# Patient Record
Sex: FEMALE | Race: WHITE | NOT HISPANIC OR LATINO | Employment: UNEMPLOYED | URBAN - METROPOLITAN AREA
[De-identification: names, ages, dates, MRNs, and addresses within clinical notes are randomized per-mention and may not be internally consistent; named-entity substitution may affect disease eponyms.]

---

## 2017-01-01 ENCOUNTER — HOSPITAL ENCOUNTER (INPATIENT)
Facility: HOSPITAL | Age: 52
LOS: 2 days | Discharge: ANOTHER HOSPITAL | DRG: 949 | End: 2017-06-28
Attending: PHYSICAL MEDICINE & REHABILITATION | Admitting: PHYSICAL MEDICINE & REHABILITATION
Payer: MEDICAID

## 2017-01-01 ENCOUNTER — ANESTHESIA EVENT (OUTPATIENT)
Dept: ENDOSCOPY | Facility: HOSPITAL | Age: 52
DRG: 643 | End: 2017-01-01
Payer: MEDICAID

## 2017-01-01 ENCOUNTER — OFFICE VISIT (OUTPATIENT)
Dept: NEUROSURGERY | Facility: CLINIC | Age: 52
End: 2017-01-01
Payer: MEDICAID

## 2017-01-01 ENCOUNTER — ANESTHESIA (OUTPATIENT)
Dept: SURGERY | Facility: HOSPITAL | Age: 52
DRG: 025 | End: 2017-01-01
Payer: MEDICAID

## 2017-01-01 ENCOUNTER — HOSPITAL ENCOUNTER (INPATIENT)
Facility: HOSPITAL | Age: 52
LOS: 1 days | DRG: 871 | End: 2017-08-31
Attending: EMERGENCY MEDICINE | Admitting: INTERNAL MEDICINE
Payer: MEDICAID

## 2017-01-01 ENCOUNTER — ANESTHESIA EVENT (OUTPATIENT)
Dept: SURGERY | Facility: HOSPITAL | Age: 52
DRG: 025 | End: 2017-01-01
Payer: MEDICAID

## 2017-01-01 ENCOUNTER — HOSPITAL ENCOUNTER (INPATIENT)
Facility: HOSPITAL | Age: 52
LOS: 5 days | Discharge: NURSING, REHAB OR LONG-TERM CARE | DRG: 871 | End: 2017-08-16
Attending: EMERGENCY MEDICINE | Admitting: INTERNAL MEDICINE
Payer: MEDICAID

## 2017-01-01 ENCOUNTER — HOSPITAL ENCOUNTER (OUTPATIENT)
Dept: RADIOLOGY | Facility: HOSPITAL | Age: 52
Discharge: HOME | DRG: 025 | End: 2017-06-06
Attending: NEUROLOGICAL SURGERY
Payer: MEDICAID

## 2017-01-01 ENCOUNTER — HOSPITAL ENCOUNTER (INPATIENT)
Facility: HOSPITAL | Age: 52
LOS: 43 days | Discharge: NURSING, REHAB OR LONG-TERM CARE | DRG: 643 | End: 2017-08-10
Attending: EMERGENCY MEDICINE | Admitting: INTERNAL MEDICINE
Payer: MEDICAID

## 2017-01-01 ENCOUNTER — SURGERY (OUTPATIENT)
Age: 52
End: 2017-01-01

## 2017-01-01 ENCOUNTER — TELEPHONE (OUTPATIENT)
Dept: NEUROSURGERY | Facility: CLINIC | Age: 52
End: 2017-01-01

## 2017-01-01 ENCOUNTER — PATIENT OUTREACH (OUTPATIENT)
Dept: ADMINISTRATIVE | Facility: CLINIC | Age: 52
End: 2017-01-01

## 2017-01-01 ENCOUNTER — ANESTHESIA (OUTPATIENT)
Dept: ENDOSCOPY | Facility: HOSPITAL | Age: 52
DRG: 643 | End: 2017-01-01
Payer: MEDICAID

## 2017-01-01 ENCOUNTER — HOSPITAL ENCOUNTER (INPATIENT)
Facility: HOSPITAL | Age: 52
LOS: 19 days | Discharge: NURSING, REHAB OR LONG-TERM CARE | DRG: 025 | End: 2017-06-26
Attending: NEUROLOGICAL SURGERY | Admitting: NEUROLOGICAL SURGERY
Payer: MEDICAID

## 2017-01-01 ENCOUNTER — TELEPHONE (OUTPATIENT)
Dept: ENDOCRINOLOGY | Facility: CLINIC | Age: 52
End: 2017-01-01

## 2017-01-01 VITALS
TEMPERATURE: 98 F | SYSTOLIC BLOOD PRESSURE: 113 MMHG | HEART RATE: 91 BPM | WEIGHT: 130.88 LBS | BODY MASS INDEX: 24.09 KG/M2 | DIASTOLIC BLOOD PRESSURE: 76 MMHG | HEIGHT: 62 IN

## 2017-01-01 VITALS
SYSTOLIC BLOOD PRESSURE: 95 MMHG | TEMPERATURE: 98 F | RESPIRATION RATE: 18 BRPM | OXYGEN SATURATION: 93 % | HEIGHT: 62 IN | WEIGHT: 118.38 LBS | HEART RATE: 118 BPM | DIASTOLIC BLOOD PRESSURE: 72 MMHG | BODY MASS INDEX: 21.79 KG/M2

## 2017-01-01 VITALS
OXYGEN SATURATION: 93 % | HEIGHT: 62 IN | BODY MASS INDEX: 23.93 KG/M2 | WEIGHT: 130.06 LBS | SYSTOLIC BLOOD PRESSURE: 116 MMHG | OXYGEN SATURATION: 96 % | TEMPERATURE: 98 F | HEART RATE: 62 BPM | DIASTOLIC BLOOD PRESSURE: 75 MMHG | HEIGHT: 62 IN | WEIGHT: 130.06 LBS | BODY MASS INDEX: 23.93 KG/M2 | DIASTOLIC BLOOD PRESSURE: 68 MMHG | SYSTOLIC BLOOD PRESSURE: 137 MMHG | HEART RATE: 69 BPM | TEMPERATURE: 97 F | RESPIRATION RATE: 18 BRPM | RESPIRATION RATE: 24 BRPM

## 2017-01-01 VITALS
SYSTOLIC BLOOD PRESSURE: 105 MMHG | HEART RATE: 115 BPM | BODY MASS INDEX: 24.38 KG/M2 | RESPIRATION RATE: 17 BRPM | WEIGHT: 132.5 LBS | TEMPERATURE: 100 F | HEIGHT: 62 IN | OXYGEN SATURATION: 96 % | DIASTOLIC BLOOD PRESSURE: 72 MMHG

## 2017-01-01 VITALS
DIASTOLIC BLOOD PRESSURE: 70 MMHG | OXYGEN SATURATION: 91 % | WEIGHT: 123.88 LBS | SYSTOLIC BLOOD PRESSURE: 112 MMHG | BODY MASS INDEX: 22.8 KG/M2 | TEMPERATURE: 99 F | RESPIRATION RATE: 18 BRPM | HEIGHT: 62 IN | HEART RATE: 121 BPM

## 2017-01-01 DIAGNOSIS — B95.62 MRSA BACTEREMIA: Primary | ICD-10-CM

## 2017-01-01 DIAGNOSIS — Z01.818 ENCOUNTER FOR INTUBATION: ICD-10-CM

## 2017-01-01 DIAGNOSIS — R45.1 RESTLESSNESS AND AGITATION: ICD-10-CM

## 2017-01-01 DIAGNOSIS — T68.XXXA HYPOTHERMIA, INITIAL ENCOUNTER: ICD-10-CM

## 2017-01-01 DIAGNOSIS — E23.2 ACQUIRED NEUROGENIC DIABETES INSIPIDUS: ICD-10-CM

## 2017-01-01 DIAGNOSIS — E27.49 SECONDARY ADRENAL INSUFFICIENCY: ICD-10-CM

## 2017-01-01 DIAGNOSIS — F17.210 CIGARETTE SMOKER: ICD-10-CM

## 2017-01-01 DIAGNOSIS — R78.81 MRSA BACTEREMIA: Primary | ICD-10-CM

## 2017-01-01 DIAGNOSIS — T68.XXXA HYPOTHERMIA: ICD-10-CM

## 2017-01-01 DIAGNOSIS — D32.9 MENINGIOMA: Primary | ICD-10-CM

## 2017-01-01 DIAGNOSIS — E87.0 HYPERNATREMIA: Primary | ICD-10-CM

## 2017-01-01 DIAGNOSIS — G93.5 BRAIN COMPRESSION: ICD-10-CM

## 2017-01-01 DIAGNOSIS — I95.9 HYPOTENSION, UNSPECIFIED HYPOTENSION TYPE: ICD-10-CM

## 2017-01-01 DIAGNOSIS — R13.12 OROPHARYNGEAL DYSPHAGIA: ICD-10-CM

## 2017-01-01 DIAGNOSIS — E23.2 DIABETES INSIPIDUS: Chronic | ICD-10-CM

## 2017-01-01 DIAGNOSIS — E83.52 HYPERCALCEMIA: ICD-10-CM

## 2017-01-01 DIAGNOSIS — R45.1 AGITATION: ICD-10-CM

## 2017-01-01 DIAGNOSIS — E03.8 SECONDARY HYPOTHYROIDISM: ICD-10-CM

## 2017-01-01 DIAGNOSIS — G93.41 ENCEPHALOPATHY, METABOLIC: ICD-10-CM

## 2017-01-01 DIAGNOSIS — D32.9 MENINGIOMA: ICD-10-CM

## 2017-01-01 DIAGNOSIS — A41.02 MRSA (METHICILLIN RESISTANT STAPHYLOCOCCUS AUREUS) SEPTICEMIA: ICD-10-CM

## 2017-01-01 DIAGNOSIS — E03.8 SECONDARY HYPOTHYROIDISM: Chronic | ICD-10-CM

## 2017-01-01 DIAGNOSIS — Z98.890 S/P CRANIOTOMY: ICD-10-CM

## 2017-01-01 DIAGNOSIS — D32.0 MENINGIOMA, RECURRENT OF BRAIN: ICD-10-CM

## 2017-01-01 DIAGNOSIS — Z71.89 GOALS OF CARE, COUNSELING/DISCUSSION: ICD-10-CM

## 2017-01-01 DIAGNOSIS — R41.82 ALTERED MENTAL STATUS, UNSPECIFIED ALTERED MENTAL STATUS TYPE: ICD-10-CM

## 2017-01-01 DIAGNOSIS — E23.2 PRIMARY CENTRAL DIABETES INSIPIDUS: ICD-10-CM

## 2017-01-01 DIAGNOSIS — E44.0 MALNUTRITION OF MODERATE DEGREE: ICD-10-CM

## 2017-01-01 DIAGNOSIS — G93.89 PNEUMOCEPHALUS: ICD-10-CM

## 2017-01-01 DIAGNOSIS — J96.01 ACUTE HYPOXEMIC RESPIRATORY FAILURE: ICD-10-CM

## 2017-01-01 DIAGNOSIS — A49.01 STAPH AUREUS INFECTION: ICD-10-CM

## 2017-01-01 DIAGNOSIS — E23.2 DIABETES INSIPIDUS: Primary | ICD-10-CM

## 2017-01-01 DIAGNOSIS — A41.9 SEPTIC SHOCK: ICD-10-CM

## 2017-01-01 DIAGNOSIS — E87.20 ACIDOSIS: ICD-10-CM

## 2017-01-01 DIAGNOSIS — A41.9 SEPSIS: ICD-10-CM

## 2017-01-01 DIAGNOSIS — T38.0X5A ADVERSE EFFECT OF GLUCOCORTICOID OR SYNTHETIC ANALOGUE, INITIAL ENCOUNTER: ICD-10-CM

## 2017-01-01 DIAGNOSIS — R13.11 ORAL PHASE DYSPHAGIA: ICD-10-CM

## 2017-01-01 DIAGNOSIS — R65.21 SEPTIC SHOCK: ICD-10-CM

## 2017-01-01 DIAGNOSIS — R40.1 STUPOR: Primary | ICD-10-CM

## 2017-01-01 DIAGNOSIS — E23.2 DIABETES INSIPIDUS: ICD-10-CM

## 2017-01-01 DIAGNOSIS — S09.90XA CHI (CLOSED HEAD INJURY): ICD-10-CM

## 2017-01-01 DIAGNOSIS — R00.1 BRADYCARDIA: ICD-10-CM

## 2017-01-01 DIAGNOSIS — R41.82 ALTERED MENTAL STATUS: ICD-10-CM

## 2017-01-01 DIAGNOSIS — J18.9 BILATERAL PNEUMONIA: ICD-10-CM

## 2017-01-01 DIAGNOSIS — G93.40 ACUTE ENCEPHALOPATHY: ICD-10-CM

## 2017-01-01 DIAGNOSIS — R47.81 SLURRED SPEECH: ICD-10-CM

## 2017-01-01 DIAGNOSIS — E27.49 SECONDARY ADRENAL INSUFFICIENCY: Primary | ICD-10-CM

## 2017-01-01 DIAGNOSIS — R00.0 TACHYARRHYTHMIA: ICD-10-CM

## 2017-01-01 DIAGNOSIS — R06.89 RESPIRATORY INSUFFICIENCY: ICD-10-CM

## 2017-01-01 DIAGNOSIS — T68.XXXD HYPOTHERMIA, SUBSEQUENT ENCOUNTER: ICD-10-CM

## 2017-01-01 DIAGNOSIS — R57.9 SHOCK: ICD-10-CM

## 2017-01-01 LAB
1,25(OH)2D3 SERPL-MCNC: 8 PG/ML
25(OH)D3+25(OH)D2 SERPL-MCNC: 28 NG/ML
ABO + RH BLD: NORMAL
ACE CSF-CCNC: 1.3 U/L (ref 0–2.5)
ACTH PLAS-MCNC: 15 PG/ML
ACTH PLAS-MCNC: 19 PG/ML
ALBUMIN SERPL BCP-MCNC: 2.2 G/DL
ALBUMIN SERPL BCP-MCNC: 2.2 G/DL
ALBUMIN SERPL BCP-MCNC: 2.3 G/DL
ALBUMIN SERPL BCP-MCNC: 2.5 G/DL
ALBUMIN SERPL BCP-MCNC: 2.5 G/DL
ALBUMIN SERPL BCP-MCNC: 2.6 G/DL
ALBUMIN SERPL BCP-MCNC: 2.6 G/DL
ALBUMIN SERPL BCP-MCNC: 2.7 G/DL
ALBUMIN SERPL BCP-MCNC: 2.8 G/DL
ALBUMIN SERPL BCP-MCNC: 2.9 G/DL
ALBUMIN SERPL BCP-MCNC: 3 G/DL
ALBUMIN SERPL BCP-MCNC: 3.1 G/DL
ALBUMIN SERPL BCP-MCNC: 3.2 G/DL
ALBUMIN SERPL BCP-MCNC: 3.3 G/DL
ALBUMIN SERPL BCP-MCNC: 3.4 G/DL
ALBUMIN SERPL BCP-MCNC: 3.5 G/DL
ALBUMIN SERPL BCP-MCNC: 3.6 G/DL
ALBUMIN SERPL BCP-MCNC: 3.7 G/DL
ALBUMIN SERPL BCP-MCNC: 3.7 G/DL
ALBUMIN SERPL BCP-MCNC: 3.8 G/DL
ALBUMIN SERPL BCP-MCNC: 3.9 G/DL
ALBUMIN SERPL ELPH-MCNC: 3.5 G/DL
ALLENS TEST: ABNORMAL
ALLENS TEST: NORMAL
ALP SERPL-CCNC: 100 U/L
ALP SERPL-CCNC: 107 U/L
ALP SERPL-CCNC: 107 U/L
ALP SERPL-CCNC: 109 U/L
ALP SERPL-CCNC: 111 U/L
ALP SERPL-CCNC: 111 U/L
ALP SERPL-CCNC: 113 U/L
ALP SERPL-CCNC: 122 U/L
ALP SERPL-CCNC: 122 U/L
ALP SERPL-CCNC: 126 U/L
ALP SERPL-CCNC: 128 U/L
ALP SERPL-CCNC: 128 U/L
ALP SERPL-CCNC: 130 U/L
ALP SERPL-CCNC: 130 U/L
ALP SERPL-CCNC: 132 U/L
ALP SERPL-CCNC: 134 U/L
ALP SERPL-CCNC: 135 U/L
ALP SERPL-CCNC: 139 U/L
ALP SERPL-CCNC: 141 U/L
ALP SERPL-CCNC: 145 U/L
ALP SERPL-CCNC: 145 U/L
ALP SERPL-CCNC: 147 U/L
ALP SERPL-CCNC: 148 U/L
ALP SERPL-CCNC: 158 U/L
ALP SERPL-CCNC: 163 U/L
ALP SERPL-CCNC: 167 U/L
ALP SERPL-CCNC: 177 U/L
ALP SERPL-CCNC: 185 U/L
ALP SERPL-CCNC: 218 U/L
ALP SERPL-CCNC: 62 U/L
ALP SERPL-CCNC: 64 U/L
ALP SERPL-CCNC: 65 U/L
ALP SERPL-CCNC: 66 U/L
ALP SERPL-CCNC: 72 U/L
ALP SERPL-CCNC: 74 U/L
ALP SERPL-CCNC: 80 U/L
ALP SERPL-CCNC: 83 U/L
ALP SERPL-CCNC: 84 U/L
ALP SERPL-CCNC: 86 U/L
ALP SERPL-CCNC: 87 U/L
ALP SERPL-CCNC: 89 U/L
ALP SERPL-CCNC: 90 U/L
ALP SERPL-CCNC: 93 U/L
ALP SERPL-CCNC: 95 U/L
ALP SERPL-CCNC: 96 U/L
ALP SERPL-CCNC: 97 U/L
ALPHA1 GLOB SERPL ELPH-MCNC: 0.65 G/DL
ALPHA2 GLOB SERPL ELPH-MCNC: 1.34 G/DL
ALT SERPL W/O P-5'-P-CCNC: 115 U/L
ALT SERPL W/O P-5'-P-CCNC: 155 U/L
ALT SERPL W/O P-5'-P-CCNC: 167 U/L
ALT SERPL W/O P-5'-P-CCNC: 22 U/L
ALT SERPL W/O P-5'-P-CCNC: 25 U/L
ALT SERPL W/O P-5'-P-CCNC: 25 U/L
ALT SERPL W/O P-5'-P-CCNC: 26 U/L
ALT SERPL W/O P-5'-P-CCNC: 27 U/L
ALT SERPL W/O P-5'-P-CCNC: 29 U/L
ALT SERPL W/O P-5'-P-CCNC: 30 U/L
ALT SERPL W/O P-5'-P-CCNC: 30 U/L
ALT SERPL W/O P-5'-P-CCNC: 32 U/L
ALT SERPL W/O P-5'-P-CCNC: 33 U/L
ALT SERPL W/O P-5'-P-CCNC: 36 U/L
ALT SERPL W/O P-5'-P-CCNC: 38 U/L
ALT SERPL W/O P-5'-P-CCNC: 42 U/L
ALT SERPL W/O P-5'-P-CCNC: 42 U/L
ALT SERPL W/O P-5'-P-CCNC: 44 U/L
ALT SERPL W/O P-5'-P-CCNC: 44 U/L
ALT SERPL W/O P-5'-P-CCNC: 45 U/L
ALT SERPL W/O P-5'-P-CCNC: 50 U/L
ALT SERPL W/O P-5'-P-CCNC: 58 U/L
ALT SERPL W/O P-5'-P-CCNC: 59 U/L
ALT SERPL W/O P-5'-P-CCNC: 61 U/L
ALT SERPL W/O P-5'-P-CCNC: 63 U/L
ALT SERPL W/O P-5'-P-CCNC: 64 U/L
ALT SERPL W/O P-5'-P-CCNC: 65 U/L
ALT SERPL W/O P-5'-P-CCNC: 65 U/L
ALT SERPL W/O P-5'-P-CCNC: 66 U/L
ALT SERPL W/O P-5'-P-CCNC: 67 U/L
ALT SERPL W/O P-5'-P-CCNC: 70 U/L
ALT SERPL W/O P-5'-P-CCNC: 71 U/L
ALT SERPL W/O P-5'-P-CCNC: 73 U/L
ALT SERPL W/O P-5'-P-CCNC: 73 U/L
ALT SERPL W/O P-5'-P-CCNC: 74 U/L
ALT SERPL W/O P-5'-P-CCNC: 77 U/L
ALT SERPL W/O P-5'-P-CCNC: 79 U/L
ALT SERPL W/O P-5'-P-CCNC: 82 U/L
ALT SERPL W/O P-5'-P-CCNC: 88 U/L
ALT SERPL W/O P-5'-P-CCNC: 92 U/L
AMMONIA PLAS-SCNC: 32 UMOL/L
AMMONIA PLAS-SCNC: 40 UMOL/L
AMORPH CRY UR QL COMP ASSIST: ABNORMAL
AMORPH CRY UR QL COMP ASSIST: NORMAL
AMPHET+METHAMPHET UR QL: NEGATIVE
ANION GAP SERPL CALC-SCNC: 10 MMOL/L
ANION GAP SERPL CALC-SCNC: 11 MMOL/L
ANION GAP SERPL CALC-SCNC: 12 MMOL/L
ANION GAP SERPL CALC-SCNC: 13 MMOL/L
ANION GAP SERPL CALC-SCNC: 14 MMOL/L
ANION GAP SERPL CALC-SCNC: 14 MMOL/L
ANION GAP SERPL CALC-SCNC: 15 MMOL/L
ANION GAP SERPL CALC-SCNC: 15 MMOL/L
ANION GAP SERPL CALC-SCNC: 16 MMOL/L
ANION GAP SERPL CALC-SCNC: 6 MMOL/L
ANION GAP SERPL CALC-SCNC: 7 MMOL/L
ANION GAP SERPL CALC-SCNC: 8 MMOL/L
ANION GAP SERPL CALC-SCNC: 9 MMOL/L
ANION GAP SERPL CALC-SCNC: ABNORMAL MMOL/L
ANISOCYTOSIS BLD QL SMEAR: SLIGHT
APTT BLDCRRT: 21.1 SEC
APTT BLDCRRT: 21.4 SEC
APTT BLDCRRT: 21.5 SEC
APTT BLDCRRT: 21.8 SEC
APTT BLDCRRT: 22.1 SEC
APTT BLDCRRT: 22.4 SEC
APTT BLDCRRT: 22.8 SEC
APTT BLDCRRT: 23.8 SEC
APTT BLDCRRT: 24.5 SEC
APTT BLDCRRT: 25.2 SEC
APTT BLDCRRT: 27.6 SEC
APTT BLDCRRT: 29 SEC
APTT BLDCRRT: 29.7 SEC
APTT BLDCRRT: 30.7 SEC
APTT BLDCRRT: 31.4 SEC
APTT BLDCRRT: 32.1 SEC
APTT BLDCRRT: 32.1 SEC
APTT BLDCRRT: 32.2 SEC
APTT BLDCRRT: 33.4 SEC
APTT BLDCRRT: 34.3 SEC
APTT BLDCRRT: 35.9 SEC
APTT BLDCRRT: 58.5 SEC
APTT BLDCRRT: 79.9 SEC
AST SERPL-CCNC: 104 U/L
AST SERPL-CCNC: 22 U/L
AST SERPL-CCNC: 23 U/L
AST SERPL-CCNC: 24 U/L
AST SERPL-CCNC: 24 U/L
AST SERPL-CCNC: 29 U/L
AST SERPL-CCNC: 30 U/L
AST SERPL-CCNC: 31 U/L
AST SERPL-CCNC: 34 U/L
AST SERPL-CCNC: 35 U/L
AST SERPL-CCNC: 35 U/L
AST SERPL-CCNC: 36 U/L
AST SERPL-CCNC: 38 U/L
AST SERPL-CCNC: 38 U/L
AST SERPL-CCNC: 39 U/L
AST SERPL-CCNC: 41 U/L
AST SERPL-CCNC: 41 U/L
AST SERPL-CCNC: 42 U/L
AST SERPL-CCNC: 44 U/L
AST SERPL-CCNC: 44 U/L
AST SERPL-CCNC: 46 U/L
AST SERPL-CCNC: 47 U/L
AST SERPL-CCNC: 47 U/L
AST SERPL-CCNC: 48 U/L
AST SERPL-CCNC: 49 U/L
AST SERPL-CCNC: 50 U/L
AST SERPL-CCNC: 51 U/L
AST SERPL-CCNC: 51 U/L
AST SERPL-CCNC: 53 U/L
AST SERPL-CCNC: 56 U/L
AST SERPL-CCNC: 57 U/L
AST SERPL-CCNC: 57 U/L
AST SERPL-CCNC: 58 U/L
AST SERPL-CCNC: 62 U/L
AST SERPL-CCNC: 62 U/L
AST SERPL-CCNC: 68 U/L
AST SERPL-CCNC: 81 U/L
AST SERPL-CCNC: 87 U/L
AST SERPL-CCNC: 90 U/L
AST SERPL-CCNC: 91 U/L
B-GLOBULIN SERPL ELPH-MCNC: 1.14 G/DL
B-HCG UR QL: NEGATIVE
BACTERIA #/AREA URNS AUTO: ABNORMAL /HPF
BACTERIA #/AREA URNS AUTO: NORMAL /HPF
BACTERIA BLD CULT: NORMAL
BACTERIA SPEC AEROBE CULT: NORMAL
BACTERIA UR CULT: NO GROWTH
BARBITURATES UR QL SCN>200 NG/ML: NEGATIVE
BASOPHILS # BLD AUTO: 0 K/UL
BASOPHILS # BLD AUTO: 0.01 K/UL
BASOPHILS # BLD AUTO: 0.02 K/UL
BASOPHILS # BLD AUTO: 0.03 K/UL
BASOPHILS # BLD AUTO: 0.04 K/UL
BASOPHILS # BLD AUTO: 0.05 K/UL
BASOPHILS # BLD AUTO: 0.06 K/UL
BASOPHILS # BLD AUTO: 0.09 K/UL
BASOPHILS # BLD AUTO: ABNORMAL K/UL
BASOPHILS # BLD AUTO: ABNORMAL K/UL
BASOPHILS NFR BLD: 0 %
BASOPHILS NFR BLD: 0.1 %
BASOPHILS NFR BLD: 0.2 %
BASOPHILS NFR BLD: 0.3 %
BASOPHILS NFR BLD: 0.4 %
BASOPHILS NFR BLD: 0.5 %
BASOPHILS NFR BLD: 0.6 %
BASOPHILS NFR BLD: 0.7 %
BASOPHILS NFR BLD: 0.7 %
BASOPHILS NFR BLD: 0.8 %
BASOPHILS NFR BLD: 0.9 %
BASOPHILS NFR BLD: 1 %
BENZODIAZ UR QL SCN>200 NG/ML: NEGATIVE
BENZODIAZ UR QL SCN>200 NG/ML: NEGATIVE
BENZODIAZ UR QL SCN>200 NG/ML: NORMAL
BILIRUB DIRECT SERPL-MCNC: 0.1 MG/DL
BILIRUB DIRECT SERPL-MCNC: 0.2 MG/DL
BILIRUB DIRECT SERPL-MCNC: 0.2 MG/DL
BILIRUB SERPL-MCNC: 0.1 MG/DL
BILIRUB SERPL-MCNC: 0.2 MG/DL
BILIRUB SERPL-MCNC: 0.3 MG/DL
BILIRUB SERPL-MCNC: 0.4 MG/DL
BILIRUB SERPL-MCNC: 0.5 MG/DL
BILIRUB UR QL STRIP: ABNORMAL
BILIRUB UR QL STRIP: ABNORMAL
BILIRUB UR QL STRIP: NEGATIVE
BLD GP AB SCN CELLS X3 SERPL QL: NORMAL
BLD PROD TYP BPU: NORMAL
BLD PROD TYP BPU: NORMAL
BLOOD UNIT EXPIRATION DATE: NORMAL
BLOOD UNIT EXPIRATION DATE: NORMAL
BLOOD UNIT TYPE CODE: 600
BLOOD UNIT TYPE CODE: 600
BLOOD UNIT TYPE: NORMAL
BLOOD UNIT TYPE: NORMAL
BNP SERPL-MCNC: 59 PG/ML
BNP SERPL-MCNC: <10 PG/ML
BUN SERPL-MCNC: 10 MG/DL
BUN SERPL-MCNC: 11 MG/DL
BUN SERPL-MCNC: 12 MG/DL
BUN SERPL-MCNC: 13 MG/DL
BUN SERPL-MCNC: 14 MG/DL
BUN SERPL-MCNC: 15 MG/DL
BUN SERPL-MCNC: 16 MG/DL
BUN SERPL-MCNC: 16 MG/DL (ref 6–30)
BUN SERPL-MCNC: 17 MG/DL
BUN SERPL-MCNC: 18 MG/DL
BUN SERPL-MCNC: 19 MG/DL
BUN SERPL-MCNC: 20 MG/DL
BUN SERPL-MCNC: 21 MG/DL
BUN SERPL-MCNC: 22 MG/DL
BUN SERPL-MCNC: 23 MG/DL
BUN SERPL-MCNC: 23 MG/DL
BUN SERPL-MCNC: 24 MG/DL
BUN SERPL-MCNC: 25 MG/DL
BUN SERPL-MCNC: 26 MG/DL
BUN SERPL-MCNC: 27 MG/DL
BUN SERPL-MCNC: 28 MG/DL
BUN SERPL-MCNC: 31 MG/DL
BUN SERPL-MCNC: 31 MG/DL
BUN SERPL-MCNC: 32 MG/DL
BUN SERPL-MCNC: 33 MG/DL
BUN SERPL-MCNC: 33 MG/DL
BUN SERPL-MCNC: 35 MG/DL
BUN SERPL-MCNC: 6 MG/DL
BUN SERPL-MCNC: 7 MG/DL
BUN SERPL-MCNC: 8 MG/DL
BUN SERPL-MCNC: 8 MG/DL
BUN SERPL-MCNC: 9 MG/DL
BZE UR QL SCN: NEGATIVE
CA-I BLDV-SCNC: 1.25 MMOL/L
CA-I BLDV-SCNC: 1.33 MMOL/L
CALCIUM SERPL-MCNC: 10 MG/DL
CALCIUM SERPL-MCNC: 10.1 MG/DL
CALCIUM SERPL-MCNC: 10.2 MG/DL
CALCIUM SERPL-MCNC: 10.3 MG/DL
CALCIUM SERPL-MCNC: 10.4 MG/DL
CALCIUM SERPL-MCNC: 10.5 MG/DL
CALCIUM SERPL-MCNC: 10.6 MG/DL
CALCIUM SERPL-MCNC: 10.7 MG/DL
CALCIUM SERPL-MCNC: 10.7 MG/DL
CALCIUM SERPL-MCNC: 10.8 MG/DL
CALCIUM SERPL-MCNC: 10.9 MG/DL
CALCIUM SERPL-MCNC: 11 MG/DL
CALCIUM SERPL-MCNC: 11.1 MG/DL
CALCIUM SERPL-MCNC: 11.2 MG/DL
CALCIUM SERPL-MCNC: 11.3 MG/DL
CALCIUM SERPL-MCNC: 11.4 MG/DL
CALCIUM SERPL-MCNC: 11.4 MG/DL
CALCIUM SERPL-MCNC: 11.6 MG/DL
CALCIUM SERPL-MCNC: 11.7 MG/DL
CALCIUM SERPL-MCNC: 11.8 MG/DL
CALCIUM SERPL-MCNC: 11.9 MG/DL
CALCIUM SERPL-MCNC: 12 MG/DL
CALCIUM SERPL-MCNC: 12.2 MG/DL
CALCIUM SERPL-MCNC: 12.7 MG/DL
CALCIUM SERPL-MCNC: 7.4 MG/DL
CALCIUM SERPL-MCNC: 7.5 MG/DL
CALCIUM SERPL-MCNC: 8 MG/DL
CALCIUM SERPL-MCNC: 8.1 MG/DL
CALCIUM SERPL-MCNC: 8.3 MG/DL
CALCIUM SERPL-MCNC: 8.3 MG/DL
CALCIUM SERPL-MCNC: 8.4 MG/DL
CALCIUM SERPL-MCNC: 8.4 MG/DL
CALCIUM SERPL-MCNC: 8.5 MG/DL
CALCIUM SERPL-MCNC: 8.6 MG/DL
CALCIUM SERPL-MCNC: 8.8 MG/DL
CALCIUM SERPL-MCNC: 9 MG/DL
CALCIUM SERPL-MCNC: 9.1 MG/DL
CALCIUM SERPL-MCNC: 9.2 MG/DL
CALCIUM SERPL-MCNC: 9.3 MG/DL
CALCIUM SERPL-MCNC: 9.4 MG/DL
CALCIUM SERPL-MCNC: 9.5 MG/DL
CALCIUM SERPL-MCNC: 9.6 MG/DL
CALCIUM SERPL-MCNC: 9.7 MG/DL
CALCIUM SERPL-MCNC: 9.8 MG/DL
CALCIUM SERPL-MCNC: 9.8 MG/DL
CALCIUM SERPL-MCNC: 9.9 MG/DL
CANNABINOIDS UR QL SCN: NEGATIVE
CANNABINOIDS UR QL SCN: NEGATIVE
CANNABINOIDS UR QL SCN: NORMAL
CAOX CRY UR QL COMP ASSIST: ABNORMAL
CAOX CRY UR QL COMP ASSIST: ABNORMAL
CHLORIDE SERPL-SCNC: 100 MMOL/L
CHLORIDE SERPL-SCNC: 101 MMOL/L
CHLORIDE SERPL-SCNC: 102 MMOL/L
CHLORIDE SERPL-SCNC: 103 MMOL/L
CHLORIDE SERPL-SCNC: 104 MMOL/L
CHLORIDE SERPL-SCNC: 105 MMOL/L
CHLORIDE SERPL-SCNC: 106 MMOL/L
CHLORIDE SERPL-SCNC: 106 MMOL/L
CHLORIDE SERPL-SCNC: 107 MMOL/L
CHLORIDE SERPL-SCNC: 108 MMOL/L
CHLORIDE SERPL-SCNC: 109 MMOL/L
CHLORIDE SERPL-SCNC: 109 MMOL/L
CHLORIDE SERPL-SCNC: 109 MMOL/L (ref 95–110)
CHLORIDE SERPL-SCNC: 110 MMOL/L
CHLORIDE SERPL-SCNC: 111 MMOL/L
CHLORIDE SERPL-SCNC: 112 MMOL/L
CHLORIDE SERPL-SCNC: 113 MMOL/L
CHLORIDE SERPL-SCNC: 114 MMOL/L
CHLORIDE SERPL-SCNC: 114 MMOL/L
CHLORIDE SERPL-SCNC: 115 MMOL/L
CHLORIDE SERPL-SCNC: 116 MMOL/L
CHLORIDE SERPL-SCNC: 117 MMOL/L
CHLORIDE SERPL-SCNC: 118 MMOL/L
CHLORIDE SERPL-SCNC: 119 MMOL/L
CHLORIDE SERPL-SCNC: 120 MMOL/L
CHLORIDE SERPL-SCNC: 120 MMOL/L
CHLORIDE SERPL-SCNC: 122 MMOL/L
CHLORIDE SERPL-SCNC: 122 MMOL/L
CHLORIDE SERPL-SCNC: 124 MMOL/L
CHLORIDE SERPL-SCNC: 125 MMOL/L
CHLORIDE SERPL-SCNC: 126 MMOL/L
CHLORIDE SERPL-SCNC: 127 MMOL/L
CHLORIDE SERPL-SCNC: 128 MMOL/L
CHLORIDE SERPL-SCNC: 84 MMOL/L
CHLORIDE SERPL-SCNC: 91 MMOL/L
CHLORIDE SERPL-SCNC: 94 MMOL/L
CHLORIDE SERPL-SCNC: 95 MMOL/L
CHLORIDE SERPL-SCNC: 96 MMOL/L
CHLORIDE SERPL-SCNC: 97 MMOL/L
CHLORIDE SERPL-SCNC: 98 MMOL/L
CHLORIDE SERPL-SCNC: 99 MMOL/L
CHLORIDE SERPL-SCNC: >130 MMOL/L
CLARITY CSF: CLEAR
CLARITY CSF: CLEAR
CLARITY UR REFRACT.AUTO: ABNORMAL
CLARITY UR REFRACT.AUTO: CLEAR
CMV SPEC QL SHELL VIAL CULT: NO GROWTH
CO2 SERPL-SCNC: 17 MMOL/L
CO2 SERPL-SCNC: 17 MMOL/L
CO2 SERPL-SCNC: 18 MMOL/L
CO2 SERPL-SCNC: 19 MMOL/L
CO2 SERPL-SCNC: 19 MMOL/L
CO2 SERPL-SCNC: 20 MMOL/L
CO2 SERPL-SCNC: 21 MMOL/L
CO2 SERPL-SCNC: 21 MMOL/L
CO2 SERPL-SCNC: 22 MMOL/L
CO2 SERPL-SCNC: 23 MMOL/L
CO2 SERPL-SCNC: 24 MMOL/L
CO2 SERPL-SCNC: 25 MMOL/L
CO2 SERPL-SCNC: 26 MMOL/L
CO2 SERPL-SCNC: 27 MMOL/L
CO2 SERPL-SCNC: 28 MMOL/L
CO2 SERPL-SCNC: 29 MMOL/L
CO2 SERPL-SCNC: 30 MMOL/L
CO2 SERPL-SCNC: 31 MMOL/L
CO2 SERPL-SCNC: 32 MMOL/L
CO2 SERPL-SCNC: 33 MMOL/L
CO2 SERPL-SCNC: 33 MMOL/L
CO2 SERPL-SCNC: 34 MMOL/L
CO2 SERPL-SCNC: 35 MMOL/L
CODING SYSTEM: NORMAL
CODING SYSTEM: NORMAL
COLOR CSF: COLORLESS
COLOR CSF: COLORLESS
COLOR UR AUTO: ABNORMAL
COLOR UR AUTO: COLORLESS
COLOR UR AUTO: NORMAL
COLOR UR AUTO: YELLOW
CORTIS SERPL-MCNC: 1.1 UG/DL
CORTIS SERPL-MCNC: 1.2 UG/DL
CORTIS SERPL-MCNC: 10.7 UG/DL
CORTIS SERPL-MCNC: 2.7 UG/DL
CORTIS SERPL-MCNC: 6.7 UG/DL
CORTIS SERPL-MCNC: 8.6 UG/DL
CREAT SERPL-MCNC: 0.5 MG/DL
CREAT SERPL-MCNC: 0.6 MG/DL
CREAT SERPL-MCNC: 0.6 MG/DL (ref 0.5–1.4)
CREAT SERPL-MCNC: 0.7 MG/DL
CREAT SERPL-MCNC: 0.8 MG/DL
CREAT SERPL-MCNC: 0.9 MG/DL
CREAT SERPL-MCNC: 1 MG/DL
CREAT SERPL-MCNC: 1.1 MG/DL
CREAT SERPL-MCNC: 1.2 MG/DL
CREAT SERPL-MCNC: 1.3 MG/DL
CREAT UR-MCNC: 105 MG/DL
CREAT UR-MCNC: 73 MG/DL
CREAT UR-MCNC: 73 MG/DL
CREAT UR-MCNC: 92 MG/DL
CRYPTOC AG CSF QL LA: NEGATIVE
CRYPTOC AG SER QL LA: NEGATIVE
DACRYOCYTES BLD QL SMEAR: ABNORMAL
DELSYS: ABNORMAL
DIASTOLIC DYSFUNCTION: NO
DIFFERENTIAL METHOD: ABNORMAL
DISPENSE STATUS: NORMAL
DISPENSE STATUS: NORMAL
DOHLE BOD BLD QL SMEAR: PRESENT
EOSINOPHIL # BLD AUTO: 0 K/UL
EOSINOPHIL # BLD AUTO: 0.1 K/UL
EOSINOPHIL # BLD AUTO: 0.2 K/UL
EOSINOPHIL # BLD AUTO: 0.3 K/UL
EOSINOPHIL # BLD AUTO: 0.4 K/UL
EOSINOPHIL # BLD AUTO: 0.5 K/UL
EOSINOPHIL # BLD AUTO: ABNORMAL K/UL
EOSINOPHIL # BLD AUTO: ABNORMAL K/UL
EOSINOPHIL NFR BLD: 0 %
EOSINOPHIL NFR BLD: 0.1 %
EOSINOPHIL NFR BLD: 0.1 %
EOSINOPHIL NFR BLD: 0.3 %
EOSINOPHIL NFR BLD: 0.5 %
EOSINOPHIL NFR BLD: 0.6 %
EOSINOPHIL NFR BLD: 0.7 %
EOSINOPHIL NFR BLD: 1 %
EOSINOPHIL NFR BLD: 1.4 %
EOSINOPHIL NFR BLD: 1.5 %
EOSINOPHIL NFR BLD: 1.6 %
EOSINOPHIL NFR BLD: 1.6 %
EOSINOPHIL NFR BLD: 1.8 %
EOSINOPHIL NFR BLD: 1.8 %
EOSINOPHIL NFR BLD: 2 %
EOSINOPHIL NFR BLD: 2.1 %
EOSINOPHIL NFR BLD: 2.2 %
EOSINOPHIL NFR BLD: 2.2 %
EOSINOPHIL NFR BLD: 2.4 %
EOSINOPHIL NFR BLD: 2.4 %
EOSINOPHIL NFR BLD: 2.5 %
EOSINOPHIL NFR BLD: 2.7 %
EOSINOPHIL NFR BLD: 2.8 %
EOSINOPHIL NFR BLD: 2.9 %
EOSINOPHIL NFR BLD: 3 %
EOSINOPHIL NFR BLD: 3 %
EOSINOPHIL NFR BLD: 3.1 %
EOSINOPHIL NFR BLD: 3.1 %
EOSINOPHIL NFR BLD: 3.2 %
EOSINOPHIL NFR BLD: 3.3 %
EOSINOPHIL NFR BLD: 3.3 %
EOSINOPHIL NFR BLD: 3.4 %
EOSINOPHIL NFR BLD: 3.7 %
EOSINOPHIL NFR BLD: 3.8 %
EOSINOPHIL NFR BLD: 3.9 %
EOSINOPHIL NFR BLD: 4 %
EOSINOPHIL NFR BLD: 4.3 %
EOSINOPHIL NFR BLD: 4.4 %
EOSINOPHIL NFR BLD: 4.6 %
EOSINOPHIL NFR BLD: 4.6 %
EOSINOPHIL NFR BLD: 4.9 %
EOSINOPHIL NFR BLD: 5 %
EOSINOPHIL NFR BLD: 5 %
EOSINOPHIL NFR BLD: 5.1 %
EOSINOPHIL NFR BLD: 5.3 %
EOSINOPHIL NFR BLD: 6 %
EOSINOPHIL NFR CSF MANUAL: 1 %
ERYTHROCYTE [DISTWIDTH] IN BLOOD BY AUTOMATED COUNT: 13.4 %
ERYTHROCYTE [DISTWIDTH] IN BLOOD BY AUTOMATED COUNT: 13.7 %
ERYTHROCYTE [DISTWIDTH] IN BLOOD BY AUTOMATED COUNT: 13.8 %
ERYTHROCYTE [DISTWIDTH] IN BLOOD BY AUTOMATED COUNT: 13.8 %
ERYTHROCYTE [DISTWIDTH] IN BLOOD BY AUTOMATED COUNT: 13.9 %
ERYTHROCYTE [DISTWIDTH] IN BLOOD BY AUTOMATED COUNT: 13.9 %
ERYTHROCYTE [DISTWIDTH] IN BLOOD BY AUTOMATED COUNT: 14 %
ERYTHROCYTE [DISTWIDTH] IN BLOOD BY AUTOMATED COUNT: 14.1 %
ERYTHROCYTE [DISTWIDTH] IN BLOOD BY AUTOMATED COUNT: 14.2 %
ERYTHROCYTE [DISTWIDTH] IN BLOOD BY AUTOMATED COUNT: 14.3 %
ERYTHROCYTE [DISTWIDTH] IN BLOOD BY AUTOMATED COUNT: 14.3 %
ERYTHROCYTE [DISTWIDTH] IN BLOOD BY AUTOMATED COUNT: 14.4 %
ERYTHROCYTE [DISTWIDTH] IN BLOOD BY AUTOMATED COUNT: 14.4 %
ERYTHROCYTE [DISTWIDTH] IN BLOOD BY AUTOMATED COUNT: 14.5 %
ERYTHROCYTE [DISTWIDTH] IN BLOOD BY AUTOMATED COUNT: 14.6 %
ERYTHROCYTE [DISTWIDTH] IN BLOOD BY AUTOMATED COUNT: 14.7 %
ERYTHROCYTE [DISTWIDTH] IN BLOOD BY AUTOMATED COUNT: 14.8 %
ERYTHROCYTE [DISTWIDTH] IN BLOOD BY AUTOMATED COUNT: 14.9 %
ERYTHROCYTE [DISTWIDTH] IN BLOOD BY AUTOMATED COUNT: 14.9 %
ERYTHROCYTE [DISTWIDTH] IN BLOOD BY AUTOMATED COUNT: 15 %
ERYTHROCYTE [DISTWIDTH] IN BLOOD BY AUTOMATED COUNT: 15.1 %
ERYTHROCYTE [DISTWIDTH] IN BLOOD BY AUTOMATED COUNT: 15.3 %
ERYTHROCYTE [DISTWIDTH] IN BLOOD BY AUTOMATED COUNT: 15.4 %
ERYTHROCYTE [DISTWIDTH] IN BLOOD BY AUTOMATED COUNT: 15.4 %
ERYTHROCYTE [DISTWIDTH] IN BLOOD BY AUTOMATED COUNT: 15.5 %
ERYTHROCYTE [DISTWIDTH] IN BLOOD BY AUTOMATED COUNT: 15.6 %
ERYTHROCYTE [DISTWIDTH] IN BLOOD BY AUTOMATED COUNT: 15.9 %
ERYTHROCYTE [DISTWIDTH] IN BLOOD BY AUTOMATED COUNT: 16 %
ERYTHROCYTE [DISTWIDTH] IN BLOOD BY AUTOMATED COUNT: 16.1 %
ERYTHROCYTE [DISTWIDTH] IN BLOOD BY AUTOMATED COUNT: 16.1 %
ERYTHROCYTE [DISTWIDTH] IN BLOOD BY AUTOMATED COUNT: 16.4 %
ERYTHROCYTE [DISTWIDTH] IN BLOOD BY AUTOMATED COUNT: 16.5 %
ERYTHROCYTE [DISTWIDTH] IN BLOOD BY AUTOMATED COUNT: 16.6 %
ERYTHROCYTE [DISTWIDTH] IN BLOOD BY AUTOMATED COUNT: 16.9 %
ERYTHROCYTE [DISTWIDTH] IN BLOOD BY AUTOMATED COUNT: 17.1 %
ERYTHROCYTE [DISTWIDTH] IN BLOOD BY AUTOMATED COUNT: 17.2 %
ERYTHROCYTE [DISTWIDTH] IN BLOOD BY AUTOMATED COUNT: 17.2 %
ERYTHROCYTE [DISTWIDTH] IN BLOOD BY AUTOMATED COUNT: 17.3 %
ERYTHROCYTE [DISTWIDTH] IN BLOOD BY AUTOMATED COUNT: 17.4 %
ERYTHROCYTE [DISTWIDTH] IN BLOOD BY AUTOMATED COUNT: 17.6 %
ERYTHROCYTE [SEDIMENTATION RATE] IN BLOOD BY WESTERGREN METHOD: 14 MM/H
ERYTHROCYTE [SEDIMENTATION RATE] IN BLOOD BY WESTERGREN METHOD: 16 MM/H
ERYTHROCYTE [SEDIMENTATION RATE] IN BLOOD BY WESTERGREN METHOD: 20 MM/H
EST. GFR  (AFRICAN AMERICAN): 54.9 ML/MIN/1.73 M^2
EST. GFR  (AFRICAN AMERICAN): >60 ML/MIN/1.73 M^2
EST. GFR  (NON AFRICAN AMERICAN): 47.6 ML/MIN/1.73 M^2
EST. GFR  (NON AFRICAN AMERICAN): 52.5 ML/MIN/1.73 M^2
EST. GFR  (NON AFRICAN AMERICAN): 58.3 ML/MIN/1.73 M^2
EST. GFR  (NON AFRICAN AMERICAN): >60 ML/MIN/1.73 M^2
ETHANOL SERPL-MCNC: <10 MG/DL
ETHANOL UR-MCNC: <10 MG/DL
FIBRINOGEN PPP-MCNC: 581 MG/DL
FIO2: 100
FIO2: 40
FIO2: 50
FLOW: 8
GAMMA GLOB SERPL ELPH-MCNC: 1.17 G/DL
GIANT PLATELETS BLD QL SMEAR: PRESENT
GLUCOSE CSF-MCNC: 53 MG/DL
GLUCOSE FINGERSTICK: 137
GLUCOSE FINGERSTICK: 141
GLUCOSE FINGERSTICK: 82
GLUCOSE SERPL-MCNC: 100 MG/DL
GLUCOSE SERPL-MCNC: 1008 MG/DL
GLUCOSE SERPL-MCNC: 101 MG/DL
GLUCOSE SERPL-MCNC: 102 MG/DL
GLUCOSE SERPL-MCNC: 103 MG/DL
GLUCOSE SERPL-MCNC: 104 MG/DL
GLUCOSE SERPL-MCNC: 105 MG/DL
GLUCOSE SERPL-MCNC: 107 MG/DL
GLUCOSE SERPL-MCNC: 108 MG/DL
GLUCOSE SERPL-MCNC: 109 MG/DL
GLUCOSE SERPL-MCNC: 110 MG/DL
GLUCOSE SERPL-MCNC: 112 MG/DL
GLUCOSE SERPL-MCNC: 112 MG/DL
GLUCOSE SERPL-MCNC: 113 MG/DL
GLUCOSE SERPL-MCNC: 114 MG/DL
GLUCOSE SERPL-MCNC: 116 MG/DL
GLUCOSE SERPL-MCNC: 116 MG/DL
GLUCOSE SERPL-MCNC: 117 MG/DL
GLUCOSE SERPL-MCNC: 118 MG/DL
GLUCOSE SERPL-MCNC: 119 MG/DL
GLUCOSE SERPL-MCNC: 121 MG/DL
GLUCOSE SERPL-MCNC: 122 MG/DL
GLUCOSE SERPL-MCNC: 122 MG/DL
GLUCOSE SERPL-MCNC: 123 MG/DL
GLUCOSE SERPL-MCNC: 124 MG/DL
GLUCOSE SERPL-MCNC: 125 MG/DL
GLUCOSE SERPL-MCNC: 125 MG/DL
GLUCOSE SERPL-MCNC: 131 MG/DL
GLUCOSE SERPL-MCNC: 131 MG/DL
GLUCOSE SERPL-MCNC: 132 MG/DL
GLUCOSE SERPL-MCNC: 132 MG/DL
GLUCOSE SERPL-MCNC: 134 MG/DL
GLUCOSE SERPL-MCNC: 137 MG/DL
GLUCOSE SERPL-MCNC: 141 MG/DL
GLUCOSE SERPL-MCNC: 142 MG/DL
GLUCOSE SERPL-MCNC: 143 MG/DL
GLUCOSE SERPL-MCNC: 146 MG/DL (ref 70–110)
GLUCOSE SERPL-MCNC: 150 MG/DL
GLUCOSE SERPL-MCNC: 151 MG/DL
GLUCOSE SERPL-MCNC: 154 MG/DL
GLUCOSE SERPL-MCNC: 156 MG/DL
GLUCOSE SERPL-MCNC: 157 MG/DL (ref 70–110)
GLUCOSE SERPL-MCNC: 165 MG/DL (ref 70–110)
GLUCOSE SERPL-MCNC: 168 MG/DL
GLUCOSE SERPL-MCNC: 178 MG/DL
GLUCOSE SERPL-MCNC: 182 MG/DL
GLUCOSE SERPL-MCNC: 189 MG/DL
GLUCOSE SERPL-MCNC: 196 MG/DL
GLUCOSE SERPL-MCNC: 266 MG/DL
GLUCOSE SERPL-MCNC: 57 MG/DL
GLUCOSE SERPL-MCNC: 57 MG/DL
GLUCOSE SERPL-MCNC: 59 MG/DL
GLUCOSE SERPL-MCNC: 62 MG/DL
GLUCOSE SERPL-MCNC: 65 MG/DL
GLUCOSE SERPL-MCNC: 68 MG/DL
GLUCOSE SERPL-MCNC: 68 MG/DL
GLUCOSE SERPL-MCNC: 69 MG/DL
GLUCOSE SERPL-MCNC: 69 MG/DL
GLUCOSE SERPL-MCNC: 74 MG/DL
GLUCOSE SERPL-MCNC: 75 MG/DL
GLUCOSE SERPL-MCNC: 76 MG/DL
GLUCOSE SERPL-MCNC: 76 MG/DL
GLUCOSE SERPL-MCNC: 78 MG/DL
GLUCOSE SERPL-MCNC: 79 MG/DL
GLUCOSE SERPL-MCNC: 80 MG/DL
GLUCOSE SERPL-MCNC: 80 MG/DL
GLUCOSE SERPL-MCNC: 81 MG/DL
GLUCOSE SERPL-MCNC: 81 MG/DL
GLUCOSE SERPL-MCNC: 82 MG/DL
GLUCOSE SERPL-MCNC: 83 MG/DL
GLUCOSE SERPL-MCNC: 84 MG/DL
GLUCOSE SERPL-MCNC: 84 MG/DL
GLUCOSE SERPL-MCNC: 85 MG/DL
GLUCOSE SERPL-MCNC: 85 MG/DL
GLUCOSE SERPL-MCNC: 86 MG/DL
GLUCOSE SERPL-MCNC: 87 MG/DL
GLUCOSE SERPL-MCNC: 87 MG/DL
GLUCOSE SERPL-MCNC: 88 MG/DL
GLUCOSE SERPL-MCNC: 88 MG/DL
GLUCOSE SERPL-MCNC: 89 MG/DL
GLUCOSE SERPL-MCNC: 89 MG/DL
GLUCOSE SERPL-MCNC: 91 MG/DL
GLUCOSE SERPL-MCNC: 92 MG/DL
GLUCOSE SERPL-MCNC: 92 MG/DL
GLUCOSE SERPL-MCNC: 93 MG/DL
GLUCOSE SERPL-MCNC: 94 MG/DL
GLUCOSE SERPL-MCNC: 95 MG/DL
GLUCOSE SERPL-MCNC: 96 MG/DL
GLUCOSE SERPL-MCNC: 96 MG/DL
GLUCOSE SERPL-MCNC: 97 MG/DL
GLUCOSE SERPL-MCNC: 98 MG/DL
GLUCOSE SERPL-MCNC: 99 MG/DL
GLUCOSE UR QL STRIP: ABNORMAL
GLUCOSE UR QL STRIP: NEGATIVE
GRAM STN SPEC: NORMAL
HAV IGM SERPL QL IA: NEGATIVE
HBV CORE IGM SERPL QL IA: NEGATIVE
HBV SURFACE AG SERPL QL IA: NEGATIVE
HCO3 UR-SCNC: 19.5 MMOL/L (ref 24–28)
HCO3 UR-SCNC: 21 MMOL/L (ref 24–28)
HCO3 UR-SCNC: 21.4 MMOL/L (ref 24–28)
HCO3 UR-SCNC: 21.5 MMOL/L (ref 24–28)
HCO3 UR-SCNC: 22 MMOL/L (ref 24–28)
HCO3 UR-SCNC: 25.8 MMOL/L (ref 24–28)
HCO3 UR-SCNC: 29 MMOL/L (ref 24–28)
HCO3 UR-SCNC: 29.4 MMOL/L (ref 24–28)
HCO3 UR-SCNC: 29.9 MMOL/L (ref 24–28)
HCO3 UR-SCNC: 30.3 MMOL/L (ref 24–28)
HCO3 UR-SCNC: 32.9 MMOL/L (ref 24–28)
HCO3 UR-SCNC: 34 MMOL/L (ref 24–28)
HCT VFR BLD AUTO: 23.4 %
HCT VFR BLD AUTO: 24.5 %
HCT VFR BLD AUTO: 24.7 %
HCT VFR BLD AUTO: 25.8 %
HCT VFR BLD AUTO: 25.8 %
HCT VFR BLD AUTO: 25.9 %
HCT VFR BLD AUTO: 26.2 %
HCT VFR BLD AUTO: 27.2 %
HCT VFR BLD AUTO: 27.6 %
HCT VFR BLD AUTO: 27.9 %
HCT VFR BLD AUTO: 28.2 %
HCT VFR BLD AUTO: 28.4 %
HCT VFR BLD AUTO: 28.6 %
HCT VFR BLD AUTO: 28.7 %
HCT VFR BLD AUTO: 28.7 %
HCT VFR BLD AUTO: 28.9 %
HCT VFR BLD AUTO: 29.2 %
HCT VFR BLD AUTO: 29.5 %
HCT VFR BLD AUTO: 29.6 %
HCT VFR BLD AUTO: 30 %
HCT VFR BLD AUTO: 30.1 %
HCT VFR BLD AUTO: 30.3 %
HCT VFR BLD AUTO: 30.7 %
HCT VFR BLD AUTO: 30.8 %
HCT VFR BLD AUTO: 31.1 %
HCT VFR BLD AUTO: 32 %
HCT VFR BLD AUTO: 32.5 %
HCT VFR BLD AUTO: 33.5 %
HCT VFR BLD AUTO: 33.6 %
HCT VFR BLD AUTO: 34.3 %
HCT VFR BLD AUTO: 34.4 %
HCT VFR BLD AUTO: 34.8 %
HCT VFR BLD AUTO: 34.9 %
HCT VFR BLD AUTO: 35 %
HCT VFR BLD AUTO: 35.1 %
HCT VFR BLD AUTO: 35.4 %
HCT VFR BLD AUTO: 35.5 %
HCT VFR BLD AUTO: 35.9 %
HCT VFR BLD AUTO: 36 %
HCT VFR BLD AUTO: 36.1 %
HCT VFR BLD AUTO: 36.4 %
HCT VFR BLD AUTO: 36.6 %
HCT VFR BLD AUTO: 36.7 %
HCT VFR BLD AUTO: 36.9 %
HCT VFR BLD AUTO: 37.4 %
HCT VFR BLD AUTO: 37.7 %
HCT VFR BLD AUTO: 37.9 %
HCT VFR BLD AUTO: 38 %
HCT VFR BLD AUTO: 38 %
HCT VFR BLD AUTO: 38.1 %
HCT VFR BLD AUTO: 38.2 %
HCT VFR BLD AUTO: 38.9 %
HCT VFR BLD AUTO: 39.3 %
HCT VFR BLD AUTO: 40.1 %
HCT VFR BLD AUTO: 40.4 %
HCT VFR BLD AUTO: 40.5 %
HCT VFR BLD AUTO: 40.6 %
HCT VFR BLD AUTO: 42.1 %
HCT VFR BLD AUTO: 42.7 %
HCT VFR BLD AUTO: 42.9 %
HCT VFR BLD AUTO: 45.1 %
HCT VFR BLD AUTO: 45.2 %
HCT VFR BLD AUTO: 45.2 %
HCT VFR BLD AUTO: 46 %
HCT VFR BLD AUTO: 46.9 %
HCT VFR BLD AUTO: 48.1 %
HCT VFR BLD AUTO: 48.4 %
HCT VFR BLD CALC: 25 %PCV (ref 36–54)
HCT VFR BLD CALC: 29 %PCV (ref 36–54)
HCT VFR BLD CALC: 30 %PCV (ref 36–54)
HCT VFR BLD CALC: 34 %PCV (ref 36–54)
HCT VFR BLD CALC: 40 %PCV (ref 36–54)
HCT VFR BLD CALC: 56 %PCV (ref 36–54)
HCV AB SERPL QL IA: NEGATIVE
HGB BLD-MCNC: 10 G/DL
HGB BLD-MCNC: 10 G/DL
HGB BLD-MCNC: 10.4 G/DL
HGB BLD-MCNC: 10.5 G/DL
HGB BLD-MCNC: 10.7 G/DL
HGB BLD-MCNC: 10.9 G/DL
HGB BLD-MCNC: 11.1 G/DL
HGB BLD-MCNC: 11.1 G/DL
HGB BLD-MCNC: 11.3 G/DL
HGB BLD-MCNC: 11.3 G/DL
HGB BLD-MCNC: 11.4 G/DL
HGB BLD-MCNC: 11.4 G/DL
HGB BLD-MCNC: 11.5 G/DL
HGB BLD-MCNC: 11.6 G/DL
HGB BLD-MCNC: 11.7 G/DL
HGB BLD-MCNC: 11.7 G/DL
HGB BLD-MCNC: 11.9 G/DL
HGB BLD-MCNC: 12 G/DL
HGB BLD-MCNC: 12 G/DL
HGB BLD-MCNC: 12.1 G/DL
HGB BLD-MCNC: 12.2 G/DL
HGB BLD-MCNC: 12.3 G/DL
HGB BLD-MCNC: 12.3 G/DL
HGB BLD-MCNC: 12.4 G/DL
HGB BLD-MCNC: 12.5 G/DL
HGB BLD-MCNC: 12.6 G/DL
HGB BLD-MCNC: 12.7 G/DL
HGB BLD-MCNC: 12.9 G/DL
HGB BLD-MCNC: 13 G/DL
HGB BLD-MCNC: 13.1 G/DL
HGB BLD-MCNC: 13.1 G/DL
HGB BLD-MCNC: 13.2 G/DL
HGB BLD-MCNC: 13.5 G/DL
HGB BLD-MCNC: 13.5 G/DL
HGB BLD-MCNC: 14.1 G/DL
HGB BLD-MCNC: 14.1 G/DL
HGB BLD-MCNC: 14.3 G/DL
HGB BLD-MCNC: 14.6 G/DL
HGB BLD-MCNC: 14.6 G/DL
HGB BLD-MCNC: 15 G/DL
HGB BLD-MCNC: 15.4 G/DL
HGB BLD-MCNC: 15.5 G/DL
HGB BLD-MCNC: 7.3 G/DL
HGB BLD-MCNC: 7.7 G/DL
HGB BLD-MCNC: 8.1 G/DL
HGB BLD-MCNC: 8.3 G/DL
HGB BLD-MCNC: 8.4 G/DL
HGB BLD-MCNC: 8.6 G/DL
HGB BLD-MCNC: 8.8 G/DL
HGB BLD-MCNC: 8.9 G/DL
HGB BLD-MCNC: 9.1 G/DL
HGB BLD-MCNC: 9.2 G/DL
HGB BLD-MCNC: 9.2 G/DL
HGB BLD-MCNC: 9.3 G/DL
HGB BLD-MCNC: 9.3 G/DL
HGB BLD-MCNC: 9.4 G/DL
HGB BLD-MCNC: 9.4 G/DL
HGB BLD-MCNC: 9.5 G/DL
HGB BLD-MCNC: 9.6 G/DL
HGB BLD-MCNC: 9.7 G/DL
HGB BLD-MCNC: 9.9 G/DL
HGB BLD-MCNC: 9.9 G/DL
HGB UR QL STRIP: ABNORMAL
HGB UR QL STRIP: NEGATIVE
HSV-1 DNA BY PCR: NEGATIVE
HSV-2 DNA BY PCR: NEGATIVE
HSV1 DNA SPEC QL NAA+PROBE: NORMAL
HSV1, PCR, CSF: NEGATIVE
HSV2 DNA SPEC QL NAA+PROBE: NORMAL
HSV2, PCR, CSF: NEGATIVE
HYALINE CASTS UR QL AUTO: 0 /LPF
HYALINE CASTS UR QL AUTO: 1 /LPF
HYALINE CASTS UR QL AUTO: 10 /LPF
HYALINE CASTS UR QL AUTO: 17 /LPF
HYALINE CASTS UR QL AUTO: 19 /LPF
HYALINE CASTS UR QL AUTO: 2 /LPF
HYALINE CASTS UR QL AUTO: 2 /LPF
HYALINE CASTS UR QL AUTO: 3 /LPF
HYALINE CASTS UR QL AUTO: 4 /LPF
HYALINE CASTS UR QL AUTO: 5 /LPF
HYALINE CASTS UR QL AUTO: 5 /LPF
HYALINE CASTS UR QL AUTO: 60 /LPF
HYALINE CASTS UR QL AUTO: 7 /LPF
HYALINE CASTS UR QL AUTO: 8 /LPF
HYALINE CASTS UR QL AUTO: <1 /LPF
HYPOCHROMIA BLD QL SMEAR: ABNORMAL
INR PPP: 0.9
INR PPP: 1
INR PPP: 1.1
INR PPP: 1.2
INTERPRETATION SERPL IFE-IMP: NORMAL
KETONES UR QL STRIP: ABNORMAL
KETONES UR QL STRIP: NEGATIVE
LACTATE SERPL-SCNC: 0.9 MMOL/L
LACTATE SERPL-SCNC: 0.9 MMOL/L
LACTATE SERPL-SCNC: 1 MMOL/L
LACTATE SERPL-SCNC: 1.1 MMOL/L
LACTATE SERPL-SCNC: 1.5 MMOL/L
LACTATE SERPL-SCNC: 1.8 MMOL/L
LACTATE SERPL-SCNC: 1.8 MMOL/L
LACTATE SERPL-SCNC: 2.9 MMOL/L
LACTATE SERPL-SCNC: 4.7 MMOL/L
LACTATE SERPL-SCNC: 5.5 MMOL/L
LACTATE SERPL-SCNC: >12 MMOL/L
LDH FLD L TO P-CCNC: 37 U/L
LDH SERPL L TO P-CCNC: 0.63 MMOL/L (ref 0.36–1.25)
LDH SERPL L TO P-CCNC: 0.89 MMOL/L (ref 0.5–2.2)
LEUKOCYTE ESTERASE UR QL STRIP: ABNORMAL
LEUKOCYTE ESTERASE UR QL STRIP: NEGATIVE
LIPASE SERPL-CCNC: 5 U/L
LIPASE SERPL-CCNC: >1000 U/L
LYMPHOCYTES # BLD AUTO: 0.7 K/UL
LYMPHOCYTES # BLD AUTO: 1 K/UL
LYMPHOCYTES # BLD AUTO: 1.1 K/UL
LYMPHOCYTES # BLD AUTO: 1.2 K/UL
LYMPHOCYTES # BLD AUTO: 1.3 K/UL
LYMPHOCYTES # BLD AUTO: 1.3 K/UL
LYMPHOCYTES # BLD AUTO: 1.4 K/UL
LYMPHOCYTES # BLD AUTO: 1.5 K/UL
LYMPHOCYTES # BLD AUTO: 1.5 K/UL
LYMPHOCYTES # BLD AUTO: 1.6 K/UL
LYMPHOCYTES # BLD AUTO: 1.8 K/UL
LYMPHOCYTES # BLD AUTO: 1.8 K/UL
LYMPHOCYTES # BLD AUTO: 1.9 K/UL
LYMPHOCYTES # BLD AUTO: 2 K/UL
LYMPHOCYTES # BLD AUTO: 2.1 K/UL
LYMPHOCYTES # BLD AUTO: 2.2 K/UL
LYMPHOCYTES # BLD AUTO: 2.2 K/UL
LYMPHOCYTES # BLD AUTO: 2.3 K/UL
LYMPHOCYTES # BLD AUTO: 2.4 K/UL
LYMPHOCYTES # BLD AUTO: 2.5 K/UL
LYMPHOCYTES # BLD AUTO: 2.6 K/UL
LYMPHOCYTES # BLD AUTO: 2.7 K/UL
LYMPHOCYTES # BLD AUTO: 2.8 K/UL
LYMPHOCYTES # BLD AUTO: 2.9 K/UL
LYMPHOCYTES # BLD AUTO: 3 K/UL
LYMPHOCYTES # BLD AUTO: 3 K/UL
LYMPHOCYTES # BLD AUTO: 3.1 K/UL
LYMPHOCYTES # BLD AUTO: 3.1 K/UL
LYMPHOCYTES # BLD AUTO: 3.2 K/UL
LYMPHOCYTES # BLD AUTO: 3.3 K/UL
LYMPHOCYTES # BLD AUTO: 3.3 K/UL
LYMPHOCYTES # BLD AUTO: 3.5 K/UL
LYMPHOCYTES # BLD AUTO: 3.6 K/UL
LYMPHOCYTES # BLD AUTO: 3.9 K/UL
LYMPHOCYTES # BLD AUTO: 4.1 K/UL
LYMPHOCYTES # BLD AUTO: 4.3 K/UL
LYMPHOCYTES # BLD AUTO: ABNORMAL K/UL
LYMPHOCYTES # BLD AUTO: ABNORMAL K/UL
LYMPHOCYTES NFR BLD: 10 %
LYMPHOCYTES NFR BLD: 10.2 %
LYMPHOCYTES NFR BLD: 10.6 %
LYMPHOCYTES NFR BLD: 12.6 %
LYMPHOCYTES NFR BLD: 15.9 %
LYMPHOCYTES NFR BLD: 18 %
LYMPHOCYTES NFR BLD: 19.9 %
LYMPHOCYTES NFR BLD: 20.9 %
LYMPHOCYTES NFR BLD: 21.4 %
LYMPHOCYTES NFR BLD: 21.5 %
LYMPHOCYTES NFR BLD: 22 %
LYMPHOCYTES NFR BLD: 22.6 %
LYMPHOCYTES NFR BLD: 23 %
LYMPHOCYTES NFR BLD: 24.1 %
LYMPHOCYTES NFR BLD: 24.8 %
LYMPHOCYTES NFR BLD: 24.9 %
LYMPHOCYTES NFR BLD: 24.9 %
LYMPHOCYTES NFR BLD: 25 %
LYMPHOCYTES NFR BLD: 26 %
LYMPHOCYTES NFR BLD: 26.5 %
LYMPHOCYTES NFR BLD: 26.5 %
LYMPHOCYTES NFR BLD: 27.5 %
LYMPHOCYTES NFR BLD: 27.9 %
LYMPHOCYTES NFR BLD: 28 %
LYMPHOCYTES NFR BLD: 28.5 %
LYMPHOCYTES NFR BLD: 28.7 %
LYMPHOCYTES NFR BLD: 29.3 %
LYMPHOCYTES NFR BLD: 29.5 %
LYMPHOCYTES NFR BLD: 29.7 %
LYMPHOCYTES NFR BLD: 3 %
LYMPHOCYTES NFR BLD: 30.6 %
LYMPHOCYTES NFR BLD: 30.6 %
LYMPHOCYTES NFR BLD: 31.4 %
LYMPHOCYTES NFR BLD: 31.6 %
LYMPHOCYTES NFR BLD: 31.7 %
LYMPHOCYTES NFR BLD: 31.8 %
LYMPHOCYTES NFR BLD: 32.2 %
LYMPHOCYTES NFR BLD: 32.5 %
LYMPHOCYTES NFR BLD: 32.5 %
LYMPHOCYTES NFR BLD: 32.6 %
LYMPHOCYTES NFR BLD: 33.2 %
LYMPHOCYTES NFR BLD: 33.3 %
LYMPHOCYTES NFR BLD: 33.3 %
LYMPHOCYTES NFR BLD: 33.4 %
LYMPHOCYTES NFR BLD: 33.5 %
LYMPHOCYTES NFR BLD: 34.2 %
LYMPHOCYTES NFR BLD: 34.6 %
LYMPHOCYTES NFR BLD: 35.6 %
LYMPHOCYTES NFR BLD: 36.7 %
LYMPHOCYTES NFR BLD: 37 %
LYMPHOCYTES NFR BLD: 38 %
LYMPHOCYTES NFR BLD: 38.1 %
LYMPHOCYTES NFR BLD: 38.6 %
LYMPHOCYTES NFR BLD: 38.8 %
LYMPHOCYTES NFR BLD: 39.6 %
LYMPHOCYTES NFR BLD: 39.7 %
LYMPHOCYTES NFR BLD: 4.1 %
LYMPHOCYTES NFR BLD: 42.7 %
LYMPHOCYTES NFR BLD: 48.1 %
LYMPHOCYTES NFR BLD: 5.3 %
LYMPHOCYTES NFR BLD: 5.3 %
LYMPHOCYTES NFR BLD: 53.7 %
LYMPHOCYTES NFR BLD: 58.5 %
LYMPHOCYTES NFR BLD: 6.3 %
LYMPHOCYTES NFR BLD: 6.4 %
LYMPHOCYTES NFR BLD: 6.6 %
LYMPHOCYTES NFR BLD: 7 %
LYMPHOCYTES NFR BLD: 7.8 %
LYMPHOCYTES NFR BLD: 8 %
LYMPHOCYTES NFR CSF MANUAL: 91 %
LYMPHOCYTES NFR CSF MANUAL: 93 %
MAGNESIUM SERPL-MCNC: 1.1 MG/DL
MAGNESIUM SERPL-MCNC: 1.6 MG/DL
MAGNESIUM SERPL-MCNC: 1.7 MG/DL
MAGNESIUM SERPL-MCNC: 1.8 MG/DL
MAGNESIUM SERPL-MCNC: 1.9 MG/DL
MAGNESIUM SERPL-MCNC: 2 MG/DL
MAGNESIUM SERPL-MCNC: 2.1 MG/DL
MAGNESIUM SERPL-MCNC: 2.2 MG/DL
MAGNESIUM SERPL-MCNC: 2.3 MG/DL
MAGNESIUM SERPL-MCNC: 2.4 MG/DL
MAGNESIUM SERPL-MCNC: 2.4 MG/DL
MAGNESIUM SERPL-MCNC: 2.5 MG/DL
MAGNESIUM SERPL-MCNC: 2.5 MG/DL
MAGNESIUM SERPL-MCNC: 2.6 MG/DL
MAGNESIUM SERPL-MCNC: 2.7 MG/DL
MAGNESIUM SERPL-MCNC: 2.7 MG/DL
MAGNESIUM SERPL-MCNC: 2.8 MG/DL
MAGNESIUM SERPL-MCNC: 2.8 MG/DL
MAGNESIUM SERPL-MCNC: 3 MG/DL
MAGNESIUM SERPL-MCNC: 3.2 MG/DL
MCH RBC QN AUTO: 30.7 PG
MCH RBC QN AUTO: 31 PG
MCH RBC QN AUTO: 31.1 PG
MCH RBC QN AUTO: 31.2 PG
MCH RBC QN AUTO: 31.3 PG
MCH RBC QN AUTO: 31.3 PG
MCH RBC QN AUTO: 31.4 PG
MCH RBC QN AUTO: 31.4 PG
MCH RBC QN AUTO: 31.5 PG
MCH RBC QN AUTO: 31.6 PG
MCH RBC QN AUTO: 31.7 PG
MCH RBC QN AUTO: 31.8 PG
MCH RBC QN AUTO: 31.9 PG
MCH RBC QN AUTO: 32 PG
MCH RBC QN AUTO: 32.1 PG
MCH RBC QN AUTO: 32.2 PG
MCH RBC QN AUTO: 32.3 PG
MCH RBC QN AUTO: 32.3 PG
MCH RBC QN AUTO: 32.4 PG
MCH RBC QN AUTO: 32.5 PG
MCH RBC QN AUTO: 32.7 PG
MCHC RBC AUTO-ENTMCNC: 30.9 G/DL
MCHC RBC AUTO-ENTMCNC: 31 %
MCHC RBC AUTO-ENTMCNC: 31.1 G/DL
MCHC RBC AUTO-ENTMCNC: 31.2 %
MCHC RBC AUTO-ENTMCNC: 31.2 %
MCHC RBC AUTO-ENTMCNC: 31.2 G/DL
MCHC RBC AUTO-ENTMCNC: 31.3 %
MCHC RBC AUTO-ENTMCNC: 31.3 G/DL
MCHC RBC AUTO-ENTMCNC: 31.4 %
MCHC RBC AUTO-ENTMCNC: 31.6 %
MCHC RBC AUTO-ENTMCNC: 31.6 %
MCHC RBC AUTO-ENTMCNC: 31.7 %
MCHC RBC AUTO-ENTMCNC: 31.8 %
MCHC RBC AUTO-ENTMCNC: 32 %
MCHC RBC AUTO-ENTMCNC: 32 %
MCHC RBC AUTO-ENTMCNC: 32 G/DL
MCHC RBC AUTO-ENTMCNC: 32.1 %
MCHC RBC AUTO-ENTMCNC: 32.1 %
MCHC RBC AUTO-ENTMCNC: 32.1 G/DL
MCHC RBC AUTO-ENTMCNC: 32.2 %
MCHC RBC AUTO-ENTMCNC: 32.2 %
MCHC RBC AUTO-ENTMCNC: 32.2 G/DL
MCHC RBC AUTO-ENTMCNC: 32.2 G/DL
MCHC RBC AUTO-ENTMCNC: 32.3 %
MCHC RBC AUTO-ENTMCNC: 32.3 %
MCHC RBC AUTO-ENTMCNC: 32.3 G/DL
MCHC RBC AUTO-ENTMCNC: 32.4 %
MCHC RBC AUTO-ENTMCNC: 32.6 %
MCHC RBC AUTO-ENTMCNC: 32.7 %
MCHC RBC AUTO-ENTMCNC: 32.7 %
MCHC RBC AUTO-ENTMCNC: 32.8 %
MCHC RBC AUTO-ENTMCNC: 32.8 G/DL
MCHC RBC AUTO-ENTMCNC: 32.9 %
MCHC RBC AUTO-ENTMCNC: 32.9 G/DL
MCHC RBC AUTO-ENTMCNC: 33 %
MCHC RBC AUTO-ENTMCNC: 33 %
MCHC RBC AUTO-ENTMCNC: 33 G/DL
MCHC RBC AUTO-ENTMCNC: 33.1 %
MCHC RBC AUTO-ENTMCNC: 33.1 G/DL
MCHC RBC AUTO-ENTMCNC: 33.1 G/DL
MCHC RBC AUTO-ENTMCNC: 33.2 %
MCHC RBC AUTO-ENTMCNC: 33.3 %
MCHC RBC AUTO-ENTMCNC: 33.4 %
MCHC RBC AUTO-ENTMCNC: 33.4 G/DL
MCHC RBC AUTO-ENTMCNC: 33.5 %
MCHC RBC AUTO-ENTMCNC: 33.6 G/DL
MCHC RBC AUTO-ENTMCNC: 33.7 G/DL
MCHC RBC AUTO-ENTMCNC: 33.7 G/DL
MCHC RBC AUTO-ENTMCNC: 33.8 G/DL
MCHC RBC AUTO-ENTMCNC: 33.9 %
MCHC RBC AUTO-ENTMCNC: 33.9 G/DL
MCHC RBC AUTO-ENTMCNC: 34.1 %
MCHC RBC AUTO-ENTMCNC: 34.1 G/DL
MCHC RBC AUTO-ENTMCNC: 34.5 G/DL
MCV RBC AUTO: 100 FL
MCV RBC AUTO: 101 FL
MCV RBC AUTO: 102 FL
MCV RBC AUTO: 103 FL
MCV RBC AUTO: 104 FL
MCV RBC AUTO: 104 FL
MCV RBC AUTO: 91 FL
MCV RBC AUTO: 92 FL
MCV RBC AUTO: 93 FL
MCV RBC AUTO: 93 FL
MCV RBC AUTO: 94 FL
MCV RBC AUTO: 95 FL
MCV RBC AUTO: 96 FL
MCV RBC AUTO: 97 FL
MCV RBC AUTO: 98 FL
MCV RBC AUTO: 99 FL
METHADONE UR QL SCN>300 NG/ML: NEGATIVE
MICROSCOPIC COMMENT: ABNORMAL
MICROSCOPIC COMMENT: NORMAL
MIN VOL: 4.43
MIN VOL: 9.3
MODE: ABNORMAL
MONOCYTES # BLD AUTO: 0.2 K/UL
MONOCYTES # BLD AUTO: 0.3 K/UL
MONOCYTES # BLD AUTO: 0.3 K/UL
MONOCYTES # BLD AUTO: 0.4 K/UL
MONOCYTES # BLD AUTO: 0.5 K/UL
MONOCYTES # BLD AUTO: 0.6 K/UL
MONOCYTES # BLD AUTO: 0.7 K/UL
MONOCYTES # BLD AUTO: 0.8 K/UL
MONOCYTES # BLD AUTO: 0.9 K/UL
MONOCYTES # BLD AUTO: 1 K/UL
MONOCYTES # BLD AUTO: 1.1 K/UL
MONOCYTES # BLD AUTO: 1.2 K/UL
MONOCYTES # BLD AUTO: 1.3 K/UL
MONOCYTES # BLD AUTO: 1.4 K/UL
MONOCYTES # BLD AUTO: 1.6 K/UL
MONOCYTES # BLD AUTO: 1.9 K/UL
MONOCYTES # BLD AUTO: 2.1 K/UL
MONOCYTES # BLD AUTO: ABNORMAL K/UL
MONOCYTES # BLD AUTO: ABNORMAL K/UL
MONOCYTES NFR BLD: 1.3 %
MONOCYTES NFR BLD: 10 %
MONOCYTES NFR BLD: 10 %
MONOCYTES NFR BLD: 10.1 %
MONOCYTES NFR BLD: 10.2 %
MONOCYTES NFR BLD: 10.3 %
MONOCYTES NFR BLD: 10.4 %
MONOCYTES NFR BLD: 10.4 %
MONOCYTES NFR BLD: 10.5 %
MONOCYTES NFR BLD: 10.9 %
MONOCYTES NFR BLD: 11.1 %
MONOCYTES NFR BLD: 11.2 %
MONOCYTES NFR BLD: 11.2 %
MONOCYTES NFR BLD: 11.5 %
MONOCYTES NFR BLD: 11.6 %
MONOCYTES NFR BLD: 11.6 %
MONOCYTES NFR BLD: 11.7 %
MONOCYTES NFR BLD: 11.7 %
MONOCYTES NFR BLD: 11.9 %
MONOCYTES NFR BLD: 12.2 %
MONOCYTES NFR BLD: 12.3 %
MONOCYTES NFR BLD: 13.8 %
MONOCYTES NFR BLD: 2 %
MONOCYTES NFR BLD: 2.3 %
MONOCYTES NFR BLD: 2.9 %
MONOCYTES NFR BLD: 3.1 %
MONOCYTES NFR BLD: 3.6 %
MONOCYTES NFR BLD: 4.2 %
MONOCYTES NFR BLD: 5.1 %
MONOCYTES NFR BLD: 5.2 %
MONOCYTES NFR BLD: 5.3 %
MONOCYTES NFR BLD: 5.4 %
MONOCYTES NFR BLD: 5.8 %
MONOCYTES NFR BLD: 5.9 %
MONOCYTES NFR BLD: 6.6 %
MONOCYTES NFR BLD: 6.7 %
MONOCYTES NFR BLD: 6.9 %
MONOCYTES NFR BLD: 6.9 %
MONOCYTES NFR BLD: 7 %
MONOCYTES NFR BLD: 7.1 %
MONOCYTES NFR BLD: 7.3 %
MONOCYTES NFR BLD: 7.4 %
MONOCYTES NFR BLD: 7.5 %
MONOCYTES NFR BLD: 7.6 %
MONOCYTES NFR BLD: 7.6 %
MONOCYTES NFR BLD: 7.9 %
MONOCYTES NFR BLD: 7.9 %
MONOCYTES NFR BLD: 8 %
MONOCYTES NFR BLD: 8.1 %
MONOCYTES NFR BLD: 8.3 %
MONOCYTES NFR BLD: 8.7 %
MONOCYTES NFR BLD: 8.8 %
MONOCYTES NFR BLD: 8.8 %
MONOCYTES NFR BLD: 9 %
MONOCYTES NFR BLD: 9.2 %
MONOCYTES NFR BLD: 9.2 %
MONOCYTES NFR BLD: 9.3 %
MONOCYTES NFR BLD: 9.3 %
MONOCYTES NFR BLD: 9.4 %
MONOCYTES NFR BLD: 9.5 %
MONOCYTES NFR BLD: 9.7 %
MONOCYTES NFR BLD: 9.7 %
MONOCYTES NFR BLD: 9.8 %
MONOCYTES NFR BLD: 9.9 %
MONOCYTES NFR BLD: 9.9 %
MONOS+MACROS NFR CSF MANUAL: 6 %
MONOS+MACROS NFR CSF MANUAL: 8 %
NEUTROPHILS # BLD AUTO: 1.3 K/UL
NEUTROPHILS # BLD AUTO: 1.4 K/UL
NEUTROPHILS # BLD AUTO: 1.4 K/UL
NEUTROPHILS # BLD AUTO: 1.6 K/UL
NEUTROPHILS # BLD AUTO: 11.3 K/UL
NEUTROPHILS # BLD AUTO: 13.1 K/UL
NEUTROPHILS # BLD AUTO: 13.8 K/UL
NEUTROPHILS # BLD AUTO: 14.8 K/UL
NEUTROPHILS # BLD AUTO: 15 K/UL
NEUTROPHILS # BLD AUTO: 15.1 K/UL
NEUTROPHILS # BLD AUTO: 16.2 K/UL
NEUTROPHILS # BLD AUTO: 17.7 K/UL
NEUTROPHILS # BLD AUTO: 18.2 K/UL
NEUTROPHILS # BLD AUTO: 18.9 K/UL
NEUTROPHILS # BLD AUTO: 19.6 K/UL
NEUTROPHILS # BLD AUTO: 21 K/UL
NEUTROPHILS # BLD AUTO: 3.4 K/UL
NEUTROPHILS # BLD AUTO: 3.5 K/UL
NEUTROPHILS # BLD AUTO: 3.6 K/UL
NEUTROPHILS # BLD AUTO: 3.8 K/UL
NEUTROPHILS # BLD AUTO: 3.8 K/UL
NEUTROPHILS # BLD AUTO: 3.9 K/UL
NEUTROPHILS # BLD AUTO: 4.1 K/UL
NEUTROPHILS # BLD AUTO: 4.2 K/UL
NEUTROPHILS # BLD AUTO: 4.2 K/UL
NEUTROPHILS # BLD AUTO: 4.3 K/UL
NEUTROPHILS # BLD AUTO: 4.4 K/UL
NEUTROPHILS # BLD AUTO: 4.6 K/UL
NEUTROPHILS # BLD AUTO: 4.7 K/UL
NEUTROPHILS # BLD AUTO: 4.8 K/UL
NEUTROPHILS # BLD AUTO: 4.9 K/UL
NEUTROPHILS # BLD AUTO: 4.9 K/UL
NEUTROPHILS # BLD AUTO: 5.1 K/UL
NEUTROPHILS # BLD AUTO: 5.2 K/UL
NEUTROPHILS # BLD AUTO: 5.3 K/UL
NEUTROPHILS # BLD AUTO: 5.4 K/UL
NEUTROPHILS # BLD AUTO: 5.4 K/UL
NEUTROPHILS # BLD AUTO: 5.5 K/UL
NEUTROPHILS # BLD AUTO: 5.6 K/UL
NEUTROPHILS # BLD AUTO: 5.7 K/UL
NEUTROPHILS # BLD AUTO: 5.8 K/UL
NEUTROPHILS # BLD AUTO: 5.9 K/UL
NEUTROPHILS # BLD AUTO: 5.9 K/UL
NEUTROPHILS # BLD AUTO: 6 K/UL
NEUTROPHILS # BLD AUTO: 6.1 K/UL
NEUTROPHILS # BLD AUTO: 6.2 K/UL
NEUTROPHILS # BLD AUTO: 6.2 K/UL
NEUTROPHILS # BLD AUTO: 6.3 K/UL
NEUTROPHILS # BLD AUTO: 7.1 K/UL
NEUTROPHILS # BLD AUTO: 7.2 K/UL
NEUTROPHILS # BLD AUTO: 7.3 K/UL
NEUTROPHILS # BLD AUTO: 7.6 K/UL
NEUTROPHILS # BLD AUTO: 7.7 K/UL
NEUTROPHILS # BLD AUTO: 7.9 K/UL
NEUTROPHILS # BLD AUTO: 8.4 K/UL
NEUTROPHILS # BLD AUTO: 8.6 K/UL
NEUTROPHILS NFR BLD: 26.3 %
NEUTROPHILS NFR BLD: 34 %
NEUTROPHILS NFR BLD: 37.7 %
NEUTROPHILS NFR BLD: 39.3 %
NEUTROPHILS NFR BLD: 45.7 %
NEUTROPHILS NFR BLD: 45.9 %
NEUTROPHILS NFR BLD: 47.1 %
NEUTROPHILS NFR BLD: 47.3 %
NEUTROPHILS NFR BLD: 47.8 %
NEUTROPHILS NFR BLD: 48.5 %
NEUTROPHILS NFR BLD: 50.2 %
NEUTROPHILS NFR BLD: 50.6 %
NEUTROPHILS NFR BLD: 50.6 %
NEUTROPHILS NFR BLD: 50.9 %
NEUTROPHILS NFR BLD: 51.3 %
NEUTROPHILS NFR BLD: 51.3 %
NEUTROPHILS NFR BLD: 51.9 %
NEUTROPHILS NFR BLD: 51.9 %
NEUTROPHILS NFR BLD: 52.3 %
NEUTROPHILS NFR BLD: 52.7 %
NEUTROPHILS NFR BLD: 53.9 %
NEUTROPHILS NFR BLD: 55.3 %
NEUTROPHILS NFR BLD: 55.4 %
NEUTROPHILS NFR BLD: 55.7 %
NEUTROPHILS NFR BLD: 56.2 %
NEUTROPHILS NFR BLD: 56.2 %
NEUTROPHILS NFR BLD: 56.3 %
NEUTROPHILS NFR BLD: 56.5 %
NEUTROPHILS NFR BLD: 56.9 %
NEUTROPHILS NFR BLD: 57.3 %
NEUTROPHILS NFR BLD: 57.5 %
NEUTROPHILS NFR BLD: 57.5 %
NEUTROPHILS NFR BLD: 58.1 %
NEUTROPHILS NFR BLD: 59.3 %
NEUTROPHILS NFR BLD: 59.6 %
NEUTROPHILS NFR BLD: 59.8 %
NEUTROPHILS NFR BLD: 59.9 %
NEUTROPHILS NFR BLD: 60.3 %
NEUTROPHILS NFR BLD: 60.7 %
NEUTROPHILS NFR BLD: 60.9 %
NEUTROPHILS NFR BLD: 61.5 %
NEUTROPHILS NFR BLD: 62 %
NEUTROPHILS NFR BLD: 62.7 %
NEUTROPHILS NFR BLD: 63.3 %
NEUTROPHILS NFR BLD: 64 %
NEUTROPHILS NFR BLD: 64.5 %
NEUTROPHILS NFR BLD: 64.5 %
NEUTROPHILS NFR BLD: 65.5 %
NEUTROPHILS NFR BLD: 66.6 %
NEUTROPHILS NFR BLD: 67.9 %
NEUTROPHILS NFR BLD: 69.1 %
NEUTROPHILS NFR BLD: 69.7 %
NEUTROPHILS NFR BLD: 71 %
NEUTROPHILS NFR BLD: 72.3 %
NEUTROPHILS NFR BLD: 74.8 %
NEUTROPHILS NFR BLD: 77 %
NEUTROPHILS NFR BLD: 78.6 %
NEUTROPHILS NFR BLD: 78.9 %
NEUTROPHILS NFR BLD: 80 %
NEUTROPHILS NFR BLD: 80 %
NEUTROPHILS NFR BLD: 80.1 %
NEUTROPHILS NFR BLD: 83 %
NEUTROPHILS NFR BLD: 85.3 %
NEUTROPHILS NFR BLD: 86.3 %
NEUTROPHILS NFR BLD: 88.4 %
NEUTROPHILS NFR BLD: 89.4 %
NEUTROPHILS NFR BLD: 90.6 %
NEUTROPHILS NFR BLD: 91.3 %
NEUTROPHILS NFR BLD: 94.5 %
NEUTROPHILS NFR CSF MANUAL: 1 %
NEUTS BAND NFR BLD MANUAL: 18 %
NEUTS BAND NFR BLD MANUAL: 2 %
NEUTS BAND NFR BLD MANUAL: 9 %
NITRITE UR QL STRIP: NEGATIVE
NITRITE UR QL STRIP: POSITIVE
NON-SQ EPI CELLS #/AREA URNS AUTO: 0 /HPF
NON-SQ EPI CELLS #/AREA URNS AUTO: 5 /HPF
NON-SQ EPI CELLS #/AREA URNS AUTO: <1 /HPF
NRBC BLD-RTO: ABNORMAL /100 WBC
OPIATES UR QL SCN: NEGATIVE
OSMOLALITY SERPL: 276 MOSM/KG
OSMOLALITY SERPL: 314 MOSM/KG
OSMOLALITY UR: 200 MOSM/KG
OSMOLALITY UR: 470 MOSM/KG
OSMOLALITY UR: 506 MOSM/KG
OSMOLALITY UR: 647 MOSM/KG
OVALOCYTES BLD QL SMEAR: ABNORMAL
PATHOLOGIST INTERPRETATION IFE: NORMAL
PATHOLOGIST INTERPRETATION SPE: NORMAL
PCO2 BLDA: 31.3 MMHG (ref 35–45)
PCO2 BLDA: 33 MMHG (ref 35–45)
PCO2 BLDA: 37.9 MMHG (ref 35–45)
PCO2 BLDA: 38.3 MMHG (ref 35–45)
PCO2 BLDA: 38.8 MMHG (ref 35–45)
PCO2 BLDA: 41.6 MMHG (ref 35–45)
PCO2 BLDA: 41.8 MMHG (ref 35–45)
PCO2 BLDA: 44.4 MMHG (ref 35–45)
PCO2 BLDA: 48.3 MMHG (ref 35–45)
PCO2 BLDA: 50.9 MMHG (ref 35–45)
PCO2 BLDA: 51.4 MMHG (ref 35–45)
PCO2 BLDA: 59.4 MMHG (ref 35–45)
PCP UR QL SCN>25 NG/ML: NEGATIVE
PEEP: 5
PEEP: 5
PEEP: 6
PH SMN: 7.31 [PH] (ref 7.35–7.45)
PH SMN: 7.35 [PH] (ref 7.35–7.45)
PH SMN: 7.37 [PH] (ref 7.35–7.45)
PH SMN: 7.38 [PH] (ref 7.35–7.45)
PH SMN: 7.38 [PH] (ref 7.35–7.45)
PH SMN: 7.39 [PH] (ref 7.35–7.45)
PH SMN: 7.42 [PH] (ref 7.35–7.45)
PH SMN: 7.44 [PH] (ref 7.35–7.45)
PH SMN: 7.46 [PH] (ref 7.35–7.45)
PH SMN: 7.49 [PH] (ref 7.35–7.45)
PH UR STRIP: 5 [PH] (ref 5–8)
PH UR STRIP: 6 [PH] (ref 5–8)
PH UR STRIP: 7 [PH] (ref 5–8)
PH UR STRIP: 8 [PH] (ref 5–8)
PHOSPHATE SERPL-MCNC: 1.8 MG/DL
PHOSPHATE SERPL-MCNC: 1.9 MG/DL
PHOSPHATE SERPL-MCNC: 2 MG/DL
PHOSPHATE SERPL-MCNC: 2.2 MG/DL
PHOSPHATE SERPL-MCNC: 2.4 MG/DL
PHOSPHATE SERPL-MCNC: 2.6 MG/DL
PHOSPHATE SERPL-MCNC: 2.7 MG/DL
PHOSPHATE SERPL-MCNC: 2.7 MG/DL
PHOSPHATE SERPL-MCNC: 2.9 MG/DL
PHOSPHATE SERPL-MCNC: 3 MG/DL
PHOSPHATE SERPL-MCNC: 3 MG/DL
PHOSPHATE SERPL-MCNC: 3.2 MG/DL
PHOSPHATE SERPL-MCNC: 3.6 MG/DL
PHOSPHATE SERPL-MCNC: 3.6 MG/DL
PHOSPHATE SERPL-MCNC: 3.7 MG/DL
PHOSPHATE SERPL-MCNC: 3.8 MG/DL
PHOSPHATE SERPL-MCNC: 3.9 MG/DL
PHOSPHATE SERPL-MCNC: 4 MG/DL
PHOSPHATE SERPL-MCNC: 4.1 MG/DL
PHOSPHATE SERPL-MCNC: 4.2 MG/DL
PHOSPHATE SERPL-MCNC: 4.3 MG/DL
PHOSPHATE SERPL-MCNC: 4.4 MG/DL
PHOSPHATE SERPL-MCNC: 4.5 MG/DL
PHOSPHATE SERPL-MCNC: 4.6 MG/DL
PHOSPHATE SERPL-MCNC: 4.7 MG/DL
PHOSPHATE SERPL-MCNC: 4.8 MG/DL
PHOSPHATE SERPL-MCNC: 4.8 MG/DL
PHOSPHATE SERPL-MCNC: 5 MG/DL
PHOSPHATE SERPL-MCNC: 5.1 MG/DL
PHOSPHATE SERPL-MCNC: 5.2 MG/DL
PHOSPHATE SERPL-MCNC: 5.4 MG/DL
PHOSPHATE SERPL-MCNC: 5.4 MG/DL
PHOSPHATE SERPL-MCNC: 5.5 MG/DL
PHOSPHATE SERPL-MCNC: 5.5 MG/DL
PHOSPHATE SERPL-MCNC: 5.8 MG/DL
PHOSPHATE SERPL-MCNC: 6 MG/DL
PHOSPHATE SERPL-MCNC: 6.2 MG/DL
PHOSPHATE SERPL-MCNC: 7.1 MG/DL
PIP: 18
PIP: 29
PLATELET # BLD AUTO: 108 K/UL
PLATELET # BLD AUTO: 223 K/UL
PLATELET # BLD AUTO: 235 K/UL
PLATELET # BLD AUTO: 243 K/UL
PLATELET # BLD AUTO: 250 K/UL
PLATELET # BLD AUTO: 252 K/UL
PLATELET # BLD AUTO: 258 K/UL
PLATELET # BLD AUTO: 259 K/UL
PLATELET # BLD AUTO: 264 K/UL
PLATELET # BLD AUTO: 265 K/UL
PLATELET # BLD AUTO: 267 K/UL
PLATELET # BLD AUTO: 269 K/UL
PLATELET # BLD AUTO: 272 K/UL
PLATELET # BLD AUTO: 273 K/UL
PLATELET # BLD AUTO: 273 K/UL
PLATELET # BLD AUTO: 276 K/UL
PLATELET # BLD AUTO: 279 K/UL
PLATELET # BLD AUTO: 280 K/UL
PLATELET # BLD AUTO: 282 K/UL
PLATELET # BLD AUTO: 282 K/UL
PLATELET # BLD AUTO: 284 K/UL
PLATELET # BLD AUTO: 286 K/UL
PLATELET # BLD AUTO: 286 K/UL
PLATELET # BLD AUTO: 289 K/UL
PLATELET # BLD AUTO: 289 K/UL
PLATELET # BLD AUTO: 293 K/UL
PLATELET # BLD AUTO: 296 K/UL
PLATELET # BLD AUTO: 300 K/UL
PLATELET # BLD AUTO: 301 K/UL
PLATELET # BLD AUTO: 303 K/UL
PLATELET # BLD AUTO: 307 K/UL
PLATELET # BLD AUTO: 309 K/UL
PLATELET # BLD AUTO: 312 K/UL
PLATELET # BLD AUTO: 315 K/UL
PLATELET # BLD AUTO: 322 K/UL
PLATELET # BLD AUTO: 326 K/UL
PLATELET # BLD AUTO: 334 K/UL
PLATELET # BLD AUTO: 338 K/UL
PLATELET # BLD AUTO: 343 K/UL
PLATELET # BLD AUTO: 344 K/UL
PLATELET # BLD AUTO: 345 K/UL
PLATELET # BLD AUTO: 347 K/UL
PLATELET # BLD AUTO: 349 K/UL
PLATELET # BLD AUTO: 354 K/UL
PLATELET # BLD AUTO: 358 K/UL
PLATELET # BLD AUTO: 359 K/UL
PLATELET # BLD AUTO: 366 K/UL
PLATELET # BLD AUTO: 382 K/UL
PLATELET # BLD AUTO: 386 K/UL
PLATELET # BLD AUTO: 390 K/UL
PLATELET # BLD AUTO: 396 K/UL
PLATELET # BLD AUTO: 403 K/UL
PLATELET # BLD AUTO: 408 K/UL
PLATELET # BLD AUTO: 413 K/UL
PLATELET # BLD AUTO: 423 K/UL
PLATELET # BLD AUTO: 427 K/UL
PLATELET # BLD AUTO: 430 K/UL
PLATELET # BLD AUTO: 431 K/UL
PLATELET # BLD AUTO: 431 K/UL
PLATELET # BLD AUTO: 432 K/UL
PLATELET # BLD AUTO: 434 K/UL
PLATELET # BLD AUTO: 455 K/UL
PLATELET # BLD AUTO: 456 K/UL
PLATELET # BLD AUTO: 478 K/UL
PLATELET # BLD AUTO: 496 K/UL
PLATELET # BLD AUTO: 498 K/UL
PLATELET # BLD AUTO: 525 K/UL
PLATELET # BLD AUTO: 533 K/UL
PLATELET # BLD AUTO: ABNORMAL K/UL
PLATELET BLD QL SMEAR: ABNORMAL
PMV BLD AUTO: 10 FL
PMV BLD AUTO: 10.1 FL
PMV BLD AUTO: 10.2 FL
PMV BLD AUTO: 10.3 FL
PMV BLD AUTO: 10.4 FL
PMV BLD AUTO: 10.5 FL
PMV BLD AUTO: 10.6 FL
PMV BLD AUTO: 10.7 FL
PMV BLD AUTO: 10.8 FL
PMV BLD AUTO: 10.9 FL
PMV BLD AUTO: 10.9 FL
PMV BLD AUTO: 11 FL
PMV BLD AUTO: 11.1 FL
PMV BLD AUTO: 11.3 FL
PMV BLD AUTO: 9.1 FL
PMV BLD AUTO: 9.3 FL
PMV BLD AUTO: 9.4 FL
PMV BLD AUTO: 9.5 FL
PMV BLD AUTO: 9.6 FL
PMV BLD AUTO: 9.7 FL
PMV BLD AUTO: 9.8 FL
PMV BLD AUTO: 9.9 FL
PO2 BLDA: 145 MMHG (ref 80–100)
PO2 BLDA: 164 MMHG (ref 80–100)
PO2 BLDA: 178 MMHG (ref 80–100)
PO2 BLDA: 200 MMHG (ref 80–100)
PO2 BLDA: 37 MMHG (ref 40–60)
PO2 BLDA: 39 MMHG (ref 40–60)
PO2 BLDA: 43 MMHG (ref 40–60)
PO2 BLDA: 56 MMHG (ref 80–100)
PO2 BLDA: 62 MMHG (ref 80–100)
PO2 BLDA: 73 MMHG (ref 80–100)
PO2 BLDA: 81 MMHG (ref 80–100)
PO2 BLDA: 87 MMHG (ref 80–100)
POC BE: -3 MMOL/L
POC BE: -3 MMOL/L
POC BE: -4 MMOL/L
POC BE: -5 MMOL/L
POC BE: -6 MMOL/L
POC BE: 1 MMOL/L
POC BE: 5 MMOL/L
POC BE: 5 MMOL/L
POC BE: 6 MMOL/L
POC BE: 6 MMOL/L
POC BE: 8 MMOL/L
POC BE: 9 MMOL/L
POC IONIZED CALCIUM: 1.04 MMOL/L (ref 1.06–1.42)
POC IONIZED CALCIUM: 1.05 MMOL/L (ref 1.06–1.42)
POC IONIZED CALCIUM: 1.09 MMOL/L (ref 1.06–1.42)
POC IONIZED CALCIUM: 1.17 MMOL/L (ref 1.06–1.42)
POC IONIZED CALCIUM: 1.25 MMOL/L (ref 1.06–1.42)
POC IONIZED CALCIUM: 1.26 MMOL/L (ref 1.06–1.42)
POC SATURATED O2: 100 % (ref 95–100)
POC SATURATED O2: 68 % (ref 95–100)
POC SATURATED O2: 70 % (ref 95–100)
POC SATURATED O2: 83 % (ref 95–100)
POC SATURATED O2: 90 % (ref 95–100)
POC SATURATED O2: 91 % (ref 95–100)
POC SATURATED O2: 94 % (ref 95–100)
POC SATURATED O2: 96 % (ref 95–100)
POC SATURATED O2: 96 % (ref 95–100)
POC SATURATED O2: 99 % (ref 95–100)
POC TCO2 (MEASURED): 27 MMOL/L (ref 23–29)
POC TCO2: 20 MMOL/L (ref 23–27)
POC TCO2: 22 MMOL/L (ref 23–27)
POC TCO2: 22 MMOL/L (ref 24–29)
POC TCO2: 23 MMOL/L (ref 23–27)
POC TCO2: 23 MMOL/L (ref 23–27)
POC TCO2: 27 MMOL/L (ref 23–27)
POC TCO2: 30 MMOL/L (ref 24–29)
POC TCO2: 31 MMOL/L (ref 23–27)
POC TCO2: 31 MMOL/L (ref 23–27)
POC TCO2: 32 MMOL/L (ref 23–27)
POC TCO2: 34 MMOL/L (ref 24–29)
POC TCO2: 36 MMOL/L (ref 23–27)
POCT GLUCOSE: 100 MG/DL (ref 70–110)
POCT GLUCOSE: 101 MG/DL (ref 70–110)
POCT GLUCOSE: 102 MG/DL (ref 70–110)
POCT GLUCOSE: 103 MG/DL (ref 70–110)
POCT GLUCOSE: 104 MG/DL (ref 70–110)
POCT GLUCOSE: 105 MG/DL (ref 70–110)
POCT GLUCOSE: 106 MG/DL (ref 70–110)
POCT GLUCOSE: 107 MG/DL (ref 70–110)
POCT GLUCOSE: 107 MG/DL (ref 70–110)
POCT GLUCOSE: 108 MG/DL (ref 70–110)
POCT GLUCOSE: 109 MG/DL (ref 70–110)
POCT GLUCOSE: 110 MG/DL (ref 70–110)
POCT GLUCOSE: 111 MG/DL (ref 70–110)
POCT GLUCOSE: 113 MG/DL (ref 70–110)
POCT GLUCOSE: 114 MG/DL (ref 70–110)
POCT GLUCOSE: 115 MG/DL (ref 70–110)
POCT GLUCOSE: 116 MG/DL (ref 70–110)
POCT GLUCOSE: 117 MG/DL (ref 70–110)
POCT GLUCOSE: 119 MG/DL (ref 70–110)
POCT GLUCOSE: 120 MG/DL (ref 70–110)
POCT GLUCOSE: 120 MG/DL (ref 70–110)
POCT GLUCOSE: 121 MG/DL (ref 70–110)
POCT GLUCOSE: 121 MG/DL (ref 70–110)
POCT GLUCOSE: 122 MG/DL (ref 70–110)
POCT GLUCOSE: 123 MG/DL (ref 70–110)
POCT GLUCOSE: 123 MG/DL (ref 70–110)
POCT GLUCOSE: 124 MG/DL (ref 70–110)
POCT GLUCOSE: 124 MG/DL (ref 70–110)
POCT GLUCOSE: 125 MG/DL (ref 70–110)
POCT GLUCOSE: 125 MG/DL (ref 70–110)
POCT GLUCOSE: 126 MG/DL (ref 70–110)
POCT GLUCOSE: 127 MG/DL (ref 70–110)
POCT GLUCOSE: 128 MG/DL (ref 70–110)
POCT GLUCOSE: 129 MG/DL (ref 70–110)
POCT GLUCOSE: 129 MG/DL (ref 70–110)
POCT GLUCOSE: 130 MG/DL (ref 70–110)
POCT GLUCOSE: 130 MG/DL (ref 70–110)
POCT GLUCOSE: 131 MG/DL (ref 70–110)
POCT GLUCOSE: 132 MG/DL (ref 70–110)
POCT GLUCOSE: 132 MG/DL (ref 70–110)
POCT GLUCOSE: 134 MG/DL (ref 70–110)
POCT GLUCOSE: 134 MG/DL (ref 70–110)
POCT GLUCOSE: 135 MG/DL (ref 70–110)
POCT GLUCOSE: 140 MG/DL (ref 70–110)
POCT GLUCOSE: 141 MG/DL (ref 70–110)
POCT GLUCOSE: 142 MG/DL (ref 70–110)
POCT GLUCOSE: 143 MG/DL (ref 70–110)
POCT GLUCOSE: 145 MG/DL (ref 70–110)
POCT GLUCOSE: 146 MG/DL (ref 70–110)
POCT GLUCOSE: 150 MG/DL (ref 70–110)
POCT GLUCOSE: 157 MG/DL (ref 70–110)
POCT GLUCOSE: 157 MG/DL (ref 70–110)
POCT GLUCOSE: 159 MG/DL (ref 70–110)
POCT GLUCOSE: 160 MG/DL (ref 70–110)
POCT GLUCOSE: 161 MG/DL (ref 70–110)
POCT GLUCOSE: 161 MG/DL (ref 70–110)
POCT GLUCOSE: 162 MG/DL (ref 70–110)
POCT GLUCOSE: 162 MG/DL (ref 70–110)
POCT GLUCOSE: 167 MG/DL (ref 70–110)
POCT GLUCOSE: 168 MG/DL (ref 70–110)
POCT GLUCOSE: 170 MG/DL (ref 70–110)
POCT GLUCOSE: 172 MG/DL (ref 70–110)
POCT GLUCOSE: 193 MG/DL (ref 70–110)
POCT GLUCOSE: 196 MG/DL (ref 70–110)
POCT GLUCOSE: 215 MG/DL (ref 70–110)
POCT GLUCOSE: 223 MG/DL (ref 70–110)
POCT GLUCOSE: 246 MG/DL (ref 70–110)
POCT GLUCOSE: 301 MG/DL (ref 70–110)
POCT GLUCOSE: 362 MG/DL (ref 70–110)
POCT GLUCOSE: 53 MG/DL (ref 70–110)
POCT GLUCOSE: 60 MG/DL (ref 70–110)
POCT GLUCOSE: 60 MG/DL (ref 70–110)
POCT GLUCOSE: 61 MG/DL (ref 70–110)
POCT GLUCOSE: 64 MG/DL (ref 70–110)
POCT GLUCOSE: 68 MG/DL (ref 70–110)
POCT GLUCOSE: 69 MG/DL (ref 70–110)
POCT GLUCOSE: 69 MG/DL (ref 70–110)
POCT GLUCOSE: 72 MG/DL (ref 70–110)
POCT GLUCOSE: 73 MG/DL (ref 70–110)
POCT GLUCOSE: 74 MG/DL (ref 70–110)
POCT GLUCOSE: 75 MG/DL (ref 70–110)
POCT GLUCOSE: 76 MG/DL (ref 70–110)
POCT GLUCOSE: 77 MG/DL (ref 70–110)
POCT GLUCOSE: 78 MG/DL (ref 70–110)
POCT GLUCOSE: 79 MG/DL (ref 70–110)
POCT GLUCOSE: 80 MG/DL (ref 70–110)
POCT GLUCOSE: 82 MG/DL (ref 70–110)
POCT GLUCOSE: 83 MG/DL (ref 70–110)
POCT GLUCOSE: 84 MG/DL (ref 70–110)
POCT GLUCOSE: 84 MG/DL (ref 70–110)
POCT GLUCOSE: 85 MG/DL (ref 70–110)
POCT GLUCOSE: 86 MG/DL (ref 70–110)
POCT GLUCOSE: 87 MG/DL (ref 70–110)
POCT GLUCOSE: 88 MG/DL (ref 70–110)
POCT GLUCOSE: 89 MG/DL (ref 70–110)
POCT GLUCOSE: 90 MG/DL (ref 70–110)
POCT GLUCOSE: 91 MG/DL (ref 70–110)
POCT GLUCOSE: 92 MG/DL (ref 70–110)
POCT GLUCOSE: 93 MG/DL (ref 70–110)
POCT GLUCOSE: 94 MG/DL (ref 70–110)
POCT GLUCOSE: 95 MG/DL (ref 70–110)
POCT GLUCOSE: 96 MG/DL (ref 70–110)
POCT GLUCOSE: 97 MG/DL (ref 70–110)
POCT GLUCOSE: 97 MG/DL (ref 70–110)
POCT GLUCOSE: 98 MG/DL (ref 70–110)
POCT GLUCOSE: 99 MG/DL (ref 70–110)
POIKILOCYTOSIS BLD QL SMEAR: SLIGHT
POLYCHROMASIA BLD QL SMEAR: ABNORMAL
POTASSIUM BLD-SCNC: 2.7 MMOL/L (ref 3.5–5.1)
POTASSIUM BLD-SCNC: 3.3 MMOL/L (ref 3.5–5.1)
POTASSIUM BLD-SCNC: 3.3 MMOL/L (ref 3.5–5.1)
POTASSIUM BLD-SCNC: 3.7 MMOL/L (ref 3.5–5.1)
POTASSIUM BLD-SCNC: 3.8 MMOL/L (ref 3.5–5.1)
POTASSIUM BLD-SCNC: 6.3 MMOL/L (ref 3.5–5.1)
POTASSIUM SERPL-SCNC: 3 MMOL/L
POTASSIUM SERPL-SCNC: 3.1 MMOL/L
POTASSIUM SERPL-SCNC: 3.2 MMOL/L
POTASSIUM SERPL-SCNC: 3.3 MMOL/L
POTASSIUM SERPL-SCNC: 3.4 MMOL/L
POTASSIUM SERPL-SCNC: 3.5 MMOL/L
POTASSIUM SERPL-SCNC: 3.6 MMOL/L
POTASSIUM SERPL-SCNC: 3.7 MMOL/L
POTASSIUM SERPL-SCNC: 3.8 MMOL/L
POTASSIUM SERPL-SCNC: 3.9 MMOL/L
POTASSIUM SERPL-SCNC: 4 MMOL/L
POTASSIUM SERPL-SCNC: 4.1 MMOL/L
POTASSIUM SERPL-SCNC: 4.2 MMOL/L
POTASSIUM SERPL-SCNC: 4.3 MMOL/L
POTASSIUM SERPL-SCNC: 4.4 MMOL/L
POTASSIUM SERPL-SCNC: 4.5 MMOL/L
POTASSIUM SERPL-SCNC: 4.6 MMOL/L
POTASSIUM SERPL-SCNC: 4.7 MMOL/L
POTASSIUM SERPL-SCNC: 4.8 MMOL/L
POTASSIUM SERPL-SCNC: 4.8 MMOL/L
POTASSIUM SERPL-SCNC: 4.9 MMOL/L
POTASSIUM SERPL-SCNC: 5 MMOL/L
POTASSIUM SERPL-SCNC: 5.7 MMOL/L
POTASSIUM SERPL-SCNC: 5.9 MMOL/L
POTASSIUM SERPL-SCNC: 5.9 MMOL/L
POTASSIUM SERPL-SCNC: 6.4 MMOL/L
POTASSIUM SERPL-SCNC: 6.4 MMOL/L
POTASSIUM UR-SCNC: 64 MMOL/L
PREALB SERPL-MCNC: 26 MG/DL
PROCALCITONIN SERPL IA-MCNC: 0.05 NG/ML
PROCALCITONIN SERPL IA-MCNC: 0.09 NG/ML
PROCALCITONIN SERPL IA-MCNC: 0.13 NG/ML
PROCALCITONIN SERPL IA-MCNC: 0.19 NG/ML
PROT CSF-MCNC: 49 MG/DL
PROT SERPL-MCNC: 5.4 G/DL
PROT SERPL-MCNC: 5.8 G/DL
PROT SERPL-MCNC: 5.9 G/DL
PROT SERPL-MCNC: 6 G/DL
PROT SERPL-MCNC: 6.3 G/DL
PROT SERPL-MCNC: 6.4 G/DL
PROT SERPL-MCNC: 6.5 G/DL
PROT SERPL-MCNC: 6.6 G/DL
PROT SERPL-MCNC: 6.7 G/DL
PROT SERPL-MCNC: 6.9 G/DL
PROT SERPL-MCNC: 7 G/DL
PROT SERPL-MCNC: 7.1 G/DL
PROT SERPL-MCNC: 7.1 G/DL
PROT SERPL-MCNC: 7.2 G/DL
PROT SERPL-MCNC: 7.2 G/DL
PROT SERPL-MCNC: 7.3 G/DL
PROT SERPL-MCNC: 7.4 G/DL
PROT SERPL-MCNC: 7.5 G/DL
PROT SERPL-MCNC: 7.6 G/DL
PROT SERPL-MCNC: 7.6 G/DL
PROT SERPL-MCNC: 7.7 G/DL
PROT SERPL-MCNC: 7.8 G/DL
PROT SERPL-MCNC: 7.9 G/DL
PROT SERPL-MCNC: 8 G/DL
PROT SERPL-MCNC: 8.2 G/DL
PROT SERPL-MCNC: 8.2 G/DL
PROT SERPL-MCNC: 8.3 G/DL
PROT SERPL-MCNC: 8.4 G/DL
PROT SERPL-MCNC: 8.5 G/DL
PROT SERPL-MCNC: 8.7 G/DL
PROT SERPL-MCNC: 9.1 G/DL
PROT UR QL STRIP: ABNORMAL
PROT UR QL STRIP: NEGATIVE
PROTHROMBIN TIME: 10.1 SEC
PROTHROMBIN TIME: 10.2 SEC
PROTHROMBIN TIME: 10.3 SEC
PROTHROMBIN TIME: 10.3 SEC
PROTHROMBIN TIME: 10.4 SEC
PROTHROMBIN TIME: 10.4 SEC
PROTHROMBIN TIME: 10.5 SEC
PROTHROMBIN TIME: 10.6 SEC
PROTHROMBIN TIME: 10.6 SEC
PROTHROMBIN TIME: 10.7 SEC
PROTHROMBIN TIME: 10.8 SEC
PROTHROMBIN TIME: 10.9 SEC
PROTHROMBIN TIME: 10.9 SEC
PROTHROMBIN TIME: 11 SEC
PROTHROMBIN TIME: 11.3 SEC
PROTHROMBIN TIME: 11.6 SEC
PROTHROMBIN TIME: 12 SEC
PROTHROMBIN TIME: 12 SEC
PROTHROMBIN TIME: 12.2 SEC
PTH RELATED PROT SERPL-SCNC: <0.2 PMOL/L
PTH-INTACT SERPL-MCNC: 29 PG/ML
PTH-INTACT SERPL-MCNC: 34 PG/ML
RBC # BLD AUTO: 2.27 M/UL
RBC # BLD AUTO: 2.46 M/UL
RBC # BLD AUTO: 2.55 M/UL
RBC # BLD AUTO: 2.63 M/UL
RBC # BLD AUTO: 2.67 M/UL
RBC # BLD AUTO: 2.71 M/UL
RBC # BLD AUTO: 2.73 M/UL
RBC # BLD AUTO: 2.83 M/UL
RBC # BLD AUTO: 2.88 M/UL
RBC # BLD AUTO: 2.9 M/UL
RBC # BLD AUTO: 2.92 M/UL
RBC # BLD AUTO: 2.93 M/UL
RBC # BLD AUTO: 2.95 M/UL
RBC # BLD AUTO: 2.97 M/UL
RBC # BLD AUTO: 2.99 M/UL
RBC # BLD AUTO: 3 M/UL
RBC # BLD AUTO: 3.02 M/UL
RBC # BLD AUTO: 3.02 M/UL
RBC # BLD AUTO: 3.05 M/UL
RBC # BLD AUTO: 3.08 M/UL
RBC # BLD AUTO: 3.11 M/UL
RBC # BLD AUTO: 3.12 M/UL
RBC # BLD AUTO: 3.14 M/UL
RBC # BLD AUTO: 3.18 M/UL
RBC # BLD AUTO: 3.28 M/UL
RBC # BLD AUTO: 3.3 M/UL
RBC # BLD AUTO: 3.36 M/UL
RBC # BLD AUTO: 3.4 M/UL
RBC # BLD AUTO: 3.51 M/UL
RBC # BLD AUTO: 3.51 M/UL
RBC # BLD AUTO: 3.57 M/UL
RBC # BLD AUTO: 3.57 M/UL
RBC # BLD AUTO: 3.59 M/UL
RBC # BLD AUTO: 3.62 M/UL
RBC # BLD AUTO: 3.64 M/UL
RBC # BLD AUTO: 3.64 M/UL
RBC # BLD AUTO: 3.65 M/UL
RBC # BLD AUTO: 3.65 M/UL
RBC # BLD AUTO: 3.66 M/UL
RBC # BLD AUTO: 3.7 M/UL
RBC # BLD AUTO: 3.72 M/UL
RBC # BLD AUTO: 3.72 M/UL
RBC # BLD AUTO: 3.73 M/UL
RBC # BLD AUTO: 3.74 M/UL
RBC # BLD AUTO: 3.75 M/UL
RBC # BLD AUTO: 3.8 M/UL
RBC # BLD AUTO: 3.85 M/UL
RBC # BLD AUTO: 3.86 M/UL
RBC # BLD AUTO: 3.88 M/UL
RBC # BLD AUTO: 3.91 M/UL
RBC # BLD AUTO: 4 M/UL
RBC # BLD AUTO: 4.06 M/UL
RBC # BLD AUTO: 4.09 M/UL
RBC # BLD AUTO: 4.13 M/UL
RBC # BLD AUTO: 4.14 M/UL
RBC # BLD AUTO: 4.23 M/UL
RBC # BLD AUTO: 4.25 M/UL
RBC # BLD AUTO: 4.35 M/UL
RBC # BLD AUTO: 4.37 M/UL
RBC # BLD AUTO: 4.45 M/UL
RBC # BLD AUTO: 4.55 M/UL
RBC # BLD AUTO: 4.61 M/UL
RBC # BLD AUTO: 4.67 M/UL
RBC # BLD AUTO: 4.74 M/UL
RBC # BLD AUTO: 4.78 M/UL
RBC # CSF: 282 /CU MM
RBC # CSF: 9 /CU MM
RBC #/AREA URNS AUTO: 0 /HPF (ref 0–4)
RBC #/AREA URNS AUTO: 1 /HPF (ref 0–4)
RBC #/AREA URNS AUTO: 11 /HPF (ref 0–4)
RBC #/AREA URNS AUTO: 13 /HPF (ref 0–4)
RBC #/AREA URNS AUTO: 14 /HPF (ref 0–4)
RBC #/AREA URNS AUTO: 17 /HPF (ref 0–4)
RBC #/AREA URNS AUTO: 18 /HPF (ref 0–4)
RBC #/AREA URNS AUTO: 2 /HPF (ref 0–4)
RBC #/AREA URNS AUTO: 20 /HPF (ref 0–4)
RBC #/AREA URNS AUTO: 22 /HPF (ref 0–4)
RBC #/AREA URNS AUTO: 3 /HPF (ref 0–4)
RBC #/AREA URNS AUTO: 3 /HPF (ref 0–4)
RBC #/AREA URNS AUTO: 4 /HPF (ref 0–4)
RBC #/AREA URNS AUTO: 42 /HPF (ref 0–4)
RBC #/AREA URNS AUTO: 46 /HPF (ref 0–4)
RBC #/AREA URNS AUTO: 5 /HPF (ref 0–4)
RBC #/AREA URNS AUTO: 54 /HPF (ref 0–4)
RBC #/AREA URNS AUTO: 6 /HPF (ref 0–4)
RBC #/AREA URNS AUTO: 6 /HPF (ref 0–4)
RBC #/AREA URNS AUTO: 62 /HPF (ref 0–4)
RBC #/AREA URNS AUTO: 69 /HPF (ref 0–4)
RBC #/AREA URNS AUTO: 7 /HPF (ref 0–4)
RBC #/AREA URNS AUTO: 75 /HPF (ref 0–4)
RBC #/AREA URNS AUTO: 8 /HPF (ref 0–4)
RBC #/AREA URNS AUTO: >100 /HPF (ref 0–4)
RETIRED EF AND QEF - SEE NOTES: 60 (ref 55–65)
SAMPLE: ABNORMAL
SAMPLE: NORMAL
SITE: ABNORMAL
SITE: NORMAL
SODIUM BLD-SCNC: 122 MMOL/L (ref 136–145)
SODIUM BLD-SCNC: 125 MMOL/L (ref 136–145)
SODIUM BLD-SCNC: 143 MMOL/L (ref 136–145)
SODIUM BLD-SCNC: 144 MMOL/L (ref 136–145)
SODIUM BLD-SCNC: 147 MMOL/L (ref 136–145)
SODIUM BLD-SCNC: 150 MMOL/L (ref 136–145)
SODIUM SERPL-SCNC: 116 MMOL/L
SODIUM SERPL-SCNC: 121 MMOL/L
SODIUM SERPL-SCNC: 123 MMOL/L
SODIUM SERPL-SCNC: 124 MMOL/L
SODIUM SERPL-SCNC: 125 MMOL/L
SODIUM SERPL-SCNC: 126 MMOL/L
SODIUM SERPL-SCNC: 128 MMOL/L
SODIUM SERPL-SCNC: 131 MMOL/L
SODIUM SERPL-SCNC: 131 MMOL/L
SODIUM SERPL-SCNC: 132 MMOL/L
SODIUM SERPL-SCNC: 133 MMOL/L
SODIUM SERPL-SCNC: 134 MMOL/L
SODIUM SERPL-SCNC: 134 MMOL/L
SODIUM SERPL-SCNC: 135 MMOL/L
SODIUM SERPL-SCNC: 135 MMOL/L
SODIUM SERPL-SCNC: 136 MMOL/L
SODIUM SERPL-SCNC: 137 MMOL/L
SODIUM SERPL-SCNC: 138 MMOL/L
SODIUM SERPL-SCNC: 139 MMOL/L
SODIUM SERPL-SCNC: 139 MMOL/L
SODIUM SERPL-SCNC: 140 MMOL/L
SODIUM SERPL-SCNC: 141 MMOL/L
SODIUM SERPL-SCNC: 142 MMOL/L
SODIUM SERPL-SCNC: 143 MMOL/L
SODIUM SERPL-SCNC: 144 MMOL/L
SODIUM SERPL-SCNC: 145 MMOL/L
SODIUM SERPL-SCNC: 146 MMOL/L
SODIUM SERPL-SCNC: 147 MMOL/L
SODIUM SERPL-SCNC: 148 MMOL/L
SODIUM SERPL-SCNC: 149 MMOL/L
SODIUM SERPL-SCNC: 150 MMOL/L
SODIUM SERPL-SCNC: 151 MMOL/L
SODIUM SERPL-SCNC: 152 MMOL/L
SODIUM SERPL-SCNC: 153 MMOL/L
SODIUM SERPL-SCNC: 154 MMOL/L
SODIUM SERPL-SCNC: 155 MMOL/L
SODIUM SERPL-SCNC: 156 MMOL/L
SODIUM SERPL-SCNC: 156 MMOL/L
SODIUM SERPL-SCNC: 157 MMOL/L
SODIUM SERPL-SCNC: 158 MMOL/L
SODIUM SERPL-SCNC: 159 MMOL/L
SODIUM SERPL-SCNC: 160 MMOL/L
SODIUM SERPL-SCNC: 163 MMOL/L
SODIUM SERPL-SCNC: 165 MMOL/L
SODIUM SERPL-SCNC: 168 MMOL/L
SODIUM UR-SCNC: 63 MMOL/L
SODIUM UR-SCNC: <20 MMOL/L
SP GR UR STRIP: 1 (ref 1–1.03)
SP GR UR STRIP: 1.01 (ref 1–1.03)
SP GR UR STRIP: 1.02 (ref 1–1.03)
SP GR UR STRIP: 1.03 (ref 1–1.03)
SP GR UR STRIP: <1.005 (ref 1–1.03)
SP GR UR STRIP: <1.005 (ref 1–1.03)
SP GR UR STRIP: >1.03 (ref 1–1.03)
SP GR UR STRIP: >=1.03 (ref 1–1.03)
SP GR UR STRIP: >=1.03 (ref 1–1.03)
SP02: 100
SP02: 88
SP02: 95
SP02: 99
SPECIMEN SOURCE: NORMAL
SPECIMEN SOURCE: NORMAL
SPECIMEN VOL CSF: 2 ML
SPECIMEN VOL CSF: 3 ML
SPONT RATE: 22
SQUAMOUS #/AREA URNS AUTO: 0 /HPF
SQUAMOUS #/AREA URNS AUTO: 1 /HPF
SQUAMOUS #/AREA URNS AUTO: 2 /HPF
SQUAMOUS #/AREA URNS AUTO: 3 /HPF
T4 FREE SERPL-MCNC: 0.81 NG/DL
T4 FREE SERPL-MCNC: 0.83 NG/DL
T4 FREE SERPL-MCNC: 0.83 NG/DL
T4 FREE SERPL-MCNC: 0.84 NG/DL
T4 FREE SERPL-MCNC: 0.85 NG/DL
T4 FREE SERPL-MCNC: 0.9 NG/DL
T4 FREE SERPL-MCNC: 0.96 NG/DL
T4 FREE SERPL-MCNC: 1 NG/DL
T4 FREE SERPL-MCNC: 1.1 NG/DL
T4 FREE SERPL-MCNC: 1.14 NG/DL
T4 SERPL-MCNC: 5.1 UG/DL
T4 SERPL-MCNC: 7.1 UG/DL
TB INDURATION 48 - 72 HR READ: 0 MM
TOXIC GRANULES BLD QL SMEAR: PRESENT
TOXIC GRANULES BLD QL SMEAR: PRESENT
TOXICOLOGY INFORMATION: NORMAL
TRANS ERYTHROCYTES VOL PATIENT: NORMAL ML
TRANS ERYTHROCYTES VOL PATIENT: NORMAL ML
TRIGL SERPL-MCNC: 291 MG/DL
TROPONIN I SERPL DL<=0.01 NG/ML-MCNC: 0.01 NG/ML
TROPONIN I SERPL DL<=0.01 NG/ML-MCNC: 0.05 NG/ML
TROPONIN I SERPL DL<=0.01 NG/ML-MCNC: <0.006 NG/ML
TROPONIN I SERPL DL<=0.01 NG/ML-MCNC: <0.006 NG/ML
TSH SERPL DL<=0.005 MIU/L-ACNC: 0.01 UIU/ML
TSH SERPL DL<=0.005 MIU/L-ACNC: 0.04 UIU/ML
TSH SERPL DL<=0.005 MIU/L-ACNC: 0.04 UIU/ML
TSH SERPL DL<=0.005 MIU/L-ACNC: 0.24 UIU/ML
TSH SERPL DL<=0.005 MIU/L-ACNC: 0.35 UIU/ML
TSH SERPL DL<=0.005 MIU/L-ACNC: 0.47 UIU/ML
URATE CRY UR QL COMP ASSIST: ABNORMAL
URN SPEC COLLECT METH UR: ABNORMAL
URN SPEC COLLECT METH UR: NORMAL
UROBILINOGEN UR STRIP-ACNC: 2 EU/DL
UROBILINOGEN UR STRIP-ACNC: 2 EU/DL
UROBILINOGEN UR STRIP-ACNC: NEGATIVE EU/DL
VANCOMYCIN TROUGH SERPL-MCNC: 15.3 UG/ML
VANCOMYCIN TROUGH SERPL-MCNC: 16.2 UG/ML
VANCOMYCIN TROUGH SERPL-MCNC: 17.3 UG/ML
VANCOMYCIN TROUGH SERPL-MCNC: 23.5 UG/ML
VASOPRESSIN SERPL-MCNC: <0.5 PG/ML (ref 0–6.9)
VDRL CSF QL: NORMAL
VIT B12 SERPL-MCNC: 1089 PG/ML
VT: 300
VT: 300
WBC # BLD AUTO: 10.19 K/UL
WBC # BLD AUTO: 10.23 K/UL
WBC # BLD AUTO: 10.37 K/UL
WBC # BLD AUTO: 10.42 K/UL
WBC # BLD AUTO: 10.44 K/UL
WBC # BLD AUTO: 10.46 K/UL
WBC # BLD AUTO: 10.76 K/UL
WBC # BLD AUTO: 11.48 K/UL
WBC # BLD AUTO: 11.64 K/UL
WBC # BLD AUTO: 11.67 K/UL
WBC # BLD AUTO: 11.77 K/UL
WBC # BLD AUTO: 13.55 K/UL
WBC # BLD AUTO: 13.57 K/UL
WBC # BLD AUTO: 14.31 K/UL
WBC # BLD AUTO: 15.95 K/UL
WBC # BLD AUTO: 16.38 K/UL
WBC # BLD AUTO: 16.63 K/UL
WBC # BLD AUTO: 17.26 K/UL
WBC # BLD AUTO: 17.35 K/UL
WBC # BLD AUTO: 17.88 K/UL
WBC # BLD AUTO: 18.8 K/UL
WBC # BLD AUTO: 19.34 K/UL
WBC # BLD AUTO: 20.63 K/UL
WBC # BLD AUTO: 20.66 K/UL
WBC # BLD AUTO: 22.97 K/UL
WBC # BLD AUTO: 23.65 K/UL
WBC # BLD AUTO: 23.71 K/UL
WBC # BLD AUTO: 3.72 K/UL
WBC # BLD AUTO: 34.23 K/UL
WBC # BLD AUTO: 4.12 K/UL
WBC # BLD AUTO: 4.28 K/UL
WBC # BLD AUTO: 4.92 K/UL
WBC # BLD AUTO: 5.55 K/UL
WBC # BLD AUTO: 6.99 K/UL
WBC # BLD AUTO: 7.02 K/UL
WBC # BLD AUTO: 7.14 K/UL
WBC # BLD AUTO: 7.34 K/UL
WBC # BLD AUTO: 7.35 K/UL
WBC # BLD AUTO: 7.4 K/UL
WBC # BLD AUTO: 7.41 K/UL
WBC # BLD AUTO: 7.71 K/UL
WBC # BLD AUTO: 7.72 K/UL
WBC # BLD AUTO: 8.1 K/UL
WBC # BLD AUTO: 8.1 K/UL
WBC # BLD AUTO: 8.22 K/UL
WBC # BLD AUTO: 8.25 K/UL
WBC # BLD AUTO: 8.33 K/UL
WBC # BLD AUTO: 8.4 K/UL
WBC # BLD AUTO: 8.61 K/UL
WBC # BLD AUTO: 8.8 K/UL
WBC # BLD AUTO: 8.81 K/UL
WBC # BLD AUTO: 8.84 K/UL
WBC # BLD AUTO: 8.96 K/UL
WBC # BLD AUTO: 9 K/UL
WBC # BLD AUTO: 9.06 K/UL
WBC # BLD AUTO: 9.06 K/UL
WBC # BLD AUTO: 9.09 K/UL
WBC # BLD AUTO: 9.29 K/UL
WBC # BLD AUTO: 9.4 K/UL
WBC # BLD AUTO: 9.43 K/UL
WBC # BLD AUTO: 9.51 K/UL
WBC # BLD AUTO: 9.66 K/UL
WBC # BLD AUTO: 9.66 K/UL
WBC # BLD AUTO: 9.68 K/UL
WBC # BLD AUTO: 9.77 K/UL
WBC # BLD AUTO: 9.81 K/UL
WBC # BLD AUTO: 9.83 K/UL
WBC # BLD AUTO: 9.86 K/UL
WBC # BLD AUTO: 9.96 K/UL
WBC # CSF: 110 /CU MM
WBC # CSF: 74 /CU MM
WBC #/AREA URNS AUTO: 0 /HPF (ref 0–5)
WBC #/AREA URNS AUTO: 1 /HPF (ref 0–5)
WBC #/AREA URNS AUTO: 10 /HPF (ref 0–5)
WBC #/AREA URNS AUTO: 11 /HPF (ref 0–5)
WBC #/AREA URNS AUTO: 11 /HPF (ref 0–5)
WBC #/AREA URNS AUTO: 12 /HPF (ref 0–5)
WBC #/AREA URNS AUTO: 14 /HPF (ref 0–5)
WBC #/AREA URNS AUTO: 18 /HPF (ref 0–5)
WBC #/AREA URNS AUTO: 19 /HPF (ref 0–5)
WBC #/AREA URNS AUTO: 2 /HPF (ref 0–5)
WBC #/AREA URNS AUTO: 24 /HPF (ref 0–5)
WBC #/AREA URNS AUTO: 27 /HPF (ref 0–5)
WBC #/AREA URNS AUTO: 3 /HPF (ref 0–5)
WBC #/AREA URNS AUTO: 4 /HPF (ref 0–5)
WBC #/AREA URNS AUTO: 5 /HPF (ref 0–5)
WBC #/AREA URNS AUTO: 5 /HPF (ref 0–5)
WBC #/AREA URNS AUTO: 6 /HPF (ref 0–5)
WBC #/AREA URNS AUTO: 7 /HPF (ref 0–5)
WBC #/AREA URNS AUTO: 75 /HPF (ref 0–5)
WBC #/AREA URNS AUTO: 8 /HPF (ref 0–5)
WBC #/AREA URNS AUTO: 8 /HPF (ref 0–5)
WBC #/AREA URNS AUTO: 80 /HPF (ref 0–5)
WBC #/AREA URNS AUTO: 9 /HPF (ref 0–5)
WBC CLUMPS UR QL AUTO: ABNORMAL
YEAST UR QL AUTO: ABNORMAL

## 2017-01-01 PROCEDURE — 63600175 PHARM REV CODE 636 W HCPCS: Performed by: PSYCHIATRY & NEUROLOGY

## 2017-01-01 PROCEDURE — 80053 COMPREHEN METABOLIC PANEL: CPT

## 2017-01-01 PROCEDURE — 97530 THERAPEUTIC ACTIVITIES: CPT

## 2017-01-01 PROCEDURE — 92526 ORAL FUNCTION THERAPY: CPT

## 2017-01-01 PROCEDURE — 63600175 PHARM REV CODE 636 W HCPCS: Performed by: STUDENT IN AN ORGANIZED HEALTH CARE EDUCATION/TRAINING PROGRAM

## 2017-01-01 PROCEDURE — 25000003 PHARM REV CODE 250: Performed by: INTERNAL MEDICINE

## 2017-01-01 PROCEDURE — 97803 MED NUTRITION INDIV SUBSEQ: CPT

## 2017-01-01 PROCEDURE — 25000003 PHARM REV CODE 250: Performed by: NEUROLOGICAL SURGERY

## 2017-01-01 PROCEDURE — 25000003 PHARM REV CODE 250: Performed by: STUDENT IN AN ORGANIZED HEALTH CARE EDUCATION/TRAINING PROGRAM

## 2017-01-01 PROCEDURE — 83735 ASSAY OF MAGNESIUM: CPT

## 2017-01-01 PROCEDURE — 80048 BASIC METABOLIC PNL TOTAL CA: CPT | Mod: 91

## 2017-01-01 PROCEDURE — 80048 BASIC METABOLIC PNL TOTAL CA: CPT

## 2017-01-01 PROCEDURE — 84100 ASSAY OF PHOSPHORUS: CPT

## 2017-01-01 PROCEDURE — 25000003 PHARM REV CODE 250: Performed by: CLINICAL NURSE SPECIALIST

## 2017-01-01 PROCEDURE — 63600175 PHARM REV CODE 636 W HCPCS: Performed by: INTERNAL MEDICINE

## 2017-01-01 PROCEDURE — 99024 POSTOP FOLLOW-UP VISIT: CPT | Mod: ,,, | Performed by: PHYSICIAN ASSISTANT

## 2017-01-01 PROCEDURE — 63600175 PHARM REV CODE 636 W HCPCS: Performed by: GENERAL ACUTE CARE HOSPITAL

## 2017-01-01 PROCEDURE — 85025 COMPLETE CBC W/AUTO DIFF WBC: CPT

## 2017-01-01 PROCEDURE — 84300 ASSAY OF URINE SODIUM: CPT

## 2017-01-01 PROCEDURE — 97535 SELF CARE MNGMENT TRAINING: CPT

## 2017-01-01 PROCEDURE — 93005 ELECTROCARDIOGRAM TRACING: CPT

## 2017-01-01 PROCEDURE — 11000001 HC ACUTE MED/SURG PRIVATE ROOM

## 2017-01-01 PROCEDURE — 93010 ELECTROCARDIOGRAM REPORT: CPT | Mod: 77,,, | Performed by: INTERNAL MEDICINE

## 2017-01-01 PROCEDURE — 20600001 HC STEP DOWN PRIVATE ROOM

## 2017-01-01 PROCEDURE — 84145 PROCALCITONIN (PCT): CPT

## 2017-01-01 PROCEDURE — 99232 SBSQ HOSP IP/OBS MODERATE 35: CPT | Mod: ,,, | Performed by: INTERNAL MEDICINE

## 2017-01-01 PROCEDURE — 92610 EVALUATE SWALLOWING FUNCTION: CPT

## 2017-01-01 PROCEDURE — 63600175 PHARM REV CODE 636 W HCPCS: Performed by: EMERGENCY MEDICINE

## 2017-01-01 PROCEDURE — 81001 URINALYSIS AUTO W/SCOPE: CPT | Mod: 91

## 2017-01-01 PROCEDURE — 80202 ASSAY OF VANCOMYCIN: CPT

## 2017-01-01 PROCEDURE — 97162 PT EVAL MOD COMPLEX 30 MIN: CPT

## 2017-01-01 PROCEDURE — S0166 INJ OLANZAPINE 2.5MG: HCPCS | Performed by: INTERNAL MEDICINE

## 2017-01-01 PROCEDURE — 25000003 PHARM REV CODE 250: Performed by: GENERAL ACUTE CARE HOSPITAL

## 2017-01-01 PROCEDURE — 36415 COLL VENOUS BLD VENIPUNCTURE: CPT

## 2017-01-01 PROCEDURE — 80076 HEPATIC FUNCTION PANEL: CPT

## 2017-01-01 PROCEDURE — 20000000 HC ICU ROOM

## 2017-01-01 PROCEDURE — 92523 SPEECH SOUND LANG COMPREHEN: CPT

## 2017-01-01 PROCEDURE — 84295 ASSAY OF SERUM SODIUM: CPT

## 2017-01-01 PROCEDURE — 99233 SBSQ HOSP IP/OBS HIGH 50: CPT | Mod: ,,, | Performed by: INTERNAL MEDICINE

## 2017-01-01 PROCEDURE — 84484 ASSAY OF TROPONIN QUANT: CPT

## 2017-01-01 PROCEDURE — 25000003 PHARM REV CODE 250: Performed by: NURSE PRACTITIONER

## 2017-01-01 PROCEDURE — 99233 SBSQ HOSP IP/OBS HIGH 50: CPT | Mod: ,,, | Performed by: PSYCHIATRY & NEUROLOGY

## 2017-01-01 PROCEDURE — 97112 NEUROMUSCULAR REEDUCATION: CPT

## 2017-01-01 PROCEDURE — 80053 COMPREHEN METABOLIC PANEL: CPT | Mod: 91

## 2017-01-01 PROCEDURE — 86580 TB INTRADERMAL TEST: CPT | Performed by: INTERNAL MEDICINE

## 2017-01-01 PROCEDURE — 25000003 PHARM REV CODE 250: Performed by: PHYSICIAN ASSISTANT

## 2017-01-01 PROCEDURE — 87040 BLOOD CULTURE FOR BACTERIA: CPT

## 2017-01-01 PROCEDURE — 85610 PROTHROMBIN TIME: CPT

## 2017-01-01 PROCEDURE — 84133 ASSAY OF URINE POTASSIUM: CPT

## 2017-01-01 PROCEDURE — 81001 URINALYSIS AUTO W/SCOPE: CPT

## 2017-01-01 PROCEDURE — 99285 EMERGENCY DEPT VISIT HI MDM: CPT | Mod: 25

## 2017-01-01 PROCEDURE — 99291 CRITICAL CARE FIRST HOUR: CPT | Mod: ,,, | Performed by: INTERNAL MEDICINE

## 2017-01-01 PROCEDURE — 12800000 HC REHAB SEMI-PRIVATE ROOM

## 2017-01-01 PROCEDURE — 99232 SBSQ HOSP IP/OBS MODERATE 35: CPT | Mod: ,,, | Performed by: NURSE PRACTITIONER

## 2017-01-01 PROCEDURE — 94799 UNLISTED PULMONARY SVC/PX: CPT

## 2017-01-01 PROCEDURE — 25000003 PHARM REV CODE 250: Performed by: ANESTHESIOLOGY

## 2017-01-01 PROCEDURE — 25000003 PHARM REV CODE 250: Performed by: HOSPITALIST

## 2017-01-01 PROCEDURE — 85730 THROMBOPLASTIN TIME PARTIAL: CPT

## 2017-01-01 PROCEDURE — 63600175 PHARM REV CODE 636 W HCPCS: Performed by: NURSE PRACTITIONER

## 2017-01-01 PROCEDURE — 84443 ASSAY THYROID STIM HORMONE: CPT

## 2017-01-01 PROCEDURE — 86901 BLOOD TYPING SEROLOGIC RH(D): CPT

## 2017-01-01 PROCEDURE — 94640 AIRWAY INHALATION TREATMENT: CPT

## 2017-01-01 PROCEDURE — 36620 INSERTION CATHETER ARTERY: CPT | Mod: ,,, | Performed by: GENERAL ACUTE CARE HOSPITAL

## 2017-01-01 PROCEDURE — 99900035 HC TECH TIME PER 15 MIN (STAT)

## 2017-01-01 PROCEDURE — 86334 IMMUNOFIX E-PHORESIS SERUM: CPT

## 2017-01-01 PROCEDURE — 43752 NASAL/OROGASTRIC W/TUBE PLMT: CPT

## 2017-01-01 PROCEDURE — 02HV33Z INSERTION OF INFUSION DEVICE INTO SUPERIOR VENA CAVA, PERCUTANEOUS APPROACH: ICD-10-PCS | Performed by: INTERNAL MEDICINE

## 2017-01-01 PROCEDURE — 94002 VENT MGMT INPAT INIT DAY: CPT

## 2017-01-01 PROCEDURE — C9113 INJ PANTOPRAZOLE SODIUM, VIA: HCPCS | Performed by: NEUROLOGICAL SURGERY

## 2017-01-01 PROCEDURE — 92507 TX SP LANG VOICE COMM INDIV: CPT

## 2017-01-01 PROCEDURE — 37799 UNLISTED PX VASCULAR SURGERY: CPT

## 2017-01-01 PROCEDURE — 94761 N-INVAS EAR/PLS OXIMETRY MLT: CPT

## 2017-01-01 PROCEDURE — 99233 SBSQ HOSP IP/OBS HIGH 50: CPT | Mod: ,,, | Performed by: NURSE PRACTITIONER

## 2017-01-01 PROCEDURE — 99291 CRITICAL CARE FIRST HOUR: CPT | Mod: ,,, | Performed by: NURSE PRACTITIONER

## 2017-01-01 PROCEDURE — 81003 URINALYSIS AUTO W/O SCOPE: CPT

## 2017-01-01 PROCEDURE — 83970 ASSAY OF PARATHORMONE: CPT

## 2017-01-01 PROCEDURE — 96361 HYDRATE IV INFUSION ADD-ON: CPT

## 2017-01-01 PROCEDURE — 99291 CRITICAL CARE FIRST HOUR: CPT | Mod: ,,, | Performed by: CLINICAL NURSE SPECIALIST

## 2017-01-01 PROCEDURE — 25000242 PHARM REV CODE 250 ALT 637 W/ HCPCS

## 2017-01-01 PROCEDURE — 84132 ASSAY OF SERUM POTASSIUM: CPT

## 2017-01-01 PROCEDURE — 84165 PROTEIN E-PHORESIS SERUM: CPT | Mod: 26,,, | Performed by: PATHOLOGY

## 2017-01-01 PROCEDURE — 93307 TTE W/O DOPPLER COMPLETE: CPT | Mod: 26,,, | Performed by: INTERNAL MEDICINE

## 2017-01-01 PROCEDURE — 63600175 PHARM REV CODE 636 W HCPCS: Performed by: HOSPITALIST

## 2017-01-01 PROCEDURE — 99231 SBSQ HOSP IP/OBS SF/LOW 25: CPT | Mod: ,,, | Performed by: HOSPITALIST

## 2017-01-01 PROCEDURE — 85610 PROTHROMBIN TIME: CPT | Mod: 91

## 2017-01-01 PROCEDURE — 96365 THER/PROPH/DIAG IV INF INIT: CPT

## 2017-01-01 PROCEDURE — 85384 FIBRINOGEN ACTIVITY: CPT

## 2017-01-01 PROCEDURE — 25000003 PHARM REV CODE 250

## 2017-01-01 PROCEDURE — 84295 ASSAY OF SERUM SODIUM: CPT | Mod: 91

## 2017-01-01 PROCEDURE — 36600 WITHDRAWAL OF ARTERIAL BLOOD: CPT

## 2017-01-01 PROCEDURE — 99204 OFFICE O/P NEW MOD 45 MIN: CPT | Mod: S$PBB,,, | Performed by: NEUROLOGICAL SURGERY

## 2017-01-01 PROCEDURE — 70553 MRI BRAIN STEM W/O & W/DYE: CPT | Mod: 26,,, | Performed by: RADIOLOGY

## 2017-01-01 PROCEDURE — 27201037 HC PRESSURE MONITORING SET UP

## 2017-01-01 PROCEDURE — 82533 TOTAL CORTISOL: CPT

## 2017-01-01 PROCEDURE — 83690 ASSAY OF LIPASE: CPT

## 2017-01-01 PROCEDURE — 84439 ASSAY OF FREE THYROXINE: CPT

## 2017-01-01 PROCEDURE — 25000003 PHARM REV CODE 250: Performed by: EMERGENCY MEDICINE

## 2017-01-01 PROCEDURE — 93010 ELECTROCARDIOGRAM REPORT: CPT | Mod: ,,, | Performed by: INTERNAL MEDICINE

## 2017-01-01 PROCEDURE — 99233 SBSQ HOSP IP/OBS HIGH 50: CPT | Mod: ,,, | Performed by: PHYSICAL MEDICINE & REHABILITATION

## 2017-01-01 PROCEDURE — 85014 HEMATOCRIT: CPT

## 2017-01-01 PROCEDURE — 82330 ASSAY OF CALCIUM: CPT

## 2017-01-01 PROCEDURE — 85007 BL SMEAR W/DIFF WBC COUNT: CPT

## 2017-01-01 PROCEDURE — 85730 THROMBOPLASTIN TIME PARTIAL: CPT | Mod: 91

## 2017-01-01 PROCEDURE — 00B10ZZ EXCISION OF CEREBRAL MENINGES, OPEN APPROACH: ICD-10-PCS | Performed by: NEUROLOGICAL SURGERY

## 2017-01-01 PROCEDURE — 63600175 PHARM REV CODE 636 W HCPCS: Performed by: NEUROLOGICAL SURGERY

## 2017-01-01 PROCEDURE — 83935 ASSAY OF URINE OSMOLALITY: CPT

## 2017-01-01 PROCEDURE — 99233 SBSQ HOSP IP/OBS HIGH 50: CPT | Mod: ,,, | Performed by: HOSPITALIST

## 2017-01-01 PROCEDURE — 95813 EEG EXTND MNTR 61-119 MIN: CPT | Mod: 26,,, | Performed by: PSYCHIATRY & NEUROLOGY

## 2017-01-01 PROCEDURE — 63600175 PHARM REV CODE 636 W HCPCS

## 2017-01-01 PROCEDURE — 94667 MNPJ CHEST WALL 1ST: CPT

## 2017-01-01 PROCEDURE — 25000003 PHARM REV CODE 250: Performed by: PSYCHIATRY & NEUROLOGY

## 2017-01-01 PROCEDURE — 27000221 HC OXYGEN, UP TO 24 HOURS

## 2017-01-01 PROCEDURE — 99223 1ST HOSP IP/OBS HIGH 75: CPT | Mod: ,,, | Performed by: NURSE PRACTITIONER

## 2017-01-01 PROCEDURE — 83880 ASSAY OF NATRIURETIC PEPTIDE: CPT

## 2017-01-01 PROCEDURE — G8996 SWALLOW CURRENT STATUS: HCPCS | Mod: CN

## 2017-01-01 PROCEDURE — 97532 *HC OT COG SKL DEV EA 15: CPT

## 2017-01-01 PROCEDURE — 99232 SBSQ HOSP IP/OBS MODERATE 35: CPT | Mod: ,,, | Performed by: HOSPITALIST

## 2017-01-01 PROCEDURE — 97163 PT EVAL HIGH COMPLEX 45 MIN: CPT

## 2017-01-01 PROCEDURE — C1751 CATH, INF, PER/CENT/MIDLINE: HCPCS

## 2017-01-01 PROCEDURE — 84100 ASSAY OF PHOSPHORUS: CPT | Mod: 91

## 2017-01-01 PROCEDURE — 89051 BODY FLUID CELL COUNT: CPT | Mod: 91

## 2017-01-01 PROCEDURE — 96368 THER/DIAG CONCURRENT INF: CPT

## 2017-01-01 PROCEDURE — A9585 GADOBUTROL INJECTION: HCPCS | Performed by: INTERNAL MEDICINE

## 2017-01-01 PROCEDURE — 83605 ASSAY OF LACTIC ACID: CPT

## 2017-01-01 PROCEDURE — 94668 MNPJ CHEST WALL SBSQ: CPT

## 2017-01-01 PROCEDURE — 95957 EEG DIGITAL ANALYSIS: CPT

## 2017-01-01 PROCEDURE — 87040 BLOOD CULTURE FOR BACTERIA: CPT | Mod: 59

## 2017-01-01 PROCEDURE — 87186 SC STD MICRODIL/AGAR DIL: CPT

## 2017-01-01 PROCEDURE — 36556 INSERT NON-TUNNEL CV CATH: CPT

## 2017-01-01 PROCEDURE — 95951 HC EEG MONITORING/VIDEO RECORD: CPT

## 2017-01-01 PROCEDURE — 94003 VENT MGMT INPAT SUBQ DAY: CPT

## 2017-01-01 PROCEDURE — C1713 ANCHOR/SCREW BN/BN,TIS/BN: HCPCS | Performed by: NEUROLOGICAL SURGERY

## 2017-01-01 PROCEDURE — 86403 PARTICLE AGGLUT ANTBDY SCRN: CPT

## 2017-01-01 PROCEDURE — 99000 SPECIMEN HANDLING OFFICE-LAB: CPT

## 2017-01-01 PROCEDURE — 80307 DRUG TEST PRSMV CHEM ANLYZR: CPT

## 2017-01-01 PROCEDURE — 80074 ACUTE HEPATITIS PANEL: CPT

## 2017-01-01 PROCEDURE — 62270 DX LMBR SPI PNXR: CPT | Mod: ,,, | Performed by: NURSE PRACTITIONER

## 2017-01-01 PROCEDURE — 82652 VIT D 1 25-DIHYDROXY: CPT

## 2017-01-01 PROCEDURE — 62272 THER SPI PNXR DRG CSF: CPT

## 2017-01-01 PROCEDURE — 85027 COMPLETE CBC AUTOMATED: CPT

## 2017-01-01 PROCEDURE — 51702 INSERT TEMP BLADDER CATH: CPT

## 2017-01-01 PROCEDURE — 25500020 PHARM REV CODE 255: Performed by: INTERNAL MEDICINE

## 2017-01-01 PROCEDURE — 85025 COMPLETE CBC W/AUTO DIFF WBC: CPT | Mod: 91

## 2017-01-01 PROCEDURE — 87086 URINE CULTURE/COLONY COUNT: CPT

## 2017-01-01 PROCEDURE — 97803 MED NUTRITION INDIV SUBSEQ: CPT | Performed by: DIETITIAN, REGISTERED

## 2017-01-01 PROCEDURE — 96366 THER/PROPH/DIAG IV INF ADDON: CPT | Mod: XS

## 2017-01-01 PROCEDURE — 97802 MEDICAL NUTRITION INDIV IN: CPT

## 2017-01-01 PROCEDURE — 82803 BLOOD GASES ANY COMBINATION: CPT

## 2017-01-01 PROCEDURE — 87529 HSV DNA AMP PROBE: CPT | Mod: 59

## 2017-01-01 PROCEDURE — 37000009 HC ANESTHESIA EA ADD 15 MINS: Performed by: INTERNAL MEDICINE

## 2017-01-01 PROCEDURE — 93307 TTE W/O DOPPLER COMPLETE: CPT

## 2017-01-01 PROCEDURE — 95951 PR EEG MONITORING/VIDEORECORD: CPT | Mod: 26,52,, | Performed by: PSYCHIATRY & NEUROLOGY

## 2017-01-01 PROCEDURE — 87070 CULTURE OTHR SPECIMN AEROBIC: CPT

## 2017-01-01 PROCEDURE — 84478 ASSAY OF TRIGLYCERIDES: CPT

## 2017-01-01 PROCEDURE — 63600175 PHARM REV CODE 636 W HCPCS: Performed by: NURSE ANESTHETIST, CERTIFIED REGISTERED

## 2017-01-01 PROCEDURE — 99239 HOSP IP/OBS DSCHRG MGMT >30: CPT | Mod: ,,, | Performed by: HOSPITALIST

## 2017-01-01 PROCEDURE — 99213 OFFICE O/P EST LOW 20 MIN: CPT | Mod: PBBFAC | Performed by: NEUROLOGICAL SURGERY

## 2017-01-01 PROCEDURE — 99291 CRITICAL CARE FIRST HOUR: CPT | Mod: ,,, | Performed by: PHYSICIAN ASSISTANT

## 2017-01-01 PROCEDURE — 82962 GLUCOSE BLOOD TEST: CPT

## 2017-01-01 PROCEDURE — 83605 ASSAY OF LACTIC ACID: CPT | Mod: 91

## 2017-01-01 PROCEDURE — 18500000 HC LEAVE OF ABSENCE HOSPITAL SERVICES

## 2017-01-01 PROCEDURE — 83615 LACTATE (LD) (LDH) ENZYME: CPT

## 2017-01-01 PROCEDURE — 95819 EEG AWAKE AND ASLEEP: CPT

## 2017-01-01 PROCEDURE — 25000242 PHARM REV CODE 250 ALT 637 W/ HCPCS: Performed by: INTERNAL MEDICINE

## 2017-01-01 PROCEDURE — 87205 SMEAR GRAM STAIN: CPT

## 2017-01-01 PROCEDURE — D9220A PRA ANESTHESIA: Mod: CRNA,,, | Performed by: NURSE ANESTHETIST, CERTIFIED REGISTERED

## 2017-01-01 PROCEDURE — 36000713 HC OR TIME LEV V EA ADD 15 MIN: Performed by: NEUROLOGICAL SURGERY

## 2017-01-01 PROCEDURE — 97110 THERAPEUTIC EXERCISES: CPT

## 2017-01-01 PROCEDURE — 99223 1ST HOSP IP/OBS HIGH 75: CPT | Mod: ,,, | Performed by: HOSPITALIST

## 2017-01-01 PROCEDURE — C1729 CATH, DRAINAGE: HCPCS | Performed by: NEUROLOGICAL SURGERY

## 2017-01-01 PROCEDURE — 25000003 PHARM REV CODE 250: Performed by: REGISTERED NURSE

## 2017-01-01 PROCEDURE — 95816 EEG AWAKE AND DROWSY: CPT

## 2017-01-01 PROCEDURE — 84165 PROTEIN E-PHORESIS SERUM: CPT

## 2017-01-01 PROCEDURE — 99292 CRITICAL CARE ADDL 30 MIN: CPT | Mod: 25,,, | Performed by: NURSE PRACTITIONER

## 2017-01-01 PROCEDURE — 8E09XBZ COMPUTER ASSISTED PROCEDURE OF HEAD AND NECK REGION: ICD-10-PCS | Performed by: NEUROLOGICAL SURGERY

## 2017-01-01 PROCEDURE — 31500 INSERT EMERGENCY AIRWAY: CPT

## 2017-01-01 PROCEDURE — 83930 ASSAY OF BLOOD OSMOLALITY: CPT

## 2017-01-01 PROCEDURE — 82397 CHEMILUMINESCENT ASSAY: CPT

## 2017-01-01 PROCEDURE — 86900 BLOOD TYPING SEROLOGIC ABO: CPT

## 2017-01-01 PROCEDURE — 27201423 OPTIME MED/SURG SUP & DEVICES STERILE SUPPLY: Performed by: NEUROLOGICAL SURGERY

## 2017-01-01 PROCEDURE — 99291 CRITICAL CARE FIRST HOUR: CPT | Mod: 25,,, | Performed by: NURSE PRACTITIONER

## 2017-01-01 PROCEDURE — 95951 PR EEG MONITORING/VIDEORECORD: CPT | Mod: 26,,, | Performed by: PSYCHIATRY & NEUROLOGY

## 2017-01-01 PROCEDURE — 82024 ASSAY OF ACTH: CPT

## 2017-01-01 PROCEDURE — 82164 ANGIOTENSIN I ENZYME TEST: CPT

## 2017-01-01 PROCEDURE — 99900026 HC AIRWAY MAINTENANCE (STAT)

## 2017-01-01 PROCEDURE — 86920 COMPATIBILITY TEST SPIN: CPT

## 2017-01-01 PROCEDURE — 95822 EEG COMA OR SLEEP ONLY: CPT | Mod: 26,,, | Performed by: PSYCHIATRY & NEUROLOGY

## 2017-01-01 PROCEDURE — 84588 ASSAY OF VASOPRESSIN: CPT

## 2017-01-01 PROCEDURE — 84157 ASSAY OF PROTEIN OTHER: CPT

## 2017-01-01 PROCEDURE — 86592 SYPHILIS TEST NON-TREP QUAL: CPT

## 2017-01-01 PROCEDURE — 43246 EGD PLACE GASTROSTOMY TUBE: CPT | Performed by: INTERNAL MEDICINE

## 2017-01-01 PROCEDURE — 82306 VITAMIN D 25 HYDROXY: CPT

## 2017-01-01 PROCEDURE — 31500 INSERT EMERGENCY AIRWAY: CPT | Mod: 59,,, | Performed by: EMERGENCY MEDICINE

## 2017-01-01 PROCEDURE — 36620 INSERTION CATHETER ARTERY: CPT | Mod: 59,,, | Performed by: ANESTHESIOLOGY

## 2017-01-01 PROCEDURE — 97116 GAIT TRAINING THERAPY: CPT

## 2017-01-01 PROCEDURE — A4216 STERILE WATER/SALINE, 10 ML: HCPCS | Performed by: NURSE PRACTITIONER

## 2017-01-01 PROCEDURE — 83735 ASSAY OF MAGNESIUM: CPT | Mod: 91

## 2017-01-01 PROCEDURE — 82945 GLUCOSE OTHER FLUID: CPT

## 2017-01-01 PROCEDURE — 99233 SBSQ HOSP IP/OBS HIGH 50: CPT | Mod: ,,, | Performed by: PHYSICIAN ASSISTANT

## 2017-01-01 PROCEDURE — 37000008 HC ANESTHESIA 1ST 15 MINUTES: Performed by: NEUROLOGICAL SURGERY

## 2017-01-01 PROCEDURE — 88108 CYTOPATH CONCENTRATE TECH: CPT | Performed by: PATHOLOGY

## 2017-01-01 PROCEDURE — 82140 ASSAY OF AMMONIA: CPT

## 2017-01-01 PROCEDURE — D9220A PRA ANESTHESIA: Mod: CRNA,,, | Performed by: REGISTERED NURSE

## 2017-01-01 PROCEDURE — D9220A PRA ANESTHESIA: Mod: ANES,,, | Performed by: ANESTHESIOLOGY

## 2017-01-01 PROCEDURE — 99291 CRITICAL CARE FIRST HOUR: CPT | Mod: 25

## 2017-01-01 PROCEDURE — 99999 PR PBB SHADOW E&M-EST. PATIENT-LVL III: CPT | Mod: PBBFAC,,, | Performed by: NEUROLOGICAL SURGERY

## 2017-01-01 PROCEDURE — 81003 URINALYSIS AUTO W/O SCOPE: CPT | Mod: 91

## 2017-01-01 PROCEDURE — 36000712 HC OR TIME LEV V 1ST 15 MIN: Performed by: NEUROLOGICAL SURGERY

## 2017-01-01 PROCEDURE — 63600175 PHARM REV CODE 636 W HCPCS: Performed by: REGISTERED NURSE

## 2017-01-01 PROCEDURE — 63600175 PHARM REV CODE 636 W HCPCS: Performed by: PHYSICIAN ASSISTANT

## 2017-01-01 PROCEDURE — 27201018 HC KIT, PEG (ANY): Performed by: INTERNAL MEDICINE

## 2017-01-01 PROCEDURE — 37000009 HC ANESTHESIA EA ADD 15 MINS: Performed by: NEUROLOGICAL SURGERY

## 2017-01-01 PROCEDURE — 81025 URINE PREGNANCY TEST: CPT

## 2017-01-01 PROCEDURE — 96366 THER/PROPH/DIAG IV INF ADDON: CPT

## 2017-01-01 PROCEDURE — 84436 ASSAY OF TOTAL THYROXINE: CPT

## 2017-01-01 PROCEDURE — 86334 IMMUNOFIX E-PHORESIS SERUM: CPT | Mod: 26,,, | Performed by: PATHOLOGY

## 2017-01-01 PROCEDURE — 88307 TISSUE EXAM BY PATHOLOGIST: CPT | Mod: 26,,, | Performed by: PATHOLOGY

## 2017-01-01 PROCEDURE — 5A1945Z RESPIRATORY VENTILATION, 24-96 CONSECUTIVE HOURS: ICD-10-PCS | Performed by: EMERGENCY MEDICINE

## 2017-01-01 PROCEDURE — 27000190 HC CPAP FULL FACE MASK W/VALVE

## 2017-01-01 PROCEDURE — 97164 PT RE-EVAL EST PLAN CARE: CPT

## 2017-01-01 PROCEDURE — 99291 CRITICAL CARE FIRST HOUR: CPT | Mod: ,,, | Performed by: PSYCHIATRY & NEUROLOGY

## 2017-01-01 PROCEDURE — 96365 THER/PROPH/DIAG IV INF INIT: CPT | Mod: XS

## 2017-01-01 PROCEDURE — 25000003 PHARM REV CODE 250: Performed by: PHYSICAL MEDICINE & REHABILITATION

## 2017-01-01 PROCEDURE — 99232 SBSQ HOSP IP/OBS MODERATE 35: CPT | Mod: ,,, | Performed by: PSYCHIATRY & NEUROLOGY

## 2017-01-01 PROCEDURE — 80320 DRUG SCREEN QUANTALCOHOLS: CPT

## 2017-01-01 PROCEDURE — 87186 SC STD MICRODIL/AGAR DIL: CPT | Mod: 59

## 2017-01-01 PROCEDURE — 99285 EMERGENCY DEPT VISIT HI MDM: CPT | Mod: ,,, | Performed by: EMERGENCY MEDICINE

## 2017-01-01 PROCEDURE — 99291 CRITICAL CARE FIRST HOUR: CPT | Mod: 25,,, | Performed by: INTERNAL MEDICINE

## 2017-01-01 PROCEDURE — 009U3ZX DRAINAGE OF SPINAL CANAL, PERCUTANEOUS APPROACH, DIAGNOSTIC: ICD-10-PCS | Performed by: INTERNAL MEDICINE

## 2017-01-01 PROCEDURE — 97166 OT EVAL MOD COMPLEX 45 MIN: CPT

## 2017-01-01 PROCEDURE — 36556 INSERT NON-TUNNEL CV CATH: CPT | Mod: 52,59,, | Performed by: EMERGENCY MEDICINE

## 2017-01-01 PROCEDURE — 87529 HSV DNA AMP PROBE: CPT

## 2017-01-01 PROCEDURE — 97167 OT EVAL HIGH COMPLEX 60 MIN: CPT

## 2017-01-01 PROCEDURE — S0028 INJECTION, FAMOTIDINE, 20 MG: HCPCS | Performed by: STUDENT IN AN ORGANIZED HEALTH CARE EDUCATION/TRAINING PROGRAM

## 2017-01-01 PROCEDURE — G8997 SWALLOW GOAL STATUS: HCPCS | Mod: CL

## 2017-01-01 PROCEDURE — 99291 CRITICAL CARE FIRST HOUR: CPT | Mod: ,,, | Performed by: EMERGENCY MEDICINE

## 2017-01-01 PROCEDURE — 0DH63UZ INSERTION OF FEEDING DEVICE INTO STOMACH, PERCUTANEOUS APPROACH: ICD-10-PCS | Performed by: INTERNAL MEDICINE

## 2017-01-01 PROCEDURE — G8997 SWALLOW GOAL STATUS: HCPCS | Mod: CK

## 2017-01-01 PROCEDURE — 43246 EGD PLACE GASTROSTOMY TUBE: CPT | Mod: ,,, | Performed by: INTERNAL MEDICINE

## 2017-01-01 PROCEDURE — 82607 VITAMIN B-12: CPT

## 2017-01-01 PROCEDURE — 0BH17EZ INSERTION OF ENDOTRACHEAL AIRWAY INTO TRACHEA, VIA NATURAL OR ARTIFICIAL OPENING: ICD-10-PCS | Performed by: EMERGENCY MEDICINE

## 2017-01-01 PROCEDURE — 88307 TISSUE EXAM BY PATHOLOGIST: CPT | Performed by: PATHOLOGY

## 2017-01-01 PROCEDURE — 94660 CPAP INITIATION&MGMT: CPT

## 2017-01-01 PROCEDURE — 96375 TX/PRO/DX INJ NEW DRUG ADDON: CPT

## 2017-01-01 PROCEDURE — 96367 TX/PROPH/DG ADDL SEQ IV INF: CPT

## 2017-01-01 PROCEDURE — A4216 STERILE WATER/SALINE, 10 ML: HCPCS | Performed by: ANESTHESIOLOGY

## 2017-01-01 PROCEDURE — 94760 N-INVAS EAR/PLS OXIMETRY 1: CPT

## 2017-01-01 PROCEDURE — 99239 HOSP IP/OBS DSCHRG MGMT >30: CPT | Mod: ,,, | Performed by: INTERNAL MEDICINE

## 2017-01-01 PROCEDURE — 84134 ASSAY OF PREALBUMIN: CPT

## 2017-01-01 PROCEDURE — 25000003 PHARM REV CODE 250: Performed by: GENERAL PRACTICE

## 2017-01-01 PROCEDURE — 36569 INSJ PICC 5 YR+ W/O IMAGING: CPT

## 2017-01-01 PROCEDURE — G8998 SWALLOW D/C STATUS: HCPCS | Mod: CN

## 2017-01-01 PROCEDURE — 25000003 PHARM REV CODE 250: Performed by: NURSE ANESTHETIST, CERTIFIED REGISTERED

## 2017-01-01 PROCEDURE — 76937 US GUIDE VASCULAR ACCESS: CPT

## 2017-01-01 PROCEDURE — 37000008 HC ANESTHESIA 1ST 15 MINUTES: Performed by: INTERNAL MEDICINE

## 2017-01-01 PROCEDURE — S0166 INJ OLANZAPINE 2.5MG: HCPCS | Performed by: NURSE PRACTITIONER

## 2017-01-01 PROCEDURE — 99291 CRITICAL CARE FIRST HOUR: CPT | Mod: 25,,, | Performed by: EMERGENCY MEDICINE

## 2017-01-01 PROCEDURE — 95816 EEG AWAKE AND DROWSY: CPT | Mod: 26,,, | Performed by: PSYCHIATRY & NEUROLOGY

## 2017-01-01 PROCEDURE — 87077 CULTURE AEROBIC IDENTIFY: CPT

## 2017-01-01 PROCEDURE — S0077 INJECTION, CLINDAMYCIN PHOSP: HCPCS | Performed by: STUDENT IN AN ORGANIZED HEALTH CARE EDUCATION/TRAINING PROGRAM

## 2017-01-01 PROCEDURE — 99223 1ST HOSP IP/OBS HIGH 75: CPT | Mod: ,,, | Performed by: INTERNAL MEDICINE

## 2017-01-01 PROCEDURE — P9045 ALBUMIN (HUMAN), 5%, 250 ML: HCPCS | Performed by: NURSE ANESTHETIST, CERTIFIED REGISTERED

## 2017-01-01 PROCEDURE — 95812 EEG 41-60 MINUTES: CPT

## 2017-01-01 PROCEDURE — 88108 CYTOPATH CONCENTRATE TECH: CPT | Mod: 26,,, | Performed by: PATHOLOGY

## 2017-01-01 PROCEDURE — G8996 SWALLOW CURRENT STATUS: HCPCS | Mod: CM

## 2017-01-01 PROCEDURE — 99292 CRITICAL CARE ADDL 30 MIN: CPT | Mod: ,,, | Performed by: NURSE PRACTITIONER

## 2017-01-01 PROCEDURE — C1762 CONN TISS, HUMAN(INC FASCIA): HCPCS | Performed by: NEUROLOGICAL SURGERY

## 2017-01-01 PROCEDURE — 36556 INSERT NON-TUNNEL CV CATH: CPT | Mod: ,,, | Performed by: GENERAL ACUTE CARE HOSPITAL

## 2017-01-01 DEVICE — PLATE BONE 2X2 HOLE SM BOX: Type: IMPLANTABLE DEVICE | Site: BRAIN | Status: FUNCTIONAL

## 2017-01-01 DEVICE — SCREW SD 1.5X4MM TI CROSS PIN: Type: IMPLANTABLE DEVICE | Site: BRAIN | Status: FUNCTIONAL

## 2017-01-01 DEVICE — DURA MATRIX ONLAY PLUS 3X3: Type: IMPLANTABLE DEVICE | Site: BRAIN | Status: FUNCTIONAL

## 2017-01-01 RX ORDER — BISACODYL 10 MG
10 SUPPOSITORY, RECTAL RECTAL DAILY PRN
Status: DISCONTINUED | OUTPATIENT
Start: 2017-01-01 | End: 2017-01-01 | Stop reason: HOSPADM

## 2017-01-01 RX ORDER — POTASSIUM CHLORIDE 20 MEQ/15ML
40 SOLUTION ORAL
Status: DISCONTINUED | OUTPATIENT
Start: 2017-01-01 | End: 2017-01-01

## 2017-01-01 RX ORDER — LEVOTHYROXINE SODIUM 75 UG/1
75 TABLET ORAL
Status: DISCONTINUED | OUTPATIENT
Start: 2017-01-01 | End: 2017-01-01

## 2017-01-01 RX ORDER — LIDOCAINE HYDROCHLORIDE 10 MG/ML
1 INJECTION, SOLUTION EPIDURAL; INFILTRATION; INTRACAUDAL; PERINEURAL ONCE
Status: DISCONTINUED | OUTPATIENT
Start: 2017-01-01 | End: 2017-01-01 | Stop reason: HOSPADM

## 2017-01-01 RX ORDER — PHENYLEPHRINE HCL IN 0.9% NACL 1 MG/10 ML
100 SYRINGE (ML) INTRAVENOUS
Status: ACTIVE | OUTPATIENT
Start: 2017-01-01 | End: 2017-01-01

## 2017-01-01 RX ORDER — LEVETIRACETAM 100 MG/ML
500 SOLUTION ORAL 2 TIMES DAILY
Status: DISCONTINUED | OUTPATIENT
Start: 2017-01-01 | End: 2017-01-01

## 2017-01-01 RX ORDER — SODIUM,POTASSIUM PHOSPHATES 280-250MG
2 POWDER IN PACKET (EA) ORAL
Status: DISCONTINUED | OUTPATIENT
Start: 2017-01-01 | End: 2017-01-01

## 2017-01-01 RX ORDER — POTASSIUM CHLORIDE 7.45 MG/ML
60 INJECTION INTRAVENOUS
Status: DISCONTINUED | OUTPATIENT
Start: 2017-01-01 | End: 2017-01-01

## 2017-01-01 RX ORDER — NOREPINEPHRINE BITARTRATE/D5W 4MG/250ML
0.05 PLASTIC BAG, INJECTION (ML) INTRAVENOUS CONTINUOUS
Status: DISCONTINUED | OUTPATIENT
Start: 2017-01-01 | End: 2017-01-01

## 2017-01-01 RX ORDER — SODIUM CHLORIDE 0.9 % (FLUSH) 0.9 %
3 SYRINGE (ML) INJECTION EVERY 8 HOURS
Status: DISCONTINUED | OUTPATIENT
Start: 2017-01-01 | End: 2017-01-01

## 2017-01-01 RX ORDER — HALOPERIDOL 5 MG/ML
2 INJECTION INTRAMUSCULAR EVERY 4 HOURS PRN
Status: DISCONTINUED | OUTPATIENT
Start: 2017-01-01 | End: 2017-01-01

## 2017-01-01 RX ORDER — PHENYLEPHRINE HCL IN 0.9% NACL 1 MG/10 ML
200 SYRINGE (ML) INTRAVENOUS ONCE
Status: COMPLETED | OUTPATIENT
Start: 2017-01-01 | End: 2017-01-01

## 2017-01-01 RX ORDER — LEVETIRACETAM 10 MG/ML
1000 INJECTION INTRAVASCULAR ONCE
Status: COMPLETED | OUTPATIENT
Start: 2017-01-01 | End: 2017-01-01

## 2017-01-01 RX ORDER — LEVOTHYROXINE SODIUM 50 UG/1
50 TABLET ORAL
Status: DISCONTINUED | OUTPATIENT
Start: 2017-01-01 | End: 2017-01-01 | Stop reason: HOSPADM

## 2017-01-01 RX ORDER — POTASSIUM CHLORIDE 7.45 MG/ML
20 INJECTION INTRAVENOUS ONCE
Status: COMPLETED | OUTPATIENT
Start: 2017-01-01 | End: 2017-01-01

## 2017-01-01 RX ORDER — HYDROCORTISONE 5 MG/1
5 TABLET ORAL NIGHTLY
Status: DISCONTINUED | OUTPATIENT
Start: 2017-01-01 | End: 2017-01-01 | Stop reason: HOSPADM

## 2017-01-01 RX ORDER — DESMOPRESSIN ACETATE 0.1 MG/1
200 TABLET ORAL NIGHTLY
Status: DISCONTINUED | OUTPATIENT
Start: 2017-01-01 | End: 2017-01-01

## 2017-01-01 RX ORDER — ATROPINE SULFATE 10 MG/ML
2 SOLUTION/ DROPS OPHTHALMIC EVERY 4 HOURS PRN
Status: DISCONTINUED | OUTPATIENT
Start: 2017-01-01 | End: 2017-01-01 | Stop reason: HOSPADM

## 2017-01-01 RX ORDER — IBUPROFEN 200 MG
16 TABLET ORAL
Status: CANCELLED | OUTPATIENT
Start: 2017-01-01

## 2017-01-01 RX ORDER — DESMOPRESSIN ACETATE 0.1 MG/ML
10 SOLUTION NASAL ONCE
Status: COMPLETED | OUTPATIENT
Start: 2017-01-01 | End: 2017-01-01

## 2017-01-01 RX ORDER — DEXTROSE MONOHYDRATE 50 MG/ML
INJECTION, SOLUTION INTRAVENOUS CONTINUOUS
Status: DISCONTINUED | OUTPATIENT
Start: 2017-01-01 | End: 2017-01-01

## 2017-01-01 RX ORDER — POTASSIUM CHLORIDE 29.8 MG/ML
40 INJECTION INTRAVENOUS
Status: DISCONTINUED | OUTPATIENT
Start: 2017-01-01 | End: 2017-01-01

## 2017-01-01 RX ORDER — SODIUM CHLORIDE 9 MG/ML
INJECTION, SOLUTION INTRAVENOUS CONTINUOUS PRN
Status: DISCONTINUED | OUTPATIENT
Start: 2017-01-01 | End: 2017-01-01

## 2017-01-01 RX ORDER — LORAZEPAM 2 MG/ML
1 INJECTION INTRAMUSCULAR EVERY 30 MIN PRN
Status: DISCONTINUED | OUTPATIENT
Start: 2017-01-01 | End: 2017-01-01

## 2017-01-01 RX ORDER — LEVETIRACETAM 5 MG/ML
500 INJECTION INTRAVASCULAR EVERY 12 HOURS
Status: DISCONTINUED | OUTPATIENT
Start: 2017-01-01 | End: 2017-01-01

## 2017-01-01 RX ORDER — LEVETIRACETAM 100 MG/ML
500 SOLUTION ORAL 2 TIMES DAILY
Qty: 300 ML | Refills: 0 | Status: SHIPPED | OUTPATIENT
Start: 2017-01-01 | End: 2017-01-01

## 2017-01-01 RX ORDER — HYDROCORTISONE 5 MG/1
5 TABLET ORAL DAILY
Status: DISCONTINUED | OUTPATIENT
Start: 2017-01-01 | End: 2017-01-01 | Stop reason: HOSPADM

## 2017-01-01 RX ORDER — FENTANYL CITRATE 50 UG/ML
INJECTION, SOLUTION INTRAMUSCULAR; INTRAVENOUS
Status: DISCONTINUED | OUTPATIENT
Start: 2017-01-01 | End: 2017-01-01

## 2017-01-01 RX ORDER — GADOBUTROL 604.72 MG/ML
6 INJECTION INTRAVENOUS
Status: COMPLETED | OUTPATIENT
Start: 2017-01-01 | End: 2017-01-01

## 2017-01-01 RX ORDER — IBUPROFEN 200 MG
24 TABLET ORAL
Status: DISCONTINUED | OUTPATIENT
Start: 2017-01-01 | End: 2017-01-01 | Stop reason: HOSPADM

## 2017-01-01 RX ORDER — ENOXAPARIN SODIUM 100 MG/ML
40 INJECTION SUBCUTANEOUS EVERY 24 HOURS
Status: DISCONTINUED | OUTPATIENT
Start: 2017-01-01 | End: 2017-01-01 | Stop reason: HOSPADM

## 2017-01-01 RX ORDER — PANTOPRAZOLE SODIUM 40 MG/1
40 FOR SUSPENSION ORAL 2 TIMES DAILY
Status: CANCELLED | OUTPATIENT
Start: 2017-01-01

## 2017-01-01 RX ORDER — DESMOPRESSIN ACETATE 0.1 MG/1
100 TABLET ORAL 3 TIMES DAILY
Status: DISCONTINUED | OUTPATIENT
Start: 2017-01-01 | End: 2017-01-01 | Stop reason: HOSPADM

## 2017-01-01 RX ORDER — DESMOPRESSIN ACETATE 0.2 MG/1
400 TABLET ORAL NIGHTLY
Qty: 60 TABLET | Refills: 0 | Status: SHIPPED | OUTPATIENT
Start: 2017-01-01 | End: 2017-01-01 | Stop reason: HOSPADM

## 2017-01-01 RX ORDER — MORPHINE SULFATE 2 MG/ML
2 INJECTION, SOLUTION INTRAMUSCULAR; INTRAVENOUS EVERY 12 HOURS PRN
Status: DISCONTINUED | OUTPATIENT
Start: 2017-01-01 | End: 2017-01-01

## 2017-01-01 RX ORDER — DESMOPRESSIN ACETATE 0.1 MG/ML
10 SOLUTION NASAL NIGHTLY
Status: DISCONTINUED | OUTPATIENT
Start: 2017-01-01 | End: 2017-01-01

## 2017-01-01 RX ORDER — CEFAZOLIN SODIUM 1 G/3ML
INJECTION, POWDER, FOR SOLUTION INTRAMUSCULAR; INTRAVENOUS
Status: DISCONTINUED | OUTPATIENT
Start: 2017-01-01 | End: 2017-01-01

## 2017-01-01 RX ORDER — HYDROCORTISONE 5 MG/1
5 TABLET ORAL NIGHTLY
Status: DISCONTINUED | OUTPATIENT
Start: 2017-01-01 | End: 2017-01-01

## 2017-01-01 RX ORDER — FUROSEMIDE 20 MG/1
20 TABLET ORAL DAILY
Status: DISCONTINUED | OUTPATIENT
Start: 2017-01-01 | End: 2017-01-01

## 2017-01-01 RX ORDER — DEXAMETHASONE SODIUM PHOSPHATE 4 MG/ML
4 INJECTION, SOLUTION INTRA-ARTICULAR; INTRALESIONAL; INTRAMUSCULAR; INTRAVENOUS; SOFT TISSUE EVERY 12 HOURS
Status: DISCONTINUED | OUTPATIENT
Start: 2017-01-01 | End: 2017-01-01

## 2017-01-01 RX ORDER — DESMOPRESSIN ACETATE 0.1 MG/ML
10 SOLUTION NASAL 2 TIMES DAILY PRN
Status: COMPLETED | OUTPATIENT
Start: 2017-01-01 | End: 2017-01-01

## 2017-01-01 RX ORDER — METOCLOPRAMIDE HYDROCHLORIDE 5 MG/ML
5 INJECTION INTRAMUSCULAR; INTRAVENOUS EVERY 6 HOURS PRN
Status: DISCONTINUED | OUTPATIENT
Start: 2017-01-01 | End: 2017-01-01

## 2017-01-01 RX ORDER — CEFAZOLIN SODIUM 1 G/50ML
1 SOLUTION INTRAVENOUS ONCE
Status: DISCONTINUED | OUTPATIENT
Start: 2017-01-01 | End: 2017-01-01

## 2017-01-01 RX ORDER — IPRATROPIUM BROMIDE AND ALBUTEROL SULFATE 2.5; .5 MG/3ML; MG/3ML
SOLUTION RESPIRATORY (INHALATION)
Status: COMPLETED
Start: 2017-01-01 | End: 2017-01-01

## 2017-01-01 RX ORDER — INSULIN ASPART 100 [IU]/ML
0-5 INJECTION, SOLUTION INTRAVENOUS; SUBCUTANEOUS EVERY 6 HOURS PRN
Status: DISCONTINUED | OUTPATIENT
Start: 2017-01-01 | End: 2017-01-01 | Stop reason: HOSPADM

## 2017-01-01 RX ORDER — ONDANSETRON 2 MG/ML
INJECTION INTRAMUSCULAR; INTRAVENOUS
Status: DISCONTINUED | OUTPATIENT
Start: 2017-01-01 | End: 2017-01-01

## 2017-01-01 RX ORDER — LIDOCAINE HCL/PF 100 MG/5ML
SYRINGE (ML) INTRAVENOUS
Status: DISCONTINUED | OUTPATIENT
Start: 2017-01-01 | End: 2017-01-01

## 2017-01-01 RX ORDER — PROPOFOL 10 MG/ML
VIAL (ML) INTRAVENOUS CONTINUOUS PRN
Status: DISCONTINUED | OUTPATIENT
Start: 2017-01-01 | End: 2017-01-01

## 2017-01-01 RX ORDER — GABAPENTIN 300 MG/1
300 CAPSULE ORAL 3 TIMES DAILY
Status: DISCONTINUED | OUTPATIENT
Start: 2017-01-01 | End: 2017-01-01

## 2017-01-01 RX ORDER — PANTOPRAZOLE SODIUM 40 MG/1
40 FOR SUSPENSION ORAL 2 TIMES DAILY
Status: DISCONTINUED | OUTPATIENT
Start: 2017-01-01 | End: 2017-01-01 | Stop reason: HOSPADM

## 2017-01-01 RX ORDER — ACETAMINOPHEN 325 MG/1
650 TABLET ORAL EVERY 4 HOURS PRN
Status: DISCONTINUED | OUTPATIENT
Start: 2017-01-01 | End: 2017-01-01 | Stop reason: HOSPADM

## 2017-01-01 RX ORDER — PROPOFOL 10 MG/ML
10 INJECTION, EMULSION INTRAVENOUS CONTINUOUS
Status: DISCONTINUED | OUTPATIENT
Start: 2017-01-01 | End: 2017-01-01

## 2017-01-01 RX ORDER — LEVETIRACETAM 500 MG/1
500 TABLET ORAL 2 TIMES DAILY
Status: DISCONTINUED | OUTPATIENT
Start: 2017-01-01 | End: 2017-01-01

## 2017-01-01 RX ORDER — AMITRIPTYLINE HYDROCHLORIDE 25 MG/1
100 TABLET, FILM COATED ORAL NIGHTLY
Status: DISCONTINUED | OUTPATIENT
Start: 2017-01-01 | End: 2017-01-01

## 2017-01-01 RX ORDER — DEXTROSE MONOHYDRATE AND SODIUM CHLORIDE 5; .45 G/100ML; G/100ML
INJECTION, SOLUTION INTRAVENOUS CONTINUOUS
Status: DISCONTINUED | OUTPATIENT
Start: 2017-01-01 | End: 2017-01-01

## 2017-01-01 RX ORDER — DESMOPRESSIN ACETATE 4 UG/ML
1 INJECTION, SOLUTION INTRAVENOUS; SUBCUTANEOUS ONCE
Status: COMPLETED | OUTPATIENT
Start: 2017-01-01 | End: 2017-01-01

## 2017-01-01 RX ORDER — LIDOCAINE HYDROCHLORIDE AND EPINEPHRINE 10; 10 MG/ML; UG/ML
INJECTION, SOLUTION INFILTRATION; PERINEURAL
Status: DISCONTINUED | OUTPATIENT
Start: 2017-01-01 | End: 2017-01-01 | Stop reason: HOSPADM

## 2017-01-01 RX ORDER — ENOXAPARIN SODIUM 100 MG/ML
40 INJECTION SUBCUTANEOUS EVERY 24 HOURS
Status: DISCONTINUED | OUTPATIENT
Start: 2017-01-01 | End: 2017-01-01

## 2017-01-01 RX ORDER — POLYETHYLENE GLYCOL 3350 17 G/17G
17 POWDER, FOR SOLUTION ORAL DAILY
Status: CANCELLED | OUTPATIENT
Start: 2017-01-01

## 2017-01-01 RX ORDER — NOREPINEPHRINE BITARTRATE/D5W 4MG/250ML
PLASTIC BAG, INJECTION (ML) INTRAVENOUS
Status: COMPLETED
Start: 2017-01-01 | End: 2017-01-01

## 2017-01-01 RX ORDER — MORPHINE SULFATE 2 MG/ML
4 INJECTION, SOLUTION INTRAMUSCULAR; INTRAVENOUS
Status: DISCONTINUED | OUTPATIENT
Start: 2017-01-01 | End: 2017-01-01

## 2017-01-01 RX ORDER — DESMOPRESSIN ACETATE 0.1 MG/ML
10 SOLUTION NASAL 2 TIMES DAILY
Status: DISCONTINUED | OUTPATIENT
Start: 2017-01-01 | End: 2017-01-01

## 2017-01-01 RX ORDER — GUAIFENESIN 100 MG/5ML
300 SOLUTION ORAL EVERY 6 HOURS PRN
Status: DISCONTINUED | OUTPATIENT
Start: 2017-01-01 | End: 2017-01-01 | Stop reason: HOSPADM

## 2017-01-01 RX ORDER — FENTANYL CITRATE 50 UG/ML
50 INJECTION, SOLUTION INTRAMUSCULAR; INTRAVENOUS
Status: COMPLETED | OUTPATIENT
Start: 2017-01-01 | End: 2017-01-01

## 2017-01-01 RX ORDER — ENOXAPARIN SODIUM 100 MG/ML
40 INJECTION SUBCUTANEOUS EVERY 24 HOURS
Status: CANCELLED | OUTPATIENT
Start: 2017-01-01

## 2017-01-01 RX ORDER — DESMOPRESSIN ACETATE 0.1 MG/ML
10 SOLUTION NASAL
Status: DISCONTINUED | OUTPATIENT
Start: 2017-01-01 | End: 2017-01-01

## 2017-01-01 RX ORDER — SODIUM CHLORIDE 450 MG/100ML
INJECTION, SOLUTION INTRAVENOUS CONTINUOUS
Status: DISCONTINUED | OUTPATIENT
Start: 2017-01-01 | End: 2017-01-01

## 2017-01-01 RX ORDER — SODIUM CHLORIDE 9 MG/ML
INJECTION, SOLUTION INTRAVENOUS CONTINUOUS
Status: DISCONTINUED | OUTPATIENT
Start: 2017-01-01 | End: 2017-01-01

## 2017-01-01 RX ORDER — DESMOPRESSIN ACETATE 4 UG/ML
1 INJECTION, SOLUTION INTRAVENOUS; SUBCUTANEOUS 2 TIMES DAILY
Status: DISCONTINUED | OUTPATIENT
Start: 2017-01-01 | End: 2017-01-01

## 2017-01-01 RX ORDER — IBUPROFEN 200 MG
1 TABLET ORAL DAILY
Status: DISCONTINUED | OUTPATIENT
Start: 2017-01-01 | End: 2017-01-01 | Stop reason: HOSPADM

## 2017-01-01 RX ORDER — MANNITOL 250 MG/ML
INJECTION, SOLUTION INTRAVENOUS
Status: DISCONTINUED | OUTPATIENT
Start: 2017-01-01 | End: 2017-01-01

## 2017-01-01 RX ORDER — POTASSIUM CHLORIDE 20 MEQ/15ML
40 SOLUTION ORAL
Status: COMPLETED | OUTPATIENT
Start: 2017-01-01 | End: 2017-01-01

## 2017-01-01 RX ORDER — MAGNESIUM SULFATE HEPTAHYDRATE 40 MG/ML
4 INJECTION, SOLUTION INTRAVENOUS
Status: DISCONTINUED | OUTPATIENT
Start: 2017-01-01 | End: 2017-01-01

## 2017-01-01 RX ORDER — OLANZAPINE 10 MG/2ML
10 INJECTION, POWDER, FOR SOLUTION INTRAMUSCULAR ONCE AS NEEDED
Status: COMPLETED | OUTPATIENT
Start: 2017-01-01 | End: 2017-01-01

## 2017-01-01 RX ORDER — POLYETHYLENE GLYCOL 3350 17 G/17G
17 POWDER, FOR SOLUTION ORAL DAILY
Status: DISCONTINUED | OUTPATIENT
Start: 2017-01-01 | End: 2017-01-01 | Stop reason: HOSPADM

## 2017-01-01 RX ORDER — PROPOFOL 10 MG/ML
INJECTION, EMULSION INTRAVENOUS
Status: COMPLETED
Start: 2017-01-01 | End: 2017-01-01

## 2017-01-01 RX ORDER — DESMOPRESSIN ACETATE 0.1 MG/1
100 TABLET ORAL 3 TIMES DAILY
Start: 2017-01-01 | End: 2017-01-01

## 2017-01-01 RX ORDER — IPRATROPIUM BROMIDE 0.5 MG/2.5ML
0.5 SOLUTION RESPIRATORY (INHALATION) EVERY 6 HOURS PRN
Status: DISCONTINUED | OUTPATIENT
Start: 2017-01-01 | End: 2017-01-01 | Stop reason: HOSPADM

## 2017-01-01 RX ORDER — MAGNESIUM SULFATE HEPTAHYDRATE 40 MG/ML
2 INJECTION, SOLUTION INTRAVENOUS
Status: DISCONTINUED | OUTPATIENT
Start: 2017-01-01 | End: 2017-01-01

## 2017-01-01 RX ORDER — GLUCAGON 1 MG
1 KIT INJECTION
Status: CANCELLED | OUTPATIENT
Start: 2017-01-01

## 2017-01-01 RX ORDER — RAMELTEON 8 MG/1
8 TABLET ORAL NIGHTLY
Status: DISCONTINUED | OUTPATIENT
Start: 2017-01-01 | End: 2017-01-01 | Stop reason: HOSPADM

## 2017-01-01 RX ORDER — FENTANYL CITRATE 50 UG/ML
100 INJECTION, SOLUTION INTRAMUSCULAR; INTRAVENOUS EVERY 5 MIN PRN
Status: DISCONTINUED | OUTPATIENT
Start: 2017-01-01 | End: 2017-01-01 | Stop reason: HOSPADM

## 2017-01-01 RX ORDER — BISACODYL 10 MG
10 SUPPOSITORY, RECTAL RECTAL DAILY
Status: CANCELLED | OUTPATIENT
Start: 2017-01-01

## 2017-01-01 RX ORDER — SUCCINYLCHOLINE CHLORIDE 20 MG/ML
INJECTION INTRAMUSCULAR; INTRAVENOUS
Status: DISCONTINUED | OUTPATIENT
Start: 2017-01-01 | End: 2017-01-01

## 2017-01-01 RX ORDER — DEXMEDETOMIDINE HYDROCHLORIDE 4 UG/ML
INJECTION, SOLUTION INTRAVENOUS
Status: COMPLETED
Start: 2017-01-01 | End: 2017-01-01

## 2017-01-01 RX ORDER — GLUCAGON 1 MG
1 KIT INJECTION
Status: DISCONTINUED | OUTPATIENT
Start: 2017-01-01 | End: 2017-01-01 | Stop reason: HOSPADM

## 2017-01-01 RX ORDER — OLANZAPINE 5 MG/1
5 TABLET ORAL NIGHTLY
Status: DISCONTINUED | OUTPATIENT
Start: 2017-01-01 | End: 2017-01-01

## 2017-01-01 RX ORDER — DESMOPRESSIN ACETATE 0.1 MG/1
100 TABLET ORAL 2 TIMES DAILY
Start: 2017-01-01 | End: 2018-08-09

## 2017-01-01 RX ORDER — METOCLOPRAMIDE HYDROCHLORIDE 5 MG/ML
5 INJECTION INTRAMUSCULAR; INTRAVENOUS EVERY 6 HOURS PRN
Status: CANCELLED | OUTPATIENT
Start: 2017-01-01

## 2017-01-01 RX ORDER — HALOPERIDOL 5 MG/ML
2 INJECTION INTRAMUSCULAR ONCE
Status: COMPLETED | OUTPATIENT
Start: 2017-01-01 | End: 2017-01-01

## 2017-01-01 RX ORDER — CHLORHEXIDINE GLUCONATE ORAL RINSE 1.2 MG/ML
15 SOLUTION DENTAL 2 TIMES DAILY
Status: DISCONTINUED | OUTPATIENT
Start: 2017-01-01 | End: 2017-01-01

## 2017-01-01 RX ORDER — PROPOFOL 10 MG/ML
VIAL (ML) INTRAVENOUS
Status: DISCONTINUED | OUTPATIENT
Start: 2017-01-01 | End: 2017-01-01

## 2017-01-01 RX ORDER — PANTOPRAZOLE SODIUM 40 MG/1
40 TABLET, DELAYED RELEASE ORAL DAILY
Status: DISCONTINUED | OUTPATIENT
Start: 2017-01-01 | End: 2017-01-01

## 2017-01-01 RX ORDER — DESMOPRESSIN ACETATE 0.1 MG/ML
10 SOLUTION NASAL
Status: DISCONTINUED | OUTPATIENT
Start: 2017-01-01 | End: 2017-01-01 | Stop reason: HOSPADM

## 2017-01-01 RX ORDER — CEFEPIME HYDROCHLORIDE 2 G/50ML
2 INJECTION, SOLUTION INTRAVENOUS
Status: DISCONTINUED | OUTPATIENT
Start: 2017-01-01 | End: 2017-01-01

## 2017-01-01 RX ORDER — DESMOPRESSIN ACETATE 0.1 MG/1
100 TABLET ORAL 2 TIMES DAILY
Status: DISCONTINUED | OUTPATIENT
Start: 2017-01-01 | End: 2017-01-01 | Stop reason: HOSPADM

## 2017-01-01 RX ORDER — FUROSEMIDE 10 MG/ML
20 INJECTION INTRAMUSCULAR; INTRAVENOUS ONCE
Status: COMPLETED | OUTPATIENT
Start: 2017-01-01 | End: 2017-01-01

## 2017-01-01 RX ORDER — BACITRACIN ZINC 500 UNIT/G
OINTMENT (GRAM) TOPICAL
Status: DISCONTINUED | OUTPATIENT
Start: 2017-01-01 | End: 2017-01-01 | Stop reason: HOSPADM

## 2017-01-01 RX ORDER — POTASSIUM CHLORIDE 7.45 MG/ML
80 INJECTION INTRAVENOUS
Status: DISCONTINUED | OUTPATIENT
Start: 2017-01-01 | End: 2017-01-01

## 2017-01-01 RX ORDER — LEVETIRACETAM 100 MG/ML
500 SOLUTION ORAL 2 TIMES DAILY
Status: DISCONTINUED | OUTPATIENT
Start: 2017-01-01 | End: 2017-01-01 | Stop reason: HOSPADM

## 2017-01-01 RX ORDER — LABETALOL HYDROCHLORIDE 5 MG/ML
INJECTION, SOLUTION INTRAVENOUS
Status: DISCONTINUED | OUTPATIENT
Start: 2017-01-01 | End: 2017-01-01

## 2017-01-01 RX ORDER — ROCURONIUM BROMIDE 10 MG/ML
INJECTION, SOLUTION INTRAVENOUS
Status: COMPLETED
Start: 2017-01-01 | End: 2017-01-01

## 2017-01-01 RX ORDER — IBUPROFEN 200 MG
16 TABLET ORAL
Status: DISCONTINUED | OUTPATIENT
Start: 2017-01-01 | End: 2017-01-01 | Stop reason: HOSPADM

## 2017-01-01 RX ORDER — MORPHINE SULFATE 2 MG/ML
2 INJECTION, SOLUTION INTRAMUSCULAR; INTRAVENOUS ONCE
Status: COMPLETED | OUTPATIENT
Start: 2017-01-01 | End: 2017-01-01

## 2017-01-01 RX ORDER — POTASSIUM CHLORIDE 14.9 MG/ML
20 INJECTION INTRAVENOUS
Status: DISCONTINUED | OUTPATIENT
Start: 2017-01-01 | End: 2017-01-01

## 2017-01-01 RX ORDER — HYDROCORTISONE 5 MG/1
15 TABLET ORAL DAILY
Status: DISCONTINUED | OUTPATIENT
Start: 2017-01-01 | End: 2017-01-01 | Stop reason: HOSPADM

## 2017-01-01 RX ORDER — RAMELTEON 8 MG/1
8 TABLET ORAL NIGHTLY PRN
Status: DISCONTINUED | OUTPATIENT
Start: 2017-01-01 | End: 2017-01-01

## 2017-01-01 RX ORDER — MIDAZOLAM HYDROCHLORIDE 1 MG/ML
INJECTION, SOLUTION INTRAMUSCULAR; INTRAVENOUS
Status: DISCONTINUED | OUTPATIENT
Start: 2017-01-01 | End: 2017-01-01

## 2017-01-01 RX ORDER — FAMOTIDINE 20 MG/1
20 TABLET, FILM COATED ORAL 2 TIMES DAILY
Status: DISCONTINUED | OUTPATIENT
Start: 2017-01-01 | End: 2017-01-01

## 2017-01-01 RX ORDER — NICARDIPINE HYDROCHLORIDE 0.2 MG/ML
1 INJECTION INTRAVENOUS CONTINUOUS
Status: DISCONTINUED | OUTPATIENT
Start: 2017-01-01 | End: 2017-01-01

## 2017-01-01 RX ORDER — IPRATROPIUM BROMIDE AND ALBUTEROL SULFATE 2.5; .5 MG/3ML; MG/3ML
3 SOLUTION RESPIRATORY (INHALATION) ONCE
Status: COMPLETED | OUTPATIENT
Start: 2017-01-01 | End: 2017-01-01

## 2017-01-01 RX ORDER — DESMOPRESSIN ACETATE 0.1 MG/ML
10 SOLUTION NASAL
Status: CANCELLED | OUTPATIENT
Start: 2017-01-01

## 2017-01-01 RX ORDER — POTASSIUM CHLORIDE 20 MEQ/15ML
60 SOLUTION ORAL
Status: DISCONTINUED | OUTPATIENT
Start: 2017-01-01 | End: 2017-01-01

## 2017-01-01 RX ORDER — RAMELTEON 8 MG/1
8 TABLET ORAL NIGHTLY
Status: CANCELLED | OUTPATIENT
Start: 2017-01-01

## 2017-01-01 RX ORDER — DESMOPRESSIN ACETATE 4 UG/ML
2 INJECTION, SOLUTION INTRAVENOUS; SUBCUTANEOUS ONCE
Status: COMPLETED | OUTPATIENT
Start: 2017-01-01 | End: 2017-01-01

## 2017-01-01 RX ORDER — HYDROCORTISONE 5 MG/1
5 TABLET ORAL
Status: DISCONTINUED | OUTPATIENT
Start: 2017-01-01 | End: 2017-01-01 | Stop reason: HOSPADM

## 2017-01-01 RX ORDER — ALBUMIN HUMAN 50 G/1000ML
SOLUTION INTRAVENOUS CONTINUOUS PRN
Status: DISCONTINUED | OUTPATIENT
Start: 2017-01-01 | End: 2017-01-01

## 2017-01-01 RX ORDER — MORPHINE SULFATE 2 MG/ML
2 INJECTION, SOLUTION INTRAMUSCULAR; INTRAVENOUS
Status: DISCONTINUED | OUTPATIENT
Start: 2017-01-01 | End: 2017-01-01

## 2017-01-01 RX ORDER — ONDANSETRON 2 MG/ML
4 INJECTION INTRAMUSCULAR; INTRAVENOUS EVERY 6 HOURS PRN
Status: DISCONTINUED | OUTPATIENT
Start: 2017-01-01 | End: 2017-01-01 | Stop reason: HOSPADM

## 2017-01-01 RX ORDER — SODIUM CHLORIDE 0.9 % (FLUSH) 0.9 %
3 SYRINGE (ML) INJECTION
Status: DISCONTINUED | OUTPATIENT
Start: 2017-01-01 | End: 2017-01-01 | Stop reason: HOSPADM

## 2017-01-01 RX ORDER — LEVOTHYROXINE SODIUM 75 UG/1
75 TABLET ORAL DAILY
Qty: 30 TABLET | Refills: 0 | Status: SHIPPED | OUTPATIENT
Start: 2017-01-01 | End: 2017-01-01

## 2017-01-01 RX ORDER — LEVOTHYROXINE SODIUM 75 UG/1
75 TABLET ORAL DAILY
Status: DISCONTINUED | OUTPATIENT
Start: 2017-01-01 | End: 2017-01-01 | Stop reason: HOSPADM

## 2017-01-01 RX ORDER — PROPOFOL 10 MG/ML
INJECTION, EMULSION INTRAVENOUS
Status: DISPENSED
Start: 2017-01-01 | End: 2017-01-01

## 2017-01-01 RX ORDER — ACETAMINOPHEN 325 MG/1
650 TABLET ORAL EVERY 4 HOURS PRN
Status: CANCELLED | OUTPATIENT
Start: 2017-01-01

## 2017-01-01 RX ORDER — ENOXAPARIN SODIUM 100 MG/ML
40 INJECTION SUBCUTANEOUS
Status: DISCONTINUED | OUTPATIENT
Start: 2017-01-01 | End: 2017-01-01

## 2017-01-01 RX ORDER — AMITRIPTYLINE HYDROCHLORIDE 25 MG/1
100 TABLET, FILM COATED ORAL NIGHTLY
Status: DISCONTINUED | OUTPATIENT
Start: 2017-01-01 | End: 2017-01-01 | Stop reason: HOSPADM

## 2017-01-01 RX ORDER — HYDROCORTISONE 10 MG/1
10 TABLET ORAL EVERY MORNING
Status: CANCELLED | OUTPATIENT
Start: 2017-01-01

## 2017-01-01 RX ORDER — SENNOSIDES 8.6 MG/1
8.6 TABLET ORAL DAILY PRN
Status: DISCONTINUED | OUTPATIENT
Start: 2017-01-01 | End: 2017-01-01 | Stop reason: HOSPADM

## 2017-01-01 RX ORDER — VASOPRESSIN 20 [USP'U]/ML
INJECTION, SOLUTION INTRAMUSCULAR; SUBCUTANEOUS
Status: DISPENSED
Start: 2017-01-01 | End: 2017-01-01

## 2017-01-01 RX ORDER — DESMOPRESSIN ACETATE 0.1 MG/1
100 TABLET ORAL 3 TIMES DAILY
Status: DISCONTINUED | OUTPATIENT
Start: 2017-01-01 | End: 2017-01-01

## 2017-01-01 RX ORDER — GABAPENTIN 300 MG/1
600 CAPSULE ORAL 3 TIMES DAILY
Status: CANCELLED | OUTPATIENT
Start: 2017-01-01

## 2017-01-01 RX ORDER — IBUPROFEN 200 MG
1 TABLET ORAL DAILY
Status: CANCELLED | OUTPATIENT
Start: 2017-01-01

## 2017-01-01 RX ORDER — METOCLOPRAMIDE HYDROCHLORIDE 5 MG/ML
5 INJECTION INTRAMUSCULAR; INTRAVENOUS EVERY 6 HOURS PRN
Status: DISCONTINUED | OUTPATIENT
Start: 2017-01-01 | End: 2017-01-01 | Stop reason: HOSPADM

## 2017-01-01 RX ORDER — IBUPROFEN 200 MG
1 TABLET ORAL DAILY
Status: DISCONTINUED | OUTPATIENT
Start: 2017-01-01 | End: 2017-01-01

## 2017-01-01 RX ORDER — HYDRALAZINE HYDROCHLORIDE 20 MG/ML
10 INJECTION INTRAMUSCULAR; INTRAVENOUS
Status: DISCONTINUED | OUTPATIENT
Start: 2017-01-01 | End: 2017-01-01 | Stop reason: HOSPADM

## 2017-01-01 RX ORDER — DESMOPRESSIN ACETATE 0.1 MG/1
400 TABLET ORAL NIGHTLY
Status: DISCONTINUED | OUTPATIENT
Start: 2017-01-01 | End: 2017-01-01

## 2017-01-01 RX ORDER — ACETAMINOPHEN 325 MG/1
650 TABLET ORAL EVERY 6 HOURS PRN
Status: DISCONTINUED | OUTPATIENT
Start: 2017-01-01 | End: 2017-01-01 | Stop reason: HOSPADM

## 2017-01-01 RX ORDER — AMITRIPTYLINE HYDROCHLORIDE 50 MG/1
100 TABLET, FILM COATED ORAL NIGHTLY
Status: DISCONTINUED | OUTPATIENT
Start: 2017-01-01 | End: 2017-01-01 | Stop reason: HOSPADM

## 2017-01-01 RX ORDER — DEXMEDETOMIDINE HYDROCHLORIDE 4 UG/ML
0.2 INJECTION, SOLUTION INTRAVENOUS CONTINUOUS
Status: DISCONTINUED | OUTPATIENT
Start: 2017-01-01 | End: 2017-01-01

## 2017-01-01 RX ORDER — FLUCONAZOLE 2 MG/ML
400 INJECTION, SOLUTION INTRAVENOUS
Status: DISCONTINUED | OUTPATIENT
Start: 2017-01-01 | End: 2017-01-01

## 2017-01-01 RX ORDER — DESMOPRESSIN ACETATE 4 UG/ML
1 INJECTION, SOLUTION INTRAVENOUS; SUBCUTANEOUS EVERY 6 HOURS PRN
Status: DISCONTINUED | OUTPATIENT
Start: 2017-01-01 | End: 2017-01-01

## 2017-01-01 RX ORDER — ROCURONIUM BROMIDE 10 MG/ML
70 INJECTION, SOLUTION INTRAVENOUS ONCE
Status: COMPLETED | OUTPATIENT
Start: 2017-01-01 | End: 2017-01-01

## 2017-01-01 RX ORDER — MAGNESIUM SULFATE HEPTAHYDRATE 40 MG/ML
2 INJECTION, SOLUTION INTRAVENOUS ONCE
Status: COMPLETED | OUTPATIENT
Start: 2017-01-01 | End: 2017-01-01

## 2017-01-01 RX ORDER — OLANZAPINE 2.5 MG/1
5 TABLET ORAL DAILY PRN
Status: DISCONTINUED | OUTPATIENT
Start: 2017-01-01 | End: 2017-01-01 | Stop reason: HOSPADM

## 2017-01-01 RX ORDER — CLINDAMYCIN PHOSPHATE 600 MG/50ML
600 INJECTION, SOLUTION INTRAVENOUS
Status: COMPLETED | OUTPATIENT
Start: 2017-01-01 | End: 2017-01-01

## 2017-01-01 RX ORDER — DOCUSATE SODIUM 100 MG/1
100 CAPSULE, LIQUID FILLED ORAL 2 TIMES DAILY
Status: DISCONTINUED | OUTPATIENT
Start: 2017-01-01 | End: 2017-01-01 | Stop reason: HOSPADM

## 2017-01-01 RX ORDER — DESMOPRESSIN ACETATE 4 UG/ML
2 INJECTION, SOLUTION INTRAVENOUS; SUBCUTANEOUS 2 TIMES DAILY
Status: DISCONTINUED | OUTPATIENT
Start: 2017-01-01 | End: 2017-01-01

## 2017-01-01 RX ORDER — DEXAMETHASONE SODIUM PHOSPHATE 4 MG/ML
INJECTION, SOLUTION INTRA-ARTICULAR; INTRALESIONAL; INTRAMUSCULAR; INTRAVENOUS; SOFT TISSUE
Status: DISCONTINUED | OUTPATIENT
Start: 2017-01-01 | End: 2017-01-01

## 2017-01-01 RX ORDER — ONDANSETRON 2 MG/ML
8 INJECTION INTRAMUSCULAR; INTRAVENOUS EVERY 8 HOURS PRN
Status: DISCONTINUED | OUTPATIENT
Start: 2017-01-01 | End: 2017-01-01 | Stop reason: HOSPADM

## 2017-01-01 RX ORDER — GABAPENTIN 250 MG/5ML
125 SOLUTION ORAL EVERY 12 HOURS
Status: DISCONTINUED | OUTPATIENT
Start: 2017-01-01 | End: 2017-01-01

## 2017-01-01 RX ORDER — ACETAMINOPHEN 650 MG/1
650 SUPPOSITORY RECTAL EVERY 6 HOURS PRN
Status: DISCONTINUED | OUTPATIENT
Start: 2017-01-01 | End: 2017-01-01 | Stop reason: HOSPADM

## 2017-01-01 RX ORDER — PHENYLEPHRINE HCL IN 0.9% NACL 1 MG/10 ML
100 SYRINGE (ML) INTRAVENOUS ONCE
Status: COMPLETED | OUTPATIENT
Start: 2017-01-01 | End: 2017-01-01

## 2017-01-01 RX ORDER — GLYCOPYRROLATE 0.2 MG/ML
INJECTION INTRAMUSCULAR; INTRAVENOUS
Status: DISCONTINUED | OUTPATIENT
Start: 2017-01-01 | End: 2017-01-01

## 2017-01-01 RX ORDER — LIDOCAINE HYDROCHLORIDE 20 MG/ML
JELLY TOPICAL ONCE
Status: COMPLETED | OUTPATIENT
Start: 2017-01-01 | End: 2017-01-01

## 2017-01-01 RX ORDER — ETOMIDATE 2 MG/ML
INJECTION INTRAVENOUS
Status: COMPLETED
Start: 2017-01-01 | End: 2017-01-01

## 2017-01-01 RX ORDER — PHENYLEPHRINE HYDROCHLORIDE 10 MG/ML
INJECTION INTRAVENOUS
Status: DISCONTINUED | OUTPATIENT
Start: 2017-01-01 | End: 2017-01-01

## 2017-01-01 RX ORDER — ONDANSETRON 8 MG/1
8 TABLET, ORALLY DISINTEGRATING ORAL EVERY 8 HOURS PRN
Status: DISCONTINUED | OUTPATIENT
Start: 2017-01-01 | End: 2017-01-01 | Stop reason: HOSPADM

## 2017-01-01 RX ORDER — ETOMIDATE 2 MG/ML
21 INJECTION INTRAVENOUS
Status: COMPLETED | OUTPATIENT
Start: 2017-01-01 | End: 2017-01-01

## 2017-01-01 RX ORDER — HYDROCORTISONE 10 MG/1
10 TABLET ORAL EVERY MORNING
Status: DISCONTINUED | OUTPATIENT
Start: 2017-01-01 | End: 2017-01-01 | Stop reason: HOSPADM

## 2017-01-01 RX ORDER — DEXTROSE MONOHYDRATE AND SODIUM CHLORIDE 5; .9 G/100ML; G/100ML
INJECTION, SOLUTION INTRAVENOUS CONTINUOUS
Status: DISCONTINUED | OUTPATIENT
Start: 2017-01-01 | End: 2017-01-01

## 2017-01-01 RX ORDER — DESMOPRESSIN ACETATE 0.1 MG/1
300 TABLET ORAL NIGHTLY
Status: DISCONTINUED | OUTPATIENT
Start: 2017-01-01 | End: 2017-01-01

## 2017-01-01 RX ORDER — POTASSIUM CHLORIDE 20 MEQ/15ML
40 SOLUTION ORAL ONCE
Status: COMPLETED | OUTPATIENT
Start: 2017-01-01 | End: 2017-01-01

## 2017-01-01 RX ORDER — DEXTROSE MONOHYDRATE 50 MG/ML
INJECTION, SOLUTION INTRAVENOUS CONTINUOUS
Status: DISCONTINUED | OUTPATIENT
Start: 2017-01-01 | End: 2017-01-01 | Stop reason: HOSPADM

## 2017-01-01 RX ORDER — ACETAMINOPHEN 500 MG
500 TABLET ORAL EVERY 6 HOURS PRN
Status: DISCONTINUED | OUTPATIENT
Start: 2017-01-01 | End: 2017-01-01 | Stop reason: HOSPADM

## 2017-01-01 RX ORDER — FENTANYL CITRAT/DEXTROSE 5%/PF 100 MCG/10
PATIENT CONTROLLED ANALGESIA SYRINGE INTRAVENOUS CONTINUOUS
Status: DISCONTINUED | OUTPATIENT
Start: 2017-01-01 | End: 2017-01-01

## 2017-01-01 RX ORDER — LEVOTHYROXINE SODIUM 50 UG/1
50 TABLET ORAL
Status: CANCELLED | OUTPATIENT
Start: 2017-01-01

## 2017-01-01 RX ORDER — SODIUM CHLORIDE AND POTASSIUM CHLORIDE 150; 900 MG/100ML; MG/100ML
INJECTION, SOLUTION INTRAVENOUS CONTINUOUS
Status: DISCONTINUED | OUTPATIENT
Start: 2017-01-01 | End: 2017-01-01

## 2017-01-01 RX ORDER — PANTOPRAZOLE SODIUM 40 MG/10ML
40 INJECTION, POWDER, LYOPHILIZED, FOR SOLUTION INTRAVENOUS DAILY
Status: DISCONTINUED | OUTPATIENT
Start: 2017-01-01 | End: 2017-01-01

## 2017-01-01 RX ORDER — POTASSIUM CHLORIDE 7.45 MG/ML
10 INJECTION INTRAVENOUS
Status: DISPENSED | OUTPATIENT
Start: 2017-01-01 | End: 2017-01-01

## 2017-01-01 RX ORDER — ETOMIDATE 2 MG/ML
INJECTION INTRAVENOUS
Status: DISPENSED
Start: 2017-01-01 | End: 2017-01-01

## 2017-01-01 RX ORDER — OLANZAPINE 10 MG/2ML
5 INJECTION, POWDER, FOR SOLUTION INTRAMUSCULAR ONCE
Status: COMPLETED | OUTPATIENT
Start: 2017-01-01 | End: 2017-01-01

## 2017-01-01 RX ORDER — SUCCINYLCHOLINE CHLORIDE 20 MG/ML
INJECTION INTRAMUSCULAR; INTRAVENOUS
Status: DISCONTINUED
Start: 2017-01-01 | End: 2017-01-01 | Stop reason: WASHOUT

## 2017-01-01 RX ORDER — DEXAMETHASONE SODIUM PHOSPHATE 4 MG/ML
4 INJECTION, SOLUTION INTRA-ARTICULAR; INTRALESIONAL; INTRAMUSCULAR; INTRAVENOUS; SOFT TISSUE EVERY 6 HOURS
Status: DISCONTINUED | OUTPATIENT
Start: 2017-01-01 | End: 2017-01-01

## 2017-01-01 RX ORDER — BISACODYL 10 MG
10 SUPPOSITORY, RECTAL RECTAL DAILY PRN
Status: DISCONTINUED | OUTPATIENT
Start: 2017-01-01 | End: 2017-01-01

## 2017-01-01 RX ORDER — LORAZEPAM 2 MG/ML
2 INJECTION INTRAMUSCULAR EVERY 5 MIN PRN
Status: DISCONTINUED | OUTPATIENT
Start: 2017-01-01 | End: 2017-01-01 | Stop reason: HOSPADM

## 2017-01-01 RX ORDER — OLANZAPINE 5 MG/1
5 TABLET ORAL DAILY PRN
Qty: 30 TABLET | Refills: 0 | Status: SHIPPED | OUTPATIENT
Start: 2017-01-01 | End: 2017-01-01 | Stop reason: HOSPADM

## 2017-01-01 RX ORDER — PHENYLEPHRINE HCL IN 0.9% NACL 1 MG/10 ML
100 SYRINGE (ML) INTRAVENOUS ONCE
Status: DISCONTINUED | OUTPATIENT
Start: 2017-01-01 | End: 2017-01-01

## 2017-01-01 RX ORDER — OLANZAPINE 5 MG/1
5 TABLET ORAL DAILY PRN
Status: DISCONTINUED | OUTPATIENT
Start: 2017-01-01 | End: 2017-01-01

## 2017-01-01 RX ORDER — POTASSIUM CHLORIDE 7.45 MG/ML
40 INJECTION INTRAVENOUS
Status: DISCONTINUED | OUTPATIENT
Start: 2017-01-01 | End: 2017-01-01

## 2017-01-01 RX ORDER — HYDROCODONE BITARTRATE AND ACETAMINOPHEN 5; 325 MG/1; MG/1
1 TABLET ORAL EVERY 4 HOURS PRN
Status: DISCONTINUED | OUTPATIENT
Start: 2017-01-01 | End: 2017-01-01

## 2017-01-01 RX ORDER — PHENYLEPHRINE HCL IN 0.9% NACL 1 MG/10 ML
SYRINGE (ML) INTRAVENOUS
Status: COMPLETED
Start: 2017-01-01 | End: 2017-01-01

## 2017-01-01 RX ORDER — HEPARIN SODIUM 5000 [USP'U]/ML
5000 INJECTION, SOLUTION INTRAVENOUS; SUBCUTANEOUS EVERY 8 HOURS
Status: CANCELLED | OUTPATIENT
Start: 2017-01-01

## 2017-01-01 RX ORDER — OLANZAPINE 5 MG/1
5 TABLET ORAL DAILY PRN
Status: DISCONTINUED | OUTPATIENT
Start: 2017-01-01 | End: 2017-01-01 | Stop reason: HOSPADM

## 2017-01-01 RX ORDER — SODIUM CHLORIDE 9 MG/ML
INJECTION, SOLUTION INTRAVENOUS CONTINUOUS
Status: ACTIVE | OUTPATIENT
Start: 2017-01-01 | End: 2017-01-01

## 2017-01-01 RX ORDER — OLANZAPINE 5 MG/1
5 TABLET ORAL DAILY PRN
Start: 2017-01-01 | End: 2017-01-01

## 2017-01-01 RX ORDER — OLANZAPINE 5 MG/1
5 TABLET ORAL DAILY PRN
Qty: 30 TABLET | Refills: 0 | Status: ON HOLD | OUTPATIENT
Start: 2017-01-01 | End: 2017-01-01

## 2017-01-01 RX ORDER — DESMOPRESSIN ACETATE 4 UG/ML
1 INJECTION, SOLUTION INTRAVENOUS; SUBCUTANEOUS
Status: DISCONTINUED | OUTPATIENT
Start: 2017-01-01 | End: 2017-01-01

## 2017-01-01 RX ORDER — HYDROCORTISONE 5 MG/1
5 TABLET ORAL DAILY
Qty: 30 TABLET | Refills: 0
Start: 2017-01-01 | End: 2017-09-08

## 2017-01-01 RX ORDER — HYDROCORTISONE 5 MG/1
15 TABLET ORAL DAILY
Qty: 90 TABLET | Refills: 0
Start: 2017-01-01 | End: 2017-09-08

## 2017-01-01 RX ORDER — GABAPENTIN 250 MG/5ML
300 SOLUTION ORAL 3 TIMES DAILY
Status: DISCONTINUED | OUTPATIENT
Start: 2017-01-01 | End: 2017-01-01

## 2017-01-01 RX ORDER — BISACODYL 10 MG
10 SUPPOSITORY, RECTAL RECTAL DAILY
Status: DISCONTINUED | OUTPATIENT
Start: 2017-01-01 | End: 2017-01-01 | Stop reason: HOSPADM

## 2017-01-01 RX ORDER — GABAPENTIN 300 MG/1
600 CAPSULE ORAL 3 TIMES DAILY
Status: DISCONTINUED | OUTPATIENT
Start: 2017-01-01 | End: 2017-01-01 | Stop reason: HOSPADM

## 2017-01-01 RX ORDER — OLANZAPINE 10 MG/2ML
2.5 INJECTION, POWDER, FOR SOLUTION INTRAMUSCULAR ONCE AS NEEDED
Status: COMPLETED | OUTPATIENT
Start: 2017-01-01 | End: 2017-01-01

## 2017-01-01 RX ORDER — ACETAMINOPHEN 500 MG
500 TABLET ORAL EVERY 6 HOURS PRN
Refills: 0 | COMMUNITY
Start: 2017-01-01

## 2017-01-01 RX ORDER — CEFEPIME HYDROCHLORIDE 2 G/50ML
2 INJECTION, SOLUTION INTRAVENOUS
Status: COMPLETED | OUTPATIENT
Start: 2017-01-01 | End: 2017-01-01

## 2017-01-01 RX ORDER — CEFEPIME HYDROCHLORIDE 1 G/50ML
1 INJECTION, SOLUTION INTRAVENOUS
Status: DISCONTINUED | OUTPATIENT
Start: 2017-01-01 | End: 2017-01-01

## 2017-01-01 RX ORDER — METOPROLOL TARTRATE 1 MG/ML
5 INJECTION, SOLUTION INTRAVENOUS ONCE
Status: COMPLETED | OUTPATIENT
Start: 2017-01-01 | End: 2017-01-01

## 2017-01-01 RX ORDER — HEPARIN SODIUM 5000 [USP'U]/ML
5000 INJECTION, SOLUTION INTRAVENOUS; SUBCUTANEOUS EVERY 8 HOURS
Status: DISCONTINUED | OUTPATIENT
Start: 2017-01-01 | End: 2017-01-01 | Stop reason: HOSPADM

## 2017-01-01 RX ORDER — LEVETIRACETAM 100 MG/ML
500 SOLUTION ORAL 2 TIMES DAILY
Qty: 300 ML | Refills: 0 | Status: ON HOLD | OUTPATIENT
Start: 2017-01-01 | End: 2017-01-01

## 2017-01-01 RX ORDER — BISACODYL 10 MG
10 SUPPOSITORY, RECTAL RECTAL DAILY
Status: DISCONTINUED | OUTPATIENT
Start: 2017-01-01 | End: 2017-01-01

## 2017-01-01 RX ORDER — HYDROCORTISONE 5 MG/1
5 TABLET ORAL DAILY
Status: DISCONTINUED | OUTPATIENT
Start: 2017-01-01 | End: 2017-01-01

## 2017-01-01 RX ORDER — PHENYTOIN SODIUM 50 MG/ML
INJECTION INTRAMUSCULAR; INTRAVENOUS
Status: DISCONTINUED | OUTPATIENT
Start: 2017-01-01 | End: 2017-01-01

## 2017-01-01 RX ORDER — DESMOPRESSIN ACETATE 4 UG/ML
0.5 INJECTION, SOLUTION INTRAVENOUS; SUBCUTANEOUS 2 TIMES DAILY
Status: DISCONTINUED | OUTPATIENT
Start: 2017-01-01 | End: 2017-01-01

## 2017-01-01 RX ORDER — SODIUM CHLORIDE, SODIUM LACTATE, POTASSIUM CHLORIDE, CALCIUM CHLORIDE 600; 310; 30; 20 MG/100ML; MG/100ML; MG/100ML; MG/100ML
INJECTION, SOLUTION INTRAVENOUS CONTINUOUS
Status: DISCONTINUED | OUTPATIENT
Start: 2017-01-01 | End: 2017-01-01

## 2017-01-01 RX ORDER — FENTANYL CITRATE 50 UG/ML
50 INJECTION, SOLUTION INTRAMUSCULAR; INTRAVENOUS EVERY 5 MIN PRN
Status: DISCONTINUED | OUTPATIENT
Start: 2017-01-01 | End: 2017-01-01

## 2017-01-01 RX ORDER — FENTANYL CITRAT/DEXTROSE 5%/PF 100 MCG/10
PATIENT CONTROLLED ANALGESIA SYRINGE INTRAVENOUS CONTINUOUS
Status: DISCONTINUED | OUTPATIENT
Start: 2017-01-01 | End: 2017-01-01 | Stop reason: HOSPADM

## 2017-01-01 RX ORDER — IBUPROFEN 200 MG
24 TABLET ORAL
Status: CANCELLED | OUTPATIENT
Start: 2017-01-01

## 2017-01-01 RX ORDER — ACETAMINOPHEN 325 MG/1
650 TABLET ORAL
Status: DISCONTINUED | OUTPATIENT
Start: 2017-01-01 | End: 2017-01-01 | Stop reason: HOSPADM

## 2017-01-01 RX ORDER — DESMOPRESSIN ACETATE 0.1 MG/1
200 TABLET ORAL 2 TIMES DAILY
Status: DISCONTINUED | OUTPATIENT
Start: 2017-01-01 | End: 2017-01-01

## 2017-01-01 RX ORDER — ROCURONIUM BROMIDE 10 MG/ML
INJECTION, SOLUTION INTRAVENOUS
Status: DISCONTINUED | OUTPATIENT
Start: 2017-01-01 | End: 2017-01-01

## 2017-01-01 RX ORDER — LEVOTHYROXINE SODIUM 50 UG/1
50 TABLET ORAL DAILY
Status: DISCONTINUED | OUTPATIENT
Start: 2017-01-01 | End: 2017-01-01

## 2017-01-01 RX ORDER — MUPIROCIN 20 MG/G
1 OINTMENT TOPICAL
Status: COMPLETED | OUTPATIENT
Start: 2017-01-01 | End: 2017-01-01

## 2017-01-01 RX ORDER — GABAPENTIN 300 MG/1
600 CAPSULE ORAL 3 TIMES DAILY
Status: DISCONTINUED | OUTPATIENT
Start: 2017-01-01 | End: 2017-01-01

## 2017-01-01 RX ORDER — FENTANYL CITRATE 50 UG/ML
INJECTION, SOLUTION INTRAMUSCULAR; INTRAVENOUS
Status: COMPLETED
Start: 2017-01-01 | End: 2017-01-01

## 2017-01-01 RX ORDER — AMITRIPTYLINE HYDROCHLORIDE 50 MG/1
100 TABLET, FILM COATED ORAL NIGHTLY
Status: CANCELLED | OUTPATIENT
Start: 2017-01-01

## 2017-01-01 RX ORDER — FAMOTIDINE 10 MG/ML
20 INJECTION INTRAVENOUS 2 TIMES DAILY
Status: DISCONTINUED | OUTPATIENT
Start: 2017-01-01 | End: 2017-01-01

## 2017-01-01 RX ORDER — BACITRACIN 50000 [IU]/1
INJECTION, POWDER, FOR SOLUTION INTRAMUSCULAR
Status: DISCONTINUED | OUTPATIENT
Start: 2017-01-01 | End: 2017-01-01 | Stop reason: HOSPADM

## 2017-01-01 RX ORDER — LEVETIRACETAM 100 MG/ML
500 SOLUTION ORAL 2 TIMES DAILY
Qty: 300 ML | Refills: 0 | Status: SHIPPED | OUTPATIENT
Start: 2017-01-01 | End: 2017-09-15

## 2017-01-01 RX ORDER — DESMOPRESSIN ACETATE 0.1 MG/1
100 TABLET ORAL 2 TIMES DAILY
Status: DISCONTINUED | OUTPATIENT
Start: 2017-01-01 | End: 2017-01-01

## 2017-01-01 RX ORDER — PANTOPRAZOLE SODIUM 40 MG/1
40 FOR SUSPENSION ORAL 2 TIMES DAILY
Status: DISCONTINUED | OUTPATIENT
Start: 2017-01-01 | End: 2017-01-01

## 2017-01-01 RX ADMIN — DEXTROSE MONOHYDRATE 25 G: 25 INJECTION, SOLUTION INTRAVENOUS at 10:08

## 2017-01-01 RX ADMIN — MAGNESIUM SULFATE IN WATER 2 G: 40 INJECTION, SOLUTION INTRAVENOUS at 02:06

## 2017-01-01 RX ADMIN — SODIUM CHLORIDE, SODIUM GLUCONATE, SODIUM ACETATE, POTASSIUM CHLORIDE, MAGNESIUM CHLORIDE, SODIUM PHOSPHATE, DIBASIC, AND POTASSIUM PHOSPHATE: .53; .5; .37; .037; .03; .012; .00082 INJECTION, SOLUTION INTRAVENOUS at 12:06

## 2017-01-01 RX ADMIN — HYDROCORTISONE 5 MG: 5 TABLET ORAL at 09:08

## 2017-01-01 RX ADMIN — SODIUM CHLORIDE AND POTASSIUM CHLORIDE: 9; 1.49 INJECTION, SOLUTION INTRAVENOUS at 07:06

## 2017-01-01 RX ADMIN — EPHEDRINE SULFATE 5 MG: 50 INJECTION, SOLUTION INTRAMUSCULAR; INTRAVENOUS; SUBCUTANEOUS at 09:06

## 2017-01-01 RX ADMIN — LEVETIRACETAM 500 MG: 100 SOLUTION ORAL at 09:07

## 2017-01-01 RX ADMIN — AMITRIPTYLINE HYDROCHLORIDE 100 MG: 100 TABLET, FILM COATED ORAL at 09:06

## 2017-01-01 RX ADMIN — HEPARIN SODIUM 5000 UNITS: 5000 INJECTION, SOLUTION INTRAVENOUS; SUBCUTANEOUS at 01:06

## 2017-01-01 RX ADMIN — HYDROCORTISONE 5 MG: 5 TABLET ORAL at 08:06

## 2017-01-01 RX ADMIN — HEPARIN SODIUM 5000 UNITS: 5000 INJECTION, SOLUTION INTRAVENOUS; SUBCUTANEOUS at 11:06

## 2017-01-01 RX ADMIN — LIDOCAINE HYDROCHLORIDE 100 MG: 20 INJECTION, SOLUTION INTRAVENOUS at 02:07

## 2017-01-01 RX ADMIN — DESMOPRESSIN ACETATE 100 MCG: 0.1 TABLET ORAL at 09:08

## 2017-01-01 RX ADMIN — CLINDAMYCIN IN 5 PERCENT DEXTROSE 600 MG: 12 INJECTION, SOLUTION INTRAVENOUS at 09:08

## 2017-01-01 RX ADMIN — FENTANYL CITRATE 50 MCG: 50 INJECTION, SOLUTION INTRAMUSCULAR; INTRAVENOUS at 12:08

## 2017-01-01 RX ADMIN — PHENYLEPHRINE HYDROCHLORIDE 50 MCG: 10 INJECTION INTRAVENOUS at 02:07

## 2017-01-01 RX ADMIN — PHENYLEPHRINE HYDROCHLORIDE 200 MCG: 10 INJECTION INTRAVENOUS at 09:06

## 2017-01-01 RX ADMIN — SODIUM CHLORIDE, SODIUM LACTATE, POTASSIUM CHLORIDE, AND CALCIUM CHLORIDE 2000 ML: .6; .31; .03; .02 INJECTION, SOLUTION INTRAVENOUS at 06:08

## 2017-01-01 RX ADMIN — CEFEPIME HYDROCHLORIDE 2 G: 2 INJECTION, SOLUTION INTRAVENOUS at 03:08

## 2017-01-01 RX ADMIN — ENOXAPARIN SODIUM 40 MG: 100 INJECTION SUBCUTANEOUS at 05:07

## 2017-01-01 RX ADMIN — NICOTINE 1 PATCH: 21 PATCH, EXTENDED RELEASE TRANSDERMAL at 09:06

## 2017-01-01 RX ADMIN — AMITRIPTYLINE HYDROCHLORIDE 100 MG: 25 TABLET, FILM COATED ORAL at 10:06

## 2017-01-01 RX ADMIN — ACETAMINOPHEN 650 MG: 325 TABLET ORAL at 10:06

## 2017-01-01 RX ADMIN — LEVOTHYROXINE SODIUM 75 MCG: 75 TABLET ORAL at 09:08

## 2017-01-01 RX ADMIN — CEFEPIME HYDROCHLORIDE 2 G: 2 INJECTION, SOLUTION INTRAVENOUS at 01:07

## 2017-01-01 RX ADMIN — HYDROCORTISONE 10 MG: 10 TABLET ORAL at 08:06

## 2017-01-01 RX ADMIN — NICOTINE 1 PATCH: 21 PATCH, EXTENDED RELEASE TRANSDERMAL at 08:07

## 2017-01-01 RX ADMIN — FUROSEMIDE 20 MG: 20 TABLET ORAL at 09:07

## 2017-01-01 RX ADMIN — GABAPENTIN 300 MG: 300 CAPSULE ORAL at 05:06

## 2017-01-01 RX ADMIN — FUROSEMIDE 20 MG: 20 TABLET ORAL at 05:07

## 2017-01-01 RX ADMIN — GABAPENTIN 125 MG: 250 SOLUTION ORAL at 08:07

## 2017-01-01 RX ADMIN — DESMOPRESSIN ACETATE 10 MCG: 0.1 SOLUTION NASAL at 09:07

## 2017-01-01 RX ADMIN — CEFTRIAXONE 1 G: 1 INJECTION, SOLUTION INTRAVENOUS at 09:06

## 2017-01-01 RX ADMIN — POTASSIUM CHLORIDE 40 MEQ: 400 INJECTION, SOLUTION INTRAVENOUS at 04:07

## 2017-01-01 RX ADMIN — PANTOPRAZOLE SODIUM 40 MG: 40 GRANULE, DELAYED RELEASE ORAL at 08:06

## 2017-01-01 RX ADMIN — CEFAZOLIN 1 G: 1 INJECTION, POWDER, FOR SOLUTION INTRAVENOUS at 02:07

## 2017-01-01 RX ADMIN — DESMOPRESSIN ACETATE 100 MCG: 0.1 TABLET ORAL at 05:08

## 2017-01-01 RX ADMIN — PHENYLEPHRINE HYDROCHLORIDE 0.25 MCG/KG/MIN: 10 INJECTION INTRAVENOUS at 10:06

## 2017-01-01 RX ADMIN — FAMOTIDINE 20 MG: 10 INJECTION, SOLUTION INTRAVENOUS at 08:08

## 2017-01-01 RX ADMIN — DEXTROSE: 5 SOLUTION INTRAVENOUS at 08:06

## 2017-01-01 RX ADMIN — RAMELTEON 8 MG: 8 TABLET, FILM COATED ORAL at 09:06

## 2017-01-01 RX ADMIN — GABAPENTIN 600 MG: 300 CAPSULE ORAL at 01:06

## 2017-01-01 RX ADMIN — HYDROCORTISONE 10 MG: 10 TABLET ORAL at 06:06

## 2017-01-01 RX ADMIN — GABAPENTIN 600 MG: 300 CAPSULE ORAL at 05:06

## 2017-01-01 RX ADMIN — NICOTINE 1 PATCH: 21 PATCH, EXTENDED RELEASE TRANSDERMAL at 08:06

## 2017-01-01 RX ADMIN — LEVOTHYROXINE SODIUM 50 MCG: 50 TABLET ORAL at 06:06

## 2017-01-01 RX ADMIN — POTASSIUM CHLORIDE 40 MEQ: 400 INJECTION, SOLUTION INTRAVENOUS at 06:07

## 2017-01-01 RX ADMIN — LEVETIRACETAM 500 MG: 100 SOLUTION ORAL at 10:07

## 2017-01-01 RX ADMIN — CEFEPIME HYDROCHLORIDE 1 G: 1 INJECTION, SOLUTION INTRAVENOUS at 01:07

## 2017-01-01 RX ADMIN — CEFEPIME HYDROCHLORIDE 1 G: 1 INJECTION, SOLUTION INTRAVENOUS at 05:07

## 2017-01-01 RX ADMIN — ACETAMINOPHEN 650 MG: 325 TABLET ORAL at 06:06

## 2017-01-01 RX ADMIN — DEXMEDETOMIDINE HYDROCHLORIDE 0.2 MCG/KG/HR: 4 INJECTION, SOLUTION INTRAVENOUS at 10:06

## 2017-01-01 RX ADMIN — POTASSIUM CHLORIDE 10 MEQ: 10 INJECTION, SOLUTION INTRAVENOUS at 01:06

## 2017-01-01 RX ADMIN — SODIUM CHLORIDE: 0.9 INJECTION, SOLUTION INTRAVENOUS at 02:07

## 2017-01-01 RX ADMIN — LEVETIRACETAM 500 MG: 100 SOLUTION ORAL at 08:07

## 2017-01-01 RX ADMIN — GABAPENTIN 600 MG: 300 CAPSULE ORAL at 06:06

## 2017-01-01 RX ADMIN — PANTOPRAZOLE SODIUM 40 MG: 40 GRANULE, DELAYED RELEASE ORAL at 09:06

## 2017-01-01 RX ADMIN — SODIUM CHLORIDE 1000 ML: 0.9 INJECTION, SOLUTION INTRAVENOUS at 03:06

## 2017-01-01 RX ADMIN — DEXTROSE: 5 SOLUTION INTRAVENOUS at 02:06

## 2017-01-01 RX ADMIN — LEVOTHYROXINE SODIUM 75 MCG: 75 TABLET ORAL at 08:08

## 2017-01-01 RX ADMIN — SODIUM CHLORIDE 500 ML: 900 INJECTION, SOLUTION INTRAVENOUS at 08:07

## 2017-01-01 RX ADMIN — POLYETHYLENE GLYCOL 3350 17 G: 17 POWDER, FOR SOLUTION ORAL at 09:06

## 2017-01-01 RX ADMIN — DEXTROSE MONOHYDRATE 12.5 G: 25 INJECTION, SOLUTION INTRAVENOUS at 06:07

## 2017-01-01 RX ADMIN — POTASSIUM CHLORIDE 40 MEQ: 20 SOLUTION ORAL at 02:06

## 2017-01-01 RX ADMIN — NICOTINE 1 PATCH: 21 PATCH, EXTENDED RELEASE TRANSDERMAL at 09:07

## 2017-01-01 RX ADMIN — LEVETIRACETAM 500 MG: 5 INJECTION INTRAVENOUS at 08:08

## 2017-01-01 RX ADMIN — GABAPENTIN 600 MG: 300 CAPSULE ORAL at 09:06

## 2017-01-01 RX ADMIN — DESMOPRESSIN ACETATE 0.5 MCG: 4 SOLUTION INTRAVENOUS at 01:07

## 2017-01-01 RX ADMIN — LEVOTHYROXINE SODIUM 50 MCG: 50 TABLET ORAL at 08:07

## 2017-01-01 RX ADMIN — HEPARIN SODIUM 5000 UNITS: 5000 INJECTION, SOLUTION INTRAVENOUS; SUBCUTANEOUS at 06:06

## 2017-01-01 RX ADMIN — DESMOPRESSIN ACETATE 10 MCG: 10 SPRAY NASAL at 10:07

## 2017-01-01 RX ADMIN — ACYCLOVIR SODIUM 600 MG: 50 INJECTION, SOLUTION INTRAVENOUS at 01:07

## 2017-01-01 RX ADMIN — HEPARIN SODIUM 5000 UNITS: 5000 INJECTION, SOLUTION INTRAVENOUS; SUBCUTANEOUS at 09:06

## 2017-01-01 RX ADMIN — PANTOPRAZOLE SODIUM 40 MG: 40 INJECTION, POWDER, FOR SOLUTION INTRAVENOUS at 08:06

## 2017-01-01 RX ADMIN — ENOXAPARIN SODIUM 40 MG: 100 INJECTION SUBCUTANEOUS at 04:08

## 2017-01-01 RX ADMIN — SODIUM CHLORIDE, SODIUM LACTATE, POTASSIUM CHLORIDE, AND CALCIUM CHLORIDE 500 ML: .6; .31; .03; .02 INJECTION, SOLUTION INTRAVENOUS at 06:07

## 2017-01-01 RX ADMIN — LEVOTHYROXINE SODIUM 75 MCG: 75 TABLET ORAL at 09:07

## 2017-01-01 RX ADMIN — ENOXAPARIN SODIUM 40 MG: 100 INJECTION SUBCUTANEOUS at 04:07

## 2017-01-01 RX ADMIN — HYDROCORTISONE 5 MG: 5 TABLET ORAL at 04:08

## 2017-01-01 RX ADMIN — ACYCLOVIR SODIUM 600 MG: 50 INJECTION, SOLUTION INTRAVENOUS at 03:07

## 2017-01-01 RX ADMIN — POTASSIUM CHLORIDE 10 MEQ: 10 INJECTION, SOLUTION INTRAVENOUS at 08:08

## 2017-01-01 RX ADMIN — Medication 3 ML: at 09:06

## 2017-01-01 RX ADMIN — HYDROCORTISONE 10 MG: 10 TABLET ORAL at 05:06

## 2017-01-01 RX ADMIN — DESMOPRESSIN ACETATE 100 MCG: 0.1 TABLET ORAL at 02:08

## 2017-01-01 RX ADMIN — LEVETIRACETAM 500 MG: 100 SOLUTION ORAL at 11:07

## 2017-01-01 RX ADMIN — HEPARIN SODIUM 5000 UNITS: 5000 INJECTION, SOLUTION INTRAVENOUS; SUBCUTANEOUS at 02:06

## 2017-01-01 RX ADMIN — GABAPENTIN 600 MG: 300 CAPSULE ORAL at 08:06

## 2017-01-01 RX ADMIN — DOCUSATE SODIUM 100 MG: 100 CAPSULE, LIQUID FILLED ORAL at 10:06

## 2017-01-01 RX ADMIN — SENNOSIDES 8.6 MG: 8.6 TABLET, FILM COATED ORAL at 09:08

## 2017-01-01 RX ADMIN — SODIUM CHLORIDE, SODIUM GLUCONATE, SODIUM ACETATE, POTASSIUM CHLORIDE, MAGNESIUM CHLORIDE, SODIUM PHOSPHATE, DIBASIC, AND POTASSIUM PHOSPHATE: .53; .5; .37; .037; .03; .012; .00082 INJECTION, SOLUTION INTRAVENOUS at 10:06

## 2017-01-01 RX ADMIN — POTASSIUM CHLORIDE 40 MEQ: 20 SOLUTION ORAL at 09:07

## 2017-01-01 RX ADMIN — SODIUM PHOSPHATE, MONOBASIC, MONOHYDRATE 20.01 MMOL: 276; 142 INJECTION, SOLUTION INTRAVENOUS at 03:06

## 2017-01-01 RX ADMIN — DESMOPRESSIN ACETATE 10 MCG: 10 SPRAY NASAL at 09:07

## 2017-01-01 RX ADMIN — LEVETIRACETAM 500 MG: 100 SOLUTION ORAL at 07:07

## 2017-01-01 RX ADMIN — LEVOTHYROXINE SODIUM 50 MCG: 50 TABLET ORAL at 09:07

## 2017-01-01 RX ADMIN — LEVETIRACETAM 500 MG: 100 SOLUTION ORAL at 09:08

## 2017-01-01 RX ADMIN — SUCCINYLCHOLINE CHLORIDE 100 MG: 20 INJECTION, SOLUTION INTRAMUSCULAR; INTRAVENOUS at 08:06

## 2017-01-01 RX ADMIN — HEPARIN SODIUM 5000 UNITS: 5000 INJECTION, SOLUTION INTRAVENOUS; SUBCUTANEOUS at 05:06

## 2017-01-01 RX ADMIN — HYDROCORTISONE 15 MG: 5 TABLET ORAL at 09:08

## 2017-01-01 RX ADMIN — HYDROCORTISONE SODIUM SUCCINATE 100 MG: 100 INJECTION, POWDER, FOR SOLUTION INTRAMUSCULAR; INTRAVENOUS at 10:08

## 2017-01-01 RX ADMIN — DESMOPRESSIN ACETATE 0.5 MCG: 4 SOLUTION INTRAVENOUS at 09:07

## 2017-01-01 RX ADMIN — CEFEPIME HYDROCHLORIDE 1 G: 1 INJECTION, SOLUTION INTRAVENOUS at 06:08

## 2017-01-01 RX ADMIN — ROCURONIUM BROMIDE 20 MG: 10 INJECTION, SOLUTION INTRAVENOUS at 10:06

## 2017-01-01 RX ADMIN — LEVOTHYROXINE SODIUM 50 MCG: 50 TABLET ORAL at 05:06

## 2017-01-01 RX ADMIN — CHLORHEXIDINE GLUCONATE 15 ML: 1.2 RINSE ORAL at 10:08

## 2017-01-01 RX ADMIN — HYDROCORTISONE SODIUM SUCCINATE 100 MG: 100 INJECTION, POWDER, FOR SOLUTION INTRAMUSCULAR; INTRAVENOUS at 04:08

## 2017-01-01 RX ADMIN — BISACODYL 10 MG: 10 SUPPOSITORY RECTAL at 09:06

## 2017-01-01 RX ADMIN — GABAPENTIN 125 MG: 250 SOLUTION ORAL at 09:07

## 2017-01-01 RX ADMIN — LORAZEPAM 2 MG: 2 INJECTION INTRAMUSCULAR; INTRAVENOUS at 12:08

## 2017-01-01 RX ADMIN — DEXAMETHASONE SODIUM PHOSPHATE 4 MG: 4 INJECTION, SOLUTION INTRAMUSCULAR; INTRAVENOUS at 08:06

## 2017-01-01 RX ADMIN — PHENYTOIN SODIUM 50 MG: 50 INJECTION INTRAMUSCULAR; INTRAVENOUS at 09:06

## 2017-01-01 RX ADMIN — HYDROCORTISONE 5 MG: 5 TABLET ORAL at 09:06

## 2017-01-01 RX ADMIN — DESMOPRESSIN ACETATE 0.5 MCG: 4 SOLUTION INTRAVENOUS at 12:07

## 2017-01-01 RX ADMIN — ACETAMINOPHEN 500 MG: 500 TABLET ORAL at 11:07

## 2017-01-01 RX ADMIN — HYDRALAZINE HYDROCHLORIDE 10 MG: 20 INJECTION INTRAMUSCULAR; INTRAVENOUS at 10:06

## 2017-01-01 RX ADMIN — POLYETHYLENE GLYCOL 3350 17 G: 17 POWDER, FOR SOLUTION ORAL at 08:06

## 2017-01-01 RX ADMIN — SODIUM CHLORIDE, SODIUM LACTATE, POTASSIUM CHLORIDE, AND CALCIUM CHLORIDE 1000 ML: .6; .31; .03; .02 INJECTION, SOLUTION INTRAVENOUS at 11:07

## 2017-01-01 RX ADMIN — POLYETHYLENE GLYCOL 3350 17 G: 17 POWDER, FOR SOLUTION ORAL at 11:06

## 2017-01-01 RX ADMIN — FUROSEMIDE 20 MG: 20 TABLET ORAL at 10:07

## 2017-01-01 RX ADMIN — OLANZAPINE 5 MG: 2.5 TABLET, FILM COATED ORAL at 10:08

## 2017-01-01 RX ADMIN — OLANZAPINE 5 MG: 5 TABLET, FILM COATED ORAL at 08:07

## 2017-01-01 RX ADMIN — DEXTROSE: 5 SOLUTION INTRAVENOUS at 10:07

## 2017-01-01 RX ADMIN — DESMOPRESSIN ACETATE 100 MCG: 0.1 TABLET ORAL at 01:08

## 2017-01-01 RX ADMIN — CEFEPIME HYDROCHLORIDE 1 G: 1 INJECTION, SOLUTION INTRAVENOUS at 09:07

## 2017-01-01 RX ADMIN — LEVETIRACETAM 500 MG: 100 SOLUTION ORAL at 08:08

## 2017-01-01 RX ADMIN — GABAPENTIN 300 MG: 250 SOLUTION ORAL at 02:07

## 2017-01-01 RX ADMIN — GABAPENTIN 300 MG: 300 CAPSULE ORAL at 06:06

## 2017-01-01 RX ADMIN — ACYCLOVIR SODIUM 600 MG: 50 INJECTION, SOLUTION INTRAVENOUS at 02:07

## 2017-01-01 RX ADMIN — CEFTRIAXONE 1 G: 1 INJECTION, SOLUTION INTRAVENOUS at 10:06

## 2017-01-01 RX ADMIN — HYDROCORTISONE 5 MG: 5 TABLET ORAL at 02:08

## 2017-01-01 RX ADMIN — GABAPENTIN 300 MG: 300 CAPSULE ORAL at 10:06

## 2017-01-01 RX ADMIN — SODIUM CHLORIDE, PRESERVATIVE FREE 3 ML: 5 INJECTION INTRAVENOUS at 05:08

## 2017-01-01 RX ADMIN — CEFTRIAXONE 1 G: 1 INJECTION, SOLUTION INTRAVENOUS at 05:08

## 2017-01-01 RX ADMIN — Medication 100 MCG: at 05:08

## 2017-01-01 RX ADMIN — ROCURONIUM BROMIDE 70 MG: 10 INJECTION, SOLUTION INTRAVENOUS at 12:08

## 2017-01-01 RX ADMIN — GABAPENTIN 125 MG: 250 SOLUTION ORAL at 02:07

## 2017-01-01 RX ADMIN — LEVOTHYROXINE SODIUM 50 MCG: 50 TABLET ORAL at 02:07

## 2017-01-01 RX ADMIN — GABAPENTIN 125 MG: 250 SOLUTION ORAL at 10:07

## 2017-01-01 RX ADMIN — OLANZAPINE 5 MG: 5 TABLET, FILM COATED ORAL at 06:08

## 2017-01-01 RX ADMIN — AMITRIPTYLINE HYDROCHLORIDE 100 MG: 100 TABLET, FILM COATED ORAL at 10:06

## 2017-01-01 RX ADMIN — ACETAMINOPHEN 650 MG: 325 TABLET ORAL at 09:06

## 2017-01-01 RX ADMIN — POTASSIUM CHLORIDE 20 MEQ: 200 INJECTION, SOLUTION INTRAVENOUS at 02:07

## 2017-01-01 RX ADMIN — DEXAMETHASONE SODIUM PHOSPHATE 4 MG: 4 INJECTION, SOLUTION INTRAMUSCULAR; INTRAVENOUS at 12:06

## 2017-01-01 RX ADMIN — POTASSIUM CHLORIDE 40 MEQ: 20 SOLUTION ORAL at 10:07

## 2017-01-01 RX ADMIN — FLUCONAZOLE 400 MG: 2 INJECTION INTRAVENOUS at 08:07

## 2017-01-01 RX ADMIN — VANCOMYCIN HYDROCHLORIDE 1000 MG: 1 INJECTION, POWDER, LYOPHILIZED, FOR SOLUTION INTRAVENOUS at 05:08

## 2017-01-01 RX ADMIN — LEVOTHYROXINE SODIUM 50 MCG: 50 TABLET ORAL at 09:06

## 2017-01-01 RX ADMIN — POTASSIUM CHLORIDE 40 MEQ: 20 SOLUTION ORAL at 05:06

## 2017-01-01 RX ADMIN — DESMOPRESSIN ACETATE 0.5 MCG: 4 SOLUTION INTRAVENOUS at 08:07

## 2017-01-01 RX ADMIN — DESMOPRESSIN ACETATE 100 MCG: 0.1 TABLET ORAL at 08:08

## 2017-01-01 RX ADMIN — HALOPERIDOL LACTATE 2 MG: 5 INJECTION, SOLUTION INTRAMUSCULAR at 03:06

## 2017-01-01 RX ADMIN — NICOTINE 1 PATCH: 21 PATCH, EXTENDED RELEASE TRANSDERMAL at 11:06

## 2017-01-01 RX ADMIN — OLANZAPINE 5 MG: 5 TABLET, FILM COATED ORAL at 09:08

## 2017-01-01 RX ADMIN — VANCOMYCIN HYDROCHLORIDE 1000 MG: 1 INJECTION, POWDER, LYOPHILIZED, FOR SOLUTION INTRAVENOUS at 06:08

## 2017-01-01 RX ADMIN — HALOPERIDOL LACTATE 2 MG: 5 INJECTION, SOLUTION INTRAMUSCULAR at 09:06

## 2017-01-01 RX ADMIN — HEPARIN SODIUM 5000 UNITS: 5000 INJECTION, SOLUTION INTRAVENOUS; SUBCUTANEOUS at 07:06

## 2017-01-01 RX ADMIN — GABAPENTIN 300 MG: 250 SOLUTION ORAL at 06:07

## 2017-01-01 RX ADMIN — VANCOMYCIN HYDROCHLORIDE 750 MG: 1 INJECTION, POWDER, LYOPHILIZED, FOR SOLUTION INTRAVENOUS at 11:07

## 2017-01-01 RX ADMIN — MAGNESIUM SULFATE IN WATER 2 G: 40 INJECTION, SOLUTION INTRAVENOUS at 05:07

## 2017-01-01 RX ADMIN — LIDOCAINE HYDROCHLORIDE 75 MG: 20 INJECTION, SOLUTION INTRAVENOUS at 08:06

## 2017-01-01 RX ADMIN — DEXAMETHASONE SODIUM PHOSPHATE 4 MG: 4 INJECTION, SOLUTION INTRAMUSCULAR; INTRAVENOUS at 06:06

## 2017-01-01 RX ADMIN — BISACODYL 10 MG: 10 SUPPOSITORY RECTAL at 08:06

## 2017-01-01 RX ADMIN — POTASSIUM CHLORIDE 40 MEQ: 20 SOLUTION ORAL at 07:07

## 2017-01-01 RX ADMIN — LEVETIRACETAM 500 MG: 5 INJECTION INTRAVENOUS at 11:06

## 2017-01-01 RX ADMIN — GABAPENTIN 300 MG: 300 CAPSULE ORAL at 07:06

## 2017-01-01 RX ADMIN — PAMIDRONATE DISODIUM 60 MG: 3 INJECTION, SOLUTION INTRAVENOUS at 09:07

## 2017-01-01 RX ADMIN — GABAPENTIN 125 MG: 250 SOLUTION ORAL at 11:07

## 2017-01-01 RX ADMIN — SODIUM CHLORIDE: 0.9 INJECTION, SOLUTION INTRAVENOUS at 11:06

## 2017-01-01 RX ADMIN — NICOTINE 1 PATCH: 21 PATCH, EXTENDED RELEASE TRANSDERMAL at 10:07

## 2017-01-01 RX ADMIN — LEVOTHYROXINE SODIUM 50 MCG: 50 TABLET ORAL at 10:07

## 2017-01-01 RX ADMIN — AMITRIPTYLINE HYDROCHLORIDE 100 MG: 100 TABLET, FILM COATED ORAL at 08:06

## 2017-01-01 RX ADMIN — POTASSIUM CHLORIDE 10 MEQ: 10 INJECTION, SOLUTION INTRAVENOUS at 05:06

## 2017-01-01 RX ADMIN — GABAPENTIN 300 MG: 300 CAPSULE ORAL at 09:06

## 2017-01-01 RX ADMIN — FENTANYL CITRATE 25 MCG: 50 INJECTION, SOLUTION INTRAMUSCULAR; INTRAVENOUS at 02:07

## 2017-01-01 RX ADMIN — SODIUM CHLORIDE, SODIUM GLUCONATE, SODIUM ACETATE, POTASSIUM CHLORIDE, MAGNESIUM CHLORIDE, SODIUM PHOSPHATE, DIBASIC, AND POTASSIUM PHOSPHATE: .53; .5; .37; .037; .03; .012; .00082 INJECTION, SOLUTION INTRAVENOUS at 08:06

## 2017-01-01 RX ADMIN — ACETAMINOPHEN 500 MG: 500 TABLET ORAL at 08:07

## 2017-01-01 RX ADMIN — DESMOPRESSIN ACETATE 400 MCG: 0.1 TABLET ORAL at 09:07

## 2017-01-01 RX ADMIN — GABAPENTIN 300 MG: 250 SOLUTION ORAL at 05:07

## 2017-01-01 RX ADMIN — RAMELTEON 8 MG: 8 TABLET, FILM COATED ORAL at 08:06

## 2017-01-01 RX ADMIN — GADOBUTROL 6 ML: 604.72 INJECTION INTRAVENOUS at 02:07

## 2017-01-01 RX ADMIN — POTASSIUM CHLORIDE 20 MEQ: 200 INJECTION, SOLUTION INTRAVENOUS at 04:07

## 2017-01-01 RX ADMIN — GABAPENTIN 600 MG: 300 CAPSULE ORAL at 02:06

## 2017-01-01 RX ADMIN — NOREPINEPHRINE BITARTRATE 2.65 MCG/KG/MIN: 1 INJECTION, SOLUTION, CONCENTRATE INTRAVENOUS at 07:08

## 2017-01-01 RX ADMIN — HEPARIN SODIUM 5000 UNITS: 5000 INJECTION, SOLUTION INTRAVENOUS; SUBCUTANEOUS at 10:06

## 2017-01-01 RX ADMIN — SODIUM CHLORIDE: 0.45 INJECTION, SOLUTION INTRAVENOUS at 07:06

## 2017-01-01 RX ADMIN — FENTANYL CITRATE 50 MCG: 50 INJECTION, SOLUTION INTRAMUSCULAR; INTRAVENOUS at 06:06

## 2017-01-01 RX ADMIN — HALOPERIDOL LACTATE 2 MG: 5 INJECTION, SOLUTION INTRAMUSCULAR at 04:06

## 2017-01-01 RX ADMIN — NICOTINE 1 PATCH: 14 PATCH, EXTENDED RELEASE TRANSDERMAL at 08:06

## 2017-01-01 RX ADMIN — SODIUM CHLORIDE, SODIUM LACTATE, POTASSIUM CHLORIDE, AND CALCIUM CHLORIDE 500 ML: .6; .31; .03; .02 INJECTION, SOLUTION INTRAVENOUS at 11:07

## 2017-01-01 RX ADMIN — HALOPERIDOL LACTATE 2 MG: 5 INJECTION, SOLUTION INTRAMUSCULAR at 05:06

## 2017-01-01 RX ADMIN — ENOXAPARIN SODIUM 40 MG: 100 INJECTION SUBCUTANEOUS at 06:08

## 2017-01-01 RX ADMIN — SODIUM CHLORIDE: 0.45 INJECTION, SOLUTION INTRAVENOUS at 11:06

## 2017-01-01 RX ADMIN — DEXTROSE: 5 SOLUTION INTRAVENOUS at 01:06

## 2017-01-01 RX ADMIN — SODIUM CHLORIDE 1000 ML: 0.45 INJECTION, SOLUTION INTRAVENOUS at 10:06

## 2017-01-01 RX ADMIN — NOREPINEPHRINE BITARTRATE 2 MCG/KG/MIN: 1 INJECTION, SOLUTION, CONCENTRATE INTRAVENOUS at 11:08

## 2017-01-01 RX ADMIN — DEXTROSE AND SODIUM CHLORIDE: 5; .9 INJECTION, SOLUTION INTRAVENOUS at 06:06

## 2017-01-01 RX ADMIN — SODIUM CHLORIDE, SODIUM LACTATE, POTASSIUM CHLORIDE, AND CALCIUM CHLORIDE 250 ML: .6; .31; .03; .02 INJECTION, SOLUTION INTRAVENOUS at 02:07

## 2017-01-01 RX ADMIN — HYDROCORTISONE 15 MG: 10 TABLET ORAL at 09:08

## 2017-01-01 RX ADMIN — HYDROCODONE BITARTRATE AND ACETAMINOPHEN 1 TABLET: 5; 325 TABLET ORAL at 03:06

## 2017-01-01 RX ADMIN — LORAZEPAM 2 MG: 2 INJECTION INTRAMUSCULAR; INTRAVENOUS at 04:08

## 2017-01-01 RX ADMIN — Medication 0.1 MCG/KG/MIN: at 08:07

## 2017-01-01 RX ADMIN — SODIUM CHLORIDE, PRESERVATIVE FREE 3 ML: 5 INJECTION INTRAVENOUS at 06:08

## 2017-01-01 RX ADMIN — LEVETIRACETAM 500 MG: 100 SOLUTION ORAL at 09:06

## 2017-01-01 RX ADMIN — DESMOPRESSIN ACETATE 10 MCG: 10 SPRAY NASAL at 03:06

## 2017-01-01 RX ADMIN — HYDROCORTISONE 15 MG: 10 TABLET ORAL at 12:08

## 2017-01-01 RX ADMIN — DEXAMETHASONE SODIUM PHOSPHATE 4 MG: 4 INJECTION, SOLUTION INTRAMUSCULAR; INTRAVENOUS at 05:06

## 2017-01-01 RX ADMIN — DEXAMETHASONE SODIUM PHOSPHATE 12 MG: 4 INJECTION, SOLUTION INTRAMUSCULAR; INTRAVENOUS at 09:06

## 2017-01-01 RX ADMIN — EPHEDRINE SULFATE 10 MG: 50 INJECTION, SOLUTION INTRAMUSCULAR; INTRAVENOUS; SUBCUTANEOUS at 08:06

## 2017-01-01 RX ADMIN — DESMOPRESSIN ACETATE 10 MCG: 10 SPRAY NASAL at 01:06

## 2017-01-01 RX ADMIN — LEVETIRACETAM 500 MG: 5 INJECTION INTRAVENOUS at 08:06

## 2017-01-01 RX ADMIN — CHLORHEXIDINE GLUCONATE 15 ML: 1.2 RINSE ORAL at 08:08

## 2017-01-01 RX ADMIN — EPHEDRINE SULFATE 5 MG: 50 INJECTION, SOLUTION INTRAMUSCULAR; INTRAVENOUS; SUBCUTANEOUS at 11:06

## 2017-01-01 RX ADMIN — FLUCONAZOLE 400 MG: 2 INJECTION INTRAVENOUS at 09:07

## 2017-01-01 RX ADMIN — PIPERACILLIN AND TAZOBACTAM 4.5 G: 4; .5 INJECTION, POWDER, LYOPHILIZED, FOR SOLUTION INTRAVENOUS; PARENTERAL at 11:07

## 2017-01-01 RX ADMIN — Medication 3 ML: at 05:07

## 2017-01-01 RX ADMIN — POTASSIUM CHLORIDE 40 MEQ: 20 SOLUTION ORAL at 05:08

## 2017-01-01 RX ADMIN — DEXTROSE: 5 SOLUTION INTRAVENOUS at 06:06

## 2017-01-01 RX ADMIN — CALCIUM GLUCONATE 1000 MG: 94 INJECTION, SOLUTION INTRAVENOUS at 07:08

## 2017-01-01 RX ADMIN — DESMOPRESSIN ACETATE 100 MCG: 0.1 TABLET ORAL at 10:08

## 2017-01-01 RX ADMIN — LIDOCAINE HYDROCHLORIDE: 20 JELLY TOPICAL at 02:06

## 2017-01-01 RX ADMIN — MORPHINE SULFATE 2 MG: 2 INJECTION, SOLUTION INTRAMUSCULAR; INTRAVENOUS at 10:06

## 2017-01-01 RX ADMIN — SODIUM CHLORIDE: 0.45 INJECTION, SOLUTION INTRAVENOUS at 12:06

## 2017-01-01 RX ADMIN — DEXTROSE MONOHYDRATE 12.5 G: 25 INJECTION, SOLUTION INTRAVENOUS at 08:06

## 2017-01-01 RX ADMIN — SODIUM CHLORIDE 1000 ML: 0.9 INJECTION, SOLUTION INTRAVENOUS at 04:08

## 2017-01-01 RX ADMIN — CEFTRIAXONE 1 G: 1 INJECTION, SOLUTION INTRAVENOUS at 08:06

## 2017-01-01 RX ADMIN — RAMELTEON 8 MG: 8 TABLET, FILM COATED ORAL at 11:06

## 2017-01-01 RX ADMIN — SODIUM CHLORIDE: 0.9 INJECTION, SOLUTION INTRAVENOUS at 08:06

## 2017-01-01 RX ADMIN — DEXTROSE: 5 SOLUTION INTRAVENOUS at 12:07

## 2017-01-01 RX ADMIN — IPRATROPIUM BROMIDE 0.5 MG: 0.5 SOLUTION RESPIRATORY (INHALATION) at 02:07

## 2017-01-01 RX ADMIN — Medication 3 ML: at 10:07

## 2017-01-01 RX ADMIN — VANCOMYCIN HYDROCHLORIDE 750 MG: 1 INJECTION, POWDER, LYOPHILIZED, FOR SOLUTION INTRAVENOUS at 02:07

## 2017-01-01 RX ADMIN — GABAPENTIN 300 MG: 250 SOLUTION ORAL at 06:06

## 2017-01-01 RX ADMIN — MIDAZOLAM HYDROCHLORIDE 2 MG: 1 INJECTION, SOLUTION INTRAMUSCULAR; INTRAVENOUS at 08:06

## 2017-01-01 RX ADMIN — PANTOPRAZOLE SODIUM 40 MG: 40 INJECTION, POWDER, FOR SOLUTION INTRAVENOUS at 09:06

## 2017-01-01 RX ADMIN — LORAZEPAM 2 MG: 2 INJECTION INTRAMUSCULAR; INTRAVENOUS at 01:08

## 2017-01-01 RX ADMIN — POTASSIUM CHLORIDE 40 MEQ: 20 SOLUTION ORAL at 08:06

## 2017-01-01 RX ADMIN — ENOXAPARIN SODIUM 40 MG: 100 INJECTION SUBCUTANEOUS at 05:08

## 2017-01-01 RX ADMIN — CEFEPIME HYDROCHLORIDE 2 G: 2 INJECTION, SOLUTION INTRAVENOUS at 02:07

## 2017-01-01 RX ADMIN — DESMOPRESSIN ACETATE 10 MCG: 10 SPRAY NASAL at 09:06

## 2017-01-01 RX ADMIN — DESMOPRESSIN ACETATE 1 MCG: 4 SOLUTION INTRAVENOUS at 06:06

## 2017-01-01 RX ADMIN — Medication 3 ML: at 10:06

## 2017-01-01 RX ADMIN — POTASSIUM CHLORIDE 10 MEQ: 10 INJECTION, SOLUTION INTRAVENOUS at 12:06

## 2017-01-01 RX ADMIN — ENOXAPARIN SODIUM 40 MG: 100 INJECTION SUBCUTANEOUS at 06:07

## 2017-01-01 RX ADMIN — REMIFENTANIL HYDROCHLORIDE 0.2 MCG/KG/MIN: 1 INJECTION, POWDER, LYOPHILIZED, FOR SOLUTION INTRAVENOUS at 08:06

## 2017-01-01 RX ADMIN — VANCOMYCIN HYDROCHLORIDE 1000 MG: 1 INJECTION, POWDER, LYOPHILIZED, FOR SOLUTION INTRAVENOUS at 07:08

## 2017-01-01 RX ADMIN — PHENYLEPHRINE HYDROCHLORIDE 50 MCG: 10 INJECTION INTRAVENOUS at 03:07

## 2017-01-01 RX ADMIN — Medication 3 ML: at 06:07

## 2017-01-01 RX ADMIN — POTASSIUM CHLORIDE 40 MEQ: 20 SOLUTION ORAL at 11:07

## 2017-01-01 RX ADMIN — DEXAMETHASONE SODIUM PHOSPHATE 4 MG: 4 INJECTION, SOLUTION INTRAMUSCULAR; INTRAVENOUS at 09:06

## 2017-01-01 RX ADMIN — GABAPENTIN 300 MG: 250 SOLUTION ORAL at 03:06

## 2017-01-01 RX ADMIN — MORPHINE SULFATE 2 MG: 2 INJECTION, SOLUTION INTRAMUSCULAR; INTRAVENOUS at 11:08

## 2017-01-01 RX ADMIN — Medication 50 MCG: at 12:08

## 2017-01-01 RX ADMIN — SODIUM CHLORIDE, SODIUM GLUCONATE, SODIUM ACETATE, POTASSIUM CHLORIDE, MAGNESIUM CHLORIDE, SODIUM PHOSPHATE, DIBASIC, AND POTASSIUM PHOSPHATE: .53; .5; .37; .037; .03; .012; .00082 INJECTION, SOLUTION INTRAVENOUS at 05:06

## 2017-01-01 RX ADMIN — BISACODYL 10 MG: 10 SUPPOSITORY RECTAL at 11:06

## 2017-01-01 RX ADMIN — DESMOPRESSIN ACETATE 1 MCG: 4 SOLUTION INTRAVENOUS at 01:06

## 2017-01-01 RX ADMIN — SODIUM CHLORIDE 300 ML: 0.45 INJECTION, SOLUTION INTRAVENOUS at 06:06

## 2017-01-01 RX ADMIN — ROCURONIUM BROMIDE 10 MG: 10 INJECTION, SOLUTION INTRAVENOUS at 08:06

## 2017-01-01 RX ADMIN — VASOPRESSIN 0.04 UNITS/MIN: 20 INJECTION INTRAVENOUS at 06:08

## 2017-01-01 RX ADMIN — ONDANSETRON 4 MG: 2 INJECTION INTRAMUSCULAR; INTRAVENOUS at 06:06

## 2017-01-01 RX ADMIN — LEVOTHYROXINE SODIUM 50 MCG: 50 TABLET ORAL at 07:07

## 2017-01-01 RX ADMIN — SODIUM CHLORIDE, SODIUM LACTATE, POTASSIUM CHLORIDE, AND CALCIUM CHLORIDE 1000 ML: .6; .31; .03; .02 INJECTION, SOLUTION INTRAVENOUS at 05:08

## 2017-01-01 RX ADMIN — SODIUM PHOSPHATE, MONOBASIC, MONOHYDRATE 20.01 MMOL: 276; 142 INJECTION, SOLUTION INTRAVENOUS at 08:08

## 2017-01-01 RX ADMIN — PROPOFOL 120 MG: 10 INJECTION, EMULSION INTRAVENOUS at 08:06

## 2017-01-01 RX ADMIN — GABAPENTIN 300 MG: 300 CAPSULE ORAL at 01:06

## 2017-01-01 RX ADMIN — HEPARIN SODIUM 5000 UNITS: 5000 INJECTION, SOLUTION INTRAVENOUS; SUBCUTANEOUS at 08:06

## 2017-01-01 RX ADMIN — DESMOPRESSIN ACETATE 300 MCG: 0.1 TABLET ORAL at 08:08

## 2017-01-01 RX ADMIN — LABETALOL HYDROCHLORIDE 10 MG: 5 INJECTION, SOLUTION INTRAVENOUS at 06:06

## 2017-01-01 RX ADMIN — Medication 200 MCG: at 05:08

## 2017-01-01 RX ADMIN — DESMOPRESSIN ACETATE 100 MCG: 0.1 TABLET ORAL at 04:08

## 2017-01-01 RX ADMIN — HYDROCORTISONE SODIUM SUCCINATE 100 MG: 100 INJECTION, POWDER, FOR SOLUTION INTRAMUSCULAR; INTRAVENOUS at 06:08

## 2017-01-01 RX ADMIN — VANCOMYCIN HYDROCHLORIDE 750 MG: 1 INJECTION, POWDER, LYOPHILIZED, FOR SOLUTION INTRAVENOUS at 03:07

## 2017-01-01 RX ADMIN — SODIUM CHLORIDE 1000 ML: 0.9 INJECTION, SOLUTION INTRAVENOUS at 06:06

## 2017-01-01 RX ADMIN — HEPARIN SODIUM 5000 UNITS: 5000 INJECTION, SOLUTION INTRAVENOUS; SUBCUTANEOUS at 03:06

## 2017-01-01 RX ADMIN — IPRATROPIUM BROMIDE AND ALBUTEROL SULFATE 3 ML: 2.5; .5 SOLUTION RESPIRATORY (INHALATION) at 05:07

## 2017-01-01 RX ADMIN — PHENYLEPHRINE HYDROCHLORIDE 100 MCG: 10 INJECTION INTRAVENOUS at 08:06

## 2017-01-01 RX ADMIN — MUPIROCIN 1 G: 20 OINTMENT TOPICAL at 07:06

## 2017-01-01 RX ADMIN — SODIUM CHLORIDE 1000 ML: 0.9 INJECTION, SOLUTION INTRAVENOUS at 04:06

## 2017-01-01 RX ADMIN — HYDROCORTISONE 5 MG: 5 TABLET ORAL at 03:08

## 2017-01-01 RX ADMIN — SODIUM CHLORIDE 500 ML: 0.9 INJECTION, SOLUTION INTRAVENOUS at 04:08

## 2017-01-01 RX ADMIN — Medication 3 ML: at 02:07

## 2017-01-01 RX ADMIN — DESMOPRESSIN ACETATE 200 MCG: 0.1 TABLET ORAL at 11:07

## 2017-01-01 RX ADMIN — Medication 500 MG: at 08:07

## 2017-01-01 RX ADMIN — Medication 3 ML: at 09:07

## 2017-01-01 RX ADMIN — DEXTROSE AND SODIUM CHLORIDE: 5; .45 INJECTION, SOLUTION INTRAVENOUS at 10:06

## 2017-01-01 RX ADMIN — MANNITOL 50 G: 250 INJECTION, SOLUTION INTRAVENOUS at 10:06

## 2017-01-01 RX ADMIN — DEXTROSE MONOHYDRATE 50 G: 25 INJECTION, SOLUTION INTRAVENOUS at 07:08

## 2017-01-01 RX ADMIN — DESMOPRESSIN ACETATE 100 MCG: 0.1 TABLET ORAL at 06:08

## 2017-01-01 RX ADMIN — POTASSIUM CHLORIDE 10 MEQ: 10 INJECTION, SOLUTION INTRAVENOUS at 06:08

## 2017-01-01 RX ADMIN — SODIUM CHLORIDE: 0.45 INJECTION, SOLUTION INTRAVENOUS at 05:06

## 2017-01-01 RX ADMIN — ACETAMINOPHEN 650 MG: 325 TABLET ORAL at 05:06

## 2017-01-01 RX ADMIN — DESMOPRESSIN ACETATE 10 MCG: 10 SPRAY NASAL at 08:07

## 2017-01-01 RX ADMIN — GLYCOPYRROLATE 0.2 MG: 0.2 INJECTION, SOLUTION INTRAMUSCULAR; INTRAVENOUS at 02:07

## 2017-01-01 RX ADMIN — SODIUM CHLORIDE: 0.45 INJECTION, SOLUTION INTRAVENOUS at 04:06

## 2017-01-01 RX ADMIN — GABAPENTIN 300 MG: 300 CAPSULE ORAL at 02:06

## 2017-01-01 RX ADMIN — GABAPENTIN 125 MG: 250 SOLUTION ORAL at 01:07

## 2017-01-01 RX ADMIN — ACETAMINOPHEN 650 MG: 325 TABLET ORAL at 08:06

## 2017-01-01 RX ADMIN — OLANZAPINE 5 MG: 5 TABLET, FILM COATED ORAL at 08:08

## 2017-01-01 RX ADMIN — HALOPERIDOL LACTATE 2 MG: 5 INJECTION, SOLUTION INTRAMUSCULAR at 02:06

## 2017-01-01 RX ADMIN — ETOMIDATE 21 MG: 2 INJECTION INTRAVENOUS at 12:08

## 2017-01-01 RX ADMIN — DESMOPRESSIN ACETATE 1 MCG: 4 SOLUTION INTRAVENOUS at 09:06

## 2017-01-01 RX ADMIN — NICARDIPINE HYDROCHLORIDE 2.5 MG/HR: 0.2 INJECTION, SOLUTION INTRAVENOUS at 09:06

## 2017-01-01 RX ADMIN — PROPOFOL: 10 INJECTION, EMULSION INTRAVENOUS at 01:08

## 2017-01-01 RX ADMIN — DESMOPRESSIN ACETATE 10 MCG: 0.1 SOLUTION NASAL at 11:07

## 2017-01-01 RX ADMIN — Medication 0.75 MCG/KG/MIN: at 02:08

## 2017-01-01 RX ADMIN — PHENYLEPHRINE HYDROCHLORIDE 100 MCG: 10 INJECTION INTRAVENOUS at 02:06

## 2017-01-01 RX ADMIN — PROPOFOL 100 MCG/KG/MIN: 10 INJECTION, EMULSION INTRAVENOUS at 08:06

## 2017-01-01 RX ADMIN — DOCUSATE SODIUM 100 MG: 100 CAPSULE, LIQUID FILLED ORAL at 08:06

## 2017-01-01 RX ADMIN — SODIUM CHLORIDE: 0.9 INJECTION, SOLUTION INTRAVENOUS at 06:06

## 2017-01-01 RX ADMIN — FENTANYL CITRATE 150 MCG: 50 INJECTION, SOLUTION INTRAMUSCULAR; INTRAVENOUS at 08:06

## 2017-01-01 RX ADMIN — HYDROCORTISONE 10 MG: 10 TABLET ORAL at 11:06

## 2017-01-01 RX ADMIN — Medication 3 ML: at 03:06

## 2017-01-01 RX ADMIN — HYDROCORTISONE 10 MG: 10 TABLET ORAL at 09:06

## 2017-01-01 RX ADMIN — CEFEPIME HYDROCHLORIDE 1 G: 1 INJECTION, SOLUTION INTRAVENOUS at 11:08

## 2017-01-01 RX ADMIN — FENTANYL CITRATE 50 MCG: 50 INJECTION INTRAMUSCULAR; INTRAVENOUS at 12:08

## 2017-01-01 RX ADMIN — SODIUM CHLORIDE, SODIUM LACTATE, POTASSIUM CHLORIDE, AND CALCIUM CHLORIDE: .6; .31; .03; .02 INJECTION, SOLUTION INTRAVENOUS at 10:06

## 2017-01-01 RX ADMIN — GABAPENTIN 600 MG: 300 CAPSULE ORAL at 04:06

## 2017-01-01 RX ADMIN — POTASSIUM CHLORIDE 20 MEQ: 10 INJECTION, SOLUTION INTRAVENOUS at 12:08

## 2017-01-01 RX ADMIN — GABAPENTIN 125 MG: 250 SOLUTION ORAL at 07:07

## 2017-01-01 RX ADMIN — POTASSIUM CHLORIDE 10 MEQ: 10 INJECTION, SOLUTION INTRAVENOUS at 09:08

## 2017-01-01 RX ADMIN — VANCOMYCIN HYDROCHLORIDE 1000 MG: 1 INJECTION, POWDER, LYOPHILIZED, FOR SOLUTION INTRAVENOUS at 03:08

## 2017-01-01 RX ADMIN — OLANZAPINE 2.5 MG: 10 INJECTION, POWDER, LYOPHILIZED, FOR SOLUTION INTRAMUSCULAR at 01:07

## 2017-01-01 RX ADMIN — POTASSIUM CHLORIDE 40 MEQ: 20 SOLUTION ORAL at 10:06

## 2017-01-01 RX ADMIN — LEVETIRACETAM 500 MG: 100 SOLUTION ORAL at 10:08

## 2017-01-01 RX ADMIN — Medication 0.1 MCG/KG/MIN: at 12:08

## 2017-01-01 RX ADMIN — Medication 5 UNITS: at 11:07

## 2017-01-01 RX ADMIN — DEXTROSE MONOHYDRATE 12.5 G: 25 INJECTION, SOLUTION INTRAVENOUS at 04:07

## 2017-01-01 RX ADMIN — EPHEDRINE SULFATE 15 MG: 50 INJECTION, SOLUTION INTRAMUSCULAR; INTRAVENOUS; SUBCUTANEOUS at 08:06

## 2017-01-01 RX ADMIN — Medication 0.05 MCG/KG/MIN: at 03:07

## 2017-01-01 RX ADMIN — OLANZAPINE 5 MG: 2.5 TABLET, FILM COATED ORAL at 08:08

## 2017-01-01 RX ADMIN — LEVETIRACETAM 1000 MG: 10 INJECTION INTRAVENOUS at 06:06

## 2017-01-01 RX ADMIN — GABAPENTIN 125 MG: 250 SOLUTION ORAL at 04:07

## 2017-01-01 RX ADMIN — ETOMIDATE 21 MG: 2 INJECTION, SOLUTION INTRAVENOUS at 12:08

## 2017-01-01 RX ADMIN — GABAPENTIN 300 MG: 250 SOLUTION ORAL at 02:06

## 2017-01-01 RX ADMIN — LEVOTHYROXINE SODIUM 50 MCG: 50 TABLET ORAL at 11:06

## 2017-01-01 RX ADMIN — POTASSIUM CHLORIDE 40 MEQ: 20 SOLUTION ORAL at 09:06

## 2017-01-01 RX ADMIN — DESMOPRESSIN ACETATE 1 MCG: 4 SOLUTION INTRAVENOUS at 02:06

## 2017-01-01 RX ADMIN — NOREPINEPHRINE BITARTRATE 1 MCG/KG/MIN: 1 INJECTION, SOLUTION, CONCENTRATE INTRAVENOUS at 05:08

## 2017-01-01 RX ADMIN — PIPERACILLIN AND TAZOBACTAM 4.5 G: 4; .5 INJECTION, POWDER, LYOPHILIZED, FOR SOLUTION INTRAVENOUS; PARENTERAL at 07:07

## 2017-01-01 RX ADMIN — OLANZAPINE 5 MG: 5 TABLET, FILM COATED ORAL at 10:08

## 2017-01-01 RX ADMIN — OLANZAPINE 5 MG: 5 TABLET, FILM COATED ORAL at 04:08

## 2017-01-01 RX ADMIN — VANCOMYCIN HYDROCHLORIDE 1250 MG: 5 INJECTION, POWDER, LYOPHILIZED, FOR SOLUTION INTRAVENOUS at 03:08

## 2017-01-01 RX ADMIN — Medication 3 ML: at 04:07

## 2017-01-01 RX ADMIN — DESMOPRESSIN ACETATE 2 MCG: 4 SOLUTION INTRAVENOUS at 07:06

## 2017-01-01 RX ADMIN — CEFTRIAXONE 1 G: 1 INJECTION, SOLUTION INTRAVENOUS at 10:08

## 2017-01-01 RX ADMIN — NICOTINE 1 PATCH: 21 PATCH, EXTENDED RELEASE TRANSDERMAL at 07:07

## 2017-01-01 RX ADMIN — OLANZAPINE 5 MG: 10 INJECTION, POWDER, FOR SOLUTION INTRAMUSCULAR at 10:08

## 2017-01-01 RX ADMIN — OLANZAPINE 10 MG: 10 INJECTION, POWDER, LYOPHILIZED, FOR SOLUTION INTRAMUSCULAR at 01:07

## 2017-01-01 RX ADMIN — DEXTROSE: 5 SOLUTION INTRAVENOUS at 12:06

## 2017-01-01 RX ADMIN — ENOXAPARIN SODIUM 40 MG: 100 INJECTION SUBCUTANEOUS at 11:06

## 2017-01-01 RX ADMIN — OLANZAPINE 5 MG: 2.5 TABLET, FILM COATED ORAL at 12:08

## 2017-01-01 RX ADMIN — CEFEPIME HYDROCHLORIDE 2 G: 2 INJECTION, SOLUTION INTRAVENOUS at 05:08

## 2017-01-01 RX ADMIN — DESMOPRESSIN ACETATE 400 MCG: 0.1 TABLET ORAL at 08:07

## 2017-01-01 RX ADMIN — POTASSIUM CHLORIDE 40 MEQ: 20 SOLUTION ORAL at 02:07

## 2017-01-01 RX ADMIN — GABAPENTIN 600 MG: 300 CAPSULE ORAL at 11:06

## 2017-01-01 RX ADMIN — DEXTROSE: 5 SOLUTION INTRAVENOUS at 05:06

## 2017-01-01 RX ADMIN — POTASSIUM CHLORIDE 20 MEQ: 200 INJECTION, SOLUTION INTRAVENOUS at 03:07

## 2017-01-01 RX ADMIN — FAMOTIDINE 20 MG: 10 INJECTION, SOLUTION INTRAVENOUS at 11:08

## 2017-01-01 RX ADMIN — Medication 12.5 MCG/HR: at 10:08

## 2017-01-01 RX ADMIN — ROCURONIUM BROMIDE 70 MG: 10 SOLUTION INTRAVENOUS at 12:08

## 2017-01-01 RX ADMIN — DEXAMETHASONE SODIUM PHOSPHATE 4 MG: 4 INJECTION, SOLUTION INTRAMUSCULAR; INTRAVENOUS at 11:06

## 2017-01-01 RX ADMIN — SODIUM CHLORIDE 1000 ML: 0.45 INJECTION, SOLUTION INTRAVENOUS at 05:06

## 2017-01-01 RX ADMIN — LEVETIRACETAM 500 MG: 100 SOLUTION ORAL at 02:07

## 2017-01-01 RX ADMIN — DESMOPRESSIN ACETATE 10 MCG: 0.1 SOLUTION NASAL at 06:06

## 2017-01-01 RX ADMIN — VANCOMYCIN HYDROCHLORIDE 1000 MG: 1 INJECTION, POWDER, LYOPHILIZED, FOR SOLUTION INTRAVENOUS at 10:08

## 2017-01-01 RX ADMIN — METOPROLOL TARTRATE 5 MG: 5 INJECTION INTRAVENOUS at 07:06

## 2017-01-01 RX ADMIN — CEFEPIME HYDROCHLORIDE 1 G: 1 INJECTION, SOLUTION INTRAVENOUS at 08:07

## 2017-01-01 RX ADMIN — ACETAMINOPHEN 500 MG: 500 TABLET ORAL at 09:08

## 2017-01-01 RX ADMIN — GABAPENTIN 300 MG: 250 SOLUTION ORAL at 10:06

## 2017-01-01 RX ADMIN — PROPOFOL 30 MG: 10 INJECTION, EMULSION INTRAVENOUS at 02:07

## 2017-01-01 RX ADMIN — HALOPERIDOL LACTATE 2 MG: 5 INJECTION, SOLUTION INTRAMUSCULAR at 10:06

## 2017-01-01 RX ADMIN — DEXTROSE: 5 SOLUTION INTRAVENOUS at 09:07

## 2017-01-01 RX ADMIN — DESMOPRESSIN ACETATE 200 MCG: 0.1 TABLET ORAL at 06:07

## 2017-01-01 RX ADMIN — GABAPENTIN 300 MG: 250 SOLUTION ORAL at 09:06

## 2017-01-01 RX ADMIN — SODIUM CHLORIDE, PRESERVATIVE FREE 3 ML: 5 INJECTION INTRAVENOUS at 10:08

## 2017-01-01 RX ADMIN — HYDRALAZINE HYDROCHLORIDE 10 MG: 20 INJECTION INTRAMUSCULAR; INTRAVENOUS at 08:06

## 2017-01-01 RX ADMIN — IPRATROPIUM BROMIDE AND ALBUTEROL SULFATE 3 ML: .5; 3 SOLUTION RESPIRATORY (INHALATION) at 05:07

## 2017-01-01 RX ADMIN — ACETAMINOPHEN 650 MG: 325 TABLET ORAL at 09:08

## 2017-01-01 RX ADMIN — INSULIN HUMAN 6 UNITS: 100 INJECTION, SOLUTION PARENTERAL at 08:08

## 2017-01-01 RX ADMIN — EPHEDRINE SULFATE 10 MG: 50 INJECTION, SOLUTION INTRAMUSCULAR; INTRAVENOUS; SUBCUTANEOUS at 09:06

## 2017-01-01 RX ADMIN — ENOXAPARIN SODIUM 40 MG: 100 INJECTION SUBCUTANEOUS at 09:07

## 2017-01-01 RX ADMIN — Medication 3 ML: at 02:06

## 2017-01-01 RX ADMIN — HYDROCORTISONE 10 MG: 10 TABLET ORAL at 07:06

## 2017-01-01 RX ADMIN — POTASSIUM CHLORIDE 40 MEQ: 400 INJECTION, SOLUTION INTRAVENOUS at 08:07

## 2017-01-01 RX ADMIN — ENOXAPARIN SODIUM 40 MG: 100 INJECTION SUBCUTANEOUS at 06:06

## 2017-01-01 RX ADMIN — PHENYLEPHRINE HYDROCHLORIDE 100 MCG: 10 INJECTION INTRAVENOUS at 09:06

## 2017-01-01 RX ADMIN — MAGNESIUM SULFATE IN WATER 2 G: 40 INJECTION, SOLUTION INTRAVENOUS at 06:07

## 2017-01-01 RX ADMIN — DESMOPRESSIN ACETATE 0.5 MCG: 4 SOLUTION INTRAVENOUS at 10:07

## 2017-01-01 RX ADMIN — ACETAMINOPHEN 500 MG: 500 TABLET ORAL at 10:07

## 2017-01-01 RX ADMIN — CALCIUM CHLORIDE 500 MG: 100 INJECTION, SOLUTION INTRAVENOUS at 04:06

## 2017-01-01 RX ADMIN — DESMOPRESSIN ACETATE 10 MCG: 0.1 SOLUTION NASAL at 06:07

## 2017-01-01 RX ADMIN — PROPOFOL 100 MCG/KG/MIN: 10 INJECTION, EMULSION INTRAVENOUS at 02:07

## 2017-01-01 RX ADMIN — VASOPRESSIN 0.04 UNITS/MIN: 20 INJECTION INTRAVENOUS at 08:08

## 2017-01-01 RX ADMIN — SODIUM CHLORIDE: 0.9 INJECTION, SOLUTION INTRAVENOUS at 02:06

## 2017-01-01 RX ADMIN — SENNOSIDES 8.6 MG: 8.6 TABLET, FILM COATED ORAL at 01:07

## 2017-01-01 RX ADMIN — ALBUMIN (HUMAN): 12.5 SOLUTION INTRAVENOUS at 04:06

## 2017-01-01 RX ADMIN — POTASSIUM CHLORIDE 10 MEQ: 10 INJECTION, SOLUTION INTRAVENOUS at 02:06

## 2017-01-01 RX ADMIN — BISACODYL 10 MG: 10 SUPPOSITORY RECTAL at 01:07

## 2017-01-01 RX ADMIN — SODIUM CHLORIDE 0.5 MCG: 9 INJECTION, SOLUTION INTRAVENOUS at 05:06

## 2017-01-01 RX ADMIN — SODIUM CHLORIDE: 0.9 INJECTION, SOLUTION INTRAVENOUS at 07:06

## 2017-01-01 RX ADMIN — NOREPINEPHRINE BITARTRATE 2.3 MCG/KG/MIN: 1 INJECTION, SOLUTION, CONCENTRATE INTRAVENOUS at 04:08

## 2017-01-01 RX ADMIN — DESMOPRESSIN ACETATE 1 MCG: 4 SOLUTION INTRAVENOUS at 12:06

## 2017-01-01 RX ADMIN — CEFEPIME HYDROCHLORIDE 1 G: 1 INJECTION, SOLUTION INTRAVENOUS at 10:07

## 2017-01-01 RX ADMIN — ENOXAPARIN SODIUM 40 MG: 100 INJECTION SUBCUTANEOUS at 05:06

## 2017-01-01 RX ADMIN — PANTOPRAZOLE SODIUM 40 MG: 40 GRANULE, DELAYED RELEASE ORAL at 10:06

## 2017-01-01 RX ADMIN — SODIUM CHLORIDE 1770 ML: 0.9 INJECTION, SOLUTION INTRAVENOUS at 02:08

## 2017-01-01 RX ADMIN — FUROSEMIDE 20 MG: 10 INJECTION, SOLUTION INTRAMUSCULAR; INTRAVENOUS at 02:07

## 2017-01-01 RX ADMIN — POTASSIUM CHLORIDE 10 MEQ: 10 INJECTION, SOLUTION INTRAVENOUS at 03:06

## 2017-01-01 RX ADMIN — LEVETIRACETAM 500 MG: 5 INJECTION INTRAVENOUS at 11:08

## 2017-01-01 RX ADMIN — CEFEPIME HYDROCHLORIDE 1 G: 1 INJECTION, SOLUTION INTRAVENOUS at 04:07

## 2017-01-01 RX ADMIN — SODIUM CHLORIDE, SODIUM GLUCONATE, SODIUM ACETATE, POTASSIUM CHLORIDE, MAGNESIUM CHLORIDE, SODIUM PHOSPHATE, DIBASIC, AND POTASSIUM PHOSPHATE: .53; .5; .37; .037; .03; .012; .00082 INJECTION, SOLUTION INTRAVENOUS at 03:06

## 2017-01-01 RX ADMIN — DESMOPRESSIN ACETATE 400 MCG: 0.1 TABLET ORAL at 09:08

## 2017-01-01 RX ADMIN — GUAIFENESIN 300 MG: 100 SOLUTION ORAL at 08:07

## 2017-01-01 RX ADMIN — POTASSIUM CHLORIDE 60 MEQ: 20 SOLUTION ORAL at 07:06

## 2017-01-01 RX ADMIN — SODIUM CHLORIDE, SODIUM GLUCONATE, SODIUM ACETATE, POTASSIUM CHLORIDE, MAGNESIUM CHLORIDE, SODIUM PHOSPHATE, DIBASIC, AND POTASSIUM PHOSPHATE: .53; .5; .37; .037; .03; .012; .00082 INJECTION, SOLUTION INTRAVENOUS at 09:06

## 2017-01-01 RX ADMIN — GABAPENTIN 300 MG: 250 SOLUTION ORAL at 09:07

## 2017-01-01 RX ADMIN — LEVOTHYROXINE SODIUM 75 MCG: 75 TABLET ORAL at 06:08

## 2017-01-01 RX ADMIN — GADOBUTROL 6 ML: 604.72 INJECTION INTRAVENOUS at 03:06

## 2017-01-01 RX ADMIN — Medication 3 ML: at 07:06

## 2017-04-27 NOTE — TELEPHONE ENCOUNTER
Left voice message advising patient appointment scheduled with Dr. Donald Chew at [facility] location on Monday 5/15/17 at 8:30am on 7th floor of the clinic. Appointment slip mailed to address on file. Call back number provided for questions or concerns.

## 2017-05-15 NOTE — PROGRESS NOTES
This office note has been dictated.  May 15, 2017    Sarah Adam M.D.  Department of Ophthalmology  [facility] [address] [place]  [address]    RE:  JOANNA MORALES  [facility] No.:  [id number]    Dear Dr. Adam:    Joannaclemente Morales was seen in neurosurgical consultation at the office this   morning.  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  She underwent operation in January 2015 on the [facility] service at [facility].  It   was apparently only possible to get a partial resection of the tumor.  She did   have a followup scan done in 2016 showing a continued large meningioma in this   area.  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  She had a   followup MRI scan done at [facility] on 01/20/17, showing this large tumor.  She   was seen by you in Ophthalmology and then referred to Neurosurgery for further   evaluation.  At this point, she has some preserved vision in the right eye.  She   complains of constant headaches.  She has no sense of smell.  She feels off   balance.  She is taking Neurontin apparently for seizure prevention. Past   medical history relates primarily to the present illness.  She does not have   chronic medical illnesses such as diabetes or hypertension.  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  She is  and has good family support.    On physical examination, she is a well-developed, well-nourished white lady, who   is alert and oriented.  Examination of the head shows a somewhat anterior, but   well-healed bicoronal incision.  She says that this is a little tender in the   left frontal area.  Eyes show full  extraocular movements.  The pupils are small,   the left is not reactive.  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  The right optic nerve is probably normal.  She   sees things as doubles.  When I held up two fingers she counted four, although   she knew that there were two.  Hearing seems preserved.  The neck is supple.  On   neurological examination, she is speaking clearly.  She is obviously anxious   and depressed over her illness.  Finger-to-nose is somewhat diminished on the   left side and gait was mildly unsteady.  Cranial nerves are otherwise intact.    She has normal facial sensation and movement.  The tongue protrudes in the   midline.  Strength in the extremities seems generally good, sensation normal and   deep tendon reflexes symmetrical.    MRI of the brain performed at [facility] on 01/20/17, shows a   previous bifrontal craniotomy.  There is a large lobular meningioma, which   occupies the sella, planum sphenoidale and extends back on to the clivus.  The   optic chiasm and optic nerves are not visualized.  The carotid arteries and   anterior cerebral arteries run done through the tumor.  The tumor elevates and   pushes the third ventricle back, but there is no hydrocephalus.    IMPRESSION:  Tuberculum sellae and planum sphenoidale meningioma.    RECOMMENDATIONS:  After considerable discussion with her and her family, we will   attempt to resect more of the tumor.  She will need navigation scan and other   preparations.    Thank you very much for the opportunity to see her in neurosurgical   consultation.    Sincerely yours,                ROSCOE  dd: 05/15/2017 11:54:47 (CDT)  td: 05/16/2017 07:59:43 (CDT)  Doc ID   #[id number]  Job ID #[id number]    CC: Sarah Adam M.D. (HODAN)  [facility]

## 2017-05-15 NOTE — MR AVS SNAPSHOT
"    [facility] - Neurosurgery 7th Fl  [address]  [place] [address]  Phone: [phone]                  Joanna Authement   5/15/2017 10:30 AM   Office Visit    Description:  Female : 1965   Provider:  Donald Chew MD   Department:  [facility] - Neurosurgery 7th Fl           Diagnoses this Visit        Comments    Meningioma    -  Primary            To Do List           Goals (5 Years of Data)     None      [facility]     [facility] On Call Nurse Care Line -  Assistance  Unless otherwise directed by your provider, please contact [facility], our nurse care line that is available for  assistance.     Registered nurses in the [facility] On Call Center provide: appointment scheduling, clinical advisement, health education, and other advisory services.  Call: [phone] (toll free)               Medications                Verify that the below list of medications is an accurate representation of the medications you are currently taking.  If none reported, the list may be blank. If incorrect, please contact your healthcare provider. Carry this list with you in case of emergency.           Current Medications     amitriptyline (ELAVIL) 100 MG tablet Take 1 tablet (100 mg total) by mouth nightly.    EUCALYPTUS OIL/MENTHOL/CAMPHOR (VICKS VAPORUB TOP) Apply topically daily as needed.    gabapentin (NEURONTIN) 300 MG capsule Take 1 capsule (300 mg total) by mouth 3 (three) times daily.    hydrocodone-acetaminophen 5-325mg (NORCO) 5-325 mg per tablet Take 1 tablet by mouth every 12 (twelve) hours as needed (severe pain).           Clinical Reference Information           Your Vitals Were     BP Pulse Temp Height Weight BMI    113/76 91 98.1 °F (36.7 °C) (Oral) 5' 2" (1.575 m) 59.4 kg (130 lb 14.4 oz) 23.94 kg/m2      Blood Pressure          Most Recent Value    BP  113/76      Allergies as of 5/15/2017     Codeine      Immunizations Administered on Date of Encounter - 5/15/2017     None "      Orders Placed During Today's Visit     Future Labs/Procedures Expected by Expires    MRI Brain W WO Contrast  5/15/2018 5/15/2018      Instructions    For surgery next month.       Smoking Cessation     If you would like to quit smoking:   You may be eligible for free services if you are a [place] resident and started smoking cigarettes before September 1, 1988.  Call the Smoking Cessation Trust (SCT) toll free at (674) 227-6189 or (074) 802-2185.   Call 1-800-QUIT-NOW if you do not meet the above criteria.   Contact us via email: [email or web address]   View our website for more information: [email or web address]        Language Assistance Services     ATTENTION: Language assistance services are available, free of charge. Please call [phone].      ATENCIÓN: Si habla español, tiene a farley disposición servicios gratuitos de asistencia lingüística. Llame al [phone].     CHÚ Ý: N?u b?n nói Ti?ng Vi?t, có các d?ch v? h? tr? ngôn ng? mi?n phí dành cho b?n. G?i s? [phone].         [facility] - [facility] complies with applicable Federal civil rights laws and does not discriminate on the basis of race, color, national origin, age, disability, or sex.

## 2017-05-15 NOTE — LETTER
May 15, 2017      Sarah Adam MD  [address] [address] [facility] [address]  [facility] [address]           [facility] Fl  [address]  [facility] [address]  Phone: [phone]          Patient: Joanna Morales   MR Number: [id number]   YOB: 1965   Date of Visit: 5/15/2017       Dear Dr. Sarah Adam:    Thank you for referring Joanna Morales to me for evaluation. Attached you will find relevant portions of my assessment and plan of care.    If you have questions, please do not hesitate to call me. I look forward to following Joanna Morales along with you.    Sincerely,    Donald Chew MD    Enclosure  CC:  No Recipients    If you would like to receive this communication electronically, please contact [email or web address] or [phone] to request more information on [employer] access.    For providers and/or their staff who would like to refer a patient to [facility], please contact us through our one-stop-shop provider referral line, [facility], at [phone].    If you feel you have received this communication in error or would no longer like to receive these types of communications, please e-mail [email or web address]

## 2017-05-15 NOTE — TELEPHONE ENCOUNTER
----- Message from Ivania Cortés sent at 5/15/2017  7:47 AM CDT -----  Contact: /Jj [phone]  PT's  called and would like to know if pt can still be seen. Pt's  stated that they are running late due to transportation.    Pt's  can be reached at [phone]      Thank you

## 2017-06-07 PROBLEM — D32.0: Status: ACTIVE | Noted: 2017-01-01

## 2017-06-07 PROBLEM — R06.89 RESPIRATORY INSUFFICIENCY: Status: ACTIVE | Noted: 2017-01-01

## 2017-06-07 NOTE — H&P (VIEW-ONLY)
This office note has been dictated.  May 15, 2017    Sarah Adam M.D.  Department of Ophthalmology  [facility] [address] [place]  [address]    RE:  JOANNA MORALES  [facility]  [id number]    Dear Dr. Adam:    Joannaclemente Morales was seen in neurosurgical consultation at the office this   morning.  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  She underwent operation in January 2015 on the [facility] service at [facility].  It   was apparently only possible to get a partial resection of the tumor.  She did   have a followup scan done in 2016 showing a continued large meningioma in this   area.  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  She had a   followup MRI scan done at [facility] on 01/20/17, showing this large tumor.  She   was seen by you in Ophthalmology and then referred to Neurosurgery for further   evaluation.  At this point, she has some preserved vision in the right eye.  She   complains of constant headaches.  She has no sense of smell.  She feels off   balance.  She is taking Neurontin apparently for seizure prevention. Past   medical history relates primarily to the present illness.  She does not have   chronic medical illnesses such as diabetes or hypertension.  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  She is  and has good family support.    On physical examination, she is a well-developed, well-nourished white lady, who   is alert and oriented.  Examination of the head shows a somewhat anterior, but   well-healed bicoronal incision.  She says that this is a little tender in the   left frontal area.  Eyes show full  extraocular movements.  The pupils are small,   the left is not reactive.  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  The right optic nerve is probably normal.  She   sees things as doubles.  When I held up two fingers she counted four, although   she knew that there were two.  Hearing seems preserved.  The neck is supple.  On   neurological examination, she is speaking clearly.  She is obviously anxious   and depressed over her illness.  Finger-to-nose is somewhat diminished on the   left side and gait was mildly unsteady.  Cranial nerves are otherwise intact.    She has normal facial sensation and movement.  The tongue protrudes in the   midline.  Strength in the extremities seems generally good, sensation normal and   deep tendon reflexes symmetrical.    MRI of the brain performed at [facility] on 01/20/17, shows a   previous bifrontal craniotomy.  There is a large lobular meningioma, which   occupies the sella, planum sphenoidale and extends back on to the clivus.  The   optic chiasm and optic nerves are not visualized.  The carotid arteries and   anterior cerebral arteries run done through the tumor.  The tumor elevates and   pushes the third ventricle back, but there is no hydrocephalus.    IMPRESSION:  Tuberculum sellae and planum sphenoidale meningioma.    RECOMMENDATIONS:  After considerable discussion with her and her family, we will   attempt to resect more of the tumor.  She will need navigation scan and other   preparations.    Thank you very much for the opportunity to see her in neurosurgical   consultation.    Sincerely yours,                ROSCOE  dd: 05/15/2017 11:54:47 (CDT)  td: 05/16/2017 07:59:43 (CDT)  Doc ID   #[id number]  Job ID #[id number]    CC: Sarah Adam M.D. (HODAN)  [facility]

## 2017-06-07 NOTE — ANESTHESIA PROCEDURE NOTES
Arterial    Diagnosis: Intraoperative Hemodynamic Monitoring    Patient location during procedure: done in OR  Procedure start time: 6/7/2017 8:26 AM  Timeout: 6/7/2017 8:25 AM  Procedure end time: 6/7/2017 8:35 AM  Staffing  Anesthesiologist: ANTONI VANG  Resident/CRNA: KAYA ALEXANDER  Performed: anesthesiologist and resident/CRNA   Anesthesiologist was present at the time of the procedure.  Preanesthetic Checklist  Completed: patient identified, site marked, surgical consent, pre-op evaluation, timeout performed, IV checked, risks and benefits discussed, monitors and equipment checked and anesthesia consent given  Arterial Line  Skin Prep: chlorhexidine gluconate  Orientation: left  Location: radial  Catheter Size: 20 G{[facility] ANESTHESIA BLOCK ART PLACEMENTInsertion Attempts: 1  Assessment  Dressing: secured with tape and tegaderm  Patient: Tolerated well

## 2017-06-08 PROBLEM — G93.5 BRAIN COMPRESSION: Status: ACTIVE | Noted: 2017-01-01

## 2017-06-08 PROBLEM — G93.89 PNEUMOCEPHALUS: Status: ACTIVE | Noted: 2017-01-01

## 2017-06-08 PROBLEM — E23.2 DIABETES INSIPIDUS: Status: ACTIVE | Noted: 2017-01-01

## 2017-06-08 PROBLEM — R47.81 SLURRED SPEECH: Status: ACTIVE | Noted: 2017-01-01

## 2017-06-08 PROBLEM — R57.9 SHOCK: Status: ACTIVE | Noted: 2017-01-01

## 2017-06-08 PROBLEM — R45.1 AGITATION: Status: ACTIVE | Noted: 2017-01-01

## 2017-06-08 NOTE — TRANSFER OF CARE
"Anesthesia Transfer of Care Note    Patient: Joanna Morales    Procedure(s) Performed: Procedure(s) (LRB):  CRANIOTOMY WITH STEALTH (N/A)    Patient location: ICU    Anesthesia Type: general    Transport from OR: Transported from OR on 6-10 L/min O2 by face mask with adequate spontaneous ventilation. Continuous SpO2 monitoring in transport    Post pain: adequate analgesia    Post assessment: no apparent anesthetic complications and tolerated procedure well    Post vital signs: stable    Level of consciousness: responds to stimulation and sedated    Nausea/Vomiting: no nausea/vomiting    Complications: none    Transfer of care protocol was followed      Last vitals:   Visit Vitals  BP (!) 101/50   Pulse (!) 45   Temp 36.4 °C (97.6 °F) (Oral)   Resp 13   Ht 5' 2" (1.575 m)   Wt 59.9 kg (132 lb)   SpO2 99%   Breastfeeding? No   BMI 24.14 kg/m²     "

## 2017-06-08 NOTE — PLAN OF CARE
Problem: Patient Care Overview  Goal: Plan of Care Review  POC reviewed with pt and son at bedside at 0400. Pt nodded head for understanding. Questions and concerns addressed. 1/2 NS started @125, Precedex titrated throughout the night due to pt restlessness, Left hemovac drain to full suction, NGT placed overnight. CT overnight. Pt in bilateral wrist restraints. No acute events overnight. Pt progressing toward goals. Will continue to monitor. See flowsheets for full assessment and VS info.  Goal: Individualization & Mutuality  Outcome: Ongoing (interventions implemented as appropriate)  Past Medical History:   Diagnosis Date    Allergy     Basal cell carcinoma     Depression 11/1/2016    Eye disorder     Fever blister      Past Surgical History:   Procedure Laterality Date    BASAL CELL CARCINOMA EXCISION      forehead    BRAIN SURGERY      SKIN BIOPSY       DX: meningioma resection    Bilateral upper wrist restraints applied 6/7/17 at 2150 for pulling at lines.

## 2017-06-08 NOTE — PROGRESS NOTES
Merari ART with [facility] notified of urine output greater then 250 for the last two hours no new orders.

## 2017-06-08 NOTE — ANESTHESIA POSTPROCEDURE EVALUATION
"Anesthesia Post Evaluation    Patient: Joanna Morales    Procedure(s) Performed: Procedure(s) (LRB):  CRANIOTOMY WITH STEALTH (N/A)    Final Anesthesia Type: general  Patient location during evaluation: ICU  Patient participation: No - Unable to Participate, Sedation  Level of consciousness: responds to stimulation  Post-procedure vital signs: reviewed and stable  Pain management: adequate  PONV status at discharge: No PONV  Anesthetic complications: no      Cardiovascular status: blood pressure returned to baseline  Respiratory status: unassisted  Hydration status: euvolemic  Follow-up not needed.        Visit Vitals  BP (!) 101/50   Pulse (!) 50   Temp 36.4 °C (97.6 °F) (Oral)   Resp 13   Ht 5' 2" (1.575 m)   Wt 59.9 kg (132 lb)   SpO2 98%   Breastfeeding? No   BMI 24.14 kg/m²       Pain/Arcelia Score: Pain Assessment Performed: Yes (6/8/2017  9:00 AM)  Presence of Pain: non-verbal indicators absent (6/8/2017  9:00 AM)  Pain Rating Prior to Med Admin: 5 (6/8/2017  3:38 AM)      "

## 2017-06-08 NOTE — PROGRESS NOTES
Pt arrived via bed from PACU on portable monitor. NCC team notified of arrival. Pt connected to wall monitor and O2. VSS, will continue to monitor.

## 2017-06-08 NOTE — PROGRESS NOTES
Merari ART with [facility] notified of of urine output greater then 250 within the last 30 minutes. Ordered Serum sodium and urinalysis. Urinalysis and sodium collected and sent to lab.

## 2017-06-08 NOTE — PLAN OF CARE
06/08/17 1549   Discharge Assessment   Assessment Type Discharge Planning Assessment   Confirmed/corrected address and phone number on facesheet? Yes   Assessment information obtained from? Caregiver   Expected Length of Stay (days) 5   Communicated expected length of stay with patient/caregiver yes   Prior to hospitilization cognitive status: Alert/Oriented   Prior to hospitalization functional status: Independent;Assistive Equipment   Current cognitive status: Unable to Assess   Current Functional Status: Completely Dependent   Arrived From admitted as an inpatient   Lives With friend(s)   Able to Return to Prior Arrangements unable to determine at this time (comments)   Is patient able to care for self after discharge? Unable to determine at this time (comments)   How many people do you have in your home that can help with your care after discharge? 1   Who are your caregiver(s) and their phone number(s)? Raj Blood (son) [phone], Aga Hernandez: (friend/roommate)  [phone]   Patient's perception of discharge disposition other (comments)  (aiyana)   Readmission Within The Last 30 Days no previous admission in last 30 days   Patient currently being followed by outpatient case management? No   Patient currently receives home health services? No   Does the patient currently use HME? No   Patient currently receives private duty nursing? N/A   Patient currently receives any other outside agency services? No   Equipment Currently Used at Home walker, rolling;cane, straight   Do you have any problems affording any of your prescribed medications? TBD   Is the patient taking medications as prescribed? yes   Does the patient have transportation to healthcare appointments? Yes   Transportation Available family or friend will provide   On Dialysis? No   Does the patient receive services at the Coumadin Clinic? No   Are there any open cases? No   Discharge Plan A Home with family   Discharge Plan B Rehab    Patient/Family In Agreement With Plan yes         Discharge/ My Health Packet Folder Given to patient/family:      Yes        PCP:  Claudy Tse MD        Pharmacy:    [facility] [place] - [address]  [address]  [place] [address]  Phone: [phone] Fax: [phone]    [facility] [place] - [place] - [address]  [address]  [place] [address]  Phone: [phone] Fax: [phone]        Emergency Contacts:  Extended Emergency Contact Information  Primary Emergency Contact: Aga Hernandez   [facility]  Home Phone: [phone]  Relation: Friend    Raj Blood (son) [phone]      Insurance:  Payor: MEDICAID / Plan: [employer] IfOnly / Product Type: Managed Medicaid /             Myla Watkins RN, CCRN-K, [facility]  Neuro-Critical Care   X [phone]

## 2017-06-08 NOTE — HOSPITAL COURSE
6/7 S/P menengioma resection  6/8 D.I monitoring Q6h sp.grav and Na. DDAVP0.5mcg IV x1  6/9 continue monitoring for D. I. Q 6h  Na/ sp. grav. Urine output >250; Na >145 sp. Grav. 1.005.  DDAVP 1mcg IV; 1L bolus .45NS x2   Endocrinology consulted for diabetes Insipidus. Agitated/restless. Haldol prn started. Repeat CTH today per [facility]  6/10 Required DDAVP 1mcg IV early a.m. For U.O > 250cc and sp. Grav. 1.0000. Less restless today required only one dose of haldol.  6/11Continue to monitor for DI; bilateral blindness; Continue Decadron; Switched to d5+.45 NS @100  6/12: The patient received DDAVP this AM. DI watch. D5% was D/C. Still blind. DI watch. Na 144. Patient on D5. DDAVP IN  Subgaleal drain was D/C.  6/14: DI event early this AM. On DDAVP bid. WBC elevated. Constipated. Na 153. Apparently did not receive the night DDAVP dose. Lethargic today. Serum Dl=614. Dobbhoff tube got placed to replace free water. Dobbhoff tip position confirmed with KUB.  6/15:DI corrected. Na 150. Hypotensive and got fluids. WBC normalizing.  EEG theta. No SZ. Became hypothermic last night. Had BM last night. L to R 1.3 cm subfalcine shift and still L- uncal herniation. Follow-up cT head with some improvement. EEG today suggestive of encephalopathy. No epileptiform discharges.  6/16: Transfer to [facility]

## 2017-06-08 NOTE — H&P
[facility]  Neurocritical Care  History & Physical    Admit Date: 6/7/2017  Service Date: 06/07/2017  Length of Stay: 0    Subjective:     Chief Complaint: <principal problem not specified>    History of Present Illness: 50yo F, presented to [facility] s/p menigioma resection (6/7). 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  She underwent operation in January 2015 on the [facility] service at [facility].  It   was apparently only possible to get a partial resection of the tumor.  She did   have a followup scan done in 2016 showing a continued large meningioma in this   area.  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  She had a   followup MRI scan done at [facility] on 01/20/17, showing this large tumor.  She   was seen by you in Ophthalmology and then referred to Neurosurgery for further   evaluation.       Past Medical History:   Diagnosis Date    Allergy     Basal cell carcinoma     Depression 11/1/2016    Eye disorder     Fever blister      Past Surgical History:   Procedure Laterality Date    BASAL CELL CARCINOMA EXCISION      forehead    BRAIN SURGERY      SKIN BIOPSY        No current facility-administered medications on file prior to encounter.      Current Outpatient Prescriptions on File Prior to Encounter   Medication Sig Dispense Refill    gabapentin (NEURONTIN) 300 MG capsule Take 1 capsule (300 mg total) by mouth 3 (three) times daily. 90 capsule 8    hydrocodone-acetaminophen 5-325mg (NORCO) 5-325 mg per tablet Take 1 tablet by mouth every 12 (twelve) hours as needed (severe pain). 60 tablet 0    amitriptyline (ELAVIL) 100 MG tablet Take 1 tablet (100 mg total) by mouth nightly. 90 tablet 3    EUCALYPTUS OIL/MENTHOL/CAMPHOR (VICKS VAPORUB TOP) Apply  topically daily as needed.        Allergies: Codeine    Family History   Problem Relation Age of Onset    Diabetes Father     Hypertension Father     Hepatitis Father     No Known Problems Mother      Social History   Substance Use Topics    Smoking status: Current Every Day Smoker     Packs/day: 1.00     Years: 40.00     Types: Cigarettes    Smokeless tobacco: Never Used    Alcohol use No     Review of Systems   Unable to perform ROS: Patient nonverbal (s/p waking from anesthesia)     Objective:     Vitals:  Temp: (!) 100.9 °F (38.3 °C) (06/07/17 1900)  Pulse: 80 (06/07/17 1900)  Resp: 14 (06/07/17 1900)  BP: (!) 141/88 (06/07/17 1900)  SpO2: 100 % (06/07/17 1900)    Temp:  [97.5 °F (36.4 °C)-100.9 °F (38.3 °C)] 100.9 °F (38.3 °C)  Pulse:  [80-84] 80  Resp:  [14-16] 14  SpO2:  [96 %-100 %] 100 %  BP: (100-141)/(82-88) 141/88  Arterial Line BP: (153)/(81) 153/81              No intake/output data recorded.    Physical Exam   Constitutional: She appears well-developed and well-nourished.   Eyes: Pupils are equal, round, and reactive to light.   Cardiovascular: Normal rate, regular rhythm, normal heart sounds and intact distal pulses.  Exam reveals no gallop and no friction rub.    No murmur heard.  Pulmonary/Chest: Effort normal and breath sounds normal. No respiratory distress. She has no wheezes. She has no rales. She exhibits no tenderness.   Abdominal: Soft. Bowel sounds are normal. She exhibits no distension.   Skin: Skin is warm.       NEUROLOGICAL EXAMINATION:     MENTAL STATUS        E1 V1 M5  Lethergic, waking from anesthesia (just returned from OR)  Nonverbal,   PERRLA, 5mm/5mm, slugish  Does not follow commands  Localize RUE  Withdraw LUE  Withdraw Bilateral lower extremity, R > L     CRANIAL NERVES     CN III, IV, VI   Pupils are equal, round, and reactive to light.      [place] Coma Scale    Today I personally reviewed pertinent medications, lines/drains/airways, imaging, cardiology, lab results,  microbiology results, notably:     Assessment/Plan:     Neuro   Meningioma, recurrent of brain    NSGY Following  S/P resection 6/7  Q1hr Neuro Checks, SBP <150  CTH in AM  Rocephin for drain PPX  PT/OT/SLP when able        Depression    -amitriptyline resumed        Pulmonary   Respiratory insufficiency    NC  Maintain O2 sat >92  CXR pending            Prophylaxis:  Venous Thromboembolism: mechanical  Stress Ulcer: PPI  Ventilator Pneumonia: not applicable     Activity Orders          None        No Order   Level 3  I spent >35 minutes reviewing patient records, examining, and counseling the patient with greater than 50% of the time spent with direct patient care and coordination.       Chapin Carrion NP  Neurocritical Care  [facility]

## 2017-06-08 NOTE — ASSESSMENT & PLAN NOTE
NSGY Following  S/P resection 6/7  Q1hr Neuro Checks, SBP <150  CTH in AM  Rocephin for drain PPX  PT/OT/SLP when able

## 2017-06-08 NOTE — PROGRESS NOTES
ICU Attending Note  Neurocritical Care    DDAVP, dexmedetomidine    E2V3M6    -dexamethasone per Neurosurgery  -gabapentin  -off dexmedetomidine  --160  -stop 1/2 NS  -LR 50 mL/h  -if UOP >250 mL/h x2 h, Na >145, urine sp g <1.005, repeat DDAVP 1 mcg IV; q6h UA, Na  -ceftriaxone per Neurosurgery  -activity as tolerated  -start heparin prophylaxis tomorrow  -PPI prophylaxis    Goal: Continue postoperative management, particularly treatment for DI.

## 2017-06-08 NOTE — PROGRESS NOTES
0400: pt brought to CT via bed on portable monitor with nurse x1 and PCT; VSS.    0420: pt brought back radha room 7080, connected to wall monitor. Pt tolerated scan well, VSS, will continue to monitor.

## 2017-06-08 NOTE — SUBJECTIVE & OBJECTIVE
Past Medical History:   Diagnosis Date    Allergy     Basal cell carcinoma     Depression 11/1/2016    Eye disorder     Fever blister      Past Surgical History:   Procedure Laterality Date    BASAL CELL CARCINOMA EXCISION      forehead    BRAIN SURGERY      SKIN BIOPSY        No current facility-administered medications on file prior to encounter.      Current Outpatient Prescriptions on File Prior to Encounter   Medication Sig Dispense Refill    gabapentin (NEURONTIN) 300 MG capsule Take 1 capsule (300 mg total) by mouth 3 (three) times daily. 90 capsule 8    hydrocodone-acetaminophen 5-325mg (NORCO) 5-325 mg per tablet Take 1 tablet by mouth every 12 (twelve) hours as needed (severe pain). 60 tablet 0    amitriptyline (ELAVIL) 100 MG tablet Take 1 tablet (100 mg total) by mouth nightly. 90 tablet 3    EUCALYPTUS OIL/MENTHOL/CAMPHOR (VICKS VAPORUB TOP) Apply topically daily as needed.        Allergies: Codeine    Family History   Problem Relation Age of Onset    Diabetes Father     Hypertension Father     Hepatitis Father     No Known Problems Mother      Social History   Substance Use Topics    Smoking status: Current Every Day Smoker     Packs/day: 1.00     Years: 40.00     Types: Cigarettes    Smokeless tobacco: Never Used    Alcohol use No     Review of Systems   Unable to perform ROS: Patient nonverbal (s/p waking from anesthesia)     Objective:     Vitals:  Temp: (!) 100.9 °F (38.3 °C) (06/07/17 1900)  Pulse: 80 (06/07/17 1900)  Resp: 14 (06/07/17 1900)  BP: (!) 141/88 (06/07/17 1900)  SpO2: 100 % (06/07/17 1900)    Temp:  [97.5 °F (36.4 °C)-100.9 °F (38.3 °C)] 100.9 °F (38.3 °C)  Pulse:  [80-84] 80  Resp:  [14-16] 14  SpO2:  [96 %-100 %] 100 %  BP: (100-141)/(82-88) 141/88  Arterial Line BP: (153)/(81) 153/81              No intake/output data recorded.    Physical Exam   Constitutional: She appears well-developed and well-nourished.   Eyes: Pupils are equal, round, and reactive to light.    Cardiovascular: Normal rate, regular rhythm, normal heart sounds and intact distal pulses.  Exam reveals no gallop and no friction rub.    No murmur heard.  Pulmonary/Chest: Effort normal and breath sounds normal. No respiratory distress. She has no wheezes. She has no rales. She exhibits no tenderness.   Abdominal: Soft. Bowel sounds are normal. She exhibits no distension.   Skin: Skin is warm.       NEUROLOGICAL EXAMINATION:     MENTAL STATUS        E1 V1 M5  Lethergic, waking from anesthesia (just returned from OR)  Nonverbal,   PERRLA, 5mm/5mm, slugish  Does not follow commands  Localize RUE  Withdraw LUE  Withdraw Bilateral lower extremity, R > L     CRANIAL NERVES     CN III, IV, VI   Pupils are equal, round, and reactive to light.      [place] Coma Scale    Today I personally reviewed pertinent medications, lines/drains/airways, imaging, cardiology, lab results, microbiology results, notably:

## 2017-06-08 NOTE — PROGRESS NOTES
Notified NCC of pt increased UO over the last two hours, 300 cc at 1900, 290 cc at 2000. Ordered to collect a sodium and send off a UA. Will continue to monitor.

## 2017-06-08 NOTE — SIGNIFICANT EVENT
ICU attending    called to patient bedside for agitation, trying to get out of bed  precedex started with modest improvement in her agitation    Patient urine out put > 500cc for three consecutive hours  Increasing serum sodium  Diabetes insipidus  ua spec grav 1.005  Given 1mcg ddavp  Switch maintenance fluids to 0.45% NS at 125cc/hour  q 4 hour Na checks    Additional 45 minutes cc time

## 2017-06-08 NOTE — HPI
50yo F, presented to [facility] s/p menigioma resection (6/7). 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  She underwent operation in January 2015 on the [facility] service at [facility].  It   was apparently only possible to get a partial resection of the tumor.  She did   have a followup scan done in 2016 showing a continued large meningioma in this   area.  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  She had a   followup MRI scan done at [facility] on 01/20/17, showing this large tumor.  She   was seen by you in Ophthalmology and then referred to Neurosurgery for further   evaluation.

## 2017-06-09 PROBLEM — D64.9 NORMOCYTIC ANEMIA: Status: ACTIVE | Noted: 2017-01-01

## 2017-06-09 PROBLEM — H54.61 VISION LOSS OF RIGHT EYE: Status: ACTIVE | Noted: 2017-01-01

## 2017-06-09 PROBLEM — D72.829 LEUKOCYTOSIS: Status: ACTIVE | Noted: 2017-01-01

## 2017-06-09 PROBLEM — R45.1 RESTLESSNESS AND AGITATION: Status: ACTIVE | Noted: 2017-01-01

## 2017-06-09 PROBLEM — I10 ESSENTIAL HYPERTENSION: Status: ACTIVE | Noted: 2017-01-01

## 2017-06-09 NOTE — PROGRESS NOTES
[facility]  Neurocritical Care  Progress Note    Admit Date: 6/7/2017  Service Date: 06/09/2017  Length of Stay: 2    Subjective:     Chief Complaint: <principal problem not specified>    History of Present Illness: 52yo F, presented to [facility] s/p menigioma resection (6/7). 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  She underwent operation in January 2015 on the [facility] service at [facility].  It   was apparently only possible to get a partial resection of the tumor.  She did   have a followup scan done in 2016 showing a continued large meningioma in this   area.  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  She had a   followup MRI scan done at [facility] on 01/20/17, showing this large tumor.  She   was seen by you in Ophthalmology and then referred to Neurosurgery for further   evaluation.       Hospital Course: 6/7 S/P menengioma resection  6/8 D.I monitoring Q6h sp.grav and Na. DDAVP0.5mcg IV x1  6/9 continue monitoring for D. I. Q 6h  Na/ sp. grav. Urine output >250; Na >145 sp. Grav. 1.005.  DDAVP 1mcg IV; 1L bolus .45NS x2   Endocrinology consulted for diabetes Insipidus. Agitated/restless. Haldol prn started. Repeat CTH today per NSGY    Interval History:  continue monitoring for D. I. Q 6h  Na/ sp. grav. Urine output >250; Na >145 sp. Grav. 1.005.  DDAVP 1mcg IV; 1L bolus .45NS x2   Endocrinology consulted for diabetes Insipidus. Agitated/restless. Haldol prn started      Review of Systems: JAVIER 2/2 encephalopathy      Vitals:   Temp: 98.2 °F (36.8 °C) (06/09/17 1500)  Pulse: (!) 58 (06/09/17 0600)  Resp: 17 (06/09/17 0600)  BP: (!) 101/50 (06/08/17 0700)  SpO2: 100 % (06/09/17 0600)    Temp:  [97.5 °F (36.4 °C)-98.9 °F (37.2 °C)] 98.2 °F (36.8 °C)  Pulse:  []  58  Resp:  [17-37] 17  SpO2:  [89 %-100 %] 100 %  Arterial Line BP: (126-138)/(59-69) 136/65              06/08 0701 - 06/09 0700  In: 1186.3 [I.V.:976.3]  Out: 4715 [Urine:4545; Drains:170]     Examination:   Constitutional: Well-nourished and -developed. No apparent distress.   Eyes: Conjunctiva clear, anicteric. Lids no lesions.  Head/Ears/Nose/Mouth/Throat/Neck: Moist mucous membranes. External ears, nose atraumatic.   Cardiovascular: Regular rhythm. No murmurs. No leg edema.  Respiratory: Comfortable respirations. Clear to auscultation.  Gastrointestinal: No hernia. Soft, nondistended, nontender. + bowel sounds.    Neurologic:  -GCS E3V4M6  -restless, agitated at times. Disoriented x3. Follows some commands.  -Cranial nerves II-XII grossly intact  --R pupil 3 sluggish/ L pupil 3 slightly reactive. EOMI  -Motor 4/5 RUE/RLE; 4/5 LUE/LLE  -Sensation bilat intact to all extremities  Unable to test memory, judgment, insight, fund of knowledge, hearing, coordination, gait due to level of consciousness.       Medications:   Continuous    sodium chloride 0.45% Last Rate: 100 mL/hr at 06/09/17 1100   Scheduled    amitriptyline 100 mg Nightly   cefTRIAXone (ROCEPHIN) IVPB 1 g Q12H   dexamethasone 4 mg Q6H   gabapentin 300 mg TID   heparin (porcine) 5,000 Units Q8H   pantoprazole 40 mg Daily   [COMPLETED] sodium chloride 1,000 mL Once   PRN    acetaminophen 650 mg Q6H PRN   acetaminophen 650 mg Q6H PRN   acetaminophen 650 mg Q4H PRN   haloperidol lactate 2 mg Q4H PRN   hydrALAZINE 10 mg Q1H PRN   hydrocodone-acetaminophen 5-325mg 1 tablet Q4H PRN   magnesium sulfate IVPB 2 g PRN   magnesium sulfate IVPB 4 g PRN   metoclopramide HCl 5 mg Q6H PRN   metoclopramide HCl 5 mg Q6H PRN   potassium chloride 10% 40 mEq PRN   potassium chloride 10% 40 mEq PRN   potassium chloride 10% 60 mEq PRN   potassium, sodium phosphates 2 packet PRN   potassium, sodium phosphates 2 packet PRN   potassium, sodium phosphates 2 packet PRN       Today I independently reviewed pertinent medications, lines/drains/airways, imaging, lab results, microbiology results,   Assessment/Plan:     Neuro   Restlessness and agitation    Haldol 2mg Q4h prn        Diabetes insipidus    6/9 Urine output >250 x2h; sp. Grav. 1.005 Na 150  DDAVP 1mcg x1 given + 1L .45NS bolus + maintenance .45NS 100cc/h  Repeated .45NS 1L bolus for Na 152 @ 1700  Endocrinology consulted for D.I  Urine Osmo; serum Osmo pending  Follow U/A sp. Grav; Na Q6h  IVFs changed .45NS @100cc/h          Meningioma, recurrent of brain    NSGY Following  S/P resection 6/7 POD2  Q1hr Neuro Checks  SBP <160  Decadron 4mg q6h  Gabapentin 300mg tid  Prn norco 5/325  Rocephin for drain PPX  PT/OT/SLP when able  CTH 6/9 Post-Op changes consistent with bilat. Craniotomy. No large residual mass identified, collection of air, blood and fluid at the site of the prior mass and underneath the frontal lobes along the surgical tract.and is decreasing from prior exam. Mass effect stable, ventricles stable, small hemorrhage in supersellar region stable. No hydrocephalus.         Depression    -amitriptyline 100mg Qhs        Pulmonary   Respiratory insufficiency    NC to room air O2 sats 97-98%  Maintain O2 sat >92  Prn CXR/ABG        Cardiac   Essential hypertension    SBP goal <160  cardene gtt off  Only on prn hydralazine.        Hem/Onc   Leukocytosis    WBC 22.97, afebrile  Monitor daily CBC  On ceftriaxone 1g Q12 while drain in place        Other   Normocytic anemia    Monitor daily CBC  H/H 12.3/36.7 stable        Vision loss of right eye     R eye vision loss s/p resection of meningioma without improvement in eye sight.   Per NSGY,Known that optic nerve injured during surgery and Will need time to heal.  Not requiring MRI or  Ophthalmology consult at present            Prophylaxis:  Venous Thromboembolism: mechanical  Stress Ulcer: PPI  Ventilator Pneumonia: not applicable     Activity Orders          Activity as  tolerated starting at 06/08 1028        No Order     Critical care time 30 minutes    Merari Barajas NP  Neurocritical Care  [facility]

## 2017-06-09 NOTE — ASSESSMENT & PLAN NOTE
R eye vision loss s/p resection of meningioma without improvement in eye sight.   Per NSGY,Known that optic nerve injured during surgery and Will need time to heal.  Not requiring MRI or  Ophthalmology consult at present

## 2017-06-09 NOTE — ASSESSMENT & PLAN NOTE
6/9 Urine output >250 x2h; sp. Grav. 1.005 Na 150  DDAVP 1mcg x1 given + 1L .45NS bolus + maintenance .45NS 100cc/h  Repeated .45NS 1L bolus for Na 152 @ 1700  Endocrinology consulted for D.I  Urine Osmo; serum Osmo pending  Follow U/A sp. Grav; Na Q6h  IVFs changed .45NS @100cc/h

## 2017-06-09 NOTE — PLAN OF CARE
Problem: Patient Care Overview  Goal: Plan of Care Review  Outcome: Ongoing (interventions implemented as appropriate)  Plan of Care discussed with Son, Raj Blood and KAREN Huang by telephone regarding patient's continued confusion and altered level of consciousness. Serial serum and urine chemistries continue. Head CT today for diagnostic evaluation of post-surgical processes. Patient given opportunity to ask questions to their satisfaction, emotional support provided. Will continue to monitor.

## 2017-06-09 NOTE — ASSESSMENT & PLAN NOTE
NSGY Following  S/P resection 6/7 POD2  Q1hr Neuro Checks  SBP <160  Decadron 4mg q6h  Gabapentin 300mg tid  Prn norco 5/325  Rocephin for drain PPX  PT/OT/SLP when able  CTH 6/9 Post-Op changes consistent with bilat. Craniotomy. No large residual mass identified, collection of air, blood and fluid at the site of the prior mass and underneath the frontal lobes along the surgical tract.and is decreasing from prior exam. Mass effect stable, ventricles stable, small hemorrhage in supersellar region stable. No hydrocephalus.

## 2017-06-09 NOTE — PROGRESS NOTES
Spoke with Merari Barajas NP   Pt with Meningioma s/p resection (6/7)   Now with DI   Elevated sodium   Sp gravity 1.000    Now on DDAVP 1 mcg IV -  6/8, 6/9  Also on dexamethasone 4 mg IV     Recommend adequate fluid intake   Match I/O and replace IV fluid   Ok to continue DDVAP 1mcg BID per primary team --> would like to transition to oral or nasal formulation.   Check BMP and UA  Q6   urine osmo and plasma osmo     D/w primary team   Full consult note to follow in the morning.   Please call with any further questions.     Umu Curry DO   Endocrinology Fellow.

## 2017-06-09 NOTE — OP NOTE
DATE OF PROCEDURE:  06/07/2017.    ATTENDING PHYSICIAN:  Donald Chew M.D.    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    PROCEDURES:  Left frontotemporal craniotomy and excision of meningioma with   neuronavigation and microsurgery.    SURGEON:  Donald Chew M.D.    ASSISTANT:  Andrew Balderrama M.D. (RES) ([facility] Resident Neurosurgery).    ANESTHESIA:  General endotracheal.    ESTIMATED BLOOD LOSS:  600 mL.    DRAINS:  Hemovac subgaleal.    CONDITION AT THE END OF PROCEDURE:  Stable.    BRIEF HISTORY:  This 51-year-old lady presented with visual loss in 2015 and was   found to have a large subfrontal and tuberculum sellae meningioma.  She   underwent a right frontotemporal craniotomy at [facility] with   partial removal of the tumor.  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  An Ophthalmology   examination in February 2017 showed her able to count fingers at 3 feet.  Other   than the severe visual loss, she is otherwise neurologically intact.  Recent MRI   shows a very large meningioma on the planum sphenoidale coming down the   tuberculum sellae and extending back over the clivus into the perimesencephalic   area.  After considerable discussion, it was decided to proceed with resection   of this tumor with help to be able to preserve some vision.    PROCEDURE IN DETAIL:  The patient was taken to MRI where a navigation sequence   was done.  She was brought to the Operating Room in supine position under   premedication, intubated, and induced with general anesthesia.  A cannula was   placed in the left brachial artery for continuous blood pressure monitoring, a   Mackey catheter inserted, sequential compression devices applied to legs, and   various intravenous lines started.  It was decided to come from  the left at this   time as she had had a previous craniotomy on the right.  The head of the bed   was somewhat elevated, turned to the right, and immobilized with the [place]   three-point skeletal fixation device.  The navigation arc was attached to the   [place] headrest and the head registered to the system with surface   recognition.  The area of the tumor and relevant landmarks were marked on the   scalp.  The hair in the left temporal and bifrontal area was shaved and this   portion of the scalp prepped with Betadine and draped in a sterile fashion.  Her   previous excision extended behind the hairline from the right over to the   temporal line on the left.  Incision was outlined from the zygoma, then   continuing her previous incision, and carrying this to the temporal line on the   right.  In case additional exposure was needed anteriorly, this area of the   scalp was prepped and draped in a sterile fashion.  The incision was marked and   injected with 1% Xylocaine and epinephrine.  The incision was carried through   the skin and galea and bleeding controlled in the skin margins and skin flap   with Trena clips.  The skin was elevated in the subgaleal space down to the   temporal line.  The temporalis fascia was cut from the zygoma along the temporal   line to the zygomatic process of the frontal bone and the skin and fascia   elevated and retracted with fishhook retractors.  The temporalis muscle was   detached from the temporal line with the Bovie cutting current, elevated and   retracted inferiorly with the fishhook retractors.  A generous frontotemporal   craniotomy was outlined with the Bovie current cutting the pericranium somewhat   off of midline and just lateral to the frontal sinus.  Bur holes were made on   the zygomatic process of the frontal bone, on the temporal squama, and the   craniotomy flap cut with the high-speed drill, elevated, and removed from the   operative field.  The temporal  squama and sphenoid ridge were drilled down with   the high-speed drill.  Drill holes were made in the bone margin and dura tacked   up to these with 4-0 Nurolon sutures.  The Cifuentes retractor was affixed to   the [place] headrest, clean towels placed around the craniotomy opening, and   the operating microscope brought into the field.  The dura was opened with a   curvilinear incision, hinged inferiorly, and retracted with 4-0 Nurolon sutures.    The operating microscope was brought into the field.  With microdissection,   the arachnoid along the sylvian fissure and then underneath the left frontal   lobe was dissected to allow some retraction of the inferior frontal lobe.  A   pinkish tumor consistent with meningioma was seen.  The arachnoid between the   frontal lobe and tumor was  with microdissection and the frontal lobe   gently retracted medially.  The tumor was biopsied and sent to the Laboratory   and consistent with meningioma.  The Sonopet ultrasonic aspirator was then used   to begin removing the tumor.  The tumor was quite vascular and the mAPPn   bipolar coagulation unit was helpful in controlling this bleeding as the tumor   was gradually detached from the floor of the anterior fossa and orbit and then   gradually and sequentially debulked and removed following this medially and   anteriorly on the floor of the anterior fossa.  After removal of a considerable   amount of tumor on the left, the anterior cerebral artery, which was seen to be   encased with tumor on MRI, was found.  Considerable time was spent dissecting   this artery out of the tumor.  It was possible to preserve the artery, and then   with this protected, take out additional tumor across the floor of the anterior   fossa.  In this way, it seemed possible to get a full resection of the anterior   portion of the tumor.  Dissection was then carried posteriorly.  The left optic   nerve was identified and tumor removed  from under this and the left internal   carotid artery seen.  With microdissection, the tumor could be debulked in the   suprasellar area and then gradually removed and detached.  The left optic nerve   appeared intact, but is apparently completely nonfunctional.  Dissection was   then carried out over the jugum sphenoidale and tuberculum sellae down toward   the sella and additional tumor removed.  Careful dissection was carried   underneath the left optic nerve and the beneath the chiasm to separate this from   the chiasm and infundibulum.  It appeared that the origin of the right optic   nerve could be identified and dissection in this area was deferred until the   tumor could be removed beneath from the internal carotid posterior communicating   artery on the right and back to the posterior cerebral artery and basilar   artery.  The area of the presumed right optic nerve and internal carotid artery   were densely covered in scar tissue, right into the area of the optic foramen   and along the edge of the tentorium.  A clearly defined right optic nerve could   not be seen in this area and defined.  The tumor was removed underneath this,   but definite identification of the right optic nerve was not made, raising   concern for her limited remaining vision.  No definite tumor seemed to remain,   but attempt was not made to dissect this scar tissue out and search for the   nerve.  The pituitary stalk had been identified and this could be preserved.  At   the conclusion of the procedure, it appeared that the tumor had been grossly   removed.  Surgicel was then placed over the frontal lobe.  Small bleeding points   were controlled with bipolar coagulation and thrombin solution used over this.    The retractor was removed.  The dura was closed with interrupted 4-0 Nurolon   sutures and a piece of Duragen placed over this and the bone flap returned in   place using the [employer] system with three four-sided  boxes.  The   temporalis muscle and fascia were reapproximated with interrupted 3-0 Vicryl   sutures.  A Hemovac drain was placed in the subgaleal space and brought through   a separate stab incision posterior to the primary incision.  The galea was   closed with inverted interrupted 3-0 Vicryl sutures and the skin closed with   skin staples.  A Telfa and bacitracin dressing was placed over the wound and   held in place with tape.  The patient's head was removed from the three-point   skeletal fixation device.  She was returned to full supine position and allowed   to awaken from anesthesia, extubated, and returned to the Neurocritical Care   Area in stable condition.  She received 12 mg of Decadron, 2 g of Rocephin, and   500 mg of Dilantin at the beginning of the procedure, and bacitracin-containing   antibiotic irrigating solutions were used throughout.      RDS/HN  dd: 06/09/2017 11:38:45 (CDT)  td: 06/09/2017 14:29:41 (CDT)  Doc ID   #[id number]  Job ID #[id number]    CC:

## 2017-06-09 NOTE — ASSESSMENT & PLAN NOTE
Urine output increased hourly  260/ 330/ 625/ 575/ 530  U/A sp. Grav; Na Q6h  Sp grav. Down to 1.005/ Na 155, DDAVP 0.5mcg IV given 1830  IVFs changed .45NS @100cc/h  1930 sp grav 1.015  Will repeat Na and sp. Grav in 3H

## 2017-06-09 NOTE — PROGRESS NOTES
[facility]  Neurocritical Care  Progress Note    Admit Date: 6/7/2017  Service Date: 06/08/2017  Length of Stay: 1    Subjective:     Chief Complaint: <principal problem not specified>    History of Present Illness: 50yo F, presented to [facility] s/p menigioma resection (6/7). 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  She underwent operation in January 2015 on the [facility] service at [facility].  It   was apparently only possible to get a partial resection of the tumor.  She did   have a followup scan done in 2016 showing a continued large meningioma in this   area.  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  She had a   followup MRI scan done at [facility] on 01/20/17, showing this large tumor.  She   was seen by you in Ophthalmology and then referred to Neurosurgery for further   evaluation.       Hospital Course: 6/7 S/P menengioma resection  6/8 D.I monitoring Q6h sp.grav and Na. DDAVP0.5mcg IV x1    Interval History:POD 1 S/P meningioma resection. D.I monitoring Q6h sp.grav and Na. DDAVP0.5mcg IV x1    Review of Systems: JAVIER 2/2 to encephalopathy    Vitals:   Temp: 97.8 °F (36.6 °C) (06/08/17 1500)  Pulse: 79 (06/08/17 1800)  Resp: 20 (06/08/17 1800)  BP: (!) 101/50 (06/08/17 0700)  SpO2: 97 % (06/08/17 1800)    Temp:  [97.6 °F (36.4 °C)-99.3 °F (37.4 °C)] 97.8 °F (36.6 °C)  Pulse:  [] 79  Resp:  [13-21] 20  SpO2:  [94 %-100 %] 97 %  BP: ()/(50-73) 101/50  Arterial Line BP: ()/(49-75) 132/65              06/07 0701 - 06/08 0700  In: 7800.1 [I.V.:7750.1]  Out: 6405 [Urine:5790; Drains:15]     Examination:   Constitutional: Well-nourished and -developed. No apparent distress.   Eyes: Conjunctiva clear, anicteric. Lids no  lesions.  Head/Ears/Nose/Mouth/Throat/Neck: Moist mucous membranes. External ears, nose atraumatic.   Cardiovascular: Regular rhythm. No murmurs. No leg edema.  Respiratory: Comfortable respirations. Clear to auscultation.  Gastrointestinal: No hernia. Soft, nondistended, nontender. + bowel sounds.    Neurologic:  -GCS E3V3M5  -lethargic. Knows she is in the hospital. Follows some commands, tongue midline, hands, gives thumbs up, lifts legs, no drift  R pupil 3 sluggish, L pupil noreactive, Blind in L eye. EOMI.  -Cranial nerves II-XII grossly intact  -Motor 4/5 RUE/RLLE; 4/5LUE/LLE  -Sensation bilat intact to all extremities  Unable to test memory, judgment, insight, fund of knowledge, hearing, coordination, gait due to level of consciousness.    Medications:   Continuous  sodium chloride 0.45% Last Rate: 100 mL/hr at 06/08/17 1932   dexmedetomidine (PRECEDEX) infusion Last Rate: Stopped (06/08/17 0700)   lactated ringers Last Rate: Stopped (06/08/17 1900)   nicardipine Last Rate: Stopped (06/07/17 2321)   Scheduled  amitriptyline 100 mg Nightly   cefTRIAXone (ROCEPHIN) IVPB 1 g Q12H   dexamethasone 4 mg Q6H   gabapentin 300 mg TID   [START ON 6/9/2017] heparin (porcine) 5,000 Units Q8H   pantoprazole 40 mg Daily   PRN  acetaminophen 650 mg Q6H PRN   acetaminophen 650 mg Q6H PRN   acetaminophen 650 mg Q4H PRN   hydrALAZINE 10 mg Q1H PRN   hydrocodone-acetaminophen 5-325mg 1 tablet Q4H PRN   metoclopramide HCl 5 mg Q6H PRN   metoclopramide HCl 5 mg Q6H PRN      Today I independently reviewed pertinent medications, lines/drains/airways, imaging, lab results, microbiology results,   Assessment/Plan:     Neuro   Diabetes insipidus    Urine output increased hourly  260/ 330/ 625/ 575/ 530  U/A sp. Grav; Na Q6h  Sp grav. Down to 1.005/ Na 155, DDAVP 0.5mcg IV given 1830  IVFs changed .45NS @100cc/h  1930 sp grav 1.015  Will repeat Na and sp. Grav in 3H         Meningioma, recurrent of brain    NSGY Following  S/P  resection 6/7 POD1  Q1hr Neuro Checks  SBP <160  Decadron 4mg q6h  Gabapentin 300mg tid  Prn norco 5/325  Rocephin for drain PPX  PT/OT/SLP when able  CTH 6/8 Small amount of hyperdensity in suprasellar cistern and within the frontal horns of lateral ventricles likely represents postoperative hemorrhage.  Loculated hypodensity tracking along left inferior frontal lobes end of the suprasellar cistern likely represents packing material and postoperative seroma.        Depression    -amitriptyline 100mg Qhs        Pulmonary   Respiratory insufficiency    NC to room air O2 sats 97-98%  Maintain O2 sat >92  Prn CXR/ABG            Prophylaxis:  Venous Thromboembolism: mechanical  Stress Ulcer: PPI  Ventilator Pneumonia: not applicable     Activity Orders          Activity as tolerated starting at 06/08 1028        No Order     I have spent 35 min with this patient, with over 50% of this time spent coordinating care and speaking with the family    Merari Barajas NP  Neurocritical Care  [facility]

## 2017-06-09 NOTE — PROGRESS NOTES
ICU Attending Note  Neurocritical Care    E4V4M6  R pupil reactive but sluggish. L pupil unreactive, R and L eyes NLP.    -query Neurosurgery regarding need for MR brain, MR orbits +/- contrast and Ophthalmology consult given R eye vision loss  -dexamethasone 4 mg q6h  -haloperidol 2 mg IV q4h prn  -attempt to avoid dexmedetomidine  --160  -DDAVP 1 mcg IV now  -1 L 1/2 NS, increase 1/2 NS to 100 mL/h  -q6h Na, UA  -ceftriaxone per Neurosurgery  -remove AL, remove NGT  -heparin, pantoprazole prophylaxis    Goal: Continue postoperative management.

## 2017-06-09 NOTE — ASSESSMENT & PLAN NOTE
NSGY Following  S/P resection 6/7 POD1  Q1hr Neuro Checks  SBP <160  Decadron 4mg q6h  Gabapentin 300mg tid  Prn norco 5/325  Rocephin for drain PPX  PT/OT/SLP when able  CTH 6/8 Small amount of hyperdensity in suprasellar cistern and within the frontal horns of lateral ventricles likely represents postoperative hemorrhage.  Loculated hypodensity tracking along left inferior frontal lobes end of the suprasellar cistern likely represents packing material and postoperative seroma.

## 2017-06-10 PROBLEM — Z98.890 S/P CRANIOTOMY: Status: ACTIVE | Noted: 2017-01-01

## 2017-06-10 PROBLEM — T38.0X5A ADVERSE EFFECT OF GLUCOCORTICOID OR SYNTHETIC ANALOGUE: Status: ACTIVE | Noted: 2017-01-01

## 2017-06-10 NOTE — CONSULTS
[facility]  Endocrinology  Consult Note    Consult Requested by: Donald Chew MD   Reason for admit: <principal problem not specified>    HISTORY OF PRESENT ILLNESS:  Reason for Consult: Diabetes insipidus     Surgical Procedure and Date:   CRANIOTOMY 6/8/2017        HPI:   Patient is a 51 y.o. female with a diagnosis of  meningioma s/p resection (6/7). 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  She underwent partial resection of the tumor in January 2015 at [facility]. Followup scan done in 2016 showing a continued large meningioma.     Endocrinology consulted for post operative DI management.  Per the operative report - The pituitary stalk was identified and could be preserved      Medications and/or Treatments Impacting Glycemic Control:  Immunotherapy:    Immunosuppressants     None        Steroids:   Hormones     Start     Stop Route Frequency Ordered    06/09/17 1200  dexamethasone injection 4 mg      -- IV Every 6 hours 06/09/17 1019        Pressors:    Autonomic Drugs     None          Prescriptions Prior to Admission   Medication Sig Dispense Refill Last Dose    gabapentin (NEURONTIN) 300 MG capsule Take 1 capsule (300 mg total) by mouth 3 (three) times daily. 90 capsule 8 6/6/2017 at 0900    hydrocodone-acetaminophen 5-325mg (NORCO) 5-325 mg per tablet Take 1 tablet by mouth every 12 (twelve) hours as needed (severe pain). 60 tablet 0 6/6/2017 at 1200    amitriptyline (ELAVIL) 100 MG tablet Take 1 tablet (100 mg total) by mouth nightly. 90 tablet 3 6/5/2017    EUCALYPTUS OIL/MENTHOL/CAMPHOR (VICKS VAPORUB TOP) Apply topically daily as needed.   Taking       Current Facility-Administered Medications   Medication Dose Route Frequency Provider Last Rate Last Dose    0.45% NaCl infusion   Intravenous Continuous Quan Burciaga  mL/hr at 06/10/17 0902       acetaminophen suppository 650 mg  650 mg Rectal Q6H PRN Andrew Balderrama MD        acetaminophen tablet 650 mg  650 mg Oral Q6H PRN Andrew Balderrama MD        acetaminophen tablet 650 mg  650 mg Oral Q4H PRN Andrew Balderrama MD        amitriptyline tablet 100 mg  100 mg Oral Nightly Andrew Bladerrama MD   Stopped at 06/09/17 2100    cefTRIAXone (ROCEPHIN) 1 g in dextrose 5 % 50 mL IVPB  1 g Intravenous Q12H Andrew Balderrama MD   1 g at 06/10/17 0813    dexamethasone injection 4 mg  4 mg Intravenous Q6H Quan Burciaga MD   4 mg at 06/10/17 0607    gabapentin capsule 300 mg  300 mg Oral TID Andrew Balderrama MD   Stopped at 06/09/17 2200    haloperidol lactate injection 2 mg  2 mg Intravenous Q4H PRN Merari Barajas NP   2 mg at 06/10/17 0230    heparin (porcine) injection 5,000 Units  5,000 Units Subcutaneous Q8H Merari Barajas NP   5,000 Units at 06/10/17 0607    hydrALAZINE injection 10 mg  10 mg Intravenous Q1H PRN Andrew Balderrama MD   10 mg at 06/09/17 2218    hydrocodone-acetaminophen 5-325mg per tablet 1 tablet  1 tablet Oral Q4H PRN Andrew Balderrama MD   1 tablet at 06/08/17 0338    magnesium sulfate 2g in water 50mL IVPB (premix)  2 g Intravenous PRN ALANNAH Savage        magnesium sulfate 2g in water 50mL IVPB (premix)  4 g Intravenous PRN ALANNAH Savage        metoclopramide HCl injection 5 mg  5 mg Intravenous Q6H PRN Andrew Balderrama MD        metoclopramide HCl injection 5 mg  5 mg Intravenous Q6H PRN Andrew Balderrama MD        pantoprazole injection 40 mg  40 mg Intravenous Daily Andrew Balderrama MD   40 mg at 06/09/17 0830    potassium chloride 10% solution 40 mEq  40 mEq Oral PRN ALANNAH Savage        potassium chloride 10% solution 40 mEq  40 mEq Oral PRN ALANNAH Savage        potassium chloride 10% solution 60 mEq  60 mEq Oral PRN ALANNAH Savage        potassium, sodium phosphates 280-160-250 mg packet 2 packet  2 packet Oral PRN Dinorah CROCKER  ALANNAH Hinton        potassium, sodium phosphates 280-160-250 mg packet 2 packet  2 packet Oral PRN ALANNAH Savage        potassium, sodium phosphates 280-160-250 mg packet 2 packet  2 packet Oral PRN ALANNAH Savage           Interval HPI:   Started on DDAVP 1 mcg IV -  6/8, 6/9, 610 once a day   Also on dexamethasone 4 mg IV   sodium remains b/t 150 - 157, today 156  Sp gravity 1000  UO remains over 3.5L ( 6L - 5.4L)   Currently NPO - pending swallow eval   S/p 1/2 NS yesterday - 1 L      PMH, PSH, FH, SH updated and reviewed     Review of Systems unable to obtain due to alter mental status. Reviewed ROS from time of admission         PHYSICAL EXAMINATION:  Vitals:    06/10/17 0802   BP: 138/63   Pulse: 62   Resp: 20   Temp:      Body mass index is 23.87 kg/m².    Physical Exam   PHYSICAL EXAMINATION  Constitutional:  Well developed, well nourished,laying in bed appears anxious/ agitated    ENT: External ears no masses with nose patent; normal hearing.   Neck:  Supple; trachea midline; no thyromegaly.   Cardiovascular: Normal heart sounds, no LE edema.     Lungs:  Normal effort; lungs anterior bilaterally clear to auscultation.  Abdomen:  Soft, no masses,  no hernias.  MS: No clubbing or cyanosis of nails noted; unable to assess gait.  Skin: No rashes, lesions, or ulcers; no nodules.  Psychiatric: unable to assess  Neurological: unable to assess  .     ASSESSMENT and PLAN:    Diabetes insipidus    S/p craniotomy 6/8/2017   Current free water deficit is 3.5 L   Recommend replacing free water deficit -- 5 percent dextrose in water, intravenously, at a rate of 80 cc/hr   Recommend intranasal DDAVP 10 mcg BID  Check sodium and urine osmo and sp. Gravity  Q6 for now  Strict I/O and daily measured weight.   Needs to replace fluids 1st and will adjust DDAVP based on response     Discussed with RN and primary team           Adverse effect of glucocorticoid or synthetic analogue    Can  increase BG          S/P craniotomy    On 6/8/2017 now with DI as in #1   Currently on dexamethasone 4 mg Q6 hr   Recommend taper to HC 10 mg in the am and 5 mg pm as clinically tolerated   Nl TSH in the past   Recommend repeat TSH ( may not be reliable ) and FT4     Will need follow up with endocrinology.     Discussed with RN and primary team           Meningioma, recurrent of brain    S/p surgery   Per primary team                DISCHARGE NEEDS: will assess daily    Umu Curry DO  Endocrinology  [facility] ORTIZ, Linda Carson MD,  have personally taken the history and examined the patient and agree with the resident's note as stated above.    hypernatremia due to DI - unclear if will be persistent or transient  Needs free water to normalize na levels - unclear if adequate thirst present   Will give prn ddavp if uop > 250 x 2 consecutive hours     On glucocorticoids for HPA coverage   Assess thyroid in 1 week -tsh and ft4

## 2017-06-10 NOTE — ASSESSMENT & PLAN NOTE
On 6/8/2017 now with DI as in #1   Currently on dexamethasone 4 mg Q6 hr   Recommend taper to HC 10 mg in the am and 5 mg pm as clinically tolerated   Nl TSH in the past   Recommend repeat TSH ( may not be reliable ) and FT4     Discussed with RN and primary team

## 2017-06-10 NOTE — PROGRESS NOTES
[facility]  Neurocritical Care  Progress Note    Admit Date: 6/7/2017  Service Date: 06/10/2017  Length of Stay: 3    Subjective:     Chief Complaint: <principal problem not specified>    History of Present Illness: 50yo F, presented to [facility] s/p menigioma resection (6/7). 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  She underwent operation in January 2015 on the [facility] service at [facility].  It   was apparently only possible to get a partial resection of the tumor.  She did   have a followup scan done in 2016 showing a continued large meningioma in this   area.  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  She had a   followup MRI scan done at [facility] on 01/20/17, showing this large tumor.  She   was seen by you in Ophthalmology and then referred to Neurosurgery for further   evaluation.       Hospital Course: 6/7 S/P menengioma resection  6/8 D.I monitoring Q6h sp.grav and Na. DDAVP0.5mcg IV x1  6/9 continue monitoring for D. I. Q 6h  Na/ sp. grav. Urine output >250; Na >145 sp. Grav. 1.005.  DDAVP 1mcg IV; 1L bolus .45NS x2   Endocrinology consulted for diabetes Insipidus. Agitated/restless. Haldol prn started. Repeat CTH today per NSGY  6/10 Required DDAVP 1mcg IV early a.m. For U.O > 250cc and sp. Grav. 1.0000. Less restless today required only one dose of haldol.    Interval History: Required DDAVP 1mcg IV early a.m. For U.O > 250cc and sp. Grav. 1.0000. Less restless today required only one dose of haldol.    Review of Systems: JAVIER 2/2 encephalopathy      Vitals:   Temp: (!) 95.6 °F (35.3 °C) (06/10/17 1502)  Pulse: 74 (06/10/17 1702)  Resp: 16 (06/10/17 1702)  BP: 128/66 (06/10/17 1702)  SpO2: 100 % (06/10/17 1702)    Temp:  [95.6 °F (35.3 °C)-98.1 °F (36.7 °C)]  95.6 °F (35.3 °C)  Pulse:  [] 74  Resp:  [12-33] 16  SpO2:  [96 %-100 %] 100 %  BP: (112-155)/(56-98) 128/66              06/09 0701 - 06/10 0700  In: 4995 [I.V.:1545]  Out: 5430 [Urine:5330; Drains:100]     Examination:   Constitutional: Well-nourished and -developed. No apparent distress.   Eyes: Conjunctiva clear, anicteric. Lids no lesions.  Head/Ears/Nose/Mouth/Throat/Neck: Moist mucous membranes. External ears, nose atraumatic.   Cardiovascular: Regular rhythm. No murmurs. No leg edema.  Respiratory: Comfortable respirations. Clear to auscultation.  Gastrointestinal: No hernia. Soft, nondistended, nontender. + bowel sounds.    Neurologic:  -GCS [id number]  -Alert. disoriented to person, place, and time.. Follows commands.  -Cranial nerves II-XII grossly intact  ---R pupil 3 sluggish/ L pupil 3 slightly reactive. EOMI  -Motor 4/5 RUE/RLE; 4/5 LUE/LLE  -Sensation bilat intact to all extremities  Unable to test memory, judgment, insight, fund of knowledge, hearing, coordination, gait due to level of consciousness.       Medications:   Continuous  sodium chloride 0.45% Last Rate: 150 mL/hr at 06/10/17 1702   Scheduled  amitriptyline 100 mg Nightly   cefTRIAXone (ROCEPHIN) IVPB 1 g Q12H   dexamethasone 4 mg Q12H   gabapentin 300 mg TID   heparin (porcine) 5,000 Units Q8H   [START ON 6/11/2017] nicotine 1 patch Daily   pantoprazole 40 mg Daily   PRN  acetaminophen 650 mg Q6H PRN   acetaminophen 650 mg Q6H PRN   acetaminophen 650 mg Q4H PRN   calcium gluconate IVPB 1 g PRN   calcium gluconate IVPB 1 g PRN   calcium gluconate IVPB 1 g PRN   desmopressin 1 mcg PRN   haloperidol lactate 2 mg Q4H PRN   hydrALAZINE 10 mg Q1H PRN   hydrocodone-acetaminophen 5-325mg 1 tablet Q4H PRN   magnesium sulfate IVPB 2 g PRN   magnesium sulfate IVPB 4 g PRN   magnesium sulfate IVPB 2 g PRN   magnesium sulfate IVPB 4 g PRN   metoclopramide HCl 5 mg Q6H PRN   metoclopramide HCl 5 mg Q6H PRN   potassium chloride 40 mEq PRN   And      potassium chloride 60 mEq PRN   And     potassium chloride 80 mEq PRN   potassium chloride 10% 40 mEq PRN   potassium chloride 10% 40 mEq PRN   potassium chloride 10% 60 mEq PRN   potassium, sodium phosphates 2 packet PRN   potassium, sodium phosphates 2 packet PRN   potassium, sodium phosphates 2 packet PRN   sodium phosphate IVPB 15 mmol PRN   sodium phosphate IVPB 20.01 mmol PRN   sodium phosphate IVPB 30 mmol PRN      Today I independently reviewed pertinent medications, lines/drains/airways, imaging, lab results, microbiology results,    Assessment/Plan:     Neuro   Restlessness and agitation    Haldol 2mg Q4h prn restlessness  6/10 less restless today after 1 dose haldol        Diabetes insipidus    6/10 Urine output >250 x2h; sp. Grav. 1.00o Na 150  DDAVP 1mcg x1 given  IL .45NS bolus IV x1   increased maintenance .45NS 150cc/h  Endocrinology following for D.I  Urine Osmo; serum Osmo pending  Follow U/A sp. Grav; Na Q6h          Meningioma, recurrent of brain    NSGY Following  S/P resection 6/7 POD3  Q1hr Neuro Checks  SBP <160  Decadron 4mg q12, tapering  Gabapentin 300mg tid  Prn norco 5/325  Rocephin for drain PPX  PT/OT/SLP when able  CTH 6/9 Post-Op changes consistent with bilat. Craniotomy. No large residual mass identified, collection of air, blood and fluid at the site of the prior mass and underneath the frontal lobes along the surgical tract.and is decreasing from prior exam. Mass effect stable, ventricles stable, small hemorrhage in supersellar region stable. No hydrocephalus.         Depression    -amitriptyline 100mg Qhs        Pulmonary   Respiratory insufficiency    NC to room air O2 sats 97-98%  Maintain O2 sat >92  Prn CXR/ABG        Cardiac   Essential hypertension    SBP goal <160  cardene gtt off  Only on prn hydralazine.        Hem/Onc   Leukocytosis    WBC 17.4, trending down, afebrile  Monitor daily CBC  On ceftriaxone 1g Q12 while drain in place        Other   Normocytic anemia     Monitor daily CBC  H/H 12.3/36.7 stable        Vision loss of right eye     R eye vision loss s/p resection of meningioma without improvement in eye sight.   Per NSGY,Known that optic nerve injured during surgery and Will need time to heal.  Not requiring MRI or  Ophthalmology consult at present            Prophylaxis:  Venous Thromboembolism: mechanical chemical  Stress Ulcer: PPI  Ventilator Pneumonia: not applicable     Activity Orders          Activity as tolerated starting at 06/08 1028        No Order     I have spent 35 min with this patient, with over 50% of this time spent coordinating care and speaking with the family    Merari Barajas NP  Neurocritical Care  [facility]

## 2017-06-10 NOTE — ASSESSMENT & PLAN NOTE
NSGY Following  S/P resection 6/7 POD3  Q1hr Neuro Checks  SBP <160  Decadron 4mg q12, tapering  Gabapentin 300mg tid  Prn norco 5/325  Rocephin for drain PPX  PT/OT/SLP when able  CTH 6/9 Post-Op changes consistent with bilat. Craniotomy. No large residual mass identified, collection of air, blood and fluid at the site of the prior mass and underneath the frontal lobes along the surgical tract.and is decreasing from prior exam. Mass effect stable, ventricles stable, small hemorrhage in supersellar region stable. No hydrocephalus.

## 2017-06-10 NOTE — PROGRESS NOTES
Pts produced 375 ml of urine between 0100 and 0200.  Another 325 ml of urine was produced by 0230.  [facility] was contacted per order at 0239.  No new orders given awaiting results of Sodium and Urinalysis.

## 2017-06-10 NOTE — PLAN OF CARE
Problem: Patient Care Overview  Goal: Plan of Care Review  POC reviewed with pt at 0500. Pt verbalized understanding. Questions and concerns addressed. No acute events overnight. Pt progressing toward goals. Will continue to monitor. See flowsheets for full assessment and VS info

## 2017-06-10 NOTE — SUBJECTIVE & OBJECTIVE
Interval HPI:   Started on DDAVP 1 mcg IV -  6/8, 6/9, 610 once a day   Also on dexamethasone 4 mg IV   sodium remains b/t 150 - 157, today 156  Sp gravity 1000  UO remains over 3.5L ( 6L - 5.4L)   Currently NPO - pending swallow eval   S/p 1/2 NS yesterday - 1 L      PMH, PSH, FH, SH updated and reviewed     Review of Systems unable to obtain due to alter mental status. Reviewed ROS from time of admission         PHYSICAL EXAMINATION:  Vitals:    06/10/17 0802   BP: 138/63   Pulse: 62   Resp: 20   Temp:      Body mass index is 23.87 kg/m².    Physical Exam   PHYSICAL EXAMINATION  Constitutional:  Well developed, well nourished,laying in bed appears anxious/ agitated    ENT: External ears no masses with nose patent; normal hearing.   Neck:  Supple; trachea midline; no thyromegaly.   Cardiovascular: Normal heart sounds, no LE edema.     Lungs:  Normal effort; lungs anterior bilaterally clear to auscultation.  Abdomen:  Soft, no masses,  no hernias.  MS: No clubbing or cyanosis of nails noted; unable to assess gait.  Skin: No rashes, lesions, or ulcers; no nodules.  Psychiatric: unable to assess  Neurological: unable to assess

## 2017-06-10 NOTE — SUBJECTIVE & OBJECTIVE
Interval History: no AE. AFVSS. Continued confusion/agitation.     Medications:  Continuous Infusions:   sodium chloride 0.45% 100 mL/hr at 06/10/17 1002     Scheduled Meds:   amitriptyline  100 mg Oral Nightly    cefTRIAXone (ROCEPHIN) IVPB  1 g Intravenous Q12H    desmopressin  10 mcg Nasal BID    dexamethasone  4 mg Intravenous Q12H    gabapentin  300 mg Oral TID    heparin (porcine)  5,000 Units Subcutaneous Q8H    pantoprazole  40 mg Intravenous Daily    sodium chloride  1,000 mL Intravenous Once     PRN Meds:acetaminophen, acetaminophen, acetaminophen, calcium gluconate IVPB, calcium gluconate IVPB, calcium gluconate IVPB, haloperidol lactate, hydrALAZINE, hydrocodone-acetaminophen 5-325mg, magnesium sulfate IVPB, magnesium sulfate IVPB, magnesium sulfate IVPB, magnesium sulfate IVPB, metoclopramide HCl, metoclopramide HCl, potassium chloride **AND** potassium chloride **AND** potassium chloride, potassium chloride 10%, potassium chloride 10%, potassium chloride 10%, potassium, sodium phosphates, potassium, sodium phosphates, potassium, sodium phosphates, sodium phosphate IVPB, sodium phosphate IVPB, sodium phosphate IVPB     Review of Systems  Objective:     Weight: 59.4 kg (130 lb 15.3 oz)  Body mass index is 23.95 kg/m².  Vital Signs (Most Recent):  Temp: 96.5 °F (35.8 °C) (06/10/17 0702)  Pulse: (!) 55 (06/10/17 0902)  Resp: (!) 22 (06/10/17 09)  BP: 116/72 (06/10/17 09)  SpO2: 100 % (06/10/17 09) Vital Signs (24h Range):  Temp:  [96.5 °F (35.8 °C)-98.9 °F (37.2 °C)] 96.5 °F (35.8 °C)  Pulse:  [] 55  Resp:  [15-33] 22  SpO2:  [96 %-100 %] 100 %  BP: (116-155)/(63-86) 116/72              Temp (24hrs), Av.9 °F (36.6 °C), Min:96.5 °F (35.8 °C), Max:98.9 °F (37.2 °C)           Date 06/10/17 07 - 17 0659   Shift 3975-8836 3728-1790 1455-0011 24 Hour Total   I  N  T  A  K  E   I.V.  (mL/kg) 413.3  (7)   413.3  (7)    IV Piggyback 50   50    Shift Total  (mL/kg) 463.3  (7.8)    "463.3  (7.8)   O  U  T  P  U  T   Urine  (mL/kg/hr) 265   265    Shift Total  (mL/kg) 265  (4.5)   265  (4.5)   Weight (kg) 59.4 59.4 59.4 59.4          Closed/Suction Drain 06/07/17 1800 Anterior;Left Scalp Accordion 10 Fr. (Active)   Site Description Unable to view 6/10/2017  7:02 AM   Dressing Type [place] 6/10/2017  7:02 AM   Dressing Status Clean;Dry;Intact 6/10/2017  7:02 AM   Dressing Intervention Dressing reinforced 6/10/2017  7:02 AM   Drainage Serosanguineous 6/10/2017  7:02 AM   Status To bulb suction 6/10/2017  7:02 AM   Output (mL) 30 mL 6/10/2017  6:00 AM            Urethral Catheter 06/07/17 0823 Straight-tip;Non-latex 16 Fr. (Active)   Site Assessment Clean;Intact 6/10/2017  7:02 AM   Collection Container Urimeter 6/10/2017  7:02 AM   Securement Method secured to top of thigh w/ adhesive device 6/10/2017  7:02 AM   Catheter Care Performed yes 6/10/2017  7:02 AM   Reason for Continuing Urinary Catheterization Critically ill in ICU requiring intensive monitoring 6/10/2017  7:02 AM   CAUTI Prevention Bundle StatLock in place w 1" slack;Intact seal between catheter & drainage tubing;Drainage bag off the floor;Green sheeting clip in use;No dependent loops or kinks;Drainage bag not overfilled (<2/3 full);Drainage bag below bladder 6/10/2017  7:02 AM   Output (mL) 40 mL 6/10/2017  9:02 AM       Neurosurgery Physical Exam  AA, confused NAD.   No light perception in R eye. Stable L eye blindness.   Pupils nonreactive. EOMI  ALEMAN spontaneously.     Significant Labs:    Recent Labs  Lab 06/10/17  0322 06/10/17  0924     --    *  156* 158*   K 3.2*  --    *  --    CO2 24  --    BUN 13  --    CREATININE 0.6  --    CALCIUM 10.2  --    MG 2.5  --        Recent Labs  Lab 06/09/17  0302 06/10/17  0322   WBC 22.97* 17.35*   HGB 12.3 12.7   HCT 36.7* 38.0    344       Recent Labs  Lab 06/10/17  0322   INR 0.9   APTT 23.8     Microbiology Results (last 7 days)     ** No results found for " the last 168 hours. **        ABGs: No results for input(s): PH, PCO2, PO2, HCO3, POCSATURATED, BE in the last 48 hours.  Cardiac markers: No results for input(s): CKMB, CPKMB, TROPONINT, TROPONINI, MYOGLOBIN in the last 48 hours.  CMP:   Recent Labs  Lab 06/10/17  0322 06/10/17  0924     --    CALCIUM 10.2  --    ALBUMIN 3.8  --    PROT 7.9  --    *  156* 158*   K 3.2*  --    CO2 24  --    *  --    BUN 13  --    CREATININE 0.6  --    ALKPHOS 74  --    ALT 58*  --    AST 46*  --    BILITOT 0.3  --      CRP: No results for input(s): CRP in the last 48 hours.  ESR: No results for input(s): POCESR in the last 48 hours.  LFTs:   Recent Labs  Lab 06/10/17  0322   ALT 58*   AST 46*   ALKPHOS 74   BILITOT 0.3   PROT 7.9   ALBUMIN 3.8     Procalcitonin: No results for input(s): PROCAL in the last 48 hours.    Significant Diagnostics:  CT: Ct Head Without Contrast    Result Date: 6/9/2017   Evolving postsurgical changes of recent large planum sphenoidale meningioma resection as above. No new hemorrhage or infarct. ______________________________________ Electronically signed by resident: JASKARAN MCFADDEN MD Date:     06/09/17 Time:    14:52 As the supervising and teaching physician, I personally reviewed the images and resident's interpretation and I agree with the findings. Electronically signed by: SHEA NUÑEZ MD Date:     06/09/17 Time:    15:49

## 2017-06-10 NOTE — HOSPITAL COURSE
6/7: to OR for L frontotemporal crani  6/8: R eye blindness postop  6/9: Endocrone consulted by NCC. DDAVP given x1.   6/10: continued ICU care. CT head yesterday stable.   6/12: NAEON  6/15: Pt lethargic overnight prompting CT head which was stable.  6/16: Mental status continues to wax/wane.  CT head overnight which was stable from prior.  6/17: TTF  6/19: Pt required 1 dose of DDAVP this afternoon.    6/20: [facility] denied.  [facility] consulted.   6/21: Increased Na and UO overnight.  Pt required DDAVP x 1 this AM.   6/22: Na - 157, 154 today.  UO and SG are stable.  DDAVP Scheduled QHS.   6/23: Na trending down, 151 today.  6/26: Na 140, 142. [facility].

## 2017-06-10 NOTE — PLAN OF CARE
Problem: Patient Care Overview  Goal: Plan of Care Review  Outcome: Revised  POC reviewed with Ms. Morales and son at 1300. Pt's son verbalized understanding. Questions and concerns addressed. No acute events today. UO has been 60-100mls/hr. Haldol given x 1 for agitation. HUMZA screen performed, pt did well with pudding consistency, aspiration precaution noted with thin liquids, so Merari, NP with NCC ok'd po meds crushed and given with pudding until SLP evaluates and clear pt for a diet. Pt progressing toward goals. Will continue to monitor. See flowsheets for full assessment and VS info.

## 2017-06-10 NOTE — PROGRESS NOTES
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  She underwent operation in January 2015 on the [facility] service at [facility].  It   was apparently only possible to get a partial resection of the tumor.  She did   have a followup scan done in 2016 showing a continued large meningioma in this   area.  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  She had a   followup MRI scan done at [facility] on 01/20/17, showing this large tumor.  She   was seen by you in Ophthalmology and then referred to Neurosurgery for further   evaluation.  At this point, she has some preserved vision in the right eye.  She   complains of constant headaches.  She has no sense of smell.  She feels off   balance.  She is taking Neurontin apparently for seizure prevention. Past   medical history relates primarily to the present illness.  She does not have   chronic medical illnesses such as diabetes or hypertension.  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  She is  and has good family support.     On physical examination, she is a well-developed, well-nourished white lady, who   is alert and oriented.  Examination of the head shows a somewhat anterior, but   well-healed bicoronal incision.  She says that this is a little tender in the   left frontal area.  Eyes show full extraocular movements.  The pupils are small,   the left is not reactive.  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  The right optic nerve is probably normal.  She   sees things as doubles.  When I held up two  fingers she counted four, although   she knew that there were two.  Hearing seems preserved.  The neck is supple.  On   neurological examination, she is speaking clearly.  She is obviously anxious   and depressed over her illness.  Finger-to-nose is somewhat diminished on the   left side and gait was mildly unsteady.  Cranial nerves are otherwise intact.    She has normal facial sensation and movement.  The tongue protrudes in the   midline.  Strength in the extremities seems generally good, sensation normal and   deep tendon reflexes symmetrical.     MRI of the brain performed at [facility] on 01/20/17, shows a   previous bifrontal craniotomy.  There is a large lobular meningioma, which   occupies the sella, planum sphenoidale and extends back on to the clivus.  The   optic chiasm and optic nerves are not visualized.  The carotid arteries and   anterior cerebral arteries run done through the tumor.  The tumor elevates and   pushes the third ventricle back, but there is no hydrocephalus    Post-Op Info:  Procedure(s) (LRB):  CRANIOTOMY WITH STEALTH (N/A)   3 Days Post-Op     Interval History: no AE. AFVSS. Continued confusion/agitation.     Medications:  Continuous Infusions:   sodium chloride 0.45% 100 mL/hr at 06/10/17 1002     Scheduled Meds:   amitriptyline  100 mg Oral Nightly    cefTRIAXone (ROCEPHIN) IVPB  1 g Intravenous Q12H    desmopressin  10 mcg Nasal BID    dexamethasone  4 mg Intravenous Q12H    gabapentin  300 mg Oral TID    heparin (porcine)  5,000 Units Subcutaneous Q8H    pantoprazole  40 mg Intravenous Daily    sodium chloride  1,000 mL Intravenous Once     PRN Meds:acetaminophen, acetaminophen, acetaminophen, calcium gluconate IVPB, calcium gluconate IVPB, calcium gluconate IVPB, haloperidol lactate, hydrALAZINE, hydrocodone-acetaminophen 5-325mg, magnesium sulfate IVPB, magnesium sulfate IVPB, magnesium sulfate IVPB, magnesium sulfate IVPB, metoclopramide HCl,  metoclopramide HCl, potassium chloride **AND** potassium chloride **AND** potassium chloride, potassium chloride 10%, potassium chloride 10%, potassium chloride 10%, potassium, sodium phosphates, potassium, sodium phosphates, potassium, sodium phosphates, sodium phosphate IVPB, sodium phosphate IVPB, sodium phosphate IVPB     Review of Systems  Objective:     Weight: 59.4 kg (130 lb 15.3 oz)  Body mass index is 23.95 kg/m².  Vital Signs (Most Recent):  Temp: 96.5 °F (35.8 °C) (06/10/17 0702)  Pulse: (!) 55 (06/10/17 0902)  Resp: (!) 22 (06/10/17 0902)  BP: 116/72 (06/10/17 0902)  SpO2: 100 % (06/10/17 0902) Vital Signs (24h Range):  Temp:  [96.5 °F (35.8 °C)-98.9 °F (37.2 °C)] 96.5 °F (35.8 °C)  Pulse:  [] 55  Resp:  [15-33] 22  SpO2:  [96 %-100 %] 100 %  BP: (116-155)/(63-86) 116/72              Temp (24hrs), Av.9 °F (36.6 °C), Min:96.5 °F (35.8 °C), Max:98.9 °F (37.2 °C)           Date 06/10/17 0700 - 17 0659   Shift 8931-5539 0476-8939 4608-1118 24 Hour Total   I  N  T  A  K  E   I.V.  (mL/kg) 413.3  (7)   413.3  (7)    IV Piggyback 50   50    Shift Total  (mL/kg) 463.3  (7.8)   463.3  (7.8)   O  U  T  P  U  T   Urine  (mL/kg/hr) 265   265    Shift Total  (mL/kg) 265  (4.5)   265  (4.5)   Weight (kg) 59.4 59.4 59.4 59.4          Closed/Suction Drain 17 1800 Anterior;Left Scalp Accordion 10 Fr. (Active)   Site Description Unable to view 6/10/2017  7:02 AM   Dressing Type [place] 6/10/2017  7:02 AM   Dressing Status Clean;Dry;Intact 6/10/2017  7:02 AM   Dressing Intervention Dressing reinforced 6/10/2017  7:02 AM   Drainage Serosanguineous 6/10/2017  7:02 AM   Status To bulb suction 6/10/2017  7:02 AM   Output (mL) 30 mL 6/10/2017  6:00 AM            Urethral Catheter 17 0823 Straight-tip;Non-latex 16 Fr. (Active)   Site Assessment Clean;Intact 6/10/2017  7:02 AM   Collection Container Urimeter 6/10/2017  7:02 AM   Securement Method secured to top of thigh w/ adhesive device  "6/10/2017  7:02 AM   Catheter Care Performed yes 6/10/2017  7:02 AM   Reason for Continuing Urinary Catheterization Critically ill in ICU requiring intensive monitoring 6/10/2017  7:02 AM   CAUTI Prevention Bundle StatLock in place w 1" slack;Intact seal between catheter & drainage tubing;Drainage bag off the floor;Green sheeting clip in use;No dependent loops or kinks;Drainage bag not overfilled (<2/3 full);Drainage bag below bladder 6/10/2017  7:02 AM   Output (mL) 40 mL 6/10/2017  9:02 AM       Neurosurgery Physical Exam  AA, confused NAD.   No light perception in R eye. Stable L eye blindness.   Pupils nonreactive. EOMI  ALEMAN spontaneously.     Significant Labs:    Recent Labs  Lab 06/10/17  0322 06/10/17  0924     --    *  156* 158*   K 3.2*  --    *  --    CO2 24  --    BUN 13  --    CREATININE 0.6  --    CALCIUM 10.2  --    MG 2.5  --        Recent Labs  Lab 06/09/17  0302 06/10/17  0322   WBC 22.97* 17.35*   HGB 12.3 12.7   HCT 36.7* 38.0    344       Recent Labs  Lab 06/10/17  0322   INR 0.9   APTT 23.8     Microbiology Results (last 7 days)     ** No results found for the last 168 hours. **        ABGs: No results for input(s): PH, PCO2, PO2, HCO3, POCSATURATED, BE in the last 48 hours.  Cardiac markers: No results for input(s): CKMB, CPKMB, TROPONINT, TROPONINI, MYOGLOBIN in the last 48 hours.  CMP:   Recent Labs  Lab 06/10/17  0322 06/10/17  0924     --    CALCIUM 10.2  --    ALBUMIN 3.8  --    PROT 7.9  --    *  156* 158*   K 3.2*  --    CO2 24  --    *  --    BUN 13  --    CREATININE 0.6  --    ALKPHOS 74  --    ALT 58*  --    AST 46*  --    BILITOT 0.3  --      CRP: No results for input(s): CRP in the last 48 hours.  ESR: No results for input(s): POCESR in the last 48 hours.  LFTs:   Recent Labs  Lab 06/10/17  0322   ALT 58*   AST 46*   ALKPHOS 74   BILITOT 0.3   PROT 7.9   ALBUMIN 3.8     Procalcitonin: No results for input(s): PROCAL in the last 48 " hours.    Significant Diagnostics:  CT: Ct Head Without Contrast    Result Date: 6/9/2017   Evolving postsurgical changes of recent large planum sphenoidale meningioma resection as above. No new hemorrhage or infarct. ______________________________________ Electronically signed by resident: JASKARAN MCFADDEN MD Date:     06/09/17 Time:    14:52 As the supervising and teaching physician, I personally reviewed the images and resident's interpretation and I agree with the findings. 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 Plan to wean starting Monday.   Cont HV drain, ppx Abx while in place.  CV- SBP < 160  Pulm- on RA.   FENGI- goal eunatremia. Endocrine consulted; recommend replacing free water deficit -- 5 percent dextrose in water, intravenously, at a rate of 80 cc/hr, intranasal DDAVP 10 mcg BID. F/u serum and urine studies q6 hours.   Heme/ID- afebrile. hgb/hct stable.   ppx- ANA/SCD's. Gi ppx. Sqh.   PT, OT, ST.   Dispo- cont ICU care            Andrew Balderrama MD  Neurosurgery  [facility]

## 2017-06-10 NOTE — ASSESSMENT & PLAN NOTE
S/p craniotomy 6/8/2017   Current free water deficit is 3.5 L   Recommend replacing free water deficit -- 5 percent dextrose in water, intravenously, at a rate of 80 cc/hr   Recommend intranasal DDAVP 10 mcg BID  Check sodium and urine osmo and sp. Gravity  Q6 for now  Strict I/O and daily measured weight.   Needs to replace fluids 1st and will adjust DDAVP based on response     Discussed with RN and primary team

## 2017-06-10 NOTE — ASSESSMENT & PLAN NOTE
52 y/o F s/p crani resection olfactory groove meningioma POD#3    Neuro stable  Cont neuro checks  Cont dex4q6. Plan to wean starting Monday.   Cont HV drain, ppx Abx while in place.  CV- SBP < 160  Pulm- on RA.   FENGI- goal eunatremia. Endocrine consulted; recommend replacing free water deficit -- 5 percent dextrose in water, intravenously, at a rate of 80 cc/hr, intranasal DDAVP 10 mcg BID. F/u serum and urine studies q6 hours.   Heme/ID- afebrile. hgb/hct stable.   ppx- ANA/SCD's. Gi ppx. Sqh.   PT, OT, ST.   Dispo- cont ICU care

## 2017-06-10 NOTE — ASSESSMENT & PLAN NOTE
6/10 Urine output >250 x2h; sp. Grav. 1.00o Na 150  DDAVP 1mcg x1 given  IL .45NS bolus IV x1   increased maintenance .45NS 150cc/h  Endocrinology following for D.I  Urine Osmo; serum Osmo pending  Follow U/A sp. Grav; Na Q6h

## 2017-06-10 NOTE — PROGRESS NOTES
ICU Attending Note  Neurocritical Care    E3V4M6    -dexamethasone  -haloperidol prn  --160  -appreciate Endocrinology consult  -DDAVP intranasal 10 mcg q12h prn  -q6h Na, UA  -1 L 1/2 NS  -1/2  mL/h  -prophylaxis    Goal: Continue postoperative meningioma management.

## 2017-06-10 NOTE — HPI
Reason for Consult: Diabetes insipidus     Surgical Procedure and Date:   CRANIOTOMY 6/8/2017        HPI:   Patient is a 51 y.o. female with a diagnosis of  meningioma s/p resection (6/7). 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  She underwent partial resection of the tumor in January 2015 at [facility]. Followup scan done in 2016 showing a continued large meningioma.     Endocrinology consulted for post operative DI management.  Per the operative report - The pituitary stalk was identified and could be preserved

## 2017-06-10 NOTE — HPI
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  She underwent operation in January 2015 on the [facility] service at [facility].  It was apparently only possible to get a partial resection of the tumor.  She did   have a followup scan done in 2016 showing a continued large meningioma in this area.  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  She had a followup MRI scan done at [facility] on 01/20/17, showing this large tumor.  She was seen by you in Ophthalmology and then referred to Neurosurgery for further evaluation.  At this point, she has some preserved vision in the right eye.  She complains of constant headaches.  She has no sense of smell.  She feels off balance.  She is taking Neurontin apparently for seizure prevention. Past medical history relates primarily to the present illness.  She does not have chronic medical illnesses such as diabetes or hypertension.  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  She is  and has good family support.     On physical examination, she is a well-developed, well-nourished white lady, who   is alert and oriented.  Examination of the head shows a somewhat anterior, but   well-healed bicoronal incision.  She says that this is a little tender in the   left frontal area.  Eyes show full extraocular movements.  The pupils are small,   the left is not reactive.  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  The right optic nerve is probably normal.  She   sees things as doubles.  When I held up two fingers she counted four, although   she knew that there were two.  Hearing seems preserved.  The neck is supple.  On   neurological  examination, she is speaking clearly.  She is obviously anxious   and depressed over her illness.  Finger-to-nose is somewhat diminished on the   left side and gait was mildly unsteady.  Cranial nerves are otherwise intact.    She has normal facial sensation and movement.  The tongue protrudes in the   midline.  Strength in the extremities seems generally good, sensation normal and   deep tendon reflexes symmetrical.     MRI of the brain performed at [facility] on 01/20/17, shows a   previous bifrontal craniotomy.  There is a large lobular meningioma, which   occupies the sella, planum sphenoidale and extends back on to the clivus.  The   optic chiasm and optic nerves are not visualized.  The carotid arteries and   anterior cerebral arteries run done through the tumor.  The tumor elevates and   pushes the third ventricle back, but there is no hydrocephalus

## 2017-06-11 NOTE — PROGRESS NOTES
"[facility]  Endocrinology  Progress Note    Admit Date: 6/7/2017     Reason for Consult: Diabetes insipidus     Surgical Procedure and Date:   CRANIOTOMY 6/8/2017        HPI:   Patient is a 51 y.o. female with a diagnosis of  meningioma s/p resection (6/7). 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  She underwent partial resection of the tumor in January 2015 at [facility]. Followup scan done in 2016 showing a continued large meningioma.     Endocrinology consulted for post operative DI management.  Per the operative report - The pituitary stalk was identified and could be preserved      Interval HPI:   Started on DDAVP 1 mcg IV -  6/8, 6/9, 610 once a day   Last dose dDAVP 6/10/2017 at 6 am  Sodium this morning is 154     UO improving   Overnight UO < 250  Sp gravity went from 1.000--> 1.015   UO lat 24 hr -  2.6 L from 5.4L previous day.   Currently on  mls/hr   Started on regular diet.       Also on dexamethasone 4 mg IV     /60 (BP Location: Left arm, Patient Position: Lying, BP Method: Automatic)   Pulse 69   Temp 97.9 °F (36.6 °C) (Oral)   Resp 16   Ht 5' 2" (1.575 m)   Wt 60 kg (132 lb 4.4 oz)   SpO2 97%   Breastfeeding? No   BMI 24.19 kg/m²       Labs Reviewed and Include      Recent Labs  Lab 06/11/17  0125   GLU 81   CALCIUM 9.7   ALBUMIN 3.3*   PROT 7.1   *  154*   K 3.1*   CO2 25   *   BUN 15   CREATININE 0.6   ALKPHOS 72   ALT 67*   AST 51*   BILITOT 0.3     Lab Results   Component Value Date    WBC 10.44 06/11/2017    HGB 12.4 06/11/2017    HCT 37.7 06/11/2017    MCV 98 06/11/2017     06/11/2017       Recent Labs  Lab 06/10/17  0924   TSH 0.042*   FREET4 1.14     No results found for: HGBA1C    Nutritional status:   Body mass index is 24.19 kg/m².  Lab Results   Component Value Date    ALBUMIN 3.3 (L) 06/11/2017    ALBUMIN 3.8 " 06/10/2017    ALBUMIN 3.7 06/09/2017     No results found for: PREALBUMIN    Estimated Creatinine Clearance: 87.7 mL/min (based on Cr of 0.6).    Accu-Checks  No results for input(s): POCTGLUCOSE in the last 72 hours.    Current Medications and/or Treatments Impacting Glycemic Control  Immunotherapy:  Immunosuppressants     None        Steroids:   Hormones     Start     Stop Route Frequency Ordered    06/10/17 2100  dexamethasone injection 4 mg      -- IV Every 12 hours 06/10/17 1021    06/10/17 1159  desmopressin injection 1 mcg      -- SubQ As needed (PRN) 06/10/17 1100        Pressors:    Autonomic Drugs     None        Hyperglycemia/Diabetes Medications: Antihyperglycemics     None          ASSESSMENT and PLAN    Diabetes insipidus    S/p craniotomy 6/8/2017   Current free water deficit is 3 L   Consider replacing free water deficit -- 5% dextrose in water, intravenously, at a rate of 100 cc/hr   Continue prn ddavp if uop > 250 x 2 consecutive hours   Check sodium and urine osmo and sp. Gravity  Q6 for now  Strict I/O and daily measured weight.   Needs to replace fluids 1st   Discussed with RN and primary team   Started on diet today --> encourage free water intake          Adverse effect of glucocorticoid or synthetic analogue    Can increase BG          S/P craniotomy    On 6/8/2017 now with DI as in #1   Currently on dexamethasone 4 mg Q112 from Q6 hr   Recommend taper to HC 10 mg in the am and 5 mg pm as clinically tolerated   Nl TSH in the past  FT4 1.14            Meningioma, recurrent of brain    S/p surgery   Per primary team                Umu Curry DO  Endocrinology  [facility],  have personally taken the history and examined the patient and agree with the resident's note as stated above.    Increase  ivf 150 cc/hr  No need for scheduled ddavp at this time

## 2017-06-11 NOTE — PROGRESS NOTES
ICU Attending Note  Neurocritical Care    E3V4M6    -dexamethasone  -haloperidol prn  --160  -DDAVP intranasal 10 mcg q12h prn  -D5 1/2  mL/h  -checking q6h Na, UA  -UOP >250 mL x2 h, Na >145, u sp g <1.005  -ADAT  -prophylaxis    Goal: Continue DI management. Consider transfer to floor when okay by Neurosurgery.

## 2017-06-11 NOTE — SUBJECTIVE & OBJECTIVE
Interval History: no AE.     Medications:  Continuous Infusions:   sodium chloride 0.45% 100 mL/hr at 17 0800     Scheduled Meds:   amitriptyline  100 mg Oral Nightly    cefTRIAXone (ROCEPHIN) IVPB  1 g Intravenous Q12H    dexamethasone  4 mg Intravenous Q12H    gabapentin  300 mg Oral TID    heparin (porcine)  5,000 Units Subcutaneous Q8H    nicotine  1 patch Transdermal Daily    pantoprazole  40 mg Intravenous Daily     PRN Meds:acetaminophen, acetaminophen, acetaminophen, calcium gluconate IVPB, calcium gluconate IVPB, calcium gluconate IVPB, desmopressin, haloperidol lactate, hydrALAZINE, hydrocodone-acetaminophen 5-325mg, magnesium sulfate IVPB, magnesium sulfate IVPB, magnesium sulfate IVPB, magnesium sulfate IVPB, metoclopramide HCl, metoclopramide HCl, potassium chloride **AND** potassium chloride **AND** potassium chloride, potassium chloride 10%, potassium chloride 10%, potassium chloride 10%, potassium, sodium phosphates, potassium, sodium phosphates, potassium, sodium phosphates, sodium phosphate IVPB, sodium phosphate IVPB, sodium phosphate IVPB     Review of Systems  Objective:     Weight: 60 kg (132 lb 4.4 oz)  Body mass index is 24.19 kg/m².  Vital Signs (Most Recent):  Temp: 97.9 °F (36.6 °C) (17 0705)  Pulse: 69 (17 0800)  Resp: 16 (17 0800)  BP: 106/60 (17 0800)  SpO2: 97 % (17 0800) Vital Signs (24h Range):  Temp:  [95.6 °F (35.3 °C)-97.9 °F (36.6 °C)] 97.9 °F (36.6 °C)  Pulse:  [] 69  Resp:  [12-33] 16  SpO2:  [97 %-100 %] 97 %  BP: ()/(55-98) 106/60              Temp (24hrs), Av.7 °F (35.9 °C), Min:95.6 °F (35.3 °C), Max:97.9 °F (36.6 °C)           Date 17 0700 - 17 0659   Shift 7290-1204 7256-8787 6068-5978 24 Hour Total   I  N  T  A  K  E   I.V.  (mL/kg) 220  (3.7)   220  (3.7)    Shift Total  (mL/kg) 220  (3.7)   220  (3.7)   O  U  T  P  U  T   Urine  (mL/kg/hr) 102   102    Drains 0   0    Shift Total  (mL/kg)  "102  (1.7)   102  (1.7)   Weight (kg) 60 60 60 60          Closed/Suction Drain 06/07/17 1800 Anterior;Left Scalp Accordion 10 Fr. (Active)   Site Description Unable to view 6/11/2017  7:05 AM   Dressing Type [place] 6/11/2017  7:05 AM   Dressing Status Clean;Dry;Intact 6/11/2017  7:05 AM   Dressing Intervention Dressing reinforced 6/11/2017  7:05 AM   Drainage Serosanguineous 6/11/2017  7:05 AM   Status To bulb suction 6/11/2017  7:05 AM   Output (mL) 0 mL 6/11/2017  8:00 AM            Urethral Catheter 06/07/17 0823 Straight-tip;Non-latex 16 Fr. (Active)   Site Assessment Clean;Intact 6/11/2017  7:05 AM   Collection Container Urimeter 6/11/2017  7:05 AM   Securement Method secured to top of thigh w/ leg strap 6/11/2017  7:05 AM   Catheter Care Performed yes 6/11/2017  7:05 AM   Reason for Continuing Urinary Catheterization Critically ill in ICU requiring intensive monitoring 6/11/2017  7:05 AM   CAUTI Prevention Bundle StatLock in place w 1" slack;Intact seal between catheter & drainage tubing;Drainage bag off the floor;Green sheeting clip in use;No dependent loops or kinks;Drainage bag not overfilled (<2/3 full);Drainage bag below bladder 6/11/2017  7:05 AM   Output (mL) 70 mL 6/11/2017  8:00 AM       Neurosurgery Physical Exam  AA, confused NAD.   No light perception in R eye. Stable L eye blindness.   Pupils nonreactive. EOMI  ALEMAN spontaneously.      Significant Labs:    Recent Labs  Lab 06/11/17  0125   GLU 81   *  154*   K 3.1*   *   CO2 25   BUN 15   CREATININE 0.6   CALCIUM 9.7   MG 1.9       Recent Labs  Lab 06/10/17  0322 06/11/17  0125   WBC 17.35* 10.44   HGB 12.7 12.4   HCT 38.0 37.7    326       Recent Labs  Lab 06/11/17 0125   INR 1.0   APTT 22.8     Microbiology Results (last 7 days)     ** No results found for the last 168 hours. **        CMP:   Recent Labs  Lab 06/11/17  0125   GLU 81   CALCIUM 9.7   ALBUMIN 3.3*   PROT 7.1   *  154*   K 3.1*   CO2 25   * "   BUN 15   CREATININE 0.6   ALKPHOS 72   ALT 67*   AST 51*   BILITOT 0.3     All pertinent labs from the last 24 hours have been reviewed.    Significant Diagnostics:

## 2017-06-11 NOTE — ASSESSMENT & PLAN NOTE
52 y/o F s/p crani resection olfactory groove meningioma POD#4    Neuro stable  Cont neuro checks  [id number]. Plan to wean starting Monday to physiologic dosing as tolerated.  Pull HV drain today   CV- SBP < 160  Pulm- on RA.   FENGI- goal eunatremia. Endocrine on board. consulted; recommend replacing free water deficit -- 5 percent dextrose in water, intravenously, at a rate of 80 cc/hr, intranasal DDAVP 10 mcg BID. F/u serum and urine studies q6 hours.   Heme/ID- afebrile. hgb/hct stable.   ppx- ANA/SCD's. Gi ppx. Sqh.   PT, OT, ST.   Dispo- cont ICU care

## 2017-06-11 NOTE — ASSESSMENT & PLAN NOTE
On 6/8/2017 now with DI as in #1   Currently on dexamethasone 4 mg Q112 from Q6 hr   Recommend taper to HC 10 mg in the am and 5 mg pm as clinically tolerated   Nl TSH in the past  FT4 1.14

## 2017-06-11 NOTE — PROGRESS NOTES
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  She underwent operation in January 2015 on the [facility] service at [facility].  It   was apparently only possible to get a partial resection of the tumor.  She did   have a followup scan done in 2016 showing a continued large meningioma in this   area.  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  She had a   followup MRI scan done at [facility] on 01/20/17, showing this large tumor.  She   was seen by you in Ophthalmology and then referred to Neurosurgery for further   evaluation.  At this point, she has some preserved vision in the right eye.  She   complains of constant headaches.  She has no sense of smell.  She feels off   balance.  She is taking Neurontin apparently for seizure prevention. Past   medical history relates primarily to the present illness.  She does not have   chronic medical illnesses such as diabetes or hypertension.  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  She is  and has good family support.     On physical examination, she is a well-developed, well-nourished white lady, who   is alert and oriented.  Examination of the head shows a somewhat anterior, but   well-healed bicoronal incision.  She says that this is a little tender in the   left frontal area.  Eyes show full extraocular movements.  The pupils are small,   the left is not reactive.  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  The right optic nerve is probably normal.  She   sees things as doubles.  When I held up two  fingers she counted four, although   she knew that there were two.  Hearing seems preserved.  The neck is supple.  On   neurological examination, she is speaking clearly.  She is obviously anxious   and depressed over her illness.  Finger-to-nose is somewhat diminished on the   left side and gait was mildly unsteady.  Cranial nerves are otherwise intact.    She has normal facial sensation and movement.  The tongue protrudes in the   midline.  Strength in the extremities seems generally good, sensation normal and   deep tendon reflexes symmetrical.     MRI of the brain performed at [facility] on 01/20/17, shows a   previous bifrontal craniotomy.  There is a large lobular meningioma, which   occupies the sella, planum sphenoidale and extends back on to the clivus.  The   optic chiasm and optic nerves are not visualized.  The carotid arteries and   anterior cerebral arteries run done through the tumor.  The tumor elevates and   pushes the third ventricle back, but there is no hydrocephalus    Post-Op Info:  Procedure(s) (LRB):  CRANIOTOMY WITH STEALTH (N/A)   4 Days Post-Op     Interval History: no AE.     Medications:  Continuous Infusions:   sodium chloride 0.45% 100 mL/hr at 06/11/17 0800     Scheduled Meds:   amitriptyline  100 mg Oral Nightly    cefTRIAXone (ROCEPHIN) IVPB  1 g Intravenous Q12H    dexamethasone  4 mg Intravenous Q12H    gabapentin  300 mg Oral TID    heparin (porcine)  5,000 Units Subcutaneous Q8H    nicotine  1 patch Transdermal Daily    pantoprazole  40 mg Intravenous Daily     PRN Meds:acetaminophen, acetaminophen, acetaminophen, calcium gluconate IVPB, calcium gluconate IVPB, calcium gluconate IVPB, desmopressin, haloperidol lactate, hydrALAZINE, hydrocodone-acetaminophen 5-325mg, magnesium sulfate IVPB, magnesium sulfate IVPB, magnesium sulfate IVPB, magnesium sulfate IVPB, metoclopramide HCl, metoclopramide HCl, potassium chloride **AND** potassium chloride **AND**  potassium chloride, potassium chloride 10%, potassium chloride 10%, potassium chloride 10%, potassium, sodium phosphates, potassium, sodium phosphates, potassium, sodium phosphates, sodium phosphate IVPB, sodium phosphate IVPB, sodium phosphate IVPB     Review of Systems  Objective:     Weight: 60 kg (132 lb 4.4 oz)  Body mass index is 24.19 kg/m².  Vital Signs (Most Recent):  Temp: 97.9 °F (36.6 °C) (17 0705)  Pulse: 69 (17 0800)  Resp: 16 (17 0800)  BP: 106/60 (17 0800)  SpO2: 97 % (17 08) Vital Signs (24h Range):  Temp:  [95.6 °F (35.3 °C)-97.9 °F (36.6 °C)] 97.9 °F (36.6 °C)  Pulse:  [] 69  Resp:  [12-33] 16  SpO2:  [97 %-100 %] 97 %  BP: ()/(55-98) 106/60              Temp (24hrs), Av.7 °F (35.9 °C), Min:95.6 °F (35.3 °C), Max:97.9 °F (36.6 °C)           Date 17 07 - 17 0659   Shift 7293-0354 7638-3475 4917-5223 24 Hour Total   I  N  T  A  K  E   I.V.  (mL/kg) 220  (3.7)   220  (3.7)    Shift Total  (mL/kg) 220  (3.7)   220  (3.7)   O  U  T  P  U  T   Urine  (mL/kg/hr) 102   102    Drains 0   0    Shift Total  (mL/kg) 102  (1.7)   102  (1.7)   Weight (kg) 60 60 60 60          Closed/Suction Drain 17 1800 Anterior;Left Scalp Accordion 10 Fr. (Active)   Site Description Unable to view 2017  7:05 AM   Dressing Type [facility] 2017  7:05 AM   Dressing Status Clean;Dry;Intact 2017  7:05 AM   Dressing Intervention Dressing reinforced 2017  7:05 AM   Drainage Serosanguineous 2017  7:05 AM   Status To bulb suction 2017  7:05 AM   Output (mL) 0 mL 2017  8:00 AM            Urethral Catheter 17 0823 Straight-tip;Non-latex 16 Fr. (Active)   Site Assessment Clean;Intact 2017  7:05 AM   Collection Container Urimeter 2017  7:05 AM   Securement Method secured to top of thigh w/ leg strap 2017  7:05 AM   Catheter Care Performed yes 2017  7:05 AM   Reason for Continuing Urinary Catheterization  "Critically ill in ICU requiring intensive monitoring 6/11/2017  7:05 AM   CAUTI Prevention Bundle StatLock in place w 1" slack;Intact seal between catheter & drainage tubing;Drainage bag off the floor;Green sheeting clip in use;No dependent loops or kinks;Drainage bag not overfilled (<2/3 full);Drainage bag below bladder 6/11/2017  7:05 AM   Output (mL) 70 mL 6/11/2017  8:00 AM       Neurosurgery Physical Exam  AA, confused NAD.   No light perception in R eye. Stable L eye blindness.   Pupils nonreactive. EOMI  ALEMAN spontaneously.      Significant Labs:    Recent Labs  Lab 06/11/17  0125   GLU 81   *  154*   K 3.1*   *   CO2 25   BUN 15   CREATININE 0.6   CALCIUM 9.7   MG 1.9       Recent Labs  Lab 06/10/17  0322 06/11/17  0125   WBC 17.35* 10.44   HGB 12.7 12.4   HCT 38.0 37.7    326       Recent Labs  Lab 06/11/17  0125   INR 1.0   APTT 22.8     Microbiology Results (last 7 days)     ** No results found for the last 168 hours. **        CMP:   Recent Labs  Lab 06/11/17  0125   GLU 81   CALCIUM 9.7   ALBUMIN 3.3*   PROT 7.1   *  154*   K 3.1*   CO2 25   *   BUN 15   CREATININE 0.6   ALKPHOS 72   ALT 67*   AST 51*   BILITOT 0.3     All pertinent labs from the last 24 hours have been reviewed.    Significant Diagnostics:      Assessment/Plan:     Meningioma, recurrent of brain    52 y/o F s/p crani resection olfactory groove meningioma POD#4    Neuro stable  Cont neuro checks  [id number]. Plan to wean starting Monday to physiologic dosing as tolerated.  Pull HV drain today   CV- SBP < 160  Pulm- on RA.   FENGI- goal eunatremia. Endocrine on board. consulted; recommend replacing free water deficit -- 5 percent dextrose in water, intravenously, at a rate of 80 cc/hr, intranasal DDAVP 10 mcg BID. F/u serum and urine studies q6 hours.   Heme/ID- afebrile. hgb/hct stable.   ppx- ANA/SCD's. Gi ppx. Sqh.   PT, OT, ST.   Dispo- cont ICU care            Andrew Balderrama MD  Neurosurgery  [facility]

## 2017-06-11 NOTE — PT/OT/SLP EVAL
Speech Language Pathology  Evaluation    Joanna Morales   MRN: [id number]   Admitting Diagnosis: Headaches, vision deficits, meningioma    Diet recommendations: Solid Diet Level: NPO  Liquid Diet Level: NPO Ongoing swallow assessment by SLP      SLP Treatment Date: 06/11/17  Speech Start Time: 0830     Speech Stop Time: 0848     Speech Total (min): 18 min       TREATMENT BILLABLE MINUTES:  Eval Swallow and Oral Function 18    Diagnosis:Headaches, vision deficits, meningioma    Past Medical History:   Diagnosis Date    Allergy     Basal cell carcinoma     Depression 11/1/2016    Essential hypertension 6/9/2017    Eye disorder     Fever blister     Normocytic anemia 6/9/2017     Past Surgical History:   Procedure Laterality Date    BASAL CELL CARCINOMA EXCISION      forehead    BRAIN SURGERY      SKIN BIOPSY         Has the patient been evaluated by SLP for swallowing? : Yes  Keep patient NPO?: No   General Precautions: Standard, aspiration, fall, NPO          Social Hx: Patient lives with family    Prior diet: regular with thin liquids    Occupational/hobbies/homemaking: swimming.    Subjective:  Awake and alert in NAD  Patient goals: to get well.    Pain/Comfort  Pain Rating 1: 0/10  Pain Rating Post-Intervention 1: 0/10    Objective:        Oral Musculature Evaluation  Oral Musculature: general weakness  Dentition: present and adequate  Mandibular Strength and Mobility: WFL  Lingual Strength and Mobility: impaired strength  Volitional Cough: adequate  Volitional Swallow: prolonged with full excursion of the larynx noted to finger palpation  Voice Prior to PO Intake: Clear, low vocal quality     Bedside Swallow Eval:  Consistencies Assessed: Thin liquids (water via tsp), Puree (pudding via tsp x 4) and Solids cracker x1/4 piece)  Oral Phase: prolonged mastication, anterior loss, decreased closure around utensil and slow oral transit time  Pharyngeal Phase: coughing/choking post liquids. No overt s/s of  aspiration noted with puree or solids.     Additional Treatment:      FIM:  Social Interaction: 3 Moderate Direction--The patient interacts appropriately 50 to 74% of the time.                            Assessment:  Joanna Morales is a 51 y.o. female with a medical diagnosis of  Dysphagia and presents with risk for aspiration.    Do you have any cultural, spiritual, Latter day conflicts, given your current situation?: none      Discharge recommendations: Discharge Facility/Level Of Care Needs: rehabilitation facility     Goals:    SLP Goals        Problem: SLP Goal    Goal Priority Disciplines Outcome   SLP Goal     SLP    Description:  Speech Language Pathology Goals  Goals expected to be met by 6/16    1. Pt will participate in ongoing swallow assessment to rule out aspiration and determine least restrictive diet.                           Plan:   Patient to be seen Therapy Frequency: 5 x/week   Plan of Care expires: 07/10/17  Plan of Care reviewed with: patient, son  SLP Follow-up?: Yes         SLP G-Codes  Functional Assessment Tool Used: NOMS  Score: 2  Functional Limitations: Swallowing  Swallow Current Status (): CM  Swallow Goal Status (): ROSA Felix, HUGH-SLP  06/11/2017

## 2017-06-11 NOTE — ASSESSMENT & PLAN NOTE
S/p craniotomy 6/8/2017   Current free water deficit is 3 L   Consider replacing free water deficit -- 5% dextrose in water, intravenously, at a rate of 100 cc/hr   Continue prn ddavp if uop > 250 x 2 consecutive hours   Check sodium and urine osmo and sp. Gravity  Q6 for now  Strict I/O and daily measured weight.   Needs to replace fluids 1st   Discussed with RN and primary team   Started on diet today --> encourage free water intake

## 2017-06-11 NOTE — SUBJECTIVE & OBJECTIVE
"Interval HPI:   Started on DDAVP 1 mcg IV -  6/8, 6/9, 610 once a day   Last dose dDAVP 6/10/2017 at 6 am  Sodium this morning is 154     UO improving   Overnight UO < 250  Sp gravity went from 1.000--> 1.015   UO lat 24 hr -  2.6 L from 5.4L previous day.   Currently on  mls/hr   Started on regular diet.       Also on dexamethasone 4 mg IV     /60 (BP Location: Left arm, Patient Position: Lying, BP Method: Automatic)   Pulse 69   Temp 97.9 °F (36.6 °C) (Oral)   Resp 16   Ht 5' 2" (1.575 m)   Wt 60 kg (132 lb 4.4 oz)   SpO2 97%   Breastfeeding? No   BMI 24.19 kg/m²     Labs Reviewed and Include      Recent Labs  Lab 06/11/17  0125   GLU 81   CALCIUM 9.7   ALBUMIN 3.3*   PROT 7.1   *  154*   K 3.1*   CO2 25   *   BUN 15   CREATININE 0.6   ALKPHOS 72   ALT 67*   AST 51*   BILITOT 0.3     Lab Results   Component Value Date    WBC 10.44 06/11/2017    HGB 12.4 06/11/2017    HCT 37.7 06/11/2017    MCV 98 06/11/2017     06/11/2017       Recent Labs  Lab 06/10/17  0924   TSH 0.042*   FREET4 1.14     No results found for: HGBA1C    Nutritional status:   Body mass index is 24.19 kg/m².  Lab Results   Component Value Date    ALBUMIN 3.3 (L) 06/11/2017    ALBUMIN 3.8 06/10/2017    ALBUMIN 3.7 06/09/2017     No results found for: PREALBUMIN    Estimated Creatinine Clearance: 87.7 mL/min (based on Cr of 0.6).    Accu-Checks  No results for input(s): POCTGLUCOSE in the last 72 hours.    Current Medications and/or Treatments Impacting Glycemic Control  Immunotherapy:  Immunosuppressants     None        Steroids:   Hormones     Start     Stop Route Frequency Ordered    06/10/17 2100  dexamethasone injection 4 mg      -- IV Every 12 hours 06/10/17 1021    06/10/17 1159  desmopressin injection 1 mcg      -- SubQ As needed (PRN) 06/10/17 1100        Pressors:    Autonomic Drugs     None        Hyperglycemia/Diabetes Medications: Antihyperglycemics     None        "

## 2017-06-11 NOTE — PLAN OF CARE
Problem: Patient Care Overview  Goal: Plan of Care Review  Outcome: Ongoing (interventions implemented as appropriate)  POC reviewed with patient and son at 1500. No evidence of learning from the patient. Questions and concerns addressed with the son. U/A sent off. Patient turned per protocol. Per NCC team, D5 1/2 NS started at 100 ml. U/O > 250 for 2 hours in a row, RN administered PRN desmospressin. NCC team notified of the PRN medication. Patient progressing toward goals. Will continue to monitor. See flowsheets for full assessment and VS info.

## 2017-06-11 NOTE — PLAN OF CARE
Problem: SLP Goal  Goal: SLP Goal  Speech Language Pathology Goals  Goals expected to be met by 6/16    1. Pt will participate in ongoing swallow assessment to rule out aspiration and determine least restrictive diet.            Recommendation:    Clinical swallow evaluation completed. Rec: Continue NPO with meds only. Pt with increased lethargy and decreased awareness of bolus increasing risk for aspiration. SLP with continue to follow for assessment of safe swallow.    Selina Felix, Nae, CCC-SLP  Speech Language Pathologist  [phone]  6/11/2017

## 2017-06-12 NOTE — SUBJECTIVE & OBJECTIVE
"Interval HPI:     Started on DDAVP 1 mcg IV -  6/8, 6/9, 610 once a day, did not receive desmopressin on 6/11/17  Last dose dDAVP 6/10/2017 at 6 am  Sodium this morning is 144     Net negative 1.588 Liters on 6/11  Sp gravity went from 1.015---> 1.005  Started on regular diet.           /68   Pulse (!) 51   Temp 96.5 °F (35.8 °C) (Axillary)   Resp 12   Ht 5' 2" (1.575 m)   Wt 60 kg (132 lb 4.4 oz)   SpO2 100%   Breastfeeding? No   BMI 24.19 kg/m²     Labs Reviewed and Include      Recent Labs  Lab 06/12/17  0237   *   CALCIUM 9.7   ALBUMIN 3.3*   PROT 7.4     144  144   K 4.3   CO2 26      BUN 13   CREATININE 0.6   ALKPHOS 83   ALT 92*   AST 56*   BILITOT 0.3     Lab Results   Component Value Date    WBC 9.68 06/12/2017    HGB 12.9 06/12/2017    HCT 38.9 06/12/2017    MCV 97 06/12/2017     06/12/2017       Recent Labs  Lab 06/10/17  0924   TSH 0.042*   FREET4 1.14     No results found for: HGBA1C    Nutritional status:   Body mass index is 24.19 kg/m².  Lab Results   Component Value Date    ALBUMIN 3.3 (L) 06/12/2017    ALBUMIN 3.3 (L) 06/11/2017    ALBUMIN 3.8 06/10/2017     No results found for: PREALBUMIN    Estimated Creatinine Clearance: 87.7 mL/min (based on Cr of 0.6).    Accu-Checks  No results for input(s): POCTGLUCOSE in the last 72 hours.    Current Medications and/or Treatments Impacting Glycemic Control  Immunotherapy:  Immunosuppressants     None        Steroids:   Hormones     Start     Stop Route Frequency Ordered    06/13/17 0600  desmopressin nasal solution 10 mcg      -- Nasl Daily 06/12/17 0904    06/10/17 2100  dexamethasone injection 4 mg      -- IV Every 12 hours 06/10/17 1021    06/10/17 1159  desmopressin injection 1 mcg      -- SubQ As needed (PRN) 06/10/17 1100        Pressors:    Autonomic Drugs     None        Hyperglycemia/Diabetes Medications: Antihyperglycemics     None        "

## 2017-06-12 NOTE — PLAN OF CARE
EDUARDO received message from RN reporting Aga Hernandez: (friend) [phone] had wanted to meet with us. CM and SW went to Pt room but friend was not there.     SW went again later and she was not there.    SW contacted Pt friend/POA regarding her request. Discussed resource needs especially for visual needs. SW will complete referral to [facility] with the MD team.     Suki Lynn LMSW  Neurocritical Care   [facility]  [id number]

## 2017-06-12 NOTE — SUBJECTIVE & OBJECTIVE
Interval History: NAEON    Medications:  Continuous Infusions:   Scheduled Meds:   amitriptyline  100 mg Oral Nightly    [START ON 2017] desmopressin  10 mcg Nasal Daily    dexamethasone  4 mg Intravenous Q12H    gabapentin  300 mg Oral TID    heparin (porcine)  5,000 Units Subcutaneous Q8H    nicotine  1 patch Transdermal Daily    pantoprazole  40 mg Intravenous Daily     PRN Meds:acetaminophen, acetaminophen, acetaminophen, calcium gluconate IVPB, calcium gluconate IVPB, calcium gluconate IVPB, desmopressin, haloperidol lactate, hydrALAZINE, hydrocodone-acetaminophen 5-325mg, magnesium sulfate IVPB, magnesium sulfate IVPB, magnesium sulfate IVPB, magnesium sulfate IVPB, metoclopramide HCl, metoclopramide HCl, potassium chloride **AND** potassium chloride **AND** potassium chloride, potassium chloride 10%, potassium chloride 10%, potassium chloride 10%, potassium, sodium phosphates, potassium, sodium phosphates, potassium, sodium phosphates, ramelteon, sodium phosphate IVPB, sodium phosphate IVPB, sodium phosphate IVPB     Review of Systems  Objective:     Weight: 60 kg (132 lb 4.4 oz)  Body mass index is 24.19 kg/m².  Vital Signs (Most Recent):  Temp: 96.5 °F (35.8 °C) (17 0700)  Pulse: (!) 58 (17 0902)  Resp: (!) 22 (17)  BP: 119/80 (17 09)  SpO2: 100 % (17) Vital Signs (24h Range):  Temp:  [96.5 °F (35.8 °C)-98.1 °F (36.7 °C)] 96.5 °F (35.8 °C)  Pulse:  [] 58  Resp:  [12-33] 22  SpO2:  [100 %] 100 %  BP: (114-154)/(57-91) 119/80              Temp (24hrs), Av.5 °F (36.4 °C), Min:96.5 °F (35.8 °C), Max:98.1 °F (36.7 °C)           Date 17 0700 - 17 0659   Shift 7201-8441 6704-4387 1101-8603 24 Hour Total   I  N  T  A  K  E   I.V.  (mL/kg) 100  (1.7)   100  (1.7)    IV Piggyback 50   50    Shift Total  (mL/kg) 150  (2.5)   150  (2.5)   O  U  T  P  U  T   Urine  (mL/kg/hr) 300   300    Shift Total  (mL/kg) 300  (5)   300  (5)   Weight (kg) 60  "60 60 60          Closed/Suction Drain 06/07/17 1800 Anterior;Left Scalp Accordion 10 Fr. (Active)   Site Description Unable to view 6/12/2017  3:05 AM   Dressing Type [place] 6/12/2017  3:05 AM   Dressing Status Clean;Intact;Dry 6/12/2017  3:05 AM   Dressing Intervention Dressing reinforced 6/12/2017  3:05 AM   Drainage Serosanguineous 6/12/2017  3:05 AM   Status To bulb suction 6/12/2017  3:05 AM   Output (mL) 0 mL 6/11/2017 10:00 PM            Urethral Catheter 06/07/17 0823 Straight-tip;Non-latex 16 Fr. (Active)   Site Assessment Clean;Intact 6/12/2017  7:00 AM   Collection Container Urimeter 6/12/2017  7:00 AM   Securement Method secured to top of thigh w/ adhesive device 6/12/2017  7:00 AM   Catheter Care Performed yes 6/12/2017  7:00 AM   Reason for Continuing Urinary Catheterization Critically ill in ICU requiring intensive monitoring 6/12/2017  7:00 AM   CAUTI Prevention Bundle StatLock in place w 1" slack;Intact seal between catheter & drainage tubing;Drainage bag off the floor;Green sheeting clip in use;No dependent loops or kinks;Drainage bag not overfilled (<2/3 full);Drainage bag below bladder 6/12/2017  7:00 AM   Output (mL) 50 mL 6/12/2017  9:00 AM       Neurosurgery Physical Exam   AA, confused NAD.   No light perception in R eye. Stable L eye blindness.   Pupils nonreactive. EOMI  ALEMAN spontaneously.     Significant Labs:    Recent Labs  Lab 06/12/17  0237 06/12/17  0937   *  --      144  144 145   K 4.3  --      --    CO2 26  --    BUN 13  --    CREATININE 0.6  --    CALCIUM 9.7  --    MG 2.1  --        Recent Labs  Lab 06/11/17  0125 06/12/17  0237   WBC 10.44 9.68   HGB 12.4 12.9   HCT 37.7 38.9    322       Recent Labs  Lab 06/12/17 0237   INR 1.0   APTT 21.8     Microbiology Results (last 7 days)     ** No results found for the last 168 hours. **            Significant Diagnostics:    "

## 2017-06-12 NOTE — SUBJECTIVE & OBJECTIVE
Interval History:  6/11Continue to monitor for DI; bilateral blindness; Continue Decadron; Switched to d5+.45 NS @100  Review of Systems   Unable to obtain a complete ROS due to level of encephalopathy  Objective:     Vitals:  Temp: 98.1 °F (36.7 °C) (06/11/17 1905)  Pulse: 75 (06/11/17 2200)  Resp: 19 (06/11/17 2200)  BP: 126/60 (06/11/17 2200)  SpO2: 100 % (06/11/17 2200)    Temp:  [96.1 °F (35.6 °C)-98.1 °F (36.7 °C)] 98.1 °F (36.7 °C)  Pulse:  [] 75  Resp:  [12-33] 19  SpO2:  [97 %-100 %] 100 %  BP: ()/(55-85) 126/60              06/10 0701 - 06/11 0700  In: 4625 [I.V.:2775]  Out: 2160 [Urine:2140; Drains:20]    Physical Exam  Constitutional: Well-nourished and -developed. No apparent distress.   Eyes: Conjunctiva clear, anicteric. Lids no lesions.  Head/Ears/Nose/Mouth/Throat/Neck: Moist mucous membranes. External ears, nose atraumatic.   Cardiovascular: Regular rhythm. No murmurs. No leg edema.  Respiratory: Comfortable respirations. Clear to auscultation.  Gastrointestinal: No hernia. Soft, nondistended, nontender. + bowel sounds.     Neurologic:  -GCS [id number]  -Alert. Disoriented, confused Follows commands.  -Cranial nerves II-XII grossly intact  ---R pupil 3 sluggish/ L pupil 3 slightly reactive. EOMI  -Motor 4/5 RUE/RLE; 4/5 LUE/LLE  -Sensation bilat intact to all extremities  -- Bilateral blindness  Unable to test memory, judgment, insight, fund of knowledge, hearing, coordination, gait due to level of consciousness.          Unable to test memory, gait due to level of consciousness.    Aristides Coma Scale    Medications:  Continuous  dextrose 5 % and 0.45 % NaCl Last Rate: 100 mL/hr at 06/11/17 2200   Scheduled  amitriptyline 100 mg Nightly   cefTRIAXone (ROCEPHIN) IVPB 1 g Q12H   dexamethasone 4 mg Q12H   gabapentin 300 mg TID   heparin (porcine) 5,000 Units Q8H   nicotine 1 patch Daily   pantoprazole 40 mg Daily   PRN  acetaminophen 650 mg Q6H PRN   acetaminophen 650 mg Q6H PRN    acetaminophen 650 mg Q4H PRN   calcium gluconate IVPB 1 g PRN   calcium gluconate IVPB 1 g PRN   calcium gluconate IVPB 1 g PRN   desmopressin 1 mcg PRN   haloperidol lactate 2 mg Q4H PRN   hydrALAZINE 10 mg Q1H PRN   hydrocodone-acetaminophen 5-325mg 1 tablet Q4H PRN   magnesium sulfate IVPB 2 g PRN   magnesium sulfate IVPB 4 g PRN   magnesium sulfate IVPB 2 g PRN   magnesium sulfate IVPB 4 g PRN   metoclopramide HCl 5 mg Q6H PRN   metoclopramide HCl 5 mg Q6H PRN   potassium chloride 40 mEq PRN   And     potassium chloride 60 mEq PRN   And     potassium chloride 80 mEq PRN   potassium chloride 10% 40 mEq PRN   potassium chloride 10% 40 mEq PRN   potassium chloride 10% 60 mEq PRN   potassium, sodium phosphates 2 packet PRN   potassium, sodium phosphates 2 packet PRN   potassium, sodium phosphates 2 packet PRN   sodium phosphate IVPB 15 mmol PRN   sodium phosphate IVPB 20.01 mmol PRN   sodium phosphate IVPB 30 mmol PRN     Today I personally reviewed pertinent medications, lines/drains/airways, lab results, notably:

## 2017-06-12 NOTE — PROGRESS NOTES
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  She underwent operation in January 2015 on the [facility] service at [facility].  It   was apparently only possible to get a partial resection of the tumor.  She did   have a followup scan done in 2016 showing a continued large meningioma in this   area.  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  She had a   followup MRI scan done at [facility] on 01/20/17, showing this large tumor.  She   was seen by you in Ophthalmology and then referred to Neurosurgery for further   evaluation.  At this point, she has some preserved vision in the right eye.  She   complains of constant headaches.  She has no sense of smell.  She feels off   balance.  She is taking Neurontin apparently for seizure prevention. Past   medical history relates primarily to the present illness.  She does not have   chronic medical illnesses such as diabetes or hypertension.  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  She is  and has good family support.     On physical examination, she is a well-developed, well-nourished white lady, who   is alert and oriented.  Examination of the head shows a somewhat anterior, but   well-healed bicoronal incision.  She says that this is a little tender in the   left frontal area.  Eyes show full extraocular movements.  The pupils are small,   the left is not reactive.  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  The right optic nerve is probably normal.  She   sees things as doubles.  When I held up two  fingers she counted four, although   she knew that there were two.  Hearing seems preserved.  The neck is supple.  On   neurological examination, she is speaking clearly.  She is obviously anxious   and depressed over her illness.  Finger-to-nose is somewhat diminished on the   left side and gait was mildly unsteady.  Cranial nerves are otherwise intact.    She has normal facial sensation and movement.  The tongue protrudes in the   midline.  Strength in the extremities seems generally good, sensation normal and   deep tendon reflexes symmetrical.     MRI of the brain performed at [facility] on 01/20/17, shows a   previous bifrontal craniotomy.  There is a large lobular meningioma, which   occupies the sella, planum sphenoidale and extends back on to the clivus.  The   optic chiasm and optic nerves are not visualized.  The carotid arteries and   anterior cerebral arteries run done through the tumor.  The tumor elevates and   pushes the third ventricle back, but there is no hydrocephalus    Post-Op Info:  Procedure(s) (LRB):  CRANIOTOMY WITH STEALTH (N/A)   5 Days Post-Op     Interval History: NAEON    Medications:  Continuous Infusions:   Scheduled Meds:   amitriptyline  100 mg Oral Nightly    [START ON 6/13/2017] desmopressin  10 mcg Nasal Daily    dexamethasone  4 mg Intravenous Q12H    gabapentin  300 mg Oral TID    heparin (porcine)  5,000 Units Subcutaneous Q8H    nicotine  1 patch Transdermal Daily    pantoprazole  40 mg Intravenous Daily     PRN Meds:acetaminophen, acetaminophen, acetaminophen, calcium gluconate IVPB, calcium gluconate IVPB, calcium gluconate IVPB, desmopressin, haloperidol lactate, hydrALAZINE, hydrocodone-acetaminophen 5-325mg, magnesium sulfate IVPB, magnesium sulfate IVPB, magnesium sulfate IVPB, magnesium sulfate IVPB, metoclopramide HCl, metoclopramide HCl, potassium chloride **AND** potassium chloride **AND** potassium chloride, potassium chloride 10%, potassium  chloride 10%, potassium chloride 10%, potassium, sodium phosphates, potassium, sodium phosphates, potassium, sodium phosphates, ramelteon, sodium phosphate IVPB, sodium phosphate IVPB, sodium phosphate IVPB     Review of Systems  Objective:     Weight: 60 kg (132 lb 4.4 oz)  Body mass index is 24.19 kg/m².  Vital Signs (Most Recent):  Temp: 96.5 °F (35.8 °C) (17 0700)  Pulse: (!) 58 (17 09)  Resp: (!) 22 (17)  BP: 119/80 (17)  SpO2: 100 % (17) Vital Signs (24h Range):  Temp:  [96.5 °F (35.8 °C)-98.1 °F (36.7 °C)] 96.5 °F (35.8 °C)  Pulse:  [] 58  Resp:  [12-33] 22  SpO2:  [100 %] 100 %  BP: (114-154)/(57-91) 119/80              Temp (24hrs), Av.5 °F (36.4 °C), Min:96.5 °F (35.8 °C), Max:98.1 °F (36.7 °C)           Date 17 07 - 17 0659   Shift 6142-1487 6020-4014 6360-0411 24 Hour Total   I  N  T  A  K  E   I.V.  (mL/kg) 100  (1.7)   100  (1.7)    IV Piggyback 50   50    Shift Total  (mL/kg) 150  (2.5)   150  (2.5)   O  U  T  P  U  T   Urine  (mL/kg/hr) 300   300    Shift Total  (mL/kg) 300  (5)   300  (5)   Weight (kg) 60 60 60 60          Closed/Suction Drain 17 1800 Anterior;Left Scalp Accordion 10 Fr. (Active)   Site Description Unable to view 2017  3:05 AM   Dressing Type [place] 2017  3:05 AM   Dressing Status Clean;Intact;Dry 2017  3:05 AM   Dressing Intervention Dressing reinforced 2017  3:05 AM   Drainage Serosanguineous 2017  3:05 AM   Status To bulb suction 2017  3:05 AM   Output (mL) 0 mL 2017 10:00 PM            Urethral Catheter 17 0823 Straight-tip;Non-latex 16 Fr. (Active)   Site Assessment Clean;Intact 2017  7:00 AM   Collection Container Urimeter 2017  7:00 AM   Securement Method secured to top of thigh w/ adhesive device 2017  7:00 AM   Catheter Care Performed yes 2017  7:00 AM   Reason for Continuing Urinary Catheterization Critically ill in ICU requiring  "intensive monitoring 6/12/2017  7:00 AM   CAUTI Prevention Bundle StatLock in place w 1" slack;Intact seal between catheter & drainage tubing;Drainage bag off the floor;Green sheeting clip in use;No dependent loops or kinks;Drainage bag not overfilled (<2/3 full);Drainage bag below bladder 6/12/2017  7:00 AM   Output (mL) 50 mL 6/12/2017  9:00 AM       Neurosurgery Physical Exam   AA, confused NAD.   No light perception in R eye. Stable L eye blindness.   Pupils nonreactive. EOMI  ALEMAN spontaneously.     Significant Labs:    Recent Labs  Lab 06/12/17  0237 06/12/17  0937   *  --      144  144 145   K 4.3  --      --    CO2 26  --    BUN 13  --    CREATININE 0.6  --    CALCIUM 9.7  --    MG 2.1  --        Recent Labs  Lab 06/11/17  0125 06/12/17  0237   WBC 10.44 9.68   HGB 12.4 12.9   HCT 37.7 38.9    322       Recent Labs  Lab 06/12/17  0237   INR 1.0   APTT 21.8     Microbiology Results (last 7 days)     ** No results found for the last 168 hours. **            Significant Diagnostics:      Assessment/Plan:     Meningioma, recurrent of brain    52 y/o F s/p crani resection olfactory groove meningioma POD#5    Neuro stable  Cont neuro checks  Consider weaning of steroids this am.  HV out  CV- SBP < 160  Pulm- on RA.   FENGI- monitor for DI, fu urine studies, fu endocrine recs.  Heme/ID- afebrile. hgb/hct stable.   ppx- ANA/SCD's. Gi ppx. Sqh.   PT, OT, ST.   Dispo- cont ICU care            Boogie Wiley, DO  Neurosurgery  [facility]  "

## 2017-06-12 NOTE — ASSESSMENT & PLAN NOTE
S/p craniotomy 6/8/2017   Current free water deficit is 1.6 Liters  Continue prn ddavp if uop > 250 x 2 consecutive hours AND Spec grav <1.005 or Serum Sodium greater than 150 (Would recommend patient meet two of three criteria prior to administration of DDAVP)    Check sodium and urine osmo and sp. Gravity  Q6 for now  Strict I/O and daily measured weight.   Needs to replace fluids 1st

## 2017-06-12 NOTE — PROGRESS NOTES
[facility]  Neurocritical Care  Progress Note    Admit Date: 6/7/2017  Service Date: 06/11/2017  Length of Stay: 4    Subjective:     Chief Complain:s/p Menigioma Resection     History of Present Illness: 52yo F, presented to [facility] s/p menigioma resection (6/7). 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  She underwent operation in January 2015 on the [facility] service at [facility].  It   was apparently only possible to get a partial resection of the tumor.  She did   have a followup scan done in 2016 showing a continued large meningioma in this   area.  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  She had a   followup MRI scan done at [facility] on 01/20/17, showing this large tumor.  She   was seen by you in Ophthalmology and then referred to Neurosurgery for further   evaluation.       Hospital Course: 6/7 S/P menengioma resection  6/8 D.I monitoring Q6h sp.grav and Na. DDAVP0.5mcg IV x1  6/9 continue monitoring for D. I. Q 6h  Na/ sp. grav. Urine output >250; Na >145 sp. Grav. 1.005.  DDAVP 1mcg IV; 1L bolus .45NS x2   Endocrinology consulted for diabetes Insipidus. Agitated/restless. Haldol prn started. Repeat CTH today per NSGY  6/10 Required DDAVP 1mcg IV early a.m. For U.O > 250cc and sp. Grav. 1.0000. Less restless today required only one dose of haldol.  6/11Continue to monitor for DI; bilateral blindness; Continue Decadron; Switched to d5+.45 NS @100    Interval History:  6/11Continue to monitor for DI; bilateral blindness; Continue Decadron; Switched to d5+.45 NS @100  Review of Systems   Unable to obtain a complete ROS due to level of encephalopathy  Objective:     Vitals:  Temp: 98.1 °F (36.7 °C) (06/11/17 1905)  Pulse: 75 (06/11/17 2200)  Resp: 19 (06/11/17  2200)  BP: 126/60 (06/11/17 2200)  SpO2: 100 % (06/11/17 2200)    Temp:  [96.1 °F (35.6 °C)-98.1 °F (36.7 °C)] 98.1 °F (36.7 °C)  Pulse:  [] 75  Resp:  [12-33] 19  SpO2:  [97 %-100 %] 100 %  BP: ()/(55-85) 126/60              06/10 0701 - 06/11 0700  In: 4625 [I.V.:2775]  Out: 2160 [Urine:2140; Drains:20]    Physical Exam  Constitutional: Well-nourished and -developed. No apparent distress.   Eyes: Conjunctiva clear, anicteric. Lids no lesions.  Head/Ears/Nose/Mouth/Throat/Neck: Moist mucous membranes. External ears, nose atraumatic.   Cardiovascular: Regular rhythm. No murmurs. No leg edema.  Respiratory: Comfortable respirations. Clear to auscultation.  Gastrointestinal: No hernia. Soft, nondistended, nontender. + bowel sounds.     Neurologic:  -GCS [id number]  -Alert. Disoriented, confused Follows commands.  -Cranial nerves II-XII grossly intact  ---R pupil 3 sluggish/ L pupil 3 slightly reactive. EOMI  -Motor 4/5 RUE/RLE; 4/5 LUE/LLE  -Sensation bilat intact to all extremities  -- Bilateral blindness  Unable to test memory, judgment, insight, fund of knowledge, hearing, coordination, gait due to level of consciousness.          Unable to test memory, gait due to level of consciousness.    [place] Coma Scale    Medications:  Continuous  dextrose 5 % and 0.45 % NaCl Last Rate: 100 mL/hr at 06/11/17 2200   Scheduled  amitriptyline 100 mg Nightly   cefTRIAXone (ROCEPHIN) IVPB 1 g Q12H   dexamethasone 4 mg Q12H   gabapentin 300 mg TID   heparin (porcine) 5,000 Units Q8H   nicotine 1 patch Daily   pantoprazole 40 mg Daily   PRN  acetaminophen 650 mg Q6H PRN   acetaminophen 650 mg Q6H PRN   acetaminophen 650 mg Q4H PRN   calcium gluconate IVPB 1 g PRN   calcium gluconate IVPB 1 g PRN   calcium gluconate IVPB 1 g PRN   desmopressin 1 mcg PRN   haloperidol lactate 2 mg Q4H PRN   hydrALAZINE 10 mg Q1H PRN   hydrocodone-acetaminophen 5-325mg 1 tablet Q4H PRN   magnesium sulfate IVPB 2 g PRN   magnesium sulfate IVPB  4 g PRN   magnesium sulfate IVPB 2 g PRN   magnesium sulfate IVPB 4 g PRN   metoclopramide HCl 5 mg Q6H PRN   metoclopramide HCl 5 mg Q6H PRN   potassium chloride 40 mEq PRN   And     potassium chloride 60 mEq PRN   And     potassium chloride 80 mEq PRN   potassium chloride 10% 40 mEq PRN   potassium chloride 10% 40 mEq PRN   potassium chloride 10% 60 mEq PRN   potassium, sodium phosphates 2 packet PRN   potassium, sodium phosphates 2 packet PRN   potassium, sodium phosphates 2 packet PRN   sodium phosphate IVPB 15 mmol PRN   sodium phosphate IVPB 20.01 mmol PRN   sodium phosphate IVPB 30 mmol PRN     Today I personally reviewed pertinent medications, lines/drains/airways, lab results, notably:     Assessment/Plan:     Neuro   Restlessness and agitation    Haldol 2mg Q4h prn restlessness          Diabetes insipidus    6/10 Urine output >250 x2h; sp. Grav. 1.00o Na 150  DDAVP 1mcg x1 given  IL .45NS bolus IV x1   increased maintenance .45NS 150cc/h  Endocrinology following for D.I  Urine Osmo; serum Osmo pending  Follow U/A sp. Grav; Na Q6h          Meningioma, recurrent of brain    NSGY Following  S/P resection 6/7 POD3  Q1hr Neuro Checks  SBP <160  Decadron 4mg q12, tapering  Gabapentin 300mg tid  Prn norco 5/325  Rocephin for drain PPX  PT/OT/SLP when able  CTH 6/9 Post-Op changes consistent with bilat. Craniotomy. No large residual mass identified, collection of air, blood and fluid at the site of the prior mass and underneath the frontal lobes along the surgical tract.and is decreasing from prior exam. Mass effect stable, ventricles stable, small hemorrhage in supersellar region stable. No hydrocephalus.         Depression    -amitriptyline 100mg Qhs        Pulmonary   Respiratory insufficiency    NC to room air O2 sats 97-98%  Maintain O2 sat >92  Prn CXR/ABG        Cardiac   Essential hypertension    SBP goal <160  cardene gtt off  Only on prn hydralazine.        Hem/Onc   Leukocytosis    WBC 17.4, trending  down, afebrile  Monitor daily CBC  On ceftriaxone 1g Q12 while drain in place        Other   Normocytic anemia    Monitor daily CBC          Vision loss of right eye     R eye vision loss s/p resection of meningioma without improvement in eye sight.  L eye vision loss prior to surgery   Per NSGY,Known that optic nerve injured during surgery and Will need time to heal.  Not requiring MRI or  Ophthalmology consult at present  -            Prophylaxis:  Venous Thromboembolism: chemical  Stress Ulcer: None  Ventilator Pneumonia: not applicable     Activity Orders          Activity as tolerated starting at 06/08 1028        No Order    ALANNAH Gaspar  Neurocritical Care  [facility]

## 2017-06-12 NOTE — ASSESSMENT & PLAN NOTE
R eye vision loss s/p resection of meningioma without improvement in eye sight.  L eye vision loss prior to surgery   Per NSGY,Known that optic nerve injured during surgery and Will need time to heal.  Not requiring MRI or  Ophthalmology consult at present  -

## 2017-06-12 NOTE — ASSESSMENT & PLAN NOTE
50 y/o F s/p crani resection olfactory groove meningioma POD#5    Neuro stable  Cont neuro checks  Consider weaning of steroids this am.  HV out  CV- SBP < 160  Pulm- on RA.   FENGI- monitor for DI, fu urine studies, fu endocrine recs.  Heme/ID- afebrile. hgb/hct stable.   ppx- ANA/SCD's. Gi ppx. Sqh.   PT, OT, ST.   Dispo- cont ICU care

## 2017-06-12 NOTE — PROGRESS NOTES
"[facility]  Endocrinology  Progress Note    Admit Date: 6/7/2017     Reason for Consult: Diabetes insipidus     Surgical Procedure and Date:   CRANIOTOMY 6/8/2017        HPI:   Patient is a 51 y.o. female with a diagnosis of  meningioma s/p resection (6/7). 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  She underwent partial resection of the tumor in January 2015 at [facility]. Followup scan done in 2016 showing a continued large meningioma.     Endocrinology consulted for post operative DI management.  Per the operative report - The pituitary stalk was identified and could be preserved      Interval HPI:     Started on DDAVP 1 mcg IV -  6/8, 6/9, 610 once a day, ReceiveD desmopressin on 6/11/17 AT 0600  Last dose dDAVP 6/12/2017 at 6 am  Sodium this morning is 144     Net negative 1.588 Liters on 6/11  Sp gravity went from 1.015---> 1.005  Started on regular diet.           /68   Pulse (!) 51   Temp 96.5 °F (35.8 °C) (Axillary)   Resp 12   Ht 5' 2" (1.575 m)   Wt 60 kg (132 lb 4.4 oz)   SpO2 100%   Breastfeeding? No   BMI 24.19 kg/m²       Labs Reviewed and Include      Recent Labs  Lab 06/12/17  0237   *   CALCIUM 9.7   ALBUMIN 3.3*   PROT 7.4     144  144   K 4.3   CO2 26      BUN 13   CREATININE 0.6   ALKPHOS 83   ALT 92*   AST 56*   BILITOT 0.3     Lab Results   Component Value Date    WBC 9.68 06/12/2017    HGB 12.9 06/12/2017    HCT 38.9 06/12/2017    MCV 97 06/12/2017     06/12/2017       Recent Labs  Lab 06/10/17  0924   TSH 0.042*   FREET4 1.14     No results found for: HGBA1C    Nutritional status:   Body mass index is 24.19 kg/m².  Lab Results   Component Value Date    ALBUMIN 3.3 (L) 06/12/2017    ALBUMIN 3.3 (L) 06/11/2017    ALBUMIN 3.8 06/10/2017     No results found for: PREALBUMIN    Estimated Creatinine Clearance: 87.7 mL/min " (based on Cr of 0.6).    Accu-Checks  No results for input(s): POCTGLUCOSE in the last 72 hours.    Current Medications and/or Treatments Impacting Glycemic Control  Immunotherapy:  Immunosuppressants     None        Steroids:   Hormones     Start     Stop Route Frequency Ordered    06/13/17 0600  desmopressin nasal solution 10 mcg      -- Nasl Daily 06/12/17 0904    06/10/17 2100  dexamethasone injection 4 mg      -- IV Every 12 hours 06/10/17 1021    06/10/17 1159  desmopressin injection 1 mcg      -- SubQ As needed (PRN) 06/10/17 1100        Pressors:    Autonomic Drugs     None        Hyperglycemia/Diabetes Medications: Antihyperglycemics     None          ASSESSMENT and PLAN    Adverse effect of glucocorticoid or synthetic analogue    Can increase BG          S/P craniotomy    On 6/8/2017 now with DI as in #1   Steroids can unmask DI symptoms  Currently on dexamethasone 4 mg Q12  Recommend taper to HC 10 mg in the am and 5 mg pm as clinically tolerated               Diabetes insipidus    S/p craniotomy 6/8/2017   Current free water deficit is 1.6 Liters,  Recommend matching i/o with Normal Saline.  Continue prn ddavp if uop > 250 x 2 consecutive hours AND Spec grav <1.005 or Serum Sodium greater than 150 (Would recommend patient meet two of three criteria prior to administration of DDAVP)    Check sodium and urine osmo and sp. Gravity  Q6 for now  Strict I/O and daily measured weight.   Needs to replace fluids 1st             Meningioma, recurrent of brain    S/p surgery   Per primary team                Melissa Bonilla MD  Endocrinology  [facility]

## 2017-06-12 NOTE — PROGRESS NOTES
Pharmacist Intervention IV to PO Note    Joanna Morales is a 51 y.o. female being treated with IV medication pantoprazole    Patient Data:    Vital Signs (Most Recent):  Temp: 96.5 °F (35.8 °C) (06/12/17 0700)  Pulse: 68 (06/12/17 1502)  Resp: 18 (06/12/17 1502)  BP: 106/73 (06/12/17 1502)  SpO2: 100 % (06/12/17 1502) Vital Signs (72h Range):  Temp:  [95.6 °F (35.3 °C)-98.1 °F (36.7 °C)]   Pulse:  []   Resp:  [12-33]   BP: ()/(55-98)   SpO2:  [96 %-100 %]      CBC:    Recent Labs     Lab 06/10/17  0322 06/11/17  0125 06/12/17  0237   WBC 17.35* 10.44 9.68   RBC 3.91* 3.86* 4.00   HGB 12.7 12.4 12.9   HCT 38.0 37.7 38.9    326 322   MCV 97 98 97   MCH 32.5* 32.1* 32.3*   MCHC 33.4 32.9 33.2     CMP:     Recent Labs     Lab 06/10/17  0322  06/11/17  0125  06/11/17  1533 06/11/17  1831 06/12/17  0237 06/12/17  0937    --  81 --  --  --  117* --    CALCIUM 10.2 --  9.7 --  --  --  9.7 --    ALBUMIN 3.8 --  3.3* --  --  --  3.3* --    PROT 7.9 --  7.1 --  --  --  7.4 --    *  156* < > 154*  154* < > 152* --  144  144  144 145   K 3.2* --  3.1* --  --  3.6 4.3 --    CO2 24 --  25 --  --  --  26 --    * --  119* --  --  --  108 --    BUN 13 --  15 --  --  --  13 --    CREATININE 0.6 --  0.6 --  --  --  0.6 --    ALKPHOS 74 --  72 --  --  --  83 --    ALT 58* --  67* --  --  --  92* --    AST 46* --  51* --  --  --  56* --    BILITOT 0.3 --  0.3 --  --  --  0.3 --    < > = values in this interval not displayed.       Dietary Orders:  Diet Orders            Diet Dental Soft: Dental soft starting at 06/12 0954            Based on the following criteria, this patient qualifies for intravenous to oral conversion:  [x] The patients gastrointestinal tract is functioning (tolerating medications via oral or enteral route for 24 hours and tolerating food or enteral feeds for 24 hours).  [x] The patient is hemodynamically stable for 24 hours (heart rate <100 beats per minute, systolic  blood pressure >99 mm Hg, and respiratory rate <20 breaths per minute).  [x] The patient shows clinical improvement (afebrile for at least 24 hours and white blood cell count downtrending or normalized). Additionally, the patient must be non-neutropenic (absolute neutrophil count >500 cells/mm3).  [x] For antimicrobials, the patient has received IV therapy for at least 24 hours.    IV pantoprazole injection was changed to oral pantoprazole EC tablet      Pharmacist's Name: Aida Quinn  Pharmacist's Extension: [phone]

## 2017-06-12 NOTE — PROGRESS NOTES
[facility]  Neurocritical Care  Progress Note    Admit Date: 6/7/2017  Service Date: 06/12/2017  Length of Stay: 5    Subjective:     Chief Complaint: <principal problem not specified>    History of Present Illness: 50yo F, presented to [facility] s/p menigioma resection (6/7). 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  She underwent operation in January 2015 on the [facility] service at [facility].  It   was apparently only possible to get a partial resection of the tumor.  She did   have a followup scan done in 2016 showing a continued large meningioma in this   area.  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  She had a   followup MRI scan done at [facility] on 01/20/17, showing this large tumor.  She   was seen by you in Ophthalmology and then referred to Neurosurgery for further   evaluation.       Hospital Course: 6/7 S/P menengioma resection  6/8 D.I monitoring Q6h sp.grav and Na. DDAVP0.5mcg IV x1  6/9 continue monitoring for D. I. Q 6h  Na/ sp. grav. Urine output >250; Na >145 sp. Grav. 1.005.  DDAVP 1mcg IV; 1L bolus .45NS x2   Endocrinology consulted for diabetes Insipidus. Agitated/restless. Haldol prn started. Repeat CTH today per NSGY  6/10 Required DDAVP 1mcg IV early a.m. For U.O > 250cc and sp. Grav. 1.0000. Less restless today required only one dose of haldol.  6/11Continue to monitor for DI; bilateral blindness; Continue Decadron; Switched to d5+.45 NS @100  6/12: The patient received DDAVP this AM. DI watch. D5% was D/C. Still blind.    Interval History:  >4 elements OR status of 3 inpatient conditions  Patient still blind. DI watch. Na 144. Patient on D5. DDAVP IN  Subgaleal drain was D/C.    Review of Systems   Eyes: Positive for visual disturbance.    Psychiatric/Behavioral: Positive for sleep disturbance.   All other systems reviewed and are negative.    2 systems OR Unable to obtain a complete ROS due to level of consciousness.  Objective:     Vitals:  Temp: 98.8 °F (37.1 °C) (06/12/17 1502)  Pulse: 68 (06/12/17 1702)  Resp: 14 (06/12/17 1702)  BP: 116/72 (06/12/17 1702)  SpO2: 100 % (06/12/17 1702)    Temp:  [96.5 °F (35.8 °C)-98.8 °F (37.1 °C)] 98.8 °F (37.1 °C)  Pulse:  [] 68  Resp:  [12-33] 14  SpO2:  [100 %] 100 %  BP: (106-149)/(60-91) 116/72              06/11 0701 - 06/12 0700  In: 2588.3 [I.V.:2488.3]  Out: 4177 [Urine:4177]    Physical Exam   Neurological: GCS eye subscore is 4 - spontaneous. GCS verbal subscore is 4 - confused. GCS motor subscore is 6 - obeys commands.     General   HEENT: S/P bifrontal crani  Chest Heart RRR / Lungs Clear to auscultation  Adbomen: Soft nontender + BS  Extremities: OK distal pulses.  Skin: UK  Neurological Exam:  MS; Awake and following commands intermittently.  abnormalities. Normal mood.  CN: II-XII Pupils fixed and blind Pupils are 8mm bilat.  Motor: LUE  5 /5 / RUE 5 /5  Tone normal bilaterally             LLE  5 /5 /  RLE 5 /5  Tone normal bilaterally  Sensory: LT/PP/T/ Vibration UK                 Complex sensory modalities: not tested  DTR:  normal throughout.  Coordination /Fine motor: UK  Gait: Not tested.  Meningeal signs: Absent     Unable to test judgment, insight, gait due to level of consciousness.        [place] Coma Scale:  Best Eye Response: 4 - spontaneous  Best Motor Response: 6 - obeys commands  Best Verbal Response: 4 - confused  Aristides Coma Scale Total: 14            Medications:  Continuous Scheduled  amitriptyline 100 mg Nightly   [START ON 6/13/2017] desmopressin 10 mcg Daily   gabapentin 300 mg TID   heparin (porcine) 5,000 Units Q8H   hydrocortisone 10 mg QAM   hydrocortisone 5 mg QHS   nicotine 1 patch Daily   [START ON 6/13/2017] pantoprazole 40 mg Daily   pantoprazole 40 mg BID    PRN  acetaminophen 650 mg Q6H PRN   acetaminophen 650 mg Q6H PRN   acetaminophen 650 mg Q4H PRN   calcium gluconate IVPB 1 g PRN   calcium gluconate IVPB 1 g PRN   calcium gluconate IVPB 1 g PRN   desmopressin 1 mcg Q6H PRN   haloperidol lactate 2 mg Q4H PRN   hydrALAZINE 10 mg Q1H PRN   hydrocodone-acetaminophen 5-325mg 1 tablet Q4H PRN   magnesium sulfate IVPB 2 g PRN   magnesium sulfate IVPB 4 g PRN   magnesium sulfate IVPB 2 g PRN   magnesium sulfate IVPB 4 g PRN   metoclopramide HCl 5 mg Q6H PRN   metoclopramide HCl 5 mg Q6H PRN   potassium chloride 40 mEq PRN   And     potassium chloride 60 mEq PRN   And     potassium chloride 80 mEq PRN   potassium chloride 10% 40 mEq PRN   potassium chloride 10% 40 mEq PRN   potassium chloride 10% 60 mEq PRN   potassium, sodium phosphates 2 packet PRN   potassium, sodium phosphates 2 packet PRN   potassium, sodium phosphates 2 packet PRN   ramelteon 8 mg Nightly PRN   sodium phosphate IVPB 15 mmol PRN   sodium phosphate IVPB 20.01 mmol PRN   sodium phosphate IVPB 30 mmol PRN     Today I personally reviewed pertinent medications, lines/drains/airways, imaging, cardiology, lab results, microbiology results, notably:     Assessment/Plan:     Neuro   Restlessness and agitation    Haldol 2mg Q4h prn restlessness          Diabetes insipidus    6/10 Urine output >250 x2h; sp. Grav. 1.00o Na 150  DDAVP 1mcg x1 given  IL .45NS bolus IV x1   increased maintenance .45NS 150cc/h  Endocrinology following for D.I  Urine Osmo; serum Osmo pending  Follow U/A sp. Grav; Na Q6h          Meningioma, recurrent of brain    NSGY Following  S/P resection 6/7 POD3  Q1hr Neuro Checks  SBP <160  Decadron 4mg q12, tapering  Gabapentin 300mg tid  Prn norco 5/325  Rocephin for drain PPX  PT/OT/SLP when able  CTH 6/9 Post-Op changes consistent with bilat. Craniotomy. No large residual mass identified, collection of air, blood and fluid at the site of the prior mass and underneath the frontal lobes along  the surgical tract.and is decreasing from prior exam. Mass effect stable, ventricles stable, small hemorrhage in supersellar region stable. No hydrocephalus.         Depression    -amitriptyline 100mg Qhs        Pulmonary   Respiratory insufficiency    NC to room air O2 sats 97-98%  Maintain O2 sat >92  Prn CXR/ABG        Cardiac   Essential hypertension    SBP goal <160  cardene gtt off  Only on prn hydralazine.        Hem/Onc   Leukocytosis    WBC 17.4, trending down, afebrile  Monitor daily CBC  On ceftriaxone 1g Q12 while drain in place        Other   Normocytic anemia    Monitor daily CBC          Vision loss of right eye     R eye vision loss s/p resection of meningioma without improvement in eye sight.  L eye vision loss prior to surgery   Per NSGY,Known that optic nerve injured during surgery and Will need time to heal.  Not requiring MRI or  Ophthalmology consult at present  -          Active Problem List:   1.S/P large skull base meningioma resection  2. Secondary blindness  3. DI and DI watch,        Assessment / Plan:     Neuro:  -Decadrone 4 mg q 12hrs.   -Tylenol PRN.  -Neurobtin 3000mg tid.   -Norco PRN.  -PT , OT.  -Remeltion 8mg qhs.  Resp: CXR OK  -RA  -IS  CV: Keep SBP < 140 mmHg.   No BP meds.  IVF/nutrition/Renal/GI:  -D5% D/C  -Drink nectar.   -Serum Na and ur-SC DDAVP 1mcg q 6hrs PRN, with parameters. Na >145 and SG in urine < 1010.  -Serum Na q 6hrs ine SG q 6hrs.  -BR  Hem / ID  -SC heparin 5000 U q 8hrs.  Endo:  -DDAVP IN 10mcg qAM starting tomorrow.  -Serum Na q 6hrs   -[id number] following.  Prophylaxis:  -Protonix  -SC Heparin.  Advance Directives and Disposition:    Full Code. Keep in the NICU. Ophthalmology consultation.           Uninterrupted level 3  Follow-up /Counseling Time (not including procedures):  = 35 min    Activity Orders          Activity as tolerated starting at 06/08 1028        No Order    Mario Mcmullen MD  Neurocritical Care  [facility]

## 2017-06-12 NOTE — PLAN OF CARE
Problem: Patient Care Overview  Goal: Plan of Care Review  POC reviewed with family. Pt continued to be only oriented to self.  Need for restraints are still needed. Pt continues to attempt to get out of bed. Safety maintained to prevent fall safety. IV fluids stopped. No signs of D.I. No acute events today. Pt progressing toward goals. Will continue to monitor. See flowsheets for full assessment and VS info.

## 2017-06-12 NOTE — SUBJECTIVE & OBJECTIVE
Interval History:  >4 elements OR status of 3 inpatient conditions  Patient still blind. DI watch. Na 144. Patient on D5. DDAVP IN  Subgaleal drain was D/C.    Review of Systems   Eyes: Positive for visual disturbance.   Psychiatric/Behavioral: Positive for sleep disturbance.   All other systems reviewed and are negative.    2 systems OR Unable to obtain a complete ROS due to level of consciousness.  Objective:     Vitals:  Temp: 98.8 °F (37.1 °C) (06/12/17 1502)  Pulse: 68 (06/12/17 1702)  Resp: 14 (06/12/17 1702)  BP: 116/72 (06/12/17 1702)  SpO2: 100 % (06/12/17 1702)    Temp:  [96.5 °F (35.8 °C)-98.8 °F (37.1 °C)] 98.8 °F (37.1 °C)  Pulse:  [] 68  Resp:  [12-33] 14  SpO2:  [100 %] 100 %  BP: (106-149)/(60-91) 116/72              06/11 0701 - 06/12 0700  In: 2588.3 [I.V.:2488.3]  Out: 4177 [Urine:4177]    Physical Exam   Neurological: GCS eye subscore is 4 - spontaneous. GCS verbal subscore is 4 - confused. GCS motor subscore is 6 - obeys commands.     General   HEENT: S/P bifrontal crani  Chest Heart RRR / Lungs Clear to auscultation  Adbomen: Soft nontender + BS  Extremities: OK distal pulses.  Skin: UK  Neurological Exam:  MS; Awake and following commands intermittently.  abnormalities. Normal mood.  CN: II-XII Pupils fixed and blind Pupils are 8mm bilat.  Motor: LUE  5 /5 / RUE 5 /5  Tone normal bilaterally             LLE  5 /5 /  RLE 5 /5  Tone normal bilaterally  Sensory: LT/PP/T/ Vibration UK                 Complex sensory modalities: not tested  DTR:  normal throughout.  Coordination /Fine motor: UK  Gait: Not tested.  Meningeal signs: Absent     Unable to test judgment, insight, gait due to level of consciousness.        [place] Coma Scale:  Best Eye Response: 4 - spontaneous  Best Motor Response: 6 - obeys commands  Best Verbal Response: 4 - confused  Aristides Coma Scale Total: 14            Medications:  Continuous Scheduled  amitriptyline 100 mg Nightly   [START ON 6/13/2017] desmopressin 10  mcg Daily   gabapentin 300 mg TID   heparin (porcine) 5,000 Units Q8H   hydrocortisone 10 mg QAM   hydrocortisone 5 mg QHS   nicotine 1 patch Daily   [START ON 6/13/2017] pantoprazole 40 mg Daily   pantoprazole 40 mg BID   PRN  acetaminophen 650 mg Q6H PRN   acetaminophen 650 mg Q6H PRN   acetaminophen 650 mg Q4H PRN   calcium gluconate IVPB 1 g PRN   calcium gluconate IVPB 1 g PRN   calcium gluconate IVPB 1 g PRN   desmopressin 1 mcg Q6H PRN   haloperidol lactate 2 mg Q4H PRN   hydrALAZINE 10 mg Q1H PRN   hydrocodone-acetaminophen 5-325mg 1 tablet Q4H PRN   magnesium sulfate IVPB 2 g PRN   magnesium sulfate IVPB 4 g PRN   magnesium sulfate IVPB 2 g PRN   magnesium sulfate IVPB 4 g PRN   metoclopramide HCl 5 mg Q6H PRN   metoclopramide HCl 5 mg Q6H PRN   potassium chloride 40 mEq PRN   And     potassium chloride 60 mEq PRN   And     potassium chloride 80 mEq PRN   potassium chloride 10% 40 mEq PRN   potassium chloride 10% 40 mEq PRN   potassium chloride 10% 60 mEq PRN   potassium, sodium phosphates 2 packet PRN   potassium, sodium phosphates 2 packet PRN   potassium, sodium phosphates 2 packet PRN   ramelteon 8 mg Nightly PRN   sodium phosphate IVPB 15 mmol PRN   sodium phosphate IVPB 20.01 mmol PRN   sodium phosphate IVPB 30 mmol PRN     Today I personally reviewed pertinent medications, lines/drains/airways, imaging, cardiology, lab results, microbiology results, notably:

## 2017-06-12 NOTE — PLAN OF CARE
06/12/17 1717   Discharge Reassessment   Assessment Type Discharge Planning Reassessment   Can the patient answer the patient profile reliably? No, cognitively impaired   How does the patient rate their overall health at the present time? Poor   Describe the patient's ability to walk at the present time. Does not walk or unable to take any steps at all   How often would a person be available to care for the patient? Occasionally   Number of comorbid conditions (as recorded on the chart) Two   During the past month, has the patient often been bothered by feeling down, depressed or hopeless? Yes  (per nursing)   During the past month, has the patient often been bothered by little interest or pleasure in doing things? Yes  (per nurisng)   Discharge plan remains the same: No   Discharge Plan A Rehab   Discharge Plan B Home with family         Patient not medically stable for discharge    Payor: MEDICAID / Plan: [employer] / Product Type: Managed Medicaid /       Myla Watkins RN, CCRN-K, [facility]  Neuro-Critical Care   [phone]

## 2017-06-12 NOTE — ASSESSMENT & PLAN NOTE
On 6/8/2017 now with DI as in #1   Steroids can unmask DI symptoms  Currently on dexamethasone 4 mg Q12  Recommend taper to HC 10 mg in the am and 5 mg pm as clinically tolerated

## 2017-06-12 NOTE — PT/OT/SLP PROGRESS
Speech Language Pathology  Treatment    Joanna Morales   MRN: [id number]   Admitting Diagnosis: <principal problem not specified>    Diet recommendations: Solid Diet Level: Dental Soft  Liquid Diet Level: Nectar Thick Feed only when awake/alert, No straws, HOB to 90 degrees, Small bites/sips, Alternating bites/sips, 1 bite/sip at a time and Meds crushed in puree    SLP Treatment Date: 06/12/17  Speech Start Time: 0836     Speech Stop Time: 0848     Speech Total (min): 12 min       TREATMENT BILLABLE MINUTES:  Treatment Swallowing Dysfunction 12    Has the patient been evaluated by SLP for swallowing? : Yes  Keep patient NPO?: No   General Precautions: Standard, aspiration, fall, nectar thick          Subjective:  Awake/alert    Pain/Comfort  Pain Rating 1: 0/10  Pain Rating Post-Intervention 1: 0/10    Objective:     Pt repositioned upright for PO trials. Voice was hoarse with decreased intensity. Ice chip and thin liquids via tsp tolerated x1 each with adequate swallow initiation time and no overt s/s of aspiration. Coughing noted post swallow with thin liquids via cup. Cough was noted to be weak and unproductive. Nectar thick liquids tolerated via cup/straw x9 with no overt s/s of airway compromise. Pt tolerated puree via spoon x4 and cracker x1 without overt s/s of aspiration and adequate mastication time. Recommend dental soft diet/nectar thick liquids at this time with meds crushed in puree. Thickening instructions and swallow precautions reviewed with pt and family at bedside. Whiteboard updated.     Assessment:  Joanna Morales is a 51 y.o. female with a medical diagnosis of <principal problem not specified> and presents with Dysphagia.    Discharge recommendations: Discharge Facility/Level Of Care Needs: rehabilitation facility     Goals:    SLP Goals        Problem: SLP Goal    Goal Priority Disciplines Outcome   SLP Goal     SLP    Description:  Speech Language Pathology Goals  Goals expected to be  met by 6/16    1. Pt will participate in ongoing swallow assessment to rule out aspiration and determine least restrictive diet. MET  2. Pt will tolerate dental soft diet/nectar thick liquids without overt s/s of aspiration  3. Pt will tolerate thin liquids trials x10 without overt s/s of aspiration  4. Pt will complete dysphagia exercises x10 reps with min cues                            Plan:   Patient to be seen Therapy Frequency: 5 x/week   Plan of Care expires: 07/10/17  Plan of Care reviewed with: patient, family  SLP Follow-up?: Yes              Nurys Amaro CCC-SLP   Speech Language Pathologist  Pager [phone]  06/12/2017

## 2017-06-13 NOTE — PLAN OF CARE
Problem: Patient Care Overview  Goal: Plan of Care Review  Outcome: Ongoing (interventions implemented as appropriate)  No acute events occurred throughout shift.  Plan to have pt drink 2.5 L free water throughout shift. NS started at 75 ml/hr. Na and urinalysis continued Q6. POC reviewed with Joanna Morales and Joanna Morales's family.  Addressed all questions and concerns.  Pt progressing toward goals as noted. See flowsheets for full list of VS and assessments. Will continue to monitor.

## 2017-06-13 NOTE — ASSESSMENT & PLAN NOTE
On 6/8/2017 now with DI as in #1   Steroids can unmask DI symptoms  Currently on HC 10 mg in the am and 5 mg pm

## 2017-06-13 NOTE — PT/OT/SLP PROGRESS
"Speech Language Pathology  Treatment    Joanna Morales   MRN: [id number]   Admitting Diagnosis: <principal problem not specified>    Diet recommendations: Solid Diet Level: Dental Soft  Liquid Diet Level: Nectar Thick Feed only when awake/alert, HOB to 90 degrees, Small bites/sips, Alternating bites/sips, 1 bite/sip at a time and Assistance with thickening liquids    SLP Treatment Date: 06/13/17  Speech Start Time: 0858     Speech Stop Time: 0910     Speech Total (min): 12 min       TREATMENT BILLABLE MINUTES:  Treatment Swallowing Dysfunction 12    Has the patient been evaluated by SLP for swallowing? : Yes  Keep patient NPO?: No   General Precautions: Standard, aspiration, fall, nectar thick          Subjective:  Awake, confusion evident  Pt continually putting fingers to lips to "smoke her cigarette."     Pain/Comfort  Pain Rating 1: 0/10  Pain Rating Post-Intervention 1: 0/10    Objective:     Pt repositioned urpight for PO trials. Pt remained alert yet lethargic with confusion evident throughout PO trials. Nectar thick liquids tolerated x5 oz via cup and single/consecutive straw sips without overt s/s of aspiration. Boom cracker tolerated x3 with adequate bolus control and manipulation and no overt s/s of airway compromise. Need for thickener and swallow precautions reviewed with pt, reinforcement recommended. Pt would benefit from speech language cognitive evaluation, second sign orders placed.       Assessment:  Joanna Morales is a 51 y.o. female with a medical diagnosis of <principal problem not specified> and presents with Dysphagia.    Discharge recommendations: Discharge Facility/Level Of Care Needs: rehabilitation facility     Goals:    SLP Goals        Problem: SLP Goal    Goal Priority Disciplines Outcome   SLP Goal     SLP    Description:  Speech Language Pathology Goals  Goals expected to be met by 6/16    1. Pt will participate in ongoing swallow assessment to rule out aspiration and " determine least restrictive diet. MET  2. Pt will tolerate dental soft diet/nectar thick liquids without overt s/s of aspiration  3. Pt will tolerate thin liquids trials x10 without overt s/s of aspiration  4. Pt will complete dysphagia exercises x10 reps with min cues                            Plan:   Patient to be seen Therapy Frequency: 5 x/week   Plan of Care expires: 07/10/17  Plan of Care reviewed with: patient  SLP Follow-up?: Yes              Nurys Amaro CCC-SLP   Speech Language Pathologist  Pager [phone]  06/13/2017

## 2017-06-13 NOTE — PROGRESS NOTES
[facility]  Neurocritical Care  Progress Note    Admit Date: 6/7/2017  Service Date: 06/13/2017  Length of Stay: 6    Subjective:     Chief Complaint: <principal problem not specified>    History of Present Illness: 52yo F, presented to [facility] s/p menigioma resection (6/7). 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  She underwent operation in January 2015 on the [facility] service at [facility].  It   was apparently only possible to get a partial resection of the tumor.  She did   have a followup scan done in 2016 showing a continued large meningioma in this   area.  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  She had a   followup MRI scan done at [facility] on 01/20/17, showing this large tumor.  She   was seen by you in Ophthalmology and then referred to Neurosurgery for further   evaluation.       Hospital Course: 6/7 S/P menengioma resection  6/8 D.I monitoring Q6h sp.grav and Na. DDAVP0.5mcg IV x1  6/9 continue monitoring for D. I. Q 6h  Na/ sp. grav. Urine output >250; Na >145 sp. Grav. 1.005.  DDAVP 1mcg IV; 1L bolus .45NS x2   Endocrinology consulted for diabetes Insipidus. Agitated/restless. Haldol prn started. Repeat CTH today per NSGY  6/10 Required DDAVP 1mcg IV early a.m. For U.O > 250cc and sp. Grav. 1.0000. Less restless today required only one dose of haldol.  6/11Continue to monitor for DI; bilateral blindness; Continue Decadron; Switched to d5+.45 NS @100  6/12: The patient received DDAVP this AM. DI watch. D5% was D/C. Still blind. DI watch. Na 144. Patient on D5. DDAVP IN  Subgaleal drain was D/C.    Interval History:>4 elements OR status of 3 inpatient conditions  DI event early this AM. Required night PRN DDAVP. Complaining of leg pain. WBC normalizing.  Able to see light on the L side. -4lt of water deficit on I/O.     Review of Systems  2 systems OR Unable to obtain a complete ROS due to level of consciousness.  Objective:     Vitals:  Temp: 98.7 °F (37.1 °C) (06/13/17 1100)  Pulse: 73 (06/13/17 1403)  Resp: (!) 21 (06/13/17 1403)  BP: 117/71 (06/13/17 1403)  SpO2: 100 % (06/13/17 1403)    Temp:  [97.2 °F (36.2 °C)-98.8 °F (37.1 °C)] 98.7 °F (37.1 °C)  Pulse:  [62-86] 73  Resp:  [9-24] 21  SpO2:  [95 %-100 %] 100 %  BP: (105-124)/(70-87) 117/71              06/12 0701 - 06/13 0700  In: 90 [I.V.:40]  Out: 2480 [Urine:2480]    Physical Exam   Neurological: GCS eye subscore is 1 - none. GCS motor subscore is 1 - none.     General   HEENT: S/P bifrontal crani  Chest Heart RRR / Lungs Clear to auscultation  Adbomen: Soft nontender + BS  Extremities: OK distal pulses.  Skin: UK  Neurological Exam:  MS; Awake and following commands intermittently.  abnormalities. Normal mood.  CN: II-XII Pupils fixed and blind Pupils are 8mm bilat. Saw light on the L-side. Not on my exam.  Motor: LUE  5 /5 / RUE 5 /5  Tone normal bilaterally             LLE  5 /5 /  RLE 5 /5  Tone normal bilaterally  Sensory: LT/PP/T/ Vibration UK                 Complex sensory modalities: not tested  DTR:  normal throughout.  Coordination /Fine motor: UK  Gait: Not tested.  Meningeal signs: Absent     Unable to test orientation, judgment, insight, fund of knowledge, hearing, shoulder shrug, tongue protrusion, coordination, gait due to level of consciousness.        Aristides Coma Scale:  Best Eye Response: 1 - none  Best Motor Response: 1 - none              Medications:  Continuous  sodium chloride 0.9% Last Rate: 75 mL/hr at 06/13/17 1400   Scheduled  amitriptyline 100 mg Nightly   desmopressin 10 mcg BID   gabapentin 300 mg TID   heparin (porcine) 5,000 Units Q8H   hydrocortisone 10 mg QAM   hydrocortisone 5 mg QHS   nicotine 1 patch Daily   pantoprazole 40 mg BID   PRN  acetaminophen 650 mg Q6H PRN    acetaminophen 650 mg Q6H PRN   acetaminophen 650 mg Q4H PRN   calcium gluconate IVPB 1 g PRN   calcium gluconate IVPB 1 g PRN   calcium gluconate IVPB 1 g PRN   desmopressin 1 mcg Q6H PRN   haloperidol lactate 2 mg Q4H PRN   hydrALAZINE 10 mg Q1H PRN   hydrocodone-acetaminophen 5-325mg 1 tablet Q4H PRN   magnesium sulfate IVPB 2 g PRN   magnesium sulfate IVPB 4 g PRN   magnesium sulfate IVPB 2 g PRN   magnesium sulfate IVPB 4 g PRN   metoclopramide HCl 5 mg Q6H PRN   metoclopramide HCl 5 mg Q6H PRN   potassium chloride 40 mEq PRN   And     potassium chloride 60 mEq PRN   And     potassium chloride 80 mEq PRN   potassium chloride 10% 40 mEq PRN   potassium chloride 10% 40 mEq PRN   potassium chloride 10% 60 mEq PRN   potassium, sodium phosphates 2 packet PRN   potassium, sodium phosphates 2 packet PRN   potassium, sodium phosphates 2 packet PRN   ramelteon 8 mg Nightly PRN   sodium phosphate IVPB 15 mmol PRN   sodium phosphate IVPB 20.01 mmol PRN   sodium phosphate IVPB 30 mmol PRN     Today I personally reviewed pertinent medications, lines/drains/airways, imaging, cardiology, lab results, microbiology results, notably:     Assessment/Plan:       Active Problem List:   1.S/P large skull base meningioma resection  2. Secondary blindness  3. DI and DI watch,        Assessment / Plan:     Neuro:  -Decadrone 4 mg q 12hrs.   -Tylenol PRN.  -Neurobtin 300mg tid.   -Norco PRN.  -PT , OT.  -Remeltion 8mg qhs.  Resp: CXR OK  -RA  -IS  CV: Keep SBP < 140 mmHg.   No BP meds.  IVF/nutrition/Renal/GI: i/o: -4Lt.   -Give 2.5 Lt of free water at least   -Drink nectar.   -Serum Na and urine SG q 6hrs.  -NS at 75cc/hr IVD.   -BR  Hem / ID  -SC heparin 5000 U q 8hrs.  Endo:  -DDAVP IN 10mcg qAM and q PM.  -SC DDAVP 1mcg q 6hrs PRN, with parameters. Na >145 and SG in urine < 1010.  -Serum Na q 6hrs   -Endocrine following.  Prophylaxis:  -Protonix  -SC Heparin.  Advance Directives and Disposition:    Full Code. Keep in the NICU one  more day.            Uninterrupted level 3  Follow-up /Counseling Time (not including procedures):  = 35 min    Activity Orders          Activity as tolerated starting at 06/08 1028        No Order    Mario Mcmullen MD  Neurocritical Care  [facility]

## 2017-06-13 NOTE — PLAN OF CARE
TENISHA advised PA of need for referral for [facility] as a MD needs to complete the form. She will  advise [employer] of the need to complete the form.    TENISHA met with Pt friend and POA and Pt son at bedside. Discussed rehab recs and gave list to them. They reported wanting to start with [place] and [facility] rehabs. They are unsure of preference until they go tour them. Discussed referral to [facility] and gave information on that. Will keep them updated on referrals.    [place] [facility] [place].    Suki Lynn, HOLDEN  Neurocritical Care   [facility]  [id number]

## 2017-06-13 NOTE — PLAN OF CARE
Problem: SLP Goal  Goal: SLP Goal  Speech Language Pathology Goals  Goals expected to be met by 6/16    1. Pt will participate in ongoing swallow assessment to rule out aspiration and determine least restrictive diet. MET  2. Pt will tolerate dental soft diet/nectar thick liquids without overt s/s of aspiration  3. Pt will tolerate thin liquids trials x10 without overt s/s of aspiration  4. Pt will complete dysphagia exercises x10 reps with min cues          Continue current diet at this time. All goals remain appropriate  Nurys Amaro CCC-SLP  6/13/2017

## 2017-06-13 NOTE — PLAN OF CARE
Problem: Occupational Therapy Goal  Goal: Occupational Therapy Goal  Goals to be met by: 6/27/2017     Patient will increase functional independence with ADLs by performing:    UE Dressing with Minimal Assistance.  LE Dressing with Minimal Assistance.  Grooming while standing with Minimal Assistance.  Toileting from toilet with Minimal Assistance for hygiene and clothing management.   Rolling to Bilateral with Contact Guard Assistance.   Supine to sit with Contact Guard Assistance.  Stand pivot transfers with Minimal Assistance.  Toilet transfer to toilet with Minimal Assistance.      OT evaluation complete and POC established.  Rehab is recommended upon d/c from acute care to further address deficits and help pt improve overall functional independence.     HELEN Lopes  6/13/2017

## 2017-06-13 NOTE — PT/OT/SLP EVAL
Occupational Therapy  Evaluation    Joanna Morales   MRN: [id number]   Admitting Diagnosis: meningioma    OT Date of Treatment: 06/13/17   OT Start Time: 0757  OT Stop Time: 0828  OT Total Time (min): 31 min    Billable Minutes:  Evaluation 31  *Completed with PT    Diagnosis: meningioma s/p resection    Past Medical History:   Diagnosis Date    Allergy     Basal cell carcinoma     Depression 11/1/2016    Essential hypertension 6/9/2017    Eye disorder     Fever blister     Normocytic anemia 6/9/2017      Past Surgical History:   Procedure Laterality Date    BASAL CELL CARCINOMA EXCISION      forehead    BRAIN SURGERY      SKIN BIOPSY         Referring physician: Donlad Chew MD  Date referred to OT: 6/12/2017    General Precautions: Standard, aspiration, fall, nectar thick  Orthopedic Precautions: N/A  Braces: N/A    Do you have any cultural, spiritual, Anglican conflicts, given your current situation?: None reported     Patient History:  Living Environment  Lives With:  (cousin)  Living Arrangements: apartment  Transportation Available: family or friend will provide  Living Environment Comment: Pt questionable historian; pt states she lives with cousin in 2nd story apartment with elevator access.  Bathroom contains walk-in shower with bench present.  PTA pt first stated she needed help with all ADLs and later reported she was (I) with all ADLs.  Pt was utilizing cane for mobilization.  Cousin is not at home during the day; 24/7 assist not available.   *Family asleep during time of evaluation; will attempt to confirm PLOF and living environment information at next session.   Equipment Currently Used at Home: cane, straight    Prior level of function:   Bed Mobility/Transfers: independent  Grooming: independent  Bathing: independent  Upper Body Dressing: independent  Lower Body Dressing: independent  Toileting: independent  Home Management Skills: independent  Driving License: Yes  Mode of  Transportation: Car     Dominant Hand:  Will obtain information in upcoming session.    Subjective:  Communicated with RN prior to session.    Chief Complaint:  Dizziness  Patient/Family stated goals: Resume regular activities    Pain/Comfort  Pain Rating 1: 10/10  Location - Side 1: Bilateral  Location - Orientation 1: generalized  Location 1: back  Pain Rating Post-Intervention 1: 10/10    Objective:  Patient found with: blood pressure cuff, garcía catheter, SCD, telemetry, pulse ox (continuous), peripheral IV    Cognitive Exam:  Oriented to: Person; pt stated it was January and named different hospital for location  Follows Commands/attention: Follows one-step commands  Communication: clear/fluent  Memory:  Deficits noted  Safety awareness/insight to disability: impaired  Coping skills/emotional control: Appropriate to situation    Visual/perceptual:  Blind    Physical Exam:  Postural examination/scapula alignment: Head forward  Skin integrity: Visible skin intact  Edema: None noted     Sensation:   Intact for (B) UE    Upper Extremity Range of Motion:  Right Upper Extremity: WFL  Left Upper Extremity: Pt limited to ~60 degrees of shoulder flexion    Upper Extremity Strength:  Right Upper Extremity: WFL  Left Upper Extremity: Deficits noted; grossly 2/5 for bicep/tricep and deltoid   Strength: 4/5 both hands    Fine motor coordination:   Intact for thumb to finger opposition    Gross motor coordination: WFL    Functional Mobility:  Bed Mobility:  Rolling/Turning Right: Maximum assistance  Scooting/Bridging: Maximum Assistance  Supine to Sit: Maximum Assistance  Sit to Supine: Maximum Assistance    Transfers:  Sit <> Stand Assistance: Minimum Assistance with assist of 2 x 1 trial from EOB  -Pt stated her legs felt numb after completing transfer  Sit <> Stand Assistive Device: No Assistive Device; OT and PT stood on either side of pt    Functional Ambulation: Pt unable to take steps this date.     Activities of  "Daily Living:    LE Dressing Level of Assistance: Total assistance for donning socks while seated EOB; pt stated, "I am having trouble bending over."  OT educated pt on figure four dressing technique    Balance:   Static Sit: FAIR+: Able to take MINIMAL challenges from all directions  Dynamic Sit: FAIR+: Maintains balance through MINIMAL excursions of active trunk motion  Static Stand: POOR+: Needs MINIMAL assist to maintain  Dynamic stand: POOR: N/A    Therapeutic Activities and Exercises:  *Pt sat EOB for ~20 minutes with SBA-CGA required to maintain upright position.    *Pt educated on role of OT and POC discussed    AM-PAC 6 CLICK ADL  How much help from another person does this patient currently need?  1 = Unable, Total/Dependent Assistance  2 = A lot, Maximum/Moderate Assistance  3 = A little, Minimum/Contact Guard/Supervision  4 = None, Modified [place]/Independent    Putting on and taking off regular lower body clothing? : 1  Bathing (including washing, rinsing, drying)?: 2  Toileting, which includes using toilet, bedpan, or urinal? : 2  Putting on and taking off regular upper body clothing?: 2  Taking care of personal grooming such as brushing teeth?: 2  Eating meals?: 2  Total Score: 11    AM-PAC Raw Score CMS "G-Code Modifier Level of Impairment Assistance   6 % Total / Unable   7 - 9 CM 80 - 100% Maximal Assist   10-14 CL 60 - 80% Moderate Assist   15 - 19 CK 40 - 60% Moderate Assist   20 - 22 CJ 20 - 40% Minimal Assist   23 CI 1-20% SBA / CGA   24 CH 0% Independent/ Mod I       Patient left supine with all lines intact, call button in reach, restraints reapplied at end of session, RN notified and family present    Assessment:  Joanna Morales is a 51 y.o. female with a medical diagnosis of meningioma s/p craniotomy resection and presents with impaired cognition, decreased endurance, LUE weakness, dizziness, and impaired balance impacting performance with ADLs and mobility.  Pt " demonstrates strength and ROM in RUE needed for ADLs that is WFL, with deficits noted in LUE.  Pt able to sit EOB with SBA-CGA and required Total A to don socks.  PLOF somewhat unclear 2* pt questionable historian; will attempt to confirm information with family in upcoming visits.  Pt required Min A with assist of 2 to complete sit to stand transfer with moderate postural instability and inability to take steps noted.  Pt would benefit from skilled OT services to address problems listed below and increase independence with ADLs.  Rehab is recommended upon d/c from acute care to further address deficits and help pt improve overall functional independence.     Rehab identified problem list/impairments: Rehab identified problem list/impairments: weakness, impaired endurance, impaired functional mobilty, impaired cognition, decreased lower extremity function, decreased upper extremity function, decreased coordination, decreased safety awareness, impaired balance, impaired self care skills, pain, impaired fine motor, gait instability    Rehab potential is good.    Activity tolerance: Fair    Discharge recommendations: Discharge Facility/Level Of Care Needs: rehabilitation facility     Barriers to discharge: Barriers to Discharge: Inaccessible home environment, Decreased caregiver support    Equipment recommendations:  (TBD pending progress)     GOALS:    Occupational Therapy Goals        Problem: Occupational Therapy Goal    Goal Priority Disciplines Outcome Interventions   Occupational Therapy Goal     OT, PT/OT     Description:  Goals to be met by: 6/27/2017     Patient will increase functional independence with ADLs by performing:    UE Dressing with Minimal Assistance.  LE Dressing with Minimal Assistance.  Grooming while standing with Minimal Assistance.  Toileting from toilet with Minimal Assistance for hygiene and clothing management.   Rolling to Bilateral with Contact Guard Assistance.   Supine to sit with  Contact Guard Assistance.  Stand pivot transfers with Minimal Assistance.  Toilet transfer to toilet with Minimal Assistance.                      PLAN:  Patient to be seen 5 x/week to address the above listed problems via self-care/home management, therapeutic activities, therapeutic exercises  Plan of Care expires: 07/13/17  Plan of Care reviewed with: patient         HELEN Lopes  06/13/2017

## 2017-06-13 NOTE — ASSESSMENT & PLAN NOTE
S/p craniotomy 6/8/2017   Current free water deficit is 4 Liters  -Recommend  cc/hr to match i/o. Goal is net neutral fluid balance.   Continue prn ddavp if uop > 250 x 2 consecutive hours AND Spec grav <1.005 or Serum Sodium greater than 150 (Would recommend patient meet two of three criteria prior to administration of DDAVP)    Check sodium and urine osmo and sp. Gravity  Q6 for now  Strict I/O and daily measured weight.

## 2017-06-13 NOTE — PLAN OF CARE
Problem: Physical Therapy Goal  Goal: Physical Therapy Goal  Goals to be met by: 17    Patient will increase functional independence with mobility by performin. Supine to sit with Contact Guard Assistance  2. Sit to supine with Contact Guard Assistance  3. Sit to stand transfer with Minimal Assistance  4. Gait  x 25 feet with Minimal Assistance with or without appropriate AD.   5. Lower extremity exercise program x30 reps per handout, with independence    Outcome: Ongoing (interventions implemented as appropriate)  Pt chart reviewed and PT evaluation completed- see note for details. POC initiated.     Melissa Watkins, PT, DPT   2017  Pager: [phone]

## 2017-06-13 NOTE — SUBJECTIVE & OBJECTIVE
Interval History: NAEON    Medications:  Continuous Infusions:   Scheduled Meds:   amitriptyline  100 mg Oral Nightly    desmopressin  10 mcg Nasal Daily    gabapentin  300 mg Oral TID    heparin (porcine)  5,000 Units Subcutaneous Q8H    hydrocortisone  10 mg Oral QAM    hydrocortisone  5 mg Oral QHS    nicotine  1 patch Transdermal Daily    pantoprazole  40 mg Oral BID     PRN Meds:acetaminophen, acetaminophen, acetaminophen, calcium gluconate IVPB, calcium gluconate IVPB, calcium gluconate IVPB, desmopressin, haloperidol lactate, hydrALAZINE, hydrocodone-acetaminophen 5-325mg, magnesium sulfate IVPB, magnesium sulfate IVPB, magnesium sulfate IVPB, magnesium sulfate IVPB, metoclopramide HCl, metoclopramide HCl, potassium chloride **AND** potassium chloride **AND** potassium chloride, potassium chloride 10%, potassium chloride 10%, potassium chloride 10%, potassium, sodium phosphates, potassium, sodium phosphates, potassium, sodium phosphates, ramelteon, sodium phosphate IVPB, sodium phosphate IVPB, sodium phosphate IVPB     Review of Systems  Objective:     Weight: 60 kg (132 lb 4.4 oz)  Body mass index is 24.19 kg/m².  Vital Signs (Most Recent):  Temp: 97.7 °F (36.5 °C) (17 0701)  Pulse: 63 (17 0800)  Resp: 12 (17 0800)  BP: 124/80 (17 0800)  SpO2: 95 % (17 0800) Vital Signs (24h Range):  Temp:  [97.2 °F (36.2 °C)-98.8 °F (37.1 °C)] 97.7 °F (36.5 °C)  Pulse:  [55-89] 63  Resp:  [9-24] 12  SpO2:  [95 %-100 %] 95 %  BP: (105-124)/(64-87) 124/80              Temp (24hrs), Av.7 °F (36.5 °C), Min:97.2 °F (36.2 °C), Max:98.8 °F (37.1 °C)           Date 17 0700 - 17 0659   Shift 1766-9746 2138-8348 8388-5746 24 Hour Total   I  N  T  A  K  E   I.V.  (mL/kg) 10  (0.2)   10  (0.2)    Shift Total  (mL/kg) 10  (0.2)   10  (0.2)   O  U  T  P  U  T   Urine  (mL/kg/hr) 85   85    Shift Total  (mL/kg) 85  (1.4)   85  (1.4)   Weight (kg) 60 60 60 60          Closed/Suction  "Drain 06/07/17 1800 Anterior;Left Scalp Accordion 10 Fr. (Active)   Site Description Unable to view 6/13/2017  3:05 AM   Dressing Type [place] 6/13/2017  3:05 AM   Dressing Status Clean;Intact;Dry 6/13/2017  3:05 AM   Dressing Intervention Dressing reinforced 6/13/2017  3:05 AM   Drainage Serosanguineous 6/13/2017  3:05 AM   Status To bulb suction 6/13/2017  3:05 AM   Output (mL) 0 mL 6/11/2017 10:00 PM            Urethral Catheter 06/07/17 0823 Straight-tip;Non-latex 16 Fr. (Active)   Site Assessment Clean;Intact 6/13/2017  3:05 AM   Collection Container Urimeter 6/13/2017  3:05 AM   Securement Method secured to top of thigh w/ adhesive device 6/13/2017  3:05 AM   Catheter Care Performed yes 6/13/2017  3:05 AM   Reason for Continuing Urinary Catheterization Critically ill in ICU requiring intensive monitoring 6/13/2017  3:05 AM   CAUTI Prevention Bundle StatLock in place w 1" slack;Intact seal between catheter & drainage tubing;Drainage bag off the floor;Green sheeting clip in use;No dependent loops or kinks;Drainage bag not overfilled (<2/3 full) 6/12/2017 11:00 AM   Output (mL) 35 mL 6/13/2017  8:00 AM       Neurosurgery Physical Exam   Awake, oriented x3  Pupils nonreactive bilaterally  ALEMAN symm  SILT  No drift    Significant Labs:    Recent Labs  Lab 06/13/17  0400   GLU 91   *  153*   K 3.7   *   CO2 25   BUN 17   CREATININE 0.7   CALCIUM 10.3   MG 2.6       Recent Labs  Lab 06/12/17  0237 06/13/17  0400   WBC 9.68 10.46   HGB 12.9 14.3   HCT 38.9 42.9    315       Recent Labs  Lab 06/13/17  0400   INR 1.0   APTT 21.4     Microbiology Results (last 7 days)     ** No results found for the last 168 hours. **            Significant Diagnostics:    "

## 2017-06-13 NOTE — PT/OT/SLP EVAL
Physical Therapy  Evaluation    Joanna Morales   MRN: [id number]   Admitting Diagnosis: s/p menigioma resection     PT Received On: 06/13/17  PT Start Time: 0756     PT Stop Time: 0828    PT Total Time (min): 32 min       Billable Minutes:  Evaluation 32 min (Co-eval with OT)    Diagnosis: s/p menigioma resection     Past Medical History:   Diagnosis Date    Allergy     Basal cell carcinoma     Depression 11/1/2016    Essential hypertension 6/9/2017    Eye disorder     Fever blister     Normocytic anemia 6/9/2017      Past Surgical History:   Procedure Laterality Date    BASAL CELL CARCINOMA EXCISION      forehead    BRAIN SURGERY      SKIN BIOPSY         Referring physician: Shruti Centeno PA-C   Date referred to PT: 6/12/17    General Precautions: Standard, fall, aspiration, nectar thick  Orthopedic Precautions: N/A   Braces: N/A       Do you have any cultural, spiritual, Church conflicts, given your current situation?: no conflicts    Patient History:  Lives With: other relative(s) (cousin)  Living Arrangements: apartment  Transportation Available: family or friend will provide  Living Environment Comment: Pt lethargic during evaluation- unreliable historian; Pt reported living with cousin in 2nd floor apartment with stairs or elevator access; prior to admit, pt was ambulating with SPC and (I) with ADLs (inconsistent with responses). Unsure of pt's assistance from family/friends upon discharge   Equipment Currently Used at Home: cane, straight, walker, rolling  DME owned (not currently used): SPC, RW     Previous Level of Function:  Ambulation Skills: needs device (with SPC)  Transfer Skills: independent  ADL Skills: independent    Subjective:  Communicated with RN prior to session. Pt asleep upon PT arrival, able to arouse with max verbal stimuli. Pt lethargic throughout evaluation.     Chief Complaint: decreased mobility   Patient goals: none stated     Pain/Comfort  Pain Rating 1:  10/10  Location - Orientation 1: generalized  Location 1: back  Pain Addressed 1: Reposition, Nurse notified  Pain Rating Post-Intervention 1: 10/10      Objective:   Patient found with: peripheral IV, pulse ox (continuous), blood pressure cuff, telemetry, garcía catheter, SCD     Cognitive Exam:  Oriented to: Person    Follows Commands/attention: Follows one-step commands  Communication: fluent, slurred speech   Safety awareness/insight to disability: impaired    Physical Exam:  Postural examination/scapula alignment: Rounded shoulder    Skin integrity: Visible skin intact  Edema: None noted BLEs    Sensation:   Intact  light/touch BLEs    Vision:   Pt with vision loss bilaterally     Lower Extremity Range of Motion:  Right Lower Extremity: WFL  Left Lower Extremity: WFL    Lower Extremity Strength:  Right Lower Extremity: Deficits: grossly 3+/5 all major muscle groups; pt lethargic during MMT- will need to re-assess at next visit   Left Lower Extremity: Deficits: grossly 3+/5 all major muscle groups; pt lethargic during MMT- will need to re-assess at next visit     Gross motor coordination: WFL    Functional Mobility:  Bed Mobility:  Rolling/Turning Right: Maximum assistance  Supine to Sit: Maximum Assistance  Sit to Supine: Minimum Assistance    Transfers:  Sit <> Stand Assistance: Other (see comments) (min A x 2; 1 trial performed from EOB)  Sit <> Stand Assistive Device: No Assistive Device    Gait:   Gait Distance: Pt unable to perform     Balance:   Static Sit: FAIR: Maintains without assist, but unable to take any challenges   Dynamic Sit: FAIR: Cannot move trunk without losing balance  Static Stand: POOR+: Needs MINIMAL assist to maintain  Dynamic stand: POOR: N/A    Therapeutic Activities and Exercises:  Pt educated on role of PT and POC/goals for therapy as well as safety with mobility. Pt verbalized understanding. Pt expressed no further concerns/questions.       AM-PAC 6 CLICK MOBILITY  How much help  from another person does this patient currently need?   1 = Unable, Total/Dependent Assistance  2 = A lot, Maximum/Moderate Assistance  3 = A little, Minimum/Contact Guard/Supervision  4 = None, Modified [place]/Independent    Turning over in bed (including adjusting bedclothes, sheets and blankets)?: 2  Sitting down on and standing up from a chair with arms (e.g., wheelchair, bedside commode, etc.): 2  Moving from lying on back to sitting on the side of the bed?: 2  Moving to and from a bed to a chair (including a wheelchair)?: 2  Need to walk in hospital room?: 1  Climbing 3-5 steps with a railing?: 1  Total Score: 10     AM-PAC Raw Score CMS G-Code Modifier Level of Impairment Assistance   6 % Total / Unable   7 - 9 CM 80 - 100% Maximal Assist   10 - 14 CL 60 - 80% Moderate Assist   15 - 19 CK 40 - 60% Moderate Assist   20 - 22 CJ 20 - 40% Minimal Assist   23 CI 1-20% SBA / CGA   24 CH 0% Independent/ Mod I     Patient left supine with all lines intact, call button in reach, RN notified and family present.    Assessment:   Joanna Morales is a 51 y.o. female with a medical diagnosis of s/p menigioma resection. Pt presents with vision loss bilaterally (blind), decreased BLE strength, impaired functional mobility, decreased endurance, and difficulty ambulating. Pt would benefit from skilled PT intervention to address below listed deficits, reduce fall risk, and maximize (I) and safety with functional mobility. Recommending pt discharge to inpatient rehabilitation facility for continued daily therapy.     Rehab identified problem list/impairments: Rehab identified problem list/impairments: weakness, impaired endurance, impaired functional mobilty, gait instability, impaired balance, visual deficits, decreased safety awareness, pain, decreased lower extremity function, impaired self care skills    Rehab potential is good.    Activity tolerance: Good    Discharge recommendations: Discharge  Facility/Level Of Care Needs: rehabilitation facility     Barriers to discharge: Barriers to Discharge: Inaccessible home environment, Decreased caregiver support    Equipment recommendations: Equipment Needed After Discharge:  (TBD pending progress)     GOALS:    Physical Therapy Goals        Problem: Physical Therapy Goal    Goal Priority Disciplines Outcome Goal Variances Interventions   Physical Therapy Goal     PT/OT, PT Ongoing (interventions implemented as appropriate)     Description:  Goals to be met by: 17    Patient will increase functional independence with mobility by performin. Supine to sit with Contact Guard Assistance  2. Sit to supine with Contact Guard Assistance  3. Sit to stand transfer with Minimal Assistance  4. Gait  x 25 feet with Minimal Assistance with or without appropriate AD.   5. Lower extremity exercise program x30 reps per handout, with independence                      PLAN:    Patient to be seen 5 x/week to address the above listed problems via gait training, therapeutic activities, therapeutic exercises, neuromuscular re-education  Plan of Care expires: 17  Plan of Care reviewed with: patient        Melissa Watkins PT, DPT   2017  Pager: [phone]

## 2017-06-13 NOTE — NURSING
Notified ALANNAH Caldwell of pts UO less than 30/hr. Orders to monitor for no urine output. Continue NS drip and free water. Will continue to monitor.

## 2017-06-13 NOTE — SUBJECTIVE & OBJECTIVE
Interval History:>4 elements OR status of 3 inpatient conditions  DI event early this AM. Required night PRN DDAVP. Complaining of leg pain. WBC normalizing. Able to see light on the L side. -4lt of water deficit on I/O.     Review of Systems  2 systems OR Unable to obtain a complete ROS due to level of consciousness.  Objective:     Vitals:  Temp: 98.7 °F (37.1 °C) (06/13/17 1100)  Pulse: 73 (06/13/17 1403)  Resp: (!) 21 (06/13/17 1403)  BP: 117/71 (06/13/17 1403)  SpO2: 100 % (06/13/17 1403)    Temp:  [97.2 °F (36.2 °C)-98.8 °F (37.1 °C)] 98.7 °F (37.1 °C)  Pulse:  [62-86] 73  Resp:  [9-24] 21  SpO2:  [95 %-100 %] 100 %  BP: (105-124)/(70-87) 117/71              06/12 0701 - 06/13 0700  In: 90 [I.V.:40]  Out: 2480 [Urine:2480]    Physical Exam   Neurological: GCS eye subscore is 1 - none. GCS motor subscore is 1 - none.     General   HEENT: S/P bifrontal crani  Chest Heart RRR / Lungs Clear to auscultation  Adbomen: Soft nontender + BS  Extremities: OK distal pulses.  Skin: UK  Neurological Exam:  MS; Awake and following commands intermittently.  abnormalities. Normal mood.  CN: II-XII Pupils fixed and blind Pupils are 8mm bilat. Saw light on the L-side. Not on my exam.  Motor: LUE  5 /5 / RUE 5 /5  Tone normal bilaterally             LLE  5 /5 /  RLE 5 /5  Tone normal bilaterally  Sensory: LT/PP/T/ Vibration UK                 Complex sensory modalities: not tested  DTR:  normal throughout.  Coordination /Fine motor: UK  Gait: Not tested.  Meningeal signs: Absent     Unable to test orientation, judgment, insight, fund of knowledge, hearing, shoulder shrug, tongue protrusion, coordination, gait due to level of consciousness.        [place] Coma Scale:  Best Eye Response: 1 - none  Best Motor Response: 1 - none              Medications:  Continuous  sodium chloride 0.9% Last Rate: 75 mL/hr at 06/13/17 1400   Scheduled  amitriptyline 100 mg Nightly   desmopressin 10 mcg BID   gabapentin 300 mg TID   heparin  (porcine) 5,000 Units Q8H   hydrocortisone 10 mg QAM   hydrocortisone 5 mg QHS   nicotine 1 patch Daily   pantoprazole 40 mg BID   PRN  acetaminophen 650 mg Q6H PRN   acetaminophen 650 mg Q6H PRN   acetaminophen 650 mg Q4H PRN   calcium gluconate IVPB 1 g PRN   calcium gluconate IVPB 1 g PRN   calcium gluconate IVPB 1 g PRN   desmopressin 1 mcg Q6H PRN   haloperidol lactate 2 mg Q4H PRN   hydrALAZINE 10 mg Q1H PRN   hydrocodone-acetaminophen 5-325mg 1 tablet Q4H PRN   magnesium sulfate IVPB 2 g PRN   magnesium sulfate IVPB 4 g PRN   magnesium sulfate IVPB 2 g PRN   magnesium sulfate IVPB 4 g PRN   metoclopramide HCl 5 mg Q6H PRN   metoclopramide HCl 5 mg Q6H PRN   potassium chloride 40 mEq PRN   And     potassium chloride 60 mEq PRN   And     potassium chloride 80 mEq PRN   potassium chloride 10% 40 mEq PRN   potassium chloride 10% 40 mEq PRN   potassium chloride 10% 60 mEq PRN   potassium, sodium phosphates 2 packet PRN   potassium, sodium phosphates 2 packet PRN   potassium, sodium phosphates 2 packet PRN   ramelteon 8 mg Nightly PRN   sodium phosphate IVPB 15 mmol PRN   sodium phosphate IVPB 20.01 mmol PRN   sodium phosphate IVPB 30 mmol PRN     Today I personally reviewed pertinent medications, lines/drains/airways, imaging, cardiology, lab results, microbiology results, notably:

## 2017-06-13 NOTE — ASSESSMENT & PLAN NOTE
50 y/o F s/p crani resection olfactory groove meningioma POD#6    Neuro stable  Cont neuro checks  Continue cortef 5 q am, 10 pm.  Monitor for AI  HV out  CV- SBP < 160  Pulm- on RA.   FENGI- monitor for DI, fu urine studies, fu endocrine recs.  Heme/ID- afebrile. hgb/hct stable.   ppx- ANA/SCD's. Gi ppx. Sqh.   PT, OT, ST.   Dispo- cont ICU care

## 2017-06-13 NOTE — PROGRESS NOTES
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  She underwent operation in January 2015 on the [facility] service at [facility].  It   was apparently only possible to get a partial resection of the tumor.  She did   have a followup scan done in 2016 showing a continued large meningioma in this   area.  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  She had a   followup MRI scan done at [facility] on 01/20/17, showing this large tumor.  She   was seen by you in Ophthalmology and then referred to Neurosurgery for further   evaluation.  At this point, she has some preserved vision in the right eye.  She   complains of constant headaches.  She has no sense of smell.  She feels off   balance.  She is taking Neurontin apparently for seizure prevention. Past   medical history relates primarily to the present illness.  She does not have   chronic medical illnesses such as diabetes or hypertension.  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  She is  and has good family support.     On physical examination, she is a well-developed, well-nourished white lady, who   is alert and oriented.  Examination of the head shows a somewhat anterior, but   well-healed bicoronal incision.  She says that this is a little tender in the   left frontal area.  Eyes show full extraocular movements.  The pupils are small,   the left is not reactive.  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  The right optic nerve is probably normal.  She   sees things as doubles.  When I held up two  fingers she counted four, although   she knew that there were two.  Hearing seems preserved.  The neck is supple.  On   neurological examination, she is speaking clearly.  She is obviously anxious   and depressed over her illness.  Finger-to-nose is somewhat diminished on the   left side and gait was mildly unsteady.  Cranial nerves are otherwise intact.    She has normal facial sensation and movement.  The tongue protrudes in the   midline.  Strength in the extremities seems generally good, sensation normal and   deep tendon reflexes symmetrical.     MRI of the brain performed at [facility] on 01/20/17, shows a   previous bifrontal craniotomy.  There is a large lobular meningioma, which   occupies the sella, planum sphenoidale and extends back on to the clivus.  The   optic chiasm and optic nerves are not visualized.  The carotid arteries and   anterior cerebral arteries run done through the tumor.  The tumor elevates and   pushes the third ventricle back, but there is no hydrocephalus    Post-Op Info:  Procedure(s) (LRB):  CRANIOTOMY WITH STEALTH (N/A)   6 Days Post-Op     Interval History: NAEON    Medications:  Continuous Infusions:   Scheduled Meds:   amitriptyline  100 mg Oral Nightly    desmopressin  10 mcg Nasal Daily    gabapentin  300 mg Oral TID    heparin (porcine)  5,000 Units Subcutaneous Q8H    hydrocortisone  10 mg Oral QAM    hydrocortisone  5 mg Oral QHS    nicotine  1 patch Transdermal Daily    pantoprazole  40 mg Oral BID     PRN Meds:acetaminophen, acetaminophen, acetaminophen, calcium gluconate IVPB, calcium gluconate IVPB, calcium gluconate IVPB, desmopressin, haloperidol lactate, hydrALAZINE, hydrocodone-acetaminophen 5-325mg, magnesium sulfate IVPB, magnesium sulfate IVPB, magnesium sulfate IVPB, magnesium sulfate IVPB, metoclopramide HCl, metoclopramide HCl, potassium chloride **AND** potassium chloride **AND** potassium chloride, potassium chloride 10%, potassium  chloride 10%, potassium chloride 10%, potassium, sodium phosphates, potassium, sodium phosphates, potassium, sodium phosphates, ramelteon, sodium phosphate IVPB, sodium phosphate IVPB, sodium phosphate IVPB     Review of Systems  Objective:     Weight: 60 kg (132 lb 4.4 oz)  Body mass index is 24.19 kg/m².  Vital Signs (Most Recent):  Temp: 97.7 °F (36.5 °C) (17 0701)  Pulse: 63 (17 0800)  Resp: 12 (17 0800)  BP: 124/80 (17 0800)  SpO2: 95 % (17 0800) Vital Signs (24h Range):  Temp:  [97.2 °F (36.2 °C)-98.8 °F (37.1 °C)] 97.7 °F (36.5 °C)  Pulse:  [55-89] 63  Resp:  [9-24] 12  SpO2:  [95 %-100 %] 95 %  BP: (105-124)/(64-87) 124/80              Temp (24hrs), Av.7 °F (36.5 °C), Min:97.2 °F (36.2 °C), Max:98.8 °F (37.1 °C)           Date 17 07 - 17 0659   Shift 0471-5639 9643-5354 9590-3499 24 Hour Total   I  N  T  A  K  E   I.V.  (mL/kg) 10  (0.2)   10  (0.2)    Shift Total  (mL/kg) 10  (0.2)   10  (0.2)   O  U  T  P  U  T   Urine  (mL/kg/hr) 85   85    Shift Total  (mL/kg) 85  (1.4)   85  (1.4)   Weight (kg) 60 60 60 60          Closed/Suction Drain 17 1800 Anterior;Left Scalp Accordion 10 Fr. (Active)   Site Description Unable to view 2017  3:05 AM   Dressing Type [facility] 2017  3:05 AM   Dressing Status Clean;Intact;Dry 2017  3:05 AM   Dressing Intervention Dressing reinforced 2017  3:05 AM   Drainage Serosanguineous 2017  3:05 AM   Status To bulb suction 2017  3:05 AM   Output (mL) 0 mL 2017 10:00 PM            Urethral Catheter 17 0823 Straight-tip;Non-latex 16 Fr. (Active)   Site Assessment Clean;Intact 2017  3:05 AM   Collection Container Urimeter 2017  3:05 AM   Securement Method secured to top of thigh w/ adhesive device 2017  3:05 AM   Catheter Care Performed yes 2017  3:05 AM   Reason for Continuing Urinary Catheterization Critically ill in ICU requiring intensive monitoring 2017  3:05  "AM   CAUTI Prevention Bundle StatLock in place w 1" slack;Intact seal between catheter & drainage tubing;Drainage bag off the floor;Green sheeting clip in use;No dependent loops or kinks;Drainage bag not overfilled (<2/3 full) 6/12/2017 11:00 AM   Output (mL) 35 mL 6/13/2017  8:00 AM       Neurosurgery Physical Exam   Awake, oriented x3  Pupils nonreactive bilaterally  ALEMAN symm  SILT  No drift    Significant Labs:    Recent Labs  Lab 06/13/17  0400   GLU 91   *  153*   K 3.7   *   CO2 25   BUN 17   CREATININE 0.7   CALCIUM 10.3   MG 2.6       Recent Labs  Lab 06/12/17  0237 06/13/17  0400   WBC 9.68 10.46   HGB 12.9 14.3   HCT 38.9 42.9    315       Recent Labs  Lab 06/13/17  0400   INR 1.0   APTT 21.4     Microbiology Results (last 7 days)     ** No results found for the last 168 hours. **            Significant Diagnostics:      Assessment/Plan:     Meningioma, recurrent of brain    50 y/o F s/p crani resection olfactory groove meningioma POD#6    Neuro stable  Cont neuro checks  Continue cortef 5 q am, 10 pm.  Monitor for AI  HV out  CV- SBP < 160  Pulm- on RA.   FENGI- monitor for DI, fu urine studies, fu endocrine recs.  Heme/ID- afebrile. hgb/hct stable.   ppx- ANA/SCD's. Gi ppx. Sqh.   PT, OT, ST.   Dispo- cont ICU care            Boogie Wiley, DO  Neurosurgery  [facility]  "

## 2017-06-13 NOTE — PROGRESS NOTES
"[facility]  Endocrinology  Progress Note    Admit Date: 6/7/2017     Reason for Consult: Diabetes insipidus     Surgical Procedure and Date:   CRANIOTOMY 6/8/2017        HPI:   Patient is a 51 y.o. female with a diagnosis of  meningioma s/p resection (6/7). 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  She underwent partial resection of the tumor in January 2015 at [facility]. Followup scan done in 2016 showing a continued large meningioma.     Endocrinology consulted for post operative DI management.  Per the operative report - The pituitary stalk was identified and could be preserved      Interval HPI:   Overnight events: Patient more alert this am.    Started on DDAVP 1 mcg IV -  6/8, 6/9, 610, 6/12  Intranasal DDAVP 10 mcg 6/13/17  Last dose dDAVP 6/10/2017 at 6 am  Sodium this morning is 153     Net negative-4.032 Liters on 6/12  Sp gravity: 1.005             /75   Pulse 73   Temp 97.7 °F (36.5 °C) (Oral)   Resp 14   Ht 5' 2" (1.575 m)   Wt 60 kg (132 lb 4.4 oz)   SpO2 97%   Breastfeeding? No   BMI 24.19 kg/m²       Labs Reviewed and Include      Recent Labs  Lab 06/13/17  0400   GLU 91   CALCIUM 10.3   ALBUMIN 3.5   PROT 7.7   *  153*   K 3.7   CO2 25   *   BUN 17   CREATININE 0.7   ALKPHOS 97   *   AST 90*   BILITOT 0.4     Lab Results   Component Value Date    WBC 10.46 06/13/2017    HGB 14.3 06/13/2017    HCT 42.9 06/13/2017    MCV 96 06/13/2017     06/13/2017       Recent Labs  Lab 06/10/17  0924   TSH 0.042*   FREET4 1.14     No results found for: HGBA1C    Nutritional status:   Body mass index is 24.19 kg/m².  Lab Results   Component Value Date    ALBUMIN 3.5 06/13/2017    ALBUMIN 3.3 (L) 06/12/2017    ALBUMIN 3.3 (L) 06/11/2017     No results found for: PREALBUMIN    Estimated Creatinine Clearance: 75.2 mL/min (based on Cr of " 0.7).    Accu-Checks  No results for input(s): POCTGLUCOSE in the last 72 hours.    Current Medications and/or Treatments Impacting Glycemic Control  Immunotherapy:  Immunosuppressants     None        Steroids:   Hormones     Start     Stop Route Frequency Ordered    06/13/17 0600  desmopressin nasal solution 10 mcg      -- Nasl Daily 06/12/17 0904    06/12/17 2100  hydrocortisone tablet 5 mg      -- Oral Nightly 06/12/17 1059    06/12/17 1100  hydrocortisone tablet 10 mg      -- Oral Every morning 06/12/17 1059    06/12/17 1015  desmopressin injection 1 mcg      -- SubQ Every 6 hours PRN 06/12/17 0914        Pressors:    Autonomic Drugs     None        Hyperglycemia/Diabetes Medications: Antihyperglycemics     None          ASSESSMENT and PLAN    Adverse effect of glucocorticoid or synthetic analogue    Can increase BG          S/P craniotomy    On 6/8/2017 now with DI as in #1   Steroids can unmask DI symptoms  Currently on HC 10 mg in the am and 5 mg pm               Diabetes insipidus    S/p craniotomy 6/8/2017   Current free water deficit is 4 Liters  -Recommend  cc/hr to match i/o. Goal is net neutral fluid balance.   Continue prn ddavp if uop > 250 x 2 consecutive hours AND Spec grav <1.005 or Serum Sodium greater than 150 (Would recommend patient meet two of three criteria prior to administration of DDAVP)    Check sodium and urine osmo and sp. Gravity  Q6 for now  Strict I/O and daily measured weight.               Meningioma, recurrent of brain    S/p surgery   Per primary team                Melissa Bonilla MD  Endocrinology  [facility]

## 2017-06-13 NOTE — SUBJECTIVE & OBJECTIVE
"Interval HPI:   Overnight events: Patient more alert this am.    Started on DDAVP 1 mcg IV -  6/8, 6/9, 610, 6/12  Intranasal DDAVP 10 mcg 6/13/17  Last dose dDAVP 6/10/2017 at 6 am  Sodium this morning is 153     Net negative-4.032 Liters on 6/12  Sp gravity: 1.005             /75   Pulse 73   Temp 97.7 °F (36.5 °C) (Oral)   Resp 14   Ht 5' 2" (1.575 m)   Wt 60 kg (132 lb 4.4 oz)   SpO2 97%   Breastfeeding? No   BMI 24.19 kg/m²     Labs Reviewed and Include      Recent Labs  Lab 06/13/17  0400   GLU 91   CALCIUM 10.3   ALBUMIN 3.5   PROT 7.7   *  153*   K 3.7   CO2 25   *   BUN 17   CREATININE 0.7   ALKPHOS 97   *   AST 90*   BILITOT 0.4     Lab Results   Component Value Date    WBC 10.46 06/13/2017    HGB 14.3 06/13/2017    HCT 42.9 06/13/2017    MCV 96 06/13/2017     06/13/2017       Recent Labs  Lab 06/10/17  0924   TSH 0.042*   FREET4 1.14     No results found for: HGBA1C    Nutritional status:   Body mass index is 24.19 kg/m².  Lab Results   Component Value Date    ALBUMIN 3.5 06/13/2017    ALBUMIN 3.3 (L) 06/12/2017    ALBUMIN 3.3 (L) 06/11/2017     No results found for: PREALBUMIN    Estimated Creatinine Clearance: 75.2 mL/min (based on Cr of 0.7).    Accu-Checks  No results for input(s): POCTGLUCOSE in the last 72 hours.    Current Medications and/or Treatments Impacting Glycemic Control  Immunotherapy:  Immunosuppressants     None        Steroids:   Hormones     Start     Stop Route Frequency Ordered    06/13/17 0600  desmopressin nasal solution 10 mcg      -- Nasl Daily 06/12/17 0904    06/12/17 2100  hydrocortisone tablet 5 mg      -- Oral Nightly 06/12/17 1059    06/12/17 1100  hydrocortisone tablet 10 mg      -- Oral Every morning 06/12/17 1059    06/12/17 1015  desmopressin injection 1 mcg      -- SubQ Every 6 hours PRN 06/12/17 0914        Pressors:    Autonomic Drugs     None        Hyperglycemia/Diabetes Medications: Antihyperglycemics     None        "

## 2017-06-14 NOTE — SUBJECTIVE & OBJECTIVE
"Interval HPI:   Overnight events: Patient tolerating po fluids.     Received Desmopressin 10 mcg intranasal this am. Receiving NS 75 cc/hr.    Results for MUNA MAHONEY (MRN [id number]) as of 6/14/2017 10:07   Ref. Range 6/14/2017 03:16   Sodium Latest Ref Range: 136 - 145 mmol/L 153 (H)       /74   Pulse 66   Temp 97.8 °F (36.6 °C) (Oral)   Resp 14   Ht 5' 2" (1.575 m)   Wt 60 kg (132 lb 4.4 oz)   SpO2 100%   Breastfeeding? No   BMI 24.19 kg/m²     Labs Reviewed and Include      Recent Labs  Lab 06/14/17  0316   GLU 89   CALCIUM 9.9   ALBUMIN 3.6   PROT 7.6   *  153*   K 3.7   CO2 29   *   BUN 20   CREATININE 0.8   ALKPHOS 100   *   AST 87*   BILITOT 0.3     Lab Results   Component Value Date    WBC 13.55 (H) 06/14/2017    HGB 14.6 06/14/2017    HCT 45.2 06/14/2017    MCV 99 (H) 06/14/2017     06/14/2017       Recent Labs  Lab 06/10/17  0924   TSH 0.042*   FREET4 1.14     No results found for: HGBA1C    Nutritional status:   Body mass index is 24.19 kg/m².  Lab Results   Component Value Date    ALBUMIN 3.6 06/14/2017    ALBUMIN 3.5 06/13/2017    ALBUMIN 3.3 (L) 06/12/2017     No results found for: PREALBUMIN    Estimated Creatinine Clearance: 65.8 mL/min (based on Cr of 0.8).    Accu-Checks  No results for input(s): POCTGLUCOSE in the last 72 hours.    Current Medications and/or Treatments Impacting Glycemic Control  Immunotherapy:  Immunosuppressants     None        Steroids:   Hormones     Start     Stop Route Frequency Ordered    06/13/17 2100  desmopressin nasal solution 10 mcg      -- Nasl 2 times daily 06/13/17 1017    06/12/17 2100  hydrocortisone tablet 5 mg      -- Oral Nightly 06/12/17 1059    06/12/17 1100  hydrocortisone tablet 10 mg      -- Oral Every morning 06/12/17 1059    06/12/17 1015  desmopressin injection 1 mcg      -- SubQ Every 6 hours PRN 06/12/17 0914        Pressors:    Autonomic Drugs     None        Hyperglycemia/Diabetes Medications: " Antihyperglycemics     None

## 2017-06-14 NOTE — PT/OT/SLP PROGRESS
Physical Therapy  Treatment    Joanna Morales   MRN: [id number]   Admitting Diagnosis: s/p menigioma resection     PT Received On: 06/14/17  PT Start Time: 0928     PT Stop Time: 0958    PT Total Time (min): 30 min       Billable Minutes:  Therapeutic Activity 30 min    Treatment Type: Treatment  PT/PTA: PT     PTA Visit Number: 0       General Precautions: Standard, fall, aspiration  Orthopedic Precautions: N/A   Braces: N/A    Do you have any cultural, spiritual, Oriental orthodox conflicts, given your current situation?: no conflicts    Subjective:  Communicated with RN prior to session. Pt asleep upon PT arrival, able to arouse with verbal stimuli. Pt required cueing throughout session to remain awake. PT tech (Chanel) present for assistance during session. Pt's son also present. Per pt's son, pt was mod(I) for mobility prior to admit using RW.     Pain/Comfort  Pain Rating 1:  (Pt did not rate pain)  Location - Orientation 1: generalized  Location 1: head  Pain Addressed 1: Reposition, Nurse notified    Objective:   Patient found with: peripheral IV, pulse ox (continuous), garcía catheter, blood pressure cuff    Functional Mobility:  Bed Mobility:   Rolling/Turning Right: Maximum assistance  Supine to Sit: Moderate Assistance  Sit to Supine: Moderate Assistance    Transfers:  Sit <> Stand Assistance: Total Assistance (Max A x 2; 2 trials from EOB)  Sit <> Stand Assistive Device: Rolling Walker, No Assistive Device    Gait:   Gait Distance: Pt unable to perform     Balance:   Static Sit: FAIR-: Maintains without assist but inconsistent   Dynamic Sit: POOR: N/A  Static Stand: POOR: Needs MODERATE assist to maintain    Therapeutic Activities and Exercises:  Therapeutic activities aimed to increase pt's independence, safety, and efficiency with bed mobility and functional transfers. See above for assistance levels.   Pt required max verbal cueing for sequencing of motor tasks and to remain alert and actively engaged in  mobility tasks due to lethargy. Therapist facilitated 2 trials of sit<>stand from EOB (1 with RW, 1 with bilateral HHA); pt with difficulty maintaining upright posture, demonstrating bilateral knee instability and poor standing balance. Pt able to maintain balance sitting EOB with SBA to mod A (fluctuated performance).  Therapist attempted to facilitate therex once pt returned to supine, but pt fell asleep again.     AM-PAC 6 CLICK MOBILITY  How much help from another person does this patient currently need?   1 = Unable, Total/Dependent Assistance  2 = A lot, Maximum/Moderate Assistance  3 = A little, Minimum/Contact Guard/Supervision  4 = None, Modified [place]/Independent    Turning over in bed (including adjusting bedclothes, sheets and blankets)?: 2  Sitting down on and standing up from a chair with arms (e.g., wheelchair, bedside commode, etc.): 2  Moving from lying on back to sitting on the side of the bed?: 2  Moving to and from a bed to a chair (including a wheelchair)?: 2  Need to walk in hospital room?: 1  Climbing 3-5 steps with a railing?: 1  Total Score: 10    AM-PAC Raw Score CMS G-Code Modifier Level of Impairment Assistance   6 % Total / Unable   7 - 9 CM 80 - 100% Maximal Assist   10 - 14 CL 60 - 80% Moderate Assist   15 - 19 CK 40 - 60% Moderate Assist   20 - 22 CJ 20 - 40% Minimal Assist   23 CI 1-20% SBA / CGA   24 CH 0% Independent/ Mod I     Patient left HOB elevated with all lines intact, call button in reach, restraints reapplied at end of session, RN notified and son present.    Assessment:  Joanna Morales is a 51 y.o. female with a medical diagnosis of s/p menigioma resection. Pt limited by lethargy this visit, required max verbal and tactile cueing to remain alert. Pt displayed impaired trunk control and standing balance, and overall poor functional mobility. Pt would benefit from continued PT intervention to address below listed deficits and maximize return to PLOF. Pt  remains a good candidate for inpatient rehab upon discharge.     Rehab identified problem list/impairments: Rehab identified problem list/impairments: weakness, impaired endurance, impaired functional mobilty, gait instability, impaired balance, impaired self care skills, visual deficits, pain, decreased safety awareness    Rehab potential is good.    Activity tolerance: Good    Discharge recommendations: Discharge Facility/Level Of Care Needs: rehabilitation facility     Barriers to discharge: Barriers to Discharge: Inaccessible home environment, Decreased caregiver support    Equipment recommendations: Equipment Needed After Discharge:  (TBD pending progress)     GOALS:    Physical Therapy Goals        Problem: Physical Therapy Goal    Goal Priority Disciplines Outcome Goal Variances Interventions   Physical Therapy Goal     PT/OT, PT Ongoing (interventions implemented as appropriate)     Description:  Goals to be met by: 17    Patient will increase functional independence with mobility by performin. Supine to sit with Contact Guard Assistance  2. Sit to supine with Contact Guard Assistance  3. Sit to stand transfer with Minimal Assistance  4. Gait  x 25 feet with Minimal Assistance with or without appropriate AD.   5. Lower extremity exercise program x30 reps per handout, with independence                      PLAN:    Patient to be seen 5 x/week  to address the above listed problems via gait training, therapeutic activities, therapeutic exercises, neuromuscular re-education  Plan of Care expires: 17  Plan of Care reviewed with: apollo jaeger        Melissa Watkins, PT, DPT   2017  Pager: [phone]

## 2017-06-14 NOTE — PLAN OF CARE
Problem: Physical Therapy Goal  Goal: Physical Therapy Goal  Goals to be met by: 17    Patient will increase functional independence with mobility by performin. Supine to sit with Contact Guard Assistance  2. Sit to supine with Contact Guard Assistance  3. Sit to stand transfer with Minimal Assistance  4. Gait  x 25 feet with Minimal Assistance with or without appropriate AD.   5. Lower extremity exercise program x30 reps per handout, with independence     Outcome: Ongoing (interventions implemented as appropriate)  No goals met this visit; pt limited by lethargy. Continue current POC.     Melissa Watkins PT, DPT   2017  Pager: [phone]

## 2017-06-14 NOTE — SUBJECTIVE & OBJECTIVE
Interval History: No AE. AFVSS.     Medications:  Continuous Infusions:   sodium chloride 0.9% 75 mL/hr at 17 0900     Scheduled Meds:   amitriptyline  100 mg Oral Nightly    bisacodyl  10 mg Rectal Daily    desmopressin  10 mcg Nasal BID    gabapentin  300 mg Oral TID    heparin (porcine)  5,000 Units Subcutaneous Q8H    hydrocortisone  10 mg Oral QAM    hydrocortisone  5 mg Oral QHS    nicotine  1 patch Transdermal Daily    pantoprazole  40 mg Oral BID    polyethylene glycol  17 g Oral Daily     PRN Meds:acetaminophen, acetaminophen, acetaminophen, calcium gluconate IVPB, calcium gluconate IVPB, calcium gluconate IVPB, desmopressin, haloperidol lactate, hydrALAZINE, hydrocodone-acetaminophen 5-325mg, magnesium sulfate IVPB, magnesium sulfate IVPB, magnesium sulfate IVPB, magnesium sulfate IVPB, metoclopramide HCl, metoclopramide HCl, potassium chloride **AND** potassium chloride **AND** potassium chloride, potassium chloride 10%, potassium chloride 10%, potassium chloride 10%, potassium, sodium phosphates, potassium, sodium phosphates, potassium, sodium phosphates, [place]on, sodium phosphate IVPB, sodium phosphate IVPB, sodium phosphate IVPB     Review of Systems    Objective:     Weight: 60 kg (132 lb 4.4 oz)  Body mass index is 24.19 kg/m².  Vital Signs (Most Recent):  Temp: 97.8 °F (36.6 °C) (17 0700)  Pulse: 66 (17 0900)  Resp: 14 (17 0900)  BP: 118/74 (17 0900)  SpO2: 100 % (17 0900) Vital Signs (24h Range):  Temp:  [97.1 °F (36.2 °C)-97.8 °F (36.6 °C)] 97.8 °F (36.6 °C)  Pulse:  [61-95] 66  Resp:  [13-45] 14  SpO2:  [91 %-100 %] 100 %  BP: (107-144)/(59-94) 118/74              Temp (24hrs), Av.5 °F (36.4 °C), Min:97.1 °F (36.2 °C), Max:97.8 °F (36.6 °C)           Date 17 07 - 06/15/17 0659   Shift [id number] 1651-6581 4850-0659 24 Hour Total   I  N  T  A  K  E   I.V.  (mL/kg) 225  (3.8)   225  (3.8)    Shift Total  (mL/kg) 225  (3.8)   225  (3.8)  "  O  U  T  P  U  T   Urine  (mL/kg/hr) 325   325    Shift Total  (mL/kg) 325  (5.4)   325  (5.4)   Weight (kg) 60 60 60 60          Closed/Suction Drain 06/07/17 1800 Anterior;Left Scalp Accordion 10 Fr. (Active)   Site Description Unable to view 6/13/2017  3:05 AM   Dressing Type [place] 6/13/2017  3:05 AM   Dressing Status Clean;Intact;Dry 6/13/2017  3:05 AM   Dressing Intervention Dressing reinforced 6/13/2017  3:05 AM   Drainage Serosanguineous 6/13/2017  3:05 AM   Status To bulb suction 6/13/2017  3:05 AM   Output (mL) 0 mL 6/11/2017 10:00 PM            Urethral Catheter 06/07/17 0823 Straight-tip;Non-latex 16 Fr. (Active)   Site Assessment Clean;Intact 6/13/2017  3:05 AM   Collection Container Urimeter 6/13/2017  3:05 AM   Securement Method secured to top of thigh w/ adhesive device 6/13/2017  3:05 AM   Catheter Care Performed yes 6/13/2017  3:05 AM   Reason for Continuing Urinary Catheterization Critically ill in ICU requiring intensive monitoring 6/13/2017  3:05 AM   CAUTI Prevention Bundle StatLock in place w 1" slack;Intact seal between catheter & drainage tubing;Drainage bag off the floor;Green sheeting clip in use;No dependent loops or kinks;Drainage bag not overfilled (<2/3 full) 6/12/2017 11:00 AM   Output (mL) 35 mL 6/13/2017  8:00 AM       Neurosurgery Physical Exam     AA. NAD. Not oriented to time or place.   Pupils nonreactive bilaterally. EOMI. Unable to sense light.   ALEMAN symm  SILT  No drift    Significant Labs:    Recent Labs  Lab 06/14/17  0316 06/14/17  0936   GLU 89  --    *  153* 157*   K 3.7  --    *  --    CO2 29  --    BUN 20  --    CREATININE 0.8  --    CALCIUM 9.9  --    MG 2.3  --        Recent Labs  Lab 06/13/17  0400 06/14/17  0316   WBC 10.46 13.55*   HGB 14.3 14.6   HCT 42.9 45.2    309       Recent Labs  Lab 06/14/17 0316   INR 1.0   APTT 22.4     Microbiology Results (last 7 days)     Procedure Component Value Units Date/Time    Blood culture " [[id number]]     Order Status:  No result Specimen:  Blood     Blood culture [[id number]]     Order Status:  No result Specimen:  Blood     Culture, Respiratory with Gram Stain [[id number]]     Order Status:  No result Specimen:  Respiratory             Significant Diagnostics:

## 2017-06-14 NOTE — PLAN OF CARE
TENISHA received message from Brie with [facility] ([phone]) regarding the referral. They will accept pending auth. They are hoping to submit for auth tomorrow.    TENISHA left return message for Brie reporting wanting to discuss Pt referral in a little more detail and to report referral needed for Geoff. TENISHA uploaded referral form to [facility] so that it could be sent to her to have it completed after she goes to rehab.    Suki Lynn, HOLDEN  Neurocritical Care   [facility]  [id number]

## 2017-06-14 NOTE — PLAN OF CARE
Problem: Occupational Therapy Goal  Goal: Occupational Therapy Goal  Goals to be met by: 6/27/2017     Patient will increase functional independence with ADLs by performing:    UE Dressing with Minimal Assistance.  LE Dressing with Minimal Assistance.  Grooming while standing with Minimal Assistance.  Toileting from toilet with Minimal Assistance for hygiene and clothing management.   Rolling to Bilateral with Contact Guard Assistance.   Supine to sit with Contact Guard Assistance.  Stand pivot transfers with Minimal Assistance.  Toilet transfer to toilet with Minimal Assistance.       POC remains appropriate.    HELEN Lopes  6/14/2017

## 2017-06-14 NOTE — ASSESSMENT & PLAN NOTE
S/p craniotomy 6/8/2017   Current free water deficit is 3 Liters  -Agree with patient receiving free water by mouth  -Recommend  cc/hr to match i/o. Goal is net neutral fluid balance.   Continue prn ddavp if uop > 250 x 2 consecutive hours AND Spec grav <1.005 or Serum Sodium greater than 150 (Would recommend patient meet two of three criteria prior to administration of DDAVP)    Check sodium and urine osmo and sp. Gravity  Q6 for now  Strict I/O and daily measured weight.

## 2017-06-14 NOTE — PLAN OF CARE
Problem: SLP Goal  Goal: SLP Goal  Speech Language Pathology Goals  Goals expected to be met by 6/16    1. Pt will participate in ongoing swallow assessment to rule out aspiration and determine least restrictive diet. MET  2. Pt will tolerate dental soft diet/nectar thick liquids without overt s/s of aspiration  3. Pt will tolerate thin liquids trials x10 without overt s/s of aspiration  4. Pt will complete dysphagia exercises x10 reps with min cues          Pt with increased lethargy today; however pt did receive Haldol this morning per NSG. Continue dental soft/nectar thick liquids at this time. If lethargy continues recommend downgrade diet to puree.   Nurys Amaro CCC-SLP  6/14/2017

## 2017-06-14 NOTE — ASSESSMENT & PLAN NOTE
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  Monitor for DI  HV out  CV- SBP < 160  Pulm- on RA.   FENGI- monitor for DI, fu urine studies, fu endocrine recs.  Heme/ID- afebrile. hgb/hct stable.   ppx- ANA/SCD's. Gi ppx. Sqh.   PT, OT, ST.   Dispo- cont ICU care. F.u social work re: placement planning/dischargeneeds.

## 2017-06-14 NOTE — PLAN OF CARE
[facility] connected        [facility] [phone] Spoke [place] and [place] have not received referral, it was resent twice verified fax number as being the same number which is corrected.

## 2017-06-14 NOTE — PT/OT/SLP PROGRESS
Speech Language Pathology  Treatment    Joanna Morales   MRN: [id number]   Admitting Diagnosis: <principal problem not specified>    Diet recommendations: Solid Diet Level: Dental Soft  Liquid Diet Level: Nectar Thick Feed only when awake/alert, HOB to 90 degrees, Small bites/sips, Alternating bites/sips, 1 bite/sip at a time, Meds crushed in puree, Assistance with meals and Assistance with thickening liquids    SLP Treatment Date: 06/14/17  Speech Start Time: 1327     Speech Stop Time: 1340     Speech Total (min): 13 min       TREATMENT BILLABLE MINUTES:  Treatment Swallowing Dysfunction 13    Has the patient been evaluated by SLP for swallowing? : Yes  Keep patient NPO?: No   General Precautions: Standard, aspiration, fall          Subjective:  Lethargic, arouses to tactile and verbal stimulation.     Pain/Comfort  Pain Rating 1: 0/10  Pain Rating Post-Intervention 1: 0/10    Objective:    Pt repositioned upright for PO trials. Pt lethargic but easily roused to verbal stimulation. RNCami stated pt received Haldol this morning and has been lethargic throughout the day and did not eat breakfast. Lunch tray at bedside. Pt tolerated puree x1, chicken x1, pears x2 and thin liquids via cup x2. Slow mastication and swallow initiation time noted throughout PO trials. Mastication time of chicken was ~1.5 minutes. Lethargy also increased as PO trial continued. No overt s/s of aspiration noted across consistencies; however pt with increased risk of aspiration 2/2 lethargy. Recommend continued dental soft diet/nectar thick liquids at this time. If significant lethargy persists recommend downgrade to puree diet. Swallow precautions reviewed with pt, recommend reinforcement.     Assessment:  Joanna Morales is a 51 y.o. female with a medical diagnosis of <principal problem not specified> and presents with Dysphagia.    Discharge recommendations: Discharge Facility/Level Of Care Needs: rehabilitation facility      Goals:    SLP Goals        Problem: SLP Goal    Goal Priority Disciplines Outcome   SLP Goal     SLP    Description:  Speech Language Pathology Goals  Goals expected to be met by 6/16    1. Pt will participate in ongoing swallow assessment to rule out aspiration and determine least restrictive diet. MET  2. Pt will tolerate dental soft diet/nectar thick liquids without overt s/s of aspiration  3. Pt will tolerate thin liquids trials x10 without overt s/s of aspiration  4. Pt will complete dysphagia exercises x10 reps with min cues                            Plan:   Patient to be seen Therapy Frequency: 5 x/week   Plan of Care expires: 07/10/17  Plan of Care reviewed with: patient, son  SLP Follow-up?: Yes              Nurys Amaro CCC-SLP   Speech Language Pathologist  Pager [phone]  06/14/2017

## 2017-06-14 NOTE — PT/OT/SLP PROGRESS
Occupational Therapy  Treatment    Joanna Morales   MRN: [id number]   Admitting Diagnosis: meningioma    OT Date of Treatment: 06/14/17   OT Start Time: 1335  OT Stop Time: 1416  OT Total Time (min): 41 min    Billable Minutes:  Therapeutic Activity 30    General Precautions: Standard, aspiration, fall  Orthopedic Precautions: N/A  Braces: N/A    Do you have any cultural, spiritual, [employer] conflicts, given your current situation?: None reported    Subjective:  Communicated with RN prior to session.    Pain/Comfort  Pain Rating 1: 0/10  Pain Rating Post-Intervention 1: 0/10    Objective:  Patient found with: peripheral IV, pulse ox (continuous), garcía catheter, blood pressure cuff.  MD entered during session to place NG tube.       Functional Mobility:  Bed Mobility:  Rolling/Turning Right: Moderate assistance  Scooting/Bridging: Maximum Assistance  Supine to Sit: Maximum Assistance  Sit to Supine: Maximum Assistance  *Increased time and cues required for all bed mobility    Transfers:  Sit <> Stand Assistance: Minimum Assistance (with assist of 2) x 1 trial from EOB  Sit <> Stand Assistive Device: No Assistive Device PT and OT stood on either side of pt    Functional Ambulation: Pt took 2 side steps to right with Max A and RW; difficulty weight shifting with moderate postural sway noted during task.     Activities of Daily Living:    LE Dressing Level of Assistance: Total assistance for donning socks while seated EOB; pt demo'd some confusion with task.  Pt able to initate forward movement with LE and then OT placed sock on.    Balance:   Static Sit: FAIR: Maintains without assist, but unable to take any challenges   Dynamic Sit: FAIR: Cannot move trunk without losing balance  Static Stand: POOR: Needs MODERATE assist to maintain  Dynamic stand: POOR: N/A    Therapeutic Activities and Exercises:  *Orientation questions asked to assess cognition; pt able to identify name, but unable to state location.    *Pt  "performed bed mobility with increased time and cues required for all activity.    *Pt sat EOB for ~30 minutes with CGA-SBA required to maintain upright position.     *Pt practiced sit to stand transfer and took two side steps  *POC reviewed with pt and family    AM-PAC 6 CLICK ADL   How much help from another person does this patient currently need?   1 = Unable, Total/Dependent Assistance  2 = A lot, Maximum/Moderate Assistance  3 = A little, Minimum/Contact Guard/Supervision  4 = None, Modified [place]/Independent    Putting on and taking off regular lower body clothing? : 1  Bathing (including washing, rinsing, drying)?: 2  Toileting, which includes using toilet, bedpan, or urinal? : 2  Putting on and taking off regular upper body clothing?: 2  Taking care of personal grooming such as brushing teeth?: 2  Eating meals?: 2  Total Score: 11     AM-PAC Raw Score CMS "G-Code Modifier Level of Impairment Assistance   6 % Total / Unable   7 - 8 CM 80 - 100% Maximal Assist   9-13 CL 60 - 80% Moderate Assist   14 - 19 CK 40 - 60% Moderate Assist   20 - 22 CJ 20 - 40% Minimal Assist   23 CI 1-20% SBA / CGA   24 CH 0% Independent/ Mod I       Patient left supine with all lines intact, call button in reach, restraints reapplied at end of session, RN notified and family present    ASSESSMENT:  Joanna Morales is a 51 y.o. female with a medical diagnosis of meningioma s/p resection and presents with decreased endurance, impaired cognition, impaired balance, and weakness impacting performance with ADLs and mobility.  Pt required increased time to process all commands and initiate movement this date.  While seated EOB pt able to maintain upright position with SBA-CGA with cues required to engage in task.  Pt required Total A to don socks while seated EOB with Min A (and assist of 2) required to complete sit to stand transfer.  Pt demo'd difficulty weight shifting with Max A required to take 2 side steps.  Pt " would continue to benefit from skilled OT services to address problems listed below and increase independence with ADLs.      Rehab identified problem list/impairments: Rehab identified problem list/impairments: weakness, impaired functional mobilty, impaired sensation, impaired self care skills, impaired balance, impaired cognition, decreased safety awareness, pain, decreased coordination, impaired endurance    Rehab potential is good.    Activity tolerance: Good    Discharge recommendations: Discharge Facility/Level Of Care Needs: rehabilitation facility     Barriers to discharge: Barriers to Discharge: Inaccessible home environment, Decreased caregiver support    Equipment recommendations:  (TBD pending progress)     GOALS:    Occupational Therapy Goals        Problem: Occupational Therapy Goal    Goal Priority Disciplines Outcome Interventions   Occupational Therapy Goal     OT, PT/OT     Description:  Goals to be met by: 6/27/2017     Patient will increase functional independence with ADLs by performing:    UE Dressing with Minimal Assistance.  LE Dressing with Minimal Assistance.  Grooming while standing with Minimal Assistance.  Toileting from toilet with Minimal Assistance for hygiene and clothing management.   Rolling to Bilateral with Contact Guard Assistance.   Supine to sit with Contact Guard Assistance.  Stand pivot transfers with Minimal Assistance.  Toilet transfer to toilet with Minimal Assistance.                      Plan:  Patient to be seen 5 x/week to address the above listed problems via self-care/home management, therapeutic activities, therapeutic exercises  Plan of Care expires: 07/13/17  Plan of Care reviewed with: patient, apollo         HELEN Lopes  06/14/2017

## 2017-06-14 NOTE — PLAN OF CARE
Referral sent to    [facility]  [facility] [phone] (Please let them know its for REHAB placement.)    Barby is f/u.

## 2017-06-14 NOTE — PLAN OF CARE
Problem: Patient Care Overview  Goal: Plan of Care Review  Outcome: Ongoing (interventions implemented as appropriate)  POC reviewed with pt and family at 1400. Pt verbalized little understanding. Questions and concerns addressed. No acute events today. RIO tube placed for free water boluses. Pt progressing toward goals. Restraints still in place. Will continue to monitor. See flowsheets for full assessment and VS info.

## 2017-06-14 NOTE — PROGRESS NOTES
"[facility]  Endocrinology  Progress Note    Admit Date: 6/7/2017     Reason for Consult: Diabetes insipidus     Surgical Procedure and Date:   CRANIOTOMY 6/8/2017        HPI:   Patient is a 51 y.o. female with a diagnosis of  meningioma s/p resection (6/7). 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  She underwent partial resection of the tumor in January 2015 at [facility]. Followup scan done in 2016 showing a continued large meningioma.     Endocrinology consulted for post operative DI management.  Per the operative report - The pituitary stalk was identified and could be preserved      Interval HPI:   Overnight events: Patient tolerating po fluids.     Received Desmopressin 10 mcg intranasal this am. Receiving NS 75 cc/hr.    Results for MUNA MAHONEY (MRN [id number]) as of 6/14/2017 10:07   Ref. Range 6/14/2017 03:16   Sodium Latest Ref Range: 136 - 145 mmol/L 153 (H)       /74   Pulse 66   Temp 97.8 °F (36.6 °C) (Oral)   Resp 14   Ht 5' 2" (1.575 m)   Wt 60 kg (132 lb 4.4 oz)   SpO2 100%   Breastfeeding? No   BMI 24.19 kg/m²       Labs Reviewed and Include      Recent Labs  Lab 06/14/17  0316   GLU 89   CALCIUM 9.9   ALBUMIN 3.6   PROT 7.6   *  153*   K 3.7   CO2 29   *   BUN 20   CREATININE 0.8   ALKPHOS 100   *   AST 87*   BILITOT 0.3     Lab Results   Component Value Date    WBC 13.55 (H) 06/14/2017    HGB 14.6 06/14/2017    HCT 45.2 06/14/2017    MCV 99 (H) 06/14/2017     06/14/2017       Recent Labs  Lab 06/10/17  0924   TSH 0.042*   FREET4 1.14     No results found for: HGBA1C    Nutritional status:   Body mass index is 24.19 kg/m².  Lab Results   Component Value Date    ALBUMIN 3.6 06/14/2017    ALBUMIN 3.5 06/13/2017    ALBUMIN 3.3 (L) 06/12/2017     No results found for: PREALBUMIN    Estimated Creatinine Clearance: 65.8 " mL/min (based on Cr of 0.8).    Accu-Checks  No results for input(s): POCTGLUCOSE in the last 72 hours.    Current Medications and/or Treatments Impacting Glycemic Control  Immunotherapy:  Immunosuppressants     None        Steroids:   Hormones     Start     Stop Route Frequency Ordered    06/13/17 2100  desmopressin nasal solution 10 mcg      -- Nasl 2 times daily 06/13/17 1017    06/12/17 2100  hydrocortisone tablet 5 mg      -- Oral Nightly 06/12/17 1059    06/12/17 1100  hydrocortisone tablet 10 mg      -- Oral Every morning 06/12/17 1059    06/12/17 1015  desmopressin injection 1 mcg      -- SubQ Every 6 hours PRN 06/12/17 0914        Pressors:    Autonomic Drugs     None        Hyperglycemia/Diabetes Medications: Antihyperglycemics     None          ASSESSMENT and PLAN    Diabetes insipidus    S/p craniotomy 6/8/2017   Current free water deficit is 3 Liters  -Agree with patient receiving free water by mouth  -Recommend  cc/hr to match i/o. Goal is net neutral fluid balance.   Continue prn ddavp if uop > 250 x 2 consecutive hours AND Spec grav <1.005 or Serum Sodium greater than 150 (Would recommend patient meet two of three criteria prior to administration of DDAVP)    Check sodium and urine osmo and sp. Gravity  Q6 for now  Strict I/O and daily measured weight.               Meningioma, recurrent of brain    S/p surgery   Per primary team                Melissa Bonilla MD  Endocrinology  [facility]

## 2017-06-15 NOTE — PT/OT/SLP PROGRESS
Occupational Therapy      Joanna Morales  MRN: [id number]    Patient not seen today secondary to increased lethargy.  Pt asleep in supine position upon arrival.  OT entered room and asked pt if she would like to participate in therapy and complete exercises.  Pt stated she could do that; however, kept eyes closed while responding to question and immediately returned to deep sleep.  OT unable to arouse pt  .  Will follow-up as POC allows    HELEN Lopes  6/15/2017

## 2017-06-15 NOTE — PLAN OF CARE
Problem: Patient Care Overview  Goal: Plan of Care Review  Outcome: Ongoing (interventions implemented as appropriate)  POC reviewed with pt and family at 1400. Pt verbalized very little understanding. Pt demonstrating increased lethargy. NCC team aware; STAT CT of the head obtained. Warming blanket applied for hypothermia. Will continue to monitor. See flowsheets for full assessment and VS info.

## 2017-06-15 NOTE — SUBJECTIVE & OBJECTIVE
Interval History: Lethargic overnight.    Medications:  Continuous Infusions:   sodium chloride 0.9% 100 mL/hr at 06/15/17 1100     Scheduled Meds:   amitriptyline  100 mg Oral Nightly    bisacodyl  10 mg Rectal Daily    cefTRIAXone (ROCEPHIN) IVPB  1 g Intravenous Q24H    desmopressin  10 mcg Nasal BID    gabapentin  300 mg Oral TID    heparin (porcine)  5,000 Units Subcutaneous Q8H    hydrocortisone  10 mg Oral QAM    hydrocortisone  5 mg Oral QHS    nicotine  1 patch Transdermal Daily    pantoprazole  40 mg Oral BID    polyethylene glycol  17 g Oral Daily    ramelteon  8 mg Oral QHS     PRN Meds:acetaminophen, acetaminophen, acetaminophen, calcium gluconate IVPB, calcium gluconate IVPB, calcium gluconate IVPB, desmopressin, haloperidol lactate, hydrALAZINE, magnesium sulfate IVPB, magnesium sulfate IVPB, magnesium sulfate IVPB, magnesium sulfate IVPB, metoclopramide HCl, metoclopramide HCl, potassium chloride **AND** potassium chloride **AND** potassium chloride, potassium chloride 10%, potassium chloride 10%, potassium chloride 10%, potassium, sodium phosphates, potassium, sodium phosphates, potassium, sodium phosphates, sodium phosphate IVPB, sodium phosphate IVPB, sodium phosphate IVPB     Review of Systems  Objective:     Weight: 59.7 kg (131 lb 9.8 oz)  Body mass index is 24.07 kg/m².  Vital Signs (Most Recent):  Temp: 97.8 °F (36.6 °C) (06/15/17 0700)  Pulse: 70 (06/15/17 1100)  Resp: 11 (06/15/17 1100)  BP: 112/62 (06/15/17 1100)  SpO2: 100 % (06/15/17 1100) Vital Signs (24h Range):  Temp:  [94.7 °F (34.8 °C)-98.4 °F (36.9 °C)] 97.8 °F (36.6 °C)  Pulse:  [] 70  Resp:  [8-33] 11  SpO2:  [99 %-100 %] 100 %  BP: ()/(57-85) 112/62              Temp (24hrs), Av.7 °F (35.9 °C), Min:94.7 °F (34.8 °C), Max:98.4 °F (36.9 °C)           Date 06/15/17 0700 - 17 0659   Shift [id number] [id number] 5528-0659 24 Hour Total   I  N  T  A  K  E   I.V.  (mL/kg) 500  (8.4)   500  (8.4)    NG/GT  400   400    IV Piggyback 50   50    Shift Total  (mL/kg) 950  (15.9)   950  (15.9)   O  U  T  P  U  T   Urine  (mL/kg/hr) 210   210    Shift Total  (mL/kg) 210  (3.5)   210  (3.5)   Weight (kg) 59.7 59.7 59.7 59.7          Closed/Suction Drain 06/07/17 1800 Anterior;Left Scalp Accordion 10 Fr. (Active)   Site Description Other (Comment) 6/15/2017 11:05 AM   Dressing Type [place] 6/14/2017  3:05 AM   Dressing Status Clean;Intact;Dry 6/14/2017  3:05 AM   Dressing Intervention Dressing reinforced 6/14/2017  3:05 AM   Drainage Serosanguineous 6/14/2017  3:05 AM   Status To bulb suction 6/14/2017  3:05 AM   Output (mL) 0 mL 6/11/2017 10:00 PM            NG/OG Tube 06/14/17 1415  Left nostril (Active)   Placement Check placement verified by aspirate characteristics;placement verified by distal tube length measurement 6/15/2017 11:05 AM   Advancement advanced manually 6/14/2017  3:05 PM   Distal Tube Length (cm) 70 6/15/2017  7:05 AM   Tolerance no signs/symptoms of discomfort 6/15/2017 11:05 AM   Securement anchored to nostril center w/ adhesive device 6/15/2017 11:05 AM   Clamp Status/Tolerance clamped;no abdominal discomfort;no abdominal distention;no emesis;no residual;no nausea;no restlessness 6/15/2017 11:05 AM   Insertion Site Appearance no redness, warmth, tenderness, skin breakdown, drainage 6/15/2017 11:05 AM   Flush/Irrigation flushed w/;water;no resistance met 6/15/2017 11:05 AM   Intake (mL) 200 mL 6/15/2017 11:00 AM   Residual Amount (ml) 0 ml 6/15/2017  7:05 AM            Urethral Catheter 06/07/17 0823 Straight-tip;Non-latex 16 Fr. (Active)   Site Assessment Clean;Intact 6/15/2017 11:05 AM   Collection Container Urimeter 6/15/2017 11:05 AM   Securement Method secured to top of thigh w/ adhesive device 6/15/2017 11:05 AM   Catheter Care Performed yes 6/15/2017 11:05 AM   Reason for Continuing Urinary Catheterization Critically ill in ICU requiring intensive monitoring 6/15/2017 11:05 AM   CAUTI  "Prevention Bundle StatLock in place w 1" slack;Intact seal between catheter & drainage tubing;Drainage bag off the floor;Green sheeting clip in use;No dependent loops or kinks;Drainage bag not overfilled (<2/3 full);Drainage bag below bladder 6/15/2017  7:05 AM   Output (mL) 60 mL 6/15/2017 11:00 AM       Neurosurgery Physical Exam   [id number]  Pupils NR bilaterally  FCx4  SILT    Significant Labs:    Recent Labs  Lab 06/15/17  0134 06/15/17  0842   GLU 89  --    * 148*   K 3.8 3.6   *  --    CO2 24  --    BUN 23*  --    CREATININE 0.7  --    CALCIUM 9.2  --    MG 1.8  --        Recent Labs  Lab 06/14/17 0316 06/15/17  0134   WBC 13.55* 13.57*   HGB 14.6 13.0   HCT 45.2 40.5    280       Recent Labs  Lab 06/15/17  0134   INR 1.0   APTT 24.5     Microbiology Results (last 7 days)     Procedure Component Value Units Date/Time    Urine culture [[id number]] Collected:  06/15/17 1127    Order Status:  Sent Specimen:  Urine from Urine, Catheterized Updated:  06/15/17 1127    Blood culture [[id number]] Collected:  06/14/17 1517    Order Status:  Completed Specimen:  Blood from Peripheral, Antecubital, Left Updated:  06/15/17 0315     Blood Culture, Routine No Growth to date    Narrative:       Blood cultures x 2 different sites. 4 bottles total. Please  draw cultures before administering antibiotics.    Blood culture [[id number]] Collected:  06/14/17 1156    Order Status:  Completed Specimen:  Blood from Peripheral, Antecubital, Left Updated:  06/14/17 2115     Blood Culture, Routine No Growth to date    Narrative:       Blood cultures from 2 different sites. 4 bottles total.  Please draw before starting antibiotics.    Culture, Respiratory with Gram Stain [[id number]]     Order Status:  No result Specimen:  Respiratory             Significant Diagnostics:  CT head: stable from prior.  "

## 2017-06-15 NOTE — PLAN OF CARE
TENISHA spoke with Brie with [facility] rehab reporting that upon further review, they cannot meet the Pt needs as their neurologist is out on leave and this Pt would need to be followed closely.. If this changes, they will let TENISHA know.    TENISHA spoke with [facility] rehab admissions regarding this Pt referral. They reported she is low level and won't meet criteria. They asked for more notes so they can decide if they can meet her needs. They do have a neurologist that can follow her closely, but would send her out to opthomology.     TENISHA sent Gallito updated notes in [facility].    Suki Lynn LMSW  Neurocritical Care   [facility]  [id number]

## 2017-06-15 NOTE — PROGRESS NOTES
"[facility]  Endocrinology  Progress Note    Admit Date: 6/7/2017     Reason for Consult: Diabetes insipidus     Surgical Procedure and Date:   CRANIOTOMY 6/8/2017        HPI:   Patient is a 51 y.o. female with a diagnosis of  meningioma s/p resection (6/7). 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  She underwent partial resection of the tumor in January 2015 at [facility]. Followup scan done in 2016 showing a continued large meningioma.     Endocrinology consulted for post operative DI management.  Per the operative report - The pituitary stalk was identified and could be preserved      Interval HPI:     Overnight events:  Serum Na improved to 150 this am    Net + 1600 cc overnight.  Patient received Desmopressin 10 mcg intranasal overnight and this am.    /70   Pulse 83   Temp 97.8 °F (36.6 °C) (Axillary)   Resp 13   Ht 5' 2" (1.575 m)   Wt 59.7 kg (131 lb 9.8 oz)   SpO2 100%   Breastfeeding? No   BMI 24.07 kg/m²       Labs Reviewed and Include      Recent Labs  Lab 06/15/17  0134   GLU 89   CALCIUM 9.2   ALBUMIN 3.1*   PROT 6.5   *   K 3.8   CO2 24   *   BUN 23*   CREATININE 0.7   ALKPHOS 86   *   AST 46*   BILITOT 0.3     Lab Results   Component Value Date    WBC 13.57 (H) 06/15/2017    HGB 13.0 06/15/2017    HCT 40.5 06/15/2017     (H) 06/15/2017     06/15/2017       Recent Labs  Lab 06/10/17  0924   TSH 0.042*   FREET4 1.14     No results found for: HGBA1C    Nutritional status:   Body mass index is 24.07 kg/m².  Lab Results   Component Value Date    ALBUMIN 3.1 (L) 06/15/2017    ALBUMIN 3.6 06/14/2017    ALBUMIN 3.5 06/13/2017     No results found for: PREALBUMIN    Estimated Creatinine Clearance: 75.2 mL/min (based on Cr of 0.7).    Accu-Checks  No results for input(s): POCTGLUCOSE in the last 72 hours.    Current Medications " and/or Treatments Impacting Glycemic Control  Immunotherapy:  Immunosuppressants     None        Steroids:   Hormones     Start     Stop Route Frequency Ordered    06/13/17 2100  desmopressin nasal solution 10 mcg      -- Nasl 2 times daily 06/13/17 1017    06/12/17 2100  hydrocortisone tablet 5 mg      -- Oral Nightly 06/12/17 1059    06/12/17 1100  hydrocortisone tablet 10 mg      -- Oral Every morning 06/12/17 1059    06/12/17 1015  desmopressin injection 1 mcg      -- SubQ Every 6 hours PRN 06/12/17 0914        Pressors:    Autonomic Drugs     None        Hyperglycemia/Diabetes Medications: Antihyperglycemics     None          ASSESSMENT and PLAN    S/P craniotomy    On 6/8/2017 now with DI as in #1   Currently on HC 10 mg in the am and 5 mg pm               Diabetes insipidus    S/p craniotomy 6/8/2017   Current free water deficit is 1.3 Liters  -Agree with patient receiving free water by mouth   -Cont  cc/hr to match i/o. Goal is net neutral fluid balance. Na improving with fluids   Continue prn ddavp if uop > 250 x 2 consecutive hours AND Spec grav <1.005 or Serum Sodium greater than 150 (Would recommend patient meet two of three criteria prior to administration of DDAVP)    Check sodium and urine osmo and sp. Gravity  Q6 for now  Strict I/O and daily measured weight.               Meningioma, recurrent of brain    S/p surgery   Per primary team                Melissa Bonilla MD  Endocrinology  [facility]

## 2017-06-15 NOTE — ASSESSMENT & PLAN NOTE
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  Monitor for DI  CV- SBP < 160  Pulm- on RA.   FENGI- monitor for DI, fu urine studies, fu endocrine recs.  Heme/ID- afebrile. hgb/hct stable.   ppx- ANA/SCD's. Gi ppx. Sqh.   PT, OT, ST.   Consider TTF today, fu SW recs as pt will likely need placement.

## 2017-06-15 NOTE — SUBJECTIVE & OBJECTIVE
"Interval HPI:     Overnight events:  Serum Na improved to 150 this am    Net + 1600 cc overnight.  Patient received Desmopressin 10 mcg intranasal overnight and this am.    /70   Pulse 83   Temp 97.8 °F (36.6 °C) (Axillary)   Resp 13   Ht 5' 2" (1.575 m)   Wt 59.7 kg (131 lb 9.8 oz)   SpO2 100%   Breastfeeding? No   BMI 24.07 kg/m²     Labs Reviewed and Include      Recent Labs  Lab 06/15/17  0134   GLU 89   CALCIUM 9.2   ALBUMIN 3.1*   PROT 6.5   *   K 3.8   CO2 24   *   BUN 23*   CREATININE 0.7   ALKPHOS 86   *   AST 46*   BILITOT 0.3     Lab Results   Component Value Date    WBC 13.57 (H) 06/15/2017    HGB 13.0 06/15/2017    HCT 40.5 06/15/2017     (H) 06/15/2017     06/15/2017       Recent Labs  Lab 06/10/17  0924   TSH 0.042*   FREET4 1.14     No results found for: HGBA1C    Nutritional status:   Body mass index is 24.07 kg/m².  Lab Results   Component Value Date    ALBUMIN 3.1 (L) 06/15/2017    ALBUMIN 3.6 06/14/2017    ALBUMIN 3.5 06/13/2017     No results found for: PREALBUMIN    Estimated Creatinine Clearance: 75.2 mL/min (based on Cr of 0.7).    Accu-Checks  No results for input(s): POCTGLUCOSE in the last 72 hours.    Current Medications and/or Treatments Impacting Glycemic Control  Immunotherapy:  Immunosuppressants     None        Steroids:   Hormones     Start     Stop Route Frequency Ordered    06/13/17 2100  desmopressin nasal solution 10 mcg      -- Nasl 2 times daily 06/13/17 1017    06/12/17 2100  hydrocortisone tablet 5 mg      -- Oral Nightly 06/12/17 1059    06/12/17 1100  hydrocortisone tablet 10 mg      -- Oral Every morning 06/12/17 1059    06/12/17 1015  desmopressin injection 1 mcg      -- SubQ Every 6 hours PRN 06/12/17 0914        Pressors:    Autonomic Drugs     None        Hyperglycemia/Diabetes Medications: Antihyperglycemics     None        "

## 2017-06-15 NOTE — NURSING
Pt transported to CT w/ RN on portable monitor. VSS. NAD noted. Will continue to monitor while in CT.

## 2017-06-15 NOTE — PLAN OF CARE
Problem: Patient Care Overview  Goal: Plan of Care Review  Outcome: Ongoing (interventions implemented as appropriate)  No acute events throughout day, VS and assessment per flow sheet, patient progressing towards goals as tolerated, plan of care reviewed with Joanna Morales and family, all concerns addressed, will continue to monitor.    Patient lethargic.  Taken to CT.

## 2017-06-15 NOTE — PT/OT/SLP PROGRESS
"Speech Language Pathology  Treatment    Joanna Morales   MRN: [id number]   Admitting Diagnosis: s/p menigioma resection  Diet recommendations: Solid Diet Level: Dental Soft  Liquid Diet Level: Nectar Thick Feed only when awake/alert, No straws, HOB to 90 degrees, Small bites/sips and Assistance with thickening liquids    SLP Treatment Date: 06/15/17  Speech Start Time: 0838     Speech Stop Time: 0848     Speech Total (min): 10 min       TREATMENT BILLABLE MINUTES:  Treatment Swallowing Dysfunction 10    Has the patient been evaluated by SLP for swallowing? : Yes  Keep patient NPO?: No   General Precautions: Standard, aspiration, fall          Subjective:  " [facility]" when asked where she was    Pain/Comfort  Pain Rating 1: 0/10  Pain Rating Post-Intervention 1: 0/10    Objective:    Pt seen bedside with no family present. She was lethargic but did rouse with cues. Pt oriented to person and hospital. She was given nectar thick liquids via a cup, pureed bolus and a cracker. Pt with no overt s/s of aspiration. However, pt noted to fall asleep with cracker in her mouth. Cues needed to stay awake and to clear bolus from oral cavity. Liquid wash helped. Pt with mild anterior bolus loss of liquids and mild swallow delay. Increased swallow delay as pt became more sleepy. Pt too lethargic to initiate cognitive eval. Pt must be awake for meals and will need supervision during meals to ensure she remains awake.                                      Assessment:  Joanna Morales is a 51 y.o. female with a medical diagnosis of s/p menigioma resection and presents with oral pharyngeal dysphagia. Progress limited by lethargy today    Discharge recommendations: Discharge Facility/Level Of Care Needs: rehabilitation facility     Goals:    SLP Goals        Problem: SLP Goal    Goal Priority Disciplines Outcome   SLP Goal     SLP    Description:  Speech Language Pathology Goals  Goals expected to be met by 6/16    1. Pt will " participate in ongoing swallow assessment to rule out aspiration and determine least restrictive diet. MET  2. Pt will tolerate dental soft diet/nectar thick liquids without overt s/s of aspiration  3. Pt will tolerate thin liquids trials x10 without overt s/s of aspiration  4. Pt will complete dysphagia exercises x10 reps with min cues                            Plan:   Patient to be seen Therapy Frequency: 5 x/week   Plan of Care expires: 07/10/17  Plan of Care reviewed with: patient  SLP Follow-up?: Yes              HILDA Curry, CCC-SLP  06/15/2017

## 2017-06-15 NOTE — PROGRESS NOTES
[facility]  Neurocritical Care  Progress Note    Admit Date: 6/7/2017  Service Date: 06/15/2017  Length of Stay: 8    Subjective:     Chief Complaint: <principal problem not specified>    History of Present Illness: 50yo F, presented to [facility] s/p menigioma resection (6/7). 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  She underwent operation in January 2015 on the [facility] service at [facility].  It   was apparently only possible to get a partial resection of the tumor.  She did   have a followup scan done in 2016 showing a continued large meningioma in this   area.  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  She had a   followup MRI scan done at [facility] on 01/20/17, showing this large tumor.  She   was seen by you in Ophthalmology and then referred to Neurosurgery for further   evaluation.       Hospital Course: 6/7 S/P menengioma resection  6/8 D.I monitoring Q6h sp.grav and Na. DDAVP0.5mcg IV x1  6/9 continue monitoring for D. I. Q 6h  Na/ sp. grav. Urine output >250; Na >145 sp. Grav. 1.005.  DDAVP 1mcg IV; 1L bolus .45NS x2   Endocrinology consulted for diabetes Insipidus. Agitated/restless. Haldol prn started. Repeat CTH today per NSGY  6/10 Required DDAVP 1mcg IV early a.m. For U.O > 250cc and sp. Grav. 1.0000. Less restless today required only one dose of haldol.  6/11Continue to monitor for DI; bilateral blindness; Continue Decadron; Switched to d5+.45 NS @100  6/12: The patient received DDAVP this AM. DI watch. D5% was D/C. Still blind. DI watch. Na 144. Patient on D5. DDAVP IN  Subgaleal drain was D/C.  6/14: DI event early this AM. On DDAVP bid. WBC elevated. Constipated. Na 153. Apparently did not receive the night DDAVP dose. Lethargic today. Serum Tt=782.  Dobbhoff tube got placed to replace free water. Dobbhoff tip position confirmed with KUB.    Interval History: >4 elements OR status of 3 inpatient conditions  DI event early this AM. On DDAVP bid. WBC elevated. Constipated. Na 153. Apparently did not receive the night DDAVP dose. Lethargic today. Serum Bo=011. Dobbhoff tube got placed to replace free water. Dobbhoff tip position confirmed with KUB.  Review of Systems   Eyes: Negative.    Respiratory: Negative.    Cardiovascular: Negative.    Gastrointestinal: Negative.    Endocrine: Negative.    Genitourinary: Negative.      2 systems OR Unable to obtain a complete ROS due to level of consciousness.  Objective:     Vitals:  Temp: (!) 94.7 °F (34.8 °C) (Warming blanket applied.) (06/15/17 0000)  Pulse: 80 (06/15/17 0000)  Resp: 12 (06/15/17 0000)  BP: (!) 91/57 (06/15/17 0000)  SpO2: 100 % (06/15/17 0000)    Temp:  [94.7 °F (34.8 °C)-98.4 °F (36.9 °C)] 94.7 °F (34.8 °C)  Pulse:  [] 80  Resp:  [8-33] 12  SpO2:  [99 %-100 %] 100 %  BP: ()/(57-94) 91/57              06/14 0701 - 06/15 0700  In: 1770 [I.V.:1170]  Out: 780 [Urine:780]    Physical Exam   Neurological: GCS eye subscore is 4 - spontaneous. GCS verbal subscore is 5 - oriented. GCS motor subscore is 6 - obeys commands.     General   HEENT: S/P bifrontal crani  Chest Heart RRR / Lungs Clear to auscultation  Adbomen: Soft nontender + BS  Extremities: OK distal pulses.  Skin: UK  Neurological Exam:  MS; Lethargic, still responding.   CN: II-XII Pupils fixed and blind Pupils are 8mm bilat. Saw light on the L-side. Not on my exam.  Motor: LUE  5 /5 / RUE 5 /5  Tone normal bilaterally             LLE  5 /5 /  RLE 5 /5  Tone normal bilaterally  Sensory: LT/PP/T/ Vibration UK                 Complex sensory modalities: not tested  DTR:  normal throughout.  Coordination /Fine motor: UK  Gait: Not tested.  Meningeal signs: Absent     Unable to test gait due to level of consciousness.        [place] Coma  Scale:  Best Eye Response: 4 - spontaneous  Best Motor Response: 6 - obeys commands  Best Verbal Response: 5 - oriented  [place] Coma Scale Total: 15            Medications:  Continuous  sodium chloride 0.9% Last Rate: 100 mL/hr at 06/15/17 0000   Scheduled  amitriptyline 100 mg Nightly   bisacodyl 10 mg Daily   desmopressin 10 mcg BID   gabapentin 300 mg TID   heparin (porcine) 5,000 Units Q8H   hydrocortisone 10 mg QAM   hydrocortisone 5 mg QHS   nicotine 1 patch Daily   pantoprazole 40 mg BID   polyethylene glycol 17 g Daily   PRN  acetaminophen 650 mg Q6H PRN   acetaminophen 650 mg Q6H PRN   acetaminophen 650 mg Q4H PRN   calcium gluconate IVPB 1 g PRN   calcium gluconate IVPB 1 g PRN   calcium gluconate IVPB 1 g PRN   desmopressin 1 mcg Q6H PRN   haloperidol lactate 2 mg Q4H PRN   hydrALAZINE 10 mg Q1H PRN   hydrocodone-acetaminophen 5-325mg 1 tablet Q4H PRN   magnesium sulfate IVPB 2 g PRN   magnesium sulfate IVPB 4 g PRN   magnesium sulfate IVPB 2 g PRN   magnesium sulfate IVPB 4 g PRN   metoclopramide HCl 5 mg Q6H PRN   metoclopramide HCl 5 mg Q6H PRN   potassium chloride 40 mEq PRN   And     potassium chloride 60 mEq PRN   And     potassium chloride 80 mEq PRN   potassium chloride 10% 40 mEq PRN   potassium chloride 10% 40 mEq PRN   potassium chloride 10% 60 mEq PRN   potassium, sodium phosphates 2 packet PRN   potassium, sodium phosphates 2 packet PRN   potassium, sodium phosphates 2 packet PRN   ramelteon 8 mg Nightly PRN   sodium phosphate IVPB 15 mmol PRN   sodium phosphate IVPB 20.01 mmol PRN   sodium phosphate IVPB 30 mmol PRN     Today I personally reviewed pertinent medications, lines/drains/airways, imaging, cardiology, lab results, microbiology results, notably:     Assessment/Plan:     Neuro   Restlessness and agitation    Haldol 2mg Q4h prn restlessness          Diabetes insipidus    6/10 Urine output >250 x2h; sp. Grav. 1.00o Na 150  DDAVP 1mcg x1 given  IL .45NS bolus IV x1   increased  maintenance .45NS 150cc/h  Endocrinology following for D.I  Urine Osmo; serum Osmo pending  Follow U/A sp. Grav; Na Q6h          Meningioma, recurrent of brain    NSGY Following  S/P resection 6/7 POD3  Q1hr Neuro Checks  SBP <160  Decadron 4mg q12, tapering  Gabapentin 300mg tid  Prn norco 5/325  Rocephin for drain PPX  PT/OT/SLP when able  CTH 6/9 Post-Op changes consistent with bilat. Craniotomy. No large residual mass identified, collection of air, blood and fluid at the site of the prior mass and underneath the frontal lobes along the surgical tract.and is decreasing from prior exam. Mass effect stable, ventricles stable, small hemorrhage in supersellar region stable. No hydrocephalus.         Depression    -amitriptyline 100mg Qhs        Pulmonary   Respiratory insufficiency    NC to room air O2 sats 97-98%  Maintain O2 sat >92  Prn CXR/ABG        Cardiac   Essential hypertension    SBP goal <160  cardene gtt off  Only on prn hydralazine.        Hem/Onc   Leukocytosis    WBC 17.4, trending down, afebrile  Monitor daily CBC  On ceftriaxone 1g Q12 while drain in place        Other   Normocytic anemia    Monitor daily CBC          Vision loss of right eye     R eye vision loss s/p resection of meningioma without improvement in eye sight.  L eye vision loss prior to surgery   Per NSGY,Known that optic nerve injured during surgery and Will need time to heal.  Not requiring MRI or  Ophthalmology consult at present  -            Active Problem List:   1.S/P large skull base meningioma resection  2. Secondary blindness  3. DI and DI watch,        Assessment / Plan:     Neuro:  -Hydrocortisone 10 and 5 mg.   -Tylenol PRN.  -Neurobtin 300mg tid.   -Norco PRN.  -PT, OT.  -Remeltion 8mg qhs.  -EEG 1 hr  Resp: CXR OK  -RA  -IS  CV: Keep SBP < 140 mmHg.   No BP meds.  IVF/nutrition/Renal/GI: i/o: -4Lt.   -Give 2.5 Lt of free water at least   -Drink nectar.   -Serum Na and urine SG q 6hrs.  -NS at 75cc/hr IVD.   -Free water  200 cc q 4hrs x 48 hrs. Through Dobbhoff.   -BR: senns , Colace   Hem / ID  -SC heparin 5000 U q 8hrs.  -Procal  -UA  -CXR  -Pancultures.  -CBC+Diff  Endo:  -DDAVP IN 10mcg qAM and q PM.  -SC DDAVP 1mcg q 6hrs PRN, with parameters. Na >145 and SG in urine < 1010.  -Serum Na q 6hrs   -Hydrocortisone 10 and 5 mg.   -Endocrine following.  Prophylaxis:  -Protonix  -SC Heparin.  Advance Directives and Disposition:    Full Code. Keep in the NICU one more day.            Uninterrupted level 3  Follow-up /Counseling Time (not including procedures):  = 35 min       Activity Orders          Activity as tolerated starting at 06/08 1028        No Order    Mario Mcmullen MD  Neurocritical Care  [facility]

## 2017-06-15 NOTE — ASSESSMENT & PLAN NOTE
S/p craniotomy 6/8/2017   Current free water deficit is 1.3 Liters  -Agree with patient receiving free water by mouth   -Cont  cc/hr to match i/o. Goal is net neutral fluid balance. Na improving with fluids   Continue prn ddavp if uop > 250 x 2 consecutive hours AND Spec grav <1.005 or Serum Sodium greater than 150 (Would recommend patient meet two of three criteria prior to administration of DDAVP)    Check sodium and urine osmo and sp. Gravity  Q6 for now  Strict I/O and daily measured weight.

## 2017-06-15 NOTE — NURSING
Pt transported back to room w/ RN. Pt placed back on bedside monitor. NAD noted. VSS. Will continue to monitor.

## 2017-06-15 NOTE — PROCEDURES
DATE OF STUDY:  06/14/2017.    ELECTROENCEPHALOGRAM REPORT    Extended Recording    METHODOLOGY:  Electroencephalographic (EEG) is recorded with electrodes placed   according to the International 10-20 placement system.  Thirty two (32) channels   of digital signal (sampling rate of 512/sec), including T1 and T2, were   simultaneously recorded from the scalp and may include EKG, EMG, and/or eye   monitors.  Recording band pass was 0.1 to 512 Hz.  Digital video recording of   the patient is simultaneously recorded with the EEG.  The patient is instructed   to report clinical symptoms which may occur during the recording session.  EEG   and video recording are stored and archived in digital format.  Activation   procedures, which include photic stimulation, hyperventilation and instructing   patients to perform simple tasks, are done in selected patients.  The EEG is displayed on a monitor screen and can be reviewed using different   montages.  Computer-assisted analysis is employed to detect spike and   electrographic seizure activity.   The entire record is submitted for computer   analysis.  The entire recording is visually reviewed, and the times identified   by computer analysis as being spikes or seizures are reviewed again.      Compressed spectral analysis (CSA) is also performed on the activity recorded   from each individual channel.  This is displayed as a power display of   frequencies from 0 to 30 Hz over time.   The CSA is reviewed looking for   asymmetries in power between homologous areas of the scalp, then compared with   the original EEG recording.      [employer] software was also utilized in the review of this study.  This software   suite analyzes the EEG recording in multiple domains.  Coherence and rhythmicity   are computed to identify EEG sections which may contain organized seizures.    Each channel undergoes analysis to detect the presence of spike and sharp waves   which have special and  morphological characteristics of epileptic activity.  The   routine EEG recording is converted from special into frequency domain.  This is   then displayed comparing homologous areas to identify areas of significant   asymmetry.  Algorithm to identify non-cortically generated artifact is used to   separate artifact from the EEG.      RECORDING TIMES:  Start on 06/14/2017 at 1230  Stop on 06/14/2017 at 1458  Duration is 2 hours 29 minutes.    EEG FINDINGS:  The background of this study is diffusely slow containing   predominantly theta frequency activity in the 5 to 7 Hz range.  There is a   hemispheric asymmetry initially noted with left hemispheric amplitudes decreased   compared to the right.  A fast activity attenuated in the left hemisphere.    This becomes more pronounced as the record progresses in certain portions.    There is a consistent left hemispheric relative slowing noted on and off   throughout the study as well as left hemispheric attenuation.  Right hemispheric   background is faster though not normal with predominantly theta and delta   activity noted there.  No consistent posterior dominant rhythm is seen.    No epileptiform discharges are seen.  No seizures are seen.  No clinical events   are noted.    INTERPRETATION:  Abnormal EEG due to:  1.  Moderate diffuse slowing of the overall background as described above.  2.  Superimposed left hemispheric slowing relative to the right.  No   epileptiform discharges or seizures were seen, however.  Result suggests both   encephalopathy and focal cortical dysfunction, however.      NBB/HN  dd: 06/14/2017 15:33:08 (CDT)  td: 06/15/2017 03:10:12 (CDT)  Doc ID   #[id number]  Job ID #[id number]    CC:

## 2017-06-15 NOTE — PROGRESS NOTES
[facility]  Neurosurgery  Progress Note    Subjective:     History of Present Illness:  51-year-old lady who presented to [facility] in 2015 with complaints of headache and visual loss and was found   to have a large skull base meningioma arising from the sphenoid bone and sellar   area.  She underwent operation in January 2015 on the [facility] service at [facility].  It   was apparently only possible to get a partial resection of the tumor.  She did   have a followup scan done in 2016 showing a continued large meningioma in this   area.  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  She had a   followup MRI scan done at [facility] on 01/20/17, showing this large tumor.  She   was seen by you in Ophthalmology and then referred to Neurosurgery for further   evaluation.  At this point, she has some preserved vision in the right eye.  She   complains of constant headaches.  She has no sense of smell.  She feels off   balance.  She is taking Neurontin apparently for seizure prevention. Past   medical history relates primarily to the present illness.  She does not have   chronic medical illnesses such as diabetes or hypertension.  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  She is  and has good family support.     On physical examination, she is a well-developed, well-nourished white lady, who   is alert and oriented.  Examination of the head shows a somewhat anterior, but   well-healed bicoronal incision.  She says that this is a little tender in the   left frontal area.  Eyes show full extraocular movements.  The pupils are small,   the left is not reactive.  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  The right optic nerve is probably normal.  She   sees things as doubles.  When I held up two  fingers she counted four, although   she knew that there were two.  Hearing seems preserved.  The neck is supple.  On   neurological examination, she is speaking clearly.  She is obviously anxious   and depressed over her illness.  Finger-to-nose is somewhat diminished on the   left side and gait was mildly unsteady.  Cranial nerves are otherwise intact.    She has normal facial sensation and movement.  The tongue protrudes in the   midline.  Strength in the extremities seems generally good, sensation normal and   deep tendon reflexes symmetrical.     MRI of the brain performed at [facility] on 01/20/17, shows a   previous bifrontal craniotomy.  There is a large lobular meningioma, which   occupies the sella, planum sphenoidale and extends back on to the clivus.  The   optic chiasm and optic nerves are not visualized.  The carotid arteries and   anterior cerebral arteries run done through the tumor.  The tumor elevates and   pushes the third ventricle back, but there is no hydrocephalus    Post-Op Info:  Procedure(s) (LRB):  CRANIOTOMY WITH STEALTH (N/A)   8 Days Post-Op     Interval History: Lethargic overnight.    Medications:  Continuous Infusions:   sodium chloride 0.9% 100 mL/hr at 06/15/17 1100     Scheduled Meds:   amitriptyline  100 mg Oral Nightly    bisacodyl  10 mg Rectal Daily    cefTRIAXone (ROCEPHIN) IVPB  1 g Intravenous Q24H    desmopressin  10 mcg Nasal BID    gabapentin  300 mg Oral TID    heparin (porcine)  5,000 Units Subcutaneous Q8H    hydrocortisone  10 mg Oral QAM    hydrocortisone  5 mg Oral QHS    nicotine  1 patch Transdermal Daily    pantoprazole  40 mg Oral BID    polyethylene glycol  17 g Oral Daily    ramelteon  8 mg Oral QHS     PRN Meds:acetaminophen, acetaminophen, acetaminophen, calcium gluconate IVPB, calcium gluconate IVPB, calcium gluconate IVPB, desmopressin, haloperidol lactate, hydrALAZINE, magnesium sulfate IVPB, magnesium sulfate IVPB, magnesium  sulfate IVPB, magnesium sulfate IVPB, metoclopramide HCl, metoclopramide HCl, potassium chloride **AND** potassium chloride **AND** potassium chloride, potassium chloride 10%, potassium chloride 10%, potassium chloride 10%, potassium, sodium phosphates, potassium, sodium phosphates, potassium, sodium phosphates, sodium phosphate IVPB, sodium phosphate IVPB, sodium phosphate IVPB     Review of Systems  Objective:     Weight: 59.7 kg (131 lb 9.8 oz)  Body mass index is 24.07 kg/m².  Vital Signs (Most Recent):  Temp: 97.8 °F (36.6 °C) (06/15/17 0700)  Pulse: 70 (06/15/17 1100)  Resp: 11 (06/15/17 1100)  BP: 112/62 (06/15/17 1100)  SpO2: 100 % (06/15/17 1100) Vital Signs (24h Range):  Temp:  [94.7 °F (34.8 °C)-98.4 °F (36.9 °C)] 97.8 °F (36.6 °C)  Pulse:  [] 70  Resp:  [8-33] 11  SpO2:  [99 %-100 %] 100 %  BP: ()/(57-85) 112/62              Temp (24hrs), Av.7 °F (35.9 °C), Min:94.7 °F (34.8 °C), Max:98.4 °F (36.9 °C)           Date 06/15/17 07 - 17 0659   Shift 9248-7150 1072-5361 4239-3209 24 Hour Total   I  N  T  A  K  E   I.V.  (mL/kg) 500  (8.4)   500  (8.4)    NG/   400    IV Piggyback 50   50    Shift Total  (mL/kg) 950  (15.9)   950  (15.9)   O  U  T  P  U  T   Urine  (mL/kg/hr) 210   210    Shift Total  (mL/kg) 210  (3.5)   210  (3.5)   Weight (kg) 59.7 59.7 59.7 59.7          Closed/Suction Drain 17 1800 Anterior;Left Scalp Accordion 10 Fr. (Active)   Site Description Other (Comment) 6/15/2017 11:05 AM   Dressing Type [place] 2017  3:05 AM   Dressing Status Clean;Intact;Dry 2017  3:05 AM   Dressing Intervention Dressing reinforced 2017  3:05 AM   Drainage Serosanguineous 2017  3:05 AM   Status To bulb suction 2017  3:05 AM   Output (mL) 0 mL 2017 10:00 PM            NG/OG Tube 17 1415  Left nostril (Active)   Placement Check placement verified by aspirate characteristics;placement verified by distal tube length measurement 6/15/2017  "11:05 AM   Advancement advanced manually 6/14/2017  3:05 PM   Distal Tube Length (cm) 70 6/15/2017  7:05 AM   Tolerance no signs/symptoms of discomfort 6/15/2017 11:05 AM   Securement anchored to nostril center w/ adhesive device 6/15/2017 11:05 AM   Clamp Status/Tolerance clamped;no abdominal discomfort;no abdominal distention;no emesis;no residual;no nausea;no restlessness 6/15/2017 11:05 AM   Insertion Site Appearance no redness, warmth, tenderness, skin breakdown, drainage 6/15/2017 11:05 AM   Flush/Irrigation flushed w/;water;no resistance met 6/15/2017 11:05 AM   Intake (mL) 200 mL 6/15/2017 11:00 AM   Residual Amount (ml) 0 ml 6/15/2017  7:05 AM            Urethral Catheter 06/07/17 0823 Straight-tip;Non-latex 16 Fr. (Active)   Site Assessment Clean;Intact 6/15/2017 11:05 AM   Collection Container Urimeter 6/15/2017 11:05 AM   Securement Method secured to top of thigh w/ adhesive device 6/15/2017 11:05 AM   Catheter Care Performed yes 6/15/2017 11:05 AM   Reason for Continuing Urinary Catheterization Critically ill in ICU requiring intensive monitoring 6/15/2017 11:05 AM   CAUTI Prevention Bundle StatLock in place w 1" slack;Intact seal between catheter & drainage tubing;Drainage bag off the floor;Green sheeting clip in use;No dependent loops or kinks;Drainage bag not overfilled (<2/3 full);Drainage bag below bladder 6/15/2017  7:05 AM   Output (mL) 60 mL 6/15/2017 11:00 AM       Neurosurgery Physical Exam   [id number]  Pupils NR bilaterally  FCx4  SILT    Significant Labs:    Recent Labs  Lab 06/15/17  0134 06/15/17  0842   GLU 89  --    * 148*   K 3.8 3.6   *  --    CO2 24  --    BUN 23*  --    CREATININE 0.7  --    CALCIUM 9.2  --    MG 1.8  --        Recent Labs  Lab 06/14/17  0316 06/15/17  0134   WBC 13.55* 13.57*   HGB 14.6 13.0   HCT 45.2 40.5    280       Recent Labs  Lab 06/15/17  0134   INR 1.0   APTT 24.5     Microbiology Results (last 7 days)     Procedure Component Value Units " Date/Time    Urine culture [[id number]] Collected:  06/15/17 1127    Order Status:  Sent Specimen:  Urine from Urine, Catheterized Updated:  06/15/17 1127    Blood culture [[id number]] Collected:  06/14/17 1517    Order Status:  Completed Specimen:  Blood from Peripheral, Antecubital, Left Updated:  06/15/17 0315     Blood Culture, Routine No Growth to date    Narrative:       Blood cultures x 2 different sites. 4 bottles total. Please  draw cultures before administering antibiotics.    Blood culture [[id number]] Collected:  06/14/17 1156    Order Status:  Completed Specimen:  Blood from Peripheral, Antecubital, Left Updated:  06/14/17 2115     Blood Culture, Routine No Growth to date    Narrative:       Blood cultures from 2 different sites. 4 bottles total.  Please draw before starting antibiotics.    Culture, Respiratory with Gram Stain [[id number]]     Order Status:  No result Specimen:  Respiratory             Significant Diagnostics:  CT head: stable from prior.    Assessment/Plan:     Meningioma, recurrent of brain    50 y/o F s/p crani resection olfactory groove meningioma POD#8    Neuro stable  Cont neuro checks  Continue cortef 10 q am, 5 q pm.  Monitor for DI  CV- SBP < 160  Pulm- on RA.   FENGI- monitor for DI, fu urine studies, fu endocrine recs.  Heme/ID- afebrile. hgb/hct stable.   ppx- ANA/SCD's. Gi ppx. Sqh.   PT, OT, ST.   Consider TTF today, fu SW recs as pt will likely need placement.            Boogie Wiley, DO  Neurosurgery  [facility]

## 2017-06-15 NOTE — PLAN OF CARE
06/15/17 1202   Discharge Reassessment   Assessment Type Discharge Planning Reassessment   Can the patient answer the patient profile reliably? No, cognitively impaired   How does the patient rate their overall health at the present time? Poor   Describe the patient's ability to walk at the present time. Does not walk or unable to take any steps at all   How often would a person be available to care for the patient? Occasionally   Number of comorbid conditions (as recorded on the chart) Two   During the past month, has the patient often been bothered by feeling down, depressed or hopeless? Yes  (per nursing)   During the past month, has the patient often been bothered by little interest or pleasure in doing things? Yes   Discharge Plan A Rehab   Discharge Plan B Home with family   Change in patient condition or support system No       Per :  Brie fitch [facility] ([phone]) stated that they will accept pending auth. They are hoping to submit for auth today.     Myla Watkins RN, CCRN-K, [facility]  Neuro-Critical Care   [phone]

## 2017-06-15 NOTE — PLAN OF CARE
Problem: SLP Goal  Goal: SLP Goal  Speech Language Pathology Goals  Goals expected to be met by 6/16    1. Pt will participate in ongoing swallow assessment to rule out aspiration and determine least restrictive diet. MET  2. Pt will tolerate dental soft diet/nectar thick liquids without overt s/s of aspiration  3. Pt will tolerate thin liquids trials x10 without overt s/s of aspiration  4. Pt will complete dysphagia exercises x10 reps with min cues          Pt lethargic but tolerated soft diet and nectar thick liquids when cued to rouse. Strict aspiration precautions.     HILDA Curry, CCC-SLP  6/15/2017

## 2017-06-15 NOTE — SUBJECTIVE & OBJECTIVE
Interval History: >4 elements OR status of 3 inpatient conditions  DI event early this AM. On DDAVP bid. WBC elevated. Constipated. Na 153. Apparently did not receive the night DDAVP dose. Lethargic today. Serum Ga=117. Dobbhoff tube got placed to replace free water. Dobbhoff tip position confirmed with KUB.  Review of Systems   Eyes: Negative.    Respiratory: Negative.    Cardiovascular: Negative.    Gastrointestinal: Negative.    Endocrine: Negative.    Genitourinary: Negative.      2 systems OR Unable to obtain a complete ROS due to level of consciousness.  Objective:     Vitals:  Temp: (!) 94.7 °F (34.8 °C) (Warming blanket applied.) (06/15/17 0000)  Pulse: 80 (06/15/17 0000)  Resp: 12 (06/15/17 0000)  BP: (!) 91/57 (06/15/17 0000)  SpO2: 100 % (06/15/17 0000)    Temp:  [94.7 °F (34.8 °C)-98.4 °F (36.9 °C)] 94.7 °F (34.8 °C)  Pulse:  [] 80  Resp:  [8-33] 12  SpO2:  [99 %-100 %] 100 %  BP: ()/(57-94) 91/57              06/14 0701 - 06/15 0700  In: 1770 [I.V.:1170]  Out: 780 [Urine:780]    Physical Exam   Neurological: GCS eye subscore is 4 - spontaneous. GCS verbal subscore is 5 - oriented. GCS motor subscore is 6 - obeys commands.     General   HEENT: S/P bifrontal crani  Chest Heart RRR / Lungs Clear to auscultation  Adbomen: Soft nontender + BS  Extremities: OK distal pulses.  Skin: UK  Neurological Exam:  MS; Lethargic, still responding.   CN: II-XII Pupils fixed and blind Pupils are 8mm bilat. Saw light on the L-side. Not on my exam.  Motor: LUE  5 /5 / RUE 5 /5  Tone normal bilaterally             LLE  5 /5 /  RLE 5 /5  Tone normal bilaterally  Sensory: LT/PP/T/ Vibration UK                 Complex sensory modalities: not tested  DTR:  normal throughout.  Coordination /Fine motor: UK  Gait: Not tested.  Meningeal signs: Absent     Unable to test gait due to level of consciousness.        [place] Coma Scale:  Best Eye Response: 4 - spontaneous  Best Motor Response: 6 - obeys commands  Best Verbal  Response: 5 - oriented  [place] Coma Scale Total: 15            Medications:  Continuous  sodium chloride 0.9% Last Rate: 100 mL/hr at 06/15/17 0000   Scheduled  amitriptyline 100 mg Nightly   bisacodyl 10 mg Daily   desmopressin 10 mcg BID   gabapentin 300 mg TID   heparin (porcine) 5,000 Units Q8H   hydrocortisone 10 mg QAM   hydrocortisone 5 mg QHS   nicotine 1 patch Daily   pantoprazole 40 mg BID   polyethylene glycol 17 g Daily   PRN  acetaminophen 650 mg Q6H PRN   acetaminophen 650 mg Q6H PRN   acetaminophen 650 mg Q4H PRN   calcium gluconate IVPB 1 g PRN   calcium gluconate IVPB 1 g PRN   calcium gluconate IVPB 1 g PRN   desmopressin 1 mcg Q6H PRN   haloperidol lactate 2 mg Q4H PRN   hydrALAZINE 10 mg Q1H PRN   hydrocodone-acetaminophen 5-325mg 1 tablet Q4H PRN   magnesium sulfate IVPB 2 g PRN   magnesium sulfate IVPB 4 g PRN   magnesium sulfate IVPB 2 g PRN   magnesium sulfate IVPB 4 g PRN   metoclopramide HCl 5 mg Q6H PRN   metoclopramide HCl 5 mg Q6H PRN   potassium chloride 40 mEq PRN   And     potassium chloride 60 mEq PRN   And     potassium chloride 80 mEq PRN   potassium chloride 10% 40 mEq PRN   potassium chloride 10% 40 mEq PRN   potassium chloride 10% 60 mEq PRN   potassium, sodium phosphates 2 packet PRN   potassium, sodium phosphates 2 packet PRN   potassium, sodium phosphates 2 packet PRN   ramelteon 8 mg Nightly PRN   sodium phosphate IVPB 15 mmol PRN   sodium phosphate IVPB 20.01 mmol PRN   sodium phosphate IVPB 30 mmol PRN     Today I personally reviewed pertinent medications, lines/drains/airways, imaging, cardiology, lab results, microbiology results, notably:

## 2017-06-16 NOTE — PLAN OF CARE
Problem: Occupational Therapy Goal  Goal: Occupational Therapy Goal  Goals to be met by: 6/27/2017     Patient will increase functional independence with ADLs by performing:    UE Dressing with Minimal Assistance.  LE Dressing with Minimal Assistance.  Grooming while standing with Minimal Assistance.  Toileting from toilet with Minimal Assistance for hygiene and clothing management.   Rolling to Bilateral with Contact Guard Assistance.   Supine to sit with Contact Guard Assistance.  Stand pivot transfers with Minimal Assistance.  Toilet transfer to toilet with Minimal Assistance.       POC remains appropriate.    HELEN Lopes  6/16/2017

## 2017-06-16 NOTE — PT/OT/SLP EVAL
"Speech Language Pathology Evaluation    Joanna Morales   MRN: [id number]   Admitting Diagnosis: <principal problem not specified> s/p meningioma resection     Diet recommendations: Solid Diet Level: Dental Soft  Liquid Diet Level: Nectar Thick  only when awake/alert, No straws, HOB to 90 degrees, Small bites/sips and Assistance with thickening liquids         SLP Treatment Date: 06/16/17  Speech Start Time: 1141     Speech Stop Time: 1155     Speech Total (min): 14 min       TREATMENT BILLABLE MINUTES:  Eval 6  and Treatment Swallowing Dysfunction 8    Diagnosis: <principal problem not specified>  S/p meningoma resection     Past Medical History:   Diagnosis Date    Allergy     Basal cell carcinoma     Depression 11/1/2016    Essential hypertension 6/9/2017    Eye disorder     Fever blister     Normocytic anemia 6/9/2017     Past Surgical History:   Procedure Laterality Date    BASAL CELL CARCINOMA EXCISION      forehead    BRAIN SURGERY      SKIN BIOPSY         Has the patient been evaluated by SLP for swallowing? : Yes  Keep patient NPO?: No   General Precautions: Standard, aspiration, fall       Subjective:  Pt awake and willingly cooperative in therapy tasks     Pain/Comfort  Pain Rating 1: 0/10  Pain Rating Post-Intervention 1: 0/10     Objective:        Oral Musculature Evaluation  Oral Musculature: general weakness  Dentition: present and adequate  Mandibular Strength and Mobility: WFL  Lingual Strength and Mobility: impaired strength  Volitional Cough: adequate  Volitional Swallow: prolonged with full excursion of the larynx noted to finger palpation  Voice Prior to PO Intake: Clear, low vocal quality     Cognitive Status:  Behavioral Observations: alert, confabulatory and confused-  Memory and Orientation: memory was unable to be truly assess 2/2 severity of underlying cognitive deficits, Pt oriented to self only - reported at "[employer]" or "high school [employer]" with bonary choice Pt " remained unable to report location   Attention: fleeting   Problem Solving:basic problem solving completed with 50% acc with mod slp verbal cues   Pragmatics: flat affect: visual engagement further impacted by vision deficits   Executive Function: impairments evident ongoing assessment warranted     Language: irrelevant    Auditory Comprehension: answers yes/no questions  Inconsistently, best with personal preference questions, Pt did not follow single step directions despite max SLP verbal [place]     Verbal Expression: responsive naming, automatic speech and fluency all WFL     Motor Speech: no motor speech impairement evident     Voice: strong and clear     Reading: unable to assess 2/2 severity of Pt vision deficits    Writing:  unable to assess 2/2 severity of Pt vision deficits    Visual-Spatial:  unable to assess 2/2 severity of Pt vision deficits    Additional Treatment:  Pt offered trial of thin liquids via single sips from an open cup x3. Pt with throat clear 2/3 trials and then cough following third trial. Pt status significant improved with liquids thickened to previously recommended nectar thick consistency without additional evidence of throat clearing and/or coughing. Pt was also offered mandarin oranges x4. Pt with functional oral mastication with intermittent throat clear. SLP drained all juice / thin liquid surrounded oranges and then Pt without evidence of additional throat clearing. Pt to remain on nectar thick liqiud and dental soft solid diet with 1:1 assistance for thickening and feeding. RN aware of plan.     FIM:        Problem Solving: 3 Moderate Direction--The patient solves routine problems 50 to 74% of the time.   Comprehension: 2 Maximal Prompting--The patient understands directions and conversation about basic daily needs 25 to 49% of the time.  Understand only simple, commonly used spoken expressions (e.g., hello, how are you) or gestures (e.g., waving good-bye, thank you).   Expression:  4 Minimal Prompting--The patient expresses basic daily needs and ideas 75 to 90% of the time.   Memory: 1 Total Assistance--The patient recognizes and remembers less than 25% of the time, or does not effectively recognize and remember.     Assessment:  Joanna Morales is a 51 y.o. female with a SLP diagnosis of Dysphagia and Cognitive-Linguistic Impairment.     Do you have any cultural, spiritual, Confucianist conflicts, given your current situation?: none     Discharge recommendations: Discharge Facility/Level Of Care Needs: rehabilitation facility     Goals:    SLP Goals        Problem: SLP Goal    Goal Priority Disciplines Outcome   SLP Goal     SLP    Description:  Speech Language Pathology Goals  Updated goals expected to be met by 6/23    1. Pt will tolerate dental soft diet/nectar thick liquids without overt s/s of aspiration  2. Pt will tolerate thin liquids trials x10 without overt s/s of aspiration  3. Pt will complete dysphagia exercises x10 reps with min cues  4. Pt will following single step directions with 75% acc with min cues to enhance comprehension   5. Pt will complete basic problem solving tasks with 75% acc with min cues to enhance safety awareness   6. Pt will demonstrate orientation x3 to enhance cognition   7. Pt will participate in ongoing assessment of speech language and cognitive skills to rule out and deficits and establish goals of care                              Plan:   Patient to be seen Therapy Frequency: 5 x/week   Plan of Care expires: 07/10/17  Plan of Care reviewed with: patient  SLP Follow-up?: Yes              Sharla Trevino CCC-SLP  06/16/2017

## 2017-06-16 NOTE — PT/OT/SLP PROGRESS
Occupational Therapy  Treatment    Joanna Morales   MRN: [id number]   Admitting Diagnosis: meningioma    OT Date of Treatment: 06/16/17   OT Start Time: 1019  OT Stop Time: 1050  OT Total Time (min): 31 min  *Co-Tx with PT    Billable Minutes:  Self Care/Home Management 8 and Neuromuscular Re-education 15    General Precautions: Standard, aspiration, fall  Orthopedic Precautions: N/A  Braces: N/A    Do you have any cultural, spiritual, Zoroastrian conflicts, given your current situation?: None reported    Subjective:  Communicated with RN prior to session.    Pain/Comfort  Pain Rating 1: 0/10  Pain Rating Post-Intervention 1: 0/10    Objective:  Patient found with: peripheral IV, pulse ox (continuous), garcía catheter, blood pressure cuff     Functional Mobility:  Bed Mobility:  Rolling/Turning Right: Maximum assistance  Supine to Sit: Moderate Assistance  Sit to Supine: Moderate Assistance    Transfers:   Sit <> Stand Assistance: Minimum Assistance x 3 trials from EOB (with assist of 2); Max A required for balance    Functional Ambulation:  Pt practiced weight shifting, but was unable to take steps this date.    Activities of Daily Living:    Grooming: Minimum assistance for washing face with cloth utilizing RUE; cues required     Balance:   Static Sit: FAIR: Maintains without assist, but unable to take any challenges   Dynamic Sit: FAIR: Cannot move trunk without losing balance  Static Stand: POOR+: Needs MINIMAL assist to maintain  Dynamic stand: POOR: N/A    Therapeutic Activities and Exercises:  *Bed mobility performed with increased time and tactile cues.  *Pt sat EOB to complete grooming ADL; see details above.  *Orientation questions asked to assess cognition; pt unable to state location or date.  *Pt performed several trials of sit to stand transfer and then practiced weight shifting.  Max A required to maintain standing balance with increased cues provided to facilitate moving from left to right.  Knee  "buckling on right side noted.  *Pt performed reaching activity while seated EOB to provide proprioceptive input and assist pt with postural control, stability, and awareness.  OT held onto hand of pt and instructed her to extend arm forward and then pull back: 1 set x 10 reps on each side with Min A for first 5 repetitions, and Mod A for second 5 repetitions.  OT then released grasp and told pt to complete same movement without assist: 1 set x 10 reps on each side with fair follow through noted.  *POC reviewed with pt and importance of therapy discussed.    -PAC 6 CLICK ADL   How much help from another person does this patient currently need?   1 = Unable, Total/Dependent Assistance  2 = A lot, Maximum/Moderate Assistance  3 = A little, Minimum/Contact Guard/Supervision  4 = None, Modified [place]/Independent    Putting on and taking off regular lower body clothing? : 1  Bathing (including washing, rinsing, drying)?: 2  Toileting, which includes using toilet, bedpan, or urinal? : 2  Putting on and taking off regular upper body clothing?: 2  Taking care of personal grooming such as brushing teeth?: 2  Eating meals?: 2  Total Score: 11     AM-PAC Raw Score CMS "G-Code Modifier Level of Impairment Assistance   6 % Total / Unable   7 - 8 CM 80 - 100% Maximal Assist   9-13 CL 60 - 80% Moderate Assist   14 - 19 CK 40 - 60% Moderate Assist   20 - 22 CJ 20 - 40% Minimal Assist   23 CI 1-20% SBA / CGA   24 CH 0% Independent/ Mod I       Patient left supine with all lines intact, call button in reach, restraints reapplied at end of session and RN notified    ASSESSMENT:  Joanna Morales is a 51 y.o. female with a medical diagnosis of meningioma s/p craniotomy and presents with impaired cognition, blindness, decreased endurance, impaired coordination, and impaired balance impacting performance with ADLs and mobility.  Pt appeared lethargic throughout session with consistent cues required to engage in session " and participate in tasks.  Pt able to complete grooming ADL with Min A while seated EOB with SBA-CGA required to maintain upright seated position.  Pt continues to display difficulty weight shifting and taking steps with Max A required to maintain upright standing position.  Pt would benefit from skilled OT services to address problems listed below and increase independence with ADLs.     Rehab identified problem list/impairments: Rehab identified problem list/impairments: weakness, impaired endurance, impaired functional mobilty, impaired balance, impaired self care skills, gait instability, impaired cognition, decreased safety awareness    Rehab potential is good.    Activity tolerance: Good    Discharge recommendations: Discharge Facility/Level Of Care Needs: rehabilitation facility     Barriers to discharge: Barriers to Discharge: Inaccessible home environment, Decreased caregiver support    Equipment recommendations:  (TBD pending progress)     GOALS:    Occupational Therapy Goals        Problem: Occupational Therapy Goal    Goal Priority Disciplines Outcome Interventions   Occupational Therapy Goal     OT, PT/OT     Description:  Goals to be met by: 6/27/2017     Patient will increase functional independence with ADLs by performing:    UE Dressing with Minimal Assistance.  LE Dressing with Minimal Assistance.  Grooming while standing with Minimal Assistance.  Toileting from toilet with Minimal Assistance for hygiene and clothing management.   Rolling to Bilateral with Contact Guard Assistance.   Supine to sit with Contact Guard Assistance.  Stand pivot transfers with Minimal Assistance.  Toilet transfer to toilet with Minimal Assistance.                      Plan:  Patient to be seen 5 x/week to address the above listed problems via self-care/home management, therapeutic activities, therapeutic groups  Plan of Care expires: 07/13/17  Plan of Care reviewed with: patient         Shanell Garcia,  LOTR  06/16/2017

## 2017-06-16 NOTE — PROGRESS NOTES
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  She underwent operation in January 2015 on the [facility] service at [facility].  It   was apparently only possible to get a partial resection of the tumor.  She did   have a followup scan done in 2016 showing a continued large meningioma in this   area.  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  She had a   followup MRI scan done at [facility] on 01/20/17, showing this large tumor.  She   was seen by you in Ophthalmology and then referred to Neurosurgery for further   evaluation.  At this point, she has some preserved vision in the right eye.  She   complains of constant headaches.  She has no sense of smell.  She feels off   balance.  She is taking Neurontin apparently for seizure prevention. Past   medical history relates primarily to the present illness.  She does not have   chronic medical illnesses such as diabetes or hypertension.  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  She is  and has good family support.     On physical examination, she is a well-developed, well-nourished white lady, who   is alert and oriented.  Examination of the head shows a somewhat anterior, but   well-healed bicoronal incision.  She says that this is a little tender in the   left frontal area.  Eyes show full extraocular movements.  The pupils are small,   the left is not reactive.  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  The right optic nerve is probably normal.  She   sees things as doubles.  When I held up two  fingers she counted four, although   she knew that there were two.  Hearing seems preserved.  The neck is supple.  On   neurological examination, she is speaking clearly.  She is obviously anxious   and depressed over her illness.  Finger-to-nose is somewhat diminished on the   left side and gait was mildly unsteady.  Cranial nerves are otherwise intact.    She has normal facial sensation and movement.  The tongue protrudes in the   midline.  Strength in the extremities seems generally good, sensation normal and   deep tendon reflexes symmetrical.     MRI of the brain performed at [facility] on 01/20/17, shows a   previous bifrontal craniotomy.  There is a large lobular meningioma, which   occupies the sella, planum sphenoidale and extends back on to the clivus.  The   optic chiasm and optic nerves are not visualized.  The carotid arteries and   anterior cerebral arteries run done through the tumor.  The tumor elevates and   pushes the third ventricle back, but there is no hydrocephalus    Post-Op Info:  Procedure(s) (LRB):  CRANIOTOMY WITH STEALTH (N/A)   9 Days Post-Op     Interval History: Remains lethargic this am.    Medications:  Continuous Infusions:   sodium chloride 0.9% 100 mL/hr at 06/16/17 0900     Scheduled Meds:   amitriptyline  100 mg Oral Nightly    bisacodyl  10 mg Rectal Daily    cefTRIAXone (ROCEPHIN) IVPB  1 g Intravenous Q24H    desmopressin  10 mcg Nasal BID    gabapentin  600 mg Oral TID    heparin (porcine)  5,000 Units Subcutaneous Q8H    hydrocortisone  10 mg Oral QAM    hydrocortisone  5 mg Oral QHS    nicotine  1 patch Transdermal Daily    pantoprazole  40 mg Oral BID    polyethylene glycol  17 g Oral Daily    ramelteon  8 mg Oral QHS     PRN Meds:acetaminophen, acetaminophen, acetaminophen, calcium gluconate IVPB, calcium gluconate IVPB, calcium gluconate IVPB, desmopressin, haloperidol lactate, hydrALAZINE, magnesium sulfate IVPB, magnesium sulfate IVPB,  magnesium sulfate IVPB, magnesium sulfate IVPB, metoclopramide HCl, metoclopramide HCl, potassium chloride **AND** potassium chloride **AND** potassium chloride, potassium chloride 10%, potassium chloride 10%, potassium chloride 10%, potassium, sodium phosphates, potassium, sodium phosphates, potassium, sodium phosphates, sodium phosphate IVPB, sodium phosphate IVPB, sodium phosphate IVPB     Review of Systems  Objective:     Weight: 60.1 kg (132 lb 7.9 oz)  Body mass index is 24.23 kg/m².  Vital Signs (Most Recent):  Temp: 97.6 °F (36.4 °C) (17 0705)  Pulse: 68 (17)  Resp: 11 (17)  BP: (!) 126/58 (17 09)  SpO2: 100 % (17) Vital Signs (24h Range):  Temp:  [94.7 °F (34.8 °C)-99.1 °F (37.3 °C)] 97.6 °F (36.4 °C)  Pulse:  [] 68  Resp:  [11-48] 11  SpO2:  [99 %-100 %] 100 %  BP: (106-132)/() 126/58              Temp (24hrs), Av.8 °F (36 °C), Min:94.7 °F (34.8 °C), Max:99.1 °F (37.3 °C)           Date 17 - 17 0659   Shift 4697-0061 2764-0358 3074-4010 24 Hour Total   I  N  T  A  K  E   I.V.  (mL/kg) 300  (5)   300  (5)    NG/   130    IV Piggyback 50   50    Shift Total  (mL/kg) 480  (8)   480  (8)   O  U  T  P  U  T   Urine  (mL/kg/hr) 175   175    Shift Total  (mL/kg) 175  (2.9)   175  (2.9)   Weight (kg) 60.1 60.1 60.1 60.1          Closed/Suction Drain 17 1800 Anterior;Left Scalp Accordion 10 Fr. (Active)   Site Description Other (Comment) 6/15/2017  3:05 PM   Dressing Type [place] 2017  3:05 AM   Dressing Status Clean;Intact;Dry 2017  3:05 AM   Dressing Intervention Dressing reinforced 2017  3:05 AM   Drainage Serosanguineous 2017  3:05 AM   Status To bulb suction 2017  3:05 AM   Output (mL) 0 mL 2017 10:00 PM            NG/OG Tube 17 1415  Left nostril (Active)   Placement Check placement verified by x-ray;placement verified by aspirate characteristics 2017  3:00 AM   Advancement  "advanced manually 6/14/2017  3:05 PM   Distal Tube Length (cm) 70 6/15/2017  7:05 AM   Tolerance no signs/symptoms of discomfort 6/15/2017  3:05 PM   Securement anchored to nostril center w/ adhesive device 6/15/2017  3:05 PM   Clamp Status/Tolerance clamped;no abdominal discomfort;no abdominal distention;no emesis;no residual;no nausea;no restlessness 6/15/2017  3:05 PM   Insertion Site Appearance no redness, warmth, tenderness, skin breakdown, drainage 6/15/2017  3:05 PM   Flush/Irrigation flushed w/;water;no resistance met 6/15/2017  3:05 PM   Intake (mL) 100 mL 6/16/2017  8:00 AM   Residual Amount (ml) 0 ml 6/15/2017  7:05 AM            Urethral Catheter 06/15/17 1205 Latex (Active)   Site Assessment Clean;Intact 6/16/2017  3:00 AM   Collection Container Urimeter 6/16/2017  3:00 AM   Securement Method secured to top of thigh w/ adhesive device 6/16/2017  3:00 AM   Catheter Care Performed yes 6/16/2017  3:00 AM   Reason for Continuing Urinary Catheterization Critically ill in ICU requiring intensive monitoring 6/16/2017  3:00 AM   CAUTI Prevention Bundle StatLock in place w 1" slack;Intact seal between catheter & drainage tubing;Drainage bag off the floor;Green sheeting clip in use;No dependent loops or kinks;Drainage bag not overfilled (<2/3 full);Drainage bag below bladder 6/15/2017  7:01 PM   Output (mL) 50 mL 6/16/2017  9:00 AM       Neurosurgery Physical Exam   [id number]  Pupils NR bilaterally, blind in both eyes  FC x4  SILT    Significant Labs:    Recent Labs  Lab 06/16/17  0207 06/16/17  0925   GLU 75  --      140 138   K 4.0  --      --    CO2 22*  --    BUN 16  --    CREATININE 0.7  --    CALCIUM 8.5*  --    MG 1.9  --        Recent Labs  Lab 06/15/17  0134 06/16/17 0207   WBC 13.57* 11.64   HGB 13.0 11.4*   HCT 40.5 36.1*    273       Recent Labs  Lab 06/16/17 0207   INR 1.0   APTT 34.3*     Microbiology Results (last 7 days)     Procedure Component Value Units Date/Time    Blood " culture [[id number]] Collected:  06/14/17 1517    Order Status:  Completed Specimen:  Blood from Peripheral, Antecubital, Left Updated:  06/15/17 2012     Blood Culture, Routine No Growth to date     Blood Culture, Routine No Growth to date    Narrative:       Blood cultures x 2 different sites. 4 bottles total. Please  draw cultures before administering antibiotics.    Urine culture [[id number]] Collected:  06/15/17 1127    Order Status:  Sent Specimen:  Urine from Urine, Catheterized Updated:  06/15/17 1518    Blood culture [[id number]] Collected:  06/14/17 1156    Order Status:  Completed Specimen:  Blood from Peripheral, Antecubital, Left Updated:  06/15/17 1412     Blood Culture, Routine No Growth to date     Blood Culture, Routine No Growth to date    Narrative:       Blood cultures from 2 different sites. 4 bottles total.  Please draw before starting antibiotics.    Culture, Respiratory with Gram Stain [[id number]]     Order Status:  No result Specimen:  Respiratory             Significant Diagnostics:  CT head: reviewed    Assessment/Plan:     Meningioma, recurrent of brain    52 y/o F s/p crani resection olfactory groove meningioma POD#9    Neuro stable  Cont neuro checks  Continue cortef 10 q am, 5 q pm.  Monitor for DI  CV- SBP < 160  Pulm- on RA.   FENGI- monitor for DI, fu urine studies, fu endocrine recs.  Heme/ID- afebrile. hgb/hct stable.   ppx- ANA/SCD's. Gi ppx. Sqh.   PT, OT, ST.   Fu SW recs, as pt will need placement.            Boogie Wiley, DO  Neurosurgery  [facility]

## 2017-06-16 NOTE — SUBJECTIVE & OBJECTIVE
Interval History: Remains lethargic this am.    Medications:  Continuous Infusions:   sodium chloride 0.9% 100 mL/hr at 17 0900     Scheduled Meds:   amitriptyline  100 mg Oral Nightly    bisacodyl  10 mg Rectal Daily    cefTRIAXone (ROCEPHIN) IVPB  1 g Intravenous Q24H    desmopressin  10 mcg Nasal BID    gabapentin  600 mg Oral TID    heparin (porcine)  5,000 Units Subcutaneous Q8H    hydrocortisone  10 mg Oral QAM    hydrocortisone  5 mg Oral QHS    nicotine  1 patch Transdermal Daily    pantoprazole  40 mg Oral BID    polyethylene glycol  17 g Oral Daily    ramelteon  8 mg Oral QHS     PRN Meds:acetaminophen, acetaminophen, acetaminophen, calcium gluconate IVPB, calcium gluconate IVPB, calcium gluconate IVPB, desmopressin, haloperidol lactate, hydrALAZINE, magnesium sulfate IVPB, magnesium sulfate IVPB, magnesium sulfate IVPB, magnesium sulfate IVPB, metoclopramide HCl, metoclopramide HCl, potassium chloride **AND** potassium chloride **AND** potassium chloride, potassium chloride 10%, potassium chloride 10%, potassium chloride 10%, potassium, sodium phosphates, potassium, sodium phosphates, potassium, sodium phosphates, sodium phosphate IVPB, sodium phosphate IVPB, sodium phosphate IVPB     Review of Systems  Objective:     Weight: 60.1 kg (132 lb 7.9 oz)  Body mass index is 24.23 kg/m².  Vital Signs (Most Recent):  Temp: 97.6 °F (36.4 °C) (17 0705)  Pulse: 68 (17 0900)  Resp: 11 (17 09)  BP: (!) 126/58 (17 0900)  SpO2: 100 % (17 09) Vital Signs (24h Range):  Temp:  [94.7 °F (34.8 °C)-99.1 °F (37.3 °C)] 97.6 °F (36.4 °C)  Pulse:  [] 68  Resp:  [11-48] 11  SpO2:  [99 %-100 %] 100 %  BP: (106-132)/() 126/58              Temp (24hrs), Av.8 °F (36 °C), Min:94.7 °F (34.8 °C), Max:99.1 °F (37.3 °C)           Date 17 0700 - 17 0659   Shift 6366-1649 4363-0191 5842-6237 24 Hour Total   I  N  T  A  K  E   I.V.  (mL/kg) 300  (5)   300  (5)  "   NG/   130    IV Piggyback 50   50    Shift Total  (mL/kg) 480  (8)   480  (8)   O  U  T  P  U  T   Urine  (mL/kg/hr) 175   175    Shift Total  (mL/kg) 175  (2.9)   175  (2.9)   Weight (kg) 60.1 60.1 60.1 60.1          Closed/Suction Drain 06/07/17 1800 Anterior;Left Scalp Accordion 10 Fr. (Active)   Site Description Other (Comment) 6/15/2017  3:05 PM   Dressing Type [place] 6/14/2017  3:05 AM   Dressing Status Clean;Intact;Dry 6/14/2017  3:05 AM   Dressing Intervention Dressing reinforced 6/14/2017  3:05 AM   Drainage Serosanguineous 6/14/2017  3:05 AM   Status To bulb suction 6/14/2017  3:05 AM   Output (mL) 0 mL 6/11/2017 10:00 PM            NG/OG Tube 06/14/17 1415  Left nostril (Active)   Placement Check placement verified by x-ray;placement verified by aspirate characteristics 6/16/2017  3:00 AM   Advancement advanced manually 6/14/2017  3:05 PM   Distal Tube Length (cm) 70 6/15/2017  7:05 AM   Tolerance no signs/symptoms of discomfort 6/15/2017  3:05 PM   Securement anchored to nostril center w/ adhesive device 6/15/2017  3:05 PM   Clamp Status/Tolerance clamped;no abdominal discomfort;no abdominal distention;no emesis;no residual;no nausea;no restlessness 6/15/2017  3:05 PM   Insertion Site Appearance no redness, warmth, tenderness, skin breakdown, drainage 6/15/2017  3:05 PM   Flush/Irrigation flushed w/;water;no resistance met 6/15/2017  3:05 PM   Intake (mL) 100 mL 6/16/2017  8:00 AM   Residual Amount (ml) 0 ml 6/15/2017  7:05 AM            Urethral Catheter 06/15/17 1205 Latex (Active)   Site Assessment Clean;Intact 6/16/2017  3:00 AM   Collection Container Urimeter 6/16/2017  3:00 AM   Securement Method secured to top of thigh w/ adhesive device 6/16/2017  3:00 AM   Catheter Care Performed yes 6/16/2017  3:00 AM   Reason for Continuing Urinary Catheterization Critically ill in ICU requiring intensive monitoring 6/16/2017  3:00 AM   CAUTI Prevention Bundle StatLock in place w 1" slack;Intact " seal between catheter & drainage tubing;Drainage bag off the floor;Green sheeting clip in use;No dependent loops or kinks;Drainage bag not overfilled (<2/3 full);Drainage bag below bladder 6/15/2017  7:01 PM   Output (mL) 50 mL 6/16/2017  9:00 AM       Neurosurgery Physical Exam   E3V4M6  Pupils NR bilaterally, blind in both eyes  FC x4  SILT    Significant Labs:    Recent Labs  Lab 06/16/17  0207 06/16/17  0925   GLU 75  --      140 138   K 4.0  --      --    CO2 22*  --    BUN 16  --    CREATININE 0.7  --    CALCIUM 8.5*  --    MG 1.9  --        Recent Labs  Lab 06/15/17  0134 06/16/17  0207   WBC 13.57* 11.64   HGB 13.0 11.4*   HCT 40.5 36.1*    273       Recent Labs  Lab 06/16/17 0207   INR 1.0   APTT 34.3*     Microbiology Results (last 7 days)     Procedure Component Value Units Date/Time    Blood culture [[id number]] Collected:  06/14/17 1517    Order Status:  Completed Specimen:  Blood from Peripheral, Antecubital, Left Updated:  06/15/17 2012     Blood Culture, Routine No Growth to date     Blood Culture, Routine No Growth to date    Narrative:       Blood cultures x 2 different sites. 4 bottles total. Please  draw cultures before administering antibiotics.    Urine culture [[id number]] Collected:  06/15/17 1127    Order Status:  Sent Specimen:  Urine from Urine, Catheterized Updated:  06/15/17 1518    Blood culture [[id number]] Collected:  06/14/17 1156    Order Status:  Completed Specimen:  Blood from Peripheral, Antecubital, Left Updated:  06/15/17 1412     Blood Culture, Routine No Growth to date     Blood Culture, Routine No Growth to date    Narrative:       Blood cultures from 2 different sites. 4 bottles total.  Please draw before starting antibiotics.    Culture, Respiratory with Gram Stain [[id number]]     Order Status:  No result Specimen:  Respiratory             Significant Diagnostics:  CT head: reviewed

## 2017-06-16 NOTE — PROGRESS NOTES
[facility]  Neurocritical Care  Progress/Transfer Note    Admit Date: 6/7/2017  Service Date: 06/16/2017  Length of Stay: 9    Subjective:     Chief Complaint: Meningioma, recurrent of brain    History of Present Illness: 50yo F, presented to [facility] s/p menigioma resection (6/7). 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  She underwent operation in January 2015 on the [facility] service at [facility].  It   was apparently only possible to get a partial resection of the tumor.  She did   have a followup scan done in 2016 showing a continued large meningioma in this   area.  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  She had a   followup MRI scan done at [facility] on 01/20/17, showing this large tumor.  She   was seen by you in Ophthalmology and then referred to Neurosurgery for further   evaluation.       Hospital Course: 6/7 S/P menengioma resection  6/8 D.I monitoring Q6h sp.grav and Na. DDAVP0.5mcg IV x1  6/9 continue monitoring for D. I. Q 6h  Na/ sp. grav. Urine output >250; Na >145 sp. Grav. 1.005.  DDAVP 1mcg IV; 1L bolus .45NS x2   Endocrinology consulted for diabetes Insipidus. Agitated/restless. Haldol prn started. Repeat CTH today per NSGY  6/10 Required DDAVP 1mcg IV early a.m. For U.O > 250cc and sp. Grav. 1.0000. Less restless today required only one dose of haldol.  6/11Continue to monitor for DI; bilateral blindness; Continue Decadron; Switched to d5+.45 NS @100  6/12: The patient received DDAVP this AM. DI watch. D5% was D/C. Still blind. DI watch. Na 144. Patient on D5. DDAVP IN  Subgaleal drain was D/C.  6/14: DI event early this AM. On DDAVP bid. WBC elevated. Constipated. Na 153. Apparently did not receive the night DDAVP dose. Lethargic today. Serum  Bq=062. Dobbhoff tube got placed to replace free water. Dobbhoff tip position confirmed with KUB.  6/15:DI corrected. Na 150. Hypotensive and got fluids. WBC normalizing.  EEG theta. No SZ. Became hypothermic last night. Had BM last night. L to R 1.3 cm subfalcine shift and still L- uncal herniation. Follow-up cT head with some improvement. EEG today suggestive of encephalopathy. No epileptiform discharges.  6/16: Transfer to [facility]     Interval History:  No significant events over night   Review of Systems  Unable to assess properly  due to confusion   Objective:     Vitals:  Temp: 97.6 °F (36.4 °C) (06/16/17 1500)  Pulse: 65 (06/16/17 1500)  Resp: 13 (06/16/17 1500)  BP: 117/75 (06/16/17 1500)  SpO2: 99 % (06/16/17 1500)    Temp:  [95 °F (35 °C)-99.1 °F (37.3 °C)] 97.6 °F (36.4 °C)  Pulse:  [] 65  Resp:  [11-48] 13  SpO2:  [99 %-100 %] 99 %  BP: (106-132)/() 117/75              06/15 0701 - 06/16 0700  In: 3050 [I.V.:2300]  Out: 1385 [Urine:1385]    Physical Exam      Physical Exam:  GA: Lethargic  comfortable, no acute distress.   HEENT: No scleral icterus or JVD.   Pulmonary: Clear to auscultation A/L. No wheezing, crackles, or rhonchi.  Cardiac: RRR S1 & S2 w/o rubs/murmurs/gallops.   Abdominal: Bowel sounds present x 4. Skin: No jaundice, rashes, or visible lesions.  Neuro:  --GCS: E3 V4 M6  --Mental Status:  Lethargic but responsive confused   --CN II-XII grossly intact.   --Blind bilateral pupils fixed    --Corneal reflex, gag, cough intact.  --LUE strength: 5/5  --RUE strength: 5/5  --LLE strength: 5/5  --RLE strength: 5/5    Unable to test gait due to level of consciousness.    Medications:  Continuous  sodium chloride 0.9% Last Rate: 75 mL/hr at 06/16/17 1500   Scheduled  amitriptyline 100 mg Nightly   bisacodyl 10 mg Daily   cefTRIAXone (ROCEPHIN) IVPB 1 g Q24H   desmopressin 10 mcg BID   gabapentin 600 mg TID   heparin (porcine) 5,000 Units Q8H   hydrocortisone 10 mg QAM   hydrocortisone 5 mg  QHS   nicotine 1 patch Daily   pantoprazole 40 mg BID   polyethylene glycol 17 g Daily   ramelteon 8 mg QHS   PRN  acetaminophen 650 mg Q6H PRN   acetaminophen 650 mg Q6H PRN   acetaminophen 650 mg Q4H PRN   calcium gluconate IVPB 1 g PRN   calcium gluconate IVPB 1 g PRN   calcium gluconate IVPB 1 g PRN   desmopressin 1 mcg Q6H PRN   haloperidol lactate 2 mg Q4H PRN   hydrALAZINE 10 mg Q1H PRN   magnesium sulfate IVPB 2 g PRN   magnesium sulfate IVPB 4 g PRN   magnesium sulfate IVPB 2 g PRN   magnesium sulfate IVPB 4 g PRN   metoclopramide HCl 5 mg Q6H PRN   metoclopramide HCl 5 mg Q6H PRN   potassium chloride 40 mEq PRN   And     potassium chloride 60 mEq PRN   And     potassium chloride 80 mEq PRN   potassium chloride 10% 40 mEq PRN   potassium chloride 10% 40 mEq PRN   potassium chloride 10% 60 mEq PRN   potassium, sodium phosphates 2 packet PRN   potassium, sodium phosphates 2 packet PRN   potassium, sodium phosphates 2 packet PRN   sodium phosphate IVPB 15 mmol PRN   sodium phosphate IVPB 20.01 mmol PRN   sodium phosphate IVPB 30 mmol PRN     Today I personally reviewed pertinent medications, lines/drains/airways, imaging, lab results,           Assessment/Plan:     Neuro   Diabetes insipidus    DDAVP IN 10mcg qAM and q PM.  -SC DDAVP 1mcg q 6hrs PRN, with parameters. Na >145 and SG in urine < 1010.  -Serum Na q 6hrs and Urine SG q 6hrs   -Hydrocortisone 10 and 5 mg.   -Endocrine following.              Meningioma, recurrent of brain    NSGY Following  S/P resection 6/7 POD3  -Neurontin 600mg tid.   -Norco PRN q 4hrs  -PT, OT.  -Remeltion 8mg qhs.  -EEG 1 hr  Gabapentin 300mg tid  PT/OT/SLP when able  CT 6/15-  No significant detrimental change compared to prior examination        Depression    -amitriptyline 100mg Qhs        Cardiac   Essential hypertension    Keep SBP < 140 mmHg.   No BP meds.       Hem / ID Procal low 0.05, Possible UTI WBC normalized.  -SC heparin 5000 U q 8hrs.  -Replace Mackey  catheter.  -Ceftriaxone 1gr qd x 7 days.  -CBC+Diff  IVF/nutrition/Renal/GI: i/o: -4Lt.   -Drink nectar.   -Recommend to keep [place] for 1 more day and D/C if no need for free water correction  -Serum Na and urine SG q 6hrs.  -NS at 75cc/hr IVD.   -Free water D/C.  -BR: Senna,, Colace          Prophylaxis:  Venous Thromboembolism: mechanical chemical  Stress Ulcer: PPI  Ventilator Pneumonia: not applicable     Activity Orders          Activity as tolerated starting at 06/08 1028        No Order    Boogie Nathan NP  Neurocritical Care  [facility]

## 2017-06-16 NOTE — NURSING
Patient to room from Neuro ICU per bed. VSS. Patient oriented to room, cardiac on, normal sinus rhythm noted. NG tube in place per ausculation. Soft restraints on. No injuries to skin, circulation good, range of motion done, and toilet offered. M.D. Notified of transfer.

## 2017-06-16 NOTE — ASSESSMENT & PLAN NOTE
50 y/o F s/p crani resection olfactory groove meningioma POD#9    Neuro stable  Cont neuro checks  Continue cortef 10 q am, 5 q pm.  Monitor for DI  CV- SBP < 160  Pulm- on RA.   FENGI- monitor for DI, fu urine studies, fu endocrine recs.  Heme/ID- afebrile. hgb/hct stable.   ppx- ANA/SCD's. Gi ppx. Sqh.   PT, OT, ST.   Fu SW recs, as pt will need placement.

## 2017-06-16 NOTE — PROGRESS NOTES
"[facility]  Endocrinology  Progress Note    Admit Date: 6/7/2017     Reason for Consult: Diabetes insipidus     Surgical Procedure and Date:   CRANIOTOMY 6/8/2017        HPI:   Patient is a 51 y.o. female with a diagnosis of  meningioma s/p resection (6/7). 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  She underwent partial resection of the tumor in January 2015 at [facility]. Followup scan done in 2016 showing a continued large meningioma.     Endocrinology consulted for post operative DI management.  Per the operative report - The pituitary stalk was identified and could be preserved        Interval History:    Overnight events:  Serum Na improved to 140 this am    Net + 1665 cc overnight.  Patient received Desmopressin 10 mcg intranasal bid.    BP (!) 132/106   Pulse 73   Temp 97.7 °F (36.5 °C) (Oral)   Resp 12   Ht 5' 2" (1.575 m)   Wt 60.1 kg (132 lb 7.9 oz)   SpO2 100%   Breastfeeding? No   BMI 24.23 kg/m²       Labs Reviewed and Include      Recent Labs  Lab 06/16/17  0207   GLU 75   CALCIUM 8.5*   ALBUMIN 2.8*   PROT 6.0     140   K 4.0   CO2 22*      BUN 16   CREATININE 0.7   ALKPHOS 80   ALT 70*   AST 23   BILITOT 0.4     Lab Results   Component Value Date    WBC 11.64 06/16/2017    HGB 11.4 (L) 06/16/2017    HCT 36.1 (L) 06/16/2017    MCV 99 (H) 06/16/2017     06/16/2017       Recent Labs  Lab 06/10/17  0924 06/15/17  1007   TSH 0.042*  --    FREET4 1.14 0.83     No results found for: HGBA1C    Nutritional status:   Body mass index is 24.23 kg/m².  Lab Results   Component Value Date    ALBUMIN 2.8 (L) 06/16/2017    ALBUMIN 3.1 (L) 06/15/2017    ALBUMIN 3.6 06/14/2017     No results found for: PREALBUMIN    Estimated Creatinine Clearance: 75.2 mL/min (based on Cr of 0.7).    Accu-Checks  No results for input(s): POCTGLUCOSE in the last 72 " hours.    Current Medications and/or Treatments Impacting Glycemic Control  Immunotherapy:  Immunosuppressants     None        Steroids:   Hormones     Start     Stop Route Frequency Ordered    06/13/17 2100  desmopressin nasal solution 10 mcg      -- Nasl 2 times daily 06/13/17 1017    06/12/17 2100  hydrocortisone tablet 5 mg      -- Oral Nightly 06/12/17 1059    06/12/17 1100  hydrocortisone tablet 10 mg      -- Oral Every morning 06/12/17 1059    06/12/17 1015  desmopressin injection 1 mcg      -- SubQ Every 6 hours PRN 06/12/17 0914        Pressors:    Autonomic Drugs     None        Hyperglycemia/Diabetes Medications: Antihyperglycemics     None          ASSESSMENT and PLAN    S/P craniotomy    On 6/8/2017 now with DI as in #1   Currently on HC 10 mg in the am and 5 mg pm               Diabetes insipidus    S/p craniotomy 6/8/2017   Patient is now net neutral  -Agree with patient receiving free water by mouth   -Cont to match ins/outs with free water by mouth or normal saline by IV. Goal is net neutral fluid balance.    Continue prn ddavp if uop > 250 x 2 consecutive hours AND Spec grav <1.005 or Serum Sodium greater than 150 (Would recommend patient meet two of three criteria prior to administration of DDAVP)    Check sodium and urine osmo and sp. Gravity  Q6 for now  Strict I/O and daily measured weight.                   Melissa Bonilla MD  Endocrinology  [facility]

## 2017-06-16 NOTE — SUBJECTIVE & OBJECTIVE
"  Interval History:    Overnight events:  Serum Na improved to 140 this am    Net + 1665 cc overnight.  Patient received Desmopressin 10 mcg intranasal bid.    BP (!) 132/106   Pulse 73   Temp 97.7 °F (36.5 °C) (Oral)   Resp 12   Ht 5' 2" (1.575 m)   Wt 60.1 kg (132 lb 7.9 oz)   SpO2 100%   Breastfeeding? No   BMI 24.23 kg/m²     Labs Reviewed and Include      Recent Labs  Lab 06/16/17  0207   GLU 75   CALCIUM 8.5*   ALBUMIN 2.8*   PROT 6.0     140   K 4.0   CO2 22*      BUN 16   CREATININE 0.7   ALKPHOS 80   ALT 70*   AST 23   BILITOT 0.4     Lab Results   Component Value Date    WBC 11.64 06/16/2017    HGB 11.4 (L) 06/16/2017    HCT 36.1 (L) 06/16/2017    MCV 99 (H) 06/16/2017     06/16/2017       Recent Labs  Lab 06/10/17  0924 06/15/17  1007   TSH 0.042*  --    FREET4 1.14 0.83     No results found for: HGBA1C    Nutritional status:   Body mass index is 24.23 kg/m².  Lab Results   Component Value Date    ALBUMIN 2.8 (L) 06/16/2017    ALBUMIN 3.1 (L) 06/15/2017    ALBUMIN 3.6 06/14/2017     No results found for: PREALBUMIN    Estimated Creatinine Clearance: 75.2 mL/min (based on Cr of 0.7).    Accu-Checks  No results for input(s): POCTGLUCOSE in the last 72 hours.    Current Medications and/or Treatments Impacting Glycemic Control  Immunotherapy:  Immunosuppressants     None        Steroids:   Hormones     Start     Stop Route Frequency Ordered    06/13/17 2100  desmopressin nasal solution 10 mcg      -- Nasl 2 times daily 06/13/17 1017    06/12/17 2100  hydrocortisone tablet 5 mg      -- Oral Nightly 06/12/17 1059    06/12/17 1100  hydrocortisone tablet 10 mg      -- Oral Every morning 06/12/17 1059    06/12/17 1015  desmopressin injection 1 mcg      -- SubQ Every 6 hours PRN 06/12/17 0914        Pressors:    Autonomic Drugs     None        Hyperglycemia/Diabetes Medications: Antihyperglycemics     None        "

## 2017-06-16 NOTE — SUBJECTIVE & OBJECTIVE
Interval History:  >4 elements OR status of 3 inpatient conditions  DI corrected. Na 150. Hypotensive and got fluids. WBC normalizing.  EEG theta. No SZ. Became hypothermic last night. Had BM last night. L to R 1.3 cm subfalcine shift and still L- uncal herniation. Follow-up cT head with some improvement. EEG today suggestive of encephalopathy. No epileptiform discharges.  Review of Systems   Unable to perform ROS: Mental status change   All other systems reviewed and are negative.    2 systems OR Unable to obtain a complete ROS due to level of consciousness.  Objective:     Vitals:  Temp: 97.7 °F (36.5 °C) (06/16/17 0300)  Pulse: 66 (06/16/17 0300)  Resp: 14 (06/16/17 0300)  BP: 127/68 (06/16/17 0300)  SpO2: 100 % (06/16/17 0300)    Temp:  [94.7 °F (34.8 °C)-99.1 °F (37.3 °C)] 97.7 °F (36.5 °C)  Pulse:  [] 66  Resp:  [11-48] 14  SpO2:  [99 %-100 %] 100 %  BP: ()/(56-85) 127/68              06/15 0701 - 06/16 0700  In: 2750 [I.V.:2000]  Out: 1210 [Urine:1210]    Physical Exam  Unable to test orientation, language, memory, judgment, insight, fund of knowledge, hearing, shoulder shrug, tongue protrusion, coordination, gait due to level of consciousness.  General   HEENT: S/P bifrontal crani  Chest Heart RRR / Lungs Clear to auscultation  Abdomen: Soft nontender + BS  Extremities: OK distal pulses.  Skin: UK  Neurological Exam:  MS; Lethargic, still responding.   CN: II-XII Pupils fixed and blind Pupils are 8mm bilat. Saw light on the L-side. Not on my exam.  Motor: LUE  5 /5 / RUE 5 /5  Tone normal bilaterally             LLE  5 /5 /  RLE 5 /5  Tone normal bilaterally  Sensory: LT/PP/T/ Vibration UK                 Complex sensory modalities: not tested  DTR:  normal throughout.  Coordination /Fine motor: UK  Gait: Not tested.  Meningeal signs: Absent  Medications:  Continuous  sodium chloride 0.9% Last Rate: 100 mL/hr at 06/16/17 0300   Scheduled  amitriptyline 100 mg Nightly   bisacodyl 10 mg Daily    cefTRIAXone (ROCEPHIN) IVPB 1 g Q24H   desmopressin 10 mcg BID   gabapentin 300 mg TID   heparin (porcine) 5,000 Units Q8H   hydrocortisone 10 mg QAM   hydrocortisone 5 mg QHS   nicotine 1 patch Daily   pantoprazole 40 mg BID   polyethylene glycol 17 g Daily   ramelteon 8 mg QHS   PRN  acetaminophen 650 mg Q6H PRN   acetaminophen 650 mg Q6H PRN   acetaminophen 650 mg Q4H PRN   calcium gluconate IVPB 1 g PRN   calcium gluconate IVPB 1 g PRN   calcium gluconate IVPB 1 g PRN   desmopressin 1 mcg Q6H PRN   haloperidol lactate 2 mg Q4H PRN   hydrALAZINE 10 mg Q1H PRN   magnesium sulfate IVPB 2 g PRN   magnesium sulfate IVPB 4 g PRN   magnesium sulfate IVPB 2 g PRN   magnesium sulfate IVPB 4 g PRN   metoclopramide HCl 5 mg Q6H PRN   metoclopramide HCl 5 mg Q6H PRN   potassium chloride 40 mEq PRN   And     potassium chloride 60 mEq PRN   And     potassium chloride 80 mEq PRN   potassium chloride 10% 40 mEq PRN   potassium chloride 10% 40 mEq PRN   potassium chloride 10% 60 mEq PRN   potassium, sodium phosphates 2 packet PRN   potassium, sodium phosphates 2 packet PRN   potassium, sodium phosphates 2 packet PRN   sodium phosphate IVPB 15 mmol PRN   sodium phosphate IVPB 20.01 mmol PRN   sodium phosphate IVPB 30 mmol PRN     Today I personally reviewed pertinent medications, lines/drains/airways, imaging, cardiology, lab results, microbiology results, notably:

## 2017-06-16 NOTE — PT/OT/SLP PROGRESS
Physical Therapy  Treatment    Joanna Morales   MRN: [id number]   Admitting Diagnosis: s/p menigioma resection     PT Received On: 06/16/17  PT Start Time: 1020     PT Stop Time: 1050    PT Total Time (min): 30 min (Co-tx with OT)     Billable Minutes:  Therapeutic Activity 30 min    Treatment Type: Treatment  PT/PTA: PT     PTA Visit Number: 0       General Precautions: Standard, fall, aspiration  Orthopedic Precautions: N/A   Braces: N/A    Do you have any cultural, spiritual, [employer] conflicts, given your current situation?: no conflicts    Subjective:  Communicated with RN prior to session. Pt agreeable to participate in tx session. Pt able to arouse but lethargic throughout visit. Pt requires frequent cueing to remain awake and actively engaged in mobility tasks.     Pain/Comfort  Pain Rating 1: 0/10  Pain Rating Post-Intervention 1: 0/10    Objective:   Patient found with: peripheral IV, pulse ox (continuous), garcía catheter, blood pressure cuff, telemetry    Functional Mobility:  Bed Mobility:   Rolling/Turning Right: Maximum assistance  Supine to Sit: Moderate Assistance  Sit to Supine: Moderate Assistance    Transfers:  Sit <> Stand Assistance: Other (see comments) (min A x 2; from EOB x 2 trials)  Sit <> Stand Assistive Device: No Assistive Device    Gait:    Pt unable to perform gait training; therapist facilitated weight shifting to L and R in stance with bilateral HHA; pt with posterior lean in standing, poor standing balance    Balance:   Static Sit: FAIR-: Maintains without assist but inconsistent   Dynamic Sit: FAIR: Cannot move trunk without losing balance  Static Stand: POOR: Needs MODERATE assist to maintain  Dynamic stand: 0: N/A     Therapeutic Activities and Exercises:  Therapeutic activities aimed to increase pt's independence, safety, and efficiency with bed mobility and functional transfers. See above for assistance levels. Pt requires frequent verbal and tactile cueing for hand  placement and sequencing of motor tasks. Increased time required for all activities 2/2 lethargy and cueing. Therapist facilitated 2 trials of sit<>stand from EOB with bilateral HHA (min A x 2) for support. Pt with posterior lean in standing, provided with cueing to improve midline orientation and shift weight forward. Therapist also facilitated pt practicing scooting at EOB with mod A required.       AM-PAC 6 CLICK MOBILITY  How much help from another person does this patient currently need?   1 = Unable, Total/Dependent Assistance  2 = A lot, Maximum/Moderate Assistance  3 = A little, Minimum/Contact Guard/Supervision  4 = None, Modified [place]/Independent    Turning over in bed (including adjusting bedclothes, sheets and blankets)?: 2  Sitting down on and standing up from a chair with arms (e.g., wheelchair, bedside commode, etc.): 2  Moving from lying on back to sitting on the side of the bed?: 2  Moving to and from a bed to a chair (including a wheelchair)?: 2  Need to walk in hospital room?: 1  Climbing 3-5 steps with a railing?: 1  Total Score: 10    AM-PAC Raw Score CMS G-Code Modifier Level of Impairment Assistance   6 % Total / Unable   7 - 9 CM 80 - 100% Maximal Assist   10 - 14 CL 60 - 80% Moderate Assist   15 - 19 CK 40 - 60% Moderate Assist   20 - 22 CJ 20 - 40% Minimal Assist   23 CI 1-20% SBA / CGA   24 CH 0% Independent/ Mod I     Patient left HOB elevated with all lines intact, call button in reach, restraints reapplied at end of session and RN notified.    Assessment:  Joanna Morales is a 51 y.o. female with a medical diagnosis of s/p menigioma resection. Pt remains limited by lethargy, requiring frequent verbal and tactile cueing for initiation and sequencing of gross motor tasks. Pt displays posterior lean in standing, unable to ambulate due to poor standing balance and generalized weakness. Pt would benefit from continued PT intervention to address below listed deficits and  maximize return to PLOF.     Rehab identified problem list/impairments: Rehab identified problem list/impairments: weakness, impaired endurance, gait instability, impaired functional mobilty, impaired balance, impaired self care skills, decreased safety awareness    Rehab potential is good.    Activity tolerance: Good    Discharge recommendations: Discharge Facility/Level Of Care Needs: rehabilitation facility     Barriers to discharge: Barriers to Discharge: Inaccessible home environment, Decreased caregiver support    Equipment recommendations: Equipment Needed After Discharge:  (TBD Pending progress)     GOALS:    Physical Therapy Goals        Problem: Physical Therapy Goal    Goal Priority Disciplines Outcome Goal Variances Interventions   Physical Therapy Goal     PT/OT, PT Ongoing (interventions implemented as appropriate)     Description:  Goals to be met by: 17    Patient will increase functional independence with mobility by performin. Supine to sit with Contact Guard Assistance  2. Sit to supine with Contact Guard Assistance  3. Sit to stand transfer with Minimal Assistance  4. Gait  x 25 feet with Minimal Assistance with or without appropriate AD.   5. Lower extremity exercise program x30 reps per handout, with independence                      PLAN:    Patient to be seen 5 x/week  to address the above listed problems via gait training, therapeutic activities, therapeutic exercises, neuromuscular re-education  Plan of Care expires: 17  Plan of Care reviewed with: patient          Melissa Watkins, PT, DPT   2017  Pager: [phone]

## 2017-06-16 NOTE — PLAN OF CARE
Problem: SLP Goal  Goal: SLP Goal  Speech Language Pathology Goals  Updated goals expected to be met by 6/23    1. Pt will tolerate dental soft diet/nectar thick liquids without overt s/s of aspiration  2. Pt will tolerate thin liquids trials x10 without overt s/s of aspiration  3. Pt will complete dysphagia exercises x10 reps with min cues  4. Pt will following single step directions with 75% acc with min cues to enhance comprehension   5. Pt will complete basic problem solving tasks with 75% acc with min cues to enhance safety awareness   6. Pt will demonstrate orientation x3 to enhance cognition   7. Pt will participate in ongoing assessment of speech language and cognitive skills to rule out and deficits and establish goals of care             Pt with slow progress towards goals     Sharla Trevino MS, CCC-SLP  Speech Language Pathologist  Pager: [phone]  Date 6/16/2017

## 2017-06-16 NOTE — ASSESSMENT & PLAN NOTE
S/p craniotomy 6/8/2017   Patient is now net neutral  -Agree with patient receiving free water by mouth   -Cont to match ins/outs with free water by mouth or normal saline by IV. Goal is net neutral fluid balance.    Continue prn ddavp if uop > 250 x 2 consecutive hours AND Spec grav <1.005 or Serum Sodium greater than 150 (Would recommend patient meet two of three criteria prior to administration of DDAVP)    Check sodium and urine osmo and sp. Gravity  Q6 for now  Strict I/O and daily measured weight.

## 2017-06-16 NOTE — PROGRESS NOTES
[facility]  Neurocritical Care  Progress Note    Admit Date: 6/7/2017  Service Date: 06/16/2017  Length of Stay: 9    Subjective:     Chief Complaint: <principal problem not specified>    History of Present Illness: 50yo F, presented to [facility] s/p menigioma resection (6/7). 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  She underwent operation in January 2015 on the [facility] service at [facility].  It   was apparently only possible to get a partial resection of the tumor.  She did   have a followup scan done in 2016 showing a continued large meningioma in this   area.  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  She had a   followup MRI scan done at [facility] on 01/20/17, showing this large tumor.  She   was seen by you in Ophthalmology and then referred to Neurosurgery for further   evaluation.       Hospital Course: 6/7 S/P menengioma resection  6/8 D.I monitoring Q6h sp.grav and Na. DDAVP0.5mcg IV x1  6/9 continue monitoring for D. I. Q 6h  Na/ sp. grav. Urine output >250; Na >145 sp. Grav. 1.005.  DDAVP 1mcg IV; 1L bolus .45NS x2   Endocrinology consulted for diabetes Insipidus. Agitated/restless. Haldol prn started. Repeat CTH today per NSGY  6/10 Required DDAVP 1mcg IV early a.m. For U.O > 250cc and sp. Grav. 1.0000. Less restless today required only one dose of haldol.  6/11Continue to monitor for DI; bilateral blindness; Continue Decadron; Switched to d5+.45 NS @100  6/12: The patient received DDAVP this AM. DI watch. D5% was D/C. Still blind. DI watch. Na 144. Patient on D5. DDAVP IN  Subgaleal drain was D/C.  6/14: DI event early this AM. On DDAVP bid. WBC elevated. Constipated. Na 153. Apparently did not receive the night DDAVP dose. Lethargic today. Serum Xm=416.  Dobbhoff tube got placed to replace free water. Dobbhoff tip position confirmed with KUB.  6/15:DI corrected. Na 150. Hypotensive and got fluids. WBC normalizing.  EEG theta. No SZ. Became hypothermic last night. Had BM last night. L to R 1.3 cm subfalcine shift and still L- uncal herniation. Follow-up cT head with some improvement. EEG today suggestive of encephalopathy. No epileptiform discharges.    Interval History:  >4 elements OR status of 3 inpatient conditions  DI corrected. Na 150. Hypotensive and got fluids. WBC normalizing.  EEG theta. No SZ. Became hypothermic last night. Had BM last night. L to R 1.3 cm subfalcine shift and still L- uncal herniation. Follow-up cT head with some improvement. EEG today suggestive of encephalopathy. No epileptiform discharges.  Review of Systems   Unable to perform ROS: Mental status change   All other systems reviewed and are negative.    2 systems OR Unable to obtain a complete ROS due to level of consciousness.  Objective:     Vitals:  Temp: 97.7 °F (36.5 °C) (06/16/17 0300)  Pulse: 66 (06/16/17 0300)  Resp: 14 (06/16/17 0300)  BP: 127/68 (06/16/17 0300)  SpO2: 100 % (06/16/17 0300)    Temp:  [94.7 °F (34.8 °C)-99.1 °F (37.3 °C)] 97.7 °F (36.5 °C)  Pulse:  [] 66  Resp:  [11-48] 14  SpO2:  [99 %-100 %] 100 %  BP: ()/(56-85) 127/68              06/15 0701 - 06/16 0700  In: 2750 [I.V.:2000]  Out: 1210 [Urine:1210]    Physical Exam  Unable to test orientation, language, memory, judgment, insight, fund of knowledge, hearing, shoulder shrug, tongue protrusion, coordination, gait due to level of consciousness.  General   HEENT: S/P bifrontal crani  Chest Heart RRR / Lungs Clear to auscultation  Abdomen: Soft nontender + BS  Extremities: OK distal pulses.  Skin: UK  Neurological Exam:  MS; Lethargic, still responding.   CN: II-XII Pupils fixed and blind Pupils are 8mm bilat. Saw light on the L-side. Not on my exam.  Motor: LUE  5 /5 / RUE 5 /5  Tone normal  bilaterally             LLE  5 /5 /  RLE 5 /5  Tone normal bilaterally  Sensory: LT/PP/T/ Vibration                  Complex sensory modalities: not tested  DTR:  normal throughout.  Coordination /Fine motor:   Gait: Not tested.  Meningeal signs: Absent  Medications:  Continuous  sodium chloride 0.9% Last Rate: 100 mL/hr at 06/16/17 0300   Scheduled  amitriptyline 100 mg Nightly   bisacodyl 10 mg Daily   cefTRIAXone (ROCEPHIN) IVPB 1 g Q24H   desmopressin 10 mcg BID   gabapentin 300 mg TID   heparin (porcine) 5,000 Units Q8H   hydrocortisone 10 mg QAM   hydrocortisone 5 mg QHS   nicotine 1 patch Daily   pantoprazole 40 mg BID   polyethylene glycol 17 g Daily   ramelteon 8 mg QHS   PRN  acetaminophen 650 mg Q6H PRN   acetaminophen 650 mg Q6H PRN   acetaminophen 650 mg Q4H PRN   calcium gluconate IVPB 1 g PRN   calcium gluconate IVPB 1 g PRN   calcium gluconate IVPB 1 g PRN   desmopressin 1 mcg Q6H PRN   haloperidol lactate 2 mg Q4H PRN   hydrALAZINE 10 mg Q1H PRN   magnesium sulfate IVPB 2 g PRN   magnesium sulfate IVPB 4 g PRN   magnesium sulfate IVPB 2 g PRN   magnesium sulfate IVPB 4 g PRN   metoclopramide HCl 5 mg Q6H PRN   metoclopramide HCl 5 mg Q6H PRN   potassium chloride 40 mEq PRN   And     potassium chloride 60 mEq PRN   And     potassium chloride 80 mEq PRN   potassium chloride 10% 40 mEq PRN   potassium chloride 10% 40 mEq PRN   potassium chloride 10% 60 mEq PRN   potassium, sodium phosphates 2 packet PRN   potassium, sodium phosphates 2 packet PRN   potassium, sodium phosphates 2 packet PRN   sodium phosphate IVPB 15 mmol PRN   sodium phosphate IVPB 20.01 mmol PRN   sodium phosphate IVPB 30 mmol PRN     Today I personally reviewed pertinent medications, lines/drains/airways, imaging, cardiology, lab results, microbiology results, notably:        Assessment/Plan:       Active Problem List:   1.S/P large skull base meningioma resection  2. Secondary blindness  3. DI and DI watch,  4. L-uncal  henriation and L to R subfalcine shift,        Assessment / Plan:     Neuro:   -Hydrocortisone 10 and 5 mg.   -Consider HTS% 2% 300cc q 6hrs PRN for Na < 150.  -Serum Na q 6hrs  -Tylenol PRN.  -Neurontin 300mg tid.   -Norco D/C.  -PT, OT.  -Remeltion 8mg qhs.  -EEG 1 hr  -PT/OT.  Resp: CXR OK  -RA  -Recruitment maneuvers q 6hrs.  -IS  CV: Keep SBP < 140 mmHg.   No BP meds.  IVF/nutrition/Renal/GI: i/o: -4Lt.   -Drink nectar.   -Consider HTS% 2% 300cc q 6hrs PRN for Na < 150.  -Serum Na and urine SG q 6hrs.  -NS at 75cc/hr IVD.   -Free water 200 cc q 4hrs x 48 hrs.After that 200cc q 6hrs.  -BR: Senna,, Colace   Hem / ID Procal low 0.05, Possible UTI  -SC heparin 5000 U q 8hrs.  -Procal  -Send culture.   -Replace Mackey catheter.  -Ceftriaxone 1gr qd x 7 days.  -CXR  -Pancultures.  -CBC+Diff  Endo:  -DDAVP IN 10mcg qAM and q PM.  -SC DDAVP 1mcg q 6hrs PRN, with parameters. Na >145 and SG in urine < 1010.  -Serum Na q 6hrs   -Hydrocortisone 10 and 5 mg.   -Endocrine following.  Prophylaxis:  -Protonix  -SC Heparin.  Advance Directives and Disposition:    Full Code. Keep in the NICU.           Uninterrupted level 3  Follow-up /Counseling Time (not including procedures):  = 35 min               Activity Orders          Activity as tolerated starting at 06/08 1028        No Order    Mario Mcmullen MD  Neurocritical Care  [facility]

## 2017-06-16 NOTE — PLAN OF CARE
Problem: Physical Therapy Goal  Goal: Physical Therapy Goal  Goals to be met by: 17    Patient will increase functional independence with mobility by performin. Supine to sit with Contact Guard Assistance  2. Sit to supine with Contact Guard Assistance  3. Sit to stand transfer with Minimal Assistance  4. Gait  x 25 feet with Minimal Assistance with or without appropriate AD.   5. Lower extremity exercise program x30 reps per handout, with independence     Outcome: Ongoing (interventions implemented as appropriate)  Pt slowly progressing towards goals; remains limited by lethargy. Continue current POC.     Melissa Watkins PT, DPT   2017  Pager: [phone]

## 2017-06-16 NOTE — NURSING TRANSFER
Nursing Transfer Note      6/16/2017     Transfer To: 724A from 7080    Transfer via bed    Transfer with cardiac monitoring    Transported by [email or web address]    Medicines sent: DDVAP    Chart send with patient: Yes    Notified: son    Upon arrival to floor: cardiac monitor applied, patient oriented to room, call bell in reach and bed in lowest position

## 2017-06-16 NOTE — ASSESSMENT & PLAN NOTE
NSGY Following  S/P resection 6/7 POD3  -Neurontin 600mg tid.   -Norco PRN q 4hrs  -PT, OT.  -Remeltion 8mg qhs.  -EEG 1 hr  Gabapentin 300mg tid  PT/OT/SLP when able  CTH 6/15-  No significant detrimental change compared to prior examination

## 2017-06-16 NOTE — PLAN OF CARE
SW received message from Loren[place] with [facility] ([phone]).    SW left return message to follow up.    Suki Lynn LMSW  Neurocritical Care   [facility]  [id number]

## 2017-06-16 NOTE — SUBJECTIVE & OBJECTIVE
Interval History:  No significant events over night   Review of Systems  Unable to assess properly  due to confusion   Objective:     Vitals:  Temp: 97.6 °F (36.4 °C) (06/16/17 1500)  Pulse: 65 (06/16/17 1500)  Resp: 13 (06/16/17 1500)  BP: 117/75 (06/16/17 1500)  SpO2: 99 % (06/16/17 1500)    Temp:  [95 °F (35 °C)-99.1 °F (37.3 °C)] 97.6 °F (36.4 °C)  Pulse:  [] 65  Resp:  [11-48] 13  SpO2:  [99 %-100 %] 99 %  BP: (106-132)/() 117/75              06/15 0701 - 06/16 0700  In: 3050 [I.V.:2300]  Out: 1385 [Urine:1385]    Physical Exam      Physical Exam:  GA: Lethargic  comfortable, no acute distress.   HEENT: No scleral icterus or JVD.   Pulmonary: Clear to auscultation A/L. No wheezing, crackles, or rhonchi.  Cardiac: RRR S1 & S2 w/o rubs/murmurs/gallops.   Abdominal: Bowel sounds present x 4. Skin: No jaundice, rashes, or visible lesions.  Neuro:  --GCS: E3 V4 M6  --Mental Status:  Lethargic but responsive confused   --CN II-XII grossly intact.   --Blind bilateral pupils fixed    --Corneal reflex, gag, cough intact.  --LUE strength: 5/5  --RUE strength: 5/5  --LLE strength: 5/5  --RLE strength: 5/5    Unable to test gait due to level of consciousness.    Medications:  Continuous  sodium chloride 0.9% Last Rate: 75 mL/hr at 06/16/17 1500   Scheduled  amitriptyline 100 mg Nightly   bisacodyl 10 mg Daily   cefTRIAXone (ROCEPHIN) IVPB 1 g Q24H   desmopressin 10 mcg BID   gabapentin 600 mg TID   heparin (porcine) 5,000 Units Q8H   hydrocortisone 10 mg QAM   hydrocortisone 5 mg QHS   nicotine 1 patch Daily   pantoprazole 40 mg BID   polyethylene glycol 17 g Daily   ramelteon 8 mg QHS   PRN  acetaminophen 650 mg Q6H PRN   acetaminophen 650 mg Q6H PRN   acetaminophen 650 mg Q4H PRN   calcium gluconate IVPB 1 g PRN   calcium gluconate IVPB 1 g PRN   calcium gluconate IVPB 1 g PRN   desmopressin 1 mcg Q6H PRN   haloperidol lactate 2 mg Q4H PRN   hydrALAZINE 10 mg Q1H PRN   magnesium sulfate IVPB 2 g PRN    magnesium sulfate IVPB 4 g PRN   magnesium sulfate IVPB 2 g PRN   magnesium sulfate IVPB 4 g PRN   metoclopramide HCl 5 mg Q6H PRN   metoclopramide HCl 5 mg Q6H PRN   potassium chloride 40 mEq PRN   And     potassium chloride 60 mEq PRN   And     potassium chloride 80 mEq PRN   potassium chloride 10% 40 mEq PRN   potassium chloride 10% 40 mEq PRN   potassium chloride 10% 60 mEq PRN   potassium, sodium phosphates 2 packet PRN   potassium, sodium phosphates 2 packet PRN   potassium, sodium phosphates 2 packet PRN   sodium phosphate IVPB 15 mmol PRN   sodium phosphate IVPB 20.01 mmol PRN   sodium phosphate IVPB 30 mmol PRN     Today I personally reviewed pertinent medications, lines/drains/airways, imaging, lab results,

## 2017-06-16 NOTE — PLAN OF CARE
Problem: Patient Care Overview  Goal: Plan of Care Review  Outcome: Ongoing (interventions implemented as appropriate)  No acute events throughout day, VS and assessment per flow sheet, patient progressing towards goals as tolerated, plan of care reviewed with Joanna Morales and family, all concerns addressed, will continue to monitor.  Goal: Individualization & Mutuality  Admit Date: 06/07/17    Admit Dx: Meningioma resection    Past Medical History:  No date: Allergy  No date: Basal cell carcinoma  11/1/2016: Depression  6/9/2017: Essential hypertension  No date: Eye disorder  No date: Fever blister  6/9/2017: Normocytic anemia    Past Surgical History:  No date: BASAL CELL CARCINOMA EXCISION      Comment: forehead  No date: BRAIN SURGERY  No date: SKIN BIOPSY    Individualization:   1. Doesn't like covers on    Restraints: 06/10 @ 0905 for removing medical devices and interfering with care.      Outcome: Ongoing (interventions implemented as appropriate)  Patient gets cold easily.

## 2017-06-16 NOTE — ASSESSMENT & PLAN NOTE
DDAVP IN 10mcg qAM and q PM.  -SC DDAVP 1mcg q 6hrs PRN, with parameters. Na >145 and SG in urine < 1010.  -Serum Na q 6hrs and Urine SG q 6hrs   -Hydrocortisone 10 and 5 mg.   -Endocrine following.

## 2017-06-17 NOTE — SUBJECTIVE & OBJECTIVE
Interval History: TTF in stable condition.    Medications:  Continuous Infusions:   dextrose 5 % and 0.9 % NaCl 75 mL/hr at 17 0609     Scheduled Meds:   amitriptyline  100 mg Oral Nightly    bisacodyl  10 mg Rectal Daily    cefTRIAXone (ROCEPHIN) IVPB  1 g Intravenous Q24H    desmopressin  10 mcg Nasal BID    gabapentin  600 mg Oral TID    heparin (porcine)  5,000 Units Subcutaneous Q8H    hydrocortisone  10 mg Oral QAM    hydrocortisone  5 mg Oral QHS    nicotine  1 patch Transdermal Daily    pantoprazole  40 mg Oral BID    polyethylene glycol  17 g Oral Daily    ramelteon  8 mg Oral QHS     PRN Meds:acetaminophen, acetaminophen, acetaminophen, calcium gluconate IVPB, calcium gluconate IVPB, calcium gluconate IVPB, desmopressin, dextrose 50%, dextrose 50%, glucagon (human recombinant), glucose, glucose, haloperidol lactate, hydrALAZINE, magnesium sulfate IVPB, magnesium sulfate IVPB, magnesium sulfate IVPB, magnesium sulfate IVPB, metoclopramide HCl, metoclopramide HCl, potassium chloride **AND** potassium chloride **AND** potassium chloride, potassium chloride 10%, potassium chloride 10%, potassium chloride 10%, potassium, sodium phosphates, potassium, sodium phosphates, potassium, sodium phosphates, sodium phosphate IVPB, sodium phosphate IVPB, sodium phosphate IVPB     Review of Systems  Objective:     Weight: 60.1 kg (132 lb 7.9 oz)  Body mass index is 24.23 kg/m².  Vital Signs (Most Recent):  Temp: (!) 94.5 °F (34.7 °C) (17 0452)  Pulse: 72 (17 0400)  Resp: 18 (17 0400)  BP: 108/63 (17 0400)  SpO2: (!) 90 % (17 0400) Vital Signs (24h Range):  Temp:  [94.5 °F (34.7 °C)-98.2 °F (36.8 °C)] 94.5 °F (34.7 °C)  Pulse:  [61-94] 72  Resp:  [11-23] 18  SpO2:  [90 %-100 %] 90 %  BP: ()/(52-82) 108/63              Temp (24hrs), Av.3 °F (36.3 °C), Min:94.5 °F (34.7 °C), Max:98.2 °F (36.8 °C)                 Urethral Catheter 06/15/17 1205 Latex (Active)   Site  "Assessment Clean;Intact 6/16/2017  7:45 PM   Collection Container Standard drainage bag 6/16/2017  7:45 PM   Securement Method secured to top of thigh w/ adhesive device 6/16/2017  7:45 PM   Catheter Care Performed yes 6/16/2017  7:45 PM   Reason for Continuing Urinary Catheterization Post operative 6/16/2017  7:45 PM   CAUTI Prevention Bundle StatLock in place w 1" slack;Intact seal between catheter & drainage tubing;Drainage bag off the floor;Green sheeting clip in use;No dependent loops or kinks;Drainage bag not overfilled (<2/3 full);Drainage bag below bladder 6/16/2017  7:45 PM   Output (mL) 1100 mL 6/17/2017  4:00 AM       Neurosurgery Physical Exam  Awake, oriented to person  Pupils nr bilaterally, blind in both eyes  FC x4  SILT  Significant Labs:    Recent Labs  Lab 06/17/17  0534   *     143   K 3.4*      CO2 25   BUN 9   CREATININE 0.7   CALCIUM 9.4   MG 1.9       Recent Labs  Lab 06/16/17  0207 06/17/17  0534   WBC 11.64 7.02   HGB 11.4* 12.3   HCT 36.1* 36.9*    273       Recent Labs  Lab 06/17/17  0534   INR 1.0   APTT 32.2*     Microbiology Results (last 7 days)     Procedure Component Value Units Date/Time    Blood culture [[id number]] Collected:  06/14/17 1517    Order Status:  Completed Specimen:  Blood from Peripheral, Antecubital, Left Updated:  06/16/17 2012     Blood Culture, Routine No Growth to date     Blood Culture, Routine No Growth to date     Blood Culture, Routine No Growth to date    Narrative:       Blood cultures x 2 different sites. 4 bottles total. Please  draw cultures before administering antibiotics.    Urine culture [[id number]] Collected:  06/15/17 1127    Order Status:  Completed Specimen:  Urine from Urine, Catheterized Updated:  06/16/17 1947     Urine Culture, Routine No growth    Blood culture [[id number]] Collected:  06/14/17 1156    Order Status:  Completed Specimen:  Blood from Peripheral, Antecubital, Left Updated:  06/16/17 1412     Blood " Culture, Routine No Growth to date     Blood Culture, Routine No Growth to date     Blood Culture, Routine No Growth to date    Narrative:       Blood cultures from 2 different sites. 4 bottles total.  Please draw before starting antibiotics.    Culture, Respiratory with Gram Stain [[id number]]     Order Status:  No result Specimen:  Respiratory             Significant Diagnostics:

## 2017-06-17 NOTE — PROGRESS NOTES
Pt is attempting to climb out of bed and pulling at medical devices. Pt requires reorientation. Camera ordered and placed in room to ensure safety.

## 2017-06-17 NOTE — PROGRESS NOTES
"[facility]  Endocrinology  Progress Note    Admit Date: 6/7/2017     Reason for Consult: Diabetes insipidus     Surgical Procedure and Date:   CRANIOTOMY 6/8/2017        HPI:   Patient is a 51 y.o. female with a diagnosis of  meningioma s/p resection (6/7). 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  She underwent partial resection of the tumor in January 2015 at [facility]. Followup scan done in 2016 showing a continued large meningioma.     Endocrinology consulted for post operative DI management.  Per the operative report - The pituitary stalk was identified and could be preserved      Interval HPI:   Overnight events:    Overnight events:  Serum Na improved to 143 this am    Net -1972 cc   Patient currently receiving Desmopressin 10 mcg intranasal bid.    /73 (BP Location: Left arm, Patient Position: Lying, BP Method: Automatic)   Pulse 62   Temp (!) 94.5 °F (34.7 °C) (Rectal)   Resp 16   Ht 5' 2" (1.575 m)   Wt 60.1 kg (132 lb 7.9 oz)   SpO2 97%   Breastfeeding? No   BMI 24.23 kg/m²       Labs Reviewed and Include      Recent Labs  Lab 06/17/17  0534   *   CALCIUM 9.4   ALBUMIN 3.1*   PROT 6.7     143   K 3.4*   CO2 25      BUN 9   CREATININE 0.7   ALKPHOS 86   ALT 61*   AST 31   BILITOT 0.4     Lab Results   Component Value Date    WBC 7.02 06/17/2017    HGB 12.3 06/17/2017    HCT 36.9 (L) 06/17/2017    MCV 96 06/17/2017     06/17/2017       Recent Labs  Lab 06/10/17  0924 06/15/17  1007   TSH 0.042*  --    FREET4 1.14 0.83     No results found for: HGBA1C    Nutritional status:   Body mass index is 24.23 kg/m².  Lab Results   Component Value Date    ALBUMIN 3.1 (L) 06/17/2017    ALBUMIN 2.8 (L) 06/16/2017    ALBUMIN 3.1 (L) 06/15/2017     No results found for: PREALBUMIN    Estimated Creatinine Clearance: 75.2 mL/min (based on Cr of " 0.7).    Accu-Checks  Recent Labs      06/16/17   1752  06/17/17   0109   POCTGLUCOSE  60*  77       Current Medications and/or Treatments Impacting Glycemic Control  Immunotherapy:  Immunosuppressants     None        Steroids:   Hormones     Start     Stop Route Frequency Ordered    06/13/17 2100  desmopressin nasal solution 10 mcg      -- Nasl 2 times daily 06/13/17 1017    06/12/17 2100  hydrocortisone tablet 5 mg      -- Oral Nightly 06/12/17 1059    06/12/17 1100  hydrocortisone tablet 10 mg      -- Oral Every morning 06/12/17 1059    06/12/17 1015  desmopressin injection 1 mcg      -- SubQ Every 6 hours PRN 06/12/17 0914        Pressors:    Autonomic Drugs     None        Hyperglycemia/Diabetes Medications: Antihyperglycemics     None          ASSESSMENT and PLAN    S/P craniotomy    On 6/8/2017 now with DI as in #1   Currently on HC 10 mg in the am and 5 mg pm               Diabetes insipidus    S/p craniotomy 6/8/2017   Patient is net negative  -Agree with D5 1/2 NS 75 cc/hr to match ins and outs  -Agree with patient receiving free water by mouth   -Cont to match ins/outs with free water by mouth or d5 1/2 NS by IV. Goal is net neutral fluid balance.    D/C scheduled DDAVP and keep only prn DDAVP as patient appears to be resolving.  Continue prn ddavp if uop > 250 x 2 consecutive hours AND Spec grav <1.005 or Serum Sodium greater than 150 (Would recommend patient meet two of three criteria prior to administration of DDAVP)    Check sodium and urine osmo and sp. Gravity  Q6 for now  Strict I/O and daily measured weight.               * Meningioma, recurrent of brain    S/p surgery   Per primary team                Melissa Bonilla MD  Endocrinology  [facility]

## 2017-06-17 NOTE — PROGRESS NOTES
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  She underwent operation in January 2015 on the [facility] service at [facility].  It   was apparently only possible to get a partial resection of the tumor.  She did   have a followup scan done in 2016 showing a continued large meningioma in this   area.  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  She had a   followup MRI scan done at [facility] on 01/20/17, showing this large tumor.  She   was seen by you in Ophthalmology and then referred to Neurosurgery for further   evaluation.  At this point, she has some preserved vision in the right eye.  She   complains of constant headaches.  She has no sense of smell.  She feels off   balance.  She is taking Neurontin apparently for seizure prevention. Past   medical history relates primarily to the present illness.  She does not have   chronic medical illnesses such as diabetes or hypertension.  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  She is  and has good family support.     On physical examination, she is a well-developed, well-nourished white lady, who   is alert and oriented.  Examination of the head shows a somewhat anterior, but   well-healed bicoronal incision.  She says that this is a little tender in the   left frontal area.  Eyes show full extraocular movements.  The pupils are small,   the left is not reactive.  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  The right optic nerve is probably normal.  She   sees things as doubles.  When I held up two  fingers she counted four, although   she knew that there were two.  Hearing seems preserved.  The neck is supple.  On   neurological examination, she is speaking clearly.  She is obviously anxious   and depressed over her illness.  Finger-to-nose is somewhat diminished on the   left side and gait was mildly unsteady.  Cranial nerves are otherwise intact.    She has normal facial sensation and movement.  The tongue protrudes in the   midline.  Strength in the extremities seems generally good, sensation normal and   deep tendon reflexes symmetrical.     MRI of the brain performed at [facility] on 01/20/17, shows a   previous bifrontal craniotomy.  There is a large lobular meningioma, which   occupies the sella, planum sphenoidale and extends back on to the clivus.  The   optic chiasm and optic nerves are not visualized.  The carotid arteries and   anterior cerebral arteries run done through the tumor.  The tumor elevates and   pushes the third ventricle back, but there is no hydrocephalus    Post-Op Info:  Procedure(s) (LRB):  CRANIOTOMY WITH STEALTH (N/A)   10 Days Post-Op     Interval History: TTF in stable condition.    Medications:  Continuous Infusions:   dextrose 5 % and 0.9 % NaCl 75 mL/hr at 06/17/17 0609     Scheduled Meds:   amitriptyline  100 mg Oral Nightly    bisacodyl  10 mg Rectal Daily    cefTRIAXone (ROCEPHIN) IVPB  1 g Intravenous Q24H    desmopressin  10 mcg Nasal BID    gabapentin  600 mg Oral TID    heparin (porcine)  5,000 Units Subcutaneous Q8H    hydrocortisone  10 mg Oral QAM    hydrocortisone  5 mg Oral QHS    nicotine  1 patch Transdermal Daily    pantoprazole  40 mg Oral BID    polyethylene glycol  17 g Oral Daily    ramelteon  8 mg Oral QHS     PRN Meds:acetaminophen, acetaminophen, acetaminophen, calcium gluconate IVPB, calcium gluconate IVPB, calcium gluconate IVPB, desmopressin, dextrose 50%, dextrose 50%, glucagon (human recombinant), glucose, glucose,  "haloperidol lactate, hydrALAZINE, magnesium sulfate IVPB, magnesium sulfate IVPB, magnesium sulfate IVPB, magnesium sulfate IVPB, metoclopramide HCl, metoclopramide HCl, potassium chloride **AND** potassium chloride **AND** potassium chloride, potassium chloride 10%, potassium chloride 10%, potassium chloride 10%, potassium, sodium phosphates, potassium, sodium phosphates, potassium, sodium phosphates, sodium phosphate IVPB, sodium phosphate IVPB, sodium phosphate IVPB     Review of Systems  Objective:     Weight: 60.1 kg (132 lb 7.9 oz)  Body mass index is 24.23 kg/m².  Vital Signs (Most Recent):  Temp: (!) 94.5 °F (34.7 °C) (17 0452)  Pulse: 72 (17 0400)  Resp: 18 (17 0400)  BP: 108/63 (17 0400)  SpO2: (!) 90 % (17 0400) Vital Signs (24h Range):  Temp:  [94.5 °F (34.7 °C)-98.2 °F (36.8 °C)] 94.5 °F (34.7 °C)  Pulse:  [61-94] 72  Resp:  [11-23] 18  SpO2:  [90 %-100 %] 90 %  BP: ()/(52-82) 108/63              Temp (24hrs), Av.3 °F (36.3 °C), Min:94.5 °F (34.7 °C), Max:98.2 °F (36.8 °C)                 Urethral Catheter 06/15/17 1205 Latex (Active)   Site Assessment Clean;Intact 2017  7:45 PM   Collection Container Standard drainage bag 2017  7:45 PM   Securement Method secured to top of thigh w/ adhesive device 2017  7:45 PM   Catheter Care Performed yes 2017  7:45 PM   Reason for Continuing Urinary Catheterization Post operative 2017  7:45 PM   CAUTI Prevention Bundle StatLock in place w 1" slack;Intact seal between catheter & drainage tubing;Drainage bag off the floor;Green sheeting clip in use;No dependent loops or kinks;Drainage bag not overfilled (<2/3 full);Drainage bag below bladder 2017  7:45 PM   Output (mL) 1100 mL 2017  4:00 AM       Neurosurgery Physical Exam  Awake, oriented to person  Pupils nr bilaterally, blind in both eyes  FC x4  SILT  Significant Labs:    Recent Labs  Lab 17  0534   *     143   K 3.4* "      CO2 25   BUN 9   CREATININE 0.7   CALCIUM 9.4   MG 1.9       Recent Labs  Lab 06/16/17  0207 06/17/17  0534   WBC 11.64 7.02   HGB 11.4* 12.3   HCT 36.1* 36.9*    273       Recent Labs  Lab 06/17/17  0534   INR 1.0   APTT 32.2*     Microbiology Results (last 7 days)     Procedure Component Value Units Date/Time    Blood culture [[id number]] Collected:  06/14/17 1517    Order Status:  Completed Specimen:  Blood from Peripheral, Antecubital, Left Updated:  06/16/17 2012     Blood Culture, Routine No Growth to date     Blood Culture, Routine No Growth to date     Blood Culture, Routine No Growth to date    Narrative:       Blood cultures x 2 different sites. 4 bottles total. Please  draw cultures before administering antibiotics.    Urine culture [[id number]] Collected:  06/15/17 1127    Order Status:  Completed Specimen:  Urine from Urine, Catheterized Updated:  06/16/17 1947     Urine Culture, Routine No growth    Blood culture [[id number]] Collected:  06/14/17 1156    Order Status:  Completed Specimen:  Blood from Peripheral, Antecubital, Left Updated:  06/16/17 1412     Blood Culture, Routine No Growth to date     Blood Culture, Routine No Growth to date     Blood Culture, Routine No Growth to date    Narrative:       Blood cultures from 2 different sites. 4 bottles total.  Please draw before starting antibiotics.    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  Continue cortef 10 q am, 5 q pm.  Monitor for DI  CV- SBP < 160  Pulm- on RA.   FENGI- monitor for DI, fu urine studies, fu endocrine recs.  Heme/ID- afebrile. hgb/hct stable.   ppx- AAN/SCD's. Gi ppx. Sqh.   PT, OT, ST.   Fu SW recs, as pt will need placement.            Boogie Wiley,    Neurosurgery  [facility]

## 2017-06-17 NOTE — PROGRESS NOTES
Dr. Alston contacted by RN, Aida, for order reconcilliation. Orders received for cardiac monitoring, q6hr accuchecks, no indication for NGT found in chart so orders received to remove NGT.    NGT removed without incident, pt swallowing independently. CAREN

## 2017-06-17 NOTE — PROGRESS NOTES
Treatment team notified of pt'sl ow grade rectal temp. No orders given at this time. Pt asymptomatic. Blankets placed on pt.

## 2017-06-17 NOTE — ASSESSMENT & PLAN NOTE
50 y/o F s/p crani resection olfactory groove meningioma POD#10    Neuro stable, on floor.  Continue cortef 10 q am, 5 q pm.  Monitor for DI  CV- SBP < 160  Pulm- on RA.   FENGI- monitor for DI, fu urine studies, fu endocrine recs.  Heme/ID- afebrile. hgb/hct stable.   ppx- ANA/SCD's. Gi ppx. Sqh.   PT, OT, ST.   Fu SW recs, as pt will need placement.

## 2017-06-17 NOTE — SUBJECTIVE & OBJECTIVE
"Interval HPI:   Overnight events:    Overnight events:  Serum Na improved to 143 this am    Net -1972 cc   Patient currently receiving Desmopressin 10 mcg intranasal bid.    /73 (BP Location: Left arm, Patient Position: Lying, BP Method: Automatic)   Pulse 62   Temp (!) 94.5 °F (34.7 °C) (Rectal)   Resp 16   Ht 5' 2" (1.575 m)   Wt 60.1 kg (132 lb 7.9 oz)   SpO2 97%   Breastfeeding? No   BMI 24.23 kg/m²     Labs Reviewed and Include      Recent Labs  Lab 06/17/17  0534   *   CALCIUM 9.4   ALBUMIN 3.1*   PROT 6.7     143   K 3.4*   CO2 25      BUN 9   CREATININE 0.7   ALKPHOS 86   ALT 61*   AST 31   BILITOT 0.4     Lab Results   Component Value Date    WBC 7.02 06/17/2017    HGB 12.3 06/17/2017    HCT 36.9 (L) 06/17/2017    MCV 96 06/17/2017     06/17/2017       Recent Labs  Lab 06/10/17  0924 06/15/17  1007   TSH 0.042*  --    FREET4 1.14 0.83     No results found for: HGBA1C    Nutritional status:   Body mass index is 24.23 kg/m².  Lab Results   Component Value Date    ALBUMIN 3.1 (L) 06/17/2017    ALBUMIN 2.8 (L) 06/16/2017    ALBUMIN 3.1 (L) 06/15/2017     No results found for: PREALBUMIN    Estimated Creatinine Clearance: 75.2 mL/min (based on Cr of 0.7).    Accu-Checks  Recent Labs      06/16/17   1752  06/17/17   0109   POCTGLUCOSE  60*  77       Current Medications and/or Treatments Impacting Glycemic Control  Immunotherapy:  Immunosuppressants     None        Steroids:   Hormones     Start     Stop Route Frequency Ordered    06/13/17 2100  desmopressin nasal solution 10 mcg      -- Nasl 2 times daily 06/13/17 1017    06/12/17 2100  hydrocortisone tablet 5 mg      -- Oral Nightly 06/12/17 1059    06/12/17 1100  hydrocortisone tablet 10 mg      -- Oral Every morning 06/12/17 1059    06/12/17 1015  desmopressin injection 1 mcg      -- SubQ Every 6 hours PRN 06/12/17 0914        Pressors:    Autonomic Drugs     None        Hyperglycemia/Diabetes Medications: " Antihyperglycemics     None

## 2017-06-17 NOTE — PROGRESS NOTES
Physician notified nurse to hold desmopressin during this shift and make day shift consult Endocrinology before resuming medication.

## 2017-06-17 NOTE — ASSESSMENT & PLAN NOTE
S/p craniotomy 6/8/2017   Patient is net negative  -Agree with D5 1/2 NS 75 cc/hr to match ins and outs  -Agree with patient receiving free water by mouth   -Cont to match ins/outs with free water by mouth or d5 1/2 NS by IV. Goal is net neutral fluid balance.    D/C scheduled DDAVP and keep only prn DDAVP as patient appears to be resolving.  Continue prn ddavp if uop > 250 x 2 consecutive hours AND Spec grav <1.005 or Serum Sodium greater than 150 (Would recommend patient meet two of three criteria prior to administration of DDAVP)    Check sodium and urine osmo and sp. Gravity  Q6 for now  Strict I/O and daily measured weight.

## 2017-06-18 NOTE — PT/OT/SLP PROGRESS
"Occupational Therapy  Treatment    Joanna Morales   MRN: [id number]   Admitting Diagnosis: Meningioma, recurrent of brain    OT Date of Treatment: 06/18/17   OT Start Time: 0818  OT Stop Time: 0847  OT Total Time (min): 29 min    Billable Minutes:  Self Care/Home Management 8 and Therapeutic Activity 21    General Precautions: Standard, fall, aspiration  Orthopedic Precautions: N/A  Braces: N/A    Do you have any cultural, spiritual, Confucianist conflicts, given your current situation?: None reported    Subjective:  Communicated with RN prior to session.    Pain/Comfort  Pain Rating 1:  (Pt reported pain "everywhere", but did not rate)    Objective:   Pt found supine with HOB elevated; bed alarm and camera on.  Therapy tech (Asiya) assisted with session.     Functional Mobility:  Bed Mobility:  Rolling/Turning to Left: Moderate assistance  Scooting/Bridging: Minimum Assistance (towards EOB; Max A towards HOB)  Supine to Sit: Moderate Assistance (for trunk elevation and LE management)  Sit to Supine: Minimum Assistance  *Increased time and cues required for all bed mobility    Transfers:  Sit <> Stand Assistance: Minimum Assistance x 2 trials from EOB; severe posterior lean noted  Sit <> Stand Assistive Device: Rolling Walker (OT and therapy tech stood on either side of pt)    Functional Ambulation: Pt unable to take steps this date.  Difficulty weight shifting and elevating feet from ground noted.    Activities of Daily Living:     Grooming Position: Seated, EOB  Grooming Level of Assistance: Minimum assistance to cleanse mouth utilizing green swabs.  Set up provided for brushing teeth; however, pt demo'd increased confusion so green swab provided instead.  OT placed green swab in right hand of pt and instructed her to cleanse mouth.  Pt hesitant at first so OT assisted with elevating RUE towards mouth.  Once swab touching mouth max cues required to complete task.    Balance:   Static Sit: FAIR+: Able to take " "MINIMAL challenges from all directions  Dynamic Sit: FAIR+: Maintains balance through MINIMAL excursions of active trunk motion  Static Stand: POOR+: Needs MINIMAL assist to maintain  Dynamic stand: POOR: N/A    Therapeutic Activities and Exercises:  *Orientation questions asked to assess cognition; pt able to identify name, but unable to state month or place.    *Pt sat EOB with SBA to complete grooming ADL; see details above.  *Pt completed sit to stand transfers and practiced weight shifting and elevating feet from ground.  Pt attempted to take steps in place in marching fashion: 1 set x 10 reps with RW; fair follow through.  *At end of session pt able to perform 3 scoots towards side; pt partially stood to slide bottom over closer towards HOB.  *POC reviewed; call button placed in right hand of pt with fingers over raised bumps to identify correct button    AM-PAC 6 CLICK ADL   How much help from another person does this patient currently need?   1 = Unable, Total/Dependent Assistance  2 = A lot, Maximum/Moderate Assistance  3 = A little, Minimum/Contact Guard/Supervision  4 = None, Modified [place]/Independent    Putting on and taking off regular lower body clothing? : 2  Bathing (including washing, rinsing, drying)?: 2  Toileting, which includes using toilet, bedpan, or urinal? : 2  Putting on and taking off regular upper body clothing?: 2  Taking care of personal grooming such as brushing teeth?: 2  Eating meals?: 2  Total Score: 12     AM-PAC Raw Score CMS "G-Code Modifier Level of Impairment Assistance   6 % Total / Unable   7 - 8 CM 80 - 100% Maximal Assist   9-13 CL 60 - 80% Moderate Assist   14 - 19 CK 40 - 60% Moderate Assist   20 - 22 CJ 20 - 40% Minimal Assist   23 CI 1-20% SBA / CGA   24 CH 0% Independent/ Mod I       Patient left supine with call button in reach and chair alarm on    ASSESSMENT:  Joanna Morales is a 51 y.o. female with a medical diagnosis of Meningioma, recurrent " of brain and presents with impaired cognition, decreased endurance, postural instability when standing, and impaired balance impacting performance with ADLs and mobility.  Pt demo'd improvement with activity tolerance this date sitting EOB for nearly 20 minutes and completing grooming task with Min A; however, continues to display confusion requiring consistent redirection and cues to assist with completing tasks.  Pt would continue to benefit from skilled OT services to address problems listed below and increase independence with ADLs.    Rehab identified problem list/impairments: Rehab identified problem list/impairments: weakness, impaired endurance, gait instability, impaired functional mobilty, impaired balance, impaired self care skills, pain, decreased coordination, decreased safety awareness    Rehab potential is good.    Activity tolerance: Good    Discharge recommendations: Discharge Facility/Level Of Care Needs: rehabilitation facility     Barriers to discharge: Barriers to Discharge: Inaccessible home environment, Decreased caregiver support    Equipment recommendations:  (TBD pending progress)     GOALS:    Occupational Therapy Goals        Problem: Occupational Therapy Goal    Goal Priority Disciplines Outcome Interventions   Occupational Therapy Goal     OT, PT/OT     Description:  Goals to be met by: 6/27/2017     Patient will increase functional independence with ADLs by performing:    UE Dressing with Minimal Assistance.  LE Dressing with Minimal Assistance.  Grooming while standing with Minimal Assistance.  Toileting from toilet with Minimal Assistance for hygiene and clothing management.   Rolling to Bilateral with Contact Guard Assistance.   Supine to sit with Contact Guard Assistance.  Stand pivot transfers with Minimal Assistance.  Toilet transfer to toilet with Minimal Assistance.                      Plan:  Patient to be seen 5 x/week to address the above listed problems via self-care/home  management, therapeutic activities, therapeutic exercises  Plan of Care expires: 07/13/17  Plan of Care reviewed with: patient         Shanell HELEN Jama  06/18/2017

## 2017-06-18 NOTE — PROGRESS NOTES
[facility]  Neurosurgery  Progress Note    Subjective:     History of Present Illness:  51-year-old lady who presented to 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  She underwent operation in January 2015 on the [facility] service at [facility].  It   was apparently only possible to get a partial resection of the tumor.  She did   have a followup scan done in 2016 showing a continued large meningioma in this   area.  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  She had a   followup MRI scan done at [facility] on 01/20/17, showing this large tumor.  She   was seen by you in Ophthalmology and then referred to Neurosurgery for further   evaluation.  At this point, she has some preserved vision in the right eye.  She   complains of constant headaches.  She has no sense of smell.  She feels off   balance.  She is taking Neurontin apparently for seizure prevention. Past   medical history relates primarily to the present illness.  She does not have   chronic medical illnesses such as diabetes or hypertension.  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  She is  and has good family support.     On physical examination, she is a well-developed, well-nourished white lady, who   is alert and oriented.  Examination of the head shows a somewhat anterior, but   well-healed bicoronal incision.  She says that this is a little tender in the   left frontal area.  Eyes show full extraocular movements.  The pupils are small,   the left is not reactive.  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  The right optic nerve is probably normal.  She   sees things as doubles.  When I held up two  fingers she counted four, although   she knew that there were two.  Hearing seems preserved.  The neck is supple.  On   neurological examination, she is speaking clearly.  She is obviously anxious   and depressed over her illness.  Finger-to-nose is somewhat diminished on the   left side and gait was mildly unsteady.  Cranial nerves are otherwise intact.    She has normal facial sensation and movement.  The tongue protrudes in the   midline.  Strength in the extremities seems generally good, sensation normal and   deep tendon reflexes symmetrical.     MRI of the brain performed at [facility] on 01/20/17, shows a   previous bifrontal craniotomy.  There is a large lobular meningioma, which   occupies the sella, planum sphenoidale and extends back on to the clivus.  The   optic chiasm and optic nerves are not visualized.  The carotid arteries and   anterior cerebral arteries run done through the tumor.  The tumor elevates and   pushes the third ventricle back, but there is no hydrocephalus    Post-Op Info:  Procedure(s) (LRB):  CRANIOTOMY WITH STEALTH (N/A)   11 Days Post-Op     Interval History: NAEON    Medications:  Continuous Infusions:   sodium chloride 0.45% 100 mL/hr at 06/17/17 1800     Scheduled Meds:   amitriptyline  100 mg Oral Nightly    bisacodyl  10 mg Rectal Daily    cefTRIAXone (ROCEPHIN) IVPB  1 g Intravenous Q24H    gabapentin  600 mg Oral TID    heparin (porcine)  5,000 Units Subcutaneous Q8H    hydrocortisone  10 mg Oral QAM    hydrocortisone  5 mg Oral QHS    nicotine  1 patch Transdermal Daily    pantoprazole  40 mg Oral BID    polyethylene glycol  17 g Oral Daily    ramelteon  8 mg Oral QHS     PRN Meds:acetaminophen, acetaminophen, acetaminophen, desmopressin, dextrose 50%, dextrose 50%, glucagon (human recombinant), glucose, glucose, haloperidol lactate, hydrALAZINE, metoclopramide HCl, metoclopramide HCl     Review of Systems  Objective:     Weight: 60.1 kg (132 lb  "7.9 oz)  Body mass index is 24.23 kg/m².  Vital Signs (Most Recent):  Temp: 99 °F (37.2 °C) (17)  Pulse: 73 (17 0300)  Resp: 19 (17)  BP: 116/67 (17)  SpO2: 97 % (17 2300) Vital Signs (24h Range):  Temp:  [93.2 °F (34 °C)-99 °F (37.2 °C)] 99 °F (37.2 °C)  Pulse:  [] 73  Resp:  [16-19] 19  SpO2:  [92 %-97 %] 97 %  BP: ()/(60-79) 116/67              Temp (24hrs), Av.2 °F (35.1 °C), Min:93.2 °F (34 °C), Max:99 °F (37.2 °C)                 Urethral Catheter 06/15/17 1205 Latex (Active)   Site Assessment Clean;Intact 2017  8:00 PM   Collection Container Standard drainage bag 2017  8:00 PM   Securement Method secured to top of thigh w/ adhesive device 2017  8:00 PM   Catheter Care Performed yes 2017  8:00 PM   Reason for Continuing Urinary Catheterization Critically ill in ICU requiring intensive monitoring 2017  8:00 PM   CAUTI Prevention Bundle StatLock in place w 1" slack;Intact seal between catheter & drainage tubing;Drainage bag off the floor;Green sheeting clip in use;No dependent loops or kinks;Drainage bag not overfilled (<2/3 full);Drainage bag below bladder 2017  8:00 PM   Output (mL) 35 mL 2017  6:00 AM       Neurosurgery Physical Exam   Awake, alert, oriented to person  Pupils NR bilaterally, blind in both eyes  FC x4  SILT    Significant Labs:    Recent Labs  Lab 17  0415   GLU 86   *  148*   K 3.8   *   CO2 24   BUN 10   CREATININE 0.7   CALCIUM 9.5   MG 2.1       Recent Labs  Lab 17  0534 17  0415   WBC 7.02  --    HGB 12.3 13.1   HCT 36.9* 40.6    284       Recent Labs  Lab 17  0415   INR 1.0   APTT 29.0     Microbiology Results (last 7 days)     Procedure Component Value Units Date/Time    Blood culture [[id number]] Collected:  17 1517    Order Status:  Completed Specimen:  Blood from Peripheral, Antecubital, Left Updated:  17     Blood Culture, " Routine No Growth to date     Blood Culture, Routine No Growth to date     Blood Culture, Routine No Growth to date     Blood Culture, Routine No Growth to date    Narrative:       Blood cultures x 2 different sites. 4 bottles total. Please  draw cultures before administering antibiotics.    Blood culture [[id number]] Collected:  06/14/17 1156    Order Status:  Completed Specimen:  Blood from Peripheral, Antecubital, Left Updated:  06/17/17 1412     Blood Culture, Routine No Growth to date     Blood Culture, Routine No Growth to date     Blood Culture, Routine No Growth to date     Blood Culture, Routine No Growth to date    Narrative:       Blood cultures from 2 different sites. 4 bottles total.  Please draw before starting antibiotics.    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  Continue cortef 10 q am, 5 q pm.  Monitor for DI  CV- SBP < 160  Pulm- on RA.   FENGI- ddAVP PRN, continue 1/2 NS at 100 cc/hr.  Monitor Na, 148 this am down from 150.  Heme/ID- afebrile. hgb/hct stable.   ppx- ANA/SCD's. Gi ppx. Sqh.   PT, OT, ST.   Fu SW recs, as pt will need placement.            Boogie Wiley, DO  Neurosurgery  [facility]

## 2017-06-18 NOTE — NURSING
Pt's heart rate sustaining in 140's. BP low. Suspision that pt is symptomatically hypovolemic (last sodium 153) shared with neurosurgery ( S[facility]). Pt very difficult to arouse and not taking po intake at time of assessment. Orders received for xray, ekg, troponin, and metoprolol 5mg x 1 dose. IV fluids and po intake encouraged since admin of metoprolol. Pt became more arousable around 2030 and able to take 100% of meal and drink thickened liquids. BP and heart rate improved. Will continue to monitor sodium and urine as well as VS and treat accordingly.

## 2017-06-18 NOTE — PROGRESS NOTES
"[facility]  Endocrinology  Progress Note    Admit Date: 6/7/2017     Reason for Consult: Diabetes insipidus     Surgical Procedure and Date:   CRANIOTOMY 6/8/2017        HPI:   Patient is a 51 y.o. female with a diagnosis of  meningioma s/p resection (6/7). 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  She underwent partial resection of the tumor in January 2015 at [facility]. Followup scan done in 2016 showing a continued large meningioma.     Endocrinology consulted for post operative DI management.  Per the operative report - The pituitary stalk was identified and could be preserved      Interval HPI:   Overnight events:  Serum Na  148 this am    Net -2655 cc   Patient currently receiving Desmopressin 10 mcg intranasal prn      /63 (BP Location: Right arm, Patient Position: Lying, BP Method: Automatic)   Pulse 71   Temp 97.7 °F (36.5 °C) (Oral)   Resp 20   Ht 5' 2" (1.575 m)   Wt 60.1 kg (132 lb 7.9 oz)   SpO2 96%   Breastfeeding? No   BMI 24.23 kg/m²       Labs Reviewed and Include      Recent Labs  Lab 06/18/17  0415   GLU 86   CALCIUM 9.5   ALBUMIN 3.1*   PROT 6.9   *  148*   K 3.8   CO2 24   *   BUN 10   CREATININE 0.7   ALKPHOS 93   ALT 77*   AST 57*   BILITOT 0.2     Lab Results   Component Value Date    WBC 7.02 06/17/2017    HGB 13.1 06/18/2017    HCT 40.6 06/18/2017    MCV 98 06/18/2017     06/18/2017       Recent Labs  Lab 06/15/17  1007   FREET4 0.83     No results found for: HGBA1C    Nutritional status:   Body mass index is 24.23 kg/m².  Lab Results   Component Value Date    ALBUMIN 3.1 (L) 06/18/2017    ALBUMIN 3.1 (L) 06/17/2017    ALBUMIN 2.8 (L) 06/16/2017     No results found for: PREALBUMIN    Estimated Creatinine Clearance: 75.2 mL/min (based on Cr of 0.7).    Accu-Checks  Recent Labs      06/16/17   1752  06/17/17   0109  " 06/17/17   1841   POCTGLUCOSE  60*  77  76       Current Medications and/or Treatments Impacting Glycemic Control  Immunotherapy:  Immunosuppressants     None        Steroids:   Hormones     Start     Stop Route Frequency Ordered    06/17/17 1712  desmopressin nasal spray 10 mcg      -- Nasl As needed (PRN) 06/17/17 1614    06/12/17 2100  hydrocortisone tablet 5 mg      -- Oral Nightly 06/12/17 1059    06/12/17 1100  hydrocortisone tablet 10 mg      -- Oral Every morning 06/12/17 1059        Pressors:    Autonomic Drugs     None        Hyperglycemia/Diabetes Medications: Antihyperglycemics     None          ASSESSMENT and PLAN    S/P craniotomy    On 6/8/2017 now with DI as in #1   Currently on HC 10 mg in the am and 5 mg pm               Diabetes insipidus    S/p craniotomy 6/8/2017   Patient is net negative  -Increase 1/2  cc/hr to match ins and outs  -Agree with patient receiving free water by mouth   -Cont to match ins/outs with free water by mouth or 1/2 NS by IV. Goal is net neutral fluid balance.    -keep only prn DDAVP as patient appears to be resolving.  Continue prn ddavp if uop > 250 x 2 consecutive hours AND Spec grav <1.005 or Serum Sodium greater than 150 (Would recommend patient meet two of three criteria prior to administration of DDAVP)    Check sodium and urine osmo and sp. Gravity  Q6 for now  Strict I/O and daily measured weight.                   Melissa Bonilla MD  Endocrinology  [facility]

## 2017-06-18 NOTE — PLAN OF CARE
Problem: Occupational Therapy Goal  Goal: Occupational Therapy Goal  Goals to be met by: 6/27/2017     Patient will increase functional independence with ADLs by performing:    UE Dressing with Minimal Assistance.  LE Dressing with Minimal Assistance.  Grooming while standing with Minimal Assistance.  Toileting from toilet with Minimal Assistance for hygiene and clothing management.   Rolling to Bilateral with Contact Guard Assistance.   Supine to sit with Contact Guard Assistance.  Stand pivot transfers with Minimal Assistance.  Toilet transfer to toilet with Minimal Assistance.        POC remains appropriate.    HELEN Lopes  6/18/2017

## 2017-06-18 NOTE — ASSESSMENT & PLAN NOTE
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  Continue cortef 10 q am, 5 q pm.  Monitor for DI  CV- SBP < 160  Pulm- on RA.   FENGI- ddAVP PRN, continue 1/2 NS at 100 cc/hr.  Monitor Na, 148 this am down from 150.  Heme/ID- afebrile. hgb/hct stable.   ppx- ANA/SCD's. Gi ppx. Sqh.   PT, OT, ST.   Fu SW recs, as pt will need placement.

## 2017-06-18 NOTE — PLAN OF CARE
Problem: Patient Care Overview  Goal: Plan of Care Review  POC reviewed with patient. Need reenforcement.  Patient remains free from falls, skin breakdown, and injury.  Call light remained within reach and side rails up X3.  Ongoing interventions implemented as appropriate.  Will continue to monitor.  Nubia Marquez RN

## 2017-06-18 NOTE — SUBJECTIVE & OBJECTIVE
"Interval History: NAEON    Medications:  Continuous Infusions:   sodium chloride 0.45% 100 mL/hr at 17 1800     Scheduled Meds:   amitriptyline  100 mg Oral Nightly    bisacodyl  10 mg Rectal Daily    cefTRIAXone (ROCEPHIN) IVPB  1 g Intravenous Q24H    gabapentin  600 mg Oral TID    heparin (porcine)  5,000 Units Subcutaneous Q8H    hydrocortisone  10 mg Oral QAM    hydrocortisone  5 mg Oral QHS    nicotine  1 patch Transdermal Daily    pantoprazole  40 mg Oral BID    polyethylene glycol  17 g Oral Daily    ramelteon  8 mg Oral QHS     PRN Meds:acetaminophen, acetaminophen, acetaminophen, desmopressin, dextrose 50%, dextrose 50%, glucagon (human recombinant), glucose, glucose, haloperidol lactate, hydrALAZINE, metoclopramide HCl, metoclopramide HCl     Review of Systems  Objective:     Weight: 60.1 kg (132 lb 7.9 oz)  Body mass index is 24.23 kg/m².  Vital Signs (Most Recent):  Temp: 99 °F (37.2 °C) (17)  Pulse: 73 (17 0300)  Resp: 19 (17)  BP: 116/67 (17)  SpO2: 97 % (17 2300) Vital Signs (24h Range):  Temp:  [93.2 °F (34 °C)-99 °F (37.2 °C)] 99 °F (37.2 °C)  Pulse:  [] 73  Resp:  [16-19] 19  SpO2:  [92 %-97 %] 97 %  BP: ()/(60-79) 116/67              Temp (24hrs), Av.2 °F (35.1 °C), Min:93.2 °F (34 °C), Max:99 °F (37.2 °C)                 Urethral Catheter 06/15/17 1205 Latex (Active)   Site Assessment Clean;Intact 2017  8:00 PM   Collection Container Standard drainage bag 2017  8:00 PM   Securement Method secured to top of thigh w/ adhesive device 2017  8:00 PM   Catheter Care Performed yes 2017  8:00 PM   Reason for Continuing Urinary Catheterization Critically ill in ICU requiring intensive monitoring 2017  8:00 PM   CAUTI Prevention Bundle StatLock in place w 1" slack;Intact seal between catheter & drainage tubing;Drainage bag off the floor;Green sheeting clip in use;No dependent loops or kinks;Drainage bag " not overfilled (<2/3 full);Drainage bag below bladder 6/17/2017  8:00 PM   Output (mL) 35 mL 6/18/2017  6:00 AM       Neurosurgery Physical Exam   Awake, alert, oriented to person  Pupils NR bilaterally, blind in both eyes  FC x4  SILT    Significant Labs:    Recent Labs  Lab 06/18/17 0415   GLU 86   *  148*   K 3.8   *   CO2 24   BUN 10   CREATININE 0.7   CALCIUM 9.5   MG 2.1       Recent Labs  Lab 06/17/17  0534 06/18/17 0415   WBC 7.02  --    HGB 12.3 13.1   HCT 36.9* 40.6    284       Recent Labs  Lab 06/18/17 0415   INR 1.0   APTT 29.0     Microbiology Results (last 7 days)     Procedure Component Value Units Date/Time    Blood culture [[id number]] Collected:  06/14/17 1517    Order Status:  Completed Specimen:  Blood from Peripheral, Antecubital, Left Updated:  06/17/17 2012     Blood Culture, Routine No Growth to date     Blood Culture, Routine No Growth to date     Blood Culture, Routine No Growth to date     Blood Culture, Routine No Growth to date    Narrative:       Blood cultures x 2 different sites. 4 bottles total. Please  draw cultures before administering antibiotics.    Blood culture [[id number]] Collected:  06/14/17 1156    Order Status:  Completed Specimen:  Blood from Peripheral, Antecubital, Left Updated:  06/17/17 1412     Blood Culture, Routine No Growth to date     Blood Culture, Routine No Growth to date     Blood Culture, Routine No Growth to date     Blood Culture, Routine No Growth to date    Narrative:       Blood cultures from 2 different sites. 4 bottles total.  Please draw before starting antibiotics.    Urine culture [[id number]] Collected:  06/15/17 1127    Order Status:  Completed Specimen:  Urine from Urine, Catheterized Updated:  06/16/17 1947     Urine Culture, Routine No growth    Culture, Respiratory with Gram Stain [[id number]]     Order Status:  No result Specimen:  Respiratory             Significant Diagnostics:

## 2017-06-18 NOTE — ASSESSMENT & PLAN NOTE
S/p craniotomy 6/8/2017   Patient is net negative  -Increase 1/2  cc/hr to match ins and outs  -Agree with patient receiving free water by mouth   -Cont to match ins/outs with free water by mouth or 1/2 NS by IV. Goal is net neutral fluid balance.    -keep only prn DDAVP as patient appears to be resolving.  Continue prn ddavp if uop > 250 x 2 consecutive hours AND Spec grav <1.005 or Serum Sodium greater than 150 (Would recommend patient meet two of three criteria prior to administration of DDAVP)    Check sodium and urine osmo and sp. Gravity  Q6 for now  Strict I/O and daily measured weight.

## 2017-06-19 NOTE — PT/OT/SLP PROGRESS
"Speech Language Pathology  Treatment    Joanna Morales   MRN: [id number]   Admitting Diagnosis: Meningioma, recurrent of brain    Diet recommendations: Solid Diet Level: Dental Soft  Liquid Diet Level: Nectar Thick Feed only when awake/alert, HOB to 90 degrees, Check for pocketing/oral residue, Meds crushed in puree, Assistance with meals and Assistance with thickening liquids    SLP Treatment Date: 06/19/17  Speech Start Time: 1323     Speech Stop Time: 1339     Speech Total (min): 16 min       TREATMENT BILLABLE MINUTES:  Speech Therapy Individual 8 and Treatment Swallowing Dysfunction 8    Has the patient been evaluated by SLP for swallowing? : Yes  Keep patient NPO?: No   General Precautions: Standard, aspiration, fall        Subjective:  "it's too early" when SLP attempting to wake Pt to participate in treatment     Pain/Comfort  Pain Rating 1: 0/10  Pain Rating Post-Intervention 1: 0/10    Objective:    Pt with increased lethargy this date. Pt oriented to self but remains not oriented to location "bathing suit in the swimming pool." SLP attempted to re-orient Pt with max verbal cueing. Pt with poor attention and warranting moderate verbal cueing and re-direction to maintain attention to tasks. SLP attempted to feed Pt however Pt expectorating small pieces of soft solids presented by SLP. Pt did accept ~ 4oz of thin liquids via straw. Pt with timely swallow and clear vocal quality post trials however she continues to exhibit a delayed and inconsistent cough present concerning for aspiration. Pt to remain on nectar thick liquids. Speech to continue to follow.       Assessment:  Joanna Morales is a 51 y.o. female with a medical diagnosis of Meningioma, recurrent of brain and presents with dysphagia and cognitive linguistic.     Discharge recommendations: Discharge Facility/Level Of Care Needs: rehabilitation facility     Goals:    SLP Goals        Problem: SLP Goal    Goal Priority Disciplines Outcome "   SLP Goal     SLP    Description:  Speech Language Pathology Goals  Updated goals expected to be met by 6/23    1. Pt will tolerate dental soft diet/nectar thick liquids without overt s/s of aspiration  2. Pt will tolerate thin liquids trials x10 without overt s/s of aspiration  3. Pt will complete dysphagia exercises x10 reps with min cues  4. Pt will following single step directions with 75% acc with min cues to enhance comprehension   5. Pt will complete basic problem solving tasks with 75% acc with min cues to enhance safety awareness   6. Pt will demonstrate orientation x3 to enhance cognition   7. Pt will participate in ongoing assessment of speech language and cognitive skills to rule out and deficits and establish goals of care                              Plan:   Patient to be seen Therapy Frequency: 5 x/week   Plan of Care expires: 07/10/17  Plan of Care reviewed with: patient  SLP Follow-up?: Yes              Sharla Trevino CCC-SLP  06/19/2017

## 2017-06-19 NOTE — PLAN OF CARE
Clinical updates sent to [facility], [phone]. Awaiting acceptance.    Christiana at [facility] advised that referral is under review.    Selina Chew LMSW  [id number]

## 2017-06-19 NOTE — PLAN OF CARE
Problem: SLP Goal  Goal: SLP Goal  Speech Language Pathology Goals  Updated goals expected to be met by 6/23    1. Pt will tolerate dental soft diet/nectar thick liquids without overt s/s of aspiration  2. Pt will tolerate thin liquids trials x10 without overt s/s of aspiration  3. Pt will complete dysphagia exercises x10 reps with min cues  4. Pt will following single step directions with 75% acc with min cues to enhance comprehension   5. Pt will complete basic problem solving tasks with 75% acc with min cues to enhance safety awareness   6. Pt will demonstrate orientation x3 to enhance cognition   7. Pt will participate in ongoing assessment of speech language and cognitive skills to rule out and deficits and establish goals of care              Pt with slow progress towards goals     Sharla Trevino MS, CCC-SLP  Speech Language Pathologist  Pager: [phone]  Date 6/19/2017

## 2017-06-19 NOTE — PT/OT/SLP PROGRESS
Physical Therapy  Treatment    Joanna Morales   MRN: [id number]   Admitting Diagnosis: Meningioma, recurrent of brain    PT Received On: 06/19/17  PT Start Time: 1102     PT Stop Time: 1114    PT Total Time (min): 12 min       Billable Minutes:  Therapeutic Exercise  12    Treatment Type: Treatment  PT/PTA: PTA     PTA Visit Number: 1       General Precautions: Standard, fall, aspiration  Orthopedic Precautions: N/A   Braces: N/A    Do you have any cultural, spiritual, Scientology conflicts, given your current situation?: no conflicts    Subjective:  Communicated with NSG prior to session.  Patient very drowsy today and unable to arouse.    Pain/Comfort  Pain Rating 1: 0/10  Pain Rating Post-Intervention 1: 0/10    Objective:   Patient found with: bed alarm, telemetry, peripheral IV, pulse ox (continuous), garcía catheter    Functional Mobility:  Bed Mobility:   Supine to Sit: Other (see comments) (Unable to perform due to drowsiness.)    Transfers:  Sit <> Stand Assistance: Activity did not occur    Gait:   Gait Distance: Unable to perform    Stairs:      Balance:   Static Sit:   Dynamic Sit:   Static Stand:   Dynamic stand:      Therapeutic Activities and Exercises:  B LE PROM x 30 reps on all available planes of motion.     AM-PAC 6 CLICK MOBILITY  How much help from another person does this patient currently need?   1 = Unable, Total/Dependent Assistance  2 = A lot, Maximum/Moderate Assistance  3 = A little, Minimum/Contact Guard/Supervision  4 = None, Modified [place]/Independent    Turning over in bed (including adjusting bedclothes, sheets and blankets)?: 2  Sitting down on and standing up from a chair with arms (e.g., wheelchair, bedside commode, etc.): 2  Moving from lying on back to sitting on the side of the bed?: 2  Moving to and from a bed to a chair (including a wheelchair)?: 2  Need to walk in hospital room?: 1  Climbing 3-5 steps with a railing?: 1  Total Score: 10    AM-PAC Raw Score CMS  G-Code Modifier Level of Impairment Assistance   6 % Total / Unable   7 - 9 CM 80 - 100% Maximal Assist   10 - 14 CL 60 - 80% Moderate Assist   15 - 19 CK 40 - 60% Moderate Assist   20 - 22 CJ 20 - 40% Minimal Assist   23 CI 1-20% SBA / CGA   24 CH 0% Independent/ Mod I     Patient left supine with all lines intact, call button in reach and bed alarm on.    Assessment:  Joanna Morales is a 51 y.o. female with a medical diagnosis of Meningioma, recurrent of brain and presents with decreased functional mobility. Treatment was limited as patient was medicated and very drowsy, which limited her participation. Patient would benefit from continued P.T. To address deficits.    Rehab identified problem list/impairments: Rehab identified problem list/impairments: weakness, impaired endurance, impaired self care skills, impaired functional mobilty, impaired balance, decreased coordination, decreased safety awareness, visual deficits    Rehab potential is fair.    Activity tolerance: Poor    Discharge recommendations: Discharge Facility/Level Of Care Needs: rehabilitation facility     Barriers to discharge: Barriers to Discharge: Inaccessible home environment, Decreased caregiver support    Equipment recommendations: Equipment Needed After Discharge:  (TBD pending progress)     GOALS:    Physical Therapy Goals        Problem: Physical Therapy Goal    Goal Priority Disciplines Outcome Goal Variances Interventions   Physical Therapy Goal     PT/OT, PT Ongoing (interventions implemented as appropriate)     Description:  Goals to be met by: 17    Patient will increase functional independence with mobility by performin. Supine to sit with Contact Guard Assistance  2. Sit to supine with Contact Guard Assistance  3. Sit to stand transfer with Minimal Assistance  4. Gait  x 25 feet with Minimal Assistance with or without appropriate AD.   5. Lower extremity exercise program x30 reps per handout, with  independence                      PLAN:    Patient to be seen 5 x/week  to address the above listed problems via gait training, therapeutic activities, therapeutic exercises, neuromuscular re-education  Plan of Care expires: 07/13/17  Plan of Care reviewed with: patient         Manish Oconnornares, PTA  06/19/2017

## 2017-06-19 NOTE — PROGRESS NOTES
"[facility]  Adult Nutrition  Progress Note    SUMMARY     General Information Comments: Pt sleeping at time of visit. No family present. K 3.3. Poor PO intake per chart review. Observed meal tray at bedside pt did not eat breakfast.     Recommendations    1. Continue current diet order   2. If poor PO continues order ONS TID.     RD following    Goals: PO intake >/=75%  Nutrition Goal Status: new     Continuum of Care Plan    Nursing Team: obtain bi weekly wts and encourage good PO intake     Reason for Assessment    Reason for Assessment: length of stay  Diagnosis:  (s/p crani resection POD #11)  Past Medical History:   Diagnosis Date    Allergy     Basal cell carcinoma     Depression 11/1/2016    Essential hypertension 6/9/2017    Eye disorder     Fever blister     Normocytic anemia 6/9/2017       Interdisciplinary Rounds: did not attend  Nutrition Discharge Planning: Pt to d/c on dental soft diet with adequate PO intake.    Nutrition Prescription Ordered    Current Diet Order: Dental soft  Nutrition Order Comments: nectar thick lqs     Nutrition Risk Screen     Nutrition Risk Screen: no indicators present    Nutrition/Diet History    Patient Reported Diet/Restrictions/Preferences:  (JAVIER)  Food Preferences: JAVIER  Factors Affecting Nutritional Intake: decreased appetite    Labs/Tests/Procedures/Meds    Pertinent Labs Reviewed: reviewed  Pertinent Labs Comments: K 3.3  Pertinent Medications Reviewed: reviewed  Pertinent Medications Comments: IVF, nicotine, pantoprazole    Physical Findings    Overall Physical Appearance: nourished  Tubes:  (none)  Oral/Mouth Cavity:  (JAVIER)  Skin: incision    Anthropometrics    Temp: (!) 94.1 °F (34.5 °C)  Height: 5' 2" (157.5 cm)  Weight Method: Bed Scale  Weight: 60.1 kg (132 lb 7.9 oz)  Ideal Body Weight (IBW), Female: 110 lb  % Ideal Body Weight, Female (lb): 120.25 lb  BMI (Calculated): 24.2  BMI Grade: 18.5-24.9 - normal  Weight Loss:  (JAVIER)   Wt " Readings from Last 10 Encounters:   06/16/17 60.1 kg (132 lb 7.9 oz)   05/15/17 59.4 kg (130 lb 14.4 oz)   05/08/17 59.4 kg (130 lb 15.3 oz)   05/01/17 59.1 kg (130 lb 6.4 oz)   02/08/17 58 kg (127 lb 15.6 oz)   02/01/17 58.6 kg (129 lb 3 oz)   01/24/17 59.4 kg (130 lb 15.3 oz)   12/14/16 59.4 kg (130 lb 15.3 oz)   11/01/16 57.7 kg (127 lb 3.3 oz)   10/26/16 56.8 kg (125 lb 3.5 oz)     Estimated/Assessed Needs    Weight Used For Calorie Calculations: 60.1 kg (132 lb 7.9 oz)   Energy Need Method: [employer]-St Jeor (1500 kcal/d (PAL 1.3))  RMR ([employer]-St. Jeor Equation): 1169.25  Weight Used For Protein Calculations: 60.1 kg (132 lb 7.9 oz)  Protein Requirements: 60-72 gm/d (1-1.2 gm/kg of ABW)  Fluid Need Method: RDA Method (1500 mL/d or per MD)    Nutrition Dx    Inadequate oral intake r/t inability to consume sufficient energy AEB poor PO intake  Status: new    Monitor and Evaluation    Food and Nutrient Intake: food and beverage intake  Food and Nutrient Adminstration: diet order  Anthropometric Measurements: weight change  Biochemical Data, Medical Tests and Procedures: electrolyte and renal panel, glucose/endocrine profile  Nutrition-Focused Physical Findings: overall appearance    Nutrition Risk    Level of Risk:  (f/u 2x/week)    Nutrition Follow-Up    RD Follow-up?: Yes     Michelle Kaplan, RD, LDN

## 2017-06-19 NOTE — PLAN OF CARE
SW following, referral sent to [facility].       06/19/17 6417   Discharge Reassessment   Assessment Type Discharge Planning Reassessment   Can the patient answer the patient profile reliably? No, cognitively impaired   How does the patient rate their overall health at the present time? Fair   Describe the patient's ability to walk at the present time. Major restrictions/daily assistance from another person   How often would a person be available to care for the patient? Occasionally   During the past month, has the patient often been bothered by feeling down, depressed or hopeless? Yes  (per nursing)   During the past month, has the patient often been bothered by little interest or pleasure in doing things? Yes  (per nursing)   Discharge plan remains the same: Yes   Provided patient/caregiver education on the expected discharge date and the discharge plan Yes   Discharge Plan A Rehab   Discharge Plan B Skilled Nursing Facility   Involved the patient/caregiver in establishing a new discharge plan: Yes

## 2017-06-19 NOTE — ASSESSMENT & PLAN NOTE
Post-surgical DI (craniotomy 6/8/2017)    Continue to Match I/O: Agree with either free water or 1/2 NS    DDAVP appears to be resolving, keep only prn DDAVP:   Parameters to give DDAVP IF uop > 250 x 2 consecutive hours AND Spec grav <1.005 or Serum Sodium > 150   Check sodium and sp. [place] Q6   Strict I/O

## 2017-06-19 NOTE — PT/OT/SLP PROGRESS
Occupational Therapy      Joanna Morales  MRN: [id number]    Patient not seen today secondary to unable to maintain aroused state.  OT and RN provided tactile stimulation with pt making vocalizations asking to turn on the lights.  Pt immediately returned to deep sleep with OT unable to arouse pt again  . Will follow-up as POC allows.    HELEN Lopes  6/19/2017

## 2017-06-19 NOTE — NURSING
ELLA Charles notified of pt urine out put of 650 ml at 1130 and 50 ml at 1030. New orders given instructed to notify endocrine.

## 2017-06-19 NOTE — PLAN OF CARE
Problem: Patient Care Overview  Goal: Plan of Care Review  POC reviewed with patient.  Needs reenforcement.  Patient remains free from falls, skin breakdown, and injury.  Patient is monitored on camera and bed alarm set.  Call light remained within reach and side rails up X3.  Neuro checks and vital signs done every 4 hours.  Patient was moderately calm and cooperative up until 2:30 a.m.  The patient became increasingly agitated, uncooperative, trying to climb out of the bed.  Patient became combative. Haloperidol was given at 03:50 a.m. After many other interventions were tried.   The PRN desmopressin was not needed last night.  Ongoing interventions implemented as appropriate.  Will continue to monitor.

## 2017-06-19 NOTE — SUBJECTIVE & OBJECTIVE
Interval History:  NAEON.   Denies N/V,  weakness, or seizures.  No new complaints.        Medications:  Continuous Infusions:   sodium chloride 0.45% 75 mL/hr at 17 1308     Scheduled Meds:   amitriptyline  100 mg Oral Nightly    bisacodyl  10 mg Rectal Daily    cefTRIAXone (ROCEPHIN) IVPB  1 g Intravenous Q24H    gabapentin  600 mg Oral TID    heparin (porcine)  5,000 Units Subcutaneous Q8H    hydrocortisone  10 mg Oral QAM    hydrocortisone  5 mg Oral QHS    nicotine  1 patch Transdermal Daily    pantoprazole  40 mg Oral BID    polyethylene glycol  17 g Oral Daily    ramelteon  8 mg Oral QHS     PRN Meds:acetaminophen, acetaminophen, acetaminophen, desmopressin, dextrose 50%, dextrose 50%, glucagon (human recombinant), glucose, glucose, haloperidol lactate, hydrALAZINE, metoclopramide HCl, metoclopramide HCl     Review of Systems  Objective:     Weight: 60.1 kg (132 lb 7.9 oz)  Body mass index is 24.23 kg/m².  Vital Signs (Most Recent):  Temp: 97.4 °F (36.3 °C) (17 1500)  Pulse: 64 (17 1500)  Resp: 18 (17 1500)  BP: 99/61 (17 1500)  SpO2: 95 % (17 1500) Vital Signs (24h Range):  Temp:  [92.6 °F (33.7 °C)-98 °F (36.7 °C)] 97.4 °F (36.3 °C)  Pulse:  [57-82] 64  Resp:  [16-18] 18  SpO2:  [94 %-97 %] 95 %  BP: ()/(57-76) 99/61              Temp (24hrs), Av.6 °F (35.3 °C), Min:92.6 °F (33.7 °C), Max:98 °F (36.7 °C)           Date 17 0700 - 17 0659   Shift 0906-7948 8849-1528 4071-2500 24 Hour Total   I  N  T  A  K  E   Shift Total  (mL/kg)       O  U  T  P  U  T   Urine  (mL/kg/hr) 1875  (3.9) 10  1885    Shift Total  (mL/kg) 1875  (31.2) 10  (0.2)  1885  (31.4)   Weight (kg) 60.1 60.1 60.1 60.1          Urethral Catheter 06/15/17 1205 Latex (Active)   Site Assessment Clean;Intact 2017  8:00 AM   Collection Container Standard drainage bag 2017  8:00 AM   Securement Method secured to top of thigh w/ adhesive device 2017  8:00 AM  "  Catheter Care Performed yes 6/19/2017  8:00 AM   Reason for Continuing Urinary Catheterization Critically ill in ICU requiring intensive monitoring 6/18/2017  8:02 AM   CAUTI Prevention Bundle StatLock in place w 1" slack;Intact seal between catheter & drainage tubing;Drainage bag off the floor;Green sheeting clip in use;No dependent loops or kinks;Drainage bag not overfilled (<2/3 full);Drainage bag below bladder 6/18/2017  8:00 PM   Output (mL) 10 mL 6/19/2017  3:00 PM       Neurosurgery Physical Exam   General: no distress  Neurologic: oriented to person  Head: normocephalic  GCS: Motor: 6/Verbal: 4/Eyes: 4 GCS Total: 14  Cranial nerves:  Pupils NR bilaterally, blind in both eyes, tongue midline  Sensory: response to light touch throughout  Motor Strength: generalized deconditioning in upper and lower extremities, no focal weakness.  FC x 4   Lungs:  normal respiratory effort  Abdomen: soft, non-tender   Extremities: no cyanosis or edema, or clubbing      Significant Labs:    Recent Labs  Lab 06/19/17  0529  06/19/17  1159   GLU 91  --   --      141  < > 146*   K 3.3*  --   --      --   --    CO2 26  --   --    BUN 9  --   --    CREATININE 0.7  --   --    CALCIUM 9.6  --   --    MG 1.7  --   --    < > = values in this interval not displayed.    Recent Labs  Lab 06/18/17  0415 06/19/17  0529   WBC 9.06 8.10   HGB 13.1 11.4*   HCT 40.6 35.0*    269       Recent Labs  Lab 06/19/17  0529   INR 1.0   APTT 33.4*     Microbiology Results (last 7 days)     Procedure Component Value Units Date/Time    Blood culture [[id number]] Collected:  06/14/17 1156    Order Status:  Completed Specimen:  Blood from Peripheral, Antecubital, Left Updated:  06/19/17 1412     Blood Culture, Routine No growth after 5 days.    Narrative:       Blood cultures from 2 different sites. 4 bottles total.  Please draw before starting antibiotics.    Blood culture [[id number]] Collected:  06/14/17 1517    Order Status:  " Completed Specimen:  Blood from Peripheral, Antecubital, Left Updated:  06/18/17 2012     Blood Culture, Routine No Growth to date     Blood Culture, Routine No Growth to date     Blood Culture, Routine No Growth to date     Blood Culture, Routine No Growth to date     Blood Culture, Routine No Growth to date    Narrative:       Blood cultures x 2 different sites. 4 bottles total. Please  draw cultures before administering antibiotics.    Urine culture [[id number]] Collected:  06/15/17 1127    Order Status:  Completed Specimen:  Urine from Urine, Catheterized Updated:  06/16/17 1947     Urine Culture, Routine No growth    Culture, Respiratory with Gram Stain [[id number]]     Order Status:  No result Specimen:  Respiratory

## 2017-06-19 NOTE — ASSESSMENT & PLAN NOTE
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  NA at 146.  Received DDAVP x 1.  Will continue to monitor.    - Continue cortef 10 q am, 5 q pm.    CHERELLE-  continue 1/2 NS. Decrease to 75cc/hr.  On dental soft diet.   - Heme/ID- afebrile. hgb/hct stable.   - DVT ppx- ANA/SCD's. Gi ppx. Sqh.   - Continue daily PT/OT/ST  - Will DC staples on Wednesday   - SW pursuing Rehab placement

## 2017-06-19 NOTE — NURSING
Dr. Hsu notified of pt letting out 650 ml of urine from garcía bag at 1130 and 50 ml at 1030. NA+ 141 and to review specific gravity.

## 2017-06-19 NOTE — PROGRESS NOTES
"[facility]  Endocrinology  Progress Note    Admit Date: 6/7/2017     Reason for Consult: Diabetes insipidus     Surgical Procedure and Date:   CRANIOTOMY 6/8/2017        HPI:   Patient is a 51 y.o. female with a diagnosis of  meningioma s/p resection (6/7). 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  She underwent partial resection of the tumor in January 2015 at [facility]. Followup scan done in 2016 showing a continued large meningioma.     Endocrinology consulted for post operative DI management.  Per the operative report - The pituitary stalk was identified and could be preserved      Interval HPI:   Overnight events:  Na down to 141, I/O mostly matched 600/800, and UOP <100cc/hr, sp gr fluctuates between 1.010-1.020. Is on 1/2 NS 75cc/hr     Low temp noted, HR stable    On HC 15/5, BP stable.     /75 (BP Location: Right arm, Patient Position: Lying, BP Method: Automatic)   Pulse 60   Temp (!) 94.4 °F (34.7 °C) (Oral)   Resp 18   Ht 5' 2" (1.575 m)   Wt 60.1 kg (132 lb 7.9 oz)   SpO2 96%   Breastfeeding? No   BMI 24.23 kg/m²       Labs Reviewed and Include      Recent Labs  Lab 06/19/17  0529 06/19/17  0757   GLU 91  --    CALCIUM 9.6  --    ALBUMIN 3.1*  --    PROT 6.3  --      141 141   K 3.3*  --    CO2 26  --      --    BUN 9  --    CREATININE 0.7  --    ALKPHOS 87  --    ALT 73*  --    AST 51*  --    BILITOT 0.3  --      Lab Results   Component Value Date    WBC 8.10 06/19/2017    HGB 11.4 (L) 06/19/2017    HCT 35.0 (L) 06/19/2017    MCV 97 06/19/2017     06/19/2017       Recent Labs  Lab 06/15/17  1007   FREET4 0.83     No results found for: HGBA1C    Nutritional status:   Body mass index is 24.23 kg/m².  Lab Results   Component Value Date    ALBUMIN 3.1 (L) 06/19/2017    ALBUMIN 3.1 (L) 06/18/2017    ALBUMIN 3.1 (L) 06/17/2017     No " results found for: PREALBUMIN    Estimated Creatinine Clearance: 75.2 mL/min (based on Cr of 0.7).    Accu-Checks  Recent Labs      06/16/17   1752  06/17/17   0109  06/17/17   1841  06/18/17   1159  06/18/17   1642  06/19/17   0036   POCTGLUCOSE  60*  77  76  107  87  104       Current Medications and/or Treatments Impacting Glycemic Control  Immunotherapy:  Immunosuppressants     None        Steroids:   Hormones     Start     Stop Route Frequency Ordered    06/17/17 1712  desmopressin nasal spray 10 mcg      -- Nasl As needed (PRN) 06/17/17 1614    06/12/17 2100  hydrocortisone tablet 5 mg      -- Oral Nightly 06/12/17 1059    06/12/17 1100  hydrocortisone tablet 10 mg      -- Oral Every morning 06/12/17 1059        Pressors:    Autonomic Drugs     None        Hyperglycemia/Diabetes Medications: Antihyperglycemics     None          ASSESSMENT and PLAN    Diabetes insipidus    Post-surgical DI (craniotomy 6/8/2017)    Continue to Match I/O: Agree with either free water or 1/2 NS    DDAVP appears to be resolving, keep only prn DDAVP:   Parameters to give DDAVP IF uop > 250 x 2 consecutive hours AND Spec grav <1.005 or Serum Sodium > 150   Check sodium and sp. [place] Q6   Strict I/O     Addendum: nurse notified me of , then 300cc over two consecutive hrs, Na 146, sp gr 1.000, advised to give DDAVP x1              * Meningioma, recurrent of brain    S/p surgery     Given hypothermia, would also repeat FT4 to assess if progression to hypothyroidism    Will also monitor for signs of SIADH: hyponatremia and high urine sp gr (Uosm>100 or Cat>30)                  Jennifer Hsu MD  Endocrinology  [facility]

## 2017-06-19 NOTE — PLAN OF CARE
Problem: Physical Therapy Goal  Goal: Physical Therapy Goal  Goals to be met by: 17    Patient will increase functional independence with mobility by performin. Supine to sit with Contact Guard Assistance  2. Sit to supine with Contact Guard Assistance  3. Sit to stand transfer with Minimal Assistance  4. Gait  x 25 feet with Minimal Assistance with or without appropriate AD.   5. Lower extremity exercise program x30 reps per handout, with independence     Outcome: Ongoing (interventions implemented as appropriate)  Limited progress achieved today due to drowsiness.

## 2017-06-19 NOTE — PLAN OF CARE
Problem: Patient Care Overview  Goal: Plan of Care Review  General Information Comments: Pt sleeping at time of visit. No family present. K 3.3. Poor PO intake per chart review. Observed meal tray at bedside pt did not eat breakfast.      Recommendations     1. Continue current diet order   2. If poor PO continues order ONS TID.      RD following    Michelle Kaplan RD, LDN

## 2017-06-19 NOTE — PROGRESS NOTES
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  She underwent operation in January 2015 on the [facility] service at [facility].  It   was apparently only possible to get a partial resection of the tumor.  She did   have a followup scan done in 2016 showing a continued large meningioma in this   area.  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  She had a   followup MRI scan done at [facility] on 01/20/17, showing this large tumor.  She   was seen by you in Ophthalmology and then referred to Neurosurgery for further   evaluation.  At this point, she has some preserved vision in the right eye.  She   complains of constant headaches.  She has no sense of smell.  She feels off   balance.  She is taking Neurontin apparently for seizure prevention. Past   medical history relates primarily to the present illness.  She does not have   chronic medical illnesses such as diabetes or hypertension.  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  She is  and has good family support.     On physical examination, she is a well-developed, well-nourished white lady, who   is alert and oriented.  Examination of the head shows a somewhat anterior, but   well-healed bicoronal incision.  She says that this is a little tender in the   left frontal area.  Eyes show full extraocular movements.  The pupils are small,   the left is not reactive.  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  The right optic nerve is probably normal.  She   sees things as doubles.  When I held up two  fingers she counted four, although   she knew that there were two.  Hearing seems preserved.  The neck is supple.  On   neurological examination, she is speaking clearly.  She is obviously anxious   and depressed over her illness.  Finger-to-nose is somewhat diminished on the   left side and gait was mildly unsteady.  Cranial nerves are otherwise intact.    She has normal facial sensation and movement.  The tongue protrudes in the   midline.  Strength in the extremities seems generally good, sensation normal and   deep tendon reflexes symmetrical.     MRI of the brain performed at [facility] on 01/20/17, shows a   previous bifrontal craniotomy.  There is a large lobular meningioma, which   occupies the sella, planum sphenoidale and extends back on to the clivus.  The   optic chiasm and optic nerves are not visualized.  The carotid arteries and   anterior cerebral arteries run done through the tumor.  The tumor elevates and   pushes the third ventricle back, but there is no hydrocephalus    Post-Op Info:  Procedure(s) (LRB):  CRANIOTOMY WITH STEALTH (N/A)   12 Days Post-Op     Interval History:  NAEON.   Denies N/V,  weakness, or seizures.  No new complaints.        Medications:  Continuous Infusions:   sodium chloride 0.45% 75 mL/hr at 06/19/17 1308     Scheduled Meds:   amitriptyline  100 mg Oral Nightly    bisacodyl  10 mg Rectal Daily    cefTRIAXone (ROCEPHIN) IVPB  1 g Intravenous Q24H    gabapentin  600 mg Oral TID    heparin (porcine)  5,000 Units Subcutaneous Q8H    hydrocortisone  10 mg Oral QAM    hydrocortisone  5 mg Oral QHS    nicotine  1 patch Transdermal Daily    pantoprazole  40 mg Oral BID    polyethylene glycol  17 g Oral Daily    ramelteon  8 mg Oral QHS     PRN Meds:acetaminophen, acetaminophen, acetaminophen, desmopressin, dextrose 50%, dextrose 50%, glucagon (human recombinant), glucose, glucose, haloperidol lactate, hydrALAZINE, metoclopramide HCl, metoclopramide HCl  "    Review of Systems  Objective:     Weight: 60.1 kg (132 lb 7.9 oz)  Body mass index is 24.23 kg/m².  Vital Signs (Most Recent):  Temp: 97.4 °F (36.3 °C) (17 1500)  Pulse: 64 (17 1500)  Resp: 18 (17 1500)  BP: 99/61 (17 1500)  SpO2: 95 % (17 1500) Vital Signs (24h Range):  Temp:  [92.6 °F (33.7 °C)-98 °F (36.7 °C)] 97.4 °F (36.3 °C)  Pulse:  [57-82] 64  Resp:  [16-18] 18  SpO2:  [94 %-97 %] 95 %  BP: ()/(57-76) 99/61              Temp (24hrs), Av.6 °F (35.3 °C), Min:92.6 °F (33.7 °C), Max:98 °F (36.7 °C)           Date 17 0700 - 17 0659   Shift 9915-1420 7008-0664 2237-9423 24 Hour Total   I  N  T  A  K  E   Shift Total  (mL/kg)       O  U  T  P  U  T   Urine  (mL/kg/hr) 1875  (3.9) 10  1885    Shift Total  (mL/kg) 1875  (31.2) 10  (0.2)  1885  (31.4)   Weight (kg) 60.1 60.1 60.1 60.1          Urethral Catheter 06/15/17 1205 Latex (Active)   Site Assessment Clean;Intact 2017  8:00 AM   Collection Container Standard drainage bag 2017  8:00 AM   Securement Method secured to top of thigh w/ adhesive device 2017  8:00 AM   Catheter Care Performed yes 2017  8:00 AM   Reason for Continuing Urinary Catheterization Critically ill in ICU requiring intensive monitoring 2017  8:02 AM   CAUTI Prevention Bundle StatLock in place w 1" slack;Intact seal between catheter & drainage tubing;Drainage bag off the floor;Green sheeting clip in use;No dependent loops or kinks;Drainage bag not overfilled (<2/3 full);Drainage bag below bladder 2017  8:00 PM   Output (mL) 10 mL 2017  3:00 PM       Neurosurgery Physical Exam   General: no distress  Neurologic: oriented to person  Head: normocephalic  GCS: Motor: 6/Verbal: 4/Eyes: 4 GCS Total: 14  Cranial nerves:  Pupils NR bilaterally, blind in both eyes, tongue midline  Sensory: response to light touch throughout  Motor Strength: generalized deconditioning in upper and lower extremities, no focal " weakness.  FC x 4   Lungs:  normal respiratory effort  Abdomen: soft, non-tender   Extremities: no cyanosis or edema, or clubbing      Significant Labs:    Recent Labs  Lab 06/19/17  0529  06/19/17  1159   GLU 91  --   --      141  < > 146*   K 3.3*  --   --      --   --    CO2 26  --   --    BUN 9  --   --    CREATININE 0.7  --   --    CALCIUM 9.6  --   --    MG 1.7  --   --    < > = values in this interval not displayed.    Recent Labs  Lab 06/18/17  0415 06/19/17  0529   WBC 9.06 8.10   HGB 13.1 11.4*   HCT 40.6 35.0*    269       Recent Labs  Lab 06/19/17  0529   INR 1.0   APTT 33.4*     Microbiology Results (last 7 days)     Procedure Component Value Units Date/Time    Blood culture [[id number]] Collected:  06/14/17 1156    Order Status:  Completed Specimen:  Blood from Peripheral, Antecubital, Left Updated:  06/19/17 1412     Blood Culture, Routine No growth after 5 days.    Narrative:       Blood cultures from 2 different sites. 4 bottles total.  Please draw before starting antibiotics.    Blood culture [[id number]] Collected:  06/14/17 1517    Order Status:  Completed Specimen:  Blood from Peripheral, Antecubital, Left Updated:  06/18/17 2012     Blood Culture, Routine No Growth to date     Blood Culture, Routine No Growth to date     Blood Culture, Routine No Growth to date     Blood Culture, Routine No Growth to date     Blood Culture, Routine No Growth to date    Narrative:       Blood cultures x 2 different sites. 4 bottles total. Please  draw cultures before administering antibiotics.    Urine culture [[id number]] Collected:  06/15/17 1127    Order Status:  Completed Specimen:  Urine from Urine, Catheterized Updated:  06/16/17 1947     Urine Culture, Routine No growth    Culture, Respiratory with Gram Stain [[id number]]     Order Status:  No result Specimen:  Respiratory               Assessment/Plan:     * Meningioma, recurrent of brain    52 y/o F s/p crani resection olfactory groove  meningioma POD 12  - Pt is neurologically stable  - Appreciate endocrine rec's for DI,  Pt with increased UO today, SG < 1.005.  NA at 146.  Received DDAVP x 1.  Will continue to monitor.    - Continue cortef 10 q am, 5 q pm.    PATI-  continue 1/2 NS. Decrease to 75cc/hr.  On dental soft diet.   - Heme/ID- afebrile. hgb/hct stable.   - DVT ppx- ANA/SCD's. Gi ppx. Sqh.   - Continue daily PT/OT/ST  - Will DC staples on Wednesday   - SW pursuing Rehab placement                 ALANNAH Peña  Neurosurgery  [facility]

## 2017-06-19 NOTE — ASSESSMENT & PLAN NOTE
S/p surgery     Given hypothermia, would also repeat FT4 to assess if progression to hypothyroidism    Will also monitor for signs of SIADH: hyponatremia and high urine sp gr (Uosm>100 or Cat>30)

## 2017-06-19 NOTE — SUBJECTIVE & OBJECTIVE
"Interval HPI:   Overnight events:  Na down to 141, I/O mostly matched 600/800, and UOP <100cc/hr, sp gr fluctuates between 1.010-1.020. Is on 1/2 NS 75cc/hr     Low temp noted, HR stable    On HC 15/5, BP stable.     /75 (BP Location: Right arm, Patient Position: Lying, BP Method: Automatic)   Pulse 60   Temp (!) 94.4 °F (34.7 °C) (Oral)   Resp 18   Ht 5' 2" (1.575 m)   Wt 60.1 kg (132 lb 7.9 oz)   SpO2 96%   Breastfeeding? No   BMI 24.23 kg/m²     Labs Reviewed and Include      Recent Labs  Lab 06/19/17  0529 06/19/17  0757   GLU 91  --    CALCIUM 9.6  --    ALBUMIN 3.1*  --    PROT 6.3  --      141 141   K 3.3*  --    CO2 26  --      --    BUN 9  --    CREATININE 0.7  --    ALKPHOS 87  --    ALT 73*  --    AST 51*  --    BILITOT 0.3  --      Lab Results   Component Value Date    WBC 8.10 06/19/2017    HGB 11.4 (L) 06/19/2017    HCT 35.0 (L) 06/19/2017    MCV 97 06/19/2017     06/19/2017       Recent Labs  Lab 06/15/17  1007   FREET4 0.83     No results found for: HGBA1C    Nutritional status:   Body mass index is 24.23 kg/m².  Lab Results   Component Value Date    ALBUMIN 3.1 (L) 06/19/2017    ALBUMIN 3.1 (L) 06/18/2017    ALBUMIN 3.1 (L) 06/17/2017     No results found for: PREALBUMIN    Estimated Creatinine Clearance: 75.2 mL/min (based on Cr of 0.7).    Accu-Checks  Recent Labs      06/16/17   1752  06/17/17   0109  06/17/17   1841  06/18/17   1159  06/18/17   1642  06/19/17   0036   POCTGLUCOSE  60*  77  76  107  87  104       Current Medications and/or Treatments Impacting Glycemic Control  Immunotherapy:  Immunosuppressants     None        Steroids:   Hormones     Start     Stop Route Frequency Ordered    06/17/17 1712  desmopressin nasal spray 10 mcg      -- Nasl As needed (PRN) 06/17/17 1614    06/12/17 2100  hydrocortisone tablet 5 mg      -- Oral Nightly 06/12/17 1059    06/12/17 1100  hydrocortisone tablet 10 mg      -- Oral Every morning 06/12/17 1059        Pressors:  "   Autonomic Drugs     None        Hyperglycemia/Diabetes Medications: Antihyperglycemics     None

## 2017-06-20 NOTE — ASSESSMENT & PLAN NOTE
-s/p L frontotemporal craniotomy and excision of meningioma on 6/7/17     · Monitor sleep disturbances and establish consistent sleep-wake cycle (lights on and shades open during day and lights dim/off at night).   · Promote environmental modifications to limit agitation and confusion (appropriate lighting, family at bedside, visible clock and calendar, updated white board, reduce noise, limited visitors, clustered nursing care).  · Monitor for bowel and bladder dysfunction.   · Encourage mobility, OOB in chair at least 3 hours per day, and ambulation.  · Reorient patient to person, place, time, and situation on each encounter.   · PT/OT evaluate and treat.  · SLP speech and cognitive evaluate and treat.  · If possible, avoid restraints.  May benefit from 24/7 supervision by a sitter.   · Avoid/limit medications that may worsen delirium.  Such as benzodiazepines, antihistamines, anticholinergics, hypnotics, and opiates.

## 2017-06-20 NOTE — SUBJECTIVE & OBJECTIVE
"Interval HPI:   Overnight events:  Required 1 dose DDAVP yesterday for increased UOP/dilute, and rise in Na.   Now Na 145, I/O mostly matched 1994/2213, and UOP ranging 10-60cc/hr, sp gr 1.010     Low temp noted, HR 60-80, FT4 lab ordered as add-on     On HC 15/5, BP stable.    /66 (BP Location: Right arm, Patient Position: Lying, BP Method: Automatic)   Pulse 68   Temp (!) 93.4 °F (34.1 °C) (Oral)   Resp 18   Ht 5' 2" (1.575 m)   Wt 60.1 kg (132 lb 7.9 oz)   SpO2 96%   Breastfeeding? No   BMI 24.23 kg/m²     Labs Reviewed and Include      Recent Labs  Lab 06/20/17  0409   *   CALCIUM 9.5   ALBUMIN 2.9*   PROT 6.3     145   K 3.7   CO2 30*      BUN 9   CREATININE 0.7   ALKPHOS 86   ALT 67*   AST 44*   BILITOT 0.3     Lab Results   Component Value Date    WBC 11.67 06/20/2017    HGB 11.7 (L) 06/20/2017    HCT 35.4 (L) 06/20/2017    MCV 97 06/20/2017     06/20/2017       Recent Labs  Lab 06/15/17  1007   FREET4 0.83     No results found for: HGBA1C    Nutritional status:   Body mass index is 24.23 kg/m².  Lab Results   Component Value Date    ALBUMIN 2.9 (L) 06/20/2017    ALBUMIN 3.1 (L) 06/19/2017    ALBUMIN 3.1 (L) 06/18/2017     No results found for: PREALBUMIN    Estimated Creatinine Clearance: 75.2 mL/min (based on Cr of 0.7).    Accu-Checks  Recent Labs      06/17/17   1841  06/18/17   1159  06/18/17   1642  06/19/17   0036  06/19/17   1125  06/19/17   1705  06/20/17   0056  06/20/17   0558   POCTGLUCOSE  76  107  87  104  91  120*  88  88       Current Medications and/or Treatments Impacting Glycemic Control  Immunotherapy:  Immunosuppressants     None        Steroids:   Hormones     Start     Stop Route Frequency Ordered    06/17/17 1712  desmopressin nasal spray 10 mcg      -- Nasl As needed (PRN) 06/17/17 1614    06/12/17 2100  hydrocortisone tablet 5 mg      -- Oral Nightly 06/12/17 1059    06/12/17 1100  hydrocortisone tablet 10 mg      -- Oral Every morning " 06/12/17 1059        Pressors:    Autonomic Drugs     None        Hyperglycemia/Diabetes Medications: Antihyperglycemics     None

## 2017-06-20 NOTE — SUBJECTIVE & OBJECTIVE
Past Medical History:   Diagnosis Date    Allergy     Basal cell carcinoma     Depression 11/1/2016    Essential hypertension 6/9/2017    Eye disorder     Fever blister     Normocytic anemia 6/9/2017     Past Surgical History:   Procedure Laterality Date    BASAL CELL CARCINOMA EXCISION      forehead    BRAIN SURGERY      SKIN BIOPSY       Review of patient's allergies indicates:   Allergen Reactions    Codeine        Scheduled Medications:    amitriptyline  100 mg Oral Nightly    bisacodyl  10 mg Rectal Daily    cefTRIAXone (ROCEPHIN) IVPB  1 g Intravenous Q24H    gabapentin  600 mg Oral TID    heparin (porcine)  5,000 Units Subcutaneous Q8H    hydrocortisone  10 mg Oral QAM    hydrocortisone  5 mg Oral QHS    nicotine  1 patch Transdermal Daily    pantoprazole  40 mg Oral BID    polyethylene glycol  17 g Oral Daily    ramelteon  8 mg Oral QHS       PRN Medications: acetaminophen, acetaminophen, acetaminophen, desmopressin, dextrose 50%, dextrose 50%, glucagon (human recombinant), glucose, glucose, haloperidol lactate, hydrALAZINE, metoclopramide HCl, metoclopramide HCl    Family History     Problem Relation (Age of Onset)    Diabetes Father    Hepatitis Father    Hypertension Father    No Known Problems Mother        Social History Main Topics    Smoking status: Current Every Day Smoker     Packs/day: 1.00     Years: 40.00     Types: Cigarettes    Smokeless tobacco: Never Used    Alcohol use No    Drug use: No    Sexual activity: No     Review of Systems   Reason unable to perform ROS: 2/2 mental status.     Objective:     Vital Signs (Most Recent):  Temp: (!) 94 °F (34.4 °C) (06/20/17 1215)  Pulse: 71 (06/20/17 1215)  Resp: 18 (06/20/17 1215)  BP: 107/73 (06/20/17 1215)  SpO2: 96 % (06/20/17 1215)    Vital Signs (24h Range):  Temp:  [93.4 °F (34.1 °C)-97.4 °F (36.3 °C)] 94 °F (34.4 °C)  Pulse:  [64-80] 71  Resp:  [16-18] 18  SpO2:  [94 %-96 %] 96 %  BP: ()/(61-73) 107/73     Body  mass index is 24.23 kg/m².    Physical Exam   Constitutional: She appears well-developed and well-nourished.   HENT:   Sutures noted throughout scalp   Eyes: Pupils are equal, round, and reactive to light.   Neck: Normal range of motion.   Cardiovascular: Normal rate and regular rhythm.    Pulmonary/Chest: Effort normal. No respiratory distress.   Abdominal: Soft. She exhibits no distension.   Musculoskeletal: She exhibits no deformity.   Neurological:   Neurologic:  -  Mental Status:  Awake, alert and oriented to person.  Follows a few commands with multiple verbal cues.    -  Speech and language:  + aphasia  -  Coordination:  Finger to nose exam:  RUE dysmetria, LUE dysmetria.  Heel to shin:  RLE abnormal, LLE abnormal.   -  Motor:  RUE: 4/5.  LUE: 4/5.  RLE: 5/5.  LLE: 5/5.   -pronator drift. Unable to assess due to patient participation.   -  Tone:  normal  -  Sensory:  Intact to light touch and pin prick.   Skin: Skin is warm and dry.   Psychiatric: Cognition and memory are impaired.   Flat affect       Diagnostic Results:   Labs: Reviewed  CT: Reviewed  MRI: Reviewed  EEG: reviewed

## 2017-06-20 NOTE — PLAN OF CARE
Problem: SLP Goal  Goal: SLP Goal  Speech Language Pathology Goals  Updated goals expected to be met by 6/23    1. Pt will tolerate dental soft diet/nectar thick liquids without overt s/s of aspiration  2. Pt will tolerate thin liquids trials x10 without overt s/s of aspiration  3. Pt will complete dysphagia exercises x10 reps with min cues  4. Pt will following single step directions with 75% acc with min cues to enhance comprehension   5. Pt will complete basic problem solving tasks with 75% acc with min cues to enhance safety awareness   6. Pt will demonstrate orientation x3 to enhance cognition   7. Pt will participate in ongoing assessment of speech language and cognitive skills to rule out and deficits and establish goals of care              Pt with inconsistent progress towards goals     Sharla Trevino MS, CCC-SLP  Speech Language Pathologist  Pager: [phone]  Date 6/20/2017

## 2017-06-20 NOTE — CONSULTS
PM&R consult received.    Reason for consult:  Debility     Reviewed patient history and current admission.  Full consult to follow.    SARWAT Lauren, FNP-C  Physical Medicine & Rehabilitation   06/20/2017  Spectralink: [id number]

## 2017-06-20 NOTE — HOSPITAL COURSE
6/13/17: Evaluated by therapy. Bed mobility Judy-MaxA.  Sit to stand Judy.  Unable to assess ambulation. LBD TotalA.  6/161/7: SLP evaluation. Cognitive-linguistic impairments noted with dysphagia. Dental soft and nectar thick liquids.   6/20/17: Participating with OT therapy. Bed mobility Judy-ModA & RW.  Sit to stand Judy & RW.  Ambulation-4 small side steps with ModA and RW..   6/21/17: Participating with therapy. Bed mobility Judy-ModA.  Sit to stand Judy & RW.  Ambulated side step with Judy & RW and 4 steps with CGA.  UBD MaxA.  6/22/17: Participating with therapy. Bed mobility Judy-MaxA.  Sit to stand Judy.  Marched in place BLE x 10 reps with ModA.  UBD ModA-MaxA.   6/22/17: SLP diet recommendation dental soft and nectar thick. Dysphagia and cognitive-linguistic impairments noted.   6/25/17: Participated with therapy;however, had difficulty following commands. Bed mobility ModA-MaxA.  Sit to stand Judy  Ambulated attempted lateral L steps, but unsuccessful 2/2 difficulty following commands.

## 2017-06-20 NOTE — SUBJECTIVE & OBJECTIVE
Interval History:  NAEON.  More awake today.   Denies N/V, weakness, or seizures.        Medications:  Continuous Infusions:   sodium chloride 0.45% 75 mL/hr at 17 1308     Scheduled Meds:   amitriptyline  100 mg Oral Nightly    bisacodyl  10 mg Rectal Daily    cefTRIAXone (ROCEPHIN) IVPB  1 g Intravenous Q24H    gabapentin  600 mg Oral TID    heparin (porcine)  5,000 Units Subcutaneous Q8H    hydrocortisone  10 mg Oral QAM    hydrocortisone  5 mg Oral QHS    nicotine  1 patch Transdermal Daily    pantoprazole  40 mg Oral BID    polyethylene glycol  17 g Oral Daily    ramelteon  8 mg Oral QHS     PRN Meds:acetaminophen, acetaminophen, acetaminophen, desmopressin, dextrose 50%, dextrose 50%, glucagon (human recombinant), glucose, glucose, haloperidol lactate, hydrALAZINE, metoclopramide HCl, metoclopramide HCl     Review of Systems  Objective:     Weight: 60.1 kg (132 lb 7.9 oz)  Body mass index is 24.23 kg/m².  Vital Signs (Most Recent):  Temp: (!) 94 °F (34.4 °C) (17 1215)  Pulse: 71 (17 1215)  Resp: 18 (17 1215)  BP: 107/73 (17 1215)  SpO2: 96 % (17 1215) Vital Signs (24h Range):  Temp:  [93.4 °F (34.1 °C)-97.4 °F (36.3 °C)] 94 °F (34.4 °C)  Pulse:  [64-80] 71  Resp:  [16-18] 18  SpO2:  [94 %-96 %] 96 %  BP: ()/(61-73) 107/73              Temp (24hrs), Av.1 °F (35.1 °C), Min:93.4 °F (34.1 °C), Max:97.4 °F (36.3 °C)           Date 17 0700 - 17 0659   Shift 3809-9241 9110-9529 6902-8802 24 Hour Total   I  N  T  A  K  E   P.O. 480   480    I.V.  (mL/kg) 525  (8.7)   525  (8.7)    IV Piggyback 50   50    Shift Total  (mL/kg) 1055  (17.6)   1055  (17.6)   O  U  T  P  U  T   Urine  (mL/kg/hr) 490   490    Shift Total  (mL/kg) 490  (8.2)   490  (8.2)   Weight (kg) 60.1 60.1 60.1 60.1          Urethral Catheter 06/15/17 1205 Latex (Active)   Site Assessment Clean;Intact 2017  8:00 AM   Collection Container Standard drainage bag 2017  8:00 AM  "  Securement Method secured to top of thigh w/ adhesive device 6/20/2017  8:00 AM   Catheter Care Performed yes 6/20/2017  8:00 AM   Reason for Continuing Urinary Catheterization Critically ill in ICU requiring intensive monitoring 6/20/2017  8:00 AM   CAUTI Prevention Bundle StatLock in place w 1" slack;Intact seal between catheter & drainage tubing;Drainage bag off the floor;Green sheeting clip in use;No dependent loops or kinks;Drainage bag not overfilled (<2/3 full);Drainage bag below bladder 6/19/2017 10:00 PM   Output (mL) 75 mL 6/20/2017  2:00 PM       Neurosurgery Physical Exam     General: no distress  Neurologic: oriented to person, year  Head: normocephalic  GCS: Motor: 6/Verbal: 4/Eyes: 4 GCS Total: 14  Cranial nerves:  Pupils NR bilaterally, blind in both eyes, tongue midline  Sensory: response to light touch throughout  Motor Strength: generalized deconditioning in upper and lower extremities, no focal weakness.  FC x 4   Lungs:  normal respiratory effort  Abdomen: soft, non-tender   Extremities: no cyanosis or edema, or clubbing      Significant Labs:    Recent Labs  Lab 06/20/17  0409 06/20/17  1036   *  --      145 145   K 3.7  --      --    CO2 30*  --    BUN 9  --    CREATININE 0.7  --    CALCIUM 9.5  --    MG 1.8  --        Recent Labs  Lab 06/19/17  0529 06/20/17  0409   WBC 8.10 11.67   HGB 11.4* 11.7*   HCT 35.0* 35.4*    279       Recent Labs  Lab 06/20/17  0409   INR 1.0   APTT 27.6     Microbiology Results (last 7 days)     Procedure Component Value Units Date/Time    Blood culture [[id number]] Collected:  06/14/17 1517    Order Status:  Completed Specimen:  Blood from Peripheral, Antecubital, Left Updated:  06/19/17 2012     Blood Culture, Routine No growth after 5 days.    Narrative:       Blood cultures x 2 different sites. 4 bottles total. Please  draw cultures before administering antibiotics.    Blood culture [[id number]] Collected:  06/14/17 1156    Order " Status:  Completed Specimen:  Blood from Peripheral, Antecubital, Left Updated:  06/19/17 1412     Blood Culture, Routine No growth after 5 days.    Narrative:       Blood cultures from 2 different sites. 4 bottles total.  Please draw before starting antibiotics.    Urine culture [[id number]] Collected:  06/15/17 1127    Order Status:  Completed Specimen:  Urine from Urine, Catheterized Updated:  06/16/17 1947     Urine Culture, Routine No growth    Culture, Respiratory with Gram Stain [[id number]]     Order Status:  No result Specimen:  Respiratory

## 2017-06-20 NOTE — PROGRESS NOTES
"[facility]  Endocrinology  Progress Note    Admit Date: 6/7/2017     Reason for Consult: Diabetes insipidus     Surgical Procedure and Date:   CRANIOTOMY 6/8/2017        HPI:   Patient is a 51 y.o. female with a diagnosis of  meningioma s/p resection (6/7). 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  She underwent partial resection of the tumor in January 2015 at [facility]. Followup scan done in 2016 showing a continued large meningioma.     Endocrinology consulted for post operative DI management.  Per the operative report - The pituitary stalk was identified and could be preserved      Interval HPI:   Overnight events:  Required 1 dose DDAVP yesterday for increased UOP/dilute, and rise in Na.   Now Na 145, I/O mostly matched 1994/2213, and UOP ranging 10-60cc/hr, sp gr 1.010     Low temp noted, HR 60-80, FT4 lab ordered as add-on     On HC 15/5, BP stable.    /66 (BP Location: Right arm, Patient Position: Lying, BP Method: Automatic)   Pulse 68   Temp (!) 93.4 °F (34.1 °C) (Oral)   Resp 18   Ht 5' 2" (1.575 m)   Wt 60.1 kg (132 lb 7.9 oz)   SpO2 96%   Breastfeeding? No   BMI 24.23 kg/m²       Labs Reviewed and Include      Recent Labs  Lab 06/20/17  0409   *   CALCIUM 9.5   ALBUMIN 2.9*   PROT 6.3     145   K 3.7   CO2 30*      BUN 9   CREATININE 0.7   ALKPHOS 86   ALT 67*   AST 44*   BILITOT 0.3     Lab Results   Component Value Date    WBC 11.67 06/20/2017    HGB 11.7 (L) 06/20/2017    HCT 35.4 (L) 06/20/2017    MCV 97 06/20/2017     06/20/2017       Recent Labs  Lab 06/15/17  1007   FREET4 0.83     No results found for: HGBA1C    Nutritional status:   Body mass index is 24.23 kg/m².  Lab Results   Component Value Date    ALBUMIN 2.9 (L) 06/20/2017    ALBUMIN 3.1 (L) 06/19/2017    ALBUMIN 3.1 (L) 06/18/2017     No results found for: " PREALBUMIN    Estimated Creatinine Clearance: 75.2 mL/min (based on Cr of 0.7).    Accu-Checks  Recent Labs      06/17/17   1841  06/18/17   1159  06/18/17   1642  06/19/17   0036  06/19/17   1125  06/19/17   1705  06/20/17   0056  06/20/17   0558   POCTGLUCOSE  76  107  87  104  91  120*  88  88       Current Medications and/or Treatments Impacting Glycemic Control  Immunotherapy:  Immunosuppressants     None        Steroids:   Hormones     Start     Stop Route Frequency Ordered    06/17/17 1712  desmopressin nasal spray 10 mcg      -- Nasl As needed (PRN) 06/17/17 1614    06/12/17 2100  hydrocortisone tablet 5 mg      -- Oral Nightly 06/12/17 1059    06/12/17 1100  hydrocortisone tablet 10 mg      -- Oral Every morning 06/12/17 1059        Pressors:    Autonomic Drugs     None        Hyperglycemia/Diabetes Medications: Antihyperglycemics     None          ASSESSMENT and PLAN    Diabetes insipidus    Post-surgical DI (craniotomy 6/8/2017)    Continue to Match I/O: Agree with 1/2 NS    keep only prn DDAVP:   Parameters to give DDAVP IF uop > 250 x 2 consecutive hours AND Spec grav <1.005 or Serum Sodium > 150   Check sodium and sp. [place] Q6   Strict I/O               Adverse effect of glucocorticoid or synthetic analogue    Can increase BG, currently staBLE without insulin requirements         * Meningioma, recurrent of brain    S/p surgery     Given hypothermia, would also repeat FT4 (ordered as add-on) to assess if progression to hypothyroidism and if so will need to start LT4 depending on level    Continue HC 10/5, clinically without s/s AI. Recheck 8am cortisol/acth o/p to decide if longterm repletion needed.    Will also monitor for signs of SIADH: hyponatremia and high urine sp gr (Uosm>100 or Cat>30)                  Jennifer Hsu MD  Endocrinology  [facility]

## 2017-06-20 NOTE — PLAN OF CARE
Problem: Physical Therapy Goal  Goal: Physical Therapy Goal  Goals to be met by: 17    Patient will increase functional independence with mobility by performin. Supine to sit with Contact Guard Assistance  2. Sit to supine with Contact Guard Assistance  3. Sit to stand transfer with Minimal Assistance  4. Gait  x 25 feet with Minimal Assistance with or without appropriate AD.   5. Lower extremity exercise program x30 reps per handout, with independence     Outcome: Ongoing (interventions implemented as appropriate)  No goals met this visit; continue current POC.     Melissa Watkins PT, DPT   2017  Pager: [phone]

## 2017-06-20 NOTE — ASSESSMENT & PLAN NOTE
S/p surgery     Low normal TSH and FT4 but clinically not mary, no longer hypothermic; continue lt4 50mcg daily and will repeat tfts o/p to assess if longterm replacement needed     Continue HC 10/5, clinically without s/s AI. Will check 8am cortisol/acth o/p to decide if longterm repletion needed.    Will also monitor for signs of SIADH: hyponatremia and high urine sp gr (Uosm>100 or Cat>30)    Will schedule f/u in pituitary clinic within 4 weeks post discharge with above labs

## 2017-06-20 NOTE — ASSESSMENT & PLAN NOTE
Post-surgical DI (craniotomy 6/8/2017)    Continue to Match I/O: Agree with 1/2 NS    keep only prn DDAVP:   Parameters to give DDAVP IF uop > 250 x 2 consecutive hours AND Spec grav <1.005 or Serum Sodium > 150   Check sodium and sp. [place] Q6   Strict I/O

## 2017-06-20 NOTE — PT/OT/SLP PROGRESS
Occupational Therapy  Treatment    Joanna Morales   MRN: [id number]   Admitting Diagnosis: Meningioma, recurrent of brain    OT Date of Treatment: 06/20/17   OT Start Time: 0850  OT Stop Time: 0926  OT Total Time (min): 36 min    Billable Minutes:  Therapeutic Activity 28    General Precautions: Standard, aspiration, fall  Orthopedic Precautions: N/A  Braces: N/A    Do you have any cultural, spiritual, Adventism conflicts, given your current situation?: None reported    Subjective:  Communicated with RN prior to session.  OT spoke with PA at end of session to discuss pt's performance during session.  Throughout session pt discussed people, objects, and situations that were not present in room.      Pain/Comfort  Pain Rating 1:  (Pt stated she had pain everywhere and that it was an 11,12, or 13)  Pain Addressed 1: Reposition, Pre-medicate for activity  Pain Rating Post-Intervention 1: 10/10    Objective:  Patient found with: bed alarm, telemetry, peripheral IV, pulse ox (continuous), garcía catheter.  Family present (asleep) during session.      Functional Mobility:  Bed Mobility:  Rolling/Turning to Left: Minimum assistance; OT assisted with guiding pt's right hand to bedrail to assist with rolling  Scooting/Bridging:   -Contact Guard Assistance towards EOB x 6 trials  -CGA for scooting towards HOB.  At end of session pt returned to supine position.  OT assisted pt with bending both knees then placeced her hands on the bedrails to help pt propel body upwards towards HOB x 4 trials.  Supine to Sit: Moderate Assistance  Sit to Supine: Minimum Assistance (for LE management)    Transfers:  Sit <> Stand Assistance: Minimum Assistance x 2 trials from EOB; severe posterior lean noted  Sit <> Stand Assistive Device: Rolling Walker    Functional Ambulation: Pt unable to take steps this date    Activities of Daily Living:    Grooming Position: Standing in front of bed  Grooming Level of Assistance: Contact guard assistance  for simulating task.  Pt declined using actual cloth but was able to mimic the movements when asked.    Balance:   Static Sit: FAIR+: Able to take MINIMAL challenges from all directions  Dynamic Sit: FAIR: Cannot move trunk without losing balance  Static Stand: POOR+: Needs MINIMAL assist to maintain  Dynamic stand: 0: N/A    Therapeutic Activities and Exercises:  *OT asked orientation questions to assess cognition of pt; pt stated she was at her sisters house, the month was February, and the year was 2000.  Daily orientation provided.  Throughout session OT asked questions again with pt unable to provide accurate response.    *Pt performed bed mobility with increased time and cues.  *Pt sat EOB for ~24 minutes with SBA-CGA given to maintain upright posture.  Posterior lean noted with cues given to remind pt to place both feet on ground.    *Pt practiced sit <> stand transfers.  Max Cues required 2* pt took hands off of RW to stretch twice.  OT assisted pt with grasping onto handles on RW.  Severe posterior lean noted with pt unable to correct posture on her own.  OT facilitated pt with shifting weight from left to right; poor follow through.  *Pt performed 5 UE exercises to address endurance needed for ADLs: 2 sets x 10 reps for each exercise.  Final exercise pt performed only 1 set x 10 reps 2* not feeling well.  During exercises pt leaned back with both feet in the air.  OT discussed importance of placing both feet on ground to assist with maintaining balance and achieving full upright position.   -shoulder flexion  -chest press  -bicep curls  -forward rows  -backward rows  *Pt bent legs and grasped handrails to propel body upwards towards HOB while in supine position x 4 trials.  Cues given to assist with movement.  *POC reviewed with pt; reinforcement needed.    AM-PAC 6 CLICK ADL   How much help from another person does this patient currently need?   1 = Unable, Total/Dependent Assistance  2 = A lot,  "Maximum/Moderate Assistance  3 = A little, Minimum/Contact Guard/Supervision  4 = None, Modified [place]/Independent    Putting on and taking off regular lower body clothing? : 2  Bathing (including washing, rinsing, drying)?: 2  Toileting, which includes using toilet, bedpan, or urinal? : 2  Putting on and taking off regular upper body clothing?: 2  Taking care of personal grooming such as brushing teeth?: 3  Eating meals?: 2  Total Score: 13     AM-PAC Raw Score CMS "G-Code Modifier Level of Impairment Assistance   6 % Total / Unable   7 - 8 CM 80 - 100% Maximal Assist   9-13 CL 60 - 80% Moderate Assist   14 - 19 CK 40 - 60% Moderate Assist   20 - 22 CJ 20 - 40% Minimal Assist   23 CI 1-20% SBA / CGA   24 CH 0% Independent/ Mod I       Patient left supine with all lines intact, call button in reach, bed alarm on and PA and family present    ASSESSMENT:  Joanna Morales is a 51 y.o. female with a medical diagnosis of Meningioma, recurrent of brain and presents with decreased endurance, poor attention, impaired cognition, gait instability, pain, poor safety awareness, blindness, and decreased balance impacting performance with ADLs and mobility.  Pt more alert this date and was agreeable to participating in therapy with encouragement.  Pt demonstrated improvement with scooting this date towards EOB and HOB with CGA.  Pt able to simulate washing face with cloth while standing with SBA for task and Min A required to maintain seated balance.  Pt appeared to be having some hallucinations throughout session requiring max redirection and cues to maintain attention and engage in therapy.  Pt is slowly making progress towards goals and would continue to benefit from skilled OT services to address problems listed below and increase independence with ADLs.    Rehab identified problem list/impairments: Rehab identified problem list/impairments: weakness, gait instability, impaired functional mobilty, impaired " balance, impaired self care skills, decreased lower extremity function, pain, decreased safety awareness, impaired cognition, impaired coordination    Rehab potential is good.    Activity tolerance: Good    Discharge recommendations: Discharge Facility/Level Of Care Needs: rehabilitation facility     Barriers to discharge: Barriers to Discharge: Inaccessible home environment, Decreased caregiver support    Equipment recommendations:  (TBD pending progress)     GOALS:    Occupational Therapy Goals        Problem: Occupational Therapy Goal    Goal Priority Disciplines Outcome Interventions   Occupational Therapy Goal     OT, PT/OT     Description:  Goals to be met by: 6/27/2017     Patient will increase functional independence with ADLs by performing:    UE Dressing with Minimal Assistance.  LE Dressing with Minimal Assistance.  Grooming while standing with Minimal Assistance.  Toileting from toilet with Minimal Assistance for hygiene and clothing management.   Rolling to Bilateral with Contact Guard Assistance.    Supine to sit with Contact Guard Assistance.  Stand pivot transfers with Minimal Assistance.  Toilet transfer to toilet with Minimal Assistance.                       Plan:  Patient to be seen 5 x/week to address the above listed problems via self-care/home management, therapeutic activities, therapeutic exercises  Plan of Care expires: 07/13/17  Plan of Care reviewed with: patient         HELEN Lopes  06/20/2017

## 2017-06-20 NOTE — CONSULTS
[facility]  Physical Medicine & Rehab  Consult Note    Patient Name: Joanna Morales  MRN: [id number]  Admission Date: 6/7/2017  Hospital Length of Stay: 13 days  Collaborating Physician : Marek Mclain MD    Consults  Subjective:     Principal Problem: Meningioma, recurrent of brain    HPI: Joanna Morales is a 51-year-old female with PMHx of depression, L eye vision loss, basal cell carcinoma, and meningioma (s/p partial resection at [facility] Jan. 2015). She was followed in [facility] clinic by Dr. Chew 5/17 and admitted [facility] on 6/7/17 for surgical intervention.  Now s/p L frontotemporal craniotomy and excision of meningioma with a drain (on Rocephin for ppx). Post-op CTH concerning for small postoperative hemorrhage; no intervention necessary. Hospital course was further complicated by pneumocephalus, DI, hypernatremia. hypotension, hypothermia, and encephalopathy. EEG shows slowing suggestive of encephalopathy without evidence of seizures. Repeat CTH showing improvement and stepped down to the floor on 6/16.       Functional History: Per the son, patient lives in [place] with cousin in a trailor home with 4 steps to enter.  Prior to admission, she was independent with ADLs and used a RW and cane for household and ambulation.  DME: RW & SC.      Hospital Course:   6/13/17: Evaluated by therapy. Bed mobility Judy-MaxA.  Sit to stand Judy.  Unable to assess ambulation. LBD TotalA.  6/161/7: SLP evaluation. Cognitive-linguistic impairments noted with dysphagia. Dental soft and nectar thick liquids.   6/20/17: Participating with OT therapy. Bed mobility Judy-ModA & RW.  Sit to stand Judy & RW.  Unable to assess ambulation.     Past Medical History:   Diagnosis Date    Allergy     Basal cell carcinoma     Depression 11/1/2016    Essential hypertension 6/9/2017    Eye disorder     Fever blister     Normocytic anemia 6/9/2017     Past Surgical History:   Procedure Laterality Date    BASAL CELL  CARCINOMA EXCISION      forehead    BRAIN SURGERY      SKIN BIOPSY       Review of patient's allergies indicates:   Allergen Reactions    Codeine        Scheduled Medications:    amitriptyline  100 mg Oral Nightly    bisacodyl  10 mg Rectal Daily    cefTRIAXone (ROCEPHIN) IVPB  1 g Intravenous Q24H    gabapentin  600 mg Oral TID    heparin (porcine)  5,000 Units Subcutaneous Q8H    hydrocortisone  10 mg Oral QAM    hydrocortisone  5 mg Oral QHS    nicotine  1 patch Transdermal Daily    pantoprazole  40 mg Oral BID    polyethylene glycol  17 g Oral Daily    ramelteon  8 mg Oral QHS       PRN Medications: acetaminophen, acetaminophen, acetaminophen, desmopressin, dextrose 50%, dextrose 50%, glucagon (human recombinant), glucose, glucose, haloperidol lactate, hydrALAZINE, metoclopramide HCl, metoclopramide HCl    Family History     Problem Relation (Age of Onset)    Diabetes Father    Hepatitis Father    Hypertension Father    No Known Problems Mother        Social History Main Topics    Smoking status: Current Every Day Smoker     Packs/day: 1.00     Years: 40.00     Types: Cigarettes    Smokeless tobacco: Never Used    Alcohol use No    Drug use: No    Sexual activity: No     Review of Systems   Reason unable to perform ROS: 2/2 mental status.     Objective:     Vital Signs (Most Recent):  Temp: (!) 94 °F (34.4 °C) (06/20/17 1215)  Pulse: 71 (06/20/17 1215)  Resp: 18 (06/20/17 1215)  BP: 107/73 (06/20/17 1215)  SpO2: 96 % (06/20/17 1215)    Vital Signs (24h Range):  Temp:  [93.4 °F (34.1 °C)-97.4 °F (36.3 °C)] 94 °F (34.4 °C)  Pulse:  [64-80] 71  Resp:  [16-18] 18  SpO2:  [94 %-96 %] 96 %  BP: ()/(61-73) 107/73     Body mass index is 24.23 kg/m².    Physical Exam   Constitutional: She appears well-developed and well-nourished.   HENT:   Sutures noted throughout scalp   Eyes: Pupils are equal, round, and reactive to light.   Neck: Normal range of motion.   Cardiovascular: Normal rate and  regular rhythm.    Pulmonary/Chest: Effort normal. No respiratory distress.   Abdominal: Soft. She exhibits no distension.   Musculoskeletal: She exhibits no deformity.   Neurological:   Neurologic:  -  Mental Status:  Awake, alert and oriented to person.  Follows a few commands with multiple verbal cues.    -  Speech and language:  + aphasia  -  Coordination:  Finger to nose exam:  RUE dysmetria, LUE dysmetria.  Heel to shin:  RLE abnormal, LLE abnormal.   -  Motor:  RUE: 4/5.  LUE: 4/5.  RLE: 5/5.  LLE: 5/5.   -pronator drift. Unable to assess due to patient participation.   -  Tone:  normal  -  Sensory:  Intact to light touch and pin prick.   Skin: Skin is warm and dry.   Psychiatric: Cognition and memory are impaired.   Flat affect       Diagnostic Results:   Labs: Reviewed  CT: Reviewed  MRI: Reviewed  EEG: reviewed    Assessment/Plan:     S/P craniotomy    -[facility] planning to removal sutures on Wednesday  -see Meningioma         Restlessness and agitation    -encephalopathy throughout admission   -EEG negative for seizures  -Haldol PRN         Pneumocephalus    -repeat CHT showed improving         Diabetes insipidus    -Endocrine following         * Meningioma, recurrent of brain    -s/p L frontotemporal craniotomy and excision of meningioma on 6/7/17     · Monitor sleep disturbances and establish consistent sleep-wake cycle (lights on and shades open during day and lights dim/off at night).   · Promote environmental modifications to limit agitation and confusion (appropriate lighting, family at bedside, visible clock and calendar, updated white board, reduce noise, limited visitors, clustered nursing care).  · Monitor for bowel and bladder dysfunction.   · Encourage mobility, OOB in chair at least 3 hours per day, and ambulation.  · Reorient patient to person, place, time, and situation on each encounter.   · PT/OT evaluate and treat.  · SLP speech and cognitive evaluate and treat.  · If possible, avoid  restraints.  May benefit from 24/7 supervision by a sitter.   · Avoid/limit medications that may worsen delirium.  Such as benzodiazepines, antihistamines, anticholinergics, hypnotics, and opiates.                Will follow patient's progress and discuss with rehab team for rehab recommendations.       Thank you for your consult.     Quita Altamirano NP  Department of Physical Medicine & Rehab  [facility]

## 2017-06-20 NOTE — PLAN OF CARE
Problem: Occupational Therapy Goal  Goal: Occupational Therapy Goal  Goals to be met by: 6/27/2017     Patient will increase functional independence with ADLs by performing:    UE Dressing with Minimal Assistance.  LE Dressing with Minimal Assistance.  Grooming while standing with Minimal Assistance.  Toileting from toilet with Minimal Assistance for hygiene and clothing management.   Rolling to Bilateral with Contact Guard Assistance.    Supine to sit with Contact Guard Assistance.  Stand pivot transfers with Minimal Assistance.  Toilet transfer to toilet with Minimal Assistance.       POC remains appropriate.    HELEN Lopes  6/20/2017

## 2017-06-20 NOTE — PLAN OF CARE
[facility] denied suggesting SNF.  Patient has Medicaid with no SNF benefits.  MSW spoke to EDUARDO Jacobs to place [facility] consult.    Selina Chew LMSW  [id number]

## 2017-06-20 NOTE — PT/OT/SLP PROGRESS
"Speech Language Pathology  Treatment    Joanna Morales   MRN: [id number]   Admitting Diagnosis: Meningioma, recurrent of brain    Diet recommendations: Solid Diet Level: Dental Soft  Liquid Diet Level: Nectar Thick Feed only when awake/alert, HOB to 90 degrees, 1 bite/sip at a time, Check for pocketing/oral residue, Meds crushed in puree, Eliminate distractions, Avoid talking while eating, Assistance with meals and Assistance with thickening liquids    SLP Treatment Date: 06/20/17  Speech Start Time: 1100     Speech Stop Time: 1118     Speech Total (min): 18 min       TREATMENT BILLABLE MINUTES:  Speech Therapy Individual 8 and Treatment Swallowing Dysfunction 10    Has the patient been evaluated by SLP for swallowing? : Yes  Keep patient NPO?: No   General Precautions: Standard, aspiration, fall          Subjective:  "I have to reach at that table"     Pain/Comfort  Pain Rating 1: 0/10 (Pt did not rate ; NAD )  Pain Rating Post-Intervention 1: 0/10 (Pt did not rate; NAD )    Objective:     Pt oriented to self only. Pt reporting "at the meeting" when attempts to orient to location. SLP attempted to re-orient Pt with max verbal cueing and binary choice with limited success. Pt with poor attention and warranting moderate verbal cueing and re-direction to maintain attention to tasks.  Divergent category naming tasks attempted Pt provided 4 items independently. Often times items did not appropriately belong in category and verbalizations were unintelligible.  RN  Appropriately thickened liquids to nectar thick consistency. Pt attempted to feed yet warranted Knik assistance to bring cup to lips. Pt accepted ~ 6oz of nectar thick liquids via open cups without overt s/s of aspiration. Pt to remain on nectar thick liquids. Speech to continue to follow.       Assessment:  Joanna Morales is a 51 y.o. female with a medical diagnosis of Meningioma, recurrent of brain and presents with cognitive linguistic impairments. "     Discharge recommendations: Discharge Facility/Level Of Care Needs: rehabilitation facility     Goals:    SLP Goals        Problem: SLP Goal    Goal Priority Disciplines Outcome   SLP Goal     SLP    Description:  Speech Language Pathology Goals  Updated goals expected to be met by 6/23    1. Pt will tolerate dental soft diet/nectar thick liquids without overt s/s of aspiration  2. Pt will tolerate thin liquids trials x10 without overt s/s of aspiration  3. Pt will complete dysphagia exercises x10 reps with min cues  4. Pt will following single step directions with 75% acc with min cues to enhance comprehension   5. Pt will complete basic problem solving tasks with 75% acc with min cues to enhance safety awareness   6. Pt will demonstrate orientation x3 to enhance cognition   7. Pt will participate in ongoing assessment of speech language and cognitive skills to rule out and deficits and establish goals of care                              Plan:   Patient to be seen Therapy Frequency: 5 x/week   Plan of Care expires: 07/10/17  Plan of Care reviewed with: patient  SLP Follow-up?: Yes              Sharla Trevino CCC-SLP  06/20/2017

## 2017-06-20 NOTE — PLAN OF CARE
SW spoke with family friend/POA regarding the referral to [facility]. Reported this SW will update the new SW on the form and ask her to give the family friend the referral. Reported to family friend that she can follow up herself when she takes the Pt to the opthomologist. Family friend reported she will make sure the referral is completed and faxed when they get to that point.    Suki Lynn LMSW  Neurocritical Care   [facility]  [id number]

## 2017-06-20 NOTE — PT/OT/SLP PROGRESS
Physical Therapy  Treatment    Joanna Morales   MRN: [id number]   Admitting Diagnosis: Meningioma, recurrent of brain    PT Received On: 06/20/17  PT Start Time: 1122      PT Stop Time: 1147    PT Total Time (min): 25 min       Billable Minutes:  Therapeutic Activity 10 min and Neuromuscular Re-education 15 min    Treatment Type: Treatment  PT/PTA: PT     PTA Visit Number: 0       General Precautions: Standard, fall, aspiration  Orthopedic Precautions: N/A   Braces: N/A    Do you have any cultural, spiritual, Yarsani conflicts, given your current situation?: no conflicts    Subjective:  Communicated with RN prior to session. Pt alert and agreeable to participate in therapy session. Pt oriented to person only; pt confused and easily distracted throughout session.     Pain/Comfort  Pain Rating 1:  (Pt did not rate pain )  Location - Orientation 1: generalized  Location 1: head  Pain Addressed 1: Reposition, Nurse notified    Objective:   Patient found with: bed alarm, telemetry, garcía catheter    Functional Mobility:  Bed Mobility:   Supine to Sit: Moderate Assistance  Sit to Supine: Moderate Assistance    Transfers:  Sit <> Stand Assistance: Total Assistance (Mod A x 2; 3 trials performed from EOB with RW)  Sit <> Stand Assistive Device: Rolling Walker    Gait:   Gait Distance: Pt able to perform ~4 side steps to L with mod A for RW management, LE advancement, weight shifting and balance  Assistance 1: Moderate assistance  Gait Assistive Device: Rolling walker  Gait Pattern: reciprocal  Gait Deviation(s): decreased step length, decreased weight-shifting ability, decreased toe-to-floor clearance, backward lean    Balance:   Static Sit: FAIR-: Maintains without assist but inconsistent   Dynamic Sit: FAIR -: Cannot move trunk without losing balance  Static Stand: POOR: Needs MODERATE assist to maintain  Dynamic stand: POOR: N/A     Therapeutic Activities and Exercises:  Therapeutic activities aimed to increase pt's  independence, safety, and efficiency with bed mobility and functional transfers. See above for assistance levels. Pt required increased time and tactile cueing for sequencing of motor tasks due to visual deficits and confusion.     Neuromuscular re-education facilitated to improve gross motor coordination, weight shifting and dynamic standing balance. Therapist facilitated 3 trials of sit <>stand transfers from EOB; pt required max verbal cueing for hand placement, foot placement and RW management. Pt also required frequent re-direction to task and cueing to coordinate mobility tasks. Therapist facilitated pt performing marching in standing and in sitting at EOB- pt with difficulty weight shifting and coordinating alternating LEs movements-required max A to perform.     AM-PAC 6 CLICK MOBILITY  How much help from another person does this patient currently need?   1 = Unable, Total/Dependent Assistance  2 = A lot, Maximum/Moderate Assistance  3 = A little, Minimum/Contact Guard/Supervision  4 = None, Modified Esmeralda/Independent    Turning over in bed (including adjusting bedclothes, sheets and blankets)?: 3  Sitting down on and standing up from a chair with arms (e.g., wheelchair, bedside commode, etc.): 2  Moving from lying on back to sitting on the side of the bed?: 2  Moving to and from a bed to a chair (including a wheelchair)?: 2  Need to walk in hospital room?: 1  Climbing 3-5 steps with a railing?: 1  Total Score: 11    AM-PAC Raw Score CMS G-Code Modifier Level of Impairment Assistance   6 % Total / Unable   7 - 9 CM 80 - 100% Maximal Assist   10 - 14 CL 60 - 80% Moderate Assist   15 - 19 CK 40 - 60% Moderate Assist   20 - 22 CJ 20 - 40% Minimal Assist   23 CI 1-20% SBA / CGA   24 CH 0% Independent/ Mod I     Patient left HOB elevated with all lines intact, call button in reach, bed alarm on, RN notified and family present.    Assessment:  Joanna Morales is a 51 y.o. female with a medical  diagnosis of Meningioma, recurrent of brain. Pt more alert this visit, however required max re-direction to task due to distractibility. Pt continues to demonstrate posterior lean in stance and difficulty ambulating. Pt would benefit from continued PT intervention to address below listed deficits and maximize return to PLOF.     Rehab identified problem list/impairments: Rehab identified problem list/impairments: weakness, impaired endurance, impaired functional mobilty, gait instability, impaired balance, impaired self care skills, visual deficits, decreased safety awareness, decreased coordination    Rehab potential is good.    Activity tolerance: Good    Discharge recommendations: Discharge Facility/Level Of Care Needs: rehabilitation facility     Barriers to discharge: Barriers to Discharge: Inaccessible home environment, Decreased caregiver support    Equipment recommendations: Equipment Needed After Discharge:  (TBD pending progress)     GOALS:    Physical Therapy Goals        Problem: Physical Therapy Goal    Goal Priority Disciplines Outcome Goal Variances Interventions   Physical Therapy Goal     PT/OT, PT Ongoing (interventions implemented as appropriate)     Description:  Goals to be met by: 17    Patient will increase functional independence with mobility by performin. Supine to sit with Contact Guard Assistance  2. Sit to supine with Contact Guard Assistance  3. Sit to stand transfer with Minimal Assistance  4. Gait  x 25 feet with Minimal Assistance with or without appropriate AD.   5. Lower extremity exercise program x30 reps per handout, with independence                       PLAN:    Patient to be seen 5 x/week  to address the above listed problems via gait training, therapeutic activities, therapeutic exercises, neuromuscular re-education  Plan of Care expires: 17  Plan of Care reviewed with: patient, family        Melissa Watkins PT, DPT   2017  Pager: [phone]

## 2017-06-20 NOTE — HPI
Joanna Morales is a 51-year-old female with PMHx of depression, L eye vision loss, basal cell carcinoma, and meningioma (s/p resection at [facility] Jan. 2015). She was followed in [facility] by Dr. Chew 5/17 and admitted [facility] [place] 6/7/17 for surgical intervention.  Now s/p L frontotemporal craniotomy and excision of meningioma with a drain (on Rocephin for ppx). Post-op CTH concerning for small postoperative hemorrhage; no intervention necessary. Hospital course was further complicated by pneumocephalus, DI, hypernatremia. hypotension, hypothermia, and encephalopathy. EEG shows slowing suggestive of encephalopathy without evidence of seizures. Repeat CTH showing improvement and stepped down to the floor on 6/16.       Functional History: Per the son, patient lives in [place] with cousin in a trailor home with 4 steps to enter.  Prior to admission, she was independent with ADLs and used a RW and cane for household and ambulation.  DME: [facility].

## 2017-06-20 NOTE — ASSESSMENT & PLAN NOTE
52 y/o F s/p crani resection olfactory groove meningioma POD 13  - Pt is neurologically stable  - Appreciate endocrine rec's for DI.  Na stable at 145 today, UO 10-60 cc/hr, SG - 1.010    - Continue cortef 10 q am, 5 q pm.    - Continue 1/2 NS at 75cc/hr.  On dental soft diet.   - Continued low temp, FT4 is pending   - DVT ppx- ANA/SCD's. GI ppx. Sqh.   - Continue daily PT/OT/ST  - Will DC staples on Wednesday   - [facility] denies, [facility] consulted

## 2017-06-20 NOTE — PROGRESS NOTES
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  She underwent operation in January 2015 on the [facility] service at [facility].  It   was apparently only possible to get a partial resection of the tumor.  She did   have a followup scan done in 2016 showing a continued large meningioma in this   area.  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  She had a   followup MRI scan done at [facility] on 01/20/17, showing this large tumor.  She   was seen by you in Ophthalmology and then referred to Neurosurgery for further   evaluation.  At this point, she has some preserved vision in the right eye.  She   complains of constant headaches.  She has no sense of smell.  She feels off   balance.  She is taking Neurontin apparently for seizure prevention. Past   medical history relates primarily to the present illness.  She does not have   chronic medical illnesses such as diabetes or hypertension.  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  She is  and has good family support.     On physical examination, she is a well-developed, well-nourished white lady, who   is alert and oriented.  Examination of the head shows a somewhat anterior, but   well-healed bicoronal incision.  She says that this is a little tender in the   left frontal area.  Eyes show full extraocular movements.  The pupils are small,   the left is not reactive.  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  The right optic nerve is probably normal.  She   sees things as doubles.  When I held up two  fingers she counted four, although   she knew that there were two.  Hearing seems preserved.  The neck is supple.  On   neurological examination, she is speaking clearly.  She is obviously anxious   and depressed over her illness.  Finger-to-nose is somewhat diminished on the   left side and gait was mildly unsteady.  Cranial nerves are otherwise intact.    She has normal facial sensation and movement.  The tongue protrudes in the   midline.  Strength in the extremities seems generally good, sensation normal and   deep tendon reflexes symmetrical.     MRI of the brain performed at [facility] on 01/20/17, shows a   previous bifrontal craniotomy.  There is a large lobular meningioma, which   occupies the sella, planum sphenoidale and extends back on to the clivus.  The   optic chiasm and optic nerves are not visualized.  The carotid arteries and   anterior cerebral arteries run done through the tumor.  The tumor elevates and   pushes the third ventricle back, but there is no hydrocephalus    Post-Op Info:  Procedure(s) (LRB):  CRANIOTOMY WITH STEALTH (N/A)   13 Days Post-Op     Interval History:  NAEON.  More awake today.   Denies N/V, weakness, or seizures.        Medications:  Continuous Infusions:   sodium chloride 0.45% 75 mL/hr at 06/19/17 1308     Scheduled Meds:   amitriptyline  100 mg Oral Nightly    bisacodyl  10 mg Rectal Daily    cefTRIAXone (ROCEPHIN) IVPB  1 g Intravenous Q24H    gabapentin  600 mg Oral TID    heparin (porcine)  5,000 Units Subcutaneous Q8H    hydrocortisone  10 mg Oral QAM    hydrocortisone  5 mg Oral QHS    nicotine  1 patch Transdermal Daily    pantoprazole  40 mg Oral BID    polyethylene glycol  17 g Oral Daily    ramelteon  8 mg Oral QHS     PRN Meds:acetaminophen, acetaminophen, acetaminophen, desmopressin, dextrose 50%, dextrose 50%, glucagon (human recombinant), glucose, glucose, haloperidol lactate, hydrALAZINE, metoclopramide HCl, metoclopramide HCl  "    Review of Systems  Objective:     Weight: 60.1 kg (132 lb 7.9 oz)  Body mass index is 24.23 kg/m².  Vital Signs (Most Recent):  Temp: (!) 94 °F (34.4 °C) (17 1215)  Pulse: 71 (17 1215)  Resp: 18 (17 1215)  BP: 107/73 (17 1215)  SpO2: 96 % (17 1215) Vital Signs (24h Range):  Temp:  [93.4 °F (34.1 °C)-97.4 °F (36.3 °C)] 94 °F (34.4 °C)  Pulse:  [64-80] 71  Resp:  [16-18] 18  SpO2:  [94 %-96 %] 96 %  BP: ()/(61-73) 107/73              Temp (24hrs), Av.1 °F (35.1 °C), Min:93.4 °F (34.1 °C), Max:97.4 °F (36.3 °C)           Date 17 07 - 17 0659   Shift 7029-9944 4153-0775 2972-8455 24 Hour Total   I  N  T  A  K  E   P.O. 480   480    I.V.  (mL/kg) 525  (8.7)   525  (8.7)    IV Piggyback 50   50    Shift Total  (mL/kg) 1055  (17.6)   1055  (17.6)   O  U  T  P  U  T   Urine  (mL/kg/hr) 490   490    Shift Total  (mL/kg) 490  (8.2)   490  (8.2)   Weight (kg) 60.1 60.1 60.1 60.1          Urethral Catheter 06/15/17 1205 Latex (Active)   Site Assessment Clean;Intact 2017  8:00 AM   Collection Container Standard drainage bag 2017  8:00 AM   Securement Method secured to top of thigh w/ adhesive device 2017  8:00 AM   Catheter Care Performed yes 2017  8:00 AM   Reason for Continuing Urinary Catheterization Critically ill in ICU requiring intensive monitoring 2017  8:00 AM   CAUTI Prevention Bundle StatLock in place w 1" slack;Intact seal between catheter & drainage tubing;Drainage bag off the floor;Green sheeting clip in use;No dependent loops or kinks;Drainage bag not overfilled (<2/3 full);Drainage bag below bladder 2017 10:00 PM   Output (mL) 75 mL 2017  2:00 PM       Neurosurgery Physical Exam     General: no distress  Neurologic: oriented to person, year  Head: normocephalic  GCS: Motor: 6/Verbal: 4/Eyes: 4 GCS Total: 14  Cranial nerves:  Pupils NR bilaterally, blind in both eyes, tongue midline  Sensory: response to light touch " throughout  Motor Strength: generalized deconditioning in upper and lower extremities, no focal weakness.  FC x 4   Lungs:  normal respiratory effort  Abdomen: soft, non-tender   Extremities: no cyanosis or edema, or clubbing      Significant Labs:    Recent Labs  Lab 06/20/17  0409 06/20/17  1036   *  --      145 145   K 3.7  --      --    CO2 30*  --    BUN 9  --    CREATININE 0.7  --    CALCIUM 9.5  --    MG 1.8  --        Recent Labs  Lab 06/19/17  0529 06/20/17  0409   WBC 8.10 11.67   HGB 11.4* 11.7*   HCT 35.0* 35.4*    279       Recent Labs  Lab 06/20/17 0409   INR 1.0   APTT 27.6     Microbiology Results (last 7 days)     Procedure Component Value Units Date/Time    Blood culture [[id number]] Collected:  06/14/17 1517    Order Status:  Completed Specimen:  Blood from Peripheral, Antecubital, Left Updated:  06/19/17 2012     Blood Culture, Routine No growth after 5 days.    Narrative:       Blood cultures x 2 different sites. 4 bottles total. Please  draw cultures before administering antibiotics.    Blood culture [[id number]] Collected:  06/14/17 1156    Order Status:  Completed Specimen:  Blood from Peripheral, Antecubital, Left Updated:  06/19/17 1412     Blood Culture, Routine No growth after 5 days.    Narrative:       Blood cultures from 2 different sites. 4 bottles total.  Please draw before starting antibiotics.    Urine culture [[id number]] Collected:  06/15/17 1127    Order Status:  Completed Specimen:  Urine from Urine, Catheterized Updated:  06/16/17 1947     Urine Culture, Routine No growth    Culture, Respiratory with Gram Stain [[id number]]     Order Status:  No result Specimen:  Respiratory             Assessment/Plan:     * Meningioma, recurrent of brain    50 y/o F s/p crani resection olfactory groove meningioma POD 13  - Pt is neurologically stable  - Appreciate endocrine rec's for DI.  Na stable at 145 today, UO 10-60 cc/hr, SG - 1.010    - Continue cortef 10 q am,  5 q pm.    - Continue 1/2 NS at 75cc/hr.  On dental soft diet.   - Continued low temp, FT4 is pending   - DVT ppx- ANA/SCD's. GI ppx. Sqh.   - Continue daily PT/OT/ST  - Will DC staples on Wednesday   - [facility] consulted                 ALANNAH Peña  Neurosurgery  [facility]

## 2017-06-21 PROBLEM — E83.52 HYPERCALCEMIA: Status: ACTIVE | Noted: 2017-01-01

## 2017-06-21 NOTE — PLAN OF CARE
Problem: Occupational Therapy Goal  Goal: Occupational Therapy Goal  Goals to be met by: 6/27/2017     Patient will increase functional independence with ADLs by performing:    UE Dressing with Minimal Assistance.  LE Dressing with Minimal Assistance.  Grooming while standing with Minimal Assistance.  Toileting from toilet with Minimal Assistance for hygiene and clothing management.   Rolling to Bilateral with Contact Guard Assistance.    Supine to sit with Contact Guard Assistance.  Stand pivot transfers with Minimal Assistance.  Toilet transfer to toilet with Minimal Assistance.      Outcome: Ongoing (interventions implemented as appropriate)  No goals met today  HELEN Portillo  6/21/2017  [phone]

## 2017-06-21 NOTE — PROGRESS NOTES
"[facility]  Endocrinology  Progress Note    Admit Date: 6/7/2017     Reason for Consult: Diabetes insipidus     Surgical Procedure and Date:   CRANIOTOMY 6/8/2017        HPI:   Patient is a 51 y.o. female with a diagnosis of  meningioma s/p resection (6/7). 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  She underwent partial resection of the tumor in January 2015 at [facility]. Followup scan done in 2016 showing a continued large meningioma.     Endocrinology consulted for post operative DI management.  Per the operative report - The pituitary stalk was identified and could be preserved        Interval HPI:   Overnight events:  Elevated Na today, had -350cc/hr overnight, dilute sp gr 1.005. Remains on 1/2 NS 75cc/hr    ft4 ordered as add on    On HC 15/5, BP stable    /66 (BP Location: Right arm, Patient Position: Lying, BP Method: Automatic)   Pulse 83   Temp 96.9 °F (36.1 °C) (Oral)   Resp 17   Ht 5' 2" (1.575 m)   Wt 60.1 kg (132 lb 7.9 oz)   SpO2 99%   Breastfeeding? No   BMI 24.23 kg/m²       Labs Reviewed and Include      Recent Labs  Lab 06/21/17  0357   GLU 74   CALCIUM 10.7*   ALBUMIN 3.1*   PROT 6.9   *  153*   K 3.4*   CO2 29   *   BUN 6   CREATININE 0.8   ALKPHOS 96   ALT 59*   AST 34   BILITOT 0.3     Lab Results   Component Value Date    WBC 16.38 (H) 06/21/2017    HGB 12.6 06/21/2017    HCT 38.2 06/21/2017    MCV 98 06/21/2017     06/21/2017       Recent Labs  Lab 06/15/17  1007   FREET4 0.83     No results found for: HGBA1C    Nutritional status:   Body mass index is 24.23 kg/m².  Lab Results   Component Value Date    ALBUMIN 3.1 (L) 06/21/2017    ALBUMIN 2.9 (L) 06/20/2017    ALBUMIN 3.1 (L) 06/19/2017     No results found for: PREALBUMIN    Estimated Creatinine Clearance: 65.8 mL/min (based on Cr of 0.8).    Accu-Checks  Recent " Labs      06/18/17   1159  06/18/17   1642  06/19/17   0036  06/19/17   1125  06/19/17   1705  06/20/17   0056  06/20/17   0558  06/20/17   1217  06/20/17   1651   POCTGLUCOSE  107  87  104  91  120*  88  88  111*  93       Current Medications and/or Treatments Impacting Glycemic Control  Immunotherapy:  Immunosuppressants     None        Steroids:   Hormones     Start     Stop Route Frequency Ordered    06/17/17 1712  desmopressin nasal spray 10 mcg      -- Nasl As needed (PRN) 06/17/17 1614    06/12/17 2100  hydrocortisone tablet 5 mg      -- Oral Nightly 06/12/17 1059    06/12/17 1100  hydrocortisone tablet 10 mg      -- Oral Every morning 06/12/17 1059        Pressors:    Autonomic Drugs     None        Hyperglycemia/Diabetes Medications: Antihyperglycemics     None          ASSESSMENT and PLAN    Diabetes insipidus    Post-surgical DI (craniotomy 6/8/2017)    I/O not matched overnight: NS was initially started due to net neg balance, at this time rec stopping IVF   Start scheduled DDAVP 10mcg intranasal qhs   Can give extra prn DDAVP:   Parameters to give extra DDAVP IF uop > 250 x 2 consecutive hours AND Spec grav <1.005 or Serum Sodium > 150   Check sodium and sp. [place] Q6   Strict I/O                   Adverse effect of glucocorticoid or synthetic analogue     Can increase BG, currently staBLE without insulin requirements        * Meningioma, recurrent of brain     S/p surgery      FT4 0.85 below goal (aim for upper limit of normal range). Given her surgical hx and requiring warmer for hypothermia, start LT4 50mcg daily. Repeat TSH, Ft4 Fri 6/23     Continue HC 10/5, clinically without s/s AI. Recheck 8am cortisol/acth o/p to decide if longterm repletion needed.     Will also monitor for signs of SIADH: hyponatremia and high urine sp gr (Uosm>100 or Cat>30)     Hypercalcemia   confirm with ionized ca level   Check PTH to assess if PTH mediated or not   Immobilization likely a contributor and rec pt/ot with  weight bearing if possible       Jennifer Hsu MD  Endocrinology  [facility]

## 2017-06-21 NOTE — ASSESSMENT & PLAN NOTE
Post-surgical DI (craniotomy 6/8/2017)    I/O not matched overnight: Agree with 1/2 NS    Advised nurse to give DDAVP x1 this morning   keep only prn DDAVP:   Parameters to give DDAVP IF uop > 250 x 2 consecutive hours AND Spec grav <1.005 or Serum Sodium > 150   Check sodium and sp. [place] Q6   Strict I/O   Will observe for one more day to assess if perhaps scheduled DDAVP qhs is appropriate

## 2017-06-21 NOTE — PROGRESS NOTES
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  She underwent operation in January 2015 on the [facility] service at [facility].  It   was apparently only possible to get a partial resection of the tumor.  She did   have a followup scan done in 2016 showing a continued large meningioma in this   area.  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  She had a   followup MRI scan done at [facility] on 01/20/17, showing this large tumor.  She   was seen by you in Ophthalmology and then referred to Neurosurgery for further   evaluation.  At this point, she has some preserved vision in the right eye.  She   complains of constant headaches.  She has no sense of smell.  She feels off   balance.  She is taking Neurontin apparently for seizure prevention. Past   medical history relates primarily to the present illness.  She does not have   chronic medical illnesses such as diabetes or hypertension.  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  She is  and has good family support.     On physical examination, she is a well-developed, well-nourished white lady, who   is alert and oriented.  Examination of the head shows a somewhat anterior, but   well-healed bicoronal incision.  She says that this is a little tender in the   left frontal area.  Eyes show full extraocular movements.  The pupils are small,   the left is not reactive.  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  The right optic nerve is probably normal.  She   sees things as doubles.  When I held up two  fingers she counted four, although   she knew that there were two.  Hearing seems preserved.  The neck is supple.  On   neurological examination, she is speaking clearly.  She is obviously anxious   and depressed over her illness.  Finger-to-nose is somewhat diminished on the   left side and gait was mildly unsteady.  Cranial nerves are otherwise intact.    She has normal facial sensation and movement.  The tongue protrudes in the   midline.  Strength in the extremities seems generally good, sensation normal and   deep tendon reflexes symmetrical.     MRI of the brain performed at [facility] on 01/20/17, shows a   previous bifrontal craniotomy.  There is a large lobular meningioma, which   occupies the sella, planum sphenoidale and extends back on to the clivus.  The   optic chiasm and optic nerves are not visualized.  The carotid arteries and   anterior cerebral arteries run done through the tumor.  The tumor elevates and   pushes the third ventricle back, but there is no hydrocephalus    Post-Op Info:  Procedure(s) (LRB):  CRANIOTOMY WITH STEALTH (N/A)   14 Days Post-Op     Interval History:  NAEON.  Pt with elevated Na, and increased UO overnight. SG 1.005.  Received DDAVP x 1 this morning.  She denies HAs, N/V, visual changes, weakness, or seizures.        Medications:  Continuous Infusions:   Scheduled Meds:   amitriptyline  100 mg Oral Nightly    bisacodyl  10 mg Rectal Daily    gabapentin  600 mg Oral TID    heparin (porcine)  5,000 Units Subcutaneous Q8H    hydrocortisone  10 mg Oral QAM    hydrocortisone  5 mg Oral QHS    nicotine  1 patch Transdermal Daily    pantoprazole  40 mg Oral BID    polyethylene glycol  17 g Oral Daily    ramelteon  8 mg Oral QHS     PRN Meds:acetaminophen, acetaminophen, acetaminophen, desmopressin, dextrose 50%, dextrose 50%, glucagon (human recombinant), glucose, glucose, haloperidol lactate, hydrALAZINE, metoclopramide HCl, metoclopramide HCl     Review  "of Systems  Objective:     Weight: 60.1 kg (132 lb 7.9 oz)  Body mass index is 24.23 kg/m².  Vital Signs (Most Recent):  Temp: 96.1 °F (35.6 °C) (17 1200)  Pulse: 107 (17 1500)  Resp: 19 (17 1200)  BP: 101/71 (17 1200)  SpO2: (!) 94 % (17 1200) Vital Signs (24h Range):  Temp:  [96.1 °F (35.6 °C)-97.1 °F (36.2 °C)] 96.1 °F (35.6 °C)  Pulse:  [] 107  Resp:  [18-19] 19  SpO2:  [94 %-99 %] 94 %  BP: ()/(62-71) 101/71              Temp (24hrs), Av.8 °F (36 °C), Min:96.1 °F (35.6 °C), Max:97.1 °F (36.2 °C)           Date 17 07 - 17 0659   Shift 6126-5986 9664-4927 1769-6600 24 Hour Total   I  N  T  A  K  E   Shift Total  (mL/kg)       O  U  T  P  U  T   Urine  (mL/kg/hr) 465  (1) 50  515    Shift Total  (mL/kg) 465  (7.7) 50  (0.8)  515  (8.6)   Weight (kg) 60.1 60.1 60.1 60.1          Urethral Catheter 06/15/17 1205 Latex (Active)   Site Assessment Clean;Intact 2017  8:15 AM   Collection Container Urimeter 2017  8:15 AM   Securement Method secured to top of thigh w/ adhesive device 2017  8:15 AM   Catheter Care Performed yes 2017  8:15 AM   Reason for Continuing Urinary Catheterization Required immobilization;Post operative 2017  4:00 AM   CAUTI Prevention Bundle StatLock in place w 1" slack 2017  8:15 AM   Output (mL) 150 mL 2017  6:00 AM       Neurosurgery Physical Exam   General: no distress  Neurologic: oriented to person, year  Head: normocephalic  GCS: Motor: 6/Verbal: 4/Eyes: 4 GCS Total: 14  Cranial nerves:  Pupils NR bilaterally, blind in both eyes, tongue midline  Sensory: response to light touch throughout  Motor Strength: generalized deconditioning in upper and lower extremities, no focal weakness.  FC x 4   Lungs:  normal respiratory effort  Abdomen: soft, non-tender   Extremities: no cyanosis or edema, or clubbing      Significant Labs:    Recent Labs  Lab 17  0357 17  0827   GLU 74  --    *  " 153* 158*   K 3.4*  --    *  --    CO2 29  --    BUN 6  --    CREATININE 0.8  --    CALCIUM 10.7*  --    MG 2.3  --        Recent Labs  Lab 06/20/17  0409 06/21/17  0357   WBC 11.67 16.38*   HGB 11.7* 12.6   HCT 35.4* 38.2    276       Recent Labs  Lab 06/21/17 0357   INR 1.0   APTT 25.2     Microbiology Results (last 7 days)     Procedure Component Value Units Date/Time    Blood culture [[id number]] Collected:  06/14/17 1517    Order Status:  Completed Specimen:  Blood from Peripheral, Antecubital, Left Updated:  06/19/17 2012     Blood Culture, Routine No growth after 5 days.    Narrative:       Blood cultures x 2 different sites. 4 bottles total. Please  draw cultures before administering antibiotics.    Blood culture [[id number]] Collected:  06/14/17 1156    Order Status:  Completed Specimen:  Blood from Peripheral, Antecubital, Left Updated:  06/19/17 1412     Blood Culture, Routine No growth after 5 days.    Narrative:       Blood cultures from 2 different sites. 4 bottles total.  Please draw before starting antibiotics.    Urine culture [[id number]] Collected:  06/15/17 1127    Order Status:  Completed Specimen:  Urine from Urine, Catheterized Updated:  06/16/17 1947     Urine Culture, Routine No growth            Assessment/Plan:     * Meningioma, recurrent of brain    50 y/o F s/p crani resection olfactory groove meningioma POD 14  - Pt is neurologically stable  - Appreciate endocrine rec's for DI: Will stop IVF today.  Start scheduled DDAVP 10mcg intranasal qhs.  Can give extra prn DDAVP:   Parameters to give extra DDAVP IF uop > 250 x 2 consecutive hours AND Spec grav <1.005 or Serum Sodium > 150.  Check sodium and sp. Gravity Q6    - FT4, low normal.  Start synthroid 50 mcg daily   - Continue cortef 10 q am, 5 q pm.    - On dental soft diet.   - DVT ppx- ANA/SCD's. GI ppx. Sqh.   - Continue daily PT/OT/ST  - Will DC staples tomorrow   - [facility] placement pending when                   ALANNAH Peña  Neurosurgery  [facility]

## 2017-06-21 NOTE — PLAN OF CARE
Carlos awaiting authorizations and also states that the pt's sodium would have to be more stable.  MSW spoke to KAREN Huang to provide an update.    Selina Chew LMSW

## 2017-06-21 NOTE — PLAN OF CARE
Problem: Physical Therapy Goal  Goal: Physical Therapy Goal  Goals to be met by: 17    Patient will increase functional independence with mobility by performin. Supine to sit with Contact Guard Assistance  2. Sit to supine with Contact Guard Assistance  3. Sit to stand transfer with Minimal Assistance using RW- MET 17      Revised: Sit to stand transfer with contact guard assistance using RW.   4. Gait  x 25 feet with Minimal Assistance with or without appropriate AD.   5. Lower extremity exercise program x30 reps per handout, with independence      Outcome: Ongoing (interventions implemented as appropriate)  Pt slowly progressing towards goals, 1/5 goals met; continue current POC.     Melissa Watkins PT, DPT   2017  Pager: [phone]

## 2017-06-21 NOTE — PROGRESS NOTES
[facility]  Physical Medicine & Rehab  Progress Note    Patient Name: Joanna Morales  MRN: [id number]  Admission Date: 6/7/2017  Length of Stay: 14 days  Collaborating Physician : Marek Mclain MD    Subjective:   Interval History (06/21/2017):  Patient is seen for follow-up rehab evaluation and recommendations.  No acute events over night.  Na+ was 153 today and will continue to follow to see Na+ trending down for possible IPR admission.    Barriers for discharge/rehab admission: not medically ready for d/c.     HPI, Past Medical, Surgical, Family, and Social History remains the same as documented in the initial encounter.    Principal Problem:Meningioma, recurrent of brain    Hospital Course:   6/13/17: Evaluated by therapy. Bed mobility Judy-MaxA.  Sit to stand Judy.  Unable to assess ambulation. LBD TotalA.  6/161/7: SLP evaluation. Cognitive-linguistic impairments noted with dysphagia. Dental soft and thin liquids.   6/20/17: Participating with OT therapy. Bed mobility Judy-ModA & RW.  Sit to stand Judy & RW.  Ambulation-4 small side steps with ModA and RW..         Scheduled Medications:    amitriptyline  100 mg Oral Nightly    bisacodyl  10 mg Rectal Daily    gabapentin  600 mg Oral TID    heparin (porcine)  5,000 Units Subcutaneous Q8H    hydrocortisone  10 mg Oral QAM    hydrocortisone  5 mg Oral QHS    nicotine  1 patch Transdermal Daily    pantoprazole  40 mg Oral BID    polyethylene glycol  17 g Oral Daily    ramelteon  8 mg Oral QHS       PRN Medications: acetaminophen, acetaminophen, acetaminophen, desmopressin, dextrose 50%, dextrose 50%, glucagon (human recombinant), glucose, glucose, haloperidol lactate, hydrALAZINE, metoclopramide HCl, metoclopramide HCl    Review of Systems   Reason unable to perform ROS: 2/2 mental status.     Objective:     Vital Signs (Most Recent):  Temp: 97.1 °F (36.2 °C) (06/21/17 0844)  Pulse: (!) 114 (06/21/17 1100)  Resp: 18 (06/21/17  0844)  BP: 90/62 (06/21/17 0844)  SpO2: 95 % (06/21/17 0844)    Vital Signs (24h Range):  Temp:  [94 °F (34.4 °C)-97.1 °F (36.2 °C)] 97.1 °F (36.2 °C)  Pulse:  [] 114  Resp:  [17-18] 18  SpO2:  [95 %-99 %] 95 %  BP: ()/(62-73) 90/62     Physical Exam   Constitutional: She appears well-developed and well-nourished.   HENT:   Sutures noted throughout scalp   Eyes: Pupils are equal, round, and reactive to light.   Neck: Normal range of motion.   Cardiovascular: Normal rate and regular rhythm.    Pulmonary/Chest: Effort normal. No respiratory distress.   Abdominal: Soft. She exhibits no distension.   Musculoskeletal: She exhibits no deformity.   Neurological:   Neurologic:  -  Mental Status:  Awake, alert and oriented to person.  Follows commands.  -  Speech and language:  + aphasia  -  Coordination:  Finger to nose exam:  RUE dysmetria, LUE dysmetria.  Heel to shin:  RLE abnormal, LLE abnormal.   -  Motor:  RUE: 4/5.  LUE: 4/5.  RLE: 5/5.  LLE: 5/5.   -pronator drift. Unable to assess due to patient participation.   -  Tone:  normal  -  Sensory:  Intact to light touch and pin prick.   Skin: Skin is warm and dry.   Psychiatric: Cognition and memory are impaired.   Flat affect       Diagnostic Results:   Labs: Reviewed  CT: Reviewed  MRI: Reviewed  EEG:reviewed  Assessment/Plan:      S/P craniotomy    -NSY planning to removal sutures today  -see Meningioma         Restlessness and agitation    -encephalopathy throughout admission   -EEG negative for seizures  -Haldol PRN         Pneumocephalus    -repeat CHT showed improving         Diabetes insipidus    -Endocrine following         * Meningioma, recurrent of brain    -s/p L frontotemporal craniotomy and excision of meningioma on 6/7/17     · Monitor sleep disturbances and establish consistent sleep-wake cycle (lights on and shades open during day and lights dim/off at night).   · Promote environmental modifications to limit agitation and confusion  (appropriate lighting, family at bedside, visible clock and calendar, updated white board, reduce noise, limited visitors, clustered nursing care).  · Monitor for bowel and bladder dysfunction.   · Encourage mobility, OOB in chair at least 3 hours per day, and ambulation.  · Reorient patient to person, place, time, and situation on each encounter.   · PT/OT evaluate and treat.  · SLP speech and cognitive evaluate and treat.  · If possible, avoid restraints.  May benefit from 24/7 supervision by a sitter.   · Avoid/limit medications that may worsen delirium.  Such as benzodiazepines, antihistamines, anticholinergics, hypnotics, and opiates.                Waiting for Na+ to trend downward for possible IPR admission. Will follow patient's progress and discuss with rehab team for rehab recommendation.    Thank you for your consult.    Quita Altamirano NP  Department of Physical Medicine & Rehab   [facility]

## 2017-06-21 NOTE — PT/OT/SLP PROGRESS
Physical Therapy  Treatment    Joanna Morales   MRN: [id number]   Admitting Diagnosis: Meningioma, recurrent of brain    PT Received On: 06/21/17  PT Start Time: 1103     PT Stop Time: 1130    PT Total Time (min): 27 min       Billable Minutes:  Therapeutic Activity 27 min    Treatment Type: Treatment  PT/PTA: PT     PTA Visit Number: 0       General Precautions: Standard, fall, aspiration, vision impaired  Orthopedic Precautions: N/A   Braces: N/A    Do you have any cultural, spiritual, Christian conflicts, given your current situation?: no conflicts    Subjective:  Communicated with RN prior to session. Pt agreeable to participate in therapy session. Pt alert and oriented to person and place. Pt remains confused with slurred speech.     Pain/Comfort  Pain Rating 1: 0/10  Pain Rating Post-Intervention 1: 0/10    Objective:   Patient found with: bed alarm, telemetry, garcía catheter    Functional Mobility:  Bed Mobility:   Supine to Sit: Moderate Assistance  Sit to Supine: Minimum Assistance    Transfers:  Sit <> Stand Assistance: Minimum Assistance (x 3 trials from EOB )  Sit <> Stand Assistive Device: Rolling Walker    Gait:    Pt unable to perform     Balance:   Static Sit: FAIR: Maintains without assist, but unable to take any challenges   Dynamic Sit: FAIR: Cannot move trunk without losing balance  Static Stand: POOR+: Needs MINIMAL assist to maintain  Dynamic stand: POOR: N/A     Therapeutic Activities and Exercises:  Therapeutic activities aimed to increase pt's independence, safety, and efficiency with bed mobility and functional transfers. See above for assistance levels. Pt required increased time and tactile cueing for sequencing of motor tasks due to visual deficits and confusion. Therapist facilitated trials of sup<>sit, and 3 trials of sit<>stand to improve motor coordination, weight shifting and dynamic standing balance. Pt required max verbal and tactile cueing for hand placement, and RW  management. Pt able to maintain standing ~1 minute intervals each trial. Pt also required frequent re-direction to task and cueing to coordinate mobility tasks. Therapist facilitated pt performing marching and LAQ x 10 reps in sitting at EOB- pt required min A to stay on task during therex.       AM-PAC 6 CLICK MOBILITY  How much help from another person does this patient currently need?   1 = Unable, Total/Dependent Assistance  2 = A lot, Maximum/Moderate Assistance  3 = A little, Minimum/Contact Guard/Supervision  4 = None, Modified [place]/Independent    Turning over in bed (including adjusting bedclothes, sheets and blankets)?: 3  Sitting down on and standing up from a chair with arms (e.g., wheelchair, bedside commode, etc.): 3  Moving from lying on back to sitting on the side of the bed?: 2  Moving to and from a bed to a chair (including a wheelchair)?: 2  Need to walk in hospital room?: 1  Climbing 3-5 steps with a railing?: 1  Total Score: 12    AM-PAC Raw Score CMS G-Code Modifier Level of Impairment Assistance   6 % Total / Unable   7 - 9 CM 80 - 100% Maximal Assist   10 - 14 CL 60 - 80% Moderate Assist   15 - 19 CK 40 - 60% Moderate Assist   20 - 22 CJ 20 - 40% Minimal Assist   23 CI 1-20% SBA / CGA   24 CH 0% Independent/ Mod I     Patient left HOB elevated with all lines intact, call button in reach, bed alarm on and RN notified.    Assessment:  Joanna Morales is a 51 y.o. female with a medical diagnosis of Meningioma, recurrent of brain. Pt alert and cooperative during session; pt remains confused but better able to attend to mobility tasks this visit with cueing for redirection. Pt continues to demonstrate posterior lean in stance, poor postural awareness and difficulty ambulating. Pt would benefit from continued PT intervention to address below listed deficits and maximize return to PLOF.       Rehab identified problem list/impairments: Rehab identified problem list/impairments:  weakness, impaired endurance, impaired self care skills, impaired functional mobilty, gait instability, visual deficits, impaired balance, impaired cognition, decreased safety awareness    Rehab potential is good.    Activity tolerance: Good    Discharge recommendations: Discharge Facility/Level Of Care Needs: rehabilitation facility     Barriers to discharge: Barriers to Discharge: Inaccessible home environment, Decreased caregiver support    Equipment recommendations: Equipment Needed After Discharge:  (TBD pending progress)     GOALS:    Physical Therapy Goals        Problem: Physical Therapy Goal    Goal Priority Disciplines Outcome Goal Variances Interventions   Physical Therapy Goal     PT/OT, PT Ongoing (interventions implemented as appropriate)     Description:  Goals to be met by: 17    Patient will increase functional independence with mobility by performin. Supine to sit with Contact Guard Assistance  2. Sit to supine with Contact Guard Assistance  3. Sit to stand transfer with Minimal Assistance using RW- MET 17      Revised: Sit to stand transfer with contact guard assistance using RW.   4. Gait  x 25 feet with Minimal Assistance with or without appropriate AD.   5. Lower extremity exercise program x30 reps per handout, with independence                        PLAN:    Patient to be seen 5 x/week  to address the above listed problems via gait training, therapeutic activities, therapeutic exercises, neuromuscular re-education  Plan of Care expires: 17  Plan of Care reviewed with: apollo jaeger        Melissa Roland, PT, DPT   2017  Pager: [phone]

## 2017-06-21 NOTE — PLAN OF CARE
Problem: SLP Goal  Goal: SLP Goal  Speech Language Pathology Goals  Updated goals expected to be met by 6/23    1. Pt will tolerate dental soft diet/nectar thick liquids without overt s/s of aspiration  2. Pt will tolerate thin liquids trials x10 without overt s/s of aspiration  3. Pt will complete dysphagia exercises x10 reps with min cues  4. Pt will following single step directions with 75% acc with min cues to enhance comprehension   5. Pt will complete basic problem solving tasks with 75% acc with min cues to enhance safety awareness   6. Pt will demonstrate orientation x3 to enhance cognition   7. Pt will participate in ongoing assessment of speech language and cognitive skills to rule out and deficits and establish goals of care               Pt with slow progress towards goals     Sharla Trevino MS, CCC-SLP  Speech Language Pathologist  Pager: [phone]  Date 6/21/2017

## 2017-06-21 NOTE — SUBJECTIVE & OBJECTIVE
Scheduled Medications:    amitriptyline  100 mg Oral Nightly    bisacodyl  10 mg Rectal Daily    gabapentin  600 mg Oral TID    heparin (porcine)  5,000 Units Subcutaneous Q8H    hydrocortisone  10 mg Oral QAM    hydrocortisone  5 mg Oral QHS    nicotine  1 patch Transdermal Daily    pantoprazole  40 mg Oral BID    polyethylene glycol  17 g Oral Daily    ramelteon  8 mg Oral QHS       PRN Medications: acetaminophen, acetaminophen, acetaminophen, desmopressin, dextrose 50%, dextrose 50%, glucagon (human recombinant), glucose, glucose, haloperidol lactate, hydrALAZINE, metoclopramide HCl, metoclopramide HCl    Review of Systems   Reason unable to perform ROS: 2/2 mental status.     Objective:     Vital Signs (Most Recent):  Temp: 97.1 °F (36.2 °C) (06/21/17 0844)  Pulse: (!) 114 (06/21/17 1100)  Resp: 18 (06/21/17 0844)  BP: 90/62 (06/21/17 0844)  SpO2: 95 % (06/21/17 0844)    Vital Signs (24h Range):  Temp:  [94 °F (34.4 °C)-97.1 °F (36.2 °C)] 97.1 °F (36.2 °C)  Pulse:  [] 114  Resp:  [17-18] 18  SpO2:  [95 %-99 %] 95 %  BP: ()/(62-73) 90/62     Physical Exam   Constitutional: She appears well-developed and well-nourished.   HENT:   Sutures noted throughout scalp   Eyes: Pupils are equal, round, and reactive to light.   Neck: Normal range of motion.   Cardiovascular: Normal rate and regular rhythm.    Pulmonary/Chest: Effort normal. No respiratory distress.   Abdominal: Soft. She exhibits no distension.   Musculoskeletal: She exhibits no deformity.   Neurological:   Neurologic:  -  Mental Status:  Awake, alert and oriented to person.  Follows commands.  -  Speech and language:  + aphasia  -  Coordination:  Finger to nose exam:  RUE dysmetria, LUE dysmetria.  Heel to shin:  RLE abnormal, LLE abnormal.   -  Motor:  RUE: 4/5.  LUE: 4/5.  RLE: 5/5.  LLE: 5/5.   -pronator drift. Unable to assess due to patient participation.   -  Tone:  normal  -  Sensory:  Intact to light touch and pin prick.    Skin: Skin is warm and dry.   Psychiatric: Cognition and memory are impaired.   Flat affect

## 2017-06-21 NOTE — SUBJECTIVE & OBJECTIVE
"Interval HPI:   Overnight events:  Elevated Na today, had -350cc/hr overnight, dilute sp gr 1.005. Remains on 1/2 NS 75cc/hr    ft4 ordered as add on    On HC 15/5, BP stable    /66 (BP Location: Right arm, Patient Position: Lying, BP Method: Automatic)   Pulse 83   Temp 96.9 °F (36.1 °C) (Oral)   Resp 17   Ht 5' 2" (1.575 m)   Wt 60.1 kg (132 lb 7.9 oz)   SpO2 99%   Breastfeeding? No   BMI 24.23 kg/m²     Labs Reviewed and Include      Recent Labs  Lab 06/21/17  0357   GLU 74   CALCIUM 10.7*   ALBUMIN 3.1*   PROT 6.9   *  153*   K 3.4*   CO2 29   *   BUN 6   CREATININE 0.8   ALKPHOS 96   ALT 59*   AST 34   BILITOT 0.3     Lab Results   Component Value Date    WBC 16.38 (H) 06/21/2017    HGB 12.6 06/21/2017    HCT 38.2 06/21/2017    MCV 98 06/21/2017     06/21/2017       Recent Labs  Lab 06/15/17  1007   FREET4 0.83     No results found for: HGBA1C    Nutritional status:   Body mass index is 24.23 kg/m².  Lab Results   Component Value Date    ALBUMIN 3.1 (L) 06/21/2017    ALBUMIN 2.9 (L) 06/20/2017    ALBUMIN 3.1 (L) 06/19/2017     No results found for: PREALBUMIN    Estimated Creatinine Clearance: 65.8 mL/min (based on Cr of 0.8).    Accu-Checks  Recent Labs      06/18/17   1159  06/18/17   1642  06/19/17   0036  06/19/17   1125  06/19/17   1705  06/20/17   0056  06/20/17   0558  06/20/17   1217  06/20/17   1651   POCTGLUCOSE  107  87  104  91  120*  88  88  111*  93       Current Medications and/or Treatments Impacting Glycemic Control  Immunotherapy:  Immunosuppressants     None        Steroids:   Hormones     Start     Stop Route Frequency Ordered    06/17/17 1712  desmopressin nasal spray 10 mcg      -- Nasl As needed (PRN) 06/17/17 1614    06/12/17 2100  hydrocortisone tablet 5 mg      -- Oral Nightly 06/12/17 1059    06/12/17 1100  hydrocortisone tablet 10 mg      -- Oral Every morning 06/12/17 1059        Pressors:    Autonomic Drugs     None    "     Hyperglycemia/Diabetes Medications: Antihyperglycemics     None

## 2017-06-21 NOTE — SUBJECTIVE & OBJECTIVE
Interval History:  NAEON.  Pt with elevated Na, and increased UO overnight. SG 1.005.  Received DDAVP x 1 this morning.  She denies HAs, N/V, visual changes, weakness, or seizures.        Medications:  Continuous Infusions:   Scheduled Meds:   amitriptyline  100 mg Oral Nightly    bisacodyl  10 mg Rectal Daily    gabapentin  600 mg Oral TID    heparin (porcine)  5,000 Units Subcutaneous Q8H    hydrocortisone  10 mg Oral QAM    hydrocortisone  5 mg Oral QHS    nicotine  1 patch Transdermal Daily    pantoprazole  40 mg Oral BID    polyethylene glycol  17 g Oral Daily    ramelteon  8 mg Oral QHS     PRN Meds:acetaminophen, acetaminophen, acetaminophen, desmopressin, dextrose 50%, dextrose 50%, glucagon (human recombinant), glucose, glucose, haloperidol lactate, hydrALAZINE, metoclopramide HCl, metoclopramide HCl     Review of Systems  Objective:     Weight: 60.1 kg (132 lb 7.9 oz)  Body mass index is 24.23 kg/m².  Vital Signs (Most Recent):  Temp: 96.1 °F (35.6 °C) (17 1200)  Pulse: 107 (17 1500)  Resp: 19 (17 1200)  BP: 101/71 (17 1200)  SpO2: (!) 94 % (17 1200) Vital Signs (24h Range):  Temp:  [96.1 °F (35.6 °C)-97.1 °F (36.2 °C)] 96.1 °F (35.6 °C)  Pulse:  [] 107  Resp:  [18-19] 19  SpO2:  [94 %-99 %] 94 %  BP: ()/(62-71) 101/71              Temp (24hrs), Av.8 °F (36 °C), Min:96.1 °F (35.6 °C), Max:97.1 °F (36.2 °C)           Date 17 0700 - 17 0659   Shift 7649-8776 6104-0965 1612-8619 24 Hour Total   I  N  T  A  K  E   Shift Total  (mL/kg)       O  U  T  P  U  T   Urine  (mL/kg/hr) 465  (1) 50  515    Shift Total  (mL/kg) 465  (7.7) 50  (0.8)  515  (8.6)   Weight (kg) 60.1 60.1 60.1 60.1          Urethral Catheter 06/15/17 1205 Latex (Active)   Site Assessment Clean;Intact 2017  8:15 AM   Collection Container Urimeter 2017  8:15 AM   Securement Method secured to top of thigh w/ adhesive device 2017  8:15 AM   Catheter Care Performed  "yes 6/21/2017  8:15 AM   Reason for Continuing Urinary Catheterization Required immobilization;Post operative 6/21/2017  4:00 AM   CAUTI Prevention Bundle StatLock in place w 1" slack 6/21/2017  8:15 AM   Output (mL) 150 mL 6/21/2017  6:00 AM       Neurosurgery Physical Exam   General: no distress  Neurologic: oriented to person, year  Head: normocephalic  GCS: Motor: 6/Verbal: 4/Eyes: 4 GCS Total: 14  Cranial nerves:  Pupils NR bilaterally, blind in both eyes, tongue midline  Sensory: response to light touch throughout  Motor Strength: generalized deconditioning in upper and lower extremities, no focal weakness.  FC x 4   Lungs:  normal respiratory effort  Abdomen: soft, non-tender   Extremities: no cyanosis or edema, or clubbing      Significant Labs:    Recent Labs  Lab 06/21/17 0357 06/21/17  0827   GLU 74  --    *  153* 158*   K 3.4*  --    *  --    CO2 29  --    BUN 6  --    CREATININE 0.8  --    CALCIUM 10.7*  --    MG 2.3  --        Recent Labs  Lab 06/20/17  0409 06/21/17  0357   WBC 11.67 16.38*   HGB 11.7* 12.6   HCT 35.4* 38.2    276       Recent Labs  Lab 06/21/17 0357   INR 1.0   APTT 25.2     Microbiology Results (last 7 days)     Procedure Component Value Units Date/Time    Blood culture [[id number]] Collected:  06/14/17 1517    Order Status:  Completed Specimen:  Blood from Peripheral, Antecubital, Left Updated:  06/19/17 2012     Blood Culture, Routine No growth after 5 days.    Narrative:       Blood cultures x 2 different sites. 4 bottles total. Please  draw cultures before administering antibiotics.    Blood culture [[id number]] Collected:  06/14/17 1156    Order Status:  Completed Specimen:  Blood from Peripheral, Antecubital, Left Updated:  06/19/17 1412     Blood Culture, Routine No growth after 5 days.    Narrative:       Blood cultures from 2 different sites. 4 bottles total.  Please draw before starting antibiotics.    Urine culture [[id number]] Collected:  06/15/17 1127 "    Order Status:  Completed Specimen:  Urine from Urine, Catheterized Updated:  06/16/17 1947     Urine Culture, Routine No growth

## 2017-06-21 NOTE — PT/OT/SLP PROGRESS
"Speech Language Pathology  Treatment    Joanna Morales   MRN: [id number]   Admitting Diagnosis: Meningioma, recurrent of brain    Diet recommendations: Solid Diet Level: Dental Soft  Liquid Diet Level: Nectar Thick Feed only when awake/alert, HOB to 90 degrees, Small bites/sips, 1 bite/sip at a time, Check for pocketing/oral residue, Meds crushed in puree, Eliminate distractions, Avoid talking while eating, Assistance with meals and Assistance with thickening liquids    SLP Treatment Date: 06/21/17  Speech Start Time: 1138     Speech Stop Time: 1151     Speech Total (min): 13 min       TREATMENT BILLABLE MINUTES:  Speech Therapy Individual 13    Has the patient been evaluated by SLP for swallowing? : Yes  Keep patient NPO?: No   General Precautions: Standard, fall, aspiration, vision impaired          Subjective:  Pt lethargic; family at bedside    Pain/Comfort  Pain Rating 1: 0/10  Pain Rating Post-Intervention 1: 0/10    Objective:     Pt with increased lethargy this date. Pt warranting mod-max tactile cueing to maintain alertness and attention to tasks. Pt oriented to self only. Location Pt reported "[facility]." Given binary choice and semantic cueing Pt unable to provide location or building type. Pt completed automatic speech tasks with 30% acc independently and max verbal cueing. Pt completed 3 item convergent category naming tasks with 80% acc with min SLP cues. Pt unable to provide single item for each category given max verbal cueing.  PO trials deferred this date given difficulty with state maintenance. SLP explained to family members ongoing goals of care during this hospitalization.     Assessment:  Joanna Morales is a 51 y.o. female with a medical diagnosis of Meningioma, recurrent of brain and presents with dysphagia and cognitive-linguistic impairements    Discharge recommendations: Discharge Facility/Level Of Care Needs: rehabilitation facility     Goals:    SLP Goals        Problem: SLP Goal "    Goal Priority Disciplines Outcome   SLP Goal     SLP    Description:  Speech Language Pathology Goals  Updated goals expected to be met by 6/23    1. Pt will tolerate dental soft diet/nectar thick liquids without overt s/s of aspiration  2. Pt will tolerate thin liquids trials x10 without overt s/s of aspiration  3. Pt will complete dysphagia exercises x10 reps with min cues  4. Pt will following single step directions with 75% acc with min cues to enhance comprehension   5. Pt will complete basic problem solving tasks with 75% acc with min cues to enhance safety awareness   6. Pt will demonstrate orientation x3 to enhance cognition   7. Pt will participate in ongoing assessment of speech language and cognitive skills to rule out and deficits and establish goals of care                              Plan:   Patient to be seen Therapy Frequency: 5 x/week   Plan of Care expires: 07/10/17  Plan of Care reviewed with: patient, family  SLP Follow-up?: Yes              Sharla Trevino CCC-SLP  06/21/2017

## 2017-06-21 NOTE — PLAN OF CARE
Problem: Patient Care Overview  Goal: Plan of Care Review  Outcome: Ongoing (interventions implemented as appropriate)  Pt remains free from falls and injury. Plan of care reviewed with pt.  Pt denies pain, VSS. Mackey output monitored. Endocrine called if needed. PRN Desmopressin given this morning. Staples remain intact. Will continue to monitor.

## 2017-06-21 NOTE — ASSESSMENT & PLAN NOTE
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  Start scheduled DDAVP 10mcg intranasal qhs.  Can give extra prn DDAVP:   Parameters to give extra DDAVP IF uop > 250 x 2 consecutive hours AND Spec grav <1.005 or Serum Sodium > 150.  Check sodium and sp. Gravity Q6    - FT4, low normal.  Start synthroid 50 mcg daily   - Continue cortef 10 q am, 5 q pm.    - On dental soft diet.   - DVT ppx- ANA/SCD's. GI ppx. Sqh.   - Continue daily PT/OT/ST  - Will DC staples tomorrow   - [facility] placement pending when

## 2017-06-21 NOTE — PT/OT/SLP PROGRESS
Occupational Therapy  Treatment    Joanna Morales   MRN: [id number]   Admitting Diagnosis: Meningioma, recurrent of brain    OT Date of Treatment: 06/21/17   OT Start Time: 0945  OT Stop Time: 1023  OT Total Time (min): 38 min    Billable Minutes:  Self Care/Home Management 20, Neuromuscular Re-education 10 and Cognitive Retraining 8    General Precautions: Standard, fall, aspiration  Orthopedic Precautions: N/A  Braces: N/A    Do you have any cultural, spiritual, Caodaism conflicts, given your current situation?: None reported    Subjective:  Communicated with RN prior to session.  No issues  Pain/Comfort  Pain Rating 1: 0/10    Objective:        Functional Mobility:  Bed Mobility:  Rolling/Turning to Left: Minimum assistance  Scooting/Bridging: Contact Guard Assistance (Scoot to eob; required max cues)  Supine to Sit: Moderate Assistance  Sit to Supine: Contact Guard Assistance    Transfers:   Sit <> Stand Assistance: Minimum Assistance  Sit <> Stand Assistive Device: Rolling Walker    Functional Ambulation: Side step to L with RW 5 steps with min a; rest, another 4 steps with CG A.  Fewer cues required second time.  L foot moving further second time.    Activities of Daily Living:       UE Dressing Level of Assistance: Maximum assistance (don gown like robe)  Grooming Position: EOB  Grooming Level of Assistance: Moderate assistance (toothbrushing; min a open paste, max a apply paste, sup brush, mod a rinse/spit; comb hair max a)                  Balance:   Static Sit: POOR+: Needs MINIMAL assist to maintain  Dynamic Sit: FAIR: Cannot move trunk without losing balance  Static Stand: POOR+: Needs MINIMAL assist to maintain  Dynamic stand: POOR: N/A    Therapeutic Activities and Exercises:  -Up to sit as above; sat eob x20+ minutes and worked on static and dynamic sitting balance, BUE arom, BUE coord and problem solving via grooming tasks.  Pt required cues and hand over hand assist to use touch to compensate  "for blindness.  Pt was able to recognize toothpaste and toothbrush by feel but not hair comb.  -Required CG A for sitting balance for first 15 min but it worsened last few minutes of session with pt falling backward and requiring min a for balance.  -Pt moderately confused throughout session, not oriented to situation.  Required step by step instruc for toothbrushing and side stepping.  -Demonstrated L neglect during rolling to L and UB dressing.  -Son in room; explained to son that pt's confusion is bigger barrier to indep self care than her weakness. Son states that prior to sx, she was able to compensate for near-blindness without assist.    AM-PAC 6 CLICK ADL   How much help from another person does this patient currently need?   1 = Unable, Total/Dependent Assistance  2 = A lot, Maximum/Moderate Assistance  3 = A little, Minimum/Contact Guard/Supervision  4 = None, Modified [place]/Independent    Putting on and taking off regular lower body clothing? : 2  Bathing (including washing, rinsing, drying)?: 2  Toileting, which includes using toilet, bedpan, or urinal? : 2  Putting on and taking off regular upper body clothing?: 2  Taking care of personal grooming such as brushing teeth?: 2  Eating meals?: 2  Total Score: 12     AM-PAC Raw Score CMS "G-Code Modifier Level of Impairment Assistance   6 % Total / Unable   7 - 8 CM 80 - 100% Maximal Assist   9-13 CL 60 - 80% Moderate Assist   14 - 19 CK 40 - 60% Moderate Assist   20 - 22 CJ 20 - 40% Minimal Assist   23 CI 1-20% SBA / CGA   24 CH 0% Independent/ Mod I       Patient left supine with all lines intact, call button in reach and son present    ASSESSMENT:  Joanna Morales is a 51 y.o. female with a medical diagnosis of Meningioma, recurrent of brain s/p resection.  The pt required max a for ub dressing and mod a for grooming on eob today 2' to general confusion, visual hallucinations, max impaired problem solving, impaired sequencing, L body " neglect, impaired strength/coord bues, impaired dynamic sitting and standing balance.  Pt tolerated session well.  OT asked nurse to try to obtain a bedside chair for pt to sit in (OT was unable to find one).  Highly recommend IPR for this complicated patient.    Rehab identified problem list/impairments: Rehab identified problem list/impairments: weakness, impaired endurance, impaired self care skills, impaired functional mobilty, gait instability, impaired balance, visual deficits, impaired cognition, decreased safety awareness, impaired coordination, abnormal tone, decreased upper extremity function, decreased lower extremity function, impaired fine motor    Rehab potential is good.    Activity tolerance: Good    Discharge recommendations: Discharge Facility/Level Of Care Needs: rehabilitation facility     Barriers to discharge: Barriers to Discharge: Inaccessible home environment, Decreased caregiver support    Equipment recommendations:       GOALS:    Occupational Therapy Goals        Problem: Occupational Therapy Goal    Goal Priority Disciplines Outcome Interventions   Occupational Therapy Goal     OT, PT/OT Ongoing (interventions implemented as appropriate)    Description:  Goals to be met by: 6/27/2017     Patient will increase functional independence with ADLs by performing:    UE Dressing with Minimal Assistance.  LE Dressing with Minimal Assistance.  Grooming while standing with Minimal Assistance.  Toileting from toilet with Minimal Assistance for hygiene and clothing management.   Rolling to Bilateral with Contact Guard Assistance.    Supine to sit with Contact Guard Assistance.  Stand pivot transfers with Minimal Assistance.  Toilet transfer to toilet with Minimal Assistance.                       Plan:  Patient to be seen 5 x/week to address the above listed problems via self-care/home management, therapeutic activities, therapeutic exercises, neuromuscular re-education, cognitive retraining  Plan  of Care expires: 07/13/17  Plan of Care reviewed with: HELEN Bunch  06/21/2017

## 2017-06-22 PROBLEM — E83.52 HYPERCALCEMIA: Status: ACTIVE | Noted: 2017-01-01

## 2017-06-22 NOTE — PT/OT/SLP PROGRESS
"Physical Therapy  Treatment    Joanna Morales   MRN: [id number]   Admitting Diagnosis: Meningioma, recurrent of brain    PT Received On: 06/22/17  PT Start Time: 1320     PT Stop Time: 1344    PT Total Time (min): 24 min       Billable Minutes:  Neuromuscular Re-education 24 min    Treatment Type: Treatment  PT/PTA: PT     PTA Visit Number: 0       General Precautions: Standard, fall, aspiration, vision impaired  Orthopedic Precautions: N/A   Braces: N/A    Do you have any cultural, spiritual, Protestant conflicts, given your current situation?: no conflicts    Subjective:  Communicated with RN prior to session. Pt alert and agreeable to participate in therapy session. Pt oriented to person only, stating she was "at the gym."     Pain/Comfort  Pain Rating 1: 0/10  Pain Rating Post-Intervention 1: 0/10    Objective:   Patient found with: bed alarm, telemetry, garcía catheter    Functional Mobility:  Bed Mobility:   Scooting/Bridging: Minimum Assistance (to scoot at EOB and towards HOB )  Supine to Sit: Maximum Assistance  Sit to Supine: Minimum Assistance    Transfers:  Sit <> Stand Assistance: Minimum Assistance (x 3 trials from EOB )  Sit <> Stand Assistive Device: No Assistive Device (HHA)    Gait:   Gait Distance: Pt able to perform marching in place of BLEs x 10 reps with mod A for weight shifting and redirection to task followed by ~3 side steps to L and R with mod A  Assistance 1: Moderate assistance  Gait Assistive Device: Hand held assist  Gait Pattern: reciprocal  Gait Deviation(s): decreased step length, decreased stride length, decreased toe-to-floor clearance, backward lean     Balance:   Static Sit: FAIR: Maintains without assist, but unable to take any challenges   Dynamic Sit: FAIR: Cannot move trunk without losing balance  Static Stand: POOR: Needs MODERATE assist to maintain  Dynamic stand: POOR: N/A     Therapeutic Activities and Exercises:  Neuromuscular re-education facilitated to improve pt's " postural awareness, trunk control, sitting and standing balance, and motor coordination. Therapist facilitated pt practicing sup<>sit transfers with cueing for hand placement and sequencing of tasks. While sitting EOB, pt provided with tactile cueing to improve midline orientation and cervical posture- pt with poor neck control demonstrating increased cervical extension- required frequent cueing throughout session to improve. Pt performed 3 trials of sit<>stand from EOB with min A for balance. Therapist facilitated pt performing weight shifting in stance, marching in place, and side stepping for pre-gait and balance training. Pt required max tactile cueing to perform. In sitting EOB, pt also performed LAQ, marches, and ankle pumps x 10 reps for BLE strengthening and coordination.     AM-PAC 6 CLICK MOBILITY  How much help from another person does this patient currently need?   1 = Unable, Total/Dependent Assistance  2 = A lot, Maximum/Moderate Assistance  3 = A little, Minimum/Contact Guard/Supervision  4 = None, Modified [place]/Independent    Turning over in bed (including adjusting bedclothes, sheets and blankets)?: 3  Sitting down on and standing up from a chair with arms (e.g., wheelchair, bedside commode, etc.): 3  Moving from lying on back to sitting on the side of the bed?: 2  Moving to and from a bed to a chair (including a wheelchair)?: 2  Need to walk in hospital room?: 1  Climbing 3-5 steps with a railing?: 1  Total Score: 12    AM-PAC Raw Score CMS G-Code Modifier Level of Impairment Assistance   6 % Total / Unable   7 - 9 CM 80 - 100% Maximal Assist   10 - 14 CL 60 - 80% Moderate Assist   15 - 19 CK 40 - 60% Moderate Assist   20 - 22 CJ 20 - 40% Minimal Assist   23 CI 1-20% SBA / CGA   24 CH 0% Independent/ Mod I     Patient left HOB elevated with all lines intact, call button in reach, bed alarm on and RN notified and sitter present.     Assessment:  Joanna Morales is a 51 y.o.  female with a medical diagnosis of Meningioma, recurrent of brain. Pt alert throughout session, but demonstrating fatigue following activity. Pt continues to demonstrate posterior lean in stance, impaired postural and spatial awareness, and poor functional mobility. Pt would benefit from continued PT intervention to address below listed deficits and maximize return to PLOF.       Rehab identified problem list/impairments: Rehab identified problem list/impairments: weakness, impaired endurance, impaired functional mobilty, gait instability, impaired self care skills, impaired balance, visual deficits, impaired cognition, decreased safety awareness    Rehab potential is good.    Activity tolerance: Good    Discharge recommendations: Discharge Facility/Level Of Care Needs: rehabilitation facility     Barriers to discharge: Barriers to Discharge: Inaccessible home environment, Decreased caregiver support    Equipment recommendations: Equipment Needed After Discharge:  (TBD pending progress)     GOALS:    Physical Therapy Goals        Problem: Physical Therapy Goal    Goal Priority Disciplines Outcome Goal Variances Interventions   Physical Therapy Goal     PT/OT, PT Ongoing (interventions implemented as appropriate)     Description:  Goals to be met by: 17    Patient will increase functional independence with mobility by performin. Supine to sit with Contact Guard Assistance  2. Sit to supine with Contact Guard Assistance  3. Sit to stand transfer with Minimal Assistance using RW- MET 17      Revised: Sit to stand transfer with contact guard assistance using RW.   4. Gait  x 25 feet with Minimal Assistance with or without appropriate AD.   5. Lower extremity exercise program x30 reps per handout, with independence                        PLAN:    Patient to be seen 5 x/week  to address the above listed problems via gait training, therapeutic activities, therapeutic exercises, neuromuscular  re-education  Plan of Care expires: 07/13/17  Plan of Care reviewed with: patient        Melissa Roland, PT, DPT   6/22/2017  Pager: [phone]

## 2017-06-22 NOTE — PT/OT/SLP PROGRESS
Occupational Therapy  Treatment    Joanna Morales   MRN: [id number]   Admitting Diagnosis: Meningioma, recurrent of brain    OT Date of Treatment: 06/22/17   OT Start Time: 1345  OT Stop Time: 1415  OT Total Time (min): 30 min    Billable Minutes:  Self Care/Home Management 15 and Neuromuscular Re-education 15    General Precautions: Standard, fall, aspiration, blind  Orthopedic Precautions: N/A  Braces: N/A    Do you have any cultural, spiritual, Evangelical conflicts, given your current situation?: None reported    Subjective:  Communicated with RN prior to session.  No issues  Pain/Comfort  Pain Rating 1: 0/10    Objective:        Functional Mobility:  Bed Mobility:  Rolling/Turning to Left: Minimum assistance  Supine to Sit:  (min/mod a)  Sit to Supine: Contact Guard Assistance    Transfers:   Sit <> Stand Assistance: Minimum Assistance  Sit <> Stand Assistive Device: No Assistive Device    Functional Ambulation: Side step to L with no AD wth mod a    Activities of Daily Living:       UE Dressing Level of Assistance:  (mod/max a don gown like robe)     Grooming Level of Assistance:  (min/mod a toothbrushing; min a face washing)                  Balance:   Static Sit: POOR+: Needs MINIMAL assist to maintain  Dynamic Sit: FAIR: Cannot move trunk without losing balance  Static Stand: POOR: Needs MODERATE assist to maintain      Therapeutic Activities and Exercises:  -Worked on compensating for blindness by using touch with grooming tasks on eob; pt started to use feel to locate needed items on table with mod cues and min a  -Worked on static and dynamic sitting balance on eob via UB dressing and grooming    AM-PAC 6 CLICK ADL   How much help from another person does this patient currently need?   1 = Unable, Total/Dependent Assistance  2 = A lot, Maximum/Moderate Assistance  3 = A little, Minimum/Contact Guard/Supervision  4 = None, Modified [place]/Independent    Putting on and taking off regular lower body  "clothing? : 2  Bathing (including washing, rinsing, drying)?: 2  Toileting, which includes using toilet, bedpan, or urinal? : 2  Putting on and taking off regular upper body clothing?: 2  Taking care of personal grooming such as brushing teeth?: 2  Eating meals?: 2  Total Score: 12     AM-PAC Raw Score CMS "G-Code Modifier Level of Impairment Assistance   6 % Total / Unable   7 - 8 CM 80 - 100% Maximal Assist   9-13 CL 60 - 80% Moderate Assist   14 - 19 CK 40 - 60% Moderate Assist   20 - 22 CJ 20 - 40% Minimal Assist   23 CI 1-20% SBA / CGA   24 CH 0% Independent/ Mod I       Patient left supine with all lines intact and call button in reach    ASSESSMENT:  Joanna Morales is a 51 y.o. female with a medical diagnosis of Meningioma, recurrent of brain s/p resection and blindness.  The pt showed improvement today with supine to sit, sitting balance on eob, use of feel to compensate for blindness and attn to task.  She did not have any visual hallucinations during session.      Rehab identified problem list/impairments: Rehab identified problem list/impairments: weakness, impaired endurance, impaired self care skills, impaired functional mobilty, gait instability, impaired balance, visual deficits, decreased coordination, impaired cognition, decreased safety awareness    Rehab potential is good.    Activity tolerance: Good    Discharge recommendations: Discharge Facility/Level Of Care Needs: rehabilitation facility     Barriers to discharge: Barriers to Discharge: Inaccessible home environment, Decreased caregiver support    Equipment recommendations:       GOALS:    Occupational Therapy Goals        Problem: Occupational Therapy Goal    Goal Priority Disciplines Outcome Interventions   Occupational Therapy Goal     OT, PT/OT Ongoing (interventions implemented as appropriate)    Description:  Goals to be met by: 6/27/2017     Patient will increase functional independence with ADLs by performing:    UE " Dressing with Minimal Assistance.  LE Dressing with Minimal Assistance.  Grooming while standing with Minimal Assistance.  Toileting from toilet with Minimal Assistance for hygiene and clothing management.   Rolling to Bilateral with Contact Guard Assistance.    Supine to sit with Contact Guard Assistance.  Stand pivot transfers with Minimal Assistance.  Toilet transfer to toilet with Minimal Assistance.                       Plan:  Patient to be seen 5 x/week to address the above listed problems via self-care/home management, therapeutic activities, therapeutic exercises, cognitive retraining, neuromuscular re-education  Plan of Care expires: 07/13/17  Plan of Care reviewed with: patient         HELEN Albarado  06/22/2017

## 2017-06-22 NOTE — PLAN OF CARE
Problem: Occupational Therapy Goal  Goal: Occupational Therapy Goal  Goals to be met by: 6/27/2017     Patient will increase functional independence with ADLs by performing:    UE Dressing with Minimal Assistance.  LE Dressing with Minimal Assistance.  Grooming while standing with Minimal Assistance.  Toileting from toilet with Minimal Assistance for hygiene and clothing management.   Rolling to Bilateral with Contact Guard Assistance.    Supine to sit with Contact Guard Assistance.  Stand pivot transfers with Minimal Assistance.  Toilet transfer to toilet with Minimal Assistance.      Outcome: Ongoing (interventions implemented as appropriate)  No goals met today  HELEN Portillo  6/22/2017  [phone]

## 2017-06-22 NOTE — ASSESSMENT & PLAN NOTE
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 Scheduled DDAVP 10mcg intranasal qhs.  Na 157, 154 today.  UO and SG are wnl.   Can give extra prn DDAVP:   Parameters to give extra DDAVP IF uop > 250 x 2 consecutive hours AND Spec grav <1.005 or Serum Sodium > 150.  Check sodium and sp. Gravity Q6    - FT4, low normal.  Started synthroid 50 mcg daily.  Repeat FT4 and TSH tomrrow  - Continue cortef 10 q am, 5 q pm.    - On dental soft diet.   - DVT ppx- ANA/SCD's. GI ppx. Sqh.   - Continue daily PT/OT/ST  - Will DC staples on POD 21  - [facility] placement pending, anticipate acceptance/DC tomorrow vs saturday

## 2017-06-22 NOTE — PT/OT/SLP PROGRESS
"Speech Language Pathology  Treatment    Joanna Moralse   MRN: [id number]   Admitting Diagnosis: Meningioma, recurrent of brain    Diet recommendations: Solid Diet Level: Dental Soft  Liquid Diet Level: Nectar Thick   Feed only when awake/alert, HOB to 90 degrees, Small bites/sips, 1 bite/sip at a time, Check for pocketing/oral residue, Meds crushed in puree, Eliminate distractions, Avoid talking while eating, Assistance with meals and Assistance with thickening liquids      SLP Treatment Date: 06/22/17  Speech Start Time: 1502     Speech Stop Time: 1518     Speech Total (min): 16 min       TREATMENT BILLABLE MINUTES:  Speech Therapy Individual 8 and Treatment Swallowing Dysfunction 8    Has the patient been evaluated by SLP for swallowing? : Yes  Keep patient NPO?: No   General Precautions: Standard, fall, aspiration, vision impaired          Subjective:  Pt asleep upon arrival     Pain/Comfort  Pain Rating 1: 0/10  Pain Rating Post-Intervention 1: 0/10    Objective:      Pt woke with max tactile cueing. Pt continues to warrant mod-max tactile cueing to maintain alertness and attention to tasks. Pt oriented to self only. Location Pt reported "[place]." Given binary choice and semantic cueing Pt this session able to provide  building type "medical".  Pt completed 3 item convergent category naming tasks with 90% acc with min SLP cues. Pt able to independently generate 4 items per concrete category, improvement from previous sessions. Pt mental flexibility impaired when SLP provides Pt with semantic cues Pt unable to process information to achieve appropriate responses. For example, when asked an article of clothing you could wear on your feet Pt responded "feet clothing." Responses remain rigid across all therapy tasks.      Pt offered trials of thin liquids via small single sip from an open cup x3. Initial trial exhibited no overt s/s of aspiration with SLP small regulated sip size. When SLP provided marginally " larger bolus from open cup Pt with immediate cough responses. Pt to continue current diet recommendations of nectar thick liquids and dental soft solids. Should lethargy remain a concern diet should be down graded to purees.      Assessment:  Joanna Morales is a 51 y.o. female with a medical diagnosis of Meningioma, recurrent of brain and presents with cognitive-linguistic deficits and dysphagia.     Discharge recommendations: Discharge Facility/Level Of Care Needs: rehabilitation facility     Goals:    SLP Goals        Problem: SLP Goal    Goal Priority Disciplines Outcome   SLP Goal     SLP    Description:  Speech Language Pathology Goals  Updated goals expected to be met by 6/23    1. Pt will tolerate dental soft diet/nectar thick liquids without overt s/s of aspiration  2. Pt will tolerate thin liquids trials x10 without overt s/s of aspiration  3. Pt will complete dysphagia exercises x10 reps with min cues  4. Pt will following single step directions with 75% acc with min cues to enhance comprehension   5. Pt will complete basic problem solving tasks with 75% acc with min cues to enhance safety awareness   6. Pt will demonstrate orientation x3 to enhance cognition   7. Pt will participate in ongoing assessment of speech language and cognitive skills to rule out and deficits and establish goals of care                              Plan:   Patient to be seen Therapy Frequency: 5 x/week   Plan of Care expires: 07/10/17  Plan of Care reviewed with: patient  SLP Follow-up?: Yes              Sharla Trevino CCC-SLP  06/22/2017

## 2017-06-22 NOTE — PLAN OF CARE
Problem: SLP Goal  Goal: SLP Goal  Speech Language Pathology Goals  Updated goals expected to be met by 6/23    1. Pt will tolerate dental soft diet/nectar thick liquids without overt s/s of aspiration  2. Pt will tolerate thin liquids trials x10 without overt s/s of aspiration  3. Pt will complete dysphagia exercises x10 reps with min cues  4. Pt will following single step directions with 75% acc with min cues to enhance comprehension   5. Pt will complete basic problem solving tasks with 75% acc with min cues to enhance safety awareness   6. Pt will demonstrate orientation x3 to enhance cognition   7. Pt will participate in ongoing assessment of speech language and cognitive skills to rule out and deficits and establish goals of care              Pt with slow progress towards goals     Sharla Trevino Ms, CCC-SLP  Speech Language Pathologist  Pager: [phone]  Date 6/22/2017

## 2017-06-22 NOTE — PLAN OF CARE
Plan of care reviewed with patient. Vital signs stable. No complaints of pain. Sitter at bedside. Intermittently lethargic. Neuro Q4.  Mackey catheter in place- Q1H I/Os, UA Q6H. Will continue to monitor.

## 2017-06-22 NOTE — PROGRESS NOTES
[facility]  Physical Medicine & Rehab  Progress Note    Patient Name: Joanna Morales  MRN: [id number]  Admission Date: 6/7/2017  Length of Stay: 15 days  Collaborating Physician : Marek Mclain MD    Subjective:     Interval History (06/22/2017):  Patient is seen for follow-up rehab evaluation and recommendations.  No acute events over night.  Participating with therapy. Na+ is 157 today. IVF were stopped and DDAVP ordered per NSGY. Synthroid initiated per NSGY 2/2 decreased FT4.  Barriers for discharge/rehab admission: not medically ready for discharge.     HPI, Past Medical, Surgical, Family, and Social History remains the same as documented in the initial encounter.    Principal Problem:Meningioma, recurrent of brain    Hospital Course:   6/13/17: Evaluated by therapy. Bed mobility Judy-MaxA.  Sit to stand Judy.  Unable to assess ambulation. LBD TotalA.  6/16/17: SLP evaluation. Cognitive-linguistic impairments noted with dysphagia. Dental soft and nectar thick liquids.   6/20/17: Participating with OT therapy. Bed mobility Judy-ModA & RW.  Sit to stand Judy & RW.  Ambulation-4 small side steps with ModA and RW..   6/21/17: Participating with therapy. Bed mobility Judy-ModA.  Sit to stand Judy & RW.  Ambulated side step with Judy & RW and 4 steps with CGA.  UBD MaxA.  6/21/17: SLP-diet recommendation dental soft and nectar thick liquids. Cognitive linguistic impairments noted.      Scheduled Medications:    amitriptyline  100 mg Oral Nightly    bisacodyl  10 mg Rectal Daily    gabapentin  600 mg Oral TID    heparin (porcine)  5,000 Units Subcutaneous Q8H    hydrocortisone  10 mg Oral QAM    hydrocortisone  5 mg Oral QHS    nicotine  1 patch Transdermal Daily    pantoprazole  40 mg Oral BID    polyethylene glycol  17 g Oral Daily    ramelteon  8 mg Oral QHS       PRN Medications: acetaminophen, acetaminophen, acetaminophen, desmopressin, dextrose 50%, dextrose 50%, glucagon  (human recombinant), glucose, glucose, haloperidol lactate, hydrALAZINE, metoclopramide HCl, metoclopramide HCl    Review of Systems   Reason unable to perform ROS: 2/2 mental status.     Objective:     Vital Signs (Most Recent):  Temp: 97.7 °F (36.5 °C) (06/22/17 0730)  Pulse: 94 (06/22/17 0730)  Resp: 16 (06/22/17 0730)  BP: 117/86 (06/22/17 0730)  SpO2: 97 % (06/22/17 0730)    Vital Signs (24h Range):  Temp:  [96.1 °F (35.6 °C)-98.9 °F (37.2 °C)] 97.7 °F (36.5 °C)  Pulse:  [] 94  Resp:  [16-19] 16  SpO2:  [94 %-99 %] 97 %  BP: ()/(60-86) 117/86     Physical Exam   Constitutional: She appears well-developed and well-nourished.   HENT:   Staples noted throughout scalp   Eyes: Pupils are equal, round, and reactive to light.   Neck: Normal range of motion.   Cardiovascular: Normal rate and regular rhythm.    Pulmonary/Chest: Effort normal. No respiratory distress.   Abdominal: Soft. She exhibits no distension.   Musculoskeletal: Normal range of motion.   Neurological:   Neurologic:  -  Mental Status:  Awake, alert and oriented to person.  Follows commands.  -  Speech and language:  + aphasia  -  Coordination:  Finger to nose exam:  RUE dysmetria, LUE dysmetria.  Heel to shin:  RLE abnormal, LLE abnormal.   -  Motor:  RUE: 4/5.  LUE: 4/5.  RLE: 5/5.  LLE: 5/5.   -pronator drift. Unable to assess due to patient participation.   -  Tone:  normal  -  Sensory:  Intact to light touch and pin prick.   Skin: Skin is warm and dry.   Psychiatric: Her behavior is normal. Cognition and memory are impaired.   Flat affect       Diagnostic Results:   Labs: Reviewed  CT: Reviewed  MRI: Reviewed  EEG:reviewed   Assessment/Plan:      S/P craniotomy    -NSY planning to removal staples today  -see Meningioma         Restlessness and agitation    -encephalopathy throughout admission   -EEG negative for seizures  -Haldol PRN         Pneumocephalus    -repeat CHT showed improving         Slurred speech    -SLP evaluate and  treat         Diabetes insipidus    -Endocrine following         * Meningioma, recurrent of brain    -s/p L frontotemporal craniotomy and excision of meningioma on 6/7/17     · Monitor sleep disturbances and establish consistent sleep-wake cycle (lights on and shades open during day and lights dim/off at night).   · Promote environmental modifications to limit agitation and confusion (appropriate lighting, family at bedside, visible clock and calendar, updated white board, reduce noise, limited visitors, clustered nursing care).  · Monitor for bowel and bladder dysfunction.   · Encourage mobility, OOB in chair at least 3 hours per day, and ambulation.  · Reorient patient to person, place, time, and situation on each encounter.   · PT/OT evaluate and treat.  · SLP speech and cognitive evaluate and treat.  · If possible, avoid restraints.  May benefit from 24/7 supervision by a sitter.   · Avoid/limit medications that may worsen delirium.  Such as benzodiazepines, antihistamines, anticholinergics, hypnotics, and opiates.                Na+ still elevated for IPR admission. Will follow patient's progress and discuss with rehab team for rehab recommendations.     Thank you for your consult.     Quita Altamirano NP  Department of Physical Medicine & Rehab   [facility]

## 2017-06-22 NOTE — SUBJECTIVE & OBJECTIVE
Interval History: NAEON.  Pt with Na 157, 154 today.  UO and SG are wnl.  Pt denies new complaints today.   NAEON.    Denies N/V, visual changes, weakness, or seizures.        Medications:  Continuous Infusions:   Scheduled Meds:   amitriptyline  100 mg Oral Nightly    bisacodyl  10 mg Rectal Daily    desmopressin  10 mcg Nasal QHS    gabapentin  600 mg Oral TID    heparin (porcine)  5,000 Units Subcutaneous Q8H    hydrocortisone  10 mg Oral QAM    hydrocortisone  5 mg Oral QHS    levothyroxine  50 mcg Oral Before breakfast    nicotine  1 patch Transdermal Daily    pantoprazole  40 mg Oral BID    polyethylene glycol  17 g Oral Daily    ramelteon  8 mg Oral QHS     PRN Meds:acetaminophen, acetaminophen, acetaminophen, desmopressin, dextrose 50%, dextrose 50%, glucagon (human recombinant), glucose, glucose, haloperidol lactate, hydrALAZINE, metoclopramide HCl, metoclopramide HCl     Review of Systems   Endocrine: Positive for cold intolerance.     Objective:     Weight: 60.1 kg (132 lb 7.9 oz)  Body mass index is 24.23 kg/m².  Vital Signs (Most Recent):  Temp: 97.7 °F (36.5 °C) (17)  Pulse: 94 (17)  Resp: 16 (17)  BP: 117/86 (17)  SpO2: 97 % (17) Vital Signs (24h Range):  Temp:  [96.1 °F (35.6 °C)-98.9 °F (37.2 °C)] 97.7 °F (36.5 °C)  Pulse:  [] 94  Resp:  [16-19] 16  SpO2:  [94 %-99 %] 97 %  BP: ()/(60-86) 117/86              Temp (24hrs), Av.8 °F (36.6 °C), Min:96.1 °F (35.6 °C), Max:98.9 °F (37.2 °C)           Date 17 0700 - 17 0659   Shift 3072-2404 4976-6409 3870-6424 24 Hour Total   I  N  T  A  K  E   P.O. 436   436    Shift Total  (mL/kg) 436  (7.3)   436  (7.3)   O  U  T  P  U  T   Urine  (mL/kg/hr) 120   120    Shift Total  (mL/kg) 120  (2)   120  (2)   Weight (kg) 60.1 60.1 60.1 60.1          Urethral Catheter 06/15/17 1205 Latex (Active)   Site Assessment Clean;Intact 2017  8:00 AM   Collection Container  "Urimeter 6/22/2017  8:00 AM   Securement Method secured to top of thigh w/ adhesive device 6/22/2017  8:00 AM   Catheter Care Performed yes 6/22/2017  8:00 AM   Reason for Continuing Urinary Catheterization Required immobilization;Post operative 6/21/2017  4:00 AM   CAUTI Prevention Bundle Drainage bag off the floor;StatLock in place w 1" slack;Green sheeting clip in use;No dependent loops or kinks;Drainage bag not overfilled (<2/3 full);Drainage bag below bladder;Intact seal between catheter & drainage tubing 6/21/2017  8:00 PM   Output (mL) 0 mL 6/22/2017 11:00 AM       Neurosurgery Physical Exam   General: no distress  Neurologic: oriented to person, year  Head: normocephalic  GCS: Motor: 6/Verbal: 4/Eyes: 4 GCS Total: 14  Cranial nerves:  Pupils NR bilaterally, blind in both eyes, tongue midline  Sensory: response to light touch throughout  Motor Strength: generalized deconditioning in upper and lower extremities, no focal weakness.  FC x 4   Lungs:  normal respiratory effort  Abdomen: soft, non-tender   Extremities: no cyanosis or edema, or clubbing    Significant Labs:    Recent Labs  Lab 06/22/17  0406 06/22/17  0834   GLU 83  --    *  157* 154*   K 3.8  --    *  --    CO2 29  --    BUN 10  --    CREATININE 1.0  --    CALCIUM 10.7*  --    MG 2.2  --        Recent Labs  Lab 06/21/17  0357 06/22/17  0406   WBC 16.38* 11.77   HGB 12.6 13.5   HCT 38.2 42.1    307       Recent Labs  Lab 06/22/17  0406   INR 1.0   APTT 35.9*     Microbiology Results (last 7 days)     Procedure Component Value Units Date/Time    Blood culture [[id number]] Collected:  06/14/17 1517    Order Status:  Completed Specimen:  Blood from Peripheral, Antecubital, Left Updated:  06/19/17 2012     Blood Culture, Routine No growth after 5 days.    Narrative:       Blood cultures x 2 different sites. 4 bottles total. Please  draw cultures before administering antibiotics.    Blood culture [[id number]] Collected:  06/14/17 1156 "    Order Status:  Completed Specimen:  Blood from Peripheral, Antecubital, Left Updated:  06/19/17 1412     Blood Culture, Routine No growth after 5 days.    Narrative:       Blood cultures from 2 different sites. 4 bottles total.  Please draw before starting antibiotics.    Urine culture [[id number]] Collected:  06/15/17 1127    Order Status:  Completed Specimen:  Urine from Urine, Catheterized Updated:  06/16/17 1947     Urine Culture, Routine No growth

## 2017-06-22 NOTE — PLAN OF CARE
Problem: Physical Therapy Goal  Goal: Physical Therapy Goal  Goals to be met by: 17    Patient will increase functional independence with mobility by performin. Supine to sit with Contact Guard Assistance  2. Sit to supine with Contact Guard Assistance  3. Sit to stand transfer with Minimal Assistance using RW- MET 17      Revised: Sit to stand transfer with contact guard assistance using RW.   4. Gait  x 25 feet with Minimal Assistance with or without appropriate AD.   5. Lower extremity exercise program x30 reps per handout, with independence       Outcome: Ongoing (interventions implemented as appropriate)  No goals met this visit; continue current POC.     Melissa Watkins PT, DPT   2017  Pager: [phone]

## 2017-06-22 NOTE — PLAN OF CARE
Problem: Patient Care Overview  Goal: Plan of Care Review  Outcome: Ongoing (interventions implemented as appropriate)  Plan of care reviewed with pt at bedside. Safety precautions initiated. Bed locked in lowest position, call bell within reach, bed alarm on. AAOX4.VSS. Q1H I/O. Q6H UA/Na. Pt is intermittently confused, but verbalizes understanding r/t care, medication, and interventions. Will continue to monitor for changes.

## 2017-06-22 NOTE — SUBJECTIVE & OBJECTIVE
Scheduled Medications:    amitriptyline  100 mg Oral Nightly    bisacodyl  10 mg Rectal Daily    gabapentin  600 mg Oral TID    heparin (porcine)  5,000 Units Subcutaneous Q8H    hydrocortisone  10 mg Oral QAM    hydrocortisone  5 mg Oral QHS    nicotine  1 patch Transdermal Daily    pantoprazole  40 mg Oral BID    polyethylene glycol  17 g Oral Daily    ramelteon  8 mg Oral QHS       PRN Medications: acetaminophen, acetaminophen, acetaminophen, desmopressin, dextrose 50%, dextrose 50%, glucagon (human recombinant), glucose, glucose, haloperidol lactate, hydrALAZINE, metoclopramide HCl, metoclopramide HCl    Review of Systems   Reason unable to perform ROS: 2/2 mental status.     Objective:     Vital Signs (Most Recent):  Temp: 97.7 °F (36.5 °C) (06/22/17 0730)  Pulse: 94 (06/22/17 0730)  Resp: 16 (06/22/17 0730)  BP: 117/86 (06/22/17 0730)  SpO2: 97 % (06/22/17 0730)    Vital Signs (24h Range):  Temp:  [96.1 °F (35.6 °C)-98.9 °F (37.2 °C)] 97.7 °F (36.5 °C)  Pulse:  [] 94  Resp:  [16-19] 16  SpO2:  [94 %-99 %] 97 %  BP: ()/(60-86) 117/86     Physical Exam   Constitutional: She appears well-developed and well-nourished.   HENT:   Sutures noted throughout scalp   Eyes: Pupils are equal, round, and reactive to light.   Neck: Normal range of motion.   Cardiovascular: Normal rate and regular rhythm.    Pulmonary/Chest: Effort normal. No respiratory distress.   Abdominal: Soft. She exhibits no distension.   Musculoskeletal: Normal range of motion.   Neurological:   Neurologic:  -  Mental Status:  Awake, alert and oriented to person.  Follows commands.  -  Speech and language:  + aphasia  -  Coordination:  Finger to nose exam:  RUE dysmetria, LUE dysmetria.  Heel to shin:  RLE abnormal, LLE abnormal.   -  Motor:  RUE: 4/5.  LUE: 4/5.  RLE: 5/5.  LLE: 5/5.   -pronator drift. Unable to assess due to patient participation.   -  Tone:  normal  -  Sensory:  Intact to light touch and pin prick.   Skin:  Skin is warm and dry.   Psychiatric: Her behavior is normal. Cognition and memory are impaired.   Flat affect

## 2017-06-22 NOTE — PLAN OF CARE
Pt not medically stable at this time.  [facility] would like for pt's sodium level to trend down before accepting.    Selina Chew LMSW

## 2017-06-22 NOTE — PROGRESS NOTES
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  She underwent operation in January 2015 on the [facility] service at [facility].  It   was apparently only possible to get a partial resection of the tumor.  She did   have a followup scan done in 2016 showing a continued large meningioma in this   area.  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  She had a   followup MRI scan done at [facility] on 01/20/17, showing this large tumor.  She   was seen by you in Ophthalmology and then referred to Neurosurgery for further   evaluation.  At this point, she has some preserved vision in the right eye.  She   complains of constant headaches.  She has no sense of smell.  She feels off   balance.  She is taking Neurontin apparently for seizure prevention. Past   medical history relates primarily to the present illness.  She does not have   chronic medical illnesses such as diabetes or hypertension.  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  She is  and has good family support.     On physical examination, she is a well-developed, well-nourished white lady, who   is alert and oriented.  Examination of the head shows a somewhat anterior, but   well-healed bicoronal incision.  She says that this is a little tender in the   left frontal area.  Eyes show full extraocular movements.  The pupils are small,   the left is not reactive.  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  The right optic nerve is probably normal.  She   sees things as doubles.  When I held up two  fingers she counted four, although   she knew that there were two.  Hearing seems preserved.  The neck is supple.  On   neurological examination, she is speaking clearly.  She is obviously anxious   and depressed over her illness.  Finger-to-nose is somewhat diminished on the   left side and gait was mildly unsteady.  Cranial nerves are otherwise intact.    She has normal facial sensation and movement.  The tongue protrudes in the   midline.  Strength in the extremities seems generally good, sensation normal and   deep tendon reflexes symmetrical.     MRI of the brain performed at [facility] on 01/20/17, shows a   previous bifrontal craniotomy.  There is a large lobular meningioma, which   occupies the sella, planum sphenoidale and extends back on to the clivus.  The   optic chiasm and optic nerves are not visualized.  The carotid arteries and   anterior cerebral arteries run done through the tumor.  The tumor elevates and   pushes the third ventricle back, but there is no hydrocephalus    Post-Op Info:  Procedure(s) (LRB):  CRANIOTOMY WITH STEALTH (N/A)   15 Days Post-Op     Interval History: NAEON.  Pt with Na 157, 154 today.  UO and SG are wnl.  Pt denies new complaints today.   NAEON.    Denies N/V, visual changes, weakness, or seizures.        Medications:  Continuous Infusions:   Scheduled Meds:   amitriptyline  100 mg Oral Nightly    bisacodyl  10 mg Rectal Daily    desmopressin  10 mcg Nasal QHS    gabapentin  600 mg Oral TID    heparin (porcine)  5,000 Units Subcutaneous Q8H    hydrocortisone  10 mg Oral QAM    hydrocortisone  5 mg Oral QHS    levothyroxine  50 mcg Oral Before breakfast    nicotine  1 patch Transdermal Daily    pantoprazole  40 mg Oral BID    polyethylene glycol  17 g Oral Daily    ramelteon  8 mg Oral QHS     PRN Meds:acetaminophen, acetaminophen, acetaminophen, desmopressin, dextrose 50%, dextrose 50%, glucagon (human recombinant), glucose, glucose, haloperidol  "lactate, hydrALAZINE, metoclopramide HCl, metoclopramide HCl     Review of Systems   Endocrine: Positive for cold intolerance.     Objective:     Weight: 60.1 kg (132 lb 7.9 oz)  Body mass index is 24.23 kg/m².  Vital Signs (Most Recent):  Temp: 97.7 °F (36.5 °C) (17)  Pulse: 94 (17)  Resp: 16 (17)  BP: 117/86 (17)  SpO2: 97 % (17) Vital Signs (24h Range):  Temp:  [96.1 °F (35.6 °C)-98.9 °F (37.2 °C)] 97.7 °F (36.5 °C)  Pulse:  [] 94  Resp:  [16-19] 16  SpO2:  [94 %-99 %] 97 %  BP: ()/(60-86) 117/86              Temp (24hrs), Av.8 °F (36.6 °C), Min:96.1 °F (35.6 °C), Max:98.9 °F (37.2 °C)           Date 17 07 - 17 0659   Shift 2128-3852 4210-1005 1499-8365 24 Hour Total   I  N  T  A  K  E   P.O. 436   436    Shift Total  (mL/kg) 436  (7.3)   436  (7.3)   O  U  T  P  U  T   Urine  (mL/kg/hr) 120   120    Shift Total  (mL/kg) 120  (2)   120  (2)   Weight (kg) 60.1 60.1 60.1 60.1          Urethral Catheter 06/15/17 1205 Latex (Active)   Site Assessment Clean;Intact 2017  8:00 AM   Collection Container Urimeter 2017  8:00 AM   Securement Method secured to top of thigh w/ adhesive device 2017  8:00 AM   Catheter Care Performed yes 2017  8:00 AM   Reason for Continuing Urinary Catheterization Required immobilization;Post operative 2017  4:00 AM   CAUTI Prevention Bundle Drainage bag off the floor;StatLock in place w 1" slack;Green sheeting clip in use;No dependent loops or kinks;Drainage bag not overfilled (<2/3 full);Drainage bag below bladder;Intact seal between catheter & drainage tubing 2017  8:00 PM   Output (mL) 0 mL 2017 11:00 AM       Neurosurgery Physical Exam   General: no distress  Neurologic: oriented to person, year  Head: normocephalic  GCS: Motor: 6/Verbal: 4/Eyes: 4 GCS Total: 14  Cranial nerves:  Pupils NR bilaterally, blind in both eyes, tongue midline  Sensory: response to light touch " throughout  Motor Strength: generalized deconditioning in upper and lower extremities, no focal weakness.  FC x 4   Lungs:  normal respiratory effort  Abdomen: soft, non-tender   Extremities: no cyanosis or edema, or clubbing    Significant Labs:    Recent Labs  Lab 06/22/17  0406 06/22/17  0834   GLU 83  --    *  157* 154*   K 3.8  --    *  --    CO2 29  --    BUN 10  --    CREATININE 1.0  --    CALCIUM 10.7*  --    MG 2.2  --        Recent Labs  Lab 06/21/17  0357 06/22/17  0406   WBC 16.38* 11.77   HGB 12.6 13.5   HCT 38.2 42.1    307       Recent Labs  Lab 06/22/17  0406   INR 1.0   APTT 35.9*     Microbiology Results (last 7 days)     Procedure Component Value Units Date/Time    Blood culture [[id number]] Collected:  06/14/17 1517    Order Status:  Completed Specimen:  Blood from Peripheral, Antecubital, Left Updated:  06/19/17 2012     Blood Culture, Routine No growth after 5 days.    Narrative:       Blood cultures x 2 different sites. 4 bottles total. Please  draw cultures before administering antibiotics.    Blood culture [[id number]] Collected:  06/14/17 1156    Order Status:  Completed Specimen:  Blood from Peripheral, Antecubital, Left Updated:  06/19/17 1412     Blood Culture, Routine No growth after 5 days.    Narrative:       Blood cultures from 2 different sites. 4 bottles total.  Please draw before starting antibiotics.    Urine culture [[id number]] Collected:  06/15/17 1127    Order Status:  Completed Specimen:  Urine from Urine, Catheterized Updated:  06/16/17 1947     Urine Culture, Routine No growth            Assessment/Plan:     * Meningioma, recurrent of brain    52 y/o F s/p crani resection olfactory groove meningioma POD 15  - Pt is neurologically stable  - Appreciate endocrine rec's for DI: Now Off IVF. Scheduled DDAVP 10mcg intranasal qhs.  Na 157, 154 today.  UO and SG are wnl.   Can give extra prn DDAVP:   Parameters to give extra DDAVP IF uop > 250 x 2 consecutive  hours AND Spec grav <1.005 or Serum Sodium > 150.  Check sodium and sp. Gravity Q6    - FT4, low normal.  Started synthroid 50 mcg daily.  Repeat FT4 and TSH tomrrow  - Continue cortef 10 q am, 5 q pm.    - On dental soft diet.   - DVT ppx- ANA/SCD's. GI ppx. Sqh.   - Continue daily PT/OT/ST  - Will DC staples on POD 21  - [facility] placement pending, anticipate acceptance/DC tomorrow vs saturday                ALANNAH Peña  Neurosurgery  [facility]

## 2017-06-23 NOTE — PROGRESS NOTES
"[facility]  Endocrinology  Progress Note    Admit Date: 6/7/2017     Reason for Consult: Diabetes insipidus     Surgical Procedure and Date:   CRANIOTOMY 6/8/2017        HPI:   Patient is a 51 y.o. female with a diagnosis of  meningioma s/p resection (6/7). 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  She underwent partial resection of the tumor in January 2015 at [facility]. Followup scan done in 2016 showing a continued large meningioma.     Endocrinology consulted for post operative DI management.  Per the operative report - The pituitary stalk was identified and could be preserved        Interval HPI:   Overnight events:  Na improved to 153 but still high, sp gr 1010, uop net positive and ranging 0-110cc/hr, got DDAVP 10 mcg last night as scheduled    On HC 10/5 and BP stable    Started on lt4 50mcg and HR stable, temp nl      /79 (BP Location: Left arm, Patient Position: Lying, BP Method: Automatic)   Pulse 96   Temp 97.6 °F (36.4 °C)   Resp 16   Ht 5' 2" (1.575 m)   Wt 60.1 kg (132 lb 7.9 oz)   SpO2 99%   Breastfeeding? No   BMI 24.23 kg/m²       Labs Reviewed and Include      Recent Labs  Lab 06/23/17  0556 06/23/17  0858   GLU 95  --    CALCIUM 10.8*  --    ALBUMIN 3.4*  --    PROT 7.8  --    *  155* 153*   K 3.6  --    CO2 30*  --    *  --    BUN 12  --    CREATININE 0.9  --    ALKPHOS 122  --    ALT 73*  --    AST 53*  --    BILITOT 0.2  --      Lab Results   Component Value Date    WBC 9.66 06/23/2017    HGB 13.5 06/23/2017    HCT 42.7 06/23/2017     (H) 06/23/2017     06/23/2017       Recent Labs  Lab 06/21/17  0357 06/23/17  0556   TSH  --  0.472   FREET4 0.85 0.83     No results found for: HGBA1C    Nutritional status:   Body mass index is 24.23 kg/m².  Lab Results   Component Value Date    ALBUMIN 3.4 (L) 06/23/2017    ALBUMIN " 3.2 (L) 06/22/2017    ALBUMIN 3.1 (L) 06/21/2017     No results found for: PREALBUMIN    Estimated Creatinine Clearance: 58.5 mL/min (based on Cr of 0.9).    Accu-Checks  Recent Labs      06/20/17   1217  06/20/17   1651  06/21/17   1233  06/22/17   1146   POCTGLUCOSE  111*  93  127*  84       Current Medications and/or Treatments Impacting Glycemic Control  Immunotherapy:  Immunosuppressants     None        Steroids:   Hormones     Start     Stop Route Frequency Ordered    06/22/17 2100  desmopressin nasal solution 10 mcg      -- Nasl Nightly 06/22/17 1023    06/17/17 1712  desmopressin nasal spray 10 mcg      -- Nasl As needed (PRN) 06/17/17 1614    06/12/17 2100  hydrocortisone tablet 5 mg      -- Oral Nightly 06/12/17 1059    06/12/17 1100  hydrocortisone tablet 10 mg      -- Oral Every morning 06/12/17 1059        Pressors:    Autonomic Drugs     None        Hyperglycemia/Diabetes Medications: Antihyperglycemics     None          ASSESSMENT and PLAN    Diabetes insipidus    Post-surgical DI (craniotomy 6/8/2017)    I/O net positive: goal is to match i/o which is difficult given impaired thirst mechanism but at the very least avoid net negative  To continue scheduled DDAVP 10mcg qhs   Parameters to give extra DDAVP prn now and once discharged: IF uop > 250 x 2 consecutive hours    Check sodium and sp. [place] Q6   Strict I/O     D/c recs: as above               Hypercalcemia    confirm with ionized ca level   Check PTH to assess if PTH mediated or not (both labs added on to am labs)   Immobilization likely a contributor and rec pt/ot with weight bearing if possible         Adverse effect of glucocorticoid or synthetic analogue    Can increase BG, currently staBLE without insulin requirements         * Meningioma, recurrent of brain    S/p surgery     Low normal TSH and FT4 but clinically not mary, no longer hypothermic; continue lt4 50mcg daily and will repeat tfts o/p to assess if longterm replacement needed      Continue HC 10/5, clinically without s/s AI. Will check 8am cortisol/acth o/p to decide if longterm repletion needed.    Will also monitor for signs of SIADH: hyponatremia and high urine sp gr (Uosm>100 or Cat>30)    Will schedule f/u in pituitary clinic within 4 weeks post discharge with above labs                  Jennifer Hsu MD  Endocrinology  [facility]

## 2017-06-23 NOTE — ASSESSMENT & PLAN NOTE
Post-surgical DI (craniotomy 6/8/2017)    I/O net positive: goal is to match i/o which is difficult given impaired thirst mechanism but at the very least avoid net negative  To continue scheduled DDAVP 10mcg qhs   Parameters to give extra DDAVP prn now and once discharged: IF uop > 250 x 2 consecutive hours    Check sodium and sp. [place] Q6   Strict I/O     D/c recs: as above

## 2017-06-23 NOTE — ASSESSMENT & PLAN NOTE
52 y/o F s/p crani resection olfactory groove meningioma POD#16.  - Pt is neurologically stable  - Appreciate endocrine recs for DI: Now Off IVF. Scheduled DDAVP 10mcg intranasal qhs.  Na 151 today.  UO and SG are wnl.   Can give extra prn DDAVP:   Parameters to give extra DDAVP IF uop > 250 x 2 consecutive hours AND Spec grav <1.005 or Serum Sodium > 150.  Check sodium and sp. Gravity Q6    - FT4, low normal.  Started synthroid 50 mcg daily.  Repeat FT4 and TSH tomrrow  - Continue cortef 10 q am, 5 q pm.    - On dental soft diet.   - DVT ppx- ANA/SCD's. GI ppx. Sqh.   - Continue daily PT/OT/ST  - Will DC staples on POD#21  - [facility] placement pending, anticipate acceptance/DC tomorrow vs saturday

## 2017-06-23 NOTE — PLAN OF CARE
Problem: SLP Goal  Goal: SLP Goal  Speech Language Pathology Goals  Updated goals expected to be met by 6/23    1. Pt will tolerate dental soft diet/nectar thick liquids without overt s/s of aspiration  2. Pt will tolerate thin liquids trials x10 without overt s/s of aspiration  3. Pt will complete dysphagia exercises x10 reps with min cues  4. Pt will following single step directions with 75% acc with min cues to enhance comprehension   5. Pt will complete basic problem solving tasks with 75% acc with min cues to enhance safety awareness   6. Pt will demonstrate orientation x3 to enhance cognition   7. Pt will participate in ongoing assessment of speech language and cognitive skills to rule out and deficits and establish goals of care            Outcome: Ongoing (interventions implemented as appropriate)  Pt with increased fatigue. See note for full details. ST to continue to follow. Thank you.    GARRET Farias., [facility]  Speech-Language Pathology  Pager: [phone]  6/23/2017

## 2017-06-23 NOTE — SUBJECTIVE & OBJECTIVE
"Interval HPI:   Overnight events:  Na improved to 153 but still high, sp gr 1010, uop net positive and ranging 0-110cc/hr, got DDAVP 10 mcg last night as scheduled    On HC 10/5 and BP stable    Started on lt4 50mcg and HR stable, temp nl      /79 (BP Location: Left arm, Patient Position: Lying, BP Method: Automatic)   Pulse 96   Temp 97.6 °F (36.4 °C)   Resp 16   Ht 5' 2" (1.575 m)   Wt 60.1 kg (132 lb 7.9 oz)   SpO2 99%   Breastfeeding? No   BMI 24.23 kg/m²     Labs Reviewed and Include      Recent Labs  Lab 06/23/17  0556 06/23/17  0858   GLU 95  --    CALCIUM 10.8*  --    ALBUMIN 3.4*  --    PROT 7.8  --    *  155* 153*   K 3.6  --    CO2 30*  --    *  --    BUN 12  --    CREATININE 0.9  --    ALKPHOS 122  --    ALT 73*  --    AST 53*  --    BILITOT 0.2  --      Lab Results   Component Value Date    WBC 9.66 06/23/2017    HGB 13.5 06/23/2017    HCT 42.7 06/23/2017     (H) 06/23/2017     06/23/2017       Recent Labs  Lab 06/21/17  0357 06/23/17  0556   TSH  --  0.472   FREET4 0.85 0.83     No results found for: HGBA1C    Nutritional status:   Body mass index is 24.23 kg/m².  Lab Results   Component Value Date    ALBUMIN 3.4 (L) 06/23/2017    ALBUMIN 3.2 (L) 06/22/2017    ALBUMIN 3.1 (L) 06/21/2017     No results found for: PREALBUMIN    Estimated Creatinine Clearance: 58.5 mL/min (based on Cr of 0.9).    Accu-Checks  Recent Labs      06/20/17   1217  06/20/17   1651  06/21/17   1233  06/22/17   1146   POCTGLUCOSE  111*  93  127*  84       Current Medications and/or Treatments Impacting Glycemic Control  Immunotherapy:  Immunosuppressants     None        Steroids:   Hormones     Start     Stop Route Frequency Ordered    06/22/17 2100  desmopressin nasal solution 10 mcg      -- Nasl Nightly 06/22/17 1023    06/17/17 1712  desmopressin nasal spray 10 mcg      -- Nasl As needed (PRN) 06/17/17 1614    06/12/17 2100  hydrocortisone tablet 5 mg      -- Oral Nightly 06/12/17 1059 "    06/12/17 1100  hydrocortisone tablet 10 mg      -- Oral Every morning 06/12/17 1059        Pressors:    Autonomic Drugs     None        Hyperglycemia/Diabetes Medications: Antihyperglycemics     None

## 2017-06-23 NOTE — PROGRESS NOTES
[facility]  Physical Medicine & Rehab  Progress Note    Patient Name: Joanna Morales  MRN: [id number]  Admission Date: 6/7/2017  Length of Stay: 16 days  Collaborating Physician : Marek Mclain MD    Subjective:     Interval History (06/23/2017):  Patient is seen for follow-up rehab evaluation and recommendations.  No acute events over night.  Participating with therapy. Na+ still elevated 155 this morning. Barriers for discharge/rehab admission: not medically ready for discharge.     HPI, Past Medical, Surgical, Family, and Social History remains the same as documented in the initial encounter.    Principal Problem:Meningioma, recurrent of brain    Hospital Course:   6/13/17: Evaluated by therapy. Bed mobility Judy-MaxA.  Sit to stand Judy.  Unable to assess ambulation. LBD TotalA.  6/161/7: SLP evaluation. Cognitive-linguistic impairments noted with dysphagia. Dental soft and nectar thick liquids.   6/20/17: Participating with OT therapy. Bed mobility Judy-ModA & RW.  Sit to stand Judy & RW.  Ambulation-4 small side steps with ModA and RW..   6/21/17: Participating with therapy. Bed mobility Judy-ModA.  Sit to stand Judy & RW.  Ambulated side step with Judy & RW and 4 steps with CGA.  UBD MaxA.  6/22/17: Participating with therapy. Bed mobility Judy-MaxA.  Sit to stand Judy.  Marched in place BLE x 10 reps with ModA.  UBD ModA-MaxA.   6/22/17: SLP diet recommendation dental soft and nectar thick. Dysphagia and cognitive-linguistic impairments noted.         Scheduled Medications:    amitriptyline  100 mg Oral Nightly    bisacodyl  10 mg Rectal Daily    desmopressin  10 mcg Nasal QHS    gabapentin  600 mg Oral TID    heparin (porcine)  5,000 Units Subcutaneous Q8H    hydrocortisone  10 mg Oral QAM    hydrocortisone  5 mg Oral QHS    levothyroxine  50 mcg Oral Before breakfast    nicotine  1 patch Transdermal Daily    pantoprazole  40 mg Oral BID    polyethylene glycol  17 g Oral  Daily    ramelteon  8 mg Oral QHS       PRN Medications: acetaminophen, acetaminophen, acetaminophen, desmopressin, dextrose 50%, dextrose 50%, glucagon (human recombinant), glucose, glucose, haloperidol lactate, hydrALAZINE, metoclopramide HCl, metoclopramide HCl    Review of Systems   Reason unable to perform ROS: 2/2 mental status.     Objective:     Vital Signs (Most Recent):  Temp: 97.4 °F (36.3 °C) (06/23/17 1218)  Pulse: 67 (06/23/17 1218)  Resp: 19 (06/23/17 1218)  BP: 116/64 (06/23/17 1218)  SpO2: 100 % (06/23/17 1218)    Vital Signs (24h Range):  Temp:  [97.4 °F (36.3 °C)-98.7 °F (37.1 °C)] 97.4 °F (36.3 °C)  Pulse:  [67-96] 67  Resp:  [16-19] 19  SpO2:  [98 %-100 %] 100 %  BP: (111-135)/(64-84) 116/64     Physical Exam   Constitutional: She appears well-developed and well-nourished.   HENT:   Sutures noted throughout scalp   Eyes: Vision loss to L eye.  Neck: Normal range of motion.   Cardiovascular: Normal rate and regular rhythm.    Pulmonary/Chest: Effort normal. No respiratory distress.   Abdominal: Soft. She exhibits no distension.   Musculoskeletal: Normal range of motion.   Neurological:   Neurologic:  -  Mental Status:  Awake, alert and oriented to person.  Follows commands.  -  Speech and language:  + aphasia  -  Coordination:  Finger to nose exam:  RUE dysmetria, LUE dysmetria.  Heel to shin:  RLE abnormal, LLE abnormal.   -  Motor:  RUE: 4/5.  LUE: 4/5.  RLE: 5/5.  LLE: 5/5.   -pronator drift. Unable to assess due to patient participation.   -  Tone:  normal  -  Sensory:  Intact to light touch and pin prick.   Skin: Skin is warm and dry.   Psychiatric: Her behavior is normal. Cognition and memory are impaired.   Flat affect       Diagnostic Results:   Labs: Reviewed  CT: Reviewed  MRI: Reviewed  EEG:reviewed    Assessment/Plan:      S/P craniotomy    -NSY planning to removal staples on POD21  -see Meningioma         Restlessness and agitation    -encephalopathy throughout admission   -EEG  negative for seizures  -Haldol PRN         Pneumocephalus    -repeat CHT showed improving         Slurred speech    -SLP evaluate and treat         Diabetes insipidus    -Endocrine following         * Meningioma, recurrent of brain    -s/p L frontotemporal craniotomy and excision of meningioma on 6/7/17     · Monitor sleep disturbances and establish consistent sleep-wake cycle (lights on and shades open during day and lights dim/off at night).   · Promote environmental modifications to limit agitation and confusion (appropriate lighting, family at bedside, visible clock and calendar, updated white board, reduce noise, limited visitors, clustered nursing care).  · Monitor for bowel and bladder dysfunction.   · Encourage mobility, OOB in chair at least 3 hours per day, and ambulation.  · Reorient patient to person, place, time, and situation on each encounter.   · PT/OT evaluate and treat.  · SLP speech and cognitive evaluate and treat.  · If possible, avoid restraints.  May benefit from 24/7 supervision by a sitter.   · Avoid/limit medications that may worsen delirium.  Such as benzodiazepines, antihistamines, anticholinergics, hypnotics, and opiates.                Awaiting decrease in Na+ for IPR admission. Will follow patient's progress and discuss with rehab team for rehab recommendation.     Thank you for your consult.     Quita Altamirano NP  Department of Physical Medicine & Rehab   [facility]

## 2017-06-23 NOTE — ASSESSMENT & PLAN NOTE
confirm with ionized ca level   Check PTH to assess if PTH mediated or not (both labs added on to am labs)   Immobilization likely a contributor and rec pt/ot with weight bearing if possible

## 2017-06-23 NOTE — PT/OT/SLP PROGRESS
"Speech Language Pathology  Treatment    Joanna Morales   MRN: [id number]   Admitting Diagnosis: Meningioma, recurrent of brain    Diet recommendations: Solid Diet Level: Dental Soft  Liquid Diet Level: Nectar Thick No straws, Small bites/sips, 1 bite/sip at a time, Check for pocketing/oral residue, Meds crushed in puree, Eliminate distractions, Assistance with meals and Assistance with thickening liquids    SLP Treatment Date: 06/23/17  Speech Start Time: 1215     Speech Stop Time: 1231     Speech Total (min): 16 min       TREATMENT BILLABLE MINUTES:  Speech Therapy Individual 8, Treatment Swallowing Dysfunction 8 and Total Time 16    Has the patient been evaluated by SLP for swallowing? : Yes  Keep patient NPO?: No   General Precautions: Standard, aspiration, fall, nectar thick, blind          Subjective:  SLP communicates with nurse prior to session, nurse explains Patient sleeping and has been sleeping most of morning  Patient presents fatigued, with delayed responses  She explains "no more," as SLP attempts to assist Patient with lunch tray  Patient denies pain  Pain/Comfort  Pain Rating 1: 0/10  Pain Rating Post-Intervention 1: 0/10    Objective:   Patient found with: bed alarm, telemetry, garcía catheter. Pt found asleep in bed, wakes per maximum verbal and tactile cueing from SLP. Patient oriented x2 to person and place. She requires maximum, multiple choice cueing to recall year and month post 1 minute from initial review. She follows 1-step commands with 80 accuracy. She initially accepts items from lunch tray (tsp sips nectar-thickened liquids x5, tsp bites apple sauce x3,) then refuses additional bitees/sips. Pt with throat clear x2 with tsp sips nectar-thickened liquids fed by tsp from clinician. Pt prompted to use double swallows between bites/sips, which provide effective in eliminating throat clears on subsequent sips. Patient with increased fatigue as session progress  Pt educated on ongoing " aspiration precautions and SLP role. She is DEP for teachback and requires assistance iwth all meals for safety, with ongoing review of safety precautions ea session with ST. No further questions noted. Whiteboard current.      Assessment:  Joanna Morales is a 51 y.o. female with a medical diagnosis of Meningioma, recurrent of brain and presents with Oropharyngeal Dysphagia and Cognitive-Linguistic Impairment.  She would benefit from daily, intensive ST following d/c from acute.      Discharge recommendations: Discharge Facility/Level Of Care Needs: rehabilitation facility     Goals:    SLP Goals        Problem: SLP Goal    Goal Priority Disciplines Outcome   SLP Goal     SLP Ongoing (interventions implemented as appropriate)   Description:  Speech Language Pathology Goals  Updated goals expected to be met by 6/23    1. Pt will tolerate dental soft diet/nectar thick liquids without overt s/s of aspiration  2. Pt will tolerate thin liquids trials x10 without overt s/s of aspiration  3. Pt will complete dysphagia exercises x10 reps with min cues  4. Pt will following single step directions with 75% acc with min cues to enhance comprehension   5. Pt will complete basic problem solving tasks with 75% acc with min cues to enhance safety awareness   6. Pt will demonstrate orientation x3 to enhance cognition   7. Pt will participate in ongoing assessment of speech language and cognitive skills to rule out and deficits and establish goals of care                              Plan:   Patient to be seen Therapy Frequency: 5 x/week   Plan of Care expires: 07/10/17  Plan of Care reviewed with: patient  SLP Follow-up?: Yes       NAIMA Farias, [facility]-SLP  Speech-Language Pathology  Pager: [phone]  6/23/2017

## 2017-06-23 NOTE — PT/OT/SLP PROGRESS
Occupational Therapy      Joanna Morales  MRN: [id number]    Patient not seen today secondary to Nursing care (OT attempted to see pt in AM. Pt in process of being cared for and cleaned by PCT and RNs in room. Unable to return later this date for additional attempt. Will follow up at next scheduled visit.).    HELEN Ravi  6/23/2017

## 2017-06-23 NOTE — SUBJECTIVE & OBJECTIVE
Interval History: NAEON.  Na trending down today, 151. Given haldol this morning without documentation.     Medications:  Continuous Infusions:   Scheduled Meds:   amitriptyline  100 mg Oral Nightly    bisacodyl  10 mg Rectal Daily    desmopressin  10 mcg Nasal QHS    gabapentin  600 mg Oral TID    heparin (porcine)  5,000 Units Subcutaneous Q8H    hydrocortisone  10 mg Oral QAM    hydrocortisone  5 mg Oral QHS    levothyroxine  50 mcg Oral Before breakfast    nicotine  1 patch Transdermal Daily    pantoprazole  40 mg Oral BID    polyethylene glycol  17 g Oral Daily    ramelteon  8 mg Oral QHS     PRN Meds:acetaminophen, acetaminophen, acetaminophen, desmopressin, dextrose 50%, dextrose 50%, glucagon (human recombinant), glucose, glucose, haloperidol lactate, hydrALAZINE, metoclopramide HCl, metoclopramide HCl     Review of Systems   Endocrine: Positive for cold intolerance.     Objective:     Weight: 60.1 kg (132 lb 7.9 oz)  Body mass index is 24.23 kg/m².  Vital Signs (Most Recent):  Temp: 97.1 °F (36.2 °C) (17)  Pulse: 69 (17)  Resp: 18 (17)  BP: 117/64 (17)  SpO2: 100 % (17) Vital Signs (24h Range):  Temp:  [97.1 °F (36.2 °C)-98.7 °F (37.1 °C)] 97.1 °F (36.2 °C)  Pulse:  [67-96] 69  Resp:  [16-19] 18  SpO2:  [98 %-100 %] 100 %  BP: (111-135)/(64-84) 117/64              Temp (24hrs), Av.7 °F (36.5 °C), Min:97.1 °F (36.2 °C), Max:98.7 °F (37.1 °C)           Date 17 0700 - 17 0659   Shift 4371-2768 3763-6394 0275-0431 24 Hour Total   I  N  T  A  K  E   P.O. 300   300    Shift Total  (mL/kg) 300  (5)   300  (5)   O  U  T  P  U  T   Urine  (mL/kg/hr) 145  (0.3) 37  182    Shift Total  (mL/kg) 145  (2.4) 37  (0.6)  182  (3)   Weight (kg) 60.1 60.1 60.1 60.1          Urethral Catheter 06/15/17 1205 Latex (Active)   Site Assessment Clean;Intact 2017  8:00 AM   Collection Container Urimeter 2017  8:00 AM   Securement Method  "secured to top of thigh w/ adhesive device 6/22/2017  8:00 AM   Catheter Care Performed yes 6/22/2017  8:00 AM   Reason for Continuing Urinary Catheterization Required immobilization;Post operative 6/21/2017  4:00 AM   CAUTI Prevention Bundle Drainage bag off the floor;StatLock in place w 1" slack;Green sheeting clip in use;No dependent loops or kinks;Drainage bag not overfilled (<2/3 full);Drainage bag below bladder;Intact seal between catheter & drainage tubing 6/21/2017  8:00 PM   Output (mL) 0 mL 6/22/2017 11:00 AM       Neurosurgery Physical Exam     General: no distress  Neurologic: oriented to person, year  Head: normocephalic  GCS: Motor: 6/Verbal: 4/Eyes: 3 GCS Total: 13  Cranial nerves:  Pupils NR bilaterally, blind in both eyes, tongue midline   Sensory: response to light touch throughout  Motor Strength: generalized deconditioning in upper and lower extremities, no focal weakness.  FC x 4   Lungs:  normal respiratory effort  Abdomen: soft, non-tender   Extremities: no cyanosis or edema, or clubbing    Significant Labs:    Recent Labs  Lab 06/23/17  0556  06/23/17  1454   GLU 95  --   --    *  155*  < > 151*   K 3.6  --   --    *  --   --    CO2 30*  --   --    BUN 12  --   --    CREATININE 0.9  --   --    CALCIUM 10.8*  --   --    MG 2.2  --   --    < > = values in this interval not displayed.    Recent Labs  Lab 06/22/17  0406 06/23/17  0556   WBC 11.77 9.66   HGB 13.5 13.5   HCT 42.1 42.7    312       Recent Labs  Lab 06/23/17  0556   INR 1.0   APTT 29.7     Microbiology Results (last 7 days)     Procedure Component Value Units Date/Time    Blood culture [[id number]] Collected:  06/14/17 1517    Order Status:  Completed Specimen:  Blood from Peripheral, Antecubital, Left Updated:  06/19/17 2012     Blood Culture, Routine No growth after 5 days.    Narrative:       Blood cultures x 2 different sites. 4 bottles total. Please  draw cultures before administering antibiotics.    Blood " culture [[id number]] Collected:  06/14/17 1156    Order Status:  Completed Specimen:  Blood from Peripheral, Antecubital, Left Updated:  06/19/17 1412     Blood Culture, Routine No growth after 5 days.    Narrative:       Blood cultures from 2 different sites. 4 bottles total.  Please draw before starting antibiotics.    Urine culture [[id number]] Collected:  06/15/17 1127    Order Status:  Completed Specimen:  Urine from Urine, Catheterized Updated:  06/16/17 1947     Urine Culture, Routine No growth

## 2017-06-23 NOTE — SUBJECTIVE & OBJECTIVE
Scheduled Medications:    amitriptyline  100 mg Oral Nightly    bisacodyl  10 mg Rectal Daily    desmopressin  10 mcg Nasal QHS    gabapentin  600 mg Oral TID    heparin (porcine)  5,000 Units Subcutaneous Q8H    hydrocortisone  10 mg Oral QAM    hydrocortisone  5 mg Oral QHS    levothyroxine  50 mcg Oral Before breakfast    nicotine  1 patch Transdermal Daily    pantoprazole  40 mg Oral BID    polyethylene glycol  17 g Oral Daily    ramelteon  8 mg Oral QHS       PRN Medications: acetaminophen, acetaminophen, acetaminophen, desmopressin, dextrose 50%, dextrose 50%, glucagon (human recombinant), glucose, glucose, haloperidol lactate, hydrALAZINE, metoclopramide HCl, metoclopramide HCl    Review of Systems   Reason unable to perform ROS: 2/2 mental status.     Objective:     Vital Signs (Most Recent):  Temp: 97.4 °F (36.3 °C) (06/23/17 1218)  Pulse: 67 (06/23/17 1218)  Resp: 19 (06/23/17 1218)  BP: 116/64 (06/23/17 1218)  SpO2: 100 % (06/23/17 1218)    Vital Signs (24h Range):  Temp:  [97.4 °F (36.3 °C)-98.7 °F (37.1 °C)] 97.4 °F (36.3 °C)  Pulse:  [67-96] 67  Resp:  [16-19] 19  SpO2:  [98 %-100 %] 100 %  BP: (111-135)/(64-84) 116/64     Physical Exam   Constitutional: She appears well-developed and well-nourished.   HENT:   Sutures noted throughout scalp   Eyes: Pupils are equal, round, and reactive to light.   Neck: Normal range of motion.   Cardiovascular: Normal rate and regular rhythm.    Pulmonary/Chest: Effort normal. No respiratory distress.   Abdominal: Soft. She exhibits no distension.   Musculoskeletal: Normal range of motion.   Neurological:   Neurologic:  -  Mental Status:  Awake, alert and oriented to person.  Follows commands.  -  Speech and language:  + aphasia  -  Coordination:  Finger to nose exam:  RUE dysmetria, LUE dysmetria.  Heel to shin:  RLE abnormal, LLE abnormal.   -  Motor:  RUE: 4/5.  LUE: 4/5.  RLE: 5/5.  LLE: 5/5.   -pronator drift. Unable to assess due to patient  participation.   -  Tone:  normal  -  Sensory:  Intact to light touch and pin prick.   Skin: Skin is warm and dry.   Psychiatric: Her behavior is normal. Cognition and memory are impaired.   Flat affect

## 2017-06-23 NOTE — PROGRESS NOTES
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  She underwent operation in January 2015 on the [facility] service at [facility].  It   was apparently only possible to get a partial resection of the tumor.  She did   have a followup scan done in 2016 showing a continued large meningioma in this   area.  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  She had a   followup MRI scan done at [facility] on 01/20/17, showing this large tumor.  She   was seen by you in Ophthalmology and then referred to Neurosurgery for further   evaluation.  At this point, she has some preserved vision in the right eye.  She   complains of constant headaches.  She has no sense of smell.  She feels off   balance.  She is taking Neurontin apparently for seizure prevention. Past   medical history relates primarily to the present illness.  She does not have   chronic medical illnesses such as diabetes or hypertension.  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  She is  and has good family support.     On physical examination, she is a well-developed, well-nourished white lady, who   is alert and oriented.  Examination of the head shows a somewhat anterior, but   well-healed bicoronal incision.  She says that this is a little tender in the   left frontal area.  Eyes show full extraocular movements.  The pupils are small,   the left is not reactive.  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  The right optic nerve is probably normal.  She   sees things as doubles.  When I held up two  fingers she counted four, although   she knew that there were two.  Hearing seems preserved.  The neck is supple.  On   neurological examination, she is speaking clearly.  She is obviously anxious   and depressed over her illness.  Finger-to-nose is somewhat diminished on the   left side and gait was mildly unsteady.  Cranial nerves are otherwise intact.    She has normal facial sensation and movement.  The tongue protrudes in the   midline.  Strength in the extremities seems generally good, sensation normal and   deep tendon reflexes symmetrical.     MRI of the brain performed at [facility] on 01/20/17, shows a   previous bifrontal craniotomy.  There is a large lobular meningioma, which   occupies the sella, planum sphenoidale and extends back on to the clivus.  The   optic chiasm and optic nerves are not visualized.  The carotid arteries and   anterior cerebral arteries run done through the tumor.  The tumor elevates and   pushes the third ventricle back, but there is no hydrocephalus    Post-Op Info:  Procedure(s) (LRB):  CRANIOTOMY WITH STEALTH (N/A)   16 Days Post-Op     Interval History: NAEON.  Na trending down today, 151. Given haldol this morning without documentation.     Medications:  Continuous Infusions:   Scheduled Meds:   amitriptyline  100 mg Oral Nightly    bisacodyl  10 mg Rectal Daily    desmopressin  10 mcg Nasal QHS    gabapentin  600 mg Oral TID    heparin (porcine)  5,000 Units Subcutaneous Q8H    hydrocortisone  10 mg Oral QAM    hydrocortisone  5 mg Oral QHS    levothyroxine  50 mcg Oral Before breakfast    nicotine  1 patch Transdermal Daily    pantoprazole  40 mg Oral BID    polyethylene glycol  17 g Oral Daily    ramelteon  8 mg Oral QHS     PRN Meds:acetaminophen, acetaminophen, acetaminophen, desmopressin, dextrose 50%, dextrose 50%, glucagon (human recombinant), glucose, glucose, haloperidol lactate, hydrALAZINE, metoclopramide HCl, metoclopramide HCl  "    Review of Systems   Endocrine: Positive for cold intolerance.     Objective:     Weight: 60.1 kg (132 lb 7.9 oz)  Body mass index is 24.23 kg/m².  Vital Signs (Most Recent):  Temp: 97.1 °F (36.2 °C) (17)  Pulse: 69 (17)  Resp: 18 (17)  BP: 117/64 (17)  SpO2: 100 % (17) Vital Signs (24h Range):  Temp:  [97.1 °F (36.2 °C)-98.7 °F (37.1 °C)] 97.1 °F (36.2 °C)  Pulse:  [67-96] 69  Resp:  [16-19] 18  SpO2:  [98 %-100 %] 100 %  BP: (111-135)/(64-84) 117/64              Temp (24hrs), Av.7 °F (36.5 °C), Min:97.1 °F (36.2 °C), Max:98.7 °F (37.1 °C)           Date 17 0700 - 17 0659   Shift 0616-2143 5954-0608 3361-4824 24 Hour Total   I  N  T  A  K  E   P.O. 300   300    Shift Total  (mL/kg) 300  (5)   300  (5)   O  U  T  P  U  T   Urine  (mL/kg/hr) 145  (0.3) 37  182    Shift Total  (mL/kg) 145  (2.4) 37  (0.6)  182  (3)   Weight (kg) 60.1 60.1 60.1 60.1          Urethral Catheter 06/15/17 1205 Latex (Active)   Site Assessment Clean;Intact 2017  8:00 AM   Collection Container Urimeter 2017  8:00 AM   Securement Method secured to top of thigh w/ adhesive device 2017  8:00 AM   Catheter Care Performed yes 2017  8:00 AM   Reason for Continuing Urinary Catheterization Required immobilization;Post operative 2017  4:00 AM   CAUTI Prevention Bundle Drainage bag off the floor;StatLock in place w 1" slack;Green sheeting clip in use;No dependent loops or kinks;Drainage bag not overfilled (<2/3 full);Drainage bag below bladder;Intact seal between catheter & drainage tubing 2017  8:00 PM   Output (mL) 0 mL 2017 11:00 AM       Neurosurgery Physical Exam     General: no distress  Neurologic: oriented to person, year  Head: normocephalic  GCS: Motor: 6/Verbal: 4/Eyes: 3 GCS Total: 13  Cranial nerves:  Pupils NR bilaterally, blind in both eyes, tongue midline   Sensory: response to light touch throughout  Motor Strength: generalized " deconditioning in upper and lower extremities, no focal weakness.  FC x 4   Lungs:  normal respiratory effort  Abdomen: soft, non-tender   Extremities: no cyanosis or edema, or clubbing    Significant Labs:    Recent Labs  Lab 06/23/17  0556  06/23/17  1454   GLU 95  --   --    *  155*  < > 151*   K 3.6  --   --    *  --   --    CO2 30*  --   --    BUN 12  --   --    CREATININE 0.9  --   --    CALCIUM 10.8*  --   --    MG 2.2  --   --    < > = values in this interval not displayed.    Recent Labs  Lab 06/22/17  0406 06/23/17  0556   WBC 11.77 9.66   HGB 13.5 13.5   HCT 42.1 42.7    312       Recent Labs  Lab 06/23/17  0556   INR 1.0   APTT 29.7     Microbiology Results (last 7 days)     Procedure Component Value Units Date/Time    Blood culture [[id number]] Collected:  06/14/17 1517    Order Status:  Completed Specimen:  Blood from Peripheral, Antecubital, Left Updated:  06/19/17 2012     Blood Culture, Routine No growth after 5 days.    Narrative:       Blood cultures x 2 different sites. 4 bottles total. Please  draw cultures before administering antibiotics.    Blood culture [[id number]] Collected:  06/14/17 1156    Order Status:  Completed Specimen:  Blood from Peripheral, Antecubital, Left Updated:  06/19/17 1412     Blood Culture, Routine No growth after 5 days.    Narrative:       Blood cultures from 2 different sites. 4 bottles total.  Please draw before starting antibiotics.    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  - Pt is neurologically stable  - Appreciate endocrine recs for DI: Now Off IVF. Scheduled DDAVP 10mcg intranasal qhs.  Na 151 today.  UO and SG are wnl.   Can give extra prn DDAVP:   Parameters to give  extra DDAVP IF uop > 250 x 2 consecutive hours AND Spec grav <1.005 or Serum Sodium > 150.  Check sodium and sp. Gravity Q6    - FT4, low normal.  Started synthroid 50 mcg daily.  Repeat FT4 and TSH tomrrow  - Continue cortef 10 q am, 5 q pm.    - On dental soft diet.   - DVT ppx- ANA/SCD's. GI ppx. Sqh.   - Continue daily PT/OT/ST  - Will DC staples on POD#21  - [facility] placement pending, anticipate acceptance/DC tomorrow vs saturday                Mayelin Alaniz PA-C  Neurosurgery  [facility]

## 2017-06-24 NOTE — SUBJECTIVE & OBJECTIVE
"Interval HPI:   Overnight events: sodium improving  Eatin%  Nausea: No  Hypoglycemia and intervention: No  Fever: No  TPN and/or TF: No  If yes, type of TF/TPN and rate: n/a    /87 (BP Location: Right arm, Patient Position: Lying, BP Method: Automatic)   Pulse (!) 53   Temp 98.6 °F (37 °C) (Oral)   Resp 17   Ht 5' 2" (1.575 m)   Wt 60.1 kg (132 lb 7.9 oz)   SpO2 99%   Breastfeeding? No   BMI 24.23 kg/m²     Labs Reviewed and Include      Recent Labs  Lab 17  0454   GLU 85   CALCIUM 10.6*   ALBUMIN 3.2*   PROT 7.1   *  151*   K 3.4*   CO2 35*      BUN 19   CREATININE 0.8   ALKPHOS 107   ALT 71*   AST 57*   BILITOT 0.2     Lab Results   Component Value Date    WBC 6.99 2017    HGB 12.5 2017    HCT 39.3 2017     (H) 2017     2017       Recent Labs  Lab 17  0357 17  0556   TSH  --  0.472   FREET4 0.85 0.83     No results found for: HGBA1C    Nutritional status:   Body mass index is 24.23 kg/m².  Lab Results   Component Value Date    ALBUMIN 3.2 (L) 2017    ALBUMIN 3.4 (L) 2017    ALBUMIN 3.2 (L) 2017     No results found for: PREALBUMIN    Estimated Creatinine Clearance: 65.8 mL/min (based on Cr of 0.8).    Accu-Checks  Recent Labs      17   1233  17   1146  17   1153  17   1750   POCTGLUCOSE  127*  84  172*  76       Current Medications and/or Treatments Impacting Glycemic Control  Immunotherapy:  Immunosuppressants     None        Steroids:   Hormones     Start     Stop Route Frequency Ordered    17 2100  desmopressin nasal solution 10 mcg      -- Nasl Nightly 17 1023    17 1712  desmopressin nasal spray 10 mcg      -- Nasl As needed (PRN) 17 1614    17 2100  hydrocortisone tablet 5 mg      -- Oral Nightly 17 1059    17 1100  hydrocortisone tablet 10 mg      -- Oral Every morning 17 1059        Pressors:    Autonomic Drugs     None    "     Hyperglycemia/Diabetes Medications: Antihyperglycemics     None

## 2017-06-24 NOTE — PROGRESS NOTES
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  She underwent operation in January 2015 on the [facility] service at [facility].  It   was apparently only possible to get a partial resection of the tumor.  She did   have a followup scan done in 2016 showing a continued large meningioma in this   area.  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  She had a   followup MRI scan done at [facility] on 01/20/17, showing this large tumor.  She   was seen by you in Ophthalmology and then referred to Neurosurgery for further   evaluation.  At this point, she has some preserved vision in the right eye.  She   complains of constant headaches.  She has no sense of smell.  She feels off   balance.  She is taking Neurontin apparently for seizure prevention. Past   medical history relates primarily to the present illness.  She does not have   chronic medical illnesses such as diabetes or hypertension.  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  She is  and has good family support.     On physical examination, she is a well-developed, well-nourished white lady, who   is alert and oriented.  Examination of the head shows a somewhat anterior, but   well-healed bicoronal incision.  She says that this is a little tender in the   left frontal area.  Eyes show full extraocular movements.  The pupils are small,   the left is not reactive.  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  The right optic nerve is probably normal.  She   sees things as doubles.  When I held up two  fingers she counted four, although   she knew that there were two.  Hearing seems preserved.  The neck is supple.  On   neurological examination, she is speaking clearly.  She is obviously anxious   and depressed over her illness.  Finger-to-nose is somewhat diminished on the   left side and gait was mildly unsteady.  Cranial nerves are otherwise intact.    She has normal facial sensation and movement.  The tongue protrudes in the   midline.  Strength in the extremities seems generally good, sensation normal and   deep tendon reflexes symmetrical.     MRI of the brain performed at [facility] on 01/20/17, shows a   previous bifrontal craniotomy.  There is a large lobular meningioma, which   occupies the sella, planum sphenoidale and extends back on to the clivus.  The   optic chiasm and optic nerves are not visualized.  The carotid arteries and   anterior cerebral arteries run done through the tumor.  The tumor elevates and   pushes the third ventricle back, but there is no hydrocephalus    Post-Op Info:  Procedure(s) (LRB):  CRANIOTOMY WITH STEALTH (N/A)   17 Days Post-Op     Interval History: NAEON    Medications:  Continuous Infusions:   Scheduled Meds:   amitriptyline  100 mg Oral Nightly    bisacodyl  10 mg Rectal Daily    desmopressin  10 mcg Nasal QHS    gabapentin  600 mg Oral TID    heparin (porcine)  5,000 Units Subcutaneous Q8H    hydrocortisone  10 mg Oral QAM    hydrocortisone  5 mg Oral QHS    levothyroxine  50 mcg Oral Before breakfast    nicotine  1 patch Transdermal Daily    pantoprazole  40 mg Oral BID    polyethylene glycol  17 g Oral Daily    ramelteon  8 mg Oral QHS     PRN Meds:acetaminophen, acetaminophen, acetaminophen, desmopressin, dextrose 50%, dextrose 50%, glucagon (human recombinant), glucose, glucose, hydrALAZINE, metoclopramide HCl, metoclopramide HCl     Review of Systems  Objective:     Weight: 60.1 kg (132 lb 7.9 oz)  Body mass index is 24.23 kg/m².  Vital  "Signs (Most Recent):  Temp: 97.8 °F (36.6 °C) (17 0000)  Pulse: (!) 46 (17 0313)  Resp: 17 (17 0000)  BP: 136/75 (17 0000)  SpO2: 98 % (17 0000) Vital Signs (24h Range):  Temp:  [97.1 °F (36.2 °C)-98.4 °F (36.9 °C)] 97.8 °F (36.6 °C)  Pulse:  [46-96] 46  Resp:  [16-19] 17  SpO2:  [98 %-100 %] 98 %  BP: (110-136)/(64-80) 136/75              Temp (24hrs), Av.7 °F (36.5 °C), Min:97.1 °F (36.2 °C), Max:98.4 °F (36.9 °C)                 Urethral Catheter 06/15/17 1205 Latex (Active)   Site Assessment Clean;Intact 2017  7:15 AM   Collection Container Urimeter 2017  7:15 AM   Securement Method secured to top of thigh w/ adhesive device 2017  7:15 AM   Catheter Care Performed yes 2017  7:15 AM   Reason for Continuing Urinary Catheterization Required immobilization;Post operative 2017  4:00 AM   CAUTI Prevention Bundle Drainage bag off the floor;StatLock in place w 1" slack;No dependent loops or kinks;Green sheeting clip in use;Drainage bag below bladder 2017  7:15 AM   Output (mL) 29 mL 2017  3:02 AM       Neurosurgery Physical Exam   General: no distress  Neurologic: oriented to person, year  Head: normocephalic  GCS: Motor: 6/Verbal: 4/Eyes: 3 GCS Total: 13  Cranial nerves:  Pupils NR bilaterally, blind in both eyes, tongue midline   Sensory: response to light touch throughout  Motor Strength: generalized deconditioning in upper and lower extremities, no focal weakness.  FC x 4   Lungs:  normal respiratory effort  Abdomen: soft, non-tender   Extremities: no cyanosis or edema, or clubbing       Significant Labs:    Recent Labs  Lab 17  0454   GLU 85   *  151*   K 3.4*      CO2 35*   BUN 19   CREATININE 0.8   CALCIUM 10.6*   MG 2.3       Recent Labs  Lab 17  0556 17  0454   WBC 9.66 6.99   HGB 13.5 12.5   HCT 42.7 39.3    259       Recent Labs  Lab 17  0454   INR 1.0   APTT 32.1*     Microbiology Results (last 7 " days)     Procedure Component Value Units Date/Time    Blood culture [[id number]] Collected:  06/14/17 1517    Order Status:  Completed Specimen:  Blood from Peripheral, Antecubital, Left Updated:  06/19/17 2012     Blood Culture, Routine No growth after 5 days.    Narrative:       Blood cultures x 2 different sites. 4 bottles total. Please  draw cultures before administering antibiotics.    Blood culture [[id number]] Collected:  06/14/17 1156    Order Status:  Completed Specimen:  Blood from Peripheral, Antecubital, Left Updated:  06/19/17 1412     Blood Culture, Routine No growth after 5 days.    Narrative:       Blood cultures from 2 different sites. 4 bottles total.  Please draw before starting antibiotics.            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  - Pt is neurologically stable  - Appreciate endocrine recs for DI: Now Off IVF. Scheduled DDAVP 10mcg intranasal qhs.  Na 151 today.  UO and SG are wnl.   Can give extra prn DDAVP:   Parameters to give extra DDAVP IF uop > 250 x 2 consecutive hours AND Spec grav <1.005 or Serum Sodium > 150.  Check sodium and sp. Gravity Q6    - FT4, low normal.  Started synthroid 50 mcg daily.  Repeat FT4 and TSH tomrrow  - Continue cortef 10 q am, 5 q pm.    - On dental soft diet.   - DVT ppx- ANA/SCD's. GI ppx. Sqh.   - Continue daily PT/OT/ST  - Will DC staples on POD#21  - [facility] pending.                Boogie Wiley,   Neurosurgery  [facility]

## 2017-06-24 NOTE — PLAN OF CARE
Problem: Fall Risk (Adult)  Goal: Identify Related Risk Factors and Signs and Symptoms  Related risk factors and signs and symptoms are identified upon initiation of Human Response Clinical Practice Guideline (CPG)   Outcome: Ongoing (interventions implemented as appropriate)  Plan of care reviewed with pt, fall precautions maintained, call bell within reach, VSS, pt sleeps most of shift, pt ate most of lunch and dinner, no distress noted, sitter at bedside, will continue to monitor, pt urine output, MD notified, will continue to monitor.

## 2017-06-24 NOTE — SUBJECTIVE & OBJECTIVE
"Interval History: NAEON    Medications:  Continuous Infusions:   Scheduled Meds:   amitriptyline  100 mg Oral Nightly    bisacodyl  10 mg Rectal Daily    desmopressin  10 mcg Nasal QHS    gabapentin  600 mg Oral TID    heparin (porcine)  5,000 Units Subcutaneous Q8H    hydrocortisone  10 mg Oral QAM    hydrocortisone  5 mg Oral QHS    levothyroxine  50 mcg Oral Before breakfast    nicotine  1 patch Transdermal Daily    pantoprazole  40 mg Oral BID    polyethylene glycol  17 g Oral Daily    ramelteon  8 mg Oral QHS     PRN Meds:acetaminophen, acetaminophen, acetaminophen, desmopressin, dextrose 50%, dextrose 50%, glucagon (human recombinant), glucose, glucose, hydrALAZINE, metoclopramide HCl, metoclopramide HCl     Review of Systems  Objective:     Weight: 60.1 kg (132 lb 7.9 oz)  Body mass index is 24.23 kg/m².  Vital Signs (Most Recent):  Temp: 97.8 °F (36.6 °C) (17 0000)  Pulse: (!) 46 (17 0313)  Resp: 17 (17 0000)  BP: 136/75 (17 0000)  SpO2: 98 % (17 0000) Vital Signs (24h Range):  Temp:  [97.1 °F (36.2 °C)-98.4 °F (36.9 °C)] 97.8 °F (36.6 °C)  Pulse:  [46-96] 46  Resp:  [16-19] 17  SpO2:  [98 %-100 %] 98 %  BP: (110-136)/(64-80) 136/75              Temp (24hrs), Av.7 °F (36.5 °C), Min:97.1 °F (36.2 °C), Max:98.4 °F (36.9 °C)                 Urethral Catheter 06/15/17 1205 Latex (Active)   Site Assessment Clean;Intact 2017  7:15 AM   Collection Container Urimeter 2017  7:15 AM   Securement Method secured to top of thigh w/ adhesive device 2017  7:15 AM   Catheter Care Performed yes 2017  7:15 AM   Reason for Continuing Urinary Catheterization Required immobilization;Post operative 2017  4:00 AM   CAUTI Prevention Bundle Drainage bag off the floor;StatLock in place w 1" slack;No dependent loops or kinks;Green sheeting clip in use;Drainage bag below bladder 2017  7:15 AM   Output (mL) 29 mL 2017  3:02 AM       Neurosurgery Physical " Exam   General: no distress  Neurologic: oriented to person, year  Head: normocephalic  GCS: Motor: 6/Verbal: 4/Eyes: 3 GCS Total: 13  Cranial nerves:  Pupils NR bilaterally, blind in both eyes, tongue midline   Sensory: response to light touch throughout  Motor Strength: generalized deconditioning in upper and lower extremities, no focal weakness.  FC x 4   Lungs:  normal respiratory effort  Abdomen: soft, non-tender   Extremities: no cyanosis or edema, or clubbing       Significant Labs:    Recent Labs  Lab 06/24/17  0454   GLU 85   *  151*   K 3.4*      CO2 35*   BUN 19   CREATININE 0.8   CALCIUM 10.6*   MG 2.3       Recent Labs  Lab 06/23/17  0556 06/24/17  0454   WBC 9.66 6.99   HGB 13.5 12.5   HCT 42.7 39.3    259       Recent Labs  Lab 06/24/17  0454   INR 1.0   APTT 32.1*     Microbiology Results (last 7 days)     Procedure Component Value Units Date/Time    Blood culture [[id number]] Collected:  06/14/17 1517    Order Status:  Completed Specimen:  Blood from Peripheral, Antecubital, Left Updated:  06/19/17 2012     Blood Culture, Routine No growth after 5 days.    Narrative:       Blood cultures x 2 different sites. 4 bottles total. Please  draw cultures before administering antibiotics.    Blood culture [[id number]] Collected:  06/14/17 1156    Order Status:  Completed Specimen:  Blood from Peripheral, Antecubital, Left Updated:  06/19/17 1412     Blood Culture, Routine No growth after 5 days.    Narrative:       Blood cultures from 2 different sites. 4 bottles total.  Please draw before starting antibiotics.            Significant Diagnostics:

## 2017-06-24 NOTE — PT/OT/SLP PROGRESS
Physical Therapy      Joanna Morales  MRN: [id number]    Patient not seen today secondary to pt eating on first attempt and asleep on second attempt.  Will follow-up on next scheduled visit.    Lynnette Holder, PT, DPT  6/24/2017

## 2017-06-24 NOTE — NURSING
After multiple attempts to keep pt calm and compliant with medication administration, telemetry monitoring,lab collect, and garcía care with sitter at bedside.Pt is persistent on having a cigarette and getting out of bed. OC aware and tried to calm pt down with sitter, RN attempted to reposition and make pt comfortable.Haldol administered. Pt now resting. Will continue to monitor.

## 2017-06-24 NOTE — NURSING
Pt behavior still occurring, MD notified. One time dose of Haldol approved. Will continue to monitor.

## 2017-06-24 NOTE — PROGRESS NOTES
"[facility]  Endocrinology  Progress Note    Admit Date: 2017     Reason for Consult: Diabetes insipidus     Surgical Procedure and Date:   CRANIOTOMY 2017        HPI:   Patient is a 51 y.o. female with a diagnosis of  meningioma s/p resection (). 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  She underwent partial resection of the tumor in 2015 at [facility]. Followup scan done in  showing a continued large meningioma.     Endocrinology consulted for post operative DI management.  Per the operative report - The pituitary stalk was identified and could be preserved        Interval HPI:   Overnight events: sodium improving  Eatin%  Nausea: No  Hypoglycemia and intervention: No  Fever: No  TPN and/or TF: No  If yes, type of TF/TPN and rate: n/a    /87 (BP Location: Right arm, Patient Position: Lying, BP Method: Automatic)   Pulse (!) 53   Temp 98.6 °F (37 °C) (Oral)   Resp 17   Ht 5' 2" (1.575 m)   Wt 60.1 kg (132 lb 7.9 oz)   SpO2 99%   Breastfeeding? No   BMI 24.23 kg/m²       Labs Reviewed and Include      Recent Labs  Lab 17  0454   GLU 85   CALCIUM 10.6*   ALBUMIN 3.2*   PROT 7.1   *  151*   K 3.4*   CO2 35*      BUN 19   CREATININE 0.8   ALKPHOS 107   ALT 71*   AST 57*   BILITOT 0.2     Lab Results   Component Value Date    WBC 6.99 2017    HGB 12.5 2017    HCT 39.3 2017     (H) 2017     2017       Recent Labs  Lab 17  0357 17  0556   TSH  --  0.472   FREET4 0.85 0.83     No results found for: HGBA1C    Nutritional status:   Body mass index is 24.23 kg/m².  Lab Results   Component Value Date    ALBUMIN 3.2 (L) 2017    ALBUMIN 3.4 (L) 2017    ALBUMIN 3.2 (L) 2017     No results found for: PREALBUMIN    Estimated Creatinine Clearance: 65.8 mL/min " (based on Cr of 0.8).    Accu-Checks  Recent Labs      06/21/17   1233  06/22/17   1146  06/23/17   1153  06/23/17   1750   POCTGLUCOSE  127*  84  172*  76       Current Medications and/or Treatments Impacting Glycemic Control  Immunotherapy:  Immunosuppressants     None        Steroids:   Hormones     Start     Stop Route Frequency Ordered    06/22/17 2100  desmopressin nasal solution 10 mcg      -- Nasl Nightly 06/22/17 1023    06/17/17 1712  desmopressin nasal spray 10 mcg      -- Nasl As needed (PRN) 06/17/17 1614    06/12/17 2100  hydrocortisone tablet 5 mg      -- Oral Nightly 06/12/17 1059    06/12/17 1100  hydrocortisone tablet 10 mg      -- Oral Every morning 06/12/17 1059        Pressors:    Autonomic Drugs     None        Hyperglycemia/Diabetes Medications: Antihyperglycemics     None          ASSESSMENT and PLAN    Diabetes insipidus    Post-surgical DI (craniotomy 6/8/2017)  Recommend free water repletion and giving desmopressin PRN  IF uop > 250 x 2 consecutive hours, urine specific gravity 1.005, and serum sodium greater than 145  check sodium and sp. Gravity to q6hrs  Strict I/O     D/c recs: as above               * Meningioma, recurrent of brain    S/p surgery     Low normal TSH and FT4 but clinically not mary, no longer hypothermic; continue lt4 50mcg daily and will repeat tfts o/p to assess if longterm replacement needed     Continue HC 10/5, clinically without s/s AI. Will check 8am cortisol/acth o/p to decide if longterm repletion needed.    Will schedule f/u in pituitary clinic within 4 weeks post discharge with above labs              Hypercalcemia    ionized ca  within normal range  Immobilization likely a contributor and rec pt/ot with weight bearing if possible   Plan to follow-up calcium level as o/p            Gerry Sr MD  Endocrinology  [facility]

## 2017-06-24 NOTE — NURSING
OC asked to hold Haldol if sitter returns, pt is still anxious, agitated, and pulling at garcía will continue to monitor for changes.

## 2017-06-24 NOTE — ASSESSMENT & PLAN NOTE
S/p surgery     Low normal TSH and FT4 but clinically not mary, no longer hypothermic; continue lt4 50mcg daily and will repeat tfts o/p to assess if longterm replacement needed     Continue HC 10/5, clinically without s/s AI. Will check 8am cortisol/acth o/p to decide if longterm repletion needed.    Will schedule f/u in pituitary clinic within 4 weeks post discharge with above labs

## 2017-06-24 NOTE — NURSING
Haldol given. Sitter has been at bedside trying to calm pt down. Non-pharmacolgic interventions used, music therapy, a warm bath, midnight snacks with no relief. Will continue to monitor.

## 2017-06-24 NOTE — ASSESSMENT & PLAN NOTE
Post-surgical DI (craniotomy 6/8/2017)    I/O net positive: goal is to match i/o which is difficult given impaired thirst mechanism but at the very least avoid net negative  To continue scheduled DDAVP 10mcg qhs   Parameters to give extra DDAVP prn now and once discharged: IF uop > 250 x 2 consecutive hours    Would considering sodium and sp. Gravity to q12hrs   Strict I/O     D/c recs: as above

## 2017-06-24 NOTE — NURSING
Called to room x4 within the beginning of shift for agitation. Pt is pulling off tele leads, trying to get out of bed, and using profanity because she wants to go to work. Pt has on a nicotine patch, but requesting a cigarette. Sitter at bedside has been pulled and replaced with [employer] camera due to staffing complications. OC and PCT taking turns to sit with pt and calm her down for 45 mins with little to no success. Will continue to monitor.

## 2017-06-24 NOTE — ASSESSMENT & PLAN NOTE
50 y/o F s/p crani resection olfactory groove meningioma POD#17.  - Pt is neurologically stable  - Appreciate endocrine recs for DI: Now Off IVF. Scheduled DDAVP 10mcg intranasal qhs.  Na 151 today.  UO and SG are wnl.   Can give extra prn DDAVP:   Parameters to give extra DDAVP IF uop > 250 x 2 consecutive hours AND Spec grav <1.005 or Serum Sodium > 150.  Check sodium and sp. Gravity Q6    - FT4, low normal.  Started synthroid 50 mcg daily.  Repeat FT4 and TSH tomrrow  - Continue cortef 10 q am, 5 q pm.    - On dental soft diet.   - DVT ppx- ANA/SCD's. GI ppx. Sqh.   - Continue daily PT/OT/ST  - Will DC staples on POD#21  - [facility] pending.

## 2017-06-24 NOTE — ASSESSMENT & PLAN NOTE
ionized ca within normal range  Immobilization likely a contributor and rec pt/ot with weight bearing if possible   Plan to follow-up calcium level as o/p

## 2017-06-25 NOTE — NURSING
MD notified of not being able to obtain temp on patient. Thermometer reads LOW. Patient placed on bear hugger.

## 2017-06-25 NOTE — PLAN OF CARE
Problem: Patient Care Overview  Goal: Plan of Care Review  Outcome: Ongoing (interventions implemented as appropriate)  Patient AAO to self only. Patient restless and agitated throughout the shift. Sitter at bedside. Soft mitten restraints bilaterally in use. Vital signs stable. Patient currently on bear hugger. No other events throughout shift. Patient stable. Blood glucose checks stable. Will continue to monitor.

## 2017-06-25 NOTE — PLAN OF CARE
Problem: Restraint, Nonbehavioral (nonviolent)  Goal: Absence of Injury/Harm  Outcome: Ongoing (interventions implemented as appropriate)  Plan of care reviewed with pt, fall precautions maintained, bed in lowest position, mitten restraints in place, VSS, no distress noted, pt ate all of meals, bear hugger temp machine in place, no distress noted, sitter at bedside, will continue to monitor.

## 2017-06-25 NOTE — PLAN OF CARE
Problem: Physical Therapy Goal  Goal: Physical Therapy Goal  Goals to be met by: 17    Patient will increase functional independence with mobility by performin. Supine to sit with Contact Guard Assistance  2. Sit to supine with Contact Guard Assistance  3. Sit to stand transfer with Minimal Assistance using RW- MET 17      Revised: Sit to stand transfer with contact guard assistance using RW.   4. Gait  x 25 feet with Minimal Assistance with or without appropriate AD.   5. Lower extremity exercise program x30 reps per handout, with independence       Outcome: Ongoing (interventions implemented as appropriate)        Goals remain appropriate   Lorenza GONZALEZ  2017

## 2017-06-25 NOTE — PROGRESS NOTES
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  She underwent operation in January 2015 on the [facility] service at [facility].  It   was apparently only possible to get a partial resection of the tumor.  She did   have a followup scan done in 2016 showing a continued large meningioma in this   area.  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  She had a   followup MRI scan done at [facility] on 01/20/17, showing this large tumor.  She   was seen by you in Ophthalmology and then referred to Neurosurgery for further   evaluation.  At this point, she has some preserved vision in the right eye.  She   complains of constant headaches.  She has no sense of smell.  She feels off   balance.  She is taking Neurontin apparently for seizure prevention. Past   medical history relates primarily to the present illness.  She does not have   chronic medical illnesses such as diabetes or hypertension.  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  She is  and has good family support.     On physical examination, she is a well-developed, well-nourished white lady, who   is alert and oriented.  Examination of the head shows a somewhat anterior, but   well-healed bicoronal incision.  She says that this is a little tender in the   left frontal area.  Eyes show full extraocular movements.  The pupils are small,   the left is not reactive.  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  The right optic nerve is probably normal.  She   sees things as doubles.  When I held up two  fingers she counted four, although   she knew that there were two.  Hearing seems preserved.  The neck is supple.  On   neurological examination, she is speaking clearly.  She is obviously anxious   and depressed over her illness.  Finger-to-nose is somewhat diminished on the   left side and gait was mildly unsteady.  Cranial nerves are otherwise intact.    She has normal facial sensation and movement.  The tongue protrudes in the   midline.  Strength in the extremities seems generally good, sensation normal and   deep tendon reflexes symmetrical.     MRI of the brain performed at [facility] on 01/20/17, shows a   previous bifrontal craniotomy.  There is a large lobular meningioma, which   occupies the sella, planum sphenoidale and extends back on to the clivus.  The   optic chiasm and optic nerves are not visualized.  The carotid arteries and   anterior cerebral arteries run done through the tumor.  The tumor elevates and   pushes the third ventricle back, but there is no hydrocephalus    Post-Op Info:  Procedure(s) (LRB):  CRANIOTOMY WITH STEALTH (N/A)   18 Days Post-Op     Interval History: NAEON    Medications:  Continuous Infusions:   dextrose 5 % 50 mL/hr at 06/25/17 0636     Scheduled Meds:   amitriptyline  100 mg Oral Nightly    bisacodyl  10 mg Rectal Daily    gabapentin  600 mg Oral TID    heparin (porcine)  5,000 Units Subcutaneous Q8H    hydrocortisone  10 mg Oral QAM    hydrocortisone  5 mg Oral QHS    levothyroxine  50 mcg Oral Before breakfast    nicotine  1 patch Transdermal Daily    pantoprazole  40 mg Oral BID    polyethylene glycol  17 g Oral Daily    ramelteon  8 mg Oral QHS     PRN Meds:acetaminophen, acetaminophen, acetaminophen, desmopressin, dextrose 50%, dextrose 50%, glucagon (human recombinant), glucose, glucose, hydrALAZINE, metoclopramide HCl, metoclopramide HCl     Review of Systems  Objective:     Weight: 60.1 kg (132 lb 7.9 oz)  Body mass index is 24.23  kg/m².  Vital Signs (Most Recent):  Temp: 97.6 °F (36.4 °C) (17 1655)  Pulse: (!) 55 (17 0355)  Resp: 16 (17 2350)  BP: 105/63 (17 2350)  SpO2: 98 % (17 2350) Vital Signs (24h Range):  Temp:  [97.1 °F (36.2 °C)-97.8 °F (36.6 °C)] 97.6 °F (36.4 °C)  Pulse:  [45-63] 55  Resp:  [12-17] 16  SpO2:  [16 %-98 %] 98 %  BP: (105-120)/(62-78) 105/63              Temp (24hrs), Av.5 °F (36.4 °C), Min:97.1 °F (36.2 °C), Max:97.8 °F (36.6 °C)                 Urethral Catheter 06/15/17 1205 Latex (Active)   Site Assessment Clean;Intact 2017 11:50 PM   Collection Container Urimeter 2017 11:50 PM   Securement Method secured to top of thigh w/ adhesive device 2017 11:50 PM   Catheter Care Performed yes 2017 11:50 PM   Reason for Continuing Urinary Catheterization Required immobilization;Post operative 2017  4:00 AM   CAUTI Prevention Bundle Intact seal between catheter & drainage tubing;Drainage bag off the floor;Green sheeting clip in use;No dependent loops or kinks;Drainage bag not overfilled (<2/3 full);Drainage bag below bladder 2017  7:50 PM   Output (mL) 10 mL 2017  6:00 AM       Neurosurgery Physical Exam   General: no distress  Neurologic: oriented to person, year  Head: normocephalic  GCS: Motor: 6/Verbal: 4/Eyes: 3 GCS Total: 13  Cranial nerves:  Pupils NR bilaterally, blind in both eyes, tongue midline   Sensory: response to light touch throughout  Motor Strength: generalized deconditioning in upper and lower extremities, no focal weakness.  FC x 4   Lungs:  normal respiratory effort  Abdomen: soft, non-tender   Extremities: no cyanosis or edema, or clubbing       Significant Labs:    Recent Labs  Lab 17  0412        145   K 3.3*      CO2 30*   BUN 20   CREATININE 0.7   CALCIUM 10.0   MG 1.9       Recent Labs  Lab 17  0454 17  0412   WBC 6.99 7.14   HGB 12.5 11.9*   HCT 39.3 36.9*    235       Recent Labs  Lab  06/25/17  0412   INR 1.0   APTT 79.9*     Microbiology Results (last 7 days)     Procedure Component Value Units Date/Time    Blood culture [[id number]] Collected:  06/14/17 1517    Order Status:  Completed Specimen:  Blood from Peripheral, Antecubital, Left Updated:  06/19/17 2012     Blood Culture, Routine No growth after 5 days.    Narrative:       Blood cultures x 2 different sites. 4 bottles total. Please  draw cultures before administering antibiotics.    Blood culture [[id number]] Collected:  06/14/17 1156    Order Status:  Completed Specimen:  Blood from Peripheral, Antecubital, Left Updated:  06/19/17 1412     Blood Culture, Routine No growth after 5 days.    Narrative:       Blood cultures from 2 different sites. 4 bottles total.  Please draw before starting antibiotics.            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  - Pt is neurologically stable  - Appreciate endocrine recs for DI: Na trending down, ddAVP only PRN.  Continue d5 at 50 cc/hr.  Can give extra prn DDAVP:   Parameters to give extra DDAVP IF uop > 250 x 2 consecutive hours AND Spec grav <1.005 or Serum Sodium > 150.  Check sodium and sp. Gravity Q6    - FT4, low normal.  Started synthroid 50 mcg daily.   - Continue cortef 10 q am, 5 q pm.    - On dental soft diet.   - DVT ppx- ANA/SCD's. GI ppx. Sqh.   - Continue daily PT/OT/ST  - Will DC staples on POD#21  - [facility] placement pending.                Boogie Wiley, DO  Neurosurgery  [facility]

## 2017-06-25 NOTE — SUBJECTIVE & OBJECTIVE
Interval History: NAEON    Medications:  Continuous Infusions:   dextrose 5 % 50 mL/hr at 17 0636     Scheduled Meds:   amitriptyline  100 mg Oral Nightly    bisacodyl  10 mg Rectal Daily    gabapentin  600 mg Oral TID    heparin (porcine)  5,000 Units Subcutaneous Q8H    hydrocortisone  10 mg Oral QAM    hydrocortisone  5 mg Oral QHS    levothyroxine  50 mcg Oral Before breakfast    nicotine  1 patch Transdermal Daily    pantoprazole  40 mg Oral BID    polyethylene glycol  17 g Oral Daily    ramelteon  8 mg Oral QHS     PRN Meds:acetaminophen, acetaminophen, acetaminophen, desmopressin, dextrose 50%, dextrose 50%, glucagon (human recombinant), glucose, glucose, hydrALAZINE, metoclopramide HCl, metoclopramide HCl     Review of Systems  Objective:     Weight: 60.1 kg (132 lb 7.9 oz)  Body mass index is 24.23 kg/m².  Vital Signs (Most Recent):  Temp: 97.6 °F (36.4 °C) (17 1655)  Pulse: (!) 55 (17 0355)  Resp: 16 (17 2350)  BP: 105/63 (17 2350)  SpO2: 98 % (17 2350) Vital Signs (24h Range):  Temp:  [97.1 °F (36.2 °C)-97.8 °F (36.6 °C)] 97.6 °F (36.4 °C)  Pulse:  [45-63] 55  Resp:  [12-17] 16  SpO2:  [16 %-98 %] 98 %  BP: (105-120)/(62-78) 105/63              Temp (24hrs), Av.5 °F (36.4 °C), Min:97.1 °F (36.2 °C), Max:97.8 °F (36.6 °C)                 Urethral Catheter 06/15/17 1205 Latex (Active)   Site Assessment Clean;Intact 2017 11:50 PM   Collection Container Urimeter 2017 11:50 PM   Securement Method secured to top of thigh w/ adhesive device 2017 11:50 PM   Catheter Care Performed yes 2017 11:50 PM   Reason for Continuing Urinary Catheterization Required immobilization;Post operative 2017  4:00 AM   CAUTI Prevention Bundle Intact seal between catheter & drainage tubing;Drainage bag off the floor;Green sheeting clip in use;No dependent loops or kinks;Drainage bag not overfilled (<2/3 full);Drainage bag below bladder 2017  7:50 PM    Output (mL) 10 mL 6/25/2017  6:00 AM       Neurosurgery Physical Exam   General: no distress  Neurologic: oriented to person, year  Head: normocephalic  GCS: Motor: 6/Verbal: 4/Eyes: 3 GCS Total: 13  Cranial nerves:  Pupils NR bilaterally, blind in both eyes, tongue midline   Sensory: response to light touch throughout  Motor Strength: generalized deconditioning in upper and lower extremities, no focal weakness.  FC x 4   Lungs:  normal respiratory effort  Abdomen: soft, non-tender   Extremities: no cyanosis or edema, or clubbing       Significant Labs:    Recent Labs  Lab 06/25/17  0412        145   K 3.3*      CO2 30*   BUN 20   CREATININE 0.7   CALCIUM 10.0   MG 1.9       Recent Labs  Lab 06/24/17  0454 06/25/17 0412   WBC 6.99 7.14   HGB 12.5 11.9*   HCT 39.3 36.9*    235       Recent Labs  Lab 06/25/17 0412   INR 1.0   APTT 79.9*     Microbiology Results (last 7 days)     Procedure Component Value Units Date/Time    Blood culture [[id number]] Collected:  06/14/17 1517    Order Status:  Completed Specimen:  Blood from Peripheral, Antecubital, Left Updated:  06/19/17 2012     Blood Culture, Routine No growth after 5 days.    Narrative:       Blood cultures x 2 different sites. 4 bottles total. Please  draw cultures before administering antibiotics.    Blood culture [[id number]] Collected:  06/14/17 1156    Order Status:  Completed Specimen:  Blood from Peripheral, Antecubital, Left Updated:  06/19/17 1412     Blood Culture, Routine No growth after 5 days.    Narrative:       Blood cultures from 2 different sites. 4 bottles total.  Please draw before starting antibiotics.            Significant Diagnostics:

## 2017-06-25 NOTE — PROGRESS NOTES
Pt is averaging 5ml/hr, VSS, pt is confused, oriented to self, MD Noah notified. No new orders present.

## 2017-06-25 NOTE — PT/OT/SLP PROGRESS
Physical Therapy  Treatment    Joanna Morales   MRN: [id number]   Admitting Diagnosis: Meningioma, recurrent of brain    PT Received On: 06/25/17  PT Start Time: 1019     PT Stop Time: 1049    PT Total Time (min): 30 min       Billable Minutes:  Therapeutic Activity 15 and Neuromuscular Re-education 15    Treatment Type: Treatment  PT/PTA: PTA     PTA Visit Number: 1       General Precautions: Standard, fall  Orthopedic Precautions: N/A   Braces:      Do you have any cultural, spiritual, Advent conflicts, given your current situation?: no conflicts    Subjective:  Communicated with LUI Unger  prior to session.  Pt stated her brother and father were in the room although they were not present.       Objective:   Patient found with: telemetry, garcía catheter (warm blanket , sitter present, mittens) peripheral IV (RN disconnects for tx session)  Pt was supine with HOB elevated.      Functional Mobility:  Bed Mobility:   Rolling/Turning to Left: Moderate assistance  Scooting/Bridging: Moderate Assistance to scoot hips to EOB  Supine to Sit: Max Assistance from L side lying  Sit to Supine: Minimum Assistance    Transfers:  Sit <> Stand Assistance: Minimum Assistance ( Min A x3 from EOB )   VC and TC for balance   Sit <> Stand Assistive Device: No Assistive Device    Gait:   Lateral steps to the L attempted x2, however, pt with poor command following, unable to weight shift  Appropriately and impaired balance with L posterior lean.    Balance:   Static Sit: FAIR: Maintains without assist, but unable to take any challenges   Dynamic Sit: FAIR: Cannot move trunk without losing balance  Static Stand: POOR: Needs MODERATE assist to maintain VC for posture awareness and balance due to posterior lean and cuing for midline orientation x2-3 min x3 trials with no AD/single HHA and B HHA  Dynamic stand: POOR: N/A       AM-PAC 6 CLICK MOBILITY  How much help from another person does this patient currently need?   1 = Unable,  Total/Dependent Assistance  2 = A lot, Maximum/Moderate Assistance  3 = A little, Minimum/Contact Guard/Supervision  4 = None, Modified [place]/Independent    Turning over in bed (including adjusting bedclothes, sheets and blankets)?: 3  Sitting down on and standing up from a chair with arms (e.g., wheelchair, bedside commode, etc.): 2  Moving from lying on back to sitting on the side of the bed?: 2  Moving to and from a bed to a chair (including a wheelchair)?: 2  Need to walk in hospital room?: 2  Climbing 3-5 steps with a railing?: 1  Total Score: 12    AM-PAC Raw Score CMS G-Code Modifier Level of Impairment Assistance   6 % Total / Unable   7 - 9 CM 80 - 100% Maximal Assist   10 - 14 CL 60 - 80% Moderate Assist   15 - 19 CK 40 - 60% Moderate Assist   20 - 22 CJ 20 - 40% Minimal Assist   23 CI 1-20% SBA / CGA   24 CH 0% Independent/ Mod I     Patient left HOB elevated with all lines intact, call button in reach, RN notified and sitter present.    Assessment:  Joanna Morales is a 51 y.o. female with a medical diagnosis of Meningioma, recurrent of brain and presents with deficits below. Pt needed constant redirection to complete and remain on task throughout session 2* confused. Pt was unable to consistently answer questions and began babbling about unrelated topics during session.  Pt displayed decreased safety awareness while repeatedly leaning forward to attempt to grab a non-existent cigarette from the floor. Pt was able to participate in standing activities but had difficulty demonstrating understanding due to decreased cognition and spatial awareness    Rehab identified problem list/impairments: Rehab identified problem list/impairments: impaired endurance, impaired self care skills, impaired functional mobilty, gait instability, impaired balance, visual deficits, impaired cognition, decreased coordination, decreased lower extremity function, decreased upper extremity function, pain,  impaired coordination    Rehab potential is good.    Activity tolerance: Fair    Discharge recommendations:       Barriers to discharge:      Equipment recommendations: Equipment Needed After Discharge:  (TBD)     GOALS:    Physical Therapy Goals        Problem: Physical Therapy Goal    Goal Priority Disciplines Outcome Goal Variances Interventions   Physical Therapy Goal     PT/OT, PT Ongoing (interventions implemented as appropriate)     Description:  Goals to be met by: 17    Patient will increase functional independence with mobility by performin. Supine to sit with Contact Guard Assistance  2. Sit to supine with Contact Guard Assistance  3. Sit to stand transfer with Minimal Assistance using RW- MET 17      Revised: Sit to stand transfer with contact guard assistance using RW.   4. Gait  x 25 feet with Minimal Assistance with or without appropriate AD.   5. Lower extremity exercise program x30 reps per handout, with independence                        PLAN:    Patient to be seen 5 x/week  to address the above listed problems via gait training, therapeutic activities, therapeutic exercises, neuromuscular re-education  Plan of Care expires: 17  Plan of Care reviewed with: patient         LISA Barclay  2017

## 2017-06-25 NOTE — ASSESSMENT & PLAN NOTE
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  - Pt is neurologically stable  - Appreciate endocrine recs for DI: Na trending down, ddAVP only PRN.  Continue d5 at 50 cc/hr.  Can give extra prn DDAVP:   Parameters to give extra DDAVP IF uop > 250 x 2 consecutive hours AND Spec grav <1.005 or Serum Sodium > 150.  Check sodium and sp. Gravity Q6    - FT4, low normal.  Started synthroid 50 mcg daily.   - Continue cortef 10 q am, 5 q pm.    - On dental soft diet.   - DVT ppx- ANA/SCD's. GI ppx. Sqh.   - Continue daily PT/OT/ST  - Will DC staples on POD#21  - [facility]

## 2017-06-26 PROBLEM — E03.8 SECONDARY HYPOTHYROIDISM: Status: ACTIVE | Noted: 2017-01-01

## 2017-06-26 PROBLEM — S09.90XA CHI (CLOSED HEAD INJURY): Status: ACTIVE | Noted: 2017-01-01

## 2017-06-26 PROBLEM — E27.49 SECONDARY ADRENAL INSUFFICIENCY: Status: ACTIVE | Noted: 2017-01-01

## 2017-06-26 NOTE — PLAN OF CARE
Pt accepted to [facility] today.  Nurse given information to call report.  Transportation scheduled with [facility], [phone]  time within the hour.    MSW attempted to update KAREN Huang, [phone] but she has a voice mail that is not set up.    Selina Chew LMSW

## 2017-06-26 NOTE — PLAN OF CARE
06/26/17 1143   Final Note   Assessment Type Final Discharge Note   Discharge Disposition Rehab   Discharge planning education complete? Yes   Hospital Follow Up  Appt(s) scheduled? Yes   Discharge plans and expectations educations in teach back method with documentation complete? Yes   Offered [facility] -- Bedside Delivery? n/a   Discharge/Hospital Encounter Summary to (non-[facility]) PCP n/a   Referral to Outpatient Case Management complete? n/a   Referral to / orders for Home Health Complete? n/a   30 day supply of medicines given at discharge, if documented non-compliance / non-adherence? n/a   Any social issues identified prior to discharge? No   Did you assess the readiness or willingness of the family or caregiver to support self management of care? Yes   Right Care Referral Info   Post Acute Recommendation IRF

## 2017-06-26 NOTE — PROGRESS NOTES
Attempt to call report to nurse at [facility],  Nurse says unaware of pt's arrival, will give a call back. ALANNAH Blackman notified to keep raquel in place. Awaiting call back from [facility].

## 2017-06-26 NOTE — SUBJECTIVE & OBJECTIVE
Scheduled Medications:    amitriptyline  100 mg Oral Nightly    bisacodyl  10 mg Rectal Daily    gabapentin  600 mg Oral TID    heparin (porcine)  5,000 Units Subcutaneous Q8H    hydrocortisone  10 mg Oral QAM    levothyroxine  50 mcg Oral Before breakfast    nicotine  1 patch Transdermal Daily    pantoprazole  40 mg Oral BID    polyethylene glycol  17 g Oral Daily    ramelteon  8 mg Oral QHS       PRN Medications: acetaminophen, acetaminophen, acetaminophen, desmopressin, dextrose 50%, dextrose 50%, glucagon (human recombinant), glucose, glucose, hydrALAZINE, metoclopramide HCl, metoclopramide HCl    Review of Systems   Reason unable to perform ROS: 2/2 mental status.     Objective:     Vital Signs (Most Recent):  Temp: 98.4 °F (36.9 °C) (06/26/17 0800)  Pulse: 93 (06/26/17 0800)  Resp: 16 (06/26/17 0800)  BP: 135/84 (06/26/17 0800)  SpO2: 96 % (06/26/17 0800)    Vital Signs (24h Range):  Temp:  [97.5 °F (36.4 °C)-98.4 °F (36.9 °C)] 98.4 °F (36.9 °C)  Pulse:  [] 93  Resp:  [16-17] 16  SpO2:  [96 %-100 %] 96 %  BP: ()/(66-84) 135/84     Physical Exam   Constitutional: She appears well-developed and well-nourished.   HENT:   Sutures noted throughout scalp   Eyes: Pupils are equal, round, and reactive to light.   Neck: Normal range of motion.   Cardiovascular: Normal rate and regular rhythm.    Pulmonary/Chest: Effort normal. No respiratory distress.   Abdominal: Soft. She exhibits no distension.   Musculoskeletal: Normal range of motion.   Neurological:   Neurologic:  -  Mental Status:  Somnolent, but awakens to voice.  Oriented to person.  Some difficulty following commands.  -  Speech and language:  + aphasia  -  Coordination:  Finger to nose exam:  RUE dysmetria, LUE dysmetria.  Heel to shin:  RLE abnormal, LLE abnormal.   -  Motor:  RUE: 4/5.  LUE: 4/5.  RLE: 5/5.  LLE: 5/5.   -pronator drift. Unable to assess due to patient participation.   -  Tone:  normal  -  Sensory:  Intact to light  touch and pin prick.   Skin: Skin is warm and dry.   Psychiatric: Her behavior is normal. Cognition and memory are impaired.   Flat affect

## 2017-06-26 NOTE — PLAN OF CARE
Plan of care reviewed with pt. VS stable. No acute events at this time. UOP has not been greater than 250mls/hr. No neuro changes. No complaints. Pt remains free from falls or injury. Bed low and locked, bed alarm on and sitter at bedside, call light and personal belongings within reach. Will continue to monitor. Suki Power RN

## 2017-06-26 NOTE — H&P
[facility]  Physical Medicine & Rehab  History & Physical    Patient Name: Joanna Morales  MRN: [id number]  Admission Date: 6/26/2017  Attending Physician: Monserrat Fairchild MD  Primary Care Provider: Claudy Tse MD    Subjective:     Principal Problem: CHI (closed head injury)    HPI: Joanna Morales is a 51-year-old female with PMHx of depression, L eye vision loss, basal cell carcinoma, and meningioma (s/p partial resection at [facility] Jan. 2015) followed in [facility] by Dr. Chew 5/17 and admitted [facility] on 6/7/17 for surgical intervention.  Now s/p L frontotemporal craniotomy and excision of meningioma with a drain (on Rocephin for ppx). Post-op CTH concerning for small postoperative hemorrhage; no intervention necessary. Hospital course was further complicated by pneumocephalus, DI, hypernatremia. hypotension, hypothermia, and encephalopathy. EEG shows slowing suggestive of encephalopathy without evidence of seizures. Repeat CTH showing improvement and stepped down to the floor on 6/16.   - On 6/24, patient developed encephalopathy, became severely agitated and anxious.  Haldol was administered, sitters were placed for SV and restraints were placed 2/2 attempted tube displacements.  Sodium has decreased to 140.    Current Functional Status:  Participating with therapy. Bed mobility ModA-MaxA.  Sit to stand Judy  Ambulated attempted lateral L steps, but unsuccessful 2/2 difficulty following commands.    UBD ModA-MaxA.   - SLP-diet recommendation dental soft and nectar thick liquids. Cognitive linguistic impairments noted.    Functional History: Per the son, patient lives in [place] with cousin in a trailor home with 4 steps to enter.  Prior to admission, she was independent with ADLs and used a RW and cane for household and ambulation.  DME: [facility] [employer] [facility].    Past Medical History:   Diagnosis Date    Allergy     Basal cell carcinoma     Depression 11/1/2016    Essential hypertension 6/9/2017     Eye disorder     Fever blister     Normocytic anemia 6/9/2017    Secondary hypothyroidism 6/26/2017     Past Surgical History:   Procedure Laterality Date    BASAL CELL CARCINOMA EXCISION      forehead    BRAIN SURGERY      SKIN BIOPSY       Review of patient's allergies indicates:   Allergen Reactions    Codeine        Scheduled Medications:    amitriptyline  100 mg Oral Nightly    [START ON 6/27/2017] bisacodyl  10 mg Rectal Daily    docusate sodium  100 mg Oral BID    gabapentin  600 mg Oral TID    heparin (porcine)  5,000 Units Subcutaneous Q8H    [START ON 6/27/2017] hydrocortisone  10 mg Oral QAM    [START ON 6/27/2017] levothyroxine  50 mcg Oral Before breakfast    [START ON 6/27/2017] nicotine  1 patch Transdermal Daily    pantoprazole  40 mg Oral BID    [START ON 6/27/2017] polyethylene glycol  17 g Oral Daily    ramelteon  8 mg Oral QHS       PRN Medications: acetaminophen, bisacodyl, desmopressin, dextrose 50%, dextrose 50%, glucagon (human recombinant), glucose, glucose, metoclopramide HCl, metoclopramide HCl, ondansetron    Family History     Problem Relation (Age of Onset)    Diabetes Father    Hepatitis Father    Hypertension Father    No Known Problems Mother        Social History Main Topics    Smoking status: Current Every Day Smoker     Packs/day: 1.00     Years: 40.00     Types: Cigarettes    Smokeless tobacco: Never Used    Alcohol use No    Drug use: No    Sexual activity: No     Review of Systems   Constitutional:        Patient was lethargic on admit, and did not provide a ROS. Appeared comfortable, no pain w range of joints     Objective:     Vital Signs (Most Recent):       Vital Signs (24h Range):  Temp:  [97.5 °F (36.4 °C)-99.6 °F (37.6 °C)] 99.6 °F (37.6 °C)  Pulse:  [] 115  Resp:  [16-17] 17  SpO2:  [96 %-100 %] 96 %  BP: ()/(66-84) 105/72     There is no height or weight on file to calculate BMI.    Physical Exam   Constitutional: She appears  well-developed and well-nourished.   HENT:   Head: Normocephalic.   Incision C/D   Cardiovascular: Normal rate.    Pulmonary/Chest: Effort normal.   Abdominal: Soft. She exhibits no distension. There is no tenderness.   Musculoskeletal:   No pain w range of extremities   + ankle clonus    Neurological:   Patient unable to maintain attention, decreased alertness   Skin: Skin is warm.   Vitals reviewed.      Diagnostic Results: Labs: Reviewed    Assessment/Plan:     Joanna Morales is a 51 y.o. female being admitted to inpatient rehabilitation on 6/26/2017 for CHI (closed head injury) with impaired mobility and ADLs.     S/P craniotomy    - incision c/d        Vision loss of right eye    - will give assistance as appropriate to accomodate        Pneumocephalus    - noted        Depression    - on elavil        * CHI (closed head injury)    Patient was lethargic on admission. Per nurse, patient did wake up to state she needed to go to the bathroom, and then was inattentive/lethargic again.   Will monitor closely   -- Tachycardic, otherwise BP, pulse oX stable. BG 92.   Will repeat labs in AM, monitor to see if response to a medication.             Monserrat Fairchild MD  Department of Physical Medicine & Rehab   [facility]    Post Admission Assessment:    Joanna Morales is a 51 y.o. female being admitted to inpatient rehabilitation on 6/26/2017 for CHI with impaired mobility and ADLs.   Patient is appropriate for inpatient rehabilitation and is expected to tolerate 3 hours of therapy a day or 15 hours of therapy a week.  Patient is expected to benefit from the following therapy services: Physical Therapy, Occupational Therapy,  Speech Therapy, as well as Recreational Therapy  Impairments include: Impaired alertness, Impaired balance, Decreased Endurance, Impaired activity tolerance  Goals: Mod to Min A with Mobility, ADLs, Transfers using LRAD   Precautions:  Fall, Aspiration    ELOS: 14- 21  days   Disposition: Home with family     I have had the opportunity to examine the patient within 24 hours of admission and have reviewed the Pre-Admission Assessment and find it consistent with my examination and evaluation of the patient. I confirm that this patient is appropriate for admission and treatment in this inpatient rehabilitation hospital, needs intense interdisciplinary rehabilitation care under my direction and is expected to achieve meaningful goals within a reasonable period of time that are consistent with the planned discharge disposition.     The patient will be admitted for inpatient comprehensive interdisciplinary rehabilitation to address the impairments and medical conditions listed above while assessing equipment needs and compensatory strategies, with coordinated interdisciplinary services that will include physical therapy, occupational therapy, and close monitoring and treatment with 24-hour rehabilitative nursing. This interdisciplinary program will be performed under the direction of a physiatrist.    Monserrat Fairchild MD

## 2017-06-26 NOTE — PROGRESS NOTES
[facility] loc dc'd. Telemetry monitor dc'd. Discharge instructions given to pt. Report given to LUI Torre from [facility]. Awaiting [employer] transportation to arrive.

## 2017-06-26 NOTE — PT/OT/SLP PROGRESS
Occupational Therapy      Joanna Morales  MRN: [id number]    Patient not seen today secondary to unable to arouse pt.  Pt in deep sleep upon arrival at room.  OT provided tactile and verbal stimulation; however, pt remained asleep without responding to stimuli.  Will follow up as POC allows.    HELEN Lopes  6/26/2017

## 2017-06-26 NOTE — ASSESSMENT & PLAN NOTE
Patient was lethargic on admission. Per nurse, patient did wake up to state she needed to go to the bathroom, and then was inattentive/lethargic again.   Will monitor closely   -- Tachycardic, otherwise BP, pulse oX stable. BG 92.   Will repeat labs in AM, monitor to see if response to a medication.

## 2017-06-26 NOTE — CARE UPDATE
Asleep on bedpan.  Lethargic.  Medium loose bm noted.  Pericare given. Bed in low position with HOB up 45 degrees.  Bed alarm and Avasys activated.

## 2017-06-26 NOTE — CARE UPDATE
"Noted to be moving legs about in bed, throwing bed covers aside. Alert and responsive.  When inquired, states has to have BM.  Also stated "I have gas."  Noted to be having loose BM, when placed on bedpan.   Bed in low position with wheels locked and bed alarm and Avasys activated.    "

## 2017-06-26 NOTE — ASSESSMENT & PLAN NOTE
S/p surgery     continue lt4 50mcg daily and will repeat tfts o/p to assess if longterm replacement needed. Low suspicion that she will require long-term levothyroxine replacement    Continue HC 10/5, will hold afternoon today and morning dose tomorrow and  check 8am cortisol/acth tomorrow in assessing whether longterm repletion is needed.    Will schedule f/u in pituitary clinic within 4 weeks post discharge with above labs

## 2017-06-26 NOTE — SUBJECTIVE & OBJECTIVE
"Interval HPI:   Overnight events: none  Eatin%  Nausea: No  Hypoglycemia and intervention: No  Fever: No  TPN and/or TF: No  If yes, type of TF/TPN and rate: n/a    /84 (BP Location: Left arm, Patient Position: Sitting, BP Method: Automatic)   Pulse 93   Temp 98.4 °F (36.9 °C) (Oral)   Resp 16   Ht 5' 2" (1.575 m)   Wt 60.1 kg (132 lb 7.9 oz)   SpO2 96%   Breastfeeding? No   BMI 24.23 kg/m²     Labs Reviewed and Include      Recent Labs  Lab 17  0343 17  0751   GLU 87  --    CALCIUM 9.8  --    ALBUMIN 3.2*  --    PROT 7.0  --      140 142   K 3.6  --    CO2 26  --      --    BUN 10  --    CREATININE 0.8  --    ALKPHOS 111  --    ALT 63*  --    AST 49*  --    BILITOT 0.3  --      Lab Results   Component Value Date    WBC 9.06 2017    HGB 12.0 2017    HCT 36.0 (L) 2017    MCV 96 2017     2017       Recent Labs  Lab 17  0357 17  0556   TSH  --  0.472   FREET4 0.85 0.83     No results found for: HGBA1C    Nutritional status:   Body mass index is 24.23 kg/m².  Lab Results   Component Value Date    ALBUMIN 3.2 (L) 2017    ALBUMIN 3.0 (L) 2017    ALBUMIN 3.2 (L) 2017     No results found for: PREALBUMIN    Estimated Creatinine Clearance: 65.8 mL/min (based on Cr of 0.8).    Accu-Checks  Recent Labs      17   1153  17   1750  17   2346  17   0627  17   1205  17   1801  17   0037  17   0530   POCTGLUCOSE  172*  76  114*  86  82  114*  82  95       Current Medications and/or Treatments Impacting Glycemic Control  Immunotherapy:  Immunosuppressants     None        Steroids:   Hormones     Start     Stop Route Frequency Ordered    17 1712  desmopressin nasal spray 10 mcg      -- Nasl As needed (PRN) 17 1614    17 2100  hydrocortisone tablet 5 mg      -- Oral Nightly 17 1059    17 1100  hydrocortisone tablet 10 mg      -- Oral Every morning " 06/12/17 1059        Pressors:    Autonomic Drugs     None        Hyperglycemia/Diabetes Medications: Antihyperglycemics     None

## 2017-06-26 NOTE — CARE UPDATE
"Unable to rouse sufficiently to feed patient.  When mild sternal rub applied, only response was "Huh?"    "

## 2017-06-26 NOTE — ASSESSMENT & PLAN NOTE
Post-surgical DI (craniotomy 6/8/2017)    Recommend discontinuing IV fluids  I/O net positive: goal is to match i/o which is difficult given impaired thirst mechanism but at the very least avoid net negative  Administer DDAVP 10mcg prn IF uop > 250 x 2 consecutive hours and serum sodium greater than 145, and urine specific gravity less than 1.005    Recommend decreasing serum sodium Urine specific gravity monitoring q12hrs  Strict I/O     D/c recs: as above

## 2017-06-26 NOTE — PROGRESS NOTES
[facility]  Physical Medicine & Rehab  Progress Note    Patient Name: Joanna Morales  MRN: [id number]  Admission Date: 6/7/2017  Length of Stay: 19 days  Collaborating Physician : Marek Mclain MD    Subjective:     Interval History (06/26/2017):  Patient is seen for follow-up rehab evaluation and recommendations.  On 6/24, patient developed encephalopathy, became severely agitated and anxious.  Haldol was administered, sitters were placed for SV and restraints were placed 2/2 attempted tube displacements.  Sodium has decreased to 140. Participating with therapy.   Barriers for discharge/rehab admission: Insurance approval pending for rehab admission.      HPI, Past Medical, Surgical, Family, and Social History remains the same as documented in the initial encounter.    Principal Problem:Meningioma, recurrent of brain    Hospital Course:   6/13/17: Evaluated by therapy. Bed mobility Judy-MaxA.  Sit to stand Judy.  Unable to assess ambulation. LBD TotalA.  6/161/7: SLP evaluation. Cognitive-linguistic impairments noted with dysphagia. Dental soft and nectar thick liquids.   6/20/17: Participating with OT therapy. Bed mobility Judy-ModA & RW.  Sit to stand Judy & RW.  Ambulation-4 small side steps with ModA and RW..   6/21/17: Participating with therapy. Bed mobility Judy-ModA.  Sit to stand Judy & RW.  Ambulated side step with Judy & RW and 4 steps with CGA.  UBD MaxA.  6/22/17: Participating with therapy. Bed mobility Judy-MaxA.  Sit to stand Judy.  Marched in place BLE x 10 reps with ModA.  UBD ModA-MaxA.   6/22/17: SLP diet recommendation dental soft and nectar thick. Dysphagia and cognitive-linguistic impairments noted.   6/25/17: Participated with therapy;however, had difficulty following commands. Bed mobility ModA-MaxA.  Sit to stand Judy  Ambulated attempted lateral L steps, but unsuccessful 2/2 difficulty following commands.          Scheduled Medications:    amitriptyline  100 mg  Oral Nightly    bisacodyl  10 mg Rectal Daily    gabapentin  600 mg Oral TID    heparin (porcine)  5,000 Units Subcutaneous Q8H    hydrocortisone  10 mg Oral QAM    levothyroxine  50 mcg Oral Before breakfast    nicotine  1 patch Transdermal Daily    pantoprazole  40 mg Oral BID    polyethylene glycol  17 g Oral Daily    ramelteon  8 mg Oral QHS       PRN Medications: acetaminophen, acetaminophen, acetaminophen, desmopressin, dextrose 50%, dextrose 50%, glucagon (human recombinant), glucose, glucose, hydrALAZINE, metoclopramide HCl, metoclopramide HCl    Review of Systems   Reason unable to perform ROS: 2/2 mental status.     Objective:     Vital Signs (Most Recent):  Temp: 98.4 °F (36.9 °C) (06/26/17 0800)  Pulse: 93 (06/26/17 0800)  Resp: 16 (06/26/17 0800)  BP: 135/84 (06/26/17 0800)  SpO2: 96 % (06/26/17 0800)    Vital Signs (24h Range):  Temp:  [97.5 °F (36.4 °C)-98.4 °F (36.9 °C)] 98.4 °F (36.9 °C)  Pulse:  [] 93  Resp:  [16-17] 16  SpO2:  [96 %-100 %] 96 %  BP: ()/(66-84) 135/84     Physical Exam   Constitutional: She appears well-developed and well-nourished.   HENT:   Sutures noted throughout scalp   Eyes: Pupils are equal, round, and reactive to light.   Neck: Normal range of motion.   Cardiovascular: Normal rate and regular rhythm.    Pulmonary/Chest: Effort normal. No respiratory distress.   Abdominal: Soft. She exhibits no distension.   Musculoskeletal: Normal range of motion.   Neurological:   Neurologic:  -  Mental Status:  Somnolent, but awakens to voice.  Oriented to person.  Some difficulty following commands.  -  Speech and language:  + aphasia  -  Coordination:  Finger to nose exam:  RUE dysmetria, LUE dysmetria.  Heel to shin:  RLE abnormal, LLE abnormal.   -  Motor:  RUE: 4/5.  LUE: 4/5.  RLE: 5/5.  LLE: 5/5.   -pronator drift. Unable to assess due to patient participation.   -  Tone:  normal  -  Sensory:  Intact to light touch and pin prick.   Skin: Skin is warm and dry.    Psychiatric: Her behavior is normal. Cognition and memory are impaired.   Flat affect       Diagnostic Results:   Labs: reviewed  CT: Reviewed  MRI: Reviewed  EEG:reviewed  Assessment/Plan:      S/P craniotomy    -NSY planning to removal staples on POD21  -see Meningioma         Restlessness and agitation    -encephalopathy throughout admission   -EEG negative for seizures  -Haldol PRN         Pneumocephalus    -repeat CHT showed improving         Slurred speech    -SLP evaluate and treat         Diabetes insipidus    -Endocrine following         * Meningioma, recurrent of brain    -s/p L frontotemporal craniotomy and excision of meningioma on 6/7/17     · Monitor sleep disturbances and establish consistent sleep-wake cycle (lights on and shades open during day and lights dim/off at night).   · Promote environmental modifications to limit agitation and confusion (appropriate lighting, family at bedside, visible clock and calendar, updated white board, reduce noise, limited visitors, clustered nursing care).  · Monitor for bowel and bladder dysfunction.   · Encourage mobility, OOB in chair at least 3 hours per day, and ambulation.  · Reorient patient to person, place, time, and situation on each encounter.   · PT/OT evaluate and treat.  · SLP speech and cognitive evaluate and treat.  · If possible, avoid restraints.  May benefit from 24/7 supervision by a sitter.   · Avoid/limit medications that may worsen delirium.  Such as benzodiazepines, antihistamines, anticholinergics, hypnotics, and opiates.                Patient approved for [facility].  Insurance pending admission.  Transfer to rehab when insurance clears and she is medically ready for discharge.      Thank you for your consult.     Quita Altamirano NP  Department of Physical Medicine & Rehab   [facility]

## 2017-06-26 NOTE — CARE UPDATE
Arrived to unit per  with [facility] escort.  Total transfer per 2 staff from  to bed.  Pt lethargic.  Eyes closed.  At times, opens eyes on command.  At other times, non responsive, but mild sternal rub eliicits response.  Answers some (short 1-2 word answer) questions.  Slurred speech noted.

## 2017-06-26 NOTE — PROGRESS NOTES
"[facility]  Endocrinology  Progress Note    Admit Date: 2017     Reason for Consult: Diabetes insipidus     Surgical Procedure and Date:   CRANIOTOMY 2017        HPI:   Patient is a 51 y.o. female with a diagnosis of  meningioma s/p resection (). 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  She underwent partial resection of the tumor in 2015 at [facility]. Followup scan done in  showing a continued large meningioma.     Endocrinology consulted for post operative DI management.  Per the operative report - The pituitary stalk was identified and could be preserved        Interval HPI:   Overnight events: none  Eatin%  Nausea: No  Hypoglycemia and intervention: No  Fever: No  TPN and/or TF: No  If yes, type of TF/TPN and rate: n/a    /84 (BP Location: Left arm, Patient Position: Sitting, BP Method: Automatic)   Pulse 93   Temp 98.4 °F (36.9 °C) (Oral)   Resp 16   Ht 5' 2" (1.575 m)   Wt 60.1 kg (132 lb 7.9 oz)   SpO2 96%   Breastfeeding? No   BMI 24.23 kg/m²       Labs Reviewed and Include      Recent Labs  Lab 17  0343 17  0751   GLU 87  --    CALCIUM 9.8  --    ALBUMIN 3.2*  --    PROT 7.0  --      140 142   K 3.6  --    CO2 26  --      --    BUN 10  --    CREATININE 0.8  --    ALKPHOS 111  --    ALT 63*  --    AST 49*  --    BILITOT 0.3  --      Lab Results   Component Value Date    WBC 9.06 2017    HGB 12.0 2017    HCT 36.0 (L) 2017    MCV 96 2017     2017       Recent Labs  Lab 17  0357 17  0556   TSH  --  0.472   FREET4 0.85 0.83     No results found for: HGBA1C    Nutritional status:   Body mass index is 24.23 kg/m².  Lab Results   Component Value Date    ALBUMIN 3.2 (L) 2017    ALBUMIN 3.0 (L) 2017    ALBUMIN 3.2 (L) 2017     No results found " for: PREALBUMIN    Estimated Creatinine Clearance: 65.8 mL/min (based on Cr of 0.8).    Accu-Checks  Recent Labs      06/23/17   1153  06/23/17   1750  06/24/17   2346  06/25/17   0627  06/25/17   1205  06/25/17   1801  06/26/17   0037  06/26/17   0530   POCTGLUCOSE  172*  76  114*  86  82  114*  82  95       Current Medications and/or Treatments Impacting Glycemic Control  Immunotherapy:  Immunosuppressants     None        Steroids:   Hormones     Start     Stop Route Frequency Ordered    06/17/17 1712  desmopressin nasal spray 10 mcg      -- Nasl As needed (PRN) 06/17/17 1614    06/12/17 2100  hydrocortisone tablet 5 mg      -- Oral Nightly 06/12/17 1059    06/12/17 1100  hydrocortisone tablet 10 mg      -- Oral Every morning 06/12/17 1059        Pressors:    Autonomic Drugs     None        Hyperglycemia/Diabetes Medications: Antihyperglycemics     None          ASSESSMENT and PLAN    Diabetes insipidus    Post-surgical DI (craniotomy 6/8/2017)    Recommend discontinuing IV fluids  I/O net positive: goal is to match i/o which is difficult given impaired thirst mechanism but at the very least avoid net negative  Administer DDAVP 10mcg prn IF uop > 250 x 2 consecutive hours and serum sodium greater than 145, and urine specific gravity less than 1.005    Recommend decreasing serum sodium Urine specific gravity monitoring q12hrs  Strict I/O     D/c recs: as above               * Meningioma, recurrent of brain    S/p surgery     continue lt4 50mcg daily and will repeat tfts o/p to assess if longterm replacement needed. Low suspicion that she will require long-term levothyroxine replacement    Continue HC 10/5, will evaluate 8am cortisol/acth as o/p now that patient is being discharged to [facility] today    Will schedule f/u in pituitary clinic within 4 weeks post discharge with above labs                  Gerry Sr MD  Endocrinology  [facility]

## 2017-06-27 NOTE — PROGRESS NOTES
"Speech Therapy  Dysphagia Evaluation    Joanna Morales   MRN: [id number]        06/27/17 1200   Speech Time Calculation   Speech Start Time 1200   Speech Stop Time 1210   Speech Total (min) 10 min   General Information   SLP Treatment Date 06/27/17   Referring Physician Dr Fairchild   General Observations Pt seen sitting upright in w/c in dining room with fluctuating alertness.   Pertinent History of Current Problem Past Medical History:   Diagnosis Date    Allergy     Basal cell carcinoma     Depression 11/1/2016    Essential hypertension 6/9/2017    Eye disorder     Fever blister     Normocytic anemia 6/9/2017    Secondary hypothyroidism 6/26/2017      General Precautions aspiration;fall;nectar thick;vision impaired   Visual/Auditory Vision impaired  (L neglect; visual changes)   Current Diet   Current Diet Textures Dental Soft   Current Liquid Consistencies Nectar Thick   Subjective   Patient states Pt stated "I don't feel hungry anymore."   Pain/Comfort   Pain Rating no pain   Assessment   SLP Diagnosis moderate-severe oropharyngeal dysphagia; moderate-severe cognitive-linguistic deficitls   Prognosis Fair   Problem List decreased alertness; visual changes; decreased airway protection; left orofacial weakness; decreased vocal quality; generalized dysphagia weakness   Assessment Pt is a 52 y/o female admitted following L frontotemporal craniotomy 2' meningioma with following encephalopathy within hospitalization. According to pt's son via H&P review, pt living with modified independence prior to admission to hospital.    Pt seen for limited BSE with pt refusing other consistencies offered with fluctuating alertness and appetite. Pt observed with overt left orofacial weakness with anterior loss of secretions and oral pooling of secretions. Oral care performed with mild and delayed gag reflex present. Pt able to perform volitional swallow; however, noted with moderate-severe delay in initiation time with " mild decreased laryngeal elevation palpated. Pt observed with overt s/s of aspiration with iced thin liquids via tsp with delayed moderate cough after swallow. Pt presents with poor coordination and strength of oral and pharyngeal musculature. Pt tolerated nectar thick liquids via cup sip without overt s/s of aspiration x1 only. Pt refused puree consistency and solid consistency offered at this time.     Recommend continued soft diet (2' acute tolerance previously) with NECTAR thick liquids via cup sip and crushed medication in puree consistency.  90' for all oral intake  Feed only when FULLY alert/participating in oral intake  Slow rate  Small bites/sips  NO STRAWS  Alternate solids/liquids  Watch for swallow  Check for pocketing  Multiple swallows  Remain upright for 1 hour after oral intake  1:1 SUPERVISION with all oral intake    Within simple greeting and conversation of BSE pt observed with fluctuating alertness, decreased visual acuity, decreased vocal intensity, dysarthria of speech, and deficits of language and cognition to maintain conversation.  Pt to benefit from skilled ST services for full/complete BSE with all consistencies presented and full cognitive/language evaluation. Suspect pt to benefit from ST services targeting dysphagia, language and cognition for return to baseline status. Pt requires 24 hour supervision at this time.   Level of Motivation/Participation good   Recommendations   Solid Diet Level Dental Soft   Liquid Diet Level Nectar Thick   Additional Recommendations Feed pt only when FULLY alert; slow rate; small bites/sips; alt solids/liquids; check for pocketing; watch for swallow; remain upright 1 hour after meals   Short Term Goals   Goal 1 Pt will complete labial/lingual OME x30 given max verbal/visual cues targeting strength and endurance.   Goal 2 Pt will complete laryngeal elevation/strengthening exercises x30 given max verbal/visual cues targeting airway protection and  coordination/strength of pharyngeal cavity.   Goal 3 Pt to participate in full BSE and cog/ling evaluation for further POC.   Long Term Goals   Goal 1 Pt will complete labial/lingual OME x30 given moderate verbal/visual cues targeting strength and endurance.   Goal 2 Pt will complete laryngeal elevation/strengthening exercises x30 given moderate verbal/visual cues targeting airway protection and coordination/strength of pharyngeal cavity.   Goal 3 Pt will tolerate soft diet with NECTAR thick liquids via cup sip given min verbal cues for utilization of compensatory strategies without overt s/s of aspiration evidenced by cough or vocal changes within meal.   Discharge Recommendations   Discharge Facility/Level Of Care Needs home health speech therapy   Discharge Planning Comments 24 hour supervision at this time   Plan   Plan Plan of care intiated;Dysphagia Therapy;Ongoing swallow assessment to intiate least restrictive diet;Speech Language/Cognitive Therapy;Speech Language/Cognitive Evaluation;Neuromuscular Electrical Stimulation;Patient Education;Family Education   Therapy Frequency Monday-Friday   Plan of Care Expires on 07/27/17   SLP Follow-up   SLP Follow-up? Yes   Treatment/Billable Minutes   Eval Swallow and Oral Function 10   Total Time 10     The patient's spiritual, cultural, social, and educational needs were considered with no evidence of barriers noted, and the patient is agreeable to plan of care.    Kassie Yap CCC-SLP  6/27/2017

## 2017-06-27 NOTE — PROGRESS NOTES
"Occupational Therapy   Evaluation + Treatment    Joanna Morales   MRN: [id number]      06/27/17 0830   OT Time Calculation   OT Start Time 0830   OT Stop Time 1010   OT Total Time (min) 100 min   General   OT Date of Treatment 06/27/17   Chart Reviewed Yes   Diagnosis Closed head injury s/p L frontotemporal craniotomy and excision of meningioma    Additional Pertinent History   Past Medical History:   Diagnosis Date    Allergy     Basal cell carcinoma     Depression 11/1/2016    Essential hypertension 6/9/2017    Eye disorder     Fever blister     Normocytic anemia 6/9/2017    Secondary hypothyroidism 6/26/2017     Past Surgical History:   Procedure Laterality Date    BASAL CELL CARCINOMA EXCISION      forehead    BRAIN SURGERY      SKIN BIOPSY          Date Referred to OT 06/26/17   Referring Physician Monserrat Fairchild MD   Family/Caregiver Present No   Patient Found (position) Supine in bed   Pt found with garcía catheter   Precautions   General Precautions aspiration;aphasia;fall;vision impaired;nectar thick   Visual/Auditory Vision impaired  (severely impaired vision in B eyes)   BADL History   Bed Mobility/Transfers independent   Grooming independent   Bathing independent   Upper Body Dressing independent   Lower Body Dressing independent   Toileting independent   Home Management Skills independent   IADL History   IADL Comments Pt unable to communicate detailed hx, as pt does not clearly express thoughts/concerns and is disoriented to time, place, and situation   Living Environment   Living Environment Comment Pt was unable to communicate details regarding living environment, as pt is fairly lethargic and disoriented and cannot clearly express thoughts/concerns. Per acute notes, pt previously lived in [place], [place] with cousin in either a mobile home or apartment that has 4 stairs to enter, and pt has a RW ans SPC-information to be validiated with family/caregiver.    Subjective   Patient states "I feel " "like I'm in the hospital."   Pain/Comfort   Pain Rating other (see comments)   Pain Addressed Nurse notified   Pain Comment c/o of pain in ears and head   Cognitive Status   Level of Consciousness (AVPU) responds to voice   Arousal Level arouses to voice;arouses to touch/gentle shaking   Orientation disoriented to;place;time;situation   Able to Follow Commands (Communication) mild impairment;success, 2-step commands   Speech delayed responses;aphasic - expressive (can't express thoughts);slurred/dysarthric   Mood/Behavior hypoactive (quiet, withdrawn);restless;cooperative  (fairly lethargic, fidgeting with clothing and garcía catheter)   Range of Motion (ROM)   Range of Motion Examination deficits as listed below   Additional Documentation (partial ROM against gravity in B UEs )   Manual Muscle Testing (MMT)   Manual Muscle Testing Results other (see comments)   Additional Documentation  Pt able to bring hand to mouth with R UE but only partial ROM on L UE, and decreased strength in B UEs as observed in functional activities.   Tone   Right UE  hypotonic   Left UE hypotonic   Observation   Appearance petite female, R gaze preference   Posture Sitting;Posterior pelvic tilt;Standing;Rounded shoulders   Skin intact;warm;dry   Bed Mobility   Bed Mobility yes   Rolling/Turning to Left Contact guard assistance   Rolling/Turning Left Comments with verbal/tactile cues and use of bed rail   Rolling/Turning Right Contact guard assistance   Rolling/Turning Right Comments with verbal/tactile cues and use of bed rail   Scooting/Bridging Contact Guard Assistance   Scooting/Bridging Comments to scoot to EOB   Supine to Sit Contact Guard Assistance   Supine to Sit Comments assist for trunk elevation   Transfers   Transfer yes   Sit to Stand   Sit <> Stand Assistance Contact Guard Assistance   Sit <> Stand Assistive Device No Assistive Device   Trials/Comments pt stood unexpectedly, posterior lean, pt standing on heels   Stand to Sit "   Assistance Contact Guard Assistance   Assistive Device No Assistive Device   Trials/Comments verbal and tactile cues to perform   Bed to Chair   Bed <> Chair Technique Scoot Pivot   Bed <> Chair Transfer Assistance Moderate Assistance   Bed <> Chair Assistive Device No Assistive Device   Trials/Comments assist for hip positioning and to control scoot   Feeding   Feeding Level of Assistance Total assistance   Feeding Where Assessed Wheelchair   Feeding Comments [place] assist to scoop and bring for to mouth with verbal cues to swallow, as pt tends to pocket food on L side   Grooming   Additional Grooming yes   Grooming Level of Assistance Total assistance   Hand Washing Level of Assistance Total assistance   Face Washing Level of Assistance Maximum assistance   Oral Hygeine Level of Assistance Total assistance   Hair Grooming Level of Assistance Total assistance   Grooming Where Assessed Bed level;Wheelchair   Grooming Comments pt unable to comb hair due to limited ROM, poor attention, and required [place] assist for face washing, hand washing, and oral hygeine   Bathing   Bathing Level of Assistance Maximum assistance   Bathing Where Assessed Bed   Bathing Comments Pt able to bathe chest and B LEs below the knee, but required assist to bathe 7/10 body parts  (verbacl and tactile cues throughout,washcloth placed in hand)   UE Dressing   UE Dressing Level of Assistance Total assistance   UE Dressing Where Assessed Bed level   UE Dressing Comments assist to thread B UEs and to pull shirt over head  (pt fidgeting with shirt )   LE Dressing   LE Dressing Yes   LE Dressing  Level of Assistance Total assistance   LE Dressing Level of Assistance Total assistance   Sock Level of Assistance Total assistance   Adult Briefs Level of Assistance Total assistance   LE Dressing Where Assessed Bed level   LE Dressing Comments pt able to doff socks, but required assist to don socks on B LEs, and assist to perform 4/4 tasks for donning  elastic pants   Treatment   Treatment   Comprehension:  - Understands basic 25-49% - Only simple expressions or gestures (waves, hello) (2)    Expression:  - Expresses basic less than 25% of time  (1)    Social Interaction:  - Interacts appropriately less than 25% of time - May be withdrawn or combative  (1)    Problem Solving:  - Solves basic problems less than 25% of time - Needs direction nearly all the time or does not effectively solve problems and my need a restraint for safety. Bed alarm on at all times.  (1)  Memory:  - Recognizes, recalls, or executes 1 step request less than 25% of time  (1)     Activity Tolerance   Activity Tolerance Patient tolerated treatment well;Treatment limited secondary to medical complications (Comment)   Medical Staff Made Aware NSG/MD   After Treatment   Patient Position After Treatment Seated in wheelchair   Patient after treatment left nursing notified  (In dining room with safety belt in place)   Assessment   Prognosis Fair   Problem List Decreased Self Care skills;Decreased upper extremity range of motion;Decreased upper extremity strength;Decreased safe judgment during ADL;Decreased cognition;Decreased endurance;Visual deficit;Decreased fine motor control;Decreased functional mobility;Decreased gross motor control;Decreased sensation;Decreased IADLs;Decreased Function of left upper extremity;Decreased Function of right upper extremity;Decreased trunk control for functional activities   Assessment Pt with decreased alertness in session, which improved slightly once sitting upright, but as session continued pt became more drowsy and inattentive to tasks. Pt required significant assist to complete ADLs, with HOHA, max verbal cues, and tactile cues to complete. Pt also limited by visual deficits, L inattention, and weakness on L side. Anticipate slow progress during rehab stay pending improved arousal.   Level of Motivation/Participation Good   Short Term Goals   Additional  Documentation yes   Time For Goal Achievement 7 days   Pt Will Perform Supine To Sit New goal;With stand by assist   Supine to Sit - Met/Not Met Not Met   Pt Will Perform Sit to Supine New goal;With stand by assist   Supine to Sit - Met/Not Met Not Met   Pt Will Transfer Sit to Stand New goal;With stand by assist   Transfer Sit to stand - Met/Not Met Not Met   Pt Will Transfer Bed/Chair New goal;Scoot Pivot;With contact guard assistance;Stand Pivot   Transfer Bed/Chair - Met/Not Met Not Met   Pt Will Transfer To Bedside Commode New goal;With minimal assist   Transfer Bedside Commode -Met/Not Met Not Met   Pt Will Transfer To Shower New goal;With tub seat;With mod assist   Transfer to Shower-Met/Not Met Not Met   Pt Will Perform Eating New goal;With maximum assistance   Eating - Met/Not Met Not Met   Pt Will Perform Grooming New goal;At edge of bed;In wheelchair;With moderate assistance   Grooming - Met/Not Met Not Met   Pt Will Perform Bathing New goal;At edge of bed;With moderate assistance   Bathing - Met/Not Met Not Met   Pt Will Perform UE Dressing New goal;With moderate assistance;At edge of bed;In wheelchair   UE Dressing - Met/Not Met Not Met   Pt Will Perform LE Dressing New goal;At edge of bed;In wheelchair;With maximum assistance   LE Dressing - Met/Not Met Not Met   Pt Will Perform Toileting New goal;With bedside commode;With maximum assistance   Toileting-Met/Not Met Not Met   Long Term Goals   Additional Documentation yes   Time For Goal Achievement 2 weeks   Pt Will Perform Supine To Sit New goal;Supervision   Supine to Sit - Met/Not Met Not Met   Pt Will Perform Sit to Supine New goal;Supervision   Supine to Sit - Met/Not Met Not Met   Pt Will Transfer Sit to Stand New goal;Supervision   Transfer Sit to stand - Met/Not Met Not Met   Pt Will Transfer Bed/Chair New goal;Stand Pivot;Scoot Pivot;With stand by assist   Transfer Bed/Chair - Met/Not Met Not Met   Pt Will Transfer To Bedside Commode New  goal;With contact guard assist   Transfer Bedside Commode -Met/Not Met Not Met   Pt Will Transfer To Shower New goal;With tub seat;With minimal assist   Transfer to Shower-Met/Not Met Not Met   Pt Will Perform Eating New goal;With moderate assistance   Eating - Met/Not Met Not Met   Pt Will Perform Grooming New goal;With minimal assistance;At edge of bed;In wheelchair   Grooming - Met/Not Met Not Met   Pt Will Perform Bathing New goal;At edge of bed;With minimal assistance   Bathing - Met/Not Met Not Met   Pt Will Perform UE Dressing New goal;At edge of bed;In wheelchair;With minimal assistance   UE Dressing - Met/Not Met Not Met   Pt Will Perform LE Dressing New goal;At edge of bed;In wheelchair;With moderate assistance   LE Dressing - Met/Not Met Not Met   Pt Will Perform Toileting New goal;With bedside commode;With moderate assistance   Toileting-Met/Not Met Not Met   Discharge Recommendations   Equipment Needed After Discharge 3-in-1 commode;bath bench;walker, rolling;wheelchair   Discharge Facility/Level Of Care Needs home health OT   Plan   Plan Self care retraining;Functional transfer training;UE thereex;Equipment Training;Family training;Safety training;Fine motor training;Functional endurance training;Patient education;Postural control;Cognitive Retraining;Community re-training;AROM;PROM;AAROM;Neuromuscular Re-education;Functional Standing Activities   Therapy Frequency 2 times/day;Monday-Friday;Saturday or Sunday   Occupational Therapy Follow-up   OT Follow-up? Yes   Treatment/Billable Minutes   Evaluation 60   Self Care/Home Management 40   Total Time 100     I certify that I was present in the room directing the student in service delivery and guiding them using my skilled judgment. As the co-signing therapist, I have reviewed the student's documentation and am responsible for the treatment, assessment and plan.    ABILIO Edgar  6/27/2017

## 2017-06-27 NOTE — CONSULTS
"  [facility]  Adult Nutrition  Consult Note    SUMMARY     Recommendations    Recommendation/Intervention: Continue dental soft diet, Recommend Boost Plus tid for po < 50%, RD to follow, encourage po intake  Goals: PO > 80%  Nutrition Goal Status: new       Continuum of Care Plan    Referral to Outpatient Services:  (-)    Reason for Assessment    Reason for Assessment: physician consult  Diagnosis:  ([facility])  Relevent Medical History: Hypothyroidism, HTN    General Information Comments: poor po per record  Nutrition Discharge Planning: Pt to be discharged on regular diet with texture per speech therapy    Nutrition Prescription Ordered    Current Diet Order: Dental soft nectar thick liquids   Nutrition Order Comments: no straw         Oral Nutrition Supplement:  (-)     Evaluation of Received Nutrients/Fluid Intake    Energy Calories Required: not meeting needs        Protein Required: not meeting needs      Fluid Required: not meeting needs     Tolerance: tolerating  % Intake of Estimated Energy Needs: 25 - 50 %  % Meal Intake: 50%     Nutrition Risk Screen     Nutrition Risk Screen: no indicators present    Nutrition/Diet History    Patient Reported Diet/Restrictions/Preferences: general              Factors Affecting Nutritional Intake: decreased appetite                Labs/Tests/Procedures/Meds       Pertinent Labs Reviewed: reviewed, pertinent  Pertinent Labs Comments: PAB 26  Pertinent Medications Reviewed: reviewed, pertinent  Pertinent Medications Comments: nicotine, pantoprazole, hydrocortisone, heparin    Physical Findings    Overall Physical Appearance: nourished  Tubes:  (-)     Skin: incision    Anthropometrics    Temp: 98.7 °F (37.1 °C)     Height: 5' 2" (157.5 cm)  Weight Method: Bed Scale  Weight: 53.7 kg (118 lb 6.2 oz)     Ideal Body Weight (IBW), Female: 110 lb     % Ideal Body Weight, Female (lb): 107.63 lb  BMI (Calculated): 21.7  BMI Grade: 18.5-24.9 - normal  Weight Loss: " other (see comments)                         Estimated/Assessed Needs    Weight Used For Calorie Calculations: 60.1 kg (132 lb 7.9 oz)   Height (cm): 157.5 cm  Energy Calorie Requirements (kcal): 1500 (1169 x 1.3= 1500)   RMR (Seco-St. Jeor Equation): 1169.25     Weight Used For Protein Calculations: 54 kg (119 lb 0.8 oz)  Protein Requirements: 65 (x 1.2)       Fluid Need Method: RDA Method   RDA Method (mL): 1500       Assessment and Plan  Nutrition Problem  Inadequate oral food and beverage intake    Related to (etiology):   Swallowing difficulty    Signs and Symptoms (as evidenced by):   Decline in po intake compared to usual appetite, < 50%> 5days  Interventions/Recommendations (treatment strategy):  Oral supplement of choice, consider boost plus tid    Nutrition Diagnosis Status:   New      Monitor and Evaluation    Food and Nutrient Intake: energy intake  Food and Nutrient Adminstration: diet order     Physical Activity and Function: nutrition-related ADLs and IADLs  Anthropometric Measurements: weight change  Biochemical Data, Medical Tests and Procedures: electrolyte and renal panel     Nutrition Risk    Level of Risk:  (follow weekly)    Nutrition Follow-Up    RD Follow-up?: Yes

## 2017-06-27 NOTE — PLAN OF CARE
Problem: Patient Care Overview  Goal: Plan of Care Review  Outcome: Ongoing (interventions implemented as appropriate)  Pt care plan updated and individualized as needed and progress continues.  See associated flowsheets for relevant documentation. Frequent rounds made per protocol to maintain pt safety and meet pt needs.  Continuing to monitor and follow up as needed.  [employer] camera in use and pt near nurse station for increased pt safety.    Problem: Fall Risk (Adult)  Goal: Absence of Falls  Patient will demonstrate the desired outcomes by discharge/transition of care.   Outcome: Ongoing (interventions implemented as appropriate)  Pt remains free from falls or trauma thus far this shift.      Problem: Pressure Ulcer Risk (Adam Scale) (Adult,Obstetrics,Pediatric)  Goal: Skin Integrity  Patient will demonstrate the desired outcomes by discharge/transition of care.   Outcome: Ongoing (interventions implemented as appropriate)  Pt turned and repositioned as needed to maintain skin integrity.  No breakdown noted.

## 2017-06-27 NOTE — DISCHARGE SUMMARY
[facility]  Neurosurgery  Discharge Summary      Patient Name: Joanna Morales  MRN: [id number]  Admission Date: 6/7/2017  Hospital Length of Stay: 19 days  Discharge Date and Time: 6/26/2017  2:11 PM  Attending Physician: No att. providers found   Discharging Provider: Mayelin Alaniz PA-C  Primary Care Provider: Claudy Tse MD     HPI: This 51-year-old lady presented with visual loss in 2015 and was found to have a large subfrontal and tuberculum sellae meningioma.  She underwent a right frontotemporal craniotomy at [facility] with partial removal of the tumor.  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  An Ophthalmology examination in February 2017 showed her able to count fingers at 3 feet.  Other   than the severe visual loss, she is otherwise neurologically intact.  Recent MRI shows a very large meningioma on the planum sphenoidale coming down the tuberculum sellae and extending back over the clivus into the perimesencephalic area.  After considerable discussion, it was decided to proceed with resection   of this tumor with help to be able to preserve some vision.    Procedure(s) (LRB):  CRANIOTOMY WITH STEALTH (N/A)     Hospital Course:   6/7: to OR for L frontotemporal crani  6/8: R eye blindness postop  6/9: Endocrone consulted by [facility]. DDAVP given x1.   6/10: continued ICU care. CT head yesterday stable.   6/12: NAEON  6/15: Pt lethargic overnight prompting CT head which was stable.  6/16: Mental status continues to wax/wane.  CT head overnight which was stable from prior.  6/17: TTF  6/19: Pt required 1 dose of DDAVP this afternoon.    6/20: [facility] denied.  [facility] consulted.   6/21: Increased Na and UO overnight.  Pt required DDAVP x 1 this AM.   6/22: Na - 157, 154 today.  UO and SG are stable.  DDAVP Scheduled QHS.   6/23: Na trending down, 151 today.  6/26: Na 140, 142. DC to [facility].      Consults:   Consults         Status Ordering Provider     Inpatient consult to Endocrinology  Once     Provider:  (Not yet assigned)    Completed MATEO RODRIGUEZ     Inpatient consult to Physical Medicine Rehab  Once     Provider:  (Not yet assigned)    Completed HIRA ORDONEZ          Significant Diagnostic Studies: As above    Pending Diagnostic Studies:     Procedure Component Value Units Date/Time    Basic metabolic panel [[id number]] Collected:  06/07/17 1846    Order Status:  Sent Lab Status:  In process Updated:  06/07/17 1846    Specimen:  Blood from Blood     Narrative:       Draw on arrival to ICU    CBC auto differential [[id number]] Collected:  06/07/17 1846    Order Status:  Sent Lab Status:  In process Updated:  06/07/17 1846    Specimen:  Blood from Blood     Narrative:       Draw on arrival to ICU    Osmolality [[id number]] Collected:  06/09/17 1556    Order Status:  Sent Lab Status:  In process Updated:  06/09/17 1557    Specimen:  Blood from Blood     Sodium [[id number]] Collected:  06/20/17 1036    Order Status:  Sent Lab Status:  In process Updated:  06/20/17 1037    Specimen:  Blood from Blood     Narrative:       Collection has been rescheduled by ALWLaury at 6/20/2017 09:45 Reason:   Patient off unit will return to collect at later time    Sodium [[id number]] Collected:  06/15/17 0323    Order Status:  Sent Lab Status:  In process Updated:  06/15/17 0324    Specimen:  Blood from Blood     Sodium [[id number]] Collected:  06/13/17 2326    Order Status:  Sent Lab Status:  In process Updated:  06/13/17 2326    Specimen:  Blood from Blood     Sodium [[id number]] Collected:  06/13/17 0052    Order Status:  Sent Lab Status:  In process Updated:  06/13/17 0052    Specimen:  Blood from Blood     Sodium [[id number]] Collected:  06/11/17 0506    Order Status:  Sent Lab Status:  In process Updated:  06/11/17 0507    Specimen:  Blood from Blood     Sodium [[id number]] Collected:  06/11/17 0506    Order  Status:  Sent Lab Status:  In process Updated:  06/11/17 0507    Specimen:  Blood from Blood     Urinalysis [[id number]] Collected:  06/21/17 1017    Order Status:  Sent Lab Status:  In process Updated:  06/21/17 1018    Specimen:  Urine     Urinalysis [[id number]] Collected:  06/14/17 1112    Order Status:  Sent Lab Status:  In process Updated:  06/14/17 1139    Specimen:  Urine     Urinalysis [[id number]] Collected:  06/13/17 1059    Order Status:  Sent Lab Status:  In process Updated:  06/13/17 1100    Specimen:  Urine     Urinalysis [[id number]] Collected:  06/11/17 1152    Order Status:  Sent Lab Status:  In process Updated:  06/11/17 1152    Specimen:  Urine     Urinalysis [[id number]] Collected:  06/11/17 1151    Order Status:  Sent Lab Status:  In process Updated:  06/11/17 1152    Specimen:  Urine     Urinalysis [[id number]] Collected:  06/08/17 1615    Order Status:  Sent Lab Status:  In process Updated:  06/08/17 1615    Specimen:  Urine         Final Active Diagnoses:    Diagnosis Date Noted POA    PRINCIPAL PROBLEM:  Meningioma, recurrent of brain [D32.0] 06/07/2017 Yes    S/P craniotomy [Z98.890] 06/10/2017 Not Applicable    Adverse effect of glucocorticoid or synthetic analogue [T38.0X5A] 06/10/2017 No    Restlessness and agitation [R45.1] 06/09/2017 Unknown    Vision loss of right eye [H54.61] 06/09/2017 Unknown    Essential hypertension [I10] 06/09/2017 Unknown    Leukocytosis [D72.829] 06/09/2017 Unknown    Normocytic anemia [D64.9] 06/09/2017 Unknown    Diabetes insipidus [E23.2] 06/08/2017 No    Shock [R57.9] 06/08/2017 No    Slurred speech [R47.81] 06/08/2017 Yes    Agitation [R45.1] 06/08/2017 Yes    Brain compression [G93.5] 06/08/2017 Yes    Pneumocephalus [G93.89] 06/08/2017 No    Respiratory insufficiency [R06.89] 06/07/2017 Unknown    Depression [F32.9] 11/01/2016 Yes      Problems Resolved During this Admission:    Diagnosis Date Noted Date Resolved POA      Discharged  Condition: fair    Disposition: Rehab Facility    Follow Up:    Patient Instructions:   No discharge procedures on file.  Medications:  Reconciled Home Medications:   Discharge Medication List as of 6/26/2017  2:12 PM      CONTINUE these medications which have NOT CHANGED    Details   amitriptyline (ELAVIL) 100 MG tablet Take 1 tablet (100 mg total) by mouth nightly., Starting 5/1/2017, Until Discontinued, Normal      EUCALYPTUS OIL/MENTHOL/CAMPHOR (VICKS VAPORUB TOP) Apply topically daily as needed., Until Discontinued, Historical Med      gabapentin (NEURONTIN) 300 MG capsule Take 1 capsule (300 mg total) by mouth 3 (three) times daily., Starting 12/14/2016, Until Thu 12/14/17, Normal      hydrocodone-acetaminophen 5-325mg (NORCO) 5-325 mg per tablet Take 1 tablet by mouth every 12 (twelve) hours as needed (severe pain)., Starting 5/1/2017, Until Discontinued, Shelia Alaniz PA-C  Neurosurgery  [facility]

## 2017-06-27 NOTE — PROGRESS NOTES
[facility]  Neurosurgery  Progress Note    Subjective:     History of Present Illness:  51-year-old lady who presented to 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  She underwent operation in January 2015 on the [facility] service at [facility].  It   was apparently only possible to get a partial resection of the tumor.  She did   have a followup scan done in 2016 showing a continued large meningioma in this   area.  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  She had a   followup MRI scan done at [facility] on 01/20/17, showing this large tumor.  She   was seen by you in Ophthalmology and then referred to Neurosurgery for further   evaluation.  At this point, she has some preserved vision in the right eye.  She   complains of constant headaches.  She has no sense of smell.  She feels off   balance.  She is taking Neurontin apparently for seizure prevention. Past   medical history relates primarily to the present illness.  She does not have   chronic medical illnesses such as diabetes or hypertension.  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  She is  and has good family support.     On physical examination, she is a well-developed, well-nourished white lady, who   is alert and oriented.  Examination of the head shows a somewhat anterior, but   well-healed bicoronal incision.  She says that this is a little tender in the   left frontal area.  Eyes show full extraocular movements.  The pupils are small,   the left is not reactive.  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  The right optic nerve is probably normal.  She   sees things as doubles.  When I held up two  fingers she counted four, although   she knew that there were two.  Hearing seems preserved.  The neck is supple.  On   neurological examination, she is speaking clearly.  She is obviously anxious   and depressed over her illness.  Finger-to-nose is somewhat diminished on the   left side and gait was mildly unsteady.  Cranial nerves are otherwise intact.    She has normal facial sensation and movement.  The tongue protrudes in the   midline.  Strength in the extremities seems generally good, sensation normal and   deep tendon reflexes symmetrical.     MRI of the brain performed at [facility] on 17, shows a   previous bifrontal craniotomy.  There is a large lobular meningioma, which   occupies the sella, planum sphenoidale and extends back on to the clivus.  The   optic chiasm and optic nerves are not visualized.  The carotid arteries and   anterior cerebral arteries run done through the tumor.  The tumor elevates and   pushes the third ventricle back, but there is no hydrocephalus    Post-Op Info:  Procedure(s) (LRB):  CRANIOTOMY WITH STEALTH (N/A)   20 Days Post-Op     Interval History: NAEON. Awake, interactive this AM. Na improved. Accepted to rehab.    Medications:  Continuous Infusions:  Scheduled Meds:  PRN Meds:     Review of Systems  Objective:     Weight: 60.1 kg (132 lb 7.9 oz)  Body mass index is 24.23 kg/m².  Vital Signs (Most Recent):  Temp: 99.6 °F (37.6 °C) (17 1200)  Pulse: (!) 115 (17 1200)  Resp: 17 (17 1200)  BP: 105/72 (17 1200)  SpO2: 96 % (17 1200) Vital Signs (24h Range):  Temp:  [98.4 °F (36.9 °C)-99.6 °F (37.6 °C)] 98.5 °F (36.9 °C)  Pulse:  [] 95  Resp:  [16-18] 16  SpO2:  [90 %-96 %] 94 %  BP: (105-135)/(72-84) 126/76              Temp (24hrs), Av.8 °F (37.1 °C), Min:98.4 °F (36.9 °C), Max:99.6 °F (37.6 °C)           Date 17 0700 - 17 0659(Discharged)   Shift 3173-3076 5423-1454 3406-1417 24 Hour Total  "  I  N  T  A  K  E   Shift Total  (mL/kg)       O  U  T  P  U  T   Urine  (mL/kg/hr) 1000  (2.1)   1000  (0.7)    Shift Total  (mL/kg) 1000  (16.6)   1000  (16.6)   Weight (kg) 60.1 60.1 60.1 60.1          Urethral Catheter 06/15/17 1205 Latex (Active)   Site Assessment Clean;Intact 6/26/2017  7:00 PM   Collection Container Urimeter 6/26/2017  7:00 PM   Securement Method secured to top of thigh w/ adhesive device 6/26/2017  7:00 PM   Catheter Care Performed yes 6/26/2017  7:00 PM   Reason for Continuing Urinary Catheterization Chronic Indwelling Urinary Catheter on Admission 6/26/2017  7:00 PM   CAUTI Prevention Bundle StatLock in place w 1" slack;Intact seal between catheter & drainage tubing;Drainage bag off the floor;Green sheeting clip in use;No dependent loops or kinks;Drainage bag not overfilled (<2/3 full);Drainage bag below bladder 6/26/2017  7:00 PM   Output (mL) 350 mL 6/27/2017  5:00 AM       Neurosurgery Physical Exam    Significant Labs:    Recent Labs  Lab 06/26/17  0343 06/26/17  0751   GLU 87  --      140 142   K 3.6  --      --    CO2 26  --    BUN 10  --    CREATININE 0.8  --    CALCIUM 9.8  --    MG 1.9  --        Recent Labs  Lab 06/26/17  0343   WBC 9.06   HGB 12.0   HCT 36.0*          Recent Labs  Lab 06/26/17 0343   INR 1.0   APTT 58.5*     Microbiology Results (last 7 days)     Procedure Component Value Units Date/Time    Blood culture [[id number]] Collected:  06/14/17 1517    Order Status:  Completed Specimen:  Blood from Peripheral, Antecubital, Left Updated:  06/19/17 2012     Blood Culture, Routine No growth after 5 days.    Narrative:       Blood cultures x 2 different sites. 4 bottles total. Please  draw cultures before administering antibiotics.    Blood culture [[id number]] Collected:  06/14/17 1156    Order Status:  Completed Specimen:  Blood from Peripheral, Antecubital, Left Updated:  06/19/17 1412     Blood Culture, Routine No growth after 5 days.    Narrative:  "      Blood cultures from 2 different sites. 4 bottles total.  Please draw before starting antibiotics.            Significant Diagnostics:  None new.    Assessment/Plan:     * Meningioma, recurrent of brain    52 y/o F s/p crani resection olfactory groove meningioma POD#18.  - Pt is neurologically stable  - Appreciate endocrine recs for DI: Na trending down, ddAVP only PRN.    Can give extra prn DDAVP:   Parameters to give extra DDAVP IF uop > 250 x 2 consecutive hours AND Spec grav <1.005 AND Serum Sodium > 150.  Check sodium and sp. Gravity Q12h   - FT4, low normal.  Started synthroid 50 mcg daily.   - Continue cortef 10 q am, 5 q pm.    - On dental soft diet.   - DVT ppx- ANA/SCD's. GI ppx. Sqh.   - Continue daily PT/OT/ST  - Staples removed without complication.   - [facility] accepted.              DC to [facility]. F/u as scheduled.    Mayelin Alaniz PA-C  Neurosurgery  [facility]

## 2017-06-27 NOTE — SUBJECTIVE & OBJECTIVE
"Interval History: NAEON. Awake, interactive this AM. Na improved. Accepted to rehab.    Medications:  Continuous Infusions:  Scheduled Meds:  PRN Meds:     Review of Systems  Objective:     Weight: 60.1 kg (132 lb 7.9 oz)  Body mass index is 24.23 kg/m².  Vital Signs (Most Recent):  Temp: 99.6 °F (37.6 °C) (17 1200)  Pulse: (!) 115 (17 1200)  Resp: 17 (17 1200)  BP: 105/72 (17 1200)  SpO2: 96 % (17 1200) Vital Signs (24h Range):  Temp:  [98.4 °F (36.9 °C)-99.6 °F (37.6 °C)] 98.5 °F (36.9 °C)  Pulse:  [] 95  Resp:  [16-18] 16  SpO2:  [90 %-96 %] 94 %  BP: (105-135)/(72-84) 126/76              Temp (24hrs), Av.8 °F (37.1 °C), Min:98.4 °F (36.9 °C), Max:99.6 °F (37.6 °C)           Date 17 0700 - 17 0659(Discharged)   Shift 7342-6981 1766-9319 9886-7244 24 Hour Total   I  N  T  A  K  E   Shift Total  (mL/kg)       O  U  T  P  U  T   Urine  (mL/kg/hr) 1000  (2.1)   1000  (0.7)    Shift Total  (mL/kg) 1000  (16.6)   1000  (16.6)   Weight (kg) 60.1 60.1 60.1 60.1          Urethral Catheter 06/15/17 1205 Latex (Active)   Site Assessment Clean;Intact 2017  7:00 PM   Collection Container Urimeter 2017  7:00 PM   Securement Method secured to top of thigh w/ adhesive device 2017  7:00 PM   Catheter Care Performed yes 2017  7:00 PM   Reason for Continuing Urinary Catheterization Chronic Indwelling Urinary Catheter on Admission 2017  7:00 PM   CAUTI Prevention Bundle StatLock in place w 1" slack;Intact seal between catheter & drainage tubing;Drainage bag off the floor;Green sheeting clip in use;No dependent loops or kinks;Drainage bag not overfilled (<2/3 full);Drainage bag below bladder 2017  7:00 PM   Output (mL) 350 mL 2017  5:00 AM       Neurosurgery Physical Exam    Significant Labs:    Recent Labs  Lab 17  0343 17  0751   GLU 87  --      140 142   K 3.6  --      --    CO2 26  --    BUN 10  --    CREATININE 0.8  -- "    CALCIUM 9.8  --    MG 1.9  --        Recent Labs  Lab 06/26/17  0343   WBC 9.06   HGB 12.0   HCT 36.0*          Recent Labs  Lab 06/26/17  0343   INR 1.0   APTT 58.5*     Microbiology Results (last 7 days)     Procedure Component Value Units Date/Time    Blood culture [[id number]] Collected:  06/14/17 1517    Order Status:  Completed Specimen:  Blood from Peripheral, Antecubital, Left Updated:  06/19/17 2012     Blood Culture, Routine No growth after 5 days.    Narrative:       Blood cultures x 2 different sites. 4 bottles total. Please  draw cultures before administering antibiotics.    Blood culture [[id number]] Collected:  06/14/17 1156    Order Status:  Completed Specimen:  Blood from Peripheral, Antecubital, Left Updated:  06/19/17 1412     Blood Culture, Routine No growth after 5 days.    Narrative:       Blood cultures from 2 different sites. 4 bottles total.  Please draw before starting antibiotics.            Significant Diagnostics:  None new.

## 2017-06-27 NOTE — PLAN OF CARE
Problem: Patient Care Overview  Goal: Plan of Care Review  Outcome: Ongoing (interventions implemented as appropriate)  Recommendations     Recommendation/Intervention: Continue dental soft diet, Recommend Boost Plus tid for po < 50%, RD to follow, encourage po intake  Goals: PO > 80%  Nutrition Goal Status: new

## 2017-06-27 NOTE — PROGRESS NOTES
Physical Therapy  Admission FIM scores( PT)    Joanna Arredondo Authement   MRN: [id number]                06/27/17 1239   Transfers   Bed/Chair/WC 1   Toilet 1   Tub 1   Locomotion   Distance Walked 1   Distance Wheelchair 1   Walk 1   Wheelchair 1   Mode C   Stairs 1           Daryl Taylor, PT 6/27/2017

## 2017-06-27 NOTE — NURSING
BLADDER  [] Pt. uses toilet (7)  [] Pt. is on dialysis - does not urinate (7)  [] Pt. uses bedside commode (5)  []Pt. uses bedpan - staff does___% (includes rolling on/off, holding bedpan in place                                                                   and emptying pan)   [] Pt. uses urinal - staff empties                          []   Pt. needs help with positioning the urinal (4)                          []   Pt. needs help holding the urinal (2)  [x]  Pt. has garcía catheter/suprapubic catheter - staff empties (1)  []  Pt. Is on ICP program:                           []  Pt. does catheterization (6)                           [] Staff does catheterization (1)  [] Pt. Is incontinent - staff does ______% of tasks  Number of accidents during this shift ____    BOWEL  [] Pt. uses toilet (7)  [] Pt. uses bedside commode (5)  [] Pt. uses bedpan - staff does ______% of task  [] Pt. is on bowel program::                         []   Pt. inserts suppository (6)                         []   Nurse inserts suppository (4)                         []  Nurse does dig. stim. (1)  [] Pt. has colostomy - staff maintains care and empties (1)                       Pt. does ____% of care  [] Pt. is incontinent - staff does ____% of tasks  [] No bowel movement during this shift  [] Number of accidents during this shift ____    EATING  [] Pt. opens packages, cuts food, pours liquids, and feeds self, regular consistency (7)  [] Pt. needs dentures, swallow technique, special foods or drink consistency (6)  [] Pt. needs supervision (cueing), setup (open containers, cut food, etc.) (5)  []Pt. needs assist with occasional scooping, or checking for food pocketing (75-90%) (4)  []Pt. needs assist to lead each bite on utensils, patient brings food to mouth (50-74%)(3)  [x]Pt. needs assist to scoop, bring food to mouth (hand over hand) (25-49%) (2)  [x]AxillaryPt. Is receiving IV fluids for hydration or tube feeding  (1)    GROOMING  []Pt. performs all tasks independently and safely (7)  []Pt. obtains all articles, needs adaptive/assistive device (wash mitt, etc.) (6)  []Pt. needs supervision (cueing), setup (5)  []Pt. doing 3 of 4 or 4 of 5 tasks, needs assist to put in or remove dentures, steadying (Patient doing 75-90%) (4)  []Pt. doing 2 of 4 or 3 of 5 tasks (3)  []Pt. doing 1 of 4 of 2 of 5 tasks (25-49%) (2)  []Pt. doing 0 of tasks, needs assistance of 2 helpers (1)  [x]Activity occurred with OT    BATHING  []Pt. safety bathes whole body (10 parts of 10) (7)  []Pt. doing 10 of 10 body parts, uses adaptive devices (wash mitt, etc.) (6)  []Pt. doing 10 of 10 body parts or needs supervision or setup (5)  []Pt. doing 8-9 of 10 body parts (75-99%) (4)  []Pt. doing 5-7 of 10 body parts (50-74%) (3)  []Pt. doing 3-4 of 10 body parts (25-49%( (2)  [x]Pt. doing 0-2 of 10 body parts (0-24%0 (1)  []Activity occurred with OT    DRESSING UPPER BODY  []Pt. dresses or undresses independently, obtaining clothes from storage area (7)  []Pt. needs adaptive devices (6)  []Pt. needs supervision (cueing), setup (5)  []Pt. doing 75-99% (4)  []Pt. doing 50-74% (3)  []Pt. doing 25-49% (2)  []Pt. doing 0-24% or needs assist of 2 helpers (1)  [x]Activity occurred with OT    DRESSING LOWER BODY  []Pt. dresses or undresses independently (7)  []Pt. needs adaptive devices (6)  []Pt. needs supervision (cueing), set up helper applies ANA hose or AFO (5)  []Pt. doing 75-99%, needs steadying assist, fastening a belt, etc.) (4)  []Pt. doing 50-74% (3)  []Pt. doing 25-49% (2)  []Pt. doing 0-24% or needs assist of 2 helpers (1)   activity occurred with OT  TOILETING  []Pt. doing 3 of 3 tasks (pulling down pants, cleaning emerald area, pulling up pants) (7)  []Pt. doing all 3 parts but needs assistive device (grab bar) (6)  []Pt. needs supervision or setup (5)  []Pt. doing 3 of 3 parts (75-99%) (4)  []Pt. doing 2 of 3 parts (50-74%) (3)  []Pt. doing 1 of 3 parts  (25.49%) (2)  []Pt. needs assist (more than steadying) with all 3 parts or needs assist of 2 helpers,  Incontinent of B/B today (0-24%) (1)  Did  Not occur    BED/ CHAIR/WHEELCHAIR TRANSFER  []Pt. transfers without assistive device into and out of wheelchair/bed/chair (7)  []Pt. uses assistive/adaptive devices (grab bars, sliding board, walker, bed rails,   wheelchair arm rests, etc.) (6)  []Pt. needs supervision, cues, head of bed raised by staff (5)  []Pt. needs steadying assist with any or all parts of transfer, needs assist with one   leg during transfer ( 4)  []Pt. needs lifting or lowering, needs assist with 2 legs during transfer (3)  []Pt. needs lifting and lowering (2)  []Pt. needs assist of 2 helpers(even if 2nd person is just there for safety) or mechanical lift (1)  [x]Activity did not occur     TOILET TRANSFER  []Pt. transfers from wheelchair or walking without assistive device (7)  []Pt. uses assistive/adaptive device (commode, grab bars, sliding board, walker,   prosthesis, orthotic, raised toilet seat, etc.) (6)  []Pt. needs supervision, cues, or setup (needs assist to lock brakes, remove leg or arm  rest, position sliding board) (5)  []Pt. needs steadying assist with any or all parts of transfer, needs assist with one leg  during transfer (4)  []Pt. needs lifting or lowering, needs assist with two legs during transfer (3)  []Pt. needs lifting and lowering (2)  []Pt. needs assist of 2 helpers or mechanical lift (1)  [x]Activity did not occur    SHOWER TRANSFER  []Pt. transfers without assistive device into and out of shower (7)  []Pt. uses assistive/adaptive device (shower bench, grab bars, etc.) (6)  []Pt. needs supervision, cues, or setup (5)  []Pt. needs steadying assist with any or all parts of transfer, needs assist with one leg  during transfer (4)  []Pt. needs lifting or lowering, needs assist with 2 legs during transfer (3)  []Pt. needs lifting and lowering (2)  []Pt. needs assist of 2  helpers, helper pushes shower chair on wheels in and out of  shower (1)  Activity did not occur

## 2017-06-27 NOTE — PROGRESS NOTES
"Patient Name: Joanna Morales  MRN: [id number]  Admission Date: 6/26/2017  LOS: 1 day  Attending Physician: Monserrat Fairchild MD  Primary Care Provider: Claudy Tse MD     Subjective:      Principal Problem: CHI (closed head injury)     HPI: I have reviewed HPI/surgical history and is unchanged since admit    Interval History: Patient is drowsy and confused this morning. She asked "Am I in the right building?"    LBM: 6/25     Current Functional Status:    PT: pending eval    OT: pending eval    SLP: pending eval     Scheduled Meds:   amitriptyline  100 mg Oral Nightly    docusate sodium  100 mg Oral BID    gabapentin  600 mg Oral TID    heparin (porcine)  5,000 Units Subcutaneous Q8H    hydrocortisone  10 mg Oral QAM    levothyroxine  50 mcg Oral Before breakfast    nicotine  1 patch Transdermal Daily    pantoprazole  40 mg Oral BID    polyethylene glycol  17 g Oral Daily    ramelteon  8 mg Oral QHS     Continuous Infusions:   PRN Meds:.acetaminophen, bisacodyl, bisacodyl, dextrose 50%, dextrose 50%, glucagon (human recombinant), glucose, glucose, ondansetron      Physical Exam   Constitutional: She appears well-developed and well-nourished.   HENT:   Head: Normocephalic.   Incision C/D   Cardiovascular: Normal rate.    Pulmonary/Chest: Effort normal.   Abdominal: Soft. She exhibits no distension. There is no tenderness.   Musculoskeletal:   No pain w range of extremities   + ankle clonus    Neurological:   Patient unable to maintain attention, decreased alertness   Skin: Skin is warm.   Vitals reviewed.        Diagnostic Results:     Recent Labs  Lab 06/27/17  0706   *   K 3.9      CO2 28   BUN 11   CREATININE 0.9         Recent Labs  Lab 06/27/17  0706   WBC 9.43   RBC 4.61   HGB 14.6   HCT 46.0      *   MCH 31.7*   MCHC 31.7*        Assessment/Plan:      Joanna Morales is a 51 y.o. female being admitted to inpatient rehabilitation on 6/26/2017 for CHI (closed head " injury) with impaired mobility and ADLs.          S/P craniotomy     - incision c/d       Vision loss of right eye     - will give assistance as appropriate to accomodate       Pneumocephalus     - noted       Depression     - on elavil       * CHI (closed head injury)     Patient was lethargic on admission. Per nurse, patient did wake up to state she needed to go to the bathroom, and then was inattentive/lethargic again.   Will monitor closely   -- Tachycardic, otherwise BP, pulse oX stable. BG 92.   Will repeat labs in AM, monitor to see if response to a medication.        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 Discuss with staff   -Consider DDAVP    Hypercalcemia   -corrected Calcium today 11.36   - may give 5% dextrose at around 50cc/hour. Discuss with staff    Hypothyroidism   - TSH low, T4 WFL   - Synthroid 50mcg qd   - Monitor       Patient was initially seen and evaluated by Dr. Jeff Lagunas D.O. [employer] PGY 4

## 2017-06-27 NOTE — PROGRESS NOTES
BLADDER  [] Pt. uses toilet (7)  [] Pt. is on dialysis - does not urinate (7)  [] Pt. uses bedside commode (5)  []Pt. uses bedpan - staff does____% (includes rolling on/off, holding bedpan in place                                                                   and emptying pan)   [] Pt. uses urinal - staff empties                          []   Pt. needs help with positioning the urinal (4)                          []   Pt. needs help holding the urinal (2)  [x]  Pt. has garcía catheter/suprapubic catheter - staff empties (1)  []  Pt. Is on ICP program:                           []  Pt. does catheterization (6)                           [] Staff does catheterization (1)  [] Pt. Is incontinent - staff does _______% of tasks  Number of accidents during this shift ___0___    BOWEL  [] Pt. uses toilet (7)  [] Pt. uses bedside commode (5)  [] Pt. uses bedpan - staff does _______% of task  [] Pt. is on bowel program::                         []   Pt. inserts suppository (6)                         []   Nurse inserts suppository (4)                         []  Nurse does dig. stim. (1)  [] Pt. has colostomy - staff maintains care and empties (1)                       Pt. does _____% of care  [] Pt. is incontinent - staff does ______% of tasks  [x] No bowel movement during this shift  [] Number of accidents during this shift ____    EATING  [] Pt. opens packages, cuts food, pours liquids, and feeds self, regular consistency (7)  [] Pt. needs dentures, swallow technique, special foods or drink consistency (6)  [] Pt. needs supervision (cueing), setup (open containers, cut food, etc.) (5)  []Pt. needs assist with occasional scooping, or checking for food pocketing (75-90%) (4)  []Pt. needs assist to lead each bite on utensils, patient brings food to mouth (50-74%)(3)  []Pt. needs assist to scoop, bring food to mouth (hand over hand) (25-49%) (2)  []AxillaryPt. Is receiving IV fluids for hydration or tube feeding  (1)    GROOMING  []Pt. performs all tasks independently and safely (7)  []Pt. obtains all articles, needs adaptive/assistive device (wash mitt, etc.) (6)  []Pt. needs supervision (cueing), setup (5)  []Pt. doing 3 of 4 or 4 of 5 tasks, needs assist to put in or remove dentures, steadying (Patient doing 75-90%) (4)  []Pt. doing 2 of 4 or 3 of 5 tasks (3)  []Pt. doing 1 of 4 of 2 of 5 tasks (25-49%) (2)  []Pt. doing 0 of tasks, needs assistance of 2 helpers (1)  []Activity occurred with OT    BATHING  []Pt. safety bathes whole body (10 parts of 10) (7)  []Pt. doing 10 of 10 body parts, uses adaptive devices (wash mitt, etc.) (6)  []Pt. doing 10 of 10 body parts or needs supervision or setup (5)  []Pt. doing 8-9 of 10 body parts (75-99%) (4)  []Pt. doing 5-7 of 10 body parts (50-74%) (3)  []Pt. doing 3-4 of 10 body parts (25-49%( (2)  []Pt. doing 0-2 of 10 body parts (0-24%0 (1)  []Activity occurred with OT    DRESSING UPPER BODY  []Pt. dresses or undresses independently, obtaining clothes from storage area (7)  []Pt. needs adaptive devices (6)  []Pt. needs supervision (cueing), setup (5)  []Pt. doing 75-99% (4)  []Pt. doing 50-74% (3)  []Pt. doing 25-49% (2)  []Pt. doing 0-24% or needs assist of 2 helpers (1)  []Activity occurred with OT    DRESSING LOWER BODY  []Pt. dresses or undresses independently (7)  []Pt. needs adaptive devices (6)  []Pt. needs supervision (cueing), set up helper applies ANA hose or AFO (5)  []Pt. doing 75-99%, needs steadying assist, fastening a belt, etc.) (4)  []Pt. doing 50-74% (3)  []Pt. doing 25-49% (2)  []Pt. doing 0-24% or needs assist of 2 helpers (1)    TOILETING  []Pt. doing 3 of 3 tasks (pulling down pants, cleaning emerald area, pulling up pants) (7)  []Pt. doing all 3 parts but needs assistive device (grab bar) (6)  []Pt. needs supervision or setup (5)  []Pt. doing 3 of 3 parts (75-99%) (4)  []Pt. doing 2 of 3 parts (50-74%) (3)  []Pt. doing 1 of 3 parts (25.49%) (2)  []Pt. needs  assist (more than steadying) with all 3 parts or needs assist of 2 helpers,  Incontinent of B/B today (0-24%) (1)    BED/ CHAIR/WHEELCHAIR TRANSFER  []Pt. transfers without assistive device into and out of wheelchair/bed/chair (7)  []Pt. uses assistive/adaptive devices (grab bars, sliding board, walker, bed rails,   wheelchair arm rests, etc.) (6)  []Pt. needs supervision, cues, head of bed raised by staff (5)  []Pt. needs steadying assist with any or all parts of transfer, needs assist with one   leg during transfer ( 4)  []Pt. needs lifting or lowering, needs assist with 2 legs during transfer (3)  []Pt. needs lifting and lowering (2)  []Pt. needs assist of 2 helpers(even if 2nd person is just there for safety) or mechanical lift (1)  [x]Activity did not occur     TOILET TRANSFER  []Pt. transfers from wheelchair or walking without assistive device (7)  []Pt. uses assistive/adaptive device (commode, grab bars, sliding board, walker,   prosthesis, orthotic, raised toilet seat, etc.) (6)  []Pt. needs supervision, cues, or setup (needs assist to lock brakes, remove leg or arm  rest, position sliding board) (5)  []Pt. needs steadying assist with any or all parts of transfer, needs assist with one leg  during transfer (4)  []Pt. needs lifting or lowering, needs assist with two legs during transfer (3)  []Pt. needs lifting and lowering (2)  []Pt. needs assist of 2 helpers or mechanical lift (1)  [x]Activity did not occur    SHOWER TRANSFER  []Pt. transfers without assistive device into and out of shower (7)  []Pt. uses assistive/adaptive device (shower bench, grab bars, etc.) (6)  []Pt. needs supervision, cues, or setup (5)  []Pt. needs steadying assist with any or all parts of transfer, needs assist with one leg  during transfer (4)  []Pt. needs lifting or lowering, needs assist with 2 legs during transfer (3)  []Pt. needs lifting and lowering (2)  []Pt. needs assist of 2 helpers, helper pushes shower chair on wheels in  and out of  shower (1)

## 2017-06-27 NOTE — PROGRESS NOTES
Occupational Therapy   FIM Scores    Joanna Morales   MRN: [id number]        06/27/17 0830   Transfers   Bed/Chair/WC 3   Self Care   Eating 1   Grooming 1   Bathing 2   Dressing-Upper 1   Dressing-Lower 1   Communication   Comprehension 2   Mode B   Expression 1   Mode B   Social Cognition   Social Interaction 1   Problem Solving 1   Memory 1     I certify that I was present in the room directing the student in service delivery and guiding them using my skilled judgment. As the co-signing therapist, I have reviewed the student's documentation and am responsible for the treatment, assessment and plan.    ABILIO Edgar  6/27/2017

## 2017-06-27 NOTE — ASSESSMENT & PLAN NOTE
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  - Pt is neurologically stable  - Appreciate endocrine recs for DI: Na trending down, ddAVP only PRN.    Can give extra prn DDAVP:   Parameters to give extra DDAVP IF uop > 250 x 2 consecutive hours AND Spec grav <1.005 AND Serum Sodium > 150.  Check sodium and sp. Gravity Q12h   - FT4, low normal.  Started synthroid 50 mcg daily.   - Continue cortef 10 q am, 5 q pm.    - On dental soft diet.   - DVT ppx- ANA/SCD's. GI ppx. Sqh.   - Continue daily PT/OT/ST  - Staples removed without complication.   - [facility].

## 2017-06-28 PROBLEM — Z72.0 TOBACCO ABUSE: Chronic | Status: ACTIVE | Noted: 2017-01-01

## 2017-06-28 PROBLEM — E03.8 SECONDARY HYPOTHYROIDISM: Chronic | Status: ACTIVE | Noted: 2017-01-01

## 2017-06-28 PROBLEM — E87.0 HYPERNATREMIA: Status: ACTIVE | Noted: 2017-01-01

## 2017-06-28 PROBLEM — E23.2 DIABETES INSIPIDUS: Chronic | Status: ACTIVE | Noted: 2017-01-01

## 2017-06-28 PROBLEM — Z72.0 TOBACCO ABUSE: Status: ACTIVE | Noted: 2017-01-01

## 2017-06-28 NOTE — CONSULTS
[facility]  Neurocritical Care  Consult Note    Admit Date: 6/28/2017  Service Date: 06/28/2017  Length of Stay: 0    Consults  Subjective:     Chief Complaint: altered mental status      History of Present Illness: Joanna Morales is a 51-year-old female who is post op olfactory groove meningioma resection with recent hospital course complicated with DI.  Patient has been at [facility] and today unresponsive for brief while. Transferred to [facility] ER for further evaluation. Concern for seizures versus new brain pathology.      Review of Symptoms:   Constitutional: Denies fevers or chills.  ENT: no hearing difficulty, no visual changes  Pulmonary: Denies shortness of breath or cough.  Cardiology: Denies chest pain or palpitations.  GI: Denies abdominal pain or constipation.  Neurologic: Denies new weakness, headaches, or paresthesias.   : no dysuria  Musk: no muscle pain, no joint pain  Psych: no hallucinations    Physical Exam:  GA: Alert, comfortable, no acute distress.   HEENT: No scleral icterus or JVD.   Pulmonary: Clear to auscultation A/L. No wheezing, crackles, or rhonchi.  Cardiac: RRR S1 & S2 w/o rubs/murmurs/gallops.   Abdominal: Bowel sounds present x 4. No appreciable hepatosplenomegaly.  Skin: No jaundice, rashes, or visible lesions.  Pulses: 2+ DP bilat    Neuro:  --sedation: none  --GCS: E4V3M6  --Mental Status: awake, alert, oriented to self and city, not year    --CN II-XII grossly intact.   --Pupils 3-->2mm, PERRL. Decreased visual fields throughout patient states she can see minimal shadows  --brainstem: intact  --Motor: 5/5 throughout  --sensory: intact to soft touch and pain throughout  --Reflexes: not tested  --Gait: deferred      Recent Labs  Lab 06/28/17  1422   WBC 9.77   RBC 4.74   HGB 15.5   HCT 46.9      MCV 99*   MCH 32.7*   MCHC 33.0       Recent Labs  Lab 06/28/17  1422   CALCIUM 11.7*   PROT 9.1*   *   K 4.8   CO2 25   *   BUN 21*    CREATININE 1.2   ALKPHOS 141*   ALT 82*   AST 68*   BILITOT 0.4     No results for input(s): INR, APTT in the last 24 hours.    Invalid input(s): PT         Vitals:    06/28/17 1732   BP: 112/79   Pulse: 99   Resp:    Temp:          I have personally reviewed all labs, imaging, and studies today      Assessment/Plan:     50 yo female post recent olfactory groove meningioma resection with hospital course complicated by DI. Presents from [facility] after episode of decreased mental status.    On my examination patient is at her baseline based on notes in chart from [place]  On my review of her recent records it appears her sodium has been slowly increasing with slgightly dilute urine on recent urinalysis. Patient is afebrile, no signs of infection, hemodynamically stable, and CT head improved from 6/15/17.    Patient does not meet criteria for ICU admission at this time  Please call if patient decompensates or becomes hemodynamically unstable      Beni Bustos MD MPH  NeuroCritical Care & Vascular Neurology  *[phone]

## 2017-06-28 NOTE — PROGRESS NOTES
BLADDER  [] Pt. uses toilet (7)  [] Pt. is on dialysis - does not urinate (7)  [] Pt. uses bedside commode (5)  []Pt. uses bedpan - staff does____% (includes rolling on/off, holding bedpan in place                                                                   and emptying pan)   [] Pt. uses urinal - staff empties                          []   Pt. needs help with positioning the urinal (4)                          []   Pt. needs help holding the urinal (2)  [x]  Pt. has garcía catheter/suprapubic catheter - staff empties (1)  []  Pt. Is on ICP program:                           []  Pt. does catheterization (6)                           [] Staff does catheterization (1)  [] Pt. Is incontinent - staff does _______% of tasks  Number of accidents during this shift ______    BOWEL  [] Pt. uses toilet (7)  [] Pt. uses bedside commode (5)  [] Pt. uses bedpan - staff does _______% of task  [] Pt. is on bowel program::                         []   Pt. inserts suppository (6)                         []   Nurse inserts suppository (4)                         []  Nurse does dig. stim. (1)  [] Pt. has colostomy - staff maintains care and empties (1)                       Pt. does _____% of care  [] Pt. is incontinent - staff does ______% of tasks  [x] No bowel movement during this shift  [] Number of accidents during this shift ____    EATING  [] Pt. opens packages, cuts food, pours liquids, and feeds self, regular consistency (7)  [] Pt. needs dentures, swallow technique, special foods or drink consistency (6)  [] Pt. needs supervision (cueing), setup (open containers, cut food, etc.) (5)  []Pt. needs assist with occasional scooping, or checking for food pocketing (75-90%) (4)  []Pt. needs assist to lead each bite on utensils, patient brings food to mouth (50-74%)(3)  [x]Pt. needs assist to scoop, bring food to mouth (hand over hand) (25-49%) (2)  []AxillaryPt. Is receiving IV fluids for hydration or tube feeding  (1)    GROOMING  []Pt. performs all tasks independently and safely (7)  []Pt. obtains all articles, needs adaptive/assistive device (wash mitt, etc.) (6)  []Pt. needs supervision (cueing), setup (5)  []Pt. doing 3 of 4 or 4 of 5 tasks, needs assist to put in or remove dentures, steadying (Patient doing 75-90%) (4)  []Pt. doing 2 of 4 or 3 of 5 tasks (3)  []Pt. doing 1 of 4 of 2 of 5 tasks (25-49%) (2)  []Pt. doing 0 of tasks, needs assistance of 2 helpers (1)  []Activity occurred with OT    BATHING  []Pt. safety bathes whole body (10 parts of 10) (7)  []Pt. doing 10 of 10 body parts, uses adaptive devices (wash mitt, etc.) (6)  []Pt. doing 10 of 10 body parts or needs supervision or setup (5)  []Pt. doing 8-9 of 10 body parts (75-99%) (4)  []Pt. doing 5-7 of 10 body parts (50-74%) (3)  []Pt. doing 3-4 of 10 body parts (25-49%( (2)  []Pt. doing 0-2 of 10 body parts (0-24%0 (1)  []Activity occurred with OT    DRESSING UPPER BODY  []Pt. dresses or undresses independently, obtaining clothes from storage area (7)  []Pt. needs adaptive devices (6)  []Pt. needs supervision (cueing), setup (5)  []Pt. doing 75-99% (4)  []Pt. doing 50-74% (3)  []Pt. doing 25-49% (2)  []Pt. doing 0-24% or needs assist of 2 helpers (1)  []Activity occurred with OT    DRESSING LOWER BODY  []Pt. dresses or undresses independently (7)  []Pt. needs adaptive devices (6)  []Pt. needs supervision (cueing), set up helper applies ANA hose or AFO (5)  []Pt. doing 75-99%, needs steadying assist, fastening a belt, etc.) (4)  []Pt. doing 50-74% (3)  []Pt. doing 25-49% (2)  []Pt. doing 0-24% or needs assist of 2 helpers (1)    TOILETING  []Pt. doing 3 of 3 tasks (pulling down pants, cleaning emerald area, pulling up pants) (7)  []Pt. doing all 3 parts but needs assistive device (grab bar) (6)  []Pt. needs supervision or setup (5)  []Pt. doing 3 of 3 parts (75-99%) (4)  []Pt. doing 2 of 3 parts (50-74%) (3)  []Pt. doing 1 of 3 parts (25.49%) (2)  []Pt. needs  assist (more than steadying) with all 3 parts or needs assist of 2 helpers,  Incontinent of B/B today (0-24%) (1)    BED/ CHAIR/WHEELCHAIR TRANSFER  []Pt. transfers without assistive device into and out of wheelchair/bed/chair (7)  []Pt. uses assistive/adaptive devices (grab bars, sliding board, walker, bed rails,   wheelchair arm rests, etc.) (6)  []Pt. needs supervision, cues, head of bed raised by staff (5)  []Pt. needs steadying assist with any or all parts of transfer, needs assist with one   leg during transfer ( 4)  []Pt. needs lifting or lowering, needs assist with 2 legs during transfer (3)  []Pt. needs lifting and lowering (2)  []Pt. needs assist of 2 helpers(even if 2nd person is just there for safety) or mechanical lift (1)  [x]Activity did not occur     TOILET TRANSFER  []Pt. transfers from wheelchair or walking without assistive device (7)  []Pt. uses assistive/adaptive device (commode, grab bars, sliding board, walker,   prosthesis, orthotic, raised toilet seat, etc.) (6)  []Pt. needs supervision, cues, or setup (needs assist to lock brakes, remove leg or arm  rest, position sliding board) (5)  []Pt. needs steadying assist with any or all parts of transfer, needs assist with one leg  during transfer (4)  []Pt. needs lifting or lowering, needs assist with two legs during transfer (3)  []Pt. needs lifting and lowering (2)  []Pt. needs assist of 2 helpers or mechanical lift (1)  [x]Activity did not occur    SHOWER TRANSFER  []Pt. transfers without assistive device into and out of shower (7)  []Pt. uses assistive/adaptive device (shower bench, grab bars, etc.) (6)  []Pt. needs supervision, cues, or setup (5)  []Pt. needs steadying assist with any or all parts of transfer, needs assist with one leg  during transfer (4)  []Pt. needs lifting or lowering, needs assist with 2 legs during transfer (3)  []Pt. needs lifting and lowering (2)  []Pt. needs assist of 2 helpers, helper pushes shower chair on wheels in  and out of  shower (1)

## 2017-06-28 NOTE — PT/OT/SLP DISCHARGE
Physical Therapy Discharge Summary    Joanna Morales  MRN: [id number]   Meningioma, recurrent of brain   Patient Discharged from acute Physical Therapy on 17.  Please refer to prior PT noted date on 17 for functional status.     Assessment:   Patient has not met goals.  GOALS:    Physical Therapy Goals        Problem: Physical Therapy Goal    Goal Priority Disciplines Outcome Goal Variances Interventions   Physical Therapy Goal     PT/OT, PT Ongoing (interventions implemented as appropriate)     Description:  Goals to be met by: 17    Patient will increase functional independence with mobility by performin. Supine to sit with Contact Guard Assistance  2. Sit to supine with Contact Guard Assistance  3. Sit to stand transfer with Minimal Assistance using RW- MET 17      Revised: Sit to stand transfer with contact guard assistance using RW.   4. Gait  x 25 feet with Minimal Assistance with or without appropriate AD.   5. Lower extremity exercise program x30 reps per handout, with independence                      Reasons for Discontinuation of Therapy Services  Transfer to alternate level of care.      Plan:  Patient Discharged to: Inpatient Rehab.    Melissa Watkins PT, DPT   2017  Pager: [phone]

## 2017-06-28 NOTE — HPI
Joanna Morales is a 51-year-old female with PMHx of depression, L eye vision loss, basal cell carcinoma, and meningioma (s/p partial resection at [facility] Jan. 2015) followed in [facility] by Dr. Chew 5/17 and admitted [facility] on 6/7/17 for surgical intervention.  Now s/p L frontotemporal craniotomy and excision of meningioma with a drain (on Rocephin for ppx). Post-op CTH concerning for small postoperative hemorrhage; no intervention necessary. Hospital course was further complicated by pneumocephalus, DI, hypernatremia. hypotension, hypothermia, and encephalopathy. EEG shows slowing suggestive of encephalopathy without evidence of seizures. Repeat CTH showing improvement and stepped down to the floor on 6/16.   Patient has been at [facility] and today unresponsive for brief while. Transferred to [facility] ER for further evaluation. Concern for seizures versus new brain pathology.

## 2017-06-28 NOTE — ED NOTES
Pt given blanket for comfort. Pt aware awaiting neurosurgery to come speak with her. No change in pt status. Will continue to monitor

## 2017-06-28 NOTE — PROGRESS NOTES
Occupational Therapy   FIM Scores    Joanna Morales   MRN: [id number]        06/28/17 1110   Self Care   Grooming 2     I certify that I was present in the room directing the student in service delivery and guiding them using my skilled judgment. As the co-signing therapist, I have reviewed the student's documentation and am responsible for the treatment, assessment and plan.    ABILIO Edgar  6/28/2017

## 2017-06-28 NOTE — CONSULTS
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 Chew and discharged on 17 to [facility].  She presents with altered mental status and worsening hypernatremia for 1 day. She was treated for DI her last admission and labs are trending up today.  Spoke with Dr. Fairchild at [facility], patient with waxing/waning mental status yesterday, but was appropriate & following some complex commands yesterday.  There was a period yesterday of significant lethargy.  Patient is unable to give history, and information is taken from the chart.         Medications:  Continuous Infusions:   Scheduled Meds:   desmopressin  10 mcg Nasal Once    levetiracetam IVPB  1,000 mg Intravenous Once    [START ON 2017] levetiracetam  500 mg Oral BID     PRN Meds:     Review of Systems   Unable to obtain secondary to AMS    Objective:     Weight: 53.5 kg (118 lb)  Body mass index is 21.58 kg/m².  Vital Signs (Most Recent):  Temp: 98.4 °F (36.9 °C) (17 1358)  Pulse: 99 (17 1732)  Resp: 16 (17 1358)  BP: 112/79 (17 1732)  SpO2: 95 % (17 1732) Vital Signs (24h Range):  Temp:  [97.6 °F (36.4 °C)-98.5 °F (36.9 °C)] 98.4 °F (36.9 °C)  Pulse:  [] 99  Resp:  [16-18] 16  SpO2:  [92 %-96 %] 95 %  BP: ()/(72-89) 112/79              Temp (24hrs), Av.2 °F (36.8 °C), Min:97.6 °F (36.4 °C), Max:98.5 °F (36.9 °C)           Date 17 0700 - 17 0659   Shift 5765-6903 3943-7616 9186-9247 24 Hour Total   I  N  T  A  K  E   Shift Total  (mL/kg)       O  U  T  P  U  T   Urine  (mL/kg/hr) 500  (1.2) 800  1300    Shift Total  (mL/kg) 500  (9.3) 800  (14.9)  1300  (24.3)   Weight (kg) 53.5 53.5 53.5 53.5          Urethral Catheter 06/15/17 1205 Latex (Active)   Site Assessment Clean;Intact 2017   "7:00 AM   Collection Container Standard drainage bag 6/28/2017  7:00 AM   Securement Method secured to top of thigh w/ adhesive device 6/28/2017  7:00 AM   Catheter Care Performed yes 6/28/2017  7:00 AM   Reason for Continuing Urinary Catheterization Chronic Indwelling Urinary Catheter on Admission 6/26/2017  7:00 PM   CAUTI Prevention Bundle StatLock in place w 1" slack;Intact seal between catheter & drainage tubing;Drainage bag off the floor;Green sheeting clip in use;No dependent loops or kinks;Drainage bag not overfilled (<2/3 full);Drainage bag below bladder 6/26/2017  7:00 PM   Output (mL) 600 mL 6/28/2017  5:00 AM       Neurosurgery Physical Exam   General: well developed, well nourished, no distress  Head: normocephalic, atraumatic  Neurologic: Very hard to arouse, even with sternal rub  GCS: Motor: 5/Verbal: 3/Eyes: 2 GCS Total: 10  Mental Status: Awakens only with noxious stimuli, able to give name & location with a lot of stimulation  Language: No aphasia  Speech: Dysarthria  Cranial nerves: face symmetric, tongue midline, CN II-XII grossly intact.   Eyes: pupils NR bilaterally  Pulmonary: normal respirations, not labored, no accessory muscles used  Abdomen: soft, non-distended, not tender to palpation  Sensory: intact to light touch throughout  Motor Strength: Moves all extremities spontaneously with good tone.  Unable to assess strength.  No abnormal movements seen.   Pronator Drift: Unable to assess  Finger-to-nose: Unable to assess  Mcguire: absent  Clonus: absent  Babinski: absent  Pulses: 2+ and symmetric radial and dorsalis pedis  Skin: warm, dry and intact, no rashes      Significant Labs:    Recent Labs  Lab 06/28/17  1422      *   K 4.8   *   CO2 25   BUN 21*   CREATININE 1.2   CALCIUM 11.7*       Recent Labs  Lab 06/27/17  0706 06/28/17  0617 06/28/17  1422   WBC 9.43 9.81 9.77   HGB 14.6 15.4 15.5   HCT 46.0 48.1 46.9    343 286     No results for input(s): LABPT, " INR, APTT in the last 48 hours.  Microbiology Results (last 7 days)     Procedure Component Value Units Date/Time    Urine culture [[id number]] Collected:  06/28/17 1733    Order Status:  No result Specimen:  Urine Updated:  06/28/17 1733          Significant Diagnostics:  I personally reviewed CT Head & agree with findings: Evolving postoperative changes of recent left sphenoidal meningioma resection with resolution of blood products and trace intraventricular hemorrhage at the surgical bed.     Continued mass effect in the parenchyma and minimal midline shift are similar to prior examination.    No evidence of major vascular distribution infarct or hemorrhage.    Assessment/Plan:     Meningioma    51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 Chew 6/7/17  -No acute neurosurgical intervention indicated  -CT Head with improvement compared to CT Head on 6/15/17  -Suspect seizure - Keppra for treatment, do not feel patient needs epilepsy consult at this time  -Admit to medicine for management of DI, [facility] does not feel that patient is appropriate or needs ICU management at this time  -Seizure precautions  -Discussed with Dr. Chew              Thank you for your consult. We will follow-up with patient. Please contact us if you have any additional questions.    Calli Paulino PA-C  Neurosurgery  [facility]

## 2017-06-28 NOTE — PROGRESS NOTES
"Occupational Therapy   Treatment    Joanna Morales   MRN: [id number]        06/28/17 1110   OT Time Calculation   OT Start Time 1110   OT Stop Time 1136   OT Total Time (min) 26 min   General   OT Date of Treatment 06/28/17   Missed Treatment Reason Out of hospital appointment  (Pt transported to [facility] for blood work due to abnormal labs)   Family/Caregiver Present Yes  (Friend (Reina) who will be caregiver)   Patient Found (position) Seated in wheelchair   Pt found with garcía catheter   Precautions   General Precautions aspiration;fall;nectar thick;vision impaired   Visual/Auditory Vision impaired  (L neglect; severe impairment in B eyes)   Subjective   Patient states "My life is confused."   Pain/Comfort   Pain Rating other (see comments)   Pain Addressed Nurse notified   Pain Comment c/o headache   Grooming   Additional Grooming yes   Grooming Level of Assistance Maximum assistance   Face Washing Level of Assistance Maximum assistance   Grooming Comments max verbal and tactile cues to bring R UE to face   Additional Activities   Additional Activities Other (Comment)   Additional Activities Comments   -Pt's caregiver present in session and states pt will be living with her in a single-story home in [place], [place] that has steps to enter, and caregiver is retired and works part-time from home, therefore, will be able to provide 24 hr care at D/C.    -Pt guided through R/L discrimination tasks and forward trunk flexion while seated in w/c in order to increase pt's proprioception and body awareness.   Activity Tolerance   Activity Tolerance Patient limited by pain;Patient limited by fatigue   Medical Staff Made Aware NSG   After Treatment   Patient Position After Treatment Other (comment)  (on stretcher with transport team)   Patient after treatment left nursing notified   Assessment   Prognosis Fair   Problem List Decreased Self Care skills;Decreased upper extremity range of motion;Decreased upper extremity " strength;Decreased safe judgment during ADL;Decreased cognition;Decreased endurance;Decreased sensation;Visual deficit;Decreased functional mobility;Decreased fine motor control;Decreased gross motor control;Decreased IADLs;Decreased Function of left upper extremity;Decreased trunk control for functional activities;Decreased Function of right upper extremity   Assessment Pt very lethargic in session, requiring verbal and tactile cues to open eyes and maintain upright head position. Pt responded to 1-step commands in session with frequent episodes of inattention and drowsiness. Pt transported in session to [facility] for blood work secondary to abnormal labwork, therefore, pt unable to participate in full 45 minute session.   Level of Motivation/Participation Fair   Barriers to Discharge Other (Comment)   Barrier Comments pt's caregiver moving to new home in order to accommodate pt at D/C, pending pt's medical status.   Discharge Recommendations   Equipment Needed After Discharge 3-in-1 commode;bath bench;wheelchair   Discharge Facility/Level Of Care Needs home health OT   Plan   Plan Continue with current plan   Therapy Frequency 2 times/day;Monday-Friday;2-3 times/week   Occupational Therapy Follow-up   OT Follow-up? Yes   Treatment/Billable Minutes   Therapeutic Activity 26   Total Time 26     I certify that I was present in the room directing the student in service delivery and guiding them using my skilled judgment. As the co-signing therapist, I have reviewed the student's documentation and am responsible for the treatment, assessment and plan.    ABILIO Edgar  6/28/2017

## 2017-06-28 NOTE — ED TRIAGE NOTES
Pt arrived by EMS on stretcher. Pt poor historian due to being disoriented. Pt from [facility] for abnormal lab and altered mental status x 1 day. Pt disoriented to place and time. Pt also has Na of 153 per morning labs. Pt recently discharged from neurosurgery service and sent to [facility] s/p meningioma resection

## 2017-06-28 NOTE — PROGRESS NOTES
Physical Therapy   Pt not seen    Joanna Morales   MRN: [id number]                06/28/17 1445   PT Time Calculation   PT Start Time 1445   PT Stop Time 1445   PT Total Time (min) 0 min   Treatment   Treatment Type Treatment   PT/PTA PT   General   PT Received On 06/28/17   Missed Time Reason Other (Comment)  (pt sent to [facility] prior to PT session)   Precautions   General Precautions aspiration;fall;nectar thick;vision impaired   Visual/Auditory Vision impaired;Other (see comments)  (L neglect; general vision impairment)   Treatment/Billable Minutes   Total Time 0         Pt not seen today as she was sent back to [facility] due to high sodium and calcium levels( per nurse Huy).  Pt's return to rehab unit is uncertain at this point.  Pt's functional levels remain as indicated on eval.        Daryl Taylor, PT 6/28/2017

## 2017-06-28 NOTE — HPI
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 Chew and discharged on 6/26/17 to [facility].  She presents with altered mental status and worsening hypernatremia for 1 day. She was treated for DI her last admission and labs are trending up today.  Spoke with Dr. Fairchild at [facility], patient with waxing/waning mental status yesterday, but was appropriate & following some complex commands yesterday.  There was a period yesterday of significant lethargy.  Patient is unable to give history, and information is taken from the chart.

## 2017-06-28 NOTE — PROGRESS NOTES
"------late entry ------    Patient Name: Joanna Morales  MRN: [id number]  Admission Date: 6/26/2017  LOS: 1 day  Attending Physician: Monserrat Fairchild MD  Primary Care Provider: Claudy Tse MD     Subjective:      Principal Problem: CHI (closed head injury)     HPI: I have reviewed HPI/surgical history and is unchanged since admit    Interval History: Patient lethargic this AM. Yesterday did have periods where patient was able to follow one step, and at times 2 step commands. This am, patient having difficulty keeping eyes open. Did respond to questions regarding pain that she "hurt everywhere."      LBM: 6/26           Scheduled Meds:   [MAR Hold - Suspended Admission] acetaminophen  650 mg Oral QID (WM & HS)    [MAR Hold - Suspended Admission] amitriptyline  100 mg Oral Nightly    [MAR Hold - Suspended Admission] docusate sodium  100 mg Oral BID    [MAR Hold - Suspended Admission] heparin (porcine)  5,000 Units Subcutaneous Q8H    [MAR Hold - Suspended Admission] hydrocortisone  10 mg Oral QAM    [MAR Hold - Suspended Admission] hydrocortisone  5 mg Oral Daily with dinner    [MAR Hold - Suspended Admission] levothyroxine  50 mcg Oral Before breakfast    [MAR Hold - Suspended Admission] nicotine  1 patch Transdermal Daily    [MAR Hold - Suspended Admission] pantoprazole  40 mg Oral BID    [MAR Hold - Suspended Admission] polyethylene glycol  17 g Oral Daily    [MAR Hold - Suspended Admission] ramelteon  8 mg Oral QHS     Continuous Infusions:   [MAR Hold - Suspended Admission] dextrose 5 % 50 mL/hr at 06/28/17 0815     PRN Meds:.[MAR Hold - Suspended Admission] acetaminophen, [MAR Hold - Suspended Admission] bisacodyl, [MAR Hold - Suspended Admission] dextrose 50%, [MAR Hold - Suspended Admission] dextrose 50%, [MAR Hold - Suspended Admission] glucagon (human recombinant), [MAR Hold - Suspended Admission] glucose, [MAR Hold - Suspended Admission] glucose, [MAR Hold - Suspended Admission] " ondansetron      Physical Exam   Constitutional: She appears well-developed and well-nourished.   HENT:   Head: Normocephalic.   Incision C/D   Cardiovascular: Normal rate.    Pulmonary/Chest: Effort normal.   Abdominal: Soft. She exhibits no distension. There is no tenderness.   Musculoskeletal:   No pain w range of extremities   + ankle clonus    Neurological:   Patient unable to maintain attention, decreased alertness   Skin: Skin is warm.   Vitals reviewed.        Diagnostic Results:     Recent Labs  Lab 06/28/17  1422   *   K 4.8   *   CO2 25   BUN 21*   CREATININE 1.2         Recent Labs  Lab 06/28/17  1422   WBC 9.77   RBC 4.74   HGB 15.5   HCT 46.9      MCV 99*   MCH 32.7*   MCHC 33.0        Assessment/Plan:      Joanna Morales is a 51 y.o. female admitted to inpatient rehabilitation on 6/26/2017 for CHI (closed head injury) with impaired mobility and ADLs.        Concern with trend of Na and Ca going higher despite D5W over night. Lethargy persists in setting of metabolic changes, and recent meningioma resection.     Blood tinge urine continues, with UA consistent with infection. Uc s pending.     Patient w minimal PO intake given clinical presentation.     Patient needs NS/Endocrine evaluations, and likely a repeat HCT, and serial labs.  Will plan to transfer to [facility] ED for further evaluation and mgmt.   Appreciate Endocrine input.    Monserrat Fairchild MD

## 2017-06-28 NOTE — PROGRESS NOTES
"Patient Name: Joanna Morales  MRN: [id number]  Admission Date: 6/26/2017  LOS: 2 day  Attending Physician: Monserrat Fairchild MD  Primary Care Provider: Claudy Tse MD     Subjective:      Principal Problem: CHI (closed head injury)     HPI: I have reviewed HPI/surgical history and is unchanged since admit     Interval History: Patient is drowsy and confused this morning. She asked "Am I in the right building?"     LBM: 6/25     Current Functional Status:    PT: sit to stand mod A, stand to sit mod A, WC 1-2 ft total A, Gait mod A 1 step // bars,      OT: Feeding D, grooming D, Bathing max A, UE dressing total A, LE dressing total A,      SLP: moderate-severe oropharyngeal dysphagia; moderate-severe cognitive-linguistic deficitls       Scheduled Meds:   acetaminophen  650 mg Oral QID (WM & HS)    amitriptyline  100 mg Oral Nightly    docusate sodium  100 mg Oral BID    gabapentin  300 mg Oral TID    heparin (porcine)  5,000 Units Subcutaneous Q8H    hydrocortisone  10 mg Oral QAM    hydrocortisone  5 mg Oral Daily with dinner    levothyroxine  50 mcg Oral Before breakfast    nicotine  1 patch Transdermal Daily    pantoprazole  40 mg Oral BID    polyethylene glycol  17 g Oral Daily    ramelteon  8 mg Oral QHS     Continuous Infusions:   dextrose 5 % 50 mL/hr at 06/28/17 0815     PRN Meds:.acetaminophen, bisacodyl, dextrose 50%, dextrose 50%, glucagon (human recombinant), glucose, glucose, ondansetron      Physical Exam   Temp:  [97.6 °F (36.4 °C)-98.7 °F (37.1 °C)]   Pulse:  [108-118]   Resp:  [18]   BP: ()/(72-86)   SpO2:  [92 %-93 %]   Constitutional: She appears well-developed and well-nourished.   HENT:   Head: Normocephalic.   Incision C/D   Cardiovascular: Normal rate.    Pulmonary/Chest: Effort normal.   Abdominal: Soft. She exhibits no distension. There is no tenderness.   Musculoskeletal:   No pain w range of extremities   + ankle clonus    Neurological:   Patient unable to maintain " attention, decreased alertness   Skin: Skin is warm.   Vitals reviewed.        Diagnostic Results:     Recent Labs  Lab 06/28/17  0617   WBC 9.81   RBC 4.78   HGB 15.4   HCT 48.1      *   MCH 32.2*   MCHC 32.0       Recent Labs  Lab 06/28/17  0617   CALCIUM 11.7*   PROT 8.7*   *   K 4.0   CO2 30*   *   BUN 18   CREATININE 1.1   ALKPHOS 145*   ALT 79*   AST 58*   BILITOT 0.3        Assessment/Plan:      Joanna Morales is a 51 y.o. female being admitted to inpatient rehabilitation on 6/26/2017 for CHI (closed head injury) with impaired mobility and ADLs.            S/P craniotomy     - incision c/d       Vision loss of right eye     - will give assistance as appropriate to accomodate       Pneumocephalus     - noted       Depression     - on elavil       * CHI (closed head injury)     Patient was lethargic on admission. Per nurse, patient did wake up to state she needed to go to the bathroom, and then was inattentive/lethargic again.   Will monitor closely   -- Tachycardic, otherwise BP, pulse oX stable. BG 92.   Will repeat labs in AM, monitor to see if response to a medication.        Hypernatremia                      - Likely central DI from brain lesion                      - Na today 150, increased from 142 on 6/26                      - Hydrocortisone decreased from 10 to 5 per endocrine recs. 6/27    - NA this AM to 153    - Start 5% dextrose at 50cc/hr     Hypercalcemia                      -corrected Calcium 6/27 11.36                      -Corrected Ca this AM      Hypothyroidism                      - TSH low, T4 WFL                      - Synthroid 50mcg qd                      - Monitor        Patient was initially seen and evaluated by Dr. Jeff Lagunas D.O. [employer] PGY 4

## 2017-06-28 NOTE — SUBJECTIVE & OBJECTIVE
"    Medications:  Continuous Infusions:   Scheduled Meds:   desmopressin  10 mcg Nasal Once    levetiracetam IVPB  1,000 mg Intravenous Once    [START ON 2017] levetiracetam  500 mg Oral BID     PRN Meds:     Review of Systems   Unable to obtain secondary to AMS    Objective:     Weight: 53.5 kg (118 lb)  Body mass index is 21.58 kg/m².  Vital Signs (Most Recent):  Temp: 98.4 °F (36.9 °C) (17 1358)  Pulse: 99 (17 1732)  Resp: 16 (17 1358)  BP: 112/79 (17 1732)  SpO2: 95 % (17 1732) Vital Signs (24h Range):  Temp:  [97.6 °F (36.4 °C)-98.5 °F (36.9 °C)] 98.4 °F (36.9 °C)  Pulse:  [] 99  Resp:  [16-18] 16  SpO2:  [92 %-96 %] 95 %  BP: ()/(72-89) 112/79              Temp (24hrs), Av.2 °F (36.8 °C), Min:97.6 °F (36.4 °C), Max:98.5 °F (36.9 °C)           Date 17 07 - 17 0659   Shift 5285-5510 6105-9325 7309-4030 24 Hour Total   I  N  T  A  K  E   Shift Total  (mL/kg)       O  U  T  P  U  T   Urine  (mL/kg/hr) 500  (1.2) 800  1300    Shift Total  (mL/kg) 500  (9.3) 800  (14.9)  1300  (24.3)   Weight (kg) 53.5 53.5 53.5 53.5          Urethral Catheter 06/15/17 1205 Latex (Active)   Site Assessment Clean;Intact 2017  7:00 AM   Collection Container Standard drainage bag 2017  7:00 AM   Securement Method secured to top of thigh w/ adhesive device 2017  7:00 AM   Catheter Care Performed yes 2017  7:00 AM   Reason for Continuing Urinary Catheterization Chronic Indwelling Urinary Catheter on Admission 2017  7:00 PM   CAUTI Prevention Bundle StatLock in place w 1" slack;Intact seal between catheter & drainage tubing;Drainage bag off the floor;Green sheeting clip in use;No dependent loops or kinks;Drainage bag not overfilled (<2/3 full);Drainage bag below bladder 2017  7:00 PM   Output (mL) 600 mL 2017  5:00 AM       Neurosurgery Physical Exam   General: well developed, well nourished, no distress  Head: normocephalic, " atraumatic  Neurologic: Very hard to arouse, even with sternal rub  GCS: Motor: 5/Verbal: 3/Eyes: 2 GCS Total: 10  Mental Status: Awakens only with noxious stimuli, able to give name & location with a lot of stimulation  Language: No aphasia  Speech: Dysarthria  Cranial nerves: face symmetric, tongue midline, CN II-XII grossly intact.   Eyes: pupils NR bilaterally  Pulmonary: normal respirations, not labored, no accessory muscles used  Abdomen: soft, non-distended, not tender to palpation  Sensory: intact to light touch throughout  Motor Strength: Moves all extremities spontaneously with good tone.  Unable to assess strength.  No abnormal movements seen.   Pronator Drift: Unable to assess  Finger-to-nose: Unable to assess  Mcguire: absent  Clonus: absent  Babinski: absent  Pulses: 2+ and symmetric radial and dorsalis pedis  Skin: warm, dry and intact, no rashes      Significant Labs:    Recent Labs  Lab 06/28/17  1422      *   K 4.8   *   CO2 25   BUN 21*   CREATININE 1.2   CALCIUM 11.7*       Recent Labs  Lab 06/27/17  0706 06/28/17  0617 06/28/17  1422   WBC 9.43 9.81 9.77   HGB 14.6 15.4 15.5   HCT 46.0 48.1 46.9    343 286     No results for input(s): LABPT, INR, APTT in the last 48 hours.  Microbiology Results (last 7 days)     Procedure Component Value Units Date/Time    Urine culture [[id number]] Collected:  06/28/17 1733    Order Status:  No result Specimen:  Urine Updated:  06/28/17 1733          Significant Diagnostics:  I personally reviewed CT Head & agree with findings: Evolving postoperative changes of recent left sphenoidal meningioma resection with resolution of blood products and trace intraventricular hemorrhage at the surgical bed.     Continued mass effect in the parenchyma and minimal midline shift are similar to prior examination.    No evidence of major vascular distribution infarct or hemorrhage.

## 2017-06-28 NOTE — DISCHARGE SUMMARY
[facility]  Neurosurgery  Discharge Summary      Patient Name: Joanna Morales  MRN: [id number]  Admission Date: 6/7/2017  Hospital Length of Stay: 19 days  Discharge Date and Time: 6/26/2017  2:11 PM  Attending Physician: Stella att. 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  She underwent operation in January 2015 on the [facility] service at [facility].  It was apparently only possible to get a partial resection of the tumor.  She did   have a followup scan done in 2016 showing a continued large meningioma in this area.  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  She had a followup MRI scan done at [facility] on 01/20/17, showing this large tumor.  She was seen by you in Ophthalmology and then referred to Neurosurgery for further evaluation.  At this point, she has some preserved vision in the right eye.  She complains of constant headaches.  She has no sense of smell.  She feels off balance.  She is taking Neurontin apparently for seizure prevention. Past medical history relates primarily to the present illness.  She does not have chronic medical illnesses such as diabetes or hypertension.  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  She is  and has good family support.     On physical examination, she is a well-developed, well-nourished white lady, who   is alert and oriented.  Examination of the head shows a somewhat anterior, but   well-healed bicoronal incision.  She says that this is a little tender in the   left frontal area.  Eyes show full extraocular movements.   The pupils are small,   the left is not reactive.  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  The right optic nerve is probably normal.  She   sees things as doubles.  When I held up two fingers she counted four, although   she knew that there were two.  Hearing seems preserved.  The neck is supple.  On   neurological examination, she is speaking clearly.  She is obviously anxious   and depressed over her illness.  Finger-to-nose is somewhat diminished on the   left side and gait was mildly unsteady.  Cranial nerves are otherwise intact.    She has normal facial sensation and movement.  The tongue protrudes in the   midline.  Strength in the extremities seems generally good, sensation normal and   deep tendon reflexes symmetrical.     MRI of the brain performed at [facility] on 01/20/17, shows a   previous bifrontal craniotomy.  There is a large lobular meningioma, which   occupies the sella, planum sphenoidale and extends back on to the clivus.  The   optic chiasm and optic nerves are not visualized.  The carotid arteries and   anterior cerebral arteries run done through the tumor.  The tumor elevates and   pushes the third ventricle back, but there is no hydrocephalus    Procedure(s) (LRB):  CRANIOTOMY WITH STEALTH (N/A)     Hospital Course: 6/7: to OR for L frontotemporal crani  6/8: R eye blindness postop  6/9: Endocrone consulted by [facility]. DDAVP given x1.   6/10: continued ICU care. CT head yesterday stable.   6/12: NAEON  6/15: Pt lethargic overnight prompting CT head which was stable.  6/16: Mental status continues to wax/wane.  CT head overnight which was stable from prior.  6/17: TTF  6/19: Pt required 1 dose of DDAVP this afternoon.    6/20: [facility] denied.  [facility] consulted.   6/21: Increased Na and UO overnight.  Pt required DDAVP x 1 this AM.   6/22: Na - 157, 154 today.  UO and SG are stable.  DDAVP Scheduled QHS.   6/23: Na  trending down, 151 today.  6/26: Na 140, 142. DC to [facility].    Consults:   Consults         Status Ordering Provider     Inpatient consult to Endocrinology  Once     Provider:  (Not yet assigned)    Completed MAETO RODRIGUEZ     Inpatient consult to Physical Medicine Rehab  Once     Provider:  (Not yet assigned)    Completed HIRA ORDONEZ          Significant Diagnostic Studies: As above    Pending Diagnostic Studies:     Procedure Component Value Units Date/Time    Basic metabolic panel [[id number]] Collected:  06/07/17 1846    Order Status:  Sent Lab Status:  In process Updated:  06/07/17 1846    Specimen:  Blood from Blood     Narrative:       Draw on arrival to ICU    CBC auto differential [[id number]] Collected:  06/07/17 1846    Order Status:  Sent Lab Status:  In process Updated:  06/07/17 1846    Specimen:  Blood from Blood     Narrative:       Draw on arrival to ICU    Osmolality [[id number]] Collected:  06/09/17 1556    Order Status:  Sent Lab Status:  In process Updated:  06/09/17 1557    Specimen:  Blood from Blood     Sodium [[id number]] Collected:  06/20/17 1036    Order Status:  Sent Lab Status:  In process Updated:  06/20/17 1037    Specimen:  Blood from Blood     Narrative:       Collection has been rescheduled by ALW2 at 6/20/2017 09:45 Reason:   Patient off unit will return to collect at later time    Sodium [[id number]] Collected:  06/15/17 0323    Order Status:  Sent Lab Status:  In process Updated:  06/15/17 0324    Specimen:  Blood from Blood     Sodium [[id number]] Collected:  06/13/17 2326    Order Status:  Sent Lab Status:  In process Updated:  06/13/17 2326    Specimen:  Blood from Blood     Sodium [[id number]] Collected:  06/13/17 0052    Order Status:  Sent Lab Status:  In process Updated:  06/13/17 0052    Specimen:  Blood from Blood     Sodium [[id number]] Collected:  06/11/17 0506    Order Status:  Sent Lab Status:  In process Updated:  06/11/17 0507    Specimen:  Blood from Blood      Sodium [[id number]] Collected:  06/11/17 0506    Order Status:  Sent Lab Status:  In process Updated:  06/11/17 0507    Specimen:  Blood from Blood     Urinalysis [[id number]] Collected:  06/21/17 1017    Order Status:  Sent Lab Status:  In process Updated:  06/21/17 1018    Specimen:  Urine     Urinalysis [[id number]] Collected:  06/14/17 1112    Order Status:  Sent Lab Status:  In process Updated:  06/14/17 1139    Specimen:  Urine     Urinalysis [[id number]] Collected:  06/13/17 1059    Order Status:  Sent Lab Status:  In process Updated:  06/13/17 1100    Specimen:  Urine     Urinalysis [[id number]] Collected:  06/11/17 1152    Order Status:  Sent Lab Status:  In process Updated:  06/11/17 1152    Specimen:  Urine     Urinalysis [[id number]] Collected:  06/11/17 1151    Order Status:  Sent Lab Status:  In process Updated:  06/11/17 1152    Specimen:  Urine     Urinalysis [[id number]] Collected:  06/08/17 1615    Order Status:  Sent Lab Status:  In process Updated:  06/08/17 1615    Specimen:  Urine         Final Active Diagnoses:    Diagnosis Date Noted POA    PRINCIPAL PROBLEM:  Meningioma, recurrent of brain [D32.0] 06/07/2017 Yes    S/P craniotomy [Z98.890] 06/10/2017 Not Applicable    Adverse effect of glucocorticoid or synthetic analogue [T38.0X5A] 06/10/2017 No    Restlessness and agitation [R45.1] 06/09/2017 Unknown    Vision loss of right eye [H54.61] 06/09/2017 Unknown    Essential hypertension [I10] 06/09/2017 Unknown    Leukocytosis [D72.829] 06/09/2017 Unknown    Normocytic anemia [D64.9] 06/09/2017 Unknown    Diabetes insipidus [E23.2] 06/08/2017 No    Shock [R57.9] 06/08/2017 No    Slurred speech [R47.81] 06/08/2017 Yes    Agitation [R45.1] 06/08/2017 Yes    Brain compression [G93.5] 06/08/2017 Yes    Pneumocephalus [G93.89] 06/08/2017 No    Respiratory insufficiency [R06.89] 06/07/2017 Unknown    Depression [F32.9] 11/01/2016 Yes      Problems Resolved During this Admission:     Diagnosis Date Noted Date Resolved POA      Discharged Condition: fair    Disposition: Rehab Facility    Follow Up:    Patient Instructions:   No discharge procedures on file.  Medications:  Transfer Medications (for Discharge Readmit only):   No current facility-administered medications for this encounter.      Current Outpatient Prescriptions   Medication Sig Dispense Refill    gabapentin (NEURONTIN) 300 MG capsule Take 1 capsule (300 mg total) by mouth 3 (three) times daily. 90 capsule 8    hydrocodone-acetaminophen 5-325mg (NORCO) 5-325 mg per tablet Take 1 tablet by mouth every 12 (twelve) hours as needed (severe pain). 60 tablet 0    amitriptyline (ELAVIL) 100 MG tablet Take 1 tablet (100 mg total) by mouth nightly. 90 tablet 3    EUCALYPTUS OIL/MENTHOL/CAMPHOR (VICKS VAPORUB TOP) Apply topically daily as needed.       Facility-Administered Medications Ordered in Other Encounters   Medication Dose Route Frequency Provider Last Rate Last Dose    [MAR Hold - Suspended Admission] acetaminophen tablet 650 mg  650 mg Oral Q4H PRN Mayelin Alaniz PA-C   650 mg at 06/27/17 1011    [MAR Hold - Suspended Admission] acetaminophen tablet 650 mg  650 mg Oral QID (WM & HS) Monserrat Fairchild MD   650 mg at 06/28/17 0804    [MAR Hold - Suspended Admission] amitriptyline tablet 100 mg  100 mg Oral Nightly Mayelin Alaniz PA-C   100 mg at 06/27/17 2206    [MAR Hold - Suspended Admission] bisacodyl suppository 10 mg  10 mg Rectal Daily PRN Monserrat Fairchild MD        [MAR Hold - Suspended Admission] dextrose 5 % infusion   Intravenous Continuous Monserrat Fairchild MD 50 mL/hr at 06/28/17 0815      [MAR Hold - Suspended Admission] dextrose 50% injection 12.5 g  12.5 g Intravenous PRN Mayelin Alaniz PA-C        [MAR Hold - Suspended Admission] dextrose 50% injection 25 g  25 g Intravenous PRN Mayelin Alaniz PA-C        [MAR Hold - Suspended Admission] docusate sodium capsule 100 mg  100 mg Oral BID Monserrat Fairchild  MD   100 mg at 06/28/17 0817    [MAR Hold - Suspended Admission] glucagon (human recombinant) injection 1 mg  1 mg Intramuscular PRN Mayelin Alaniz PA-C        [MAR Hold - Suspended Admission] glucose chewable tablet 16 g  16 g Oral PRN Mayelin Alaniz PA-C        [MAR Hold - Suspended Admission] glucose chewable tablet 24 g  24 g Oral PRN Mayelin Alaniz PA-C        [MAR Hold - Suspended Admission] heparin (porcine) injection 5,000 Units  5,000 Units Subcutaneous Q8H Mayelin Alaniz PA-C   5,000 Units at 06/28/17 0504    [MAR Hold - Suspended Admission] hydrocortisone tablet 10 mg  10 mg Oral QAM Mayelin Alaniz PA-C   10 mg at 06/28/17 0744    [MAR Hold - Suspended Admission] hydrocortisone tablet 5 mg  5 mg Oral Daily with dinner Monserrat Fairchild MD        [MAR Hold - Suspended Admission] levothyroxine tablet 50 mcg  50 mcg Oral Before breakfast Mayelin Alaniz PA-C   50 mcg at 06/28/17 0504    [MAR Hold - Suspended Admission] nicotine 14 mg/24 hr 1 patch  1 patch Transdermal Daily Monserrat Fairchild MD   1 patch at 06/28/17 0804    [MAR Hold - Suspended Admission] ondansetron disintegrating tablet 8 mg  8 mg Oral Q8H PRN Monserrat Fairchild MD        [MAR Hold - Suspended Admission] pantoprazole suspension 40 mg  40 mg Oral BID Mayelin Alaniz PA-C   40 mg at 06/28/17 0804    [MAR Hold - Suspended Admission] polyethylene glycol packet 17 g  17 g Oral Daily Mayelin Alaniz PA-C   17 g at 06/28/17 0805    [MAR Hold - Suspended Admission] ramelteon tablet 8 mg  8 mg Oral QHS Mayelin Alaniz PA-C   8 mg at 06/27/17 2100       Mayelin Alaniz PA-C  Neurosurgery [facility]

## 2017-06-28 NOTE — ED PROVIDER NOTES
Encounter Date: 6/28/2017       History     Chief Complaint   Patient presents with    Abnormal Lab     Pt sent from [facility] for elevated sodium of 153 and calcium of 11.7.     Patient is a 52yo F with history of Meningioma s/p Resection and discharge from [facility] on 6/26/17, who presents with altered mental status and worsening hypernatremia for 1 day. Patient is a poor historian, and information is taken from the chart. Patient is s/p meningioma resection and discharge from [facility] on 6/26/17 after treatment for DI. She returns with gradual change in mental status and worsening hypernatremia. Morning labs suggest Na of 153 after trending up in Rehab at [place]. She was sent for evaluation.           Review of patient's allergies indicates:   Allergen Reactions    Codeine      Past Medical History:   Diagnosis Date    Allergy     Basal cell carcinoma     Depression 11/1/2016    Essential hypertension 6/9/2017    Eye disorder     Fever blister     Normocytic anemia 6/9/2017    Secondary hypothyroidism 6/26/2017     Past Surgical History:   Procedure Laterality Date    BASAL CELL CARCINOMA EXCISION      forehead    BRAIN SURGERY      SKIN BIOPSY       Family History   Problem Relation Age of Onset    Diabetes Father     Hypertension Father     Hepatitis Father     No Known Problems Mother      Social History   Substance Use Topics    Smoking status: Current Every Day Smoker     Packs/day: 1.00     Years: 40.00     Types: Cigarettes    Smokeless tobacco: Never Used    Alcohol use No     Review of Systems   Unable to perform ROS: Mental status change       Physical Exam     Initial Vitals [06/28/17 1217]   BP Pulse Resp Temp SpO2   115/80 (!) 123 16 98.3 °F (36.8 °C) 96 %      MAP       91.67         Physical Exam    Nursing note and vitals reviewed.  Constitutional: She appears well-developed and well-nourished. She is not diaphoretic. No distress.   HENT:   Head: Normocephalic and atraumatic.    Mouth/Throat: No oropharyngeal exudate.   Dry mucus membranes   Eyes: Pupils are equal, round, and reactive to light. Right eye exhibits no discharge. Left eye exhibits no discharge. No scleral icterus.   Dilated to 4mm, but reactive to light bilaterally   Neck: No tracheal deviation present.   Cardiovascular: Regular rhythm, normal heart sounds and intact distal pulses. Exam reveals no gallop and no friction rub.    No murmur heard.  Tachycardia   Pulmonary/Chest: Breath sounds normal. No respiratory distress. She has no wheezes. She has no rhonchi. She has no rales. She exhibits no tenderness.   Abdominal: Soft. Bowel sounds are normal. She exhibits no distension. There is no tenderness. There is no guarding.   Musculoskeletal: She exhibits no edema or tenderness.   Neurological: She is alert.   GCS 14 for confusion.    Skin: Skin is warm and dry. Capillary refill takes less than 2 seconds.         ED Course   Procedures  Labs Reviewed   COMPREHENSIVE METABOLIC PANEL   CBC W/ AUTO DIFFERENTIAL   DRUG SCREEN PANEL, URINE EMERGENCY   LACTIC ACID, PLASMA   TSH   URINALYSIS, REFLEX TO URINE CULTURE   AMMONIA   PREGNANCY TEST, URINE RAPID   SODIUM, URINE, RANDOM   CREATININE, URINE, RANDOM     EKG Readings: (Independently Interpreted)   Initial Reading: No STEMI.   Sinus Tach 116, R axis 93, T wave inversions 2,3,avF and V3-V6 consistent with prior studies.           Medical Decision Making:   History:   Old Medical Records: I decided to obtain old medical records.  Clinical Tests:   Lab Tests: Ordered and Reviewed  Radiological Study: Ordered and Reviewed  Other:   I have discussed this case with another health care provider.       APC / Resident Notes:   PGY-2 MDM:   -Patient is a 51 y.o. presenting with a chief complaint of hypernatramia after discharge for DI and s/p Meningioma resection. Vital signs stable, patient afebrile, tachycardic and non-hypoxic.   -Patient altered but GCS 14 for confusion. Morning sodium  was 153 and review of notes suggest a gradual change in mental status with her trending up of sodium. Repeat labs pending, including CT scan and ammonia. NSGY notified of the patient being here.   -Mucus membranes dry, will provide 1L of NS for volume depletion.     Workup ongoing, will continue to re-assess patient and update management as needed.     Lamberto Baker DO  [employer] EM/IM PGY-2  6/28/2017 2:35 PM         Patient Care Update:  -Na at 152, lactic normal. Urine pending and ammonia pending.   -CT compared to prior study and shows no acute change. Discussed with neurosurgery. They are reviewing the case with neuro critical care. Either neurosurgery or neuro critical care is to admit after review of the patient by neuro critical care.     Lamberto Baker DO  [employer] EM/IM PGY-2  6/28/2017 4:39 PM            Attending Attestation:   Physician Attestation Statement for Resident:  As the supervising MD   Physician Attestation Statement: I have personally seen and examined this patient.   I agree with the above history. -: 52 yo f, just discharged from [facility] for meningioma resection, post-op course c/b diabetes insipidus, now 2 days s/p d/c returning for abnormal labs, return of hypernatremia, AMS, hypercalcemia.  Pt with depressed MS though able to answer some simple questions.  Neurosurgery has evaluated pt, wanted pt admitted to neuro ICU but they have evaluated pt and think she does not need ICU and would be more appropriately managed on inpatient medical service.  Endocrinology was following this pt when developed this complication,  CT head unchanged.  Will admit   As the supervising MD I agree with the above PE.    As the supervising MD I agree with the above treatment, course, plan, and disposition.  I have reviewed and agree with the residents interpretation of the following: lab data and CT scans.  I have reviewed the following: old records at this facility.               [unfilled]     ED Course     Clinical  Impression:   The primary encounter diagnosis was Hypernatremia. A diagnosis of Altered mental status was also pertinent to this visit.    Disposition:   Disposition: Admitted                        Lamberto Baker MD  Resident  06/29/17 110

## 2017-06-28 NOTE — ED NOTES
LOC: The patient lethargic with flat affect; pt disoriented to place, time, and situation;   APPEARANCE: Patient resting comfortably and in no acute distress, patient is clean and well groomed, patient's clothing is properly fastened.  SKIN: The skin is warm and dry, patient has normal skin turgor and dry mucus membranes; bruising noted to abdomen; inscision noted to left side of head consistent with recent craniotomy   MUSKULOSKELETAL: Patient moving all extremities well, no obvious swelling or deformities noted.  RESPIRATORY: Airway is open and patent, respirations are spontaneous, patient has a normal effort and rate. Breath sounds are clear and equal bilaterally.  CARDIAC: tachycardic with rate of 125; No peripheral edema.  ABDOMEN: Soft and non tender to palpation, no distention noted. Bowel sounds present.  NEURO: No neuro deficits, hand grasp equal, no drift noted, no facial droop

## 2017-06-29 PROBLEM — G93.40 ACUTE ENCEPHALOPATHY: Status: ACTIVE | Noted: 2017-01-01

## 2017-06-29 NOTE — CONSULTS
[facility]  Endocrinology  Consult Note    Consult Requested by: Daryl Barajas MD   Reason for admit: Hypernatremia    HISTORY OF PRESENT ILLNESS:  Reason for Consult: Management of hypernatremia    Admit Date: 6/28/2017      Reason for Consult: Diabetes insipidus      Surgical Procedure and Date:   CRANIOTOMY 6/8/2017           HPI:   Patient is a 51 y.o. female with a diagnosis of  meningioma s/p resection (6/7). 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  She underwent partial resection of the tumor in January 2015 at [facility]. Followup scan done in 2016 showing a continued large meningioma. Endocrine had been previously involved in her care during earlier June hospitalization for post-operative diabetes insipidus and additional concern for secondary adrenal insufficiency and secondary hypothyroidism. Had been discharged to [facility] on 6/26/17. Re-admitted to [facility] on 6/28/17 for worsening hypernatremia and mental status. Endocrine had been consulted this admission for severe hypernatremia with concerns for diabetes insipidus.             Medications and/or Treatments Impacting Glycemic Control:  Immunotherapy:    Immunosuppressants     None        Steroids:   Hormones     None        Pressors:    Autonomic Drugs     None          Prescriptions Prior to Admission   Medication Sig Dispense Refill Last Dose    amitriptyline (ELAVIL) 100 MG tablet Take 1 tablet (100 mg total) by mouth nightly. 90 tablet 3 6/5/2017    EUCALYPTUS OIL/MENTHOL/CAMPHOR (VICKS VAPORUB TOP) Apply topically daily as needed.   Taking    gabapentin (NEURONTIN) 300 MG capsule Take 1 capsule (300 mg total) by mouth 3 (three) times daily. 90 capsule 8 6/6/2017 at 0900    hydrocodone-acetaminophen 5-325mg (NORCO) 5-325 mg per tablet Take 1 tablet by mouth every 12 (twelve) hours as needed (severe  pain). 60 tablet 0 6/6/2017 at 1200       Current Facility-Administered Medications   Medication Dose Route Frequency Provider Last Rate Last Dose    acetaminophen tablet 500 mg  500 mg Oral Q6H PRN Jaswant rAellano MD        dextrose 5 % infusion   Intravenous Continuous Fiona Winterbottom, APRN, CNS 75 mL/hr at 06/29/17 0849      enoxaparin injection 40 mg  40 mg Subcutaneous Daily Jaswant Arellano MD   40 mg at 06/28/17 2348    gabapentin capsule 300 mg  300 mg Oral TID Jaswant Arellano MD        levetiracetam in NaCl (iso-os) IVPB 500 mg  500 mg Intravenous Q12H Daniel Marion NP        nicotine 21 mg/24 hr 1 patch  1 patch Transdermal Daily Jaswant Arellano MD        ondansetron injection 8 mg  8 mg Intravenous Q8H PRN Jaswant Arellano MD        ramelteon tablet 8 mg  8 mg Oral Nightly PRN Jaswant Arellano MD        sodium chloride 0.9% flush 3 mL  3 mL Intravenous Q8H Fiona Winterbottom, APRN, CNS         Facility-Administered Medications Ordered in Other Encounters   Medication Dose Route Frequency Provider Last Rate Last Dose    [MAR Hold - Suspended Admission] acetaminophen tablet 650 mg  650 mg Oral Q4H PRN Mayelin Alaniz PA-C   650 mg at 06/27/17 1011    [MAR Hold - Suspended Admission] acetaminophen tablet 650 mg  650 mg Oral QID (WM & HS) Monserrat Fairchild MD   650 mg at 06/28/17 0804    [MAR Hold - Suspended Admission] amitriptyline tablet 100 mg  100 mg Oral Nightly Mayelin Alaniz PA-C   100 mg at 06/27/17 2206    [MAR Hold - Suspended Admission] bisacodyl suppository 10 mg  10 mg Rectal Daily PRN Monserrat Fairchild MD        [MAR Hold - Suspended Admission] dextrose 5 % infusion   Intravenous Continuous Monserrat Fairchild MD 50 mL/hr at 06/28/17 0815      [MAR Hold - Suspended Admission] dextrose 50% injection 12.5 g  12.5 g Intravenous PRN Mayelin Alaniz PA-C        [MAR Hold - Suspended Admission] dextrose 50% injection 25 g  25 g Intravenous PRN Mayelin Alaniz PA-C        [MAR Hold - Suspended  Admission] docusate sodium capsule 100 mg  100 mg Oral BID Monserrat Fairchild MD   100 mg at 06/28/17 0817    [MAR Hold - Suspended Admission] glucagon (human recombinant) injection 1 mg  1 mg Intramuscular PRN Mayelin Alaniz PA-C        [MAR Hold - Suspended Admission] glucose chewable tablet 16 g  16 g Oral PRN Mayelin Alaniz PA-C        [MAR Hold - Suspended Admission] glucose chewable tablet 24 g  24 g Oral PRN Mayelin Alaniz PA-C        [MAR Hold - Suspended Admission] heparin (porcine) injection 5,000 Units  5,000 Units Subcutaneous Q8H Mayelin Alaniz PA-C   5,000 Units at 06/28/17 0504    [MAR Hold - Suspended Admission] hydrocortisone tablet 10 mg  10 mg Oral QAM Mayelin Alaniz PA-C   10 mg at 06/28/17 0744    [MAR Hold - Suspended Admission] hydrocortisone tablet 5 mg  5 mg Oral Daily with dinner Monserrat Fairhcild MD        [MAR Hold - Suspended Admission] levothyroxine tablet 50 mcg  50 mcg Oral Before breakfast Mayelin Alaniz PA-C   50 mcg at 06/28/17 0504    [MAR Hold - Suspended Admission] nicotine 14 mg/24 hr 1 patch  1 patch Transdermal Daily Monserrat Fairchild MD   1 patch at 06/28/17 0804    [MAR Hold - Suspended Admission] ondansetron disintegrating tablet 8 mg  8 mg Oral Q8H PRN Monserrat Fairchild MD        [MAR Hold - Suspended Admission] pantoprazole suspension 40 mg  40 mg Oral BID Mayelin Alaniz PA-C   40 mg at 06/28/17 0804    [MAR Hold - Suspended Admission] polyethylene glycol packet 17 g  17 g Oral Daily Mayelin Alaniz PA-C   17 g at 06/28/17 0805    [MAR Hold - Suspended Admission] ramelteon tablet 8 mg  8 mg Oral QHS Mayelin Alainz PA-C   8 mg at 06/27/17 2100       Interval HPI:   Overnight events: worsening hypernatremia on morning labs. Transfer to MICU for worsening mentation and higher level of care  Eating:   <25%  Nausea: No  Hypoglycemia and intervention: No  Fever: No  TPN and/or TF: No  If yes, type of TF/TPN and rate: n/a    PMH, PSH, FH, SH  updated and reviewed         Review of Systems   Unable to obtain due to: Altered mental status,Critical illness        PHYSICAL EXAMINATION:  Vitals:    06/29/17 0915   BP: 112/72   Pulse: (!) 113   Resp: 11   Temp: 97.9 °F (36.6 °C)     Body mass index is 21.18 kg/m².    Physical Exam   Constitutional:  Non-responsive, underweight, NAD.  ENT: External ears no masses with nose patent; normal hearing.   Neck:  Supple; trachea midline; no thyromegaly.   Cardiovascular: Normal heart sounds, no LE edema.     Lungs:  Normal effort; lungs anterior bilaterally clear to auscultation.  Abdomen:  Soft, no masses,  no hernias.  MS: No clubbing or cyanosis of nails noted; unable to assess gait.  Skin: No rashes, lesions, or ulcers; no nodules.  Psychiatric: Good judgement and insight; normal mood and affect.  Neurological: Cranial nerves are grossly intact.   .     ASSESSMENT and PLAN:  Ms. Joanna Morales is a 51 yr old female s/p meningioma resection and post-surgical diabetes insipidus. Readmitted for severe hypernatremia and worsening mentation.  * Hypernatremia    Severe hypernatremia with greater suspicion for volume depletion as opposed to central diabetes insipidus  Calculated free water deficit of 5.2L, recommend d5 75cc/hr -- goal is to reduce sodium by 10 mmol/L within the first 24hrs  Have asked primary team to recheck urine specific gravity and urine osmolality and serial sodium monitoring every 4 hrs    Recommend desmopressin 10mcg intranasal prn for urine output greater than 200cc/hr for two-three consecutive hrs, urine specific gravity of 1.005 or less, and serum sodium greater than 150          Secondary adrenal insufficiency    Morning cortisol level 10.7 adequate, ACTH pending  Will hold off on further hydrocortisone unless patient develops hemodynamic instability            Secondary hypothyroidism    Recommend levothyroxine 50mcg oral if NG tube obtained, otherwise would recommend IV levothyroxine  25mcg  Repeat tsh within 4-6 weeks as o/p                Gerry Sr MD  Endocrinology  [facility]

## 2017-06-29 NOTE — ASSESSMENT & PLAN NOTE
Endocrine following- Etiology possibly volume depletion vs central diabetes insipidus  -- Q8 urine specific gravity   -- QD urine osmolality  - unable to order more frequently  -- serial sodium monitoring every 4 hrs  --Desmopressin 10mcg intranasal prn for urine output greater than 200cc/hr for two-three consecutive hrs, urine specific gravity of 1.005 or less, and serum sodium greater than 150

## 2017-06-29 NOTE — CONSULTS
Received Consult: New Admit    Pt transferred from Rehab, currently being followed by RD. Will follow up with Pt at new scheduled visit 6/3/17

## 2017-06-29 NOTE — SUBJECTIVE & OBJECTIVE
Past Medical History:   Diagnosis Date    Allergy     Basal cell carcinoma     Depression 11/1/2016    Essential hypertension 6/9/2017    Eye disorder     Fever blister     Normocytic anemia 6/9/2017    Secondary hypothyroidism 6/26/2017       Past Surgical History:   Procedure Laterality Date    BASAL CELL CARCINOMA EXCISION      forehead    BRAIN SURGERY      SKIN BIOPSY         Review of patient's allergies indicates:   Allergen Reactions    Codeine        Family History     Problem Relation (Age of Onset)    Diabetes Father    Hepatitis Father    Hypertension Father    No Known Problems Mother        Social History Main Topics    Smoking status: Current Every Day Smoker     Packs/day: 1.00     Years: 40.00     Types: Cigarettes    Smokeless tobacco: Never Used    Alcohol use No    Drug use: No    Sexual activity: No      Review of Systems   Unable to perform ROS: Mental status change     Objective:     Vital Signs (Most Recent):  Temp: 97.4 °F (36.3 °C) (06/29/17 1500)  Pulse: 102 (06/29/17 1700)  Resp: 13 (06/29/17 1700)  BP: 114/85 (06/29/17 1700)  SpO2: 95 % (06/29/17 1700) Vital Signs (24h Range):  Temp:  [97.4 °F (36.3 °C)-98.2 °F (36.8 °C)] 97.4 °F (36.3 °C)  Pulse:  [] 102  Resp:  [11-18] 13  SpO2:  [93 %-97 %] 95 %  BP: ()/(70-93) 114/85   Weight: 52.5 kg (115 lb 12.8 oz)  Body mass index is 21.18 kg/m².      Intake/Output Summary (Last 24 hours) at 06/29/17 1747  Last data filed at 06/29/17 1700   Gross per 24 hour   Intake          2118.75 ml   Output             3440 ml   Net         -1321.25 ml       Physical Exam   Constitutional: She appears cachectic. She appears ill.   HENT:   Head: Normocephalic.   Eyes: Right eye exhibits normal extraocular motion. Left eye exhibits normal extraocular motion. Right pupil is not reactive. Left pupil is not reactive.   Pupils #7 with fixed forward gaze  Does not track   Cardiovascular: Regular rhythm, normal heart sounds and normal  pulses.  Tachycardia present.    Pulmonary/Chest: Effort normal. She has decreased breath sounds in the right lower field and the left lower field.   Abdominal: Soft. Normal appearance and bowel sounds are normal.   Genitourinary:   Genitourinary Comments: Mackey   Neurological: She is unresponsive. GCS eye subscore is 1. GCS verbal subscore is 1. GCS motor subscore is 4.   Possible seizures/post ictal  Minimally responsive  Appears able to protect airway    Skin: Skin is dry.   Skin cold     Nursing note and vitals reviewed.      Vents:     Lines/Drains/Airways     Drain                 Urethral Catheter 06/15/17 1205 Latex 14 days         NG/OG Tube 06/29/17 1212 [place] sump Right nostril less than 1 day          Peripheral Intravenous Line                 Peripheral IV - Single Lumen 06/29/17 1051 Right Forearm less than 1 day              Significant Labs:    CBC/Anemia Profile:    Recent Labs  Lab 06/28/17  1422 06/29/17  0559 06/29/17  1018   WBC 9.77 9.00 8.22   HGB 15.5 14.1 15.0   HCT 46.9 45.1 48.4    334 349   MCV 99* 104* 104*   RDW 15.0* 15.4* 15.3*        Chemistries:    Recent Labs  Lab 06/29/17  0559 06/29/17  0724 06/29/17  1018 06/29/17  1406   * 168* 168* 163*   K 3.8 5.9* 4.0 3.6   * >130* 126* 125*   CO2 28 23 29 28   BUN 19 20 20 19   CREATININE 1.1 1.0 1.3 1.2   CALCIUM 11.0* 10.9* 11.3* 10.5   ALBUMIN 3.6 3.5 3.8  --    PROT 8.2 8.4 8.7*  --    BILITOT 0.3 0.3 0.4  --    ALKPHOS 128 126 134  --    ALT 65* 65* 64*  --    AST 41* 48* 42*  --    MG 2.5  --   --   --    PHOS 3.9  --   --   --        All pertinent labs within the past 24 hours have been reviewed.    Significant Imaging: I have reviewed and interpreted all pertinent imaging results/findings within the past 24 hours.

## 2017-06-29 NOTE — NURSING TRANSFER
Nursing Transfer Note      6/29/2017     Transfer To: 9th floor    Transfer via stretcher strecther    Transfer with personal belongings    Transported by charge nurse    Medicines sent: no    Chart send with patient: YES      Notified: Report given to Dimple     Patient reassessed at: 09:25 6/29/2017    Upon arrival to floor:

## 2017-06-29 NOTE — HPI
Ms. Joanna Morales is a 51 y.o. female with tobacco abuse, secondary hypothyroidism (TSH 0.241 6/17) and history of meningioma s/p resection 2015 & 6/7/2017. The most recent surgery by Dr. Chew at [facility] was complicated by DI . The patient was discharged to [facility] on 6/26/17 and re-presented to [facility] ED on 6/28 with altered mental status and worsening hypernatremia.     Of note, She underwent a right frontotemporal craniotomy with partial removal of a large subfrontal and tuberculum sellae meningioma at [facility] in 2015. 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

## 2017-06-29 NOTE — ASSESSMENT & PLAN NOTE
Severe hypernatremia with greater suspicion for volume depletion vs central diabetes insipidus  -- Calculated free water deficit of 5.2L, recommend d5 75cc/hr -- goal is to reduce sodium by 10 mmol/L within the first 24hrs  -- Initially D5 at 150 mls/hr with free water bolus per NGTx2  -- Reduced to D5 75 mls/hr as Na decreased and glucose increasing

## 2017-06-29 NOTE — ASSESSMENT & PLAN NOTE
Patient altered and minimally responsive during the day. Appears to be protecting her airway  --Awoke slightly with ABG and NGT insertion.  -- No hypercapnia on ABG  -- Continue to decrease sodium level  -- Keppra for possible seizures  -- Neuro-surgery following

## 2017-06-29 NOTE — ASSESSMENT & PLAN NOTE
Patient was counseled on smoking cessation and she will be provided a nicotine transdermal patch applied while inpatient.  Will provide additional smoking cessation counseling prior to discharge.

## 2017-06-29 NOTE — PLAN OF CARE
Paged by nurses at 7:00 AM regarding critical sodium level of 168. The patient was admitted by a physician overnight to my medical service. Immediately I spoke with neurocritical care and critical care medicine. Instructed nurse to stop IVF.  The patient is not responsive to verbal or tactile stimuli. The patient is at extremely high risk morbidity/mortality and should be transferred to MICU for closer monitoring and possible airway protection as deemed necessary.  I discussed with [facility] that I have ordered subcutaneous desmopressin as a precaution.  I made myself available to [facility] should any further issues arise.  A full progress note is to follow.

## 2017-06-29 NOTE — PROGRESS NOTES
"Speech Therapy  Cognitive/Language Evaluation    Joanna Morales   MRN: [id number]        06/28/17 1015   Speech Time Calculation   Speech Start Time 1015   Speech Stop Time 1105   Speech Total (min) 50 min   General Information   SLP Treatment Date 06/28/17   Referring Physician Dr Fairchild   General Observations Pt seen in ST office while sitting upright in w/c with friend, Reina, present.   General Precautions aspiration;fall;nectar thick;vision impaired   Visual/Auditory Vision impaired  (L neglect; B visual changes )   Subjective   Patient states Pt stated "I'm here to see the dermatologist."   Pain/Comfort   Pain Rating no pain   Assessment   SLP Diagnosis severe cognitive-linguistic deficits; moderate-severe oropharyngeal dysphagia   Prognosis Fair   Problem List decreased alertness; visual changes; decreased airway protection; severe confusion; poor orientation skills; language errors of speech; decreased vocal intensity   Assessment Please see BSE for full Hx. Pt presents today with increased alertness compared to yesterdays evaluation. Pt's friend, Reina, present and providing Hx and plan for home situation information. She reports pt was independent prior to admission with poor vision previously, but able to ambulate and perform cooking tasks. Pt presents today with decreased breath support, dysarthria of speech, left orofacial weakness; right preference head position, and severe confusion. Pt oriented to person indly with 100% acc; however, orientation to place, time, and situation with 0% acc. Pt completed YNQ task with 42% acc given redirections to task with poor attention skills. Pt fidgety with pulling at clothes and seatbelt of w/c t/o session. Following directions task completed with 50% acc of 1 unit commands. Labeling body parts task completed with 40% acc and sentence completion task completed with 0% acc. Pt with report of incorrect history information within interview portion of evaluation of " previous living situation. Pt observed with delayed verbal responses, delayed processing and perseveration of speech. Pt completed automatic speech tasks of counting 1-15 and BHAVIK with 60% acc with perseveration of speech and errors of word selection. Pt with severe cognitive and language deficits at this time requiring 24 hour supervision. Education provided to pt and Reina present for evaluation of POC, recommendations, and safety. All questions answered and concerns addressed at this time.    Level of Motivation/Participation good   Short Term Goals   Goal 1 Pt will be oriented x4 given moderate verbal cues with 50% acc.   Goal 2 Pt will complete expressive language tasks given max verbal cues with 40% acc.   Goal 3 Pt will complete comprehension language tasks given max verbal cues with 40% acc.   Long Term Goals   Goal 1 Pt will be oriented x4 given min verbal cues with 75% acc.   Goal 2 Pt will complete expressive language tasks given moderate verbal cues with 60% acc.   Goal 3 Pt will complete comprehension language tasks given moderate verbal cues with 60% acc.   Discharge Recommendations   Discharge Facility/Level Of Care Needs home health speech therapy  (24 hour supervision)   Plan   Plan Plan of care intiated;Speech Language/Cognitive Therapy;Patient Education;Family Education   Therapy Frequency Monday-Friday   Plan of Care Expires on 17   SLP Follow-up   SLP Follow-up? Yes   Treatment/Billable Minutes   Eval 50   Total Time 50     The patient's spiritual, cultural, social, and educational needs were considered with no evidence of barriers noted, and the patient is agreeable to plan of care.    FIM Scores  Comprehension:1  Expression: 1  Social Interaction:1  Problem Solvin  Memory: 1    Comprehension:  Complex= humor, finances, rationale for medical treatment(hip precautions, pressure relief)  Basic= pain, hunger, thirst, bathroom needs, cold, nutrition, sleep    Understands basic less than  25%- Does not respond appropriately (1)    Expression:  Expresses basic less than 25% of time  (1)    Social Interaction:  Interacts appropriately less than 25% of time - May be withdrawn or combative  (1)    Problem Solving:  Solves basic problems less than 25% of time - Needs direction nearly all the time or does not effectively solve problems and my need a restraint for safety. Bed alarm on at all times.  (1)    Memory:  Recognizes, recalls, or executes 1 step request less than 25% of time  (1)        Kassie Yap CCC-SLP  6/28/17

## 2017-06-29 NOTE — ASSESSMENT & PLAN NOTE
She is s/p resection with complications of diabetes insipidus and secondary hypothyroidism.  Neurosurgery has been consulted; will await further recommendations.

## 2017-06-29 NOTE — NURSING
Pt arrived to floor per transport, pt is resting quietly, no distress/pain noted, sitter is in room as well.

## 2017-06-29 NOTE — PROGRESS NOTES
Pt arrived from 9th floor. Pt is extremely drowsy. Oriented to self. Disoriented to time and situation. Will continue to monitor.

## 2017-06-29 NOTE — PLAN OF CARE
Problem: Patient Care Overview  Goal: Plan of Care Review  Outcome: Ongoing (interventions implemented as appropriate)  No acute events throughout day, pt remains unresponsive but arouses to pain, pupils remain 6 and fixed, VS and assessment per flow sheet, pt remains on  D5 gtt, will continue to trend BMP and urinalysis, plan of care reviewed with Joanna Morales but no demonstration of understanding, all concerns addressed, will continue to monitor.

## 2017-06-29 NOTE — NURSING
Upon rounding patient exhibited more alertness, eyes opened and fixed, answered to name call and stated date of birth but confused with place,date and time. Sitter in room, will continue to monitor.

## 2017-06-29 NOTE — ASSESSMENT & PLAN NOTE
Patient's laboratory findings are most likely secondary to her diabetes insipidus.  She was given a dose of intranasal desmopressin and started on IV fluids.  Will repeat her metabolic panel in the morning and consult Endocrinology for further recommendations.

## 2017-06-29 NOTE — SUBJECTIVE & OBJECTIVE
Interval HPI:   Overnight events: worsening hypernatremia on morning labs. Transfer to MICU for worsening mentation and higher level of care  Eating:   <25%  Nausea: No  Hypoglycemia and intervention: No  Fever: No  TPN and/or TF: No  If yes, type of TF/TPN and rate: n/a    PMH, PSH, FH, SH updated and reviewed         Review of Systems   Unable to obtain due to: Altered mental status,Critical illness        PHYSICAL EXAMINATION:  Vitals:    06/29/17 0915   BP: 112/72   Pulse: (!) 113   Resp: 11   Temp: 97.9 °F (36.6 °C)     Body mass index is 21.18 kg/m².    Physical Exam   Constitutional:  Non-responsive, underweight, NAD.  ENT: External ears no masses with nose patent; normal hearing.   Neck:  Supple; trachea midline; no thyromegaly.   Cardiovascular: Normal heart sounds, no LE edema.     Lungs:  Normal effort; lungs anterior bilaterally clear to auscultation.  Abdomen:  Soft, no masses,  no hernias.  MS: No clubbing or cyanosis of nails noted; unable to assess gait.  Skin: No rashes, lesions, or ulcers; no nodules.  Psychiatric: Good judgement and insight; normal mood and affect.  Neurological: Cranial nerves are grossly intact.

## 2017-06-29 NOTE — H&P
[facility] Hospital Medicine  History & Physical    Patient Name: Joanna Morales  MRN: [id number]  Admission Date: 6/28/2017  Attending Physician: Daryl Brownlee MD   Primary Care Provider: Claudy Tse MD    [facility] Team: [facility]OSP MED B Jaswant Arellano MD     Patient information was obtained from ER records.     Subjective:     Principal Problem:Hypernatremia    Chief Complaint: Abnormal labwork today.    HPI: Ms. Joanna Morales is a 51 y.o. female with tobacco abuse, secondary hypothyroidism (TSH 0.241 Jun 2017), and history of meningioma s/p resection complicated by diabetes insipidus who presents to [facility] ED from [facility] after being found with abnormal labwork.  She had been more altered in the last day or so and upon checking labwork, she was noted to be with hypernatremia.  She contributed very minimally to her history but does report some abdominal pain.  Of note, she was recently admitted to this facility under the Neurosurgery service for resection of a meningioma that was complicated by diabetes insipidus for which she received desmopressin a few times before being discharged to rehabilitation at [facility].  Further history is limited at this time.    Chart Review:  Previous Hospitalizations  Date Hospital Diagnosis   Jun 2017 [facility] Left frontotemporal craniotomy      Outpatient Follow-Up  Date of Visit Physician Service   May 2017 Donald Chew MD Neurosurgery    May 2017 Deepak Spencer MD General Surgery    May 2017 Claudy Tse MD Primary Care   Apr 2017 Sarah King MD Ophthalmology    Feb 2017 Abhijit Tinoco MD Dermatology    Jan 2017 Marek Jones MD Neurology    Nov 2015 Jess Klein MD Gynecology      Past Medical History:   Diagnosis Date    Allergy     Basal cell carcinoma     Depression 11/1/2016    Essential hypertension 6/9/2017    Eye disorder     Fever blister     Normocytic anemia 6/9/2017    Secondary  hypothyroidism 6/26/2017       Past Surgical History:   Procedure Laterality Date    BASAL CELL CARCINOMA EXCISION      forehead    BRAIN SURGERY      SKIN BIOPSY         Review of patient's allergies indicates:   Allergen Reactions    Codeine        Current Facility-Administered Medications on File Prior to Encounter   Medication    [MAR Hold - Suspended Admission] acetaminophen tablet 650 mg    [MAR Hold - Suspended Admission] acetaminophen tablet 650 mg    [MAR Hold - Suspended Admission] amitriptyline tablet 100 mg    [MAR Hold - Suspended Admission] bisacodyl suppository 10 mg    [MAR Hold - Suspended Admission] dextrose 5 % infusion    [MAR Hold - Suspended Admission] dextrose 50% injection 12.5 g    [MAR Hold - Suspended Admission] dextrose 50% injection 25 g    [MAR Hold - Suspended Admission] docusate sodium capsule 100 mg    [MAR Hold - Suspended Admission] glucagon (human recombinant) injection 1 mg    [MAR Hold - Suspended Admission] glucose chewable tablet 16 g    [MAR Hold - Suspended Admission] glucose chewable tablet 24 g    [MAR Hold - Suspended Admission] heparin (porcine) injection 5,000 Units    [MAR Hold - Suspended Admission] hydrocortisone tablet 10 mg    [MAR Hold - Suspended Admission] hydrocortisone tablet 5 mg    [MAR Hold - Suspended Admission] levothyroxine tablet 50 mcg    [MAR Hold - Suspended Admission] nicotine 14 mg/24 hr 1 patch    [MAR Hold - Suspended Admission] ondansetron disintegrating tablet 8 mg    [MAR Hold - Suspended Admission] pantoprazole suspension 40 mg    [MAR Hold - Suspended Admission] polyethylene glycol packet 17 g    [MAR Hold - Suspended Admission] ramelteon tablet 8 mg    [DISCONTINUED] gabapentin capsule 300 mg     Current Outpatient Prescriptions on File Prior to Encounter   Medication Sig    amitriptyline (ELAVIL) 100 MG tablet Take 1 tablet (100 mg total) by mouth nightly.    EUCALYPTUS OIL/MENTHOL/CAMPHOR (VICKS VAPORUB TOP)  Apply topically daily as needed.    gabapentin (NEURONTIN) 300 MG capsule Take 1 capsule (300 mg total) by mouth 3 (three) times daily.    hydrocodone-acetaminophen 5-325mg (NORCO) 5-325 mg per tablet Take 1 tablet by mouth every 12 (twelve) hours as needed (severe pain).     Family History     Problem Relation (Age of Onset)    Diabetes Father    Hepatitis Father    Hypertension Father    No Known Problems Mother        Social History Main Topics    Smoking status: Current Every Day Smoker     Packs/day: 1.00     Years: 40.00     Types: Cigarettes    Smokeless tobacco: Never Used    Alcohol use No    Drug use: No    Sexual activity: No     Review of Systems   Unable to perform ROS: Mental status change     Objective:     Vital Signs (Most Recent):  Temp: 97.5 °F (36.4 °C) (06/28/17 2328)  Pulse: 108 (06/28/17 2328)  Resp: 16 (06/28/17 2328)  BP: 112/77 (06/28/17 2328)  SpO2: (!) 93 % (06/28/17 2328) Vital Signs (24h Range):  Temp:  [97.5 °F (36.4 °C)-98.4 °F (36.9 °C)] 97.5 °F (36.4 °C)  Pulse:  [] 108  Resp:  [15-18] 16  SpO2:  [93 %-96 %] 93 %  BP: ()/(70-89) 112/77     Weight: 52.5 kg (115 lb 12.8 oz)  Body mass index is 21.18 kg/m².    Physical Exam   Constitutional: She appears well-developed and well-nourished. No distress.   HENT:   Head: Normocephalic.   Right Ear: External ear normal.   Left Ear: External ear normal.   Nose: Nose normal.   Coronal incision healing well   Eyes: Pupils are equal, round, and reactive to light. Right eye exhibits no discharge.   Neck: Normal range of motion.   Cardiovascular:   Tachycardic without murmurs   Pulmonary/Chest: Effort normal and breath sounds normal. No respiratory distress. She has no wheezes. She has no rales. She exhibits no tenderness.   Abdominal: Soft. Bowel sounds are normal. She exhibits no distension. There is no rebound and no guarding.   Mild tenderness to palpation generally   Musculoskeletal: She exhibits no edema.   Neurological:    Alert, cooperative, and follows commands, but oriented only to self.   Skin: Skin is warm and dry. She is not diaphoretic. No erythema.   Nursing note and vitals reviewed.       Significant Labs: All pertinent labs within the past 24 hours have been reviewed.    Significant Imaging: I have reviewed and interpreted all pertinent imaging results/findings within the past 24 hours.    Assessment/Plan:     * Hypernatremia    Patient's laboratory findings are most likely secondary to her diabetes insipidus.  She was given a dose of intranasal desmopressin and started on IV fluids.  Will repeat her metabolic panel in the morning and consult Endocrinology for further recommendations.          Diabetes insipidus    As addressed above.        Secondary hypothyroidism    As addressed above.  Will continue her home regimen of levothyroxine.        Meningioma    She is s/p resection with complications of diabetes insipidus and secondary hypothyroidism.  Neurosurgery has been consulted; will await further recommendations.        Tobacco abuse    Patient was counseled on smoking cessation and she will be provided a nicotine transdermal patch applied while inpatient.  Will provide additional smoking cessation counseling prior to discharge.          VTE Risk Mitigation         Ordered     enoxaparin injection 40 mg  Daily     Route:  Subcutaneous        06/28/17 2211     Medium Risk of VTE  Once      06/28/17 2211        Jaswant Arellano MD  Department of Hospital Medicine   [facility]

## 2017-06-29 NOTE — ASSESSMENT & PLAN NOTE
Recommend levothyroxine 50mcg oral if NG tube obtained, otherwise would recommend IV levothyroxine 25mcg  Repeat tsh within 4-6 weeks as o/p

## 2017-06-29 NOTE — PROGRESS NOTES
Progress Note  Hospital Medicine    Provider team:    [facility] HOSP MED B  [facility] ENDOCRINOLOGY  [facility] CRITICAL CARE OUTREACH (CORE)  [facility] CRITICAL CARE MEDICINE  Admit Date: 6/28/2017  Encounter Date: 06/29/2017     SUBJECTIVE:     Follow-up Visit for: Hypernatremia    HPI (See H&P for complete P,F,SHx): 51F tobacco abuse, secondary hypothyroidism (TSH 0.241 Jun 2017), and history of meningioma s/p resection complicated by diabetes insipidus who presents to [facility] ED from [facility] after being found with abnormal labwork.  She had been more altered in the last day or so and upon checking labwork, she was noted to be with hypernatremia.  She contributed very minimally to her history but does report some abdominal pain.  Of note, she was recently admitted to this facility under the Neurosurgery service for resection of a meningioma that was complicated by diabetes insipidus for which she received desmopressin a few times before being discharged to rehabilitation at [facility].  Further history is limited at this time.    Interval history: As documented, notified by nursing immediately upon starting shift of critical sodium level.  I spoke with neuro and medical ICU immediately as patient warranted transfer for closer observation.  The patient could not provide any history and was minimally responsive to verbal or tactile stimuli. GCS 3.    Review of Systems:  Review of Systems   Unable to perform ROS: Critical illness       OBJECTIVE:     Intake/Output Summary (Last 24 hours) at 06/29/17 0944  Last data filed at 06/29/17 0604   Gross per 24 hour   Intake           466.25 ml   Output             2300 ml   Net         -1833.75 ml     Vital Signs Range (Last 24H):  Temp:  [97.5 °F (36.4 °C)-98.4 °F (36.9 °C)]   Pulse:  []   Resp:  [11-18]   BP: ()/(70-89)   SpO2:  [93 %-96 %]   Body mass index is 21.18 kg/m².    Objective:  General Appearance:  Ill-appearing.    Vital signs: (most recent): Blood  "pressure 109/82, pulse (!) 112, temperature 97.9 °F (36.6 °C), temperature source Oral, resp. rate 16, height 5' 2" (1.575 m), weight 52.5 kg (115 lb 12.8 oz), SpO2 (!) 94 %, not currently breastfeeding.    Output: Producing urine and producing stool.    HEENT: (Dilated, minimally responsive pupils)    Lungs:  Bradypnea.  Breath sounds clear to auscultation.  She is not in respiratory distress.  No rales or wheezes.    Heart: Normal rate.  Regular rhythm.  S1 normal and S2 normal.  No murmur.   Chest: Symmetric chest wall expansion.   Abdomen: Abdomen is soft.  Bowel sounds are normal.   There is no abdominal tenderness.     Extremities: There is no deformity, effusion, dependent edema or local swelling.    Pulses: Distal pulses are intact.    Neurological: GCS score is 3.    Pupils:  Pupils are equal, round, and reactive to light.    Skin:  Warm and dry.          Medications:  Medication list was reviewed in EPIC and changes noted under Assessment/Plan and MAR.    Laboratory:  Recent Labs      06/29/17   0559   WBC  9.00   RBC  4.35   HGB  14.1   HCT  45.1   PLT  334   MCV  104*   MCH  32.4*   MCHC  31.3*   GRAN  47.3  4.3   LYMPH  38.6  3.5   MONO  11.7  1.1*   EOS  0.2      Recent Labs      06/29/17   0559  06/29/17   0724   GLU  91  93   NA  168*  168*   K  3.8  5.9*   CL  127*  >130*   CO2  28  23   BUN  19  20   CREATININE  1.1  1.0   CALCIUM  11.0*  10.9*   ANIONGAP  13  Unable to calculate   MG  2.5   --    PHOS  3.9   --        ASSESSMENT/PLAN:     Active Hospital Problems    Diagnosis  POA    *Hypernatremia [E87.0]  Yes    Tobacco abuse [Z72.0]  Yes     Chronic    Secondary hypothyroidism [E03.8]  Yes     Chronic    Diabetes insipidus [E23.2]  Yes     Chronic    Meningioma [D32.9]  Yes     Chronic      Resolved Hospital Problems    Diagnosis Date Resolved POA   No resolved problems to display.      Hypernatremia, acute on chronic, severe, life-threatening  Central diabetes insipidus  - Sodium " severe elevated, CCM consulted for closer monitoring  - Endocrinology recommendations greatly appreciated  - Hold desmopressin for now and start D5W at 75 cc/hr  - Goal correction 10 mmol/L within first 24 hours  - Check urine specific gravity, urine osm, and serial sodium q4h  - If UOP > 250 cc/hr for 2 hours, specific gravity <1.005, or Na >150, give 10 mcg ddAVP    Secondary adrenal insufficiency  - Check ACTH, cortisol  - Consider hydrocortisone    Secondary hypothyroidism  - Initiate levothyroxine either via NG or IV    Meningioma, s/p resection  - Case discussed with neurocritical care  - Consultation to neurosurgery placed     Anticipated discharge date and disposition:   To medical ICU.  Daryl Brownlee MD  [facility] Medicine

## 2017-06-29 NOTE — ASSESSMENT & PLAN NOTE
History of meningioma s/p resection 2015 & 6/2017.   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

## 2017-06-29 NOTE — SUBJECTIVE & OBJECTIVE
Interval History: Pt is drowsy, but arousable.  Knows name, cannot answer questions appropriately.    Medications:  Continuous Infusions:   dextrose 5 % 150 mL/hr at 17 1500     Scheduled Meds:   enoxaparin  40 mg Subcutaneous Daily    gabapentin  300 mg Per NG tube TID    levetiracetam IVPB  500 mg Intravenous Q12H    nicotine  1 patch Transdermal Daily    sodium chloride 0.9%  3 mL Intravenous Q8H     PRN Meds:acetaminophen, ondansetron, ramelteon     Review of Systems  Objective:     Weight: 52.5 kg (115 lb 12.8 oz)  Body mass index is 21.18 kg/m².  Vital Signs (Most Recent):  Temp: 97.4 °F (36.3 °C) (17 1500)  Pulse: 101 (17 1500)  Resp: 12 (17 1500)  BP: 117/84 (17 1500)  SpO2: 97 % (17 1500) Vital Signs (24h Range):  Temp:  [97.4 °F (36.3 °C)-98.2 °F (36.8 °C)] 97.4 °F (36.3 °C)  Pulse:  [] 101  Resp:  [11-18] 12  SpO2:  [93 %-97 %] 97 %  BP: ()/(70-93) 117/84              Temp (24hrs), Av.7 °F (36.5 °C), Min:97.4 °F (36.3 °C), Max:98.2 °F (36.8 °C)           Date 17 07 - 17 0659   Shift 1279-7699 1597-9619 1363-4477 24 Hour Total   I  N  T  A  K  E   I.V.  (mL/kg) 602.5  (11.5) 150  (2.9)  752.5  (14.3)    NG/   400    IV Piggyback 100   100    Shift Total  (mL/kg) 1102.5  (21) 150  (2.9)  1252.5  (23.8)   O  U  T  P  U  T   Urine  (mL/kg/hr) 865  (2.1) 100  965    Shift Total  (mL/kg) 865  (16.5) 100  (1.9)  965  (18.4)   Weight (kg) 52.5 52.5 52.5 52.5          NG/OG Tube 17 1212 Gipsy sump Right nostril (Active)   Intake (mL) 400 mL 2017  1:00 PM            Urethral Catheter 06/15/17 1205 Latex (Active)   Site Assessment Clean;Intact 2017  3:00 PM   Collection Container Standard drainage bag 2017  3:00 PM   Securement Method secured to top of thigh w/ adhesive device 2017  3:00 PM   Catheter Care Performed yes 2017  3:00 PM   Reason for Continuing Urinary Catheterization Critically ill in ICU  "requiring intensive monitoring 6/29/2017  3:00 PM   CAUTI Prevention Bundle StatLock in place w 1" slack;Intact seal between catheter & drainage tubing;Drainage bag off the floor;Green sheeting clip in use;No dependent loops or kinks;Drainage bag not overfilled (<2/3 full);Drainage bag below bladder 6/29/2017  9:15 AM   Output (mL) 100 mL 6/29/2017  3:00 PM       Neurosurgery Physical Exam   General: well developed, well nourished, no distress  Head: normocephalic, atraumatic  Neurologic: Alert and oriented. Thought content appropriate  GCS: Motor: 5/Verbal: 3/Eyes: 3 GCS Total: 11  Mental Status: Arousable  Language: No aphasia  Speech: Dysarthria  Cranial nerves: face symmetric, tongue midline, CN II-XII grossly intact.   Eyes: pupils NR bilaterally  Pulmonary: normal respirations, not labored, no accessory muscles used  Abdomen: soft, non-distended, not tender to palpation  Sensory: intact to light touch throughout  Motor Strength: Moves all extremities spontaneously with good tone. No abnormal movements seen.   Pronator Drift: Unable to assess  Finger-to-nose: Unable to assess  Mcguire: absent  Clonus: absent  Babinski: absent  Pulses: 2+ and symmetric radial and dorsalis pedis  Skin: warm, dry and intact, no rashes  Incision is healing well.         Significant Labs:    Recent Labs  Lab 06/29/17  0559  06/29/17  1406   GLU 91  < > 168*   *  < > 163*   K 3.8  < > 3.6   *  < > 125*   CO2 28  < > 28   BUN 19  < > 19   CREATININE 1.1  < > 1.2   CALCIUM 11.0*  < > 10.5   MG 2.5  --   --    < > = values in this interval not displayed.    Recent Labs  Lab 06/28/17  1422 06/29/17  0559 06/29/17  1018   WBC 9.77 9.00 8.22   HGB 15.5 14.1 15.0   HCT 46.9 45.1 48.4    334 349       Recent Labs  Lab 06/29/17  1018   INR 1.1     Microbiology Results (last 7 days)     Procedure Component Value Units Date/Time    Urine culture [[id number]] Collected:  06/28/17 8003    Order Status:  No result Specimen:  " Urine Updated:  06/28/17 1733          Significant Diagnostics:  No new imaging

## 2017-06-29 NOTE — H&P
[facility]  Critical Care Medicine  History & Physical    Patient Name: Joanna Morales  MRN: [id number]  Admission Date: 6/28/2017  Hospital Length of Stay: 1 days  Code Status: Full Code  Attending Physician: Lamberto Mcdaniel MD   Primary Care Provider: Claudy Tse MD   Principal Problem: Hypernatremia    Subjective:     HPI:  Ms. Joanna Morales is a 51 y.o. female with tobacco abuse, secondary hypothyroidism (TSH 0.241 6/17) and history of meningioma s/p resection 2015 & 6/2017. The most recent surgery by Dr. Chew at [facility] was complicated by DI .  The patient was discharged to [facility] on 6/26/17 and presented to [facility] ED on 6/28 with altered mental status and worsening hypernatremia.     Of note, She underwent a right frontotemporal craniotomy with partial removal of a large subfrontal and tuberculum sellae meningioma at [facility] in 2015. 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      Hospital/ICU Course:  Ms. Morales was discharged from [facility]  to [facility] on 6/26/17 and presented to [facility] ED on 6/28 with altered mental status and worsening hypernatremia. According to the patient's sitter, the patient was awake, verbal, and interactive with therapy on 6/23 but had been progressively less interactive and responsive when she was at [place]. Ms. Morales was admitted to Hospital Medicine but was found to be unresponsive with a sodium level of 168 on the morning of 6/29. The Critical Care team was consulted for ICU monitoring and management. She was started on a dextrose infusion and given free water per T. Endocrine was also involved in patient  management during this and the prior visit.        Past Medical History:   Diagnosis Date    Allergy     Basal cell carcinoma     Depression 11/1/2016    Essential hypertension 6/9/2017    Eye disorder     Fever blister     Normocytic anemia 6/9/2017    Secondary hypothyroidism 6/26/2017       Past Surgical History:   Procedure Laterality Date    BASAL CELL CARCINOMA EXCISION      forehead    BRAIN SURGERY      SKIN BIOPSY         Review of patient's allergies indicates:   Allergen Reactions    Codeine        Family History     Problem Relation (Age of Onset)    Diabetes Father    Hepatitis Father    Hypertension Father    No Known Problems Mother        Social History Main Topics    Smoking status: Current Every Day Smoker     Packs/day: 1.00     Years: 40.00     Types: Cigarettes    Smokeless tobacco: Never Used    Alcohol use No    Drug use: No    Sexual activity: No      Review of Systems   Unable to perform ROS: Mental status change     Objective:     Vital Signs (Most Recent):  Temp: 97.4 °F (36.3 °C) (06/29/17 1500)  Pulse: 102 (06/29/17 1700)  Resp: 13 (06/29/17 1700)  BP: 114/85 (06/29/17 1700)  SpO2: 95 % (06/29/17 1700) Vital Signs (24h Range):  Temp:  [97.4 °F (36.3 °C)-98.2 °F (36.8 °C)] 97.4 °F (36.3 °C)  Pulse:  [] 102  Resp:  [11-18] 13  SpO2:  [93 %-97 %] 95 %  BP: ()/(70-93) 114/85   Weight: 52.5 kg (115 lb 12.8 oz)  Body mass index is 21.18 kg/m².      Intake/Output Summary (Last 24 hours) at 06/29/17 1747  Last data filed at 06/29/17 1700   Gross per 24 hour   Intake          2118.75 ml   Output             3440 ml   Net         -1321.25 ml       Physical Exam   Constitutional: She appears cachectic. She appears ill.   HENT:   Head: Normocephalic.   Eyes: Right eye exhibits normal extraocular motion. Left eye exhibits normal extraocular motion. Right pupil is not reactive. Left pupil is not reactive.   Pupils #7 with fixed forward gaze  Does not track    Cardiovascular: Regular rhythm, normal heart sounds and normal pulses.  Tachycardia present.    Pulmonary/Chest: Effort normal. She has decreased breath sounds in the right lower field and the left lower field.   Abdominal: Soft. Normal appearance and bowel sounds are normal.   Genitourinary:   Genitourinary Comments: Mackey   Neurological: She is unresponsive. GCS eye subscore is 1. GCS verbal subscore is 1. GCS motor subscore is 4.   Possible seizures/post ictal  Minimally responsive  Appears able to protect airway    Skin: Skin is dry.   Skin cold     Nursing note and vitals reviewed.      Vents:     Lines/Drains/Airways     Drain                 Urethral Catheter 06/15/17 1205 Latex 14 days         NG/OG Tube 06/29/17 1212 [place] sump Right nostril less than 1 day          Peripheral Intravenous Line                 Peripheral IV - Single Lumen 06/29/17 1051 Right Forearm less than 1 day              Significant Labs:    CBC/Anemia Profile:    Recent Labs  Lab 06/28/17  1422 06/29/17  0559 06/29/17  1018   WBC 9.77 9.00 8.22   HGB 15.5 14.1 15.0   HCT 46.9 45.1 48.4    334 349   MCV 99* 104* 104*   RDW 15.0* 15.4* 15.3*        Chemistries:    Recent Labs  Lab 06/29/17  0559 06/29/17  0724 06/29/17  1018 06/29/17  1406   * 168* 168* 163*   K 3.8 5.9* 4.0 3.6   * >130* 126* 125*   CO2 28 23 29 28   BUN 19 20 20 19   CREATININE 1.1 1.0 1.3 1.2   CALCIUM 11.0* 10.9* 11.3* 10.5   ALBUMIN 3.6 3.5 3.8  --    PROT 8.2 8.4 8.7*  --    BILITOT 0.3 0.3 0.4  --    ALKPHOS 128 126 134  --    ALT 65* 65* 64*  --    AST 41* 48* 42*  --    MG 2.5  --   --   --    PHOS 3.9  --   --   --        All pertinent labs within the past 24 hours have been reviewed.    Significant Imaging: I have reviewed and interpreted all pertinent imaging results/findings within the past 24 hours.    Assessment/Plan:     Neuro   Acute encephalopathy    Patient altered and minimally responsive during the day. Appears to be protecting  her airway  --Awoke slightly with ABG and NGT insertion.  -- No hypercapnia on ABG  -- Continue to decrease sodium level  -- Keppra for possible seizures  -- Neuro-surgery following          Secondary hypothyroidism    Start 50mcg Levothyroxine per NGT            Diabetes insipidus    Endocrine following- Etiology possibly volume depletion vs central diabetes insipidus  -- Q8 urine specific gravity   -- QD urine osmolality  - unable to order more frequently  -- serial sodium monitoring every 4 hrs  --Desmopressin 10mcg intranasal prn for urine output greater than 200cc/hr for two-three consecutive hrs, urine specific gravity of 1.005 or less, and serum sodium greater than 150           Meningioma    History of meningioma s/p resection 2015 & 6/2017.   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            Endocrine   Secondary adrenal insufficiency    Cortisol level normal  -- Will continue to monitor        Fluids/Electrolytes/Nutrition/GI   * Hypernatremia    Severe hypernatremia with greater suspicion for volume depletion vs central diabetes insipidus  -- Calculated free water deficit of 5.2L, recommend d5 75cc/hr -- goal is to reduce sodium by 10 mmol/L within the first 24hrs  -- Initially D5 at 150 mls/hr with free water bolus per NGTx2  -- Reduced to D5 75 mls/hr as Na decreased and glucose increasing          Other   Tobacco abuse    Nicotine patch            Critical Care Medicine Daily Checklist:    A: Awake: RASS Goal/Actual Goal:    Actual: Tong Agitation Sedation Scale (RASS): Light sedation   B: Spontaneous Breathing  Trial Performed?  NA   C: SAT & SBT Coordinated?  NA                  D: Delirium: CAM-ICU  JAVIER   E: Early Mobility Performed? No   F: Feeding Goal:    Status:     Current Diet Order   Procedures    Diet NPO Except for: Other (Comment) (free water ad ranjeet)     Order Specific Question:   Except for     Answer:   Other (Comment)     Comments:   free water ad ranjeet      AS: Analgesia/Sedation NA   T: Thromboembolic Prophylaxis Enoxaparin   H: HOB > 300 Yes   U: Stress Ulcer Prophylaxis (if needed) NA   G: Glucose Control SSI   B: Bowel Function  N   I: Indwelling Catheter (Lines & Mackey) Necessity Mackey   D: De-escalation of Antimicrobials/Pharmacotherapies NA    Plan for the day/ETD Intubation watch  Q4 BMP  Monitor sodium    Code Status:  Family/Goals of Care: Full Code         Critical secondary to Patient has a condition that poses threat to life and bodily function: Hypernatremia/Acute Encephalopathy-Intubation watch     Critical care was time spent personally by me on the following activities: development of treatment plan with patient or surrogate and bedside caregivers, discussions with consultants, evaluation of patient's response to treatment, examination of patient, ordering and performing treatments and interventions, ordering and review of laboratory studies, ordering and review of radiographic studies, pulse oximetry, re-evaluation of patient's condition. This critical care time did not overlap with that of any other provider or involve time for any procedures.     Critical Care Time: 60 mins      Fiona Winterbottom, APRN, CNS  Critical Care Medicine  [facility]

## 2017-06-29 NOTE — CONSULTS
Consult to Critical Care Medicine for Acute Encephalopathy due to Hypernatremia  Discussed with Dr Brownlee who had already spoken with Neuro Critical Care and Neuro Surgery.  Patient to be admitted to CMICU by Critical Care Medicine.  See H+P from today

## 2017-06-29 NOTE — ASSESSMENT & PLAN NOTE
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 Chew 6/7/17  -No acute neurosurgical intervention indicated  -CT Head with improvement compared to CT Head on 6/15/17  -Suspect seizure - Keppra for treatment, do not feel patient needs epilepsy consult at this time  -Admit to medicine for management of DI, NCC does not feel that patient is appropriate or needs ICU management of this time  -Seizure precautions  -Discussed with Dr. Chew

## 2017-06-29 NOTE — ASSESSMENT & PLAN NOTE
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 Chew 6/7/17  -Tx to CMICU this am for dec in MS as well as Na 148  -Endocrine consulted for management of DI  -No acute neurosurgical intervention indicated  -CT Head with improvement compared to CT Head on 6/15/17  -Continue Keppra for seizure prophylaxis  -Seizure precautions  -Discuss with Dr. Chew

## 2017-06-29 NOTE — HPI
Ms. Joanna Morales is a 51 y.o. female with tobacco abuse, secondary hypothyroidism (TSH 0.241 Jun 2017), and history of meningioma s/p resection complicated by diabetes insipidus who presents to [facility] ED from [facility] after being found with abnormal labwork.  She had been more altered in the last day or so and upon checking labwork, she was noted to be with hypernatremia.  She contributed very minimally to her history but does report some abdominal pain.  Of note, she was recently admitted to this facility under the Neurosurgery service for resection of a meningioma that was complicated by diabetes insipidus for which she received desmopressin a few times before being discharged to rehabilitation at [facility].  Further history is limited at this time.

## 2017-06-29 NOTE — PLAN OF CARE
Claudy Tse MD  [address] [place] / [place] [address]    [facility] - [place], [place] - [address]  [address]  [place] [address]  Phone: [phone] Fax: [phone]    [facility] [place] - [address]  [address]  [place] [address]  Phone: [phone] Fax: [phone]    Payor: MEDICAID / Plan: [facility] / Product Type: Managed Medicaid /     Future Appointments  Date Time Provider Department Center   7/10/2017 9:55 AM [facility] LAB [facility] LAB [facility]   7/18/2017 9:00 AM Bria Parker MD [facility] ENDOCRN ANA MARIA WEST   7/19/2017 8:00 AM OPHTHALMOLOGY CORNEA, [facility] OPHTHAL [facility] GREEN   7/26/2017 9:30 AM OPHTHALMOLOGY PLASTICS, [facility] OPHTHAL [facility] GREEN   7/27/2017 9:00 AM OPHTHALMOLOGY GLAUCOMA, [facility] OPHTHAL [facility] GREEN   8/2/2017 8:20 AM Claudy Tse MD [facility] FAM MED ANA MARIA ACC   11/10/2017 9:00 AM CHA US1 CHAH ULSOUND [facility]   11/10/2017 9:30 AM [facility] MAMMO1 [facility] MAMMO [facility]   11/15/2017 8:15 AM GENERAL SURGERY CLINIC, [facility] GENSURG ANA MARIA HARTMAN     Extended Emergency Contact Information  Primary Emergency Contact: Raj Rivera   [place]  Mobile Phone: [phone]  Relation: Son  Secondary Emergency Contact: Aga Hernandez   [facility]  Home Phone: [phone]  Relation: Friend     06/29/17 1428   Discharge Assessment   Assessment Type Discharge Planning Assessment   Confirmed/corrected address and phone number on facesheet? Yes   Assessment information obtained from? Patient   Expected Length of Stay (days) 4   Communicated expected length of stay with patient/caregiver no   Prior to hospitilization cognitive status: Alert/Oriented   Prior to hospitalization functional status: Assistive Equipment;Independent   Current cognitive status: Inappropriate Behavior   Current Functional Status: Assistive Equipment;Needs Assistance   Arrived From rehab facility  ([facility])   Lives With  other relative(s)   Able to Return to Prior Arrangements unable to determine at this time (comments)   Is patient able to care for self after discharge? Unable to determine at this time (comments)   How many people do you have in your home that can help with your care after discharge? 1   Who are your caregiver(s) and their phone number(s)? Raj Blood (son) [phone], Aga Hernandez: (friend/roommate) [phone]   Patient's perception of discharge disposition rehab facility   Readmission Within The Last 30 Days current reason for admission unrelated to previous admission   Patient currently being followed by outpatient case management? No   Patient currently receives home health services? No   Does the patient currently use HME? No   Patient currently receives private duty nursing? N/A   Patient currently receives any other outside agency services? No   Equipment Currently Used at Home cane, straight;walker, rolling   Do you have any problems affording any of your prescribed medications? No   Is the patient taking medications as prescribed? yes   Do you have any financial concerns preventing you from receiving the healthcare you need? No   Does the patient have transportation to healthcare appointments? Yes   Transportation Available family or friend will provide   On Dialysis? No   Does the patient receive services at the Coumadin Clinic? No   Are there any open cases? No   Discharge Plan A Rehab   Discharge Plan B Skilled Nursing Facility   Patient/Family In Agreement With Plan yes   Cathy Aguilar RN, BSN  Case Management  [facility]  Ext. [phone]

## 2017-06-29 NOTE — DISCHARGE SUMMARY
[facility]  Physical Medicine & Rehab  Discharge Summary    Patient Name: Joanna Morales  MRN: [id number]  Admission Date: 6/26/2017  Hospital Length of Stay: 2 days  Discharge Date and Time: 6/28/2017 11:45 AM  Attending Physician: No att. providers found  Discharging Provider: Monserrat Fairchild MD  Primary Care Physician: Claudy Tse MD    HPI:  51-year-old female with PMHx of depression, L eye vision loss, basal cell carcinoma, and meningioma (s/p partial resection at [facility] Jan. 2015) followed in [facility] by Dr. Chew 5/17 and admitted [facility] on 6/7/17 for surgical intervention.  Now s/p L frontotemporal craniotomy and excision of meningioma with a drain (on Rocephin for ppx). Post-op CTH concerning for small postoperative hemorrhage; no intervention necessary. Hospital course was further complicated by pneumocephalus, DI, hypernatremia. hypotension, hypothermia, and encephalopathy. EEG shows slowing suggestive of encephalopathy without evidence of seizures. Repeat CTH showing improvement and stepped down to the floor on 6/16.   - On 6/24, patient developed encephalopathy, became severely agitated and anxious.  Haldol was administered, sitters were placed for SV and restraints were placed 2/2 attempted tube displacements.  Sodium has decreased to 140.     Current Functional Status:  Participating with therapy. Bed mobility ModA-MaxA.  Sit to stand Judy  Ambulated attempted lateral L steps, but unsuccessful 2/2 difficulty following commands.    UBD ModA-MaxA.   - SLP-diet recommendation dental soft and nectar thick liquids. Cognitive linguistic impairments noted.     Functional History: Per the son, patient lives in [place] with cousin in a trailor home with 4 steps to enter.  Prior to admission, she was independent with ADLs and used a RW and cane for household and ambulation.  DME: RW & SC.         Indwelling Lines/Drains at time of discharge:   Lines/Drains/Airways     Drain                  Urethral Catheter 06/15/17 1205 Latex 14 days         NG/OG Tube 06/29/17 1212 [place] sump Right nostril less than 1 day                Hospital Course:  51 y.o. female admitted to inpatient rehabilitation on 6/26/2017 for CHI (closed head injury) with impaired mobility and ADLs.    Concern with worsening trend of Na and Ca, in setting of waxing/waning mental status. Acutely [place] [facility] [place] further evaluation and mgmt. Pls see transfer note from 6/28/2017.        Consults         Status Ordering Provider     IP consult to dietary  Once     Provider:  (Not yet assigned)    Completed BYRON MORRIS          Significant Diagnostic Studies: Reviewed     Final Active Diagnoses:    Diagnosis Date Noted POA    PRINCIPAL PROBLEM:  CHI (closed head injury) [S09.90XA] 06/26/2017 Yes    S/P craniotomy [Z98.890] 06/10/2017 Not Applicable    Vision loss of right eye [H54.61] 06/09/2017 Yes    Pneumocephalus [G93.89] 06/08/2017 Yes    Depression [F32.9] 11/01/2016 Yes      Problems Resolved During this Admission:    Diagnosis Date Noted Date Resolved POA       Discharged Condition: stable    Disposition[place] [facility]     Follow Up:    Patient Instructions:   No discharge procedures on file.  Medications:  Reconciled Home Medications:   Discharge Medication List as of 6/29/2017  1:00 PM      CONTINUE these medications which have NOT CHANGED    Details   gabapentin (NEURONTIN) 300 MG capsule Take 1 capsule (300 mg total) by mouth 3 (three) times daily., Starting 12/14/2016, Until Thu 12/14/17, Normal      hydrocodone-acetaminophen 5-325mg (NORCO) 5-325 mg per tablet Take 1 tablet by mouth every 12 (twelve) hours as needed (severe pain)., Starting 5/1/2017, Until Discontinued, Print      amitriptyline (ELAVIL) 100 MG tablet Take 1 tablet (100 mg total) by mouth nightly., Starting 5/1/2017, Until Discontinued, Normal      EUCALYPTUS OIL/MENTHOL/CAMPHOR (VICKS VAPORUB TOP) Apply topically daily as needed., Until Discontinued,  Historical Med                 Monserrat Fairchild MD  Department of Physical Medicine & Rehab  [facility]

## 2017-06-29 NOTE — SUBJECTIVE & OBJECTIVE
Past Medical History:   Diagnosis Date    Allergy     Basal cell carcinoma     Depression 11/1/2016    Essential hypertension 6/9/2017    Eye disorder     Fever blister     Normocytic anemia 6/9/2017    Secondary hypothyroidism 6/26/2017       Past Surgical History:   Procedure Laterality Date    BASAL CELL CARCINOMA EXCISION      forehead    BRAIN SURGERY      SKIN BIOPSY         Review of patient's allergies indicates:   Allergen Reactions    Codeine        Current Facility-Administered Medications on File Prior to Encounter   Medication    [MAR Hold - Suspended Admission] acetaminophen tablet 650 mg    [MAR Hold - Suspended Admission] acetaminophen tablet 650 mg    [MAR Hold - Suspended Admission] amitriptyline tablet 100 mg    [MAR Hold - Suspended Admission] bisacodyl suppository 10 mg    [MAR Hold - Suspended Admission] dextrose 5 % infusion    [MAR Hold - Suspended Admission] dextrose 50% injection 12.5 g    [MAR Hold - Suspended Admission] dextrose 50% injection 25 g    [MAR Hold - Suspended Admission] docusate sodium capsule 100 mg    [MAR Hold - Suspended Admission] glucagon (human recombinant) injection 1 mg    [MAR Hold - Suspended Admission] glucose chewable tablet 16 g    [MAR Hold - Suspended Admission] glucose chewable tablet 24 g    [MAR Hold - Suspended Admission] heparin (porcine) injection 5,000 Units    [MAR Hold - Suspended Admission] hydrocortisone tablet 10 mg    [MAR Hold - Suspended Admission] hydrocortisone tablet 5 mg    [MAR Hold - Suspended Admission] levothyroxine tablet 50 mcg    [MAR Hold - Suspended Admission] nicotine 14 mg/24 hr 1 patch    [MAR Hold - Suspended Admission] ondansetron disintegrating tablet 8 mg    [MAR Hold - Suspended Admission] pantoprazole suspension 40 mg    [MAR Hold - Suspended Admission] polyethylene glycol packet 17 g    [MAR Hold - Suspended Admission] ramelteon tablet 8 mg    [DISCONTINUED] gabapentin capsule 300 mg      Current Outpatient Prescriptions on File Prior to Encounter   Medication Sig    amitriptyline (ELAVIL) 100 MG tablet Take 1 tablet (100 mg total) by mouth nightly.    EUCALYPTUS OIL/MENTHOL/CAMPHOR (VICKS VAPORUB TOP) Apply topically daily as needed.    gabapentin (NEURONTIN) 300 MG capsule Take 1 capsule (300 mg total) by mouth 3 (three) times daily.    hydrocodone-acetaminophen 5-325mg (NORCO) 5-325 mg per tablet Take 1 tablet by mouth every 12 (twelve) hours as needed (severe pain).     Family History     Problem Relation (Age of Onset)    Diabetes Father    Hepatitis Father    Hypertension Father    No Known Problems Mother        Social History Main Topics    Smoking status: Current Every Day Smoker     Packs/day: 1.00     Years: 40.00     Types: Cigarettes    Smokeless tobacco: Never Used    Alcohol use No    Drug use: No    Sexual activity: No     Review of Systems   Unable to perform ROS: Mental status change     Objective:     Vital Signs (Most Recent):  Temp: 97.5 °F (36.4 °C) (06/28/17 2328)  Pulse: 108 (06/28/17 2328)  Resp: 16 (06/28/17 2328)  BP: 112/77 (06/28/17 2328)  SpO2: (!) 93 % (06/28/17 2328) Vital Signs (24h Range):  Temp:  [97.5 °F (36.4 °C)-98.4 °F (36.9 °C)] 97.5 °F (36.4 °C)  Pulse:  [] 108  Resp:  [15-18] 16  SpO2:  [93 %-96 %] 93 %  BP: ()/(70-89) 112/77     Weight: 52.5 kg (115 lb 12.8 oz)  Body mass index is 21.18 kg/m².    Physical Exam   Constitutional: She appears well-developed and well-nourished. No distress.   HENT:   Head: Normocephalic.   Right Ear: External ear normal.   Left Ear: External ear normal.   Nose: Nose normal.   Coronal incision healing well   Eyes: Pupils are equal, round, and reactive to light. Right eye exhibits no discharge.   Neck: Normal range of motion.   Cardiovascular:   Tachycardic without murmurs   Pulmonary/Chest: Effort normal and breath sounds normal. No respiratory distress. She has no wheezes. She has no rales. She exhibits  no tenderness.   Abdominal: Soft. Bowel sounds are normal. She exhibits no distension. There is no rebound and no guarding.   Mild tenderness to palpation generally   Musculoskeletal: She exhibits no edema.   Neurological:   Alert, cooperative, and follows commands, but oriented only to self.   Skin: Skin is warm and dry. She is not diaphoretic. No erythema.   Nursing note and vitals reviewed.       Significant Labs: All pertinent labs within the past 24 hours have been reviewed.    Significant Imaging: I have reviewed and interpreted all pertinent imaging results/findings within the past 24 hours.

## 2017-06-29 NOTE — HPI
Reason for Consult: Management of hypernatremia    Admit Date: 6/28/2017      Reason for Consult: Diabetes insipidus      Surgical Procedure and Date:   CRANIOTOMY 6/8/2017           HPI:   Patient is a 51 y.o. female with a diagnosis of  meningioma s/p resection (6/7). 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  She underwent partial resection of the tumor in January 2015 at [facility]. Followup scan done in 2016 showing a continued large meningioma. Endocrine had been previously involved in her care during earlier June hospitalization for post-operative diabetes insipidus and additional concern for secondary adrenal insufficiency and secondary hypothyroidism. Had been discharged to [facility] on 6/26/17. Re-admitted to [facility] on 6/28/17 for worsening hypernatremia and mental status. Endocrine had been consulted this admission for severe hypernatremia with concerns for diabetes insipidus.

## 2017-06-29 NOTE — ASSESSMENT & PLAN NOTE
Morning cortisol level 10.7 adequate, ACTH pending  Will hold off on further hydrocortisone unless patient develops hemodynamic instability

## 2017-06-29 NOTE — HOSPITAL COURSE
6/29: Tx to [facility] for Na 168 this am  6/30: No ddavp given overnight.  7/1: Off fluids, mental status improving.  7/2: NAEON  7/4: Exam unchanged. Patient remains obtunded.   7/6: Na stable.  Continues to be lethargic.   7/8: slightly brighter today. NG tube remains in place   7/10: Trial on puree diet.   7/17: transferred to ICU due to respiratory distress after getting PEG  7/18: stable  7/19: neurology requesting LP, on EEG  7/20: LP performed  7/26: Continued delirium, endo changed nasal DDAVP to the oral dose  7/31: Awake, Alert.  Pending NH placement.   8/6: Awake, alert, conversant; pending NH placement \  8/7: awake, following commands.

## 2017-06-29 NOTE — PLAN OF CARE
06/29/17 1430   Readmission Questionnaire   At the time of your discharge, did someone talk to you about what your health problems were? Yes   At the time of discharge, did someone talk to you about what to watch out for regarding worsening of your health problem? Yes   At the time of discharge, did someone talk to you about what to do if you experienced worsening of your health problem? Yes   At the time of discharge, did someone talk to you about which medication to take when you left the hospital and which ones to stop taking? No   At the time of discharge, did someone talk to you about when and where to follow up with a doctor after you left the hospital? Yes   What do you believe caused you to be sick enough to be re-admitted? AMS at rehab   How often do you need to have someone help you when you read instructions, pamphlets, or other written material from your doctor or pharmacy? Rarely   Do you have problems taking your medications as prescribed? No   Do you have any problems affording any of  your prescribed medications? No   Do you have problems obtaining/receiving your medications? No   Does the patient have transportation to healthcare appointments? Yes   Lives With other relative(s)   Living Arrangements house   Does the patient have family/friends to help with healtcare needs after discharge? yes   Who are your caregiver(s) and their phone number(s)? Aga Hernandez [phone] and Raj Blood [phone]

## 2017-06-29 NOTE — NURSING
Notified md Brownlee of critical sodium and chloride, md spoke with medical and neuro icu, pt to be transferred for closer observation.

## 2017-06-29 NOTE — ASSESSMENT & PLAN NOTE
Check STAT cortisol, ACTH this morning and will consider resuming HC 10, 5mg depending on cortisol result

## 2017-06-29 NOTE — ASSESSMENT & PLAN NOTE
Severe hypernatremia with greater suspicion for volume depletion as opposed to central diabetes insipidus  Calculated free water deficit of 5.2L, recommend d5 75cc/hr -- goal is to reduce sodium by 10 mmol/L within the first 24hrs  Have asked primary team to recheck urine specific gravity and urine osmolality and serial sodium monitoring every 4 hrs    Recommend desmopressin 10mcg intranasal prn for urine output greater than 250cc/hr for two consecutive hrs, urine specific gravity of 1.005 or less, and serum sodium greater than 150

## 2017-06-29 NOTE — HOSPITAL COURSE
Ms. Morales was discharged from [facility]  to [facility] on 6/26/17 and presented to [facility] ED on 6/28 with altered mental status and worsening hypernatremia. According to the patient's sitter, the patient was awake, verbal, and interactive with therapy on 6/23 but had been progressively less interactive and responsive when she was at [place]. Ms. Morales was admitted to Hospital Medicine but was found to be unresponsive with a sodium level of 168 on the morning of 6/29.     She has been followed by endocrinology and neurology and workup thus far significant for  unremarkable UA, neuro-imaging c/w recent left sphenoidal meningioma resection with resolution of blood products and trace intraventricular hemorrhage.  Previous EEG (6/16/17) demonstrated Moderate diffuse slowing of the overall background as described above, Superimposed left hemispheric slowing relative to the right, and NO epileptiform discharges or seizures were seen c/w ENCP and focal cortical dysfunction.  Pt's presentation is concerning for multi-factorial encephalopathy given persistent hypernatremia.  Per neurology, low suspicion for MNG currently, but patient is at high risk given recent intracranial procedure.    Critical Care consulted 7/17/17 for acute hypoxic respiratory failure. Pt had PEG tube placed earlier today and in PACU, oxygen saturations noted to be in the mid 80's. Pt was placed on a non-rebreather with minimal improvement and subsequently placed on BiPAP. ABG significant for elevated CO2 however pt was compensating. Repeat ABG showed pH 7.372, CO2 44, O2 62, HCO3 25.8. Pt transitioned to NC. Cxray concerning for aspiration pneumonia - new infiltrate in RLL so patient started on vancomycin and zosyn for HAP.

## 2017-06-30 PROBLEM — Z71.89 GOALS OF CARE, COUNSELING/DISCUSSION: Status: ACTIVE | Noted: 2017-01-01

## 2017-06-30 NOTE — SUBJECTIVE & OBJECTIVE
"Interval HPI:   Overnight events: improvement in hypernatremia since receiving d5. Primary team had ordered one dose of desmopressin today  Eating:   NPO  Nausea: No  Hypoglycemia and intervention: No  Fever: No  TPN and/or TF: No  If yes, type of TF/TPN and rate: n/a    /78 (BP Location: Right arm)   Pulse 75   Temp 97 °F (36.1 °C) (Oral)   Resp 10   Ht 5' 2" (1.575 m)   Wt 52.2 kg (115 lb 1.3 oz)   SpO2 98%   Breastfeeding? No   BMI 21.05 kg/m²     Labs Reviewed and Include      Recent Labs  Lab 06/30/17  0521   *   CALCIUM 10.5   ALBUMIN 3.1*   PROT 7.2   *   K 3.8   CO2 30*   *   BUN 16   CREATININE 1.1   ALKPHOS 109   ALT 45*   AST 31   BILITOT 0.3     Lab Results   Component Value Date    WBC 8.40 06/30/2017    HGB 14.1 06/30/2017    HCT 45.2 06/30/2017     (H) 06/30/2017     06/30/2017       Recent Labs  Lab 06/27/17  0706 06/28/17  1422   TSH 0.354* 0.241*   FREET4 0.96 0.90     No results found for: HGBA1C    Nutritional status:   Body mass index is 21.05 kg/m².  Lab Results   Component Value Date    ALBUMIN 3.1 (L) 06/30/2017    ALBUMIN 3.8 06/29/2017    ALBUMIN 3.5 06/29/2017     Lab Results   Component Value Date    PREALBUMIN 26 06/27/2017       Estimated Creatinine Clearance: 47.9 mL/min (based on Cr of 1.1).    Accu-Checks  Recent Labs      06/28/17   2345  06/29/17   0747  06/29/17   1019  06/29/17   1411  06/29/17   1753  06/29/17   2144  06/29/17   2233  06/30/17   0007  06/30/17   0411  06/30/17   0619   POCTGLUCOSE  301*  96  108  168*  223*  80  82  107  160*  124*       Current Medications and/or Treatments Impacting Glycemic Control  Immunotherapy:  Immunosuppressants     None        Steroids:   Hormones     Start     Stop Route Frequency Ordered    06/29/17 1825  desmopressin nasal spray 10 mcg     Question:  Is the patient competent?  Answer:  No    -- Nasl 2 times daily PRN 06/29/17 1677        Pressors:    Autonomic Drugs     None    "     Hyperglycemia/Diabetes Medications: Antihyperglycemics     Start     Stop Route Frequency Ordered    06/29/17 1922  insulin aspart pen 0-5 Units      -- SubQ Every 6 hours PRN 06/29/17 1826

## 2017-06-30 NOTE — ASSESSMENT & PLAN NOTE
Endocrine following- Etiology possibly volume depletion vs central diabetes insipidus  -- Q8 urine specific gravity   -- QD urine osmolality  - unable to order more frequently  -- serial sodium monitoring every 4 hrs  --Desmopressin 10mcg intranasal prn for urine output greater than 200cc/hr for two-three consecutive hrs, urine specific gravity of 1.005 or less, and serum sodium greater than 150  -- Will discuss longterm startegy for DI management with Endocrine today

## 2017-06-30 NOTE — SUBJECTIVE & OBJECTIVE
Interval History/Significant Events: Much improved since yesterday. Sodium decreased to 158 and patient now awake, verbal and following commands    Review of Systems   Unable to perform ROS: Mental status change     Objective:     Vital Signs (Most Recent):  Temp: 97 °F (36.1 °C) (06/30/17 0400)  Pulse: 79 (06/30/17 0800)  Resp: 10 (06/30/17 0800)  BP: 109/78 (06/30/17 0800)  SpO2: 98 % (06/30/17 0800) Vital Signs (24h Range):  Temp:  [97 °F (36.1 °C)-98.2 °F (36.8 °C)] 97 °F (36.1 °C)  Pulse:  [] 79  Resp:  [9-21] 10  SpO2:  [93 %-99 %] 98 %  BP: ()/(55-93) 109/78   Weight: 52.2 kg (115 lb 1.3 oz)  Body mass index is 21.05 kg/m².      Intake/Output Summary (Last 24 hours) at 06/30/17 0814  Last data filed at 06/30/17 0800   Gross per 24 hour   Intake          4413.75 ml   Output             2775 ml   Net          1638.75 ml       Physical Exam   Constitutional: Vital signs are normal. She appears cachectic. She is cooperative. She has a sickly appearance.   Suture line across frontal area of scalp with shaved hair from surgery   HENT:   Head: Normocephalic.   Eyes:   Pupils large and non reactive  Tracking today  Patient reports blurry vision   Cardiovascular: Normal rate and regular rhythm.    Pulmonary/Chest: She has rhonchi in the right lower field and the left lower field.   Abdominal: Soft. Normal appearance and bowel sounds are normal.   Genitourinary:   Genitourinary Comments: Mackey in place   Musculoskeletal:   Limited movement    Neurological: She is alert. She has normal strength. GCS eye subscore is 4. GCS verbal subscore is 5. GCS motor subscore is 6.   Patient awake, speech slow and delayed.  Answers simple questions with one-word answers   Psychiatric: Her speech is delayed. She is slowed.   Nursing note and vitals reviewed.      Vents:     Lines/Drains/Airways     Drain                 Urethral Catheter 06/15/17 1205 Latex 14 days         NG/OG Tube 06/29/17 1212 [place] sump Right nostril  less than 1 day          Peripheral Intravenous Line                 Peripheral IV - Single Lumen 06/29/17 1051 Left Wrist less than 1 day         Peripheral IV - Single Lumen 06/29/17 1645 Right Antecubital less than 1 day              Significant Labs:    CBC/Anemia Profile:    Recent Labs  Lab 06/29/17  0559 06/29/17  1018 06/30/17  0401   WBC 9.00 8.22 8.40   HGB 14.1 15.0 14.1   HCT 45.1 48.4 45.2    349 286   * 104* 103*   RDW 15.4* 15.3* 15.3*        Chemistries:    Recent Labs  Lab 06/29/17  0559 06/29/17  0724 06/29/17  1018  06/29/17  1937 06/29/17  2354 06/30/17  0521   * 168* 168*  < > 158* 165* 158*   K 3.8 5.9* 4.0  < > 3.3* 5.7* 3.8   * >130* 126*  < > 120* 128* 122*   CO2 28 23 29  < > 29 26 30*   BUN 19 20 20  < > 18 17 16   CREATININE 1.1 1.0 1.3  < > 1.1 1.3 1.1   CALCIUM 11.0* 10.9* 11.3*  < > 10.2 11.3* 10.5   ALBUMIN 3.6 3.5 3.8  --   --   --  3.1*   PROT 8.2 8.4 8.7*  --   --   --  7.2   BILITOT 0.3 0.3 0.4  --   --   --  0.3   ALKPHOS 128 126 134  --   --   --  109   ALT 65* 65* 64*  --   --   --  45*   AST 41* 48* 42*  --   --   --  31   MG 2.5  --   --   --   --   --   --    PHOS 3.9  --   --   --   --   --   --    < > = values in this interval not displayed.    All pertinent labs within the past 24 hours have been reviewed.    Significant Imaging:  I have reviewed and interpreted all pertinent imaging results/findings within the past 24 hours.

## 2017-06-30 NOTE — ASSESSMENT & PLAN NOTE
Severe hypernatremia with greater suspicion for volume depletion vs central diabetes insipidus  -- Calculated free water deficit of 5.2L, recommend D5  to reduce sodium    -- Free water bolus per NGT every 4hrs  -

## 2017-06-30 NOTE — ASSESSMENT & PLAN NOTE
History of meningioma s/p resection 2015 & 6/2017.   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    -- Neurosurgery following

## 2017-06-30 NOTE — PROGRESS NOTES
"[facility]  Endocrinology  Progress Note      Reason for Consult: Management of hypernatremia    Admit Date: 6/28/2017      Reason for Consult: Diabetes insipidus      Surgical Procedure and Date:   CRANIOTOMY 6/8/2017           HPI:   Patient is a 51 y.o. female with a diagnosis of  meningioma s/p resection (6/7). 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  She underwent partial resection of the tumor in January 2015 at [facility]. Followup scan done in 2016 showing a continued large meningioma. Endocrine had been previously involved in her care during earlier June hospitalization for post-operative diabetes insipidus and additional concern for secondary adrenal insufficiency and secondary hypothyroidism. Had been discharged to [facility] on 6/26/17. Re-admitted to [facility] on 6/28/17 for worsening hypernatremia and mental status. Endocrine had been consulted this admission for severe hypernatremia with concerns for diabetes insipidus.             Interval HPI:   Overnight events: improvement in hypernatremia since receiving d5. Primary team had ordered one dose of desmopressin today  Eating:   NPO  Nausea: No  Hypoglycemia and intervention: No  Fever: No  TPN and/or TF: No  If yes, type of TF/TPN and rate: n/a    /78 (BP Location: Right arm)   Pulse 75   Temp 97 °F (36.1 °C) (Oral)   Resp 10   Ht 5' 2" (1.575 m)   Wt 52.2 kg (115 lb 1.3 oz)   SpO2 98%   Breastfeeding? No   BMI 21.05 kg/m²       Labs Reviewed and Include      Recent Labs  Lab 06/30/17  0521   *   CALCIUM 10.5   ALBUMIN 3.1*   PROT 7.2   *   K 3.8   CO2 30*   *   BUN 16   CREATININE 1.1   ALKPHOS 109   ALT 45*   AST 31   BILITOT 0.3     Lab Results   Component Value Date    WBC 8.40 06/30/2017    HGB 14.1 06/30/2017    HCT 45.2 06/30/2017     (H) 06/30/2017     " 06/30/2017       Recent Labs  Lab 06/27/17  0706 06/28/17  1422   TSH 0.354* 0.241*   FREET4 0.96 0.90     No results found for: HGBA1C    Nutritional status:   Body mass index is 21.05 kg/m².  Lab Results   Component Value Date    ALBUMIN 3.1 (L) 06/30/2017    ALBUMIN 3.8 06/29/2017    ALBUMIN 3.5 06/29/2017     Lab Results   Component Value Date    PREALBUMIN 26 06/27/2017       Estimated Creatinine Clearance: 47.9 mL/min (based on Cr of 1.1).    Accu-Checks  Recent Labs      06/28/17   2345  06/29/17   0747  06/29/17   1019  06/29/17   1411  06/29/17   1753  06/29/17   2144  06/29/17   2233  06/30/17   0007  06/30/17   0411  06/30/17   0619   POCTGLUCOSE  301*  96  108  168*  223*  80  82  107  160*  124*       Current Medications and/or Treatments Impacting Glycemic Control  Immunotherapy:  Immunosuppressants     None        Steroids:   Hormones     Start     Stop Route Frequency Ordered    06/29/17 1825  desmopressin nasal spray 10 mcg     Question:  Is the patient competent?  Answer:  No    -- Nasl 2 times daily PRN 06/29/17 1726        Pressors:    Autonomic Drugs     None        Hyperglycemia/Diabetes Medications: Antihyperglycemics     Start     Stop Route Frequency Ordered    06/29/17 1922  insulin aspart pen 0-5 Units      -- SubQ Every 6 hours PRN 06/29/17 1822          ASSESSMENT and PLAN    * Hypernatremia    Hypernatremia with likely component of both volume depletion and partial central diabetes insipidus  Calculated free water deficit of 3.3L, would recommend d5 75cc/hr   serial sodium and urine specific gravity monitoring     Recommend desmopressin 10mcg intranasal prn for urine output greater than 200cc/hr for two consecutive hrs, urine specific gravity of 1.005 or less, and serum sodium greater than 150          Primary central diabetes insipidus    As above          Secondary adrenal insufficiency    Morning cortisol level 10.7 adequate, ACTH pending  Will hold off on further hydrocortisone  unless patient develops hemodynamic instability          Secondary hypothyroidism    Recommend levothyroxine 50mcg oral or via NG tube   Repeat tsh within 4-6 weeks as o/p              Gerry Sr MD  Endocrinology  [facility]

## 2017-06-30 NOTE — SUBJECTIVE & OBJECTIVE
Interval History: naeon    Medications:  Continuous Infusions:   dextrose 5 % 125 mL/hr at 17 0631     Scheduled Meds:   desmopressin  2 mcg Intravenous BID    enoxaparin  40 mg Subcutaneous Daily    gabapentin  300 mg Per NG tube TID    levetiracetam IVPB  500 mg Intravenous Q12H    levothyroxine  50 mcg Per NG tube Daily    nicotine  1 patch Transdermal Daily    sodium chloride 0.9%  3 mL Intravenous Q8H     PRN Meds:acetaminophen, desmopressin, dextrose 50%, glucagon (human recombinant), insulin aspart, ondansetron, ramelteon     Review of Systems  Objective:     Weight: 52.2 kg (115 lb 1.3 oz)  Body mass index is 21.05 kg/m².  Vital Signs (Most Recent):  Temp: 97 °F (36.1 °C) (17 0400)  Pulse: 79 (17 0600)  Resp: 10 (17 0600)  BP: 116/74 (17 0600)  SpO2: 99 % (17 0600) Vital Signs (24h Range):  Temp:  [97 °F (36.1 °C)-98.2 °F (36.8 °C)] 97 °F (36.1 °C)  Pulse:  [] 79  Resp:  [10-21] 10  SpO2:  [93 %-99 %] 99 %  BP: ()/(55-93) 116/74              Temp (24hrs), Av.7 °F (36.5 °C), Min:97 °F (36.1 °C), Max:98.2 °F (36.8 °C)                 NG/OG Tube 17 1212 [place] sump Right nostril (Active)   Placement Check placement verified by aspirate characteristics;placement verified by aspirate pH;placement verified by x-ray 2017  3:00 AM   Tolerance no signs/symptoms of discomfort 2017  3:00 AM   Securement anchored to nostril center w/ adhesive device 2017  3:00 AM   Clamp Status/Tolerance clamped 2017  3:00 AM   Insertion Site Appearance no redness, warmth, tenderness, skin breakdown, drainage 2017  3:00 AM   Drainage Clear 2017  3:00 AM   Flush/Irrigation flushed w/;water 2017  3:00 AM   Intake (mL) 40 mL 2017  6:00 AM   Residual Amount (ml) 5 ml 2017  3:00 AM            Urethral Catheter 06/15/17 1205 Latex (Active)   Site Assessment Clean;Intact 2017  3:00 AM   Collection Container Standard drainage bag  "6/30/2017  3:00 AM   Securement Method secured to top of thigh w/ adhesive device 6/30/2017  3:00 AM   Catheter Care Performed yes 6/29/2017  3:00 PM   Reason for Continuing Urinary Catheterization Critically ill in ICU requiring intensive monitoring 6/30/2017  3:00 AM   CAUTI Prevention Bundle StatLock in place w 1" slack;Intact seal between catheter & drainage tubing;Drainage bag off the floor;Green sheeting clip in use;No dependent loops or kinks;Drainage bag not overfilled (<2/3 full);Drainage bag below bladder 6/29/2017 11:00 PM   Output (mL) 90 mL 6/30/2017  6:00 AM       Neurosurgery Physical Exam   E3V3M6  Pupils nonreactive bilaterally, blind in both eyes  FC x4    Significant Labs:    Recent Labs  Lab 06/29/17  0559  06/30/17  0521   GLU 91  < > 143*   *  < > 158*   K 3.8  < > 3.8   *  < > 122*   CO2 28  < > 30*   BUN 19  < > 16   CREATININE 1.1  < > 1.1   CALCIUM 11.0*  < > 10.5   MG 2.5  --   --    < > = values in this interval not displayed.    Recent Labs  Lab 06/29/17  0559 06/29/17  1018 06/30/17  0401   WBC 9.00 8.22 8.40   HGB 14.1 15.0 14.1   HCT 45.1 48.4 45.2    349 286       Recent Labs  Lab 06/30/17  0401   INR 1.2     Microbiology Results (last 7 days)     Procedure Component Value Units Date/Time    Urine culture [[id number]] Collected:  06/28/17 1733    Order Status:  Completed Specimen:  Urine Updated:  06/29/17 2047     Urine Culture, Routine No growth    Narrative:       Preferred Collection Type->Urine, Clean Catch            Significant Diagnostics:    "

## 2017-06-30 NOTE — PLAN OF CARE
Problem: Patient Care Overview  Goal: Plan of Care Review  No acute events noted overnight. Patient mental status improved from beginning of shift when she only had a slight response to noxious stimuli to drowsy but easily able to wake up and able to state name. No respiratory issues overnight. Room air O2. MD Vargas notified after HR climbed into 120s as well as NA reading of 165 at 0000. Bilateral wrist restraints maintained throughout shift. Clear light yellow urine drained via garcía catheter. New PIV placed in left wrist overnight. No signs of distress. Plan of care discussed with patient. No questions. VSS. Will continue to monitor.

## 2017-06-30 NOTE — ASSESSMENT & PLAN NOTE
Mental status improved today  --Awake and answering simple questions  -- Continue to decrease sodium level  -- Keppra for possible seizures  -- Neuro-surgery following

## 2017-06-30 NOTE — ASSESSMENT & PLAN NOTE
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 Chew 6/7/17  -q 1 hr neuro checks  -Monitor for DI, fu endocrine recs  -Hypernatremia: mgmt per endo as above  -Continue care per ICU team  -Seizure precautions

## 2017-06-30 NOTE — PLAN OF CARE
Problem: SLP Goal  Goal: SLP Goal  Speech Language Pathology Goals  Goals expected to be met by 7/7 1. Patient will successfully participate in ongoing clinical swallow evaluation and tolerate po trials with no overt s/s of aspiration.         Outcome: Ongoing (interventions implemented as appropriate)  SLP Clinical Swallow Evaluation completed. Please see note for recommendations. HILDA Milton, CCC-SLP, [facility] 6/30/2017

## 2017-06-30 NOTE — PROGRESS NOTES
[facility]  Critical Care Medicine  Progress Note    Patient Name: Joanna Morales  MRN: [id number]  Admission Date: 6/28/2017  Hospital Length of Stay: 2 days  Code Status: Full Code  Attending Provider: Lamberto Mcdaniel MD  Primary Care Provider: Claudy Tse MD   Principal Problem: Hypernatremia    Subjective:     HPI:  Ms. Joanna Morales is a 51 y.o. female with tobacco abuse, secondary hypothyroidism (TSH 0.241 6/17) and history of meningioma s/p resection 2015 & 6/2017. The most recent surgery by Dr. Chew at [facility] was complicated by DI .  The patient was discharged to [facility] on 6/26/17 and presented to [facility] ED on 6/28 with altered mental status and worsening hypernatremia.     Of note, She underwent a right frontotemporal craniotomy with partial removal of a large subfrontal and tuberculum sellae meningioma at [facility] in 2015. 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      Hospital/ICU Course:  Ms. Morales was discharged from [facility]  to [facility] on 6/26/17 and presented to [facility] ED on 6/28 with altered mental status and worsening hypernatremia. According to the patient's sitter, the patient was awake, verbal, and interactive with therapy on 6/23 but had been progressively less interactive and responsive when she was at [place]. Ms. Morales was admitted to Hospital Medicine but was found to be unresponsive with a sodium level of 168 on the morning of 6/29. The Critical Care team was consulted for ICU monitoring and management. She was started on a dextrose infusion and given free water per T. Endocrine was also involved in patient management  during this and the prior visit.       Interval History/Significant Events: Much improved since yesterday. Sodium decreased to 158 and patient now awake, verbal and following commands    Review of Systems   Unable to perform ROS: Mental status change     Objective:     Vital Signs (Most Recent):  Temp: 97 °F (36.1 °C) (06/30/17 0400)  Pulse: 79 (06/30/17 0800)  Resp: 10 (06/30/17 0800)  BP: 109/78 (06/30/17 0800)  SpO2: 98 % (06/30/17 0800) Vital Signs (24h Range):  Temp:  [97 °F (36.1 °C)-98.2 °F (36.8 °C)] 97 °F (36.1 °C)  Pulse:  [] 79  Resp:  [9-21] 10  SpO2:  [93 %-99 %] 98 %  BP: ()/(55-93) 109/78   Weight: 52.2 kg (115 lb 1.3 oz)  Body mass index is 21.05 kg/m².      Intake/Output Summary (Last 24 hours) at 06/30/17 0814  Last data filed at 06/30/17 0800   Gross per 24 hour   Intake          4413.75 ml   Output             2775 ml   Net          1638.75 ml       Physical Exam   Constitutional: Vital signs are normal. She appears cachectic. She is cooperative. She has a sickly appearance.   Suture line across frontal area of scalp with shaved hair from surgery   HENT:   Head: Normocephalic.   Eyes:   Pupils large and non reactive  Tracking today  Patient reports blurry vision   Cardiovascular: Normal rate and regular rhythm.    Pulmonary/Chest: She has rhonchi in the right lower field and the left lower field.   Abdominal: Soft. Normal appearance and bowel sounds are normal.   Genitourinary:   Genitourinary Comments: Mackey in place   Musculoskeletal:   Limited movement    Neurological: She is alert. She has normal strength. GCS eye subscore is 4. GCS verbal subscore is 5. GCS motor subscore is 6.   Patient awake, speech slow and delayed.  Answers simple questions with one-word answers   Psychiatric: Her speech is delayed. She is slowed.   Nursing note and vitals reviewed.      Vents:     Lines/Drains/Airways     Drain                 Urethral Catheter 06/15/17 1205 Latex 14 days         NG/OG Tube  06/29/17 1212 [place] sump Right nostril less than 1 day          Peripheral Intravenous Line                 Peripheral IV - Single Lumen 06/29/17 1051 Left Wrist less than 1 day         Peripheral IV - Single Lumen 06/29/17 1645 Right Antecubital less than 1 day              Significant Labs:    CBC/Anemia Profile:    Recent Labs  Lab 06/29/17  0559 06/29/17  1018 06/30/17  0401   WBC 9.00 8.22 8.40   HGB 14.1 15.0 14.1   HCT 45.1 48.4 45.2    349 286   * 104* 103*   RDW 15.4* 15.3* 15.3*        Chemistries:    Recent Labs  Lab 06/29/17  0559 06/29/17  0724 06/29/17  1018  06/29/17  1937 06/29/17  2354 06/30/17  0521   * 168* 168*  < > 158* 165* 158*   K 3.8 5.9* 4.0  < > 3.3* 5.7* 3.8   * >130* 126*  < > 120* 128* 122*   CO2 28 23 29  < > 29 26 30*   BUN 19 20 20  < > 18 17 16   CREATININE 1.1 1.0 1.3  < > 1.1 1.3 1.1   CALCIUM 11.0* 10.9* 11.3*  < > 10.2 11.3* 10.5   ALBUMIN 3.6 3.5 3.8  --   --   --  3.1*   PROT 8.2 8.4 8.7*  --   --   --  7.2   BILITOT 0.3 0.3 0.4  --   --   --  0.3   ALKPHOS 128 126 134  --   --   --  109   ALT 65* 65* 64*  --   --   --  45*   AST 41* 48* 42*  --   --   --  31   MG 2.5  --   --   --   --   --   --    PHOS 3.9  --   --   --   --   --   --    < > = values in this interval not displayed.    All pertinent labs within the past 24 hours have been reviewed.    Significant Imaging:  I have reviewed and interpreted all pertinent imaging results/findings within the past 24 hours.    Assessment/Plan:     Neuro   Acute encephalopathy    Mental status improved today  --Awake and answering simple questions  -- Continue to decrease sodium level  -- Keppra for possible seizures  -- Neuro-surgery following          Secondary hypothyroidism    Start 50mcg Levothyroxine per NGT            Diabetes insipidus    Endocrine following- Etiology possibly volume depletion vs central diabetes insipidus  -- Q8 urine specific gravity   -- QD urine osmolality  - unable to order  more frequently  -- serial sodium monitoring every 4 hrs  --Desmopressin 10mcg intranasal prn for urine output greater than 200cc/hr for two-three consecutive hrs, urine specific gravity of 1.005 or less, and serum sodium greater than 150  -- Will discuss longterm startegy for DI management with Endocrine today           Meningioma    History of meningioma s/p resection 2015 & 6/2017.   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    -- Neurosurgery following          Endocrine   Secondary adrenal insufficiency    Cortisol level normal  -- Will continue to monitor with endocrine team        Fluids/Electrolytes/Nutrition/GI   * Hypernatremia    Severe hypernatremia with greater suspicion for volume depletion vs central diabetes insipidus  -- Calculated free water deficit of 5.2L, recommend D5  to reduce sodium    -- Free water bolus per NGT every 4hrs  -          Other   Tobacco abuse    Nicotine patch          Goals of Care  Friend/POA (Reina) at bedside. She was updated on the patient's condition and acute issues. We discussed that the patient had little interaction with biological relatives therefore,as she was a close friend she had recommended to the patient that POA be established prior to the most recent surgery. She reported the patient's course of disease including transfer to rehab. She has discussed discharge planning with Social Workers in the recent past and seems to understand that the patient has a long path to recovery. She would like to speak with someone from the Neuro-Surgery team about prognosis and long-term  outcomes.The nurse will page the team to arrange that meeting.        Critical Care Medicine Daily Checklist:    A: Awake: RASS Goal/Actual Goal:    Actual: Tong Agitation Sedation Scale (RASS): Drowsy   B: Spontaneous Breathing Trial Performed?  NA   C: SAT & SBT Coordinated?  NA                   D: Delirium: CAM-ICU Overall CAM-ICU: Positive   E: Early Mobility Performed? No   F: Feeding Goal:    Status:     Current Diet Order   Procedures    Diet NPO Except for: Other (Comment) (free water ad ranjeet)     Order Specific Question:   Except for     Answer:   Other (Comment)     Comments:   free water ad ranjeet      AS: Analgesia/Sedation NA   T: Thromboembolic Prophylaxis Enoxaparin   H: HOB > 300 Yes   U: Stress Ulcer Prophylaxis (if needed) NA   G: Glucose Control SSI   B: Bowel Function Stool Occurrence: 0   I: Indwelling Catheter (Lines & Mackey) Necessity Mackey to monitor UOP for DI   D: De-escalation of Antimicrobials/Pharmacotherapies NA    Plan for the day/ETD Continue to monitor sodium and mental status    Code Status:  Family/Goals of Care: Full Code  Friend at bedside-updated       Critical secondary to Patient has a condition that poses threat to life and bodily function: Acute Encephalopathy/Hypernatremia      Critical care was time spent personally by me on the following activities: development of treatment plan with patient or surrogate and bedside caregivers, discussions with consultants, evaluation of patient's response to treatment, examination of patient, ordering and performing treatments and interventions, ordering and review of laboratory studies, ordering and review of radiographic studies, pulse oximetry, re-evaluation of patient's condition. This critical care time did not overlap with that of any other provider or involve time for any procedures.     Critical Care Time: 45 mins    Fiona Winterbottom, APRN, CNS  Critical Care Medicine  [facility]

## 2017-06-30 NOTE — ASSESSMENT & PLAN NOTE
Hypernatremia with likely component of both volume depletion and partial central diabetes insipidus  Calculated free water deficit of 3.3L, would recommend d5 75cc/hr   serial sodium and urine specific gravity monitoring     Recommend desmopressin 10mcg intranasal prn for urine output greater than 250cc/hr for two consecutive hrs, urine specific gravity of 1.005 or less, and serum sodium greater than 150

## 2017-06-30 NOTE — PT/OT/SLP EVAL
Speech Language Pathology  Evaluation  Clinical Evaluation of Swallow     Joanna Morales   MRN: [id number]   3098/3098 A     Admitting Diagnosis: Hypernatremia    Diet recommendations:  Solid Diet Level: NPO   Liquid Diet Level: NPO   ·  Continue alternative means of nutrition, hydration and medication via NG  · Offer oral care throughout the day  · Ongoing assessment of swallow function with SLP  · Please note: pt was d/c'd from rehab on dental soft diet with nectar thick liquids    SLP Treatment Date: 06/30/17  Speech Start Time: 1000     Speech Stop Time: 1010     Speech Total (min): 10 min       TREATMENT BILLABLE MINUTES:  Treatment Swallowing Dysfunction 10    Diagnosis: Hypernatremia  No intubation history for this admission  Pt well known to speech pathology services from this admission. Pt was d/c'd from rehab on 6/26 on dental soft diet with nectar thick liquids.     Past Medical History:   Diagnosis Date    Allergy     Basal cell carcinoma     Depression 11/1/2016    Essential hypertension 6/9/2017    Eye disorder     Fever blister     Normocytic anemia 6/9/2017    Secondary hypothyroidism 6/26/2017     Past Surgical History:   Procedure Laterality Date    BASAL CELL CARCINOMA EXCISION      forehead    BRAIN SURGERY      SKIN BIOPSY         Has the patient been evaluated by SLP for swallowing? : Yes  Keep patient NPO?: Yes   General Precautions: Standard, fall, aspiration, NPO, vision impaired    Current Respiratory Status: other (comment) (room air)     Prior diet: Dental soft/Nectar thick liquids    Subjective:  Pt lethargic and drowsy upon entry.     Pain/Comfort  Pain Rating 1: 0/10    Objective:   Patient found with: blood pressure cuff, telemetry, pulse ox (continuous), NG tube    Oral Musculature Evaluation  Oral Musculature: general weakness  Dentition: present and adequate  Mucosal Quality: adequate  Lingual Strength and Mobility: impaired strength  Volitional Cough: reduced  strength  Volitional Swallow: not elicited   Voice Prior to PO Intake: clear quality, reduced intensity     Bedside Swallow Eval:  Pt required max verbal and tactile stimulation to minimally participate in assessment. HOB elevated as high as possible. Pt provided moistened oral swabs. Limited mouth opening elicited. No spontaneous or volitional swallow triggered. Pt continuously falling asleep during session.  Pt not appropriate or safe to participate in further po trials due to impaired alertness level despite max stimulation.       Assessment:  Joanna Morales is a 51 y.o. female with a medical diagnosis of Hypernatremia and presents with dysphagia.          Discharge recommendations: Discharge Facility/Level Of Care Needs: other (see comments) (TBD)     Goals:    SLP Goals        Problem: SLP Goal    Goal Priority Disciplines Outcome   SLP Goal     SLP Ongoing (interventions implemented as appropriate)   Description:  Speech Language Pathology Goals  Goals expected to be met by 7/7  1. Patient will successfully participate in ongoing clinical swallow evaluation and tolerate po trials with no overt s/s of aspiration.                            Plan:   Patient to be seen Therapy Frequency: 5 x/week   Plan of Care expires: 07/29/17  Plan of Care reviewed with: patient  SLP Follow-up?: Yes       HILDA Luo, CCC-SLP, CLC  Speech Language Pathologist  Certified Lactation Counselor  [phone]  6/30/2017

## 2017-06-30 NOTE — PROGRESS NOTES
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 Chew and discharged on 17 to [facility].  She presents with altered mental status and worsening hypernatremia for 1 day. She was treated for DI her last admission and labs are trending up today.  Spoke with Dr. Fairchild at [facility], patient with waxing/waning mental status yesterday, but was appropriate & following some complex commands yesterday.  There was a period yesterday of significant lethargy.  Patient is unable to give history, and information is taken from the chart.     Post-Op Info:  * No surgery found *         Interval History: naeon    Medications:  Continuous Infusions:   dextrose 5 % 125 mL/hr at 17 0631     Scheduled Meds:   desmopressin  2 mcg Intravenous BID    enoxaparin  40 mg Subcutaneous Daily    gabapentin  300 mg Per NG tube TID    levetiracetam IVPB  500 mg Intravenous Q12H    levothyroxine  50 mcg Per NG tube Daily    nicotine  1 patch Transdermal Daily    sodium chloride 0.9%  3 mL Intravenous Q8H     PRN Meds:acetaminophen, desmopressin, dextrose 50%, glucagon (human recombinant), insulin aspart, ondansetron, ramelteon     Review of Systems  Objective:     Weight: 52.2 kg (115 lb 1.3 oz)  Body mass index is 21.05 kg/m².  Vital Signs (Most Recent):  Temp: 97 °F (36.1 °C) (17 0400)  Pulse: 79 (17 0600)  Resp: 10 (17 0600)  BP: 116/74 (17 0600)  SpO2: 99 % (17 0600) Vital Signs (24h Range):  Temp:  [97 °F (36.1 °C)-98.2 °F (36.8 °C)] 97 °F (36.1 °C)  Pulse:  [] 79  Resp:  [10-21] 10  SpO2:  [93 %-99 %] 99 %  BP: ()/(55-93) 116/74              Temp (24hrs), Av.7 °F (36.5 °C), Min:97 °F (36.1 °C), Max:98.2 °F (36.8 °C)                 NG/OG Tube 17 1212 [place] sump Right nostril (Active)   Placement Check placement  "verified by aspirate characteristics;placement verified by aspirate pH;placement verified by x-ray 6/30/2017  3:00 AM   Tolerance no signs/symptoms of discomfort 6/30/2017  3:00 AM   Securement anchored to nostril center w/ adhesive device 6/30/2017  3:00 AM   Clamp Status/Tolerance clamped 6/30/2017  3:00 AM   Insertion Site Appearance no redness, warmth, tenderness, skin breakdown, drainage 6/30/2017  3:00 AM   Drainage Clear 6/30/2017  3:00 AM   Flush/Irrigation flushed w/;water 6/30/2017  3:00 AM   Intake (mL) 40 mL 6/30/2017  6:00 AM   Residual Amount (ml) 5 ml 6/30/2017  3:00 AM            Urethral Catheter 06/15/17 1205 Latex (Active)   Site Assessment Clean;Intact 6/30/2017  3:00 AM   Collection Container Standard drainage bag 6/30/2017  3:00 AM   Securement Method secured to top of thigh w/ adhesive device 6/30/2017  3:00 AM   Catheter Care Performed yes 6/29/2017  3:00 PM   Reason for Continuing Urinary Catheterization Critically ill in ICU requiring intensive monitoring 6/30/2017  3:00 AM   CAUTI Prevention Bundle StatLock in place w 1" slack;Intact seal between catheter & drainage tubing;Drainage bag off the floor;Green sheeting clip in use;No dependent loops or kinks;Drainage bag not overfilled (<2/3 full);Drainage bag below bladder 6/29/2017 11:00 PM   Output (mL) 90 mL 6/30/2017  6:00 AM       Neurosurgery Physical Exam   E3V3M6  Pupils nonreactive bilaterally, blind in both eyes  FC x4    Significant Labs:    Recent Labs  Lab 06/29/17  0559  06/30/17  0521   GLU 91  < > 143*   *  < > 158*   K 3.8  < > 3.8   *  < > 122*   CO2 28  < > 30*   BUN 19  < > 16   CREATININE 1.1  < > 1.1   CALCIUM 11.0*  < > 10.5   MG 2.5  --   --    < > = values in this interval not displayed.    Recent Labs  Lab 06/29/17  0559 06/29/17  1018 06/30/17  0401   WBC 9.00 8.22 8.40   HGB 14.1 15.0 14.1   HCT 45.1 48.4 45.2    349 286       Recent Labs  Lab 06/30/17  0401   INR 1.2     Microbiology Results " (last 7 days)     Procedure Component Value Units Date/Time    Urine culture [[id number]] Collected:  06/28/17 1733    Order Status:  Completed Specimen:  Urine Updated:  06/29/17 2047     Urine Culture, Routine No growth    Narrative:       Preferred Collection Type->Urine, Clean Catch            Significant Diagnostics:      Assessment/Plan:     Meningioma    51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 Chew 6/7/17  -q 1 hr neuro checks  -Monitor for DI, fu endocrine recs  -Hypernatremia: mgmt per endo as above  -Continue care per ICU team  -Seizure precautions                Boogie Wiley,   Neurosurgery  [facility]

## 2017-06-30 NOTE — ASSESSMENT & PLAN NOTE
--Friend/POA (Reina) at bedside on 6/30. She was updated on the patient's condition and acute issues. We discussed that the patient had little interaction with biological relatives therefore, as she was a close friend she had recommended to the patient that POA be established prior to the most recent surgery. She reported the patient's course of disease including transfer to rehab. She has discussed discharge planning with Social Workers in the recent past and seems to understand that the patient has a long path to recovery.

## 2017-07-01 NOTE — PLAN OF CARE
Pt increasingly lethargic from earlier today. Carmina NP notified. Last cbg 78. Orders to push .5 amp d50 and Carmina will come to bedside shortly.

## 2017-07-01 NOTE — PROGRESS NOTES
Dr. Vargas called regarding pt's spot check BG of 64 and admin of D50. Pt asymptomatic, neuro check at baseline per previous assessments. Recheck of . Awaiting new orders at this time. Will continue to monitor.

## 2017-07-01 NOTE — ASSESSMENT & PLAN NOTE
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 Chew 6/7/17  -q 1 hr neuro checks  -Monitor for DI, fu endocrine recs  -Hypernatremia: mgmt per endo as above  -Continue care per ICU team  -Seizure precautions  -Will follow

## 2017-07-01 NOTE — PLAN OF CARE
Problem: Patient Care Overview  Goal: Plan of Care Review  Outcome: Ongoing (interventions implemented as appropriate)  POC  Reviewed with pt. Pt unable to verbalize understanding due to altered mental status. Pt oriented only to self, moves all extremities, and follows commands. Pt alert during most of the shift and able to answer more questions than previous shifts. Pupils remain fixed and dilated. Team aware. Temp WNL during shift, warmer taken off. HR 60s-80s, regular. BP stable. One BM-smear noted during shift with pt passing gas. NG replaced to left nare after pt removed original. Dr. Vargas gave ok to use. Water boluses administered with Q4 BMPs done for sodium monitoring. Urinary output remains low. CCS called and notified during shift, with no new orders given. Incision to frontal lobe intact and without complication at this time. Accuchecks performed Q2 hours with BG 70-80. Restraints ordered for safety. Plan to possibly step down today.

## 2017-07-01 NOTE — PLAN OF CARE
Problem: Patient Care Overview  Goal: Plan of Care Review  Outcome: Ongoing (interventions implemented as appropriate)  No acute events today. VSS. Pt NA now 147. Pt still only oriented to self and trying to pull out lines. Tube feeds initiated. Free water boluses continued. UO about 45 hr this am then picked up to about 100/hr. POC discussed with pt. Needs reinforcement

## 2017-07-01 NOTE — PT/OT/SLP EVAL
Physical Therapy  Evaluation    Joanna Morales   MRN: [id number]   Admitting Diagnosis: Hypernatremia    PT Received On: 07/01/17  PT Start Time: 1014     PT Stop Time: 1023    PT Total Time (min): 9 min       Billable Minutes:  Evaluation 9 min    Diagnosis: Hypernatremia  Transferred from inpt rehab with abnormal lab value. Pt is s/p craniotomy with resection of meningioma 6/7/17.    Past Medical History:   Diagnosis Date    Allergy     Basal cell carcinoma     Depression 11/1/2016    Essential hypertension 6/9/2017    Eye disorder     Fever blister     Normocytic anemia 6/9/2017    Secondary hypothyroidism 6/26/2017      Past Surgical History:   Procedure Laterality Date    BASAL CELL CARCINOMA EXCISION      forehead    BRAIN SURGERY      SKIN BIOPSY         Referring physician: Fiona Winterbottom  Date referred to PT: 6/30/17    General Precautions: Standard, fall, aspiration, vision impaired, NPO  Orthopedic Precautions:     Braces:         Do you have any cultural, spiritual, Jehovah's witness conflicts, given your current situation?: none    Patient History:  Lives With: other relative(s) (per previous notes but will need to be confirmed, cousins)  Living Arrangements: apartment (2nd floor)  Equipment Currently Used at Home:  (SC, RW (needs to be confirmed))  DME owned (not currently used): none    Previous Level of Function: needs to be confirmed    Ambulation Skills: needs device (with SPC)  Transfer Skills: independent  ADL Skills: independent    Subjective:  Communicated with nurse prior to session.    Chief Complaint: pt had no complaints during treatment.   Patient goals: pt unable to set goals and no family present to set goals     Pain/Comfort  Pain Rating 1: 0/10  Pain Rating Post-Intervention 1: 0/10      Objective:   Patient found with: pulse ox (continuous), telemetry, blood pressure cuff, NG tube, garcía catheter, SCD, peripheral IV, restraints ( BUE restraints)     Cognitive Exam:  Oriented  to: Person    Follows Commands/attention: Follows one-step commands pt had difficulty tending to tasks and needed to be redirected.  Communication: clear/fluent  Safety awareness/insight to disability: impaired    Physical Exam:    Lower Extremity Range of Motion:  Right Lower Extremity: WFL  Left Lower Extremity: WFL    Lower Extremity Strength:  Right Lower Extremity: not able to assess . pt was not able to follow commands for testing.   Left Lower Extremity: see above       Functional Mobility:  Bed Mobility:  Rolling/Turning Right: Minimum assistance (pt needed verbal cues for hand placement and sequencing for functional mobility. )  Supine to Sit: Minimum Assistance, Maximum Assistance    Transfers:  Sit <> Stand Assistance: Minimum Assistance  Sit <> Stand Assistive Device: No Assistive Device    Gait:   Gait Distance: 14 ft,   Assistance 1: Total assistance (min assist x 2. pt was leaning backwards with gait. )  Gait Assistive Device: Hand held assist  Gait Pattern: 2-point gait      AM-PAC 6 CLICK MOBILITY  How much help from another person does this patient currently need?   1 = Unable, Total/Dependent Assistance  2 = A lot, Maximum/Moderate Assistance  3 = A little, Minimum/Contact Guard/Supervision  4 = None, Modified [place]/Independent    Turning over in bed (including adjusting bedclothes, sheets and blankets)?: 3  Sitting down on and standing up from a chair with arms (e.g., wheelchair, bedside commode, etc.): 3  Moving from lying on back to sitting on the side of the bed?: 2  Moving to and from a bed to a chair (including a wheelchair)?: 3  Need to walk in hospital room?: 2  Climbing 3-5 steps with a railing?: 1  Total Score: 14     AM-PAC Raw Score CMS G-Code Modifier Level of Impairment Assistance   6 % Total / Unable   7 - 9 CM 80 - 100% Maximal Assist   10 - 14 CL 60 - 80% Moderate Assist   15 - 19 CK 40 - 60% Moderate Assist   20 - 22 CJ 20 - 40% Minimal Assist   23 CI 1-20% SBA /  CGA   24 CH 0% Independent/ Mod I     Patient left supine with all lines intact, call button in reach and restraints reapplied at end of session. BUE re-applied.     Assessment:   Joanna Morales is a 51 y.o. female with a medical diagnosis of Hypernatremia and presents with decreased mobility, transfer, balance, cognition, safety awareness and decreased distance ambulated. Pt would benefit from cont PT to address deficits and will need inpt rehab when medically stable. Pt will benefit from skilled PT 5x/wk to progress physically and decrease complications from immobility. Pt is s/p craniotomy with resection of meningioma (previous admit.).    Rehab identified problem list/impairments: Rehab identified problem list/impairments: impaired endurance, impaired functional mobilty, gait instability, impaired balance, decreased lower extremity function, impaired cognition, decreased safety awareness, decreased coordination    Rehab potential is fair.    Activity tolerance: Fair    Discharge recommendations: Discharge Facility/Level Of Care Needs: rehabilitation facility     Barriers to discharge: Barriers to Discharge: Decreased caregiver support, Inaccessible home environment (pt may not have 24 hour supervision available. )    Equipment recommendations: Equipment Needed After Discharge:  (will determine DME needs closer to discharge)     GOALS:    Physical Therapy Goals        Problem: Physical Therapy Goal    Goal Priority Disciplines Outcome Goal Variances Interventions   Physical Therapy Goal     PT/OT, PT      Description:  Goals to be met by: 7/15/17    Patient will increase functional independence with mobility by performin. Supine to sit with Stand-by Assistance - not met  2. Sit to stand transfer with Stand-by Assistance - not met  3. Gait  x 100 feet with Minimal Assistance using AD if needed.- not met                      PLAN:    Patient to be seen 5 x/week to address the above listed problems  via gait training, therapeutic activities, neuromuscular re-education  Plan of Care expires: 07/29/17  Plan of Care reviewed with: patient          Stephanie DEB Maradiaga, PT  07/01/2017

## 2017-07-01 NOTE — PLAN OF CARE
Problem: Occupational Therapy Goal  Goal: Occupational Therapy Goal  Goals to be met by: 7/15/17     Patient will increase functional independence with ADLs by performing:     UE Dressing with Minimal Assistance.  LE Dressing with Minimal Assistance.  Grooming while standing with Contact Guard Assistance.  Toileting from toilet with Minimal Assistance for hygiene and clothing management.   Toilet transfer to toilet with Contact Guard Assistance.      Outcome: Ongoing (interventions implemented as appropriate)  OT Eval completed, and above goals established. HELEN Shaikh  7/1/2017

## 2017-07-01 NOTE — PLAN OF CARE
Problem: Patient Care Overview  Goal: Plan of Care Review  Pt remains lethargic, temp. Increased after bairhugger applied, pt ALEMAN, follows commands at times, inconsistent. VSS at this time- continuing H2O bolus's otherwise NPO. U.O. Decreased majority of day after DDAVP given, F. Winterbottom aware. 1L NS fluids given for decreased urine out, pt had slight improvement, will continue to monitor. POC discussed with pt. Questions answered and needs addressed.

## 2017-07-01 NOTE — PROGRESS NOTES
[facility]  Critical Care Medicine  Progress Note    Patient Name: Joanna Morales  MRN: [id number]  Admission Date: 6/28/2017  Hospital Length of Stay: 3 days  Code Status: Full Code  Attending Provider: Lamberto Mcdaniel MD  Primary Care Provider: Claudy Tse MD   Principal Problem: Hypernatremia    Subjective:     HPI:  Ms. Joanna Morales is a 51 y.o. female with tobacco abuse, secondary hypothyroidism (TSH 0.241 6/17) and history of meningioma s/p resection 2015 & 6/7/2017. The most recent surgery by Dr. Chew at [facility] was complicated by DI . The patient was discharged to [facility] on 6/26/17 and re-presented to [facility] ED on 6/28 with altered mental status and worsening hypernatremia.     Of note, She underwent a right frontotemporal craniotomy with partial removal of a large subfrontal and tuberculum sellae meningioma at [facility] in 2015. 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      Hospital/ICU Course:  Ms. Morales was discharged from [facility]  to [facility] on 6/26/17 and presented to [facility] ED on 6/28 with altered mental status and worsening hypernatremia. According to the patient's sitter, the patient was awake, verbal, and interactive with therapy on 6/23 but had been progressively less interactive and responsive when she was at [facility]. Ms. Morales was admitted to Hospital Medicine but was found to be unresponsive with a sodium level of 168 on the morning of 6/29. The Critical Care team was consulted for ICU monitoring and management. She was started on a dextrose infusion and given free water per T. Endocrine was also involved in patient  management during this and the prior visit and reconsulted for this admission to assist with management. In addition to free water replacement intravenously and enterally, DDAVP was recommended if urine output exceeded 200 mL/hr x3 hours. She was given one dose of DDAVP subcutaneously on the morning of 6/30 with improvement in urine output. Hypernatremia has slowly improved.       Interval History/Significant Events: Hypernatremia improved to 147. Mental status also improved; alert, oriented to person and conversant although encephalopathic. Hypoglycemic overnight.     Review of Systems   Unable to perform ROS: Mental status change   Constitutional:        Limited due to mental status.    Eyes: Positive for visual disturbance (reports mildly improved vision from yesterday. Can see shapes ).   Respiratory: Negative for shortness of breath.    Cardiovascular: Negative for chest pain.   Gastrointestinal: Negative for abdominal pain.   Neurological: Negative for headaches.     Objective:     Vital Signs (Most Recent):  Temp: 97.3 °F (36.3 °C) (07/01/17 0700)  Pulse: 70 (07/01/17 0900)  Resp: 10 (07/01/17 0900)  BP: 116/71 (07/01/17 0900)  SpO2: 100 % (07/01/17 0900) Vital Signs (24h Range):  Temp:  [94.2 °F (34.6 °C)-98.4 °F (36.9 °C)] 97.3 °F (36.3 °C)  Pulse:  [] 70  Resp:  [9-17] 10  SpO2:  [95 %-100 %] 100 %  BP: ()/(54-75) 116/71   Weight: 54.4 kg (119 lb 14.9 oz)  Body mass index is 21.94 kg/m².      Intake/Output Summary (Last 24 hours) at 07/01/17 0949  Last data filed at 07/01/17 0800   Gross per 24 hour   Intake             2570 ml   Output              600 ml   Net             1970 ml       Physical Exam   Constitutional: She appears well-developed. She appears ill. No distress.   HENT:   Head: Normocephalic.   Mouth/Throat: Oropharynx is clear and moist. No oropharyngeal exudate.   Healing craniotomy incision.    Eyes: Conjunctivae are normal. Right eye exhibits no discharge. Left eye exhibits no  discharge. No scleral icterus. Right eye exhibits no nystagmus. Right pupil is reactive. Left pupil is reactive.   Neck: Neck supple. No JVD present. No tracheal deviation present. No thyromegaly present.   Cardiovascular: Normal rate, regular rhythm, S1 normal and S2 normal.    No murmur heard.  Pulses:       Radial pulses are 2+ on the right side, and 2+ on the left side.        Dorsalis pedis pulses are 2+ on the right side, and 2+ on the left side.   Extremities warm. No peripheral edema    Pulmonary/Chest: Effort normal. No accessory muscle usage. No respiratory distress. She has no decreased breath sounds. She has no wheezes. She has no rales.   Abdominal: Soft. Bowel sounds are normal. She exhibits no distension. There is no tenderness. There is no guarding.   Lymphadenopathy:     She has no cervical adenopathy.   Neurological: She is alert. GCS eye subscore is 4. GCS verbal subscore is 4. GCS motor subscore is 6.   RASS 0. CAMICU positive  Oriented to person only  Following commands and moving extremities equally bilaterally  Pupils 7 mm bilaterally, round not reactive  No nystagmus, roving eye movements  Able to see shapes, light. Cannot discern number of fingers held before eyes  Left sided facial droop noted. No tongue deviation or slurred speech.    Skin: Skin is dry. Capillary refill takes less than 2 seconds. She is not diaphoretic. There is pallor.       Vents:     Lines/Drains/Airways     Drain                 Urethral Catheter 06/15/17 1205 Latex 15 days         NG/OG Tube 07/01/17 0505 [place] sump;nasogastric 14 Fr. less than 1 day          Peripheral Intravenous Line                 Peripheral IV - Single Lumen 06/29/17 1051 Left Wrist 1 day         Peripheral IV - Single Lumen 06/29/17 1645 Right Antecubital 1 day         Peripheral IV - Single Lumen 06/30/17 1800 Right Wrist less than 1 day              Significant Labs:    CBC/Anemia Profile:    Recent Labs  Lab 06/29/17  1018 06/30/17  0401  07/01/17  0232   WBC 8.22 8.40 9.51   HGB 15.0 14.1 10.5*   HCT 48.4 45.2 33.6*    286 252   * 103* 102*   RDW 15.3* 15.3* 14.7*        Chemistries:    Recent Labs  Lab 06/29/17  1018  06/30/17  0521  06/30/17  2140 07/01/17  0232 07/01/17  0631   *  < > 158*  < > 150* 149*  149* 147*   K 4.0  < > 3.8  < > 4.2 3.6  3.6 3.6   *  < > 122*  < > 119* 117*  117* 115*   CO2 29  < > 30*  < > 23 25  25 24   BUN 20  < > 16  < > 15 13  13 13   CREATININE 1.3  < > 1.1  < > 0.8 0.8  0.8 0.9   CALCIUM 11.3*  < > 10.5  < > 9.3 9.2  9.2 9.4   ALBUMIN 3.8  --  3.1*  --   --  2.8*  --    PROT 8.7*  --  7.2  --   --  5.9*  --    BILITOT 0.4  --  0.3  --   --  0.3  --    ALKPHOS 134  --  109  --   --  90  --    ALT 64*  --  45*  --   --  33  --    AST 42*  --  31  --   --  24  --    < > = values in this interval not displayed.    Significant Imaging:  I have reviewed and interpreted all pertinent imaging results/findings within the past 24 hours.    Assessment/Plan:     Neuro   Meningioma    --History of meningioma s/p resection 2015 & 6/7/2017.   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    -- Neurosurgery following; appreciate assitance  --management as above          Secondary hypothyroidism    --continue 50mcg Levothyroxine per NGT  --T4 this admission WNL            Acute encephalopathy    --see above  --continue keppra for seizure prophylaxis   --appreciate neurosurgery's assistance.   --mental status slowly improving. Alert, oriented to person             Primary central diabetes insipidus    --appreciate  endocrine's assistance.   --suspect hypernatremia is multifactorial: dehydration given poor PO intake and a component of DI given extensive meningioma resection on 6/7/17  --hypernatremia improved s/p intravenous and enteral water boluses with one dose DDAVP on 6/30  --continue free water enterally: 400 mL q6h for now. IVF's discontinued and no need for further DDAVP.   --continue to monitor sodium levels and urine output  --PRN intranasal DDAVP order for >200 mL urine output/hr x3 hours   --per endocrine, unclear if she will need DDAVP chronically, will continue to monitor progress over next few days        Endocrine   Secondary adrenal insufficiency    --Cortisol and ACTH level normal          Fluids/Electrolytes/Nutrition/GI   * Hypernatremia    --plan as above        Other   Goals of care, counseling/discussion    --Friend/POA (Reina) at bedside on 6/30. She was updated on the patient's condition and acute issues. We discussed that the patient had little interaction with biological relatives therefore, as she was a close friend she had recommended to the patient that POA be established prior to the most recent surgery. She reported the patient's course of disease including transfer to rehab. She has discussed discharge planning with Social Workers in the recent past and seems to understand that the patient has a long path to recovery.         Tobacco abuse    --Nicotine patch            Critical secondary to Patient has a condition that poses threat to life and bodily function: encephalopathy with need for close monitoring for airway protection. Intensive monitoring of hypernatremia.     Critical care time: 60 minutes      Critical care was time spent personally by me on the following activities: development of treatment plan with patient or surrogate and bedside caregivers, discussions with consultants, evaluation of patient's response to treatment, examination of patient, ordering and performing treatments and  interventions, ordering and review of laboratory studies, ordering and review of radiographic studies, pulse oximetry, re-evaluation of patient's condition. This critical care time did not overlap with that of any other provider or involve time for any procedures.     Carmina De Jesus NP  Critical Care Medicine  [facility]

## 2017-07-01 NOTE — PLAN OF CARE
Problem: Physical Therapy Goal  Goal: Physical Therapy Goal  Goals to be met by: 7/15/17    Patient will increase functional independence with mobility by performin. Supine to sit with Stand-by Assistance - not met  2. Sit to stand transfer with Stand-by Assistance - not met  3. Gait  x 100 feet with Minimal Assistance using AD if needed.- not met    eval completed and goals appropriate. Stephanie Maradiaga, PT 2017

## 2017-07-01 NOTE — CONSULTS
"RD received consult for "tube feed recommendations; severe hypernatremia."  RD services currently following pt.    Pt NPO per SLP.  TF ordered: Isosource 1.5 @ 35mL/hr.  Enteral Water Bolus: 400ml q6hrs    Recommendations:  Isosource 1.5 @ 40mL/hr.  - Hold for residuals >500mL.  - Provides 1440kcals, 65g protein, 733mL free water.  - Continue water boluses 400mL q6hrs until hypernatremia resolved. For maintenance fluid needs: 175mL q6hrs.    Will f/u as previously scheduled.    Thanks,  GUY Acuña, LDN  [id number]      "

## 2017-07-01 NOTE — SUBJECTIVE & OBJECTIVE
Interval History/Significant Events: Hypernatremia improved to 147. Mental status also improved; alert, oriented to person and conversant although encephalopathic. Hypoglycemic overnight.     Review of Systems   Unable to perform ROS: Mental status change   Constitutional:        Limited due to mental status.    Eyes: Positive for visual disturbance (reports mildly improved vision from yesterday. Can see shapes ).   Respiratory: Negative for shortness of breath.    Cardiovascular: Negative for chest pain.   Gastrointestinal: Negative for abdominal pain.   Neurological: Negative for headaches.     Objective:     Vital Signs (Most Recent):  Temp: 97.3 °F (36.3 °C) (07/01/17 0700)  Pulse: 70 (07/01/17 0900)  Resp: 10 (07/01/17 0900)  BP: 116/71 (07/01/17 0900)  SpO2: 100 % (07/01/17 0900) Vital Signs (24h Range):  Temp:  [94.2 °F (34.6 °C)-98.4 °F (36.9 °C)] 97.3 °F (36.3 °C)  Pulse:  [] 70  Resp:  [9-17] 10  SpO2:  [95 %-100 %] 100 %  BP: ()/(54-75) 116/71   Weight: 54.4 kg (119 lb 14.9 oz)  Body mass index is 21.94 kg/m².      Intake/Output Summary (Last 24 hours) at 07/01/17 0949  Last data filed at 07/01/17 0800   Gross per 24 hour   Intake             2570 ml   Output              600 ml   Net             1970 ml       Physical Exam   Constitutional: She appears well-developed. She appears ill. No distress.   HENT:   Head: Normocephalic.   Mouth/Throat: Oropharynx is clear and moist. No oropharyngeal exudate.   Healing craniotomy incision.    Eyes: Conjunctivae are normal. Right eye exhibits no discharge. Left eye exhibits no discharge. No scleral icterus. Right eye exhibits no nystagmus. Right pupil is reactive. Left pupil is reactive.   Neck: Neck supple. No JVD present. No tracheal deviation present. No thyromegaly present.   Cardiovascular: Normal rate, regular rhythm, S1 normal and S2 normal.    No murmur heard.  Pulses:       Radial pulses are 2+ on the right side, and 2+ on the left side.         Dorsalis pedis pulses are 2+ on the right side, and 2+ on the left side.   Extremities warm. No peripheral edema    Pulmonary/Chest: Effort normal. No accessory muscle usage. No respiratory distress. She has no decreased breath sounds. She has no wheezes. She has no rales.   Abdominal: Soft. Bowel sounds are normal. She exhibits no distension. There is no tenderness. There is no guarding.   Lymphadenopathy:     She has no cervical adenopathy.   Neurological: She is alert. GCS eye subscore is 4. GCS verbal subscore is 4. GCS motor subscore is 6.   RASS 0. CAMICU positive  Oriented to person only  Following commands and moving extremities equally bilaterally  Pupils 7 mm bilaterally, round not reactive  No nystagmus, roving eye movements  Able to see shapes, light. Cannot discern number of fingers held before eyes  Left sided facial droop noted. No tongue deviation or slurred speech.    Skin: Skin is dry. Capillary refill takes less than 2 seconds. She is not diaphoretic. There is pallor.       Vents:     Lines/Drains/Airways     Drain                 Urethral Catheter 06/15/17 1205 Latex 15 days         NG/OG Tube 07/01/17 0505 [place] sump;nasogastric 14 Fr. less than 1 day          Peripheral Intravenous Line                 Peripheral IV - Single Lumen 06/29/17 1051 Left Wrist 1 day         Peripheral IV - Single Lumen 06/29/17 1645 Right Antecubital 1 day         Peripheral IV - Single Lumen 06/30/17 1800 Right Wrist less than 1 day              Significant Labs:    CBC/Anemia Profile:    Recent Labs  Lab 06/29/17  1018 06/30/17  0401 07/01/17  0232   WBC 8.22 8.40 9.51   HGB 15.0 14.1 10.5*   HCT 48.4 45.2 33.6*    286 252   * 103* 102*   RDW 15.3* 15.3* 14.7*        Chemistries:    Recent Labs  Lab 06/29/17  1018  06/30/17  0521  06/30/17  2140 07/01/17  0232 07/01/17  0631   *  < > 158*  < > 150* 149*  149* 147*   K 4.0  < > 3.8  < > 4.2 3.6  3.6 3.6   *  < > 122*  < > 119* 117*   117* 115*   CO2 29  < > 30*  < > 23 25  25 24   BUN 20  < > 16  < > 15 13  13 13   CREATININE 1.3  < > 1.1  < > 0.8 0.8  0.8 0.9   CALCIUM 11.3*  < > 10.5  < > 9.3 9.2  9.2 9.4   ALBUMIN 3.8  --  3.1*  --   --  2.8*  --    PROT 8.7*  --  7.2  --   --  5.9*  --    BILITOT 0.4  --  0.3  --   --  0.3  --    ALKPHOS 134  --  109  --   --  90  --    ALT 64*  --  45*  --   --  33  --    AST 42*  --  31  --   --  24  --    < > = values in this interval not displayed.    Significant Imaging:  I have reviewed and interpreted all pertinent imaging results/findings within the past 24 hours.

## 2017-07-01 NOTE — ASSESSMENT & PLAN NOTE
--appreciate endocrine's assistance.   --suspect hypernatremia is multifactorial: dehydration given poor PO intake and a component of DI given extensive meningioma resection on 6/7/17  --hypernatremia improved s/p intravenous and enteral water boluses with one dose DDAVP on 6/30  --continue free water enterally: 400 mL q6h for now. IVF's discontinued and no need for further DDAVP.   --continue to monitor sodium levels and urine output  --PRN intranasal DDAVP order for >200 mL urine output/hr x3 hours   --per endocrine, unclear if she will need DDAVP chronically, will continue to monitor progress over next few days

## 2017-07-01 NOTE — SUBJECTIVE & OBJECTIVE
"Interval History: Mental status slightly improved, now responding to questions faster.    Medications:  Continuous Infusions:   Scheduled Meds:   enoxaparin  40 mg Subcutaneous Daily    gabapentin  300 mg Per NG tube TID    levetiracetam oral soln  500 mg Per NG tube BID    levothyroxine  50 mcg Per NG tube Daily    nicotine  1 patch Transdermal Daily    sodium chloride 0.9%  3 mL Intravenous Q8H     PRN Meds:acetaminophen, desmopressin, dextrose 50%, glucagon (human recombinant), insulin aspart, ondansetron, ramelteon     Review of Systems  Objective:     Weight: 54.4 kg (119 lb 14.9 oz)  Body mass index is 21.94 kg/m².  Vital Signs (Most Recent):  Temp: 97.3 °F (36.3 °C) (17 0400)  Pulse: 76 (17 0600)  Resp: 12 (17 0600)  BP: 112/72 (17 0600)  SpO2: 100 % (17 0600) Vital Signs (24h Range):  Temp:  [94.2 °F (34.6 °C)-98.4 °F (36.9 °C)] 97.3 °F (36.3 °C)  Pulse:  [] 76  Resp:  [9-17] 12  SpO2:  [95 %-100 %] 100 %  BP: ()/(54-78) 112/72              Temp (24hrs), Av.8 °F (36 °C), Min:94.2 °F (34.6 °C), Max:98.4 °F (36.9 °C)                 Urethral Catheter 06/15/17 1205 Latex (Active)   Site Assessment Clean;Intact 2017  3:15 AM   Collection Container Urimeter 2017  3:15 AM   Securement Method secured to top of thigh w/ adhesive device 2017  3:15 AM   Catheter Care Performed no 2017  3:15 AM   Reason for Continuing Urinary Catheterization Critically ill in ICU requiring intensive monitoring 2017  3:15 AM   CAUTI Prevention Bundle StatLock in place w 1" slack;Intact seal between catheter & drainage tubing;Drainage bag off the floor;Green sheeting clip in use;No dependent loops or kinks;Drainage bag not overfilled (<2/3 full);Drainage bag below bladder 2017  3:15 AM   Output (mL) 10 mL 2017  6:00 AM       Neurosurgery Physical Exam  E3V4M6  FCx4  SILT  Significant Labs:    Recent Labs  Lab 17  0232   GLU 78  78   *  149*   K " 3.6  3.6   *  117*   CO2 25  25   BUN 13  13   CREATININE 0.8  0.8   CALCIUM 9.2  9.2       Recent Labs  Lab 06/29/17  1018 06/30/17  0401 07/01/17  0232   WBC 8.22 8.40 9.51   HGB 15.0 14.1 10.5*   HCT 48.4 45.2 33.6*    286 252       Recent Labs  Lab 07/01/17  0232   INR 1.0     Microbiology Results (last 7 days)     Procedure Component Value Units Date/Time    Urine culture [[id number]] Collected:  06/28/17 1733    Order Status:  Completed Specimen:  Urine Updated:  06/29/17 2047     Urine Culture, Routine No growth    Narrative:       Preferred Collection Type->Urine, Clean Catch            Significant Diagnostics:

## 2017-07-01 NOTE — ASSESSMENT & PLAN NOTE
--see above  --continue keppra for seizure prophylaxis   --appreciate neurosurgery's assistance.   --mental status slowly improving. Alert, oriented to person

## 2017-07-01 NOTE — PROGRESS NOTES
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 Chew and discharged on 17 to [facility].  She presents with altered mental status and worsening hypernatremia for 1 day. She was treated for DI her last admission and labs are trending up today.  Spoke with Dr. Fairchild at [facility], patient with waxing/waning mental status yesterday, but was appropriate & following some complex commands yesterday.  There was a period yesterday of significant lethargy.  Patient is unable to give history, and information is taken from the chart.     Post-Op Info:  * No surgery found *         Interval History: Mental status slightly improved, now responding to questions faster.    Medications:  Continuous Infusions:   Scheduled Meds:   enoxaparin  40 mg Subcutaneous Daily    gabapentin  300 mg Per NG tube TID    levetiracetam oral soln  500 mg Per NG tube BID    levothyroxine  50 mcg Per NG tube Daily    nicotine  1 patch Transdermal Daily    sodium chloride 0.9%  3 mL Intravenous Q8H     PRN Meds:acetaminophen, desmopressin, dextrose 50%, glucagon (human recombinant), insulin aspart, ondansetron, ramelteon     Review of Systems  Objective:     Weight: 54.4 kg (119 lb 14.9 oz)  Body mass index is 21.94 kg/m².  Vital Signs (Most Recent):  Temp: 97.3 °F (36.3 °C) (17 0400)  Pulse: 76 (17 0600)  Resp: 12 (17 0600)  BP: 112/72 (17 0600)  SpO2: 100 % (17 0600) Vital Signs (24h Range):  Temp:  [94.2 °F (34.6 °C)-98.4 °F (36.9 °C)] 97.3 °F (36.3 °C)  Pulse:  [] 76  Resp:  [9-17] 12  SpO2:  [95 %-100 %] 100 %  BP: ()/(54-78) 112/72              Temp (24hrs), Av.8 °F (36 °C), Min:94.2 °F (34.6 °C), Max:98.4 °F (36.9 °C)                 Urethral Catheter 06/15/17 1205 Latex (Active)   Site Assessment Clean;Intact 2017  3:15 AM  "  Collection Container Urimeter 7/1/2017  3:15 AM   Securement Method secured to top of thigh w/ adhesive device 7/1/2017  3:15 AM   Catheter Care Performed no 7/1/2017  3:15 AM   Reason for Continuing Urinary Catheterization Critically ill in ICU requiring intensive monitoring 7/1/2017  3:15 AM   CAUTI Prevention Bundle StatLock in place w 1" slack;Intact seal between catheter & drainage tubing;Drainage bag off the floor;Green sheeting clip in use;No dependent loops or kinks;Drainage bag not overfilled (<2/3 full);Drainage bag below bladder 7/1/2017  3:15 AM   Output (mL) 10 mL 7/1/2017  6:00 AM       Neurosurgery Physical Exam  E3V4M6  FCx4  SILT  Significant Labs:    Recent Labs  Lab 07/01/17  0232   GLU 78  78   *  149*   K 3.6  3.6   *  117*   CO2 25  25   BUN 13  13   CREATININE 0.8  0.8   CALCIUM 9.2  9.2       Recent Labs  Lab 06/29/17  1018 06/30/17  0401 07/01/17  0232   WBC 8.22 8.40 9.51   HGB 15.0 14.1 10.5*   HCT 48.4 45.2 33.6*    286 252       Recent Labs  Lab 07/01/17  0232   INR 1.0     Microbiology Results (last 7 days)     Procedure Component Value Units Date/Time    Urine culture [[id number]] Collected:  06/28/17 1733    Order Status:  Completed Specimen:  Urine Updated:  06/29/17 2047     Urine Culture, Routine No growth    Narrative:       Preferred Collection Type->Urine, Clean Catch            Significant Diagnostics:      Assessment/Plan:     Meningioma    51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 Chew 6/7/17  -q 1 hr neuro checks  -Monitor for DI, fu endocrine recs  -Hypernatremia: mgmt per endo as above  -Continue care per ICU team  -Seizure precautions  -Will follow                Boogie Wiley DO  Neurosurgery  [facility]  "

## 2017-07-01 NOTE — PT/OT/SLP EVAL
Occupational Therapy  Evaluation    Joanna Morales   MRN: [id number]   Admitting Diagnosis: Hypernatremia    OT Date of Treatment: 07/01/17   OT Start Time: 1014  OT Stop Time: 1027  OT Total Time (min): 13 min    Billable Minutes:  Evaluation 13    Diagnosis: Hypernatremia   Pt readmitted from [facility] d/t abnormal labs. Pt d/c'd from [facility] 6/26 s/p L frontotemportal crani for meningioma resection on 6/7/17    Past Medical History:   Diagnosis Date    Allergy     Basal cell carcinoma     Depression 11/1/2016    Essential hypertension 6/9/2017    Eye disorder     Fever blister     Normocytic anemia 6/9/2017    Secondary hypothyroidism 6/26/2017      Past Surgical History:   Procedure Laterality Date    BASAL CELL CARCINOMA EXCISION      forehead    BRAIN SURGERY      SKIN BIOPSY         Referring physician: Winterbottom  Date referred to OT: 6/30/017    General Precautions: Standard, fall, aspiration, NPO, vision impaired  Orthopedic Precautions:    Braces:      Do you have any cultural, spiritual, Druze conflicts, given your current situation?: None reported     Patient History:  Living Environment  Living Environment Comment: Per previous notes, pt lives with cousin in first floor apartment and used SC or RW for mobility; pt states she was as   Equipment Currently Used at Home: cane, straight, walker, rolling (needs to be confirmed)    Prior level of function:   Bed Mobility/Transfers: independent  Grooming: independent  Bathing: independent  Upper Body Dressing: independent  Lower Body Dressing: independent  Toileting: independent  Home Management Skills: independent  Driving License: Yes     Dominant hand: right    Subjective:  Communicated with RN prior to session.    Chief Complaint: Denies pain or dizziness  Patient/Family stated goals: to go home    Pain/Comfort  Pain Rating 1: 0/10  Pain Rating Post-Intervention 1: 0/10    Objective:  Patient found with: blood pressure cuff,  restraints, telemetry, garcía catheter, NG tube, pulse ox (continuous), peripheral IV    Cognitive Exam:  Oriented to: Person and Place  Follows Commands/attention: Follows one-step commands  Communication: clear/fluent  Pt visually impaired in B eyes    Physical Exam:  Postural examination/scapula alignment: No postural abnormalities identified  Skin integrity: Visible skin intact  Edema: None noted     Sensation:   Intact    Upper Extremity Range of Motion:  Right Upper Extremity: WFL  Left Upper Extremity: WFL    Upper Extremity Strength:  Right Upper Extremity: WFL  Left Upper Extremity: WFL   Strength: Good    Fine motor coordination:   Minimally impaired    Gross motor coordination: Minimally impaired    Functional Mobility:  Bed Mobility:  Rolling/Turning Right: Minimum assistance  Scooting/Bridging: Minimum Assistance  Supine to Sit: Minimum Assistance    Transfers:  Sit <> Stand Assistance: Minimum Assistance  Sit <> Stand Assistive Device: No Assistive Device (B H HA)  Bed <> Chair Technique: Stand Pivot  Bed <> Chair Transfer Assistance: Minimum Assistance  Bed <> Chair Assistive Device: No Assistive Device (B HHA)    Functional Ambulation: Mod A to/from couch from EOB    Activities of Daily Living:  Feeding Level of Assistance: Activity did not occur (NG tube)  Feeding adaptive equipment:   UE Dressing Level of Assistance: Maximum assistance  UE adaptive equipment:   LE Dressing Level of Assistance: Maximum assistance  LE adaptive equipment:   Grooming Position: Seated  Grooming Level of Assistance: Maximum assistance  Toileting Where Assessed: Other (Comment) (standing EOB for pericare)  Toileting Level of Assistance: Maximum assistance        AM-PAC 6 CLICK ADL  How much help from another person does this patient currently need?  1 = Unable, Total/Dependent Assistance  2 = A lot, Maximum/Moderate Assistance  3 = A little, Minimum/Contact Guard/Supervision  4 = None, Modified  "[place]/Independent    Putting on and taking off regular lower body clothing? : 2  Bathing (including washing, rinsing, drying)?: 2  Toileting, which includes using toilet, bedpan, or urinal? : 2  Putting on and taking off regular upper body clothing?: 2  Taking care of personal grooming such as brushing teeth?: 2  Eating meals?: 1  Total Score: 11    AM-PAC Raw Score CMS "G-Code Modifier Level of Impairment Assistance   6 % Total / Unable   7 - 9 CM 80 - 100% Maximal Assist   10-14 CL 60 - 80% Moderate Assist   15 - 19 CK 40 - 60% Moderate Assist   20 - 22 CJ 20 - 40% Minimal Assist   23 CI 1-20% SBA / CGA   24 CH 0% Independent/ Mod I       Patient left supine with all lines intact, call button in reach, restraints reapplied at end of session and rn notified    Assessment:  Joanna Morales is a 51 y.o. female with a medical diagnosis of Hypernatremia and presents with confusion, blindness, overall weakness and decreased endurance s/p neurosurgery and hypernatremia.    Rehab identified problem list/impairments: Rehab identified problem list/impairments: weakness, impaired endurance, gait instability, impaired functional mobilty, impaired self care skills, impaired balance, visual deficits, decreased safety awareness, impaired coordination    Rehab potential is good.    Activity tolerance: Good    Discharge recommendations: Discharge Facility/Level Of Care Needs: rehabilitation facility     Barriers to discharge: Barriers to Discharge: Decreased caregiver support, Inaccessible home environment (pt matthew need 24 hour supervision at this point)    Equipment recommendations:  (TBD closer to d/c)     GOALS:    Occupational Therapy Goals        Problem: Occupational Therapy Goal    Goal Priority Disciplines Outcome Interventions   Occupational Therapy Goal     OT, PT/OT Ongoing (interventions implemented as appropriate)    Description:  Goals to be met by: 7/15/17     Patient will increase functional " independence with ADLs by performing:     UE Dressing with Minimal Assistance.  LE Dressing with Minimal Assistance.  Grooming while standing with Contact Guard Assistance.  Toileting from toilet with Minimal Assistance for hygiene and clothing management.   Toilet transfer to toilet with Contact Guard Assistance.                        PLAN:  Patient to be seen 5 x/week to address the above listed problems via self-care/home management, therapeutic activities, therapeutic exercises  Plan of Care expires: 07/31/17  Plan of Care reviewed with:           HELEN Shaikh  07/01/2017

## 2017-07-01 NOTE — ASSESSMENT & PLAN NOTE
--History of meningioma s/p resection 2015 & 6/7/2017.   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    -- Neurosurgery following; appreciate assitance  --management as above

## 2017-07-02 PROBLEM — T68.XXXA HYPOTHERMIA: Status: ACTIVE | Noted: 2017-01-01

## 2017-07-02 NOTE — PLAN OF CARE
Problem: Patient Care Overview  Goal: Plan of Care Review  Pt to remain in restraints, pt moving frequently in bed, pulling at lines, interfering with treatment. Pt tolerating water boluses and tube feedings. Na 142. UOP < 200 cc/hr, no PRN desmopressin given this shift.

## 2017-07-02 NOTE — PROGRESS NOTES
"[facility]  Endocrinology  Progress Note    Admit Date: 6/28/2017     Reason for Consult: Management of hypernatremia    Admit Date: 6/28/2017      Reason for Consult: Diabetes insipidus      Surgical Procedure and Date:   CRANIOTOMY 6/8/2017           HPI:   Patient is a 51 y.o. female with a diagnosis of  meningioma s/p resection (6/7). 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  She underwent partial resection of the tumor in January 2015 at [facility]. Followup scan done in 2016 showing a continued large meningioma. Endocrine had been previously involved in her care during earlier June hospitalization for post-operative diabetes insipidus and additional concern for secondary adrenal insufficiency and secondary hypothyroidism. Had been discharged to [facility] on 6/26/17. Re-admitted to [facility] on 6/28/17 for worsening hypernatremia and mental status. Endocrine had been consulted this admission for severe hypernatremia with concerns for diabetes insipidus.             Interval HPI:   Overnight events: desmopressin given at 10pm overnight with uop averaging 200cc/hr during the evening  Eating:   NPO  Nausea: No  Hypoglycemia and intervention: No  Fever: No  TPN and/or TF: Yes  If yes, type of TF/TPN and rate: 35cc/hr    /78   Pulse 90   Temp 97.3 °F (36.3 °C) (Axillary)   Resp 12   Ht 5' 2" (1.575 m)   Wt 54 kg (119 lb 0.8 oz)   SpO2 96%   Breastfeeding? No   BMI 21.77 kg/m²       Labs Reviewed and Include      Recent Labs  Lab 07/02/17  0030 07/02/17  0445   GLU 91  --    CALCIUM 10.0  --    ALBUMIN  --  2.9*   PROT  --  6.5   *  --    K 4.0  --    CO2 26  --    *  --    BUN 9  --    CREATININE 0.8  --    ALKPHOS  --  113   ALT  --  30   AST  --  23   BILITOT  --  0.3     Lab Results   Component Value Date    WBC 8.80 07/02/2017    HGB 11.3 (L) " 07/02/2017    HCT 34.9 (L) 07/02/2017    MCV 98 07/02/2017     07/02/2017       Recent Labs  Lab 06/27/17  0706 06/28/17  1422   TSH 0.354* 0.241*   FREET4 0.96 0.90     No results found for: HGBA1C    Nutritional status:   Body mass index is 21.77 kg/m².  Lab Results   Component Value Date    ALBUMIN 2.9 (L) 07/02/2017    ALBUMIN 2.8 (L) 07/01/2017    ALBUMIN 3.1 (L) 06/30/2017     Lab Results   Component Value Date    PREALBUMIN 26 06/27/2017       Estimated Creatinine Clearance: 65.8 mL/min (based on Cr of 0.8).    Accu-Checks  Recent Labs      07/01/17   1241  07/01/17   1437  07/01/17   1622  07/01/17   1707  07/01/17   1804  07/01/17   2109  07/02/17   0102  07/02/17   0307  07/02/17   0434  07/02/17   0626   POCTGLUCOSE  89  98  78  161*  106  92  99  90  100  105       Current Medications and/or Treatments Impacting Glycemic Control  Immunotherapy:  Immunosuppressants     None        Steroids:   Hormones     Start     Stop Route Frequency Ordered    07/02/17 0856  desmopressin nasal spray 10 mcg     Question:  Is the patient competent?  Answer:  No    -- Nasl 2 times daily PRN 07/02/17 0757        Pressors:    Autonomic Drugs     None        Hyperglycemia/Diabetes Medications: Antihyperglycemics     Start     Stop Route Frequency Ordered    06/29/17 1922  insulin aspart pen 0-5 Units      -- SubQ Every 6 hours PRN 06/29/17 1822          ASSESSMENT and PLAN    * Hypernatremia    Hypernatremia with likely component of both volume depletion and partial central diabetes insipidus, sodium improving  Agree with scheduled free water boluses 4x daily  Recommending scheduling desmopressin 10mcg intranasal nightly    May give desmopressin 10mcg intranasal prn for urine output greater than 250cc/hr for two consecutive hrs, urine specific gravity of 1.005 or less, and serum sodium greater than 150          Primary central diabetes insipidus    As above          Secondary adrenal insufficiency    Morning cortisol  level 10.7 adequate, ACTH level of 19 is appropriate  Will hold off on further hydrocortisone unless patient develops hemodynamic instability          Secondary hypothyroidism    Recommend levothyroxine 50mcg oral or via NG tube   Repeat tsh within 4-6 weeks as o/p            Will attempt to schedule in endocrine pituitary clinic.  Signing off, please call with further questions.    Gerry Sr MD  Endocrinology  [facility]

## 2017-07-02 NOTE — ASSESSMENT & PLAN NOTE
Hypernatremia with likely component of both volume depletion and partial central diabetes insipidus, sodium improving  Agree with scheduled free water boluses 4x daily  Recommending scheduling desmopressin 10mcg intranasal nightly    May give desmopressin 10mcg intranasal prn for urine output greater than 250cc/hr for two consecutive hrs, urine specific gravity of 1.005 or less, and serum sodium greater than 150

## 2017-07-02 NOTE — ASSESSMENT & PLAN NOTE
--recurrent requiring warming blanket PRN  --suspect this is 2/2 neurologic issues. TSH, cortisol, ACTH WNL.  --do not feel this is 2/2 sepsis, as no other symptoms of infection.   --continue to monitor

## 2017-07-02 NOTE — PLAN OF CARE
Problem: Patient Care Overview  Goal: Plan of Care Review  Outcome: Ongoing (interventions implemented as appropriate)  Pt requiring dose of DDAVP intranasal for UOP > than 200 ml per .  UOP corrected after dose.  Pt with watery copious stool after TF started. Flexi placed to maintain skin integrity.  Neuro status wax and wane from very awake to sleepy Rass -2, possible due to dosing of neurontin q 8hr.  Pt aware of self, but not time place situation.  More sleepy around neurontin dosing per dayshift RN.  Pupils remain 5/6 and fixed with small response to light on left.  Pt totally blind on right, can see light on left.  VSWNL, Hypothermic 94 degrees requiring bare hugger over night, not 97.3.  Replacing electrolytes this am

## 2017-07-02 NOTE — PLAN OF CARE
Discussed patient's prescribed gabapentin and indication with neurosurgery resident on call. Patient increasingly somnolent with it's administration. Per NSGY, gabapentin not necessary for seizure prophylaxis and okay to hold dosing for now. Keppra continued.     Also called patient's POA and friend, Reina, this AM to update her on Mrs. Morales's status and plan of care. All questions answered. Reina will be visiting this afternoon.    SARWAT Messer,  [facility]  Critical Care Medicine  Pager [phone]

## 2017-07-02 NOTE — SUBJECTIVE & OBJECTIVE
"Interval HPI:   Overnight events: desmopressin given at 10pm overnight with uop averaging 200cc/hr during the evening  Eating:   NPO  Nausea: No  Hypoglycemia and intervention: No  Fever: No  TPN and/or TF: Yes  If yes, type of TF/TPN and rate: 35cc/hr    /78   Pulse 90   Temp 97.3 °F (36.3 °C) (Axillary)   Resp 12   Ht 5' 2" (1.575 m)   Wt 54 kg (119 lb 0.8 oz)   SpO2 96%   Breastfeeding? No   BMI 21.77 kg/m²     Labs Reviewed and Include      Recent Labs  Lab 07/02/17  0030 07/02/17  0445   GLU 91  --    CALCIUM 10.0  --    ALBUMIN  --  2.9*   PROT  --  6.5   *  --    K 4.0  --    CO2 26  --    *  --    BUN 9  --    CREATININE 0.8  --    ALKPHOS  --  113   ALT  --  30   AST  --  23   BILITOT  --  0.3     Lab Results   Component Value Date    WBC 8.80 07/02/2017    HGB 11.3 (L) 07/02/2017    HCT 34.9 (L) 07/02/2017    MCV 98 07/02/2017     07/02/2017       Recent Labs  Lab 06/27/17  0706 06/28/17  1422   TSH 0.354* 0.241*   FREET4 0.96 0.90     No results found for: HGBA1C    Nutritional status:   Body mass index is 21.77 kg/m².  Lab Results   Component Value Date    ALBUMIN 2.9 (L) 07/02/2017    ALBUMIN 2.8 (L) 07/01/2017    ALBUMIN 3.1 (L) 06/30/2017     Lab Results   Component Value Date    PREALBUMIN 26 06/27/2017       Estimated Creatinine Clearance: 65.8 mL/min (based on Cr of 0.8).    Accu-Checks  Recent Labs      07/01/17   1241  07/01/17   1437  07/01/17   1622  07/01/17   1707  07/01/17   1804  07/01/17   2109  07/02/17   0102  07/02/17   0307  07/02/17   0434  07/02/17   0626   POCTGLUCOSE  89  98  78  161*  106  92  99  90  100  105       Current Medications and/or Treatments Impacting Glycemic Control  Immunotherapy:  Immunosuppressants     None        Steroids:   Hormones     Start     Stop Route Frequency Ordered    07/02/17 0856  desmopressin nasal spray 10 mcg     Question:  Is the patient competent?  Answer:  No    -- Nasl 2 times daily PRN 07/02/17 0757    "     Pressors:    Autonomic Drugs     None        Hyperglycemia/Diabetes Medications: Antihyperglycemics     Start     Stop Route Frequency Ordered    06/29/17 1922  insulin aspart pen 0-5 Units      -- SubQ Every 6 hours PRN 06/29/17 182

## 2017-07-02 NOTE — SUBJECTIVE & OBJECTIVE
Interval History/Significant Events: mental status waxing/waning. Somnolent this AM and these episodes appear to be occurring s/p gabapentin dosing. Will hold for now and discuss with NSGY. Hypothermic.     Review of Systems   Unable to perform ROS: Mental status change   Constitutional:              Objective:     Vital Signs (Most Recent):  Temp: 97.3 °F (36.3 °C) (07/02/17 0400)  Pulse: 90 (07/02/17 0630)  Resp: 12 (07/02/17 0630)  BP: 114/78 (07/02/17 0630)  SpO2: 96 % (07/02/17 0630) Vital Signs (24h Range):  Temp:  [93.7 °F (34.3 °C)-97.3 °F (36.3 °C)] 97.3 °F (36.3 °C)  Pulse:  [] 90  Resp:  [10-35] 12  SpO2:  [94 %-100 %] 96 %  BP: ()/(61-86) 114/78   Weight: 54 kg (119 lb 0.8 oz)  Body mass index is 21.77 kg/m².      Intake/Output Summary (Last 24 hours) at 07/02/17 0803  Last data filed at 07/02/17 0637   Gross per 24 hour   Intake             2055 ml   Output             2305 ml   Net             -250 ml       Physical Exam   Constitutional: She appears well-developed. She appears ill. No distress.   HENT:   Head: Normocephalic.   Mouth/Throat: Oropharynx is clear and moist. No oropharyngeal exudate.   Healing craniotomy incision.    Eyes: Conjunctivae are normal. Right eye exhibits no discharge. Left eye exhibits no discharge. No scleral icterus. Right eye exhibits no nystagmus. Right pupil is not reactive. Left pupil is not reactive.   Neck: Neck supple. No JVD present. No tracheal deviation present. No thyromegaly present.   Cardiovascular: Normal rate, regular rhythm, S1 normal and S2 normal.    No murmur heard.  Pulses:       Radial pulses are 2+ on the right side, and 2+ on the left side.        Dorsalis pedis pulses are 2+ on the right side, and 2+ on the left side.   Extremities warm. No peripheral edema    Pulmonary/Chest: Effort normal. No accessory muscle usage. No respiratory distress. She has no decreased breath sounds. She has no wheezes. She has no rales.   Abdominal: Soft.  Bowel sounds are normal. She exhibits no distension. There is no tenderness. There is no guarding.   Lymphadenopathy:     She has no cervical adenopathy.   Neurological: She is alert. GCS eye subscore is 2. GCS verbal subscore is 4. GCS motor subscore is 5.   Somnolent, opens eyes to pain. Moving extremities to pain. Disoriented speech  Pupils 7 mm bilaterally, round not reactive  No nystagmus, roving eye movements     Skin: Skin is dry. Capillary refill takes less than 2 seconds. She is not diaphoretic. There is pallor.       Vents:     Lines/Drains/Airways     Drain                 Urethral Catheter 06/15/17 1205 Latex 16 days         NG/OG Tube 07/01/17 0505 [place] sump;nasogastric 14 Fr. 1 day         Rectal Tube 07/02/17 0030 rectal tube w/ balloon (indicate number of mLs) less than 1 day          Peripheral Intravenous Line                 Peripheral IV - Single Lumen 06/29/17 1645 Right Antecubital 2 days         Peripheral IV - Single Lumen 07/01/17 1200 Left Forearm less than 1 day              Significant Labs:    CBC/Anemia Profile:    Recent Labs  Lab 07/01/17  0232 07/02/17  0445   WBC 9.51 8.80   HGB 10.5* 11.3*   HCT 33.6* 34.9*    243   * 98   RDW 14.7* 14.2        Chemistries:    Recent Labs  Lab 07/01/17  0232  07/01/17  1214 07/01/17  1709 07/02/17  0030 07/02/17  0445   *  149*  < > 147* 146* 146*  --    K 3.6  3.6  < > 3.6 3.7 4.0  --    *  117*  < > 113* 111* 112*  --    CO2 25  25  < > 26 27 26  --    BUN 13  13  < > 13 10 9  --    CREATININE 0.8  0.8  < > 0.8 0.9 0.8  --    CALCIUM 9.2  9.2  < > 9.4 9.3 10.0  --    ALBUMIN 2.8*  --   --   --   --  2.9*   PROT 5.9*  --   --   --   --  6.5   BILITOT 0.3  --   --   --   --  0.3   ALKPHOS 90  --   --   --   --  113   ALT 33  --   --   --   --  30   AST 24  --   --   --   --  23   MG  --   --   --   --   --  1.7   PHOS  --   --   --   --   --  3.9   < > = values in this interval not displayed.    Significant  Imaging:  I have reviewed and interpreted all pertinent imaging results/findings within the past 24 hours.

## 2017-07-02 NOTE — ASSESSMENT & PLAN NOTE
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  Continue free water per endocrine  -Hypernatremia: Na at 146 this am, improving, continue to monitor.  -Continue care per ICU team  -Seizure precautions  -Will follow

## 2017-07-02 NOTE — ASSESSMENT & PLAN NOTE
--see above  --continue keppra for seizure prophylaxis. Hold gabapentin for now given increased somnolence associated with dosing. Will discuss with NSGY if this is necessary

## 2017-07-02 NOTE — PROGRESS NOTES
[facility]  Critical Care Medicine  Progress Note    Patient Name: Joanna Morales  MRN: [id number]  Admission Date: 6/28/2017  Hospital Length of Stay: 4 days  Code Status: Full Code  Attending Provider: Lamberto Mcdaniel MD  Primary Care Provider: Claudy Tse MD   Principal Problem: Hypernatremia    Subjective:     HPI:  Ms. Joanna Morales is a 51 y.o. female with tobacco abuse, secondary hypothyroidism (TSH 0.241 6/17) and history of meningioma s/p resection 2015 & 6/7/2017. The most recent [place] by Dr. Chew [place] [facility] was complicated by DI . The patient was discharged to [facility] on 6/26/17 and re-presented to [facility] [place] on 6/28 with altered mental status and worsening hypernatremia.     Of note, She underwent a right frontotemporal craniotomy with partial removal of a large subfrontal and tuberculum sellae meningioma at [facility] in 2015. 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      Hospital/ICU Course:  Ms. Morales was discharged [place] [facility]  to [facility] on 6/26/17 and presented to [facility] [place] on 6/28 with altered mental status and worsening hypernatremia. According to the patient's sitter, the patient was awake, verbal, and interactive with therapy on 6/23 but had been progressively less interactive and responsive when she was at [place]. Ms. Morales was admitted to Hospital Medicine but was found to be unresponsive with a sodium level of 168 on the morning of 6/29. The Critical Care team was consulted for ICU monitoring and management. She was started on a dextrose infusion and given free water per T. Endocrine was also involved in patient  management during this and the prior visit and reconsulted for this admission to assist with management. In addition to free water replacement intravenously and enterally, DDAVP was recommended if urine output exceeded 200 mL/hr x3 hours. She was given one dose of DDAVP subcutaneously on the morning of 6/30 and night of 7/1 with improvement in urine output. Hypernatremia has slowly improved.       Interval History/Significant Events: mental status waxing/waning. Somnolent this AM and these episodes appear to be occurring s/p gabapentin dosing. Will hold for now and discuss with NSGY. Hypothermic.     Review of Systems   Unable to perform ROS: Mental status change   Constitutional:              Objective:     Vital Signs (Most Recent):  Temp: 97.3 °F (36.3 °C) (07/02/17 0400)  Pulse: 90 (07/02/17 0630)  Resp: 12 (07/02/17 0630)  BP: 114/78 (07/02/17 0630)  SpO2: 96 % (07/02/17 0630) Vital Signs (24h Range):  Temp:  [93.7 °F (34.3 °C)-97.3 °F (36.3 °C)] 97.3 °F (36.3 °C)  Pulse:  [] 90  Resp:  [10-35] 12  SpO2:  [94 %-100 %] 96 %  BP: ()/(61-86) 114/78   Weight: 54 kg (119 lb 0.8 oz)  Body mass index is 21.77 kg/m².      Intake/Output Summary (Last 24 hours) at 07/02/17 0803  Last data filed at 07/02/17 0637   Gross per 24 hour   Intake             2055 ml   Output             2305 ml   Net             -250 ml       Physical Exam   Constitutional: She appears well-developed. She appears ill. No distress.   HENT:   Head: Normocephalic.   Mouth/Throat: Oropharynx is clear and moist. No oropharyngeal exudate.   Healing craniotomy incision.    Eyes: Conjunctivae are normal. Right eye exhibits no discharge. Left eye exhibits no discharge. No scleral icterus. Right eye exhibits no nystagmus. Right pupil is not reactive. Left pupil is not reactive.   Neck: Neck supple. No JVD present. No tracheal deviation present. No thyromegaly present.   Cardiovascular: Normal rate, regular rhythm, S1 normal and S2 normal.     No murmur heard.  Pulses:       Radial pulses are 2+ on the right side, and 2+ on the left side.        Dorsalis pedis pulses are 2+ on the right side, and 2+ on the left side.   Extremities warm. No peripheral edema    Pulmonary/Chest: Effort normal. No accessory muscle usage. No respiratory distress. She has no decreased breath sounds. She has no wheezes. She has no rales.   Abdominal: Soft. Bowel sounds are normal. She exhibits no distension. There is no tenderness. There is no guarding.   Lymphadenopathy:     She has no cervical adenopathy.   Neurological: She is alert. GCS eye subscore is 2. GCS verbal subscore is 4. GCS motor subscore is 5.   Somnolent, opens eyes to pain. Moving extremities to pain. Disoriented speech  Pupils 7 mm bilaterally, round not reactive  No nystagmus, roving eye movements     Skin: Skin is dry. Capillary refill takes less than 2 seconds. She is not diaphoretic. There is pallor.       Vents:     Lines/Drains/Airways     Drain                 Urethral Catheter 06/15/17 1205 Latex 16 days         NG/OG Tube 07/01/17 0505 [place] sump;nasogastric 14 Fr. 1 day         Rectal Tube 07/02/17 0030 rectal tube w/ balloon (indicate number of mLs) less than 1 day          Peripheral Intravenous Line                 Peripheral IV - Single Lumen 06/29/17 1645 Right Antecubital 2 days         Peripheral IV - Single Lumen 07/01/17 1200 Left Forearm less than 1 day              Significant Labs:    CBC/Anemia Profile:    Recent Labs  Lab 07/01/17  0232 07/02/17  0445   WBC 9.51 8.80   HGB 10.5* 11.3*   HCT 33.6* 34.9*    243   * 98   RDW 14.7* 14.2        Chemistries:    Recent Labs  Lab 07/01/17  0232  07/01/17  1214 07/01/17  1709 07/02/17  0030 07/02/17  0445   *  149*  < > 147* 146* 146*  --    K 3.6  3.6  < > 3.6 3.7 4.0  --    *  117*  < > 113* 111* 112*  --    CO2 25  25  < > 26 27 26  --    BUN 13  13  < > 13 10 9  --    CREATININE 0.8  0.8  < > 0.8 0.9 0.8  --     CALCIUM 9.2  9.2  < > 9.4 9.3 10.0  --    ALBUMIN 2.8*  --   --   --   --  2.9*   PROT 5.9*  --   --   --   --  6.5   BILITOT 0.3  --   --   --   --  0.3   ALKPHOS 90  --   --   --   --  113   ALT 33  --   --   --   --  30   AST 24  --   --   --   --  23   MG  --   --   --   --   --  1.7   PHOS  --   --   --   --   --  3.9   < > = values in this interval not displayed.    Significant Imaging:  I have reviewed and interpreted all pertinent imaging results/findings within the past 24 hours.    Assessment/Plan:     Neuro   Meningioma    --History of meningioma s/p resection 2015 & 6/7/2017.   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    -- Neurosurgery following; appreciate assitance  --management as above          Secondary hypothyroidism    --continue 50mcg Levothyroxine per NGT  --T4 this admission WNL            Acute encephalopathy    --see above  --continue keppra for seizure prophylaxis. Hold gabapentin for now given increased somnolence associated with dosing. Will discuss with NSGY if this is necessary              Primary central diabetes insipidus    --appreciate endocrine's assistance.   --suspect hypernatremia is multifactorial: dehydration given poor PO intake and a component of DI given extensive meningioma resection on 6/7/17  --hypernatremia improved s/p intravenous and enteral water boluses.  --received DDAVP on 6/30 and 7/1  --per endocrine recommendations, will schedule DDAVP 10 mcg intranasally nightly starting today. Continue to monitor BMP's closely to assess continued need or dose adjustments  --continue free  water enterally: have decreased to 175 mL q6h per NGT now that at goal on tube feedings. If TF's are paused/stopped, will need to increase free water boluses again likely.  --continue to monitor sodium levels and urine output  --PRN intranasal DDAVP order for >200 mL urine output/hr x3 hours   --per endocrine, unclear if she will need DDAVP chronically, will continue to monitor progress over next few days        Endocrine   Secondary adrenal insufficiency    --Cortisol and ACTH level normal          Fluids/Electrolytes/Nutrition/GI   * Hypernatremia    --plan as above        Other   Goals of care, counseling/discussion    --Friend/POA (Reina) at bedside on 6/30. She was updated on the patient's condition and acute issues. We discussed that the patient had little interaction with biological relatives therefore, as she was a close friend she had recommended to the patient that POA be established prior to the most recent surgery. She reported the patient's course of disease including transfer to rehab. She has discussed discharge planning with Social Workers in the recent past and seems to understand that the patient has a long path to recovery.         Tobacco abuse    --Nicotine patch        Hypothermia    --recurrent requiring warming blanket PRN  --suspect this is 2/2 neurologic issues. TSH, cortisol, ACTH WNL.  --do not feel this is 2/2 sepsis, as no other symptoms of infection.   --continue to monitor            Critical Care Medicine Daily Checklist:    A: Awake: RASS Goal/Actual Goal: RASS Goal: 0-->alert and calm  Actual: Tong Agitation Sedation Scale (RASS): Light sedation   B: Spontaneous Breathing Trial Performed?     C: SAT & SBT Coordinated?  n/a                      D: Delirium: CAM-ICU Overall CAM-ICU: Positive   E: Early Mobility Performed? Yes   F: Feeding Goal:    Status:     Current Diet Order   Procedures    Diet NPO Except for: Other (Comment) (free water ad ranjeet)     Order Specific Question:    Except for     Answer:   Other (Comment)     Comments:   free water ad ranjeet      AS: Analgesia/Sedation None    T: Thromboembolic Prophylaxis lovenox    H: HOB > 300 Yes   U: Stress Ulcer Prophylaxis (if needed) n/a   G: Glucose Control SSI    B: Bowel Function Stool Occurrence: 0   I: Indwelling Catheter (Lines & García) Necessity Peripheral IV's and garcía necessary    D: De-escalation of Antimicrobials/Pharmacotherapies None     Plan for the day/ETD Monitor neuro status. BMP's. Urine output     Code Status:  Family/Goals of Care: Full Code         Critical secondary to Patient has a condition that poses threat to life and bodily function: mental status that requires close monitoring for airway protection.     Critical care time: 55 minutes      Critical care was time spent personally by me on the following activities: development of treatment plan with patient or surrogate and bedside caregivers, discussions with consultants, evaluation of patient's response to treatment, examination of patient, ordering and performing treatments and interventions, ordering and review of laboratory studies, ordering and review of radiographic studies, pulse oximetry, re-evaluation of patient's condition. This critical care time did not overlap with that of any other provider or involve time for any procedures.     Carmina De Jesus NP  Critical Care Medicine  [facility]

## 2017-07-02 NOTE — PROGRESS NOTES
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 Chew and discharged on 17 to [facility].  She presents with altered mental status and worsening hypernatremia for 1 day. She was treated for DI her last admission and labs are trending up today.  Spoke with Dr. Fairchild at [facility], patient with waxing/waning mental status yesterday, but was appropriate & following some complex commands yesterday.  There was a period yesterday of significant lethargy.  Patient is unable to give history, and information is taken from the chart.     Post-Op Info:  * No surgery found *         Interval History: NAEON    Medications:  Continuous Infusions:   Scheduled Meds:   enoxaparin  40 mg Subcutaneous Daily    gabapentin  300 mg Per NG tube TID    levetiracetam oral soln  500 mg Per NG tube BID    levothyroxine  50 mcg Per NG tube Daily    nicotine  1 patch Transdermal Daily    sodium chloride 0.9%  3 mL Intravenous Q8H     PRN Meds:acetaminophen, dextrose 50%, glucagon (human recombinant), insulin aspart, magnesium sulfate IVPB, magnesium sulfate IVPB, ondansetron, potassium chloride 10%, potassium chloride 10%, potassium chloride 10%, potassium, sodium phosphates, potassium, sodium phosphates, potassium, sodium phosphates, ramelteon     Review of Systems  Objective:     Weight: 54 kg (119 lb 0.8 oz)  Body mass index is 21.77 kg/m².  Vital Signs (Most Recent):  Temp: 97.3 °F (36.3 °C) (17 0400)  Pulse: 90 (17 0630)  Resp: 12 (17)  BP: 114/78 (17)  SpO2: 96 % (17) Vital Signs (24h Range):  Temp:  [93.7 °F (34.3 °C)-97.3 °F (36.3 °C)] 97.3 °F (36.3 °C)  Pulse:  [] 90  Resp:  [10-35] 12  SpO2:  [94 %-100 %] 96 %  BP: ()/(61-86) 114/78              Temp (24hrs), Av.3 °F (35.7 °C), Min:93.7 °F (34.3 °C), Max:97.3  °F (36.3 °C)                 NG/OG Tube 07/01/17 0505 [place] sump;nasogastric 14 Fr. (Active)   Placement Check placement verified by aspirate characteristics;placement verified by distal tube length measurement;placement verified by x-ray 7/2/2017  4:00 AM   Distal Tube Length (cm) 64 7/1/2017  8:00 PM   Tolerance no signs/symptoms of discomfort 7/2/2017  4:00 AM   Securement taped to cheek;anchored to nostril center w/ adhesive device 7/2/2017  4:00 AM   Clamp Status/Tolerance unclamped 7/2/2017  4:00 AM   Insertion Site Appearance no redness, warmth, tenderness, skin breakdown, drainage 7/2/2017  4:00 AM   Flush/Irrigation flushed w/;water;no resistance met 7/2/2017  4:00 AM   Feeding Method continuous 7/2/2017  4:00 AM   Current Rate (mL/hr) 20 mL/hr 7/1/2017  8:00 PM   Goal Rate (mL/hr) 35 mL/hr 7/1/2017  8:00 PM   Intake (mL) 400 mL 7/2/2017  6:00 AM   Intake (mL) - Formula Tube Feeding 35 7/2/2017  6:00 AM   Residual Amount (ml) 15 ml 7/2/2017  3:00 AM            Rectal Tube 07/02/17 0030 rectal tube w/ balloon (indicate number of mLs) (Active)   Balloon Inflation Volume (mL) 45 7/2/2017  4:00 AM   Reposition drainage bags for BMS & Mackey on opposite sides of bed 7/2/2017  4:00 AM   Outcome stool evacuated;comfort enhanced 7/2/2017  1:00 AM   Stool Color Brown 7/2/2017  4:00 AM   Insertion Site Appearance no redness, warmth, tenderness, skin breakdown, drainage 7/2/2017  4:00 AM   Flush/Irrigation irrigated w/;water 7/2/2017  1:00 AM   Intake (mL) 575 mL 7/2/2017  6:00 AM            Urethral Catheter 06/15/17 1205 Latex (Active)   Site Assessment Clean;Intact 7/2/2017  4:00 AM   Collection Container Urimeter 7/2/2017  4:00 AM   Securement Method secured to top of thigh w/ adhesive device 7/2/2017  4:00 AM   Catheter Care Performed yes 7/2/2017 12:30 AM   Reason for Continuing Urinary Catheterization Chronic Indwelling Urinary Catheter on Admission 7/2/2017  4:00 AM   CAUTI Prevention Bundle StatLock in place w  "1" slack;Drainage bag off the floor;Green sheeting clip in use;No dependent loops or kinks;Drainage bag not overfilled (<2/3 full);Drainage bag below bladder 7/1/2017  8:00 PM   Output (mL) 100 mL 7/2/2017  6:00 AM       Neurosurgery Physical Exam   Awake, alert, oriented to person  Pupils NR bilaterally, blind in both eyes  FC x4  silt    Significant Labs:    Recent Labs  Lab 07/02/17  0030 07/02/17  0445   GLU 91  --    *  --    K 4.0  --    *  --    CO2 26  --    BUN 9  --    CREATININE 0.8  --    CALCIUM 10.0  --    MG  --  1.7       Recent Labs  Lab 07/01/17  0232 07/02/17 0445   WBC 9.51 8.80   HGB 10.5* 11.3*   HCT 33.6* 34.9*    243       Recent Labs  Lab 07/01/17 0232   INR 1.0     Microbiology Results (last 7 days)     Procedure Component Value Units Date/Time    Urine culture [[id number]] Collected:  06/28/17 1733    Order Status:  Completed Specimen:  Urine Updated:  06/29/17 2047     Urine Culture, Routine No growth    Narrative:       Preferred Collection Type->Urine, Clean Catch            Significant Diagnostics:      Assessment/Plan:     Meningioma    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  Continue free water per endocrine  -Hypernatremia: Na at 146 this am, improving, continue to monitor.  -Continue care per ICU team  -Seizure precautions  -Will follow                Boogie Wiley,   Neurosurgery  [facility]  "

## 2017-07-02 NOTE — ASSESSMENT & PLAN NOTE
--appreciate endocrine's assistance.   --suspect hypernatremia is multifactorial: dehydration given poor PO intake and a component of DI given extensive meningioma resection on 6/7/17  --hypernatremia improved s/p intravenous and enteral water boluses.  --received DDAVP on 6/30 and 7/1  --per endocrine recommendations, will schedule DDAVP 10 mcg intranasally nightly starting today. Continue to monitor BMP's closely to assess continued need or dose adjustments  --continue free water enterally: have decreased to 175 mL q6h per NGT now that at goal on tube feedings. If TF's are paused/stopped, will need to increase free water boluses again likely.  --continue to monitor sodium levels and urine output  --PRN intranasal DDAVP order for >200 mL urine output/hr x3 hours   --per endocrine, unclear if she will need DDAVP chronically, will continue to monitor progress over next few days

## 2017-07-02 NOTE — SUBJECTIVE & OBJECTIVE
Interval History: NAEON    Medications:  Continuous Infusions:   Scheduled Meds:   enoxaparin  40 mg Subcutaneous Daily    gabapentin  300 mg Per NG tube TID    levetiracetam oral soln  500 mg Per NG tube BID    levothyroxine  50 mcg Per NG tube Daily    nicotine  1 patch Transdermal Daily    sodium chloride 0.9%  3 mL Intravenous Q8H     PRN Meds:acetaminophen, dextrose 50%, glucagon (human recombinant), insulin aspart, magnesium sulfate IVPB, magnesium sulfate IVPB, ondansetron, potassium chloride 10%, potassium chloride 10%, potassium chloride 10%, potassium, sodium phosphates, potassium, sodium phosphates, potassium, sodium phosphates, ramelteon     Review of Systems  Objective:     Weight: 54 kg (119 lb 0.8 oz)  Body mass index is 21.77 kg/m².  Vital Signs (Most Recent):  Temp: 97.3 °F (36.3 °C) (17 0400)  Pulse: 90 (17)  Resp: 12 (17)  BP: 114/78 (17)  SpO2: 96 % (17) Vital Signs (24h Range):  Temp:  [93.7 °F (34.3 °C)-97.3 °F (36.3 °C)] 97.3 °F (36.3 °C)  Pulse:  [] 90  Resp:  [10-35] 12  SpO2:  [94 %-100 %] 96 %  BP: ()/(61-86) 114/78              Temp (24hrs), Av.3 °F (35.7 °C), Min:93.7 °F (34.3 °C), Max:97.3 °F (36.3 °C)                 NG/OG Tube 17 0505 [place] sump;nasogastric 14 Fr. (Active)   Placement Check placement verified by aspirate characteristics;placement verified by distal tube length measurement;placement verified by x-ray 2017  4:00 AM   Distal Tube Length (cm) 64 2017  8:00 PM   Tolerance no signs/symptoms of discomfort 2017  4:00 AM   Securement taped to cheek;anchored to nostril center w/ adhesive device 2017  4:00 AM   Clamp Status/Tolerance unclamped 2017  4:00 AM   Insertion Site Appearance no redness, warmth, tenderness, skin breakdown, drainage 2017  4:00 AM   Flush/Irrigation flushed w/;water;no resistance met 2017  4:00 AM   Feeding Method continuous 2017  4:00 AM  "  Current Rate (mL/hr) 20 mL/hr 7/1/2017  8:00 PM   Goal Rate (mL/hr) 35 mL/hr 7/1/2017  8:00 PM   Intake (mL) 400 mL 7/2/2017  6:00 AM   Intake (mL) - Formula Tube Feeding 35 7/2/2017  6:00 AM   Residual Amount (ml) 15 ml 7/2/2017  3:00 AM            Rectal Tube 07/02/17 0030 rectal tube w/ balloon (indicate number of mLs) (Active)   Balloon Inflation Volume (mL) 45 7/2/2017  4:00 AM   Reposition drainage bags for BMS & Mackey on opposite sides of bed 7/2/2017  4:00 AM   Outcome stool evacuated;comfort enhanced 7/2/2017  1:00 AM   Stool Color Brown 7/2/2017  4:00 AM   Insertion Site Appearance no redness, warmth, tenderness, skin breakdown, drainage 7/2/2017  4:00 AM   Flush/Irrigation irrigated w/;water 7/2/2017  1:00 AM   Intake (mL) 575 mL 7/2/2017  6:00 AM            Urethral Catheter 06/15/17 1205 Latex (Active)   Site Assessment Clean;Intact 7/2/2017  4:00 AM   Collection Container Urimeter 7/2/2017  4:00 AM   Securement Method secured to top of thigh w/ adhesive device 7/2/2017  4:00 AM   Catheter Care Performed yes 7/2/2017 12:30 AM   Reason for Continuing Urinary Catheterization Chronic Indwelling Urinary Catheter on Admission 7/2/2017  4:00 AM   CAUTI Prevention Bundle StatLock in place w 1" slack;Drainage bag off the floor;Green sheeting clip in use;No dependent loops or kinks;Drainage bag not overfilled (<2/3 full);Drainage bag below bladder 7/1/2017  8:00 PM   Output (mL) 100 mL 7/2/2017  6:00 AM       Neurosurgery Physical Exam   Awake, alert, oriented to person  Pupils NR bilaterally, blind in both eyes  FC x4  silt    Significant Labs:    Recent Labs  Lab 07/02/17  0030 07/02/17  0445   GLU 91  --    *  --    K 4.0  --    *  --    CO2 26  --    BUN 9  --    CREATININE 0.8  --    CALCIUM 10.0  --    MG  --  1.7       Recent Labs  Lab 07/01/17 0232 07/02/17  0445   WBC 9.51 8.80   HGB 10.5* 11.3*   HCT 33.6* 34.9*    243       Recent Labs  Lab 07/01/17 0232   INR 1.0 "     Microbiology Results (last 7 days)     Procedure Component Value Units Date/Time    Urine culture [[id number]] Collected:  06/28/17 1733    Order Status:  Completed Specimen:  Urine Updated:  06/29/17 2047     Urine Culture, Routine No growth    Narrative:       Preferred Collection Type->Urine, Clean Catch            Significant Diagnostics:

## 2017-07-02 NOTE — ASSESSMENT & PLAN NOTE
--History of meningioma s/p resection 2015 & 6/7/2017.   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    -- Neurosurgery following; appreciate assitance  --management as above

## 2017-07-03 PROBLEM — Z71.89 GOALS OF CARE, COUNSELING/DISCUSSION: Status: RESOLVED | Noted: 2017-01-01 | Resolved: 2017-01-01

## 2017-07-03 PROBLEM — T68.XXXA HYPOTHERMIA: Status: RESOLVED | Noted: 2017-01-01 | Resolved: 2017-01-01

## 2017-07-03 PROBLEM — E87.0 HYPERNATREMIA: Status: RESOLVED | Noted: 2017-01-01 | Resolved: 2017-01-01

## 2017-07-03 PROBLEM — E27.49 SECONDARY ADRENAL INSUFFICIENCY: Status: RESOLVED | Noted: 2017-01-01 | Resolved: 2017-01-01

## 2017-07-03 NOTE — PROGRESS NOTES
Unable to attain oral or axillary temperature. Continuous temperature monitor inserted rectally, showing temp of 93.6. Warming blanket applied, Md notified.

## 2017-07-03 NOTE — PLAN OF CARE
Patient remains in ICU with AMS and agitation requiring restraints.  Neurosurgery and Endocrine following.  PT/OT recs for rehab upon d/c.  Plan to continue supportive care, free water boluses for hypernatremia and Q1 neuro checks.  CM will continue to follow.     07/03/17 1243   Right Care Assessment   Can the patient answer the patient profile reliably? No, cognitively impaired  (fluctuating mentation)   How often would a person be available to care for the patient? Infrequently   Describe the patient's ability to walk at the present time. Major restrictions/daily assistance from another person   How does the patient rate their overall health at the present time? (JAVIER)   Number of comorbid conditions (as recorded on the chart) Five or more   Have you felt down, depressed, or hopeless? Unable to Assess   Have you felt little interest or pleasure in doing things? Unable to assess   Cathy Aguilar, RN, BSN  Case Management  [facility]  Ext. [phone]

## 2017-07-03 NOTE — RESIDENT HANDOFF
Handoff     Primary Team: [facility] CRITICAL CARE MEDICINE Room Number: 3076/3076 A     Patient Name: Joanna Morales MRN: [id number]     Date of Birth: [id number] Allergies: Codeine     Age: 51 y.o. Admit Date: 6/28/2017     Sex: female  BMI: Body mass index is 21.77 kg/m².     Code Status: Full Code        Illness Level (current clinical status): Watcher - Yes - Aspiration risk- AMS    Reason for Admission: Hypernatremia    Brief HPI:  Ms. Joanna Morales is a 51 y.o. female with tobacco abuse, secondary hypothyroidism (TSH 0.241 6/17) and history of meningioma s/p resection 2015 & 6/7/2017. The most recent surgery by Dr. Chew at [facility] was complicated by DI . The patient was discharged to [facility] on 6/26/17 and re-presented to [facility] ED on 6/28 with altered mental status and worsening hypernatremia.      Of note, She underwent a right frontotemporal craniotomy with partial removal of a large subfrontal and tuberculum sellae meningioma at [facility] in 2015. 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       Hospital/ICU Course:  Ms. Morales was discharged from [facility]  to [facility] on 6/26/17 and presented to [facility] ED on 6/28 with altered mental status and worsening hypernatremia. According to the patient's sitter, the patient was awake, verbal, and interactive with therapy on 6/23 but had been progressively less interactive and responsive when she was at [place]. Ms. Morales was admitted to Hospital Medicine but was found to be unresponsive with a sodium level of 168 on the morning of 6/29. The Critical Care team was consulted for ICU monitoring and management. She was started on a dextrose  infusion and given free water per NGT. Endocrine was also involved in patient management during this and the prior visit and reconsulted for this admission to assist with management. In addition to free water replacement intravenously and enterally, DDAVP was recommended if urine output exceeded 200 mL/hr x3 hours. She was given one dose of DDAVP subcutaneously on the morning of 6/30 and night of 7/1 with improvement in urine output. Hypernatremia has slowly improved. She has been scheduled for DDAVP nightly and will need close monitoring of sodium an fluid balance    Tasks   Monitor urine output and sodium levels  Reassess need for DDAVP prior to discharge    Contingency Plan: Call x [phone] for deterioration in condition    Estimated Discharge Date: 7/10/2017    Discharge Disposition: Rehab Facility

## 2017-07-03 NOTE — PLAN OF CARE
Problem: Patient Care Overview  Goal: Plan of Care Review  Outcome: Ongoing (interventions implemented as appropriate)  VS and assessment per flow sheet, patient progressing towards goals as tolerated, plan of care reviewed with Joanna Morales, all concerns addressed, will continue to monitor. Pt restless throughout shift. Pt frequent moving causing issues with flexiseal and garcía as patient is grabbing at and getting caught on legs despite bilateral wrist restraints. Pt given zyprexa IM after discussing situation with CC MD. Pt much more relaxed. Pt tolerating TF and enteral water boluses without residual. K 3.4, replaced.

## 2017-07-03 NOTE — PROGRESS NOTES
[facility]  Neurosurgery  Progress Note    Subjective:     Interval History: NAEON.    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 Chew and discharged on 17 to [facility].  She presents with altered mental status and worsening hypernatremia for 1 day. She was treated for DI her last admission and labs are trending up today.  Spoke with Dr. Fairchild at [facility], patient with waxing/waning mental status yesterday, but was appropriate & following some complex commands yesterday.  There was a period yesterday of significant lethargy.  Patient is unable to give history, and information is taken from the chart.     Post-Op Info:  * No surgery found *          Medications:  Continuous Infusions:   Scheduled Meds:   desmopressin  10 mcg Nasal QHS    enoxaparin  40 mg Subcutaneous Daily    gabapentin  125 mg Per NG tube Q12H    levetiracetam oral soln  500 mg Per NG tube BID    levothyroxine  50 mcg Per NG tube Daily    nicotine  1 patch Transdermal Daily    sodium chloride 0.9%  3 mL Intravenous Q8H     PRN Meds:acetaminophen, dextrose 50%, glucagon (human recombinant), insulin aspart, magnesium sulfate IVPB, magnesium sulfate IVPB, ondansetron, potassium chloride 10%, potassium chloride 10%, potassium chloride 10%, potassium, sodium phosphates, potassium, sodium phosphates, potassium, sodium phosphates     Review of Systems  Objective:     Weight: 54 kg (119 lb 0.8 oz)  Body mass index is 21.77 kg/m².  Vital Signs (Most Recent):  Temp: (!) 93.9 °F (34.4 °C) (17 0800)  Pulse: 76 (17 08)  Resp: (!) 23 (17 08)  BP: 122/76 (17 0800)  SpO2: 100 % (17) Vital Signs (24h Range):  Temp:  [93.6 °F (34.2 °C)-98.4 °F (36.9 °C)] 93.9 °F (34.4 °C)  Pulse:  [] 76  Resp:  [11-40] 23  SpO2:  [95 %-100 %] 100 %  BP: (100-142)/(59-92) 122/76            Temp (24hrs), Av.8 °F (36 °C),  "Min:93.6 °F (34.2 °C), Max:98.4 °F (36.9 °C)           Date 07/03/17 0700 - 07/04/17 0659   Shift 7171-3758 1279-4926 3005-0497 24 Hour Total   I  N  T  A  K  E   NG/GT 80   80    Shift Total  (mL/kg) 80  (1.5)   80  (1.5)   O  U  T  P  U  T   Urine  (mL/kg/hr) 125   125    Shift Total  (mL/kg) 125  (2.3)   125  (2.3)   Weight (kg) 54 54 54 54          NG/OG Tube 07/01/17 0505 [place] sump;nasogastric 14 Fr. (Active)   Placement Check placement verified by aspirate characteristics;placement verified by distal tube length measurement;placement verified by x-ray 7/3/2017  3:00 AM   Distal Tube Length (cm) 65 7/3/2017  3:00 AM   Tolerance no signs/symptoms of discomfort 7/3/2017  3:00 AM   Securement anchored to nostril center w/ adhesive device 7/3/2017  3:00 AM   Clamp Status/Tolerance unclamped 7/3/2017  3:00 AM   Insertion Site Appearance no redness, warmth, tenderness, skin breakdown, drainage 7/3/2017  3:00 AM   Flush/Irrigation flushed w/;water 7/2/2017  5:00 PM   Feeding Method continuous 7/3/2017  3:00 AM   Current Rate (mL/hr) 40 mL/hr 7/2/2017 11:00 PM   Goal Rate (mL/hr) 40 mL/hr 7/2/2017 11:00 PM   Intake (mL) 175 mL 7/3/2017  6:00 AM   Intake (mL) - Breast Milk Tube Feeding 60 7/2/2017  9:00 AM   Intake (mL) - Formula Tube Feeding 40 7/3/2017  8:00 AM   Residual Amount (ml) 0 ml 7/3/2017  3:00 AM            Urethral Catheter 06/15/17 1205 Latex (Active)   Site Assessment Clean;Intact 7/3/2017  3:00 AM   Collection Container Urimeter 7/3/2017  3:00 AM   Securement Method secured to top of thigh w/ adhesive device 7/3/2017  3:00 AM   Catheter Care Performed yes 7/2/2017  7:01 PM   Reason for Continuing Urinary Catheterization Critically ill in ICU requiring intensive monitoring 7/3/2017  3:00 AM   CAUTI Prevention Bundle StatLock in place w 1" slack;Intact seal between catheter & drainage tubing;Drainage bag off the floor;Green sheeting clip in use;No dependent loops or kinks;Drainage bag not overfilled (<2/3 " full);Drainage bag below bladder 7/3/2017  3:00 AM   Output (mL) 50 mL 7/3/2017  8:00 AM       Neurosurgery Physical Exam    AAO to person, pupils nonreactive bilaterally, blind bilaterally.  FCx4, SILT.    Significant Labs:    Recent Labs  Lab 07/03/17  0426 07/03/17  0803   GLU  --  91   NA  --  139   K  --  3.2*   CL  --  112*   CO2  --  20*   BUN  --  8   CREATININE  --  0.5   CALCIUM  --  7.4*   MG 2.3  --        Recent Labs  Lab 07/02/17  0445 07/03/17  0426   WBC 8.80 9.40   HGB 11.3* 11.6*   HCT 34.9* 34.8*    258     No results for input(s): LABPT, INR, APTT in the last 48 hours.  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 Chew 6/7/17    -- q1 hr neuro checks  -- Monitor for DI, fu endocrine recs.  Continue free water per endocrine  -- Hypernatremia: Na at 142 this am, continue to monitor.  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

## 2017-07-03 NOTE — ASSESSMENT & PLAN NOTE
--History of meningioma s/p resection 2015 & 6/7/2017.   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    -- Neurosurgery following; appreciate assitance  --management as above

## 2017-07-03 NOTE — ASSESSMENT & PLAN NOTE
--appreciate endocrine's assistance.   --suspect hypernatremia is multifactorial: dehydration given poor PO intake and a component of DI given extensive meningioma resection on 6/7/17  --hypernatremia improved s/p intravenous and enteral water boluses.  --received DDAVP on 6/30 and 7/1.   -- Will schedule nightly DDAVP 10 mcg intranasally nightly starting 7/2.   --Continue to monitor BMP's closely to assess continued need or dose adjustments  --continue free water enterally: have decreased to 175 mL q6h per NGT now that at goal on tube feedings. If TF's are paused/stopped, will need to increase free water boluses   --continue to monitor sodium levels and urine output  --PRN intranasal DDAVP order for >200 mL urine output/hr x3 hours   --per endocrine, unclear if she will need DDAVP chronically, will continue to monitor progress over next few days

## 2017-07-03 NOTE — ASSESSMENT & PLAN NOTE
--see above  --continue keppra for seizure prophylaxis.  --  Hold gabapentin for now given increased somnolence associated with dosing (discussed with NSGY)  -- Monitor for delirium and encourage delirium precautions   -- Avoid benzodiazepines, anticholinergics, and anti-histaminics and minimize opiates when possible as they may worsen or exacerbate delirium.  -- Ensure blinds are open with lights on during the day and that the shades are closed with lights off at night. -- Reorient pt through out the day.    -- Soft restraints if pt is not redirectable and doesn't respond to PRNs medication (Physical restraints can worsen delirium)  -- Consider a 1:1 sitter to minimize use of restraints.

## 2017-07-03 NOTE — PROGRESS NOTES
[facility]  Adult Nutrition  Consult Note    SUMMARY     Recommendations    Recommendation/Intervention:   1. Continue Isosource 1.5 @ goal rate 40mL/hr to provide 100% of EEN and EPN. Hold for residual >500ml.   2. If able to advance diet, reommend Regular with texture per SLP recommendations.     RD to monitor.  Goals: Pt to receive >90% of EEN via TF or po intake  Nutrition Goal Status: new  Communication of RD Recs: reviewed with RN      Reason for Assessment    Reason for Assessment: RD follow-up  Diagnosis: other (see comments) (AMS and hypernatremia)  Relevent Medical History: hypothryodism, olfactory grooce meningioma, s/p crani         General Information Comments: Pt NPO on TF. Tx from rehab for worsening AMS and hypernatremia. Water boluses continues.    Nutrition Discharge Planning: unable to determine at this time    Nutrition Prescription Ordered    Current Diet Order: NPO  Nutrition Order Comments: running at goal rate  Current Nutrition Support Formula Ordered: Isosource 1.5  Current Nutrition Support Rate Ordered: 40 (ml)  Current Nutrition Support Frequency Ordered: mL/hr        Evaluation of Received Nutrients/Fluid Intake    Enteral Calories (kcal): 1440  Enteral Protein (gm): 65  Enteral (Free Water) Fluid (mL): 733  Free Water Flush Fluid (mL): 700       Energy Calories Required: meeting needs  % Kcal Needs: 98              Protein Required: meeting needs  % Protein Needs: 100               Fluid Required: meeting needs  Comments: +I/O overall, -I/O overnight, +BM, good UOP  Tolerance: tolerating  % Intake of Estimated Energy Needs: 75 - 100 %  % Meal Intake: NPO     Nutrition Risk Screen          Nutrition/Diet History       Typical Food/Fluid Intake: Pt remains NPO on TF. Tolerating without residuals.           Factors Affecting Nutritional Intake: NPO                Labs/Tests/Procedures/Meds       Pertinent Labs Reviewed: reviewed  Pertinent Labs Comments: Na 140, K  "3.4  Pertinent Medications Reviewed: reviewed  Pertinent Medications Comments: insulin    Physical Findings    Overall Physical Appearance: nourished, lethargic  Tubes: nasogastric tube     Skin: incision    Anthropometrics    Temp: (!) 93.9 °F (34.4 °C)     Height: 5' 2" (157.5 cm)  Weight Method: Bed Scale  Weight: 54 kg (119 lb 0.8 oz)     Ideal Body Weight (IBW), Female: 110 lb     % Ideal Body Weight, Female (lb): 107.27 lb  BMI (Calculated): 21.6  BMI Grade: 18.5-24.9 - normal                            Estimated/Assessed Needs    Weight Used For Calorie Calculations: 54.4 kg (119 lb 14.9 oz)      Energy Calorie Requirements (kcal): 1406  Energy Need Method: [place]-St Jeor (PAL 1.25)       RMR ([place]-St. Jeor Equation): 1112.25        Weight Used For Protein Calculations: 54 kg (119 lb 0.8 oz)  Protein Requirements: 54-65g (1.0-1.2g/kg)    Fluid Requirements (mL): 1ml/kcal or per MD              RDA Method (mL): 1406               Assessment and Plan    Nutrition Problem  Swallowing difficulty    Related to (etiology):   AMS    Signs and Symptoms (as evidenced by):   NPO, requiring alternative means of nutrition.     Interventions/Recommendations (treatment strategy):  See RD recs above.    Nutrition Diagnosis Status:   New          Monitor and Evaluation    Food and Nutrient Intake: energy intake, enteral nutrition intake, food and beverage intake  Food and Nutrient Adminstration: diet order, enteral and parenteral nutrition administration        Anthropometric Measurements: weight, weight change, body mass index  Biochemical Data, Medical Tests and Procedures: electrolyte and renal panel, glucose/endocrine profile, gastrointestinal profile, inflammatory profile, lipid profile  Nutrition-Focused Physical Findings: overall appearance    Nutrition Risk    Level of Risk: other (see comments) (f/u 1x/week)    Nutrition Follow-Up    RD Follow-up?: Yes        "

## 2017-07-03 NOTE — ASSESSMENT & PLAN NOTE
--recurrent requiring warming blanket PRN  --suspect this is due to neurologic issues. TSH, cortisol, ACTH WNL.  --do not feel this is due to sepsis, as no other symptoms of infection.   -- Will get CXR and blood cultures today  --continue to monitor

## 2017-07-03 NOTE — PT/OT/SLP PROGRESS
Speech Language Pathology      Joanna Morales  MRN: [id number]   [id number] A       Patient not seen today secondary to Other (Comment) (Pt unable to rouse to safely participate in ongoing assessment of swallow). Will follow-up 7/5/17.    HILDA Luo, CCC-SLP, [facility], 7/3/2017

## 2017-07-03 NOTE — PROGRESS NOTES
[facility]  Critical Care Medicine  Progress Note    Patient Name: Joanna Morales  MRN: [id number]  Admission Date: 6/28/2017  Hospital Length of Stay: 5 days  Code Status: Full Code  Attending Provider: Davi Parisi*  Primary Care Provider: Claudy Tse MD   Principal Problem: Hypernatremia    Subjective:     HPI:  Ms. Joanna Morales is a 51 y.o. female with tobacco abuse, secondary hypothyroidism (TSH 0.241 6/17) and history of meningioma s/p resection 2015 & 6/7/2017. The most recent surgery by Dr. Chew at [facility] was complicated by DI . The patient was discharged to [facility] on 6/26/17 and re-presented to [facility] ED on 6/28 with altered mental status and worsening hypernatremia.     Of note, She underwent a right frontotemporal craniotomy with partial removal of a large subfrontal and tuberculum sellae meningioma at [facility] in 2015. 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      Hospital/ICU Course:  Ms. Morales was discharged from [facility]  to [facility] on 6/26/17 and presented to [facility] ED on 6/28 with altered mental status and worsening hypernatremia. According to the patient's sitter, the patient was awake, verbal, and interactive with therapy on 6/23 but had been progressively less interactive and responsive when she was at [facility]. Ms. Morales was admitted to Hospital Medicine but was found to be unresponsive with a sodium level of 168 on the morning of 6/29. The Critical Care team was consulted for ICU monitoring and management. She was started on a dextrose infusion and given free water per T. Endocrine was also involved in patient  management during this and the prior visit and reconsulted for this admission to assist with management. In addition to free water replacement intravenously and enterally, DDAVP was recommended if urine output exceeded 200 mL/hr x3 hours. She was given one dose of DDAVP subcutaneously on the morning of 6/30 and night of 7/1 with improvement in urine output. Hypernatremia has slowly improved. She has been scheduled for DDAVP nightly and will need close monitoring of sodium an fluid balance    Interval History/Significant Events: Agitated overnight    Review of Systems   Unable to perform ROS: Mental status change     Objective:     Vital Signs (Most Recent):  Temp: (!) 93.9 °F (34.4 °C) (07/03/17 0800)  Pulse: 76 (07/03/17 0800)  Resp: (!) 23 (07/03/17 0800)  BP: 122/76 (07/03/17 0800)  SpO2: 100 % (07/03/17 0800) Vital Signs (24h Range):  Temp:  [93.6 °F (34.2 °C)-98.4 °F (36.9 °C)] 93.9 °F (34.4 °C)  Pulse:  [] 76  Resp:  [11-40] 23  SpO2:  [95 %-100 %] 100 %  BP: (100-142)/(59-92) 122/76   Weight: 54 kg (119 lb 0.8 oz)  Body mass index is 21.77 kg/m².      Intake/Output Summary (Last 24 hours) at 07/03/17 1027  Last data filed at 07/03/17 0800   Gross per 24 hour   Intake             1200 ml   Output             2360 ml   Net            -1160 ml       Physical Exam   Constitutional: She appears well-developed. She appears lethargic. She appears cachectic. She appears ill.   HENT:   Head: Normocephalic.   Suture line noted across frontal scalp. Hair regrowing   Eyes: Pupils are equal, round, and reactive to light.   Cardiovascular: Normal rate, regular rhythm, normal heart sounds and normal pulses.    Pulmonary/Chest: Effort normal. She has rhonchi in the right lower field and the left lower field.   Abdominal: Soft. Bowel sounds are normal. There is no tenderness.   Musculoskeletal: Normal range of motion.   Neurological: She has normal strength. She appears lethargic. GCS eye subscore is 3. GCS verbal  subscore is 3. GCS motor subscore is 5.   Skin: Skin is warm and dry.   Psychiatric: Her affect is labile. Her speech is delayed. She is combative. Cognition and memory are impaired. She expresses inappropriate judgment.   Nursing note and vitals reviewed.      Vents:     Lines/Drains/Airways     Drain                 Urethral Catheter 06/15/17 1205 Latex 17 days         NG/OG Tube 07/01/17 0505 [place] sump;nasogastric 14 Fr. 2 days          Peripheral Intravenous Line                 Peripheral IV - Single Lumen 07/01/17 1200 Left Forearm 1 day         Peripheral IV - Single Lumen 07/02/17 1359 Left Hand less than 1 day              Significant Labs:    CBC/Anemia Profile:    Recent Labs  Lab 07/02/17  0445 07/03/17  0426   WBC 8.80 9.40   HGB 11.3* 11.6*   HCT 34.9* 34.8*    258   MCV 98 96   RDW 14.2 14.2        Chemistries:    Recent Labs  Lab 07/02/17  0445 07/02/17  0813 07/02/17  2201 07/03/17  0426 07/03/17  0803   NA  --  142 140  --  139   K  --  3.8 3.4*  --  3.2*   CL  --  105 104  --  112*   CO2  --  29 27  --  20*   BUN  --  10 9  --  8   CREATININE  --  0.8 0.8  --  0.5   CALCIUM  --  9.4 9.5  --  7.4*   ALBUMIN 2.9*  --   --  3.0*  --    PROT 6.5  --   --  6.9  --    BILITOT 0.3  --   --  0.2  --    ALKPHOS 113  --   --  122  --    ALT 30  --   --  27  --    AST 23  --   --  22  --    MG 1.7  --   --  2.3  --    PHOS 3.9  --   --  3.9  --        All pertinent labs within the past 24 hours have been reviewed.    Significant Imaging:  I have reviewed and interpreted all pertinent imaging results/findings within the past 24 hours.    Assessment/Plan:     Neuro   Acute encephalopathy    --see above  --continue keppra for seizure prophylaxis.  --  Hold gabapentin for now given increased somnolence associated with dosing (discussed with NSGY)  -- Monitor for delirium and encourage delirium precautions   -- Avoid benzodiazepines, anticholinergics, and anti-histaminics and minimize opiates when  possible as they may worsen or exacerbate delirium.  -- Ensure blinds are open with lights on during the day and that the shades are closed with lights off at night. -- Reorient pt through out the day.    -- Soft restraints if pt is not redirectable and doesn't respond to PRNs medication (Physical restraints can worsen delirium)  -- Consider a 1:1 sitter to minimize use of restraints.            Secondary hypothyroidism    --continue 50mcg Levothyroxine per NGT  --T4 this admission WNL  -- Repeat in 1-2months            Primary central diabetes insipidus    --appreciate endocrine's assistance.   --suspect hypernatremia is multifactorial: dehydration given poor PO intake and a component of DI given extensive meningioma resection on 6/7/17  --hypernatremia improved s/p intravenous and enteral water boluses.  --received DDAVP on 6/30 and 7/1.   -- Will schedule nightly DDAVP 10 mcg intranasally nightly starting 7/2.   --Continue to monitor BMP's closely to assess continued need or dose adjustments  --continue free water enterally: have decreased to 175 mL q6h per NGT now that at goal on tube feedings. If TF's are paused/stopped, will need to increase free water boluses   --continue to monitor sodium levels and urine output  --PRN intranasal DDAVP order for >200 mL urine output/hr x3 hours   --per endocrine, unclear if she will need DDAVP chronically, will continue to monitor progress over next few days        Meningioma    --History of meningioma s/p resection 2015 & 6/7/2017.   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    --  Neurosurgery following; appreciate assitance  --management as above          Endocrine   Secondary adrenal insufficiency    --Cortisol and ACTH level normal          Fluids/Electrolytes/Nutrition/GI   * Hypernatremia    --Resolved  -- plan as above for ongoing management        Other   Hypothermia    --recurrent requiring warming blanket PRN  --suspect this is due to neurologic issues. TSH, cortisol, ACTH WNL.  --do not feel this is due to sepsis, as no other symptoms of infection.   -- Will get CXR and blood cultures today  --continue to monitor         Goals of care, counseling/discussion    --Friend/POA (Reina) updated on 7/2 by phone        Tobacco abuse    --Nicotine patch           Critical Care Medicine Daily Checklist:    A: Awake: RASS Goal/Actual Goal: RASS Goal: 0-->alert and calm  Actual: Tong Agitation Sedation Scale (RASS): Restless   B: Spontaneous Breathing Trial Performed?  NA   C: SAT & SBT Coordinated?  NA                    D: Delirium: CAM-ICU Overall CAM-ICU: Positive   E: Early Mobility Performed? No   F: Feeding Goal:    Status:     Current Diet Order   Procedures    Diet NPO Except for: Other (Comment) (free water ad ranjeet)     Order Specific Question:   Except for     Answer:   Other (Comment)     Comments:   free water ad ranjeet      AS: Analgesia/Sedation N   T: Thromboembolic Prophylaxis Lovenox   H: HOB > 300 Yes   U: Stress Ulcer Prophylaxis (if needed) NA   G: Glucose Control SSI   B: Bowel Function x1 liquid   I: Indwelling Catheter (Lines & Mackey) Necessity PIV    D: De-escalation of Antimicrobials/Pharmacotherapies NA    Plan for the day/ETD Monitor AMS and sodium    Code Status:  Family/Goals of Care: Full Code         Critical secondary to Patient has a condition that poses threat to life and bodily function: Acute encephalopathy and risk of aspiration     Critical care was time spent personally by me on the following activities: development of treatment plan with patient or  surrogate and bedside caregivers, discussions with consultants, evaluation of patient's response to treatment, examination of patient, ordering and performing treatments and interventions, ordering and review of laboratory studies, ordering and review of radiographic studies, pulse oximetry, re-evaluation of patient's condition. This critical care time did not overlap with that of any other provider or involve time for any procedures.     Critical Care Time: 50 mins    Fiona Winterbottom, APRN, CNS  Critical Care Medicine  [facility]

## 2017-07-03 NOTE — SUBJECTIVE & OBJECTIVE
Interval History/Significant Events: Agitated overnight    Review of Systems   Unable to perform ROS: Mental status change     Objective:     Vital Signs (Most Recent):  Temp: (!) 93.9 °F (34.4 °C) (07/03/17 0800)  Pulse: 76 (07/03/17 0800)  Resp: (!) 23 (07/03/17 0800)  BP: 122/76 (07/03/17 0800)  SpO2: 100 % (07/03/17 0800) Vital Signs (24h Range):  Temp:  [93.6 °F (34.2 °C)-98.4 °F (36.9 °C)] 93.9 °F (34.4 °C)  Pulse:  [] 76  Resp:  [11-40] 23  SpO2:  [95 %-100 %] 100 %  BP: (100-142)/(59-92) 122/76   Weight: 54 kg (119 lb 0.8 oz)  Body mass index is 21.77 kg/m².      Intake/Output Summary (Last 24 hours) at 07/03/17 1027  Last data filed at 07/03/17 0800   Gross per 24 hour   Intake             1200 ml   Output             2360 ml   Net            -1160 ml       Physical Exam   Constitutional: She appears well-developed. She appears lethargic. She appears cachectic. She appears ill.   HENT:   Head: Normocephalic.   Suture line noted across frontal scalp. Hair regrowing   Eyes: Pupils are equal, round, and reactive to light.   Cardiovascular: Normal rate, regular rhythm, normal heart sounds and normal pulses.    Pulmonary/Chest: Effort normal. She has rhonchi in the right lower field and the left lower field.   Abdominal: Soft. Bowel sounds are normal. There is no tenderness.   Musculoskeletal: Normal range of motion.   Neurological: She has normal strength. She appears lethargic. GCS eye subscore is 3. GCS verbal subscore is 3. GCS motor subscore is 5.   Skin: Skin is warm and dry.   Psychiatric: Her affect is labile. Her speech is delayed. She is combative. Cognition and memory are impaired. She expresses inappropriate judgment.   Nursing note and vitals reviewed.      Vents:     Lines/Drains/Airways     Drain                 Urethral Catheter 06/15/17 1205 Latex 17 days         NG/OG Tube 07/01/17 0505 [place] sump;nasogastric 14 Fr. 2 days          Peripheral Intravenous Line                 Peripheral IV  - Single Lumen 07/01/17 1200 Left Forearm 1 day         Peripheral IV - Single Lumen 07/02/17 1359 Left Hand less than 1 day              Significant Labs:    CBC/Anemia Profile:    Recent Labs  Lab 07/02/17  0445 07/03/17  0426   WBC 8.80 9.40   HGB 11.3* 11.6*   HCT 34.9* 34.8*    258   MCV 98 96   RDW 14.2 14.2        Chemistries:    Recent Labs  Lab 07/02/17  0445 07/02/17  0813 07/02/17  2201 07/03/17  0426 07/03/17  0803   NA  --  142 140  --  139   K  --  3.8 3.4*  --  3.2*   CL  --  105 104  --  112*   CO2  --  29 27  --  20*   BUN  --  10 9  --  8   CREATININE  --  0.8 0.8  --  0.5   CALCIUM  --  9.4 9.5  --  7.4*   ALBUMIN 2.9*  --   --  3.0*  --    PROT 6.5  --   --  6.9  --    BILITOT 0.3  --   --  0.2  --    ALKPHOS 113  --   --  122  --    ALT 30  --   --  27  --    AST 23  --   --  22  --    MG 1.7  --   --  2.3  --    PHOS 3.9  --   --  3.9  --        All pertinent labs within the past 24 hours have been reviewed.    Significant Imaging:  I have reviewed and interpreted all pertinent imaging results/findings within the past 24 hours.

## 2017-07-04 NOTE — PROGRESS NOTES
[facility] Hospital Medicine  Progress Note    Patient Name: Joanna Morales  MRN: [id number]  Patient Class: IP- Inpatient   Admission Date: 6/28/2017  Length of Stay: 6 days  Attending Physician: Chino Wiley MD  Primary Care Provider: Claudy Tse MD    [facility] Medicine Team: [facility] HOSP MED B Chino Wiley MD    Subjective:     Principal Problem:Hypernatremia    HPI:  Ms. Joanna Morales is a 51 y.o. female with tobacco abuse, secondary hypothyroidism (TSH 0.241 Jun 2017), and history of meningioma s/p resection complicated by diabetes insipidus who presents to [facility] ED from [facility] after being found with abnormal labwork.  She had been more altered in the last day or so and upon checking labwork, she was noted to be with hypernatremia.  She contributed very minimally to her history but does report some abdominal pain.  Of note, she was recently admitted to this facility under the Neurosurgery service for resection of a meningioma that was complicated by diabetes insipidus for which she received desmopressin a few times before being discharged to rehabilitation at [facility].  Further history is limited at this time.    Hospital Course:  No notes on file    Interval History/Significant Events: Agitated overnight    Review of Systems   Unable to perform ROS: Mental status change   Constitutional: Negative.    HENT: Negative.    Eyes: Negative.    Respiratory: Negative.    Cardiovascular: Negative.    Gastrointestinal: Negative.    Allergic/Immunologic: Negative.    Neurological: Negative.      Objective:     Vital Signs (Most Recent):  Temp: 97 °F (36.1 °C) (07/04/17 0759)  Pulse: 78 (07/04/17 0759)  Resp: 16 (07/04/17 0759)  BP: 125/78 (07/04/17 0759)  SpO2: 99 % (07/04/17 0759) Vital Signs (24h Range):  Temp:  [95 °F (35 °C)-98.5 °F (36.9 °C)] 97 °F (36.1 °C)  Pulse:  [] 78  Resp:  [12-18] 16  SpO2:  [93 %-100 %] 99 %  BP: (112-129)/(64-87) 125/78    Weight: 54 kg (119 lb 0.8 oz)  Body mass index is 21.77 kg/m².      Intake/Output Summary (Last 24 hours) at 07/04/17 1012  Last data filed at 07/04/17 0759   Gross per 24 hour   Intake              995 ml   Output             1510 ml   Net             -515 ml       Physical Exam   Constitutional: She appears well-developed. She appears lethargic. She appears cachectic. She appears ill.   HENT:   Head: Normocephalic.   Suture line noted across frontal scalp. Hair regrowing   Eyes: Pupils are equal, round, and reactive to light.   Cardiovascular: Normal rate, regular rhythm, normal heart sounds and normal pulses.    Pulmonary/Chest: Effort normal. She has rhonchi in the right lower field and the left lower field.   Abdominal: Soft. Bowel sounds are normal. There is no tenderness.   Musculoskeletal: Normal range of motion.   Neurological: She has normal strength. She appears lethargic. GCS eye subscore is 3. GCS verbal subscore is 3. GCS motor subscore is 5.   Skin: Skin is warm and dry.   Psychiatric: Her affect is labile. Her speech is delayed. She is combative. Cognition and memory are impaired. She expresses inappropriate judgment.   Nursing note and vitals reviewed.      Vents:     Lines/Drains/Airways     Drain                 NG/OG Tube 07/01/17 0505 [place] sump;nasogastric 14 Fr. 3 days          Peripheral Intravenous Line                 Peripheral IV - Single Lumen 07/01/17 1200 Left Forearm 2 days         Peripheral IV - Single Lumen 07/02/17 1359 Left Hand 1 day              Significant Labs:    CBC/Anemia Profile:    Recent Labs  Lab 07/03/17  0426 07/04/17  0545   WBC 9.40 9.09   HGB 11.6* 13.2   HCT 34.8* 40.4    293   MCV 96 98   RDW 14.2 14.5        Chemistries:    Recent Labs  Lab 07/03/17  0426 07/03/17  0803 07/03/17  1413 07/03/17  2024 07/04/17  0545 07/04/17  0739   NA  --  139  --  146*  --  143   K  --  3.2* 4.6 4.5  --  4.4   CL  --  112*  --  105  --  105   CO2  --  20*  --  31*  --   26   BUN  --  8  --  9  --  11   CREATININE  --  0.5  --  0.8  --  0.7   CALCIUM  --  7.4*  --  11.2*  --  11.2*   ALBUMIN 3.0*  --   --   --  3.3*  --    PROT 6.9  --   --   --  8.0  --    BILITOT 0.2  --   --   --  0.2  --    ALKPHOS 122  --   --   --  132  --    ALT 27  --   --   --  25  --    AST 22  --   --   --  23  --    MG 2.3  --   --   --  2.4  --    PHOS 3.9  --   --   --  5.1*  --        All pertinent labs within the past 24 hours have been reviewed.    Significant Imaging:  I have reviewed and interpreted all pertinent imaging results/findings within the past 24 hours.    Assessment/Plan:      Acute encephalopathy    Still remains obtunded          Tobacco abuse    Patient was counseled on smoking cessation and she will be provided a nicotine transdermal patch applied while inpatient.  Will provide additional smoking cessation counseling prior to discharge.        Secondary hypothyroidism    As addressed above.  Will continue her home regimen of levothyroxine.        Diabetes insipidus    As addressed above.  Follow sodium          VTE Risk Mitigation         Ordered     High Risk of VTE  Once      06/30/17 0920     Place sequential compression device  Until discontinued      06/30/17 0920     enoxaparin injection 40 mg  Daily     Route:  Subcutaneous        06/28/17 6906          Chino Wiley MD  Department of Hospital Medicine   [facility]

## 2017-07-04 NOTE — PLAN OF CARE
Problem: Patient Care Overview  Goal: Plan of Care Review  Outcome: Ongoing (interventions implemented as appropriate)  VS and assessment per flow sheet, patient progressing towards goals as tolerated, plan of care reviewed with Pt, all concerns addressed. Pt restless throughout day shift. Continues to grab at NG tube despite bilateral wrist restraints. Pt tolerating TF and enteral water boluses without residual. No other acute changes. Will continue to monitor.

## 2017-07-04 NOTE — SUBJECTIVE & OBJECTIVE
Interval History/Significant Events: Agitated overnight    Review of Systems   Unable to perform ROS: Mental status change   Constitutional: Negative.    HENT: Negative.    Eyes: Negative.    Respiratory: Negative.    Cardiovascular: Negative.    Gastrointestinal: Negative.    Allergic/Immunologic: Negative.    Neurological: Negative.      Objective:     Vital Signs (Most Recent):  Temp: 97 °F (36.1 °C) (07/04/17 0759)  Pulse: 78 (07/04/17 0759)  Resp: 16 (07/04/17 0759)  BP: 125/78 (07/04/17 0759)  SpO2: 99 % (07/04/17 0759) Vital Signs (24h Range):  Temp:  [95 °F (35 °C)-98.5 °F (36.9 °C)] 97 °F (36.1 °C)  Pulse:  [] 78  Resp:  [12-18] 16  SpO2:  [93 %-100 %] 99 %  BP: (112-129)/(64-87) 125/78   Weight: 54 kg (119 lb 0.8 oz)  Body mass index is 21.77 kg/m².      Intake/Output Summary (Last 24 hours) at 07/04/17 1012  Last data filed at 07/04/17 0759   Gross per 24 hour   Intake              995 ml   Output             1510 ml   Net             -515 ml       Physical Exam   Constitutional: She appears well-developed. She appears lethargic. She appears cachectic. She appears ill.   HENT:   Head: Normocephalic.   Suture line noted across frontal scalp. Hair regrowing   Eyes: Pupils are equal, round, and reactive to light.   Cardiovascular: Normal rate, regular rhythm, normal heart sounds and normal pulses.    Pulmonary/Chest: Effort normal. She has rhonchi in the right lower field and the left lower field.   Abdominal: Soft. Bowel sounds are normal. There is no tenderness.   Musculoskeletal: Normal range of motion.   Neurological: She has normal strength. She appears lethargic. GCS eye subscore is 3. GCS verbal subscore is 3. GCS motor subscore is 5.   Skin: Skin is warm and dry.   Psychiatric: Her affect is labile. Her speech is delayed. She is combative. Cognition and memory are impaired. She expresses inappropriate judgment.   Nursing note and vitals reviewed.      Vents:     Lines/Drains/Airways     Drain                  NG/OG Tube 07/01/17 0505 [place] sump;nasogastric 14 Fr. 3 days          Peripheral Intravenous Line                 Peripheral IV - Single Lumen 07/01/17 1200 Left Forearm 2 days         Peripheral IV - Single Lumen 07/02/17 1359 Left Hand 1 day              Significant Labs:    CBC/Anemia Profile:    Recent Labs  Lab 07/03/17  0426 07/04/17  0545   WBC 9.40 9.09   HGB 11.6* 13.2   HCT 34.8* 40.4    293   MCV 96 98   RDW 14.2 14.5        Chemistries:    Recent Labs  Lab 07/03/17  0426 07/03/17  0803 07/03/17  1413 07/03/17  2024 07/04/17  0545 07/04/17  0739   NA  --  139  --  146*  --  143   K  --  3.2* 4.6 4.5  --  4.4   CL  --  112*  --  105  --  105   CO2  --  20*  --  31*  --  26   BUN  --  8  --  9  --  11   CREATININE  --  0.5  --  0.8  --  0.7   CALCIUM  --  7.4*  --  11.2*  --  11.2*   ALBUMIN 3.0*  --   --   --  3.3*  --    PROT 6.9  --   --   --  8.0  --    BILITOT 0.2  --   --   --  0.2  --    ALKPHOS 122  --   --   --  132  --    ALT 27  --   --   --  25  --    AST 22  --   --   --  23  --    MG 2.3  --   --   --  2.4  --    PHOS 3.9  --   --   --  5.1*  --        All pertinent labs within the past 24 hours have been reviewed.    Significant Imaging:  I have reviewed and interpreted all pertinent imaging results/findings within the past 24 hours.

## 2017-07-05 PROBLEM — G93.89 BRAIN MASS: Status: ACTIVE | Noted: 2017-01-01

## 2017-07-05 NOTE — PLAN OF CARE
Problem: Patient Care Overview  Goal: Plan of Care Review  Outcome: Ongoing (interventions implemented as appropriate)  VSS, NAD, Disoriented x4, non-compliant, restless, continues to pull at medical devices, BUE soft wrist restraints continued, no sign of injury. Continue to monitor sodium, spoke w/ HM HARRY who instructed this nurse not to draw UA d/t need for straight cath w/ 2 person assist to manage sterility for labwork that is trending normal. Dayteam to consider need for continued daily UA.

## 2017-07-05 NOTE — NURSING
Pt had no urinary output today, bladder scan 350ml. Informed Dr Wiley. Informed nurse to call on call tonight if bladder scan >750ml.

## 2017-07-05 NOTE — ASSESSMENT & PLAN NOTE
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 Chew 6/7/17, who represented with hypernatremia  -Patient remains obtunded on exam  -Na 144 today. Continue management per Endo and primary team  -Continue neuro checks q4 hours. Notify NSGY for any changes.   -Continue Keppra for seizure prophylaxis  -Continue Lovenox for DVT prophylaxis  -Continue aggressive PT/OT  -Continue management of care per primary team  -Will continue to follow    DISPO: Pending Rehab per primary team

## 2017-07-05 NOTE — ASSESSMENT & PLAN NOTE
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 Continue management per Endo and primary team  -Continue neuro checks q4 hours. Notify NSGY for any changes.   -continue Keppra for seizure prophylaxis  -Continue Lovenox for DVT prophylaxis  -Continue aggressive PT/OT  -Continue management of care per primary team  -Will continue to follow    DISPO: Pending Rehab per primary team

## 2017-07-05 NOTE — SUBJECTIVE & OBJECTIVE
Interval History: Patient continues to be very lethargic & mumbles yes/no occasionally on exam.  Not following commands.      Medications:  Continuous Infusions:   Scheduled Meds:   desmopressin  10 mcg Nasal QHS    enoxaparin  40 mg Subcutaneous Daily    gabapentin  125 mg Per NG tube Q12H    levetiracetam oral soln  500 mg Per NG tube BID    levothyroxine  50 mcg Per NG tube Daily    nicotine  1 patch Transdermal Daily     PRN Meds:acetaminophen, dextrose 50%, glucagon (human recombinant), insulin aspart, ondansetron, potassium chloride 10%     Review of Systems  Objective:     Weight: 54 kg (119 lb 0.8 oz)  Body mass index is 21.77 kg/m².  Vital Signs (Most Recent):  Temp: 97.6 °F (36.4 °C) (17 0842)  Pulse: 96 (17 1100)  Resp: 17 (17 0842)  BP: 117/82 (17 0842)  SpO2: 95 % (17 0842) Vital Signs (24h Range):  Temp:  [97 °F (36.1 °C)-97.6 °F (36.4 °C)] 97.6 °F (36.4 °C)  Pulse:  [] 96  Resp:  [16-18] 17  SpO2:  [95 %-100 %] 95 %  BP: (110-126)/(65-83) 117/82              Temp (24hrs), Av.5 °F (36.4 °C), Min:97 °F (36.1 °C), Max:97.6 °F (36.4 °C)          Date 17 07 - 17 0659   Shift 9702-3320 6922-1332 6411-8206 24 Hour Total   I  N  T  A  K  E   NG/   175    Shift Total  (mL/kg) 175  (3.2)   175  (3.2)   O  U  T  P  U  T   Shift Total  (mL/kg)       Weight (kg) 54 54 54 54          NG/OG Tube 17 0505 [place] sump;nasogastric 14 Fr. (Active)   Placement Check placement verified by x-ray 2017  8:42 AM   Distal Tube Length (cm) 65 2017  8:42 AM   Tolerance no signs/symptoms of discomfort 2017  8:42 AM   Securement anchored to nostril center w/ adhesive device 2017  8:42 AM   Clamp Status/Tolerance unclamped 2017  8:42 AM   Insertion Site Appearance no redness, warmth, tenderness, skin breakdown, drainage 2017  8:42 AM   Flush/Irrigation flushed w/;water 2017  8:42 AM   Feeding Method continuous 2017  8:42 AM    Current Rate (mL/hr) 40 mL/hr 7/5/2017  8:42 AM   Goal Rate (mL/hr) 40 mL/hr 7/5/2017  8:42 AM   Intake (mL) 175 mL 7/5/2017  8:42 AM   Intake (mL) - Breast Milk Tube Feeding 60 7/2/2017  9:00 AM   Rate Formula Tube Feeding (mL/hr) 40 mL/hr 7/4/2017  7:59 AM   Intake (mL) - Formula Tube Feeding 120 7/4/2017 11:00 PM   Residual Amount (ml) 0 ml 7/4/2017  7:59 AM       Neurosurgery Physical Exam   General: well developed, well nourished, no distress.   Head: normocephalic  Neurologic: Obtunded  GCS: Motor: 5/Verbal: 2 /Eyes: 3 GCS Total: 10  Mental Status: Drowsy, arousable. Non verbal. Will not follow commands.   Language: unable to assess 2/2 mental status  Speech: unable to assess 2/2 mental status  Cranial nerves: face symmetric, CN II-XII grossly intact.   Eyes: pupils equal, round, reactive to light.  Pulmonary: normal respirations, no signs of respiratory distress  Abdomen: soft, non-distended, not tender to palpation  Sensory: response to light touch throughout  Motor Strength: Moves all extremities spontaneously with good strength and tone. Will not follow commands today. Unable to assess individual muscle groups.   Pronator Drift: unable to assess 2/2 mental status  Finger-to-nose: unable to assess 2/2 mental status  Clonus: absent  Babinski: absent  No LE edema  Skin: Skin is warm, dry and intact.    Significant Labs:    Recent Labs  Lab 07/05/17 0417 07/05/17  0823   GLU  --  132*   NA  --  144   K  --  4.2   CL  --  100   CO2  --  31*   BUN  --  15   CREATININE  --  0.8   CALCIUM  --  11.1*   MG 2.1  --        Recent Labs  Lab 07/04/17  0545 07/05/17 0417   WBC 9.09 8.84   HGB 13.2 13.1   HCT 40.4 40.1    382*     No results for input(s): LABPT, INR, APTT in the last 48 hours.  Microbiology Results (last 7 days)     Procedure Component Value Units Date/Time    Blood culture [[id number]] Collected:  07/03/17 0954    Order Status:  Completed Specimen:  Blood from Peripheral, Wrist, Right Updated:   07/04/17 1212     Blood Culture, Routine No Growth to date     Blood Culture, Routine No Growth to date    Narrative:       Blood cultures x 2 different sites. 4 bottles total. Please  draw cultures before administering antibiotics.    Blood culture [[id number]] Collected:  07/03/17 0934    Order Status:  Completed Specimen:  Blood from Peripheral, Hand, Left Updated:  07/04/17 1212     Blood Culture, Routine No Growth to date     Blood Culture, Routine No Growth to date    Narrative:       Blood cultures from 2 different sites. 4 bottles total.  Please draw before starting antibiotics.    Urine culture [[id number]] Collected:  06/28/17 1733    Order Status:  Completed Specimen:  Urine Updated:  06/29/17 2047     Urine Culture, Routine No growth    Narrative:       Preferred Collection Type->Urine, Clean Catch          Significant Diagnostics:  No new imaging

## 2017-07-05 NOTE — PROGRESS NOTES
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 Chew and discharged on 17 to [facility].  She presents with altered mental status and worsening hypernatremia for 1 day. She was treated for DI her last admission and labs are trending up today.  Spoke with Dr. Fairchild at [facility], patient with waxing/waning mental status yesterday, but was appropriate & following some complex commands yesterday.  There was a period yesterday of significant lethargy.  Patient is unable to give history, and information is taken from the chart.     Post-Op Info:  * No surgery found *       Interval History: Patient continues to be very lethargic & mumbles yes/no occasionally on exam.  Not following commands.      Medications:  Continuous Infusions:   Scheduled Meds:   desmopressin  10 mcg Nasal QHS    enoxaparin  40 mg Subcutaneous Daily    gabapentin  125 mg Per NG tube Q12H    levetiracetam oral soln  500 mg Per NG tube BID    levothyroxine  50 mcg Per NG tube Daily    nicotine  1 patch Transdermal Daily     PRN Meds:acetaminophen, dextrose 50%, glucagon (human recombinant), insulin aspart, ondansetron, potassium chloride 10%     Review of Systems  Objective:     Weight: 54 kg (119 lb 0.8 oz)  Body mass index is 21.77 kg/m².  Vital Signs (Most Recent):  Temp: 97.6 °F (36.4 °C) (17 0842)  Pulse: 96 (17 1100)  Resp: 17 (17 0842)  BP: 117/82 (17 0842)  SpO2: 95 % (17 0842) Vital Signs (24h Range):  Temp:  [97 °F (36.1 °C)-97.6 °F (36.4 °C)] 97.6 °F (36.4 °C)  Pulse:  [] 96  Resp:  [16-18] 17  SpO2:  [95 %-100 %] 95 %  BP: (110-126)/(65-83) 117/82              Temp (24hrs), Av.5 °F (36.4 °C), Min:97 °F (36.1 °C), Max:97.6 °F (36.4 °C)          Date 17 0700 - 17 0659   Shift 8189-3176 6149-9645 1175-0314 24 Hour Total    I  N  T  A  K  E   NG/   175    Shift Total  (mL/kg) 175  (3.2)   175  (3.2)   O  U  T  P  U  T   Shift Total  (mL/kg)       Weight (kg) 54 54 54 54          NG/OG Tube 07/01/17 0505 [place] sump;nasogastric 14 Fr. (Active)   Placement Check placement verified by x-ray 7/5/2017  8:42 AM   Distal Tube Length (cm) 65 7/5/2017  8:42 AM   Tolerance no signs/symptoms of discomfort 7/5/2017  8:42 AM   Securement anchored to nostril center w/ adhesive device 7/5/2017  8:42 AM   Clamp Status/Tolerance unclamped 7/5/2017  8:42 AM   Insertion Site Appearance no redness, warmth, tenderness, skin breakdown, drainage 7/5/2017  8:42 AM   Flush/Irrigation flushed w/;water 7/5/2017  8:42 AM   Feeding Method continuous 7/5/2017  8:42 AM   Current Rate (mL/hr) 40 mL/hr 7/5/2017  8:42 AM   Goal Rate (mL/hr) 40 mL/hr 7/5/2017  8:42 AM   Intake (mL) 175 mL 7/5/2017  8:42 AM   Intake (mL) - Breast Milk Tube Feeding 60 7/2/2017  9:00 AM   Rate Formula Tube Feeding (mL/hr) 40 mL/hr 7/4/2017  7:59 AM   Intake (mL) - Formula Tube Feeding 120 7/4/2017 11:00 PM   Residual Amount (ml) 0 ml 7/4/2017  7:59 AM       Neurosurgery Physical Exam   General: well developed, well nourished, no distress.   Head: normocephalic  Neurologic: Obtunded  GCS: Motor: 5/Verbal: 2 /Eyes: 3 GCS Total: 10  Mental Status: Drowsy, arousable. Non verbal. Will not follow commands.   Language: unable to assess 2/2 mental status  Speech: unable to assess 2/2 mental status  Cranial nerves: face symmetric, CN II-XII grossly intact.   Eyes: pupils equal, round, reactive to light.  Pulmonary: normal respirations, no signs of respiratory distress  Abdomen: soft, non-distended, not tender to palpation  Sensory: response to light touch throughout  Motor Strength: Moves all extremities spontaneously with good strength and tone. Will not follow commands today. Unable to assess individual muscle groups.   Pronator Drift: unable to assess 2/2 mental status  Finger-to-nose:  unable to assess 2/2 mental status  Clonus: absent  Babinski: absent  No LE edema  Skin: Skin is warm, dry and intact.    Significant Labs:    Recent Labs  Lab 07/05/17 0417 07/05/17  0823   GLU  --  132*   NA  --  144   K  --  4.2   CL  --  100   CO2  --  31*   BUN  --  15   CREATININE  --  0.8   CALCIUM  --  11.1*   MG 2.1  --        Recent Labs  Lab 07/04/17  0545 07/05/17 0417   WBC 9.09 8.84   HGB 13.2 13.1   HCT 40.4 40.1    382*     No results for input(s): LABPT, INR, APTT in the last 48 hours.  Microbiology Results (last 7 days)     Procedure Component Value Units Date/Time    Blood culture [[id number]] Collected:  07/03/17 0954    Order Status:  Completed Specimen:  Blood from Peripheral, Wrist, Right Updated:  07/04/17 1212     Blood Culture, Routine No Growth to date     Blood Culture, Routine No Growth to date    Narrative:       Blood cultures x 2 different sites. 4 bottles total. Please  draw cultures before administering antibiotics.    Blood culture [[id number]] Collected:  07/03/17 0934    Order Status:  Completed Specimen:  Blood from Peripheral, Hand, Left Updated:  07/04/17 1212     Blood Culture, Routine No Growth to date     Blood Culture, Routine No Growth to date    Narrative:       Blood cultures from 2 different sites. 4 bottles total.  Please draw before starting antibiotics.    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 Continue management per Endo and primary team  -Continue neuro checks q4 hours. Notify NSGY for any changes.    -Continue Keppra for seizure prophylaxis  -Continue Lovenox for DVT prophylaxis  -Continue aggressive PT/OT  -Continue management of care per primary team  -Will continue to follow    DISPO: Pending Rehab per primary team          Please feel free to call with any further questions    Calli Paulino PA-C  Neurosurgery  [phone]

## 2017-07-05 NOTE — SUBJECTIVE & OBJECTIVE
Interval History: Patient continues to be very lethargic & mumbles yes/no occasionally on exam.  Not following commands.      Medications:  Continuous Infusions:   Scheduled Meds:   desmopressin  10 mcg Nasal QHS    enoxaparin  40 mg Subcutaneous Daily    gabapentin  125 mg Per NG tube Q12H    levetiracetam oral soln  500 mg Per NG tube BID    levothyroxine  50 mcg Per NG tube Daily    nicotine  1 patch Transdermal Daily     PRN Meds:acetaminophen, dextrose 50%, glucagon (human recombinant), insulin aspart, ondansetron, potassium chloride 10%     Review of Systems  Objective:     Weight: 54 kg (119 lb 0.8 oz)  Body mass index is 21.77 kg/m².  Vital Signs (Most Recent):  Temp: 97.8 °F (36.6 °C) (17 1200)  Pulse: 96 (17 1200)  Resp: 20 (17 1200)  BP: 112/82 (17 1200)  SpO2: 96 % (17 1200) Vital Signs (24h Range):  Temp:  [97.4 °F (36.3 °C)-97.8 °F (36.6 °C)] 97.8 °F (36.6 °C)  Pulse:  [] 96  Resp:  [16-20] 20  SpO2:  [95 %-100 %] 96 %  BP: (110-126)/(65-83) 112/82              Temp (24hrs), Av.6 °F (36.4 °C), Min:97.4 °F (36.3 °C), Max:97.8 °F (36.6 °C)          Date 17 07 - 17 0659   Shift 3571-8325 2864-6037 1395-2123 24 Hour Total   I  N  T  A  K  E   NG/   175    Shift Total  (mL/kg) 175  (3.2)   175  (3.2)   O  U  T  P  U  T   Shift Total  (mL/kg)       Weight (kg) 54 54 54 54          NG/OG Tube 17 0505 [place] sump;nasogastric 14 Fr. (Active)   Placement Check placement verified by x-ray 2017  8:42 AM   Distal Tube Length (cm) 65 2017  8:42 AM   Tolerance no signs/symptoms of discomfort 2017  8:42 AM   Securement anchored to nostril center w/ adhesive device 2017  8:42 AM   Clamp Status/Tolerance unclamped 2017  8:42 AM   Insertion Site Appearance no redness, warmth, tenderness, skin breakdown, drainage 2017  8:42 AM   Flush/Irrigation flushed w/;water 2017  8:42 AM   Feeding Method continuous 2017  8:42 AM    Current Rate (mL/hr) 40 mL/hr 7/5/2017  8:42 AM   Goal Rate (mL/hr) 40 mL/hr 7/5/2017  8:42 AM   Intake (mL) 175 mL 7/5/2017  8:42 AM   Intake (mL) - Breast Milk Tube Feeding 60 7/2/2017  9:00 AM   Rate Formula Tube Feeding (mL/hr) 40 mL/hr 7/4/2017  7:59 AM   Intake (mL) - Formula Tube Feeding 120 7/4/2017 11:00 PM   Residual Amount (ml) 0 ml 7/4/2017  7:59 AM       Neurosurgery Physical Exam   General: well developed, well nourished, no distress.   Head: normocephalic  Neurologic: Obtunded  GCS: Motor: 5/Verbal: 2 /Eyes: 3 GCS Total: 10  Mental Status: Drowsy, arousable. Non verbal. Will not follow commands.   Language: unable to assess 2/2 mental status  Speech: unable to assess 2/2 mental status  Cranial nerves: face symmetric, CN II-XII grossly intact.   Eyes: pupils equal, round, reactive to light.  Pulmonary: normal respirations, no signs of respiratory distress  Abdomen: soft, non-distended, not tender to palpation  Sensory: response to light touch throughout  Motor Strength: Moves all extremities spontaneously with good strength and tone. Will not follow commands today. Unable to assess individual muscle groups.   Pronator Drift: unable to assess 2/2 mental status  Finger-to-nose: unable to assess 2/2 mental status  Clonus: absent  Babinski: absent  No LE edema  Skin: Skin is warm, dry and intact.    Significant Labs:    Recent Labs  Lab 07/05/17 0417 07/05/17  0823   GLU  --  132*   NA  --  144   K  --  4.2   CL  --  100   CO2  --  31*   BUN  --  15   CREATININE  --  0.8   CALCIUM  --  11.1*   MG 2.1  --        Recent Labs  Lab 07/04/17  0545 07/05/17 0417   WBC 9.09 8.84   HGB 13.2 13.1   HCT 40.4 40.1    382*     No results for input(s): LABPT, INR, APTT in the last 48 hours.  Microbiology Results (last 7 days)     Procedure Component Value Units Date/Time    Blood culture [[id number]] Collected:  07/03/17 0954    Order Status:  Completed Specimen:  Blood from Peripheral, Wrist, Right Updated:   07/05/17 1212     Blood Culture, Routine No Growth to date     Blood Culture, Routine No Growth to date     Blood Culture, Routine No Growth to date    Narrative:       Blood cultures x 2 different sites. 4 bottles total. Please  draw cultures before administering antibiotics.    Blood culture [[id number]] Collected:  07/03/17 0934    Order Status:  Completed Specimen:  Blood from Peripheral, Hand, Left Updated:  07/05/17 1212     Blood Culture, Routine No Growth to date     Blood Culture, Routine No Growth to date     Blood Culture, Routine No Growth to date    Narrative:       Blood cultures from 2 different sites. 4 bottles total.  Please draw before starting antibiotics.    Urine culture [[id number]] Collected:  06/28/17 1733    Order Status:  Completed Specimen:  Urine Updated:  06/29/17 2047     Urine Culture, Routine No growth    Narrative:       Preferred Collection Type->Urine, Clean Catch          Significant Diagnostics:  No new imaging    Physical Exam

## 2017-07-05 NOTE — PROGRESS NOTES
[facility] Hospital Medicine  Progress Note    Patient Name: Joanna Morales  MRN: [id number]  Patient Class: IP- Inpatient   Admission Date: 6/28/2017  Length of Stay: 7 days  Attending Physician: Chino Wiley MD  Primary Care Provider: Claudy Tse MD    [facility] Medicine Team: [facility] [place], [place] HOSP MED B Chino Wiley MD    Subjective:     Principal Problem:Hypernatremia    HPI:  Ms. Joanna Morales is a 51 y.o. female with tobacco abuse, secondary hypothyroidism (TSH 0.241 Jun 2017), and history of meningioma s/p resection complicated by diabetes insipidus who presents to [facility] ED from [facility] after being found with abnormal labwork.  She had been more altered in the last day or so and upon checking labwork, she was noted to be with hypernatremia.  She contributed very minimally to her history but does report some abdominal pain.  Of note, she was recently admitted to this facility under the Neurosurgery service for resection of a meningioma that was complicated by diabetes insipidus for which she received desmopressin a few times before being discharged to rehabilitation at [facility].  Further history is limited at this time.    Hospital Course:  No notes on file    Interval History: Patient continues to be very lethargic & mumbles yes/no occasionally on exam.  Not following commands.      Medications:  Continuous Infusions:   Scheduled Meds:   desmopressin  10 mcg Nasal QHS    enoxaparin  40 mg Subcutaneous Daily    gabapentin  125 mg Per NG tube Q12H    levetiracetam oral soln  500 mg Per NG tube BID    levothyroxine  50 mcg Per NG tube Daily    nicotine  1 patch Transdermal Daily     PRN Meds:acetaminophen, dextrose 50%, glucagon (human recombinant), insulin aspart, ondansetron, potassium chloride 10%     Review of Systems  Objective:     Weight: 54 kg (119 lb 0.8 oz)  Body mass index is 21.77 kg/m².  Vital Signs (Most Recent):  Temp: 97.8 °F (36.6 °C)  (17 1200)  Pulse: 96 (17 1200)  Resp: 20 (17 1200)  BP: 112/82 (17 1200)  SpO2: 96 % (17 1200) Vital Signs (24h Range):  Temp:  [97.4 °F (36.3 °C)-97.8 °F (36.6 °C)] 97.8 °F (36.6 °C)  Pulse:  [] 96  Resp:  [16-20] 20  SpO2:  [95 %-100 %] 96 %  BP: (110-126)/(65-83) 112/82              Temp (24hrs), Av.6 °F (36.4 °C), Min:97.4 °F (36.3 °C), Max:97.8 °F (36.6 °C)          Date 17 0700 - 17 0659   Shift 8744-9367 1580-1595 1574-1241 24 Hour Total   I  N  T  A  K  E   NG/   175    Shift Total  (mL/kg) 175  (3.2)   175  (3.2)   O  U  T  P  U  T   Shift Total  (mL/kg)       Weight (kg) 54 54 54 54          NG/OG Tube 17 0505 [place] sump;nasogastric 14 Fr. (Active)   Placement Check placement verified by x-ray 2017  8:42 AM   Distal Tube Length (cm) 65 2017  8:42 AM   Tolerance no signs/symptoms of discomfort 2017  8:42 AM   Securement anchored to nostril center w/ adhesive device 2017  8:42 AM   Clamp Status/Tolerance unclamped 2017  8:42 AM   Insertion Site Appearance no redness, warmth, tenderness, skin breakdown, drainage 2017  8:42 AM   Flush/Irrigation flushed w/;water 2017  8:42 AM   Feeding Method continuous 2017  8:42 AM   Current Rate (mL/hr) 40 mL/hr 2017  8:42 AM   Goal Rate (mL/hr) 40 mL/hr 2017  8:42 AM   Intake (mL) 175 mL 2017  8:42 AM   Intake (mL) - Breast Milk Tube Feeding 60 2017  9:00 AM   Rate Formula Tube Feeding (mL/hr) 40 mL/hr 2017  7:59 AM   Intake (mL) - Formula Tube Feeding 120 2017 11:00 PM   Residual Amount (ml) 0 ml 2017  7:59 AM       Neurosurgery Physical Exam   General: well developed, well nourished, no distress.   Head: normocephalic  Neurologic: Obtunded  GCS: Motor: 5/Verbal: 2 /Eyes: 3 GCS Total: 10  Mental Status: Drowsy, arousable. Non verbal. Will not follow commands.   Language: unable to assess 2/2 mental status  Speech: unable to assess 2/2 mental  status  Cranial nerves: face symmetric, CN II-XII grossly intact.   Eyes: pupils equal, round, reactive to light.  Pulmonary: normal respirations, no signs of respiratory distress  Abdomen: soft, non-distended, not tender to palpation  Sensory: response to light touch throughout  Motor Strength: Moves all extremities spontaneously with good strength and tone. Will not follow commands today. Unable to assess individual muscle groups.   Pronator Drift: unable to assess 2/2 mental status  Finger-to-nose: unable to assess 2/2 mental status  Clonus: absent  Babinski: absent  No LE edema  Skin: Skin is warm, dry and intact.    Significant Labs:    Recent Labs  Lab 07/05/17 0417 07/05/17 0823   GLU  --  132*   NA  --  144   K  --  4.2   CL  --  100   CO2  --  31*   BUN  --  15   CREATININE  --  0.8   CALCIUM  --  11.1*   MG 2.1  --        Recent Labs  Lab 07/04/17 0545 07/05/17 0417   WBC 9.09 8.84   HGB 13.2 13.1   HCT 40.4 40.1    382*     No results for input(s): LABPT, INR, APTT in the last 48 hours.  Microbiology Results (last 7 days)     Procedure Component Value Units Date/Time    Blood culture [[id number]] Collected:  07/03/17 0954    Order Status:  Completed Specimen:  Blood from Peripheral, Wrist, Right Updated:  07/05/17 1212     Blood Culture, Routine No Growth to date     Blood Culture, Routine No Growth to date     Blood Culture, Routine No Growth to date    Narrative:       Blood cultures x 2 different sites. 4 bottles total. Please  draw cultures before administering antibiotics.    Blood culture [[id number]] Collected:  07/03/17 0934    Order Status:  Completed Specimen:  Blood from Peripheral, Hand, Left Updated:  07/05/17 1212     Blood Culture, Routine No Growth to date     Blood Culture, Routine No Growth to date     Blood Culture, Routine No Growth to date    Narrative:       Blood cultures from 2 different sites. 4 bottles total.  Please draw before starting antibiotics.    Urine culture  [[id number]] Collected:  06/28/17 1733    Order Status:  Completed Specimen:  Urine Updated:  06/29/17 2047     Urine Culture, Routine No growth    Narrative:       Preferred Collection Type->Urine, Clean Catch          Significant Diagnostics:  No new imaging    Physical Exam      Assessment/Plan:      Acute encephalopathy    Still remains obtunded          Tobacco abuse    Patient was counseled on smoking cessation and she will be provided a nicotine transdermal patch applied while inpatient.  Will provide additional smoking cessation counseling prior to discharge.        Secondary hypothyroidism    As addressed above.  Will continue her home regimen of levothyroxine.        Diabetes insipidus    As addressed above.  Follow sodium        Meningioma, recurrent of brain                VTE Risk Mitigation         Ordered     High Risk of VTE  Once      06/30/17 0920     Place sequential compression device  Until discontinued      06/30/17 0920     enoxaparin injection 40 mg  Daily     Route:  Subcutaneous        06/28/17 9596          Chino Wiley MD  Department of Hospital Medicine   [facility]

## 2017-07-05 NOTE — PLAN OF CARE
Problem: SLP Goal  Goal: SLP Goal  Speech Language Pathology Goals  Goals expected to be met by 7/7  1. Patient will successfully participate in ongoing clinical swallow evaluation and tolerate po trials with no overt s/s of aspiration.          Outcome: Unable to achieve outcome(s) by discharge  Pt not seen by SLP this date 2/2 as well as 6/30 and 7/3 2/2 unable to rouse to safely participate in ongoing assessment of swallow. Please re-consult when more appropriate.     HILDA Rascon, CCC-SLP  [phone]  7/5/2017

## 2017-07-05 NOTE — NURSING
POC reviewed w/ family. Pt disoriented and lethargic. Ng tube feeding isosource 40ml/hr tolerated well. Vs wnl. bld sugars wnl. Will continue to monitor.

## 2017-07-05 NOTE — PROGRESS NOTES
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 Chew and discharged on 17 to [facility].  She presents with altered mental status and worsening hypernatremia for 1 day. She was treated for DI her last admission and labs are trending up today.  Spoke with Dr. Fairchild at [facility], patient with waxing/waning mental status yesterday, but was appropriate & following some complex commands yesterday.  There was a period yesterday of significant lethargy.  Patient is unable to give history, and information is taken from the chart.     Post-Op Info:  * No surgery found *         Interval History:   NAEON. Patient remains obtunded and non verbal on exam. No family at bedside.       Medications:  Continuous Infusions:   Scheduled Meds:   desmopressin  10 mcg Nasal QHS    enoxaparin  40 mg Subcutaneous Daily    gabapentin  125 mg Per NG tube Q12H    levetiracetam oral soln  500 mg Per NG tube BID    levothyroxine  50 mcg Per NG tube Daily    nicotine  1 patch Transdermal Daily     PRN Meds:acetaminophen, dextrose 50%, glucagon (human recombinant), insulin aspart, ondansetron, potassium chloride 10%     Review of Systems  Objective:     Weight: 54 kg (119 lb 0.8 oz)  Body mass index is 21.77 kg/m².  Vital Signs (Most Recent):  Temp: 97.4 °F (36.3 °C) (17 162)  Pulse: (P) 88 (17 1900)  Resp: 16 (17 162)  BP: 110/65 (17 162)  SpO2: 100 % (17) Vital Signs (24h Range):  Temp:  [97 °F (36.1 °C)-97.8 °F (36.6 °C)] 97.4 °F (36.3 °C)  Pulse:  [76-94] (P) 88  Resp:  [16-18] 16  SpO2:  [97 %-100 %] 100 %  BP: (110-125)/(65-87) 110/65              Temp (24hrs), Av.3 °F (36.3 °C), Min:97 °F (36.1 °C), Max:97.8 °F (36.6 °C)           Date 17 0700 - 17 0659   Shift 2641-4519 6856-1043 6674-6929 24 Hour Total    I  N  T  A  K  E   I.V.  (mL/kg) 10  (0.2)   10  (0.2)    NG/ 615  765    Shift Total  (mL/kg) 160  (3) 615  (11.4)  775  (14.4)   O  U  T  P  U  T   Shift Total  (mL/kg)       Weight (kg) 54 54 54 54          NG/OG Tube 07/01/17 0505 [place] sump;nasogastric 14 Fr. (Active)   Placement Check placement verified by x-ray 7/4/2017  7:59 AM   Distal Tube Length (cm) 65 7/3/2017  7:00 AM   Tolerance no signs/symptoms of discomfort 7/4/2017  7:59 AM   Securement anchored to cheek w/ adhesive device 7/3/2017  8:00 PM   Clamp Status/Tolerance unclamped;no abdominal distention;no emesis;no nausea;no residual 7/4/2017  7:59 AM   Insertion Site Appearance no redness, warmth, tenderness, skin breakdown, drainage 7/4/2017  7:59 AM   Flush/Irrigation flushed w/;water;no resistance met 7/4/2017  7:59 AM   Feeding Method continuous 7/4/2017  7:59 AM   Current Rate (mL/hr) 40 mL/hr 7/4/2017  7:59 AM   Goal Rate (mL/hr) 40 mL/hr 7/4/2017  7:59 AM   Intake (mL) 175 mL 7/4/2017  3:11 PM   Intake (mL) - Breast Milk Tube Feeding 60 7/2/2017  9:00 AM   Rate Formula Tube Feeding (mL/hr) 40 mL/hr 7/4/2017  7:59 AM   Intake (mL) - Formula Tube Feeding 80 7/4/2017  5:30 PM   Residual Amount (ml) 0 ml 7/4/2017  7:59 AM       Neurosurgery Physical Exam   General: well developed, well nourished, no distress.   Head: normocephalic  Neurologic: Obtunded  GCS: Motor: 5/Verbal: 1/Eyes: 4 GCS Total: 10  Mental Status: Awake, Alert. Non verbal. Will not follow commands.   Language: unable to assess 2/2 mental status  Speech: unable to assess 2/2 mental status  Cranial nerves: face symmetric, CN II-XII grossly intact.   Eyes: pupils equal, round, reactive to light.  Pulmonary: normal respirations, no signs of respiratory distress  Abdomen: soft, non-distended, not tender to palpation  Sensory: response to light touch throughout  Motor Strength: Moves all extremities spontaneously with good strength and tone. Will not follow commands today.  Unable to assess individual muscle groups.   Pronator Drift: unable to assess 2/2 mental status  Finger-to-nose: unable to assess 2/2 mental status  Clonus: absent  Babinski: absent  No LE edema  Skin: Skin is warm, dry and intact.    Incision:  Clean, dry, intact, healed.         Significant Labs:    Recent Labs  Lab 07/04/17 0545  07/04/17 2012   GLU  --   < > 132*   NA  --   < > 144   K  --   < > 4.1   CL  --   < > 103   CO2  --   < > 30*   BUN  --   < > 14   CREATININE  --   < > 0.7   CALCIUM  --   < > 11.2*   MG 2.4  --   --    < > = values in this interval not displayed.    Recent Labs  Lab 07/03/17  0426 07/04/17  0545   WBC 9.40 9.09   HGB 11.6* 13.2   HCT 34.8* 40.4    293     No results for input(s): LABPT, INR, APTT in the last 48 hours.  Microbiology Results (last 7 days)     Procedure Component Value Units Date/Time    Blood culture [[id number]] Collected:  07/03/17 0954    Order Status:  Completed Specimen:  Blood from Peripheral, Wrist, Right Updated:  07/04/17 1212     Blood Culture, Routine No Growth to date     Blood Culture, Routine No Growth to date    Narrative:       Blood cultures x 2 different sites. 4 bottles total. Please  draw cultures before administering antibiotics.    Blood culture [[id number]] Collected:  07/03/17 0934    Order Status:  Completed Specimen:  Blood from Peripheral, Hand, Left Updated:  07/04/17 1212     Blood Culture, Routine No Growth to date     Blood Culture, Routine No Growth to date    Narrative:       Blood cultures from 2 different sites. 4 bottles total.  Please draw before starting antibiotics.    Urine culture [[id number]] Collected:  06/28/17 1733    Order Status:  Completed Specimen:  Urine Updated:  06/29/17 2047     Urine Culture, Routine No growth    Narrative:       Preferred Collection Type->Urine, Clean Catch            Assessment/Plan:     Meningioma, recurrent of brain    50 yo F with an olfactory groove meningioma s/p resection with Dr. Chew  6/7/17, who represented with hypernatremia    -Patient remains obtunded on exam  -Na 146 today. Continue management per Endo and primary team  -Continue neuro checks q4 hours. Notify NSGY for any changes.   -continue Keppra for seizure prophylaxis  -Continue Lovenox for DVT prophylaxis  -Continue aggressive PT/OT  -Continue management of care per primary team  -Will continue to follow    DISPO: Pending Rehab per primary team          Please call with any questions    ALANNAH Díaz  Neurosurgery  [facility]

## 2017-07-05 NOTE — SUBJECTIVE & OBJECTIVE
Interval History:   NAEON. Patient remains obtunded and non verbal on exam. No family at bedside.       Medications:  Continuous Infusions:   Scheduled Meds:   desmopressin  10 mcg Nasal QHS    enoxaparin  40 mg Subcutaneous Daily    gabapentin  125 mg Per NG tube Q12H    levetiracetam oral soln  500 mg Per NG tube BID    levothyroxine  50 mcg Per NG tube Daily    nicotine  1 patch Transdermal Daily     PRN Meds:acetaminophen, dextrose 50%, glucagon (human recombinant), insulin aspart, ondansetron, potassium chloride 10%     Review of Systems  Objective:     Weight: 54 kg (119 lb 0.8 oz)  Body mass index is 21.77 kg/m².  Vital Signs (Most Recent):  Temp: 97.4 °F (36.3 °C) (17)  Pulse: (P) 88 (17 1900)  Resp: 16 (17)  BP: 110/65 (17)  SpO2: 100 % (17) Vital Signs (24h Range):  Temp:  [97 °F (36.1 °C)-97.8 °F (36.6 °C)] 97.4 °F (36.3 °C)  Pulse:  [76-94] (P) 88  Resp:  [16-18] 16  SpO2:  [97 %-100 %] 100 %  BP: (110-125)/(65-87) 110/65              Temp (24hrs), Av.3 °F (36.3 °C), Min:97 °F (36.1 °C), Max:97.8 °F (36.6 °C)           Date 17 07 - 17 0659   Shift 5999-7482 0596-6933 0338-3357 24 Hour Total   I  N  T  A  K  E   I.V.  (mL/kg) 10  (0.2)   10  (0.2)    NG/ 615  765    Shift Total  (mL/kg) 160  (3) 615  (11.4)  775  (14.4)   O  U  T  P  U  T   Shift Total  (mL/kg)       Weight (kg) 54 54 54 54          NG/OG Tube 17 0505 [place] sump;nasogastric 14 Fr. (Active)   Placement Check placement verified by x-ray 2017  7:59 AM   Distal Tube Length (cm) 65 7/3/2017  7:00 AM   Tolerance no signs/symptoms of discomfort 2017  7:59 AM   Securement anchored to cheek w/ adhesive device 7/3/2017  8:00 PM   Clamp Status/Tolerance unclamped;no abdominal distention;no emesis;no nausea;no residual 2017  7:59 AM   Insertion Site Appearance no redness, warmth, tenderness, skin breakdown, drainage 2017  7:59 AM   Flush/Irrigation  flushed w/;water;no resistance met 7/4/2017  7:59 AM   Feeding Method continuous 7/4/2017  7:59 AM   Current Rate (mL/hr) 40 mL/hr 7/4/2017  7:59 AM   Goal Rate (mL/hr) 40 mL/hr 7/4/2017  7:59 AM   Intake (mL) 175 mL 7/4/2017  3:11 PM   Intake (mL) - Breast Milk Tube Feeding 60 7/2/2017  9:00 AM   Rate Formula Tube Feeding (mL/hr) 40 mL/hr 7/4/2017  7:59 AM   Intake (mL) - Formula Tube Feeding 80 7/4/2017  5:30 PM   Residual Amount (ml) 0 ml 7/4/2017  7:59 AM       Neurosurgery Physical Exam   General: well developed, well nourished, no distress.   Head: normocephalic  Neurologic: Obtunded  GCS: Motor: 5/Verbal: 1/Eyes: 4 GCS Total: 10  Mental Status: Awake, Alert. Non verbal. Will not follow commands.   Language: unable to assess 2/2 mental status  Speech: unable to assess 2/2 mental status  Cranial nerves: face symmetric, CN II-XII grossly intact.   Eyes: pupils equal, round, reactive to light.  Pulmonary: normal respirations, no signs of respiratory distress  Abdomen: soft, non-distended, not tender to palpation  Sensory: response to light touch throughout  Motor Strength: Moves all extremities spontaneously with good strength and tone. Will not follow commands today. Unable to assess individual muscle groups.   Pronator Drift: unable to assess 2/2 mental status  Finger-to-nose: unable to assess 2/2 mental status  Clonus: absent  Babinski: absent  No LE edema  Skin: Skin is warm, dry and intact.    Incision:  Clean, dry, intact, healed.         Significant Labs:    Recent Labs  Lab 07/04/17  0545  07/04/17 2012   GLU  --   < > 132*   NA  --   < > 144   K  --   < > 4.1   CL  --   < > 103   CO2  --   < > 30*   BUN  --   < > 14   CREATININE  --   < > 0.7   CALCIUM  --   < > 11.2*   MG 2.4  --   --    < > = values in this interval not displayed.    Recent Labs  Lab 07/03/17  0426 07/04/17  0545   WBC 9.40 9.09   HGB 11.6* 13.2   HCT 34.8* 40.4    293     No results for input(s): LABPT, INR, APTT in the last  48 hours.  Microbiology Results (last 7 days)     Procedure Component Value Units Date/Time    Blood culture [[id number]] Collected:  07/03/17 0954    Order Status:  Completed Specimen:  Blood from Peripheral, Wrist, Right Updated:  07/04/17 1212     Blood Culture, Routine No Growth to date     Blood Culture, Routine No Growth to date    Narrative:       Blood cultures x 2 different sites. 4 bottles total. Please  draw cultures before administering antibiotics.    Blood culture [[id number]] Collected:  07/03/17 0934    Order Status:  Completed Specimen:  Blood from Peripheral, Hand, Left Updated:  07/04/17 1212     Blood Culture, Routine No Growth to date     Blood Culture, Routine No Growth to date    Narrative:       Blood cultures from 2 different sites. 4 bottles total.  Please draw before starting antibiotics.    Urine culture [[id number]] Collected:  06/28/17 1733    Order Status:  Completed Specimen:  Urine Updated:  06/29/17 2047     Urine Culture, Routine No growth    Narrative:       Preferred Collection Type->Urine, Clean Catch

## 2017-07-06 NOTE — PROGRESS NOTES
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 Chew and discharged on 17 to [facility].  She presents with altered mental status and worsening hypernatremia for 1 day. She was treated for DI her last admission and labs are trending up today.  Spoke with Dr. Fairchild at [facility], patient with waxing/waning mental status yesterday, but was appropriate & following some complex commands yesterday.  There was a period yesterday of significant lethargy.  Patient is unable to give history, and information is taken from the chart.     Post-Op Info:  * No surgery found *         Interval History: NAEON.  Continued lethargy.  Speaks few words, answers yes and no.      Medications:  Continuous Infusions:   dextrose 5 % 50 mL/hr at 17 1031     Scheduled Meds:   desmopressin  10 mcg Nasal BID    enoxaparin  40 mg Subcutaneous Daily    gabapentin  125 mg Per NG tube Q12H    levetiracetam oral soln  500 mg Per NG tube BID    levothyroxine  50 mcg Per NG tube Daily    nicotine  1 patch Transdermal Daily     PRN Meds:acetaminophen, dextrose 50%, glucagon (human recombinant), insulin aspart, ondansetron, potassium chloride 10%     Review of Systems  Objective:     Weight: 59.9 kg (132 lb)  Body mass index is 24.14 kg/m².  Vital Signs (Most Recent):  Temp: 97.4 °F (36.3 °C) (17 1550)  Pulse: (!) 112 (17 1550)  Resp: 17 (17 1550)  BP: 109/73 (17 1550)  SpO2: 96 % (17 1550) Vital Signs (24h Range):  Temp:  [96.2 °F (35.7 °C)-97.5 °F (36.4 °C)] 97.4 °F (36.3 °C)  Pulse:  [108-128] 112  Resp:  [16-18] 17  SpO2:  [94 %-98 %] 96 %  BP: (108-139)/(68-85) 109/73              Temp (24hrs), Av °F (36.1 °C), Min:96.2 °F (35.7 °C), Max:97.5 °F (36.4 °C)           Date 17 07 - 17 0659   [facility] [id number] [id number] 24  Hour Total   I  N  T  A  K  E   NG/   375    Shift Total  (mL/kg) 375  (6.3)   375  (6.3)   O  U  T  P  U  T   Shift Total  (mL/kg)       Weight (kg) 59.9 59.9 59.9 59.9          NG/OG Tube 07/01/17 0505 [place] sump;nasogastric 14 Fr. (Active)   Placement Check placement verified by x-ray 7/6/2017  8:05 AM   Distal Tube Length (cm) 65 7/6/2017  8:05 AM   Tolerance no signs/symptoms of discomfort 7/6/2017  8:05 AM   Securement anchored to nostril center w/ adhesive device 7/6/2017  8:05 AM   Clamp Status/Tolerance unclamped 7/6/2017  8:05 AM   Insertion Site Appearance no redness, warmth, tenderness, skin breakdown, drainage 7/6/2017  8:05 AM   Flush/Irrigation flushed w/;water 7/6/2017  8:05 AM   Feeding Method continuous 7/6/2017  8:05 AM   Current Rate (mL/hr) 40 mL/hr 7/6/2017  8:05 AM   Goal Rate (mL/hr) 40 mL/hr 7/6/2017  8:05 AM   Intake (mL) 175 mL 7/6/2017  2:00 PM   Intake (mL) - Breast Milk Tube Feeding 60 7/2/2017  9:00 AM   Rate Formula Tube Feeding (mL/hr) 40 mL/hr 7/5/2017  8:00 PM   Intake (mL) - Formula Tube Feeding 120 7/4/2017 11:00 PM   Residual Amount (ml) 5 ml 7/6/2017  8:05 AM       Neurosurgery Physical Exam   General: well developed, well nourished, no distress.   Head: normocephalic  Neurologic: Obtunded  GCS: Motor: 5/Verbal: 2 /Eyes: 3 GCS Total: 10  Mental Status: Drowsy, arousable. Non verbal. Will not follow commands.   Language: unable to assess 2/2 mental status  Speech: unable to assess 2/2 mental status  Cranial nerves: face symmetric, CN II-XII grossly intact.   Eyes: pupils equal, round, reactive to light.  Pulmonary: normal respirations, no signs of respiratory distress  Abdomen: soft, non-distended, not tender to palpation  Sensory: response to light touch throughout  Motor Strength: Moves all extremities spontaneously with good strength and tone. Will not follow commands today. Unable to assess individual muscle groups.   Pronator Drift: unable to assess 2/2 mental  status  Finger-to-nose: unable to assess 2/2 mental status  Clonus: absent  Babinski: absent  No LE edema  Skin: Skin is warm, dry and intact.    Significant Labs:    Recent Labs  Lab 07/06/17  0405 07/06/17  0904   GLU  --  118*   NA  --  149*   K  --  4.0   CL  --  105   CO2  --  34*   BUN  --  14   CREATININE  --  0.8   CALCIUM  --  11.2*   MG 2.4  --        Recent Labs  Lab 07/05/17  0417 07/06/17  0405   WBC 8.84 11.48   HGB 13.1 12.5   HCT 40.1 38.0   * 430*     No results for input(s): LABPT, INR, APTT in the last 48 hours.  Microbiology Results (last 7 days)     Procedure Component Value Units Date/Time    Blood culture [[id number]] Collected:  07/03/17 0934    Order Status:  Completed Specimen:  Blood from Peripheral, Hand, Left Updated:  07/06/17 1212     Blood Culture, Routine No Growth to date     Blood Culture, Routine No Growth to date     Blood Culture, Routine No Growth to date     Blood Culture, Routine No Growth to date    Narrative:       Blood cultures from 2 different sites. 4 bottles total.  Please draw before starting antibiotics.    Blood culture [[id number]] Collected:  07/03/17 0954    Order Status:  Completed Specimen:  Blood from Peripheral, Wrist, Right Updated:  07/06/17 1212     Blood Culture, Routine No Growth to date     Blood Culture, Routine No Growth to date     Blood Culture, Routine No Growth to date     Blood Culture, Routine No Growth to date    Narrative:       Blood cultures x 2 different sites. 4 bottles total. Please  draw cultures before administering antibiotics.    Urine culture [[id number]] Collected:  06/28/17 1733    Order Status:  Completed Specimen:  Urine Updated:  06/29/17 2047     Urine Culture, Routine No growth    Narrative:       Preferred Collection Type->Urine, Clean Catch            Assessment/Plan:     Meningioma, recurrent of brain    52 yo F with an olfactory groove meningioma s/p resection with Dr. Chew 6/7/17, who represented with  hypernatremia  -Patient remains obtunded on exam  -Na 143 today. Continue management per Endo and primary team  -Continue neuro checks q4 hours. Notify NSGY for any changes.   -Continue Keppra for seizure prophylaxis  -Continue Lovenox for DVT prophylaxis  -Continue aggressive PT/OT  -Continue management of care per primary team  -Will continue to follow  - Pending Rehab per primary team            ALANNAH Peña  Neurosurgery  [facility]

## 2017-07-06 NOTE — ASSESSMENT & PLAN NOTE
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 Chew 6/7/17, who represented with hypernatremia  -Patient remains obtunded on exam  -Na 143 today. Continue management per Endo and primary team  -Continue neuro checks q4 hours. Notify NSGY for any changes.   -Continue Keppra for seizure prophylaxis  -Continue Lovenox for DVT prophylaxis  -Continue aggressive PT/OT  -Continue management of care per primary team  -Will continue to follow  - Pending Rehab per primary team

## 2017-07-06 NOTE — SUBJECTIVE & OBJECTIVE
Interval History: NAEON.  Continued lethargy.  Speaks few words, answers yes and no.      Medications:  Continuous Infusions:   dextrose 5 % 50 mL/hr at 17 1031     Scheduled Meds:   desmopressin  10 mcg Nasal BID    enoxaparin  40 mg Subcutaneous Daily    gabapentin  125 mg Per NG tube Q12H    levetiracetam oral soln  500 mg Per NG tube BID    levothyroxine  50 mcg Per NG tube Daily    nicotine  1 patch Transdermal Daily     PRN Meds:acetaminophen, dextrose 50%, glucagon (human recombinant), insulin aspart, ondansetron, potassium chloride 10%     Review of Systems  Objective:     Weight: 59.9 kg (132 lb)  Body mass index is 24.14 kg/m².  Vital Signs (Most Recent):  Temp: 97.4 °F (36.3 °C) (17 1550)  Pulse: (!) 112 (17 1550)  Resp: 17 (17 1550)  BP: 109/73 (17 1550)  SpO2: 96 % (17 1550) Vital Signs (24h Range):  Temp:  [96.2 °F (35.7 °C)-97.5 °F (36.4 °C)] 97.4 °F (36.3 °C)  Pulse:  [108-128] 112  Resp:  [16-18] 17  SpO2:  [94 %-98 %] 96 %  BP: (108-139)/(68-85) 109/73              Temp (24hrs), Av °F (36.1 °C), Min:96.2 °F (35.7 °C), Max:97.5 °F (36.4 °C)           Date 17 07 - 17 0659   Shift 3814-3349 1467-9290 1061-3003 24 Hour Total   I  N  T  A  K  E   NG/   375    Shift Total  (mL/kg) 375  (6.3)   375  (6.3)   O  U  T  P  U  T   Shift Total  (mL/kg)       Weight (kg) 59.9 59.9 59.9 59.9          NG/OG Tube 17 0505 [place] sump;nasogastric 14 Fr. (Active)   Placement Check placement verified by x-ray 2017  8:05 AM   Distal Tube Length (cm) 65 2017  8:05 AM   Tolerance no signs/symptoms of discomfort 2017  8:05 AM   Securement anchored to nostril center w/ adhesive device 2017  8:05 AM   Clamp Status/Tolerance unclamped 2017  8:05 AM   Insertion Site Appearance no redness, warmth, tenderness, skin breakdown, drainage 2017  8:05 AM   Flush/Irrigation flushed w/;water 2017  8:05 AM   Feeding Method continuous  7/6/2017  8:05 AM   Current Rate (mL/hr) 40 mL/hr 7/6/2017  8:05 AM   Goal Rate (mL/hr) 40 mL/hr 7/6/2017  8:05 AM   Intake (mL) 175 mL 7/6/2017  2:00 PM   Intake (mL) - Breast Milk Tube Feeding 60 7/2/2017  9:00 AM   Rate Formula Tube Feeding (mL/hr) 40 mL/hr 7/5/2017  8:00 PM   Intake (mL) - Formula Tube Feeding 120 7/4/2017 11:00 PM   Residual Amount (ml) 5 ml 7/6/2017  8:05 AM       Neurosurgery Physical Exam   General: well developed, well nourished, no distress.   Head: normocephalic  Neurologic: Obtunded  GCS: Motor: 5/Verbal: 2 /Eyes: 3 GCS Total: 10  Mental Status: Drowsy, arousable. Non verbal. Will not follow commands.   Language: unable to assess 2/2 mental status  Speech: unable to assess 2/2 mental status  Cranial nerves: face symmetric, CN II-XII grossly intact.   Eyes: pupils equal, round, reactive to light.  Pulmonary: normal respirations, no signs of respiratory distress  Abdomen: soft, non-distended, not tender to palpation  Sensory: response to light touch throughout  Motor Strength: Moves all extremities spontaneously with good strength and tone. Will not follow commands today. Unable to assess individual muscle groups.   Pronator Drift: unable to assess 2/2 mental status  Finger-to-nose: unable to assess 2/2 mental status  Clonus: absent  Babinski: absent  No LE edema  Skin: Skin is warm, dry and intact.    Significant Labs:    Recent Labs  Lab 07/06/17  0405 07/06/17  0904   GLU  --  118*   NA  --  149*   K  --  4.0   CL  --  105   CO2  --  34*   BUN  --  14   CREATININE  --  0.8   CALCIUM  --  11.2*   MG 2.4  --        Recent Labs  Lab 07/05/17  0417 07/06/17  0405   WBC 8.84 11.48   HGB 13.1 12.5   HCT 40.1 38.0   * 430*     No results for input(s): LABPT, INR, APTT in the last 48 hours.  Microbiology Results (last 7 days)     Procedure Component Value Units Date/Time    Blood culture [[id number]] Collected:  07/03/17 0934    Order Status:  Completed Specimen:  Blood from Peripheral,  Hand, Left Updated:  07/06/17 1212     Blood Culture, Routine No Growth to date     Blood Culture, Routine No Growth to date     Blood Culture, Routine No Growth to date     Blood Culture, Routine No Growth to date    Narrative:       Blood cultures from 2 different sites. 4 bottles total.  Please draw before starting antibiotics.    Blood culture [[id number]] Collected:  07/03/17 0954    Order Status:  Completed Specimen:  Blood from Peripheral, Wrist, Right Updated:  07/06/17 1212     Blood Culture, Routine No Growth to date     Blood Culture, Routine No Growth to date     Blood Culture, Routine No Growth to date     Blood Culture, Routine No Growth to date    Narrative:       Blood cultures x 2 different sites. 4 bottles total. Please  draw cultures before administering antibiotics.    Urine culture [[id number]] Collected:  06/28/17 1733    Order Status:  Completed Specimen:  Urine Updated:  06/29/17 2047     Urine Culture, Routine No growth    Narrative:       Preferred Collection Type->Urine, Clean Catch

## 2017-07-06 NOTE — NURSING
POC reviewed w/ family. Verbalized understanding. Pt tachycardic, wnl for pt. All other vs wnl. Tube feedings cont at 40ml/hr. Pt urinating today much more than yesterday. Lethargic/obtunded. bilat wrist restraints in place. Pt Na 149 this morning. D5% @50ml/hr initiated. Will continue to monitor.

## 2017-07-06 NOTE — PT/OT/SLP PROGRESS
Occupational Therapy      Joanna Maria Elena Giovannianabel  MRN: [id number]    Patient not seen today secondary to unable to arouse pt despite multiple attempts incorporating tactile and verbal stimulation  .  RN attempted to wake pt too by applying pressure to nailbed; however, pt unable to remain in alert state after briefly opening eyes.  Will follow-up as POC allows    HELEN Lopes  7/6/2017

## 2017-07-07 NOTE — ASSESSMENT & PLAN NOTE
As addressed above.  Follow sodium and UOP  Sodium 149 this yetserady, improved  Will increase ddavp to BID

## 2017-07-07 NOTE — PT/OT/SLP PROGRESS
"Occupational Therapy      Joanna Morales  MRN: [id number]    Patient not seen today secondary to unable to arouse pt  . Pt briefly opened eyes for several seconds with assist from RN.  OT asked pt if she would like to do exercises with pt closing eyes and stating, "No."  Pt immediately returned to deep sleep.  OT attempted to arouse pt again with verbal and tactile stimuli; however, unable to do so.  Will follow-up as POC allows.    HELEN Lopes  7/7/2017  "

## 2017-07-07 NOTE — PROGRESS NOTES
[facility] Hospital Medicine  Progress Note    Patient Name: Joanna Morales  MRN: [id number]  Patient Class: IP- Inpatient   Admission Date: 6/28/2017  Length of Stay: 9 days  Attending Physician: Chino Wiley MD  Primary Care Provider: Claudy Tse MD    [facility] Medicine Team: [facility] B Chino Wiley MD    Subjective:     Principal Problem:Hypernatremia    HPI:  Ms. Joanna Morales is a 51 y.o. female with tobacco abuse, secondary hypothyroidism (TSH 0.241 Jun 2017), and history of meningioma s/p resection complicated by diabetes insipidus who presents to [facility] ED from [facility] after being found with abnormal labwork.  She had been more altered in the last day or so and upon checking labwork, she was noted to be with hypernatremia.  She contributed very minimally to her history but does report some abdominal pain.  Of note, she was recently admitted to this facility under the Neurosurgery service for resection of a meningioma that was complicated by diabetes insipidus for which she received desmopressin a few times before being discharged to rehabilitation at [facility].  Further history is limited at this time.    Hospital Course:  No notes on file    Interval History: Patient continues to be very lethargic & mumbles yes/no occasionally on exam.  Not following commands.      Medications:  Continuous Infusions:   dextrose 5 % 75 mL/hr at 07/06/17 2010     Scheduled Meds:   desmopressin  10 mcg Nasal BID    enoxaparin  40 mg Subcutaneous Daily    gabapentin  125 mg Per NG tube Q12H    levetiracetam oral soln  500 mg Per NG tube BID    levothyroxine  50 mcg Per NG tube Daily    nicotine  1 patch Transdermal Daily     PRN Meds:acetaminophen, dextrose 50%, glucagon (human recombinant), insulin aspart, ondansetron, potassium chloride 10%     Review of Systems  Objective:     Weight: 59.9 kg (132 lb)  Body mass index is 24.14 kg/m².  Vital Signs  (Most Recent):  Temp: 97.7 °F (36.5 °C) (17 1545)  Pulse: 60 (17 154)  Resp: 20 (17 154)  BP: (!) 127/59 (17 1545)  SpO2: (!) 92 % (17 154) Vital Signs (24h Range):  Temp:  [97.3 °F (36.3 °C)-98.6 °F (37 °C)] 97.7 °F (36.5 °C)  Pulse:  [] 60  Resp:  [18-20] 20  SpO2:  [92 %-100 %] 92 %  BP: (109-134)/(59-76) 127/59              Temp (24hrs), Av.9 °F (36.6 °C), Min:97.3 °F (36.3 °C), Max:98.6 °F (37 °C)          Date 17 07 - 17 0659   Shift 2236-1785 6468-2313 4541-5714 24 Hour Total   I  N  T  A  K  E   I.V.  (mL/kg)  900  (15)  900  (15)    NG/ 480  830    Shift Total  (mL/kg) 350  (5.8) 1380  (23)  1730  (28.9)   O  U  T  P  U  T   Shift Total  (mL/kg)       Weight (kg) 59.9 59.9 59.9 59.9          NG/OG Tube 17 0505 [place] sump;nasogastric 14 Fr. (Active)   Placement Check placement verified by x-ray 2017  8:42 AM   Distal Tube Length (cm) 65 2017  8:42 AM   Tolerance no signs/symptoms of discomfort 2017  8:42 AM   Securement anchored to nostril center w/ adhesive device 2017  8:42 AM   Clamp Status/Tolerance unclamped 2017  8:42 AM   Insertion Site Appearance no redness, warmth, tenderness, skin breakdown, drainage 2017  8:42 AM   Flush/Irrigation flushed w/;water 2017  8:42 AM   Feeding Method continuous 2017  8:42 AM   Current Rate (mL/hr) 40 mL/hr 2017  8:42 AM   Goal Rate (mL/hr) 40 mL/hr 2017  8:42 AM   Intake (mL) 175 mL 2017  8:42 AM   Intake (mL) - Breast Milk Tube Feeding 60 2017  9:00 AM   Rate Formula Tube Feeding (mL/hr) 40 mL/hr 2017  7:59 AM   Intake (mL) - Formula Tube Feeding 120 2017 11:00 PM   Residual Amount (ml) 0 ml 2017  7:59 AM       Physical Exam:    Constitutional: She appears well-developed and well-nourished.     Eyes: Pupils are equal, round, and reactive to light. EOM are normal.     Abdominal: Soft. Bowel sounds are normal.     Psych/Behavior: She is alert.  She is oriented to person, place, and time.      General: well developed, well nourished, no distress.   Head: normocephalic  Neurologic: Obtunded  GCS: Motor: 5/Verbal: 2 /Eyes: 3 GCS Total: 10  Mental Status: Drowsy, arousable. Non verbal. Will not follow commands.   Language: unable to assess 2/2 mental status  Speech: unable to assess 2/2 mental status  Cranial nerves: face symmetric, CN II-XII grossly intact.   Eyes: pupils equal, round, reactive to light.  Pulmonary: normal respirations, no signs of respiratory distress  Abdomen: soft, non-distended, not tender to palpation  Sensory: response to light touch throughout  Motor Strength: Moves all extremities spontaneously with good strength and tone. Will not follow commands today. Unable to assess individual muscle groups.   Pronator Drift: unable to assess 2/2 mental status  Finger-to-nose: unable to assess 2/2 mental status  Clonus: absent  Babinski: absent  No LE edema  Skin: Skin is warm, dry and intact.    Significant Labs:    Recent Labs  Lab 07/07/17  0439 07/07/17  0851   GLU  --  119*   NA  --  143   K  --  4.0   CL  --  101   CO2  --  34*   BUN  --  17   CREATININE  --  0.8   CALCIUM  --  10.8*   MG 2.0  --        Recent Labs  Lab 07/06/17  0405 07/07/17  0439   WBC 11.48 10.37   HGB 12.5 11.3*   HCT 38.0 35.1*   * 386*     No results for input(s): LABPT, INR, APTT in the last 48 hours.  Microbiology Results (last 7 days)     Procedure Component Value Units Date/Time    Blood culture [[id number]] Collected:  07/03/17 0954    Order Status:  Completed Specimen:  Blood from Peripheral, Wrist, Right Updated:  07/07/17 1212     Blood Culture, Routine No Growth to date     Blood Culture, Routine No Growth to date     Blood Culture, Routine No Growth to date     Blood Culture, Routine No Growth to date     Blood Culture, Routine No Growth to date    Narrative:       Blood cultures x 2 different sites. 4 bottles total. Please  draw cultures before  administering antibiotics.    Blood culture [[id number]] Collected:  07/03/17 0934    Order Status:  Completed Specimen:  Blood from Peripheral, Hand, Left Updated:  07/07/17 1212     Blood Culture, Routine No Growth to date     Blood Culture, Routine No Growth to date     Blood Culture, Routine No Growth to date     Blood Culture, Routine No Growth to date     Blood Culture, Routine No Growth to date    Narrative:       Blood cultures from 2 different sites. 4 bottles total.  Please draw before starting antibiotics.          Significant Diagnostics:  No new imaging    Physical Exam   Constitutional: She is oriented to person, place, and time. She appears well-developed and well-nourished.   HENT:   Head: Normocephalic and atraumatic.   Eyes: EOM are normal. Pupils are equal, round, and reactive to light.   Neck: Neck supple.   Pulmonary/Chest: Effort normal and breath sounds normal.   Abdominal: Soft. Bowel sounds are normal.   Neurological: She is alert and oriented to person, place, and time.   Skin: Skin is warm.     Interval History: Patient continues to be very lethargic & mumbles yes/no occasionally on exam.  Not following commands.      Medications:  Continuous Infusions:   dextrose 5 % 75 mL/hr at 07/06/17 2010     Scheduled Meds:   desmopressin  10 mcg Nasal BID    enoxaparin  40 mg Subcutaneous Daily    gabapentin  125 mg Per NG tube Q12H    levetiracetam oral soln  500 mg Per NG tube BID    levothyroxine  50 mcg Per NG tube Daily    nicotine  1 patch Transdermal Daily     PRN Meds:acetaminophen, dextrose 50%, glucagon (human recombinant), insulin aspart, ondansetron, potassium chloride 10%     Review of Systems  Objective:     Weight: 59.9 kg (132 lb)  Body mass index is 24.14 kg/m².  Vital Signs (Most Recent):  Temp: 97.7 °F (36.5 °C) (07/07/17 1545)  Pulse: 60 (07/07/17 1545)  Resp: 20 (07/07/17 1545)  BP: (!) 127/59 (07/07/17 1545)  SpO2: (!) 92 % (07/07/17 1545) Vital Signs (24h Range):  Temp:   [97.3 °F (36.3 °C)-98.6 °F (37 °C)] 97.7 °F (36.5 °C)  Pulse:  [] 60  Resp:  [18-20] 20  SpO2:  [92 %-100 %] 92 %  BP: (109-134)/(59-76) 127/59              Temp (24hrs), Av.9 °F (36.6 °C), Min:97.3 °F (36.3 °C), Max:98.6 °F (37 °C)          Date 17 07 - 17 0659   Shift 3197-9302 5521-5337 1842-0499 24 Hour Total   I  N  T  A  K  E   I.V.  (mL/kg)  900  (15)  900  (15)    NG/ 480  830    Shift Total  (mL/kg) 350  (5.8) 1380  (23)  1730  (28.9)   O  U  T  P  U  T   Shift Total  (mL/kg)       Weight (kg) 59.9 59.9 59.9 59.9          NG/OG Tube 17 0505 [place] sump;nasogastric 14 Fr. (Active)   Placement Check placement verified by x-ray 2017  8:42 AM   Distal Tube Length (cm) 65 2017  8:42 AM   Tolerance no signs/symptoms of discomfort 2017  8:42 AM   Securement anchored to nostril center w/ adhesive device 2017  8:42 AM   Clamp Status/Tolerance unclamped 2017  8:42 AM   Insertion Site Appearance no redness, warmth, tenderness, skin breakdown, drainage 2017  8:42 AM   Flush/Irrigation flushed w/;water 2017  8:42 AM   Feeding Method continuous 2017  8:42 AM   Current Rate (mL/hr) 40 mL/hr 2017  8:42 AM   Goal Rate (mL/hr) 40 mL/hr 2017  8:42 AM   Intake (mL) 175 mL 2017  8:42 AM   Intake (mL) - Breast Milk Tube Feeding 60 2017  9:00 AM   Rate Formula Tube Feeding (mL/hr) 40 mL/hr 2017  7:59 AM   Intake (mL) - Formula Tube Feeding 120 2017 11:00 PM   Residual Amount (ml) 0 ml 2017  7:59 AM       Neurosurgery Physical Exam   General: well developed, well nourished, no distress.   Head: normocephalic  Neurologic: Obtunded  GCS: Motor: 5/Verbal: 2 /Eyes: 3 GCS Total: 10  Mental Status: Drowsy, arousable. Non verbal. Will not follow commands.   Language: unable to assess 2/2 mental status  Speech: unable to assess 2/2 mental status  Cranial nerves: face symmetric, CN II-XII grossly intact.   Eyes: pupils equal, round, reactive to  light.  Pulmonary: normal respirations, no signs of respiratory distress  Abdomen: soft, non-distended, not tender to palpation  Sensory: response to light touch throughout  Motor Strength: Moves all extremities spontaneously with good strength and tone. Will not follow commands today. Unable to assess individual muscle groups.   Pronator Drift: unable to assess 2/2 mental status  Finger-to-nose: unable to assess 2/2 mental status  Clonus: absent  Babinski: absent  No LE edema  Skin: Skin is warm, dry and intact.    Significant Labs:    Recent Labs  Lab 07/07/17  0439 07/07/17  0851   GLU  --  119*   NA  --  143   K  --  4.0   CL  --  101   CO2  --  34*   BUN  --  17   CREATININE  --  0.8   CALCIUM  --  10.8*   MG 2.0  --        Recent Labs  Lab 07/06/17  0405 07/07/17  0439   WBC 11.48 10.37   HGB 12.5 11.3*   HCT 38.0 35.1*   * 386*     No results for input(s): LABPT, INR, APTT in the last 48 hours.  Microbiology Results (last 7 days)     Procedure Component Value Units Date/Time    Blood culture [[id number]] Collected:  07/03/17 0954    Order Status:  Completed Specimen:  Blood from Peripheral, Wrist, Right Updated:  07/07/17 1212     Blood Culture, Routine No Growth to date     Blood Culture, Routine No Growth to date     Blood Culture, Routine No Growth to date     Blood Culture, Routine No Growth to date     Blood Culture, Routine No Growth to date    Narrative:       Blood cultures x 2 different sites. 4 bottles total. Please  draw cultures before administering antibiotics.    Blood culture [[id number]] Collected:  07/03/17 0934    Order Status:  Completed Specimen:  Blood from Peripheral, Hand, Left Updated:  07/07/17 1212     Blood Culture, Routine No Growth to date     Blood Culture, Routine No Growth to date     Blood Culture, Routine No Growth to date     Blood Culture, Routine No Growth to date     Blood Culture, Routine No Growth to date    Narrative:       Blood cultures from 2 different sites. 4  bottles total.  Please draw before starting antibiotics.          Significant Diagnostics:  No new imaging    Physical Exam      Assessment/Plan:      Acute encephalopathy    Still remains obtunded  Has made little progress  EEG today        Tobacco abuse    Patient was counseled on smoking cessation and she will be provided a nicotine transdermal patch applied while inpatient.  Will provide additional smoking cessation counseling prior to discharge.        Secondary hypothyroidism    As addressed above.  Will continue her home regimen of levothyroxine.        Diabetes insipidus    As addressed above.  Follow sodium and UOP  Sodium 149 this yetserady, improved  Will increase ddavp to BID        Meningioma, recurrent of brain    Uncertain prognosis            VTE Risk Mitigation         Ordered     High Risk of VTE  Once      06/30/17 0920     Place sequential compression device  Until discontinued      06/30/17 0920     enoxaparin injection 40 mg  Daily     Route:  Subcutaneous        06/28/17 2211          Chino Wiley MD  Department of Hospital Medicine   [facility]

## 2017-07-07 NOTE — SUBJECTIVE & OBJECTIVE
Interval History: Patient continues to be very lethargic & mumbles yes/no occasionally on exam.  Not following commands.      Medications:  Continuous Infusions:   dextrose 5 % 50 mL/hr at 17 1031     Scheduled Meds:   desmopressin  10 mcg Nasal BID    enoxaparin  40 mg Subcutaneous Daily    gabapentin  125 mg Per NG tube Q12H    levetiracetam oral soln  500 mg Per NG tube BID    levothyroxine  50 mcg Per NG tube Daily    nicotine  1 patch Transdermal Daily     PRN Meds:acetaminophen, dextrose 50%, glucagon (human recombinant), insulin aspart, ondansetron, potassium chloride 10%     Review of Systems  Objective:     Weight: 59.9 kg (132 lb)  Body mass index is 24.14 kg/m².  Vital Signs (Most Recent):  Temp: 97.4 °F (36.3 °C) (17 1550)  Pulse: (!) 120 (17 1900)  Resp: 17 (17 1550)  BP: 109/73 (17 1550)  SpO2: 96 % (17 1550) Vital Signs (24h Range):  Temp:  [96.2 °F (35.7 °C)-97.5 °F (36.4 °C)] 97.4 °F (36.3 °C)  Pulse:  [108-122] 120  Resp:  [16-18] 17  SpO2:  [94 %-98 %] 96 %  BP: (108-135)/(68-85) 109/73              Temp (24hrs), Av °F (36.1 °C), Min:96.2 °F (35.7 °C), Max:97.5 °F (36.4 °C)          Date 17 - 17 0659   Shift 6893-2616 2687-0752 2458-2995 24 Hour Total   I  N  T  A  K  E   I.V.  (mL/kg)  324.2  (5.4)  324.2  (5.4)    NG/ 480  855    Shift Total  (mL/kg) 375  (6.3) 804.2  (13.4)  1179.2  (19.7)   O  U  T  P  U  T   Shift Total  (mL/kg)       Weight (kg) 59.9 59.9 59.9 59.9          NG/OG Tube 17 0505 [place] sump;nasogastric 14 Fr. (Active)   Placement Check placement verified by x-ray 2017  8:42 AM   Distal Tube Length (cm) 65 2017  8:42 AM   Tolerance no signs/symptoms of discomfort 2017  8:42 AM   Securement anchored to nostril center w/ adhesive device 2017  8:42 AM   Clamp Status/Tolerance unclamped 2017  8:42 AM   Insertion Site Appearance no redness, warmth, tenderness, skin breakdown, drainage  7/5/2017  8:42 AM   Flush/Irrigation flushed w/;water 7/5/2017  8:42 AM   Feeding Method continuous 7/5/2017  8:42 AM   Current Rate (mL/hr) 40 mL/hr 7/5/2017  8:42 AM   Goal Rate (mL/hr) 40 mL/hr 7/5/2017  8:42 AM   Intake (mL) 175 mL 7/5/2017  8:42 AM   Intake (mL) - Breast Milk Tube Feeding 60 7/2/2017  9:00 AM   Rate Formula Tube Feeding (mL/hr) 40 mL/hr 7/4/2017  7:59 AM   Intake (mL) - Formula Tube Feeding 120 7/4/2017 11:00 PM   Residual Amount (ml) 0 ml 7/4/2017  7:59 AM       Physical Exam:    Constitutional: She appears well-developed and well-nourished.     Eyes: Pupils are equal, round, and reactive to light. EOM are normal.     Abdominal: Soft. Bowel sounds are normal.     Psych/Behavior: She is alert. She is oriented to person, place, and time.      General: well developed, well nourished, no distress.   Head: normocephalic  Neurologic: Obtunded  GCS: Motor: 5/Verbal: 2 /Eyes: 3 GCS Total: 10  Mental Status: Drowsy, arousable. Non verbal. Will not follow commands.   Language: unable to assess 2/2 mental status  Speech: unable to assess 2/2 mental status  Cranial nerves: face symmetric, CN II-XII grossly intact.   Eyes: pupils equal, round, reactive to light.  Pulmonary: normal respirations, no signs of respiratory distress  Abdomen: soft, non-distended, not tender to palpation  Sensory: response to light touch throughout  Motor Strength: Moves all extremities spontaneously with good strength and tone. Will not follow commands today. Unable to assess individual muscle groups.   Pronator Drift: unable to assess 2/2 mental status  Finger-to-nose: unable to assess 2/2 mental status  Clonus: absent  Babinski: absent  No LE edema  Skin: Skin is warm, dry and intact.    Significant Labs:    Recent Labs  Lab 07/06/17  0405 07/06/17  0904   GLU  --  118*   NA  --  149*   K  --  4.0   CL  --  105   CO2  --  34*   BUN  --  14   CREATININE  --  0.8   CALCIUM  --  11.2*   MG 2.4  --        Recent Labs  Lab  07/05/17  0417 07/06/17  0405   WBC 8.84 11.48   HGB 13.1 12.5   HCT 40.1 38.0   * 430*     No results for input(s): LABPT, INR, APTT in the last 48 hours.  Microbiology Results (last 7 days)     Procedure Component Value Units Date/Time    Blood culture [[id number]] Collected:  07/03/17 0934    Order Status:  Completed Specimen:  Blood from Peripheral, Hand, Left Updated:  07/06/17 1212     Blood Culture, Routine No Growth to date     Blood Culture, Routine No Growth to date     Blood Culture, Routine No Growth to date     Blood Culture, Routine No Growth to date    Narrative:       Blood cultures from 2 different sites. 4 bottles total.  Please draw before starting antibiotics.    Blood culture [[id number]] Collected:  07/03/17 0954    Order Status:  Completed Specimen:  Blood from Peripheral, Wrist, Right Updated:  07/06/17 1212     Blood Culture, Routine No Growth to date     Blood Culture, Routine No Growth to date     Blood Culture, Routine No Growth to date     Blood Culture, Routine No Growth to date    Narrative:       Blood cultures x 2 different sites. 4 bottles total. Please  draw cultures before administering antibiotics.    Urine culture [[id number]] Collected:  06/28/17 1733    Order Status:  Completed Specimen:  Urine Updated:  06/29/17 2047     Urine Culture, Routine No growth    Narrative:       Preferred Collection Type->Urine, Clean Catch          Significant Diagnostics:  No new imaging    Physical Exam   Constitutional: She is oriented to person, place, and time. She appears well-developed and well-nourished.   HENT:   Head: Normocephalic and atraumatic.   Eyes: EOM are normal. Pupils are equal, round, and reactive to light.   Neck: Neck supple.   Pulmonary/Chest: Effort normal and breath sounds normal.   Abdominal: Soft. Bowel sounds are normal.   Neurological: She is alert and oriented to person, place, and time.   Skin: Skin is warm.     Interval History: Patient continues to be very  lethargic & mumbles yes/no occasionally on exam.  Not following commands.      Medications:  Continuous Infusions:   dextrose 5 % 50 mL/hr at 17 1031     Scheduled Meds:   desmopressin  10 mcg Nasal BID    enoxaparin  40 mg Subcutaneous Daily    gabapentin  125 mg Per NG tube Q12H    levetiracetam oral soln  500 mg Per NG tube BID    levothyroxine  50 mcg Per NG tube Daily    nicotine  1 patch Transdermal Daily     PRN Meds:acetaminophen, dextrose 50%, glucagon (human recombinant), insulin aspart, ondansetron, potassium chloride 10%     Review of Systems  Objective:     Weight: 59.9 kg (132 lb)  Body mass index is 24.14 kg/m².  Vital Signs (Most Recent):  Temp: 97.4 °F (36.3 °C) (17 1550)  Pulse: (!) 120 (17 1900)  Resp: 17 (17 1550)  BP: 109/73 (17 1550)  SpO2: 96 % (17 1550) Vital Signs (24h Range):  Temp:  [96.2 °F (35.7 °C)-97.5 °F (36.4 °C)] 97.4 °F (36.3 °C)  Pulse:  [108-122] 120  Resp:  [16-18] 17  SpO2:  [94 %-98 %] 96 %  BP: (108-135)/(68-85) 109/73              Temp (24hrs), Av °F (36.1 °C), Min:96.2 °F (35.7 °C), Max:97.5 °F (36.4 °C)          Date 17 - 17 0659   Shift 4125-7786 8349-8663 4328-1706 24 Hour Total   I  N  T  A  K  E   I.V.  (mL/kg)  324.2  (5.4)  324.2  (5.4)    NG/ 480  855    Shift Total  (mL/kg) 375  (6.3) 804.2  (13.4)  1179.2  (19.7)   O  U  T  P  U  T   Shift Total  (mL/kg)       Weight (kg) 59.9 59.9 59.9 59.9          NG/OG Tube 17 0505 [place] sump;nasogastric 14 Fr. (Active)   Placement Check placement verified by x-ray 2017  8:42 AM   Distal Tube Length (cm) 65 2017  8:42 AM   Tolerance no signs/symptoms of discomfort 2017  8:42 AM   Securement anchored to nostril center w/ adhesive device 2017  8:42 AM   Clamp Status/Tolerance unclamped 2017  8:42 AM   Insertion Site Appearance no redness, warmth, tenderness, skin breakdown, drainage 2017  8:42 AM   Flush/Irrigation flushed  w/;water 7/5/2017  8:42 AM   Feeding Method continuous 7/5/2017  8:42 AM   Current Rate (mL/hr) 40 mL/hr 7/5/2017  8:42 AM   Goal Rate (mL/hr) 40 mL/hr 7/5/2017  8:42 AM   Intake (mL) 175 mL 7/5/2017  8:42 AM   Intake (mL) - Breast Milk Tube Feeding 60 7/2/2017  9:00 AM   Rate Formula Tube Feeding (mL/hr) 40 mL/hr 7/4/2017  7:59 AM   Intake (mL) - Formula Tube Feeding 120 7/4/2017 11:00 PM   Residual Amount (ml) 0 ml 7/4/2017  7:59 AM       Neurosurgery Physical Exam   General: well developed, well nourished, no distress.   Head: normocephalic  Neurologic: Obtunded  GCS: Motor: 5/Verbal: 2 /Eyes: 3 GCS Total: 10  Mental Status: Drowsy, arousable. Non verbal. Will not follow commands.   Language: unable to assess 2/2 mental status  Speech: unable to assess 2/2 mental status  Cranial nerves: face symmetric, CN II-XII grossly intact.   Eyes: pupils equal, round, reactive to light.  Pulmonary: normal respirations, no signs of respiratory distress  Abdomen: soft, non-distended, not tender to palpation  Sensory: response to light touch throughout  Motor Strength: Moves all extremities spontaneously with good strength and tone. Will not follow commands today. Unable to assess individual muscle groups.   Pronator Drift: unable to assess 2/2 mental status  Finger-to-nose: unable to assess 2/2 mental status  Clonus: absent  Babinski: absent  No LE edema  Skin: Skin is warm, dry and intact.    Significant Labs:    Recent Labs  Lab 07/06/17  0405 07/06/17  0904   GLU  --  118*   NA  --  149*   K  --  4.0   CL  --  105   CO2  --  34*   BUN  --  14   CREATININE  --  0.8   CALCIUM  --  11.2*   MG 2.4  --        Recent Labs  Lab 07/05/17  0417 07/06/17  0405   WBC 8.84 11.48   HGB 13.1 12.5   HCT 40.1 38.0   * 430*     No results for input(s): LABPT, INR, APTT in the last 48 hours.  Microbiology Results (last 7 days)     Procedure Component Value Units Date/Time    Blood culture [[id number]] Collected:  07/03/17 0934    Order  Status:  Completed Specimen:  Blood from Peripheral, Hand, Left Updated:  07/06/17 1212     Blood Culture, Routine No Growth to date     Blood Culture, Routine No Growth to date     Blood Culture, Routine No Growth to date     Blood Culture, Routine No Growth to date    Narrative:       Blood cultures from 2 different sites. 4 bottles total.  Please draw before starting antibiotics.    Blood culture [[id number]] Collected:  07/03/17 0954    Order Status:  Completed Specimen:  Blood from Peripheral, Wrist, Right Updated:  07/06/17 1212     Blood Culture, Routine No Growth to date     Blood Culture, Routine No Growth to date     Blood Culture, Routine No Growth to date     Blood Culture, Routine No Growth to date    Narrative:       Blood cultures x 2 different sites. 4 bottles total. Please  draw cultures before administering antibiotics.    Urine culture [[id number]] Collected:  06/28/17 1733    Order Status:  Completed Specimen:  Urine Updated:  06/29/17 2047     Urine Culture, Routine No growth    Narrative:       Preferred Collection Type->Urine, Clean Catch          Significant Diagnostics:  No new imaging    Physical Exam

## 2017-07-07 NOTE — PLAN OF CARE
Problem: Restraint, Nonbehavioral (nonviolent)  Goal: Clinical Justification  Outcome: Ongoing (interventions implemented as appropriate)  Pt obtunded.

## 2017-07-07 NOTE — PROGRESS NOTES
[facility] Hospital Medicine  Progress Note    Patient Name: Joanna Morales  MRN: [id number]  Patient Class: IP- Inpatient   Admission Date: 6/28/2017  Length of Stay: 8 days  Attending Physician: Chino Wiley MD  Primary Care Provider: Claudy Tse MD    [facility] Medicine Team: [facility] HOSP MED B Chino Wiley MD    Subjective:     Principal Problem:Hypernatremia    HPI:  Ms. Joanna Morales is a 51 y.o. female with tobacco abuse, secondary hypothyroidism (TSH 0.241 Jun 2017), and history of meningioma s/p resection complicated by diabetes insipidus who presents to [facility] ED from [facility] after being found with abnormal labwork.  She had been more altered in the last day or so and upon checking labwork, she was noted to be with hypernatremia.  She contributed very minimally to her history but does report some abdominal pain.  Of note, she was recently admitted to this facility under the Neurosurgery service for resection of a meningioma that was complicated by diabetes insipidus for which she received desmopressin a few times before being discharged to rehabilitation at [facility].  Further history is limited at this time.    Hospital Course:  No notes on file    Interval History: Patient continues to be very lethargic & mumbles yes/no occasionally on exam.  Not following commands.      Medications:  Continuous Infusions:   dextrose 5 % 50 mL/hr at 07/06/17 1031     Scheduled Meds:   desmopressin  10 mcg Nasal BID    enoxaparin  40 mg Subcutaneous Daily    gabapentin  125 mg Per NG tube Q12H    levetiracetam oral soln  500 mg Per NG tube BID    levothyroxine  50 mcg Per NG tube Daily    nicotine  1 patch Transdermal Daily     PRN Meds:acetaminophen, dextrose 50%, glucagon (human recombinant), insulin aspart, ondansetron, potassium chloride 10%     Review of Systems  Objective:     Weight: 59.9 kg (132 lb)  Body mass index is 24.14 kg/m².  Vital Signs  (Most Recent):  Temp: 97.4 °F (36.3 °C) (17 1550)  Pulse: (!) 120 (17 1900)  Resp: 17 (17 1550)  BP: 109/73 (17 1550)  SpO2: 96 % (17 1550) Vital Signs (24h Range):  Temp:  [96.2 °F (35.7 °C)-97.5 °F (36.4 °C)] 97.4 °F (36.3 °C)  Pulse:  [108-122] 120  Resp:  [16-18] 17  SpO2:  [94 %-98 %] 96 %  BP: (108-135)/(68-85) 109/73              Temp (24hrs), Av °F (36.1 °C), Min:96.2 °F (35.7 °C), Max:97.5 °F (36.4 °C)          Date 17 07 - 17 0659   Shift 7457-7999 7816-7411 2760-6103 24 Hour Total   I  N  T  A  K  E   I.V.  (mL/kg)  324.2  (5.4)  324.2  (5.4)    NG/ 480  855    Shift Total  (mL/kg) 375  (6.3) 804.2  (13.4)  1179.2  (19.7)   O  U  T  P  U  T   Shift Total  (mL/kg)       Weight (kg) 59.9 59.9 59.9 59.9          NG/OG Tube 17 0505 [place] sump;nasogastric 14 Fr. (Active)   Placement Check placement verified by x-ray 2017  8:42 AM   Distal Tube Length (cm) 65 2017  8:42 AM   Tolerance no signs/symptoms of discomfort 2017  8:42 AM   Securement anchored to nostril center w/ adhesive device 2017  8:42 AM   Clamp Status/Tolerance unclamped 2017  8:42 AM   Insertion Site Appearance no redness, warmth, tenderness, skin breakdown, drainage 2017  8:42 AM   Flush/Irrigation flushed w/;water 2017  8:42 AM   Feeding Method continuous 2017  8:42 AM   Current Rate (mL/hr) 40 mL/hr 2017  8:42 AM   Goal Rate (mL/hr) 40 mL/hr 2017  8:42 AM   Intake (mL) 175 mL 2017  8:42 AM   Intake (mL) - Breast Milk Tube Feeding 60 2017  9:00 AM   Rate Formula Tube Feeding (mL/hr) 40 mL/hr 2017  7:59 AM   Intake (mL) - Formula Tube Feeding 120 2017 11:00 PM   Residual Amount (ml) 0 ml 2017  7:59 AM       Physical Exam:    Constitutional: She appears well-developed and well-nourished.     Eyes: Pupils are equal, round, and reactive to light. EOM are normal.     Abdominal: Soft. Bowel sounds are normal.     Psych/Behavior:  She is alert. She is oriented to person, place, and time.      General: well developed, well nourished, no distress.   Head: normocephalic  Neurologic: Obtunded  GCS: Motor: 5/Verbal: 2 /Eyes: 3 GCS Total: 10  Mental Status: Drowsy, arousable. Non verbal. Will not follow commands.   Language: unable to assess 2/2 mental status  Speech: unable to assess 2/2 mental status  Cranial nerves: face symmetric, CN II-XII grossly intact.   Eyes: pupils equal, round, reactive to light.  Pulmonary: normal respirations, no signs of respiratory distress  Abdomen: soft, non-distended, not tender to palpation  Sensory: response to light touch throughout  Motor Strength: Moves all extremities spontaneously with good strength and tone. Will not follow commands today. Unable to assess individual muscle groups.   Pronator Drift: unable to assess 2/2 mental status  Finger-to-nose: unable to assess 2/2 mental status  Clonus: absent  Babinski: absent  No LE edema  Skin: Skin is warm, dry and intact.    Significant Labs:    Recent Labs  Lab 07/06/17  0405 07/06/17  0904   GLU  --  118*   NA  --  149*   K  --  4.0   CL  --  105   CO2  --  34*   BUN  --  14   CREATININE  --  0.8   CALCIUM  --  11.2*   MG 2.4  --        Recent Labs  Lab 07/05/17  0417 07/06/17  0405   WBC 8.84 11.48   HGB 13.1 12.5   HCT 40.1 38.0   * 430*     No results for input(s): LABPT, INR, APTT in the last 48 hours.  Microbiology Results (last 7 days)     Procedure Component Value Units Date/Time    Blood culture [[id number]] Collected:  07/03/17 0934    Order Status:  Completed Specimen:  Blood from Peripheral, Hand, Left Updated:  07/06/17 1212     Blood Culture, Routine No Growth to date     Blood Culture, Routine No Growth to date     Blood Culture, Routine No Growth to date     Blood Culture, Routine No Growth to date    Narrative:       Blood cultures from 2 different sites. 4 bottles total.  Please draw before starting antibiotics.    Blood culture  [[id number]] Collected:  07/03/17 0954    Order Status:  Completed Specimen:  Blood from Peripheral, Wrist, Right Updated:  07/06/17 1212     Blood Culture, Routine No Growth to date     Blood Culture, Routine No Growth to date     Blood Culture, Routine No Growth to date     Blood Culture, Routine No Growth to date    Narrative:       Blood cultures x 2 different sites. 4 bottles total. Please  draw cultures before administering antibiotics.    Urine culture [[id number]] Collected:  06/28/17 1733    Order Status:  Completed Specimen:  Urine Updated:  06/29/17 2047     Urine Culture, Routine No growth    Narrative:       Preferred Collection Type->Urine, Clean Catch          Significant Diagnostics:  No new imaging    Physical Exam   Constitutional: She is oriented to person, place, and time. She appears well-developed and well-nourished.   HENT:   Head: Normocephalic and atraumatic.   Eyes: EOM are normal. Pupils are equal, round, and reactive to light.   Neck: Neck supple.   Pulmonary/Chest: Effort normal and breath sounds normal.   Abdominal: Soft. Bowel sounds are normal.   Neurological: She is alert and oriented to person, place, and time.   Skin: Skin is warm.     Interval History: Patient continues to be very lethargic & mumbles yes/no occasionally on exam.  Not following commands.      Medications:  Continuous Infusions:   dextrose 5 % 50 mL/hr at 07/06/17 1031     Scheduled Meds:   desmopressin  10 mcg Nasal BID    enoxaparin  40 mg Subcutaneous Daily    gabapentin  125 mg Per NG tube Q12H    levetiracetam oral soln  500 mg Per NG tube BID    levothyroxine  50 mcg Per NG tube Daily    nicotine  1 patch Transdermal Daily     PRN Meds:acetaminophen, dextrose 50%, glucagon (human recombinant), insulin aspart, ondansetron, potassium chloride 10%     Review of Systems  Objective:     Weight: 59.9 kg (132 lb)  Body mass index is 24.14 kg/m².  Vital Signs (Most Recent):  Temp: 97.4 °F (36.3 °C) (07/06/17  1550)  Pulse: (!) 120 (17 1900)  Resp: 17 (17 1550)  BP: 109/73 (17 1550)  SpO2: 96 % (17 1550) Vital Signs (24h Range):  Temp:  [96.2 °F (35.7 °C)-97.5 °F (36.4 °C)] 97.4 °F (36.3 °C)  Pulse:  [108-122] 120  Resp:  [16-18] 17  SpO2:  [94 %-98 %] 96 %  BP: (108-135)/(68-85) 109/73              Temp (24hrs), Av °F (36.1 °C), Min:96.2 °F (35.7 °C), Max:97.5 °F (36.4 °C)          Date 17 07 - 17 0659   Shift 1338-0761 3318-0576 8222-5390 24 Hour Total   I  N  T  A  K  E   I.V.  (mL/kg)  324.2  (5.4)  324.2  (5.4)    NG/ 480  855    Shift Total  (mL/kg) 375  (6.3) 804.2  (13.4)  1179.2  (19.7)   O  U  T  P  U  T   Shift Total  (mL/kg)       Weight (kg) 59.9 59.9 59.9 59.9          NG/OG Tube 17 0505 [place] sump;nasogastric 14 Fr. (Active)   Placement Check placement verified by x-ray 2017  8:42 AM   Distal Tube Length (cm) 65 2017  8:42 AM   Tolerance no signs/symptoms of discomfort 2017  8:42 AM   Securement anchored to nostril center w/ adhesive device 2017  8:42 AM   Clamp Status/Tolerance unclamped 2017  8:42 AM   Insertion Site Appearance no redness, warmth, tenderness, skin breakdown, drainage 2017  8:42 AM   Flush/Irrigation flushed w/;water 2017  8:42 AM   Feeding Method continuous 2017  8:42 AM   Current Rate (mL/hr) 40 mL/hr 2017  8:42 AM   Goal Rate (mL/hr) 40 mL/hr 2017  8:42 AM   Intake (mL) 175 mL 2017  8:42 AM   Intake (mL) - Breast Milk Tube Feeding 60 2017  9:00 AM   Rate Formula Tube Feeding (mL/hr) 40 mL/hr 2017  7:59 AM   Intake (mL) - Formula Tube Feeding 120 2017 11:00 PM   Residual Amount (ml) 0 ml 2017  7:59 AM       Neurosurgery Physical Exam   General: well developed, well nourished, no distress.   Head: normocephalic  Neurologic: Obtunded  GCS: Motor: 5/Verbal: 2 /Eyes: 3 GCS Total: 10  Mental Status: Drowsy, arousable. Non verbal. Will not follow commands.   Language: unable to  assess 2/2 mental status  Speech: unable to assess 2/2 mental status  Cranial nerves: face symmetric, CN II-XII grossly intact.   Eyes: pupils equal, round, reactive to light.  Pulmonary: normal respirations, no signs of respiratory distress  Abdomen: soft, non-distended, not tender to palpation  Sensory: response to light touch throughout  Motor Strength: Moves all extremities spontaneously with good strength and tone. Will not follow commands today. Unable to assess individual muscle groups.   Pronator Drift: unable to assess 2/2 mental status  Finger-to-nose: unable to assess 2/2 mental status  Clonus: absent  Babinski: absent  No LE edema  Skin: Skin is warm, dry and intact.    Significant Labs:    Recent Labs  Lab 07/06/17  0405 07/06/17  0904   GLU  --  118*   NA  --  149*   K  --  4.0   CL  --  105   CO2  --  34*   BUN  --  14   CREATININE  --  0.8   CALCIUM  --  11.2*   MG 2.4  --        Recent Labs  Lab 07/05/17  0417 07/06/17  0405   WBC 8.84 11.48   HGB 13.1 12.5   HCT 40.1 38.0   * 430*     No results for input(s): LABPT, INR, APTT in the last 48 hours.  Microbiology Results (last 7 days)     Procedure Component Value Units Date/Time    Blood culture [[id number]] Collected:  07/03/17 0934    Order Status:  Completed Specimen:  Blood from Peripheral, Hand, Left Updated:  07/06/17 1212     Blood Culture, Routine No Growth to date     Blood Culture, Routine No Growth to date     Blood Culture, Routine No Growth to date     Blood Culture, Routine No Growth to date    Narrative:       Blood cultures from 2 different sites. 4 bottles total.  Please draw before starting antibiotics.    Blood culture [[id number]] Collected:  07/03/17 0954    Order Status:  Completed Specimen:  Blood from Peripheral, Wrist, Right Updated:  07/06/17 1212     Blood Culture, Routine No Growth to date     Blood Culture, Routine No Growth to date     Blood Culture, Routine No Growth to date     Blood Culture, Routine No Growth to  date    Narrative:       Blood cultures x 2 different sites. 4 bottles total. Please  draw cultures before administering antibiotics.    Urine culture [[id number]] Collected:  06/28/17 1733    Order Status:  Completed Specimen:  Urine Updated:  06/29/17 2047     Urine Culture, Routine No growth    Narrative:       Preferred Collection Type->Urine, Clean Catch          Significant Diagnostics:  No new imaging    Physical Exam      Assessment/Plan:      Acute encephalopathy    Still remains obtunded  Has made little progress          Tobacco abuse    Patient was counseled on smoking cessation and she will be provided a nicotine transdermal patch applied while inpatient.  Will provide additional smoking cessation counseling prior to discharge.        Secondary hypothyroidism    As addressed above.  Will continue her home regimen of levothyroxine.        Diabetes insipidus    As addressed above.  Follow sodium and UOP  Sodium 149 this am  Will increase ddavp to BID        Meningioma, recurrent of brain    Uncertain prognosis            VTE Risk Mitigation         Ordered     High Risk of VTE  Once      06/30/17 0920     Place sequential compression device  Until discontinued      06/30/17 0920     enoxaparin injection 40 mg  Daily     Route:  Subcutaneous        06/28/17 2211          Chino Wiley MD  Department of Hospital Medicine   [facility]

## 2017-07-07 NOTE — ASSESSMENT & PLAN NOTE
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 Chew 6/7/17, who represented with hypernatremia  -Patient remains obtunded on exam  -Na 147 today. Continue management per Endo and primary team  -Continue neuro checks q4 hours. Notify NSGY for any changes.   -Continue Keppra for seizure prophylaxis  -Continue Lovenox for DVT prophylaxis  -Continue aggressive PT/OT  -Recommend spot EEG today.   -Continue management of care per primary team  -Will continue to follow  - Pending Rehab per primary team

## 2017-07-07 NOTE — PROGRESS NOTES
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 Chew and discharged on 17 to [facility].  She presents with altered mental status and worsening hypernatremia for 1 day. She was treated for DI her last admission and labs are trending up today.  Spoke with Dr. Fairchild at [facility], patient with waxing/waning mental status yesterday, but was appropriate & following some complex commands yesterday.  There was a period yesterday of significant lethargy.  Patient is unable to give history, and information is taken from the chart.     Post-Op Info:  * No surgery found *         Interval History: NAEON. Continued lethargy with minimal verbal responses. Restraints and NG Tube in place.     Medications:  Continuous Infusions:   dextrose 5 % 75 mL/hr at 17     Scheduled Meds:   desmopressin  10 mcg Nasal BID    enoxaparin  40 mg Subcutaneous Daily    gabapentin  125 mg Per NG tube Q12H    levetiracetam oral soln  500 mg Per NG tube BID    levothyroxine  50 mcg Per NG tube Daily    nicotine  1 patch Transdermal Daily     PRN Meds:acetaminophen, dextrose 50%, glucagon (human recombinant), insulin aspart, ondansetron, potassium chloride 10%     Review of Systems     Objective:     Weight: 59.9 kg (132 lb)  Body mass index is 24.14 kg/m².  Vital Signs (Most Recent):  Temp: 97.3 °F (36.3 °C) (17 1159)  Pulse: 92 (17 1159)  Resp: 20 (17 1159)  BP: 134/71 (17 1159)  SpO2: (!) 92 % (17 1159) Vital Signs (24h Range):  Temp:  [97.3 °F (36.3 °C)-98.6 °F (37 °C)] 97.3 °F (36.3 °C)  Pulse:  [] 92  Resp:  [17-20] 20  SpO2:  [92 %-100 %] 92 %  BP: (109-134)/(67-76) 134/71              Temp (24hrs), Av.8 °F (36.6 °C), Min:97.3 °F (36.3 °C), Max:98.6 °F (37 °C)                 NG/OG Tube 17 0505 [place];nasogastric 14  Fr. (Active)   Placement Check placement verified by aspirate characteristics 7/7/2017  8:00 AM   Distal Tube Length (cm) 65 7/6/2017 10:57 PM   Tolerance no signs/symptoms of discomfort 7/7/2017  8:00 AM   Securement taped to cheek 7/7/2017  8:00 AM   Clamp Status/Tolerance unclamped 7/7/2017  8:00 AM   Insertion Site Appearance no redness, warmth, tenderness, skin breakdown, drainage 7/7/2017  8:00 AM   Flush/Irrigation flushed w/;sterile normal saline;no resistance met 7/7/2017  8:00 AM   Feeding Method continuous 7/7/2017  8:00 AM   Current Rate (mL/hr) 40 mL/hr 7/7/2017  8:00 AM   Goal Rate (mL/hr) 40 mL/hr 7/7/2017  8:00 AM   Intake (mL) 175 mL 7/6/2017 10:57 PM   Intake (mL) - Breast Milk Tube Feeding 60 7/2/2017  9:00 AM   Rate Formula Tube Feeding (mL/hr) 40 mL/hr 7/7/2017  8:00 AM   Intake (mL) - Formula Tube Feeding 480 7/6/2017  6:00 PM   Residual Amount (ml) 5 ml 7/7/2017  8:00 AM       Neurosurgery Physical Exam  General: well developed, well nourished, no distress.   Head: normocephalic  Neurologic: Obtunded  GCS: Motor: 5/Verbal: 3 /Eyes: 3 GCS Total: 11  Mental Status: Drowsy, arousable. Minimal verbal responses. Intermittently follows simple  commands.   Language: unable to assess 2/2 mental status  Speech: unable to assess 2/2 mental status  Cranial nerves: face symmetric, CN II-XII grossly intact.   Eyes: pupils equal, round, reactive to light.  Pulmonary: normal respirations, no signs of respiratory distress  Abdomen: soft, non-distended, not tender to palpation  Sensory: response to light touch throughout  Motor Strength: Moves all extremities spontaneously with good strength and tone. Intermittently follows simple  commands.   Unable to assess individual muscle groups.   Pronator Drift: unable to assess 2/2 mental status  Finger-to-nose: unable to assess 2/2 mental status  Clonus: absent  Babinski: absent  No LE edema  Skin: Skin is warm, dry and intact.      Significant Labs:    Recent  Labs  Lab 07/07/17  0439 07/07/17  0851   GLU  --  119*   NA  --  143   K  --  4.0   CL  --  101   CO2  --  34*   BUN  --  17   CREATININE  --  0.8   CALCIUM  --  10.8*   MG 2.0  --        Recent Labs  Lab 07/06/17  0405 07/07/17  0439   WBC 11.48 10.37   HGB 12.5 11.3*   HCT 38.0 35.1*   * 386*     No results for input(s): LABPT, INR, APTT in the last 48 hours.  Microbiology Results (last 7 days)     Procedure Component Value Units Date/Time    Blood culture [[id number]] Collected:  07/03/17 0954    Order Status:  Completed Specimen:  Blood from Peripheral, Wrist, Right Updated:  07/07/17 1212     Blood Culture, Routine No Growth to date     Blood Culture, Routine No Growth to date     Blood Culture, Routine No Growth to date     Blood Culture, Routine No Growth to date     Blood Culture, Routine No Growth to date    Narrative:       Blood cultures x 2 different sites. 4 bottles total. Please  draw cultures before administering antibiotics.    Blood culture [[id number]] Collected:  07/03/17 0934    Order Status:  Completed Specimen:  Blood from Peripheral, Hand, Left Updated:  07/07/17 1212     Blood Culture, Routine No Growth to date     Blood Culture, Routine No Growth to date     Blood Culture, Routine No Growth to date     Blood Culture, Routine No Growth to date     Blood Culture, Routine No Growth to date    Narrative:       Blood cultures from 2 different sites. 4 bottles total.  Please draw before starting antibiotics.            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 Chew 6/7/17, who represented with hypernatremia  -Patient remains obtunded on exam  -Na 147 today. Continue management per Endo and primary team  -Continue neuro checks q4 hours. Notify NSGY for any changes.   -Continue Keppra for seizure prophylaxis  -Continue Lovenox for DVT prophylaxis  -Continue aggressive  PT/OT  -Recommend spot EEG today.   -Continue management of care per primary team  -Will continue to follow  - Pending Rehab per primary team          Discussed with Dr. Jeevan Kirkpatrick PA-C  Neurosurgery  [facility]

## 2017-07-07 NOTE — SUBJECTIVE & OBJECTIVE
Interval History: Patient continues to be very lethargic & mumbles yes/no occasionally on exam.  Not following commands.      Medications:  Continuous Infusions:   dextrose 5 % 75 mL/hr at 17     Scheduled Meds:   desmopressin  10 mcg Nasal BID    enoxaparin  40 mg Subcutaneous Daily    gabapentin  125 mg Per NG tube Q12H    levetiracetam oral soln  500 mg Per NG tube BID    levothyroxine  50 mcg Per NG tube Daily    nicotine  1 patch Transdermal Daily     PRN Meds:acetaminophen, dextrose 50%, glucagon (human recombinant), insulin aspart, ondansetron, potassium chloride 10%     Review of Systems  Objective:     Weight: 59.9 kg (132 lb)  Body mass index is 24.14 kg/m².  Vital Signs (Most Recent):  Temp: 97.7 °F (36.5 °C) (17)  Pulse: 60 (17)  Resp: 20 (17)  BP: (!) 127/59 (17)  SpO2: (!) 92 % (17) Vital Signs (24h Range):  Temp:  [97.3 °F (36.3 °C)-98.6 °F (37 °C)] 97.7 °F (36.5 °C)  Pulse:  [] 60  Resp:  [18-20] 20  SpO2:  [92 %-100 %] 92 %  BP: (109-134)/(59-76) 127/59              Temp (24hrs), Av.9 °F (36.6 °C), Min:97.3 °F (36.3 °C), Max:98.6 °F (37 °C)          Date 17 - 17 0659   Shift 3503-5206 8403-3231 8510-6268 24 Hour Total   I  N  T  A  K  E   I.V.  (mL/kg)  900  (15)  900  (15)    NG/ 480  830    Shift Total  (mL/kg) 350  (5.8) 1380  (23)  1730  (28.9)   O  U  T  P  U  T   Shift Total  (mL/kg)       Weight (kg) 59.9 59.9 59.9 59.9          NG/OG Tube 17 0505 Ruben wood;nasogastric 14 Fr. (Active)   Placement Check placement verified by x-ray 2017  8:42 AM   Distal Tube Length (cm) 65 2017  8:42 AM   Tolerance no signs/symptoms of discomfort 2017  8:42 AM   Securement anchored to nostril center w/ adhesive device 2017  8:42 AM   Clamp Status/Tolerance unclamped 2017  8:42 AM   Insertion Site Appearance no redness, warmth, tenderness, skin breakdown, drainage 2017  8:42  AM   Flush/Irrigation flushed w/;water 7/5/2017  8:42 AM   Feeding Method continuous 7/5/2017  8:42 AM   Current Rate (mL/hr) 40 mL/hr 7/5/2017  8:42 AM   Goal Rate (mL/hr) 40 mL/hr 7/5/2017  8:42 AM   Intake (mL) 175 mL 7/5/2017  8:42 AM   Intake (mL) - Breast Milk Tube Feeding 60 7/2/2017  9:00 AM   Rate Formula Tube Feeding (mL/hr) 40 mL/hr 7/4/2017  7:59 AM   Intake (mL) - Formula Tube Feeding 120 7/4/2017 11:00 PM   Residual Amount (ml) 0 ml 7/4/2017  7:59 AM       Physical Exam:    Constitutional: She appears well-developed and well-nourished.     Eyes: Pupils are equal, round, and reactive to light. EOM are normal.     Abdominal: Soft. Bowel sounds are normal.     Psych/Behavior: She is alert. She is oriented to person, place, and time.      General: well developed, well nourished, no distress.   Head: normocephalic  Neurologic: Obtunded  GCS: Motor: 5/Verbal: 2 /Eyes: 3 GCS Total: 10  Mental Status: Drowsy, arousable. Non verbal. Will not follow commands.   Language: unable to assess 2/2 mental status  Speech: unable to assess 2/2 mental status  Cranial nerves: face symmetric, CN II-XII grossly intact.   Eyes: pupils equal, round, reactive to light.  Pulmonary: normal respirations, no signs of respiratory distress  Abdomen: soft, non-distended, not tender to palpation  Sensory: response to light touch throughout  Motor Strength: Moves all extremities spontaneously with good strength and tone. Will not follow commands today. Unable to assess individual muscle groups.   Pronator Drift: unable to assess 2/2 mental status  Finger-to-nose: unable to assess 2/2 mental status  Clonus: absent  Babinski: absent  No LE edema  Skin: Skin is warm, dry and intact.    Significant Labs:    Recent Labs  Lab 07/07/17  0439 07/07/17  0851   GLU  --  119*   NA  --  143   K  --  4.0   CL  --  101   CO2  --  34*   BUN  --  17   CREATININE  --  0.8   CALCIUM  --  10.8*   MG 2.0  --        Recent Labs  Lab 07/06/17  7596  07/07/17  0439   WBC 11.48 10.37   HGB 12.5 11.3*   HCT 38.0 35.1*   * 386*     No results for input(s): LABPT, INR, APTT in the last 48 hours.  Microbiology Results (last 7 days)     Procedure Component Value Units Date/Time    Blood culture [[id number]] Collected:  07/03/17 0954    Order Status:  Completed Specimen:  Blood from Peripheral, Wrist, Right Updated:  07/07/17 1212     Blood Culture, Routine No Growth to date     Blood Culture, Routine No Growth to date     Blood Culture, Routine No Growth to date     Blood Culture, Routine No Growth to date     Blood Culture, Routine No Growth to date    Narrative:       Blood cultures x 2 different sites. 4 bottles total. Please  draw cultures before administering antibiotics.    Blood culture [[id number]] Collected:  07/03/17 0934    Order Status:  Completed Specimen:  Blood from Peripheral, Hand, Left Updated:  07/07/17 1212     Blood Culture, Routine No Growth to date     Blood Culture, Routine No Growth to date     Blood Culture, Routine No Growth to date     Blood Culture, Routine No Growth to date     Blood Culture, Routine No Growth to date    Narrative:       Blood cultures from 2 different sites. 4 bottles total.  Please draw before starting antibiotics.          Significant Diagnostics:  No new imaging    Physical Exam   Constitutional: She is oriented to person, place, and time. She appears well-developed and well-nourished.   HENT:   Head: Normocephalic and atraumatic.   Eyes: EOM are normal. Pupils are equal, round, and reactive to light.   Neck: Neck supple.   Pulmonary/Chest: Effort normal and breath sounds normal.   Abdominal: Soft. Bowel sounds are normal.   Neurological: She is alert and oriented to person, place, and time.   Skin: Skin is warm.     Interval History: Patient continues to be very lethargic & mumbles yes/no occasionally on exam.  Not following commands.      Medications:  Continuous Infusions:   dextrose 5 % 75 mL/hr at 07/06/17       Scheduled Meds:   desmopressin  10 mcg Nasal BID    enoxaparin  40 mg Subcutaneous Daily    gabapentin  125 mg Per NG tube Q12H    levetiracetam oral soln  500 mg Per NG tube BID    levothyroxine  50 mcg Per NG tube Daily    nicotine  1 patch Transdermal Daily     PRN Meds:acetaminophen, dextrose 50%, glucagon (human recombinant), insulin aspart, ondansetron, potassium chloride 10%     Review of Systems  Objective:     Weight: 59.9 kg (132 lb)  Body mass index is 24.14 kg/m².  Vital Signs (Most Recent):  Temp: 97.7 °F (36.5 °C) (17)  Pulse: 60 (17)  Resp: 20 (17)  BP: (!) 127/59 (17)  SpO2: (!) 92 % (17) Vital Signs (24h Range):  Temp:  [97.3 °F (36.3 °C)-98.6 °F (37 °C)] 97.7 °F (36.5 °C)  Pulse:  [] 60  Resp:  [18-20] 20  SpO2:  [92 %-100 %] 92 %  BP: (109-134)/(59-76) 127/59              Temp (24hrs), Av.9 °F (36.6 °C), Min:97.3 °F (36.3 °C), Max:98.6 °F (37 °C)          Date 17 07 - 17 0659   Shift 4948-6544 5941-7271 8996-5590 24 Hour Total   I  N  T  A  K  E   I.V.  (mL/kg)  900  (15)  900  (15)    NG/ 480  830    Shift Total  (mL/kg) 350  (5.8) 1380  (23)  1730  (28.9)   O  U  T  P  U  T   Shift Total  (mL/kg)       Weight (kg) 59.9 59.9 59.9 59.9          NG/OG Tube 17 0505 [place] sump;nasogastric 14 Fr. (Active)   Placement Check placement verified by x-ray 2017  8:42 AM   Distal Tube Length (cm) 65 2017  8:42 AM   Tolerance no signs/symptoms of discomfort 2017  8:42 AM   Securement anchored to nostril center w/ adhesive device 2017  8:42 AM   Clamp Status/Tolerance unclamped 2017  8:42 AM   Insertion Site Appearance no redness, warmth, tenderness, skin breakdown, drainage 2017  8:42 AM   Flush/Irrigation flushed w/;water 2017  8:42 AM   Feeding Method continuous 2017  8:42 AM   Current Rate (mL/hr) 40 mL/hr 2017  8:42 AM   Goal Rate (mL/hr) 40 mL/hr 2017  8:42 AM    Intake (mL) 175 mL 7/5/2017  8:42 AM   Intake (mL) - Breast Milk Tube Feeding 60 7/2/2017  9:00 AM   Rate Formula Tube Feeding (mL/hr) 40 mL/hr 7/4/2017  7:59 AM   Intake (mL) - Formula Tube Feeding 120 7/4/2017 11:00 PM   Residual Amount (ml) 0 ml 7/4/2017  7:59 AM       Neurosurgery Physical Exam   General: well developed, well nourished, no distress.   Head: normocephalic  Neurologic: Obtunded  GCS: Motor: 5/Verbal: 2 /Eyes: 3 GCS Total: 10  Mental Status: Drowsy, arousable. Non verbal. Will not follow commands.   Language: unable to assess 2/2 mental status  Speech: unable to assess 2/2 mental status  Cranial nerves: face symmetric, CN II-XII grossly intact.   Eyes: pupils equal, round, reactive to light.  Pulmonary: normal respirations, no signs of respiratory distress  Abdomen: soft, non-distended, not tender to palpation  Sensory: response to light touch throughout  Motor Strength: Moves all extremities spontaneously with good strength and tone. Will not follow commands today. Unable to assess individual muscle groups.   Pronator Drift: unable to assess 2/2 mental status  Finger-to-nose: unable to assess 2/2 mental status  Clonus: absent  Babinski: absent  No LE edema  Skin: Skin is warm, dry and intact.    Significant Labs:    Recent Labs  Lab 07/07/17  0439 07/07/17  0851   GLU  --  119*   NA  --  143   K  --  4.0   CL  --  101   CO2  --  34*   BUN  --  17   CREATININE  --  0.8   CALCIUM  --  10.8*   MG 2.0  --        Recent Labs  Lab 07/06/17  0405 07/07/17  0439   WBC 11.48 10.37   HGB 12.5 11.3*   HCT 38.0 35.1*   * 386*     No results for input(s): LABPT, INR, APTT in the last 48 hours.  Microbiology Results (last 7 days)     Procedure Component Value Units Date/Time    Blood culture [[id number]] Collected:  07/03/17 0954    Order Status:  Completed Specimen:  Blood from Peripheral, Wrist, Right Updated:  07/07/17 1212     Blood Culture, Routine No Growth to date     Blood Culture, Routine No  Growth to date     Blood Culture, Routine No Growth to date     Blood Culture, Routine No Growth to date     Blood Culture, Routine No Growth to date    Narrative:       Blood cultures x 2 different sites. 4 bottles total. Please  draw cultures before administering antibiotics.    Blood culture [[id number]] Collected:  07/03/17 0934    Order Status:  Completed Specimen:  Blood from Peripheral, Hand, Left Updated:  07/07/17 1212     Blood Culture, Routine No Growth to date     Blood Culture, Routine No Growth to date     Blood Culture, Routine No Growth to date     Blood Culture, Routine No Growth to date     Blood Culture, Routine No Growth to date    Narrative:       Blood cultures from 2 different sites. 4 bottles total.  Please draw before starting antibiotics.          Significant Diagnostics:  No new imaging    Physical Exam

## 2017-07-07 NOTE — PLAN OF CARE
Problem: Patient Care Overview  Goal: Plan of Care Review  Outcome: Ongoing (interventions implemented as appropriate)  Unable to discuss POC with pt, pt incoherent. Awakens with loud stimuli or pain, speech garbled and unable to be understood. PEG feeding continues at 40ml/hr Isosource, pt tolerating well, no residual noted. Safety measures in place. Will continue to monitor.

## 2017-07-07 NOTE — PLAN OF CARE
Problem: Patient Care Overview  Goal: Plan of Care Review  Outcome: Ongoing (interventions implemented as appropriate)  Pt remains obtunded

## 2017-07-07 NOTE — SUBJECTIVE & OBJECTIVE
Interval History: NAEON. Continued lethargy with minimal verbal responses. Restraints and NG Tube in place.     Medications:  Continuous Infusions:   dextrose 5 % 75 mL/hr at 17     Scheduled Meds:   desmopressin  10 mcg Nasal BID    enoxaparin  40 mg Subcutaneous Daily    gabapentin  125 mg Per NG tube Q12H    levetiracetam oral soln  500 mg Per NG tube BID    levothyroxine  50 mcg Per NG tube Daily    nicotine  1 patch Transdermal Daily     PRN Meds:acetaminophen, dextrose 50%, glucagon (human recombinant), insulin aspart, ondansetron, potassium chloride 10%     Review of Systems     Objective:     Weight: 59.9 kg (132 lb)  Body mass index is 24.14 kg/m².  Vital Signs (Most Recent):  Temp: 97.3 °F (36.3 °C) (17)  Pulse: 92 (17)  Resp: 20 (17)  BP: 134/71 (17)  SpO2: (!) 92 % (17) Vital Signs (24h Range):  Temp:  [97.3 °F (36.3 °C)-98.6 °F (37 °C)] 97.3 °F (36.3 °C)  Pulse:  [] 92  Resp:  [17-20] 20  SpO2:  [92 %-100 %] 92 %  BP: (109-134)/(67-76) 134/71              Temp (24hrs), Av.8 °F (36.6 °C), Min:97.3 °F (36.3 °C), Max:98.6 °F (37 °C)                 NG/OG Tube 17 0505 Tucker sump;nasogastric 14 Fr. (Active)   Placement Check placement verified by aspirate characteristics 2017  8:00 AM   Distal Tube Length (cm) 65 2017 10:57 PM   Tolerance no signs/symptoms of discomfort 2017  8:00 AM   Securement taped to cheek 2017  8:00 AM   Clamp Status/Tolerance unclamped 2017  8:00 AM   Insertion Site Appearance no redness, warmth, tenderness, skin breakdown, drainage 2017  8:00 AM   Flush/Irrigation flushed w/;sterile normal saline;no resistance met 2017  8:00 AM   Feeding Method continuous 2017  8:00 AM   Current Rate (mL/hr) 40 mL/hr 2017  8:00 AM   Goal Rate (mL/hr) 40 mL/hr 2017  8:00 AM   Intake (mL) 175 mL 2017 10:57 PM   Intake (mL) - Breast Milk Tube Feeding 60 2017  9:00 AM    Rate Formula Tube Feeding (mL/hr) 40 mL/hr 7/7/2017  8:00 AM   Intake (mL) - Formula Tube Feeding 480 7/6/2017  6:00 PM   Residual Amount (ml) 5 ml 7/7/2017  8:00 AM       Neurosurgery Physical Exam  General: well developed, well nourished, no distress.   Head: normocephalic  Neurologic: Obtunded  GCS: Motor: 5/Verbal: 3 /Eyes: 3 GCS Total: 11  Mental Status: Drowsy, arousable. Minimal verbal responses. Intermittently follows simple  commands.   Language: unable to assess 2/2 mental status  Speech: unable to assess 2/2 mental status  Cranial nerves: face symmetric, CN II-XII grossly intact.   Eyes: pupils equal, round, reactive to light.  Pulmonary: normal respirations, no signs of respiratory distress  Abdomen: soft, non-distended, not tender to palpation  Sensory: response to light touch throughout  Motor Strength: Moves all extremities spontaneously with good strength and tone. Intermittently follows simple  commands.   Unable to assess individual muscle groups.   Pronator Drift: unable to assess 2/2 mental status  Finger-to-nose: unable to assess 2/2 mental status  Clonus: absent  Babinski: absent  No LE edema  Skin: Skin is warm, dry and intact.      Significant Labs:    Recent Labs  Lab 07/07/17  0439 07/07/17  0851   GLU  --  119*   NA  --  143   K  --  4.0   CL  --  101   CO2  --  34*   BUN  --  17   CREATININE  --  0.8   CALCIUM  --  10.8*   MG 2.0  --        Recent Labs  Lab 07/06/17  0405 07/07/17  0439   WBC 11.48 10.37   HGB 12.5 11.3*   HCT 38.0 35.1*   * 386*     No results for input(s): LABPT, INR, APTT in the last 48 hours.  Microbiology Results (last 7 days)     Procedure Component Value Units Date/Time    Blood culture [[id number]] Collected:  07/03/17 0954    Order Status:  Completed Specimen:  Blood from Peripheral, Wrist, Right Updated:  07/07/17 1212     Blood Culture, Routine No Growth to date     Blood Culture, Routine No Growth to date     Blood Culture, Routine No Growth to date      Blood Culture, Routine No Growth to date     Blood Culture, Routine No Growth to date    Narrative:       Blood cultures x 2 different sites. 4 bottles total. Please  draw cultures before administering antibiotics.    Blood culture [[id number]] Collected:  07/03/17 0934    Order Status:  Completed Specimen:  Blood from Peripheral, Hand, Left Updated:  07/07/17 1212     Blood Culture, Routine No Growth to date     Blood Culture, Routine No Growth to date     Blood Culture, Routine No Growth to date     Blood Culture, Routine No Growth to date     Blood Culture, Routine No Growth to date    Narrative:       Blood cultures from 2 different sites. 4 bottles total.  Please draw before starting antibiotics.            Significant Diagnostics:  None new

## 2017-07-07 NOTE — PLAN OF CARE
Discharge planning: Patient not medically ready for discharge. NG tube in place for feeding. Patient not participating with therapy.

## 2017-07-08 NOTE — PLAN OF CARE
Problem: Patient Care Overview  Goal: Plan of Care Review  Outcome: Ongoing (interventions implemented as appropriate)  Pt unable to verbalize understanding of POC. . Pt more verbal and active today, verbally appropriate at time answering questions. NG removed per pt, no orders to replace. Bed alarm on, safety measures in place. Will continue to monitor.

## 2017-07-08 NOTE — PLAN OF CARE
Problem: Patient Care Overview  Goal: Plan of Care Review  Outcome: Ongoing (interventions implemented as appropriate)   Patient remained stable overnight, without changes. Tolerating tube feeding without residuals. This a.m. Following oral care/perineal care/hygiene/turning patient, patient opened eyes spontaneously & had semi-lucid conversation with this r.n.   Attempting to release restraints & patient fervently attempted to remove ngt, so patient had to be replaced in bilat wrist restraints per md order. Stable condition, will monitor closely

## 2017-07-08 NOTE — NURSING
NG tube noted to be removed by pt. Pt moving a lot in bed, talking, at times able to answer questions appropriately. Dr. Wiley notified. Orders to leave NG tube out.

## 2017-07-08 NOTE — PROGRESS NOTES
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 Chew and discharged on 17 to [facility].  She presents with altered mental status and worsening hypernatremia for 1 day. She was treated for DI her last admission and labs are trending up today.  Spoke with Dr. Fairchild at [facility], patient with waxing/waning mental status yesterday, but was appropriate & following some complex commands yesterday.  There was a period yesterday of significant lethargy.  Patient is unable to give history, and information is taken from the chart.     Post-Op Info:  * No surgery found *         Interval History: No acute events overnight. More verbal today. Restraints and NG Tube in place.     Medications:  Continuous Infusions:   dextrose 5 % 75 mL/hr at 17     Scheduled Meds:   desmopressin  10 mcg Nasal BID    enoxaparin  40 mg Subcutaneous Daily    gabapentin  125 mg Per NG tube Q12H    levetiracetam oral soln  500 mg Per NG tube BID    levothyroxine  50 mcg Per NG tube Daily    nicotine  1 patch Transdermal Daily     PRN Meds:acetaminophen, dextrose 50%, glucagon (human recombinant), insulin aspart, ondansetron, potassium chloride 10%     Review of Systems       Objective:     Weight: 59.9 kg (132 lb)  Body mass index is 24.14 kg/m².  Vital Signs (Most Recent):  Temp: 97.8 °F (36.6 °C) (17 1105)  Pulse: 86 (17 1136)  Resp: 20 (17 1105)  BP: (!) 116/58 (17 1105)  SpO2: (!) 94 % (17 1105) Vital Signs (24h Range):  Temp:  [97.4 °F (36.3 °C)-97.8 °F (36.6 °C)] 97.8 °F (36.6 °C)  Pulse:  [] 86  Resp:  [16-20] 20  SpO2:  [92 %-99 %] 94 %  BP: (112-131)/(58-88) 116/58              Temp (24hrs), Av.6 °F (36.4 °C), Min:97.4 °F (36.3 °C), Max:97.8 °F (36.6 °C)           Date 17 07 - 17 0659   [facility] [id number]  [id number] [id number] 24 Hour Total   I  N  T  A  K  E   NG/   230    Shift Total  (mL/kg) 230  (3.8)   230  (3.8)   O  U  T  P  U  T   Shift Total  (mL/kg)       Weight (kg) 59.9 59.9 59.9 59.9          NG/OG Tube 07/01/17 0505 [place] sump;nasogastric 14 Fr. (Active)   Placement Check placement verified by aspirate characteristics 7/7/2017  8:00 AM   Distal Tube Length (cm) 65 7/6/2017 10:57 PM   Tolerance no signs/symptoms of discomfort 7/7/2017  8:00 AM   Securement taped to cheek 7/7/2017  8:00 AM   Clamp Status/Tolerance unclamped 7/7/2017  8:00 AM   Insertion Site Appearance no redness, warmth, tenderness, skin breakdown, drainage 7/7/2017  8:00 AM   Flush/Irrigation flushed w/;sterile normal saline;no resistance met 7/7/2017  8:00 AM   Feeding Method continuous 7/7/2017  8:00 AM   Current Rate (mL/hr) 40 mL/hr 7/7/2017  8:00 AM   Goal Rate (mL/hr) 40 mL/hr 7/7/2017  8:00 AM   Intake (mL) 175 mL 7/6/2017 10:57 PM   Intake (mL) - Breast Milk Tube Feeding 60 7/2/2017  9:00 AM   Rate Formula Tube Feeding (mL/hr) 40 mL/hr 7/7/2017  8:00 AM   Intake (mL) - Formula Tube Feeding 480 7/6/2017  6:00 PM   Residual Amount (ml) 5 ml 7/7/2017  8:00 AM       Neurosurgery Physical Exam    General: well developed, well nourished, no distress.   Head: normocephalic  Neurologic: lethargic  GCS: Motor: 5/Verbal: 4 /Eyes: 3 GCS Total: 11  Mental Status: Some discussion with Dr. Chew. Intermittently follows simple  commands.   Cranial nerves: face symmetric, CN II-XII grossly intact.   Eyes: pupils equal, round, reactive to light.  Pulmonary: normal respirations, no signs of respiratory distress  Abdomen: soft, non-distended, not tender to palpation  Sensory: response to light touch throughout  Motor Strength: Moves all extremities spontaneously with good strength and tone. Intermittently follows simple  commands.   Unable to assess individual muscle groups.   Pronator Drift: unable to assess 2/2 mental status  Finger-to-nose:  unable to assess 2/2 mental status  Clonus: absent  Babinski: absent  No LE edema  Skin: Skin is warm, dry and intact.      Significant Labs:    Recent Labs  Lab 07/08/17  0631 07/08/17  0826   GLU  --  109   NA  --  138   K  --  3.9   CL  --  96   CO2  --  31*   BUN  --  13   CREATININE  --  0.7   CALCIUM  --  10.6*   MG 1.7  --        Recent Labs  Lab 07/07/17  0439 07/08/17  0631   WBC 10.37 9.66   HGB 11.3* 10.7*   HCT 35.1* 32.5*   * 427*     No results for input(s): LABPT, INR, APTT in the last 48 hours.  Microbiology Results (last 7 days)     Procedure Component Value Units Date/Time    Blood culture [[id number]] Collected:  07/03/17 0954    Order Status:  Completed Specimen:  Blood from Peripheral, Wrist, Right Updated:  07/07/17 1212     Blood Culture, Routine No Growth to date     Blood Culture, Routine No Growth to date     Blood Culture, Routine No Growth to date     Blood Culture, Routine No Growth to date     Blood Culture, Routine No Growth to date    Narrative:       Blood cultures x 2 different sites. 4 bottles total. Please  draw cultures before administering antibiotics.    Blood culture [[id number]] Collected:  07/03/17 0934    Order Status:  Completed Specimen:  Blood from Peripheral, Hand, Left Updated:  07/07/17 1212     Blood Culture, Routine No Growth to date     Blood Culture, Routine No Growth to date     Blood Culture, Routine No Growth to date     Blood Culture, Routine No Growth to date     Blood Culture, Routine No Growth to date    Narrative:       Blood cultures from 2 different sites. 4 bottles total.  Please draw before starting antibiotics.              Assessment/Plan:     Meningioma, recurrent of brain    50 yo F with an olfactory groove meningioma s/p resection with Dr. Chew 6/7/17, who re-presented with hypernatremia    -Na 143 today. Continue management per Endo and primary team  -Continue neuro checks q4 hours. Notify NSGY for any changes.   -Continue Keppra for  seizure prophylaxis  -Continue Lovenox for DVT prophylaxis  -Continue aggressive PT/OT  -Follow up EEG    -Continue management of care per primary team  -Consider PEG placement   -Will continue to follow  -Pending Rehab per primary team            Lamar Levin MD  Neurosurgery  [facility]

## 2017-07-08 NOTE — ASSESSMENT & PLAN NOTE
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 Chew 6/7/17, who re-presented with hypernatremia    -Na 143 today. Continue management per Endo and primary team  -Continue neuro checks q4 hours. Notify NSGY for any changes.   -Continue Keppra for seizure prophylaxis  -Continue Lovenox for DVT prophylaxis  -Continue aggressive PT/OT  -Follow up EEG    -Continue management of care per primary team  -Consider PEG placement   -Will continue to follow  -Pending Rehab per primary team

## 2017-07-08 NOTE — PT/OT/SLP DISCHARGE
Occupational Therapy Discharge Summary    Joanna Morales  MRN: [id number]   Hypernatremia   Patient Discharged from acute Occupational Therapy on 7/8/2017.  Please refer to prior OT note dated on 7/1/2017 for functional status.     Assessment:   Patient has not met goals.  GOALS:    Occupational Therapy Goals        Problem: Occupational Therapy Goal    Goal Priority Disciplines Outcome Interventions   Occupational Therapy Goal     OT, PT/OT Ongoing (interventions implemented as appropriate)    Description:  Goals to be met by: 7/15/17     Patient will increase functional independence with ADLs by performing:     UE Dressing with Minimal Assistance.  LE Dressing with Minimal Assistance.  Grooming while standing with Contact Guard Assistance.  Toileting from toilet with Minimal Assistance for hygiene and clothing management.   Toilet transfer to toilet with Contact Guard Assistance.                      Reasons for Discontinuation of Therapy Services  Pt t/f to Neurology from ICU. Critical care medicine CNS d/c Physical Therapy treatment orders. Occupational therapy orders also d/c at this time.     Plan:  Please re-consult when appropriate for re-evaluation and follow up. Previous evaluation on patient on 7/1/2017 indicated rehab for d/c recommendation; however, pt has not been seen by therapy since that date.       HELEN Broussard 7/8/2017

## 2017-07-08 NOTE — SUBJECTIVE & OBJECTIVE
Interval History: No acute events overnight. More verbal today. Restraints and NG Tube in place.     Medications:  Continuous Infusions:   dextrose 5 % 75 mL/hr at 17     Scheduled Meds:   desmopressin  10 mcg Nasal BID    enoxaparin  40 mg Subcutaneous Daily    gabapentin  125 mg Per NG tube Q12H    levetiracetam oral soln  500 mg Per NG tube BID    levothyroxine  50 mcg Per NG tube Daily    nicotine  1 patch Transdermal Daily     PRN Meds:acetaminophen, dextrose 50%, glucagon (human recombinant), insulin aspart, ondansetron, potassium chloride 10%     Review of Systems       Objective:     Weight: 59.9 kg (132 lb)  Body mass index is 24.14 kg/m².  Vital Signs (Most Recent):  Temp: 97.8 °F (36.6 °C) (17 1105)  Pulse: 86 (17 1136)  Resp: 20 (17 1105)  BP: (!) 116/58 (17 1105)  SpO2: (!) 94 % (17 1105) Vital Signs (24h Range):  Temp:  [97.4 °F (36.3 °C)-97.8 °F (36.6 °C)] 97.8 °F (36.6 °C)  Pulse:  [] 86  Resp:  [16-20] 20  SpO2:  [92 %-99 %] 94 %  BP: (112-131)/(58-88) 116/58              Temp (24hrs), Av.6 °F (36.4 °C), Min:97.4 °F (36.3 °C), Max:97.8 °F (36.6 °C)           Date 17 07 - 17 0659   Shift 3227-5186 7012-0082 1736-1083 24 Hour Total   I  N  T  A  K  E   NG/   230    Shift Total  (mL/kg) 230  (3.8)   230  (3.8)   O  U  T  P  U  T   Shift Total  (mL/kg)       Weight (kg) 59.9 59.9 59.9 59.9          NG/OG Tube 17 0505 [place] sump;nasogastric 14 Fr. (Active)   Placement Check placement verified by aspirate characteristics 2017  8:00 AM   Distal Tube Length (cm) 65 2017 10:57 PM   Tolerance no signs/symptoms of discomfort 2017  8:00 AM   Securement taped to cheek 2017  8:00 AM   Clamp Status/Tolerance unclamped 2017  8:00 AM   Insertion Site Appearance no redness, warmth, tenderness, skin breakdown, drainage 2017  8:00 AM   Flush/Irrigation flushed w/;sterile normal saline;no resistance met 2017   8:00 AM   Feeding Method continuous 7/7/2017  8:00 AM   Current Rate (mL/hr) 40 mL/hr 7/7/2017  8:00 AM   Goal Rate (mL/hr) 40 mL/hr 7/7/2017  8:00 AM   Intake (mL) 175 mL 7/6/2017 10:57 PM   Intake (mL) - Breast Milk Tube Feeding 60 7/2/2017  9:00 AM   Rate Formula Tube Feeding (mL/hr) 40 mL/hr 7/7/2017  8:00 AM   Intake (mL) - Formula Tube Feeding 480 7/6/2017  6:00 PM   Residual Amount (ml) 5 ml 7/7/2017  8:00 AM       Neurosurgery Physical Exam    General: well developed, well nourished, no distress.   Head: normocephalic  Neurologic: lethargic  GCS: Motor: 5/Verbal: 4 /Eyes: 3 GCS Total: 11  Mental Status: Some discussion with Dr. Chew. Intermittently follows simple  commands.   Cranial nerves: face symmetric, CN II-XII grossly intact.   Eyes: pupils equal, round, reactive to light.  Pulmonary: normal respirations, no signs of respiratory distress  Abdomen: soft, non-distended, not tender to palpation  Sensory: response to light touch throughout  Motor Strength: Moves all extremities spontaneously with good strength and tone. Intermittently follows simple  commands.   Unable to assess individual muscle groups.   Pronator Drift: unable to assess 2/2 mental status  Finger-to-nose: unable to assess 2/2 mental status  Clonus: absent  Babinski: absent  No LE edema  Skin: Skin is warm, dry and intact.      Significant Labs:    Recent Labs  Lab 07/08/17  0631 07/08/17  0826   GLU  --  109   NA  --  138   K  --  3.9   CL  --  96   CO2  --  31*   BUN  --  13   CREATININE  --  0.7   CALCIUM  --  10.6*   MG 1.7  --        Recent Labs  Lab 07/07/17  0439 07/08/17  0631   WBC 10.37 9.66   HGB 11.3* 10.7*   HCT 35.1* 32.5*   * 427*     No results for input(s): LABPT, INR, APTT in the last 48 hours.  Microbiology Results (last 7 days)     Procedure Component Value Units Date/Time    Blood culture [[id number]] Collected:  07/03/17 0954    Order Status:  Completed Specimen:  Blood from Peripheral, Wrist, Right Updated:   07/07/17 1212     Blood Culture, Routine No Growth to date     Blood Culture, Routine No Growth to date     Blood Culture, Routine No Growth to date     Blood Culture, Routine No Growth to date     Blood Culture, Routine No Growth to date    Narrative:       Blood cultures x 2 different sites. 4 bottles total. Please  draw cultures before administering antibiotics.    Blood culture [[id number]] Collected:  07/03/17 0934    Order Status:  Completed Specimen:  Blood from Peripheral, Hand, Left Updated:  07/07/17 1212     Blood Culture, Routine No Growth to date     Blood Culture, Routine No Growth to date     Blood Culture, Routine No Growth to date     Blood Culture, Routine No Growth to date     Blood Culture, Routine No Growth to date    Narrative:       Blood cultures from 2 different sites. 4 bottles total.  Please draw before starting antibiotics.

## 2017-07-09 NOTE — PROGRESS NOTES
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 Chew and discharged on 17 to [facility].  She presents with altered mental status and worsening hypernatremia for 1 day. She was treated for DI her last admission and labs are trending up today.  Spoke with Dr. Fairchild at [facility], patient with waxing/waning mental status yesterday, but was appropriate & following some complex commands yesterday.  There was a period yesterday of significant lethargy.  Patient is unable to give history, and information is taken from the chart.     Post-Op Info:  * No surgery found *         Interval History: No acute events overnight. Not opening eyes, but remains verbal today     Medications:  Continuous Infusions:     Scheduled Meds:   [START ON 7/10/2017] desmopressin  10 mcg Nasal Daily    enoxaparin  40 mg Subcutaneous Daily    gabapentin  125 mg Per NG tube Q12H    levetiracetam oral soln  500 mg Per NG tube BID    levothyroxine  50 mcg Per NG tube Daily    nicotine  1 patch Transdermal Daily     PRN Meds:acetaminophen, dextrose 50%, glucagon (human recombinant), insulin aspart, ondansetron, potassium chloride 10%     Review of Systems       Objective:     Weight: 59.9 kg (132 lb)  Body mass index is 24.14 kg/m².  Vital Signs (Most Recent):  Temp: 96.9 °F (36.1 °C) (17 0800)  Pulse: 76 (17 1205)  Resp: 18 (17 1205)  BP: 112/66 (17 1205)  SpO2: 98 % (17 1205) Vital Signs (24h Range):  Temp:  [96.9 °F (36.1 °C)-97.9 °F (36.6 °C)] 96.9 °F (36.1 °C)  Pulse:  [71-91] 76  Resp:  [16-18] 18  SpO2:  [95 %-98 %] 98 %  BP: (112-124)/(62-95) 112/66              Temp (24hrs), Av.3 °F (36.3 °C), Min:96.9 °F (36.1 °C), Max:97.9 °F (36.6 °C)                 NG/OG Tube 17 0505 [place];nasogastric 14 Fr. (Active)   Placement Check placement  verified by aspirate characteristics 7/7/2017  8:00 AM   Distal Tube Length (cm) 65 7/6/2017 10:57 PM   Tolerance no signs/symptoms of discomfort 7/7/2017  8:00 AM   Securement taped to cheek 7/7/2017  8:00 AM   Clamp Status/Tolerance unclamped 7/7/2017  8:00 AM   Insertion Site Appearance no redness, warmth, tenderness, skin breakdown, drainage 7/7/2017  8:00 AM   Flush/Irrigation flushed w/;sterile normal saline;no resistance met 7/7/2017  8:00 AM   Feeding Method continuous 7/7/2017  8:00 AM   Current Rate (mL/hr) 40 mL/hr 7/7/2017  8:00 AM   Goal Rate (mL/hr) 40 mL/hr 7/7/2017  8:00 AM   Intake (mL) 175 mL 7/6/2017 10:57 PM   Intake (mL) - Breast Milk Tube Feeding 60 7/2/2017  9:00 AM   Rate Formula Tube Feeding (mL/hr) 40 mL/hr 7/7/2017  8:00 AM   Intake (mL) - Formula Tube Feeding 480 7/6/2017  6:00 PM   Residual Amount (ml) 5 ml 7/7/2017  8:00 AM       Neurosurgery Physical Exam    General: well developed, well nourished, no distress.   Head: normocephalic  Neurologic: lethargic  GCS: Motor: 5/Verbal: 4 /Eyes: 2 GCS Total: 11  Mental Status: Able to state name. Intermittently follows simple commands.   Cranial nerves: face symmetric, CN II-XII grossly intact.   Eyes: pupils equal, round, reactive to light.  Pulmonary: normal respirations, no signs of respiratory distress  Abdomen: soft, non-distended, not tender to palpation  Sensory: response to light touch throughout  Motor Strength: Moves all extremities spontaneously with good strength and tone. Intermittently follows simple commands.   Unable to assess individual muscle groups.   Pronator Drift: unable to assess 2/2 mental status  Finger-to-nose: unable to assess 2/2 mental status  Clonus: absent  Babinski: absent  No LE edema  Skin: Skin is warm, dry and intact.      Significant Labs:    Recent Labs  Lab 07/09/17  0400 07/09/17  0818   GLU  --  84   NA  --  134*   K  --  3.3*   CL  --  95   CO2  --  31*   BUN  --  9   CREATININE  --  0.6   CALCIUM  --   10.3   MG 1.9  --        Recent Labs  Lab 07/08/17  0631 07/09/17  0400   WBC 9.66 8.81   HGB 10.7* 10.0*   HCT 32.5* 30.1*   * 403*     No results for input(s): LABPT, INR, APTT in the last 48 hours.  Microbiology Results (last 7 days)     Procedure Component Value Units Date/Time    Blood culture [[id number]] Collected:  07/03/17 0934    Order Status:  Completed Specimen:  Blood from Peripheral, Hand, Left Updated:  07/08/17 1212     Blood Culture, Routine No growth after 5 days.    Narrative:       Blood cultures from 2 different sites. 4 bottles total.  Please draw before starting antibiotics.    Blood culture [[id number]] Collected:  07/03/17 0954    Order Status:  Completed Specimen:  Blood from Peripheral, Wrist, Right Updated:  07/08/17 1212     Blood Culture, Routine No growth after 5 days.    Narrative:       Blood cultures x 2 different sites. 4 bottles total. Please  draw cultures before administering antibiotics.              Assessment/Plan:     Meningioma, recurrent of brain    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 Notify NSGY for any changes.   -Continue Keppra for seizure prophylaxis  -Continue Lovenox for DVT prophylaxis  -Continue aggressive PT/OT  -Follow up EEG    -Continue management of electrolyte imbalances per primary team  -Consider PEG placement   -Will continue to follow  -Pending Rehab per primary team            Lamar Levin MD  Neurosurgery  [facility]

## 2017-07-09 NOTE — SUBJECTIVE & OBJECTIVE
Interval History: Patient continues to be very lethargic & mumbles yes/no occasionally on exam.  Not following commands.      Medications:  Continuous Infusions:     Scheduled Meds:   [START ON 7/10/2017] desmopressin  10 mcg Nasal Daily    enoxaparin  40 mg Subcutaneous Daily    gabapentin  125 mg Per NG tube Q12H    levetiracetam oral soln  500 mg Per NG tube BID    levothyroxine  50 mcg Per NG tube Daily    nicotine  1 patch Transdermal Daily     PRN Meds:acetaminophen, dextrose 50%, glucagon (human recombinant), insulin aspart, ondansetron, potassium chloride 10%     Review of Systems  Objective:     Weight: 59.9 kg (132 lb)  Body mass index is 24.14 kg/m².  Vital Signs (Most Recent):  Temp: 97.2 °F (36.2 °C) (17 1635)  Pulse: 62 (17 1635)  Resp: 17 (17 1635)  BP: 121/70 (17 1635)  SpO2: 100 % (17 1635) Vital Signs (24h Range):  Temp:  [96.9 °F (36.1 °C)-97.9 °F (36.6 °C)] 97.2 °F (36.2 °C)  Pulse:  [62-91] 62  Resp:  [16-18] 17  SpO2:  [95 %-100 %] 100 %  BP: (112-124)/(62-95) 121/70              Temp (24hrs), Av.3 °F (36.3 °C), Min:96.9 °F (36.1 °C), Max:97.9 °F (36.6 °C)                NG/OG Tube 17 0505 [place] sump;nasogastric 14 Fr. (Active)   Placement Check placement verified by x-ray 2017  8:42 AM   Distal Tube Length (cm) 65 2017  8:42 AM   Tolerance no signs/symptoms of discomfort 2017  8:42 AM   Securement anchored to nostril center w/ adhesive device 2017  8:42 AM   Clamp Status/Tolerance unclamped 2017  8:42 AM   Insertion Site Appearance no redness, warmth, tenderness, skin breakdown, drainage 2017  8:42 AM   Flush/Irrigation flushed w/;water 2017  8:42 AM   Feeding Method continuous 2017  8:42 AM   Current Rate (mL/hr) 40 mL/hr 2017  8:42 AM   Goal Rate (mL/hr) 40 mL/hr 2017  8:42 AM   Intake (mL) 175 mL 2017  8:42 AM   Intake (mL) - Breast Milk Tube Feeding 60 2017  9:00 AM   Rate Formula Tube Feeding  (mL/hr) 40 mL/hr 7/4/2017  7:59 AM   Intake (mL) - Formula Tube Feeding 120 7/4/2017 11:00 PM   Residual Amount (ml) 0 ml 7/4/2017  7:59 AM       Physical Exam:    Constitutional: She appears well-developed and well-nourished.     Eyes: Pupils are equal, round, and reactive to light. EOM are normal.     Abdominal: Soft. Bowel sounds are normal.     Psych/Behavior: She is alert. She is oriented to person, place, and time.      General: well developed, well nourished, no distress.   Head: normocephalic  Neurologic: Obtunded  GCS: Motor: 5/Verbal: 2 /Eyes: 3 GCS Total: 10  Mental Status: Drowsy, arousable. Non verbal. Will not follow commands.   Language: unable to assess 2/2 mental status  Speech: unable to assess 2/2 mental status  Cranial nerves: face symmetric, CN II-XII grossly intact.   Eyes: pupils equal, round, reactive to light.  Pulmonary: normal respirations, no signs of respiratory distress  Abdomen: soft, non-distended, not tender to palpation  Sensory: response to light touch throughout  Motor Strength: Moves all extremities spontaneously with good strength and tone. Will not follow commands today. Unable to assess individual muscle groups.   Pronator Drift: unable to assess 2/2 mental status  Finger-to-nose: unable to assess 2/2 mental status  Clonus: absent  Babinski: absent  No LE edema  Skin: Skin is warm, dry and intact.    Significant Labs:    Recent Labs  Lab 07/09/17  0400 07/09/17  0818   GLU  --  84   NA  --  134*   K  --  3.3*   CL  --  95   CO2  --  31*   BUN  --  9   CREATININE  --  0.6   CALCIUM  --  10.3   MG 1.9  --        Recent Labs  Lab 07/08/17  0631 07/09/17  0400   WBC 9.66 8.81   HGB 10.7* 10.0*   HCT 32.5* 30.1*   * 403*     No results for input(s): LABPT, INR, APTT in the last 48 hours.  Microbiology Results (last 7 days)     Procedure Component Value Units Date/Time    Blood culture [[id number]] Collected:  07/03/17 0934    Order Status:  Completed Specimen:  Blood from  Peripheral, Hand, Left Updated:  07/08/17 1212     Blood Culture, Routine No growth after 5 days.    Narrative:       Blood cultures from 2 different sites. 4 bottles total.  Please draw before starting antibiotics.    Blood culture [[id number]] Collected:  07/03/17 0954    Order Status:  Completed Specimen:  Blood from Peripheral, Wrist, Right Updated:  07/08/17 1212     Blood Culture, Routine No growth after 5 days.    Narrative:       Blood cultures x 2 different sites. 4 bottles total. Please  draw cultures before administering antibiotics.          Significant Diagnostics:  No new imaging    Physical Exam   Constitutional: She is oriented to person, place, and time. She appears well-developed and well-nourished.   HENT:   Head: Normocephalic and atraumatic.   Eyes: EOM are normal. Pupils are equal, round, and reactive to light.   Neck: Neck supple.   Pulmonary/Chest: Effort normal and breath sounds normal.   Abdominal: Soft. Bowel sounds are normal.   Neurological: She is alert and oriented to person, place, and time.   Skin: Skin is warm.     Interval History: Patient continues to be very lethargic & mumbles yes/no occasionally on exam.  Not following commands.      Medications:  Continuous Infusions:     Scheduled Meds:   [START ON 7/10/2017] desmopressin  10 mcg Nasal Daily    enoxaparin  40 mg Subcutaneous Daily    gabapentin  125 mg Per NG tube Q12H    levetiracetam oral soln  500 mg Per NG tube BID    levothyroxine  50 mcg Per NG tube Daily    nicotine  1 patch Transdermal Daily     PRN Meds:acetaminophen, dextrose 50%, glucagon (human recombinant), insulin aspart, ondansetron, potassium chloride 10%     Review of Systems  Objective:     Weight: 59.9 kg (132 lb)  Body mass index is 24.14 kg/m².  Vital Signs (Most Recent):  Temp: 97.2 °F (36.2 °C) (07/09/17 1635)  Pulse: 62 (07/09/17 1635)  Resp: 17 (07/09/17 1635)  BP: 121/70 (07/09/17 1635)  SpO2: 100 % (07/09/17 1635) Vital Signs (24h  Range):  Temp:  [96.9 °F (36.1 °C)-97.9 °F (36.6 °C)] 97.2 °F (36.2 °C)  Pulse:  [62-91] 62  Resp:  [16-18] 17  SpO2:  [95 %-100 %] 100 %  BP: (112-124)/(62-95) 121/70              Temp (24hrs), Av.3 °F (36.3 °C), Min:96.9 °F (36.1 °C), Max:97.9 °F (36.6 °C)                NG/OG Tube 17 0505 [place] sump;nasogastric 14 Fr. (Active)   Placement Check placement verified by x-ray 2017  8:42 AM   Distal Tube Length (cm) 65 2017  8:42 AM   Tolerance no signs/symptoms of discomfort 2017  8:42 AM   Securement anchored to nostril center w/ adhesive device 2017  8:42 AM   Clamp Status/Tolerance unclamped 2017  8:42 AM   Insertion Site Appearance no redness, warmth, tenderness, skin breakdown, drainage 2017  8:42 AM   Flush/Irrigation flushed w/;water 2017  8:42 AM   Feeding Method continuous 2017  8:42 AM   Current Rate (mL/hr) 40 mL/hr 2017  8:42 AM   Goal Rate (mL/hr) 40 mL/hr 2017  8:42 AM   Intake (mL) 175 mL 2017  8:42 AM   Intake (mL) - Breast Milk Tube Feeding 60 2017  9:00 AM   Rate Formula Tube Feeding (mL/hr) 40 mL/hr 2017  7:59 AM   Intake (mL) - Formula Tube Feeding 120 2017 11:00 PM   Residual Amount (ml) 0 ml 2017  7:59 AM       Neurosurgery Physical Exam   General: well developed, well nourished, no distress.   Head: normocephalic  Neurologic: Obtunded  GCS: Motor: 5/Verbal: 2 /Eyes: 3 GCS Total: 10  Mental Status: Drowsy, arousable. Non verbal. Will not follow commands.   Language: unable to assess 2/2 mental status  Speech: unable to assess 2/2 mental status  Cranial nerves: face symmetric, CN II-XII grossly intact.   Eyes: pupils equal, round, reactive to light.  Pulmonary: normal respirations, no signs of respiratory distress  Abdomen: soft, non-distended, not tender to palpation  Sensory: response to light touch throughout  Motor Strength: Moves all extremities spontaneously with good strength and tone. Will not follow commands today.  Unable to assess individual muscle groups.   Pronator Drift: unable to assess 2/2 mental status  Finger-to-nose: unable to assess 2/2 mental status  Clonus: absent  Babinski: absent  No LE edema  Skin: Skin is warm, dry and intact.    Significant Labs:    Recent Labs  Lab 07/09/17  0400 07/09/17 0818   GLU  --  84   NA  --  134*   K  --  3.3*   CL  --  95   CO2  --  31*   BUN  --  9   CREATININE  --  0.6   CALCIUM  --  10.3   MG 1.9  --        Recent Labs  Lab 07/08/17  0631 07/09/17  0400   WBC 9.66 8.81   HGB 10.7* 10.0*   HCT 32.5* 30.1*   * 403*     No results for input(s): LABPT, INR, APTT in the last 48 hours.  Microbiology Results (last 7 days)     Procedure Component Value Units Date/Time    Blood culture [[id number]] Collected:  07/03/17 0934    Order Status:  Completed Specimen:  Blood from Peripheral, Hand, Left Updated:  07/08/17 1212     Blood Culture, Routine No growth after 5 days.    Narrative:       Blood cultures from 2 different sites. 4 bottles total.  Please draw before starting antibiotics.    Blood culture [[id number]] Collected:  07/03/17 0954    Order Status:  Completed Specimen:  Blood from Peripheral, Wrist, Right Updated:  07/08/17 1212     Blood Culture, Routine No growth after 5 days.    Narrative:       Blood cultures x 2 different sites. 4 bottles total. Please  draw cultures before administering antibiotics.          Significant Diagnostics:  No new imaging    Physical Exam

## 2017-07-09 NOTE — PROGRESS NOTES
[facility] Hospital Medicine  Progress Note    Patient Name: Joanna Morales  MRN: [id number]  Patient Class: IP- Inpatient   Admission Date: 6/28/2017  Length of Stay: 10 days  Attending Physician: Chino Wiley MD  Primary Care Provider: Claudy Tse MD    [facility] Medicine Team: [facility] [place] [facility] MED B Chino Wiley MD    Subjective:     Principal Problem:Hypernatremia    HPI:  Ms. Joanna Morales is a 51 y.o. female with tobacco abuse, secondary hypothyroidism (TSH 0.241 Jun 2017), and history of meningioma s/p resection complicated by diabetes insipidus who presents to [facility] ED from [facility] after being found with abnormal labwork.  She had been more altered in the last day or so and upon checking labwork, she was noted to be with hypernatremia.  She contributed very minimally to her history but does report some abdominal pain.  Of note, she was recently admitted to this facility under the Neurosurgery service for resection of a meningioma that was complicated by diabetes insipidus for which she received desmopressin a few times before being discharged to rehabilitation at [facility].  Further history is limited at this time.    Hospital Course:  No notes on file    Interval History: Patient continues to be very lethargic & mumbles yes/no occasionally on exam.  Not following commands.      Medications:  Continuous Infusions:   dextrose 5 % 75 mL/hr at 07/06/17 2010     Scheduled Meds:   desmopressin  10 mcg Nasal BID    enoxaparin  40 mg Subcutaneous Daily    gabapentin  125 mg Per NG tube Q12H    levetiracetam oral soln  500 mg Per NG tube BID    levothyroxine  50 mcg Per NG tube Daily    nicotine  1 patch Transdermal Daily     PRN Meds:acetaminophen, dextrose 50%, glucagon (human recombinant), insulin aspart, ondansetron, potassium chloride 10%     Review of Systems  Objective:     Weight: 59.9 kg (132 lb)  Body mass index is 24.14 kg/m².  Vital Signs  (Most Recent):  Temp: 97.8 °F (36.6 °C) (17 110)  Pulse: 71 (17)  Resp: 20 (17)  BP: (!) 116/58 (175)  SpO2: (!) 94 % (17) Vital Signs (24h Range):  Temp:  [97.4 °F (36.3 °C)-97.8 °F (36.6 °C)] 97.8 °F (36.6 °C)  Pulse:  [] 71  Resp:  [16-20] 20  SpO2:  [94 %-99 %] 94 %  BP: (112-131)/(58-88) 116/58              Temp (24hrs), Av.6 °F (36.4 °C), Min:97.4 °F (36.3 °C), Max:97.8 °F (36.6 °C)          Date 17 07 - 17 0659   Shift 7107-6157 7330-3330 9017-4105 24 Hour Total   I  N  T  A  K  E   I.V.  (mL/kg)  987.5  (16.5)  987.5  (16.5)    NG/   230    Shift Total  (mL/kg) 230  (3.8) 987.5  (16.5)  1217.5  (20.3)   O  U  T  P  U  T   Shift Total  (mL/kg)       Weight (kg) 59.9 59.9 59.9 59.9          NG/OG Tube 17 0505 Muse sump;nasogastric 14 Fr. (Active)   Placement Check placement verified by x-ray 2017  8:42 AM   Distal Tube Length (cm) 65 2017  8:42 AM   Tolerance no signs/symptoms of discomfort 2017  8:42 AM   Securement anchored to nostril center w/ adhesive device 2017  8:42 AM   Clamp Status/Tolerance unclamped 2017  8:42 AM   Insertion Site Appearance no redness, warmth, tenderness, skin breakdown, drainage 2017  8:42 AM   Flush/Irrigation flushed w/;water 2017  8:42 AM   Feeding Method continuous 2017  8:42 AM   Current Rate (mL/hr) 40 mL/hr 2017  8:42 AM   Goal Rate (mL/hr) 40 mL/hr 2017  8:42 AM   Intake (mL) 175 mL 2017  8:42 AM   Intake (mL) - Breast Milk Tube Feeding 60 2017  9:00 AM   Rate Formula Tube Feeding (mL/hr) 40 mL/hr 2017  7:59 AM   Intake (mL) - Formula Tube Feeding 120 2017 11:00 PM   Residual Amount (ml) 0 ml 2017  7:59 AM       Physical Exam:    Constitutional: She appears well-developed and well-nourished.     Eyes: Pupils are equal, round, and reactive to light. EOM are normal.     Abdominal: Soft. Bowel sounds are normal.     Psych/Behavior:  She is alert. She is oriented to person, place, and time.      General: well developed, well nourished, no distress.   Head: normocephalic  Neurologic: Obtunded  GCS: Motor: 5/Verbal: 2 /Eyes: 3 GCS Total: 10  Mental Status: Drowsy, arousable. Non verbal. Will not follow commands.   Language: unable to assess 2/2 mental status  Speech: unable to assess 2/2 mental status  Cranial nerves: face symmetric, CN II-XII grossly intact.   Eyes: pupils equal, round, reactive to light.  Pulmonary: normal respirations, no signs of respiratory distress  Abdomen: soft, non-distended, not tender to palpation  Sensory: response to light touch throughout  Motor Strength: Moves all extremities spontaneously with good strength and tone. Will not follow commands today. Unable to assess individual muscle groups.   Pronator Drift: unable to assess 2/2 mental status  Finger-to-nose: unable to assess 2/2 mental status  Clonus: absent  Babinski: absent  No LE edema  Skin: Skin is warm, dry and intact.    Significant Labs:    Recent Labs  Lab 07/08/17  0631  07/08/17 1958   GLU  --   < > 91   NA  --   < > 136   K  --   < > 3.7   CL  --   < > 97   CO2  --   < > 32*   BUN  --   < > 11   CREATININE  --   < > 0.7   CALCIUM  --   < > 10.5   MG 1.7  --   --    < > = values in this interval not displayed.    Recent Labs  Lab 07/07/17  0439 07/08/17  0631   WBC 10.37 9.66   HGB 11.3* 10.7*   HCT 35.1* 32.5*   * 427*     No results for input(s): LABPT, INR, APTT in the last 48 hours.  Microbiology Results (last 7 days)     Procedure Component Value Units Date/Time    Blood culture [[id number]] Collected:  07/03/17 0934    Order Status:  Completed Specimen:  Blood from Peripheral, Hand, Left Updated:  07/08/17 1212     Blood Culture, Routine No growth after 5 days.    Narrative:       Blood cultures from 2 different sites. 4 bottles total.  Please draw before starting antibiotics.    Blood culture [[id number]] Collected:  07/03/17 0954    Order  Status:  Completed Specimen:  Blood from Peripheral, Wrist, Right Updated:  17 1212     Blood Culture, Routine No growth after 5 days.    Narrative:       Blood cultures x 2 different sites. 4 bottles total. Please  draw cultures before administering antibiotics.          Significant Diagnostics:  No new imaging    Physical Exam   Constitutional: She is oriented to person, place, and time. She appears well-developed and well-nourished.   HENT:   Head: Normocephalic and atraumatic.   Eyes: EOM are normal. Pupils are equal, round, and reactive to light.   Neck: Neck supple.   Pulmonary/Chest: Effort normal and breath sounds normal.   Abdominal: Soft. Bowel sounds are normal.   Neurological: She is alert and oriented to person, place, and time.   Skin: Skin is warm.     Interval History: Patient continues to be very lethargic & mumbles yes/no occasionally on exam.  Not following commands.      Medications:  Continuous Infusions:   dextrose 5 % 75 mL/hr at 17     Scheduled Meds:   desmopressin  10 mcg Nasal BID    enoxaparin  40 mg Subcutaneous Daily    gabapentin  125 mg Per NG tube Q12H    levetiracetam oral soln  500 mg Per NG tube BID    levothyroxine  50 mcg Per NG tube Daily    nicotine  1 patch Transdermal Daily     PRN Meds:acetaminophen, dextrose 50%, glucagon (human recombinant), insulin aspart, ondansetron, potassium chloride 10%     Review of Systems  Objective:     Weight: 59.9 kg (132 lb)  Body mass index is 24.14 kg/m².  Vital Signs (Most Recent):  Temp: 97.8 °F (36.6 °C) (17 110)  Pulse: 71 (17)  Resp: 20 (17)  BP: (!) 116/58 (17)  SpO2: (!) 94 % (17) Vital Signs (24h Range):  Temp:  [97.4 °F (36.3 °C)-97.8 °F (36.6 °C)] 97.8 °F (36.6 °C)  Pulse:  [] 71  Resp:  [16-20] 20  SpO2:  [94 %-99 %] 94 %  BP: (112-131)/(58-88) 116/58              Temp (24hrs), Av.6 °F (36.4 °C), Min:97.4 °F (36.3 °C), Max:97.8 °F (36.6  °C)          Date 07/08/17 0700 - 07/09/17 0659   Shift 9930-14485 7749-9699 9930-0659 24 Hour Total   I  N  T  A  K  E   I.V.  (mL/kg)  987.5  (16.5)  987.5  (16.5)    NG/   230    Shift Total  (mL/kg) 230  (3.8) 987.5  (16.5)  1217.5  (20.3)   O  U  T  P  U  T   Shift Total  (mL/kg)       Weight (kg) 59.9 59.9 59.9 59.9          NG/OG Tube 07/01/17 0505 [place] sump;nasogastric 14 Fr. (Active)   Placement Check placement verified by x-ray 7/5/2017  8:42 AM   Distal Tube Length (cm) 65 7/5/2017  8:42 AM   Tolerance no signs/symptoms of discomfort 7/5/2017  8:42 AM   Securement anchored to nostril center w/ adhesive device 7/5/2017  8:42 AM   Clamp Status/Tolerance unclamped 7/5/2017  8:42 AM   Insertion Site Appearance no redness, warmth, tenderness, skin breakdown, drainage 7/5/2017  8:42 AM   Flush/Irrigation flushed w/;water 7/5/2017  8:42 AM   Feeding Method continuous 7/5/2017  8:42 AM   Current Rate (mL/hr) 40 mL/hr 7/5/2017  8:42 AM   Goal Rate (mL/hr) 40 mL/hr 7/5/2017  8:42 AM   Intake (mL) 175 mL 7/5/2017  8:42 AM   Intake (mL) - Breast Milk Tube Feeding 60 7/2/2017  9:00 AM   Rate Formula Tube Feeding (mL/hr) 40 mL/hr 7/4/2017  7:59 AM   Intake (mL) - Formula Tube Feeding 120 7/4/2017 11:00 PM   Residual Amount (ml) 0 ml 7/4/2017  7:59 AM       Neurosurgery Physical Exam   General: well developed, well nourished, no distress.   Head: normocephalic  Neurologic: Obtunded  GCS: Motor: 5/Verbal: 2 /Eyes: 3 GCS Total: 10  Mental Status: Drowsy, arousable. Non verbal. Will not follow commands.   Language: unable to assess 2/2 mental status  Speech: unable to assess 2/2 mental status  Cranial nerves: face symmetric, CN II-XII grossly intact.   Eyes: pupils equal, round, reactive to light.  Pulmonary: normal respirations, no signs of respiratory distress  Abdomen: soft, non-distended, not tender to palpation  Sensory: response to light touch throughout  Motor Strength: Moves all extremities spontaneously with  good strength and tone. Will not follow commands today. Unable to assess individual muscle groups.   Pronator Drift: unable to assess 2/2 mental status  Finger-to-nose: unable to assess 2/2 mental status  Clonus: absent  Babinski: absent  No LE edema  Skin: Skin is warm, dry and intact.    Significant Labs:    Recent Labs  Lab 07/08/17  0631  07/08/17 1958   GLU  --   < > 91   NA  --   < > 136   K  --   < > 3.7   CL  --   < > 97   CO2  --   < > 32*   BUN  --   < > 11   CREATININE  --   < > 0.7   CALCIUM  --   < > 10.5   MG 1.7  --   --    < > = values in this interval not displayed.    Recent Labs  Lab 07/07/17  0439 07/08/17 0631   WBC 10.37 9.66   HGB 11.3* 10.7*   HCT 35.1* 32.5*   * 427*     No results for input(s): LABPT, INR, APTT in the last 48 hours.  Microbiology Results (last 7 days)     Procedure Component Value Units Date/Time    Blood culture [[id number]] Collected:  07/03/17 0934    Order Status:  Completed Specimen:  Blood from Peripheral, Hand, Left Updated:  07/08/17 1212     Blood Culture, Routine No growth after 5 days.    Narrative:       Blood cultures from 2 different sites. 4 bottles total.  Please draw before starting antibiotics.    Blood culture [[id number]] Collected:  07/03/17 0954    Order Status:  Completed Specimen:  Blood from Peripheral, Wrist, Right Updated:  07/08/17 1212     Blood Culture, Routine No growth after 5 days.    Narrative:       Blood cultures x 2 different sites. 4 bottles total. Please  draw cultures before administering antibiotics.          Significant Diagnostics:  No new imaging    Physical Exam      Assessment/Plan:      Acute encephalopathy    Is improving, verbal today.  EEG today        Tobacco abuse    Patient was counseled on smoking cessation and she will be provided a nicotine transdermal patch applied while inpatient.  Will provide additional smoking cessation counseling prior to discharge.        Secondary hypothyroidism    As addressed above.   Will continue her home regimen of levothyroxine.        Diabetes insipidus    As addressed above.  Follow sodium and UOP  Sodium 149 this yetserady, improved  Will increase ddavp to BID        Meningioma, recurrent of brain    Uncertain prognosis  NS is following            VTE Risk Mitigation         Ordered     High Risk of VTE  Once      06/30/17 0920     Place sequential compression device  Until discontinued      06/30/17 0920     enoxaparin injection 40 mg  Daily     Route:  Subcutaneous        06/28/17 1183          Chino Wiley MD  Department of Hospital Medicine   [facility]

## 2017-07-09 NOTE — PT/OT/SLP DISCHARGE
Occupational Therapy Discharge Summary    Joanna Morales  MRN: [id number]   Meningioma, recurrent of brain   Patient Discharged from acute Occupational Therapy on 6/26/2017.  Please refer to prior OT note dated on 6/22/2017 for functional status.     Assessment:   Patient appropriate for care in another setting.  GOALS:    Occupational Therapy Goals        Problem: Occupational Therapy Goal    Goal Priority Disciplines Outcome Interventions   Occupational Therapy Goal     OT, PT/OT Ongoing (interventions implemented as appropriate)    Description:  Goals to be met by: 6/27/2017     Patient will increase functional independence with ADLs by performing:    UE Dressing with Minimal Assistance.  LE Dressing with Minimal Assistance.  Grooming while standing with Minimal Assistance.  Toileting from toilet with Minimal Assistance for hygiene and clothing management.   Rolling to Bilateral with Contact Guard Assistance.    Supine to sit with Contact Guard Assistance.  Stand pivot transfers with Minimal Assistance.  Toilet transfer to toilet with Minimal Assistance.                     Reasons for Discontinuation of Therapy Services  Transfer to alternate level of care.      Plan:  Patient Discharged to: Inpatient Rehab.

## 2017-07-09 NOTE — PLAN OF CARE
Problem: Patient Care Overview  Goal: Plan of Care Review  Outcome: Ongoing (interventions implemented as appropriate)  Discussed patient condition/POC with patient & patient's friend (teodora) at bedside; this RN called into room at approximately 0300 & upon entering, found patient wide awake with eyes open, having semi-lucid conversation with friend at bedside. Patient still confused/disoriented & requires frequent redirection & reorientation. After multiple attempts, patient remains in bue wrist restraints r/t pulling at lines & attempting to get oob unsafely. Oral care/hygienic care provided. Stable condition, on continuous tele monitoring; will monitor closely

## 2017-07-09 NOTE — PROGRESS NOTES
[facility] Hospital Medicine  Progress Note    Patient Name: Joanna Morales  MRN: [id number]  Patient Class: IP- Inpatient   Admission Date: 6/28/2017  Length of Stay: 11 days  Attending Physician: Chino Wiley MD  Primary Care Provider: Claudy Tse MD    [facility] [place]: [facility] [place] [facility] [place] Chino Wiley MD    Subjective:     Principal Problem:Hypernatremia    HPI:  Ms. Joanna Morales is a 51 y.o. female with tobacco abuse, secondary hypothyroidism (TSH 0.241 Jun 2017), and history of meningioma s/p resection complicated by diabetes insipidus who presents to [facility] ED from [facility] after being found with abnormal labwork.  She had been more altered in the last day or so and upon checking labwork, she was noted to be with hypernatremia.  She contributed very minimally to her history but does report some abdominal pain.  Of note, she was recently admitted to this facility under the Neurosurgery service for resection of a meningioma that was complicated by diabetes insipidus for which she received desmopressin a few times before being discharged to rehabilitation at [facility].  Further history is limited at this time.    Hospital Course:  No notes on file    Interval History: Patient continues to be very lethargic & mumbles yes/no occasionally on exam.  Not following commands.      Medications:  Continuous Infusions:     Scheduled Meds:   [START ON 7/10/2017] desmopressin  10 mcg Nasal Daily    enoxaparin  40 mg Subcutaneous Daily    gabapentin  125 mg Per NG tube Q12H    levetiracetam oral soln  500 mg Per NG tube BID    levothyroxine  50 mcg Per NG tube Daily    nicotine  1 patch Transdermal Daily     PRN Meds:acetaminophen, dextrose 50%, glucagon (human recombinant), insulin aspart, ondansetron, potassium chloride 10%     Review of Systems  Objective:     Weight: 59.9 kg (132 lb)  Body mass index is 24.14 kg/m².  Vital Signs (Most Recent):  Temp:  97.2 °F (36.2 °C) (17 1635)  Pulse: 62 (17 1635)  Resp: 17 (17 1635)  BP: 121/70 (17 1635)  SpO2: 100 % (17 1635) Vital Signs (24h Range):  Temp:  [96.9 °F (36.1 °C)-97.9 °F (36.6 °C)] 97.2 °F (36.2 °C)  Pulse:  [62-91] 62  Resp:  [16-18] 17  SpO2:  [95 %-100 %] 100 %  BP: (112-124)/(62-95) 121/70              Temp (24hrs), Av.3 °F (36.3 °C), Min:96.9 °F (36.1 °C), Max:97.9 °F (36.6 °C)                NG/OG Tube 17 0505 [place] sump;nasogastric 14 Fr. (Active)   Placement Check placement verified by x-ray 2017  8:42 AM   Distal Tube Length (cm) 65 2017  8:42 AM   Tolerance no signs/symptoms of discomfort 2017  8:42 AM   Securement anchored to nostril center w/ adhesive device 2017  8:42 AM   Clamp Status/Tolerance unclamped 2017  8:42 AM   Insertion Site Appearance no redness, warmth, tenderness, skin breakdown, drainage 2017  8:42 AM   Flush/Irrigation flushed w/;water 2017  8:42 AM   Feeding Method continuous 2017  8:42 AM   Current Rate (mL/hr) 40 mL/hr 2017  8:42 AM   Goal Rate (mL/hr) 40 mL/hr 2017  8:42 AM   Intake (mL) 175 mL 2017  8:42 AM   Intake (mL) - Breast Milk Tube Feeding 60 2017  9:00 AM   Rate Formula Tube Feeding (mL/hr) 40 mL/hr 2017  7:59 AM   Intake (mL) - Formula Tube Feeding 120 2017 11:00 PM   Residual Amount (ml) 0 ml 2017  7:59 AM       Physical Exam:    Constitutional: She appears well-developed and well-nourished.     Eyes: Pupils are equal, round, and reactive to light. EOM are normal.     Abdominal: Soft. Bowel sounds are normal.     Psych/Behavior: She is alert. She is oriented to person, place, and time.      General: well developed, well nourished, no distress.   Head: normocephalic  Neurologic: Obtunded  GCS: Motor: 5/Verbal: 2 /Eyes: 3 GCS Total: 10  Mental Status: Drowsy, arousable. Non verbal. Will not follow commands.   Language: unable to assess 2/2 mental status  Speech: unable to  assess 2/2 mental status  Cranial nerves: face symmetric, CN II-XII grossly intact.   Eyes: pupils equal, round, reactive to light.  Pulmonary: normal respirations, no signs of respiratory distress  Abdomen: soft, non-distended, not tender to palpation  Sensory: response to light touch throughout  Motor Strength: Moves all extremities spontaneously with good strength and tone. Will not follow commands today. Unable to assess individual muscle groups.   Pronator Drift: unable to assess 2/2 mental status  Finger-to-nose: unable to assess 2/2 mental status  Clonus: absent  Babinski: absent  No LE edema  Skin: Skin is warm, dry and intact.    Significant Labs:    Recent Labs  Lab 07/09/17  0400 07/09/17  0818   GLU  --  84   NA  --  134*   K  --  3.3*   CL  --  95   CO2  --  31*   BUN  --  9   CREATININE  --  0.6   CALCIUM  --  10.3   MG 1.9  --        Recent Labs  Lab 07/08/17  0631 07/09/17  0400   WBC 9.66 8.81   HGB 10.7* 10.0*   HCT 32.5* 30.1*   * 403*     No results for input(s): LABPT, INR, APTT in the last 48 hours.  Microbiology Results (last 7 days)     Procedure Component Value Units Date/Time    Blood culture [[id number]] Collected:  07/03/17 0934    Order Status:  Completed Specimen:  Blood from Peripheral, Hand, Left Updated:  07/08/17 1212     Blood Culture, Routine No growth after 5 days.    Narrative:       Blood cultures from 2 different sites. 4 bottles total.  Please draw before starting antibiotics.    Blood culture [[id number]] Collected:  07/03/17 0954    Order Status:  Completed Specimen:  Blood from Peripheral, Wrist, Right Updated:  07/08/17 1212     Blood Culture, Routine No growth after 5 days.    Narrative:       Blood cultures x 2 different sites. 4 bottles total. Please  draw cultures before administering antibiotics.          Significant Diagnostics:  No new imaging    Physical Exam   Constitutional: She is oriented to person, place, and time. She appears well-developed and  well-nourished.   HENT:   Head: Normocephalic and atraumatic.   Eyes: EOM are normal. Pupils are equal, round, and reactive to light.   Neck: Neck supple.   Pulmonary/Chest: Effort normal and breath sounds normal.   Abdominal: Soft. Bowel sounds are normal.   Neurological: She is alert and oriented to person, place, and time.   Skin: Skin is warm.     Interval History: Patient continues to be very lethargic & mumbles yes/no occasionally on exam.  Not following commands.      Medications:  Continuous Infusions:     Scheduled Meds:   [START ON 7/10/2017] desmopressin  10 mcg Nasal Daily    enoxaparin  40 mg Subcutaneous Daily    gabapentin  125 mg Per NG tube Q12H    levetiracetam oral soln  500 mg Per NG tube BID    levothyroxine  50 mcg Per NG tube Daily    nicotine  1 patch Transdermal Daily     PRN Meds:acetaminophen, dextrose 50%, glucagon (human recombinant), insulin aspart, ondansetron, potassium chloride 10%     Review of Systems  Objective:     Weight: 59.9 kg (132 lb)  Body mass index is 24.14 kg/m².  Vital Signs (Most Recent):  Temp: 97.2 °F (36.2 °C) (17 1635)  Pulse: 62 (17 1635)  Resp: 17 (17 1635)  BP: 121/70 (17 1635)  SpO2: 100 % (17 1635) Vital Signs (24h Range):  Temp:  [96.9 °F (36.1 °C)-97.9 °F (36.6 °C)] 97.2 °F (36.2 °C)  Pulse:  [62-91] 62  Resp:  [16-18] 17  SpO2:  [95 %-100 %] 100 %  BP: (112-124)/(62-95) 121/70              Temp (24hrs), Av.3 °F (36.3 °C), Min:96.9 °F (36.1 °C), Max:97.9 °F (36.6 °C)                NG/OG Tube 17 0505 [place] sump;nasogastric 14 Fr. (Active)   Placement Check placement verified by x-ray 2017  8:42 AM   Distal Tube Length (cm) 65 2017  8:42 AM   Tolerance no signs/symptoms of discomfort 2017  8:42 AM   Securement anchored to nostril center w/ adhesive device 2017  8:42 AM   Clamp Status/Tolerance unclamped 2017  8:42 AM   Insertion Site Appearance no redness, warmth, tenderness, skin breakdown,  drainage 7/5/2017  8:42 AM   Flush/Irrigation flushed w/;water 7/5/2017  8:42 AM   Feeding Method continuous 7/5/2017  8:42 AM   Current Rate (mL/hr) 40 mL/hr 7/5/2017  8:42 AM   Goal Rate (mL/hr) 40 mL/hr 7/5/2017  8:42 AM   Intake (mL) 175 mL 7/5/2017  8:42 AM   Intake (mL) - Breast Milk Tube Feeding 60 7/2/2017  9:00 AM   Rate Formula Tube Feeding (mL/hr) 40 mL/hr 7/4/2017  7:59 AM   Intake (mL) - Formula Tube Feeding 120 7/4/2017 11:00 PM   Residual Amount (ml) 0 ml 7/4/2017  7:59 AM       Neurosurgery Physical Exam   General: well developed, well nourished, no distress.   Head: normocephalic  Neurologic: Obtunded  GCS: Motor: 5/Verbal: 2 /Eyes: 3 GCS Total: 10  Mental Status: Drowsy, arousable. Non verbal. Will not follow commands.   Language: unable to assess 2/2 mental status  Speech: unable to assess 2/2 mental status  Cranial nerves: face symmetric, CN II-XII grossly intact.   Eyes: pupils equal, round, reactive to light.  Pulmonary: normal respirations, no signs of respiratory distress  Abdomen: soft, non-distended, not tender to palpation  Sensory: response to light touch throughout  Motor Strength: Moves all extremities spontaneously with good strength and tone. Will not follow commands today. Unable to assess individual muscle groups.   Pronator Drift: unable to assess 2/2 mental status  Finger-to-nose: unable to assess 2/2 mental status  Clonus: absent  Babinski: absent  No LE edema  Skin: Skin is warm, dry and intact.    Significant Labs:    Recent Labs  Lab 07/09/17  0400 07/09/17  0818   GLU  --  84   NA  --  134*   K  --  3.3*   CL  --  95   CO2  --  31*   BUN  --  9   CREATININE  --  0.6   CALCIUM  --  10.3   MG 1.9  --        Recent Labs  Lab 07/08/17  0631 07/09/17  0400   WBC 9.66 8.81   HGB 10.7* 10.0*   HCT 32.5* 30.1*   * 403*     No results for input(s): LABPT, INR, APTT in the last 48 hours.  Microbiology Results (last 7 days)     Procedure Component Value Units Date/Time    Blood  culture [[id number]] Collected:  07/03/17 0934    Order Status:  Completed Specimen:  Blood from Peripheral, Hand, Left Updated:  07/08/17 1212     Blood Culture, Routine No growth after 5 days.    Narrative:       Blood cultures from 2 different sites. 4 bottles total.  Please draw before starting antibiotics.    Blood culture [[id number]] Collected:  07/03/17 0954    Order Status:  Completed Specimen:  Blood from Peripheral, Wrist, Right Updated:  07/08/17 1212     Blood Culture, Routine No growth after 5 days.    Narrative:       Blood cultures x 2 different sites. 4 bottles total. Please  draw cultures before administering antibiotics.          Significant Diagnostics:  No new imaging    Physical Exam      Assessment/Plan:      Acute encephalopathy    Is improving, verbal today.  EEG today  Pulled NGT ou t/7/8  Pureed diet trila        Tobacco abuse    Patient was counseled on smoking cessation and she will be provided a nicotine transdermal patch applied while inpatient.  Will provide additional smoking cessation counseling prior to discharge.        Secondary hypothyroidism    As addressed above.  Will continue her home regimen of levothyroxine.        Diabetes insipidus    As addressed above.  Follow sodium and UOP  Decrease DDAVP to daily, Na improved.        Meningioma, recurrent of brain    Uncertain prognosis  NS is following            VTE Risk Mitigation         Ordered     High Risk of VTE  Once      06/30/17 0920     Place sequential compression device  Until discontinued      06/30/17 0920     enoxaparin injection 40 mg  Daily     Route:  Subcutaneous        06/28/17 2561          Chino Wiley MD  Department of Hospital Medicine   [facility]

## 2017-07-09 NOTE — SUBJECTIVE & OBJECTIVE
Interval History: Patient continues to be very lethargic & mumbles yes/no occasionally on exam.  Not following commands.      Medications:  Continuous Infusions:   dextrose 5 % 75 mL/hr at 17     Scheduled Meds:   desmopressin  10 mcg Nasal BID    enoxaparin  40 mg Subcutaneous Daily    gabapentin  125 mg Per NG tube Q12H    levetiracetam oral soln  500 mg Per NG tube BID    levothyroxine  50 mcg Per NG tube Daily    nicotine  1 patch Transdermal Daily     PRN Meds:acetaminophen, dextrose 50%, glucagon (human recombinant), insulin aspart, ondansetron, potassium chloride 10%     Review of Systems  Objective:     Weight: 59.9 kg (132 lb)  Body mass index is 24.14 kg/m².  Vital Signs (Most Recent):  Temp: 97.8 °F (36.6 °C) (17)  Pulse: 71 (17)  Resp: 20 (17)  BP: (!) 116/58 (17)  SpO2: (!) 94 % (17) Vital Signs (24h Range):  Temp:  [97.4 °F (36.3 °C)-97.8 °F (36.6 °C)] 97.8 °F (36.6 °C)  Pulse:  [] 71  Resp:  [16-20] 20  SpO2:  [94 %-99 %] 94 %  BP: (112-131)/(58-88) 116/58              Temp (24hrs), Av.6 °F (36.4 °C), Min:97.4 °F (36.3 °C), Max:97.8 °F (36.6 °C)          Date 17 07 - 17 0659   Shift 4437-3794 5554-5924 1046-0248 24 Hour Total   I  N  T  A  K  E   I.V.  (mL/kg)  987.5  (16.5)  987.5  (16.5)    NG/   230    Shift Total  (mL/kg) 230  (3.8) 987.5  (16.5)  1217.5  (20.3)   O  U  T  P  U  T   Shift Total  (mL/kg)       Weight (kg) 59.9 59.9 59.9 59.9          NG/OG Tube 17 0505 [place] sump;nasogastric 14 Fr. (Active)   Placement Check placement verified by x-ray 2017  8:42 AM   Distal Tube Length (cm) 65 2017  8:42 AM   Tolerance no signs/symptoms of discomfort 2017  8:42 AM   Securement anchored to nostril center w/ adhesive device 2017  8:42 AM   Clamp Status/Tolerance unclamped 2017  8:42 AM   Insertion Site Appearance no redness, warmth, tenderness, skin breakdown, drainage  7/5/2017  8:42 AM   Flush/Irrigation flushed w/;water 7/5/2017  8:42 AM   Feeding Method continuous 7/5/2017  8:42 AM   Current Rate (mL/hr) 40 mL/hr 7/5/2017  8:42 AM   Goal Rate (mL/hr) 40 mL/hr 7/5/2017  8:42 AM   Intake (mL) 175 mL 7/5/2017  8:42 AM   Intake (mL) - Breast Milk Tube Feeding 60 7/2/2017  9:00 AM   Rate Formula Tube Feeding (mL/hr) 40 mL/hr 7/4/2017  7:59 AM   Intake (mL) - Formula Tube Feeding 120 7/4/2017 11:00 PM   Residual Amount (ml) 0 ml 7/4/2017  7:59 AM       Physical Exam:    Constitutional: She appears well-developed and well-nourished.     Eyes: Pupils are equal, round, and reactive to light. EOM are normal.     Abdominal: Soft. Bowel sounds are normal.     Psych/Behavior: She is alert. She is oriented to person, place, and time.      General: well developed, well nourished, no distress.   Head: normocephalic  Neurologic: Obtunded  GCS: Motor: 5/Verbal: 2 /Eyes: 3 GCS Total: 10  Mental Status: Drowsy, arousable. Non verbal. Will not follow commands.   Language: unable to assess 2/2 mental status  Speech: unable to assess 2/2 mental status  Cranial nerves: face symmetric, CN II-XII grossly intact.   Eyes: pupils equal, round, reactive to light.  Pulmonary: normal respirations, no signs of respiratory distress  Abdomen: soft, non-distended, not tender to palpation  Sensory: response to light touch throughout  Motor Strength: Moves all extremities spontaneously with good strength and tone. Will not follow commands today. Unable to assess individual muscle groups.   Pronator Drift: unable to assess 2/2 mental status  Finger-to-nose: unable to assess 2/2 mental status  Clonus: absent  Babinski: absent  No LE edema  Skin: Skin is warm, dry and intact.    Significant Labs:    Recent Labs  Lab 07/08/17  0631  07/08/17 1958   GLU  --   < > 91   NA  --   < > 136   K  --   < > 3.7   CL  --   < > 97   CO2  --   < > 32*   BUN  --   < > 11   CREATININE  --   < > 0.7   CALCIUM  --   < > 10.5   MG 1.7   --   --    < > = values in this interval not displayed.    Recent Labs  Lab 07/07/17  0439 07/08/17  0631   WBC 10.37 9.66   HGB 11.3* 10.7*   HCT 35.1* 32.5*   * 427*     No results for input(s): LABPT, INR, APTT in the last 48 hours.  Microbiology Results (last 7 days)     Procedure Component Value Units Date/Time    Blood culture [[id number]] Collected:  07/03/17 0934    Order Status:  Completed Specimen:  Blood from Peripheral, Hand, Left Updated:  07/08/17 1212     Blood Culture, Routine No growth after 5 days.    Narrative:       Blood cultures from 2 different sites. 4 bottles total.  Please draw before starting antibiotics.    Blood culture [[id number]] Collected:  07/03/17 0954    Order Status:  Completed Specimen:  Blood from Peripheral, Wrist, Right Updated:  07/08/17 1212     Blood Culture, Routine No growth after 5 days.    Narrative:       Blood cultures x 2 different sites. 4 bottles total. Please  draw cultures before administering antibiotics.          Significant Diagnostics:  No new imaging    Physical Exam   Constitutional: She is oriented to person, place, and time. She appears well-developed and well-nourished.   HENT:   Head: Normocephalic and atraumatic.   Eyes: EOM are normal. Pupils are equal, round, and reactive to light.   Neck: Neck supple.   Pulmonary/Chest: Effort normal and breath sounds normal.   Abdominal: Soft. Bowel sounds are normal.   Neurological: She is alert and oriented to person, place, and time.   Skin: Skin is warm.     Interval History: Patient continues to be very lethargic & mumbles yes/no occasionally on exam.  Not following commands.      Medications:  Continuous Infusions:   dextrose 5 % 75 mL/hr at 07/06/17 2010     Scheduled Meds:   desmopressin  10 mcg Nasal BID    enoxaparin  40 mg Subcutaneous Daily    gabapentin  125 mg Per NG tube Q12H    levetiracetam oral soln  500 mg Per NG tube BID    levothyroxine  50 mcg Per NG tube Daily    nicotine  1  patch Transdermal Daily     PRN Meds:acetaminophen, dextrose 50%, glucagon (human recombinant), insulin aspart, ondansetron, potassium chloride 10%     Review of Systems  Objective:     Weight: 59.9 kg (132 lb)  Body mass index is 24.14 kg/m².  Vital Signs (Most Recent):  Temp: 97.8 °F (36.6 °C) (17 1105)  Pulse: 71 (17)  Resp: 20 (17)  BP: (!) 116/58 (17 1105)  SpO2: (!) 94 % (17 110) Vital Signs (24h Range):  Temp:  [97.4 °F (36.3 °C)-97.8 °F (36.6 °C)] 97.8 °F (36.6 °C)  Pulse:  [] 71  Resp:  [16-20] 20  SpO2:  [94 %-99 %] 94 %  BP: (112-131)/(58-88) 116/58              Temp (24hrs), Av.6 °F (36.4 °C), Min:97.4 °F (36.3 °C), Max:97.8 °F (36.6 °C)          Date 17 07 - 17 0659   Shift 6411-6777 7039-1005 2288-6623 24 Hour Total   I  N  T  A  K  E   I.V.  (mL/kg)  987.5  (16.5)  987.5  (16.5)    NG/   230    Shift Total  (mL/kg) 230  (3.8) 987.5  (16.5)  1217.5  (20.3)   O  U  T  P  U  T   Shift Total  (mL/kg)       Weight (kg) 59.9 59.9 59.9 59.9          NG/OG Tube 17 0505 [place] sump;nasogastric 14 Fr. (Active)   Placement Check placement verified by x-ray 2017  8:42 AM   Distal Tube Length (cm) 65 2017  8:42 AM   Tolerance no signs/symptoms of discomfort 2017  8:42 AM   Securement anchored to nostril center w/ adhesive device 2017  8:42 AM   Clamp Status/Tolerance unclamped 2017  8:42 AM   Insertion Site Appearance no redness, warmth, tenderness, skin breakdown, drainage 2017  8:42 AM   Flush/Irrigation flushed w/;water 2017  8:42 AM   Feeding Method continuous 2017  8:42 AM   Current Rate (mL/hr) 40 mL/hr 2017  8:42 AM   Goal Rate (mL/hr) 40 mL/hr 2017  8:42 AM   Intake (mL) 175 mL 2017  8:42 AM   Intake (mL) - Breast Milk Tube Feeding 60 2017  9:00 AM   Rate Formula Tube Feeding (mL/hr) 40 mL/hr 2017  7:59 AM   Intake (mL) - Formula Tube Feeding 120 2017 11:00 PM   Residual  Amount (ml) 0 ml 7/4/2017  7:59 AM       Neurosurgery Physical Exam   General: well developed, well nourished, no distress.   Head: normocephalic  Neurologic: Obtunded  GCS: Motor: 5/Verbal: 2 /Eyes: 3 GCS Total: 10  Mental Status: Drowsy, arousable. Non verbal. Will not follow commands.   Language: unable to assess 2/2 mental status  Speech: unable to assess 2/2 mental status  Cranial nerves: face symmetric, CN II-XII grossly intact.   Eyes: pupils equal, round, reactive to light.  Pulmonary: normal respirations, no signs of respiratory distress  Abdomen: soft, non-distended, not tender to palpation  Sensory: response to light touch throughout  Motor Strength: Moves all extremities spontaneously with good strength and tone. Will not follow commands today. Unable to assess individual muscle groups.   Pronator Drift: unable to assess 2/2 mental status  Finger-to-nose: unable to assess 2/2 mental status  Clonus: absent  Babinski: absent  No LE edema  Skin: Skin is warm, dry and intact.    Significant Labs:    Recent Labs  Lab 07/08/17  0631  07/08/17  1958   GLU  --   < > 91   NA  --   < > 136   K  --   < > 3.7   CL  --   < > 97   CO2  --   < > 32*   BUN  --   < > 11   CREATININE  --   < > 0.7   CALCIUM  --   < > 10.5   MG 1.7  --   --    < > = values in this interval not displayed.    Recent Labs  Lab 07/07/17  0439 07/08/17  0631   WBC 10.37 9.66   HGB 11.3* 10.7*   HCT 35.1* 32.5*   * 427*     No results for input(s): LABPT, INR, APTT in the last 48 hours.  Microbiology Results (last 7 days)     Procedure Component Value Units Date/Time    Blood culture [[id number]] Collected:  07/03/17 0934    Order Status:  Completed Specimen:  Blood from Peripheral, Hand, Left Updated:  07/08/17 1212     Blood Culture, Routine No growth after 5 days.    Narrative:       Blood cultures from 2 different sites. 4 bottles total.  Please draw before starting antibiotics.    Blood culture [[id number]] Collected:  07/03/17 0961     Order Status:  Completed Specimen:  Blood from Peripheral, Wrist, Right Updated:  07/08/17 1212     Blood Culture, Routine No growth after 5 days.    Narrative:       Blood cultures x 2 different sites. 4 bottles total. Please  draw cultures before administering antibiotics.          Significant Diagnostics:  No new imaging    Physical Exam

## 2017-07-09 NOTE — SUBJECTIVE & OBJECTIVE
Interval History: No acute events overnight. Not opening eyes, but remains verbal today     Medications:  Continuous Infusions:     Scheduled Meds:   [START ON 7/10/2017] desmopressin  10 mcg Nasal Daily    enoxaparin  40 mg Subcutaneous Daily    gabapentin  125 mg Per NG tube Q12H    levetiracetam oral soln  500 mg Per NG tube BID    levothyroxine  50 mcg Per NG tube Daily    nicotine  1 patch Transdermal Daily     PRN Meds:acetaminophen, dextrose 50%, glucagon (human recombinant), insulin aspart, ondansetron, potassium chloride 10%     Review of Systems       Objective:     Weight: 59.9 kg (132 lb)  Body mass index is 24.14 kg/m².  Vital Signs (Most Recent):  Temp: 96.9 °F (36.1 °C) (17 0800)  Pulse: 76 (17 1205)  Resp: 18 (17 1205)  BP: 112/66 (17 1205)  SpO2: 98 % (17 1205) Vital Signs (24h Range):  Temp:  [96.9 °F (36.1 °C)-97.9 °F (36.6 °C)] 96.9 °F (36.1 °C)  Pulse:  [71-91] 76  Resp:  [16-18] 18  SpO2:  [95 %-98 %] 98 %  BP: (112-124)/(62-95) 112/66              Temp (24hrs), Av.3 °F (36.3 °C), Min:96.9 °F (36.1 °C), Max:97.9 °F (36.6 °C)                 NG/OG Tube 17 0505 [place] sump;nasogastric 14 Fr. (Active)   Placement Check placement verified by aspirate characteristics 2017  8:00 AM   Distal Tube Length (cm) 65 2017 10:57 PM   Tolerance no signs/symptoms of discomfort 2017  8:00 AM   Securement taped to cheek 2017  8:00 AM   Clamp Status/Tolerance unclamped 2017  8:00 AM   Insertion Site Appearance no redness, warmth, tenderness, skin breakdown, drainage 2017  8:00 AM   Flush/Irrigation flushed w/;sterile normal saline;no resistance met 2017  8:00 AM   Feeding Method continuous 2017  8:00 AM   Current Rate (mL/hr) 40 mL/hr 2017  8:00 AM   Goal Rate (mL/hr) 40 mL/hr 2017  8:00 AM   Intake (mL) 175 mL 2017 10:57 PM   Intake (mL) - Breast Milk Tube Feeding 60 2017  9:00 AM   Rate Formula Tube Feeding (mL/hr) 40  mL/hr 7/7/2017  8:00 AM   Intake (mL) - Formula Tube Feeding 480 7/6/2017  6:00 PM   Residual Amount (ml) 5 ml 7/7/2017  8:00 AM       Neurosurgery Physical Exam    General: well developed, well nourished, no distress.   Head: normocephalic  Neurologic: lethargic  GCS: Motor: 5/Verbal: 4 /Eyes: 2 GCS Total: 11  Mental Status: Able to state name. Intermittently follows simple commands.   Cranial nerves: face symmetric, CN II-XII grossly intact.   Eyes: pupils equal, round, reactive to light.  Pulmonary: normal respirations, no signs of respiratory distress  Abdomen: soft, non-distended, not tender to palpation  Sensory: response to light touch throughout  Motor Strength: Moves all extremities spontaneously with good strength and tone. Intermittently follows simple commands.   Unable to assess individual muscle groups.   Pronator Drift: unable to assess 2/2 mental status  Finger-to-nose: unable to assess 2/2 mental status  Clonus: absent  Babinski: absent  No LE edema  Skin: Skin is warm, dry and intact.      Significant Labs:    Recent Labs  Lab 07/09/17  0400 07/09/17  0818   GLU  --  84   NA  --  134*   K  --  3.3*   CL  --  95   CO2  --  31*   BUN  --  9   CREATININE  --  0.6   CALCIUM  --  10.3   MG 1.9  --        Recent Labs  Lab 07/08/17  0631 07/09/17  0400   WBC 9.66 8.81   HGB 10.7* 10.0*   HCT 32.5* 30.1*   * 403*     No results for input(s): LABPT, INR, APTT in the last 48 hours.  Microbiology Results (last 7 days)     Procedure Component Value Units Date/Time    Blood culture [[id number]] Collected:  07/03/17 0934    Order Status:  Completed Specimen:  Blood from Peripheral, Hand, Left Updated:  07/08/17 1212     Blood Culture, Routine No growth after 5 days.    Narrative:       Blood cultures from 2 different sites. 4 bottles total.  Please draw before starting antibiotics.    Blood culture [[id number]] Collected:  07/03/17 0954    Order Status:  Completed Specimen:  Blood from Peripheral, Wrist,  Right Updated:  07/08/17 1212     Blood Culture, Routine No growth after 5 days.    Narrative:       Blood cultures x 2 different sites. 4 bottles total. Please  draw cultures before administering antibiotics.

## 2017-07-09 NOTE — PROGRESS NOTES
Potassium 3.3. Call placed to MD to obtain new order, order rec'd to administer standing potassium PRN order, however pt is uncooperative and is refusing PO meds at this time. Call placed to MD to notify. Awaiting response. Care is to be endorsed to oncoming shift nurse to re-attempt oral administration.

## 2017-07-09 NOTE — ASSESSMENT & PLAN NOTE
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 Notify NSGY for any changes.   -Continue Keppra for seizure prophylaxis  -Continue Lovenox for DVT prophylaxis  -Continue aggressive PT/OT  -Follow up EEG    -Continue management of electrolyte imbalances per primary team  -Consider PEG placement   -Will continue to follow  -Pending Rehab per primary team

## 2017-07-10 NOTE — SUBJECTIVE & OBJECTIVE
Interval History:  NAEON.  Pt more awake today.  Denies N/V, visual changes, weakness, or seizures.        Medications:  Continuous Infusions:   Scheduled Meds:   desmopressin  10 mcg Nasal Daily    enoxaparin  40 mg Subcutaneous Daily    gabapentin  125 mg Per NG tube Q12H    levetiracetam oral soln  500 mg Per NG tube BID    levothyroxine  50 mcg Per NG tube Daily    nicotine  1 patch Transdermal Daily     PRN Meds:acetaminophen, dextrose 50%, glucagon (human recombinant), insulin aspart, ondansetron, potassium chloride 10%     Review of Systems  Objective:     Weight: 59.9 kg (132 lb)  Body mass index is 24.14 kg/m².  Vital Signs (Most Recent):  Temp: 98.6 °F (37 °C) (07/10/17 1658)  Pulse: (!) 113 (07/10/17 1658)  Resp: 17 (07/10/17 1658)  BP: 94/62 (07/10/17 1658)  SpO2: 96 % (07/10/17 1658) Vital Signs (24h Range):  Temp:  [93.4 °F (34.1 °C)-98.6 °F (37 °C)] 98.6 °F (37 °C)  Pulse:  [] 113  Resp:  [17-19] 17  SpO2:  [95 %-100 %] 96 %  BP: ()/(62-82) 94/62              Temp (24hrs), Av.5 °F (35.8 °C), Min:93.4 °F (34.1 °C), Max:98.6 °F (37 °C)           Date 07/10/17 0700 - 17 0659   Shift 2865-3572 0100-5460 6112-7860 24 Hour Total   I  N  T  A  K  E   P.O. 80   80    Shift Total  (mL/kg) 80  (1.3)   80  (1.3)   O  U  T  P  U  T   Urine  (mL/kg/hr) 1  (0)   1    Shift Total  (mL/kg) 1  (0)   1  (0)   Weight (kg) 59.9 59.9 59.9 59.9          Neurosurgery Physical Exam   General: well developed, well nourished, no distress.   Head: normocephalic  Neurologic: lethargic  GCS: Motor: 6/Verbal: 4 /Eyes: 2 GCS Total: 12  Mental Status: Able to state name. Intermittently follows simple commands.   Cranial nerves: face symmetric, CN II-XII grossly intact.   Eyes: pupils equal, round, reactive to light.  Pulmonary: normal respirations, no signs of respiratory distress  Abdomen: soft, non-distended, not tender to palpation  Sensory: response to light touch throughout  Motor Strength: Moves all  extremities spontaneously with good strength and tone. Intermittently follows simple commands.   Unable to assess individual muscle groups.   Pronator Drift: unable to assess 2/2 mental status  Finger-to-nose: unable to assess 2/2 mental status  Clonus: absent  Babinski: absent  No LE edema  Skin: Skin is warm, dry and intact.    Significant Labs:    Recent Labs  Lab 07/10/17  0410 07/10/17  0825   GLU  --  74   NA  --  142   K  --  3.6   CL  --  100   CO2  --  30*   BUN  --  7   CREATININE  --  0.7   CALCIUM  --  11.3*   MG 2.2  --        Recent Labs  Lab 07/09/17  0400 07/10/17  0410   WBC 8.81 7.35   HGB 10.0* 10.9*   HCT 30.1* 32.0*   * 434*     No results for input(s): LABPT, INR, APTT in the last 48 hours.  Microbiology Results (last 7 days)     Procedure Component Value Units Date/Time    Blood culture [[id number]] Collected:  07/03/17 0934    Order Status:  Completed Specimen:  Blood from Peripheral, Hand, Left Updated:  07/08/17 1212     Blood Culture, Routine No growth after 5 days.    Narrative:       Blood cultures from 2 different sites. 4 bottles total.  Please draw before starting antibiotics.    Blood culture [[id number]] Collected:  07/03/17 0954    Order Status:  Completed Specimen:  Blood from Peripheral, Wrist, Right Updated:  07/08/17 1212     Blood Culture, Routine No growth after 5 days.    Narrative:       Blood cultures x 2 different sites. 4 bottles total. Please  draw cultures before administering antibiotics.

## 2017-07-10 NOTE — PROGRESS NOTES
[facility]  Adult Nutrition  Progress Note    SUMMARY     Recommendations    Recommendation/Intervention:   1. Encourage po intake of Regular diet with texture per SLP - pureed currently as appropriate.   2. Continue TF while po intake remains insufficient to meet needs. TF order meets needs with Isosource 1.5 @ 40ml/hr.   RD to monitor    Goals: Pt to receive >90% of EEN via TF or po intake  Nutrition Goal Status: progressing towards goal  Communication of RD Recs: reviewed with RN    Reason for Assessment  Reason for Assessment: RD follow-up  Diagnosis: other (see comments) (AMS and hypernatremia)  Relevent Medical History: hypothryodism, olfactory grooce meningioma, s/p crani   Interdisciplinary Rounds: did not attend     General Information Comments: Pt with diet advanced on pureed diet, TF continues to run. Noted pt with continued confusion.    Nutrition Discharge Planning: unable to determine at this time    Nutrition Prescription Ordered  Current Diet Order: Pureed  Nutrition Order Comments: TF still ordered, minimal PO intake  Current Nutrition Support Formula Ordered: Isosource 1.5  Current Nutrition Support Rate Ordered: 40 (ml)  Current Nutrition Support Frequency Ordered: mL/hr    Evaluation of Received Nutrients/Fluid Intake  Enteral Calories (kcal): 1440  Enteral Protein (gm): 65  Enteral (Free Water) Fluid (mL): 733  Free Water Flush Fluid (mL): 700   Oral Fluid (mL): 120      Energy Calories Required: meeting needs  % Kcal Needs: 98      Protein Required: meeting needs  % Protein Needs: 100      Fluid Required: meeting needs  Comments: +I/O overall, -I/O overnight, +BM, good UOP  Tolerance: tolerating  % Intake of Estimated Energy Needs: 75 - 100 %  % Meal Intake: Other: PO pureed with TF     Nutrition/Diet History  Patient Reported Diet/Restrictions/Preferences:  (JAVIER)  Typical Food/Fluid Intake: Pt on pureed diet w/o po intake noted. TF ordered - pt in wrist restraints  Food  "Preferences: JAVIER     Factors Affecting Nutritional Intake: other (see comments) (pureed)     Labs/Tests/Procedures/Meds  Pertinent Labs Reviewed: reviewed  Pertinent Labs Comments: CO2 31, Glu 8, Kevan 10.8  Pertinent Medications Reviewed: reviewed  Pertinent Medications Comments: insulin    Physical Findings  Overall Physical Appearance: nourished, lethargic  Tubes: nasogastric tube     Skin: incision    Anthropometrics  Temp: 97.6 °F (36.4 °C)     Height: 5' 2" (157.5 cm)  Weight Method: Bed Scale  Weight: 59.9 kg (132 lb)     Ideal Body Weight (IBW), Female: 110 lb     % Ideal Body Weight, Female (lb): 107.27 lb  BMI (Calculated): 21.6  BMI Grade: 18.5-24.9 - normal        Estimated/Assessed Needs  Weight Used For Calorie Calculations: 54.4 kg (119 lb 14.9 oz)      Energy Calorie Requirements (kcal): 1406  Energy Need Method: Beaufort-St Jeor (PAL 1.25)   RMR (Beaufort-St. Jeor Equation): 1112.25        Weight Used For Protein Calculations: 54 kg (119 lb 0.8 oz)  Protein Requirements: 54-65g (1.0-1.2g/kg)    Fluid Requirements (mL): 1ml/kcal or per MD      RDA Method (mL): 1406     Assessment and Plan  No new Assessment & Plan notes have been filed under this hospital service since the last note was generated.  Service: Nutrition    Nutrition Problem  Swallowing difficulty    Related to (etiology):   AMS    Signs and Symptoms (as evidenced by):   Pureed texture foods pt requiring alternate means of nutrition to meet needs     Interventions/Recommendations (treatment strategy):  TF + PO with pureed foods     Nutrition Diagnosis Status:   New      Monitor and Evaluation    Food and Nutrient Intake: energy intake, enteral nutrition intake, food and beverage intake  Food and Nutrient Adminstration: diet order, enteral and parenteral nutrition administration        Anthropometric Measurements: weight, weight change, body mass index  Biochemical Data, Medical Tests and Procedures: electrolyte and renal panel, " glucose/endocrine profile, gastrointestinal profile, inflammatory profile, lipid profile  Nutrition-Focused Physical Findings: overall appearance    Nutrition Risk    Level of Risk: other (see comments) (f/u 1x/week)    Nutrition Follow-Up    RD Follow-up?: Yes

## 2017-07-10 NOTE — PROGRESS NOTES
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 Chew and discharged on 17 to [facility].  She presents with altered mental status and worsening hypernatremia for 1 day. She was treated for DI her last admission and labs are trending up today.  Spoke with Dr. Fairchild at [facility], patient with waxing/waning mental status yesterday, but was appropriate & following some complex commands yesterday.  There was a period yesterday of significant lethargy.  Patient is unable to give history, and information is taken from the chart.     Post-Op Info:  * No surgery found *         Interval History:  NAEON.  Pt more awake today.  Denies N/V, visual changes, weakness, or seizures.        Medications:  Continuous Infusions:   Scheduled Meds:   desmopressin  10 mcg Nasal Daily    enoxaparin  40 mg Subcutaneous Daily    gabapentin  125 mg Per NG tube Q12H    levetiracetam oral soln  500 mg Per NG tube BID    levothyroxine  50 mcg Per NG tube Daily    nicotine  1 patch Transdermal Daily     PRN Meds:acetaminophen, dextrose 50%, glucagon (human recombinant), insulin aspart, ondansetron, potassium chloride 10%     Review of Systems  Objective:     Weight: 59.9 kg (132 lb)  Body mass index is 24.14 kg/m².  Vital Signs (Most Recent):  Temp: 98.6 °F (37 °C) (07/10/17 1658)  Pulse: (!) 113 (07/10/17 1658)  Resp: 17 (07/10/17 1658)  BP: 94/62 (07/10/17 1658)  SpO2: 96 % (07/10/17 1658) Vital Signs (24h Range):  Temp:  [93.4 °F (34.1 °C)-98.6 °F (37 °C)] 98.6 °F (37 °C)  Pulse:  [] 113  Resp:  [17-19] 17  SpO2:  [95 %-100 %] 96 %  BP: ()/(62-82) 94/62              Temp (24hrs), Av.5 °F (35.8 °C), Min:93.4 °F (34.1 °C), Max:98.6 °F (37 °C)           Date 07/10/17 07 - 17 0659   Shift 7204-3061 0119-3461 8117-7833 24 Hour Total   I  N  T  A  K  E   P.O.  80   80    Shift Total  (mL/kg) 80  (1.3)   80  (1.3)   O  U  T  P  U  T   Urine  (mL/kg/hr) 1  (0)   1    Shift Total  (mL/kg) 1  (0)   1  (0)   Weight (kg) 59.9 59.9 59.9 59.9          Neurosurgery Physical Exam   General: well developed, well nourished, no distress.   Head: normocephalic  Neurologic: lethargic  GCS: Motor: 6/Verbal: 4 /Eyes: 2 GCS Total: 12  Mental Status: Able to state name. Intermittently follows simple commands.   Cranial nerves: face symmetric, CN II-XII grossly intact.   Eyes: pupils equal, round, reactive to light.  Pulmonary: normal respirations, no signs of respiratory distress  Abdomen: soft, non-distended, not tender to palpation  Sensory: response to light touch throughout  Motor Strength: Moves all extremities spontaneously with good strength and tone. Intermittently follows simple commands.   Unable to assess individual muscle groups.   Pronator Drift: unable to assess 2/2 mental status  Finger-to-nose: unable to assess 2/2 mental status  Clonus: absent  Babinski: absent  No LE edema  Skin: Skin is warm, dry and intact.    Significant Labs:    Recent Labs  Lab 07/10/17  0410 07/10/17  0825   GLU  --  74   NA  --  142   K  --  3.6   CL  --  100   CO2  --  30*   BUN  --  7   CREATININE  --  0.7   CALCIUM  --  11.3*   MG 2.2  --        Recent Labs  Lab 07/09/17  0400 07/10/17  0410   WBC 8.81 7.35   HGB 10.0* 10.9*   HCT 30.1* 32.0*   * 434*     No results for input(s): LABPT, INR, APTT in the last 48 hours.  Microbiology Results (last 7 days)     Procedure Component Value Units Date/Time    Blood culture [[id number]] Collected:  07/03/17 0934    Order Status:  Completed Specimen:  Blood from Peripheral, Hand, Left Updated:  07/08/17 1212     Blood Culture, Routine No growth after 5 days.    Narrative:       Blood cultures from 2 different sites. 4 bottles total.  Please draw before starting antibiotics.    Blood culture [[id number]] Collected:  07/03/17 0954    Order Status:   Completed Specimen:  Blood from Peripheral, Wrist, Right Updated:  07/08/17 1212     Blood Culture, Routine No growth after 5 days.    Narrative:       Blood cultures x 2 different sites. 4 bottles total. Please  draw cultures before administering antibiotics.            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 Notify NSGY for any changes.   -Continue Keppra for seizure prophylaxis  -Continue Lovenox for DVT prophylaxis  -Continue aggressive PT/OT  -EEG pending     -Continue management of electrolyte imbalances per primary team  -Consider PEG placement if po intake continues to not be sufficient   -Will continue to follow, please call with questions   -Pending Rehab per primary team            ALANNAH Peña  Neurosurgery  [facility]

## 2017-07-10 NOTE — PT/OT/SLP RE-EVAL
Physical Therapy  Re-evaluation    Joanna Morales   MRN: [id number]   Admitting Diagnosis: Hypernatremia    PT Received On: 07/10/17  PT Start Time: 1320     PT Stop Time: 1340    PT Total Time (min): 20 min       Billable Minutes:  Re-eval 12 and Therapeutic Activity 8    Diagnosis: Hypernatremia  Readmit from IP Rehab; s/p craniotomy    Past Medical History:   Diagnosis Date    Allergy     Basal cell carcinoma     Depression 11/1/2016    Essential hypertension 6/9/2017    Eye disorder     Fever blister     Normocytic anemia 6/9/2017    Secondary hypothyroidism 6/26/2017      Past Surgical History:   Procedure Laterality Date    BASAL CELL CARCINOMA EXCISION      forehead    BRAIN SURGERY      SKIN BIOPSY         Referring physician: Chino Wiley  Date referred to PT: 7/10/2017    General Precautions: Standard, fall, aspiration, vision impaired  Orthopedic Precautions: N/A   Braces: N/A       Do you have any cultural, spiritual, Anglican conflicts, given your current situation?: none    Patient History:  Lives With: other relative(s) (per previous notes but will need to be confirmed, cousins)  Living Arrangements: apartment (2nd floor)  Equipment Currently Used at Home:  (SC, RW (needs to be confirmed))  DME owned (not currently used): TBD    Previous Level of Function:       Subjective:  Communicated with nsg prior to session.    Chief Complaint: sleepiness; wanting to return to sleep  Patient goals: to improve mobility    Pain/Comfort  Pain Rating 1: 0/10    Pt verbally agreeable to treatment session.     Objective:   Patient found with: restraints, telemetry, peripheral IV ([facility]); found supine in bed      Cognitive Exam:  Oriented to: Person; unable to report place, time, or situation    Follows Commands/attention: Easily distracted and Follows one-step commands, followed less than 25% of commands, req max tactile stimulation for command following  Communication: slurred speech  pattern  Safety awareness/insight to disability: impaired    Physical Exam:  Postural examination/scapula alignment: Rounded shoulder, Head forward and Affected scapula abducted    Skin integrity: Bruising of (B) UE  Edema: None noted in BLE    Sensation:   Intact per pt report      Lower Extremity Range of Motion:  Right Lower Extremity: unable to fully assess due to pt lethargy; appears to have WFL movement  Left Lower Extremity:unable to fully assess due to pt lethargy; appears to have WFL movement    Lower Extremity Strength:  Right Lower Extremity:unable to assess via MMT; appears to have at least 4-/5 based on functional mobility  Left Lower Extremity: unable to assess via MMT; appears to have at least 3+/5 based on functional mobility (decr strength appears due to difficulty with BM and unable to fully support weight correctly during standing)     Fine motor coordination:  Unable to assess    Gross motor coordination: discoordinated trunk and extremity movement affecting overall motor coordination    Functional Mobility:  Bed Mobility:  Rolling/Turning to Left: Moderate assistance  Scooting/Bridging: Moderate Assistance  Supine to Sit: Maximum Assistance  Sit to Supine: Maximum Assistance    Transfers:  Sit <> Stand Assistance: Maximum Assistance  Sit <> Stand Assistive Device: No Assistive Device    Gait:   Gait Distance: unable to perform due to incr WB through R LE; unable to shift weight or advance (B) limbs    Balance:   Static Sit: FAIR: Maintains without assist, but unable to take any challenges   Dynamic Sit: FAIR: Cannot move trunk without losing balance  Static Stand: POOR+: Needs MINIMAL assist to maintain  Dynamic stand: POOR: N/A    Therapeutic Activities and Exercises:  Educated pt on role of PT, PT POC, and need to further mobility during hospital stay.    Pt sat EOB for 8 minutes with (B) UE support and CGA  - able to maintain support with eyes closed; req max cueing and stimulation to open  eyes, R sided gaze with pt unable to look in neutral position.     Pt stood at bedside for 1 minute with Moderate Assistance  - flexed thoracic spine, incr posterior pelvic tilt, and weight bearing through R LE  - unable to transition weight or advance L LE with therapist assistance.    Returned to supine position after this due to pt inability to further participate in therapy session.    EDUCATION  Pt educated pt on incr OOB activity including sitting in bedside chair majority of day when alert  Educated pt on being appropriate to transfer with nsg and PCT; safe to transfer with 1 person assistance pending alert status  **Mobility Technician appropriate to perform: out of bed, up to chair, back to bed, bed exercises, PROM  Updated white board with appropriate PT information; notified nsg   Pt verbalized agreement.     AM-PAC 6 CLICK MOBILITY  How much help from another person does this patient currently need?   1 = Unable, Total/Dependent Assistance  2 = A lot, Maximum/Moderate Assistance  3 = A little, Minimum/Contact Guard/Supervision  4 = None, Modified [place]/Independent    Turning over in bed (including adjusting bedclothes, sheets and blankets)?: 2  Sitting down on and standing up from a chair with arms (e.g., wheelchair, bedside commode, etc.): 2  Moving from lying on back to sitting on the side of the bed?: 2  Moving to and from a bed to a chair (including a wheelchair)?: 2  Need to walk in hospital room?: 1  Climbing 3-5 steps with a railing?: 1  Total Score: 10     AM-PAC Raw Score CMS G-Code Modifier Level of Impairment Assistance   6 % Total / Unable   7 - 9 CM 80 - 100% Maximal Assist   10 - 14 CL 60 - 80% Moderate Assist   15 - 19 CK 40 - 60% Moderate Assist   20 - 22 CJ 20 - 40% Minimal Assist   23 CI 1-20% SBA / CGA   24 CH 0% Independent/ Mod I     Patient left supine with all lines intact, call button in reach and restraints reapplied at end of session.    Assessment:   Joanna Arredondo  Carmen is a 51 y.o. female with a medical diagnosis of Hypernatremia and presents with impaired balance, endurance, strength, coordination, and cognition at this time. Pt recently discharged from IP rehab and admitted to hospital due to current hypernatremia and poor mental status.  During evaluation, pt with incr lethargy and req constant tactile stimulation to awaken for re-evaluation. Limited with assessment sec to this; unable to perform gait due to poor weight-shifting and weight-bearing through (B) LE.  Appropriate for return to IP Rehab to maximize return to PLOF.  Excellent inpatient rehab candidate; able to tolerate 3 hrs of therapy, motivated to improve mobility, great family support, and multiple neurological and cognitive impairments currently. Pt remains appropriate for continued skilled services and discharge to postacute location to address the below deficits and improve pt return to PLOF.      Rehab identified problem list/impairments: Rehab identified problem list/impairments: weakness, impaired endurance, impaired functional mobilty, impaired balance, visual deficits, gait instability, impaired cognition, decreased lower extremity function, decreased safety awareness, pain, decreased ROM, impaired joint extensibility    Rehab potential is good.    Activity tolerance: Poor    Discharge recommendations: Discharge Facility/Level Of Care Needs: rehabilitation facility     Barriers to discharge: Barriers to Discharge: Decreased caregiver support, Inaccessible home environment    Equipment recommendations: Equipment Needed After Discharge:  (TBD pending progress)     GOALS:    Physical Therapy Goals        Problem: Physical Therapy Goal    Goal Priority Disciplines Outcome Goal Variances Interventions   Physical Therapy Goal     PT/OT, PT Ongoing (interventions implemented as appropriate)     Description:  Goals to be met by: 7/20/17    Patient will increase functional independence with mobility by  performin. Supine to sit with Minimal Assistance  2. Sit to supine with Minimal Assistance  3. Sit to stand transfer with Minimal Assistance  4. Standing for 3 minutes with Minimal Assistance and weight evenly distributed through (B) LE.   5. Gait  x 20 feet with Moderate Assistance using AD if needed   6. Pt will perform (B) LE therapeutic exercises x 20 reps to improve muscular strength and endurance.                     PLAN:    Patient to be seen 5 x/week to address the above listed problems via gait training, therapeutic activities, therapeutic exercises, neuromuscular re-education  Plan of Care expires: 17  Plan of Care reviewed with: patient          Lynnette Holder, PT, DPT  07/10/2017

## 2017-07-10 NOTE — ASSESSMENT & PLAN NOTE
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 Notify NSGY for any changes.   -Continue Keppra for seizure prophylaxis  -Continue Lovenox for DVT prophylaxis  -Continue aggressive PT/OT  -EEG pending     -Continue management of electrolyte imbalances per primary team  -Consider PEG placement if po intake continues to not be sufficient   -Will continue to follow, please call with questions   -Pending Rehab per primary team

## 2017-07-10 NOTE — PLAN OF CARE
07/10/17 1433   Right Care Assessment   Can the patient answer the patient profile reliably? No, cognitively impaired   Describe the patient's ability to walk at the present time. Major restrictions/daily assistance from another person   How does the patient rate their overall health at the present time? Fair   Number of comorbid conditions (as recorded on the chart) Five or more   During the past month, has the patient often been bothered by feeling down, depressed or hopeless? No   Have you felt down, depressed, or hopeless? Unable to Assess   During the past month, has the patient often been bothered by little interest or pleasure in doing things? No   Have you felt little interest or pleasure in doing things? Unable to assess   Patient with increased responsiveness. PT/OT/ST reconsulted.

## 2017-07-10 NOTE — PLAN OF CARE
Problem: Physical Therapy Goal  Goal: Physical Therapy Goal  Goals to be met by: 17    Patient will increase functional independence with mobility by performin. Supine to sit with Minimal Assistance  2. Sit to supine with Minimal Assistance  3. Sit to stand transfer with Minimal Assistance  4. Standing for 3 minutes with Minimal Assistance and weight evenly distributed through (B) LE.   5. Gait  x 20 feet with Moderate Assistance using AD if needed   6. Pt will perform (B) LE therapeutic exercises x 20 reps to improve muscular strength and endurance.   Outcome: Ongoing (interventions implemented as appropriate)  PT re-eval completed. POC initiated.    Lynnette Holder, PT, DPT  7/10/2017

## 2017-07-10 NOTE — SUBJECTIVE & OBJECTIVE
Interval History: Patient continues to be very lethargic & mumbles yes/no occasionally on exam.  Not following commands.      Medications:  Continuous Infusions:     Scheduled Meds:   desmopressin  10 mcg Nasal Daily    enoxaparin  40 mg Subcutaneous Daily    gabapentin  125 mg Per NG tube Q12H    levetiracetam oral soln  500 mg Per NG tube BID    levothyroxine  50 mcg Per NG tube Daily    nicotine  1 patch Transdermal Daily     PRN Meds:acetaminophen, dextrose 50%, glucagon (human recombinant), insulin aspart, ondansetron, potassium chloride 10%     Review of Systems   Constitutional: Negative.    Eyes: Negative.    Respiratory: Negative.    Cardiovascular: Negative.      Objective:     Weight: 59.9 kg (132 lb)  Body mass index is 24.14 kg/m².  Vital Signs (Most Recent):  Temp: 98.6 °F (37 °C) (07/10/17 1658)  Pulse: (!) 113 (07/10/17 1658)  Resp: 17 (07/10/17 1658)  BP: 94/62 (07/10/17 1658)  SpO2: 96 % (07/10/17 1658) Vital Signs (24h Range):  Temp:  [93.4 °F (34.1 °C)-98.6 °F (37 °C)] 98.6 °F (37 °C)  Pulse:  [] 113  Resp:  [17-] 17  SpO2:  [95 %-100 %] 96 %  BP: ()/(62-82) 94/62              Temp (24hrs), Av.5 °F (35.8 °C), Min:93.4 °F (34.1 °C), Max:98.6 °F (37 °C)          Date 07/10/17 0700 - 17 0659   Shift 0398-1650 7819-4650 4709-8057 24 Hour Total   I  N  T  A  K  E   P.O. 80   80    Shift Total  (mL/kg) 80  (1.3)   80  (1.3)   O  U  T  P  U  T   Urine  (mL/kg/hr) 1  (0) 2  3    Shift Total  (mL/kg) 1  (0) 2  (0)  3  (0.1)   Weight (kg) 59.9 59.9 59.9 59.9          NG/OG Tube 17 0505 [place] wood;nasogastric 14 Fr. (Active)   Placement Check placement verified by x-ray 2017  8:42 AM   Distal Tube Length (cm) 65 2017  8:42 AM   Tolerance no signs/symptoms of discomfort 2017  8:42 AM   Securement anchored to nostril center w/ adhesive device 2017  8:42 AM   Clamp Status/Tolerance unclamped 2017  8:42 AM   Insertion Site Appearance no redness, warmth,  tenderness, skin breakdown, drainage 7/5/2017  8:42 AM   Flush/Irrigation flushed w/;water 7/5/2017  8:42 AM   Feeding Method continuous 7/5/2017  8:42 AM   Current Rate (mL/hr) 40 mL/hr 7/5/2017  8:42 AM   Goal Rate (mL/hr) 40 mL/hr 7/5/2017  8:42 AM   Intake (mL) 175 mL 7/5/2017  8:42 AM   Intake (mL) - Breast Milk Tube Feeding 60 7/2/2017  9:00 AM   Rate Formula Tube Feeding (mL/hr) 40 mL/hr 7/4/2017  7:59 AM   Intake (mL) - Formula Tube Feeding 120 7/4/2017 11:00 PM   Residual Amount (ml) 0 ml 7/4/2017  7:59 AM       Physical Exam:    Constitutional: She appears well-developed and well-nourished.     Eyes: Pupils are equal, round, and reactive to light. EOM are normal.     Abdominal: Soft. Bowel sounds are normal.     Psych/Behavior: She is alert. She is oriented to person, place, and time.      General: well developed, well nourished, no distress.   Head: normocephalic  Neurologic: Obtunded  GCS: Motor: 5/Verbal: 2 /Eyes: 3 GCS Total: 10  Mental Status: Drowsy, arousable. Non verbal. Will not follow commands.   Language: unable to assess 2/2 mental status  Speech: unable to assess 2/2 mental status  Cranial nerves: face symmetric, CN II-XII grossly intact.   Eyes: pupils equal, round, reactive to light.  Pulmonary: normal respirations, no signs of respiratory distress  Abdomen: soft, non-distended, not tender to palpation  Sensory: response to light touch throughout  Motor Strength: Moves all extremities spontaneously with good strength and tone. Will not follow commands today. Unable to assess individual muscle groups.   Pronator Drift: unable to assess 2/2 mental status  Finger-to-nose: unable to assess 2/2 mental status  Clonus: absent  Babinski: absent  No LE edema  Skin: Skin is warm, dry and intact.    Significant Labs:    Recent Labs  Lab 07/10/17  0410 07/10/17  0825   GLU  --  74   NA  --  142   K  --  3.6   CL  --  100   CO2  --  30*   BUN  --  7   CREATININE  --  0.7   CALCIUM  --  11.3*   MG 2.2  --         Recent Labs  Lab 07/09/17  0400 07/10/17  0410   WBC 8.81 7.35   HGB 10.0* 10.9*   HCT 30.1* 32.0*   * 434*     No results for input(s): LABPT, INR, APTT in the last 48 hours.  Microbiology Results (last 7 days)     Procedure Component Value Units Date/Time    Blood culture [[id number]] Collected:  07/03/17 0934    Order Status:  Completed Specimen:  Blood from Peripheral, Hand, Left Updated:  07/08/17 1212     Blood Culture, Routine No growth after 5 days.    Narrative:       Blood cultures from 2 different sites. 4 bottles total.  Please draw before starting antibiotics.    Blood culture [[id number]] Collected:  07/03/17 0954    Order Status:  Completed Specimen:  Blood from Peripheral, Wrist, Right Updated:  07/08/17 1212     Blood Culture, Routine No growth after 5 days.    Narrative:       Blood cultures x 2 different sites. 4 bottles total. Please  draw cultures before administering antibiotics.          Significant Diagnostics:  No new imaging    Physical Exam   Constitutional: She is oriented to person, place, and time. She appears well-developed and well-nourished.   HENT:   Head: Normocephalic and atraumatic.   Eyes: EOM are normal. Pupils are equal, round, and reactive to light.   Neck: Neck supple.   Pulmonary/Chest: Effort normal and breath sounds normal.   Abdominal: Soft. Bowel sounds are normal.   Neurological: She is alert and oriented to person, place, and time.   Skin: Skin is warm.     Interval History: Patient continues to be very lethargic & mumbles yes/no occasionally on exam.  Not following commands.      Medications:  Continuous Infusions:     Scheduled Meds:   desmopressin  10 mcg Nasal Daily    enoxaparin  40 mg Subcutaneous Daily    gabapentin  125 mg Per NG tube Q12H    levetiracetam oral soln  500 mg Per NG tube BID    levothyroxine  50 mcg Per NG tube Daily    nicotine  1 patch Transdermal Daily     PRN Meds:acetaminophen, dextrose 50%, glucagon (human recombinant),  insulin aspart, ondansetron, potassium chloride 10%     Review of Systems  Objective:     Weight: 59.9 kg (132 lb)  Body mass index is 24.14 kg/m².  Vital Signs (Most Recent):  Temp: 98.6 °F (37 °C) (07/10/17 1658)  Pulse: (!) 113 (07/10/17 1658)  Resp: 17 (07/10/17 1658)  BP: 94/62 (07/10/17 1658)  SpO2: 96 % (07/10/17 1658) Vital Signs (24h Range):  Temp:  [93.4 °F (34.1 °C)-98.6 °F (37 °C)] 98.6 °F (37 °C)  Pulse:  [] 113  Resp:  [-]   SpO2:  [95 %-100 %] 96 %  BP: ()/(62-82) 94/62              Temp (24hrs), Av.5 °F (35.8 °C), Min:93.4 °F (34.1 °C), Max:98.6 °F (37 °C)          Date 07/10/17 0700 - 17 0659   Shift 5210-6168 9812-5214 3821-8131 24 Hour Total   I  N  T  A  K  E   P.O. 80   80    Shift Total  (mL/kg) 80  (1.3)   80  (1.3)   O  U  T  P  U  T   Urine  (mL/kg/hr) 1  (0) 2  3    Shift Total  (mL/kg) 1  (0) 2  (0)  3  (0.1)   Weight (kg) 59.9 59.9 59.9 59.9          NG/OG Tube 17 0505 [place] sump;nasogastric 14 Fr. (Active)   Placement Check placement verified by x-ray 2017  8:42 AM   Distal Tube Length (cm) 65 2017  8:42 AM   Tolerance no signs/symptoms of discomfort 2017  8:42 AM   Securement anchored to nostril center w/ adhesive device 2017  8:42 AM   Clamp Status/Tolerance unclamped 2017  8:42 AM   Insertion Site Appearance no redness, warmth, tenderness, skin breakdown, drainage 2017  8:42 AM   Flush/Irrigation flushed w/;water 2017  8:42 AM   Feeding Method continuous 2017  8:42 AM   Current Rate (mL/hr) 40 mL/hr 2017  8:42 AM   Goal Rate (mL/hr) 40 mL/hr 2017  8:42 AM   Intake (mL) 175 mL 2017  8:42 AM   Intake (mL) - Breast Milk Tube Feeding 60 2017  9:00 AM   Rate Formula Tube Feeding (mL/hr) 40 mL/hr 2017  7:59 AM   Intake (mL) - Formula Tube Feeding 120 2017 11:00 PM   Residual Amount (ml) 0 ml 2017  7:59 AM       Neurosurgery Physical Exam   General: well developed, well nourished, no distress.    Head: normocephalic  Neurologic: Obtunded  GCS: Motor: 5/Verbal: 2 /Eyes: 3 GCS Total: 10  Mental Status: Drowsy, arousable. Non verbal. Will not follow commands.   Language: unable to assess 2/2 mental status  Speech: unable to assess 2/2 mental status  Cranial nerves: face symmetric, CN II-XII grossly intact.   Eyes: pupils equal, round, reactive to light.  Pulmonary: normal respirations, no signs of respiratory distress  Abdomen: soft, non-distended, not tender to palpation  Sensory: response to light touch throughout  Motor Strength: Moves all extremities spontaneously with good strength and tone. Will not follow commands today. Unable to assess individual muscle groups.   Pronator Drift: unable to assess 2/2 mental status  Finger-to-nose: unable to assess 2/2 mental status  Clonus: absent  Babinski: absent  No LE edema  Skin: Skin is warm, dry and intact.    Significant Labs:    Recent Labs  Lab 07/10/17  0410 07/10/17  0825   GLU  --  74   NA  --  142   K  --  3.6   CL  --  100   CO2  --  30*   BUN  --  7   CREATININE  --  0.7   CALCIUM  --  11.3*   MG 2.2  --        Recent Labs  Lab 07/09/17  0400 07/10/17  0410   WBC 8.81 7.35   HGB 10.0* 10.9*   HCT 30.1* 32.0*   * 434*     No results for input(s): LABPT, INR, APTT in the last 48 hours.  Microbiology Results (last 7 days)     Procedure Component Value Units Date/Time    Blood culture [[id number]] Collected:  07/03/17 0934    Order Status:  Completed Specimen:  Blood from Peripheral, Hand, Left Updated:  07/08/17 1212     Blood Culture, Routine No growth after 5 days.    Narrative:       Blood cultures from 2 different sites. 4 bottles total.  Please draw before starting antibiotics.    Blood culture [[id number]] Collected:  07/03/17 0954    Order Status:  Completed Specimen:  Blood from Peripheral, Wrist, Right Updated:  07/08/17 1212     Blood Culture, Routine No growth after 5 days.    Narrative:       Blood cultures x 2 different sites. 4 bottles  total. Please  draw cultures before administering antibiotics.          Significant Diagnostics:  No new imaging    Physical Exam

## 2017-07-10 NOTE — PLAN OF CARE
Problem: Patient Care Overview  Goal: Plan of Care Review  Outcome: Ongoing (interventions implemented as appropriate)  Plan of care reviewed with patient. Patient is currently lethargic and only oriented to self. Patient was placed on a warming blanket today for low temps. See chart for assessments and VS. All questions answered. Will continue to monitor.

## 2017-07-10 NOTE — PROGRESS NOTES
Dr. Wiley notified of patient's rectal temperature of 93.4. Instructed to place patient on warming blanket. Warming blanket placed on patient and set to highest degree. Will continue to monitor.

## 2017-07-10 NOTE — PROGRESS NOTES
[facility] Hospital Medicine  Progress Note    Patient Name: Joanna Morales  MRN: [id number]  Patient Class: IP- Inpatient   Admission Date: 6/28/2017  Length of Stay: 12 days  Attending Physician: Chino Wiley MD  Primary Care Provider: Claudy Tse MD    [facility] Medicine Team: [facility] Chino Wiley MD    Subjective:     Principal Problem:Hypernatremia    HPI:  Ms. Joanna Morales is a 51 y.o. female with tobacco abuse, secondary hypothyroidism (TSH 0.241 Jun 2017), and history of meningioma s/p resection complicated by diabetes insipidus who presents to [facility] ED from [facility] after being found with abnormal labwork.  She had been more altered in the last day or so and upon checking labwork, she was noted to be with hypernatremia.  She contributed very minimally to her history but does report some abdominal pain.  Of note, she was recently admitted to this facility under the Neurosurgery service for resection of a meningioma that was complicated by diabetes insipidus for which she received desmopressin a few times before being discharged to rehabilitation at [facility].  Further history is limited at this time.    Hospital Course:  No notes on file    Interval History: Patient continues to be very lethargic & mumbles yes/no occasionally on exam.  Not following commands.      Medications:  Continuous Infusions:     Scheduled Meds:   desmopressin  10 mcg Nasal Daily    enoxaparin  40 mg Subcutaneous Daily    gabapentin  125 mg Per NG tube Q12H    levetiracetam oral soln  500 mg Per NG tube BID    levothyroxine  50 mcg Per NG tube Daily    nicotine  1 patch Transdermal Daily     PRN Meds:acetaminophen, dextrose 50%, glucagon (human recombinant), insulin aspart, ondansetron, potassium chloride 10%     Review of Systems   Constitutional: Negative.    Eyes: Negative.    Respiratory: Negative.    Cardiovascular: Negative.      Objective:     Weight:  59.9 kg (132 lb)  Body mass index is 24.14 kg/m².  Vital Signs (Most Recent):  Temp: 98.6 °F (37 °C) (07/10/17 1658)  Pulse: (!) 113 (07/10/17 1658)  Resp: 17 (07/10/17 1658)  BP: 94/62 (07/10/17 1658)  SpO2: 96 % (07/10/17 1658) Vital Signs (24h Range):  Temp:  [93.4 °F (34.1 °C)-98.6 °F (37 °C)] 98.6 °F (37 °C)  Pulse:  [] 113  Resp:  [17-19] 17  SpO2:  [95 %-100 %] 96 %  BP: ()/(62-82) 94/62              Temp (24hrs), Av.5 °F (35.8 °C), Min:93.4 °F (34.1 °C), Max:98.6 °F (37 °C)          Date 07/10/17 0700 - 17 0659   Shift 0258-3329 3615-9927 1328-2471 24 Hour Total   I  N  T  A  K  E   P.O. 80   80    Shift Total  (mL/kg) 80  (1.3)   80  (1.3)   O  U  T  P  U  T   Urine  (mL/kg/hr) 1  (0) 2  3    Shift Total  (mL/kg) 1  (0) 2  (0)  3  (0.1)   Weight (kg) 59.9 59.9 59.9 59.9          NG/OG Tube 17 0505 [place] sump;nasogastric 14 Fr. (Active)   Placement Check placement verified by x-ray 2017  8:42 AM   Distal Tube Length (cm) 65 2017  8:42 AM   Tolerance no signs/symptoms of discomfort 2017  8:42 AM   Securement anchored to nostril center w/ adhesive device 2017  8:42 AM   Clamp Status/Tolerance unclamped 2017  8:42 AM   Insertion Site Appearance no redness, warmth, tenderness, skin breakdown, drainage 2017  8:42 AM   Flush/Irrigation flushed w/;water 2017  8:42 AM   Feeding Method continuous 2017  8:42 AM   Current Rate (mL/hr) 40 mL/hr 2017  8:42 AM   Goal Rate (mL/hr) 40 mL/hr 2017  8:42 AM   Intake (mL) 175 mL 2017  8:42 AM   Intake (mL) - Breast Milk Tube Feeding 60 2017  9:00 AM   Rate Formula Tube Feeding (mL/hr) 40 mL/hr 2017  7:59 AM   Intake (mL) - Formula Tube Feeding 120 2017 11:00 PM   Residual Amount (ml) 0 ml 2017  7:59 AM       Physical Exam:    Constitutional: She appears well-developed and well-nourished.     Eyes: Pupils are equal, round, and reactive to light. EOM are normal.     Abdominal: Soft. Bowel  sounds are normal.     Psych/Behavior: She is alert. She is oriented to person, place, and time.      General: well developed, well nourished, no distress.   Head: normocephalic  Neurologic: Obtunded  GCS: Motor: 5/Verbal: 2 /Eyes: 3 GCS Total: 10  Mental Status: Drowsy, arousable. Non verbal. Will not follow commands.   Language: unable to assess 2/2 mental status  Speech: unable to assess 2/2 mental status  Cranial nerves: face symmetric, CN II-XII grossly intact.   Eyes: pupils equal, round, reactive to light.  Pulmonary: normal respirations, no signs of respiratory distress  Abdomen: soft, non-distended, not tender to palpation  Sensory: response to light touch throughout  Motor Strength: Moves all extremities spontaneously with good strength and tone. Will not follow commands today. Unable to assess individual muscle groups.   Pronator Drift: unable to assess 2/2 mental status  Finger-to-nose: unable to assess 2/2 mental status  Clonus: absent  Babinski: absent  No LE edema  Skin: Skin is warm, dry and intact.    Significant Labs:    Recent Labs  Lab 07/10/17  0410 07/10/17  0825   GLU  --  74   NA  --  142   K  --  3.6   CL  --  100   CO2  --  30*   BUN  --  7   CREATININE  --  0.7   CALCIUM  --  11.3*   MG 2.2  --        Recent Labs  Lab 07/09/17  0400 07/10/17  0410   WBC 8.81 7.35   HGB 10.0* 10.9*   HCT 30.1* 32.0*   * 434*     No results for input(s): LABPT, INR, APTT in the last 48 hours.  Microbiology Results (last 7 days)     Procedure Component Value Units Date/Time    Blood culture [[id number]] Collected:  07/03/17 0934    Order Status:  Completed Specimen:  Blood from Peripheral, Hand, Left Updated:  07/08/17 1212     Blood Culture, Routine No growth after 5 days.    Narrative:       Blood cultures from 2 different sites. 4 bottles total.  Please draw before starting antibiotics.    Blood culture [[id number]] Collected:  07/03/17 0954    Order Status:  Completed Specimen:  Blood from  Peripheral, Wrist, Right Updated:  17 1212     Blood Culture, Routine No growth after 5 days.    Narrative:       Blood cultures x 2 different sites. 4 bottles total. Please  draw cultures before administering antibiotics.          Significant Diagnostics:  No new imaging    Physical Exam   Constitutional: She is oriented to person, place, and time. She appears well-developed and well-nourished.   HENT:   Head: Normocephalic and atraumatic.   Eyes: EOM are normal. Pupils are equal, round, and reactive to light.   Neck: Neck supple.   Pulmonary/Chest: Effort normal and breath sounds normal.   Abdominal: Soft. Bowel sounds are normal.   Neurological: She is alert and oriented to person, place, and time.   Skin: Skin is warm.     Interval History: Patient continues to be very lethargic & mumbles yes/no occasionally on exam.  Not following commands.      Medications:  Continuous Infusions:     Scheduled Meds:   desmopressin  10 mcg Nasal Daily    enoxaparin  40 mg Subcutaneous Daily    gabapentin  125 mg Per NG tube Q12H    levetiracetam oral soln  500 mg Per NG tube BID    levothyroxine  50 mcg Per NG tube Daily    nicotine  1 patch Transdermal Daily     PRN Meds:acetaminophen, dextrose 50%, glucagon (human recombinant), insulin aspart, ondansetron, potassium chloride 10%     Review of Systems  Objective:     Weight: 59.9 kg (132 lb)  Body mass index is 24.14 kg/m².  Vital Signs (Most Recent):  Temp: 98.6 °F (37 °C) (07/10/17 1658)  Pulse: (!) 113 (07/10/17 1658)  Resp: 17 (07/10/17 1658)  BP: 94/62 (07/10/17 1658)  SpO2: 96 % (07/10/17 1658) Vital Signs (24h Range):  Temp:  [93.4 °F (34.1 °C)-98.6 °F (37 °C)] 98.6 °F (37 °C)  Pulse:  [] 113  Resp:  [17-19] 17  SpO2:  [95 %-100 %] 96 %  BP: ()/(62-82) 94/62              Temp (24hrs), Av.5 °F (35.8 °C), Min:93.4 °F (34.1 °C), Max:98.6 °F (37 °C)          Date 07/10/17 0700 - 17 0659   Shift 4412-8986 7896-6413 3428-7476 24 Hour Total    I  N  T  A  K  E   P.O. 80   80    Shift Total  (mL/kg) 80  (1.3)   80  (1.3)   O  U  T  P  U  T   Urine  (mL/kg/hr) 1  (0) 2  3    Shift Total  (mL/kg) 1  (0) 2  (0)  3  (0.1)   Weight (kg) 59.9 59.9 59.9 59.9          NG/OG Tube 07/01/17 0505 [place] sump;nasogastric 14 Fr. (Active)   Placement Check placement verified by x-ray 7/5/2017  8:42 AM   Distal Tube Length (cm) 65 7/5/2017  8:42 AM   Tolerance no signs/symptoms of discomfort 7/5/2017  8:42 AM   Securement anchored to nostril center w/ adhesive device 7/5/2017  8:42 AM   Clamp Status/Tolerance unclamped 7/5/2017  8:42 AM   Insertion Site Appearance no redness, warmth, tenderness, skin breakdown, drainage 7/5/2017  8:42 AM   Flush/Irrigation flushed w/;water 7/5/2017  8:42 AM   Feeding Method continuous 7/5/2017  8:42 AM   Current Rate (mL/hr) 40 mL/hr 7/5/2017  8:42 AM   Goal Rate (mL/hr) 40 mL/hr 7/5/2017  8:42 AM   Intake (mL) 175 mL 7/5/2017  8:42 AM   Intake (mL) - Breast Milk Tube Feeding 60 7/2/2017  9:00 AM   Rate Formula Tube Feeding (mL/hr) 40 mL/hr 7/4/2017  7:59 AM   Intake (mL) - Formula Tube Feeding 120 7/4/2017 11:00 PM   Residual Amount (ml) 0 ml 7/4/2017  7:59 AM       Neurosurgery Physical Exam   General: well developed, well nourished, no distress.   Head: normocephalic  Neurologic: Obtunded  GCS: Motor: 5/Verbal: 2 /Eyes: 3 GCS Total: 10  Mental Status: Drowsy, arousable. Non verbal. Will not follow commands.   Language: unable to assess 2/2 mental status  Speech: unable to assess 2/2 mental status  Cranial nerves: face symmetric, CN II-XII grossly intact.   Eyes: pupils equal, round, reactive to light.  Pulmonary: normal respirations, no signs of respiratory distress  Abdomen: soft, non-distended, not tender to palpation  Sensory: response to light touch throughout  Motor Strength: Moves all extremities spontaneously with good strength and tone. Will not follow commands today. Unable to assess individual muscle groups.   Pronator  Drift: unable to assess 2/2 mental status  Finger-to-nose: unable to assess 2/2 mental status  Clonus: absent  Babinski: absent  No LE edema  Skin: Skin is warm, dry and intact.    Significant Labs:    Recent Labs  Lab 07/10/17  0410 07/10/17  0825   GLU  --  74   NA  --  142   K  --  3.6   CL  --  100   CO2  --  30*   BUN  --  7   CREATININE  --  0.7   CALCIUM  --  11.3*   MG 2.2  --        Recent Labs  Lab 07/09/17  0400 07/10/17  0410   WBC 8.81 7.35   HGB 10.0* 10.9*   HCT 30.1* 32.0*   * 434*     No results for input(s): LABPT, INR, APTT in the last 48 hours.  Microbiology Results (last 7 days)     Procedure Component Value Units Date/Time    Blood culture [[id number]] Collected:  07/03/17 0934    Order Status:  Completed Specimen:  Blood from Peripheral, Hand, Left Updated:  07/08/17 1212     Blood Culture, Routine No growth after 5 days.    Narrative:       Blood cultures from 2 different sites. 4 bottles total.  Please draw before starting antibiotics.    Blood culture [[id number]] Collected:  07/03/17 0954    Order Status:  Completed Specimen:  Blood from Peripheral, Wrist, Right Updated:  07/08/17 1212     Blood Culture, Routine No growth after 5 days.    Narrative:       Blood cultures x 2 different sites. 4 bottles total. Please  draw cultures before administering antibiotics.          Significant Diagnostics:  No new imaging    Physical Exam      Assessment/Plan:      Acute encephalopathy    Is improving, verbal today.  EEG done  Pulled NGT ou t/ 7/8  Pureed diet trial        Tobacco abuse    Patient was counseled on smoking cessation and she will be provided a nicotine transdermal patch applied while inpatient.  Will provide additional smoking cessation counseling prior to discharge.        Secondary hypothyroidism    As addressed above.  Will continue her home regimen of levothyroxine.        Diabetes insipidus    As addressed above.  Follow sodium and UOP  Decrease DDAVP to daily, Na improved.         Meningioma, recurrent of brain    Uncertain prognosis  NS is following            VTE Risk Mitigation         Ordered     High Risk of VTE  Once      06/30/17 0920     Place sequential compression device  Until discontinued      06/30/17 0920     enoxaparin injection 40 mg  Daily     Route:  Subcutaneous        06/28/17 6587          Chino Wiley MD  Department of Hospital Medicine   [facility]

## 2017-07-10 NOTE — PROGRESS NOTES
Unable to get patient's temperature via oral or axillary routes. Rectal temperature probe ordered. Will obtain rectal temperature when received.

## 2017-07-11 PROBLEM — R57.9 SHOCK: Status: RESOLVED | Noted: 2017-01-01 | Resolved: 2017-01-01

## 2017-07-11 PROBLEM — E83.52 HYPERCALCEMIA: Status: ACTIVE | Noted: 2017-01-01

## 2017-07-11 NOTE — NURSING
Paged Dr. Lewis at 0900, 0930, called via cell phone at 1003, MD states she needs to call back, made her aware it was re: a critical lab. Awaiting call back. Will continue to monitor.    1013: Spoke to Dr. Lewis, made her aware of critical lab of Ca+ 12.2. Also made her aware of pt's Na+ increasing to 154. No new orders at this time. Also made her aware that SLP is recommending strict NPO for pt, requesting meds be ordered IV. MD states she will order pt's Keppra and levothyroxine IV. Will await orders. Will continue to monitor.

## 2017-07-11 NOTE — PROGRESS NOTES
[facility] Hospital Medicine  Progress Note    Patient Name: Joanna Morales  MRN: [id number]  Patient Class: IP- Inpatient   Admission Date: 6/28/2017  Length of Stay: 13 days  Attending Physician: Rhoda Lewis MD  Primary Care Provider: Claudy Tse MD    Hospital Medicine Team: [facility] B Rhoda Lewis MD    Subjective:     Principal Problem:Hypernatremia    HPI:  Ms. Joanna Morales is a 51 y.o. female with tobacco abuse, secondary hypothyroidism (TSH 0.241 Jun 2017), and history of meningioma s/p resection complicated by diabetes insipidus who presents to [facility] ED from [facility] after being found with abnormal labwork.  She had been more altered in the last day or so and upon checking labwork, she was noted to be with hypernatremia.  She contributed very minimally to her history but does report some abdominal pain.  Of note, she was recently admitted to this facility under the Neurosurgery service for resection of a meningioma that was complicated by diabetes insipidus for which she received desmopressin a few times before being discharged to rehabilitation at [facility].  Further history is limited at this time.    Hospital Course:  7/11 - DDAVP was decreased yesterday and -> 154, and MS declined .  Not taking po liquids, tanya 12.2    Interval History: non-verbal    Review of Systems   Unable to perform ROS: Patient nonverbal   Constitutional: Negative for chills.        Per nurse was speaking a few words this am.    Gastrointestinal: Negative for abdominal pain, constipation, diarrhea, nausea and vomiting.   Skin: Negative for pallor, rash and wound.   Hematological: Does not bruise/bleed easily.   Psychiatric/Behavioral: Negative for sleep disturbance.     Objective:     Vital Signs (Most Recent):  Temp: 97.8 °F (36.6 °C) (07/11/17 0832)  Pulse: 103 (07/11/17 0832)  Resp: 18 (07/11/17 0832)  BP: 107/72 (07/11/17 0832)  SpO2: (!) 93 %  (07/11/17 0832) Vital Signs (24h Range):  Temp:  [94.5 °F (34.7 °C)-98.6 °F (37 °C)] 97.8 °F (36.6 °C)  Pulse:  [] 103  Resp:  [17-19] 18  SpO2:  [93 %-100 %] 93 %  BP: ()/(56-82) 107/72     Weight: 59.9 kg (132 lb)  Body mass index is 24.14 kg/m².    Intake/Output Summary (Last 24 hours) at 07/11/17 1139  Last data filed at 07/11/17 0603   Gross per 24 hour   Intake               80 ml   Output                6 ml   Net               74 ml      Physical Exam   Constitutional: She appears well-developed and well-nourished.   somnolent   HENT:   Head: Normocephalic.   Mouth/Throat: Oropharynx is clear and moist.   Eyes: Conjunctivae are normal. Pupils are equal, round, and reactive to light. No scleral icterus.   Neck: Normal range of motion. Neck supple. No JVD present.   Cardiovascular: Normal rate, regular rhythm, normal heart sounds and intact distal pulses.  Exam reveals no gallop and no friction rub.    No murmur heard.  Pulmonary/Chest: Effort normal and breath sounds normal. No respiratory distress. She has no wheezes. She has no rales. She exhibits no tenderness.   Abdominal: Soft. Bowel sounds are normal. She exhibits no distension and no mass. There is no tenderness. There is no rebound and no guarding.   Musculoskeletal: She exhibits no edema or tenderness.   Lymphadenopathy:     She has no cervical adenopathy.   Neurological: She has normal strength. She exhibits normal muscle tone. Coordination normal.   Skin: Skin is warm and dry.   Psychiatric: Her speech is normal. Cognition and memory are normal.   Nursing note and vitals reviewed.      Significant Labs:   BMP:   Recent Labs  Lab 07/11/17  0343 07/11/17  0806   GLU  --  99   NA  --  154*   K  --  4.2   CL  --  110   CO2  --  28   BUN  --  14   CREATININE  --  1.0   CALCIUM  --  12.2*   MG 2.8*  --      CBC:   Recent Labs  Lab 07/10/17  0410 07/11/17  0343   WBC 7.35 8.10   HGB 10.9* 12.5   HCT 32.0* 36.9*   * 525*          Assessment/Plan:      Acute encephalopathy    Worse in increasing Na level  EEG done - Moderate diffuse slowing of the overall background as described above.  2.  Superimposed left hemispheric slowing relative to the right.  No   epileptiform discharges or seizures were seen, however.  Result suggests both   encephalopathy and focal cortical dysfunction,    Pulled NGT ou t/ 7/8, will need to replace to give po meds and liquids          Diabetes insipidus    Increase DDAVP to BID, D5W,  As addressed above.  Follow sodium and UOP  reconsult endcrine          Hypercalcemia    D5W,  lasix,             Tobacco abuse    Patient was counseled on smoking cessation and she will be provided a nicotine transdermal patch applied while inpatient.  Will provide additional smoking cessation counseling prior to discharge.        Secondary hypothyroidism    As addressed above.  Will continue her home regimen of levothyroxine.        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      Hx of a right frontotemporal craniotomy with partial removal of a large subfrontal and tuberculum sellae meningioma at [facility] in 2015. 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      NS is following            VTE Risk Mitigation         Ordered     High Risk of VTE  Once      06/30/17 0920     Place sequential compression device  Until discontinued      06/30/17 0920     enoxaparin injection 40 mg  Daily     Route:  Subcutaneous        06/28/17 3022          Rhoda Lewis MD  Department of  Hospital Medicine [facility]

## 2017-07-11 NOTE — ASSESSMENT & PLAN NOTE
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      Hx of a right frontotemporal craniotomy with partial removal of a large subfrontal and tuberculum sellae meningioma at [facility] in 2015. 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      NS is following

## 2017-07-11 NOTE — PT/OT/SLP EVAL
"Speech Language Pathology  Clinical Evaluation of Swallow    Joanna Morales   MRN: [id number]   743/743 A    Admitting Diagnosis: Hypernatremia    Diet recommendations: Solid Diet Level: NPO  Liquid Diet Level: NPO     SLP Treatment Date: 07/11/17  Speech Start Time: 0915     Speech Stop Time: 0930     Speech Total (min): 15 min       TREATMENT BILLABLE MINUTES:  Eval Swallow and Oral Function 15    Diagnosis: Hypernatremia      Past Medical History:   Diagnosis Date    Allergy     Basal cell carcinoma     Depression 11/1/2016    Essential hypertension 6/9/2017    Eye disorder     Fever blister     Normocytic anemia 6/9/2017    Secondary hypothyroidism 6/26/2017     Past Surgical History:   Procedure Laterality Date    BASAL CELL CARCINOMA EXCISION      forehead    BRAIN SURGERY      SKIN BIOPSY         Has the patient been evaluated by SLP for swallowing? : Yes  Keep patient NPO?: Yes   General Precautions: Standard, aspiration, NPO    Current Respiratory Status:  (room air)     Prior diet: Pt unable to report prior diet.     Subjective:  "Food on my tongue" Pt demonstrating confabulatory speech this session.     Pain/Comfort  Pain Rating 1: 0/10  Pain Rating 2: 0/10    Objective:   Patient found with: restraints (upper extremity bilateral soft restraints)    Oral Musculature Evaluation  Oral Musculature: general weakness  Dentition: present and adequate  Mucosal Quality: coated tongue (brown coating observed on lingual service with additional dried brown secretion observed posteriorly)  Oral Labial Strength and Mobility: functional pursing, impaired retraction (dec'd left labial retraction)  Lingual Strength and Mobility: WFL  Volitional Cough: unable to assess 2/2 dec'd comprehension  Volitional Swallow: unable to assess 2/2 dec'd comprehension   Voice Prior to PO Intake: clear, dry, reduced intensity      Bedside Swallow Eval:  SEE "ADDITIONAL TREATMENT"  Consistencies Assessed: tsp water x1   Oral " Phase: WFL  Pharyngeal Phase: No overt s/s aspiration     Additional Treatment:    Pt asleep upon entry, HOB raised. Pt observed to produce confabulatory speech throughout session. Upon initiation of OME, brown coating observed on lingual surface, use of pen light revealed dried brown secretion posteriorly. Lemon glycerin swabs attempted to be used to remove, however pt biting swab and not opening oral cavity enough to adequately clean lingual surface. Pt initially did not attempt to open oral cavity to accept tsp x1 water. After extensive cueing and prompting, accepted trial. Suspect delayed swallow initiation, however no overt s/s aspiration and voice remained clear/ dry. Despite extensive cueing and prompting, pt refused further trials. Presented with 1/2 tsp pureed apple sauce x2, pt did not attempt to open oral cavity to accept. No further trials attempted. White board updated. Results discussed with RN.     Assessment:  Joanna Morales is a 51 y.o. female with a medical diagnosis of Hypernatremia and presents with oral phase dysphagia.           Discharge recommendations: Discharge Facility/Level Of Care Needs:  (pending progress)     Goals:    SLP Goals        Problem: SLP Goal    Goal Priority Disciplines Outcome   SLP Goal     SLP Unable to achieve outcome(s) by discharge   Description:  Speech Language Pathology Goals  Goals expected to be met by 7/7  1. Patient will successfully participate in ongoing clinical swallow evaluation and tolerate po trials with no overt s/s of aspiration.                  Problem: SLP Goal    Goal Priority Disciplines Outcome   SLP Goal     SLP Ongoing (interventions implemented as appropriate)   Description:  SLP goals expected to be met by 7/18:  1. Pt will participate in going assessment of swallow in order to determine safest, least restrictive diet.                      Plan:   Patient to be seen Therapy Frequency: 5 x/week   Plan of Care expires: 08/09/17  Plan of  Care reviewed with: patient  SLP Follow-up?: Yes            HILDA Rascon, CCC-SLP  [phone]  7/11/2017

## 2017-07-11 NOTE — NURSING
Spoke to Dr. Lewis, made her aware of Xray completion. States OK to use NG tube, and will enter order. MD also states she will order tube feeds for pt. Requested restraint renewal order. Will continue to monitor.

## 2017-07-11 NOTE — PT/OT/SLP RE-EVAL
Occupational Therapy  Re-evaluation    Joanna Morales   MRN: [id number]   Admitting Diagnosis: Hypernatremia    OT Date of Treatment: 07/11/17   OT Start Time: 0925  OT Stop Time: 1005  OT Total Time (min): 40 min    Billable Minutes:  Re-eval 15  Self Care/Home Management 25    Diagnosis: Hypernatremia   Admitted to hosp in mid-June, 2017 2' to meningioma, s/p craniectomy.  Discharged to [facility] on 6/26/2017 and re-admitted [facility] 7/1/2017 2' to abnormal labs.  Pt was then transferred to ICU due to medical instability and OT now has orders to resume.  The pt was nearly blind prior to admit and was indep in compensating for it.    Past Medical History:   Diagnosis Date    Allergy     Basal cell carcinoma     Depression 11/1/2016    Essential hypertension 6/9/2017    Eye disorder     Fever blister     Normocytic anemia 6/9/2017    Secondary hypothyroidism 6/26/2017      Past Surgical History:   Procedure Laterality Date    BASAL CELL CARCINOMA EXCISION      forehead    BRAIN SURGERY      SKIN BIOPSY         Referring physician: Dr. Chino Wiley  Date referred to OT: 7/10/2017    General Precautions: Standard, blind, fall  Orthopedic Precautions: N/A  Braces: N/A    Do you have any cultural, spiritual, Buddhism conflicts, given your current situation?: None reported     Patient History:  Living Environment  Living Environment Comment: Per previous notes, pt lives with cousin in first floor apartment and used SC or RW for mobility; pt states she was as   Equipment Currently Used at Home: cane, straight, walker, rolling (needs to be confirmed)  Pt was indep with compensating for her blindness prior to admit  Prior level of function:   Bed Mobility/Transfers: independent  Grooming: independent  Bathing: independent  Upper Body Dressing: independent  Lower Body Dressing: independent  Toileting: independent  Home Management Skills: independent  Driving License: Yes     Dominant hand:  right    Subjective:  Communicated with RN prior to session.   no issues  Chief Complaint: Pt highly confused and unable to articulate  Patient/Family stated goals: Pt unable to formulize goals nor agree to poc    Pain/Comfort  Pain Rating 1: 0/10    Objective:       Cognitive Exam:  Oriented to: Person  Follows Commands/attention: Easily distracted and Follows one-step commands; follows approx 50% of commands  Communication: Mumbling, low voice volume; highly confused and her speech rarely makes sense  Memory:  Impaired STM, Impaired LTM and Poor immediate recall  Safety awareness/insight to disability: impaired  Coping skills/emotional control: flat affect    Visual/perceptual:  Pt is blind; she has visually hallucinated in the past as if she does see.  No visual hallucinations during today's session.    Physical Exam:    Skin integrity: Visible skin intact  Edema: None noted     Sensation:   Unable to assess.  Pt appeared to be aware of OT touching her.    Upper Extremity Range of Motion:  Right Upper Extremity: AROM sh fl 30 deg in sitting; 80% AROM other joints  Left Upper Extremity: AROM sh fl zero; 25% AROM other jts    Upper Extremity Strength:  Right Upper Extremity: 3-/5  Left Upper Extremity: 1/5   Strength: R 3-/5, L 1/5    Functional Mobility:  Bed Mobility:  Rolling/Turning to Left: Moderate assistance  Scooting/Bridging: Minimum Assistance  Supine to Sit: Moderate Assistance  Sit to Supine: Minimum Assistance    Transfers:  Sit <> Stand Assistance: Moderate Assistance  Sit <> Stand Assistive Device: No Assistive Device    Functional Ambulation: Side step to L 5 steps with mod a; OT had to physically move pt's LLE each time    Activities of Daily Living:   Feeding: total a  Grooming: max a face washing, toothbrushing; total a hair combing  UB dressing: total a  LB dressing: total a  Toileting: total a; pt incontinent of B&B       Balance:   Static Sit: FAIR: Maintains without assist, but unable to  "take any challenges   Dynamic Sit: FAIR+: Maintains balance through MINIMAL excursions of active trunk motion  Static Stand: POOR: Needs MODERATE assist to maintain  Dynamic stand: POOR: N/A    Therapeutic Activities and Exercises:  -Worked with pt on static and dynamic sitting balance on eob via grooming tasks  -Guided pt's RUE into feeling on table for needed adl items.  Pt required total a for this and did not appear to comprehend what she was attempting to do.    AM-PAC 6 CLICK ADL  How much help from another person does this patient currently need?  1 = Unable, Total/Dependent Assistance  2 = A lot, Maximum/Moderate Assistance  3 = A little, Minimum/Contact Guard/Supervision  4 = None, Modified [place]/Independent    Putting on and taking off regular lower body clothing? : 2  Bathing (including washing, rinsing, drying)?: 2  Toileting, which includes using toilet, bedpan, or urinal? : 2  Putting on and taking off regular upper body clothing?: 2  Taking care of personal grooming such as brushing teeth?: 2  Eating meals?: 2  Total Score: 12    AM-PAC Raw Score CMS "G-Code Modifier Level of Impairment Assistance   6 % Total / Unable   7 - 9 CM 80 - 100% Maximal Assist   10 - 14 CL 60 - 80% Moderate Assist   15 - 19 CK 40 - 60% Moderate Assist   20 - 22 CJ 20 - 40% Minimal Assist   23 CI 1-20% SBA / CGA   24 CH 0% Independent/ Mod I       Patient left left sidelying with all lines intact and call button in reach    Assessment:  Joanna Morales is a 51 y.o. female with a medical diagnosis of Hypernatremia and meningioma s/p craniectomy.  The pt is currently at total a level self-care 2' to severe confusion, blindness, max impaired memory/orientation/insight/problem solving, max impaired AROM LUE/LLE, impaired AROM RUE/RLE, impaired strength BUE/BLEs, impaired standing balance, impaired dynamic sitting balance, low tone LUE/LLE. Pt as previously indep all self-care, using compensatory tech for her " blindness. Pt to be seen by OT to increase self care indep.  Recommend return to [facility] for this complicated patient with prior high level of function.    Rehab identified problem list/impairments: Rehab identified problem list/impairments: weakness, impaired self care skills, impaired functional mobilty, gait instability, impaired balance, visual deficits, impaired cognition, decreased coordination, decreased safety awareness, abnormal tone, decreased ROM, impaired coordination, impaired fine motor    Rehab potential is good.    Activity tolerance: Good    Discharge recommendations: Discharge Facility/Level Of Care Needs: rehabilitation facility     Barriers to discharge: Barriers to Discharge: Decreased caregiver support    Equipment recommendations:       GOALS:    Occupational Therapy Goals        Problem: Occupational Therapy Goal    Goal Priority Disciplines Outcome Interventions   Occupational Therapy Goal     OT, PT/OT Ongoing (interventions implemented as appropriate)    Description:  Goals to be met by: 17     Patient will increase functional independence with ADLs by performin.  UE Dressing with Minimal Assistance.  2.  LE Dressing with Moderate Assistance.  3.  Brush teeth with min a and wash face with SBA and mod cues.  4. Toileting from toilet with Moderate Assistance for hygiene and clothing management.   5.  Toilet transfer to toilet with minimal Assistance.  6.  Pt will use feel to compensate for blindness during self care adls, with mod cues, 25% of the time                         PLAN:  Patient to be seen 5 x/week to address the above listed problems via self-care/home management, therapeutic activities, therapeutic exercises, neuromuscular re-education, cognitive retraining  Plan of Care expires: 17  Plan of Care reviewed with: patient         HELEN Albarado  2017

## 2017-07-11 NOTE — SUBJECTIVE & OBJECTIVE
"Interval HPI:   Overnight events: serum sodium worsening, had not received any desmopressin since 7/8. Calcium level increasing  Eating:   NPO  Nausea: No  Hypoglycemia and intervention: No  Fever: No  TPN and/or TF: No  If yes, type of TF/TPN and rate: n/a    /71 (BP Location: Left arm, Patient Position: Lying, BP Method: Automatic)   Pulse (!) 111   Temp 96.6 °F (35.9 °C) (Oral)   Resp 20   Ht 5' 2" (1.575 m)   Wt 59.9 kg (132 lb)   SpO2 (!) 94%   Breastfeeding? No   BMI 24.14 kg/m²     Labs Reviewed and Include      Recent Labs  Lab 07/11/17  0343 07/11/17  0806   GLU  --  99   CALCIUM  --  12.2*   ALBUMIN 3.1*  --    PROT 8.7*  --    NA  --  154*   K  --  4.2   CO2  --  28   CL  --  110   BUN  --  14   CREATININE  --  1.0   ALKPHOS 163*  --    ALT 27  --    AST 30  --    BILITOT 0.2  --      Lab Results   Component Value Date    WBC 8.10 07/11/2017    HGB 12.5 07/11/2017    HCT 36.9 (L) 07/11/2017    MCV 96 07/11/2017     (H) 07/11/2017     No results for input(s): TSH, FREET4 in the last 168 hours.  No results found for: HGBA1C    Nutritional status:   Body mass index is 24.14 kg/m².  Lab Results   Component Value Date    ALBUMIN 3.1 (L) 07/11/2017    ALBUMIN 2.9 (L) 07/10/2017    ALBUMIN 2.6 (L) 07/09/2017     Lab Results   Component Value Date    PREALBUMIN 26 06/27/2017       Estimated Creatinine Clearance: 52.6 mL/min (based on Cr of 1).    Accu-Checks  Recent Labs      07/09/17   1603  07/09/17   1819  07/09/17   2255  07/10/17   0716  07/10/17   0718  07/10/17   1219  07/10/17   1640  07/10/17   1642  07/11/17   0105  07/11/17   0541   POCTGLUCOSE  89  86  90  60*  74  87  69*  91  92  92       Current Medications and/or Treatments Impacting Glycemic Control  Immunotherapy:  Immunosuppressants     None        Steroids:   Hormones     Start     Stop Route Frequency Ordered    07/11/17 2100  desmopressin 10 mcg/spray (0.1 mL) solution 10 mcg      -- Nasl 2 times daily 07/11/17 1213    "     Pressors:    Autonomic Drugs     None        Hyperglycemia/Diabetes Medications: Antihyperglycemics     Start     Stop Route Frequency Ordered    06/29/17 1922  insulin aspart pen 0-5 Units      -- SubQ Every 6 hours PRN 06/29/17 1827

## 2017-07-11 NOTE — ASSESSMENT & PLAN NOTE
Hypernatremia with likely component of both volume depletion and central diabetes insipidus  Agree with scheduled free water boluses 4x daily  Recommending scheduled desmopressin 10mcg intranasal nightly    May give desmopressin 10mcg intranasal prn for urine output greater than 250cc/hr for two consecutive hrs, urine specific gravity of 1.005 or less, and serum sodium greater than 150

## 2017-07-11 NOTE — ASSESSMENT & PLAN NOTE
Worse in increasing Na level  EEG done - Moderate diffuse slowing of the overall background as described above.  2.  Superimposed left hemispheric slowing relative to the right.  No   epileptiform discharges or seizures were seen, however.  Result suggests both   encephalopathy and focal cortical dysfunction,    Pulled NGT ou t/ 7/8, will need to replace to give po meds and liquids

## 2017-07-11 NOTE — PLAN OF CARE
Problem: Patient Care Overview  Goal: Plan of Care Review  Outcome: Ongoing (interventions implemented as appropriate)  POC reviewed with the patient. Fall precautions and safety maintained. No falls/injury this shift. Avasys @ bedside. No new skin impairments noted. Remains with incision to head. AAOx1. Afebrile. VSS. Cardiac monitoring and bilateral wrist restraints continued. Pt progressing toward goals. No cognitive/physical changes noted overnight. Will continue to monitor.

## 2017-07-11 NOTE — PLAN OF CARE
Problem: SLP Goal  Goal: SLP Goal  SLP goals expected to be met by 7/18:  1. Pt will participate in going assessment of swallow in order to determine safest, least restrictive diet.   Outcome: Ongoing (interventions implemented as appropriate)  Recommend NPO at this time. Consider alternative means nutrition/ hydration/ medication.     HILDA Rascon, CCC-SLP  [phone]  7/11/2017

## 2017-07-11 NOTE — HOSPITAL COURSE
Ms. Morales was discharged from [facility]  to [facility] on 6/26/17 and presented to [facility] ED on 6/28 with altered mental status and worsening hypernatremia. According to the patient's sitter, the patient was awake, verbal, and interactive with therapy on 6/23 but had been progressively less interactive and responsive when she was at [place]. Ms. Morales was admitted to Hospital Medicine but was found to be unresponsive with a sodium level of 168 on the morning of 6/29.      She has been followed by endocrinology and neurology and workup thus far significant for  unremarkable UA, neuro-imaging c/w recent left sphenoidal meningioma resection with resolution of blood products and trace intraventricular hemorrhage.  Previous EEG (6/16/17) demonstrated Moderate diffuse slowing of the overall background as described above, Superimposed left hemispheric slowing relative to the right, and NO epileptiform discharges or seizures were seen c/w ENCP and focal cortical dysfunction.  Pt's presentation is concerning for multi-factorial encephalopathy given persistent hypernatremia.  Per neurology, low suspicion for MNG currently, but patient is at high risk given recent intracranial procedure.     Critical Care consulted 7/17/17 for acute hypoxic respiratory failure. Pt had PEG tube placed earlier today and in PACU, oxygen saturations noted to be in the mid 80's. Pt was placed on a non-rebreather with minimal improvement and subsequently placed on BiPAP. ABG significant for elevated CO2 however pt was compensating. Repeat ABG showed pH 7.372, CO2 44, O2 62, HCO3 25.8. Pt transitioned to NC. Cxray concerning for aspiration pneumonia - new infiltrate in RLL so patient started on vancomycin and zosyn for HAP. She was then switched to Vanc/Cefepime/Acyclovir. Had significant elevation in WBC count 7/18 and Needed levophed to bring BP up for the night. Switched nasal DDAVP to IV DDAVP which has normalized her sodium  levels. LP performed 7/20; consistent with a viral process. BP has been stable off pressors; leukocytosis resolved. Patient more alert.    Patient stepped down to Hospital Medicine team L on 7/22 pending viral etiology work up. She remained lethargic and minimally participatory despite correction of metabolic derangements. Desmopressin therapy was resumed as per Endocrinology service recommendations. Enteral feedings changed to bolus with free water in order to prevent patient pulling her PEG tube. Hypernatremia continued to improve slowly with free water replacement and optimization of desmopressin therapy.    Hyponatremia resolved and patient medically stable for discharge pending securement of placement. HPOA able to secure placement with help of financial payment plan.

## 2017-07-11 NOTE — SUBJECTIVE & OBJECTIVE
Interval History: non-verbal    Review of Systems   Unable to perform ROS: Patient nonverbal   Constitutional: Negative for chills.        Per nurse was speaking a few words this am.    Gastrointestinal: Negative for abdominal pain, constipation, diarrhea, nausea and vomiting.   Skin: Negative for pallor, rash and wound.   Hematological: Does not bruise/bleed easily.   Psychiatric/Behavioral: Negative for sleep disturbance.     Objective:     Vital Signs (Most Recent):  Temp: 97.8 °F (36.6 °C) (07/11/17 0832)  Pulse: 103 (07/11/17 0832)  Resp: 18 (07/11/17 0832)  BP: 107/72 (07/11/17 0832)  SpO2: (!) 93 % (07/11/17 0832) Vital Signs (24h Range):  Temp:  [94.5 °F (34.7 °C)-98.6 °F (37 °C)] 97.8 °F (36.6 °C)  Pulse:  [] 103  Resp:  [17-19] 18  SpO2:  [93 %-100 %] 93 %  BP: ()/(56-82) 107/72     Weight: 59.9 kg (132 lb)  Body mass index is 24.14 kg/m².    Intake/Output Summary (Last 24 hours) at 07/11/17 1139  Last data filed at 07/11/17 0603   Gross per 24 hour   Intake               80 ml   Output                6 ml   Net               74 ml      Physical Exam   Constitutional: She appears well-developed and well-nourished.   somnolent   HENT:   Head: Normocephalic.   Mouth/Throat: Oropharynx is clear and moist.   Eyes: Conjunctivae are normal. Pupils are equal, round, and reactive to light. No scleral icterus.   Neck: Normal range of motion. Neck supple. No JVD present.   Cardiovascular: Normal rate, regular rhythm, normal heart sounds and intact distal pulses.  Exam reveals no gallop and no friction rub.    No murmur heard.  Pulmonary/Chest: Effort normal and breath sounds normal. No respiratory distress. She has no wheezes. She has no rales. She exhibits no tenderness.   Abdominal: Soft. Bowel sounds are normal. She exhibits no distension and no mass. There is no tenderness. There is no rebound and no guarding.   Musculoskeletal: She exhibits no edema or tenderness.   Lymphadenopathy:     She has no  cervical adenopathy.   Neurological: She has normal strength. She exhibits normal muscle tone. Coordination normal.   Skin: Skin is warm and dry.   Psychiatric: Her speech is normal. Cognition and memory are normal.   Nursing note and vitals reviewed.      Significant Labs:   BMP:   Recent Labs  Lab 07/11/17 0343 07/11/17  0806   GLU  --  99   NA  --  154*   K  --  4.2   CL  --  110   CO2  --  28   BUN  --  14   CREATININE  --  1.0   CALCIUM  --  12.2*   MG 2.8*  --      CBC:   Recent Labs  Lab 07/10/17  0410 07/11/17  0343   WBC 7.35 8.10   HGB 10.9* 12.5   HCT 32.0* 36.9*   * 525*

## 2017-07-11 NOTE — ASSESSMENT & PLAN NOTE
Concern for dehydration, immobility as likely etiologies -- ionized calcium in normal range  Recommend hydration and physical therapy as tolerated

## 2017-07-11 NOTE — NURSING
Spoke to Dr. Lewis re: plan of care for pt. Made her aware NGT was placed, requested order for Xray to verify placement. States she will order. States she ordered IVF on pt, pt OK for lasix after IVF is running. Will continue to monitor.    1211: Spoke to Dr. Lewis, verified she wants CXR to confirm NGT placement. States she specified reason for exam in comments. TM.

## 2017-07-11 NOTE — PLAN OF CARE
Problem: Occupational Therapy Goal  Goal: Occupational Therapy Goal  Goals to be met by: 17     Patient will increase functional independence with ADLs by performin.  UE Dressing with Minimal Assistance.  2.  LE Dressing with Moderate Assistance.  3.  Brush teeth with min a and wash face with SBA and mod cues.  4. Toileting from toilet with Moderate Assistance for hygiene and clothing management.   5.  Toilet transfer to toilet with minimal Assistance.  6.  Pt will use feel to compensate for blindness during self care adls, with mod cues, 25% of the time       Outcome: Ongoing (interventions implemented as appropriate)  Re-Eval completed and goals established  HELEN Portillo  2017  [phone]

## 2017-07-12 NOTE — ASSESSMENT & PLAN NOTE
7/12 - Na 146  ot on sterPatient's laboratory findings are most likely secondary to her diabetes insipidus.  She was given a dose of intranasal desmopressin and started on IV fluids.  Will repeat her metabolic panel in the morning and consult Endocrinology for further recommendations.

## 2017-07-12 NOTE — PLAN OF CARE
Problem: Patient Care Overview  Goal: Plan of Care Review  Outcome: Ongoing (interventions implemented as appropriate)  Pt is confused and disoriented. Safety precautions initiated. Bed locked in lowest position, call bell within reach, bed alarm on.VSS. Avasys camera monitoring 24/7. Pt is in bilateral wrist restraints to prevent pulling of IV's and NG tube. TF is being tolerated well. Will continue to monitor.

## 2017-07-12 NOTE — ASSESSMENT & PLAN NOTE
7/12 - improved  Worse with  increasing Na level  EEG done - Moderate diffuse slowing of the overall background as described above.  2.  Superimposed left hemispheric slowing relative to the right.  No   epileptiform discharges or seizures were seen, however.  Result suggests both   encephalopathy and focal cortical dysfunction,    Pulled NGT ou t/ 7/8 and 7.12,  May need to replace to give po meds and liquids, ST eval

## 2017-07-12 NOTE — PT/OT/SLP PROGRESS
"Speech Language Pathology  Treatment    Joanna Morales   MRN: [id number]   743/743 A    Admitting Diagnosis: Hypernatremia    Diet recommendations: Solid Diet Level: NPO  Liquid Diet Level: NPO     SLP Treatment Date: 07/12/17  Speech Start Time: 1411     Speech Stop Time: 1419     Speech Total (min): 8 min       TREATMENT BILLABLE MINUTES:  Treatment Swallowing Dysfunction 8    Has the patient been evaluated by SLP for swallowing? : Yes  Keep patient NPO?: Yes   General Precautions: Standard, aspiration, NPO  Current Respiratory Status:  (room air)       Subjective:  "I'll take it." Pt re: willingness to accept PO trials.     Pain/Comfort  Pain Rating 1: 0/10  Pain Rating Post-Intervention 1: 0/10    Objective:   Patient found with: restraints (upper extremity bilateral soft)  Pt awake upon entry. HOB raised. Confabulatory speech observed throughout session, with moment of clarity x1 when pt stated "I'll take it" re: willingness to accept PO trials. Pt did not complete volitional swallow and volitional cough noted to be extremely weak. PO trials including tsp water x3 and cup sip water x1 were presented. Despite applying tactile cue via tip of spoon/ edge of cup to labial surface, however pt did not make any attempt to accept the bolus. Pt educated re: SLP POC. Whiteboard updated. No further questions.     Assessment:  Joanna Morales is a 51 y.o. female with a medical diagnosis of Hypernatremia and presents with oral dysphagia.     Discharge recommendations: Discharge Facility/Level Of Care Needs: rehabilitation facility     Goals:    SLP Goals        Problem: SLP Goal    Goal Priority Disciplines Outcome   SLP Goal     SLP Unable to achieve outcome(s) by discharge   Description:  Speech Language Pathology Goals  Goals expected to be met by 7/7  1. Patient will successfully participate in ongoing clinical swallow evaluation and tolerate po trials with no overt s/s of aspiration.                  Problem: " SLP Goal    Goal Priority Disciplines Outcome   SLP Goal     SLP Ongoing (interventions implemented as appropriate)   Description:  SLP goals expected to be met by 7/18:  1. Pt will participate in going assessment of swallow in order to determine safest, least restrictive diet.                      Plan:   Patient to be seen Therapy Frequency: 5 x/week   Plan of Care expires: 08/09/17  Plan of Care reviewed with: patient  SLP Follow-up?: Yes          HILDA Rascon, CCC-SLP  [phone]  7/12/2017

## 2017-07-12 NOTE — PT/OT/SLP PROGRESS
"Physical Therapy  Treatment    Joanna Morales   MRN: [id number]   Admitting Diagnosis: Hypernatremia    PT Received On: 07/12/17  PT Start Time: 1457     PT Stop Time: 1525    PT Total Time (min): 28 min       Billable Minutes:  Therapeutic Activity  15 and Therapeutic Exercise 13    Treatment Type: Treatment  PT/PTA: PTA     PTA Visit Number: 1       General Precautions: Standard, aspiration, fall, NPO  Orthopedic Precautions: N/A   Braces: N/A    Do you have any cultural, spiritual, Mandaeism conflicts, given your current situation?: none    Subjective:  Communicated with NSG prior to session.  Patient was mostly incoherent, but stated " I can stand"    Pain/Comfort  Pain Rating 1: 0/10  Pain Rating Post-Intervention 1: 0/10    Objective:   Patient found with: bed alarm, peripheral IV, SCD    Functional Mobility:  Bed Mobility:   Rolling/Turning to Left: Moderate assistance  Scooting/Bridging: Moderate Assistance  Supine to Sit: Moderate Assistance  Sit to Supine: Moderate Assistance    Transfers:  Sit <> Stand Assistance: Moderate Assistance  Sit <> Stand Assistive Device: No Assistive Device    Gait:   Gait Distance: Unable to perform    Stairs:      Balance:   Static Sit: FAIR+: Able to take MINIMAL challenges from all directions  Dynamic Sit: FAIR+: Maintains balance through MINIMAL excursions of active trunk motion  Static Stand: POOR: Needs MODERATE assist to maintain  Dynamic stand: 0: N/A     Therapeutic Activities and Exercises:  Doffed/Donned SCD's. B LE PROM x 30 reps on all available planes of motion. Static sitting balance at EOB x 11 minutes with SBA to CGA. Scoot pivot transfer towards head of bed with mod assistance.      AM-PAC 6 CLICK MOBILITY  How much help from another person does this patient currently need?   1 = Unable, Total/Dependent Assistance  2 = A lot, Maximum/Moderate Assistance  3 = A little, Minimum/Contact Guard/Supervision  4 = None, Modified [place]/Independent    Turning " over in bed (including adjusting bedclothes, sheets and blankets)?: 2  Sitting down on and standing up from a chair with arms (e.g., wheelchair, bedside commode, etc.): 2  Moving from lying on back to sitting on the side of the bed?: 2  Moving to and from a bed to a chair (including a wheelchair)?: 2  Need to walk in hospital room?: 1  Climbing 3-5 steps with a railing?: 1  Total Score: 10    AM-PAC Raw Score CMS G-Code Modifier Level of Impairment Assistance   6 % Total / Unable   7 - 9 CM 80 - 100% Maximal Assist   10 - 14 CL 60 - 80% Moderate Assist   15 - 19 CK 40 - 60% Moderate Assist   20 - 22 CJ 20 - 40% Minimal Assist   23 CI 1-20% SBA / CGA   24 CH 0% Independent/ Mod I     Patient left HOB elevated with all lines intact, call button in reach and bed alarm on.    Assessment:  Joanna Morales is a 51 y.o. female with a medical diagnosis of Hypernatremia and presents with decreased functional mobility. It required multiple attempts to arouse, until RN came and assisted, as Patient is very drowsy, but once Patient is awake She was able to communicate. Posterior lean and extensor tone noted when patient transferred into standing position. Patient would benefit from continued P.T. To address deficits .    Rehab identified problem list/impairments: Rehab identified problem list/impairments: weakness, impaired endurance, impaired self care skills, impaired functional mobilty, impaired balance, impaired cognition, decreased coordination, decreased lower extremity function, decreased safety awareness, abnormal tone, impaired skin    Rehab potential is fair.    Activity tolerance: Fair    Discharge recommendations: Discharge Facility/Level Of Care Needs: rehabilitation facility     Barriers to discharge: Barriers to Discharge: Decreased caregiver support    Equipment recommendations: Equipment Needed After Discharge: other (see comments) (TBD pending progress.)     GOALS:    Physical Therapy Goals         Problem: Physical Therapy Goal    Goal Priority Disciplines Outcome Goal Variances Interventions   Physical Therapy Goal     PT/OT, PT Ongoing (interventions implemented as appropriate)     Description:  Goals to be met by: 17    Patient will increase functional independence with mobility by performin. Supine to sit with Minimal Assistance  2. Sit to supine with Minimal Assistance  3. Sit to stand transfer with Minimal Assistance  4. Standing for 3 minutes with Minimal Assistance and weight evenly distributed through (B) LE.   5. Gait  x 20 feet with Moderate Assistance using AD if needed   6. Pt will perform (B) LE therapeutic exercises x 20 reps to improve muscular strength and endurance.                     PLAN:    Patient to be seen 5 x/week  to address the above listed problems via gait training, therapeutic activities, therapeutic exercises, neuromuscular re-education  Plan of Care expires: 17  Plan of Care reviewed with: patient         Manish  Isai, PTA  2017

## 2017-07-12 NOTE — PLAN OF CARE
Problem: Physical Therapy Goal  Goal: Physical Therapy Goal  Goals to be met by: 17    Patient will increase functional independence with mobility by performin. Supine to sit with Minimal Assistance  2. Sit to supine with Minimal Assistance  3. Sit to stand transfer with Minimal Assistance  4. Standing for 3 minutes with Minimal Assistance and weight evenly distributed through (B) LE.   5. Gait  x 20 feet with Moderate Assistance using AD if needed   6. Pt will perform (B) LE therapeutic exercises x 20 reps to improve muscular strength and endurance.    Outcome: Ongoing (interventions implemented as appropriate)  Patient is very drowsy, which limits her participation and mobility.

## 2017-07-12 NOTE — ASSESSMENT & PLAN NOTE
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      Hx of a right frontotemporal craniotomy with partial removal of a large subfrontal and tuberculum sellae meningioma at [facility] in 2015. 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      NS is following

## 2017-07-12 NOTE — PLAN OF CARE
Problem: SLP Goal  Goal: SLP Goal  SLP goals expected to be met by 7/18:  1. Pt will participate in going assessment of swallow in order to determine safest, least restrictive diet.    Outcome: Ongoing (interventions implemented as appropriate)  Recommend continue NPO at this time. Consider alternative means nutrition/ hydration/ medication.     HILDA Rascon, CCC-SLP  [phone]  7/12/2017

## 2017-07-12 NOTE — PLAN OF CARE
Problem: Patient Care Overview  Goal: Plan of Care Review  Outcome: Ongoing (interventions implemented as appropriate)  POC reviewed with pt, pt needing reinforcement. Pt oriented x1, able to state birthday. Pt lethargic through the day, arousable to voice and intermittently needing repeated verbal stimulation and touch to arouse. Pt intermittently following commands. Pt verbalizing at times through the shift, not responding consistently to questions. Pt on tele in ST in the 100s, pt on RA. Pt with mod amt yellow sputum suctioned with shantelle x1 this afternoon. Pt failed swallow screen with SLP this AM, made NPO, NGT placed to R nare. TFs infusing. IVF infusing into R arm IV. Pt incontinent of urine, purewick attempted. Pt with bilateral soft wrist restraints, frequent monitoring. Q2 turns. Barrier ointment applied to buttocks. Pt currently resting in bed, call bell within reach, restraints in place, bed alarm on, camera on. Will continue to monitor.

## 2017-07-12 NOTE — PROGRESS NOTES
[facility] Hospital Medicine  Progress Note    Patient Name: Joanna Morales  MRN: [id number]  Patient Class: IP- Inpatient   Admission Date: 6/28/2017  Length of Stay: 14 days  Attending Physician: Rhoda Lewis MD  Primary Care Provider: Claudy Tse MD    [facility] Medicine Team: [facility]OSP MED B Rhoda eLwis MD    Subjective:     Principal Problem:Hypernatremia    HPI:  Ms. Joanna Morales is a 51 y.o. female with tobacco abuse, secondary hypothyroidism (TSH 0.241 Jun 2017), and history of meningioma s/p resection complicated by diabetes insipidus who presents to [facility] ED from [facility] after being found with abnormal labwork.  She had been more altered in the last day or so and upon checking labwork, she was noted to be with hypernatremia.  She contributed very minimally to her history but does report some abdominal pain.  Of note, she was recently admitted to this facility under the Neurosurgery service for resection of a meningioma that was complicated by diabetes insipidus for which she received desmopressin a few times before being discharged to rehabilitation at [facility].  Further history is limited at this time.    Hospital Course:  7/11 - DDAVP was decreased yesterday and -> 154, and MS declined .  Not taking po liquids, tanya 12.2  7/12 - na and calcium levels improved, pt arter and talking, following commands.  She pulled out her NG tube.  ST to do another eval.  Endocrine consulted.     Interval History: non-verbal    Review of Systems   Constitutional: Positive for fatigue. Negative for chills and fever.        Per nurse was speaking a few words this am.    HENT: Negative for nosebleeds and trouble swallowing.    Respiratory: Negative for cough, shortness of breath and stridor.    Cardiovascular: Negative for chest pain and leg swelling.   Gastrointestinal: Negative for abdominal pain, constipation, diarrhea, nausea and vomiting.    Genitourinary: Negative for difficulty urinating and flank pain.   Skin: Negative for pallor, rash and wound.   Neurological: Negative for dizziness, light-headedness and numbness.   Hematological: Does not bruise/bleed easily.   Psychiatric/Behavioral: Positive for confusion. Negative for sleep disturbance.     Objective:     Vital Signs (Most Recent):  Temp: 98.6 °F (37 °C) (07/12/17 1239)  Pulse: 84 (07/12/17 1239)  Resp: 18 (07/12/17 1239)  BP: 131/66 (07/12/17 1239)  SpO2: 96 % (07/12/17 1239) Vital Signs (24h Range):  Temp:  [96.8 °F (36 °C)-98.9 °F (37.2 °C)] 98.6 °F (37 °C)  Pulse:  [] 84  Resp:  [17-20] 18  SpO2:  [93 %-97 %] 96 %  BP: (104-131)/(60-79) 131/66     Weight: 59.9 kg (132 lb)  Body mass index is 24.14 kg/m².    Intake/Output Summary (Last 24 hours) at 07/12/17 1312  Last data filed at 07/12/17 0700   Gross per 24 hour   Intake          3049.66 ml   Output              225 ml   Net          2824.66 ml      Physical Exam   Constitutional: She appears well-developed and well-nourished.   somnolent   HENT:   Head: Normocephalic.   Mouth/Throat: Oropharynx is clear and moist.   Eyes: Conjunctivae are normal. Pupils are equal, round, and reactive to light. No scleral icterus.   Neck: Normal range of motion. Neck supple. No JVD present.   Cardiovascular: Normal rate, regular rhythm, normal heart sounds and intact distal pulses.  Exam reveals no gallop and no friction rub.    No murmur heard.  Pulmonary/Chest: Effort normal and breath sounds normal. No respiratory distress. She has no wheezes. She has no rales. She exhibits no tenderness.   Abdominal: Soft. Bowel sounds are normal. She exhibits no distension and no mass. There is no tenderness. There is no rebound and no guarding.   Musculoskeletal: She exhibits no edema or tenderness.   Lymphadenopathy:     She has no cervical adenopathy.   Neurological: She has normal strength. She exhibits normal muscle tone. Coordination normal.   Skin:  Skin is warm and dry.   Psychiatric: Her speech is normal. Cognition and memory are normal.   Nursing note and vitals reviewed.      Significant Labs:   BMP:     Recent Labs  Lab 07/12/17  0350   *   *   K 3.4*      CO2 32*   BUN 16   CREATININE 1.0   CALCIUM 10.9*   MG 2.3     CBC:     Recent Labs  Lab 07/11/17  0343 07/12/17  0350   WBC 8.10 7.34   HGB 12.5 11.9*   HCT 36.9* 35.9*   * 496*         Assessment/Plan:      Acute encephalopathy    7/12 - improved  Worse with  increasing Na level  EEG done - Moderate diffuse slowing of the overall background as described above.  2.  Superimposed left hemispheric slowing relative to the right.  No   epileptiform discharges or seizures were seen, however.  Result suggests both   encephalopathy and focal cortical dysfunction,    Pulled NGT ou t/ 7/8 and 7.12,  May need to replace to give po meds and liquids, ST eval          Diabetes insipidus    7/12 - reduce d5W, need long term plan from endocrine: will need scheduled water boluses  250cc q 6-8 hours  + desmopressin nightly for home.  7/11 - Increase DDAVP to BID, D5W,  As addressed above.  Follow sodium and UOP  reconsult endcrine          Hypercalcemia    D5W, s/p  lasix,   7/12 - tanya 12.2-> 10.9          Tobacco abuse    Patient was counseled on smoking cessation and she will be provided a nicotine transdermal patch applied while inpatient.  Will provide additional smoking cessation counseling prior to discharge.        Secondary hypothyroidism    As addressed above.  Will continue her home regimen of levothyroxine.        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      Hx of a right frontotemporal craniotomy with partial removal of a large subfrontal and tuberculum sellae meningioma at [facility] in 2015. She had  residual blindness in her left eye and has had progressive loss of vision in the right eye since that time. 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      NS is following          * Hypernatremia    7/12 - Na 146  ot on sterPatient's laboratory findings are most likely secondary to her diabetes insipidus.  She was given a dose of intranasal desmopressin and started on IV fluids.  Will repeat her metabolic panel in the morning and consult Endocrinology for further recommendations.            VTE Risk Mitigation         Ordered     High Risk of VTE  Once      06/30/17 0920     Place sequential compression device  Until discontinued      06/30/17 0920     enoxaparin injection 40 mg  Daily     Route:  Subcutaneous        06/28/17 9131          Rhoda Lewis MD  Department of Hospital Medicine   [facility]

## 2017-07-12 NOTE — PROGRESS NOTES
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 Chew and discharged on 17 to [facility].  She presents with altered mental status and worsening hypernatremia for 1 day. She was treated for DI her last admission and labs are trending up today.  Spoke with Dr. Fairchild at [facility], patient with waxing/waning mental status yesterday, but was appropriate & following some complex commands yesterday.  There was a period yesterday of significant lethargy.  Patient is unable to give history, and information is taken from the chart.     Post-Op Info:  * No surgery found *         Interval History: NAEON. Patient awake but drowsy on exam. No new complaints.     Medications:  Continuous Infusions:   dextrose 5 % 75 mL/hr at 17 1403     Scheduled Meds:   desmopressin  10 mcg Nasal Nightly    enoxaparin  40 mg Subcutaneous Daily    gabapentin  125 mg Per NG tube Q12H    levetiracetam oral soln  500 mg Per NG tube BID    levothyroxine  50 mcg Per NG tube Daily    nicotine  1 patch Transdermal Daily     PRN Meds:acetaminophen, dextrose 50%, glucagon (human recombinant), insulin aspart, ondansetron, potassium chloride 10%     Review of Systems  Objective:     Weight: 59.9 kg (132 lb)  Body mass index is 24.14 kg/m².  Vital Signs (Most Recent):  Temp: 98.6 °F (37 °C) (17 1239)  Pulse: 78 (17 1500)  Resp: 18 (17 1239)  BP: 131/66 (17 1239)  SpO2: 96 % (17 1239) Vital Signs (24h Range):  Temp:  [97.6 °F (36.4 °C)-98.9 °F (37.2 °C)] 98.6 °F (37 °C)  Pulse:  [] 78  Resp:  [17-18] 18  SpO2:  [95 %-97 %] 96 %  BP: (104-131)/(60-76) 131/66              Temp (24hrs), Av.5 °F (36.9 °C), Min:97.6 °F (36.4 °C), Max:98.9 °F (37.2 °C)           Date 17 07 - 17 0659   Shift 8528-8722 7653-0481 0868-3252 24 Hour Total    I  N  T  A  K  E   I.V.  (mL/kg) 999  (16.7)   999  (16.7)    Shift Total  (mL/kg) 999  (16.7)   999  (16.7)   O  U  T  P  U  T   Shift Total  (mL/kg)       Weight (kg) 59.9 59.9 59.9 59.9          Neurosurgery Physical Exam  General: well developed, well nourished, no distress.   Head: normocephalic  Neurologic: drowsy, arousable. Minimal verbal responses.   GCS: Motor: 6/Verbal: 4 /Eyes: 2 GCS Total: 12  Mental Status: Able to state name. Intermittently follows simple commands.   Cranial nerves: face symmetric, CN II-XII grossly intact.   Eyes: pupils equal, round, reactive to light.  Pulmonary: normal respirations, no signs of respiratory distress  Abdomen: soft, non-distended, not tender to palpation  Sensory: response to light touch throughout  Motor Strength: Moves all extremities spontaneously with good strength and tone. Intermittently follows simple commands.   Unable to assess individual muscle groups.   Pronator Drift: unable to assess 2/2 mental status  Finger-to-nose: unable to assess 2/2 mental status  Clonus: absent  Babinski: absent  No LE edema  Skin: Skin is warm, dry and intact.    Significant Labs:    Recent Labs  Lab 07/12/17  0350  07/12/17  1605   *  < > 96   *  < > 144   K 3.4*  < > 3.3*     < > 100   CO2 32*  < > 33*   BUN 16  < > 14   CREATININE 1.0  < > 0.9   CALCIUM 10.9*  < > 11.1*   MG 2.3  --   --    < > = values in this interval not displayed.    Recent Labs  Lab 07/11/17  0343 07/12/17  0350   WBC 8.10 7.34   HGB 12.5 11.9*   HCT 36.9* 35.9*   * 496*     No results for input(s): LABPT, INR, APTT in the last 48 hours.  Microbiology Results (last 7 days)     Procedure Component Value Units Date/Time    Blood culture [[id number]] Collected:  07/03/17 0934    Order Status:  Completed Specimen:  Blood from Peripheral, Hand, Left Updated:  07/08/17 1212     Blood Culture, Routine No growth after 5 days.    Narrative:       Blood cultures from 2 different sites. 4  bottles total.  Please draw before starting antibiotics.    Blood culture [[id number]] Collected:  07/03/17 0954    Order Status:  Completed Specimen:  Blood from Peripheral, Wrist, Right Updated:  07/08/17 1212     Blood Culture, Routine No growth after 5 days.    Narrative:       Blood cultures x 2 different sites. 4 bottles total. Please  draw cultures before administering antibiotics.            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 Notify NSGY for any changes.   -Continue Keppra for seizure prophylaxis  -Continue Lovenox for DVT prophylaxis  -Continue aggressive PT/OT  -EEG pending     -Continue management of electrolyte imbalances per primary team  -Consider PEG placement if po intake continues to not be sufficient   -Will continue to follow, please call with questions   -Pending Rehab per primary team            Mario Kirkpatrick PA-C  Neurosurgery  [facility]

## 2017-07-12 NOTE — ASSESSMENT & PLAN NOTE
7/12 - reduce d5W, need long term plan from endocrine: will need scheduled water boluses  250cc q 6-8 hours  + desmopressin nightly for home.  7/11 - Increase DDAVP to BID, D5W,  As addressed above.  Follow sodium and UOP  reconsult endcrine

## 2017-07-12 NOTE — ASSESSMENT & PLAN NOTE
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 Chew 6/7/17, who re-presented with hypernatremia  -Continue neuro checks q4 hours. Notify NSGY for any changes.   -Continue Keppra for seizure prophylaxis  -Continue Lovenox for DVT prophylaxis  -Continue aggressive PT/OT  -EEG pending     -Continue management of electrolyte imbalances per primary team  -Consider PEG placement if po intake continues to not be sufficient   -Will continue to follow, please call with questions   -Pending Rehab per primary team

## 2017-07-12 NOTE — SUBJECTIVE & OBJECTIVE
Interval History: NAEON. Patient awake but drowsy on exam. No new complaints.     Medications:  Continuous Infusions:   dextrose 5 % 75 mL/hr at 17 1403     Scheduled Meds:   desmopressin  10 mcg Nasal Nightly    enoxaparin  40 mg Subcutaneous Daily    gabapentin  125 mg Per NG tube Q12H    levetiracetam oral soln  500 mg Per NG tube BID    levothyroxine  50 mcg Per NG tube Daily    nicotine  1 patch Transdermal Daily     PRN Meds:acetaminophen, dextrose 50%, glucagon (human recombinant), insulin aspart, ondansetron, potassium chloride 10%     Review of Systems  Objective:     Weight: 59.9 kg (132 lb)  Body mass index is 24.14 kg/m².  Vital Signs (Most Recent):  Temp: 98.6 °F (37 °C) (17 1239)  Pulse: 78 (17 1500)  Resp: 18 (17 1239)  BP: 131/66 (17 1239)  SpO2: 96 % (17 1239) Vital Signs (24h Range):  Temp:  [97.6 °F (36.4 °C)-98.9 °F (37.2 °C)] 98.6 °F (37 °C)  Pulse:  [] 78  Resp:  [17-18] 18  SpO2:  [95 %-97 %] 96 %  BP: (104-131)/(60-76) 131/66              Temp (24hrs), Av.5 °F (36.9 °C), Min:97.6 °F (36.4 °C), Max:98.9 °F (37.2 °C)           Date 17 07 - 17 0659   Shift [id number] [id number] 24 Hour Total   I  N  T  A  K  E   I.V.  (mL/kg) 999  (16.7)   999  (16.7)    Shift Total  (mL/kg) 999  (16.7)   999  (16.7)   O  U  T  P  U  T   Shift Total  (mL/kg)       Weight (kg) 59.9 59.9 59.9 59.9          Neurosurgery Physical Exam  General: well developed, well nourished, no distress.   Head: normocephalic  Neurologic: drowsy, arousable. Minimal verbal responses.   GCS: Motor: 6/Verbal: 4 /Eyes: 2 GCS Total: 12  Mental Status: Able to state name. Intermittently follows simple commands.   Cranial nerves: face symmetric, CN II-XII grossly intact.   Eyes: pupils equal, round, reactive to light.  Pulmonary: normal respirations, no signs of respiratory distress  Abdomen: soft, non-distended, not tender to palpation  Sensory: response to light  touch throughout  Motor Strength: Moves all extremities spontaneously with good strength and tone. Intermittently follows simple commands.   Unable to assess individual muscle groups.   Pronator Drift: unable to assess 2/2 mental status  Finger-to-nose: unable to assess 2/2 mental status  Clonus: absent  Babinski: absent  No LE edema  Skin: Skin is warm, dry and intact.    Significant Labs:    Recent Labs  Lab 07/12/17  0350  07/12/17  1605   *  < > 96   *  < > 144   K 3.4*  < > 3.3*     < > 100   CO2 32*  < > 33*   BUN 16  < > 14   CREATININE 1.0  < > 0.9   CALCIUM 10.9*  < > 11.1*   MG 2.3  --   --    < > = values in this interval not displayed.    Recent Labs  Lab 07/11/17  0343 07/12/17  0350   WBC 8.10 7.34   HGB 12.5 11.9*   HCT 36.9* 35.9*   * 496*     No results for input(s): LABPT, INR, APTT in the last 48 hours.  Microbiology Results (last 7 days)     Procedure Component Value Units Date/Time    Blood culture [[id number]] Collected:  07/03/17 0934    Order Status:  Completed Specimen:  Blood from Peripheral, Hand, Left Updated:  07/08/17 1212     Blood Culture, Routine No growth after 5 days.    Narrative:       Blood cultures from 2 different sites. 4 bottles total.  Please draw before starting antibiotics.    Blood culture [[id number]] Collected:  07/03/17 0954    Order Status:  Completed Specimen:  Blood from Peripheral, Wrist, Right Updated:  07/08/17 1212     Blood Culture, Routine No growth after 5 days.    Narrative:       Blood cultures x 2 different sites. 4 bottles total. Please  draw cultures before administering antibiotics.            Significant Diagnostics:  None new

## 2017-07-12 NOTE — SUBJECTIVE & OBJECTIVE
Interval History: non-verbal    Review of Systems   Constitutional: Positive for fatigue. Negative for chills and fever.        Per nurse was speaking a few words this am.    HENT: Negative for nosebleeds and trouble swallowing.    Respiratory: Negative for cough, shortness of breath and stridor.    Cardiovascular: Negative for chest pain and leg swelling.   Gastrointestinal: Negative for abdominal pain, constipation, diarrhea, nausea and vomiting.   Genitourinary: Negative for difficulty urinating and flank pain.   Skin: Negative for pallor, rash and wound.   Neurological: Negative for dizziness, light-headedness and numbness.   Hematological: Does not bruise/bleed easily.   Psychiatric/Behavioral: Positive for confusion. Negative for sleep disturbance.     Objective:     Vital Signs (Most Recent):  Temp: 98.6 °F (37 °C) (07/12/17 1239)  Pulse: 84 (07/12/17 1239)  Resp: 18 (07/12/17 1239)  BP: 131/66 (07/12/17 1239)  SpO2: 96 % (07/12/17 1239) Vital Signs (24h Range):  Temp:  [96.8 °F (36 °C)-98.9 °F (37.2 °C)] 98.6 °F (37 °C)  Pulse:  [] 84  Resp:  [17-20] 18  SpO2:  [93 %-97 %] 96 %  BP: (104-131)/(60-79) 131/66     Weight: 59.9 kg (132 lb)  Body mass index is 24.14 kg/m².    Intake/Output Summary (Last 24 hours) at 07/12/17 1312  Last data filed at 07/12/17 0700   Gross per 24 hour   Intake          3049.66 ml   Output              225 ml   Net          2824.66 ml      Physical Exam   Constitutional: She appears well-developed and well-nourished.   somnolent   HENT:   Head: Normocephalic.   Mouth/Throat: Oropharynx is clear and moist.   Eyes: Conjunctivae are normal. Pupils are equal, round, and reactive to light. No scleral icterus.   Neck: Normal range of motion. Neck supple. No JVD present.   Cardiovascular: Normal rate, regular rhythm, normal heart sounds and intact distal pulses.  Exam reveals no gallop and no friction rub.    No murmur heard.  Pulmonary/Chest: Effort normal and breath sounds normal.  No respiratory distress. She has no wheezes. She has no rales. She exhibits no tenderness.   Abdominal: Soft. Bowel sounds are normal. She exhibits no distension and no mass. There is no tenderness. There is no rebound and no guarding.   Musculoskeletal: She exhibits no edema or tenderness.   Lymphadenopathy:     She has no cervical adenopathy.   Neurological: She has normal strength. She exhibits normal muscle tone. Coordination normal.   Skin: Skin is warm and dry.   Psychiatric: Her speech is normal. Cognition and memory are normal.   Nursing note and vitals reviewed.      Significant Labs:   BMP:     Recent Labs  Lab 07/12/17  0350   *   *   K 3.4*      CO2 32*   BUN 16   CREATININE 1.0   CALCIUM 10.9*   MG 2.3     CBC:     Recent Labs  Lab 07/11/17  0343 07/12/17  0350   WBC 8.10 7.34   HGB 12.5 11.9*   HCT 36.9* 35.9*   * 496*

## 2017-07-12 NOTE — ASSESSMENT & PLAN NOTE
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      Hx of a right frontotemporal craniotomy with partial removal of a large subfrontal and tuberculum sellae meningioma at [facility] in 2015. 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      NS is following

## 2017-07-13 PROBLEM — Z51.5 PALLIATIVE CARE ENCOUNTER: Status: ACTIVE | Noted: 2017-01-01

## 2017-07-13 PROBLEM — E44.0 MALNUTRITION OF MODERATE DEGREE: Status: ACTIVE | Noted: 2017-01-01

## 2017-07-13 PROBLEM — E87.0 HYPERNATREMIA: Status: RESOLVED | Noted: 2017-01-01 | Resolved: 2017-01-01

## 2017-07-13 NOTE — SUBJECTIVE & OBJECTIVE
Interval History: non-verbal    Review of Systems   Constitutional: Positive for activity change. Negative for chills, fatigue and fever.        Somnolent today  Responds to voice and touch   HENT: Positive for trouble swallowing. Negative for nosebleeds.    Respiratory: Negative for cough, shortness of breath and stridor.    Cardiovascular: Negative for chest pain and leg swelling.   Gastrointestinal: Negative for abdominal pain, constipation, diarrhea, nausea and vomiting.   Genitourinary: Negative for difficulty urinating, flank pain and pelvic pain.   Musculoskeletal: Negative for back pain.   Skin: Negative for pallor, rash and wound.   Hematological: Does not bruise/bleed easily.   Psychiatric/Behavioral: Positive for confusion. Negative for agitation and sleep disturbance.     Objective:     Vital Signs (Most Recent):  Temp: 96.7 °F (35.9 °C) (07/13/17 0805)  Pulse: 68 (07/13/17 1216)  Resp: 16 (07/13/17 1216)  BP: 104/76 (07/13/17 1216)  SpO2: 95 % (07/13/17 1216) Vital Signs (24h Range):  Temp:  [96 °F (35.6 °C)-98.2 °F (36.8 °C)] 96.7 °F (35.9 °C)  Pulse:  [68-85] 68  Resp:  [16-18] 16  SpO2:  [95 %-98 %] 95 %  BP: (102-127)/(67-80) 104/76     Weight: 59.9 kg (132 lb)  Body mass index is 24.14 kg/m².    Intake/Output Summary (Last 24 hours) at 07/13/17 1337  Last data filed at 07/13/17 0559   Gross per 24 hour   Intake              910 ml   Output                0 ml   Net              910 ml      Physical Exam   Constitutional: She appears well-developed and well-nourished.   Somnolent, has NG tube, in restraints   HENT:   Head: Normocephalic.   Mouth/Throat: Oropharynx is clear and moist.   Eyes: Conjunctivae are normal. Pupils are equal, round, and reactive to light. No scleral icterus.   Neck: Normal range of motion. Neck supple. No JVD present.   Cardiovascular: Normal rate, regular rhythm, normal heart sounds and intact distal pulses.  Exam reveals no gallop and no friction rub.    No murmur  heard.  Pulmonary/Chest: Effort normal and breath sounds normal. No respiratory distress. She has no wheezes. She has no rales. She exhibits no tenderness.   Abdominal: Soft. Bowel sounds are normal. She exhibits no distension and no mass. There is no tenderness. There is no rebound and no guarding.   Musculoskeletal: She exhibits no edema or tenderness.   Lymphadenopathy:     She has no cervical adenopathy.   Neurological: She has normal strength. She exhibits normal muscle tone. Coordination normal.   Skin: Skin is warm and dry.   Psychiatric: Her speech is normal. Cognition and memory are normal.   Nursing note and vitals reviewed.      Significant Labs:   BMP:     Recent Labs  Lab 07/13/17  0542 07/13/17  0827   GLU 92 102    143   K 3.7 3.8    102   CO2 32* 31*   BUN 12 12   CREATININE 0.9 0.9   CALCIUM 11.4* 11.3*   MG 2.3  --      CBC:     Recent Labs  Lab 07/12/17  0350 07/13/17  0542   WBC 7.34 7.41   HGB 11.9* 11.5*   HCT 35.9* 34.3*   * 456*

## 2017-07-13 NOTE — HPI
Joanna Cowan is a 50yo F with PMHx of subfrontal, tuberculum sellae meningioma s/p resection at [facility] in 2015, and 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 Patient was discharged on 6/26/17 to [facility] after a complicated post-op course, and has been re-admitted for altered mental status. AMS was majorly attributed to electrolyte abnormalities of worsenied hypernatremia / DI and hypothyroid state. With significant correction of lytes / and avoidance of infection, stable LFTs / RFTs patient had significant improvement in mentation, however with waxing and waning episode over the last 24 hrs. Had questionable concerns of reduced responsiveness after imptovment, hence general neurology was consulted for subclinical seizure events contributing to encephalopathy. Patient has been on keppra for seizure prophylaxis post surgery and prior EEG was negative for any epileptiform activities too. No recent events of fevers or clinical seziures or focal neurological deficits.

## 2017-07-13 NOTE — ASSESSMENT & PLAN NOTE
7/13 - waxing and waning, today she is somnolent,  Consult Neurology, Since symptoms are intermittent consider continuous  EEG monitoring.    7/12 - improved  7/11 - Worse with  increasing Na level  EEG done - Moderate diffuse slowing of the overall background as described above.  2.  Superimposed left hemispheric slowing relative to the right.  No   epileptiform discharges or seizures were seen, however.  Result suggests both   encephalopathy and focal cortical dysfunction,    Pulled NGT ou t/ 7/8 and 7.12,  May need to replace to give po meds and liquids, ST eval

## 2017-07-13 NOTE — PT/OT/SLP PROGRESS
Physical Therapy  Treatment    Joanna Morales   MRN: [id number]   Admitting Diagnosis: Hypernatremia    PT Received On: 07/13/17  PT Start Time: 1451     PT Stop Time: 1521    PT Total Time (min): 30 min       Billable Minutes:  Therapeutic Activity 30    Treatment Type: Treatment  PT/PTA: PT     PTA Visit Number: 0       General Precautions: Standard, aspiration, fall  Orthopedic Precautions: N/A   Braces: N/A    Do you have any cultural, spiritual, Anglican conflicts, given your current situation?: none    Subjective:  Communicated with nurse prior to session. Pt kept referring to a piece of cake that she wanted to eat. She also mentioned candy and removing a jar from her pants.  Every now and then, pt would communicate with me relevantly.     Pain/Comfort  Pain Rating 1: 0/10 (NG tube is bothersome to pt.)    Objective:   Patient found with: restraints, NG tube, SCD, peripheral IV (BUE)    Functional Mobility:  Bed Mobility:   Supine to Sit: Moderate Assistance  Sit to Supine: Moderate Assistance    Transfers:  Sit <> Stand Assistance: Minimum Assistance  Sit <> Stand Assistive Device: No Assistive Device (from EOB)    Gait:   Gait Distance: n/a    Balance:   Static Sit: FAIR+: Able to take MINIMAL challenges from all directions  Dynamic Sit: FAIR+: Maintains balance through MINIMAL excursions of active trunk motion  Static Stand: POOR+: Needs MINIMAL assist to maintain    Therapeutic Activities and Exercises:  Pt sat edge of bed for ~15 minutes with CGA.  She was unable to perform any seated exercises, as she did not follow any of my commands.  She stood twice from edge of bed for ~30 seconds each and had trouble performing posterior pelvic tilt in order to sit.  She performed heel slides in supine x 10 reps BLE with verbal cues to complete.      AM-PAC 6 CLICK MOBILITY  How much help from another person does this patient currently need?   1 = Unable, Total/Dependent Assistance  2 = A lot, Maximum/Moderate  Assistance  3 = A little, Minimum/Contact Guard/Supervision  4 = None, Modified [place]/Independent    Turning over in bed (including adjusting bedclothes, sheets and blankets)?: 3  Sitting down on and standing up from a chair with arms (e.g., wheelchair, bedside commode, etc.): 3  Moving from lying on back to sitting on the side of the bed?: 3  Moving to and from a bed to a chair (including a wheelchair)?: 2  Need to walk in hospital room?: 1  Climbing 3-5 steps with a railing?: 1  Total Score: 13    AM-PAC Raw Score CMS G-Code Modifier Level of Impairment Assistance   6 % Total / Unable   7 - 9 CM 80 - 100% Maximal Assist   10 - 14 CL 60 - 80% Moderate Assist   15 - 19 CK 40 - 60% Moderate Assist   20 - 22 CJ 20 - 40% Minimal Assist   23 CI 1-20% SBA / CGA   24 CH 0% Independent/ Mod I     Patient left HOB elevated with call button in reach, nurse notified.    Assessment:  Joanna Morales is a 51 y.o. female with a medical diagnosis of Hypernatremia and presents with impaired cognition, limiting her PT session. She was able to meet one goal of standing with minimal assistance. If her cognition improves, I believe MS. Morales will improve significantly as far as her transfers and bed mobility are concerned.     Rehab identified problem list/impairments: Rehab identified problem list/impairments: weakness, impaired endurance, impaired self care skills, impaired balance, decreased lower extremity function, pain, impaired cognition    Rehab potential is fair.    Activity tolerance: Fair    Discharge recommendations: Discharge Facility/Level Of Care Needs: rehabilitation facility     Barriers to discharge: Barriers to Discharge: Decreased caregiver support    Equipment recommendations: Equipment Needed After Discharge:  (tbd)     GOALS:    Physical Therapy Goals        Problem: Physical Therapy Goal    Goal Priority Disciplines Outcome Goal Variances Interventions   Physical Therapy Goal     PT/OT,  PT Ongoing (interventions implemented as appropriate)     Description:  Goals to be met by: 17    Patient will increase functional independence with mobility by performin. Supine to sit with Minimal Assistance  2. Sit to supine with Minimal Assistance  3. Sit to stand transfer with Minimal Assistance met (2017)  4. Standing for 3 minutes with Minimal Assistance and weight evenly distributed through (B) LE.   5. Gait  x 20 feet with Moderate Assistance using AD if needed   6. Pt will perform (B) LE therapeutic exercises x 20 reps to improve muscular strength and endurance.                      PLAN:    Patient to be seen 5 x/week  to address the above listed problems via gait training, therapeutic activities, therapeutic exercises, neuromuscular re-education  Plan of Care expires: 17  Plan of Care reviewed with: patient         Shantal August, PT  2017

## 2017-07-13 NOTE — PROGRESS NOTES
[facility] Hospital Medicine  Progress Note    Patient Name: Joanna Morales  MRN: [id number]  Patient Class: IP- Inpatient   Admission Date: 6/28/2017  Length of Stay: 15 days  Attending Physician: Rhoda Lewis MD  Primary Care Provider: Claudy Tse MD    [facility] Medicine Team: [facility] HOSP MED B Rhoda Lewis MD    Subjective:     Principal Problem:Hypernatremia    HPI:  Ms. Joanna Morales is a 51 y.o. female with tobacco abuse, secondary hypothyroidism (TSH 0.241 Jun 2017), and history of meningioma s/p resection complicated by diabetes insipidus who presents to [facility] ED from [facility] after being found with abnormal labwork.  She had been more altered in the last day or so and upon checking labwork, she was noted to be with hypernatremia.  She contributed very minimally to her history but does report some abdominal pain.  Of note, she was recently admitted to this facility under the Neurosurgery service for resection of a meningioma that was complicated by diabetes insipidus for which she received desmopressin a few times before being discharged to rehabilitation at [facility].  Further history is limited at this time.    Hospital Course:  7/11 - DDAVP was decreased yesterday and -> 154, and MS declined .  Not taking po liquids, tanya 12.2  7/12 - na and calcium levels improved, pt arter and talking, following commands.  She pulled out her NG tube.  ST to do another eval.  Endocrine consulted.   7/13 - pt more somnolent, responds to voice and sternal pressure.  titrating up tube feeds, on D5W,  , VVS,  Na 143, ca 11.3, alb 2.9.  Called Reina Hernandez  -  [phone]    Interval History: non-verbal    Review of Systems   Constitutional: Positive for activity change. Negative for chills, fatigue and fever.        Somnolent today  Responds to voice and touch   HENT: Positive for trouble swallowing. Negative for nosebleeds.    Respiratory: Negative  for cough, shortness of breath and stridor.    Cardiovascular: Negative for chest pain and leg swelling.   Gastrointestinal: Negative for abdominal pain, constipation, diarrhea, nausea and vomiting.   Genitourinary: Negative for difficulty urinating, flank pain and pelvic pain.   Musculoskeletal: Negative for back pain.   Skin: Negative for pallor, rash and wound.   Hematological: Does not bruise/bleed easily.   Psychiatric/Behavioral: Positive for confusion. Negative for agitation and sleep disturbance.     Objective:     Vital Signs (Most Recent):  Temp: 96.7 °F (35.9 °C) (07/13/17 0805)  Pulse: 68 (07/13/17 1216)  Resp: 16 (07/13/17 1216)  BP: 104/76 (07/13/17 1216)  SpO2: 95 % (07/13/17 1216) Vital Signs (24h Range):  Temp:  [96 °F (35.6 °C)-98.2 °F (36.8 °C)] 96.7 °F (35.9 °C)  Pulse:  [68-85] 68  Resp:  [16-18] 16  SpO2:  [95 %-98 %] 95 %  BP: (102-127)/(67-80) 104/76     Weight: 59.9 kg (132 lb)  Body mass index is 24.14 kg/m².    Intake/Output Summary (Last 24 hours) at 07/13/17 1337  Last data filed at 07/13/17 0559   Gross per 24 hour   Intake              910 ml   Output                0 ml   Net              910 ml      Physical Exam   Constitutional: She appears well-developed and well-nourished.   Somnolent, has NG tube, in restraints   HENT:   Head: Normocephalic.   Mouth/Throat: Oropharynx is clear and moist.   Eyes: Conjunctivae are normal. Pupils are equal, round, and reactive to light. No scleral icterus.   Neck: Normal range of motion. Neck supple. No JVD present.   Cardiovascular: Normal rate, regular rhythm, normal heart sounds and intact distal pulses.  Exam reveals no gallop and no friction rub.    No murmur heard.  Pulmonary/Chest: Effort normal and breath sounds normal. No respiratory distress. She has no wheezes. She has no rales. She exhibits no tenderness.   Abdominal: Soft. Bowel sounds are normal. She exhibits no distension and no mass. There is no tenderness. There is no rebound and  no guarding.   Musculoskeletal: She exhibits no edema or tenderness.   Lymphadenopathy:     She has no cervical adenopathy.   Neurological: She has normal strength. She exhibits normal muscle tone. Coordination normal.   Skin: Skin is warm and dry.   Psychiatric: Her speech is normal. Cognition and memory are normal.   Nursing note and vitals reviewed.      Significant Labs:   BMP:     Recent Labs  Lab 07/13/17  0542 07/13/17  0827   GLU 92 102    143   K 3.7 3.8    102   CO2 32* 31*   BUN 12 12   CREATININE 0.9 0.9   CALCIUM 11.4* 11.3*   MG 2.3  --      CBC:     Recent Labs  Lab 07/12/17  0350 07/13/17  0542   WBC 7.34 7.41   HGB 11.9* 11.5*   HCT 35.9* 34.3*   * 456*         Assessment/Plan:      Acute encephalopathy    7/13 - waxing and waning, today she is somnolent,  Consult Neurology, Since symptoms are intermittent consider continuous  EEG monitoring.    7/12 - improved  7/11 - Worse with  increasing Na level  EEG done - Moderate diffuse slowing of the overall background as described above.  2.  Superimposed left hemispheric slowing relative to the right.  No   epileptiform discharges or seizures were seen, however.  Result suggests both   encephalopathy and focal cortical dysfunction,    Pulled NGT ou t/ 7/8 and 7.12,  May need to replace to give po meds and liquids, ST eval          Primary central diabetes insipidus    7/13 - has NG and getting water, can dc IVF  7/12 - reduce d5W, need long term plan from endocrine: will need scheduled water boluses  250cc q 6-8 hours  + desmopressin nightly for home.  7/11 - Increase DDAVP to BID, D5W,  As addressed above.  Follow sodium and UOP  reconsult endcrine          Hypercalcemia    7/13 - increase D9agubs +lasix 20 po.  Consider bisphosphenate. Will discuss with endocrine  7/12 - tanya 12.2-> 10.9 ->  11.3  7/11- D5W, s/p  lasix,         Malnutrition of moderate degree    PEG desired  Needs regular water intake due to diabetes insipidus           Palliative care encounter    Non-hospice Palliative Care:   Does patient have Capacity? no  Goals- 7/13 - discussed care with  Reina Hernandez  -  [phone], pt's POA.    This is turning into a long post operative course, with inability to nourish patient.  Reina agreed to PEG tube.  Pt needs regular water intake, with mental status changes pulls out NG tube frequently..  NG could be contributing to delirium as well.  willl consult GI  Code Status- FULL  Pain management- stable, no pain  Prognosis-  Poor given numerous complications, but too soon to give up.  Would like another neurology eval, give effort another 1/2 months at least.   Family discussions- 7/13 - Called Reina Hernandez  -  [phone]  Functional Status - limited to bed, presently    Post acute care plan:  Rehab if MS improves.              Tobacco abuse    Patient was counseled on smoking cessation and she will be provided a nicotine transdermal patch applied while inpatient.  Will provide additional smoking cessation counseling prior to discharge.        Altered mental status              Secondary hypothyroidism    As addressed above.  Will continue her home regimen of levothyroxine.        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      Hx of a right frontotemporal craniotomy with partial removal of a large subfrontal and tuberculum sellae meningioma at [facility] in 2015. 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      NS is following          Brain mass    S/p resectin of meningiomas            VTE Risk Mitigation         Ordered     High Risk of VTE  Once      06/30/17 0920     Place sequential compression device  Until discontinued      06/30/17 0920     enoxaparin injection 40 mg  Daily     Route:  Subcutaneous        06/28/17 7273          Rhoda Lewis MD  Department of Hospital Medicine   [facility]

## 2017-07-13 NOTE — CONSULTS
[facility]  Neurology  Consult Note    Patient Name: Joanna Morales  MRN: [id number]  Admission Date: 6/28/2017  Hospital Length of Stay: 15 days  Code Status: Full Code   Attending Provider: Nat Najera MD   Consulting Provider: Atilio Scott MD  Primary Care Physician: Claudy Tse MD  Principal Problem:Hypernatremia    Inpatient consult to Neurology  Consult performed by: ATILIO SCOTT  Consult ordered by: NAT NAJERA         Subjective:     Chief Complaint:  AMS     HPI:   Joanna Cowan is a 52yo F with PMHx of subfrontal, tuberculum sellae meningioma s/p resection at [facility] in 2015, and 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 Patient was discharged on 6/26/17 to [facility] after a complicated post-op course, and has been re-admitted for altered mental status. AMS was majorly attributed to electrolyte abnormalities of worsenied hypernatremia / DI and hypothyroid state. With significant correction of lytes / and avoidance of infection, stable LFTs / RFTs patient had significant improvement in mentation, however with waxing and waning episode over the last 24 hrs. Had questionable concerns of reduced responsiveness after imptovment, hence general neurology was consulted for subclinical seizure events contributing to encephalopathy. Patient has been on keppra for seizure prophylaxis post surgery and prior EEG was negative for any epileptiform activities too. No recent events of fevers or clinical seziures or focal neurological deficits.            Past Medical History:   Diagnosis Date    Allergy     Basal cell carcinoma     Depression 11/1/2016    Essential hypertension 6/9/2017    Eye disorder     Fever blister     Normocytic anemia 6/9/2017    Secondary hypothyroidism 6/26/2017       Past Surgical History:   Procedure Laterality Date    BASAL CELL CARCINOMA EXCISION      forehead    BRAIN SURGERY      SKIN BIOPSY          Review of patient's allergies indicates:   Allergen Reactions    Codeine        Current Neurological Medications:     No current facility-administered medications on file prior to encounter.      Current Outpatient Prescriptions on File Prior to Encounter   Medication Sig    amitriptyline (ELAVIL) 100 MG tablet Take 1 tablet (100 mg total) by mouth nightly.    EUCALYPTUS OIL/MENTHOL/CAMPHOR (VICKS VAPORUB TOP) Apply topically daily as needed.    gabapentin (NEURONTIN) 300 MG capsule Take 1 capsule (300 mg total) by mouth 3 (three) times daily.    hydrocodone-acetaminophen 5-325mg (NORCO) 5-325 mg per tablet Take 1 tablet by mouth every 12 (twelve) hours as needed (severe pain).     Family History     Problem Relation (Age of Onset)    Diabetes Father    Hepatitis Father    Hypertension Father    No Known Problems Mother        Social History Main Topics    Smoking status: Current Every Day Smoker     Packs/day: 1.00     Years: 40.00     Types: Cigarettes    Smokeless tobacco: Never Used    Alcohol use No    Drug use: No    Sexual activity: No     Review of Systems   Constitutional: Positive for activity change. Negative for fever.   HENT: Negative for voice change.    Eyes: Positive for visual disturbance.   Respiratory: Negative for shortness of breath.    Cardiovascular: Negative for chest pain.   Gastrointestinal: Negative for abdominal distention.   Genitourinary: Negative for dysuria.   Musculoskeletal: Negative for neck pain and neck stiffness.   Neurological: Negative for tremors, seizures, syncope, facial asymmetry and headaches.   Psychiatric/Behavioral: Positive for confusion. Negative for agitation and behavioral problems.     Objective:     Vital Signs (Most Recent):  Temp: 96.7 °F (35.9 °C) (07/13/17 0805)  Pulse: 79 (07/13/17 1500)  Resp: 16 (07/13/17 1216)  BP: 104/76 (07/13/17 1216)  SpO2: 95 % (07/13/17 1216) Vital Signs (24h Range):  Temp:  [96 °F (35.6 °C)-96.7 °F (35.9 °C)] 96.7  °F (35.9 °C)  Pulse:  [68-85] 79  Resp:  [16-18] 16  SpO2:  [95 %-98 %] 95 %  BP: (104-127)/(72-80) 104/76     Weight: 59.9 kg (132 lb)  Body mass index is 24.14 kg/m².    Physical Exam   Constitutional: No distress.   HENT:   Head: Atraumatic.   Eyes: EOM are normal.   Neck: Normal range of motion. Neck supple.   Cardiovascular: Regular rhythm.    Pulmonary/Chest: Effort normal.   Abdominal: Soft.   Neurological: Finger-nose-finger test: Limited from mental status change / drowsy state.   Reflex Scores:       Tricep reflexes are 2+ on the right side and 2+ on the left side.       Bicep reflexes are 2+ on the right side and 2+ on the left side.       Brachioradialis reflexes are 2+ on the right side and 2+ on the left side.       Patellar reflexes are 2+ on the right side and 2+ on the left side.       Achilles reflexes are 2+ on the right side and 2+ on the left side.  Psychiatric: Her speech is normal.       NEUROLOGICAL EXAMINATION:     MENTAL STATUS   Oriented to person.   Disoriented to place.   Disoriented to year, month and date.   Follows 1 step commands.   Attention: decreased (Limited from mental status change / drowsy state). Concentration: decreased.   Speech: speech is normal   Level of consciousness: drowsy  Unable to name object: Limited from mental status change / drowsy state. : NT.    CRANIAL NERVES     CN II   Visual acuity: (Negative light perception )  Right visual field deficit: optic afferant affected.    CN III, IV, VI   Extraocular motions are normal.   Pupils: dilated  Right pupil: Shape: regular. Reactivity: non-reactive.   Left pupil: Shape: regular. Reactivity: non-reactive.   Cranial nerve III palsy: III nr / parasympathetic affected.  CN VI: no CN VI palsy  Nystagmus: none     CN V   Facial sensation intact.     CN VII   Facial expression full, symmetric.     CN VIII   CN VIII normal.     CN IX, X   CN IX normal.   CN X normal.     CN XI   CN XI normal.     CN XII   CN XII normal.      MOTOR EXAM   Muscle bulk: normal  Overall muscle tone: normal    Strength   Right neck flexion: 4/5  Left neck flexion: 4/5  Right neck extension: 4/5  Left neck extension: 4/5  Right deltoid: 3/5  Left deltoid: 3/5  Right biceps: 3/5  Left biceps: 3/5  Right triceps: 3/5  Left triceps: 3/5  Right wrist flexion: 4/5  Left wrist flexion: 4/5  Right wrist extension: 4/5  Left wrist extension: 4/5  Right interossei: 4/5  Left interossei: 4/5  Right iliopsoas: 3/5  Left iliopsoas: 3/5  Right quadriceps: 3/5  Left quadriceps: 3/5  Right hamstring: 3/5  Left hamstring: 3/5  Right anterior tibial: 3/5  Left anterior tibial: 3/5  Right posterior tibial: 3/5  Left posterior tibial: 3/5    REFLEXES     Reflexes   Right brachioradialis: 2+  Left brachioradialis: 2+  Right biceps: 2+  Left biceps: 2+  Right triceps: 2+  Left triceps: 2+  Right patellar: 2+  Left patellar: 2+  Right achilles: 2+  Left achilles: 2+  Right : 2+  Left : 2+  Right plantar: normal  Left plantar: normal  Right ankle clonus: absent  Left ankle clonus: absent    SENSORY EXAM   Pinprick normal.     GAIT AND COORDINATION     Gait  Gait: (Limited from mental status change / drowsy state)     Coordination   Finger-nose-finger test: Limited from mental status change / drowsy state.    Tremor   Resting tremor: absent      Significant Labs:   CBC:   Recent Labs  Lab 07/12/17  0350 07/13/17  0542   WBC 7.34 7.41   HGB 11.9* 11.5*   HCT 35.9* 34.3*   * 456*     CMP:   Recent Labs  Lab 07/12/17  0350  07/13/17  0028 07/13/17  0542 07/13/17  0827   *  < > 105 92 102   *  < > 142 144 143   K 3.4*  < > 3.2* 3.7 3.8     < > 99 101 102   CO2 32*  < > 34* 32* 31*   BUN 16  < > 12 12 12   CREATININE 1.0  < > 0.9 0.9 0.9   CALCIUM 10.9*  < > 11.1* 11.4* 11.3*   MG 2.3  --   --  2.3  --    PROT 8.0  --   --  8.0  --    ALBUMIN 2.7*  --   --  2.9*  --    BILITOT 0.1  --   --  0.2  --    ALKPHOS 139*  --   --  141*  --    AST 24  --    --  39  --    ALT 22  --   --  25  --    ANIONGAP 12  < > 9 11 10   EGFRNONAA >60.0  < > >60.0 >60.0 >60.0   < > = values in this interval not displayed.  All pertinent lab results from the past 24 hours have been reviewed.    Significant Imaging: I have reviewed and interpreted all pertinent imaging results/findings within the past 24 hours.    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   - Waxing and waning symptoms of reduced responsiveness, rasing concerns for subclinical seizure events contributing to encephalopathy    - Prior EEG was negative for any epileptiform activities, ON keppra for seizure prophylaxis post surgery  - Prior MRI stable post op changes, with no worsening picture  - No recent events of fevers or clinical seziures or focal neurological deficits.   - stable LFTs / RFTs / infections source control    - DDx multifactorial from electrolyte disturbances / metabolic disturbances / endocrine etiologies > subclinical seizures or nutritional disturbances      Plan:   - Routine EEG  - Continue keppra   - Monitor for clinical signs/symptoms of meningitis. Consider PEG tube > NG tube, given the proximity to surgical site and potential infections   - Order vitamin B1/B12/folate and RPR  - Correct lytes as needed / avoid hypoglycemia / infection control if needed          Secondary hypothyroidism    - as per primary team / endocrine recs        Primary central diabetes insipidus    - as per primary team / endocrine recs            VTE Risk Mitigation         Ordered     High Risk of VTE  Once      06/30/17 0920     Place sequential compression device  Until  discontinued      06/30/17 0920     enoxaparin injection 40 mg  Daily     Route:  Subcutaneous        06/28/17 4946          Thank you for your consult. I will follow-up with patient. Please contact us if you have any additional questions.    Atilio Scott MD  Neurology  [facility]

## 2017-07-13 NOTE — ASSESSMENT & PLAN NOTE
7/13 - has NG and getting water, can dc IVF  7/12 - reduce d5W, need long term plan from endocrine: will need scheduled water boluses  250cc q 6-8 hours  + desmopressin nightly for home.  7/11 - Increase DDAVP to BID, D5W,  As addressed above.  Follow sodium and UOP  reconsult endcrine

## 2017-07-13 NOTE — PLAN OF CARE
Problem: SLP Goal  Goal: SLP Goal  SLP goals expected to be met by 7/18:  1. Pt will participate in going assessment of swallow in order to determine safest, least restrictive diet.    Outcome: Ongoing (interventions implemented as appropriate)  Recommend continue NPO at this time with alternative means of nutrition/ hydration/ medication.     HILDA Rascon, CCC-SLP  [phone]  7/13/2017

## 2017-07-13 NOTE — ASSESSMENT & PLAN NOTE
Non-hospice Palliative Care:   Does patient have Capacity? no  Goals- 7/13 - discussed care with  Reina Hernandez  -  [phone], pt's POA.    This is turning into a long post operative course, with inability to nourish patient.  Reina agreed to PEG tube.  Pt needs regular water intake, with mental status changes pulls out NG tube frequently..  NG could be contributing to delirium as well.  willl consult GI  Code Status- FULL  Pain management- stable, no pain  Prognosis-  Poor given numerous complications, but too soon to give up.  Would like another neurology eval, give effort another 1/2 months at least.   Family discussions- 7/13 - Called Reina Hernandez  -  [phone]  Functional Status - limited to bed, presently    Post acute care plan:  Rehab if MS improves.

## 2017-07-13 NOTE — ASSESSMENT & PLAN NOTE
7/13 - increase [id number] +lasix 20 po.  Consider bisphosphenate. Will discuss with endocrine  7/12 - tanya 12.2-> 10.9 ->  11.3  7/11- D5W, s/p  lasix,

## 2017-07-13 NOTE — ASSESSMENT & PLAN NOTE
Recommend desmopressin 10mcg nightly given post-operative diabetes insipidus  Agree with scheduled free water boluses 4x daily or po water intake    May give desmopressin 10mcg intranasal prn for urine output greater than 250cc/hr for two consecutive hrs, urine specific gravity of 1.005 or less, and serum sodium greater than 150

## 2017-07-13 NOTE — SUBJECTIVE & OBJECTIVE
Past Medical History:   Diagnosis Date    Allergy     Basal cell carcinoma     Depression 11/1/2016    Essential hypertension 6/9/2017    Eye disorder     Fever blister     Normocytic anemia 6/9/2017    Secondary hypothyroidism 6/26/2017       Past Surgical History:   Procedure Laterality Date    BASAL CELL CARCINOMA EXCISION      forehead    BRAIN SURGERY      SKIN BIOPSY         Review of patient's allergies indicates:   Allergen Reactions    Codeine        Current Neurological Medications:     No current facility-administered medications on file prior to encounter.      Current Outpatient Prescriptions on File Prior to Encounter   Medication Sig    amitriptyline (ELAVIL) 100 MG tablet Take 1 tablet (100 mg total) by mouth nightly.    EUCALYPTUS OIL/MENTHOL/CAMPHOR (VICKS VAPORUB TOP) Apply topically daily as needed.    gabapentin (NEURONTIN) 300 MG capsule Take 1 capsule (300 mg total) by mouth 3 (three) times daily.    hydrocodone-acetaminophen 5-325mg (NORCO) 5-325 mg per tablet Take 1 tablet by mouth every 12 (twelve) hours as needed (severe pain).     Family History     Problem Relation (Age of Onset)    Diabetes Father    Hepatitis Father    Hypertension Father    No Known Problems Mother        Social History Main Topics    Smoking status: Current Every Day Smoker     Packs/day: 1.00     Years: 40.00     Types: Cigarettes    Smokeless tobacco: Never Used    Alcohol use No    Drug use: No    Sexual activity: No     Review of Systems   Constitutional: Positive for activity change. Negative for fever.   HENT: Negative for voice change.    Eyes: Positive for visual disturbance.   Respiratory: Negative for shortness of breath.    Cardiovascular: Negative for chest pain.   Gastrointestinal: Negative for abdominal distention.   Genitourinary: Negative for dysuria.   Musculoskeletal: Negative for neck pain and neck stiffness.   Neurological: Negative for tremors, seizures, syncope, facial  asymmetry and headaches.   Psychiatric/Behavioral: Positive for confusion. Negative for agitation and behavioral problems.     Objective:     Vital Signs (Most Recent):  Temp: 96.7 °F (35.9 °C) (07/13/17 0805)  Pulse: 79 (07/13/17 1500)  Resp: 16 (07/13/17 1216)  BP: 104/76 (07/13/17 1216)  SpO2: 95 % (07/13/17 1216) Vital Signs (24h Range):  Temp:  [96 °F (35.6 °C)-96.7 °F (35.9 °C)] 96.7 °F (35.9 °C)  Pulse:  [68-85] 79  Resp:  [16-18] 16  SpO2:  [95 %-98 %] 95 %  BP: (104-127)/(72-80) 104/76     Weight: 59.9 kg (132 lb)  Body mass index is 24.14 kg/m².    Physical Exam   Constitutional: No distress.   HENT:   Head: Atraumatic.   Eyes: EOM are normal.   Neck: Normal range of motion. Neck supple.   Cardiovascular: Regular rhythm.    Pulmonary/Chest: Effort normal.   Abdominal: Soft.   Neurological: Finger-nose-finger test: Limited from mental status change / drowsy state.   Reflex Scores:       Tricep reflexes are 2+ on the right side and 2+ on the left side.       Bicep reflexes are 2+ on the right side and 2+ on the left side.       Brachioradialis reflexes are 2+ on the right side and 2+ on the left side.       Patellar reflexes are 2+ on the right side and 2+ on the left side.       Achilles reflexes are 2+ on the right side and 2+ on the left side.  Psychiatric: Her speech is normal.       NEUROLOGICAL EXAMINATION:     MENTAL STATUS   Oriented to person.   Disoriented to place.   Disoriented to year, month and date.   Follows 1 step commands.   Attention: decreased (Limited from mental status change / drowsy state). Concentration: decreased.   Speech: speech is normal   Level of consciousness: drowsy  Unable to name object: Limited from mental status change / drowsy state. : NT.    CRANIAL NERVES     CN II   Visual acuity: (Negative light perception )  Right visual field deficit: optic afferant affected.    CN III, IV, VI   Extraocular motions are normal.   Pupils: dilated  Right pupil: Shape: regular.  Reactivity: non-reactive.   Left pupil: Shape: regular. Reactivity: non-reactive.   Cranial nerve III palsy: III nr / parasympathetic affected.  CN VI: no CN VI palsy  Nystagmus: none     CN V   Facial sensation intact.     CN VII   Facial expression full, symmetric.     CN VIII   CN VIII normal.     CN IX, X   CN IX normal.   CN X normal.     CN XI   CN XI normal.     CN XII   CN XII normal.     MOTOR EXAM   Muscle bulk: normal  Overall muscle tone: normal    Strength   Right neck flexion: 4/5  Left neck flexion: 4/5  Right neck extension: 4/5  Left neck extension: 4/5  Right deltoid: 3/5  Left deltoid: 3/5  Right biceps: 3/5  Left biceps: 3/5  Right triceps: 3/5  Left triceps: 3/5  Right wrist flexion: 4/5  Left wrist flexion: 4/5  Right wrist extension: 4/5  Left wrist extension: 4/5  Right interossei: 4/5  Left interossei: 4/5  Right iliopsoas: 3/5  Left iliopsoas: 3/5  Right quadriceps: 3/5  Left quadriceps: 3/5  Right hamstring: 3/5  Left hamstring: 3/5  Right anterior tibial: 3/5  Left anterior tibial: 3/5  Right posterior tibial: 3/5  Left posterior tibial: 3/5    REFLEXES     Reflexes   Right brachioradialis: 2+  Left brachioradialis: 2+  Right biceps: 2+  Left biceps: 2+  Right triceps: 2+  Left triceps: 2+  Right patellar: 2+  Left patellar: 2+  Right achilles: 2+  Left achilles: 2+  Right : 2+  Left : 2+  Right plantar: normal  Left plantar: normal  Right ankle clonus: absent  Left ankle clonus: absent    SENSORY EXAM   Pinprick normal.     GAIT AND COORDINATION     Gait  Gait: (Limited from mental status change / drowsy state)     Coordination   Finger-nose-finger test: Limited from mental status change / drowsy state.    Tremor   Resting tremor: absent      Significant Labs:   CBC:   Recent Labs  Lab 07/12/17 0350 07/13/17  0542   WBC 7.34 7.41   HGB 11.9* 11.5*   HCT 35.9* 34.3*   * 456*     CMP:   Recent Labs  Lab 07/12/17  0350 07/13/17  0028 07/13/17  0542 07/13/17  0827   *   < > 105 92 102   *  < > 142 144 143   K 3.4*  < > 3.2* 3.7 3.8     < > 99 101 102   CO2 32*  < > 34* 32* 31*   BUN 16  < > 12 12 12   CREATININE 1.0  < > 0.9 0.9 0.9   CALCIUM 10.9*  < > 11.1* 11.4* 11.3*   MG 2.3  --   --  2.3  --    PROT 8.0  --   --  8.0  --    ALBUMIN 2.7*  --   --  2.9*  --    BILITOT 0.1  --   --  0.2  --    ALKPHOS 139*  --   --  141*  --    AST 24  --   --  39  --    ALT 22  --   --  25  --    ANIONGAP 12  < > 9 11 10   EGFRNONAA >60.0  < > >60.0 >60.0 >60.0   < > = values in this interval not displayed.  All pertinent lab results from the past 24 hours have been reviewed.    Significant Imaging: I have reviewed and interpreted all pertinent imaging results/findings within the past 24 hours.

## 2017-07-13 NOTE — PROGRESS NOTES
"[facility]  Endocrinology  Progress Note    Admit Date: 6/28/2017     Reason for Consult: Management of hypernatremia    Admit Date: 6/28/2017      Reason for Consult: Diabetes insipidus      Surgical Procedure and Date:   CRANIOTOMY 6/8/2017           HPI:   Patient is a 51 y.o. female with a diagnosis of  meningioma s/p resection (6/7). 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  She underwent partial resection of the tumor in January 2015 at [facility]. Followup scan done in 2016 showing a continued large meningioma. Endocrine had been previously involved in her care during earlier June hospitalization for post-operative diabetes insipidus and additional concern for secondary adrenal insufficiency and secondary hypothyroidism. Had been discharged to [facility] on 6/26/17. Re-admitted to [facility] on 6/28/17 for worsening hypernatremia and mental status. Endocrine had been consulted this admission for severe hypernatremia with concerns for diabetes insipidus.             Interval HPI:   Overnight events: patient did not receive desmopressin yesterday evening  Eating:   NPO  Nausea: No  Hypoglycemia and intervention: No  Fever: No  TPN and/or TF: No  If yes, type of TF/TPN and rate: n/a    /80 (BP Location: Left arm, Patient Position: Lying, BP Method: Automatic)   Pulse 79   Temp 96.7 °F (35.9 °C) (Axillary)   Resp 16   Ht 5' 2" (1.575 m)   Wt 59.9 kg (132 lb)   SpO2 98%   Breastfeeding? No   BMI 24.14 kg/m²       Labs Reviewed and Include      Recent Labs  Lab 07/13/17  0542   GLU 92   CALCIUM 11.4*   ALBUMIN 2.9*   PROT 8.0      K 3.7   CO2 32*      BUN 12   CREATININE 0.9   ALKPHOS 141*   ALT 25   AST 39   BILITOT 0.2     Lab Results   Component Value Date    WBC 7.41 07/13/2017    HGB 11.5 (L) 07/13/2017    HCT 34.3 (L) 07/13/2017    MCV 96 " 07/13/2017     (H) 07/13/2017     No results for input(s): TSH, FREET4 in the last 168 hours.  No results found for: HGBA1C    Nutritional status:   Body mass index is 24.14 kg/m².  Lab Results   Component Value Date    ALBUMIN 2.9 (L) 07/13/2017    ALBUMIN 2.7 (L) 07/12/2017    ALBUMIN 3.1 (L) 07/11/2017     Lab Results   Component Value Date    PREALBUMIN 26 06/27/2017       Estimated Creatinine Clearance: 58.5 mL/min (based on Cr of 0.9).    Accu-Checks  Recent Labs      07/10/17   1642  07/11/17   0105  07/11/17   0541  07/11/17   1249  07/11/17   1713  07/11/17   2305  07/12/17   0742  07/12/17   1241  07/12/17   1609  07/13/17   0643   POCTGLUCOSE  91  92  92  95  196*  106  170*  130*  102  100       Current Medications and/or Treatments Impacting Glycemic Control  Immunotherapy:  Immunosuppressants     None        Steroids:   Hormones     Start     Stop Route Frequency Ordered    07/11/17 2100  desmopressin 10 mcg/spray (0.1 mL) solution 10 mcg      -- Nasl Nightly 07/11/17 1300        Pressors:    Autonomic Drugs     None        Hyperglycemia/Diabetes Medications: Antihyperglycemics     Start     Stop Route Frequency Ordered    06/29/17 1922  insulin aspart pen 0-5 Units      -- SubQ Every 6 hours PRN 06/29/17 1822          ASSESSMENT and PLAN    Primary central diabetes insipidus    Recommend desmopressin 10mcg nightly given post-operative diabetes insipidus  Agree with scheduled free water boluses 4x daily or po water intake    May give desmopressin 10mcg intranasal prn for urine output greater than 250cc/hr for two consecutive hrs, urine specific gravity of 1.005 or less, and serum sodium greater than 150          * Hypernatremia-resolved as of 7/13/2017              Hypercalcemia    Concern for dehydration, immobility as likely etiologies -- ionized calcium in normal range  Recommend hydration and physical therapy as tolerated        Secondary hypothyroidism    Recommend levothyroxine 50mcg oral  or via NG tube   Repeat tsh within 4 weeks as o/p              Gerry Sr MD  Endocrinology  [facility]

## 2017-07-13 NOTE — SUBJECTIVE & OBJECTIVE
"Interval HPI:   Overnight events: patient did not receive desmopressin yesterday evening  Eating:   NPO  Nausea: No  Hypoglycemia and intervention: No  Fever: No  TPN and/or TF: No  If yes, type of TF/TPN and rate: n/a    /80 (BP Location: Left arm, Patient Position: Lying, BP Method: Automatic)   Pulse 79   Temp 96.7 °F (35.9 °C) (Axillary)   Resp 16   Ht 5' 2" (1.575 m)   Wt 59.9 kg (132 lb)   SpO2 98%   Breastfeeding? No   BMI 24.14 kg/m²     Labs Reviewed and Include      Recent Labs  Lab 07/13/17  0542   GLU 92   CALCIUM 11.4*   ALBUMIN 2.9*   PROT 8.0      K 3.7   CO2 32*      BUN 12   CREATININE 0.9   ALKPHOS 141*   ALT 25   AST 39   BILITOT 0.2     Lab Results   Component Value Date    WBC 7.41 07/13/2017    HGB 11.5 (L) 07/13/2017    HCT 34.3 (L) 07/13/2017    MCV 96 07/13/2017     (H) 07/13/2017     No results for input(s): TSH, FREET4 in the last 168 hours.  No results found for: HGBA1C    Nutritional status:   Body mass index is 24.14 kg/m².  Lab Results   Component Value Date    ALBUMIN 2.9 (L) 07/13/2017    ALBUMIN 2.7 (L) 07/12/2017    ALBUMIN 3.1 (L) 07/11/2017     Lab Results   Component Value Date    PREALBUMIN 26 06/27/2017       Estimated Creatinine Clearance: 58.5 mL/min (based on Cr of 0.9).    Accu-Checks  Recent Labs      07/10/17   1642  07/11/17   0105  07/11/17   0541  07/11/17   1249  07/11/17   1713  07/11/17   2305  07/12/17   0742  07/12/17   1241  07/12/17   1609  07/13/17   0643   POCTGLUCOSE  91  92  92  95  196*  106  170*  130*  102  100       Current Medications and/or Treatments Impacting Glycemic Control  Immunotherapy:  Immunosuppressants     None        Steroids:   Hormones     Start     Stop Route Frequency Ordered    07/11/17 2100  desmopressin 10 mcg/spray (0.1 mL) solution 10 mcg      -- Nasl Nightly 07/11/17 1300        Pressors:    Autonomic Drugs     None        Hyperglycemia/Diabetes Medications: Antihyperglycemics     Start     Stop " Route Frequency Ordered    06/29/17 1920  insulin aspart pen 0-5 Units      -- SubQ Every 6 hours PRN 06/29/17 1826

## 2017-07-13 NOTE — PLAN OF CARE
Problem: Physical Therapy Goal  Goal: Physical Therapy Goal  Goals to be met by: 17    Patient will increase functional independence with mobility by performin. Supine to sit with Minimal Assistance  2. Sit to supine with Minimal Assistance  3. Sit to stand transfer with Minimal Assistance met (2017)  4. Standing for 3 minutes with Minimal Assistance and weight evenly distributed through (B) LE.   5. Gait  x 20 feet with Moderate Assistance using AD if needed   6. Pt will perform (B) LE therapeutic exercises x 20 reps to improve muscular strength and endurance.    Outcome: Ongoing (interventions implemented as appropriate)  Pt met one goal today.

## 2017-07-13 NOTE — ASSESSMENT & PLAN NOTE
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   - Waxing and waning symptoms of reduced responsiveness, rasing concerns for subclinical seizure events contributing to encephalopathy    - Prior EEG was negative for any epileptiform activities, ON keppra for seizure prophylaxis post surgery  - Prior MRI stable post op changes, with no worsening picture  - No recent events of fevers or clinical seziures or focal neurological deficits.   - stable LFTs / RFTs / infections source control    - DDx multifactorial from electrolyte disturbances / metabolic disturbances / endocrine etiologies > subclinical seizures or nutritional disturbances      Plan:   - Routine EEG  - Continue keppra   - Monitor for clinical signs/symptoms of meningitis. Consider PEG tube > NG tube, given the proximity to surgical site and potential infections   - Order vitamin B1/B12/folate and RPR  - Correct lytes as needed / avoid hypoglycemia / infection control if needed

## 2017-07-13 NOTE — PT/OT/SLP PROGRESS
"Speech Language Pathology  Treatment    Joanna Morales   MRN: [id number]   743/743 A    Admitting Diagnosis: Hypernatremia    Diet recommendations: Solid Diet Level: NPO  Liquid Diet Level: NPO     SLP Treatment Date: 07/13/17  Speech Start Time: 1317     Speech Stop Time: 1329     Speech Total (min): 12 min       TREATMENT BILLABLE MINUTES:  Treatment Swallowing Dysfunction 12    Has the patient been evaluated by SLP for swallowing? : Yes  Keep patient NPO?: Yes   General Precautions: Standard, aspiration, fall  Current Respiratory Status:  (room air)       Subjective:  "Dealing with life." Pt re: how she is doing.     Pain/Comfort  Pain Rating 1: 0/10  Pain Rating Post-Intervention 1: 0/10    Objective:   Patient found with: restraints (UE bilateral soft restraints)    Pt awake upon entry. HOB raised. Pt demonstrating less confusion today than previous days, however intermittent confusion still evident. Pt presented with PO trials tsp water x6. On trials x2, pt made no attempt to strip bolus from spoon, despite spoon tip touching lower labial surface as tactile cue. On additional trials x2, pt inappropriately accepted bolus from spoon, as she minimally opened oral cavity and only removed 1/4 of presentation from spoon tip. Given remaining trials, she req's prompt to swallow x1, and she minimally opened oral cavity as if to accept trial however made no attempt to strip bolus from spoon. No overt s/s aspiration, vocal quality remaining clear and dry. Due to inconsistency of performance, no further trials attempted. Pt educated re: SLP POC. White board updated. No further questions.     Assessment:  Joanna Morales is a 51 y.o. female with a medical diagnosis of Hypernatremia and presents with oral dysphagia.     Discharge recommendations: Discharge Facility/Level Of Care Needs: rehabilitation facility     Goals:    SLP Goals        Problem: SLP Goal    Goal Priority Disciplines Outcome   SLP Goal     SLP " Unable to achieve outcome(s) by discharge   Description:  Speech Language Pathology Goals  Goals expected to be met by 7/7  1. Patient will successfully participate in ongoing clinical swallow evaluation and tolerate po trials with no overt s/s of aspiration.                  Problem: SLP Goal    Goal Priority Disciplines Outcome   SLP Goal     SLP Ongoing (interventions implemented as appropriate)   Description:  SLP goals expected to be met by 7/18:  1. Pt will participate in going assessment of swallow in order to determine safest, least restrictive diet.                      Plan:   Patient to be seen Therapy Frequency: 5 x/week   Plan of Care expires: 08/09/17  Plan of Care reviewed with: patient  SLP Follow-up?: Yes              HILDA Rascon, CCC-SLP  [phone]  7/13/2017

## 2017-07-13 NOTE — PROGRESS NOTES
NGT placed in right nare, pt tolerated well, no acute distress noted. waiting on KUB to confirm placement. will continue to monitor.

## 2017-07-13 NOTE — PLAN OF CARE
Problem: Restraint, Nonbehavioral (nonviolent)  Goal: Preservation of Dignity/Wellbeing  Outcome: Ongoing (interventions implemented as appropriate)  Plan of care reviewed with patient.  Verbalized understanding. Patient was awake around 2 in the afternoon. Confused but oriented to self. VSS. Resting comfortably in bed. Will continue to monitor.

## 2017-07-14 PROBLEM — R13.11 ORAL PHASE DYSPHAGIA: Status: ACTIVE | Noted: 2017-01-01

## 2017-07-14 NOTE — PT/OT/SLP PROGRESS
Occupational Therapy  Not Seen      Joanna Morales  MRN: [id number]    OT unable to see patient on this date for tx session. Pt with lethargic nature and poor attention to engage in therapeutic session.  Will follow up at later time.    HELEN Broussard  7/14/2017

## 2017-07-14 NOTE — SUBJECTIVE & OBJECTIVE
Interval History: NAEON. On exam, patient was drowsy but easily arousable; even during lab draws. She answers simple questions. Denies N/V, visual changes, weakness, or seizures.  NG tube in place. Patient has bilateral UE restraints on.     Medications:  Continuous Infusions:   Scheduled Meds:   desmopressin  10 mcg Nasal Nightly    enoxaparin  40 mg Subcutaneous Daily    furosemide  20 mg Oral Daily    gabapentin  125 mg Per NG tube Q12H    levetiracetam oral soln  500 mg Per NG tube BID    levothyroxine  50 mcg Per NG tube Daily    nicotine  1 patch Transdermal Daily     PRN Meds:acetaminophen, dextrose 50%, glucagon (human recombinant), insulin aspart, ondansetron, potassium chloride 10%     Review of Systems  Objective:     Weight: 59.9 kg (132 lb)  Body mass index is 24.14 kg/m².  Vital Signs (Most Recent):  Temp: 98 °F (36.7 °C) (17 0810)  Pulse: 85 (17 1248)  Resp: 18 (17 1248)  BP: 118/69 (17 1248)  SpO2: 97 % (17 1248) Vital Signs (24h Range):  Temp:  [96.7 °F (35.9 °C)-98.6 °F (37 °C)] 98 °F (36.7 °C)  Pulse:  [75-92] 85  Resp:  [16-20] 18  SpO2:  [94 %-98 %] 97 %  BP: (100-125)/(69-89) 118/69              Temp (24hrs), Av.1 °F (36.7 °C), Min:96.7 °F (35.9 °C), Max:98.6 °F (37 °C)                 NG/OG Tube 17 0430 [place] sump Left nostril (Active)   Placement Check placement verified by aspirate characteristics 2017  7:40 AM   Distal Tube Length (cm) 68 2017  8:05 AM   Tolerance no signs/symptoms of discomfort 2017  7:40 AM   Securement anchored to nostril center w/ adhesive device 2017  7:40 AM   Clamp Status/Tolerance clamped 2017  7:05 PM   Insertion Site Appearance no redness, warmth, tenderness, skin breakdown, drainage 2017  8:05 AM   Current Rate (mL/hr) 30 mL/hr 2017  7:05 PM   Goal Rate (mL/hr) 45 mL/hr 2017  7:05 PM   Intake (mL) 300 mL 2017  6:00 PM   Tube Output(mL)(Include Discarded Residual) 50 mL  7/13/2017  6:00 PM   Intake (mL) - Formula Tube Feeding 200 7/13/2017  6:00 PM       Neurosurgery Physical Exam  General: well developed, well nourished, no distress.   Head: normocephalic  Neurologic: drowsy, arousable. Minimal verbal responses.   GCS: Motor: 6/Verbal: 4 /Eyes: 2 GCS Total: 12  Mental Status: Able to state name. Intermittently follows simple commands.   Cranial nerves: face symmetric, CN II-XII grossly intact.   Eyes: pupils equal, round, reactive to light.  Pulmonary: normal respirations, no signs of respiratory distress  Abdomen: soft, non-distended, not tender to palpation  Sensory: response to light touch throughout  Motor Strength: Moves all extremities spontaneously with good strength and tone. Intermittently follows simple commands.   Unable to assess individual muscle groups.   Pronator Drift: unable to assess 2/2 mental status  Finger-to-nose: unable to assess 2/2 mental status  Clonus: absent  Babinski: absent  No LE edema  Skin: Skin is warm, dry and intact.     Significant Labs:    Recent Labs  Lab 07/14/17  0502  07/14/17  1231   GLU 92  < > 105     < > 147*   K 3.4*  < > 4.8     < > 104   CO2 31*  < > 29   BUN 11  < > 14   CREATININE 0.9  < > 1.0   CALCIUM 11.0*  < > 11.1*   MG 2.1  --   --    < > = values in this interval not displayed.    Recent Labs  Lab 07/13/17  0542 07/14/17  0502   WBC 7.41 7.72   HGB 11.5* 11.1*   HCT 34.3* 33.5*   * 431*     No results for input(s): LABPT, INR, APTT in the last 48 hours.  Microbiology Results (last 7 days)     Procedure Component Value Units Date/Time    Blood culture [[id number]] Collected:  07/03/17 0934    Order Status:  Completed Specimen:  Blood from Peripheral, Hand, Left Updated:  07/08/17 1212     Blood Culture, Routine No growth after 5 days.    Narrative:       Blood cultures from 2 different sites. 4 bottles total.  Please draw before starting antibiotics.    Blood culture [[id number]] Collected:  07/03/17 0954     Order Status:  Completed Specimen:  Blood from Peripheral, Wrist, Right Updated:  07/08/17 1212     Blood Culture, Routine No growth after 5 days.    Narrative:       Blood cultures x 2 different sites. 4 bottles total. Please  draw cultures before administering antibiotics.            Significant Diagnostics:  None new

## 2017-07-14 NOTE — ASSESSMENT & PLAN NOTE
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      Hx of a right frontotemporal craniotomy with partial removal of a large subfrontal and tuberculum sellae meningioma at [facility] in 2015. 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      NS is following

## 2017-07-14 NOTE — PT/OT/SLP PROGRESS
Speech Language Pathology  Treatment    Joanna Morales   MRN: [id number]   743/743 A    Admitting Diagnosis: Hypernatremia    Diet recommendations: Solid Diet Level: NPO  Liquid Diet Level: NPO     SLP Treatment Date: 07/14/17  Speech Start Time: 1443     Speech Stop Time: 1453     Speech Total (min): 10 min       TREATMENT BILLABLE MINUTES:  Treatment Swallowing Dysfunction 10    Has the patient been evaluated by SLP for swallowing? : Yes  Keep patient NPO?: Yes   General Precautions: Standard, aspiration, NPO, fall, blind  Current Respiratory Status:  (room air)       Subjective:  Pt with unintelligible vocalization x1 this date.     Pain/Comfort  Pain Rating 1: 0/10  Pain Rating Post-Intervention 1: 0/10    Objective:   Patient found with: restraints (UE bilateral soft restraints)  Pt asleep upon entry, req'd moderate verbal stimuli to rouse and remained lethargic throughout session. HOB raised. Presented with tsp water x1, tip of spoon lightly touching labial surface, made no attempt to strip bolus from spoon. Lemon glycerin swabs provided in an effort to rouse, however only able to reach central incisors, as pt bit the swab x2. Swabs discontinued, noted dried brown secretions upon removal. Tsp water x1 was presented to labial surface with again no effort to accept the bolus. Pt educated re: SLP POC. Whiteboard updated. No further questions.     Assessment:  Joanna Morales is a 51 y.o. female with a medical diagnosis of Hypernatremia and presents with dysphagia.     Discharge recommendations: Discharge Facility/Level Of Care Needs: rehabilitation facility     Goals:    SLP Goals        Problem: SLP Goal    Goal Priority Disciplines Outcome   SLP Goal     SLP Unable to achieve outcome(s) by discharge   Description:  Speech Language Pathology Goals  Goals expected to be met by 7/7  1. Patient will successfully participate in ongoing clinical swallow evaluation and tolerate po trials with no overt s/s of  aspiration.                  Problem: SLP Goal    Goal Priority Disciplines Outcome   SLP Goal     SLP Ongoing (interventions implemented as appropriate)   Description:  SLP goals expected to be met by 7/18:  1. Pt will participate in going assessment of swallow in order to determine safest, least restrictive diet.                      Plan:   Patient to be seen Therapy Frequency: 5 x/week   Plan of Care expires: 08/09/17  Plan of Care reviewed with: patient  SLP Follow-up?: Yes              HILDA Rascon, CCC-SLP  [phone]  7/14/2017

## 2017-07-14 NOTE — ASSESSMENT & PLAN NOTE
Patient has persistent oral dysphagia with AMS in the section of recurrent meningioma. Due to poor PO intake she continues to develop hypernatremia which further worsens her mention. Family is not ready for palliative measures and would like to continue fighting.  -PEG tube for water flushes and nutrition  -Have made multiple unsuccessful attempts to reach Reina Hernandez. Prior to procedure we need to obtain consent from Reina Hernandez. If consent obtain PEG will be place early next week.  -Thank you for this consultation

## 2017-07-14 NOTE — SUBJECTIVE & OBJECTIVE
Interval History: verbal 1-2 woeds    Review of Systems   Constitutional: Positive for activity change. Negative for chills, fatigue and fever.        Somnolent today  Responds to voice and touch   HENT: Positive for trouble swallowing. Negative for nosebleeds.    Respiratory: Negative for cough, shortness of breath and stridor.    Cardiovascular: Negative for chest pain and leg swelling.   Gastrointestinal: Negative for abdominal pain, constipation, diarrhea, nausea and vomiting.   Genitourinary: Negative for difficulty urinating.   Musculoskeletal: Negative for back pain.   Hematological: Does not bruise/bleed easily.   Psychiatric/Behavioral: Positive for confusion. Negative for agitation and sleep disturbance.     Objective:     Vital Signs (Most Recent):  Temp: 98 °F (36.7 °C) (07/14/17 0810)  Pulse: 87 (07/14/17 1100)  Resp: 20 (07/14/17 0810)  BP: 118/77 (07/14/17 0810)  SpO2: 95 % (07/14/17 0810) Vital Signs (24h Range):  Temp:  [96.7 °F (35.9 °C)-98.6 °F (37 °C)] 98 °F (36.7 °C)  Pulse:  [68-92] 87  Resp:  [16-20] 20  SpO2:  [94 %-98 %] 95 %  BP: (100-125)/(75-89) 118/77     Weight: 59.9 kg (132 lb)  Body mass index is 24.14 kg/m².    Intake/Output Summary (Last 24 hours) at 07/14/17 1150  Last data filed at 07/14/17 0500   Gross per 24 hour   Intake             2000 ml   Output             1100 ml   Net              900 ml      Physical Exam   Constitutional: She appears well-developed.   Somnolent, has NG tube, in restraints  Thin, cachexia   HENT:   Head: Normocephalic.   Mouth/Throat: Oropharynx is clear and moist.   Eyes: Conjunctivae are normal. Pupils are equal, round, and reactive to light. No scleral icterus.   Neck: Normal range of motion. Neck supple. No JVD present.   Cardiovascular: Normal rate, regular rhythm, normal heart sounds and intact distal pulses.  Exam reveals no gallop and no friction rub.    No murmur heard.  Pulmonary/Chest: Effort normal and breath sounds normal. No respiratory  distress. She has no wheezes. She has no rales. She exhibits no tenderness.   Abdominal: Soft. Bowel sounds are normal. She exhibits no distension and no mass. There is no tenderness. There is no rebound and no guarding.   Musculoskeletal: She exhibits no edema or tenderness.   Lymphadenopathy:     She has no cervical adenopathy.   Neurological: She has normal strength. She exhibits normal muscle tone. Coordination normal.   Skin: Skin is warm and dry.   Psychiatric: Her speech is normal. Cognition and memory are normal.   Nursing note and vitals reviewed.      Significant Labs:   BMP:     Recent Labs  Lab 07/14/17  0502 07/14/17  0903   GLU 92 1,008*    116*   K 3.4* 3.0*    84*   CO2 31* 24   BUN 11 8   CREATININE 0.9 1.2   CALCIUM 11.0* 8.3*   MG 2.1  --      CBC:     Recent Labs  Lab 07/13/17  0542 07/14/17  0502   WBC 7.41 7.72   HGB 11.5* 11.1*   HCT 34.3* 33.5*   * 431*

## 2017-07-14 NOTE — ASSESSMENT & PLAN NOTE
7/14 - discussed w ENDOcrine, I Kevan nl, no changes nedded  7/13 - increase K5kwxmb +lasix 20 po.  Consider bisphosphenate. Will discuss with endocrine  7/12 - kevan 12.2-> 10.9 ->  11.3 -> 8.3  7/11- D5W, s/p  lasix,

## 2017-07-14 NOTE — PROGRESS NOTES
[facility] Hospital Medicine  Progress Note    Patient Name: Joanna Morales  MRN: [id number]  Patient Class: IP- Inpatient   Admission Date: 6/28/2017  Length of Stay: 16 days  Attending Physician: Rhoda Lewis MD  Primary Care Provider: Claudy Tse MD    Hospital Medicine Team: [facility] HOSP MED B Rhoda Lewis MD    Subjective:     Principal Problem:Hypernatremia    HPI:  Ms. Joanna Morales is a 51 y.o. female with tobacco abuse, secondary hypothyroidism (TSH 0.241 Jun 2017), and history of meningioma s/p resection complicated by diabetes insipidus who presents to [facility] ED from [facility] after being found with abnormal labwork.  She had been more altered in the last day or so and upon checking labwork, she was noted to be with hypernatremia.  She contributed very minimally to her history but does report some abdominal pain.  Of note, she was recently admitted to this facility under the Neurosurgery service for resection of a meningioma that was complicated by diabetes insipidus for which she received desmopressin a few times before being discharged to rehabilitation at [facility].  Further history is limited at this time.    Hospital Course:  7/11 - DDAVP was decreased yesterday and -> 154, and MS declined .  Not taking po liquids, tanya 12.2  7/12 - na and calcium levels improved, pt arter and talking, following commands.  She pulled out her NG tube.  ST to do another eval.  Endocrine consulted.   7/13 - pt more somnolent, responds to voice and sternal pressure.  titrating up tube feeds, on D5W,  , VVS,  Na 143, ca 11.3, alb 2.9.  Called Reina Hernandez  -  [phone]  7/14 - awake today, verbal only 1-2 words, decreased attention, moving all extremities,  Discussed case with Neurology and GI fellows.     Interval History: verbal 1-2 woeds    Review of Systems   Constitutional: Positive for activity change. Negative for chills, fatigue and fever.         Somnolent today  Responds to voice and touch   HENT: Positive for trouble swallowing. Negative for nosebleeds.    Respiratory: Negative for cough, shortness of breath and stridor.    Cardiovascular: Negative for chest pain and leg swelling.   Gastrointestinal: Negative for abdominal pain, constipation, diarrhea, nausea and vomiting.   Genitourinary: Negative for difficulty urinating.   Musculoskeletal: Negative for back pain.   Hematological: Does not bruise/bleed easily.   Psychiatric/Behavioral: Positive for confusion. Negative for agitation and sleep disturbance.     Objective:     Vital Signs (Most Recent):  Temp: 98 °F (36.7 °C) (07/14/17 0810)  Pulse: 87 (07/14/17 1100)  Resp: 20 (07/14/17 0810)  BP: 118/77 (07/14/17 0810)  SpO2: 95 % (07/14/17 0810) Vital Signs (24h Range):  Temp:  [96.7 °F (35.9 °C)-98.6 °F (37 °C)] 98 °F (36.7 °C)  Pulse:  [68-92] 87  Resp:  [16-20] 20  SpO2:  [94 %-98 %] 95 %  BP: (100-125)/(75-89) 118/77     Weight: 59.9 kg (132 lb)  Body mass index is 24.14 kg/m².    Intake/Output Summary (Last 24 hours) at 07/14/17 1150  Last data filed at 07/14/17 0500   Gross per 24 hour   Intake             2000 ml   Output             1100 ml   Net              900 ml      Physical Exam   Constitutional: She appears well-developed.   Somnolent, has NG tube, in restraints  Thin, cachexia   HENT:   Head: Normocephalic.   Mouth/Throat: Oropharynx is clear and moist.   Eyes: Conjunctivae are normal. Pupils are equal, round, and reactive to light. No scleral icterus.   Neck: Normal range of motion. Neck supple. No JVD present.   Cardiovascular: Normal rate, regular rhythm, normal heart sounds and intact distal pulses.  Exam reveals no gallop and no friction rub.    No murmur heard.  Pulmonary/Chest: Effort normal and breath sounds normal. No respiratory distress. She has no wheezes. She has no rales. She exhibits no tenderness.   Abdominal: Soft. Bowel sounds are normal. She exhibits no distension  and no mass. There is no tenderness. There is no rebound and no guarding.   Musculoskeletal: She exhibits no edema or tenderness.   Lymphadenopathy:     She has no cervical adenopathy.   Neurological: She has normal strength. She exhibits normal muscle tone. Coordination normal.   Skin: Skin is warm and dry.   Psychiatric: Her speech is normal. Cognition and memory are normal.   Nursing note and vitals reviewed.      Significant Labs:   BMP:     Recent Labs  Lab 07/14/17  0502 07/14/17  0903   GLU 92 1,008*    116*   K 3.4* 3.0*    84*   CO2 31* 24   BUN 11 8   CREATININE 0.9 1.2   CALCIUM 11.0* 8.3*   MG 2.1  --      CBC:     Recent Labs  Lab 07/13/17  0542 07/14/17  0502   WBC 7.41 7.72   HGB 11.5* 11.1*   HCT 34.3* 33.5*   * 431*         Assessment/Plan:      Acute encephalopathy    7/14 - delirium vs seizure disorder and recurrent post-ictal states:   EEG ordered, pt may need 24 hour EEG.  PEG also planned.    7/13 - waxing and waning, today she is somnolent,  Consult Neurology, Since symptoms are intermittent consider continuous  EEG monitoring.    7/12 - improved  7/11 - Worse with  increasing Na level  EEG done - Moderate diffuse slowing of the overall background as described above.  2.  Superimposed left hemispheric slowing relative to the right.  No   epileptiform discharges or seizures were seen, however.  Result suggests both   encephalopathy and focal cortical dysfunction,    Pulled NGT ou t/ 7/8 and 7.12,  May need to replace to give po meds and liquids, ST eval          Primary central diabetes insipidus    7/14 - Na dropped to 116, pseudo- Hypohatremia ( bad draw from line with D5W, BS 1000),  pt mental status improved overnight,  dc D5W, and water flushes 150 cc q 6 hours, repeat level and follow q 6 hours.  received DDAVP last night. (may hold depending on afternoon labs).  Discussed with nurse.     7/13 - has NG and getting water, can dc IVF  7/12 - reduce d5W, need long term  plan from endocrine: will need scheduled water boluses  250cc q 6-8 hours  + desmopressin nightly for home.  7/11 - Increase DDAVP to BID, D5W,  As addressed above.  Follow sodium and UOP  reconsult endcrine          Hypercalcemia    7/14 - discussed w ENDOcrine, I Keavn nl, no changes nedded  7/13 - increase I5kkvdv +lasix 20 po.  Consider bisphosphenate. Will discuss with endocrine  7/12 - kevan 12.2-> 10.9 ->  11.3 -> 8.3  7/11- D5W, s/p  lasix,         Oral phase dysphagia    Failed multiple ST trials, needs PEG, MS changes interferring with eating.        Malnutrition of moderate degree    PEG desired  Needs regular water intake due to diabetes insipidus          Palliative care encounter    Non-hospice Palliative Care:   Does patient have Capacity? no  Goals- 7/13 - discussed care with  Reina Hernandez  -  [phone], pt's POA.    This is turning into a long post operative course, with inability to nourish patient.  Reina agreed to PEG tube.  Pt needs regular water intake, with mental status changes pulls out NG tube frequently..  NG could be contributing to delirium as well.  willl consult GI  Code Status- FULL  Pain management- stable, no pain  Prognosis-  Poor given numerous complications, but too soon to give up.  Would like another neurology eval, give effort another 1/2 months at least.   Family discussions- 7/13 - Called Reina Hernandez  -  [phone]  Functional Status - limited to bed, presently    Post acute care plan:  Rehab if MS improves.              Tobacco abuse    Patient was counseled on smoking cessation and she will be provided a nicotine transdermal patch applied while inpatient.  Will provide additional smoking cessation counseling prior to discharge.        Altered mental status    7/14 - Level of alertness has waxesd and waned since surgery 6 weeks ago,  Discussed with Reina, her POA.  Plan is to repeat eval for seizures and get PEG.           Secondary hypothyroidism    As addressed  above.  Will continue her home regimen of levothyroxine.        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      Hx of a right frontotemporal craniotomy with partial removal of a large subfrontal and tuberculum sellae meningioma at [facility] in 2015. 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      NS is following          Brain mass    S/p resection of meningiomas            VTE Risk Mitigation         Ordered     High Risk of VTE  Once      06/30/17 0920     Place sequential compression device  Until discontinued      06/30/17 0920     enoxaparin injection 40 mg  Daily     Route:  Subcutaneous        06/28/17 2969          Rhoda Lewis MD  Department of Hospital Medicine   [facility]

## 2017-07-14 NOTE — PROGRESS NOTES
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 Chew and discharged on 17 to [facility].  She presents with altered mental status and worsening hypernatremia for 1 day. She was treated for DI her last admission and labs are trending up today.  Spoke with Dr. Fairchild at [facility], patient with waxing/waning mental status yesterday, but was appropriate & following some complex commands yesterday.  There was a period yesterday of significant lethargy.  Patient is unable to give history, and information is taken from the chart.     Post-Op Info:  * No surgery found *         Interval History: NAEON. On exam, patient was drowsy but easily arousable; even during lab draws. She answers simple questions. Denies N/V, visual changes, weakness, or seizures.  NG tube in place. Patient has bilateral UE restraints on.     Medications:  Continuous Infusions:   Scheduled Meds:   desmopressin  10 mcg Nasal Nightly    enoxaparin  40 mg Subcutaneous Daily    furosemide  20 mg Oral Daily    gabapentin  125 mg Per NG tube Q12H    levetiracetam oral soln  500 mg Per NG tube BID    levothyroxine  50 mcg Per NG tube Daily    nicotine  1 patch Transdermal Daily     PRN Meds:acetaminophen, dextrose 50%, glucagon (human recombinant), insulin aspart, ondansetron, potassium chloride 10%     Review of Systems  Objective:     Weight: 59.9 kg (132 lb)  Body mass index is 24.14 kg/m².  Vital Signs (Most Recent):  Temp: 98 °F (36.7 °C) (17 0810)  Pulse: 85 (17 1248)  Resp: 18 (17 1248)  BP: 118/69 (17 1248)  SpO2: 97 % (17 1248) Vital Signs (24h Range):  Temp:  [96.7 °F (35.9 °C)-98.6 °F (37 °C)] 98 °F (36.7 °C)  Pulse:  [75-92] 85  Resp:  [16-20] 18  SpO2:  [94 %-98 %] 97 %  BP: (100-125)/(69-89) 118/69              Temp (24hrs), Av.1 °F (36.7 °C),  Min:96.7 °F (35.9 °C), Max:98.6 °F (37 °C)                 NG/OG Tube 07/13/17 0430 [place] sump Left nostril (Active)   Placement Check placement verified by aspirate characteristics 7/14/2017  7:40 AM   Distal Tube Length (cm) 68 7/13/2017  8:05 AM   Tolerance no signs/symptoms of discomfort 7/14/2017  7:40 AM   Securement anchored to nostril center w/ adhesive device 7/14/2017  7:40 AM   Clamp Status/Tolerance clamped 7/13/2017  7:05 PM   Insertion Site Appearance no redness, warmth, tenderness, skin breakdown, drainage 7/13/2017  8:05 AM   Current Rate (mL/hr) 30 mL/hr 7/13/2017  7:05 PM   Goal Rate (mL/hr) 45 mL/hr 7/13/2017  7:05 PM   Intake (mL) 300 mL 7/13/2017  6:00 PM   Tube Output(mL)(Include Discarded Residual) 50 mL 7/13/2017  6:00 PM   Intake (mL) - Formula Tube Feeding 200 7/13/2017  6:00 PM       Neurosurgery Physical Exam  General: well developed, well nourished, no distress.   Head: normocephalic  Neurologic: drowsy, arousable. Minimal verbal responses.   GCS: Motor: 6/Verbal: 4 /Eyes: 2 GCS Total: 12  Mental Status: Able to state name. Intermittently follows simple commands.   Cranial nerves: face symmetric, CN II-XII grossly intact.   Eyes: pupils equal, round, reactive to light.  Pulmonary: normal respirations, no signs of respiratory distress  Abdomen: soft, non-distended, not tender to palpation  Sensory: response to light touch throughout  Motor Strength: Moves all extremities spontaneously with good strength and tone. Intermittently follows simple commands.   Unable to assess individual muscle groups.   Pronator Drift: unable to assess 2/2 mental status  Finger-to-nose: unable to assess 2/2 mental status  Clonus: absent  Babinski: absent  No LE edema  Skin: Skin is warm, dry and intact.     Significant Labs:    Recent Labs  Lab 07/14/17  0502  07/14/17  1231   GLU 92  < > 105     < > 147*   K 3.4*  < > 4.8     < > 104   CO2 31*  < > 29   BUN 11  < > 14   CREATININE 0.9  < > 1.0    CALCIUM 11.0*  < > 11.1*   MG 2.1  --   --    < > = values in this interval not displayed.    Recent Labs  Lab 07/13/17  0542 07/14/17  0502   WBC 7.41 7.72   HGB 11.5* 11.1*   HCT 34.3* 33.5*   * 431*     No results for input(s): LABPT, INR, APTT in the last 48 hours.  Microbiology Results (last 7 days)     Procedure Component Value Units Date/Time    Blood culture [[id number]] Collected:  07/03/17 0934    Order Status:  Completed Specimen:  Blood from Peripheral, Hand, Left Updated:  07/08/17 1212     Blood Culture, Routine No growth after 5 days.    Narrative:       Blood cultures from 2 different sites. 4 bottles total.  Please draw before starting antibiotics.    Blood culture [[id number]] Collected:  07/03/17 0954    Order Status:  Completed Specimen:  Blood from Peripheral, Wrist, Right Updated:  07/08/17 1212     Blood Culture, Routine No growth after 5 days.    Narrative:       Blood cultures x 2 different sites. 4 bottles total. Please  draw cultures before administering antibiotics.            Significant Diagnostics:  None new    Assessment/Plan:     Meningioma, recurrent of brain    52 yo F with an olfactory groove meningioma s/p resection with Dr. Chew 6/7/17, who re-presented with hypernatremia    -Continue neuro checks q4 hours. Notify NSGY for any changes.   -Continue Keppra for seizure prophylaxis  -Continue Lovenox for DVT prophylaxis  -Continue aggressive PT/OT  -EEG 7/7/17 pending results?  -Continue management of electrolyte imbalances per primary team  -Consider PEG placement if po intake continues to not be sufficient. Gen surgery consulted and is awaiting consents with family members. Likely early next week.    -Will continue to follow, please call with questions   -Pending Rehab or patient may need longer term placement per primary team            Mario Kirkpatrick PA-C  Neurosurgery  [facility]

## 2017-07-14 NOTE — ASSESSMENT & PLAN NOTE
7/14 - Na dropped to 116, pseudo- Hypohatremia ( bad draw from line with D5W, BS 1000),  pt mental status improved overnight,  dc D5W, and water flushes 150 cc q 6 hours, repeat level and follow q 6 hours.  received DDAVP last night. (may hold depending on afternoon labs).  Discussed with nurse.     7/13 - has NG and getting water, can dc IVF  7/12 - reduce d5W, need long term plan from endocrine: will need scheduled water boluses  250cc q 6-8 hours  + desmopressin nightly for home.  7/11 - Increase DDAVP to BID, D5W,  As addressed above.  Follow sodium and UOP  reconsult endcrine

## 2017-07-14 NOTE — PLAN OF CARE
Problem: Occupational Therapy Goal  Goal: Occupational Therapy Goal  Goals to be met by: 17     Patient will increase functional independence with ADLs by performin.  UE Dressing with Minimal Assistance.  2.  LE Dressing with Moderate Assistance.  3.  Brush teeth with min a and wash face with SBA and mod cues.  4. Toileting from toilet with Moderate Assistance for hygiene and clothing management.   5.  Toilet transfer to toilet with minimal Assistance.  6.  Pt will use feel to compensate for blindness during self care adls, with mod cues, 25% of the time  7. Pt will state orientation to person, place and time with added time and 0 added cues.         Outcome: Ongoing (interventions implemented as appropriate)  goals remain appropriate. HELEN Broussard 2017

## 2017-07-14 NOTE — ASSESSMENT & PLAN NOTE
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 Notify NSGY for any changes.   -Continue Keppra for seizure prophylaxis  -Continue Lovenox for DVT prophylaxis  -Continue aggressive PT/OT  -EEG 7/7/17 pending results?  -Continue management of electrolyte imbalances per primary team  -Consider PEG placement if po intake continues to not be sufficient. Gen surgery consulted and is awaiting consents with family members. Likely early next week.    -Will continue to follow, please call with questions   -Pending Rehab or patient may need longer term placement per primary team

## 2017-07-14 NOTE — SUBJECTIVE & OBJECTIVE
Past Medical History:   Diagnosis Date    Allergy     Basal cell carcinoma     Depression 11/1/2016    Essential hypertension 6/9/2017    Eye disorder     Fever blister     Normocytic anemia 6/9/2017    Secondary hypothyroidism 6/26/2017       Past Surgical History:   Procedure Laterality Date    BASAL CELL CARCINOMA EXCISION      forehead    BRAIN SURGERY      SKIN BIOPSY         Review of patient's allergies indicates:   Allergen Reactions    Codeine      Family History     Problem Relation (Age of Onset)    Diabetes Father    Hepatitis Father    Hypertension Father    No Known Problems Mother        Social History Main Topics    Smoking status: Current Every Day Smoker     Packs/day: 1.00     Years: 40.00     Types: Cigarettes    Smokeless tobacco: Never Used    Alcohol use No    Drug use: No    Sexual activity: No     Review of Systems   Unable to perform ROS: Mental status change     Objective:     Vital Signs (Most Recent):  Temp: 98.4 °F (36.9 °C) (07/14/17 0335)  Pulse: 77 (07/14/17 0700)  Resp: 20 (07/14/17 0335)  BP: 100/78 (07/14/17 0335)  SpO2: 98 % (07/14/17 0335) Vital Signs (24h Range):  Temp:  [96.7 °F (35.9 °C)-98.6 °F (37 °C)] 98.4 °F (36.9 °C)  Pulse:  [68-92] 77  Resp:  [16-20] 20  SpO2:  [94 %-98 %] 98 %  BP: (100-125)/(75-89) 100/78     Weight: 59.9 kg (132 lb) (07/06/17 0400)  Body mass index is 24.14 kg/m².      Intake/Output Summary (Last 24 hours) at 07/14/17 0853  Last data filed at 07/14/17 0500   Gross per 24 hour   Intake             2000 ml   Output             1100 ml   Net              900 ml       Lines/Drains/Airways     Drain                 NG/OG Tube 07/13/17 0430 [place] sump Left nostril 1 day          Peripheral Intravenous Line                 Peripheral IV - Single Lumen 07/12/17 1800 Left Hand 1 day                Physical Exam   Constitutional: No distress.   HENT:   Well healed scar from surgery   Eyes: Conjunctivae are normal. Pupils are equal, round, and  reactive to light.   Neck: Normal range of motion.   Cardiovascular: Normal rate and regular rhythm.    Pulmonary/Chest: Effort normal and breath sounds normal.   Abdominal: Soft. Bowel sounds are normal.   Neurological:   Only oriented to person, requires painful stimulus to have patient wake up and answer simple questions like her name   Skin: Skin is warm. She is not diaphoretic.       Significant Labs:  CBC:   Recent Labs  Lab 07/13/17  0542 07/14/17  0502   WBC 7.41 7.72   HGB 11.5* 11.1*   HCT 34.3* 33.5*   * 431*     CMP:   Recent Labs  Lab 07/14/17  0502   GLU 92   CALCIUM 11.0*   ALBUMIN 2.8*   PROT 7.5      K 3.4*   CO2 31*      BUN 11   CREATININE 0.9   ALKPHOS 135   ALT 32   AST 50*   BILITOT 0.2     Significant Imaging:  Imaging results within the past 24 hours have been reviewed.

## 2017-07-14 NOTE — HPI
51 year old female with a history of Hypothyroidism and Hx of meningioma s/p resection which was complicated DI on which GI is being consulted for PEG placement.    Patient is altered therefore history was obtain from reviewing primary team's H&P (included in italics below). No know abdominal surgeries. No current fevers. Negative cultures with last set drawn on 7/3/17. Not on antibiotics currently. Lastly WBC, Hgb, and platelets within normal limits.    Primary Team's H&P  HPI:  Ms. Joanna Morales is a 51 y.o. female with tobacco abuse, secondary hypothyroidism (TSH 0.241 6/17) and history of meningioma s/p resection 2015 & 6/2017. The most recent surgery by Dr. Chew at [facility] was complicated by DI .  The patient was discharged to [facility] on 6/26/17 and presented to [facility] ED on 6/28 with altered mental status and worsening hypernatremia.      Of note, She underwent a right frontotemporal craniotomy with partial removal of a large subfrontal and tuberculum sellae meningioma at [facility] in 2015. 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       Hospital/ICU Course:  Ms. Morales was discharged from [facility]  to [facility] on 6/26/17 and presented to [facility] ED on 6/28 with altered mental status and worsening hypernatremia. According to the patient's sitter, the patient was awake, verbal, and interactive with therapy on 6/23 but had been progressively less interactive and responsive when she was at [facility]. Ms. Morales was admitted to Hospital Medicine but was found to be unresponsive with a sodium level of 168 on the morning of 6/29. The Critical Care team was consulted for ICU monitoring  and management. She was started on a dextrose infusion and given free water per NGT. Endocrine was also involved in patient management during this and the prior visit.

## 2017-07-14 NOTE — PROGRESS NOTES
[facility]  Neurology  Progress Note    Patient Name: Joanna Morales  MRN: [id number]  Admission Date: 6/28/2017  Hospital Length of Stay: 16 days  Code Status: Full Code   Attending Provider: Rhoda Lewis MD  Primary Care Physician: Claudy Tse MD   Principal Problem:Hypernatremia    Past Medical History:   Diagnosis Date    Allergy     Basal cell carcinoma     Depression 11/1/2016    Essential hypertension 6/9/2017    Eye disorder     Fever blister     Normocytic anemia 6/9/2017    Secondary hypothyroidism 6/26/2017       Past Surgical History:   Procedure Laterality Date    BASAL CELL CARCINOMA EXCISION      forehead    BRAIN SURGERY      SKIN BIOPSY         Review of patient's allergies indicates:   Allergen Reactions    Codeine        Current Neurological Medications:     No current facility-administered medications on file prior to encounter.      Current Outpatient Prescriptions on File Prior to Encounter   Medication Sig    amitriptyline (ELAVIL) 100 MG tablet Take 1 tablet (100 mg total) by mouth nightly.    EUCALYPTUS OIL/MENTHOL/CAMPHOR (VICKS VAPORUB TOP) Apply topically daily as needed.    gabapentin (NEURONTIN) 300 MG capsule Take 1 capsule (300 mg total) by mouth 3 (three) times daily.    hydrocodone-acetaminophen 5-325mg (NORCO) 5-325 mg per tablet Take 1 tablet by mouth every 12 (twelve) hours as needed (severe pain).     Family History     Problem Relation (Age of Onset)    Diabetes Father    Hepatitis Father    Hypertension Father    No Known Problems Mother        Social History Main Topics    Smoking status: Current Every Day Smoker     Packs/day: 1.00     Years: 40.00     Types: Cigarettes    Smokeless tobacco: Never Used    Alcohol use No    Drug use: No    Sexual activity: No     Interval History:     NAEON. Waxing and waning levels of mentation, however decreased level of consciousness / drowsiness compared to baseline.     Review of  Systems   Constitutional: Positive for activity change. Negative for fever.   HENT: Negative for voice change.    Eyes: Positive for visual disturbance.   Respiratory: Negative for shortness of breath.    Cardiovascular: Negative for chest pain.   Gastrointestinal: Negative for abdominal distention.   Genitourinary: Negative for dysuria.   Musculoskeletal: Negative for neck pain and neck stiffness.   Neurological: Negative for tremors, seizures, syncope, facial asymmetry and headaches.   Psychiatric/Behavioral: Positive for confusion. Negative for agitation and behavioral problems.     Objective:     Vital Signs (Most Recent):  Temp: 98 °F (36.7 °C) (07/14/17 0810)  Pulse: 85 (07/14/17 1248)  Resp: 18 (07/14/17 1248)  BP: 118/69 (07/14/17 1248)  SpO2: 97 % (07/14/17 1248) Vital Signs (24h Range):  Temp:  [96.7 °F (35.9 °C)-98.6 °F (37 °C)] 98 °F (36.7 °C)  Pulse:  [75-92] 85  Resp:  [16-20] 18  SpO2:  [94 %-98 %] 97 %  BP: (100-125)/(69-89) 118/69     Weight: 59.9 kg (132 lb)  Body mass index is 24.14 kg/m².    Physical Exam   Constitutional: No distress.   HENT:   Head: Atraumatic.   Eyes: EOM are normal.   Neck: Normal range of motion. Neck supple.   Cardiovascular: Regular rhythm.    Pulmonary/Chest: Effort normal.   Abdominal: Soft.   Neurological: Finger-nose-finger test: Limited from mental status change / drowsy state.   Reflex Scores:       Tricep reflexes are 2+ on the right side and 2+ on the left side.       Bicep reflexes are 2+ on the right side and 2+ on the left side.       Brachioradialis reflexes are 2+ on the right side and 2+ on the left side.       Patellar reflexes are 2+ on the right side and 2+ on the left side.       Achilles reflexes are 2+ on the right side and 2+ on the left side.  Psychiatric: Her speech is normal.       NEUROLOGICAL EXAMINATION:     MENTAL STATUS   Oriented to person.   Disoriented to place.   Disoriented to year, month and date.   Follows 1 step commands.   Attention:  decreased (Limited from mental status change / drowsy state). Concentration: decreased.   Speech: speech is normal   Level of consciousness: drowsy  Unable to name object: Limited from mental status change / drowsy state. : NT.    CRANIAL NERVES     CN II   Visual acuity: (Negative light perception )  Right visual field deficit: optic afferant affected.    CN III, IV, VI   Extraocular motions are normal.   Pupils: dilated  Right pupil: Shape: regular. Reactivity: non-reactive.   Left pupil: Shape: regular. Reactivity: non-reactive.   Cranial nerve III palsy: III nr / parasympathetic affected.  CN VI: no CN VI palsy  Nystagmus: none     CN V   Facial sensation intact.     CN VII   Facial expression full, symmetric.     CN VIII   CN VIII normal.     CN IX, X   CN IX normal.   CN X normal.     CN XI   CN XI normal.     CN XII   CN XII normal.     MOTOR EXAM   Muscle bulk: normal  Overall muscle tone: normal    Strength   Right neck flexion: 4/5  Left neck flexion: 4/5  Right neck extension: 4/5  Left neck extension: 4/5  Right deltoid: 3/5  Left deltoid: 3/5  Right biceps: 3/5  Left biceps: 3/5  Right triceps: 3/5  Left triceps: 3/5  Right wrist flexion: 4/5  Left wrist flexion: 4/5  Right wrist extension: 4/5  Left wrist extension: 4/5  Right interossei: 4/5  Left interossei: 4/5  Right iliopsoas: 3/5  Left iliopsoas: 3/5  Right quadriceps: 3/5  Left quadriceps: 3/5  Right hamstring: 3/5  Left hamstring: 3/5  Right anterior tibial: 3/5  Left anterior tibial: 3/5  Right posterior tibial: 3/5  Left posterior tibial: 3/5    REFLEXES     Reflexes   Right brachioradialis: 2+  Left brachioradialis: 2+  Right biceps: 2+  Left biceps: 2+  Right triceps: 2+  Left triceps: 2+  Right patellar: 2+  Left patellar: 2+  Right achilles: 2+  Left achilles: 2+  Right : 2+  Left : 2+  Right plantar: normal  Left plantar: normal  Right ankle clonus: absent  Left ankle clonus: absent    SENSORY EXAM   Pinprick normal.     GAIT AND  COORDINATION     Gait  Gait: (Limited from mental status change / drowsy state)     Coordination   Finger-nose-finger test: Limited from mental status change / drowsy state.    Tremor   Resting tremor: absent      Significant Labs:   CBC:     Recent Labs  Lab 07/13/17  0542 07/14/17  0502   WBC 7.41 7.72   HGB 11.5* 11.1*   HCT 34.3* 33.5*   * 431*     CMP:   Recent Labs  Lab 07/13/17  0542  07/14/17  0502 07/14/17  0903 07/14/17  1231   GLU 92  < > 92 1,008* 105     < > 144 116* 147*   K 3.7  < > 3.4* 3.0* 4.8     < > 101 84* 104   CO2 32*  < > 31* 24 29   BUN 12  < > 11 8 14   CREATININE 0.9  < > 0.9 1.2 1.0   CALCIUM 11.4*  < > 11.0* 8.3* 11.1*   MG 2.3  --  2.1  --   --    PROT 8.0  --  7.5  --   --    ALBUMIN 2.9*  --  2.8*  --   --    BILITOT 0.2  --  0.2  --   --    ALKPHOS 141*  --  135  --   --    AST 39  --  50*  --   --    ALT 25  --  32  --   --    ANIONGAP 11  < > 12 8 14   EGFRNONAA >60.0  < > >60.0 52.5* >60.0   < > = values in this interval not displayed.  All pertinent lab results from the past 24 hours have been reviewed.    Significant Imaging: I have reviewed and interpreted all pertinent imaging results/findings within the past 24 hours.    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   - Waxing and waning symptoms of reduced responsiveness, rasing concerns for subclinical seizure events contributing to encephalopathy    - Prior EEG was negative for any epileptiform activities, ON keppra for seizure prophylaxis post surgery  - Prior MRI stable post op changes, with no worsening picture  - No recent events of fevers or clinical  seziures or focal neurological deficits.   - stable LFTs / RFTs / infections source control    - DDx multifactorial from electrolyte disturbances / metabolic disturbances / endocrine etiologies > subclinical seizures or nutritional disturbances    - Waxing and waning levels of mentation, however decreased level of consciousness / drowsiness compared to baseline.     Plan:   - Pending routine EEG  - Pending labs: vitamin B1/B12/folate and RPR  - Continue keppra   - Monitor for clinical signs/symptoms of meningitis. Consider PEG tube > NG tube, given the proximity to surgical site and potential infections   - Correct lytes as needed / avoid hypoglycemia / infection control if needed          Secondary hypothyroidism    - as per primary team / endocrine recs        Primary central diabetes insipidus    - as per primary team / endocrine recs        Meningioma, recurrent of brain    - S/p resection   - see section under acute encephalopathy             VTE Risk Mitigation         Ordered     High Risk of VTE  Once      06/30/17 0920     Place sequential compression device  Until discontinued      06/30/17 0920     enoxaparin injection 40 mg  Daily     Route:  Subcutaneous        06/28/17 9401          Atilio Scott MD  Neurology  [facility]

## 2017-07-14 NOTE — ASSESSMENT & PLAN NOTE
Non-hospice Palliative Care:   Does patient have Capacity? no  Goals- 7/13 - discussed care with  Reina Hernandez  -  [phone], pt's POA.    This is turning into a long post operative course, with inability to nourish patient.  Reina agreed to PEG tube.  Pt needs regular water intake, with mental status changes pulls out NG tube frequently..  NG could be contributing to delirium as well.  willl consult GI  Code Status- FULL  Pain management- stable, no pain  Prognosis-  Poor given numerous complications, but too soon to give up.  Would like another neurology eval, give effort another 1/2 months at least.   Family discussions- 7/13 - Called Reina Hernandez  -  [phone]  Functional Status - limited to bed, presently    Post acute care plan:  Rehab if MS improves.

## 2017-07-14 NOTE — PLAN OF CARE
Problem: SLP Goal  Goal: SLP Goal  SLP goals expected to be met by 7/18:  1. Pt will participate in going assessment of swallow in order to determine safest, least restrictive diet.    Outcome: Ongoing (interventions implemented as appropriate)  Recommend continue NPO at this time with alternative means nutrition, hydration, medication.     HILDA Rascon, CCC-SLP  [phone]  7/14/2017

## 2017-07-14 NOTE — ASSESSMENT & PLAN NOTE
7/14 - Level of alertness has waxesd and waned since surgery 6 weeks ago,  Discussed with Reina, her POA.  Plan is to repeat eval for seizures and get PEG.

## 2017-07-14 NOTE — PT/OT/SLP PROGRESS
Physical Therapy      Joanna Morales  MRN: [id number]    Patient not seen today secondary to pt daughter requesting therapy come back in PM  after she talks with  MDs.In PM, pt not alert and sleeping no responsive  To stimulus, nsg notified. Will follow-up with next PT session.    LISA Hu  I certify that I was present in the room directing the student in service delivery and guiding them using my skilled judgment. As the co-signing therapist I have reviewed the students documentation and am responsible for the treatment, assessment, and plan. Daniel Saba PTA  7/14/2017

## 2017-07-14 NOTE — ASSESSMENT & PLAN NOTE
7/14 - delirium vs seizure disorder and recurrent post-ictal states:   EEG ordered, pt may need 24 hour EEG.  PEG also planned.    7/13 - waxing and waning, today she is somnolent,  Consult Neurology, Since symptoms are intermittent consider continuous  EEG monitoring.    7/12 - improved  7/11 - Worse with  increasing Na level  EEG done - Moderate diffuse slowing of the overall background as described above.  2.  Superimposed left hemispheric slowing relative to the right.  No   epileptiform discharges or seizures were seen, however.  Result suggests both   encephalopathy and focal cortical dysfunction,    Pulled NGT ou t/ 7/8 and 7.12,  May need to replace to give po meds and liquids, ST eval

## 2017-07-14 NOTE — PLAN OF CARE
Problem: Patient Care Overview  Goal: Plan of Care Review  Outcome: Ongoing (interventions implemented as appropriate)  POC reviewed with pt. Questions and concerns addressed. Pt remained in restraints. Pt tube feeds at goal. Pt progressing toward goals. Will continue to monitor. See flowsheets for full assessment and VS info

## 2017-07-14 NOTE — ASSESSMENT & PLAN NOTE
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   - Waxing and waning symptoms of reduced responsiveness, rasing concerns for subclinical seizure events contributing to encephalopathy    - Prior EEG was negative for any epileptiform activities, ON keppra for seizure prophylaxis post surgery  - Prior MRI stable post op changes, with no worsening picture  - No recent events of fevers or clinical seziures or focal neurological deficits.   - stable LFTs / RFTs / infections source control    - DDx multifactorial from electrolyte disturbances / metabolic disturbances / endocrine etiologies > subclinical seizures or nutritional disturbances    - Waxing and waning levels of mentation, however decreased level of consciousness / drowsiness compared to baseline.     Plan:   - Pending routine EEG  - Pending labs: vitamin B1/B12/folate and RPR  - Continue keppra   - Monitor for clinical signs/symptoms of meningitis. Consider PEG tube > NG tube, given the proximity to surgical site and potential infections   - Correct lytes as needed / avoid hypoglycemia / infection control if needed

## 2017-07-14 NOTE — CONSULTS
[facility]  Gastroenterology  Consult Note    Patient Name: Joanna Morales  MRN: [id number]  Admission Date: 6/28/2017  Hospital Length of Stay: 16 days  Code Status: Full Code   Attending Provider: Nat Najera MD   Consulting Provider: Ava Fuentes MD  Primary Care Physician: Claudy Tse MD  Principal Problem:Hypernatremia    Inpatient consult to Gastroenterology  Consult performed by: AVA FUENTES  Consult ordered by: NAT NAJERA        Subjective:     HPI:  51 year old female with a history of Hypothyroidism and Hx of meningioma s/p resection which was complicated DI on which GI is being consulted for PEG placement.    Patient is altered therefore history was obtain from reviewing primary team's H&P (included in italics below). No know abdominal surgeries. No current fevers. Negative cultures with last set drawn on 7/3/17. Not on antibiotics currently. Lastly WBC, Hgb, and platelets within normal limits.    Primary Team's H&P  HPI:  Ms. Joanna Morales is a 51 y.o. female with tobacco abuse, secondary hypothyroidism (TSH 0.241 6/17) and history of meningioma s/p resection 2015 & 6/2017. The most recent surgery by Dr. Chew at [facility] was complicated by DI .  The patient was discharged to [facility] on 6/26/17 and presented to [facility] ED on 6/28 with altered mental status and worsening hypernatremia.      Of note, She underwent a right frontotemporal craniotomy with partial removal of a large subfrontal and tuberculum sellae meningioma at [facility] in 2015. 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       Hospital/ICU Course:  Ms. Morales was discharged from [facility]   to [facility] on 6/26/17 and presented to [facility] ED on 6/28 with altered mental status and worsening hypernatremia. According to the patient's sitter, the patient was awake, verbal, and interactive with therapy on 6/23 but had been progressively less interactive and responsive when she was at [place]. Ms. Morales was admitted to Hospital Medicine but was found to be unresponsive with a sodium level of 168 on the morning of 6/29. The Critical Care team was consulted for ICU monitoring and management. She was started on a dextrose infusion and given free water per NGT. Endocrine was also involved in patient management during this and the prior visit.             Past Medical History:   Diagnosis Date    Allergy     Basal cell carcinoma     Depression 11/1/2016    Essential hypertension 6/9/2017    Eye disorder     Fever blister     Normocytic anemia 6/9/2017    Secondary hypothyroidism 6/26/2017       Past Surgical History:   Procedure Laterality Date    BASAL CELL CARCINOMA EXCISION      forehead    BRAIN SURGERY      SKIN BIOPSY         Review of patient's allergies indicates:   Allergen Reactions    Codeine      Family History     Problem Relation (Age of Onset)    Diabetes Father    Hepatitis Father    Hypertension Father    No Known Problems Mother        Social History Main Topics    Smoking status: Current Every Day Smoker     Packs/day: 1.00     Years: 40.00     Types: Cigarettes    Smokeless tobacco: Never Used    Alcohol use No    Drug use: No    Sexual activity: No     Review of Systems   Unable to perform ROS: Mental status change     Objective:     Vital Signs (Most Recent):  Temp: 98.4 °F (36.9 °C) (07/14/17 0335)  Pulse: 77 (07/14/17 0700)  Resp: 20 (07/14/17 0335)  BP: 100/78 (07/14/17 0335)  SpO2: 98 % (07/14/17 0335) Vital Signs (24h Range):  Temp:  [96.7 °F (35.9 °C)-98.6 °F (37 °C)] 98.4 °F (36.9 °C)  Pulse:  [68-92] 77  Resp:  [16-20] 20  SpO2:  [94 %-98 %] 98  %  BP: (100-125)/(75-89) 100/78     Weight: 59.9 kg (132 lb) (07/06/17 0400)  Body mass index is 24.14 kg/m².      Intake/Output Summary (Last 24 hours) at 07/14/17 0853  Last data filed at 07/14/17 0500   Gross per 24 hour   Intake             2000 ml   Output             1100 ml   Net              900 ml       Lines/Drains/Airways     Drain                 NG/OG Tube 07/13/17 0430 Thompson sump Left nostril 1 day          Peripheral Intravenous Line                 Peripheral IV - Single Lumen 07/12/17 1800 Left Hand 1 day                Physical Exam   Constitutional: No distress.   HENT:   Well healed scar from surgery   Eyes: Conjunctivae are normal. Pupils are equal, round, and reactive to light.   Neck: Normal range of motion.   Cardiovascular: Normal rate and regular rhythm.    Pulmonary/Chest: Effort normal and breath sounds normal.   Abdominal: Soft. Bowel sounds are normal.   Neurological:   Only oriented to person, requires painful stimulus to have patient wake up and answer simple questions like her name   Skin: Skin is warm. She is not diaphoretic.       Significant Labs:  CBC:   Recent Labs  Lab 07/13/17  0542 07/14/17  0502   WBC 7.41 7.72   HGB 11.5* 11.1*   HCT 34.3* 33.5*   * 431*     CMP:   Recent Labs  Lab 07/14/17  0502   GLU 92   CALCIUM 11.0*   ALBUMIN 2.8*   PROT 7.5      K 3.4*   CO2 31*      BUN 11   CREATININE 0.9   ALKPHOS 135   ALT 32   AST 50*   BILITOT 0.2     Significant Imaging:  Imaging results within the past 24 hours have been reviewed.    Assessment/Plan:     Oral phase dysphagia    Patient has persistent oral dysphagia with AMS in the section of recurrent meningioma. Due to poor PO intake she continues to develop hypernatremia which further worsens her mention. Family is not ready for palliative measures and would like to continue fighting.  -PEG tube for water flushes and nutrition  -Have made multiple unsuccessful attempts to reach Reina Hernandez. Prior to  procedure we need to obtain consent from Reina Hernandez. If consent obtain PEG will be place early next week.  -Thank you for this consultation        Altered mental status    Refer to above        Meningioma, recurrent of brain    -Refer to above        Primary central diabetes insipidus    -Refer to above            I will follow-up with patient. Please contact us if you have any additional questions.  Winsome Hsu M.D.  Gastroenterology Fellow, PGY-IV  Pager: [phone]  [facility]

## 2017-07-14 NOTE — SUBJECTIVE & OBJECTIVE
Past Medical History:   Diagnosis Date    Allergy     Basal cell carcinoma     Depression 11/1/2016    Essential hypertension 6/9/2017    Eye disorder     Fever blister     Normocytic anemia 6/9/2017    Secondary hypothyroidism 6/26/2017       Past Surgical History:   Procedure Laterality Date    BASAL CELL CARCINOMA EXCISION      forehead    BRAIN SURGERY      SKIN BIOPSY         Review of patient's allergies indicates:   Allergen Reactions    Codeine        Current Neurological Medications:     No current facility-administered medications on file prior to encounter.      Current Outpatient Prescriptions on File Prior to Encounter   Medication Sig    amitriptyline (ELAVIL) 100 MG tablet Take 1 tablet (100 mg total) by mouth nightly.    EUCALYPTUS OIL/MENTHOL/CAMPHOR (VICKS VAPORUB TOP) Apply topically daily as needed.    gabapentin (NEURONTIN) 300 MG capsule Take 1 capsule (300 mg total) by mouth 3 (three) times daily.    hydrocodone-acetaminophen 5-325mg (NORCO) 5-325 mg per tablet Take 1 tablet by mouth every 12 (twelve) hours as needed (severe pain).     Family History     Problem Relation (Age of Onset)    Diabetes Father    Hepatitis Father    Hypertension Father    No Known Problems Mother        Social History Main Topics    Smoking status: Current Every Day Smoker     Packs/day: 1.00     Years: 40.00     Types: Cigarettes    Smokeless tobacco: Never Used    Alcohol use No    Drug use: No    Sexual activity: No     Interval History:     NAEON. Waxing and waning levels of mentation, however decreased level of consciousness / drowsiness compared to baseline.     Review of Systems   Constitutional: Positive for activity change. Negative for fever.   HENT: Negative for voice change.    Eyes: Positive for visual disturbance.   Respiratory: Negative for shortness of breath.    Cardiovascular: Negative for chest pain.   Gastrointestinal: Negative for abdominal distention.   Genitourinary:  Negative for dysuria.   Musculoskeletal: Negative for neck pain and neck stiffness.   Neurological: Negative for tremors, seizures, syncope, facial asymmetry and headaches.   Psychiatric/Behavioral: Positive for confusion. Negative for agitation and behavioral problems.     Objective:     Vital Signs (Most Recent):  Temp: 98 °F (36.7 °C) (07/14/17 0810)  Pulse: 85 (07/14/17 1248)  Resp: 18 (07/14/17 1248)  BP: 118/69 (07/14/17 1248)  SpO2: 97 % (07/14/17 1248) Vital Signs (24h Range):  Temp:  [96.7 °F (35.9 °C)-98.6 °F (37 °C)] 98 °F (36.7 °C)  Pulse:  [75-92] 85  Resp:  [16-20] 18  SpO2:  [94 %-98 %] 97 %  BP: (100-125)/(69-89) 118/69     Weight: 59.9 kg (132 lb)  Body mass index is 24.14 kg/m².    Physical Exam   Constitutional: No distress.   HENT:   Head: Atraumatic.   Eyes: EOM are normal.   Neck: Normal range of motion. Neck supple.   Cardiovascular: Regular rhythm.    Pulmonary/Chest: Effort normal.   Abdominal: Soft.   Neurological: Finger-nose-finger test: Limited from mental status change / drowsy state.   Reflex Scores:       Tricep reflexes are 2+ on the right side and 2+ on the left side.       Bicep reflexes are 2+ on the right side and 2+ on the left side.       Brachioradialis reflexes are 2+ on the right side and 2+ on the left side.       Patellar reflexes are 2+ on the right side and 2+ on the left side.       Achilles reflexes are 2+ on the right side and 2+ on the left side.  Psychiatric: Her speech is normal.       NEUROLOGICAL EXAMINATION:     MENTAL STATUS   Oriented to person.   Disoriented to place.   Disoriented to year, month and date.   Follows 1 step commands.   Attention: decreased (Limited from mental status change / drowsy state). Concentration: decreased.   Speech: speech is normal   Level of consciousness: drowsy  Unable to name object: Limited from mental status change / drowsy state. : NT.    CRANIAL NERVES     CN II   Visual acuity: (Negative light perception )  Right visual  field deficit: optic afferant affected.    CN III, IV, VI   Extraocular motions are normal.   Pupils: dilated  Right pupil: Shape: regular. Reactivity: non-reactive.   Left pupil: Shape: regular. Reactivity: non-reactive.   Cranial nerve III palsy: III nr / parasympathetic affected.  CN VI: no CN VI palsy  Nystagmus: none     CN V   Facial sensation intact.     CN VII   Facial expression full, symmetric.     CN VIII   CN VIII normal.     CN IX, X   CN IX normal.   CN X normal.     CN XI   CN XI normal.     CN XII   CN XII normal.     MOTOR EXAM   Muscle bulk: normal  Overall muscle tone: normal    Strength   Right neck flexion: 4/5  Left neck flexion: 4/5  Right neck extension: 4/5  Left neck extension: 4/5  Right deltoid: 3/5  Left deltoid: 3/5  Right biceps: 3/5  Left biceps: 3/5  Right triceps: 3/5  Left triceps: 3/5  Right wrist flexion: 4/5  Left wrist flexion: 4/5  Right wrist extension: 4/5  Left wrist extension: 4/5  Right interossei: 4/5  Left interossei: 4/5  Right iliopsoas: 3/5  Left iliopsoas: 3/5  Right quadriceps: 3/5  Left quadriceps: 3/5  Right hamstring: 3/5  Left hamstring: 3/5  Right anterior tibial: 3/5  Left anterior tibial: 3/5  Right posterior tibial: 3/5  Left posterior tibial: 3/5    REFLEXES     Reflexes   Right brachioradialis: 2+  Left brachioradialis: 2+  Right biceps: 2+  Left biceps: 2+  Right triceps: 2+  Left triceps: 2+  Right patellar: 2+  Left patellar: 2+  Right achilles: 2+  Left achilles: 2+  Right : 2+  Left : 2+  Right plantar: normal  Left plantar: normal  Right ankle clonus: absent  Left ankle clonus: absent    SENSORY EXAM   Pinprick normal.     GAIT AND COORDINATION     Gait  Gait: (Limited from mental status change / drowsy state)     Coordination   Finger-nose-finger test: Limited from mental status change / drowsy state.    Tremor   Resting tremor: absent      Significant Labs:   CBC:     Recent Labs  Lab 07/13/17  0542 07/14/17  0502   WBC 7.41 7.72   HGB  11.5* 11.1*   HCT 34.3* 33.5*   * 431*     CMP:   Recent Labs  Lab 07/13/17  0542  07/14/17  0502 07/14/17  0903 07/14/17  1231   GLU 92  < > 92 1,008* 105     < > 144 116* 147*   K 3.7  < > 3.4* 3.0* 4.8     < > 101 84* 104   CO2 32*  < > 31* 24 29   BUN 12  < > 11 8 14   CREATININE 0.9  < > 0.9 1.2 1.0   CALCIUM 11.4*  < > 11.0* 8.3* 11.1*   MG 2.3  --  2.1  --   --    PROT 8.0  --  7.5  --   --    ALBUMIN 2.9*  --  2.8*  --   --    BILITOT 0.2  --  0.2  --   --    ALKPHOS 141*  --  135  --   --    AST 39  --  50*  --   --    ALT 25  --  32  --   --    ANIONGAP 11  < > 12 8 14   EGFRNONAA >60.0  < > >60.0 52.5* >60.0   < > = values in this interval not displayed.  All pertinent lab results from the past 24 hours have been reviewed.    Significant Imaging: I have reviewed and interpreted all pertinent imaging results/findings within the past 24 hours.

## 2017-07-14 NOTE — PLAN OF CARE
Discharge planning: Pt not medically stable for discharge. Plan for Peg tube placement. Plan A: Rehab  Plan B: NH placement.

## 2017-07-14 NOTE — PT/OT/SLP PROGRESS
"Occupational Therapy  Treatment    Joanna Morales   MRN: [id number]   Admitting Diagnosis: Hypernatremia    OT Date of Treatment: 07/13/17   OT Start Time: 1320  OT Stop Time: 1353  OT Total Time (min): 33 min    Billable Minutes:  Therapeutic Activity 30    General Precautions: Standard, aspiration, fall, blind, NPO  Orthopedic Precautions: N/A  Braces: N/A    Do you have any cultural, spiritual, Nondenominational conflicts, given your current situation?: None reported    Subjective:  Communicated with RN prior to session.  Pt reported "There is something down there, I have to get it" - in regards to to diaper/brief   Pain/Comfort  Pain Rating 1: 0/10  Pain Rating Post-Intervention 1: 0/10    Objective:  Patient found with: NG tube, peripheral IV, restraints, SCD, bed alarm, telemetry, Avys system      Functional Mobility:  Bed Mobility:  Rolling/Turning Right: Minimum assistance, With side rail  Scooting/Bridging: Moderate Assistance (to HOB)  Supine to Sit: Minimum Assistance  Sit to Supine: Moderate Assistance    Transfers:   Sit <> Stand Assistance: Minimum Assistance (x4 trials from EOB)  Sit <> Stand Assistive Device: No Assistive Device    Functional Ambulation: Min(A) for steps at side of bed without AD/used hand held assistance  *completed in linear F/B planes     Activities of Daily Living:  Feeding Level of Assistance: Activity did not occur (NPO/NG)  LE Dressing Level of Assistance: Maximum assistance (doff/don brief in standing)    Balance:   Static/Dynamic Sit: CGA-min(A); added assistance constant for B UE (trying to pull NG/get into brief, etc)  Static Stand: POOR+: Needs MINIMAL assist to maintain    Additional:  -Pt alert with eyes open throughout; able to verbalize orientation to self only  -OT to provide verbal daily orientation with poor short term carry over requiring frequent re-cue  -Completed EOB activity for postural control as prep for functional activities   *Facilitation for thoracic " "extension   *Facilitation for scapular adduction and depression   -Completed multiple sit<>stand from EOB for endurance and LB dressing   -Pt able to state 12/12 months of the year with added time; Able to state birthday  -Demo perseveration on marriage status and requiring constant re-cues throughout  -Demo need for supervision and tactile redirection for B UE 2/2 constant trying to place hands into brief  -Communication board updated ; no family present for education     *Communication board also updated to state pt's visual impairment     AM-PAC 6 CLICK ADL   How much help from another person does this patient currently need?   1 = Unable, Total/Dependent Assistance  2 = A lot, Maximum/Moderate Assistance  3 = A little, Minimum/Contact Guard/Supervision  4 = None, Modified [place]/Independent    Putting on and taking off regular lower body clothing? : 2  Bathing (including washing, rinsing, drying)?: 2  Toileting, which includes using toilet, bedpan, or urinal? : 2  Putting on and taking off regular upper body clothing?: 2  Taking care of personal grooming such as brushing teeth?: 2  Eating meals?: 2  Total Score: 12     AM-PAC Raw Score CMS "G-Code Modifier Level of Impairment Assistance   6 % Total / Unable   7 - 8 CM 80 - 100% Maximal Assist   9-13 CL 60 - 80% Moderate Assist   14 - 19 CK 40 - 60% Moderate Assist   20 - 22 CJ 20 - 40% Minimal Assist   23 CI 1-20% SBA / CGA   24 CH 0% Independent/ Mod I       Patient left with bed in chair position with all lines intact, call button in reach, bed alarm on and restraints reapplied at end of session    ASSESSMENT:  Joanna Morales is a 51 y.o. female with a medical diagnosis of Hypernatremia and presents with as return to [facility] from Rehab following resection of meningioma for AMS. Pt unable to engage in functional task on this date 2/2 perseveration and high distractibility. She demo limited progress towards goals on this date.     Rehab identified " problem list/impairments: Rehab identified problem list/impairments: weakness, impaired endurance, impaired self care skills, impaired functional mobilty, impaired balance, gait instability, impaired cognition, decreased coordination, decreased safety awareness, decreased upper extremity function, decreased lower extremity function    Rehab potential is fair.    Activity tolerance: Poor    Discharge recommendations: Discharge Facility/Level Of Care Needs: rehabilitation facility     Barriers to discharge: Barriers to Discharge: Decreased caregiver support    Equipment recommendations:  (TBD at next level of care)     GOALS:    Occupational Therapy Goals        Problem: Occupational Therapy Goal    Goal Priority Disciplines Outcome Interventions   Occupational Therapy Goal     OT, PT/OT Ongoing (interventions implemented as appropriate)    Description:  Goals to be met by: 17     Patient will increase functional independence with ADLs by performin.  UE Dressing with Minimal Assistance.  2.  LE Dressing with Moderate Assistance.  3.  Brush teeth with min a and wash face with SBA and mod cues.  4. Toileting from toilet with Moderate Assistance for hygiene and clothing management.   5.  Toilet transfer to toilet with minimal Assistance.  6.  Pt will use feel to compensate for blindness during self care adls, with mod cues, 25% of the time  7. Pt will state orientation to person, place and time with added time and 0 added cues.                           Plan:  Patient to be seen 5 x/week to address the above listed problems via therapeutic activities, self-care/home management, therapeutic exercises, neuromuscular re-education, cognitive retraining  Plan of Care expires: 17  Plan of Care reviewed with: patient  HELEN Broussard  2017

## 2017-07-15 NOTE — TREATMENT PLAN
GI Treatment Plan  07/15/2017  3:56 PM    Extensive conversation with family (mother, oldest son, oldest daughter, middle son, and Aga Hernandez) regarding risk, complications, and long-term care of PEG tuve . The POA are Aga Hernandez and oldest son (Raj Blood). Entire family in agreement to move forward with PEG placement and therefore consent was obtain from Aga and Raj.    Winsome Hsu M.D.  Gastroenterology Fellow, PGY-IV  Pager: [phone]  [facility]

## 2017-07-15 NOTE — ASSESSMENT & PLAN NOTE
7/15 - resume water bolus 150 q 6 hours  7/14 - discussed w ENDOcrine, I Kevan nl, no changes nedded  7/13 - increase D2oyxnf +lasix 20 po.  Consider bisphosphenate. Will discuss with endocrine  7/12 - kevan 12.2-> 10.9 ->  11.3 -> 8.3  7/11- D5W, s/p  lasix,

## 2017-07-15 NOTE — ASSESSMENT & PLAN NOTE
7/15 - not correlating w electrolytes na or tanya.   7/14 - Level of alertness has waxesd and waned since surgery 6 weeks ago,  Discussed with Reina, her POA.  Plan is to repeat eval for seizures and get PEG.

## 2017-07-15 NOTE — ASSESSMENT & PLAN NOTE
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      Hx of a right frontotemporal craniotomy with partial removal of a large subfrontal and tuberculum sellae meningioma at [facility] in 2015. 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      NS is following

## 2017-07-15 NOTE — ASSESSMENT & PLAN NOTE
7/15 - MS status changes not correlating with sodium or calcium levels  7/14 - delirium vs seizure disorder and recurrent post-ictal states:   EEG ordered, pt may need 24 hour EEG.  PEG also planned.    7/13 - waxing and waning, today she is somnolent,  Consult Neurology, Since symptoms are intermittent consider continuous  EEG monitoring.    7/12 - improved  7/11 - Worse with  increasing Na level  EEG done - Moderate diffuse slowing of the overall background as described above.  2.  Superimposed left hemispheric slowing relative to the right.  No   epileptiform discharges or seizures were seen, however.  Result suggests both   encephalopathy and focal cortical dysfunction,    Pulled NGT ou t/ 7/8 and 7.12,  May need to replace to give po meds and liquids, ST eval

## 2017-07-15 NOTE — ASSESSMENT & PLAN NOTE
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   - Waxing and waning symptoms of reduced responsiveness, rasing concerns for subclinical seizure events contributing to encephalopathy    - Prior EEG was negative for any epileptiform activities, ON keppra for seizure prophylaxis post surgery  - Prior MRI stable post op changes, with no worsening picture  - No recent events of fevers or clinical seziures or focal neurological deficits.   - stable LFTs / RFTs / infections source control    - DDx multifactorial from electrolyte disturbances / metabolic disturbances / endocrine etiologies > subclinical seizures or nutritional disturbances      - Waxing and waning levels of mentation, however decreased level of consciousness / drowsiness compared to baseline.     Plan:   - Pending routine EEG  - Pending labs: vitamin B1/B12/folate and RPR  - Continue keppra   - Monitor for clinical signs/symptoms of meningitis. Consider PEG tube > NG tube, given the proximity to surgical site and potential infections   - Address concerns of hypernatremia. Fluctuations can impede neuro improvement   - Correct lytes as needed / avoid hypoglycemia / infection control if needed

## 2017-07-15 NOTE — PLAN OF CARE
Problem: Patient Care Overview  Goal: Plan of Care Review  Outcome: Ongoing (interventions implemented as appropriate)  Patient is resting in bed with eyes closed.  Bed low and locked.  Call bell in reach.  No complaints voiced.  Soft wrist Restraints in use bue.

## 2017-07-15 NOTE — PLAN OF CARE
Problem: Patient Care Overview  Goal: Plan of Care Review  Outcome: Ongoing (interventions implemented as appropriate)  Educated on DI and hypernatremia; necessity of free water flushes.  Also educated on necessity of PEG tube placement

## 2017-07-15 NOTE — PT/OT/SLP PROGRESS
"Occupational Therapy  Treatment    Joanna Morales   MRN: [id number]   Admitting Diagnosis: Hypernatremia    OT Date of Treatment: 07/15/17   OT Start Time: 1425  OT Stop Time: 1505  OT Total Time (min): 40 min    Billable Minutes:  Therapeutic Activity 15 and Neuromuscular Re-education 25    General Precautions: Standard, aspiration, NPO, fall, blind  Orthopedic Precautions: N/A  Braces: N/A    Do you have any cultural, spiritual, Jew conflicts, given your current situation?: None reported    Subjective:  Communicated with nurse prior to session.  Pt was more alert and interactive during tx session.  Pt noted with continued confusion but was able to engage in simple conversation and follow some simple commands (~50%)  "Take the baby out of here.  I don't want it to pull anything." pt referring to her grand baby in the room  "My arm is hurting"  Referring to L arm    Pain/Comfort  Pain Rating 1:  (pt c/o pain but did not give numerical value - pain when moving or touching L UE)  Location - Side 1: Left  Location 1:  (arm and hand)  Pain Addressed 1: Reposition, Distraction, Cessation of Activity  Pain Rating Post-Intervention 1: 0/10 (no pain at rest)    Objective:  Patient found with: NG tube, restraints, SCD, peripheral IV, blood pressure cuff - pt awake and supine with HOB elevated with a lot of family members present     Functional Mobility:  Bed Mobility:  Scooting/Bridging: Maximum Assistance, Total Assistance (max to scoot to EOB; total x2 to scoot to HOB)  Supine to Sit: Maximum Assistance (assist to raise trunk and lower LEs)  Sit to Supine: Moderate Assistance (pt lifted L LE onto bed with delayed response to vcs to lift leg)    Transfers:   Sit <> Stand Assistance: Moderate Assistance (lifting assist provided)  Sit <> Stand Assistive Device: No Assistive Device    Functional Ambulation: Pt able to take 4 steps to her L to position herself towards the HOB before sitting - needed mod support in " stand with verbal and tactile cues to advance each leg- pt with post lean, requiring physical assist to lean forward    Activities of Daily Living:  Feeding Level of Assistance: Activity did not occur (NPO via NG tube)    UE Dressing Level of Assistance:  (inserted B UEs into gown sleeves when seated EOB - cues given to feel and reach for openings to insert arms - performed sitting EOB)    LE Dressing Level of Assistance: Total assistance (socks)     Grooming Position: EOB  Grooming Level of Assistance: Maximum assistance     Toileting Level of Assistance: Activity did not occur     Bathing Level of Assistance: Activity did not occur      Balance:   Static Sit: FAIR-: Maintains without assist but inconsistent   Dynamic Sit: FAIR: Cannot move trunk without losing balance - at times required min to mod assist to maintain - progressively required less assist while seated (from mod to SBA)  Static Stand: POOR: Needs MODERATE assist to maintain - pt with posterior lean while standing - not responsive to verbal cues to lean shoulders forward - required physical assist with correcting posture forward - with corrected posture, pt able to maintain standing with min assist   Dynamic stand: POOR: N/A - once standing posture corrected, pt performed side to side rocking to work on weight shifting with mod to max support    Therapeutic Activities and Exercises:  · Pt completed ADLs and func mobility activities for tx session as noted above  · Pt had a large number of visitors (7+baby) - increased visual, tactile and audio stimulation in room - pt very distractible during tx session requiring increased verbal, tactile and gestural cues to participate in tx and follow simple commands.   · Pt sat EOB for ~15-20 minutes with assistance ranging from mod A to SBA  · Pt positioned at rest with head turned R - able to turn head midline and slightly L with verbal cues.  Pt completed 1 set of 10 reps of B UE AAROM exercises in order to  "work towards increasing her UB strength/endurance to assist with mobility and self care skills.  Pt completed the following exercises with assistance: shoulder flex/ext, elbow flex/ext, forearm sup/pro, and hand pumps. Exercises performed while seated EOB - pt reported increased pain in L UE during exercises - OT palpated shoulder joint with not observable subluxation noted.   · Whiteboard updated  · Pt educated on role of OT and POC      AM-PAC 6 CLICK ADL   How much help from another person does this patient currently need?   1 = Unable, Total/Dependent Assistance  2 = A lot, Maximum/Moderate Assistance  3 = A little, Minimum/Contact Guard/Supervision  4 = None, Modified [place]/Independent    Putting on and taking off regular lower body clothing? : 2  Bathing (including washing, rinsing, drying)?: 2  Toileting, which includes using toilet, bedpan, or urinal? : 2  Putting on and taking off regular upper body clothing?: 2  Taking care of personal grooming such as brushing teeth?: 2  Eating meals?: 1  Total Score: 11     AM-PAC Raw Score CMS "G-Code Modifier Level of Impairment Assistance   6 % Total / Unable   7 - 8 CM 80 - 100% Maximal Assist   9-13 CL 60 - 80% Moderate Assist   14 - 19 CK 40 - 60% Moderate Assist   20 - 22 CJ 20 - 40% Minimal Assist   23 CI 1-20% SBA / CGA   24 CH 0% Independent/ Mod I       Patient left HOB elevated with all lines intact, call button in reach, bed alarm on and nurse notified    ASSESSMENT:  Joanna Morales is a 51 y.o. female with a medical diagnosis of Hypernatremia and presents with pain, decreased safety awareness, decreased environmental awareness, decreased func mobility, impaired self care skills, gait instability, visual deficit, decreased balance and decreased strength/endurance. Pt was agreeable to OT and was noted to make progress towards her goals in therapy.  Pt's goals remain appropriate at this time.  She was more alert during her tx session " compared to her last attempt, however, continues to require increased cues/assist for safe and functional mobility and completion of ADLs.  She will continue to benefit from skilled OT services in order to assist her with increasing her safety and level of independence with self care and mobility tasks.   .    Rehab identified problem list/impairments: Rehab identified problem list/impairments: weakness, impaired endurance, impaired sensation, impaired self care skills, impaired functional mobilty, gait instability, impaired balance, visual deficits, impaired cognition, decreased coordination, decreased upper extremity function, decreased lower extremity function, abnormal tone, decreased safety awareness, pain, impaired coordination, impaired fine motor, impaired skin, edema    Rehab potential is fair.    Activity tolerance: Fair    Discharge recommendations: Discharge Facility/Level Of Care Needs: rehabilitation facility     Barriers to discharge: Barriers to Discharge: Decreased caregiver support    Equipment recommendations:  (TBD at next level of care)     GOALS:    Occupational Therapy Goals        Problem: Occupational Therapy Goal    Goal Priority Disciplines Outcome Interventions   Occupational Therapy Goal     OT, PT/OT Ongoing (interventions implemented as appropriate)    Description:  Goals to be met by: 17     Patient will increase functional independence with ADLs by performin.  UE Dressing with Minimal Assistance.  2.  LE Dressing with Moderate Assistance.  3.  Brush teeth with min a and wash face with SBA and mod cues.  4. Toileting from toilet with Moderate Assistance for hygiene and clothing management.   5.  Toilet transfer to toilet with minimal Assistance.  6.  Pt will use feel to compensate for blindness during self care adls, with mod cues, 25% of the time  7. Pt will state orientation to person, place and time with added time and 0 added cues.                            Plan:  Patient to be seen 5 x/week to address the above listed problems via self-care/home management, therapeutic activities, therapeutic exercises, cognitive retraining, sensory integration, neuromuscular re-education  Plan of Care expires: 08/11/17  Plan of Care reviewed with: patient         Riddhi LEDEZMA Zack, OT  07/15/2017

## 2017-07-15 NOTE — PROGRESS NOTES
[facility]  Neurology  Progress Note    Patient Name: Joanna Morales  MRN: [id number]  Admission Date: 6/28/2017  Hospital Length of Stay: 17 days  Code Status: Full Code   Attending Provider: Rhoda Lewis MD  Primary Care Physician: Claudy Tse MD   Principal Problem:Hypernatremia    Past Medical History:   Diagnosis Date    Allergy     Basal cell carcinoma     Depression 11/1/2016    Essential hypertension 6/9/2017    Eye disorder     Fever blister     Normocytic anemia 6/9/2017    Secondary hypothyroidism 6/26/2017       Past Surgical History:   Procedure Laterality Date    BASAL CELL CARCINOMA EXCISION      forehead    BRAIN SURGERY      SKIN BIOPSY         Review of patient's allergies indicates:   Allergen Reactions    Codeine        Current Neurological Medications:     No current facility-administered medications on file prior to encounter.      Current Outpatient Prescriptions on File Prior to Encounter   Medication Sig    amitriptyline (ELAVIL) 100 MG tablet Take 1 tablet (100 mg total) by mouth nightly.    EUCALYPTUS OIL/MENTHOL/CAMPHOR (VICKS VAPORUB TOP) Apply topically daily as needed.    gabapentin (NEURONTIN) 300 MG capsule Take 1 capsule (300 mg total) by mouth 3 (three) times daily.    hydrocodone-acetaminophen 5-325mg (NORCO) 5-325 mg per tablet Take 1 tablet by mouth every 12 (twelve) hours as needed (severe pain).     Family History     Problem Relation (Age of Onset)    Diabetes Father    Hepatitis Father    Hypertension Father    No Known Problems Mother        Social History Main Topics    Smoking status: Current Every Day Smoker     Packs/day: 1.00     Years: 40.00     Types: Cigarettes    Smokeless tobacco: Never Used    Alcohol use No    Drug use: No    Sexual activity: No     Interval History:     NAEON.     Waxing and waning levels of mentation overnight, however imprevement level of consciousness / drowsiness in the morning.      Review of Systems   Constitutional: Positive for activity change. Negative for fever.   HENT: Negative for voice change.    Eyes: Positive for visual disturbance.   Respiratory: Negative for shortness of breath.    Cardiovascular: Negative for chest pain.   Gastrointestinal: Negative for abdominal distention.   Genitourinary: Negative for dysuria.   Musculoskeletal: Negative for neck pain and neck stiffness.   Neurological: Negative for tremors, seizures, syncope, facial asymmetry and headaches.   Psychiatric/Behavioral: Positive for confusion. Negative for agitation and behavioral problems.     Objective:     Vital Signs (Most Recent):  Temp: 96.2 °F (35.7 °C) (07/15/17 1157)  Pulse: 101 (07/15/17 1157)  Resp: 18 (07/15/17 0507)  BP: 106/66 (07/15/17 1157)  SpO2: 100 % (07/15/17 1157) Vital Signs (24h Range):  Temp:  [96.2 °F (35.7 °C)-97.6 °F (36.4 °C)] 96.2 °F (35.7 °C)  Pulse:  [] 101  Resp:  [18-19] 18  SpO2:  [93 %-100 %] 100 %  BP: (106-118)/(62-76) 106/66     Weight: 59.9 kg (132 lb)  Body mass index is 24.14 kg/m².    Physical Exam   Constitutional: No distress.   HENT:   Head: Atraumatic.   Eyes: EOM are normal.   Neck: Normal range of motion. Neck supple.   Cardiovascular: Regular rhythm.    Pulmonary/Chest: Effort normal.   Abdominal: Soft.   Neurological: Finger-nose-finger test: Limited from mental status change / drowsy state.   Reflex Scores:       Tricep reflexes are 2+ on the right side and 2+ on the left side.       Bicep reflexes are 2+ on the right side and 2+ on the left side.       Brachioradialis reflexes are 2+ on the right side and 2+ on the left side.       Patellar reflexes are 2+ on the right side and 2+ on the left side.       Achilles reflexes are 2+ on the right side and 2+ on the left side.  Psychiatric: Her speech is normal.       NEUROLOGICAL EXAMINATION:     MENTAL STATUS   Oriented to person.   Disoriented to place.   Disoriented to year, month and date.   Follows 1  step commands.   Attention: decreased (Limited from mental status change / Mild improvement overtime). Concentration: decreased (Limited from mental status change / Mild improvement overtime).   Speech: speech is normal   Level of consciousness: drowsy (Improving levels of somnolence)  Unable to name object: Limited from mental status change / drowsy state. : NT.    CRANIAL NERVES     CN II   Visual acuity: (Negative light perception )  Right visual field deficit: optic afferant affected.    CN III, IV, VI   Extraocular motions are normal.   Pupils: dilated  Right pupil: Shape: regular. Reactivity: non-reactive.   Left pupil: Shape: regular. Reactivity: non-reactive.   Cranial nerve III palsy: III nr / parasympathetic affected.  CN VI: no CN VI palsy  Nystagmus: none     CN V   Facial sensation intact.     CN VII   Facial expression full, symmetric.     CN VIII   CN VIII normal.     CN IX, X   CN IX normal.   CN X normal.     CN XI   CN XI normal.     CN XII   CN XII normal.     MOTOR EXAM   Muscle bulk: normal  Overall muscle tone: normal    Strength   Right neck flexion: 4/5  Left neck flexion: 4/5  Right neck extension: 4/5  Left neck extension: 4/5  Right deltoid: 4/5  Left deltoid: 4/5  Right biceps: 4/5  Left biceps: 4/5  Right triceps: 4/5  Left triceps: 4/5  Right wrist flexion: 4/5  Left wrist flexion: 4/5  Right wrist extension: 4/5  Left wrist extension: 4/5  Right interossei: 4/5  Left interossei: 4/5  Right iliopsoas: 4/5  Left iliopsoas: 4/5  Right quadriceps: 4/5  Left quadriceps: 4/5  Right hamstrin/5  Left hamstrin/5  Right anterior tibial: 4/5  Left anterior tibial: 4/5  Right posterior tibial: 4/5  Left posterior tibial: 4/5    REFLEXES     Reflexes   Right brachioradialis: 2+  Left brachioradialis: 2+  Right biceps: 2+  Left biceps: 2+  Right triceps: 2+  Left triceps: 2+  Right patellar: 2+  Left patellar: 2+  Right achilles: 2+  Left achilles: 2+  Right : 2+  Left : 2+  Right  plantar: normal  Left plantar: normal  Right ankle clonus: absent  Left ankle clonus: absent    SENSORY EXAM   Pinprick normal.     GAIT AND COORDINATION     Gait  Gait: (Limited from mental status change / drowsy state)     Coordination   Finger-nose-finger test: Limited from mental status change / drowsy state.    Tremor   Resting tremor: absent      Significant Labs:   CBC:     Recent Labs  Lab 07/14/17  0502 07/15/17  0513   WBC 7.72 7.71   HGB 11.1* 12.2   HCT 33.5* 38.1   * 478*     CMP:   Recent Labs  Lab 07/14/17  0502  07/14/17  1604 07/14/17  2355 07/15/17  0513   GLU 92  < > 91 112* 104  104     < > 150* 155* 155*  155*   K 3.4*  < > 3.5 4.3 3.9  3.9     < > 106 112* 112*  112*   CO2 31*  < > 31* 31* 31*  31*   BUN 11  < > 13 18 20  20   CREATININE 0.9  < > 0.9 1.1 1.1  1.1   CALCIUM 11.0*  < > 11.7* 11.9* 11.8*  11.8*   MG 2.1  --   --   --  3.0*   PROT 7.5  --   --   --  8.7*   ALBUMIN 2.8*  --   --   --  3.1*   BILITOT 0.2  --   --   --  0.2   ALKPHOS 135  --   --   --  177*   AST 50*  --   --   --  47*   ALT 32  --   --   --  38   ANIONGAP 12  < > 13 12 12  12   EGFRNONAA >60.0  < > >60.0 58.3* 58.3*  58.3*   < > = values in this interval not displayed.  All pertinent lab results from the past 24 hours have been reviewed.    Significant Imaging: I have reviewed and interpreted all pertinent imaging results/findings within the past 24 hours.    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   - Waxing and waning symptoms of reduced responsiveness, rasing concerns for subclinical seizure events contributing to encephalopathy    -  Prior EEG was negative for any epileptiform activities, ON keppra for seizure prophylaxis post surgery  - Prior MRI stable post op changes, with no worsening picture  - No recent events of fevers or clinical seziures or focal neurological deficits.   - stable LFTs / RFTs / infections source control    - DDx multifactorial from electrolyte disturbances / metabolic disturbances / endocrine etiologies > subclinical seizures or nutritional disturbances      - Waxing and waning levels of mentation, however decreased level of consciousness / drowsiness compared to baseline.     Plan:   - Pending routine EEG  - Pending labs: vitamin B1/B12/folate and RPR  - Continue keppra   - Monitor for clinical signs/symptoms of meningitis. Consider PEG tube > NG tube, given the proximity to surgical site and potential infections   - Address concerns of hypernatremia. Fluctuations can impede neuro improvement   - Correct lytes as needed / avoid hypoglycemia / infection control if needed          Secondary hypothyroidism    - as per primary team / endocrine recs        Primary central diabetes insipidus    - as per primary team / endocrine recs        Meningioma, recurrent of brain    - S/p resection   - see section under acute encephalopathy             VTE Risk Mitigation         Ordered     High Risk of VTE  Once      06/30/17 0920     Place sequential compression device  Until discontinued      06/30/17 0920     enoxaparin injection 40 mg  Daily     Route:  Subcutaneous        06/28/17 0315          Atilio Scott MD  Neurology  [facility]

## 2017-07-15 NOTE — SUBJECTIVE & OBJECTIVE
Interval History: verbal 1-2 woeds    Review of Systems   Constitutional: Negative for activity change, chills, fatigue and fever.        Awake , alert today  Verbal, confused, not oriented   HENT: Positive for trouble swallowing. Negative for nosebleeds.    Respiratory: Negative for cough, shortness of breath and stridor.    Cardiovascular: Negative for chest pain and leg swelling.   Gastrointestinal: Negative for abdominal pain, constipation, diarrhea, nausea and vomiting.   Genitourinary: Negative for difficulty urinating.   Musculoskeletal: Negative for back pain.   Hematological: Does not bruise/bleed easily.   Psychiatric/Behavioral: Positive for confusion. Negative for agitation and sleep disturbance.     Objective:     Vital Signs (Most Recent):  Temp: 96.2 °F (35.7 °C) (07/15/17 0507)  Pulse: 100 (07/15/17 0800)  Resp: 18 (07/15/17 0507)  BP: 109/76 (07/15/17 0800)  SpO2: 98 % (07/15/17 0800) Vital Signs (24h Range):  Temp:  [96.2 °F (35.7 °C)-97.6 °F (36.4 °C)] 96.2 °F (35.7 °C)  Pulse:  [] 100  Resp:  [18-19] 18  SpO2:  [93 %-98 %] 98 %  BP: (106-118)/(62-76) 109/76     Weight: 59.9 kg (132 lb)  Body mass index is 24.14 kg/m².    Intake/Output Summary (Last 24 hours) at 07/15/17 1051  Last data filed at 07/15/17 0500   Gross per 24 hour   Intake              540 ml   Output                0 ml   Net              540 ml      Physical Exam   Constitutional: She appears well-developed.   Somnolent, has NG tube, in restraints  Thin, cachexia   HENT:   Head: Normocephalic.   Mouth/Throat: Oropharynx is clear and moist.   Eyes: Conjunctivae are normal. Pupils are equal, round, and reactive to light. No scleral icterus.   Neck: Normal range of motion. Neck supple. No JVD present.   Cardiovascular: Normal rate, regular rhythm, normal heart sounds and intact distal pulses.  Exam reveals no gallop and no friction rub.    No murmur heard.  Pulmonary/Chest: Effort normal and breath sounds normal. No respiratory  distress. She has no wheezes. She has no rales. She exhibits no tenderness.   Abdominal: Soft. Bowel sounds are normal. She exhibits no distension and no mass. There is no tenderness. There is no rebound and no guarding.   Musculoskeletal: She exhibits no edema or tenderness.   Lymphadenopathy:     She has no cervical adenopathy.   Neurological: She has normal strength. She exhibits normal muscle tone. Coordination normal.   Skin: Skin is warm and dry.   Psychiatric: Her speech is normal. Cognition and memory are normal.   Nursing note and vitals reviewed.      Significant Labs:   BMP:     Recent Labs  Lab 07/15/17  0513     104   *  155*   K 3.9  3.9   *  112*   CO2 31*  31*   BUN 20  20   CREATININE 1.1  1.1   CALCIUM 11.8*  11.8*   MG 3.0*     CBC:     Recent Labs  Lab 07/14/17  0502 07/15/17  0513   WBC 7.72 7.71   HGB 11.1* 12.2   HCT 33.5* 38.1   * 478*

## 2017-07-15 NOTE — SUBJECTIVE & OBJECTIVE
Past Medical History:   Diagnosis Date    Allergy     Basal cell carcinoma     Depression 11/1/2016    Essential hypertension 6/9/2017    Eye disorder     Fever blister     Normocytic anemia 6/9/2017    Secondary hypothyroidism 6/26/2017       Past Surgical History:   Procedure Laterality Date    BASAL CELL CARCINOMA EXCISION      forehead    BRAIN SURGERY      SKIN BIOPSY         Review of patient's allergies indicates:   Allergen Reactions    Codeine        Current Neurological Medications:     No current facility-administered medications on file prior to encounter.      Current Outpatient Prescriptions on File Prior to Encounter   Medication Sig    amitriptyline (ELAVIL) 100 MG tablet Take 1 tablet (100 mg total) by mouth nightly.    EUCALYPTUS OIL/MENTHOL/CAMPHOR (VICKS VAPORUB TOP) Apply topically daily as needed.    gabapentin (NEURONTIN) 300 MG capsule Take 1 capsule (300 mg total) by mouth 3 (three) times daily.    hydrocodone-acetaminophen 5-325mg (NORCO) 5-325 mg per tablet Take 1 tablet by mouth every 12 (twelve) hours as needed (severe pain).     Family History     Problem Relation (Age of Onset)    Diabetes Father    Hepatitis Father    Hypertension Father    No Known Problems Mother        Social History Main Topics    Smoking status: Current Every Day Smoker     Packs/day: 1.00     Years: 40.00     Types: Cigarettes    Smokeless tobacco: Never Used    Alcohol use No    Drug use: No    Sexual activity: No     Interval History:     NAEON.     Waxing and waning levels of mentation overnight, however imprevement level of consciousness / drowsiness in the morning.     Review of Systems   Constitutional: Positive for activity change. Negative for fever.   HENT: Negative for voice change.    Eyes: Positive for visual disturbance.   Respiratory: Negative for shortness of breath.    Cardiovascular: Negative for chest pain.   Gastrointestinal: Negative for abdominal distention.    Genitourinary: Negative for dysuria.   Musculoskeletal: Negative for neck pain and neck stiffness.   Neurological: Negative for tremors, seizures, syncope, facial asymmetry and headaches.   Psychiatric/Behavioral: Positive for confusion. Negative for agitation and behavioral problems.     Objective:     Vital Signs (Most Recent):  Temp: 96.2 °F (35.7 °C) (07/15/17 1157)  Pulse: 101 (07/15/17 1157)  Resp: 18 (07/15/17 0507)  BP: 106/66 (07/15/17 1157)  SpO2: 100 % (07/15/17 1157) Vital Signs (24h Range):  Temp:  [96.2 °F (35.7 °C)-97.6 °F (36.4 °C)] 96.2 °F (35.7 °C)  Pulse:  [] 101  Resp:  [18-19] 18  SpO2:  [93 %-100 %] 100 %  BP: (106-118)/(62-76) 106/66     Weight: 59.9 kg (132 lb)  Body mass index is 24.14 kg/m².    Physical Exam   Constitutional: No distress.   HENT:   Head: Atraumatic.   Eyes: EOM are normal.   Neck: Normal range of motion. Neck supple.   Cardiovascular: Regular rhythm.    Pulmonary/Chest: Effort normal.   Abdominal: Soft.   Neurological: Finger-nose-finger test: Limited from mental status change / drowsy state.   Reflex Scores:       Tricep reflexes are 2+ on the right side and 2+ on the left side.       Bicep reflexes are 2+ on the right side and 2+ on the left side.       Brachioradialis reflexes are 2+ on the right side and 2+ on the left side.       Patellar reflexes are 2+ on the right side and 2+ on the left side.       Achilles reflexes are 2+ on the right side and 2+ on the left side.  Psychiatric: Her speech is normal.       NEUROLOGICAL EXAMINATION:     MENTAL STATUS   Oriented to person.   Disoriented to place.   Disoriented to year, month and date.   Follows 1 step commands.   Attention: decreased (Limited from mental status change / Mild improvement overtime). Concentration: decreased (Limited from mental status change / Mild improvement overtime).   Speech: speech is normal   Level of consciousness: drowsy (Improving levels of somnolence)  Unable to name object: Limited  from mental status change / drowsy state. : NT.    CRANIAL NERVES     CN II   Visual acuity: (Negative light perception )  Right visual field deficit: optic afferant affected.    CN III, IV, VI   Extraocular motions are normal.   Pupils: dilated  Right pupil: Shape: regular. Reactivity: non-reactive.   Left pupil: Shape: regular. Reactivity: non-reactive.   Cranial nerve III palsy: III nr / parasympathetic affected.  CN VI: no CN VI palsy  Nystagmus: none     CN V   Facial sensation intact.     CN VII   Facial expression full, symmetric.     CN VIII   CN VIII normal.     CN IX, X   CN IX normal.   CN X normal.     CN XI   CN XI normal.     CN XII   CN XII normal.     MOTOR EXAM   Muscle bulk: normal  Overall muscle tone: normal    Strength   Right neck flexion: 4/5  Left neck flexion: 4/5  Right neck extension: 4/5  Left neck extension: 4/5  Right deltoid: 4/5  Left deltoid: 4/5  Right biceps: 4/5  Left biceps: 4/5  Right triceps: 4/5  Left triceps: 4/5  Right wrist flexion: 4/5  Left wrist flexion: 4/5  Right wrist extension: 4/5  Left wrist extension: 4/5  Right interossei: 4/5  Left interossei: 4/5  Right iliopsoas: 4/5  Left iliopsoas: 4/5  Right quadriceps: 4/5  Left quadriceps: 4/5  Right hamstrin/5  Left hamstrin/5  Right anterior tibial: 4/5  Left anterior tibial: 4/5  Right posterior tibial: 4/5  Left posterior tibial: 4/5    REFLEXES     Reflexes   Right brachioradialis: 2+  Left brachioradialis: 2+  Right biceps: 2+  Left biceps: 2+  Right triceps: 2+  Left triceps: 2+  Right patellar: 2+  Left patellar: 2+  Right achilles: 2+  Left achilles: 2+  Right : 2+  Left : 2+  Right plantar: normal  Left plantar: normal  Right ankle clonus: absent  Left ankle clonus: absent    SENSORY EXAM   Pinprick normal.     GAIT AND COORDINATION     Gait  Gait: (Limited from mental status change / drowsy state)     Coordination   Finger-nose-finger test: Limited from mental status change / drowsy  state.    Tremor   Resting tremor: absent      Significant Labs:   CBC:     Recent Labs  Lab 07/14/17  0502 07/15/17  0513   WBC 7.72 7.71   HGB 11.1* 12.2   HCT 33.5* 38.1   * 478*     CMP:   Recent Labs  Lab 07/14/17  0502  07/14/17  1604 07/14/17  2355 07/15/17  0513   GLU 92  < > 91 112* 104  104     < > 150* 155* 155*  155*   K 3.4*  < > 3.5 4.3 3.9  3.9     < > 106 112* 112*  112*   CO2 31*  < > 31* 31* 31*  31*   BUN 11  < > 13 18 20  20   CREATININE 0.9  < > 0.9 1.1 1.1  1.1   CALCIUM 11.0*  < > 11.7* 11.9* 11.8*  11.8*   MG 2.1  --   --   --  3.0*   PROT 7.5  --   --   --  8.7*   ALBUMIN 2.8*  --   --   --  3.1*   BILITOT 0.2  --   --   --  0.2   ALKPHOS 135  --   --   --  177*   AST 50*  --   --   --  47*   ALT 32  --   --   --  38   ANIONGAP 12  < > 13 12 12  12   EGFRNONAA >60.0  < > >60.0 58.3* 58.3*  58.3*   < > = values in this interval not displayed.  All pertinent lab results from the past 24 hours have been reviewed.    Significant Imaging: I have reviewed and interpreted all pertinent imaging results/findings within the past 24 hours.

## 2017-07-15 NOTE — PROGRESS NOTES
[facility] Hospital Medicine  Progress Note    Patient Name: Joanna Morales  MRN: [id number]  Patient Class: IP- Inpatient   Admission Date: 6/28/2017  Length of Stay: 17 days  Attending Physician: Rhoda Lewis MD  Primary Care Provider: Claudy Tse MD    [facility] Medicine Team: [facility] MED B Rhoda Lewis MD    Subjective:     Principal Problem:Hypernatremia    HPI:  Ms. Joanna Morales is a 51 y.o. female with tobacco abuse, secondary hypothyroidism (TSH 0.241 Jun 2017), and history of meningioma s/p resection complicated by diabetes insipidus who presents to [facility] ED from [facility] after being found with abnormal labwork.  She had been more altered in the last day or so and upon checking labwork, she was noted to be with hypernatremia.  She contributed very minimally to her history but does report some abdominal pain.  Of note, she was recently admitted to this facility under the Neurosurgery service for resection of a meningioma that was complicated by diabetes insipidus for which she received desmopressin a few times before being discharged to rehabilitation at [facility].  Further history is limited at this time.    Hospital Course:  7/11 - DDAVP was decreased yesterday and -> 154, and MS declined .  Not taking po liquids, tanya 12.2  7/12 - na and calcium levels improved, pt arter and talking, following commands.  She pulled out her NG tube.  ST to do another eval.  Endocrine consulted.   7/13 - pt more somnolent, responds to voice and sternal pressure.  titrating up tube feeds, on D5W,  , VVS,  Na 143, ca 11.3, alb 2.9.  Called Reina Hernandez  -  [phone]  7/14 - awake today, verbal only 1-2 words, decreased attention, moving all extremities,  Discussed case with Neurology and GI fellows.   7/15 - spoke to so who is the room, thanked  for being here. Pt talking about her grand-baby with his presence. Asked him to arrange more visits  rom family to help re-orient patient.    Interval History: verbal 1-2 woeds    Review of Systems   Constitutional: Negative for activity change, chills, fatigue and fever.        Awake , alert today  Verbal, confused, not oriented   HENT: Positive for trouble swallowing. Negative for nosebleeds.    Respiratory: Negative for cough, shortness of breath and stridor.    Cardiovascular: Negative for chest pain and leg swelling.   Gastrointestinal: Negative for abdominal pain, constipation, diarrhea, nausea and vomiting.   Genitourinary: Negative for difficulty urinating.   Musculoskeletal: Negative for back pain.   Hematological: Does not bruise/bleed easily.   Psychiatric/Behavioral: Positive for confusion. Negative for agitation and sleep disturbance.     Objective:     Vital Signs (Most Recent):  Temp: 96.2 °F (35.7 °C) (07/15/17 0507)  Pulse: 100 (07/15/17 0800)  Resp: 18 (07/15/17 0507)  BP: 109/76 (07/15/17 0800)  SpO2: 98 % (07/15/17 0800) Vital Signs (24h Range):  Temp:  [96.2 °F (35.7 °C)-97.6 °F (36.4 °C)] 96.2 °F (35.7 °C)  Pulse:  [] 100  Resp:  [18-19] 18  SpO2:  [93 %-98 %] 98 %  BP: (106-118)/(62-76) 109/76     Weight: 59.9 kg (132 lb)  Body mass index is 24.14 kg/m².    Intake/Output Summary (Last 24 hours) at 07/15/17 1051  Last data filed at 07/15/17 0500   Gross per 24 hour   Intake              540 ml   Output                0 ml   Net              540 ml      Physical Exam   Constitutional: She appears well-developed.   Somnolent, has NG tube, in restraints  Thin, cachexia   HENT:   Head: Normocephalic.   Mouth/Throat: Oropharynx is clear and moist.   Eyes: Conjunctivae are normal. Pupils are equal, round, and reactive to light. No scleral icterus.   Neck: Normal range of motion. Neck supple. No JVD present.   Cardiovascular: Normal rate, regular rhythm, normal heart sounds and intact distal pulses.  Exam reveals no gallop and no friction rub.    No murmur heard.  Pulmonary/Chest: Effort normal  and breath sounds normal. No respiratory distress. She has no wheezes. She has no rales. She exhibits no tenderness.   Abdominal: Soft. Bowel sounds are normal. She exhibits no distension and no mass. There is no tenderness. There is no rebound and no guarding.   Musculoskeletal: She exhibits no edema or tenderness.   Lymphadenopathy:     She has no cervical adenopathy.   Neurological: She has normal strength. She exhibits normal muscle tone. Coordination normal.   Skin: Skin is warm and dry.   Psychiatric: Her speech is normal. Cognition and memory are normal.   Nursing note and vitals reviewed.      Significant Labs:   BMP:     Recent Labs  Lab 07/15/17  0513     104   *  155*   K 3.9  3.9   *  112*   CO2 31*  31*   BUN 20  20   CREATININE 1.1  1.1   CALCIUM 11.8*  11.8*   MG 3.0*     CBC:     Recent Labs  Lab 07/14/17  0502 07/15/17  0513   WBC 7.72 7.71   HGB 11.1* 12.2   HCT 33.5* 38.1   * 478*         Assessment/Plan:      Acute encephalopathy    7/15 - MS status changes not correlating with sodium or calcium levels  7/14 - delirium vs seizure disorder and recurrent post-ictal states:   EEG ordered, pt may need 24 hour EEG.  PEG also planned.    7/13 - waxing and waning, today she is somnolent,  Consult Neurology, Since symptoms are intermittent consider continuous  EEG monitoring.    7/12 - improved  7/11 - Worse with  increasing Na level  EEG done - Moderate diffuse slowing of the overall background as described above.  2.  Superimposed left hemispheric slowing relative to the right.  No   epileptiform discharges or seizures were seen, however.  Result suggests both   encephalopathy and focal cortical dysfunction,    Pulled NGT ou t/ 7/8 and 7.12,  May need to replace to give po meds and liquids, ST eval          Primary central diabetes insipidus    7/15 -resume free water boluses, discussed w nurse  7/14 - Na dropped to 116, pseudo- Hypohatremia ( bad draw from line with  D5W, BS 1000),  pt mental status improved overnight,  dc D5W, and water flushes 150 cc q 6 hours, repeat level and follow q 6 hours.  received DDAVP last night. (may hold depending on afternoon labs).  Discussed with nurse.     7/13 - has NG and getting water, can dc IVF  7/12 - reduce d5W, need long term plan from endocrine: will need scheduled water boluses  250cc q 6-8 hours  + desmopressin nightly for home.  7/11 - Increase DDAVP to BID, D5W,  As addressed above.  Follow sodium and UOP  reconsult endcrine          Altered mental status    7/15 - not correlating w electrolytes na or kevan.   7/14 - Level of alertness has waxesd and waned since surgery 6 weeks ago,  Discussed with Reina, her POA.  Plan is to repeat eval for seizures and get PEG.           Hypercalcemia    7/15 - resume water bolus 150 q 6 hours  7/14 - discussed w ENDOcrine, I Kevan nl, no changes nedded  7/13 - increase O8ktldc +lasix 20 po.  Consider bisphosphenate. 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      Hx of a right frontotemporal craniotomy with partial removal of a large subfrontal and tuberculum sellae meningioma at [facility] in 2015. 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      NS is  following          Oral phase dysphagia    Failed multiple ST trials, needs PEG, MS changes interferring with eating.        Palliative care encounter    Non-hospice Palliative Care:   Does patient have Capacity? no  Goals-   7/15 - discussed LTC plan with her son. Need consent for PEG  7/13 - discussed care with  Reina Hernandez  -  [phone], pt's POA.    This is turning into a long post operative course, with inability to nourish patient.  Reina agreed to PEG tube.  Pt needs regular water intake, with mental status changes pulls out NG tube frequently..  NG could be contributing to delirium as well.  willl consult GI  Code Status- FULL  Pain management- stable, no pain  Prognosis-  Poor given numerous complications, but too soon to give up.  Would like another neurology eval, give effort another 1-2 months at least. Discussed prognosis w Reina on 7/13.  Family discussions- 7/13 - Called Reina Hernandez  -  [phone]  Functional Status - limited to bed, presently    Post acute care plan:  Rehab if MS improves.              Adverse effect of glucocorticoid or synthetic analogue    Not on steroids presently          Tobacco abuse    Patient was counseled on smoking cessation and she will be provided a nicotine transdermal patch applied while inpatient.  Will provide additional smoking cessation counseling prior to discharge.        Secondary hypothyroidism    As addressed above.  Will continue her home regimen of levothyroxine.        Agitation    7/13 - stable          Brain mass    S/p resection of meningiomas            VTE Risk Mitigation         Ordered     High Risk of VTE  Once      06/30/17 0920     Place sequential compression device  Until discontinued      06/30/17 0920     enoxaparin injection 40 mg  Daily     Route:  Subcutaneous        06/28/17 6148          Rhoda Lewis MD  Department of Hospital Medicine   [facility]

## 2017-07-15 NOTE — PLAN OF CARE
Problem: Occupational Therapy Goal  Goal: Occupational Therapy Goal  Goals to be met by: 17     Patient will increase functional independence with ADLs by performin.  UE Dressing with Minimal Assistance.  2.  LE Dressing with Moderate Assistance.  3.  Brush teeth with min a and wash face with SBA and mod cues.  4. Toileting from toilet with Moderate Assistance for hygiene and clothing management.   5.  Toilet transfer to toilet with minimal Assistance.  6.  Pt will use feel to compensate for blindness during self care adls, with mod cues, 25% of the time  7. Pt will state orientation to person, place and time with added time and 0 added cues.          Outcome: Ongoing (interventions implemented as appropriate)    Pt was agreeable to OT and was noted to make progress towards her goals in therapy.  Pt's goals remain appropriate at this time.  She will continue to benefit from skilled OT services in order to assist her with increasing her safety and level of independence with self care and mobility tasks.     Riddhi Dixon, OT  7/15/2017

## 2017-07-15 NOTE — ASSESSMENT & PLAN NOTE
PEG desired, discussed w Reina and her son  Needs regular water intake due to diabetes insipidus  Dysphagia   fluctuating MS

## 2017-07-15 NOTE — ASSESSMENT & PLAN NOTE
7/15 -resume free water boluses, discussed w nurse  7/14 - Na dropped to 116, pseudo- Hypohatremia ( bad draw from line with D5W, BS 1000),  pt mental status improved overnight,  dc D5W, and water flushes 150 cc q 6 hours, repeat level and follow q 6 hours.  received DDAVP last night. (may hold depending on afternoon labs).  Discussed with nurse.     7/13 - has NG and getting water, can dc IVF  7/12 - reduce d5W, need long term plan from endocrine: will need scheduled water boluses  250cc q 6-8 hours  + desmopressin nightly for home.  7/11 - Increase DDAVP to BID, D5W,  As addressed above.  Follow sodium and UOP  reconsult endcrine

## 2017-07-15 NOTE — ASSESSMENT & PLAN NOTE
Non-hospice Palliative Care:   Does patient have Capacity? no  Goals-   7/15 - discussed LTC plan with her son. Need consent for PEG  7/13 - discussed care with  Reina Hernandez  -  [phone], pt's POA.    This is turning into a long post operative course, with inability to nourish patient.  Reina agreed to PEG tube.  Pt needs regular water intake, with mental status changes pulls out NG tube frequently..  NG could be contributing to delirium as well.  willl consult GI  Code Status- FULL  Pain management- stable, no pain  Prognosis-  Poor given numerous complications, but too soon to give up.  Would like another neurology eval, give effort another 1-2 months at least. Discussed prognosis toni Huang on 7/13.  Family discussions- 7/13 - Called Reina Hernandez  -  [phone]  Functional Status - limited to bed, presently    Post acute care plan:  Rehab if MS improves.

## 2017-07-16 NOTE — ASSESSMENT & PLAN NOTE
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      Hx of a right frontotemporal craniotomy with partial removal of a large subfrontal and tuberculum sellae meningioma at [facility] in 2015. 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      NS is following

## 2017-07-16 NOTE — PLAN OF CARE
Problem: Patient Care Overview  Goal: Plan of Care Review  Outcome: Ongoing (interventions implemented as appropriate)  No acute events occurred throughout the shift. See flowsheets for assessments and VS. Plan of care reviewed with Joanna Morales. Pt progressing towards goals as noted. Will continue to monitor.

## 2017-07-16 NOTE — ASSESSMENT & PLAN NOTE
Non-hospice Palliative Care:   Does patient have Capacity? no  Goals-   7/15 - discussed LTC plan with her son. Need consent for PEG  7/13 - discussed care with  Reina Hernandez  -  [phone], pt's POA.    This is turning into a long post operative course, with inability to nourish patient.  Reina agreed to PEG tube.  Pt needs regular water intake, with mental status changes pulls out NG tube frequently..  NG could be contributing to delirium as well.  willl consult GI  Code Status- FULL  Pain management- stable, no pain  Prognosis-  Poor given numerous complications, but too soon to give up.  Would like another neurology eval, give effort another 1-2 months at least. Discussed prognosis toni Huang on 7/13.  Family discussions- 7/13 - Called Reina Hernandez  -  [phone]  Functional Status - limited to bed, presently    Post acute care plan:  Rehab if MS improves.

## 2017-07-16 NOTE — ASSESSMENT & PLAN NOTE
7/16 - MS seems improved  7/15 - not correlating w electrolytes na or tanya.   7/14 - Level of alertness has waxesd and waned since surgery 6 weeks ago,  Discussed with Reina, her POA.  Plan is to repeat eval for seizures and get PEG.

## 2017-07-16 NOTE — ASSESSMENT & PLAN NOTE
7/16 - stable x 2 days  7/15 - MS status changes not correlating with sodium or calcium levels  7/14 - delirium vs seizure disorder and recurrent post-ictal states:   EEG ordered, pt may need 24 hour EEG.  PEG also planned.    7/13 - waxing and waning, today she is somnolent,  Consult Neurology, Since symptoms are intermittent consider continuous  EEG monitoring.    7/12 - improved  7/11 - Worse with  increasing Na level  EEG done - Moderate diffuse slowing of the overall background as described above.  2.  Superimposed left hemispheric slowing relative to the right.  No   epileptiform discharges or seizures were seen, however.  Result suggests both   encephalopathy and focal cortical dysfunction,    Pulled NGT ou t/ 7/8 and 7.12,  May need to replace to give po meds and liquids, ST eval

## 2017-07-16 NOTE — ASSESSMENT & PLAN NOTE
7/16 - Na 154, increase water 200 q 6 hours  7/15 -resume free water boluses, discussed w nurse  7/14 - Na dropped to 116, pseudo- Hypohatremia ( bad draw from line with D5W, BS 1000),  pt mental status improved overnight,  dc D5W, and water flushes 150 cc q 6 hours, repeat level and follow q 6 hours.  received DDAVP last night. (may hold depending on afternoon labs).  Discussed with nurse.     7/13 - has NG and getting water, can dc IVF  7/12 - reduce d5W, need long term plan from endocrine: will need scheduled water boluses  250cc q 6-8 hours  + desmopressin nightly for home.  7/11 - Increase DDAVP to BID, D5W,  As addressed above.  Follow sodium and UOP  reconsult endcrine

## 2017-07-16 NOTE — SUBJECTIVE & OBJECTIVE
Interval History: verbal 1-2 woeds    Review of Systems   Constitutional: Negative for activity change, chills, fatigue and fever.        Awake , alert today  Verbal, confused, not oriented   HENT: Positive for trouble swallowing. Negative for nosebleeds.    Respiratory: Negative for cough, shortness of breath and stridor.    Cardiovascular: Negative for chest pain and leg swelling.   Gastrointestinal: Negative for abdominal pain, constipation, diarrhea, nausea and vomiting.   Genitourinary: Negative for difficulty urinating.   Musculoskeletal: Negative for back pain.   Hematological: Does not bruise/bleed easily.   Psychiatric/Behavioral: Positive for confusion. Negative for agitation and sleep disturbance.     Objective:     Vital Signs (Most Recent):  Temp: 98.1 °F (36.7 °C) (07/16/17 0805)  Pulse: 100 (07/16/17 0805)  Resp: 20 (07/16/17 0805)  BP: 113/70 (07/16/17 0805)  SpO2: 95 % (07/16/17 0805) Vital Signs (24h Range):  Temp:  [96.2 °F (35.7 °C)-98.1 °F (36.7 °C)] 98.1 °F (36.7 °C)  Pulse:  [] 100  Resp:  [18-22] 20  SpO2:  [92 %-100 %] 95 %  BP: (104-113)/(66-80) 113/70     Weight: 59.9 kg (132 lb)  Body mass index is 24.14 kg/m².    Intake/Output Summary (Last 24 hours) at 07/16/17 1100  Last data filed at 07/16/17 0500   Gross per 24 hour   Intake              400 ml   Output                0 ml   Net              400 ml      Physical Exam   Constitutional: She appears well-developed.    has NG tube, in restraints  Thin, cachexia   HENT:   Head: Normocephalic.   Mouth/Throat: Oropharynx is clear and moist.   Eyes: Conjunctivae are normal. Pupils are equal, round, and reactive to light. No scleral icterus.   Neck: Normal range of motion. Neck supple. No JVD present.   Cardiovascular: Normal rate, regular rhythm, normal heart sounds and intact distal pulses.  Exam reveals no gallop and no friction rub.    No murmur heard.  Pulmonary/Chest: Effort normal and breath sounds normal. No respiratory  distress. She has no wheezes. She has no rales. She exhibits no tenderness.   Abdominal: Soft. Bowel sounds are normal. She exhibits no distension and no mass. There is no tenderness. There is no rebound and no guarding.   Musculoskeletal: She exhibits no edema or tenderness.   Lymphadenopathy:     She has no cervical adenopathy.   Neurological: She has normal strength. She exhibits normal muscle tone. Coordination normal.   Skin: Skin is warm and dry.   Psychiatric: Her speech is normal. Cognition and memory are normal.   Nursing note and vitals reviewed.      Significant Labs:   BMP:     Recent Labs  Lab 07/16/17 0414   *   *   K 3.6   *   CO2 31*   BUN 28*   CREATININE 1.2   CALCIUM 11.6*   MG 2.8*     CBC:     Recent Labs  Lab 07/15/17  0513 07/16/17 0414   WBC 7.71 8.25   HGB 12.2 11.9*   HCT 38.1 37.9   * 455*

## 2017-07-16 NOTE — PROGRESS NOTES
[facility] Hospital Medicine  Progress Note    Patient Name: Joanna Morales  MRN: [id number]  Patient Class: IP- Inpatient   Admission Date: 6/28/2017  Length of Stay: 18 days  Attending Physician: Rhoda Lewis MD  Primary Care Provider: Claudy Tse MD    [facility] Medicine Team: [facility]OSP MED B Rhoda Lewis MD    Subjective:     Principal Problem:Hypernatremia    HPI:  Ms. Joanna Morales is a 51 y.o. female with tobacco abuse, secondary hypothyroidism (TSH 0.241 Jun 2017), and history of meningioma s/p resection complicated by diabetes insipidus who presents to [facility] ED from [facility] after being found with abnormal labwork.  She had been more altered in the last day or so and upon checking labwork, she was noted to be with hypernatremia.  She contributed very minimally to her history but does report some abdominal pain.  Of note, she was recently admitted to this facility under the Neurosurgery service for resection of a meningioma that was complicated by diabetes insipidus for which she received desmopressin a few times before being discharged to rehabilitation at [facility].  Further history is limited at this time.    Hospital Course:  7/11 - DDAVP was decreased yesterday and -> 154, and MS declined .  Not taking po liquids, tanya 12.2  7/12 - na and calcium levels improved, pt arter and talking, following commands.  She pulled out her NG tube.  ST to do another eval.  Endocrine consulted.   7/13 - pt more somnolent, responds to voice and sternal pressure.  titrating up tube feeds, on D5W,  , VVS,  Na 143, ca 11.3, alb 2.9.  Called Reina Hernandez  -  [phone]  7/14 - awake today, verbal only 1-2 words, decreased attention, moving all extremities,  Discussed case with Neurology and GI fellows.   7/15 - spoke to so who is the room, thanked  for being here. Pt talking about her grand-baby with his presence. Asked him to arrange more visits  rom family to help re-orient patient.  7/15- Na 154, increase water 200 q 6 hours.  She is awake and verbal today    Interval History: verbal 1-2 woeds    Review of Systems   Constitutional: Negative for activity change, chills, fatigue and fever.        Awake , alert today  Verbal, confused, not oriented   HENT: Positive for trouble swallowing. Negative for nosebleeds.    Respiratory: Negative for cough, shortness of breath and stridor.    Cardiovascular: Negative for chest pain and leg swelling.   Gastrointestinal: Negative for abdominal pain, constipation, diarrhea, nausea and vomiting.   Genitourinary: Negative for difficulty urinating.   Musculoskeletal: Negative for back pain.   Hematological: Does not bruise/bleed easily.   Psychiatric/Behavioral: Positive for confusion. Negative for agitation and sleep disturbance.     Objective:     Vital Signs (Most Recent):  Temp: 98.1 °F (36.7 °C) (07/16/17 0805)  Pulse: 100 (07/16/17 0805)  Resp: 20 (07/16/17 0805)  BP: 113/70 (07/16/17 0805)  SpO2: 95 % (07/16/17 0805) Vital Signs (24h Range):  Temp:  [96.2 °F (35.7 °C)-98.1 °F (36.7 °C)] 98.1 °F (36.7 °C)  Pulse:  [] 100  Resp:  [18-22] 20  SpO2:  [92 %-100 %] 95 %  BP: (104-113)/(66-80) 113/70     Weight: 59.9 kg (132 lb)  Body mass index is 24.14 kg/m².    Intake/Output Summary (Last 24 hours) at 07/16/17 1100  Last data filed at 07/16/17 0500   Gross per 24 hour   Intake              400 ml   Output                0 ml   Net              400 ml      Physical Exam   Constitutional: She appears well-developed.    has NG tube, in restraints  Thin, cachexia   HENT:   Head: Normocephalic.   Mouth/Throat: Oropharynx is clear and moist.   Eyes: Conjunctivae are normal. Pupils are equal, round, and reactive to light. No scleral icterus.   Neck: Normal range of motion. Neck supple. No JVD present.   Cardiovascular: Normal rate, regular rhythm, normal heart sounds and intact distal pulses.  Exam reveals no gallop and  no friction rub.    No murmur heard.  Pulmonary/Chest: Effort normal and breath sounds normal. No respiratory distress. She has no wheezes. She has no rales. She exhibits no tenderness.   Abdominal: Soft. Bowel sounds are normal. She exhibits no distension and no mass. There is no tenderness. There is no rebound and no guarding.   Musculoskeletal: She exhibits no edema or tenderness.   Lymphadenopathy:     She has no cervical adenopathy.   Neurological: She has normal strength. She exhibits normal muscle tone. Coordination normal.   Skin: Skin is warm and dry.   Psychiatric: Her speech is normal. Cognition and memory are normal.   Nursing note and vitals reviewed.      Significant Labs:   BMP:     Recent Labs  Lab 07/16/17  0414   *   *   K 3.6   *   CO2 31*   BUN 28*   CREATININE 1.2   CALCIUM 11.6*   MG 2.8*     CBC:     Recent Labs  Lab 07/15/17  0513 07/16/17  0414   WBC 7.71 8.25   HGB 12.2 11.9*   HCT 38.1 37.9   * 455*         Assessment/Plan:      Acute encephalopathy    7/16 - stable x 2 days  7/15 - MS status changes not correlating with sodium or calcium levels  7/14 - delirium vs seizure disorder and recurrent post-ictal states:   EEG ordered, pt may need 24 hour EEG.  PEG also planned.    7/13 - waxing and waning, today she is somnolent,  Consult Neurology, Since symptoms are intermittent consider continuous  EEG monitoring.    7/12 - improved  7/11 - Worse with  increasing Na level  EEG done - Moderate diffuse slowing of the overall background as described above.  2.  Superimposed left hemispheric slowing relative to the right.  No   epileptiform discharges or seizures were seen, however.  Result suggests both   encephalopathy and focal cortical dysfunction,    Pulled NGT ou t/ 7/8 and 7.12,  May need to replace to give po meds and liquids, ST eval          Primary central diabetes insipidus    7/16 - Na 154, increase water 200 q 6 hours  7/15 -resume free water boluses,  discussed w nurse  7/14 - Na dropped to 116, pseudo- Hypohatremia ( bad draw from line with D5W, BS 1000),  pt mental status improved overnight,  dc D5W, and water flushes 150 cc q 6 hours, repeat level and follow q 6 hours.  received DDAVP last night. (may hold depending on afternoon labs).  Discussed with nurse.     7/13 - has NG and getting water, can dc IVF  7/12 - reduce d5W, need long term plan from endocrine: will need scheduled water boluses  250cc q 6-8 hours  + desmopressin nightly for home.  7/11 - Increase DDAVP to BID, D5W,  As addressed above.  Follow sodium and UOP  reconsult endcrine          Altered mental status    7/16 - MS seems improved  7/15 - not correlating w electrolytes na or kevan.   7/14 - Level of alertness has waxesd and waned since surgery 6 weeks ago,  Discussed with Reina, her POA.  Plan is to repeat eval for seizures and get PEG.           Hypercalcemia    1/16 - kevan 11.6, last I Kevan was nl, monitoring  7/15 - resume water bolus 150 q 6 hours  7/14 - discussed w ENDOcrine, I Kevan nl, no changes nedded  7/13 - increase V1vjrpy +lasix 20 po.  Consider bisphosphenate. 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      Hx of a right frontotemporal craniotomy with partial removal of a large subfrontal and tuberculum sellae meningioma at [facility] in 2015. 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 On 6/7/2017 she underwent a l eft frontotemporal  craniotomy and excision of meningioma with neuronavigation and microsurgery for removal of a large tuberculum sellae and planum sphenoidale meningioma complicated by diabetes insipidus.      NS is following          Oral phase dysphagia    Failed multiple ST trials, needs PEG, MS changes interferring with eating.        Palliative care encounter    Non-hospice Palliative Care:   Does patient have Capacity? no  Goals-   7/15 - discussed LTC plan with her son. Need consent for PEG  7/13 - discussed care with  Reina Hernandez  -  [phone], pt's POA.    This is turning into a long post operative course, with inability to nourish patient.  Reina agreed to PEG tube.  Pt needs regular water intake, with mental status changes pulls out NG tube frequently..  NG could be contributing to delirium as well.  willl consult GI  Code Status- FULL  Pain management- stable, no pain  Prognosis-  Poor given numerous complications, but too soon to give up.  Would like another neurology eval, give effort another 1-2 months at least. Discussed prognosis w Reina on 7/13.  Family discussions- 7/13 - Called Reina David  -  [phone]  Functional Status - limited to bed, presently    Post acute care plan:  Rehab if MS improves.              Adverse effect of glucocorticoid or synthetic analogue    Not on steroids presently          Tobacco abuse    Patient was counseled on smoking cessation and she will be provided a nicotine transdermal patch applied while inpatient.  Will provide additional smoking cessation counseling prior to discharge.        Secondary hypothyroidism    As addressed above.  Will continue her home regimen of levothyroxine.        Agitation    7/13 - stable          Brain mass    S/p resection of meningiomas            VTE Risk Mitigation         Ordered     High Risk of VTE  Once      06/30/17 0920     Place sequential compression device  Until discontinued      06/30/17 0920     enoxaparin injection 40 mg  Daily      Route:  Subcutaneous        06/28/17 9147          Rhoda Lewis MD  Department of Hospital Medicine   [facility]

## 2017-07-16 NOTE — ASSESSMENT & PLAN NOTE
7/16 - PEG placement tomorrow  PEG desired, discussed toni Reina and her son  Needs regular water intake due to diabetes insipidus  Dysphagia   fluctuating MS

## 2017-07-16 NOTE — ASSESSMENT & PLAN NOTE
1/16 - kevan 11.6, last I Kevan was nl, monitoring  7/15 - resume water bolus 150 q 6 hours  7/14 - discussed w ENDOcrine, I Kevan nl, no changes nedded  7/13 - increase L2kmgsp +lasix 20 po.  Consider bisphosphenate. Will discuss with endocrine  7/12 - kevan 12.2-> 10.9 ->  11.3 -> 8.3  7/11- D5W, s/p  lasix,

## 2017-07-17 PROBLEM — E87.0 HYPERNATREMIA: Status: ACTIVE | Noted: 2017-01-01

## 2017-07-17 NOTE — PLAN OF CARE
Problem: Physical Therapy Goal  Goal: Physical Therapy Goal  Goals to be met by: 17    Patient will increase functional independence with mobility by performin. Supine to sit with Minimal Assistance  2. Sit to supine with Minimal Assistance  3. Sit to stand transfer with Minimal Assistance met (2017)   Revised: sit to stand transfer with SBA  4. Standing for 3 minutes with Minimal Assistance and weight evenly distributed through (B) LE.   5. Gait  x 20 feet with Moderate Assistance using AD if needed   6. Pt will perform (B) LE therapeutic exercises x 20 reps to improve muscular strength and endurance.     Outcome: Ongoing (interventions implemented as appropriate)  Pt goals remain appropriate.    RAUL MELENDEZ, PT  2017

## 2017-07-17 NOTE — NURSING TRANSFER
Nursing Transfer Note      7/17/2017     Transfer To: PACU 16    Transfer via stretcher    Transfer with non-rebreather mask O2 15LPM    Transported by Angie RN, Brie RN, Shawna RRT, and Dr Davis    Chart send with patient: Yes    Bedside repory given to LUI Musa

## 2017-07-17 NOTE — ASSESSMENT & PLAN NOTE
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      Hx of a right frontotemporal craniotomy with partial removal of a large subfrontal and tuberculum sellae meningioma at [facility] in 2015. 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      NS is following

## 2017-07-17 NOTE — INTERVAL H&P NOTE
The patient has been examined and the H&P has been reviewed:    I concur with the findings and no changes have occurred since H&P was written.    Anesthesia/Surgery risks, benefits and alternative options discussed and understood by patient/family.          Active Hospital Problems    Diagnosis  POA    Oral phase dysphagia [R13.11]  Yes    Palliative care encounter [Z51.5]  Not Applicable    Malnutrition of moderate degree [E44.0]  No    Hypercalcemia [E83.52]  No    Brain mass [G93.9]  Yes    Acute encephalopathy [G93.40]  Yes    Tobacco abuse [Z72.0]  Yes     Chronic    Altered mental status [R41.82]  Yes    Secondary hypothyroidism [E03.8]  Yes     Chronic    Adverse effect of glucocorticoid or synthetic analogue [T38.0X5A]  Yes    Primary central diabetes insipidus [E23.2]  Yes    Meningioma, recurrent of brain [D32.0]  Yes      Resolved Hospital Problems    Diagnosis Date Resolved POA    *Hypernatremia [E87.0] 07/13/2017 Yes    Hypothermia [T68.XXXA] 07/03/2017 No    Goals of care, counseling/discussion [Z71.89] 07/03/2017 Not Applicable    Secondary adrenal insufficiency [E27.49] 07/03/2017 Yes    Meningioma [D32.9] 07/03/2017 Yes     Chronic

## 2017-07-17 NOTE — PT/OT/SLP PROGRESS
Speech Language Pathology      Joanna Morales  MRN: [id number]    Patient not seen today secondary to Nursing hold (Comment) (Pt NPO for possible PEG placement this date.). Will follow-up at next regularly schedule session.    HILDA Rascon, CCC-SLP  [phone]  7/17/2017

## 2017-07-17 NOTE — ANESTHESIA RELEASE NOTE
Anesthesia Release from PACU Note    Anesthesia Release from PACU note    Patient: Joanna Morales    Procedure(s) Performed: Procedure(s) (LRB):  PLACEMENT-TUBE-PEG (N/A)    Anesthesia type: general    Post pain: Adequate analgesia    Post assessment: no apparent anesthetic complications, tolerated procedure well and no evidence of recall    Last Vitals:   Vitals:    07/17/17 1600   BP: 93/71   Pulse: 100   Resp: (!) 24   Temp:    SpO2: 96%       Post vital signs: stable    Level of consciousness: awake, alert  and oriented    Nausea/Vomiting: no nausea/no vomiting    Complications: none    Airway Patency: patent    Respiratory: unassisted    Cardiovascular: stable and blood pressure at baseline    Hydration: euvolemic

## 2017-07-17 NOTE — PROGRESS NOTES
Pt arrived to PACU from phase 2. O2 sats 90% on 100% NRB. Other VSS. Pt opens eyes to verbal command. Respiratory and Dr. Dodson at bedside.

## 2017-07-17 NOTE — PATIENT INSTRUCTIONS
Discharge Summary/Instructions after an Endoscopic Procedure  Patient Name: Joanna Morales  Patient MRN: [id number]  Patient YOB: 1965  Monday, July 17, 2017  Claudy Chew MD  RESTRICTIONS ON ACTIVITY:  - DO NOT drive a car, operate machinery, make legal/financial decisions, or   drink alcohol until the day after the procedure.    - The following day: return to full activity including work, except no heavy   lifting, straining or running for 3 days if polyps were removed.  - Diet: Eat and drink normally unless instructed otherwise.  TREATMENT FOR COMMON SIDE EFFECTS:  - Mild abdominal pain, bloating or excessive gas: rest, eat lightly and use   a heating pad.  - Sore Throat - treat with throat lozenges. Gargle with warm salt water.  SYMPTOMS TO WATCH FOR AND REPORT TO YOUR PHYSICIAN:  1. Severe abdominal pain or bloating.  2. Pain in chest.  3. Chills or fever occurring within 24 hours after a procedure.  4. A large amount of rectal bleeding, which would show as bright red,   maroon, or black stools. (A small amount of blood from the rectum is not   serious, especially if hemorrhoids are present.)  5. Because air was used during the procedure, expelling large amounts of air   from your rectum or belching is normal.  6. If a bowel prep was taken, you may not have a bowel movement for 1-3   days.  This is normal.  7. Go directly to the emergency room if you notice any of the following:   Chills and/or fever over 101 F   Persistent vomiting   Severe abdominal pain, other than gas cramps   Severe chest pain   Black, tarry stools   Any bleeding - exceeding one tablespoon  Your doctor recommends these additional instructions:  If any biopsies were performed, my office will call you in 5 to 6 business   days with any results.  Continue your present medications.   Please follow the post-PEG recommendations including: a Nutritional consult   will be made for formula and volume recommendations, recommendations  to   advance food and medications will be made by your primary care provider,   the external bolster should be kept snug to the abdominal wall, change the   dressing once per day, you may use the tube today for medications and water   and you may use the tube tomorrow for feedings.  For questions, problems or results please call your physician - Claudy Chew MD at Work:  [phone].  [facility], EMERGENCY ROOM PHONE NUMBER: [phone]  IF A COMPLICATION OR EMERGENCY SITUATION ARISES AND YOU ARE UNABLE TO REACH   YOUR PHYSICIAN - GO TO THE EMERGENCY ROOM.  Claudy Chew MD  7/17/2017 2:59:04 PM  This report has been verified and signed electronically.

## 2017-07-17 NOTE — ANESTHESIA PREPROCEDURE EVALUATION
07/17/2017  Joanna Morales is a 51 y.o., female.    Anesthesia Evaluation         Review of Systems  Anesthesia Hx:  No problems with previous Anesthesia   Social:  Smoker    Hematology/Oncology:         -- Anemia: Current/Recent Cancer. Oncology Comments: Brain Meningioma status post surgery    Cardiovascular:   Hypertension    Endocrine:   Hypothyroidism    Psych:   depression          Physical Exam  General:  Cachexia    Airway/Jaw/Neck:  Airway Findings: Mouth Opening: Normal Tongue: Normal  General Airway Assessment: Adult  Mallampati: II  TM Distance: Normal, at least 6 cm  Jaw/Neck Findings:     Neck ROM: Normal ROM      Dental:  Dental Findings: In tact   Chest/Lungs:  Chest/Lungs Findings: Clear to auscultation, Normal Respiratory Rate     Heart/Vascular:  Heart Findings: Rate: Normal  Rhythm: Regular Rhythm  Sounds: Normal        Mental Status:  Mental Status Findings:  Lethargic, Confusion         Anesthesia Plan  Type of Anesthesia, risks & benefits discussed:  Anesthesia Type:  general  Patient's Preference:   Intra-op Monitoring Plan: standard ASA monitors  Intra-op Monitoring Plan Comments:   Post Op Pain Control Plan: per primary service following discharge from PACU  Post Op Pain Control Plan Comments:   Induction:   IV  Beta Blocker:  Patient is not currently on a Beta-Blocker (No further documentation required).       Informed Consent: Patient representative understands risks and agrees with Anesthesia plan.  Questions answered. Anesthesia consent signed with patient representative.  ASA Score: 3     Day of Surgery Review of History & Physical:        Anesthesia Plan Notes: Pt status post Crani Surgery in June with multiple postop complications including encephalopathy, AMS here for a PEG placement        Ready For Surgery From Anesthesia Perspective.

## 2017-07-17 NOTE — PROGRESS NOTES
[facility] Hospital Medicine  Progress Note    Patient Name: Joanna Morales  MRN: [id number]  Patient Class: IP- Inpatient   Admission Date: 6/28/2017  Length of Stay: 19 days  Attending Physician: Rhoda Lewis MD  Primary Care Provider: Claudy Tse MD    [facility] Medicine Team: [facility] HOSP MED B Rhoda Lewis MD    Subjective:     Principal Problem:Hypernatremia    HPI:  Ms. Joanna Morales is a 51 y.o. female with tobacco abuse, secondary hypothyroidism (TSH 0.241 Jun 2017), and history of meningioma s/p resection complicated by diabetes insipidus who presents to [facility] ED from [facility] after being found with abnormal labwork.  She had been more altered in the last day or so and upon checking labwork, she was noted to be with hypernatremia.  She contributed very minimally to her history but does report some abdominal pain.  Of note, she was recently admitted to this facility under the Neurosurgery service for resection of a meningioma that was complicated by diabetes insipidus for which she received desmopressin a few times before being discharged to rehabilitation at [facility].  Further history is limited at this time.    Hospital Course:  7/11 - DDAVP was decreased yesterday and -> 154, and MS declined .  Not taking po liquids, tanya 12.2  7/12 - na and calcium levels improved, pt arter and talking, following commands.  She pulled out her NG tube.  ST to do another eval.  Endocrine consulted.   7/13 - pt more somnolent, responds to voice and sternal pressure.  titrating up tube feeds, on D5W,  , VVS,  Na 143, ca 11.3, alb 2.9.  Called Reina Hernandez  -  [phone]  7/14 - awake today, verbal only 1-2 words, decreased attention, moving all extremities,  Discussed case with Neurology and GI fellows.   7/15 - spoke to so who is the room, thanked  for being here. Pt talking about her grand-baby with his presence. Asked him to arrange more visits  rom family to help re-orient patient.  7/16- Na 154, increase water 200 q 6 hours.  She is awake and verbal today  7/17 - Na 160, recieved desmopressin, discussed w nurse, on water 200 q 6 hours.  Add D5W 100 cc per hour today,  EGD planned for today. More somnolent today. Will order 24 hour EEG.     Interval History: verbal 1-2 woeds    Review of Systems   Constitutional: Negative for activity change, chills, fatigue and fever.        Somnolent, opens eyes to touch  not oriented   HENT: Positive for trouble swallowing. Negative for nosebleeds.    Respiratory: Negative for cough, shortness of breath and stridor.    Cardiovascular: Negative for chest pain and leg swelling.   Gastrointestinal: Negative for abdominal pain, constipation, diarrhea, nausea and vomiting.   Genitourinary: Negative for difficulty urinating.   Musculoskeletal: Negative for back pain.   Hematological: Does not bruise/bleed easily.   Psychiatric/Behavioral: Positive for confusion. Negative for agitation and sleep disturbance.     Objective:     Vital Signs (Most Recent):  Temp: 96 °F (35.6 °C) (07/17/17 0800)  Pulse: 86 (07/17/17 1100)  Resp: 18 (07/17/17 0800)  BP: 118/74 (07/17/17 0800)  SpO2: (!) 93 % (07/17/17 0800) Vital Signs (24h Range):  Temp:  [96 °F (35.6 °C)-98.4 °F (36.9 °C)] 96 °F (35.6 °C)  Pulse:  [] 86  Resp:  [17-18] 18  SpO2:  [93 %-97 %] 93 %  BP: ()/(63-77) 118/74     Weight: 59.9 kg (132 lb)  Body mass index is 24.14 kg/m².    Intake/Output Summary (Last 24 hours) at 07/17/17 1219  Last data filed at 07/17/17 1100   Gross per 24 hour   Intake             1040 ml   Output                0 ml   Net             1040 ml      Physical Exam   Constitutional: She appears well-developed.   Appears older than her expected age   has NG tube, in restraints  Thin, cachexia   HENT:   Head: Normocephalic.   Mouth/Throat: Oropharynx is clear and moist.   Eyes: Conjunctivae are normal. Pupils are equal, round, and reactive to  light. No scleral icterus.   Neck: Normal range of motion. Neck supple. No JVD present.   Cardiovascular: Normal rate, regular rhythm, normal heart sounds and intact distal pulses.  Exam reveals no gallop and no friction rub.    No murmur heard.  Pulmonary/Chest: Effort normal and breath sounds normal. No respiratory distress. She has no wheezes. She has no rales. She exhibits no tenderness.   Abdominal: Soft. Bowel sounds are normal. She exhibits no distension and no mass. There is no tenderness. There is no rebound and no guarding.   Musculoskeletal: She exhibits no edema or tenderness.   Lymphadenopathy:     She has no cervical adenopathy.   Neurological: She has normal strength. She exhibits normal muscle tone. Coordination normal.   Skin: Skin is warm and dry.   Psychiatric: Her speech is normal. Cognition and memory are normal.   Nursing note and vitals reviewed.      Significant Labs:   BMP:     Recent Labs  Lab 07/17/17  0355   GLU 84   *   K 4.0   *   CO2 32*   BUN 35*   CREATININE 1.1   CALCIUM 11.8*   MG 3.2*     CBC:     Recent Labs  Lab 07/16/17  0414 07/17/17  0355   WBC 8.25 10.76   HGB 11.9* 11.7*   HCT 37.9 37.4   * 423*         Assessment/Plan:      Acute encephalopathy    7/17 - somnolent again, with  Hypernatremia, increase water boluses 250 q 6 , one liter d 5 W.   24 hour EEG  7/16 - stable x 2 days  7/15 - MS status changes not correlating with sodium or calcium levels  7/14 - delirium vs seizure disorder and recurrent post-ictal states:   EEG ordered, pt may need 24 hour EEG.  PEG also planned.    7/13 - waxing and waning, today she is somnolent,  Consult Neurology, Since symptoms are intermittent consider continuous  EEG monitoring.    7/12 - improved  7/11 - Worse with  increasing Na level  EEG done - Moderate diffuse slowing of the overall background as described above.  2.  Superimposed left hemispheric slowing relative to the right.  No   epileptiform discharges or  seizures were seen, however.  Result suggests both   encephalopathy and focal cortical dysfunction,    Pulled NGT ou t/ 7/8 and 7.12,  May need to replace to give po meds and liquids, ST eval          Primary central diabetes insipidus    7/17 - Na 160, increase water 250 q 6, also give D5W 100 cc per hour x 10 hours., the re-check sodium level. She did receive DDAVP last night according to MAR.   7/16 - Na 154, increase water 200 q 6 hours  7/15 -resume free water boluses, discussed w nurse  7/14 - Na dropped to 116, pseudo- Hyponatremia ( bad draw from line with D5W, BS 1000),  pt mental status improved overnight,  dc D5W, and water flushes 150 cc q 6 hours, repeat level and follow q 6 hours.  received DDAVP last night. (may hold depending on afternoon labs).  Discussed with nurse.     7/13 - has NG and getting water, can dc IVF  7/12 - reduce d5W, need long term plan from endocrine: will need scheduled water boluses  250cc q 6-8 hours  + desmopressin nightly for home.  7/11 - Increase DDAVP to BID, D5W,  As addressed above.  Follow sodium and UOP  reconsult endcrine          Altered mental status    7/17 - continues to wax and wane  7/16 - MS seems improved  7/15 - not correlating w electrolytes na or kevan.   7/14 - Level of alertness has waxesd and waned since surgery 6 weeks ago,  Discussed with Reina, her POA.  Plan is to repeat eval for seizures and get PEG.           Hypercalcemia    7/17 - kevan 11.8,  IV water started  1/16 - kevan 11.6, last iCal was nl, monitoring  7/15 - resume water bolus 150 q 6 hours  7/14 - discussed w ENDOcrine, I Kevan nl, no changes nedded  7/13 - increase T7frrcf +lasix 20 po.  Consider bisphosphenate. 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      Hx of a right frontotemporal craniotomy with partial removal of a large subfrontal and tuberculum sellae meningioma at [facility] in 2015. 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      NS is following          Oral phase dysphagia    Failed multiple ST trials, needs PEG, MS changes interferring with eating.        Palliative care encounter    Non-hospice Palliative Care:   Does patient have Capacity? no  Goals-   7/15 - discussed LTC plan with her son. Need consent for PEG  7/13 - discussed care with  Reina Hernandez  - [phone], pt's POA.    This is turning into a long post operative course, with inability to nourish patient.  Reina agreed to PEG tube.  Pt needs regular water intake, with mental status changes pulls out NG tube frequently..  NG could be contributing to delirium as well.  willl consult GI  Code Status- FULL  Pain management- stable, no pain  Prognosis-  Poor given numerous complications, but too soon to give up.  Would like another neurology eval, give effort another 1-2 months at least. Discussed prognosis toni Huang on 7/13.  Family discussions- 7/13 - Called Reina Hernandez  -  [phone]  Functional Status - limited to bed, presently    Post acute care plan:  Rehab if MS improves.              Adverse effect of glucocorticoid or synthetic analogue    Not on steroids presently          Tobacco abuse    Patient was counseled on smoking cessation and she  will be provided a nicotine transdermal patch applied while inpatient.  Will provide additional smoking cessation counseling prior to discharge.        Secondary hypothyroidism    As addressed above.  Will continue her home regimen of levothyroxine.        Agitation    7/13 - stable          Brain mass    S/p resection of meningiomas            VTE Risk Mitigation         Ordered     High Risk of VTE  Once      06/30/17 0920     Place sequential compression device  Until discontinued      06/30/17 0920     enoxaparin injection 40 mg  Daily     Route:  Subcutaneous        06/28/17 3761          Rhoda Lewis MD  Department of Hospital Medicine   [facility]

## 2017-07-17 NOTE — HOSPITAL COURSE
7/10-11/17: Evaluated by therapy.  Bed mobility ModA-MaxA.  Sit to stand MaxA.  Ambulated unable to perform.  UBD totalA and LBD totalA.  7/17/17: Participating with OT. Bed mobility MaxA.  Sit to stand Judy.  No gait. LBD totalA.      AM-PAC 6 CLICK MOBILITY (PT) - Total score: 10 (7/10), 12 (7/17)  AM-PAC 6 CLICK ADL (OT) - Total score: 12 (7/11), 11 (7/15)    AM-PAC Raw Score Level of Impairment Assistance   6 100% Total / Unable   7 - 8 80 - 100% Maximal Assist   9-13 60 - 80% Moderate Assist   14 - 19 40 - 60% Moderate Assist   20 - 22 20 - 40% Minimal Assist   23 1-20% SBA / CGA   24 0% Independent/ Mod I

## 2017-07-17 NOTE — PROGRESS NOTES
RN, Aida, contacted by Dr. Hsu via phone and instructed to make pt NPO for possible PEG placement. TORB. Tube feedings stopped. WCTM

## 2017-07-17 NOTE — SUBJECTIVE & OBJECTIVE
Interval History: verbal 1-2 woeds    Review of Systems   Constitutional: Negative for activity change, chills, fatigue and fever.        Somnolent, opens eyes to touch  not oriented   HENT: Positive for trouble swallowing. Negative for nosebleeds.    Respiratory: Negative for cough, shortness of breath and stridor.    Cardiovascular: Negative for chest pain and leg swelling.   Gastrointestinal: Negative for abdominal pain, constipation, diarrhea, nausea and vomiting.   Genitourinary: Negative for difficulty urinating.   Musculoskeletal: Negative for back pain.   Hematological: Does not bruise/bleed easily.   Psychiatric/Behavioral: Positive for confusion. Negative for agitation and sleep disturbance.     Objective:     Vital Signs (Most Recent):  Temp: 96 °F (35.6 °C) (07/17/17 0800)  Pulse: 86 (07/17/17 1100)  Resp: 18 (07/17/17 0800)  BP: 118/74 (07/17/17 0800)  SpO2: (!) 93 % (07/17/17 0800) Vital Signs (24h Range):  Temp:  [96 °F (35.6 °C)-98.4 °F (36.9 °C)] 96 °F (35.6 °C)  Pulse:  [] 86  Resp:  [17-18] 18  SpO2:  [93 %-97 %] 93 %  BP: ()/(63-77) 118/74     Weight: 59.9 kg (132 lb)  Body mass index is 24.14 kg/m².    Intake/Output Summary (Last 24 hours) at 07/17/17 1219  Last data filed at 07/17/17 1100   Gross per 24 hour   Intake             1040 ml   Output                0 ml   Net             1040 ml      Physical Exam   Constitutional: She appears well-developed.   Appears older than her expected age   has NG tube, in restraints  Thin, cachexia   HENT:   Head: Normocephalic.   Mouth/Throat: Oropharynx is clear and moist.   Eyes: Conjunctivae are normal. Pupils are equal, round, and reactive to light. No scleral icterus.   Neck: Normal range of motion. Neck supple. No JVD present.   Cardiovascular: Normal rate, regular rhythm, normal heart sounds and intact distal pulses.  Exam reveals no gallop and no friction rub.    No murmur heard.  Pulmonary/Chest: Effort normal and breath sounds normal.  No respiratory distress. She has no wheezes. She has no rales. She exhibits no tenderness.   Abdominal: Soft. Bowel sounds are normal. She exhibits no distension and no mass. There is no tenderness. There is no rebound and no guarding.   Musculoskeletal: She exhibits no edema or tenderness.   Lymphadenopathy:     She has no cervical adenopathy.   Neurological: She has normal strength. She exhibits normal muscle tone. Coordination normal.   Skin: Skin is warm and dry.   Psychiatric: Her speech is normal. Cognition and memory are normal.   Nursing note and vitals reviewed.      Significant Labs:   BMP:     Recent Labs  Lab 07/17/17  0355   GLU 84   *   K 4.0   *   CO2 32*   BUN 35*   CREATININE 1.1   CALCIUM 11.8*   MG 3.2*     CBC:     Recent Labs  Lab 07/16/17  0414 07/17/17  0355   WBC 8.25 10.76   HGB 11.9* 11.7*   HCT 37.9 37.4   * 423*

## 2017-07-17 NOTE — SUBJECTIVE & OBJECTIVE
Past Medical History:   Diagnosis Date    Allergy     Basal cell carcinoma     Depression 11/1/2016    Essential hypertension 6/9/2017    Eye disorder     Fever blister     Normocytic anemia 6/9/2017    Secondary hypothyroidism 6/26/2017     Past Surgical History:   Procedure Laterality Date    BASAL CELL CARCINOMA EXCISION      forehead    BRAIN SURGERY      SKIN BIOPSY       Review of patient's allergies indicates:   Allergen Reactions    Codeine        Scheduled Medications:    desmopressin  10 mcg Nasal Nightly    enoxaparin  40 mg Subcutaneous Daily    furosemide  20 mg Oral Daily    gabapentin  125 mg Per NG tube Q12H    levetiracetam oral soln  500 mg Per NG tube BID    levothyroxine  50 mcg Per NG tube Daily    nicotine  1 patch Transdermal Daily       PRN Medications: acetaminophen, dextrose 50%, glucagon (human recombinant), guaifenesin 100 mg/5 ml, insulin aspart, ipratropium, ondansetron, potassium chloride 10%    Family History     Problem Relation (Age of Onset)    Diabetes Father    Hepatitis Father    Hypertension Father    No Known Problems Mother        Social History Main Topics    Smoking status: Current Every Day Smoker     Packs/day: 1.00     Years: 40.00     Types: Cigarettes    Smokeless tobacco: Never Used    Alcohol use No    Drug use: No    Sexual activity: No     Review of Systems   Reason unable to perform ROS: 2/2 mental status.     Objective:     Vital Signs (Most Recent):  Temp: 98.7 °F (37.1 °C) (07/17/17 1200)  Pulse: 90 (07/17/17 1200)  Resp: 18 (07/17/17 1200)  BP: 112/68 (07/17/17 1200)  SpO2: (!) 92 % (07/17/17 1200)    Vital Signs (24h Range):  Temp:  [96 °F (35.6 °C)-98.7 °F (37.1 °C)] 98.7 °F (37.1 °C)  Pulse:  [] 90  Resp:  [17-18] 18  SpO2:  [92 %-97 %] 92 %  BP: ()/(63-77) 112/68     Body mass index is 24.14 kg/m².    Physical Exam   Constitutional:   Appears older than her expected age   has NG tube, in restraints  Thin, cachexia    HENT:   Head: Normocephalic and atraumatic.   Eyes:   Patient is legally blind   Neck: Normal range of motion.   Cardiovascular: Normal rate and regular rhythm.    Pulmonary/Chest: No respiratory distress.   Abdominal: Soft. There is no tenderness.   Musculoskeletal: Normal range of motion.   Neurological:   Neurologic:  -  Mental Status:  Very lethargic. Oriented to self and place only.   Follows a couple of commands. Exam very limited.    -  Speech and language:  + aphasia + dysarthria.   -  Motor:  Moves all extremities    -  Tone:  normal   Skin: Skin is warm and dry.   Psychiatric: Cognition and memory are impaired.   Vitals reviewed.    NEUROLOGICAL EXAMINATION:       Diagnostic Results:   Labs: Reviewed  X-Ray: Reviewed  CT: Reviewed

## 2017-07-17 NOTE — ASSESSMENT & PLAN NOTE
Non-hospice Palliative Care:   Does patient have Capacity? no  Goals-   7/15 - discussed LTC plan with her son. Need consent for PEG  7/13 - discussed care with  Reina Hernandez  -  [phone], pt's POA.    This is turning into a long post operative course, with inability to nourish patient.  Reina agreed to PEG tube.  Pt needs regular water intake, with mental status changes pulls out NG tube frequently..  NG could be contributing to delirium as well.  willl consult GI  Code Status- FULL  Pain management- stable, no pain  Prognosis-  Poor given numerous complications, but too soon to give up.  Would like another neurology eval, give effort another 1-2 months at least. Discussed prognosis toni Huang on 7/13.  Family discussions- 7/13 - Called eRina Hernandez  -  [phone]  Functional Status - limited to bed, presently    Post acute care plan:  Rehab if MS improves.

## 2017-07-17 NOTE — ASSESSMENT & PLAN NOTE
7/17 - kevan 11.8,  IV water started  1/16 - kevan 11.6, last iCal was nl, monitoring  7/15 - resume water bolus 150 q 6 hours  7/14 - discussed w ENDOcrine, I Kevan nl, no changes nedded  7/13 - increase K0gpbiv +lasix 20 po.  Consider bisphosphenate. Will discuss with endocrine  7/12 - kevan 12.2-> 10.9 ->  11.3 -> 8.3  7/11- D5W, s/p  lasix,

## 2017-07-17 NOTE — CONSULTS
PM&R consult received.    Reason for consult:  Assess rehab needs    Reviewed patient history and current admission.  Full consult to follow.    SARWAT Heck, FNP-C  Physical Medicine & Rehabilitation   07/17/2017  Spectralink: [id number]

## 2017-07-17 NOTE — PROGRESS NOTES
Dr. Davis present at the bedside to evaluate pt after pt with acute  desat episode. After speaking with MD for release, pt on  on 2L NC at 83% and converted to non rebreather with O2 sat improved to only 87%

## 2017-07-17 NOTE — ASSESSMENT & PLAN NOTE
7/17 - Na 160, increase water 250 q 6, also give D5W 100 cc per hour x 10 hours., the re-check sodium level. She did receive DDAVP last night according to MAR.   7/16 - Na 154, increase water 200 q 6 hours  7/15 -resume free water boluses, discussed w nurse  7/14 - Na dropped to 116, pseudo- Hyponatremia ( bad draw from line with D5W, BS 1000),  pt mental status improved overnight,  dc D5W, and water flushes 150 cc q 6 hours, repeat level and follow q 6 hours.  received DDAVP last night. (may hold depending on afternoon labs).  Discussed with nurse.     7/13 - has NG and getting water, can dc IVF  7/12 - reduce d5W, need long term plan from endocrine: will need scheduled water boluses  250cc q 6-8 hours  + desmopressin nightly for home.  7/11 - Increase DDAVP to BID, D5W,  As addressed above.  Follow sodium and UOP  reconsult endcrine

## 2017-07-17 NOTE — ADDENDUM NOTE
Addendum  created 07/17/17 9142 by Mariposa Davis MD    Order list changed, Order sets accessed

## 2017-07-17 NOTE — ASSESSMENT & PLAN NOTE
7/17 - continues to wax and wane  7/16 - MS seems improved  7/15 - not correlating w electrolytes na or tanya.   7/14 - Level of alertness has waxesd and waned since surgery 6 weeks ago,  Discussed with Reina, her POA.  Plan is to repeat eval for seizures and get PEG.

## 2017-07-17 NOTE — H&P (VIEW-ONLY)
[facility]  Gastroenterology  Consult Note    Patient Name: Joanna Morales  MRN: [id number]  Admission Date: 6/28/2017  Hospital Length of Stay: 16 days  Code Status: Full Code   Attending Provider: Nat aNjera MD   Consulting Provider: Ava Fuentes MD  Primary Care Physician: Claudy Tse MD  Principal Problem:Hypernatremia    Inpatient consult to Gastroenterology  Consult performed by: AVA FUENTES  Consult ordered by: NAT NAJERA        Subjective:     HPI:  51 year old female with a history of Hypothyroidism and Hx of meningioma s/p resection which was complicated DI on which GI is being consulted for PEG placement.    Patient is altered therefore history was obtain from reviewing primary team's H&P (included in italics below). No know abdominal surgeries. No current fevers. Negative cultures with last set drawn on 7/3/17. Not on antibiotics currently. Lastly WBC, Hgb, and platelets within normal limits.    Primary Team's H&P  HPI:  Ms. Joanna Morales is a 51 y.o. female with tobacco abuse, secondary hypothyroidism (TSH 0.241 6/17) and history of meningioma s/p resection 2015 & 6/2017. The most recent [place] by Dr. Chew [place] [facility] was complicated by DI .  The patient was discharged to [facility] on 6/26/17 and presented to [facility] ED on 6/28 with altered mental status and worsening hypernatremia.      Of note, She underwent a right frontotemporal craniotomy with partial removal of a large subfrontal and tuberculum sellae meningioma at [facility] in 2015. 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       Hospital/ICU Course:  Ms. Morales was discharged [facility] on 6/26/17 and presented to [facility] ED on 6/28 with altered mental status and worsening hypernatremia. According to the patient's sitter, the patient was awake, verbal, and interactive with therapy on 6/23 but had been progressively less interactive and responsive when she was at [place]. Ms. Morales was admitted to Hospital Medicine but was found to be unresponsive with a sodium level of 168 on the morning of 6/29. The Critical Care team was consulted for ICU monitoring and management. She was started on a dextrose infusion and given free water per NGT. Endocrine was also involved in patient management during this and the prior visit.             Past Medical History:   Diagnosis Date    Allergy     Basal cell carcinoma     Depression 11/1/2016    Essential hypertension 6/9/2017    Eye disorder     Fever blister     Normocytic anemia 6/9/2017    Secondary hypothyroidism 6/26/2017       Past Surgical History:   Procedure Laterality Date    BASAL CELL CARCINOMA EXCISION      forehead    BRAIN SURGERY      SKIN BIOPSY         Review of patient's allergies indicates:   Allergen Reactions    Codeine      Family History     Problem Relation (Age of Onset)    Diabetes Father    Hepatitis Father    Hypertension Father    No Known Problems Mother        Social History Main Topics    Smoking status: Current Every Day Smoker     Packs/day: 1.00     Years: 40.00     Types: Cigarettes    Smokeless tobacco: Never Used    Alcohol use No    Drug use: No    Sexual activity: No     Review of Systems   Unable to perform ROS: Mental status change     Objective:     Vital Signs (Most Recent):  Temp: 98.4 °F (36.9 °C) (07/14/17 0335)  Pulse: 77 (07/14/17 0700)  Resp: 20 (07/14/17 0335)  BP: 100/78 (07/14/17 0335)  SpO2: 98 % (07/14/17 0335) Vital Signs (24h Range):  Temp:  [96.7 °F (35.9 °C)-98.6 °F (37 °C)] 98.4 °F (36.9 °C)  Pulse:  [68-92] 77  Resp:  [16-20] 20  SpO2:  [94 %-98 %] 98  %  BP: (100-125)/(75-89) 100/78     Weight: 59.9 kg (132 lb) (07/06/17 0400)  Body mass index is 24.14 kg/m².      Intake/Output Summary (Last 24 hours) at 07/14/17 0853  Last data filed at 07/14/17 0500   Gross per 24 hour   Intake             2000 ml   Output             1100 ml   Net              900 ml       Lines/Drains/Airways     Drain                 NG/OG Tube 07/13/17 0430 [place] sump Left nostril 1 day          Peripheral Intravenous Line                 Peripheral IV - Single Lumen 07/12/17 1800 Left Hand 1 day                Physical Exam   Constitutional: No distress.   HENT:   Well healed scar from surgery   Eyes: Conjunctivae are normal. Pupils are equal, round, and reactive to light.   Neck: Normal range of motion.   Cardiovascular: Normal rate and regular rhythm.    Pulmonary/Chest: Effort normal and breath sounds normal.   Abdominal: Soft. Bowel sounds are normal.   Neurological:   Only oriented to person, requires painful stimulus to have patient wake up and answer simple questions like her name   Skin: Skin is warm. She is not diaphoretic.       Significant Labs:  CBC:   Recent Labs  Lab 07/13/17  0542 07/14/17  0502   WBC 7.41 7.72   HGB 11.5* 11.1*   HCT 34.3* 33.5*   * 431*     CMP:   Recent Labs  Lab 07/14/17  0502   GLU 92   CALCIUM 11.0*   ALBUMIN 2.8*   PROT 7.5      K 3.4*   CO2 31*      BUN 11   CREATININE 0.9   ALKPHOS 135   ALT 32   AST 50*   BILITOT 0.2     Significant Imaging:  Imaging results within the past 24 hours have been reviewed.    Assessment/Plan:     Oral phase dysphagia    Patient has persistent oral dysphagia with AMS in the section of recurrent meningioma. Due to poor PO intake she continues to develop hypernatremia which further worsens her mention. Family is not ready for palliative measures and would like to continue fighting.  -PEG tube for water flushes and nutrition  -Have made multiple unsuccessful attempts to reach Reina Hernandez. Prior to  procedure we need to obtain consent from Reina Hernandez. If consent obtain PEG will be place early next week.  -Thank you for this consultation        Altered mental status    Refer to above        Meningioma, recurrent of brain    -Refer to above        Primary central diabetes insipidus    -Refer to above            I will follow-up with patient. Please contact us if you have any additional questions.  Winsome Hsu M.D.  Gastroenterology Fellow, PGY-IV  Pager: [phone]  [facility]

## 2017-07-17 NOTE — ASSESSMENT & PLAN NOTE
7/17 - somnolent again, with  Hypernatremia, increase water boluses 250 q 6 , one liter d 5 W.   24 hour EEG  7/16 - stable x 2 days  7/15 - MS status changes not correlating with sodium or calcium levels  7/14 - delirium vs seizure disorder and recurrent post-ictal states:   EEG ordered, pt may need 24 hour EEG.  PEG also planned.    7/13 - waxing and waning, today she is somnolent,  Consult Neurology, Since symptoms are intermittent consider continuous  EEG monitoring.    7/12 - improved  7/11 - Worse with  increasing Na level  EEG done - Moderate diffuse slowing of the overall background as described above.  2.  Superimposed left hemispheric slowing relative to the right.  No   epileptiform discharges or seizures were seen, however.  Result suggests both   encephalopathy and focal cortical dysfunction,    Pulled NGT ou t/ 7/8 and 7.12,  May need to replace to give po meds and liquids, ST eval

## 2017-07-17 NOTE — PROGRESS NOTES
Medicine team contacted regarding daily UA order. Pt is incontinent and would required a straight cath to collect sample, given OK by Rina mccormack MD to skip today's collection. CAREN

## 2017-07-17 NOTE — PT/OT/SLP PROGRESS
Occupational Therapy      Joanna Morales  MRN: [id number]    OT attempted to see patient at [phone] on this date; however, patient unable to arouse following tactile and verbal stimuli.   Will follow up at later time as appropriate.     HELEN Broussard  7/17/2017

## 2017-07-17 NOTE — ASSESSMENT & PLAN NOTE
-  Monitor sleep disturbances and establish consistent sleep-wake cycle (day time- lights on and shades open, night time- lights dim/off)  -  Environmental modifications to limit agitation/confusion (appropriate lighting, family at bedside, visible clock and calendar, updated white board, reduce noise, limited visitors, clustered nursing care)  -  Reorient patient to person, place, time, and situation on each encounter  -  If possible, avoid restraints.  May benefit from 24/7 supervision by a sitter  -  Avoid/limit medications that can worsen delirium (benzodiazepines, antihistamines, anticholinergics, hypnotics, opiates)  -  Encourage mobility, OOB in chair at least 3 hours per day, and early ambulation as appropriate  -  PT/OT evaluate and treat

## 2017-07-17 NOTE — PLAN OF CARE
Problem: Patient Care Overview  Goal: Plan of Care Review  Outcome: Ongoing (interventions implemented as appropriate)  POC reviewed with pt family at 0800. Pt family verbalized understanding. Questions and concerns addressing PEG status answered. No acute events today. Pt progressing toward goals. Will continue to monitor. See flowsheets for full assessment and VS info. Pt currently getting a PEG tube placed.

## 2017-07-17 NOTE — PT/OT/SLP PROGRESS
Physical Therapy  Treatment    Joanna Morales   MRN: [id number]   Admitting Diagnosis: Hypernatremia    PT Received On: 07/17/17  PT Start Time: 0839     PT Stop Time: 0908    PT Total Time (min): 29 min       Billable Minutes:  Therapeutic Activity 19 and Neuromuscular Re-education 10    Treatment Type: Treatment  PT/PTA: PT     PTA Visit Number: 0       General Precautions: Standard, aspiration, NPO, fall, blind  Orthopedic Precautions: N/A   Braces: N/A    Do you have any cultural, spiritual, Synagogue conflicts, given your current situation?: none    Subjective:  Communicated with RN prior to session.  Pt agreeable to therapy session.     Pain/Comfort  Pain Rating 1: 0/10  Pain Rating Post-Intervention 1: 0/10    Objective:   Patient found with: NG tube, restraints    Functional Mobility:  Bed Mobility:   Supine to Sit: Maximum Assistance (increased assist to initiate mvt)  Sit to Supine: Maximum Assistance (increased assist to initiate mvt)    Transfers:  Sit <> Stand Assistance: Minimum Assistance (x2 trials)  Sit <> Stand Assistive Device: No Assistive Device    Gait:   Gait Distance: unable to WS in stance    Balance:   Static Sit: FAIR-: Maintains without assist but inconsistent   Dynamic Sit: POOR: N/A  Static Stand: 0: Needs MAXIMAL assist to maintain   Dynamic stand: 0: N/A     Therapeutic Activities and Exercises:  Pt with limited verbalization throughout session, ~50% command follow with delay.  Pt able to follow commands for functional tasks (sit to stand) but unable to follow commands for there-ex.   Pt sat EOB with CGA-SBA for ~15min; required verbal and tactile cues for scap ADD and ant pelvic tilt.   Pt able to maintain upright posture with SBA for ~15sec then required CGA to correct.   Pt stood EOB max A with post lean, pt attempted to pull out NG tube on 1st trial; unable to WS in stance.     AM-PAC 6 CLICK MOBILITY  How much help from another person does this patient currently need?   1 =  Unable, Total/Dependent Assistance  2 = A lot, Maximum/Moderate Assistance  3 = A little, Minimum/Contact Guard/Supervision  4 = None, Modified [place]/Independent    Turning over in bed (including adjusting bedclothes, sheets and blankets)?: 3  Sitting down on and standing up from a chair with arms (e.g., wheelchair, bedside commode, etc.): 3  Moving from lying on back to sitting on the side of the bed?: 2  Moving to and from a bed to a chair (including a wheelchair)?: 2  Need to walk in hospital room?: 1  Climbing 3-5 steps with a railing?: 1  Total Score: 12    AM-PAC Raw Score CMS G-Code Modifier Level of Impairment Assistance   6 % Total / Unable   7 - 9 CM 80 - 100% Maximal Assist   10 - 14 CL 60 - 80% Moderate Assist   15 - 19 CK 40 - 60% Moderate Assist   20 - 22 CJ 20 - 40% Minimal Assist   23 CI 1-20% SBA / CGA   24 CH 0% Independent/ Mod I     Patient left supine with all lines intact, call button in reach and RN notified.    Assessment:  Joanna Morales is a 51 y.o. female with a medical diagnosis of Hypernatremia and presents with decreased strength, endurance, balance, safety awareness and overall functional mobility. Pt performed bed mobility max A and sit<>stand transfers min A. Pt stood EOB with max A for post lean, unable to WS in stance. Pt will continue to benefit from skilled PT to improve deficits and increase overall functional mobility.     Rehab identified problem list/impairments: Rehab identified problem list/impairments: weakness, impaired endurance, gait instability, impaired functional mobilty, decreased safety awareness, impaired cognition, decreased coordination, impaired balance, decreased lower extremity function, impaired skin, edema    Rehab potential is good.    Activity tolerance: Good    Discharge recommendations: Discharge Facility/Level Of Care Needs: rehabilitation facility     Barriers to discharge: Barriers to Discharge: Decreased caregiver  support    Equipment recommendations: Equipment Needed After Discharge: other (see comments) (TBD)     GOALS:    Physical Therapy Goals        Problem: Physical Therapy Goal    Goal Priority Disciplines Outcome Goal Variances Interventions   Physical Therapy Goal     PT/OT, PT Ongoing (interventions implemented as appropriate)     Description:  Goals to be met by: 17    Patient will increase functional independence with mobility by performin. Supine to sit with Minimal Assistance  2. Sit to supine with Minimal Assistance  3. Sit to stand transfer with Minimal Assistance met (2017)   Revised: sit to stand transfer with SBA  4. Standing for 3 minutes with Minimal Assistance and weight evenly distributed through (B) LE.   5. Gait  x 20 feet with Moderate Assistance using AD if needed   6. Pt will perform (B) LE therapeutic exercises x 20 reps to improve muscular strength and endurance.                       PLAN:    Patient to be seen 5 x/week  to address the above listed problems via gait training, therapeutic activities, therapeutic exercises, neuromuscular re-education  Plan of Care expires: 17  Plan of Care reviewed with: patient         RAUL AL, PT  2017

## 2017-07-17 NOTE — PROGRESS NOTES
1700 responded to [facility] to give tx. Pt on non rebreather mask with low spo2(88). Dr. West Mullins at bedside. Pt brought to PACU 16. Tx given then patient placed on BIPAP. Please refer to flowsheet for settings and changes. As of last assessment 1830, Patient improving, Fio2 at 55% and BIPAP set 12/5cmh2o. Will continue to wean as tolerates per Dr. Goldstein and Dr. RUBY Chew(anesthesia team) and monitor patient.

## 2017-07-17 NOTE — ANESTHESIA POSTPROCEDURE EVALUATION
"Anesthesia Post Evaluation    Patient: Joanna Morales    Procedure(s) Performed: Procedure(s) (LRB):  PLACEMENT-TUBE-PEG (N/A)    Final Anesthesia Type: general  Patient location during evaluation: PACU  Patient participation: Yes- Able to Participate  Level of consciousness: awake, lethargic and responds to stimulation  Post-procedure vital signs: reviewed and stable  Pain management: adequate  Airway patency: patent  PONV status at discharge: No PONV  Anesthetic complications: no      Cardiovascular status: hemodynamically stable and blood pressure returned to baseline  Respiratory status: unassisted and spontaneous ventilation  Hydration status: euvolemic  Follow-up not needed.        Visit Vitals  BP 93/71 (BP Location: Left arm, Patient Position: Lying, BP Method: Automatic)   Pulse 100   Temp 36.2 °C (97.2 °F) (Temporal)   Resp (!) 24   Ht 5' 2" (1.575 m)   Wt 59.9 kg (132 lb)   SpO2 96%   Breastfeeding? No   BMI 24.14 kg/m²       Pain/Arcelia Score: Pain Assessment Performed: Yes (7/17/2017  3:20 PM)  Presence of Pain: non-verbal indicators absent (7/17/2017  3:20 PM)  Arcelia Score: 6 (7/17/2017  3:20 PM)      "

## 2017-07-17 NOTE — HPI
Joanna Morales is a 51-year-old female with PMHx of depression, L eye vision loss, basal cell carcinoma, and meningioma (s/p partial resection at [facility] Jan. 2015). She was recently admitted to [facility] on 6/7/17 and is now s/p L frontotemporal craniotomy and excision of meningioma. Hospital course was further complicated by pneumocephalus, DI, hypernatremia. hypotension, hypothermia, and encephalopathy. She was discharged from the hospital and was admitted to [facility] on 6/26/17.  She then presented to [facility] ED from [facility] on 6/28/17 with worsening hypernatremia and AMS.  CTH revealed no acute pathology and showed improvement compared to CT Head on 6/15/17.  On 6/29, she was found unresponsive with a sodium level of 168.  She was transferred to [facility] and administered a dextrose infusion and given free water per NGT.  Endocrinology was consulted for management of severe hypernatremia with concerns for diabetes insipidus.  She was administered DDAVP. Hospital course was further complicated by hypothermia and encephalopathy. Sodium started to trend down (139 on 7/3) and was transferred to the floor on 7/3.  Blood cx from 7/3 and urine cx from 6/29 resulted on 7/6 as NGTD. Upon being on the floor, she was still very lethargic and unable to follow commands; however, some improvement was noted on 7/8. Sodium continued to wax and wane (142 on 7/10, 154 on 7/11) as DDAVP was titrated. On 7/11, Calcium was 12.2 and improved the next day.  She continues to wax and wane with mental status from 7/13 to today.  On 7/17, Na was 160 and is more somnolent.  An EGD for PEG placement is pending for today.       Functional History:  Per chart last admission, patient lives in [place] with cousin in a trailor home with 4 steps to enter.  Prior to admission, she was independent with ADLs and used a RW and cane for household and ambulation.  DME: [facility].

## 2017-07-17 NOTE — PLAN OF CARE
Problem: Patient Care Overview  Goal: Plan of Care Review  Outcome: Ongoing (interventions implemented as appropriate)  Plan of care reviewed with pt, pt unable to verbalize understanding and acceptance but is confused. Pt still requiring bilateral wrist restraints, fidgeting with NGT. Pt free from falls or injury, No new skin breakdown.   Pt coughing up thick mucus, apparent bleeding sore somewhere in mouth, RN unable to locate, oral care provided multiple times.   VS stable throughout shift. No s/sx of distress noted. [facility]    Temp:  [96.5 °F (35.8 °C)-98.3 °F (36.8 °C)]   Pulse:  [100-109]   Resp:  [18]   BP: ()/(63-65)   SpO2:  [93 %-97 %]

## 2017-07-17 NOTE — PROGRESS NOTES
Call to [facility] by nurse due to acute episode of O2 desaturation. On arrival, patient is on non-rebreather mask with O2 sats of 86%. She is slightly tachycardic(low 100's), tachypneic (RR 20's) and BP is stable.  On auscultation she sounds really junky but its hard to have her follow commands to cough secretions out. There is no wheezing and there are BL breath sounds.  Chest Xray ordered, Respiratory called to give treatment and get an ABG.  Patient transferred to PACU 16 for closer observation.  After suctioning some secretions, O2 sats up to 90-91%. Pt seems more awake and is talking.  Respiratory at bedside giving albuterol-ipatropium treatment.  Gave report to Dr Rita Chew to follow up on patient since I will be at the MRI suite.   Discussed CPAP as a next step in management if necessary.

## 2017-07-17 NOTE — PROGRESS NOTES
Pt expectorating thick yellow mucus, oral care provided, pt turned on side and bed elevated to 40degrees. O2 sats around 93%, Dr. Nitza Levin with medicine team notified, orders received for prn atrovent nebulizer and prn OLIVER snow. Will continue to monitor.

## 2017-07-17 NOTE — TRANSFER OF CARE
"Anesthesia Transfer of Care Note    Patient: Joanna Morales    Procedure(s) Performed: Procedure(s) (LRB):  PLACEMENT-TUBE-PEG (N/A)    Patient location: [facility]    Anesthesia Type: MAC    Transport from OR: Transported from OR on 2-3 L/min O2 by NC with adequate spontaneous ventilation    Post pain: adequate analgesia    Post assessment: no apparent anesthetic complications and tolerated procedure well    Post vital signs: stable    Level of consciousness: responds to stimulation (voice)    Nausea/Vomiting: no nausea/vomiting    Complications: none          Last vitals:   Visit Vitals  BP 95/70   Pulse 95   Temp 36.2 °C (97.2 °F) (Temporal)   Resp (!) 23   Ht 5' 2" (1.575 m)   Wt 59.9 kg (132 lb)   SpO2 99%   Breastfeeding? No   BMI 24.14 kg/m²     "

## 2017-07-17 NOTE — TREATMENT PLAN
GI Treatment Plan  07/17/2017  2:58 PM    Findings:  Impression:             - Normal esophagus.  - Normal stomach.  - Erosive gastropathy.  - Duodenal erosion without bleeding.  - An externally removable PEG placement was successfully completed.  - No specimens collected.    Recommendation:         - Return patient to hospital calix for ongoing care.  - Continue present medications.  - Please follow the post-PEG recommendations including: Nutrition consult for formula and volume, advance food and medications per primary care provider, external bolster snug to abdominal wall, change dressing once per day, may use PEG today for meds and water and may use PEG tomorrow for feedings.    Of note Ms. Reina Hernandez was called on 3 occasions yet was unable to get a hold of her. Will try again tomorrow in order to let her know how the PEG tube placement went.    Winsome Hsu M.D.  Gastroenterology Fellow, PGY-IV  Pager: [phone]  [facility]

## 2017-07-17 NOTE — CONSULTS
[facility]  Physical Medicine & Rehab  Consult Note    Patient Name: Joanna Morales  MRN: [id number]  Admission Date: 6/28/2017  Hospital Length of Stay: 19 days  Attending Physician: Rhoda Lewis MD   Collaborating Physician : Marek Mclain MD    Consults  Subjective:     Principal Problem: Hypernatremia    HPI: Joanna Morales is a 51-year-old female with PMHx of depression, L eye vision loss, basal cell carcinoma, and meningioma (s/p partial resection at [facility] Jan. 2015). She was recently admitted to [facility] on 6/7/17 and is now s/p L frontotemporal craniotomy and excision of meningioma. Hospital course was further complicated by pneumocephalus, DI, hypernatremia. hypotension, hypothermia, and encephalopathy. She was discharged from the hospital and was admitted to [facility] IPR on 6/26/17.  She then presented to [facility] ED from [facility] on 6/28/17 with worsening hypernatremia and AMS.  CTH revealed no acute pathology and showed improvement compared to CT Head on 6/15/17.  On 6/29, she was found unresponsive with a sodium level of 168.  She was transferred to [facility] and administered a dextrose infusion and given free water per NGT.  Endocrinology was consulted for management of severe hypernatremia with concerns for diabetes insipidus.  She was administered DDAVP. Hospital course was further complicated by hypothermia and encephalopathy. Sodium started to trend down (139 on 7/3) and was transferred to the floor on 7/3.  Blood cx from 7/3 and urine cx from 6/29 resulted on 7/6 as NGTD. Upon being on the floor, she was still very lethargic and unable to follow commands; however, some improvement was noted on 7/8. Sodium continued to wax and wane (142 on 7/10, 154 on 7/11) as DDAVP was titrated. On 7/11, Calcium was 12.2 and improved the next day.  She continues to wax and wane with mental status from 7/13 to today.  On 7/17, Na was 160 and is more somnolent.  An EGD for PEG  placement is pending for today.       Functional History:  Per  chart last admission, patient lives in [place] with cousin in a trailor home with 4 steps to enter.  Prior to admission, she was independent with ADLs and used a RW and cane for household and ambulation.  DME: RW & SC.      Hospital Course:   7/10-11/17: Evaluated by therapy.  Bed mobility ModA-MaxA.  Sit to stand MaxA.  Ambulated unable to perform.  UBD totalA and LBD totalA.  7/17/17: Participating with OT. Bed mobility MaxA.  Sit to stand Judy.  No gait. LBD totalA.      AM-PAC 6 CLICK MOBILITY (PT) - Total score: 10 (7/10), 12 (7/17)  AM-PAC 6 CLICK ADL (OT) - Total score: 12 (7/11), 11 (7/15)    AM-PAC Raw Score Level of Impairment Assistance   6 100% Total / Unable   7 - 8 80 - 100% Maximal Assist   9-13 60 - 80% Moderate Assist   14 - 19 40 - 60% Moderate Assist   20 - 22 20 - 40% Minimal Assist   23 1-20% SBA / CGA   24 0% Independent/ Mod I     Past Medical History:   Diagnosis Date    Allergy     Basal cell carcinoma     Depression 11/1/2016    Essential hypertension 6/9/2017    Eye disorder     Fever blister     Normocytic anemia 6/9/2017    Secondary hypothyroidism 6/26/2017     Past Surgical History:   Procedure Laterality Date    BASAL CELL CARCINOMA EXCISION      forehead    BRAIN SURGERY      SKIN BIOPSY       Review of patient's allergies indicates:   Allergen Reactions    Codeine        Scheduled Medications:    desmopressin  10 mcg Nasal Nightly    enoxaparin  40 mg Subcutaneous Daily    furosemide  20 mg Oral Daily    gabapentin  125 mg Per NG tube Q12H    levetiracetam oral soln  500 mg Per NG tube BID    levothyroxine  50 mcg Per NG tube Daily    nicotine  1 patch Transdermal Daily       PRN Medications: acetaminophen, dextrose 50%, glucagon (human recombinant), guaifenesin 100 mg/5 ml, insulin aspart, ipratropium, ondansetron, potassium chloride 10%    Family History     Problem Relation (Age of Onset)    Diabetes  Father    Hepatitis Father    Hypertension Father    No Known Problems Mother        Social History Main Topics    Smoking status: Current Every Day Smoker     Packs/day: 1.00     Years: 40.00     Types: Cigarettes    Smokeless tobacco: Never Used    Alcohol use No    Drug use: No    Sexual activity: No     Review of Systems   Reason unable to perform ROS: 2/2 mental status.     Objective:     Vital Signs (Most Recent):  Temp: 98.7 °F (37.1 °C) (07/17/17 1200)  Pulse: 90 (07/17/17 1200)  Resp: 18 (07/17/17 1200)  BP: 112/68 (07/17/17 1200)  SpO2: (!) 92 % (07/17/17 1200)    Vital Signs (24h Range):  Temp:  [96 °F (35.6 °C)-98.7 °F (37.1 °C)] 98.7 °F (37.1 °C)  Pulse:  [] 90  Resp:  [17-18] 18  SpO2:  [92 %-97 %] 92 %  BP: ()/(63-77) 112/68     Body mass index is 24.14 kg/m².    Physical Exam   Constitutional:   Appears older than her expected age   has NG tube, in restraints  Thin, cachexia   HENT:   Head: Normocephalic and atraumatic.   Eyes:   Patient is legally blind   Neck: Normal range of motion.   Cardiovascular: Normal rate and regular rhythm.    Pulmonary/Chest: No respiratory distress.   Abdominal: Soft. There is no tenderness.   Musculoskeletal: Normal range of motion.   Neurological:   Neurologic:  -  Mental Status:  Very lethargic. Oriented to self and place only.   Follows a couple of commands. Exam very limited.    -  Speech and language:  + aphasia + dysarthria.   -  Motor:  Moves all extremities    -  Tone:  normal   Skin: Skin is warm and dry.   Psychiatric: Cognition and memory are impaired.   Vitals reviewed.    NEUROLOGICAL EXAMINATION:       Diagnostic Results:   Labs: Reviewed  X-Ray: Reviewed  CT: Reviewed    Assessment/Plan:     Oral phase dysphagia    -SLP evaluate and treat        Acute encephalopathy    -  Monitor sleep disturbances and establish consistent sleep-wake cycle (day time- lights on and shades open, night time- lights dim/off)  -  Environmental modifications to  limit agitation/confusion (appropriate lighting, family at bedside, visible clock and calendar, updated white board, reduce noise, limited visitors, clustered nursing care)  -  Reorient patient to person, place, time, and situation on each encounter  -  If possible, avoid restraints.  May benefit from 24/7 supervision by a sitter  -  Avoid/limit medications that can worsen delirium (benzodiazepines, antihistamines, anticholinergics, hypnotics, opiates)  -  Encourage mobility, OOB in chair at least 3 hours per day, and early ambulation as appropriate  -  PT/OT evaluate and treat            Altered mental status    -see encephalopathy        Primary central diabetes insipidus    -Endocrine followed but signed off, management per   -Na 160 on 7/17        Meningioma, recurrent of brain    -s/p L frontotemporal craniotomy and excision of meningioma on 6/7/17        Participating with therapy. Will follow patient's progress and discuss with rehab team for  rehab recommendation.      Thank you for your consult.     Quita Altamirano NP  Department of Physical Medicine & Rehab  [facility]

## 2017-07-18 PROBLEM — J96.01 ACUTE HYPOXEMIC RESPIRATORY FAILURE: Status: ACTIVE | Noted: 2017-01-01

## 2017-07-18 NOTE — TREATMENT PLAN
GI Treatment Plan  07/18/2017  9:00 AM    Spoke with Mrs. Reina Hernandez regarding how Mrs. Authement PEG tube placement underwent uneventful.    At this time will sign off please call us if there are any additional questions.    Winsome Hsu M.D.  Gastroenterology Fellow, PGY-IV  Pager: [phone]  [facility]

## 2017-07-18 NOTE — CARE UPDATE
I met with medical POA's (patient's son, Raj Blood, and friend, Reina Hernandez) and Dr. Mcguire.  We discussed worsening hemodynamic status with need for vasopressors to support blood pressure with concern for infection and possible meningitis.  Prior to hospitalization for resection in June, Ms. Morales was living with friend and functioning independently with some vision difficulty.  They relayed that Ms. Morales discussed with them at that time that she would not want to be on a ventilator or life support for a prolong period if she were not improving.  Mr. Anthony and Ms. Huang are in agreement with continuing aggressive measures treating reversible causes of acute deterioration including infection and evaluating for seizures.  Consents obtained for central line and lumbar puncture.  Ms. Morales will remain full code at this time.     TAE FAM, [facility]  Critical Care Medicine  [phone]

## 2017-07-18 NOTE — PROGRESS NOTES
Patient VSS with Bipap, AAO to self and place, moves all extremities with weakness. Paged IM team B, awaiting call back for clarification of orders. Patient NAD, Wctm

## 2017-07-18 NOTE — NURSING
Patient received from PACU. Patient had a peg tube placement and had problems with her O2 sats dropped to 80%.Patient was placed on a non -rebreather mask and transported to x-ray, chest xray showed possible aspiration pnuemonia.  Patient was transferred to ICU for close observation. Patient is presently on 2 liters oxygen per MD orders, sat's is 100%. Neuro. Patient opens her eyes to deep sternal rub and patient speech is slurred and slow to respond. Critical care team doctors @ bedside assessing the patient. Patient is stable at present ,no distress noted. CC team doctors states they will continue to monitor the patient status. Blood glucose at present is 108. All other v/s are wnl. All safety measures in place.

## 2017-07-18 NOTE — SIGNIFICANT EVENT
Nurse from PACU called for the patient disp Kay after she went into respiratory distress after placement of PEG.     Vitals:    07/17/17 1910   BP: (!) 97/55   Pulse: (!) 115   Resp: (!) 32   Temp:      Chest Xray reviewed and compared it to the one on 07/11/2017    Assessment:     Aspiration Pneumonia leading to respiratory failure.   - S/p Brain tumor resection in June   - Consulted Neuro-critical Care  - I suggest her to be placed in Neuro-critical care until her current respiratory status improved.  - Further rec's per Neurocritical Care team.

## 2017-07-18 NOTE — PROGRESS NOTES
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 Neuro consulted 7/13 for AMS. Pt admitted on 6/28 for decreased mentation. On our last exam 7/15, patient was oriented x 1, able to follow simple commands consistently, able to repeat, 0/3 registration and recall, no reaction to L in pupil and minimal reaction to light on R, with no blink to threat on L and o/w non-focal neurological exam.  Neuro-imaging c/w recent left sphenoidal meningioma resection with resolution of blood products and trace intraventricular hemorrhage. EEG (6/16/17) with moderate diffuse slowing of the overall background and superimposed left hemispheric slowing. NO epileptiform discharges or seizures were seen c/w ENCP and focal cortical dysfunction. Labs remarkable for HyperNa, on DDAVP nightly.     7/17-PEG placed  7/18-patient moved to ICU after O2 desats and possible aspiration. Primary team was concerned about CVA vs. seizure after facial weakness was noted today. MRI brain W WO contrast 7/18 was unremarkable for acute infarct or other acute intracranial pathology. Patient YESSICA for MRI and not examined.  Extended EEG pending.  Started on acyclovir, cefepime, vanc    -currently on Keppra 500 mg BID, pending EEG results and if further suspected seizure activity can increase Keppra    Neurology to follow    Lydia Luciano PA-C  General Neurology Consult  Neuro Consult [facility] # [id number]

## 2017-07-18 NOTE — PLAN OF CARE
Problem: Patient Care Overview  Goal: Plan of Care Review  Outcome: Ongoing (interventions implemented as appropriate)  VSS. Patient with VSS. No N&V. Teds/SCD's in place throughout duration in PACU. Transferred to the next Phase of Care.

## 2017-07-18 NOTE — ASSESSMENT & PLAN NOTE
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    -Hx of a right frontotemporal craniotomy with partial removal of a large subfrontal and tuberculum sellae meningioma at [facility] in 2015. 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

## 2017-07-18 NOTE — ASSESSMENT & PLAN NOTE
--see above, likely multifactorial (metabolic considering persistent hypernatremia)  --continue keppra for seizure prophylaxis.  --  Hold gabapentin for now given increased somnolence associated with dosing (discussed with NSGY)  -- Monitor for delirium and encourage delirium precautions, pt also with q1h neuro checks   -- Avoid benzodiazepines, anticholinergics, and anti-histaminics and minimize opiates when possible as they may worsen or exacerbate delirium.  -- Ensure blinds are open with lights on during the day and that the shades are closed with lights off at night. -- Reorient pt through out the day.    -- repeat CT head 7/17/17 with no interval changes

## 2017-07-18 NOTE — NURSING TRANSFER
Nursing Transfer Note      7/17/2017     Transfer To: [id number] from PACU    Transfer via stretcher    Transfer with  O2, cardiac monitoring    Transported by RN x2    Medicines sent: Gabapentin, Desmopressin    Chart send with patient: Yes    Notified: friend    Patient reassessed at: 7/17/17 22:27

## 2017-07-18 NOTE — ASSESSMENT & PLAN NOTE
-pt with persistent hypernatremia since admission  -will increase FWB to 300cc q6h (was receiving 200cc q8h) and monitor BMP q6h, continue desmopressin  -likely contributing to patient's AMS  -appreciate endocrinology recs

## 2017-07-18 NOTE — CONSULTS
"  [facility]  Adult Nutrition  Consult Note    SUMMARY     Recommendations    1. TF recommendations: Isosource 1.5 @ 40 mL/hr to provide 1440 calories, 65 grams of protein, 733 mL fluid.    - Hold for residuals >400 mL; additional fluid per MD.  2. RD to monitor & follow-up.    Goals: Pt to receive >90% of EEN via TF or po intake  Nutrition Goal Status: progressing towards goal  Communication of RD Recs: reviewed with RN    Reason for Assessment    Reason for Assessment: physician consult  Diagnosis: other (see comments) (AMS and hypernatremia)  Relevent Medical History: hypothryodism, olfactory grooce meningioma, s/p crani   Interdisciplinary Rounds: did not attend     General Information Comments: PEG placed 7/17. Pt disoriented during visit.  Nutrition Discharge Planning: Tolerance of TF.    Nutrition Prescription Ordered    Current Diet Order: NPO    Evaluation of Received Nutrients/Fluid Intake    Free Water Flush Fluid (mL): 1200     IV Fluid (mL): 2400    Nutrition Risk Screen     Nutrition Risk Screen: dysphagia or difficulty swallowing    Nutrition/Diet History    Factors Affecting Nutritional Intake: NPO    Labs/Tests/Procedures/Meds    Pertinent Labs Reviewed: reviewed  Pertinent Labs Comments: Na 157, BUN 35, Gluc 125  Pertinent Medications Reviewed: reviewed, pertinent  Pertinent Medications Comments: D5 @ 100 mL/hr    Physical Findings    Overall Physical Appearance: nourished, lethargic  Tubes: gastrostomy tube  Skin: incision    Anthropometrics    Height: 5' 2" (157.5 cm)  Weight Method: Bed Scale  Weight: 60 kg (132 lb 4.4 oz)     Ideal Body Weight (IBW), Female: 110 lb  % Ideal Body Weight, Female (lb): 120.25 lb     BMI (Calculated): 24.2  BMI Grade: 18.5-24.9 - normal    Estimated/Assessed Needs    Weight Used For Calorie Calculations: 60 kg (132 lb 4.4 oz)      Energy Need Method: [place], other (see comments) (9519-3994 kcal/d)     Weight Used For Protein " Calculations: 60 kg (132 lb 4.4 oz)  Protein Requirements: 60-72 g/d    Fluid Requirements (mL): 1ml/kcal or per MD    Assessment and Plan    Inadequate energy intake r/t inability to consume sufficient energy AEB NPO with no alternate means of nutrition.  Status: New    Monitor and Evaluation    Food and Nutrient Intake: enteral nutrition intake  Food and Nutrient Adminstration: enteral and parenteral nutrition administration     Anthropometric Measurements: weight, weight change, body mass index  Biochemical Data, Medical Tests and Procedures: electrolyte and renal panel, glucose/endocrine profile, gastrointestinal profile, inflammatory profile, lipid profile  Nutrition-Focused Physical Findings: overall appearance    Nutrition Risk    Level of Risk: other (see comments) (f/u 2x/week)    Nutrition Follow-Up    RD Follow-up?: Yes

## 2017-07-18 NOTE — PT/OT/SLP DISCHARGE
Occupational Therapy Discharge Summary    Joanna Morales  MRN: [id number]   Hypernatremia   Patient Discharged from acute Occupational Therapy on 2017.  Please refer to prior OT note dated on 7/15/2017 for functional status.     Assessment:   Patient has not met goals.  GOALS:    Occupational Therapy Goals     Not on file          Multidisciplinary Problems (Resolved)        Problem: Occupational Therapy Goal    Goal Priority Disciplines Outcome Interventions   Occupational Therapy Goal   (Resolved)     OT, PT/OT Outcome(s) achieved    Description:  Goals to be met by: 17     Patient will increase functional independence with ADLs by performin.  UE Dressing with Minimal Assistance. Not met  2.  LE Dressing with Moderate Assistance. Not met  3.  Brush teeth with min a and wash face with SBA and mod cues. Not met  4. Toileting from toilet with Moderate Assistance for hygiene and clothing management. Not met  5.  Toilet transfer to toilet with minimal Assistance. Not met  6.  Pt will use feel to compensate for blindness during self care adls, with mod cues, 25% of the time. Not met  7. Pt will state orientation to person, place and time with added time and 0 added cues. Not met                         Reasons for Discontinuation of Therapy Services  T/f to ICU from neuro-step down     Plan:  Will require new orders when appropriate for re-eval at treat.     HELEN Broussard 2017

## 2017-07-18 NOTE — ASSESSMENT & PLAN NOTE
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 Chew 6/7/17, who re-presented with hypernatremia.    -CTH stable, no new findings.  -Continue neuro checks q4 hours. Notify NSGY for any changes.   -Continue Keppra for seizure prophylaxis  -Continue Lovenox for DVT prophylaxis  -Continue aggressive PT/OT  -Medical management per primary team.  -Will continue to follow, please call with questions

## 2017-07-18 NOTE — TREATMENT PLAN
GI Treatment Plan  07/17/2017  9:27 PM    OK to use PEG tube for medications at this time.    Winsome Hsu M.D.  Gastroenterology Fellow, PGY-IV  Pager: [phone]  [facility]

## 2017-07-18 NOTE — PLAN OF CARE
Problem: Patient Care Overview  Goal: Plan of Care Review  Outcome: Ongoing (interventions implemented as appropriate)  1. TF recommendations: Isosource 1.5 @ 40 mL/hr to provide 1440 calories, 65 grams of protein, 733 mL fluid.    - Hold for residuals >400 mL; additional fluid per MD.  2. RD to monitor & follow-up.

## 2017-07-18 NOTE — SUBJECTIVE & OBJECTIVE
Interval History: NAEON.    Medications:  Continuous Infusions:   norepinephrine bitartrate-D5W       Scheduled Meds:   acyclovir  10 mg/kg Intravenous Q12H    ceFEPime (MAXIPIME) IVPB  2 g Intravenous Q12H    desmopressin  10 mcg Nasal Nightly    levetiracetam oral soln  500 mg Per NG tube BID    levothyroxine  50 mcg Per NG tube Daily    vancomycin (VANCOCIN) IVPB  15 mg/kg (Dosing Weight) Intravenous Q12H     PRN Meds:acetaminophen, dextrose 50%, glucagon (human recombinant), guaifenesin 100 mg/5 ml, insulin aspart, ipratropium, ondansetron, potassium chloride 10%, sodium chloride 0.9%     Review of Systems  Objective:     Weight: 60 kg (132 lb 4.4 oz)  Body mass index is 24.19 kg/m².  Vital Signs (Most Recent):  Temp: 97.6 °F (36.4 °C) (17 1100)  Pulse: 89 (17 1200)  Resp: 18 (17 1200)  BP: (!) 86/63 (17 1200)  SpO2: 99 % (17 1200) Vital Signs (24h Range):  Temp:  [97.2 °F (36.2 °C)-98.7 °F (37.1 °C)] 97.6 °F (36.4 °C)  Pulse:  [] 89  Resp:  [14-33] 18  SpO2:  [83 %-100 %] 99 %  BP: ()/(50-75) 86/63              Temp (24hrs), Av.7 °F (36.5 °C), Min:97.2 °F (36.2 °C), Max:98.7 °F (37.1 °C)    Oxygen Concentration (%):  [] 50      Date 17 0700 - 17 0659   Shift 4723-6041 8008-4432 7264-6956 24 Hour Total   I  N  T  A  K  E   I.V.  (mL/kg) 1900  (31.7)   1900  (31.7)    NG/   180    IV Piggyback 1600   1600    Shift Total  (mL/kg) 3680  (61.3)   3680  (61.3)   O  U  T  P  U  T   Urine  (mL/kg/hr) 178   178    Shift Total  (mL/kg) 178  (3)   178  (3)   Weight (kg) 60 60 60 60          Gastrostomy/Enterostomy 17 1520 Percutaneous endoscopic gastrostomy (PEG) LUQ feeding (Active)   Securement taped to abdomen 2017 11:15 AM   Clamp Status/Tolerance clamped 2017 11:15 AM   Dressing dry and intact 2017 11:15 AM   Insertion Site no redness;no warmth;no drainage;no tenderness;no swelling 2017 11:15 AM   Intake (mL) 60 mL  "7/17/2017  9:44 PM   Water Bolus (mL) 180 mL 7/18/2017  9:00 AM            Urethral Catheter 07/17/17 2335 Straight-tip 16 Fr. (Active)   Site Assessment Clean;Intact 7/18/2017 11:15 AM   Collection Container Urimeter 7/18/2017 11:15 AM   Securement Method secured to top of thigh w/ adhesive device 7/18/2017 11:15 AM   Catheter Care Performed yes 7/18/2017 11:15 AM   Reason for Continuing Urinary Catheterization Critically ill in ICU requiring intensive monitoring 7/18/2017 11:15 AM   CAUTI Prevention Bundle StatLock in place w 1" slack;Intact seal between catheter & drainage tubing;No dependent loops or kinks;Drainage bag below bladder 7/17/2017 11:00 PM   Output (mL) 23 mL 7/18/2017 12:00 PM       Neurosurgery Physical Exam    General: well developed, well nourished, no distress.   Head: normocephalic  Neurologic: Awake. Minimal verbal responses.   GCS: Motor: 6/Verbal: 4 /Eyes: 4 GCS Total: 14  Mental Status: Able to state name. Intermittently follows simple commands.   Cranial nerves: face symmetric, CN II-XII grossly intact.   Eyes: pupils equal, round, reactive to light.  Motor Strength: Moves all extremities spontaneously with good strength and tone. Intermittently follows simple commands.     Significant Labs:    Recent Labs  Lab 07/18/17  0855 07/18/17  1124   * 98   * 150*   K 4.1 3.9    108   CO2 30* 31*   BUN 35* 33*   CREATININE 1.3 1.2   CALCIUM 10.8* 10.3   MG 2.1  --        Recent Labs  Lab 07/17/17  0355 07/17/17  2111 07/18/17  0457 07/18/17  0855   WBC 10.76  --  23.65* 34.23*   HGB 11.7*  --  7.3* 9.5*   HCT 37.4 56* 23.4* 30.3*   *  --  223 366*     No results for input(s): LABPT, INR, APTT in the last 48 hours.  Microbiology Results (last 7 days)     Procedure Component Value Units Date/Time    Blood culture [[id number]] Collected:  07/17/17 2232    Order Status:  Sent Specimen:  Blood Updated:  07/17/17 2232    Blood culture [[id number]] Collected:  07/17/17 2232    " Order Status:  Sent Specimen:  Blood Updated:  07/17/17 2230

## 2017-07-18 NOTE — PROGRESS NOTES
Notified Poonam Riddle with IM B of patient status, orders to continue continuous IVF, awaiting additional orders.

## 2017-07-18 NOTE — PLAN OF CARE
Problem: Physical Therapy Goal  Goal: Physical Therapy Goal  Goals to be met by: 17    Patient will increase functional independence with mobility by performin. Supine to sit with Minimal Assistance  2. Sit to supine with Minimal Assistance  3. Sit to stand transfer with Minimal Assistance met (2017)   Revised: sit to stand transfer with SBA  4. Standing for 3 minutes with Minimal Assistance and weight evenly distributed through (B) LE.   5. Gait  x 20 feet with Moderate Assistance using AD if needed   6. Pt will perform (B) LE therapeutic exercises x 20 reps to improve muscular strength and endurance.      Outcome: Outcome(s) achieved Date Met: 17  D/C PT 2* ICU transfer. Re-consult when medically appropriate.    RAUL MELENDEZ, PT  2017

## 2017-07-18 NOTE — ASSESSMENT & PLAN NOTE
-pt with O2 sats in the mid 80's after PEG tube placement and minimal improvement on a non rebreather, pt then placed on BiPAP and ABG showed pH 7.372, CO2 44, O2 62, HCO3 25.8 so pt switched to nasal cannula and tolerating it well  -CXray concerning for RLL infiltrate, pt started on vancomycin and zosyn for HAP  -will f/u cultures and repeat VBG

## 2017-07-18 NOTE — PROGRESS NOTES
Patient brought to CT with RNx2 and Resp x1 and return back to PACU, no distress, tolerated well. Wctm

## 2017-07-18 NOTE — PLAN OF CARE
Problem: Occupational Therapy Goal  Goal: Occupational Therapy Goal  Goals to be met by: 17     Patient will increase functional independence with ADLs by performin.  UE Dressing with Minimal Assistance. Not met  2.  LE Dressing with Moderate Assistance. Not met  3.  Brush teeth with min a and wash face with SBA and mod cues. Not met  4. Toileting from toilet with Moderate Assistance for hygiene and clothing management. Not met  5.  Toilet transfer to toilet with minimal Assistance. Not met  6.  Pt will use feel to compensate for blindness during self care adls, with mod cues, 25% of the time. Not met  7. Pt will state orientation to person, place and time with added time and 0 added cues. Not met         Outcome: Outcome(s) achieved Date Met: 17  Goals not met. Pt t/f to ICU. HELEN Broussard 2017

## 2017-07-18 NOTE — PROGRESS NOTES
Awaiting orders for PM medication administration, pending okay from GI to use Peg tube. Patient NAD, more lethergic, Wctm

## 2017-07-18 NOTE — PT/OT/SLP DISCHARGE
Physical Therapy Discharge Summary    Joanna Morales  MRN: [id number]   Hypernatremia   Patient Discharged from acute Physical Therapy on 2017.  Please refer to prior PT noted date on 2017 for functional status.     Assessment:   Patient has not met goals.  GOALS:    Physical Therapy Goals     Not on file          Multidisciplinary Problems (Resolved)        Problem: Physical Therapy Goal    Goal Priority Disciplines Outcome Goal Variances Interventions   Physical Therapy Goal   (Resolved)     PT/OT, PT Outcome(s) achieved     Description:  Goals to be met by: 17    Patient will increase functional independence with mobility by performin. Supine to sit with Minimal Assistance  2. Sit to supine with Minimal Assistance  3. Sit to stand transfer with Minimal Assistance met (2017)   Revised: sit to stand transfer with SBA  4. Standing for 3 minutes with Minimal Assistance and weight evenly distributed through (B) LE.   5. Gait  x 20 feet with Moderate Assistance using AD if needed   6. Pt will perform (B) LE therapeutic exercises x 20 reps to improve muscular strength and endurance.                     Reasons for Discontinuation of Therapy Services  Patient is unable to continue work toward goals because of medical or psychosocial complications.      Plan:  Patient Discharged to: Transfer to ICU s/p PEG placement. Re-consult when medically appropriate. .    RAUL MELENDEZ, PT  2017

## 2017-07-18 NOTE — PROGRESS NOTES
PM&R consult follow up.  Please see original consult for detailed note.      Patient is too acute and not ready for discharge. Will sign off.  Please re-consult when patient is medically stable, participating with therapy, and ready for discharge.      SARWAT Heck, FNP-C  Physical Medicine & Rehabilitation   07/18/2017  Spectralink: [id number]

## 2017-07-18 NOTE — SUBJECTIVE & OBJECTIVE
Subjective:     Interval History:   Hooked up to EEG. Per friend at bedside no acute events overnight  Awaiting NSGY decision on safety of LP to assess for possible CNS infection given leukocytosis and recent meningioma resection  Empiric coverage broadened to include fluconazole (CNS penetration dose)    Current Facility-Administered Medications   Medication Dose Route Frequency Provider Last Rate Last Dose    acetaminophen tablet 500 mg  500 mg Oral Q6H PRN Jaswant Arellano MD   500 mg at 07/14/17 2042    acyclovir (ZOVIRAX) 600 mg in dextrose 5 % 100 mL IVPB  10 mg/kg Intravenous Q12H Júnior Chaves PA-C 100 mL/hr at 07/18/17 1429 600 mg at 07/18/17 1429    ceFEPIme in dextrose 5% 2 gram/50 mL IVPB 2 g  2 g Intravenous Q12H Júnior Chaves PA-C 100 mL/hr at 07/18/17 1430 2 g at 07/18/17 1430    desmopressin 10 mcg/spray (0.1 mL) solution 10 mcg  10 mcg Nasal Nightly Gerry Sr MD   10 mcg at 07/17/17 2131    dextrose 50% injection 12.5 g  12.5 g Intravenous PRN Fiona Winterbottom, APRN, CNS   12.5 g at 06/30/17 2011    glucagon (human recombinant) injection 1 mg  1 mg Intramuscular PRN Fiona Winterbottom, APRN, CNS        guaifenesin 100 mg/5 ml syrup 300 mg  300 mg Oral Q6H PRN Nitza Bonilla MD        insulin aspart pen 0-5 Units  0-5 Units Subcutaneous Q6H PRN Fiona Winterbottom, APRN, CNS        ipratropium 0.02 % nebulizer solution 0.5 mg  0.5 mg Nebulization Q6H PRN Nitza Bonilla MD   0.5 mg at 07/17/17 0214    levetiracetam oral soln Soln 500 mg  500 mg Per NG tube BID Fiona Winterbottom, APRN, CNS   500 mg at 07/18/17 0917    levothyroxine tablet 50 mcg  50 mcg Per NG tube Daily Fiona Winterbottom, APRN, CNS   50 mcg at 07/18/17 0917    norepinephrine 4 mg in dextrose 5% 250 mL infusion (premix) (titrating)  0.05 mcg/kg/min (Dosing Weight) Intravenous Continuous Lucille Geiger NP 10.2 mL/hr at 07/18/17 1510 0.05 mcg/kg/min at 07/18/17 1510    ondansetron injection 8 mg  8 mg Intravenous  Q8H PRN Jaswant Arellano MD        potassium chloride 10% solution 40 mEq  40 mEq Per NG tube PRN Carmina Uriosteguiuterive, NP   Stopped at 07/12/17 0851    sodium chloride 0.9% flush 3 mL  3 mL Intravenous PRN Mariposa Davis MD        vancomycin (VANCOCIN) 750 mg in dextrose 5 % 250 mL IVPB  15 mg/kg (Dosing Weight) Intravenous Q12H Lucille Geiger .7 mL/hr at 07/18/17 1429 750 mg at 07/18/17 1429     Review of Systems   Unable to perform ROS: Mental status change     Objective:     Vital Signs (Most Recent):  Temp: 97.6 °F (36.4 °C) (07/18/17 1100)  Pulse: 85 (07/18/17 1428)  Resp: 18 (07/18/17 1428)  BP: (!) 98/55 (07/18/17 1428)  SpO2: (!) 92 % (07/18/17 1428) Vital Signs (24h Range):  Temp:  [97.2 °F (36.2 °C)-98.2 °F (36.8 °C)] 97.6 °F (36.4 °C)  Pulse:  [] 85  Resp:  [14-33] 18  SpO2:  [83 %-100 %] 92 %  BP: ()/(50-75) 98/55     Weight: 60 kg (132 lb 4.4 oz)  Body mass index is 24.19 kg/m².    Physical Exam   Constitutional: No distress.   Neck: Neck supple.   Pulmonary/Chest: Effort normal.   Neurological:   Reflex Scores:       Bicep reflexes are 2+ on the right side and 2+ on the left side.       Brachioradialis reflexes are 2+ on the right side and 2+ on the left side.       Patellar reflexes are 2+ on the right side and 2+ on the left side.  Skin: She is not diaphoretic.     NEUROLOGICAL EXAMINATION:     MENTAL STATUS   Oriented to person.   Disoriented to place.   Follows 1 step commands.   Attention: decreased. Concentration: decreased.   Level of consciousness: drowsy    CRANIAL NERVES     CN III, IV, VI   Right pupil: Reactivity: non-reactive.   Left pupil: Reactivity: non-reactive.   Nystagmus: none     CN V   Facial sensation intact.     CN VII   Facial expression full, symmetric.     CN VIII   CN VIII normal.     CN XII   CN XII normal.            Blind in left eye, light perception in right eye     MOTOR EXAM   Muscle bulk: decreased  Overall muscle tone: normal       Moving  all extremities antigravity      REFLEXES     Reflexes   Right brachioradialis: 2+  Left brachioradialis: 2+  Right biceps: 2+  Left biceps: 2+  Right patellar: 2+  Left patellar: 2+    SENSORY EXAM   Light touch normal.     Significant Labs:   Hemoglobin A1c: No results for input(s): HGBA1C in the last 720 hours.  Blood Culture: No results for input(s): LABBLOO in the last 48 hours.  CBC:   Recent Labs  Lab 07/17/17  0355 07/17/17  2111 07/18/17  0457 07/18/17  0855   WBC 10.76  --  23.65* 34.23*   HGB 11.7*  --  7.3* 9.5*   HCT 37.4 56* 23.4* 30.3*   *  --  223 366*     CMP:   Recent Labs  Lab 07/17/17  0355 07/17/17  1745 07/18/17  0855 07/18/17  1124   GLU 84 125* 137* 98   * 157* 149* 150*   K 4.0 3.5 4.1 3.9   * 117* 107 108   CO2 32* 27 30* 31*   BUN 35* 35* 35* 33*   CREATININE 1.1 1.2 1.3 1.2   CALCIUM 11.8* 11.7* 10.8* 10.3   MG 3.2*  --  2.1  --    PROT 8.5*  --  7.3  --    ALBUMIN 3.0*  --  2.3*  --    BILITOT 0.2  --  0.5  --    ALKPHOS 185*  --  158*  --    AST 62*  --  35  --    ALT 44  --  29  --    ANIONGAP 13 13 12 11   EGFRNONAA 58.3* 52.5* 47.6* 52.5*     Inflammatory Markers: No results for input(s): SEDRATE, CRP, PROCAL in the last 48 hours.  Urine Culture: No results for input(s): LABURIN in the last 48 hours.  Urine Studies:   Recent Labs  Lab 07/18/17  0110   COLORU Yellow   APPEARANCEUA Hazy*   PHUR 5.0   SPECGRAV 1.020   PROTEINUA Negative   GLUCUA Negative   KETONESU Negative   BILIRUBINUA Negative   OCCULTUA 2+*   NITRITE Negative   UROBILINOGEN Negative   LEUKOCYTESUR Negative   RBCUA 17*   WBCUA 3   BACTERIA Few*   SQUAMEPITHEL 1     All pertinent lab results from the past 24 hours have been reviewed.    24 hr vEEG results pending    Significant Imaging: I have reviewed and interpreted all pertinent imaging results/findings within the past 24 hours.     MRI Brain W WO contrast (7/18/17):  Postsurgical changes of bilateral frontal temporal craniotomy for sphenoid  meningioma resection.  There is mucosal thickening of the left maxillary antrum, bilateral sphenoid sinus and near-complete opacification of the left ethmoid air cells.  There is a mucosal thickening of the left frontal sinus.   Rightward tentorial shift around frontal lobes

## 2017-07-18 NOTE — PT/OT/SLP PROGRESS
Speech Language Pathology  Discharge    Jonana Morales  MRN: [id number]   [id number]/3082 A       Patient not seen today secondary to transfer to ICU. Will need new evaluation orders. .    HILDA Luo, CCC-SLP, [facility], 7/18/2017

## 2017-07-18 NOTE — HPI
Ms. Joanna Morales is a 51 y.o. female with tobacco abuse, secondary hypothyroidism (TSH 0.241 6/17) and history of meningioma s/p resection 2015 & 6/2017. The most recent surgery by Dr. Chew at [facility] was complicated by DI .  The patient was discharged to [facility] on 6/26/17 and presented to [facility] ED on 6/28 with altered mental status and worsening hypernatremia.      Of note, She underwent a right frontotemporal craniotomy with partial removal of a large subfrontal and tuberculum sellae meningioma at [facility] in 2015. 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

## 2017-07-18 NOTE — SUBJECTIVE & OBJECTIVE
Past Medical History:   Diagnosis Date    Allergy     Basal cell carcinoma     Depression 11/1/2016    Essential hypertension 6/9/2017    Eye disorder     Fever blister     Normocytic anemia 6/9/2017    Secondary hypothyroidism 6/26/2017       Past Surgical History:   Procedure Laterality Date    BASAL CELL CARCINOMA EXCISION      forehead    BRAIN SURGERY      SKIN BIOPSY         Review of patient's allergies indicates:   Allergen Reactions    Codeine        Family History     Problem Relation (Age of Onset)    Diabetes Father    Hepatitis Father    Hypertension Father    No Known Problems Mother        Social History Main Topics    Smoking status: Current Every Day Smoker     Packs/day: 1.00     Years: 40.00     Types: Cigarettes    Smokeless tobacco: Never Used    Alcohol use No    Drug use: No    Sexual activity: No      Review of Systems   Unable to perform ROS: Acuity of condition     Objective:     Vital Signs (Most Recent):  Temp: 98.2 °F (36.8 °C) (07/17/17 2300)  Pulse: 110 (07/17/17 2300)  Resp: (!) 22 (07/17/17 2300)  BP: (!) 106/50 (07/17/17 2300)  SpO2: 95 % (07/17/17 2300) Vital Signs (24h Range):  Temp:  [96 °F (35.6 °C)-98.7 °F (37.1 °C)] 98.2 °F (36.8 °C)  Pulse:  [] 110  Resp:  [15-33] 22  SpO2:  [83 %-100 %] 95 %  BP: ()/(50-75) 106/50   Weight: 59.9 kg (132 lb)  Body mass index is 24.14 kg/m².      Intake/Output Summary (Last 24 hours) at 07/18/17 0022  Last data filed at 07/17/17 2300   Gross per 24 hour   Intake              910 ml   Output              150 ml   Net              760 ml       Physical Exam   Constitutional:   Thin, cachectic   HENT:   Head: Normocephalic and atraumatic.   Eyes:   Patient is legally blind   Neck: Normal range of motion.   Cardiovascular: Normal rate and regular rhythm.    Pulmonary/Chest: No respiratory distress.   Decreased breath sounds bilateral bases   Abdominal: Soft. There is no tenderness.   Musculoskeletal: Normal range of  motion.   Neurological:   -  Mental Status:  Very lethargic. Does not follow commands or respond to sternal rub but does move all extremities. GCS 9.    Skin: Skin is warm and dry.   Psychiatric: Cognition and memory are impaired.   Vitals reviewed.      Vents:  Oxygen Concentration (%): 50 (07/17/17 2135)  Lines/Drains/Airways     Drain                 Gastrostomy/Enterostomy 07/17/17 1520 Percutaneous endoscopic gastrostomy (PEG) LUQ feeding less than 1 day         Urethral Catheter 07/17/17 2335 Straight-tip 16 Fr. less than 1 day          Peripheral Intravenous Line                 Peripheral IV - Single Lumen 07/17/17 0800 Right Antecubital less than 1 day         Peripheral IV - Single Lumen 07/17/17 0800 Right Hand less than 1 day              Significant Labs:    CBC/Anemia Profile:    Recent Labs  Lab 07/16/17 0414 07/17/17  0355 07/17/17 2111   WBC 8.25 10.76  --    HGB 11.9* 11.7*  --    HCT 37.9 37.4 56*   * 423*  --    * 103*  --    RDW 15.3* 15.0*  --         Chemistries:    Recent Labs  Lab 07/16/17  0414 07/17/17  0355 07/17/17  1745   * 160* 157*   K 3.6 4.0 3.5   * 115* 117*   CO2 31* 32* 27   BUN 28* 35* 35*   CREATININE 1.2 1.1 1.2   CALCIUM 11.6* 11.8* 11.7*   ALBUMIN 3.0* 3.0*  --    PROT 8.3 8.5*  --    BILITOT 0.2 0.2  --    ALKPHOS 167* 185*  --    ALT 32 44  --    AST 38 62*  --    MG 2.8* 3.2*  --    PHOS 5.1* 5.2*  --        ABGs:   Recent Labs  Lab 07/17/17 2111   PH 7.372   PCO2 44.4   HCO3 25.8   POCSATURATED 91*   BE 1       Recent Labs  Lab 07/17/17  1241 07/17/17  1718 07/17/17  1942   POCTGLUCOSE 117* 91 108     Significant Imaging: I have reviewed all pertinent imaging results/findings within the past 24 hours.

## 2017-07-18 NOTE — ASSESSMENT & PLAN NOTE
--appreciate endocrine's assistance.   --suspect hypernatremia is multifactorial: dehydration given poor PO intake and a component of DI given extensive meningioma resection on 6/7/17  --pt still hypernatremic - will adjust FWB  -- continue nightly DDAVP 10 mcg intranasally nightly.   --Continue to monitor BMP's closely to assess continued need or dose adjustments q6h   --continue free water enterally: have increased to 300 mL q6h per NGT now   --continue to monitor sodium levels and urine output

## 2017-07-18 NOTE — H&P
[facility]  Critical Care Medicine  History & Physical    Patient Name: Joanna Morales  MRN: [id number]  Admission Date: 6/28/2017  Hospital Length of Stay: 20 days  Code Status: Full Code  Attending Physician: Sonya Dc MD  Primary Care Provider: Claudy Tse MD   Principal Problem: Hypernatremia    Subjective:     HPI:  Ms. Joanna Morales is a 51 y.o. female with tobacco abuse, secondary hypothyroidism (TSH 0.241 6/17) and history of meningioma s/p resection 2015 & 6/7/2017. The most recent surgery by Dr. Chew at [facility] was complicated by DI . The patient was discharged to [facility] on 6/26/17 and re-presented to [facility] ED on 6/28 with altered mental status and worsening hypernatremia.     Of note, She underwent a right frontotemporal craniotomy with partial removal of a large subfrontal and tuberculum sellae meningioma at [facility] in 2015. 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      Hospital/ICU Course:  Ms. Morales was discharged from [facility]  to [facility] on 6/26/17 and presented to [facility] ED on 6/28 with altered mental status and worsening hypernatremia. According to the patient's sitter, the patient was awake, verbal, and interactive with therapy on 6/23 but had been progressively less interactive and responsive when she was at [place]. Ms. Morales was admitted to Hospital Medicine but was found to be unresponsive with a sodium level of 168 on the morning of 6/29.     She has been followed by endocrinology and neurology and workup thus far significant for  unremarkable UA, neuro-imaging c/w recent left sphenoidal meningioma resection with  resolution of blood products and trace intraventricular hemorrhage.  Previous EEG (6/16/17) demonstrated Moderate diffuse slowing of the overall background as described above, Superimposed left hemispheric slowing relative to the right, and NO epileptiform discharges or seizures were seen c/w ENCP and focal cortical dysfunction.  Pt's presentation is concerning for multi-factorial encephalopathy given persistent hypernatremia.  Per neurology, low suspicion for MNG currently, but patient is at high risk given recent intracranial procedure.    Critical Care consulted 7/17/17 for acute hypoxic respiratory failure. Pt had PEG tube placed earlier today and in PACU, oxygen saturations noted to be in the mid 80's. Pt was placed on a non-rebreather with minimal improvement and subsequently placed on BiPAP. ABG significant for elevated CO2 however pt was compensating. Repeat ABG showed pH 7.372, CO2 44, O2 62, HCO3 25.8. Pt transitioned to NC. Cxray concerning for aspiration pneumonia - new infiltrate in RLL so patient started on vancomycin and zosyn for HAP.       Past Medical History:   Diagnosis Date    Allergy     Basal cell carcinoma     Depression 11/1/2016    Essential hypertension 6/9/2017    Eye disorder     Fever blister     Normocytic anemia 6/9/2017    Secondary hypothyroidism 6/26/2017       Past Surgical History:   Procedure Laterality Date    BASAL CELL CARCINOMA EXCISION      forehead    BRAIN SURGERY      SKIN BIOPSY         Review of patient's allergies indicates:   Allergen Reactions    Codeine        Family History     Problem Relation (Age of Onset)    Diabetes Father    Hepatitis Father    Hypertension Father    No Known Problems Mother        Social History Main Topics    Smoking status: Current Every Day Smoker     Packs/day: 1.00     Years: 40.00     Types: Cigarettes    Smokeless tobacco: Never Used    Alcohol use No    Drug use: No    Sexual activity: No      Review of Systems    Unable to perform ROS: Acuity of condition     Objective:     Vital Signs (Most Recent):  Temp: 98.2 °F (36.8 °C) (07/17/17 2300)  Pulse: 110 (07/17/17 2300)  Resp: (!) 22 (07/17/17 2300)  BP: (!) 106/50 (07/17/17 2300)  SpO2: 95 % (07/17/17 2300) Vital Signs (24h Range):  Temp:  [96 °F (35.6 °C)-98.7 °F (37.1 °C)] 98.2 °F (36.8 °C)  Pulse:  [] 110  Resp:  [15-33] 22  SpO2:  [83 %-100 %] 95 %  BP: ()/(50-75) 106/50   Weight: 59.9 kg (132 lb)  Body mass index is 24.14 kg/m².      Intake/Output Summary (Last 24 hours) at 07/18/17 0022  Last data filed at 07/17/17 2300   Gross per 24 hour   Intake              910 ml   Output              150 ml   Net              760 ml       Physical Exam   Constitutional:   Thin, cachectic   HENT:   Head: Normocephalic and atraumatic.   Eyes:   Patient is legally blind   Neck: Normal range of motion.   Cardiovascular: Normal rate and regular rhythm.    Pulmonary/Chest: No respiratory distress.   Decreased breath sounds bilateral bases   Abdominal: Soft. There is no tenderness.   Musculoskeletal: Normal range of motion.   Neurological:   -  Mental Status:  Very lethargic. Does not follow commands or respond to sternal rub but does move all extremities. GCS 9.    Skin: Skin is warm and dry.   Psychiatric: Cognition and memory are impaired.   Vitals reviewed.      Vents:  Oxygen Concentration (%): 50 (07/17/17 2135)  Lines/Drains/Airways     Drain                 Gastrostomy/Enterostomy 07/17/17 1520 Percutaneous endoscopic gastrostomy (PEG) LUQ feeding less than 1 day         Urethral Catheter 07/17/17 2335 Straight-tip 16 Fr. less than 1 day          Peripheral Intravenous Line                 Peripheral IV - Single Lumen 07/17/17 0800 Right Antecubital less than 1 day         Peripheral IV - Single Lumen 07/17/17 0800 Right Hand less than 1 day              Significant Labs:    CBC/Anemia Profile:    Recent Labs  Lab 07/16/17  0414 07/17/17  0355 07/17/17  2111   WBC  8.25 10.76  --    HGB 11.9* 11.7*  --    HCT 37.9 37.4 56*   * 423*  --    * 103*  --    RDW 15.3* 15.0*  --         Chemistries:    Recent Labs  Lab 07/16/17  0414 07/17/17  0355 07/17/17  1745   * 160* 157*   K 3.6 4.0 3.5   * 115* 117*   CO2 31* 32* 27   BUN 28* 35* 35*   CREATININE 1.2 1.1 1.2   CALCIUM 11.6* 11.8* 11.7*   ALBUMIN 3.0* 3.0*  --    PROT 8.3 8.5*  --    BILITOT 0.2 0.2  --    ALKPHOS 167* 185*  --    ALT 32 44  --    AST 38 62*  --    MG 2.8* 3.2*  --    PHOS 5.1* 5.2*  --        ABGs:   Recent Labs  Lab 07/17/17  2111   PH 7.372   PCO2 44.4   HCO3 25.8   POCSATURATED 91*   BE 1       Recent Labs  Lab 07/17/17  1241 07/17/17  1718 07/17/17  1942   POCTGLUCOSE 117* 91 108     Significant Imaging: I have reviewed all pertinent imaging results/findings within the past 24 hours.    Assessment/Plan:     Neuro   Acute encephalopathy    --see above, likely multifactorial (metabolic considering persistent hypernatremia)  --continue keppra for seizure prophylaxis.  -- continue gabapentin for now   -- Monitor for delirium and encourage delirium precautions, pt also with q1h neuro checks   -- Avoid benzodiazepines, anticholinergics, and anti-histaminics and minimize opiates when possible as they may worsen or exacerbate delirium.  -- Ensure blinds are open with lights on during the day and that the shades are closed with lights off at night. -- Reorient pt through out the day.    -- repeat CT head 7/17/17 with no interval changes            Secondary hypothyroidism    --continue 50mcg Levothyroxine per NGT  --T4 this admission WNL  -- Repeat in 1-2months            Primary central diabetes insipidus    --appreciate endocrine's assistance.   --suspect hypernatremia is multifactorial: dehydration given poor PO intake and a component of DI given extensive meningioma resection on 6/7/17  --pt still hypernatremic - will adjust FWB  -- continue nightly DDAVP 10 mcg intranasally nightly.    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    -Hx of a right frontotemporal craniotomy with partial removal of a large subfrontal and tuberculum sellae meningioma at [facility] in 2015. 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              Pulmonary   Acute hypoxemic respiratory failure    -pt with O2 sats in the mid 80's after PEG tube placement and minimal improvement on a non rebreather, pt then placed on BiPAP and ABG showed pH 7.372, CO2 44, O2 62, HCO3 25.8 so pt switched to nasal cannula and tolerating it well  -CXray concerning for RLL infiltrate, pt started on vancomycin and zosyn for HAP  -will f/u cultures and repeat VBG        Fluids/Electrolytes/Nutrition/GI   * Hypernatremia    -pt with persistent hypernatremia since admission  -will increase FWB to 300cc q6h (was receiving 200cc q8h) and monitor BMP q6h, continue desmopressin  -likely contributing to patient's AMS  -appreciate endocrinology recs        Malnutrition of moderate degree    -s/p PEG tube placement on 7/17/17  -will f/u nutrition recs and start feeds        Other   Tobacco abuse    --Nicotine patch            Critical Care Medicine Daily Checklist:    A: Awake: RASS Goal/Actual Goal: RASS Goal: -1-->drowsy  Actual: Tong Agitation Sedation Scale (RASS):  Drowsy   B: Spontaneous Breathing Trial Performed?  NA   C: SAT & SBT Coordinated?  NA                      D: Delirium: CAM-ICU Overall CAM-ICU: Negative   E: Early Mobility Performed? No   F: Feeding Goal: Goals: Pt to receive >90% of EEN via TF or po intake  Status: Nutrition Goal Status: progressing towards goal   Current Diet Order   Procedures    Diet NPO      AS: Analgesia/Sedation None   T: Thromboembolic Prophylaxis enoxaparin   H: HOB > 300 Yes   U: Stress Ulcer Prophylaxis (if needed) none   G: Glucose Control accuechecks   B: Bowel Function Stool Occurrence: 0   I: Indwelling Catheter (Lines & García) Necessity 2 peripheral IV, PEG tube, garcía catheter   D: De-escalation of Antimicrobials/Pharmacotherapies Vancomycin, zosyn for HAP    Plan for the day/ETD Admit to MICU    Code Status:  Family/Goals of Care: Full Code         Kobe Calloway MD  Critical Care Medicine  [facility]

## 2017-07-18 NOTE — HOSPITAL COURSE
Ms. Morales was discharged from [facility]  to [facility] on 6/26/17 and presented to [facility] ED on 6/28 with altered mental status and worsening hypernatremia. According to the patient's sitter, the patient was awake, verbal, and interactive with therapy on 6/23 but had been progressively less interactive and responsive when she was at [place]. Ms. Morales was admitted to Hospital Medicine but was found to be unresponsive with a sodium level of 168 on the morning of 6/29. She was started on a dextrose infusion and given free water per NGT. Endocrine was also involved in patient management during this and the prior visit.     She has been evaluated by endocrinology and neurology while here, workup thus far notable for hypernatremia, unremarkable UA, neuro-imaging c/w recent left sphenoidal meningioma resection with resolution of blood products and trace intraventricular hemorrhage. Per neurology, pt's presentation is concerning for multi-factorial ENCP.  Low suspicion for MNG currently, but patient is at high risk given recent intracranial procedure.     Critical Care consulted on 7/17/17 for acute hypoxic respiratory failure - pt had a PEG tube placed earlier today and then desatted to the mid 80's, pt was placed on a non-breather with minimal improvement and thus placed on BiPAP. ABG done at that time only significant for CO2 in the 50's however repeat ABG after BiPAP revealed pH 7.372, pCO2 44, pO2 62, HCO3 25.8. Pt was placed on NC and oxygenating well. Cxray concerning for aspiration pneumonia - new infiltrate in RLL so started pt on vancomycin and zosyn for HAP coverage.

## 2017-07-18 NOTE — PLAN OF CARE
Patient s/p PEG placement 7/17/17.  Transferred to ICU with O2 desaturations, possible aspiration PNA.  Patient HDS tolerating 2L NC.  PT/OT/SLP following, CM will request new orders since transfer to different level of care.  Plan for rehab disposition vs. NH placement.  CM will continue to follow.     07/18/17 1026   Right Care Assessment   Can the patient answer the patient profile reliably? No, cognitively impaired   How often would a person be available to care for the patient? Infrequently   Describe the patient's ability to walk at the present time. Major restrictions/daily assistance from another person   How does the patient rate their overall health at the present time? Poor   Number of comorbid conditions (as recorded on the chart) Five or more   During the past month, has the patient often been bothered by feeling down, depressed or hopeless? No   During the past month, has the patient often been bothered by little interest or pleasure in doing things? No   Cathy Aguilar RN, BSN  Case Management  [facility]  Ext. [phone]

## 2017-07-18 NOTE — PLAN OF CARE
Problem: Patient Care Overview  Goal: Plan of Care Review  Outcome: Ongoing (interventions implemented as appropriate)  Ms. Joanna Morales is a 51 y.o. female with tobacco abuse, history of meningioma s/p resection 2015 & 6/7/2017. . The patient was discharged to [facility] on 6/26/17 and re-presented to [facility] ED on 6/28 with altered mental status and worsening elevated and elevated sodium levels.  Patient was transferred to ICU last night for decreased O2 after her peg tube insertion. Patient is presently on O2 at 2 liters NC sat's are 100%. Peg tube site free of drainage. Patient has remained lethargic this shift but is aroused with a sternal rub. Patient family states that her speech has been slurred post her neuro sx.  Patient has been stable this shift  Plan of care discussed with patient family.

## 2017-07-19 NOTE — SUBJECTIVE & OBJECTIVE
Interval History/Significant Events: No acute events overnight    Review of Systems   Unable to perform ROS: Acuity of condition     Objective:     Vital Signs (Most Recent):  Temp: 97.6 °F (36.4 °C) (07/19/17 0715)  Pulse: 85 (07/19/17 0815)  Resp: 15 (07/19/17 0815)  BP: (!) 87/66 (07/19/17 0815)  SpO2: 96 % (07/19/17 0815) Vital Signs (24h Range):  Temp:  [94.4 °F (34.7 °C)-99.5 °F (37.5 °C)] 97.6 °F (36.4 °C)  Pulse:  [] 85  Resp:  [11-27] 15  SpO2:  [92 %-100 %] 96 %  BP: ()/(51-79) 87/66   Weight: 60 kg (132 lb 4.4 oz)  Body mass index is 24.19 kg/m².      Intake/Output Summary (Last 24 hours) at 07/19/17 0831  Last data filed at 07/19/17 0715   Gross per 24 hour   Intake          6592.06 ml   Output             3050 ml   Net          3542.06 ml       Physical Exam   Constitutional:   Thin, cachectic   HENT:   Head: Normocephalic and atraumatic.   Eyes:   Patient is legally blind   Neck: Normal range of motion.   Cardiovascular: Normal rate and regular rhythm.    Pulmonary/Chest: No respiratory distress.   RLL rales   Abdominal: Soft. There is no tenderness.   Musculoskeletal: Normal range of motion.   Neurological:   -  Mental Status:  Very lethargic. Follows simple commands and moves extremities.   Skin: Skin is warm and dry.   Psychiatric: Cognition and memory are impaired.   Vitals reviewed.      Vents:  Oxygen Concentration (%): 50 (07/17/17 2135)  Lines/Drains/Airways     Central Venous Catheter Line                 Percutaneous Central Line Insertion/Assessment - triple lumen  07/18/17 1934 left internal jugular less than 1 day          Drain                 Gastrostomy/Enterostomy 07/17/17 1520 Percutaneous endoscopic gastrostomy (PEG) LUQ feeding 1 day         Urethral Catheter 07/17/17 2335 Straight-tip 16 Fr. 1 day          Peripheral Intravenous Line                 Peripheral IV - Single Lumen 07/17/17 0800 Right Antecubital 2 days              Significant Labs:    CBC/Anemia  Profile:    Recent Labs  Lab 07/18/17  0457 07/18/17  0855 07/19/17 0318   WBC 23.65* 34.23* 18.80*   HGB 7.3* 9.5* 7.7*   HCT 23.4* 30.3* 24.5*    366* 264   * 101* 100*   RDW 15.6* 15.3* 15.0*        Chemistries:    Recent Labs  Lab 07/18/17  0855  07/18/17  1534 07/19/17  0002 07/19/17 0318 07/19/17  0633   *  < > 149* 146*  --  150*   K 4.1  < > 4.3 3.2*  --  3.9     < > 108 108  --  115*   CO2 30*  < > 27 27  --  26   BUN 35*  < > 31* 26*  --  21*   CREATININE 1.3  < > 1.2 1.1  --  1.1   CALCIUM 10.8*  < > 10.2 9.3  --  10.0   ALBUMIN 2.3*  --   --   --   --   --    PROT 7.3  --   --   --   --   --    BILITOT 0.5  --   --   --   --   --    ALKPHOS 158*  --   --   --   --   --    ALT 29  --   --   --   --   --    AST 35  --   --   --   --   --    MG 2.1  --   --   --  1.7  --    PHOS 5.5*  --   --   --  3.6  --    < > = values in this interval not displayed.    All pertinent labs within the past 24 hours have been reviewed.    Significant Imaging:  I have reviewed and interpreted all pertinent imaging results/findings within the past 24 hours.

## 2017-07-19 NOTE — ASSESSMENT & PLAN NOTE
-pt with persistent hypernatremia since admission  -will increase FWB to 300cc q6h (was receiving 200cc q8h) and monitor BMP q6h, continue desmopressin  -likely contributing to patient's AMS

## 2017-07-19 NOTE — PROGRESS NOTES
Dr. Calloway called and notified of pt's urinary output increase over past 2 hours from 150ml/hr to 350/275. Urine becoming more clear almost with no yellow tinge. No new orders given at this time. Will continue to monitor.

## 2017-07-19 NOTE — PLAN OF CARE
Problem: Patient Care Overview  Goal: Plan of Care Review  Outcome: Ongoing (interventions implemented as appropriate)  Pt rested well in bed during shift. Oriented only to self, moves all extremities, follows commands, but doesn't speak in rational sentences. Pupils sluggish but equal. EEG in place. No obvious signs of seizures observed. Neurosurgery consulted for possible LP. HR 90s-100s. Levo gtt titrated off at 0130 with MAP sustaining >65. Baire hugger taken off at 2300 for temp of 99.5. Afebrile. Abx and antifungal administered. No BM during shift. Peg intact to LUQ with no drainage. Mackey draining clear urine to gravity from 125-450/hr, see previous notes. Potassium and Mag replaced. CCS called concerning morning labs, no new orders given. Plan for infectious disease consult.

## 2017-07-19 NOTE — PROGRESS NOTES
Dr. Dubon with [facility] called and notified of pt's increase in urinary output over past 3 hours with concern for DI. Urine clear. MD stated pt was still net positive at this point with no further orders given. Will continue to monitor.

## 2017-07-19 NOTE — PLAN OF CARE
Pt afebrile throughout shift. Pt on room air with O2 sats=%. Pt connected to EEG monitor; no s/s of seizures noted. Pt restarted on TF and water boluses; tolerating well. Accu checks Q4hrs with D5 amp given x1. Pt free from fall or injury throughout shift.

## 2017-07-19 NOTE — ASSESSMENT & PLAN NOTE
52yo F with tuberculum sellae meningioma s/p resection, and repeat resection for recurrence / olfactory groove meningioma on 6/7/17 with repeat admission with hypernatremia and encephalopathy. Waxing and waning mental status. PEG placed Monday 7/17 and decline in mental status 7/18 with concern for CVA vs seizure. MRI Brain performed 7/18 and showed mucosal thickening of the left maxillary antrum, bilateral sphenoid sinus and near-complete opacification of the left ethmoid air cells,mucosal thickening of the left frontal sinus. Concern for possible CNS infection if any communication between postop site and sinuses. ID has been consulted and broad spectrum Abx + fluconazole + acyclovir started in setting of new leukocytosis. NSGY asked to weigh in on safety of LP--concern for vectors shifting from appearance of rightward tentorial shift on MRI around frontal lobes vs. ex vacuo.      -will f/u EEG results, continue Keppra 500 mg BID for seizure ppx for now  -appreciated ID recommendations  -continue correcting hypernatremia per primary team  -delirium precautions, regulate sleep/wake, frequent reorientation, minimize sedating medications

## 2017-07-19 NOTE — CONSULTS
[facility]  Infectious Disease  Consult Note    Patient Name: Joanna Morales  MRN: [id number]  Admission Date: 6/28/2017  Hospital Length of Stay: 21 days  Attending Physician: Sonya Dc MD  Primary Care Provider: Claudy Tse MD     Isolation Status: No active isolations    Patient information was obtained from relative(s) and ER records.      Inpatient consult to Infectious Diseases  Consult performed by: RICH ROLAND  Consult ordered by: SHIRLEY CRUZ        Assessment/Plan:     Leukocytosis    51 yr old female with PMH of meningioma (2015 and 2017) s/p resection, secondary hypothyroidism (TSG 0.241 ion 6/17). Has a partial resection in 2015. Developed visual loss in the left eye with anosmia in the post-op period. Second surgery done in June 2017. Developed polyuria with altered mental status     in the post-op period. Serum sodium was elevated and urine osm was low. Diagnosed with DI and started on ddavp (IV at first then intranasal). Hospital course complicated by altered mental status, episodes of agitation, and hypernatremia. Underwent PEG tube placement on 7/17 following which she developed resp distress with deteriorating O2 sats and resp distress. Given 2L O2 via NC and then converted to non rebreather.mask at 15LPM. O2 sat improved. CXR was done which showed some infiltrate at the RLL; Concern for aspiration pneumnia; transferred to [facility]. Single dose of Rocephin given. Started on Vanc and Zosyn.  Has significant leukocytosis yesterday for which ID was consulted.  WBC count - 44328 (7/17), 45421 (7/17), 24819 (7/18).   Zosyn d/c yesterday and started on Cefepime. Acyclovir and Fluconazole also added yesterday.  Neurology consulted for possible meningitis. Assses that CNC involement possible due to prev instrumentation. MRI brain showed no lesion.   Planned for LP today.    - Differential can include aspiration pneumonia or meningitis. Likely bacterial.    - Continue Cefepime, Vanc, Fluc, Acyclovir.  - Will adjust therapy based on LP.  - Sputum culture  - Will follow-up with labs            Active Diagnoses:    Diagnosis Date Noted POA    PRINCIPAL PROBLEM:  Hypernatremia [E87.0] 07/17/2017 Yes    Acute hypoxemic respiratory failure [J96.01] 07/18/2017 Unknown    Malnutrition of moderate degree [E44.0] 07/13/2017 No    Acute encephalopathy [G93.40] 06/29/2017 Yes    Tobacco abuse [Z72.0] 06/28/2017 Yes     Chronic    Secondary hypothyroidism [E03.8] 06/26/2017 Yes     Chronic    Primary central diabetes insipidus [E23.2] 06/08/2017 Yes    Meningioma, recurrent of brain [D32.0] 06/07/2017 Yes      Problems Resolved During this Admission:    Diagnosis Date Noted Date Resolved POA    Hypothermia [T68.XXXA] 07/02/2017 07/03/2017 No    Goals of care, counseling/discussion [Z71.89] 06/30/2017 07/03/2017 Not Applicable    Hypernatremia [E87.0] 06/28/2017 07/13/2017 Yes           Thank you for your consult. I will follow-up with patient. Please contact us if you have any additional questions.    Tucker Lim MD  Infectious Disease  [facility]    Subjective:     Principal Problem: Hypernatremia    HPI: 51 yr old female with PMH of tobacco abuse, secondary hypothyroidism (TSH 0.241 6/17) and history of meningioma s/p resection 2015 & 6/7/2017.ofwho presented on 6/28 with altered mental status and hypernatremia.     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  She underwent operation in January 2015 on the [facility] service at [facility].  It was apparently only possible to get a partial resection of the tumor.  She did have a followup scan done in 2016 showing a continued large meningioma in this area.  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  She had a followup MRI scan done at [facility] on 01/20/17, showing this large tumor.  She complained of constant headaches, anosmia and gait instability. Underwent complete resection in June 2017. Post-op developed DI. Started on ddavp (IV initially then intranasal) and dexemthasone. Discharged on 6/26 to rehab.     Presented on 6/28 from rehab with AMS and worsening hypernatremia. Neuro consulted 7/13 for AMS. Developed O2 desats and possibly aspiration pneumonia after PEG tube placement on 7/17. Given 2L O2 via NC and then converted to non rebreather.mask at 15LPM. O2 sat improved. CXR was done which showed some infiltrate at the RLL; Single dose of Rocephin given. Started on Vanc and Zosyn. Primary team was concerned about CVA vs. seizure after facial weakness was noted on 7/18. MRI brain without contrast (7/18) was unremarkable for acute infarct or other acute intracranial pathology.  Became hypotensive on 7/18 for which fluids were given following which she improved.\    As per Neurology, "Has had another drop in mental status today, now with word salad though still able to tell me her name, age (51) and follow intermittent one step commands.  Reviewed her anti-infectives coverage and has broad bacterial/viral, but cannot rule out some CNS involvement with fungus given the nasal pathology and previous instrumentation.  Discussed ID consult with team.  LP could be considered, but concern for vectors shifting from appearance of Rward tentorial shift on MRI around frontal lobes (though could be ex vacuo as well).  If BRIA clears, could get spinal fluid, though admittedly already partially treated."    Crytococcal Ag was negative. Blood culture from 7/17 pending (prev cultures showed NGTD x 5)  Currently on Cefepime + Vanc + Fluc + Acyclovir.    Past Medical History:   Diagnosis Date    Allergy     Basal cell carcinoma     Depression 11/1/2016    Essential hypertension " 6/9/2017    Eye disorder     Fever blister     Normocytic anemia 6/9/2017    Secondary hypothyroidism 6/26/2017       Past Surgical History:   Procedure Laterality Date    BASAL CELL CARCINOMA EXCISION      forehead    BRAIN SURGERY      SKIN BIOPSY         Review of patient's allergies indicates:   Allergen Reactions    Codeine        Medications:  Prescriptions Prior to Admission   Medication Sig    amitriptyline (ELAVIL) 100 MG tablet Take 1 tablet (100 mg total) by mouth nightly.    EUCALYPTUS OIL/MENTHOL/CAMPHOR (VICKS VAPORUB TOP) Apply topically daily as needed.    gabapentin (NEURONTIN) 300 MG capsule Take 1 capsule (300 mg total) by mouth 3 (three) times daily.    hydrocodone-acetaminophen 5-325mg (NORCO) 5-325 mg per tablet Take 1 tablet by mouth every 12 (twelve) hours as needed (severe pain).     Antibiotics     Start     Stop Route Frequency Ordered    07/18/17 1200  vancomycin (VANCOCIN) 750 mg in dextrose 5 % 250 mL IVPB  (Vancomycin IVPB with levels panel)      -- IV Every 12 hours (non-standard times) 07/18/17 1015    07/18/17 1128  ceFEPIme in dextrose 5% 2 gram/50 mL IVPB 2 g      -- IV Every 12 hours (non-standard times) 07/18/17 1125        Antifungals     Start     Stop Route Frequency Ordered    07/18/17 2030  fluconazole (DIFLUCAN) IVPB 400 mg 200 mL      -- IV Every 24 hours (non-standard times) 07/18/17 1919        Antivirals         Stop Route Frequency     acyclovir (ZOVIRAX) injection      -- IV Every 12 hours (non-standard times)           Immunization History   Administered Date(s) Administered    Pneumococcal Polysaccharide - 23 Valent 02/01/2017    Tdap 02/01/2017    influenza - Quadrivalent - PF (ADULT) 02/01/2017       Family History     Problem Relation (Age of Onset)    Diabetes Father    Hepatitis Father    Hypertension Father    No Known Problems Mother        Social History     Social History    Marital status:      Spouse name: N/A    Number of  children: N/A    Years of education: 10     Social History Main Topics    Smoking status: Current Every Day Smoker     Packs/day: 1.00     Years: 40.00     Types: Cigarettes    Smokeless tobacco: Never Used    Alcohol use No    Drug use: No    Sexual activity: No     Other Topics Concern    Are You Pregnant Or Think You May Be? No    Breast-Feeding No     Social History Narrative    None       Review of Systems   Unable to perform ROS: Acuity of condition     Objective:     Vital Signs (Most Recent):  Temp: 97.6 °F (36.4 °C) (07/19/17 0715)  Pulse: 82 (07/19/17 0900)  Resp: 15 (07/19/17 0900)  BP: 103/72 (07/19/17 0900)  SpO2: 98 % (07/19/17 0900) Vital Signs (24h Range):  Temp:  [94.4 °F (34.7 °C)-99.5 °F (37.5 °C)] 97.6 °F (36.4 °C)  Pulse:  [] 82  Resp:  [11-27] 15  SpO2:  [92 %-100 %] 98 %  BP: ()/(51-79) 103/72     Weight: 60 kg (132 lb 4.4 oz)  Body mass index is 24.19 kg/m².    Estimated Creatinine Clearance: 47.9 mL/min (based on Cr of 1.1).    Physical Exam   Constitutional: She appears lethargic.   Restraints in place as she tried to pull out her IV line.   Eyes:   Left pupil dilated and unresponsive to light.     Neck: No tracheal deviation present.   Cardiovascular: Normal rate, regular rhythm, normal heart sounds and intact distal pulses.    Pulmonary/Chest: Effort normal. No respiratory distress. She has no wheezes. She has no rhonchi. She has rales in the right middle field and the right lower field. She exhibits no tenderness.   Rales over the right lower lobe.   Abdominal: Soft. Bowel sounds are normal. There is tenderness in the periumbilical area.   Mild tenderness over the abdomen; Winces on palpations.   Neurological: She appears lethargic. She is disoriented.   Strength - 5/5 in RUE and LUE, 2/5 in RLE and LLE.       Skin: Skin is warm. No erythema.   Vitals reviewed.      Significant Labs:   Bilirubin:   Recent Labs  Lab 07/07/17  0439 07/08/17  0631 07/09/17  0400  07/10/17  0410 07/11/17  0343  07/14/17  0502 07/15/17  0513 07/16/17  0414 07/17/17  0355 07/18/17  0855   BILIDIR 0.1 0.1 0.2 0.1 0.1  --   --   --   --   --   --    BILITOT 0.2 0.2 0.3 0.2 0.2  < > 0.2 0.2 0.2 0.2 0.5   < > = values in this interval not displayed.  Blood Culture:   Recent Labs  Lab 06/14/17  1156 06/14/17  1517 07/03/17  0934 07/03/17  0954   LABBLOO No growth after 5 days. No growth after 5 days. No growth after 5 days. No growth after 5 days.     BMP:   Recent Labs  Lab 07/19/17  0318 07/19/17  0633   GLU  --  69*   NA  --  150*   K  --  3.9   CL  --  115*   CO2  --  26   BUN  --  21*   CREATININE  --  1.1   CALCIUM  --  10.0   MG 1.7  --      Bone Marrow Culture: No results for input(s): BONEMARROWCU in the last 4320 hours.  C4 Count: No results for input(s): C4 in the last 48 hours.  CBC:   Recent Labs  Lab 07/18/17  0457 07/18/17  0855 07/19/17  0318   WBC 23.65* 34.23* 18.80*   HGB 7.3* 9.5* 7.7*   HCT 23.4* 30.3* 24.5*    366* 264     CMP:   Recent Labs  Lab 07/18/17  0855  07/18/17  1534 07/19/17  0002 07/19/17  0633   *  < > 149* 146* 150*   K 4.1  < > 4.3 3.2* 3.9     < > 108 108 115*   CO2 30*  < > 27 27 26   *  < > 96 78 69*   BUN 35*  < > 31* 26* 21*   CREATININE 1.3  < > 1.2 1.1 1.1   CALCIUM 10.8*  < > 10.2 9.3 10.0   PROT 7.3  --   --   --   --    ALBUMIN 2.3*  --   --   --   --    BILITOT 0.5  --   --   --   --    ALKPHOS 158*  --   --   --   --    AST 35  --   --   --   --    ALT 29  --   --   --   --    ANIONGAP 12  < > 14 11 9   EGFRNONAA 47.6*  < > 52.5* 58.3* 58.3*   < > = values in this interval not displayed.  Coagulation:   Recent Labs  Lab 07/18/17  1534   INR 1.1     CSF: No results for input(s): CSFCULTURE in the last 4320 hours.  Fungus Culture (Blood or Bone Marrow): No results for input(s): FUNGUSCULTUR in the last 4320 hours.  Genital Culture: No results for input(s): LABGENI in the last 4320 hours.  Hepatitis Panel:   Recent Labs  Lab  07/18/17  1124   HEPBSAG Negative   HEPAIGM Negative   HEPCAB Negative     HIV 1/2 Antibodies: No results for input(s): MHD85YLBD in the last 48 hours.  HIV Rapid: No results for input(s): HIVRAPID in the last 48 hours.  Lactic Acid:   Recent Labs  Lab 07/18/17  1030 07/18/17  1132 07/18/17  1534   LACTATE >12.0* 1.8  1.8 1.1     Lipase: No results for input(s): LIPASE in the last 48 hours.  Microbiology Results (last 7 days)     Procedure Component Value Units Date/Time    Cryptococcal antigen [[id number]] Collected:  07/18/17 2030    Order Status:  Completed Specimen:  Blood from Blood Updated:  07/19/17 0923     Cryptococcal Ag, Blood Negative    Blood culture [[id number]] Collected:  07/17/17 2232    Order Status:  Sent Specimen:  Blood Updated:  07/17/17 2232    Blood culture [[id number]] Collected:  07/17/17 2232    Order Status:  Sent Specimen:  Blood Updated:  07/17/17 2232        Pathology Results  (Last 10 years)    None        POCT Glucose:   Recent Labs  Lab 07/17/17  1718 07/17/17  1942 07/17/17 2328   POCTGLUCOSE 91 108 113*     Prealbumin: No results for input(s): PREALBUMIN in the last 48 hours.  Procalcitonin: No results for input(s): PROCAL in the last 48 hours.  Quantiferon: No results for input(s): NIL, TBAG, TBAGNIL, MITOGENNIL, TBGOLD in the last 48 hours.  Respiratory Culture: No results for input(s): GSRESP, RESPIRATORYC in the last 4320 hours.  Urine Culture:   Recent Labs  Lab 06/15/17  1127 06/27/17  1432 06/28/17  1733   LABURIN No growth No growth No growth     Urine Studies:   Recent Labs  Lab 07/01/17  0319  07/18/17  0110   COLORU Yellow  < > Yellow   APPEARANCEUA Hazy*  < > Hazy*   PHUR 5.0  < > 5.0   SPECGRAV 1.010  < > 1.020   PROTEINUA Negative  < > Negative   GLUCUA Negative  < > Negative   KETONESU Negative  < > Negative   BILIRUBINUA Negative  < > Negative   OCCULTUA 1+*  < > 2+*   NITRITE Negative  < > Negative   UROBILINOGEN Negative  < > Negative   LEUKOCYTESUR 3+*  < >  Negative   RBCUA 1  < > 17*   WBCUA 12*  < > 3   BACTERIA Occasional  < > Few*   SQUAMEPITHEL 1  < > 1   HYALINECASTS 3*  --   --    < > = values in this interval not displayed.  Wound Culture: No results for input(s): LABAERO in the last 4320 hours.  All pertinent labs within the past 24 hours have been reviewed.    Significant Imaging: I have reviewed all pertinent imaging results/findings within the past 24 hours.       CXR (7/18)    Lungs are expanded. Minimal, patchy consolidation is again seen at the right lung base. Elsewhere lungs are clear. No significant pleural fluid.  Left jugular catheter is now present with tip in SVC. No pneumothorax post placement.    CXR (7/17):    There is a minimal right pleural effusion.  The trachea is midline.  The lungs are symmetrically expanded bilaterally with interval development of airspace infiltrate at the right base.      MRI Brain without contrast (7/18)    Impression       1.  No acute intracranial abnormality on this motion limited examination.   The brain parenchyma appears within normal limits. No evidence of mass lesion, hemorrhage, edema or recent or remote major vascular distribution infarction. No abnormal postcontrast parenchymal or leptomeningeal enhancement.    2.  Postsurgical changes of bilateral frontal temporal craniotomy for sphenoid meningioma resection.    3.  Sinus disease     CT Brain (7/17)    Impression          No significant detrimental change from the prior exam in this patient with a recent sphenoid meningioma resection. No acute major territorial infarction or hemorrhage.    Sinus disease.

## 2017-07-19 NOTE — MEDICAL/APP STUDENT
General Infectious Diseases: Consult Note    Consult Requested By: Sonya Dc MD    Reason for Consult:       IMPRESSION:  Patient is a 51 y.o. female with a PMH of tobacco abuse, secondary hypothyroidism (TSH 0.241 on 6/17/17), and meningioma s/p resection in 2015 and 6/7/17. Most recent surgery complicated by DI. Pt was discharged after sx to [facility] rehab 6/26/17 and presented on 6/2817 due to worsening hypernatremia and altered mental status. Ms. Morales has been followed by Neurology and Endocrinology since re-admission on 6/28/17 with an unremarkable w/u to date. PEG tube placement on  7/17/17 complicated by post-surgical acute hypoxic respiratory failure. CXR demonstrated new RLL infiltrate most likely due to aspiration pneumonia. Patient started on Zosyn and Vancomycin on 7/17/17. WBC 34 on 7/18/17. Changed to Cefepime and Vancomycin on 7/18/17. Fluconazole and Acyclovir also added on 7/18/17. WBC 18 on 7/19/17. Pt being treated for aspiration pneumonia vs meningitis.       PLAN:  - Continue the following until blood cx NGTD for 48hrs:   - Vancomycin   - Cefepime   - Fluconazole   - Acyclovir  - LP planned for today - will adjust therapy accordingly  - Sputum culture      Thanks for the consult. ID will continue to follow.     Olamide Holguin MS4      SUBJECTIVE:     History of Present Illness:  Patient is a 51 y.o. female with a PMH of tobacco abuse, secondary hypothyroidism (TSH 0.241 on 6/17/17), and meningioma s/p resection in 2015 and 6/7/17. Most recent surgery complicated by DI. Pt was discharged after sx to [facility] rehab 6/26/17 and presented on 6/2817 due to worsening hypernatremia and altered mental status. Ms. Morales has been followed by Neurology and Endocrinology since re-admission on 6/28/17 with an unremarkable w/u to date.     PEG tube placement on  7/17/17 complicated by post-surgical acute hypoxic respiratory failure. CXR demonstrated new RLL infiltrate most likely due  to aspiration pneumonia. One dose of Rocephin given. Patient then started on Zosyn and Vancomycin on 7/17/17. WBC 34 on 7/18/17. Changed to Cefepime and Vancomycin on 7/18/17. Fluconazole and Acyclovir also added on 7/18/17. WBC 18 on 7/19/17. UA negative on 7/17/17.     Neurology re-consulted due to facial weakness noted s/p PEG tube placement for concern of CVA vs seizure on 7/18/17. MRI unremarkable. 24hr continuous EEG being followed.     Past Medical History:  Past Medical History:   Diagnosis Date    Allergy     Basal cell carcinoma     Depression 11/1/2016    Essential hypertension 6/9/2017    Eye disorder     Fever blister     Normocytic anemia 6/9/2017    Secondary hypothyroidism 6/26/2017       Past Surgical History:  Past Surgical History:   Procedure Laterality Date    BASAL CELL CARCINOMA EXCISION      forehead    BRAIN SURGERY      SKIN BIOPSY         Family History:  Family History   Problem Relation Age of Onset    Diabetes Father     Hypertension Father     Hepatitis Father     No Known Problems Mother        Social History:  Social History   Substance Use Topics    Smoking status: Current Every Day Smoker     Packs/day: 1.00     Years: 40.00     Types: Cigarettes    Smokeless tobacco: Never Used    Alcohol use No       Allergies:  Review of patient's allergies indicates:   Allergen Reactions    Codeine         Pertinent Medications:  Antibiotics     Start     Stop Route Frequency Ordered    07/18/17 1200  vancomycin (VANCOCIN) 750 mg in dextrose 5 % 250 mL IVPB  (Vancomycin IVPB with levels panel)      -- IV Every 12 hours (non-standard times) 07/18/17 1015    07/18/17 1128  ceFEPIme in dextrose 5% 2 gram/50 mL IVPB 2 g      -- IV Every 12 hours (non-standard times) 07/18/17 1125      fluconazole (DIFLUCAN) IVPB 400 mg 200 mL  Dose: 400 mg Freq: Every 24 hours (non-standard times) Route: IV  Indications of Use: Bacteremia  Start: 07/18/17 2030    acyclovir (ZOVIRAX) 600 mg in  dextrose 5 % 100 mL IVPB  Dose: 10 mg/kg Freq: Every 12 hours (non-standard times) Route: IV  Start: 07/18/17 1230      Review of Symptoms:  Unable to obtain due to AMS.     OBJECTIVE:     Vital Signs (Most Recent)  Temp: 97.6 °F (36.4 °C) (07/19/17 0715)  Pulse: 85 (07/19/17 0815)  Resp: 15 (07/19/17 0815)  BP: (!) 87/66 (07/19/17 0815)  SpO2: 96 % (07/19/17 0815)    Temperature Range Min/Max (Last 24H):  Temp:  [94.4 °F (34.7 °C)-99.5 °F (37.5 °C)]     Physical Exam:  Gen: lethargic.    Pulmonary: Non labored. Clear to auscultation A/P/L. No wheezing, crackles, or rhonchi.   Cardiac: Normal S1 & S2 w/o rubs/murmurs/gallops.   Abdomen: Normal bowel sounds. Nondistended. Mild tenderness to palpation over epigastric region. No rebound or guarding. PEG tube placement site without discharge or erythema.   Extremities: No cyanosis, or peripheral edema.   Neurological: Lethargic. Strength 2/5 LUE and LLE, 4/5 RLE and RUE.   Skin: No jaundice, rashes, or visible lesions.      Laboratory:  CBC:   Lab Results   Component Value Date    WBC 18.80 (H) 07/19/2017    HGB 7.7 (L) 07/19/2017    HCT 24.5 (L) 07/19/2017     (H) 07/19/2017     07/19/2017       BMP:   Recent Labs  Lab 07/19/17  0318 07/19/17  0633   GLU  --  69*   NA  --  150*   K  --  3.9   CL  --  115*   CO2  --  26   BUN  --  21*   CREATININE  --  1.1   CALCIUM  --  10.0   MG 1.7  --        LFT: Lab Results   Component Value Date    ALT 29 07/18/2017    AST 35 07/18/2017    ALKPHOS 158 (H) 07/18/2017    BILITOT 0.5 07/18/2017       Microbiology:  7/17/17 - Blood Cx - NGTD      Diagnostic Results:      ASSESSMENT/PLAN:     Active Hospital Problems    Diagnosis  POA    *Hypernatremia [E87.0]  Yes    Acute hypoxemic respiratory failure [J96.01]  Unknown    Malnutrition of moderate degree [E44.0]  No    Acute encephalopathy [G93.40]  Yes    Tobacco abuse [Z72.0]  Yes     Chronic    Secondary hypothyroidism [E03.8]  Yes     Chronic    Primary  central diabetes insipidus [E23.2]  Yes    Meningioma, recurrent of brain [D32.0]  Yes      Resolved Hospital Problems    Diagnosis Date Resolved POA    Hypothermia [T68.XXXA] 07/03/2017 No    Goals of care, counseling/discussion [Z71.89] 07/03/2017 Not Applicable    Hypernatremia [E87.0] 07/13/2017 Yes       Plan: Please see top of page

## 2017-07-19 NOTE — ASSESSMENT & PLAN NOTE
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    -Hx of a right frontotemporal craniotomy with partial removal of a large subfrontal and tuberculum sellae meningioma at [facility] in 2015. 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    - Neurosurgery consulted for safety of possible LP; determined it is safe - will proceed with LP today

## 2017-07-19 NOTE — PROCEDURES
"Joanna Morales is a 51 y.o. female patient.    Temp: 98.1 °F (36.7 °C) (07/18/17 1830)  Pulse: 93 (07/18/17 1900)  Resp: (!) 27 (07/18/17 1900)  BP: 118/75 (07/18/17 1900)  SpO2: 97 % (07/18/17 1900)  Weight: 60 kg (132 lb 4.4 oz) (07/18/17 1100)  Height: 5' 2" (157.5 cm) (07/18/17 1100)       Central Line  Date/Time: 7/18/2017 7:48 PM  Location procedure was performed: [facility]  Performed by: JAIME BAHENA  Supervising provider: Conner Jj MD  Assisting provider: CONNER JJ  Consent Done: Yes  Time out: Immediately prior to procedure a "time out" was called to verify the correct patient, procedure, equipment, support staff and site/side marked as required.  Indications: med administration  Anesthesia: local infiltration    Anesthesia:  Local Anesthetic: lidocaine 1% without epinephrine  Preparation: skin prepped with ChloraPrep  Skin prep agent dried: skin prep agent completely dried prior to procedure  Sterile barriers: all five maximum sterile barriers used - cap, mask, sterile gown, sterile gloves, and large sterile sheet  Hand hygiene: hand hygiene performed prior to central venous catheter insertion  Location details: left subclavian  Catheter type: triple lumen  Ultrasound guidance: yes  Vessel Caliber: small, compressibility normal  Needle advanced into vessel with real time Ultrasound guidance.  Guidewire confirmed in vessel.  Sterile sheath used.  Manometry: Yes  Number of attempts: 1  Assessment: placement verified by x-ray,  no pneumothorax on x-ray and successful placement  Complications: none  Estimated blood loss (mL): 5  Specimens: No  Implants: No  Post-procedure: line sutured,  sterile dressing applied and blood return through all ports  Complications: No          Jaime Bahena  7/18/2017  "

## 2017-07-19 NOTE — ASSESSMENT & PLAN NOTE
-pt with O2 sats in the mid 80's after PEG tube placement and minimal improvement on a non rebreather, pt then placed on BiPAP and ABG showed pH 7.372, CO2 44, O2 62, HCO3 25.8 so pt switched to nasal cannula and tolerating it well  -CXray concerning for RLL infiltrate; vanc/cefepime/acyclovir/fluconazole day 2  -will f/u cultures and repeat VBG

## 2017-07-19 NOTE — PROGRESS NOTES
[facility]  Critical Care Medicine  Progress Note    Patient Name: Joanna Morales  MRN: [id number]  Admission Date: 6/28/2017  Hospital Length of Stay: 21 days  Code Status: Full Code  Attending Provider: Sonya Dc MD  Primary Care Provider: Claudy Tse MD   Principal Problem: Hypernatremia    Subjective:     HPI:  Ms. Joanna Morales is a 51 y.o. female with tobacco abuse, secondary hypothyroidism (TSH 0.241 6/17) and history of meningioma s/p resection 2015 & 6/7/2017. The most [place]. Chew [place] [facility] [place] by DI . The patient was discharged to [facility] on 6/26/17 and re-presented [place] [facility] [place] on 6/28 with altered mental status and worsening hypernatremia.     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      Hospital/ICU Course:  Ms. Morales was [place] [facility]  to [facility] on 6/26/17 and presented to [facility] [place] on 6/28 with altered mental status and worsening hypernatremia. According to the patient's sitter, the patient was awake, verbal, and interactive with therapy on 6/23 but had been progressively less interactive and responsive when she was at [place]. Ms. Morales was admitted to Hospital Medicine but was found to be unresponsive with a sodium level of 168 on the morning of 6/29.     She has been followed by endocrinology and neurology and workup thus far significant for  unremarkable UA, neuro-imaging c/w recent left sphenoidal meningioma resection with  resolution of blood products and trace intraventricular hemorrhage.  Previous EEG (6/16/17) demonstrated Moderate diffuse slowing of the overall background as described above, Superimposed left hemispheric slowing relative to the right, and NO epileptiform discharges or seizures were seen c/w ENCP and focal cortical dysfunction.  Pt's presentation is concerning for multi-factorial encephalopathy given persistent hypernatremia.  Per neurology, low suspicion for MNG currently, but patient is at high risk given recent intracranial procedure.    Critical Care consulted 7/17/17 for acute hypoxic respiratory failure. Pt had PEG tube placed earlier today and in PACU, oxygen saturations noted to be in the mid 80's. Pt was placed on a non-rebreather with minimal improvement and subsequently placed on BiPAP. ABG significant for elevated CO2 however pt was compensating. Repeat ABG showed pH 7.372, CO2 44, O2 62, HCO3 25.8. Pt transitioned to NC. Cxray concerning for aspiration pneumonia - new infiltrate in RLL so patient started on vancomycin and zosyn for HAP.      Interval History/Significant Events: No acute events overnight. Off Levophed since 1 am. Waxing and waning mental status. More responsive this morning and able to follow simple commands. Urinary output increased overnight. Will switch nasal DDAVP to Iv. CXR improved this AM.    Review of Systems   Unable to perform ROS: Acuity of condition     Objective:     Vital Signs (Most Recent):  Temp: 97.6 °F (36.4 °C) (07/19/17 0715)  Pulse: 85 (07/19/17 0815)  Resp: 15 (07/19/17 0815)  BP: (!) 87/66 (07/19/17 0815)  SpO2: 96 % (07/19/17 0815) Vital Signs (24h Range):  Temp:  [94.4 °F (34.7 °C)-99.5 °F (37.5 °C)] 97.6 °F (36.4 °C)  Pulse:  [] 85  Resp:  [11-27] 15  SpO2:  [92 %-100 %] 96 %  BP: ()/(51-79) 87/66   Weight: 60 kg (132 lb 4.4 oz)  Body mass index is 24.19 kg/m².      Intake/Output Summary (Last 24 hours) at 07/19/17 0831  Last data filed at 07/19/17  0715   Gross per 24 hour   Intake          6592.06 ml   Output             3050 ml   Net          3542.06 ml       Physical Exam   Constitutional:   Thin, cachectic   HENT:   Head: Normocephalic and atraumatic.   Eyes:   Patient is legally blind   Neck: Normal range of motion.   Cardiovascular: Normal rate and regular rhythm.    Pulmonary/Chest: No respiratory distress.   RLL rales   Abdominal: Soft. There is no tenderness.   Musculoskeletal: Normal range of motion.   Neurological:   -  Mental Status:  Very lethargic. Follows simple commands and moves extremities.   Skin: Skin is warm and dry.   Psychiatric: Cognition and memory are impaired.   Vitals reviewed.      Vents:  Oxygen Concentration (%): 50 (07/17/17 2135)  Lines/Drains/Airways     Central Venous Catheter Line                 Percutaneous Central Line Insertion/Assessment - triple lumen  07/18/17 1934 left internal jugular less than 1 day          Drain                 Gastrostomy/Enterostomy 07/17/17 1520 Percutaneous endoscopic gastrostomy (PEG) LUQ feeding 1 day         Urethral Catheter 07/17/17 2335 Straight-tip 16 Fr. 1 day          Peripheral Intravenous Line                 Peripheral IV - Single Lumen 07/17/17 0800 Right Antecubital 2 days              Significant Labs:    CBC/Anemia Profile:    Recent Labs  Lab 07/18/17  0457 07/18/17  0855 07/19/17  0318   WBC 23.65* 34.23* 18.80*   HGB 7.3* 9.5* 7.7*   HCT 23.4* 30.3* 24.5*    366* 264   * 101* 100*   RDW 15.6* 15.3* 15.0*        Chemistries:    Recent Labs  Lab 07/18/17  0855  07/18/17  1534 07/19/17  0002 07/19/17  0318 07/19/17  0633   *  < > 149* 146*  --  150*   K 4.1  < > 4.3 3.2*  --  3.9     < > 108 108  --  115*   CO2 30*  < > 27 27  --  26   BUN 35*  < > 31* 26*  --  21*   CREATININE 1.3  < > 1.2 1.1  --  1.1   CALCIUM 10.8*  < > 10.2 9.3  --  10.0   ALBUMIN 2.3*  --   --   --   --   --    PROT 7.3  --   --   --   --   --    BILITOT 0.5  --   --   --   --    --    ALKPHOS 158*  --   --   --   --   --    ALT 29  --   --   --   --   --    AST 35  --   --   --   --   --    MG 2.1  --   --   --  1.7  --    PHOS 5.5*  --   --   --  3.6  --    < > = values in this interval not displayed.    All pertinent labs within the past 24 hours have been reviewed.    Significant Imaging:  I have reviewed and interpreted all pertinent imaging results/findings within the past 24 hours.    Assessment/Plan:     Neuro   Acute encephalopathy    --see above, likely multifactorial (metabolic considering persistent hypernatremia)  --continue keppra 500 BID for seizure prophylaxis.  --  Hold gabapentin for now given increased somnolence associated with dosing (discussed with NSGY)  -- Monitor for delirium and encourage delirium precautions, pt also with q1h neuro checks   -- Avoid benzodiazepines, anticholinergics, and anti-histaminics and minimize opiates when possible as they may worsen or exacerbate delirium.  -- Ensure blinds are open with lights on during the day and that the shades are closed with lights off at night. -- Reorient pt through out the day.    -- repeat CT head 7/17/17 with no interval changes  - MRI 7/18 with no acute changes  - Neuro consulted and discussed possibility of fungal infection; will broaden coverage with Fluconazole and consulted ID  - On Cefepime, Vanc, Fluconazole day 2            Secondary hypothyroidism    --continue 50mcg Levothyroxine per NGT  --T4 this admission WNL  -- Repeat in 1-2months            Primary central diabetes insipidus    --appreciate endocrine's assistance.   --suspect hypernatremia is multifactorial: dehydration given poor PO intake and a component of DI given extensive meningioma resection on 6/7/17  --pt still hypernatremic - will adjust FWB  -- continue nightly DDAVP 10 mcg intranasally nightly.   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    - Neurosurgery consulted for safety of possible LP; determined it is safe - will proceed with LP today              Pulmonary   Acute hypoxemic respiratory failure    -pt with O2 sats in the mid 80's after PEG tube placement and minimal improvement on a non rebreather, pt then placed on BiPAP and ABG showed pH 7.372, CO2 44, O2 62, HCO3 25.8 so pt switched to nasal cannula and tolerating it well  -CXray concerning for RLL infiltrate; vanc/cefepime/acyclovir/fluconazole day 2  -will f/u cultures and repeat VBG        Fluids/Electrolytes/Nutrition/GI   * Hypernatremia    -pt with persistent hypernatremia since admission  -will increase FWB to 300cc q6h (was receiving 200cc q8h) and monitor BMP q6h, continue desmopressin  -likely contributing to patient's AMS        Malnutrition of moderate degree    -s/p PEG tube placement on 7/17/17  -will f/u nutrition recs and start feeds        Other   Tobacco abuse    --Nicotine patch           Critical Care Medicine Daily Checklist:    A: Awake: RASS Goal/Actual Goal: RASS Goal: 0-->alert and calm  Actual: Tong Agitation Sedation Scale (RASS): Alert and calm   B: Spontaneous Breathing Trial Performed?     C:  SAT & SBT Coordinated?  NA                      D: Delirium: CAM-ICU Overall CAM-ICU: Negative   E: Early Mobility Performed? No   F: Feeding Goal: Goals: Pt to receive >90% of EEN via TF or po intake  Status: Nutrition Goal Status: progressing towards goal   Current Diet Order   Procedures    Diet NPO      AS: Analgesia/Sedation None   T: Thromboembolic Prophylaxis Enoxaparin D/C'd 7/18   H: HOB > 300 Yes   U: Stress Ulcer Prophylaxis (if needed) None   G: Glucose Control Accuechecks   B: Bowel Function Stool Occurrence: 0   I: Indwelling Catheter (Lines & Mackey) Necessity Left IJ; Mackey   D: De-escalation of Antimicrobials/Pharmacotherapies Vanc/Cefepime/Acyclovir/Fluconazole Day 2    Plan for the day/ETD Continue to monitor respiratory status; Consider LP    Code Status:  Family/Goals of Care: Full Code        Jonas Renee MD  Critical Care Medicine  [facility]

## 2017-07-19 NOTE — ASSESSMENT & PLAN NOTE
--see above, likely multifactorial (metabolic considering persistent hypernatremia)  --continue keppra 500 BID for seizure prophylaxis.  --  Hold gabapentin for now given increased somnolence associated with dosing (discussed with NSGY)  -- Monitor for delirium and encourage delirium precautions, pt also with q1h neuro checks   -- Avoid benzodiazepines, anticholinergics, and anti-histaminics and minimize opiates when possible as they may worsen or exacerbate delirium.  -- Ensure blinds are open with lights on during the day and that the shades are closed with lights off at night. -- Reorient pt through out the day.    -- repeat CT head 7/17/17 with no interval changes  - MRI 7/18 with no acute changes  - Neuro consulted and discussed possibility of fungal infection; will broaden coverage with Fluconazole and consulted ID  - On Cefepime, Vanc, Fluconazole day 2

## 2017-07-19 NOTE — PROGRESS NOTES
[facility]  Neurology  Progress Note    Patient Name: Joanna Morales  MRN: [id number]  Admission Date: 6/28/2017  Hospital Length of Stay: 21 days  Code Status: Full Code   Attending Provider: Sonya Dc MD  Primary Care Physician: Claudy Tse MD   Principal Problem:Hypernatremia      Subjective:     Interval History:   Hooked up to EEG. Per friend at bedside no acute events overnight  Awaiting NSGY decision on safety of LP to assess for possible CNS infection given leukocytosis and recent meningioma resection  Empiric coverage broadened to include fluconazole (CNS penetration dose)    Current Facility-Administered Medications   Medication Dose Route Frequency Provider Last Rate Last Dose    acetaminophen tablet 500 mg  500 mg Oral Q6H PRN Jaswant Arellano MD   500 mg at 07/14/17 2042    acyclovir (ZOVIRAX) 600 mg in dextrose 5 % 100 mL IVPB  10 mg/kg Intravenous Q12H Júnior Chaves PA-C 100 mL/hr at 07/18/17 1429 600 mg at 07/18/17 1429    ceFEPIme in dextrose 5% 2 gram/50 mL IVPB 2 g  2 g Intravenous Q12H Júnior Chaves PA-C 100 mL/hr at 07/18/17 1430 2 g at 07/18/17 1430    desmopressin 10 mcg/spray (0.1 mL) solution 10 mcg  10 mcg Nasal Nightly Gerry Sr MD   10 mcg at 07/17/17 2131    dextrose 50% injection 12.5 g  12.5 g Intravenous PRN Fiona Winterbottom, APRN, CNS   12.5 g at 06/30/17 2011    glucagon (human recombinant) injection 1 mg  1 mg Intramuscular PRN Fiona Winterbottom, APRN, CNS        guaifenesin 100 mg/5 ml syrup 300 mg  300 mg Oral Q6H PRN Nitza Bonilla MD        insulin aspart pen 0-5 Units  0-5 Units Subcutaneous Q6H PRN Fiona Winterbottom, APRN, CNS        ipratropium 0.02 % nebulizer solution 0.5 mg  0.5 mg Nebulization Q6H PRN Nitza Bonilla MD   0.5 mg at 07/17/17 0214    levetiracetam oral soln Soln 500 mg  500 mg Per NG tube BID Fiona Winterbottom, APRN, CNS   500 mg at 07/18/17 0917    levothyroxine tablet 50 mcg  50 mcg Per NG tube  Daily Fiona Winterbottom, APRN, CNS   50 mcg at 07/18/17 0917    norepinephrine 4 mg in dextrose 5% 250 mL infusion (premix) (titrating)  0.05 mcg/kg/min (Dosing Weight) Intravenous Continuous Lucille Geiger NP 10.2 mL/hr at 07/18/17 1510 0.05 mcg/kg/min at 07/18/17 1510    ondansetron injection 8 mg  8 mg Intravenous Q8H PRN Jaswant Arellano MD        potassium chloride 10% solution 40 mEq  40 mEq Per NG tube PRN Carmina De Jesus NP   Stopped at 07/12/17 0851    sodium chloride 0.9% flush 3 mL  3 mL Intravenous PRN Mariposa Davis MD        vancomycin (VANCOCIN) 750 mg in dextrose 5 % 250 mL IVPB  15 mg/kg (Dosing Weight) Intravenous Q12H Lucille Geiger .7 mL/hr at 07/18/17 1429 750 mg at 07/18/17 1429     Review of Systems   Unable to perform ROS: Mental status change     Objective:     Vital Signs (Most Recent):  Temp: 97.6 °F (36.4 °C) (07/18/17 1100)  Pulse: 85 (07/18/17 1428)  Resp: 18 (07/18/17 1428)  BP: (!) 98/55 (07/18/17 1428)  SpO2: (!) 92 % (07/18/17 1428) Vital Signs (24h Range):  Temp:  [97.2 °F (36.2 °C)-98.2 °F (36.8 °C)] 97.6 °F (36.4 °C)  Pulse:  [] 85  Resp:  [14-33] 18  SpO2:  [83 %-100 %] 92 %  BP: ()/(50-75) 98/55     Weight: 60 kg (132 lb 4.4 oz)  Body mass index is 24.19 kg/m².    Physical Exam   Constitutional: No distress.   Neck: Neck supple.   Pulmonary/Chest: Effort normal.   Neurological:   Reflex Scores:       Bicep reflexes are 2+ on the right side and 2+ on the left side.       Brachioradialis reflexes are 2+ on the right side and 2+ on the left side.       Patellar reflexes are 2+ on the right side and 2+ on the left side.  Skin: She is not diaphoretic.     NEUROLOGICAL EXAMINATION:     MENTAL STATUS   Oriented to person.   Disoriented to place.   Follows 1 step commands.   Attention: decreased. Concentration: decreased.   Level of consciousness: drowsy    CRANIAL NERVES     CN III, IV, VI   Right pupil: Reactivity: non-reactive.   Left pupil:  Reactivity: non-reactive.   Nystagmus: none     CN V   Facial sensation intact.     CN VII   Facial expression full, symmetric.     CN VIII   CN VIII normal.     CN XII   CN XII normal.            Blind in left eye, light perception in right eye     MOTOR EXAM   Muscle bulk: decreased  Overall muscle tone: normal       Moving all extremities antigravity      REFLEXES     Reflexes   Right brachioradialis: 2+  Left brachioradialis: 2+  Right biceps: 2+  Left biceps: 2+  Right patellar: 2+  Left patellar: 2+    SENSORY EXAM   Light touch normal.     Significant Labs:   Hemoglobin A1c: No results for input(s): HGBA1C in the last 720 hours.  Blood Culture: No results for input(s): LABBLOO in the last 48 hours.  CBC:   Recent Labs  Lab 07/17/17  0355 07/17/17  2111 07/18/17  0457 07/18/17  0855   WBC 10.76  --  23.65* 34.23*   HGB 11.7*  --  7.3* 9.5*   HCT 37.4 56* 23.4* 30.3*   *  --  223 366*     CMP:   Recent Labs  Lab 07/17/17  0355 07/17/17  1745 07/18/17  0855 07/18/17  1124   GLU 84 125* 137* 98   * 157* 149* 150*   K 4.0 3.5 4.1 3.9   * 117* 107 108   CO2 32* 27 30* 31*   BUN 35* 35* 35* 33*   CREATININE 1.1 1.2 1.3 1.2   CALCIUM 11.8* 11.7* 10.8* 10.3   MG 3.2*  --  2.1  --    PROT 8.5*  --  7.3  --    ALBUMIN 3.0*  --  2.3*  --    BILITOT 0.2  --  0.5  --    ALKPHOS 185*  --  158*  --    AST 62*  --  35  --    ALT 44  --  29  --    ANIONGAP 13 13 12 11   EGFRNONAA 58.3* 52.5* 47.6* 52.5*     Inflammatory Markers: No results for input(s): SEDRATE, CRP, PROCAL in the last 48 hours.  Urine Culture: No results for input(s): LABURIN in the last 48 hours.  Urine Studies:   Recent Labs  Lab 07/18/17  0110   COLORU Yellow   APPEARANCEUA Hazy*   PHUR 5.0   SPECGRAV 1.020   PROTEINUA Negative   GLUCUA Negative   KETONESU Negative   BILIRUBINUA Negative   OCCULTUA 2+*   NITRITE Negative   UROBILINOGEN Negative   LEUKOCYTESUR Negative   RBCUA 17*   WBCUA 3   BACTERIA Few*   SQUAMEPITHEL 1     All  pertinent lab results from the past 24 hours have been reviewed.    24 hr vEEG results pending    Significant Imaging: I have reviewed and interpreted all pertinent imaging results/findings within the past 24 hours.     MRI Brain W WO contrast (7/18/17):  Postsurgical changes of bilateral frontal temporal craniotomy for sphenoid meningioma resection.  There is mucosal thickening of the left maxillary antrum, bilateral sphenoid sinus and near-complete opacification of the left ethmoid air cells.  There is a mucosal thickening of the left frontal sinus.   Rightward tentorial shift around frontal lobes     Assessment and Plan:     Acute encephalopathy    50 yo F with tuberculum sellae meningioma s/p resection, and repeat resection for recurrence / olfactory groove meningioma on 6/7/17 with  Admission for hypernatremia and encephalopathy. Waxing and waning mental status. PEG placed Monday 7/17 and decline in mental status 7/18 with concern for CVA vs seizure. MRI Brain performed 7/18 showed mucosal thickening of the left maxillary antrum, bilateral sphenoid sinus and near-complete opacification of the left ethmoid air cells,mucosal thickening of the left frontal sinus. Concern for possible CNS infection if any communication between postop site and sinuses. ID has been consulted and broad spectrum Abx + fluconazole + acyclovir started in setting of new leukocytosis. NSGY asked to weigh in on safety of LP--concern for vectors shifting from appearance of rightward tentorial shift on MRI around frontal lobes vs. ex vacuo.      -will f/u EEG results, continue Keppra 500 mg BID for seizure ppx for now  -appreciated ID recommendations  -If LP is performed, obtain opening pressure, cell count with diff, glucose, protein, Cx, gram stain, cryptococcus, freeze and hold   -continue correcting hypernatremia per primary team  -delirium precautions, regulate sleep/wake, frequent reorientation, minimize sedating medications     VTE  Risk Mitigation         Ordered     High Risk of VTE  Once      06/30/17 0920     Place sequential compression device  Until discontinued      06/30/17 0920        Dr. Valdez attestation to follow    Lydia Luciano PA-C  General Neurology Consult  Neuro Consult [facility] # [id number]

## 2017-07-20 NOTE — ASSESSMENT & PLAN NOTE
-pt with O2 sats in the mid 80's after PEG tube placement and minimal improvement on a non rebreather, pt then placed on BiPAP and ABG showed pH 7.372, CO2 44, O2 62, HCO3 25.8 so pt switched to nasal cannula and tolerating it well  -CXray concerning for RLL infiltrate; vanc/cefepime/acyclovir/fluconazole day 3  -will f/u cultures

## 2017-07-20 NOTE — ASSESSMENT & PLAN NOTE
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    -Hx of a right frontotemporal craniotomy with partial removal of a large subfrontal and tuberculum sellae meningioma at [facility] in 2015. 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    - Neurosurgery consulted for safety of possible LP; awaiting documentation of approval

## 2017-07-20 NOTE — SUBJECTIVE & OBJECTIVE
Interval History/Significant Events: No acute events overnight. BP stable off Levo for two days now. More responsive this am. Sodium continues to decrease with IV desmopressin on board.    Review of Systems   Unable to perform ROS: Acuity of condition     Objective:     Vital Signs (Most Recent):  Temp: 97.7 °F (36.5 °C) (07/20/17 0700)  Pulse: 75 (07/20/17 1000)  Resp: 17 (07/20/17 1000)  BP: 122/68 (07/20/17 1000)  SpO2: 95 % (07/20/17 1000) Vital Signs (24h Range):  Temp:  [97.5 °F (36.4 °C)-97.7 °F (36.5 °C)] 97.7 °F (36.5 °C)  Pulse:  [72-95] 75  Resp:  [13-23] 17  SpO2:  [93 %-100 %] 95 %  BP: ()/(56-91) 122/68   Weight: 60 kg (132 lb 4.4 oz)  Body mass index is 24.19 kg/m².      Intake/Output Summary (Last 24 hours) at 07/20/17 1026  Last data filed at 07/20/17 1000   Gross per 24 hour   Intake             3045 ml   Output             2880 ml   Net              165 ml       Physical Exam   Constitutional:   Thin, cachectic   HENT:   Head: Normocephalic and atraumatic.   Eyes:   Patient is legally blind  Pupils dilated L>R minimally reactive   Neck: Normal range of motion.   Cardiovascular: Normal rate and regular rhythm.    Pulmonary/Chest: No respiratory distress.   RLL rales - slight   Abdominal: Soft. There is no tenderness.   Musculoskeletal: Normal range of motion.   Neurological:   Mental Status:  Very lethargic. Follows simple commands and moves extremities.   Skin: Skin is warm and dry.   Psychiatric: Cognition and memory are impaired.   Vitals reviewed.      Vents:  Oxygen Concentration (%): 50 (07/17/17 2135)  Lines/Drains/Airways     Central Venous Catheter Line                 Percutaneous Central Line Insertion/Assessment - triple lumen  07/18/17 1934 left internal jugular 1 day          Drain                 Gastrostomy/Enterostomy 07/17/17 1520 Percutaneous endoscopic gastrostomy (PEG) LUQ feeding 2 days         Urethral Catheter 07/17/17 2335 Straight-tip 16 Fr. 2 days           Peripheral Intravenous Line                 Peripheral IV - Single Lumen 07/17/17 0800 Right Antecubital 3 days              Significant Labs:    CBC/Anemia Profile:    Recent Labs  Lab 07/19/17 0318 07/20/17  0515   WBC 18.80* 8.33   HGB 7.7* 8.1*   HCT 24.5* 25.9*    267   * 102*   RDW 15.0* 15.5*        Chemistries:    Recent Labs  Lab 07/19/17 0318 07/19/17  1812 07/19/17  2307 07/19/17  2313 07/20/17  0515   NA  --   < > 152* 148*  --  143   K  --   < > 3.6 4.1  --  3.8   CL  --   < > 115* 115*  --  111*   CO2  --   < > 27 24  --  26   BUN  --   < > 19 19  --  18   CREATININE  --   < > 1.1 1.0  --  1.0   CALCIUM  --   < > 9.9 9.6  --  9.5   MG 1.7  --   --   --  2.7* 2.1   PHOS 3.6  --   --   --   --  4.1   < > = values in this interval not displayed.    All pertinent labs within the past 24 hours have been reviewed.    Significant Imaging:  I have reviewed and interpreted all pertinent imaging results/findings within the past 24 hours.

## 2017-07-20 NOTE — ASSESSMENT & PLAN NOTE
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 Chew 6/7/17, who re-presented with hypernatremia.    -CTH stable, no new findings.  -Continue neuro checks q4 hours. Notify NSGY for any changes.   -Continue Keppra for seizure prophylaxis  -Continue Lovenox for DVT prophylaxis  -Continue aggressive PT/OT  -OK for LP  -Medical management per primary team.  -Will continue to follow, please call with questions

## 2017-07-20 NOTE — ASSESSMENT & PLAN NOTE
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 Waxing and waning mental status. PEG placed Monday 7/17 and decline in mental status 7/18 with concern for CVA vs seizure. MRI Brain performed 7/18 and showed mucosal thickening of the left maxillary antrum, bilateral sphenoid sinus and near-complete opacification of the left ethmoid air cells,mucosal thickening of the left frontal sinus. Concern for possible CNS infection if any communication between postop site and sinuses. ID has been consulted and broad spectrum Abx + fluconazole + acyclovir started in setting of leukocytosis with concern for possible meningoencephalitis. NSGY deemed LP safe to perform 7/20, CSF pending.    -extended EEG prelim result--slowing over L hemisphere 2/2 structural changes from meningioma resection, no epileptiform discharges   -ID on board--CSF studies in process and will determine length of antiviral, Abx and antifungals  -delirium precautions, regulate sleep/wake, frequent reorientation, minimize sedating medications

## 2017-07-20 NOTE — PLAN OF CARE
Problem: Patient Care Overview  Goal: Plan of Care Review  Pt rested well in bed during shift. Oriented only to self, very rarely oriented to place, moves all extremities, follows commands, but doesn't speak in rational sentences. Pt mental status waxes and wanes in alertness, team aware. Pupils sluggish but equal. EEG in place. No obvious signs of seizures observed. HR 90s-100s. MAP sustaining >65 during shift without pressor support. Afebrile. Abx, antiviral, and antifungal administered. No BM during shift, bowel sounds present. Peg intact to LUQ with no drainage. Tube feedings continued and increased to 35ml/hr per order. Awaiting neurosurgery sign off for LP. Mackey draining clear urine to gravity from /hr. Sodium now 143. Free water flushes administered Q6. Potassium replaced. Restraints continued for safety. BG monitored Q6 with no coverage needed, 90s-160s.

## 2017-07-20 NOTE — SUBJECTIVE & OBJECTIVE
Interval History: NAEON.    Medications:  Continuous Infusions:     Scheduled Meds:   acyclovir  10 mg/kg Intravenous Q12H    ceFEPime (MAXIPIME) IVPB  2 g Intravenous Q12H    desmopressin  0.5 mcg Intravenous BID    fluconazole (DIFLUCAN) IVPB  400 mg Intravenous Q24H    levetiracetam oral soln  500 mg Per NG tube BID    levothyroxine  50 mcg Per NG tube Daily    vancomycin (VANCOCIN) IVPB  15 mg/kg (Dosing Weight) Intravenous Q12H     PRN Meds:acetaminophen, dextrose 50%, glucagon (human recombinant), guaifenesin 100 mg/5 ml, insulin aspart, ipratropium, ondansetron, potassium chloride, potassium chloride, potassium chloride, sodium chloride 0.9%     Review of Systems    Objective:     Weight: 60 kg (132 lb 4.4 oz)  Body mass index is 24.19 kg/m².  Vital Signs (Most Recent):  Temp: 97.8 °F (36.6 °C) (17 1100)  Pulse: 80 (17 1100)  Resp: 18 (17 1100)  BP: 104/74 (17 1100)  SpO2: 96 % (17 1100) Vital Signs (24h Range):  Temp:  [97.5 °F (36.4 °C)-97.8 °F (36.6 °C)] 97.8 °F (36.6 °C)  Pulse:  [72-95] 80  Resp:  [13-23] 18  SpO2:  [93 %-100 %] 96 %  BP: ()/(56-91) 104/74              Temp (24hrs), Av.6 °F (36.4 °C), Min:97.5 °F (36.4 °C), Max:97.8 °F (36.6 °C)           Date 17 0700 - 17 0659   Shift 3211-8034 0824-2307 9895-8120 24 Hour Total   I  N  T  A  K  E   P.O. 0   0    I.V.  (mL/kg) 50  (0.8)   50  (0.8)    NG/   505    Shift Total  (mL/kg) 555  (9.3)   555  (9.3)   O  U  T  P  U  T   Urine  (mL/kg/hr) 435   435    Shift Total  (mL/kg) 435  (7.3)   435  (7.3)   Weight (kg) 60 60 60 60          Gastrostomy/Enterostomy 17 1520 Percutaneous endoscopic gastrostomy (PEG) LUQ feeding (Active)   Securement taped to abdomen 2017 11:15 AM   Clamp Status/Tolerance clamped 2017 11:15 AM   Dressing dry and intact 2017 11:15 AM   Insertion Site no redness;no warmth;no drainage;no tenderness;no swelling 2017 11:15 AM   Intake (mL) 60  "mL 7/17/2017  9:44 PM   Water Bolus (mL) 180 mL 7/18/2017  9:00 AM            Urethral Catheter 07/17/17 2335 Straight-tip 16 Fr. (Active)   Site Assessment Clean;Intact 7/18/2017 11:15 AM   Collection Container Urimeter 7/18/2017 11:15 AM   Securement Method secured to top of thigh w/ adhesive device 7/18/2017 11:15 AM   Catheter Care Performed yes 7/18/2017 11:15 AM   Reason for Continuing Urinary Catheterization Critically ill in ICU requiring intensive monitoring 7/18/2017 11:15 AM   CAUTI Prevention Bundle StatLock in place w 1" slack;Intact seal between catheter & drainage tubing;No dependent loops or kinks;Drainage bag below bladder 7/17/2017 11:00 PM   Output (mL) 23 mL 7/18/2017 12:00 PM       Neurosurgery Physical Exam      General: well developed, well nourished, no distress.   Head: normocephalic  Neurologic: Awake. Minimal verbal responses.   GCS: Motor: 6/Verbal: 4 /Eyes: 4 GCS Total: 14  Mental Status: Able to state name. Intermittently follows simple commands.   Cranial nerves: face symmetric, CN II-XII grossly intact.   Eyes: pupils equal, round, reactive to light.  Motor Strength: Moves all extremities spontaneously with good strength and tone. Intermittently follows simple commands.     Significant Labs:    Recent Labs  Lab 07/20/17  0515   *      K 3.8   *   CO2 26   BUN 18   CREATININE 1.0   CALCIUM 9.5   MG 2.1       Recent Labs  Lab 07/19/17  0318 07/20/17  0515   WBC 18.80* 8.33   HGB 7.7* 8.1*   HCT 24.5* 25.9*    267       Recent Labs  Lab 07/18/17  1534   INR 1.1     Microbiology Results (last 7 days)     Procedure Component Value Units Date/Time    Blood culture [[id number]] Collected:  07/20/17 1103    Order Status:  Sent Specimen:  Blood from Peripheral, Upper Arm, Right Updated:  07/20/17 1103    Blood culture [[id number]] Collected:  07/20/17 1052    Order Status:  Sent Specimen:  Blood from Peripheral, Upper Arm, Left Updated:  07/20/17 1053    Blood culture " [[id number]]     Order Status:  Canceled Specimen:  Blood     Culture, Respiratory with Gram Stain [[id number]] Collected:  07/19/17 1541    Order Status:  Completed Specimen:  Respiratory from Sputum, Expectorated Updated:  07/19/17 2155     Gram Stain (Respiratory) <10 epithelial cells per low power field.     Gram Stain (Respiratory) Few WBC's     Gram Stain (Respiratory) No organisms seen    Cryptococcal antigen [[id number]] Collected:  07/18/17 2030    Order Status:  Completed Specimen:  Blood from Blood Updated:  07/19/17 0923     Cryptococcal Ag, Blood Negative    Blood culture [[id number]] Collected:  07/17/17 2232    Order Status:  Sent Specimen:  Blood Updated:  07/17/17 2232    Blood culture [[id number]] Collected:  07/17/17 2232    Order Status:  Sent Specimen:  Blood Updated:  07/17/17 2232

## 2017-07-20 NOTE — PROCEDURES
DATE OF PROCEDURE:  07/19/2017    EEG NUMBER:  [id number]    REQUESTED BY:  Dr. Caraballo.    LOCATION OF SERVICE:  3082.    ICU EEG/VIDEO MONITORING REPORT    METHODOLOGY:  Electroencephalographic (EEG) is recorded with electrodes placed   according to the International 10-20 placement system.  Thirty two (32) channels   of digital signal (sampling rate of 512/sec), including T1 and T2, were   simultaneously recorded from the scalp and may include EKG, EMG, and/or eye   monitors.  Recording band pass was 0.1 to 512 Hz.  Digital video recording of   the patient is simultaneously recorded with the EEG.  The patient is instructed   to report clinical symptoms which may occur during the recording session.  EEG   and video recording are stored and archived in digital format.  Activation   procedures, which include photic stimulation, hyperventilation and instructing   patients to perform simple tasks, are done in selected patients.    The EEG is displayed on a monitor screen and can be reviewed using different   montages.  Computer-assisted analysis is employed to detect spike and   electrographic seizure activity.  The entire record is submitted for computer   analysis.  The entire recording is visually reviewed, and the times identified   by computer analysis as being spikes or seizures are reviewed again.    Compressed spectral analysis (CSA) is also performed on the activity recorded   from each individual channel.  This is displayed as a power display of   frequencies from 0 to 30 Hz over time.  The CSA is reviewed looking for   asymmetries in power between homologous areas of the scalp, then compared with   the original EEG recording.    [employer] software was also utilized in the review of this study.  This software   suite analyzes the EEG recording in multiple domains.  Coherence and rhythmicity   are computed to identify EEG sections which may contain organized seizures.    Each channel undergoes analysis to  detect the presence of spike and sharp waves   which have special and morphological characteristics of epileptic activity.  The   routine EEG recording is converted from special into frequency domain.  This is   then displayed comparing homologous areas to identify areas of significant   asymmetry.  Algorithm to identify non-cortically generated artifact is used to   separate artifact from the EEG.    RECORDING TIMES:  Start on 07/19/2017 at 07:00  Stop on 07/20/2017 at 07:00  A total of 24 hours of EEG monitoring was recorded.    EEG FINDINGS:  Recording was obtained at the patient's bedside in the ICU.  The   patient was awake and breathing on her own at the onset of the recording.    Background showed a posterior dominant rhythm, which was fairly rhythmic and 7   Hz in frequency and present in the mid and posterior head regions bilaterally.    There was focal slowing with 2 to 4 Hz frequencies intermixed over the left   frontal central temporal area.  Slowing was noted there intermittently and there   was no spike or sharp wave activity intermixed.  With sleep, the background   changed into a generalized mixture of delta and theta frequencies with some   intermixed midrange beta activity.  With sleep, the lateralized changes were not   quite as apparent, but there was still focal slowing, it was greater over the   left hemisphere.  Activation procedures were not carried out.    IMPRESSION:  Abnormal EEG.  The waking background rhythm is a little slower than   expected indicating presence of a mild probably static encephalopathy, which is   nonspecific and the pattern does not suggest a specific etiology.  Focal   slowing into the delta range noted intermittently over the left frontal central   temporal area indicating a focal area of structural dysfunction.  No epileptic   activity was found.    CLINICAL CORRELATION:  The patient is a 51-year-old female who underwent   resection of a meningioma, is complaining of  headaches and some visual   disturbance.  This tracing shows mild encephalopathic changes and focal slowing   over the left side, which is probably related to the recent resection of the   meningioma.      RR/HN  dd: 07/20/2017 16:19:09 (CDT)  td: 07/20/2017 16:47:51 (CDT)  Doc ID   #[id number]  Job ID #[id number]    CC:

## 2017-07-20 NOTE — ASSESSMENT & PLAN NOTE
--see above, likely multifactorial (metabolic considering persistent hypernatremia)  --continue keppra 500 BID for seizure prophylaxis.  --  Hold gabapentin for now given increased somnolence associated with dosing (discussed with NSGY)  -- Monitor for delirium and encourage delirium precautions, pt also with q1h neuro checks   -- Avoid benzodiazepines, anticholinergics, and anti-histaminics and minimize opiates when possible as they may worsen or exacerbate delirium.  -- Ensure blinds are open with lights on during the day and that the shades are closed with lights off at night. -- Reorient pt through out the day.    -- repeat CT head 7/17/17 with no interval changes  - MRI 7/18 with no acute changes  - Neuro consulted and discussed possibility of fungal infection; will broaden coverage with Fluconazole and consulted ID  - On Cefepime, Vanc, Fluconazole, acyclovir day 3

## 2017-07-20 NOTE — PROGRESS NOTES
"  [facility]  Adult Nutrition  Consult Note    SUMMARY     Recommendations    1. Continue to increase TF rate (of Isosource 1.5) to 40 mL/hr to provide 1440 calories, 65 grams of protein, 733 mL fluid.    - Hold for residuals >500 mL.   2. RD to monitor & follow-up.    Goals: Pt to receive >90% of EEN via TF or po intake  Nutrition Goal Status: goal met  Communication of RD Recs: reviewed with RN    Reason for Assessment    Reason for Assessment: RD follow-up  Diagnosis: other (see comments) (AMS and hypernatremia)  Relevent Medical History: hypothryodism, olfactory grooce meningioma, s/p crani   Interdisciplinary Rounds: did not attend     General Information Comments: PEG placed 7/17. Tolerating current TF regimen.  Nutrition Discharge Planning: Tolerance of TF.    Nutrition Prescription Ordered    Current Diet Order: NPO     Current Nutrition Support Formula Ordered: Isosource 1.5  Current Nutrition Support Rate Ordered: 35 (ml)  Current Nutrition Support Frequency Ordered: mL/hr    Evaluation of Received Nutrients/Fluid Intake    Enteral Calories (kcal): 1260  Enteral Protein (gm): 57  Enteral (Free Water) Fluid (mL): 642  Free Water Flush Fluid (mL): 1200     Energy Calories Required: not meeting needs  % Kcal Needs:  (90%)     Protein Required: meeting needs  % Protein Needs:  (95%)     Fluid Required: meeting needs     Tolerance: tolerating    Nutrition Risk Screen     Nutrition Risk Screen: dysphagia or difficulty swallowing    Nutrition/Diet History    Patient Reported Diet/Restrictions/Preferences:  (JAVIER)     Food Preferences: JAVIER     Factors Affecting Nutritional Intake: NPO    Labs/Tests/Procedures/Meds    Pertinent Labs Reviewed: reviewed  Pertinent Labs Comments: Stable  Pertinent Medications Reviewed: reviewed, pertinent    Physical Findings    Overall Physical Appearance: nourished, lethargic  Tubes: gastrostomy tube  Skin: incision    Anthropometrics    Height: 5' 2" (157.5 " cm)  Weight Method: Bed Scale  Weight: 60 kg (132 lb 4.4 oz)     Ideal Body Weight (IBW), Female: 110 lb  % Ideal Body Weight, Female (lb): 120.25 lb     BMI (Calculated): 24.2  BMI Grade: 18.5-24.9 - normal    Estimated/Assessed Needs    Weight Used For Calorie Calculations: 60 kg (132 lb 4.4 oz)      Energy Need Method: [place] Jeor, other (see comments) (9314-8527 kcal/d)     Weight Used For Protein Calculations: 60 kg (132 lb 4.4 oz)  Protein Requirements: 60-72 g/d    Fluid Need Method: RDA Method, other (see comments) (1 mL/kcal or per MD)    Assessment and Plan    Inadequate energy intake r/t inability to consume sufficient energy AEB NPO with no alternate means of nutrition.  Status: Resolved    Monitor and Evaluation    Food and Nutrient Intake: enteral nutrition intake  Food and Nutrient Adminstration: enteral and parenteral nutrition administration     Anthropometric Measurements: weight, weight change, body mass index  Biochemical Data, Medical Tests and Procedures: electrolyte and renal panel, glucose/endocrine profile, gastrointestinal profile, inflammatory profile, lipid profile  Nutrition-Focused Physical Findings: overall appearance    Nutrition Risk    Level of Risk: other (see comments) (f/u 1x/week)    Nutrition Follow-Up    RD Follow-up?: Yes

## 2017-07-20 NOTE — SUBJECTIVE & OBJECTIVE
Subjective:     Interval History:   Na has normalized to 142 with IV DDAVP  Successful bedside LP today, CSF in process  Disconnecting EEG this afternoon, no push button events   Lethargic and mental status waxing and waning throughout the day    Current Facility-Administered Medications   Medication Dose Route Frequency Provider Last Rate Last Dose    acetaminophen tablet 500 mg  500 mg Oral Q6H PRN Jaswant Arellano MD   500 mg at 07/14/17 2042    acyclovir (ZOVIRAX) 600 mg in dextrose 5 % 100 mL IVPB  10 mg/kg Intravenous Q12H Júnior Chaves PA-C 100 mL/hr at 07/20/17 1408 600 mg at 07/20/17 1408    bisacodyl suppository 10 mg  10 mg Rectal Daily PRN Jonas Renee MD   10 mg at 07/20/17 1310    ceFEPIme in dextrose 5% 2 gram/50 mL IVPB 2 g  2 g Intravenous Q12H Júnior Chaves PA-C 100 mL/hr at 07/20/17 1408 2 g at 07/20/17 1408    desmopressin injection 0.5 mcg  0.5 mcg Intravenous BID Sonya Dc MD   0.5 mcg at 07/20/17 1209    dextrose 50% injection 12.5 g  12.5 g Intravenous PRN Fiona Winterbottom, APRN, CNS   12.5 g at 07/19/17 1808    fluconazole (DIFLUCAN) IVPB 400 mg 200 mL  400 mg Intravenous Q24H Jonas Renee  mL/hr at 07/19/17 2123 400 mg at 07/19/17 2123    glucagon (human recombinant) injection 1 mg  1 mg Intramuscular PRN Fiona Winterbottom, APRN, CNS        guaifenesin 100 mg/5 ml syrup 300 mg  300 mg Oral Q6H PRN Nitza Bonilla MD        insulin aspart pen 0-5 Units  0-5 Units Subcutaneous Q6H PRN Fiona Winterbottom, APRN, CNS        ipratropium 0.02 % nebulizer solution 0.5 mg  0.5 mg Nebulization Q6H PRN Nitza Bonilla MD   0.5 mg at 07/17/17 0214    levetiracetam oral soln Soln 500 mg  500 mg Per NG tube BID Fiona Winterbottom, APRN, CNS   500 mg at 07/20/17 0920    levothyroxine tablet 50 mcg  50 mcg Per NG tube Daily Fiona Winterbottom, APRN, CNS   50 mcg at 07/20/17 0920    ondansetron injection 8 mg  8 mg Intravenous Q8H PRN Jaswant Arellano MD         potassium chloride 20 mEq in 100 mL IVPB (FOR CENTRAL LINE ADMINISTRATION ONLY)  20 mEq Intravenous PRN Aline Dubon MD 50 mL/hr at 07/19/17 0440 20 mEq at 07/19/17 0440    potassium chloride 40 mEq in 100 mL IVPB (FOR CENTRAL LINE ADMINISTRATION ONLY)  40 mEq Intravenous PRN Aline Dubon MD        potassium chloride 40 mEq in 100 mL IVPB (FOR CENTRAL LINE ADMINISTRATION ONLY)  40 mEq Intravenous PRN Aline Dubon MD 25 mL/hr at 07/20/17 0649 40 mEq at 07/20/17 0649    senna tablet 8.6 mg  8.6 mg Per G Tube Daily PRN Jonas Renee MD   8.6 mg at 07/20/17 1310    sodium chloride 0.9% flush 3 mL  3 mL Intravenous PRN Mariposa Davis MD        vancomycin (VANCOCIN) 750 mg in dextrose 5 % 250 mL IVPB  15 mg/kg (Dosing Weight) Intravenous Q12H Lucille Geiger .7 mL/hr at 07/20/17 1411 750 mg at 07/20/17 1411     Review of Systems   Unable to perform ROS: Mental status change     Objective:     Vital Signs (Most Recent):  Temp: 97.5 °F (36.4 °C) (07/20/17 1500)  Pulse: 87 (07/20/17 1500)  Resp: (!) 22 (07/20/17 1500)  BP: 119/83 (07/20/17 1500)  SpO2: (!) 92 % (07/20/17 1500) Vital Signs (24h Range):  Temp:  [97.5 °F (36.4 °C)-97.8 °F (36.6 °C)] 97.5 °F (36.4 °C)  Pulse:  [72-91] 87  Resp:  [13-26] 22  SpO2:  [92 %-100 %] 92 %  BP: (101-158)/(57-91) 119/83     Weight: 60 kg (132 lb 4.4 oz)  Body mass index is 24.19 kg/m².    Physical Exam   Constitutional: No distress.   blt wrist restraints     Neck: Normal range of motion. Neck supple.   Pulmonary/Chest: Effort normal.   Skin: She is not diaphoretic.     NEUROLOGICAL EXAMINATION:     MENTAL STATUS   Oriented to person.   (Oriented to hospital, does not state [facility])  Follows 1 step commands.   Attention: decreased. Concentration: decreased.   Level of consciousness: drowsy       Minimal verbal response      CRANIAL NERVES     CN II   Visual acuity: (L eye-blind, R eye-light perception)    CN III, IV, VI   Right pupil: Reactivity:  non-reactive.   Left pupil: Reactivity: non-reactive.     CN VIII   CN VIII normal.     MOTOR EXAM   Muscle bulk: decreased       Moving all extremities spontaneously with 4/5 power throughout      Significant Labs:   Blood Culture: No results for input(s): LABBLOO in the last 48 hours.  CBC:   Recent Labs  Lab 07/19/17 0318 07/20/17  0515   WBC 18.80* 8.33   HGB 7.7* 8.1*   HCT 24.5* 25.9*    267     CMP:   Recent Labs  Lab 07/19/17 0318 07/19/17  2307 07/19/17  2313 07/20/17  0515 07/20/17  1212   GLU  --   < > 85  --  125* 88   NA  --   < > 148*  --  143 142   K  --   < > 4.1  --  3.8 4.2   CL  --   < > 115*  --  111* 108   CO2  --   < > 24  --  26 28   BUN  --   < > 19  --  18 19   CREATININE  --   < > 1.0  --  1.0 0.8   CALCIUM  --   < > 9.6  --  9.5 9.8   MG 1.7  --   --  2.7* 2.1  --    ANIONGAP  --   < > 9  --  6* 6*   EGFRNONAA  --   < > >60.0  --  >60.0 >60.0   < > = values in this interval not displayed.  CSF Culture: No results for input(s): CSFCULTURE in the last 48 hours.  CSF Studies: No results for input(s): ALIQUT, APPEARCSF, COLORCSF, CSFWBC, CSFRBC, GLUCCSF, LDHCSF, PROTEINCSF, VDRLCSF in the last 48 hours.  Inflammatory Markers: No results for input(s): SEDRATE, CRP, PROCAL in the last 48 hours.  Respiratory Culture:   Recent Labs  Lab 07/19/17  1541   GSRESP <10 epithelial cells per low power field.  Few WBC's  No organisms seen     Urine Culture: No results for input(s): LABURIN in the last 48 hours.  Urine Studies: No results for input(s): COLORU, APPEARANCEUA, PHUR, SPECGRAV, PROTEINUA, GLUCUA, KETONESU, BILIRUBINUA, OCCULTUA, NITRITE, UROBILINOGEN, LEUKOCYTESUR, RBCUA, WBCUA, BACTERIA, SQUAMEPITHEL, HYALINECASTS in the last 48 hours.    Invalid input(s): WRIGHTSUR  All pertinent lab results from the past 24 hours have been reviewed.    Significant Imaging: I have reviewed and interpreted all pertinent imaging results/findings within the past 24 hours.

## 2017-07-20 NOTE — NURSING
Bedside LP complete; 4 vials CSF collected and sent to lab; pt tolerated well; band aid on back, no bleeding

## 2017-07-20 NOTE — PROGRESS NOTES
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 Chew and discharged on 17 to [facility].  She presents with altered mental status and worsening hypernatremia for 1 day. She was treated for DI her last admission and labs are trending up today.  Spoke with Dr. Fairchild at [facility], patient with waxing/waning mental status yesterday, but was appropriate & following some complex commands yesterday.  There was a period yesterday of significant lethargy.  Patient is unable to give history, and information is taken from the chart.     Post-Op Info:  Procedure(s) (LRB):  PLACEMENT-TUBE-PEG (N/A)   3 Days Post-Op     Interval History: NAEON.    Medications:  Continuous Infusions:   norepinephrine bitartrate-D5W       Scheduled Meds:   acyclovir  10 mg/kg Intravenous Q12H    ceFEPime (MAXIPIME) IVPB  2 g Intravenous Q12H    desmopressin  10 mcg Nasal Nightly    levetiracetam oral soln  500 mg Per NG tube BID    levothyroxine  50 mcg Per NG tube Daily    vancomycin (VANCOCIN) IVPB  15 mg/kg (Dosing Weight) Intravenous Q12H     PRN Meds:acetaminophen, dextrose 50%, glucagon (human recombinant), guaifenesin 100 mg/5 ml, insulin aspart, ipratropium, ondansetron, potassium chloride 10%, sodium chloride 0.9%     Review of Systems  Objective:     Weight: 60 kg (132 lb 4.4 oz)  Body mass index is 24.19 kg/m².  Vital Signs (Most Recent):  Temp: 97.6 °F (36.4 °C) (17 1100)  Pulse: 89 (17 1200)  Resp: 18 (17 1200)  BP: (!) 86/63 (17 1200)  SpO2: 99 % (17 1200) Vital Signs (24h Range):  Temp:  [97.2 °F (36.2 °C)-98.7 °F (37.1 °C)] 97.6 °F (36.4 °C)  Pulse:  [] 89  Resp:  [14-33] 18  SpO2:  [83 %-100 %] 99 %  BP: ()/(50-75) 86/63              Temp (24hrs), Av.7 °F (36.5 °C), Min:97.2 °F (36.2 °C), Max:98.7 °F (37.1 °C)    Oxygen  "Concentration (%):  [] 50      Date 07/18/17 0700 - 07/19/17 0659   Shift 5137-2838 3605-7287 3446-1086 24 Hour Total   I  N  T  A  K  E   I.V.  (mL/kg) 1900  (31.7)   1900  (31.7)    NG/   180    IV Piggyback 1600   1600    Shift Total  (mL/kg) 3680  (61.3)   3680  (61.3)   O  U  T  P  U  T   Urine  (mL/kg/hr) 178   178    Shift Total  (mL/kg) 178  (3)   178  (3)   Weight (kg) 60 60 60 60          Gastrostomy/Enterostomy 07/17/17 1520 Percutaneous endoscopic gastrostomy (PEG) LUQ feeding (Active)   Securement taped to abdomen 7/18/2017 11:15 AM   Clamp Status/Tolerance clamped 7/18/2017 11:15 AM   Dressing dry and intact 7/18/2017 11:15 AM   Insertion Site no redness;no warmth;no drainage;no tenderness;no swelling 7/18/2017 11:15 AM   Intake (mL) 60 mL 7/17/2017  9:44 PM   Water Bolus (mL) 180 mL 7/18/2017  9:00 AM            Urethral Catheter 07/17/17 2335 Straight-tip 16 Fr. (Active)   Site Assessment Clean;Intact 7/18/2017 11:15 AM   Collection Container Urimeter 7/18/2017 11:15 AM   Securement Method secured to top of thigh w/ adhesive device 7/18/2017 11:15 AM   Catheter Care Performed yes 7/18/2017 11:15 AM   Reason for Continuing Urinary Catheterization Critically ill in ICU requiring intensive monitoring 7/18/2017 11:15 AM   CAUTI Prevention Bundle StatLock in place w 1" slack;Intact seal between catheter & drainage tubing;No dependent loops or kinks;Drainage bag below bladder 7/17/2017 11:00 PM   Output (mL) 23 mL 7/18/2017 12:00 PM       Neurosurgery Physical Exam    General: well developed, well nourished, no distress.   Head: normocephalic  Neurologic: Awake. Minimal verbal responses.   GCS: Motor: 6/Verbal: 4 /Eyes: 4 GCS Total: 14  Mental Status: Able to state name. Intermittently follows simple commands.   Cranial nerves: face symmetric, CN II-XII grossly intact.   Eyes: pupils equal, round, reactive to light.  Motor Strength: Moves all extremities spontaneously with good strength " and tone. Intermittently follows simple commands.     Significant Labs:    Recent Labs  Lab 07/18/17  0855 07/18/17  1124   * 98   * 150*   K 4.1 3.9    108   CO2 30* 31*   BUN 35* 33*   CREATININE 1.3 1.2   CALCIUM 10.8* 10.3   MG 2.1  --        Recent Labs  Lab 07/17/17  0355 07/17/17  2111 07/18/17  0457 07/18/17  0855   WBC 10.76  --  23.65* 34.23*   HGB 11.7*  --  7.3* 9.5*   HCT 37.4 56* 23.4* 30.3*   *  --  223 366*     No results for input(s): LABPT, INR, APTT in the last 48 hours.  Microbiology Results (last 7 days)     Procedure Component Value Units Date/Time    Blood culture [[id number]] Collected:  07/17/17 2232    Order Status:  Sent Specimen:  Blood Updated:  07/17/17 2232    Blood culture [[id number]] Collected:  07/17/17 2232    Order Status:  Sent Specimen:  Blood Updated:  07/17/17 2232        Assessment/Plan:     Meningioma, recurrent of brain    52 yo F with an olfactory groove meningioma s/p resection with Dr. Chew 6/7/17, who re-presented with hypernatremia.    -CTH stable, no new findings.  -Continue neuro checks q4 hours. Notify NSGY for any changes.   -Continue Keppra for seizure prophylaxis  -Continue Lovenox for DVT prophylaxis  -Continue aggressive PT/OT  -Medical management per primary team.  -Will continue to follow, please call with questions             Daniel Zamora MD  Neurosurgery  [facility]

## 2017-07-20 NOTE — PROGRESS NOTES
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 Chew and discharged on 17 to [facility].  She presents with altered mental status and worsening hypernatremia for 1 day. She was treated for DI her last admission and labs are trending up today.  Spoke with Dr. Fairchild at [facility], patient with waxing/waning mental status yesterday, but was appropriate & following some complex commands yesterday.  There was a period yesterday of significant lethargy.  Patient is unable to give history, and information is taken from the chart.     Post-Op Info:  Procedure(s) (LRB):  PLACEMENT-TUBE-PEG (N/A)   3 Days Post-Op     Interval History: NAEON.    Medications:  Continuous Infusions:     Scheduled Meds:   acyclovir  10 mg/kg Intravenous Q12H    ceFEPime (MAXIPIME) IVPB  2 g Intravenous Q12H    desmopressin  0.5 mcg Intravenous BID    fluconazole (DIFLUCAN) IVPB  400 mg Intravenous Q24H    levetiracetam oral soln  500 mg Per NG tube BID    levothyroxine  50 mcg Per NG tube Daily    vancomycin (VANCOCIN) IVPB  15 mg/kg (Dosing Weight) Intravenous Q12H     PRN Meds:acetaminophen, dextrose 50%, glucagon (human recombinant), guaifenesin 100 mg/5 ml, insulin aspart, ipratropium, ondansetron, potassium chloride, potassium chloride, potassium chloride, sodium chloride 0.9%     Review of Systems    Objective:     Weight: 60 kg (132 lb 4.4 oz)  Body mass index is 24.19 kg/m².  Vital Signs (Most Recent):  Temp: 97.8 °F (36.6 °C) (17 1100)  Pulse: 80 (17 1100)  Resp: 18 (17 1100)  BP: 104/74 (17 1100)  SpO2: 96 % (17 1100) Vital Signs (24h Range):  Temp:  [97.5 °F (36.4 °C)-97.8 °F (36.6 °C)] 97.8 °F (36.6 °C)  Pulse:  [72-95] 80  Resp:  [13-23] 18  SpO2:  [93 %-100 %] 96 %  BP: ()/(56-91) 104/74              Temp (24hrs), Av.6 °F (36.4  "°C), Min:97.5 °F (36.4 °C), Max:97.8 °F (36.6 °C)           Date 07/20/17 0700 - 07/21/17 0659   Shift 4937-0702 5217-6023 1333-7479 24 Hour Total   I  N  T  A  K  E   P.O. 0   0    I.V.  (mL/kg) 50  (0.8)   50  (0.8)    NG/   505    Shift Total  (mL/kg) 555  (9.3)   555  (9.3)   O  U  T  P  U  T   Urine  (mL/kg/hr) 435   435    Shift Total  (mL/kg) 435  (7.3)   435  (7.3)   Weight (kg) 60 60 60 60          Gastrostomy/Enterostomy 07/17/17 1520 Percutaneous endoscopic gastrostomy (PEG) LUQ feeding (Active)   Securement taped to abdomen 7/18/2017 11:15 AM   Clamp Status/Tolerance clamped 7/18/2017 11:15 AM   Dressing dry and intact 7/18/2017 11:15 AM   Insertion Site no redness;no warmth;no drainage;no tenderness;no swelling 7/18/2017 11:15 AM   Intake (mL) 60 mL 7/17/2017  9:44 PM   Water Bolus (mL) 180 mL 7/18/2017  9:00 AM            Urethral Catheter 07/17/17 2335 Straight-tip 16 Fr. (Active)   Site Assessment Clean;Intact 7/18/2017 11:15 AM   Collection Container Urimeter 7/18/2017 11:15 AM   Securement Method secured to top of thigh w/ adhesive device 7/18/2017 11:15 AM   Catheter Care Performed yes 7/18/2017 11:15 AM   Reason for Continuing Urinary Catheterization Critically ill in ICU requiring intensive monitoring 7/18/2017 11:15 AM   CAUTI Prevention Bundle StatLock in place w 1" slack;Intact seal between catheter & drainage tubing;No dependent loops or kinks;Drainage bag below bladder 7/17/2017 11:00 PM   Output (mL) 23 mL 7/18/2017 12:00 PM       Neurosurgery Physical Exam      General: well developed, well nourished, no distress.   Head: normocephalic  Neurologic: Awake. Minimal verbal responses.   GCS: Motor: 6/Verbal: 4 /Eyes: 4 GCS Total: 14  Mental Status: Able to state name. Intermittently follows simple commands.   Cranial nerves: face symmetric, CN II-XII grossly intact.   Eyes: pupils equal, round, reactive to light.  Motor Strength: Moves all extremities spontaneously with good strength " and tone. Intermittently follows simple commands.     Significant Labs:    Recent Labs  Lab 07/20/17  0515   *      K 3.8   *   CO2 26   BUN 18   CREATININE 1.0   CALCIUM 9.5   MG 2.1       Recent Labs  Lab 07/19/17  0318 07/20/17  0515   WBC 18.80* 8.33   HGB 7.7* 8.1*   HCT 24.5* 25.9*    267       Recent Labs  Lab 07/18/17  1534   INR 1.1     Microbiology Results (last 7 days)     Procedure Component Value Units Date/Time    Blood culture [[id number]] Collected:  07/20/17 1103    Order Status:  Sent Specimen:  Blood from Peripheral, Upper Arm, Right Updated:  07/20/17 1103    Blood culture [[id number]] Collected:  07/20/17 1052    Order Status:  Sent Specimen:  Blood from Peripheral, Upper Arm, Left Updated:  07/20/17 1053    Blood culture [[id number]]     Order Status:  Canceled Specimen:  Blood     Culture, Respiratory with Gram Stain [[id number]] Collected:  07/19/17 1541    Order Status:  Completed Specimen:  Respiratory from Sputum, Expectorated Updated:  07/19/17 2155     Gram Stain (Respiratory) <10 epithelial cells per low power field.     Gram Stain (Respiratory) Few WBC's     Gram Stain (Respiratory) No organisms seen    Cryptococcal antigen [[id number]] Collected:  07/18/17 2030    Order Status:  Completed Specimen:  Blood from Blood Updated:  07/19/17 0923     Cryptococcal Ag, Blood Negative    Blood culture [[id number]] Collected:  07/17/17 2232    Order Status:  Sent Specimen:  Blood Updated:  07/17/17 2232    Blood culture [[id number]] Collected:  07/17/17 2232    Order Status:  Sent Specimen:  Blood Updated:  07/17/17 2232        Assessment/Plan:     Meningioma, recurrent of brain    50 yo F with an olfactory groove meningioma s/p resection with Dr. Chew 6/7/17, who re-presented with hypernatremia.    -CTH stable, no new findings.  -Continue neuro checks q4 hours. Notify NSGY for any changes.   -Continue Keppra for seizure prophylaxis  -Continue Lovenox for DVT  prophylaxis  -Continue aggressive PT/OT  -OK for LP  -Medical management per primary team.  -Will continue to follow, please call with questions             Daniel Zamora MD  Neurosurgery  [facility]

## 2017-07-20 NOTE — PROGRESS NOTES
[facility]  Infectious Disease  Progress Note    Patient Name: Joanna Morales  MRN: [id number]  Admission Date: 6/28/2017  Length of Stay: 22 days  Attending Physician: Sonya Dc MD  Primary Care Provider: Claudy Tse MD    Isolation Status: No active isolations  Assessment/Plan:      Leukocytosis     51 yr old female with PMH of secondary hypothyroidism (TSG 0.241 ion 6/17), sphenoid/sellar meningioma s/p partial resection in 2015, with loss of vision in the left eye and anosmia post-operatively with progressive loss of vision in the right eye since the past year. Residual tumor resection in 2017, complicated by DI in the post-op period. Started on ddavp (IV first and then intranasal). Discharged to rehab on 6/26. Presented again on 6/28 with altered mental status and hypernatremia. Underwent PEG tube placement on 7/17 following which she developed resp distress with deteriorating O2 sats and resp distress. CXR showed some infiltrate at the RLL; Concern for aspiration pneumnia. Fever and WBC counts improved with emperical abx. ID consulted for the possibility of indolent CNS infection driving her AMS.  MRI brain unremarkable.    -WBC count WNL.  -Sputum gram stain unremarkable. Cultures pending.  - LP pending.  - Continue Cefepime+Vanc+Fluconazole+Acyclovir. Will adjust based on any LP findings.        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 I will follow-up with patient. Please contact us if you have any additional questions.    Tucker Lim MD  Infectious Disease  [facility]    Subjective:     Principal Problem:Hypernatremia    Interval History:     More alert compared to yesterday, but still disoriented. Speech is without meaning.  Responding to 1-step commands.   WBC 8330, Na 134  Sputum gram stain is unremarkable. Culture pending.    HPI:   51 yr old female with PMH of tobacco abuse, secondary hypothyroidism (TSH 0.241 6/17) and history of meningioma s/p resection 2015 & 6/7/2017.charli presented on 6/28 with altered mental status and hypernatremia.      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  She underwent operation in January 2015 on the [facility] service at [facility].  It was apparently only possible to get a partial resection of the tumor.  She did have a followup scan done in 2016 showing a continued large meningioma in this area.  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  She had a followup MRI scan done at [facility] on 01/20/17, showing this large tumor.  She complained of constant headaches, anosmia and gait instability. Underwent complete resection in June 2017. Post-op developed DI. Started on ddavp (IV initially then intranasal) and dexemthasone. Discharged on 6/26 to rehab.      Presented on 6/28 from rehab with AMS and worsening hypernatremia. Neuro consulted 7/13 for AMS. Developed O2  "desats and possibly aspiration pneumonia after PEG tube placement on 7/17. Given 2L O2 via NC and then converted to non rebreather.mask at 15LPM. O2 sat improved. CXR was done which showed some infiltrate at the RLL; Single dose of Rocephin given. Started on Vanc and Zosyn. Primary team was concerned about CVA vs. seizure after facial weakness was noted on 7/18. MRI brain without contrast (7/18) was unremarkable for acute infarct or other acute intracranial pathology.  Became hypotensive on 7/18 for which fluids were given following which she improved.\     As per Neurology, "Has had another drop in mental status today, now with word salad though still able to tell me her name, age (51) and follow intermittent one step commands.  Reviewed her anti-infectives coverage and has broad bacterial/viral, but cannot rule out some CNS involvement with fungus given the nasal pathology and previous instrumentation.  Discussed ID consult with team.  LP could be considered, but concern for vectors shifting from appearance of Rward tentorial shift on MRI around frontal lobes (though could be ex vacuo as well).  If BRIA clears, could get spinal fluid, though admittedly already partially treated."   Crytococcal Ag was negative. Blood culture from 7/17 pending (prev cultures showed NGTD x 5)  Review of Systems   Unable to perform ROS: Mental status change     Objective:     Vital Signs (Most Recent):  Temp: 97.7 °F (36.5 °C) (07/20/17 0700)  Pulse: 74 (07/20/17 0900)  Resp: 15 (07/20/17 0900)  BP: 105/76 (07/20/17 0900)  SpO2: 95 % (07/20/17 0900) Vital Signs (24h Range):  Temp:  [97.5 °F (36.4 °C)-97.7 °F (36.5 °C)] 97.7 °F (36.5 °C)  Pulse:  [72-95] 74  Resp:  [13-23] 15  SpO2:  [93 %-100 %] 95 %  BP: ()/(56-91) 105/76     Weight: 60 kg (132 lb 4.4 oz)  Body mass index is 24.19 kg/m².    Estimated Creatinine Clearance: 52.6 mL/min (based on Cr of 1).    Physical Exam   Constitutional: She appears listless. No distress. "   Cardiovascular: Normal rate, regular rhythm, normal heart sounds and intact distal pulses.    Pulmonary/Chest: She has rales in the right middle field and the right lower field.   Abdominal: Soft. Bowel sounds are normal.   Neurological: She appears listless. She is disoriented.       Significant Labs:   Bilirubin:   Recent Labs  Lab 07/07/17  0439 07/08/17  0631 07/09/17  0400 07/10/17  0410 07/11/17  0343  07/14/17  0502 07/15/17  0513 07/16/17  0414 07/17/17  0355 07/18/17  0855   BILIDIR 0.1 0.1 0.2 0.1 0.1  --   --   --   --   --   --    BILITOT 0.2 0.2 0.3 0.2 0.2  < > 0.2 0.2 0.2 0.2 0.5   < > = values in this interval not displayed.  Blood Culture:   Recent Labs  Lab 06/14/17  1156 06/14/17  1517 07/03/17  0934 07/03/17  0954   LABBLOO No growth after 5 days. No growth after 5 days. No growth after 5 days. No growth after 5 days.     BMP:   Recent Labs  Lab 07/20/17  0515   *      K 3.8   *   CO2 26   BUN 18   CREATININE 1.0   CALCIUM 9.5   MG 2.1     Bone Marrow Culture: No results for input(s): BONEMARROWCU in the last 4320 hours.  C4 Count: No results for input(s): C4 in the last 48 hours.  CBC:   Recent Labs  Lab 07/19/17  0318 07/20/17  0515   WBC 18.80* 8.33   HGB 7.7* 8.1*   HCT 24.5* 25.9*    267     CMP:   Recent Labs  Lab 07/19/17  1812 07/19/17  2307 07/20/17  0515   * 148* 143   K 3.6 4.1 3.8   * 115* 111*   CO2 27 24 26   GLU 65* 85 125*   BUN 19 19 18   CREATININE 1.1 1.0 1.0   CALCIUM 9.9 9.6 9.5   ANIONGAP 10 9 6*   EGFRNONAA 58.3* >60.0 >60.0     Coagulation:   Recent Labs  Lab 07/18/17  1534   INR 1.1     CSF: No results for input(s): CSFCULTURE in the last 4320 hours.  Fungus Culture (Blood or Bone Marrow): No results for input(s): FUNGUSCULTUR in the last 4320 hours.  Genital Culture: No results for input(s): LABGENI in the last 4320 hours.  Hepatitis Panel:   Recent Labs  Lab 07/18/17  1124   HEPBSAG Negative   HEPAIGM Negative   HEPCAB Negative      HIV 1/2 Antibodies: No results for input(s): MGM12LNXY in the last 48 hours.  HIV Rapid: No results for input(s): HIVRAPID in the last 48 hours.  Lactic Acid:   Recent Labs  Lab 07/18/17  1030 07/18/17  1132 07/18/17  1534   LACTATE >12.0* 1.8  1.8 1.1     Lipase: No results for input(s): LIPASE in the last 48 hours.  Microbiology Results (last 7 days)     Procedure Component Value Units Date/Time    Blood culture [[id number]]     Order Status:  No result Specimen:  Blood     Blood culture [[id number]]     Order Status:  No result Specimen:  Blood     Blood culture [[id number]]     Order Status:  Canceled Specimen:  Blood     Culture, Respiratory with Gram Stain [[id number]] Collected:  07/19/17 1541    Order Status:  Completed Specimen:  Respiratory from Sputum, Expectorated Updated:  07/19/17 2155     Gram Stain (Respiratory) <10 epithelial cells per low power field.     Gram Stain (Respiratory) Few WBC's     Gram Stain (Respiratory) No organisms seen    Cryptococcal antigen [[id number]] Collected:  07/18/17 2030    Order Status:  Completed Specimen:  Blood from Blood Updated:  07/19/17 0923     Cryptococcal Ag, Blood Negative    Blood culture [[id number]] Collected:  07/17/17 2232    Order Status:  Sent Specimen:  Blood Updated:  07/17/17 2232    Blood culture [[id number]] Collected:  07/17/17 2232    Order Status:  Sent Specimen:  Blood Updated:  07/17/17 2232        Pathology Results  (Last 10 years)    None        POCT Glucose:   Recent Labs  Lab 07/19/17  1801 07/19/17  1837 07/20/17  0519   POCTGLUCOSE 69* 167* 162*     Prealbumin: No results for input(s): PREALBUMIN in the last 48 hours.  Procalcitonin: No results for input(s): PROCAL in the last 48 hours.  Quantiferon: No results for input(s): NIL, TBAG, TBAGNIL, MITOGENNIL, TBGOLD in the last 48 hours.  Respiratory Culture:   Recent Labs  Lab 07/19/17  1541   GSRESP <10 epithelial cells per low power field.  Few WBC's  No organisms seen     Urine Culture:    Recent Labs  Lab 06/15/17  1127 06/27/17  1432 06/28/17  1733   LABURIN No growth No growth No growth     Urine Studies:   Recent Labs  Lab 07/01/17  0319  07/18/17  0110   COLORU Yellow  < > Yellow   APPEARANCEUA Hazy*  < > Hazy*   PHUR 5.0  < > 5.0   SPECGRAV 1.010  < > 1.020   PROTEINUA Negative  < > Negative   GLUCUA Negative  < > Negative   KETONESU Negative  < > Negative   BILIRUBINUA Negative  < > Negative   OCCULTUA 1+*  < > 2+*   NITRITE Negative  < > Negative   UROBILINOGEN Negative  < > Negative   LEUKOCYTESUR 3+*  < > Negative   RBCUA 1  < > 17*   WBCUA 12*  < > 3   BACTERIA Occasional  < > Few*   SQUAMEPITHEL 1  < > 1   HYALINECASTS 3*  --   --    < > = values in this interval not displayed.  Wound Culture: No results for input(s): LABAERO in the last 4320 hours.  All pertinent labs within the past 24 hours have been reviewed.    Significant Imaging: I have reviewed all pertinent imaging results/findings within the past 24 hours.     CXR (7/19)    Lung zones appear stable, with some continued demonstration of minimal patchy airspace consolidation in the right lower lung zone, but with no evidence of superimposed airspace consolidation or volume loss noted.  The right lower lung zone appears better aerated on this examination than on earlier studies of 7/18/17 and 7/17/17.  No pleural fluid of any substantial volume is seen on either side.  No pneumothorax.

## 2017-07-20 NOTE — PROCEDURES
DATE OF PROCEDURE:  07/18/2017    EEG NUMBER:  [id number]    REQUESTED BY:  Dr. Caraballo.    ICU EEG/VIDEO MONITORING REPORT    METHODOLOGY:  Electroencephalographic (EEG) is recorded with electrodes placed   according to the International 10-20 placement system.  Thirty two (32) channels   of digital signal (sampling rate of 512/sec), including T1 and T2, were   simultaneously recorded from the scalp and may include EKG, EMG, and/or eye   monitors.  Recording band pass was 0.1 to 512 Hz.  Digital video recording of   the patient is simultaneously recorded with the EEG.  The patient is instructed   to report clinical symptoms which may occur during the recording session.  EEG   and video recording are stored and archived in digital format.  Activation   procedures, which include photic stimulation, hyperventilation and instructing   patients to perform simple tasks, are done in selected patients.    The EEG is displayed on a monitor screen and can be reviewed using different   montages.  Computer-assisted analysis is employed to detect spike and   electrographic seizure activity.  The entire record is submitted for computer   analysis.  The entire recording is visually reviewed, and the times identified   by computer analysis as being spikes or seizures are reviewed again.    Compressed spectral analysis (CSA) is also performed on the activity recorded   from each individual channel.  This is displayed as a power display of   frequencies from 0 to 30 Hz over time.  The CSA is reviewed looking for   asymmetries in power between homologous areas of the scalp, then compared with   the original EEG recording.    [employer] software was also utilized in the review of this study.  This software   suite analyzes the EEG recording in multiple domains.  Coherence and rhythmicity   are computed to identify EEG sections which may contain organized seizures.    Each channel undergoes analysis to detect the presence of spike and  sharp waves   which have special and morphological characteristics of epileptic activity.  The   routine EEG recording is converted from special into frequency domain.  This is   then displayed comparing homologous areas to identify areas of significant   asymmetry.  Algorithm to identify non-cortically generated artifact is used to   separate artifact from the EEG.    RECORDING TIMES:  Start on 07/18/2017 at 16:31  Stop on 07/19/2016 at 07:00  A total of 14 hours and 17 minutes of EEG monitoring was obtained.    EEG FINDINGS:  Recording was obtained at the patient's bedside in the ICU.  The   patient was moving around spontaneously and breathing on her own.  Background   showed a posterior dominant rhythm that was 6 Hz and located in the occipital,   parietal, and posterior temporal regions bilaterally.  There was intermixed   slower theta noted diffusely and at times some 1 to 2 Hz delta was noted   frontally.  No lateralized and focal features and no spike or sharp wave   activity seen.  There were segments in which the background changed and it   appeared to be asleep.  Background at this time was a generalized mixture of   irregular delta with superimposed mid range beta spindles.  Midrange theta also   present.  Background was symmetrical over the 2 hemispheres.  Lateralized focal   changes were noted and no epileptic activity was recorded.    IMPRESSION:  Abnormal EEG.  Waking and sleeping state indicative of a moderate   nonfocal and nonspecific encephalopathy.  There is no evidence for an epileptic   process.    CLINICAL CORRELATION:  Joanna Morales has been complaining of headaches and   progressive visual changes.  This tracing only shows a nonfocal encephalopathy.    No evidence for an epileptic process.      RR/HN  dd: 07/20/2017 16:11:29 (CDT)  td: 07/20/2017 16:41:46 (CDT)  Doc ID   #[id number]  Job ID #[id number]    CC:

## 2017-07-20 NOTE — MEDICAL/APP STUDENT
General Infectious Diseases: Consult Note    Consult Requested By: Sonya Dc MD    Reason for Consult:       Patient is a 51 y.o. female with a PMH of tobacco abuse, secondary hypothyroidism (TSH 0.241 on 6/17/17), and meningioma s/p resection in 2015 and 6/7/17. Most recent surgery complicated by DI. Pt was discharged after sx to [facility] rehab 6/26/17 and presented on 6/2817 due to worsening hypernatremia and altered mental status. Ms. Morales has been followed by Neurology and Endocrinology since re-admission on 6/28/17 with an unremarkable w/u to date. PEG tube placement on  7/17/17 complicated by post-surgical acute hypoxic respiratory failure. CXR demonstrated new RLL infiltrate most likely due to aspiration pneumonia. Patient started on Zosyn and Vancomycin on 7/17/17. WBC 34 on 7/18/17. Changed to Cefepime and Vancomycin on 7/18/17. Fluconazole and Acyclovir also added on 7/18/17. WBC 18 on 7/19/17. Pt being treated for aspiration pneumonia vs meningitis.         PLAN:  - Continue the following:                        - Vancomycin                        - Cefepime                        - Fluconazole                        - Acyclovir  - LP planned for today - will adjust therapy accordingly  - Sputum culture - f/u - no organisms seen 7/19/17  - blood cx - NGTD        Olamide Holguin MS4    SUBJECTIVE:     History of Present Illness:  Patient is a 51 y.o. female with a PMH of tobacco abuse, secondary hypothyroidism (TSH 0.241 on 6/17/17), and meningioma s/p resection in 2015 and 6/7/17. Most recent surgery complicated by DI. Pt was discharged after sx to [facility] rehab 6/26/17 and presented on 6/2817 due to worsening hypernatremia and altered mental status. Ms. Morales has been followed by Neurology and Endocrinology since re-admission on 6/28/17 with an unremarkable w/u to date.      PEG tube placement on  7/17/17 complicated by post-surgical acute hypoxic respiratory failure. CXR  demonstrated new RLL infiltrate most likely due to aspiration pneumonia. One dose of Rocephin given. Patient then started on Zosyn and Vancomycin on 7/17/17. WBC 34 on 7/18/17. Changed to Cefepime and Vancomycin on 7/18/17. Fluconazole and Acyclovir also added on 7/18/17. WBC 18 on 7/19/17. UA negative on 7/17/17.      Neurology re-consulted due to facial weakness noted s/p PEG tube placement for concern of CVA vs seizure on 7/18/17. MRI unremarkable. 24hr continuous EEG being followed.     Past Medical History:  Past Medical History:   Diagnosis Date    Allergy     Basal cell carcinoma     Depression 11/1/2016    Essential hypertension 6/9/2017    Eye disorder     Fever blister     Normocytic anemia 6/9/2017    Secondary hypothyroidism 6/26/2017       Past Surgical History:  Past Surgical History:   Procedure Laterality Date    BASAL CELL CARCINOMA EXCISION      forehead    BRAIN SURGERY      SKIN BIOPSY         Family History:  Family History   Problem Relation Age of Onset    Diabetes Father     Hypertension Father     Hepatitis Father     No Known Problems Mother        Social History:  Social History   Substance Use Topics    Smoking status: Current Every Day Smoker     Packs/day: 1.00     Years: 40.00     Types: Cigarettes    Smokeless tobacco: Never Used    Alcohol use No       Allergies:  Review of patient's allergies indicates:   Allergen Reactions    Codeine         Pertinent Medications:  Antibiotics     Start     Stop Route Frequency Ordered    07/18/17 1200  vancomycin (VANCOCIN) 750 mg in dextrose 5 % 250 mL IVPB  (Vancomycin IVPB with levels panel)      -- IV Every 12 hours (non-standard times) 07/18/17 1015    07/18/17 1128  ceFEPIme in dextrose 5% 2 gram/50 mL IVPB 2 g      -- IV Every 12 hours (non-standard times) 07/18/17 1125           fluconazole (DIFLUCAN) IVPB 400 mg 200 mL  Dose: 400 mg Freq: Every 24 hours (non-standard times) Route: IV  Indications of Use:  Bacteremia  Start: 07/18/17 2030     acyclovir (ZOVIRAX) 600 mg in dextrose 5 % 100 mL IVPB  Dose: 10 mg/kg Freq: Every 12 hours (non-standard times) Route: IV  Start: 07/18/17 1230      Review of Symptoms:  Unable to obtain due to AMS.     OBJECTIVE:     Vital Signs (Most Recent)  Temp: 97.5 °F (36.4 °C) (07/20/17 1500)  Pulse: 87 (07/20/17 1500)  Resp: (!) 22 (07/20/17 1500)  BP: 119/83 (07/20/17 1500)  SpO2: (!) 92 % (07/20/17 1500)    Temperature Range Min/Max (Last 24H):  Temp:  [97.5 °F (36.4 °C)-97.8 °F (36.6 °C)]     Physical Exam:  Gen: lethargic. Pt with eyes open. Oriented to person but not time or place.  HEENT- pupils reactive to light. L pupil > R pupil  Pulmonary: Non labored. Clear to auscultation A/P/L. No wheezing, crackles, or rhonchi.   Cardiac: Normal S1 & S2 w/o rubs/murmurs/gallops.   Abdomen: Normal bowel sounds. Nondistended. Pt winces to palpation over epigastric region. No rebound or guarding. PEG tube placement site without discharge or erythema.   Extremities: No cyanosis, or peripheral edema.   Neurological: Lethargic. Strength 2/5 LUE and LLE, 4/5 RLE and RUE.   Skin: No jaundice, rashes, or visible lesions.      Laboratory:  CBC:   Lab Results   Component Value Date    WBC 8.33 07/20/2017    HGB 8.1 (L) 07/20/2017    HCT 25.9 (L) 07/20/2017     (H) 07/20/2017     07/20/2017       BMP:   Recent Labs  Lab 07/20/17  0515 07/20/17  1212   * 88    142   K 3.8 4.2   * 108   CO2 26 28   BUN 18 19   CREATININE 1.0 0.8   CALCIUM 9.5 9.8   MG 2.1  --        LFT: Lab Results   Component Value Date    ALT 29 07/18/2017    AST 35 07/18/2017    ALKPHOS 158 (H) 07/18/2017    BILITOT 0.5 07/18/2017       Microbiology:  7/17/17 - Blood Cx - NGTD  7/19/17- Sputum Cx - No organisms seen       Diagnostic Results:  CXR- 7/19/17- Lung zones appear stable, with some continued demonstration of minimal patchy airspace consolidation in the right lower lung zone, but with no  evidence of superimposed airspace consolidation or volume loss noted.  The right lower lung zone appears better aerated on this examination than on earlier studies of 7/18/17 at 10:35 AM and 7/17/17.     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

## 2017-07-20 NOTE — ASSESSMENT & PLAN NOTE
--appreciate endocrine's assistance.   --suspect hypernatremia is multifactorial: dehydration given poor PO intake and a component of DI given extensive meningioma resection on 6/7/17  --pts hypernatremia improving with IV desmopressin  -- continue 0.5 DDAVP IV BID   --Continue to monitor BMP's closely to assess continued need or dose adjustments q6h   --continue free water enterally: have increased to 300 mL q6h per NGT now   --continue to monitor sodium levels and urine output

## 2017-07-20 NOTE — PROGRESS NOTES
[facility]  Neurology  Progress Note    Patient Name: Joanna Morales  MRN: [id number]  Admission Date: 6/28/2017  Hospital Length of Stay: 22 days  Code Status: Full Code   Attending Provider: Sonya Dc MD  Primary Care Physician: Claudy Tse MD   Principal Problem:Hypernatremia      Subjective:     Interval History:   Na has normalized to 142 with IV DDAVP  Successful bedside LP today, CSF in process  Disconnecting EEG this afternoon, no push button events   Lethargic and mental status waxing and waning throughout the day    Current Facility-Administered Medications   Medication Dose Route Frequency Provider Last Rate Last Dose    acetaminophen tablet 500 mg  500 mg Oral Q6H PRN Jaswant Arellano MD   500 mg at 07/14/17 2042    acyclovir (ZOVIRAX) 600 mg in dextrose 5 % 100 mL IVPB  10 mg/kg Intravenous Q12H Júnior Chaves PA-C 100 mL/hr at 07/20/17 1408 600 mg at 07/20/17 1408    bisacodyl suppository 10 mg  10 mg Rectal Daily PRN Jonas Renee MD   10 mg at 07/20/17 1310    ceFEPIme in dextrose 5% 2 gram/50 mL IVPB 2 g  2 g Intravenous Q12H Júnior Chaves PA-C 100 mL/hr at 07/20/17 1408 2 g at 07/20/17 1408    desmopressin injection 0.5 mcg  0.5 mcg Intravenous BID Sonya Dc MD   0.5 mcg at 07/20/17 1209    dextrose 50% injection 12.5 g  12.5 g Intravenous PRN Fiona Winterbottom, APRN, CNS   12.5 g at 07/19/17 1808    fluconazole (DIFLUCAN) IVPB 400 mg 200 mL  400 mg Intravenous Q24H Jonas Renee  mL/hr at 07/19/17 2123 400 mg at 07/19/17 2123    glucagon (human recombinant) injection 1 mg  1 mg Intramuscular PRN Fiona Winterbottom, APRN, CNS        guaifenesin 100 mg/5 ml syrup 300 mg  300 mg Oral Q6H PRN Nitza Bonilla MD        insulin aspart pen 0-5 Units  0-5 Units Subcutaneous Q6H PRN Fiona Winterbottom, APRN, CNS        ipratropium 0.02 % nebulizer solution 0.5 mg  0.5 mg Nebulization Q6H PRN Nitza Bonilla MD   0.5 mg at 07/17/17 8927     levetiracetam oral soln Soln 500 mg  500 mg Per NG tube BID Lesly Winterbottom, APRN, CNS   500 mg at 07/20/17 0920    levothyroxine tablet 50 mcg  50 mcg Per NG tube Daily Lesly Winterbottom, APRN, CNS   50 mcg at 07/20/17 0920    ondansetron injection 8 mg  8 mg Intravenous Q8H PRN Jaswant Arellano MD        potassium chloride 20 mEq in 100 mL IVPB (FOR CENTRAL LINE ADMINISTRATION ONLY)  20 mEq Intravenous PRN Aline Dubon MD 50 mL/hr at 07/19/17 0440 20 mEq at 07/19/17 0440    potassium chloride 40 mEq in 100 mL IVPB (FOR CENTRAL LINE ADMINISTRATION ONLY)  40 mEq Intravenous PRN Aline Dubon MD        potassium chloride 40 mEq in 100 mL IVPB (FOR CENTRAL LINE ADMINISTRATION ONLY)  40 mEq Intravenous PRN Aline Dubon MD 25 mL/hr at 07/20/17 0649 40 mEq at 07/20/17 0649    senna tablet 8.6 mg  8.6 mg Per G Tube Daily PRN Jonas Renee MD   8.6 mg at 07/20/17 1310    sodium chloride 0.9% flush 3 mL  3 mL Intravenous PRN Mariposa Davis MD        vancomycin (VANCOCIN) 750 mg in dextrose 5 % 250 mL IVPB  15 mg/kg (Dosing Weight) Intravenous Q12H Lucille Geiger .7 mL/hr at 07/20/17 1411 750 mg at 07/20/17 1411     Review of Systems   Unable to perform ROS: Mental status change     Objective:     Vital Signs (Most Recent):  Temp: 97.5 °F (36.4 °C) (07/20/17 1500)  Pulse: 87 (07/20/17 1500)  Resp: (!) 22 (07/20/17 1500)  BP: 119/83 (07/20/17 1500)  SpO2: (!) 92 % (07/20/17 1500) Vital Signs (24h Range):  Temp:  [97.5 °F (36.4 °C)-97.8 °F (36.6 °C)] 97.5 °F (36.4 °C)  Pulse:  [72-91] 87  Resp:  [13-26] 22  SpO2:  [92 %-100 %] 92 %  BP: (101-158)/(57-91) 119/83     Weight: 60 kg (132 lb 4.4 oz)  Body mass index is 24.19 kg/m².    Physical Exam   Constitutional: No distress.   blt wrist restraints     Neck: Normal range of motion. Neck supple.   Pulmonary/Chest: Effort normal.   Skin: She is not diaphoretic.     NEUROLOGICAL EXAMINATION:     MENTAL STATUS   Oriented to person.    (Oriented to hospital, does not state [facility])  Follows 1 step commands.   Attention: decreased. Concentration: decreased.   Level of consciousness: drowsy       Minimal verbal response      CRANIAL NERVES     CN II   Visual acuity: (L eye-blind, R eye-light perception)    CN III, IV, VI   Right pupil: Reactivity: non-reactive.   Left pupil: Reactivity: non-reactive.     CN VIII   CN VIII normal.     MOTOR EXAM   Muscle bulk: decreased       Moving all extremities spontaneously with 4/5 power throughout      Significant Labs:   Blood Culture: No results for input(s): LABBLOO in the last 48 hours.  CBC:   Recent Labs  Lab 07/19/17 0318 07/20/17  0515   WBC 18.80* 8.33   HGB 7.7* 8.1*   HCT 24.5* 25.9*    267     CMP:   Recent Labs  Lab 07/19/17 0318 07/19/17  2307 07/19/17  2313 07/20/17  0515 07/20/17  1212   GLU  --   < > 85  --  125* 88   NA  --   < > 148*  --  143 142   K  --   < > 4.1  --  3.8 4.2   CL  --   < > 115*  --  111* 108   CO2  --   < > 24  --  26 28   BUN  --   < > 19  --  18 19   CREATININE  --   < > 1.0  --  1.0 0.8   CALCIUM  --   < > 9.6  --  9.5 9.8   MG 1.7  --   --  2.7* 2.1  --    ANIONGAP  --   < > 9  --  6* 6*   EGFRNONAA  --   < > >60.0  --  >60.0 >60.0   < > = values in this interval not displayed.  CSF Culture: No results for input(s): CSFCULTURE in the last 48 hours.  CSF Studies: No results for input(s): ALIQUT, APPEARCSF, COLORCSF, CSFWBC, CSFRBC, GLUCCSF, LDHCSF, PROTEINCSF, VDRLCSF in the last 48 hours.  Inflammatory Markers: No results for input(s): SEDRATE, CRP, PROCAL in the last 48 hours.  Respiratory Culture:   Recent Labs  Lab 07/19/17  1541   GSRESP <10 epithelial cells per low power field.  Few WBC's  No organisms seen     Urine Culture: No results for input(s): LABURIN in the last 48 hours.  Urine Studies: No results for input(s): COLORU, APPEARANCEUA, PHUR, SPECGRAV, PROTEINUA, GLUCUA, KETONESU, BILIRUBINUA, OCCULTUA, NITRITE, UROBILINOGEN, LEUKOCYTESUR,  RBCUA, WBCUA, BACTERIA, SQUAMEPITHEL, HYALINECASTS in the last 48 hours.    Invalid input(s): BEE  All pertinent lab results from the past 24 hours have been reviewed.    Significant Imaging: I have reviewed and interpreted all pertinent imaging results/findings within the past 24 hours.    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 Waxing and waning mental status. PEG placed Monday 7/17 and decline in mental status 7/18 with concern for CVA vs seizure. MRI Brain performed 7/18 and showed mucosal thickening of the left maxillary antrum, bilateral sphenoid sinus and near-complete opacification of the left ethmoid air cells,mucosal thickening of the left frontal sinus. Concern for possible CNS infection if any communication between postop site and sinuses. ID has been consulted and broad spectrum Abx + fluconazole + acyclovir started in setting of leukocytosis with concern for possible meningoencephalitis. NSGY deemed LP safe to perform done 7/20, CSF pending.    -extended EEG prelim result--slowing over L hemisphere 2/2 structural changes from meningioma resection, no epileptiform discharges   -ID on board--CSF studies in process and will determine length of antiviral, Abx and antifungals  -delirium precautions, regulate sleep/wake, frequent reorientation, minimize sedating medications     VTE Risk Mitigation         Ordered     High Risk of VTE  Once      06/30/17 0920     Place sequential compression device  Until discontinued      06/30/17 0920        Neurology to sign off, please call with any questions/clarifications    Lydia Luciano PA-C  General Neurology Consult  Neuro Consult [facility] # [id number]

## 2017-07-20 NOTE — ASSESSMENT & PLAN NOTE
-pt with persistent hypernatremia since admission  -will increase FWB to 300cc q6h (was receiving 200cc q8h) and monitor BMP q6h, continue desmopressin IV  -likely contributing to patient's AMS  -Sodium normalizing now that we have IV desmopressin on board

## 2017-07-20 NOTE — PROGRESS NOTES
[facility]  Critical Care Medicine  Progress Note    Patient Name: Joanna Morales  MRN: [id number]  Admission Date: 6/28/2017  Hospital Length of Stay: 22 days  Code Status: Full Code  Attending Provider: Sonya Dc MD  Primary Care Provider: Claudy Tse MD   Principal Problem: Hypernatremia    Subjective:     HPI:  Ms. Joanna Morales is a 51 y.o. female with tobacco abuse, secondary hypothyroidism (TSH 0.241 6/17) and history of meningioma s/p resection 2015 & 6/7/2017. The most recent surgery by Dr. Chew at [facility] was complicated by DI . The patient was discharged to [facility] on 6/26/17 and re-presented to [facility] ED on 6/28 with altered mental status and worsening hypernatremia.     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      Hospital/ICU Course:  Ms. Morales was discharged from [facility]  to [facility] on 6/26/17 and presented to [facility] ED on 6/28 with altered mental status and worsening hypernatremia. According to the patient's sitter, the patient was awake, verbal, and interactive with therapy on 6/23 but had been progressively less interactive and responsive when she was at [facility]. Ms. Morales was admitted to Hospital Medicine but was found to be unresponsive with a sodium level of 168 on the morning of 6/29.     She has been followed by endocrinology and neurology and workup thus far significant for  unremarkable UA, neuro-imaging c/w recent left sphenoidal meningioma resection with  resolution of blood products and trace intraventricular hemorrhage.  Previous EEG (6/16/17) demonstrated Moderate diffuse slowing of the overall background as described above, Superimposed left hemispheric slowing relative to the right, and NO epileptiform discharges or seizures were seen c/w ENCP and focal cortical dysfunction.  Pt's presentation is concerning for multi-factorial encephalopathy given persistent hypernatremia.  Per neurology, low suspicion for MNG currently, but patient is at high risk given recent intracranial procedure.    Critical Care consulted 7/17/17 for acute hypoxic respiratory failure. Pt had PEG tube placed earlier today and in PACU, oxygen saturations noted to be in the mid 80's. Pt was placed on a non-rebreather with minimal improvement and subsequently placed on BiPAP. ABG significant for elevated CO2 however pt was compensating. Repeat ABG showed pH 7.372, CO2 44, O2 62, HCO3 25.8. Pt transitioned to NC. Cxray concerning for aspiration pneumonia - new infiltrate in RLL so patient started on vancomycin and zosyn for HAP.      Interval History/Significant Events: No acute events overnight. BP stable off Levo for two days now. More responsive this am. Sodium continues to decrease with IV desmopressin on board. Leukocytosis resolved.    Review of Systems   Unable to perform ROS: Acuity of condition     Objective:     Vital Signs (Most Recent):  Temp: 97.7 °F (36.5 °C) (07/20/17 0700)  Pulse: 75 (07/20/17 1000)  Resp: 17 (07/20/17 1000)  BP: 122/68 (07/20/17 1000)  SpO2: 95 % (07/20/17 1000) Vital Signs (24h Range):  Temp:  [97.5 °F (36.4 °C)-97.7 °F (36.5 °C)] 97.7 °F (36.5 °C)  Pulse:  [72-95] 75  Resp:  [13-23] 17  SpO2:  [93 %-100 %] 95 %  BP: ()/(56-91) 122/68   Weight: 60 kg (132 lb 4.4 oz)  Body mass index is 24.19 kg/m².      Intake/Output Summary (Last 24 hours) at 07/20/17 1026  Last data filed at 07/20/17 1000   Gross per 24 hour   Intake             3045 ml   Output              2880 ml   Net              165 ml       Physical Exam   Constitutional:   Thin, cachectic   HENT:   Head: Normocephalic and atraumatic.   Eyes:   Patient is legally blind  Pupils dilated L>R minimally reactive   Neck: Normal range of motion.   Cardiovascular: Normal rate and regular rhythm.    Pulmonary/Chest: No respiratory distress.   RLL rales - slight   Abdominal: Soft. There is no tenderness.   Musculoskeletal: Normal range of motion.   Neurological:   Mental Status:  Very lethargic. Follows simple commands and moves extremities.   Skin: Skin is warm and dry.   Psychiatric: Cognition and memory are impaired.   Vitals reviewed.      Vents:  Oxygen Concentration (%): 50 (07/17/17 2135)  Lines/Drains/Airways     Central Venous Catheter Line                 Percutaneous Central Line Insertion/Assessment - triple lumen  07/18/17 1934 left internal jugular 1 day          Drain                 Gastrostomy/Enterostomy 07/17/17 1520 Percutaneous endoscopic gastrostomy (PEG) LUQ feeding 2 days         Urethral Catheter 07/17/17 2335 Straight-tip 16 Fr. 2 days          Peripheral Intravenous Line                 Peripheral IV - Single Lumen 07/17/17 0800 Right Antecubital 3 days              Significant Labs:    CBC/Anemia Profile:    Recent Labs  Lab 07/19/17  0318 07/20/17  0515   WBC 18.80* 8.33   HGB 7.7* 8.1*   HCT 24.5* 25.9*    267   * 102*   RDW 15.0* 15.5*        Chemistries:    Recent Labs  Lab 07/19/17  0318  07/19/17  1812 07/19/17  2307 07/19/17  2313 07/20/17  0515   NA  --   < > 152* 148*  --  143   K  --   < > 3.6 4.1  --  3.8   CL  --   < > 115* 115*  --  111*   CO2  --   < > 27 24  --  26   BUN  --   < > 19 19  --  18   CREATININE  --   < > 1.1 1.0  --  1.0   CALCIUM  --   < > 9.9 9.6  --  9.5   MG 1.7  --   --   --  2.7* 2.1   PHOS 3.6  --   --   --   --  4.1   < > = values in this interval not displayed.    All pertinent labs within the past 24 hours have been  reviewed.    Significant Imaging:  I have reviewed and interpreted all pertinent imaging results/findings within the past 24 hours.    Assessment/Plan:     Neuro   Acute encephalopathy    --see above, likely multifactorial (metabolic considering persistent hypernatremia)  --continue keppra 500 BID for seizure prophylaxis.  --  Hold gabapentin for now given increased somnolence associated with dosing (discussed with NSGY)  -- Monitor for delirium and encourage delirium precautions, pt also with q1h neuro checks   -- Avoid benzodiazepines, anticholinergics, and anti-histaminics and minimize opiates when possible as they may worsen or exacerbate delirium.  -- Ensure blinds are open with lights on during the day and that the shades are closed with lights off at night. -- Reorient pt through out the day.    -- repeat CT head 7/17/17 with no interval changes  - MRI 7/18 with no acute changes  - Neuro consulted and discussed possibility of fungal infection; will broaden coverage with Fluconazole and consulted ID  - On Cefepime, Vanc, Fluconazole, acyclovir day 3            Secondary hypothyroidism    --continue 50mcg Levothyroxine per NGT  --T4 this admission WNL  -- Repeat in 1-2months            Primary central diabetes insipidus    --appreciate endocrine's assistance.   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     -Hx of a right frontotemporal craniotomy with partial removal of a large subfrontal and tuberculum sellae meningioma at [facility] in 2015. 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    - Neurosurgery consulted for safety of possible LP; awaiting documentation of approval              Pulmonary   Acute hypoxemic respiratory failure    -pt with O2 sats in the mid 80's after PEG tube placement and minimal improvement on a non rebreather, pt then placed on BiPAP and ABG showed pH 7.372, CO2 44, O2 62, HCO3 25.8 so pt switched to nasal cannula and tolerating it well  -CXray concerning for RLL infiltrate; vanc/cefepime/acyclovir/fluconazole day 3  -will f/u cultures        Fluids/Electrolytes/Nutrition/GI   * Hypernatremia    -pt with persistent hypernatremia since admission  -will increase FWB to 300cc q6h (was receiving 200cc q8h) and monitor BMP q6h, continue desmopressin IV  -likely contributing to patient's AMS  -Sodium normalizing now that we have IV desmopressin on board        Malnutrition of moderate degree    -s/p PEG tube placement on 7/17/17  -will f/u nutrition recs and start feeds        Other   Tobacco abuse    --Nicotine patch           Critical Care Medicine Daily Checklist:    A: Awake: RASS Goal/Actual Goal: RASS Goal: 0-->alert and calm  Actual: Tong Agitation Sedation Scale (RASS): Alert and calm   B: Spontaneous Breathing Trial Performed?     C: SAT & SBT Coordinated?  NA                      D: Delirium: CAM-ICU Overall CAM-ICU: Positive   E: Early Mobility Performed? No   F: Feeding Goal: Goals: Pt to receive >90% of EEN via TF or po intake  Status: Nutrition Goal Status: progressing towards goal    Current Diet Order   Procedures    Diet NPO      AS: Analgesia/Sedation None   T: Thromboembolic Prophylaxis Enoxaparin d/c'd 7/18   H: HOB > 300 Yes   U: Stress Ulcer Prophylaxis (if needed) None   G: Glucose Control Accuechecks   B: Bowel Function Stool Occurrence: 0   I: Indwelling Catheter (Lines & Mackey) Necessity Left Ij; Mackey   D: De-escalation of Antimicrobials/Pharmacotherapies Vanc/Cefepime/Acyclovir/Fluconazole Day 3    Plan for the day/ETD Continue to monitor respiratory status; Consider LP    Code Status:  Family/Goals of Care: Full Code          Jonas Renee MD  Critical Care Medicine  [facility]

## 2017-07-21 PROBLEM — E23.2 ACQUIRED NEUROGENIC DIABETES INSIPIDUS: Status: ACTIVE | Noted: 2017-01-01

## 2017-07-21 NOTE — ASSESSMENT & PLAN NOTE
50 yo s/p meningioma resection (06/2017) presents with worsening AMS concerning for metabolic or infectious encephalopathy:  -con't vanc, cefepime, and acyclovir (if HSV PCR negative, reasonable to discon't acyclovir)  -afebrile, improving leukocytosis  -LP performed yesterday: possible infection given elevated WBC & lymphocytes (, , lymph 93, mono/MP 6, eusinophil 1%, glc 53 (normal), LDH 0, protein 49)  -CSF & blood cryptococcal ag negative  -CSF culture NGTD  -blood cx NGTD  -pending: HSV PCR, CSF ACE & CSF VDRL

## 2017-07-21 NOTE — ASSESSMENT & PLAN NOTE
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 Chew 6/7/17, who re-presented with hypernatremia.    -Continue neuro checks q4 hours. Notify NSGY for any changes.   -Continue Keppra for seizure prophylaxis  -Continue Lovenox for DVT prophylaxis  -Continue aggressive PT/OT  -LP performed, follow up studies.  -Medical management per primary team.  -Will continue to follow, please call with questions

## 2017-07-21 NOTE — PROCEDURES
DATE OF SERVICE:  07/20/2017    REQUESTED BY:  Dr. Caraballo.    LOCATION OF SERVICES:  3082.    ICU EEG/VIDEO MONITORING REPORT     METHODOLOGY:  Electroencephalographic (EEG) is recorded with electrodes placed   according to the International 10-20 placement system.  Thirty two (32) channels   of digital signal (sampling rate of 512/sec), including T1 and T2, were   simultaneously recorded from the scalp and may include EKG, EMG, and/or eye   monitors.  Recording band pass was 0.1 to 512 Hz.  Digital video recording of   the patient is simultaneously recorded with the EEG.  The patient is instructed   to report clinical symptoms which may occur during the recording session.  EEG   and video recording are stored and archived in digital format.  Activation   procedures, which include photic stimulation, hyperventilation and instructing   patients to perform simple tasks, are done in selected patients  The EEG is displayed on a monitor screen and can be reviewed using different   montages.  Computer-assisted analysis is employed to detect spike and   electrographic seizure activity.   The entire record is submitted for computer   analysis.  The entire recording is visually reviewed, and the times identified   by computer analysis as being spikes or seizures are reviewed again.    Compressed spectral analysis (CSA) is also performed on the activity recorded   from each individual channel.  This is displayed as a power display of   frequencies from 0 to 30 Hz over time.   The CSA is reviewed looking for   asymmetries in power between homologous areas of the scalp, then compared with   the original EEG recording.    [employer] software was also utilized in the review of this study.  This software   suite analyzes the EEG recording in multiple domains.  Coherence and rhythmicity   are computed to identify EEG sections which may contain organized seizures.    Each channel undergoes analysis to detect the presence of spike and  sharp waves   which have special and morphological characteristics of epileptic activity.  The   routine EEG recording is converted from special into frequency domain.  This is   then displayed comparing homologous areas to identify areas of significant   asymmetry.  Algorithm to identify non-cortically generated artifact is used to   separate artifact from the EEG.      RECORDING TIMES:  Start on 07/20/2017 at 07:00.  End on 07/20/2017 at 15:53.  A total of 8 hours and 31 minutes of EEG monitoring was obtained.    EEG FINDINGS:  Recording was obtained at the patient's bedside in the ICU.    Waking and moving around spontaneously and interacting with staff.  Posterior   dominant rhythm in the waking state.  There was a somewhat irregular 7 Hz   activity seen in the occipital, parietal and posterior temporal regions   bilaterally.  3 Hz delta noted bilaterally, but more prominent in the frontal   regions.  Range theta also intermixed at times.  Overall, the background was   symmetrical over the 2 hemispheres.  Sleep was recorded and this consisted of a   generalized mixture of 0.5 to 1 Hz delta mixed beta frequencies.  The delta was   a little slower over the left hemisphere in the sleeping state.  No other   lateralized or focal features were noted and no spike or sharp wave activity was   seen.  Activation procedures were not carried out.    IMPRESSION:  Abnormal EEG with the background in the waking state was slow and   disorganized indicating a mild-to-moderate nonspecific encephalopathy.  There is   evidence for additional left hemispheric lateralized and probably structural   dysfunction with slowing noted, more prominent on that side and no evidence for   an epileptic process.      RR/HN  dd: 07/20/2017 17:06:23 (CDT)  td: 07/20/2017 17:35:07 (CDT)  Doc ID   #[id number]  Job ID #[id number]    CC:

## 2017-07-21 NOTE — ASSESSMENT & PLAN NOTE
-pt with O2 sats in the mid 80's after PEG tube placement and minimal improvement on a non rebreather, pt then placed on BiPAP and ABG showed pH 7.372, CO2 44, O2 62, HCO3 25.8 so pt switched to nasal cannula and tolerating it well  -CXray concerning for RLL infiltrate; vanc/cefepime/acyclovir day 4  -cx's lost; re-cultured 7/20

## 2017-07-21 NOTE — NURSING TRANSFER
Nursing Transfer Note      7/21/2017     Transfer To: 1150    Transfer via bed    Transfer with cardiac monitoring    Transported by Adolfo RN    Medicines sent: vanc, cefepime    Chart send with patient: Yes    Notified: friend    Patient reassessed at: 1610 (date, time)    Upon arrival to floor: cardiac monitor applied, patient oriented to room, call bell in reach and bed in lowest position

## 2017-07-21 NOTE — PROGRESS NOTES
[facility]  Infectious Disease  Progress Note    Patient Name: Joanna Morales  MRN: [id number]  Admission Date: 6/28/2017  Length of Stay: 23 days  Attending Physician: Sonya Dc MD  Primary Care Provider: Claudy Tse MD    Isolation Status: No active isolations    Subjective:     Principal Problem:Hypernatremia    HPI: No notes on file  Interval History: Patient responsive to questions and follows commands. Oriented to self but not place or time. Endorses nausea pain near incision of PEG tube. States she has felt fevers/chills although no recorded fever, Tmax 97.8. Improved leukocytosis on while Abx, LP performed yesterday.    Review of Systems   Unable to perform ROS given AMS.    Objective:     Vital Signs (Most Recent):  Temp: 98.2 °F (36.8 °C) (07/21/17 1100)  Pulse: 77 (07/21/17 1314)  Resp: 14 (07/21/17 1314)  BP: 122/67 (07/21/17 1300)  SpO2: 95 % (07/21/17 1314) Vital Signs (24h Range):  Temp:  [97 °F (36.1 °C)-98.2 °F (36.8 °C)] 98.2 °F (36.8 °C)  Pulse:  [73-91] 77  Resp:  [13-26] 14  SpO2:  [92 %-97 %] 95 %  BP: ()/(58-89) 122/67     Weight: 56.2 kg (123 lb 14.4 oz)  Body mass index is 22.66 kg/m².    Estimated Creatinine Clearance: 65.8 mL/min (based on Cr of 0.8).    Physical Exam   Constitutional: She appears well-developed and well-nourished.   HENT:   Head: Normocephalic.   Surgical scar   Cardiovascular: Normal rate, regular rhythm and normal heart sounds.    Pulmonary/Chest: Effort normal and breath sounds normal.   Abdominal: Soft. Bowel sounds are normal.   PEG tube in place   Musculoskeletal: She exhibits no edema or tenderness.   Neurological:   Oriented to person, not to time or place; left sided weakness; sensation in tact.   Skin: Skin is warm and dry.       Significant Labs:   Blood Culture:   Recent Labs  Lab 06/14/17  1517 07/03/17  0934 07/03/17  0954 07/20/17  1052 07/20/17  1103   LABOO No growth after 5 days. No growth after 5 days. No  growth after 5 days. No Growth to date No Growth to date     CBC:   Recent Labs  Lab 07/20/17  0515 07/21/17  0329   WBC 8.33 10.42   HGB 8.1* 8.6*   HCT 25.9* 26.2*    265     CMP:   Recent Labs  Lab 07/20/17  2337 07/21/17  0329 07/21/17  0741    137 137   K 4.1 3.8 4.5    100 102   CO2 29 28 28   GLU 74 112* 99   BUN 18 18 17   CREATININE 0.9 0.9 0.8   CALCIUM 10.1 10.0 10.1   ANIONGAP 9 9 7*   EGFRNONAA >60.0 >60.0 >60.0     CSF:   Recent Labs  Lab 07/20/17  1544   CSFCULTURE No Growth to date       Significant Imaging: None    Assessment/Plan:      Acute encephalopathy    50 yo s/p meningioma resection (06/2017) presents from [facility] with worsening AMS concerning for metabolic or infectious encephalopathy:  -con't vanc, cefepime, and acyclovir (if HSV PCR negative, reasonable to discon't acyclovir)  -afebrile, improving leukocytosis  -LP performed yesterday: possible infection given elevated WBC & lymphocytes (, , lymph 93, mono/MP 6, eusinophil 1%, glc 53 (normal), LDH 0, protein 49)  -CSF & blood cryptococcal ag negative  -CSF culture NGTD  -blood cx NGTD  -pending: HSV PCR, CSF ACE & CSF VDRL              Angie Nassar MD  Infectious Disease  [facility]

## 2017-07-21 NOTE — PLAN OF CARE
Problem: Fall Risk (Adult)  Goal: Absence of Falls  Patient will demonstrate the desired outcomes by discharge/transition of care.   Fall precaution maintained. Side rails up x 3. Bed alarm. Call bell in reach.    Problem: Pressure Ulcer Risk (Adam Scale) (Adult,Obstetrics,Pediatric)  Goal: Skin Integrity  Patient will demonstrate the desired outcomes by discharge/transition of care.   Pt reposition every 2hrs and as needed    Problem: Infection, Risk/Actual (Adult)  Goal: Infection Prevention/Resolution  Patient will demonstrate the desired outcomes by discharge/transition of care.   Pt remained afebrile vs stable will monitor

## 2017-07-21 NOTE — PLAN OF CARE
Awtg new therapy recs for d/c planning.  Previously, pt was recommended to go to [facility]hab.  Previous SWs sent referrals to [facility] and [facility], which both denied pt, stating he is too low level.  They recommended SNF, but pt has Medicaid and has no SNF benefit.  Therapy was discontinued on 7/18/2017, due to pt's transfer to ICU.      [facility] accepted pt on 06/26/2017.    Will initiate new referrals after new recs are made and pt is more medically stable as he was denied for being too level, recently.     Pt came from [facility] on 06/28/2017 after becoming more lethargic.      SW will initiate referrals when appropriate.  Shirin Mahan LCSW     [phone]  Critical Care (MICU)

## 2017-07-21 NOTE — RESIDENT HANDOFF
Handoff     Primary Team: [facility] CRITICAL CARE MEDICINE Room Number: 3082/3082 A     Patient Name: Joanna Morales MRN: [id number]     Date of Birth: [id number] Allergies: Codeine     Age: 51 y.o. Admit Date: 6/28/2017     Sex: female  BMI: Body mass index is 22.66 kg/m².     Code Status: Full Code        Illness Level (current clinical status): Watcher - No    Reason for Admission: Hypernatremia    Brief HPI (pertinent PMH and diagnosis or differential diagnosis):   Ms. Joanna Morales is a 51 y.o. female with tobacco abuse, secondary hypothyroidism (TSH 0.241 6/17) and history of meningioma s/p resection 2015 & 6/7/2017. The most recent surgery by Dr. Chew at [facility] was complicated by DI . The patient was discharged to [facility] on 6/26/17 and re-presented to [facility] ED on 6/28 with altered mental status and worsening hypernatremia.      Of note, She underwent a right frontotemporal craniotomy with partial removal of a large subfrontal and tuberculum sellae meningioma at [facility] in 2015. 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       Procedure Date:   7/18/17: Central Line (Left Ij)  7/19: EEG  7/20: Lumbar puncture    Hospital Course (updated, brief assessment by system or problem, significant events):   Ms. Morales was discharged from [facility]  to [facility] on 6/26/17 and presented to [facility] ED on 6/28 with altered mental status and worsening hypernatremia. According to the patient's sitter, the patient was awake, verbal, and interactive with therapy on 6/23 but had been progressively less interactive and responsive when she was at [facility]. Ms. Morales was admitted to [facility]  but was found to be unresponsive with a sodium level of 168 on the morning of 6/29.      She has been followed by endocrinology and neurology and workup thus far significant for  unremarkable UA, neuro-imaging c/w recent left sphenoidal meningioma resection with resolution of blood products and trace intraventricular hemorrhage.  Previous EEG (6/16/17) demonstrated Moderate diffuse slowing of the overall background as described above, Superimposed left hemispheric slowing relative to the right, and NO epileptiform discharges or seizures were seen c/w ENCP and focal cortical dysfunction.  Pt's presentation is concerning for multi-factorial encephalopathy given persistent hypernatremia.  Per neurology, low suspicion for MNG currently, but patient is at high risk given recent intracranial procedure.     Critical Care consulted 7/17/17 for acute hypoxic respiratory failure. Pt had PEG tube placed earlier today and in PACU, oxygen saturations noted to be in the mid 80's. Pt was placed on a non-rebreather with minimal improvement and subsequently placed on BiPAP. ABG significant for elevated CO2 however pt was compensating. Repeat ABG showed pH 7.372, CO2 44, O2 62, HCO3 25.8. Pt transitioned to NC. Cxray concerning for aspiration pneumonia - new infiltrate in RLL so patient started on vancomycin and zosyn for HAP. She was then switched to Vanc/Cefepime/Acyclovir. Had significant elevation in WBC count 7/18 and Needed levophed to bring BP up for the night. Switched nasal DDAVP to IV DDAVP which has normalized her sodium levels. LP performed 7/20; consistent with a viral process. BP has been stable off pressors; leukocytosis resolved. Patient more alert.    Tasks (specific, using if-then statements): Consider ACTH stimulation test.    Estimated Discharge Date: Clinically stable    Discharge Disposition: Home or Self Care    Mentored By: Sonya Dc MD

## 2017-07-21 NOTE — SUBJECTIVE & OBJECTIVE
Interval History: Patient responsive to questions and follows commands. Oriented to self but not place or time. Endorses nausea pain near incision of PEG tube. States she has felt fevers/chills although no recorded fever, Tmax 97.8. Improved leukocytosis on while Abx, LP performed yesterday.    Review of Systems   Unable to perform ROS given AMS.    Objective:     Vital Signs (Most Recent):  Temp: 98.2 °F (36.8 °C) (07/21/17 1100)  Pulse: 77 (07/21/17 1314)  Resp: 14 (07/21/17 1314)  BP: 122/67 (07/21/17 1300)  SpO2: 95 % (07/21/17 1314) Vital Signs (24h Range):  Temp:  [97 °F (36.1 °C)-98.2 °F (36.8 °C)] 98.2 °F (36.8 °C)  Pulse:  [73-91] 77  Resp:  [13-26] 14  SpO2:  [92 %-97 %] 95 %  BP: ()/(58-89) 122/67     Weight: 56.2 kg (123 lb 14.4 oz)  Body mass index is 22.66 kg/m².    Estimated Creatinine Clearance: 65.8 mL/min (based on Cr of 0.8).    Physical Exam   Constitutional: She appears well-developed and well-nourished.   HENT:   Head: Normocephalic.   Surgical scar   Cardiovascular: Normal rate, regular rhythm and normal heart sounds.    Pulmonary/Chest: Effort normal and breath sounds normal.   Abdominal: Soft. Bowel sounds are normal.   PEG tube in place   Musculoskeletal: She exhibits no edema or tenderness.   Neurological:   Oriented to person, not to time or place; left sided weakness; sensation in tact.   Skin: Skin is warm and dry.       Significant Labs:   Blood Culture:   Recent Labs  Lab 06/14/17  1517 07/03/17  0934 07/03/17  0954 07/20/17  1052 07/20/17  1103   LABBLOO No growth after 5 days. No growth after 5 days. No growth after 5 days. No Growth to date No Growth to date     CBC:   Recent Labs  Lab 07/20/17  0515 07/21/17  0329   WBC 8.33 10.42   HGB 8.1* 8.6*   HCT 25.9* 26.2*    265     CMP:   Recent Labs  Lab 07/20/17  2337 07/21/17  0329 07/21/17  0741    137 137   K 4.1 3.8 4.5    100 102   CO2 29 28 28   GLU 74 112* 99   BUN 18 18 17   CREATININE 0.9 0.9 0.8    CALCIUM 10.1 10.0 10.1   ANIONGAP 9 9 7*   EGFRNONAA >60.0 >60.0 >60.0     CSF:   Recent Labs  Lab 07/20/17  1544   CSFCULTURE No Growth to date       Significant Imaging: None

## 2017-07-21 NOTE — SUBJECTIVE & OBJECTIVE
Interval History: NAEON.    Medications:  Continuous Infusions:     Scheduled Meds:   acyclovir  10 mg/kg Intravenous Q12H    ceFEPime (MAXIPIME) IVPB  1 g Intravenous Q8H    desmopressin  0.5 mcg Intravenous BID    enoxaparin  40 mg Subcutaneous Daily    levetiracetam oral soln  500 mg Per NG tube BID    levothyroxine  50 mcg Per NG tube Daily     PRN Meds:acetaminophen, bisacodyl, dextrose 50%, glucagon (human recombinant), guaifenesin 100 mg/5 ml, insulin aspart, ipratropium, ondansetron, senna, sodium chloride 0.9%     Review of Systems    Objective:     Weight: 56.2 kg (123 lb 14.4 oz)  Body mass index is 22.66 kg/m².  Vital Signs (Most Recent):  Temp: 98 °F (36.7 °C) (17 0700)  Pulse: 75 (17 1000)  Resp: 16 (17 1000)  BP: (!) 109/59 (17 1000)  SpO2: 95 % (17 1000) Vital Signs (24h Range):  Temp:  [97 °F (36.1 °C)-98 °F (36.7 °C)] 98 °F (36.7 °C)  Pulse:  [73-91] 75  Resp:  [13-26] 16  SpO2:  [92 %-97 %] 95 %  BP: ()/(58-89) 109/59              Temp (24hrs), Av.5 °F (36.4 °C), Min:97 °F (36.1 °C), Max:98 °F (36.7 °C)           Date 17 07 - 17 0659   Shift 8961-0983 2957-4274 9883-5409 24 Hour Total   I  N  T  A  K  E   I.V.  (mL/kg) 30  (0.5)   30  (0.5)    NG/   135    IV Piggyback 150   150    Shift Total  (mL/kg) 315  (5.6)   315  (5.6)   O  U  T  P  U  T   Urine  (mL/kg/hr) 525   525    Shift Total  (mL/kg) 525  (9.3)   525  (9.3)   Weight (kg) 56.2 56.2 56.2 56.2          Gastrostomy/Enterostomy 17 1520 Percutaneous endoscopic gastrostomy (PEG) LUQ feeding (Active)   Securement taped to abdomen 2017 11:15 AM   Clamp Status/Tolerance clamped 2017 11:15 AM   Dressing dry and intact 2017 11:15 AM   Insertion Site no redness;no warmth;no drainage;no tenderness;no swelling 2017 11:15 AM   Intake (mL) 60 mL 2017  9:44 PM   Water Bolus (mL) 180 mL 2017  9:00 AM            Urethral Catheter 17 3899  "Straight-tip 16 Fr. (Active)   Site Assessment Clean;Intact 7/18/2017 11:15 AM   Collection Container Urimeter 7/18/2017 11:15 AM   Securement Method secured to top of thigh w/ adhesive device 7/18/2017 11:15 AM   Catheter Care Performed yes 7/18/2017 11:15 AM   Reason for Continuing Urinary Catheterization Critically ill in ICU requiring intensive monitoring 7/18/2017 11:15 AM   CAUTI Prevention Bundle StatLock in place w 1" slack;Intact seal between catheter & drainage tubing;No dependent loops or kinks;Drainage bag below bladder 7/17/2017 11:00 PM   Output (mL) 23 mL 7/18/2017 12:00 PM       Neurosurgery Physical Exam    General: well developed, well nourished, no distress.   Head: normocephalic  Neurologic: Awake. Minimal verbal responses.   GCS: Motor: 6/Verbal: 4 /Eyes: 4 GCS Total: 14  Mental Status: Able to state name. Intermittently follows simple commands.   Cranial nerves: face symmetric, CN II-XII grossly intact.   Eyes: pupils equal, round, reactive to light.  Motor Strength: Moves all extremities spontaneously with good strength and tone. Intermittently follows simple commands.     Significant Labs:    Recent Labs  Lab 07/21/17  0329 07/21/17  0741   * 99    137   K 3.8 4.5    102   CO2 28 28   BUN 18 17   CREATININE 0.9 0.8   CALCIUM 10.0 10.1   MG 1.8  --        Recent Labs  Lab 07/20/17  0515 07/21/17  0329   WBC 8.33 10.42   HGB 8.1* 8.6*   HCT 25.9* 26.2*    265     No results for input(s): LABPT, INR, APTT in the last 48 hours.  Microbiology Results (last 7 days)     Procedure Component Value Units Date/Time    Cryptococcal antigen, CSF [[id number]] Collected:  07/20/17 1544    Order Status:  Completed Specimen:  CSF (Spinal Fluid) from CSF Tap, Tube 1 Updated:  07/21/17 1226     Crypto Ag, CSF Negative    Narrative:       On which sequentially labeled tube should this analysis be  performed?->4    Culture, Respiratory with Gram Stain [[id number]] Collected:  07/19/17 1541 "    Order Status:  Completed Specimen:  Respiratory from Sputum, Expectorated Updated:  07/21/17 1034     Respiratory Culture Normal respiratory tram     Gram Stain (Respiratory) <10 epithelial cells per low power field.     Gram Stain (Respiratory) Few WBC's     Gram Stain (Respiratory) No organisms seen    CSF culture [[id number]] Collected:  07/20/17 1544    Order Status:  Completed Specimen:  CSF (Spinal Fluid) from CSF Tap, Tube 1 Updated:  07/21/17 0743     CSF CULTURE No Growth to date     Gram Stain Result Cytospin indicates:      Moderate WBC's       No organisms seen    Narrative:       On which sequentially labeled tube should this analysis be  performed?->3    Blood culture [[id number]] Collected:  07/20/17 1052    Order Status:  Completed Specimen:  Blood from Peripheral, Upper Arm, Left Updated:  07/20/17 2115     Blood Culture, Routine No Growth to date    Blood culture [[id number]] Collected:  07/20/17 1103    Order Status:  Completed Specimen:  Blood from Peripheral, Upper Arm, Right Updated:  07/20/17 2115     Blood Culture, Routine No Growth to date    Stool culture [[id number]]     Order Status:  Canceled Specimen:  Stool from Stool     Blood culture [[id number]]     Order Status:  Canceled Specimen:  Blood     Cryptococcal antigen [[id number]] Collected:  07/18/17 2030    Order Status:  Completed Specimen:  Blood from Blood Updated:  07/19/17 0923     Cryptococcal Ag, Blood Negative    Blood culture [[id number]] Collected:  07/17/17 2232    Order Status:  Sent Specimen:  Blood Updated:  07/17/17 2232    Blood culture [[id number]] Collected:  07/17/17 2232    Order Status:  Sent Specimen:  Blood Updated:  07/17/17 2232

## 2017-07-21 NOTE — PLAN OF CARE
Problem: Patient Care Overview  Goal: Plan of Care Review  Outcome: Ongoing (interventions implemented as appropriate)  Patient progressing towards discharge.  Patient had no acute events noted throughout the night at this time.  See Doc Flowsheets for documented results.       Infusions: KVO with antibiotics     Neurological:  Responds to voice.  Opens eyes on command.  Pupils equal and sluggish.  Patient has blindness.  Patient oriented to self only.  Patient does not always have coherent thought process.     Cardiac:  Normal Sinus Rhythm.  Pulses palpable in all extremities.  Capillary refill < 3 seconds in all extremities.    Respiratory: Room air with oxygen saturation from 93-97%.     Gastrointestinal: PEG tube in place with Isosource @ 45 ml/hr which is goal rate.  Water boluses were discontinued by MD overnight.  No BM.     Genitourinary: Mackey catheter in place with  ml/hr of urine output every hour overnight.     Endocrine:  Blood glucose monitoring every 4 hours.     Musculoskeletal: Moves all extremities, but has weakness to left side.     Integumentary/Other: Patient repositioned every 2 hours throughout the night, and patient repositioned self multiple times.  Soft bilateral upper extremities restraints in place at this time.     Discussed plan of care with patient and family with verbalization of understanding.  Will continue to monitor.

## 2017-07-21 NOTE — PROGRESS NOTES
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 Chew and discharged on 17 to [facility].  She presents with altered mental status and worsening hypernatremia for 1 day. She was treated for DI her last admission and labs are trending up today.  Spoke with Dr. Fairchild at [facility], patient with waxing/waning mental status yesterday, but was appropriate & following some complex commands yesterday.  There was a period yesterday of significant lethargy.  Patient is unable to give history, and information is taken from the chart.     Post-Op Info:  Procedure(s) (LRB):  PLACEMENT-TUBE-PEG (N/A)   4 Days Post-Op     Interval History: NAEON.    Medications:  Continuous Infusions:     Scheduled Meds:   acyclovir  10 mg/kg Intravenous Q12H    ceFEPime (MAXIPIME) IVPB  1 g Intravenous Q8H    desmopressin  0.5 mcg Intravenous BID    enoxaparin  40 mg Subcutaneous Daily    levetiracetam oral soln  500 mg Per NG tube BID    levothyroxine  50 mcg Per NG tube Daily     PRN Meds:acetaminophen, bisacodyl, dextrose 50%, glucagon (human recombinant), guaifenesin 100 mg/5 ml, insulin aspart, ipratropium, ondansetron, senna, sodium chloride 0.9%     Review of Systems    Objective:     Weight: 56.2 kg (123 lb 14.4 oz)  Body mass index is 22.66 kg/m².  Vital Signs (Most Recent):  Temp: 98 °F (36.7 °C) (17 0700)  Pulse: 75 (17 1000)  Resp: 16 (17 1000)  BP: (!) 109/59 (17 1000)  SpO2: 95 % (17 1000) Vital Signs (24h Range):  Temp:  [97 °F (36.1 °C)-98 °F (36.7 °C)] 98 °F (36.7 °C)  Pulse:  [73-91] 75  Resp:  [13-26] 16  SpO2:  [92 %-97 %] 95 %  BP: ()/(58-89) 109/59              Temp (24hrs), Av.5 °F (36.4 °C), Min:97 °F (36.1 °C), Max:98 °F (36.7 °C)           Date 17 0700 - 17 0659   Shift [id number] 0356-5970 3113-2341 24  "Hour Total   I  N  T  A  K  E   I.V.  (mL/kg) 30  (0.5)   30  (0.5)    NG/   135    IV Piggyback 150   150    Shift Total  (mL/kg) 315  (5.6)   315  (5.6)   O  U  T  P  U  T   Urine  (mL/kg/hr) 525   525    Shift Total  (mL/kg) 525  (9.3)   525  (9.3)   Weight (kg) 56.2 56.2 56.2 56.2          Gastrostomy/Enterostomy 07/17/17 1520 Percutaneous endoscopic gastrostomy (PEG) LUQ feeding (Active)   Securement taped to abdomen 7/18/2017 11:15 AM   Clamp Status/Tolerance clamped 7/18/2017 11:15 AM   Dressing dry and intact 7/18/2017 11:15 AM   Insertion Site no redness;no warmth;no drainage;no tenderness;no swelling 7/18/2017 11:15 AM   Intake (mL) 60 mL 7/17/2017  9:44 PM   Water Bolus (mL) 180 mL 7/18/2017  9:00 AM            Urethral Catheter 07/17/17 2335 Straight-tip 16 Fr. (Active)   Site Assessment Clean;Intact 7/18/2017 11:15 AM   Collection Container Urimeter 7/18/2017 11:15 AM   Securement Method secured to top of thigh w/ adhesive device 7/18/2017 11:15 AM   Catheter Care Performed yes 7/18/2017 11:15 AM   Reason for Continuing Urinary Catheterization Critically ill in ICU requiring intensive monitoring 7/18/2017 11:15 AM   CAUTI Prevention Bundle StatLock in place w 1" slack;Intact seal between catheter & drainage tubing;No dependent loops or kinks;Drainage bag below bladder 7/17/2017 11:00 PM   Output (mL) 23 mL 7/18/2017 12:00 PM       Neurosurgery Physical Exam    General: well developed, well nourished, no distress.   Head: normocephalic  Neurologic: Awake. Minimal verbal responses.   GCS: Motor: 6/Verbal: 4 /Eyes: 4 GCS Total: 14  Mental Status: Able to state name. Intermittently follows simple commands.   Cranial nerves: face symmetric, CN II-XII grossly intact.   Eyes: pupils equal, round, reactive to light.  Motor Strength: Moves all extremities spontaneously with good strength and tone. Intermittently follows simple commands.     Significant Labs:    Recent Labs  Lab 07/21/17  0329 " 07/21/17  0741   * 99    137   K 3.8 4.5    102   CO2 28 28   BUN 18 17   CREATININE 0.9 0.8   CALCIUM 10.0 10.1   MG 1.8  --        Recent Labs  Lab 07/20/17  0515 07/21/17  0329   WBC 8.33 10.42   HGB 8.1* 8.6*   HCT 25.9* 26.2*    265     No results for input(s): LABPT, INR, APTT in the last 48 hours.  Microbiology Results (last 7 days)     Procedure Component Value Units Date/Time    Cryptococcal antigen, CSF [[id number]] Collected:  07/20/17 1544    Order Status:  Completed Specimen:  CSF (Spinal Fluid) from CSF Tap, Tube 1 Updated:  07/21/17 1226     Crypto Ag, CSF Negative    Narrative:       On which sequentially labeled tube should this analysis be  performed?->4    Culture, Respiratory with Gram Stain [[id number]] Collected:  07/19/17 1541    Order Status:  Completed Specimen:  Respiratory from Sputum, Expectorated Updated:  07/21/17 1034     Respiratory Culture Normal respiratory tram     Gram Stain (Respiratory) <10 epithelial cells per low power field.     Gram Stain (Respiratory) Few WBC's     Gram Stain (Respiratory) No organisms seen    CSF culture [[id number]] Collected:  07/20/17 1544    Order Status:  Completed Specimen:  CSF (Spinal Fluid) from CSF Tap, Tube 1 Updated:  07/21/17 0743     CSF CULTURE No Growth to date     Gram Stain Result Cytospin indicates:      Moderate WBC's       No organisms seen    Narrative:       On which sequentially labeled tube should this analysis be  performed?->3    Blood culture [[id number]] Collected:  07/20/17 1052    Order Status:  Completed Specimen:  Blood from Peripheral, Upper Arm, Left Updated:  07/20/17 2115     Blood Culture, Routine No Growth to date    Blood culture [[id number]] Collected:  07/20/17 1103    Order Status:  Completed Specimen:  Blood from Peripheral, Upper Arm, Right Updated:  07/20/17 2115     Blood Culture, Routine No Growth to date    Stool culture [[id number]]     Order Status:  Canceled Specimen:  Stool from  Stool     Blood culture [[id number]]     Order Status:  Canceled Specimen:  Blood     Cryptococcal antigen [[id number]] Collected:  07/18/17 2030    Order Status:  Completed Specimen:  Blood from Blood Updated:  07/19/17 0923     Cryptococcal Ag, Blood Negative    Blood culture [[id number]] Collected:  07/17/17 2232    Order Status:  Sent Specimen:  Blood Updated:  07/17/17 2232    Blood culture [[id number]] Collected:  07/17/17 2232    Order Status:  Sent Specimen:  Blood Updated:  07/17/17 2232        Assessment/Plan:     Meningioma, recurrent of brain    52 yo F with an olfactory groove meningioma s/p resection with Dr. Chew 6/7/17, who re-presented with hypernatremia.    -Continue neuro checks q4 hours. Notify NSGY for any changes.   -Continue Keppra for seizure prophylaxis  -Continue Lovenox for DVT prophylaxis  -Continue aggressive PT/OT  -LP performed, follow up studies.  -Medical management per primary team.  -Will continue to follow, please call with questions             Daniel Zamora MD  Neurosurgery  [facility]

## 2017-07-21 NOTE — PROGRESS NOTES
[facility]  Critical Care Medicine  Progress Note    Patient Name: Joanna Morales  MRN: [id number]  Admission Date: 6/28/2017  Hospital Length of Stay: 23 days  Code Status: Full Code  Attending Provider: Sonya Dc MD  Primary Care Provider: Claudy Tse MD   Principal Problem: Hypernatremia    Subjective:     HPI:  Ms. Joanna Morales is a 51 y.o. female with tobacco abuse, secondary hypothyroidism (TSH 0.241 6/17) and history of meningioma s/p resection 2015 & 6/7/2017. The most recent surgery by Dr. Chew at [facility] was complicated by DI . The patient was discharged to [facility] on 6/26/17 and re-presented to [facility] ED on 6/28 with altered mental status and worsening hypernatremia.     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      Hospital/ICU Course:  Ms. Morales was discharged from [facility]  to [facility] on 6/26/17 and presented to [facility] ED on 6/28 with altered mental status and worsening hypernatremia. According to the patient's sitter, the patient was awake, verbal, and interactive with therapy on 6/23 but had been progressively less interactive and responsive when she was at [place]. Ms. Morales was admitted to Hospital Medicine but was found to be unresponsive with a sodium level of 168 on the morning of 6/29.     She has been followed by endocrinology and neurology and workup thus far significant for  unremarkable UA, neuro-imaging c/w recent left sphenoidal meningioma resection with  resolution of blood products and trace intraventricular hemorrhage.  Previous EEG (6/16/17) demonstrated Moderate diffuse slowing of the overall background as described above, Superimposed left hemispheric slowing relative to the right, and NO epileptiform discharges or seizures were seen c/w ENCP and focal cortical dysfunction.  Pt's presentation is concerning for multi-factorial encephalopathy given persistent hypernatremia.  Per neurology, low suspicion for MNG currently, but patient is at high risk given recent intracranial procedure.    Critical Care consulted 7/17/17 for acute hypoxic respiratory failure. Pt had PEG tube placed earlier today and in PACU, oxygen saturations noted to be in the mid 80's. Pt was placed on a non-rebreather with minimal improvement and subsequently placed on BiPAP. ABG significant for elevated CO2 however pt was compensating. Repeat ABG showed pH 7.372, CO2 44, O2 62, HCO3 25.8. Pt transitioned to NC. Cxray concerning for aspiration pneumonia - new infiltrate in RLL so patient started on vancomycin and zosyn for HAP.      Interval History/Significant Events: No acute events overnight. Patient seems to be progressing; more alert this am. Vital signs stable. Urinary output appropriate. Sodium continues to improve on IV desmopressin.     Review of Systems   Unable to perform ROS: Acuity of condition     Objective:     Vital Signs (Most Recent):  Temp: 97.3 °F (36.3 °C) (07/21/17 0400)  Pulse: 76 (07/21/17 0600)  Resp: 17 (07/21/17 0600)  BP: 113/81 (07/21/17 0600)  SpO2: (!) 94 % (07/21/17 0600) Vital Signs (24h Range):  Temp:  [97 °F (36.1 °C)-97.8 °F (36.6 °C)] 97.3 °F (36.3 °C)  Pulse:  [73-91] 76  Resp:  [13-26] 17  SpO2:  [92 %-97 %] 94 %  BP: ()/(58-89) 113/81   Weight: 56.2 kg (123 lb 14.4 oz)  Body mass index is 22.66 kg/m².      Intake/Output Summary (Last 24 hours) at 07/21/17 0913  Last data filed at 07/21/17 0701   Gross per 24 hour   Intake             2140 ml    Output             1970 ml   Net              170 ml       Physical Exam   Constitutional:   Thin, cachectic   HENT:   Head: Normocephalic and atraumatic.   Eyes:   Patient is legally blind  Pupils dilated L>R minimally reactive   Neck: Normal range of motion.   Cardiovascular: Normal rate and regular rhythm.    Pulmonary/Chest: No respiratory distress.   RLL rales - slight   Abdominal: Soft. There is no tenderness.   Musculoskeletal: Normal range of motion.   Neurological:   Mental Status:  Very lethargic. Follows simple commands and moves extremities. Improving.  LUE strength 2/5; RUE strength 3/5   Skin: Skin is warm and dry.   Psychiatric: Cognition and memory are impaired.   Vitals reviewed.      Vents:  Oxygen Concentration (%): 50 (07/17/17 2135)  Lines/Drains/Airways     Central Venous Catheter Line                 Percutaneous Central Line Insertion/Assessment - triple lumen  07/18/17 1934 left internal jugular 2 days          Drain                 Gastrostomy/Enterostomy 07/17/17 1520 Percutaneous endoscopic gastrostomy (PEG) LUQ feeding 3 days         Urethral Catheter 07/17/17 2335 Straight-tip 16 Fr. 3 days          Peripheral Intravenous Line                 Peripheral IV - Single Lumen 07/17/17 0800 Right Antecubital 4 days              Significant Labs:    CBC/Anemia Profile:    Recent Labs  Lab 07/20/17  0515 07/21/17  0329   WBC 8.33 10.42   HGB 8.1* 8.6*   HCT 25.9* 26.2*    265   * 96   RDW 15.5* 14.8*        Chemistries:    Recent Labs  Lab 07/19/17  2313 07/20/17  0515  07/20/17  2337 07/21/17  0329 07/21/17  0741   NA  --  143  < > 141 137 137   K  --  3.8  < > 4.1 3.8 4.5   CL  --  111*  < > 103 100 102   CO2  --  26  < > 29 28 28   BUN  --  18  < > 18 18 17   CREATININE  --  1.0  < > 0.9 0.9 0.8   CALCIUM  --  9.5  < > 10.1 10.0 10.1   MG 2.7* 2.1  --   --  1.8  --    PHOS  --  4.1  --   --  5.0*  --    < > = values in this interval not displayed.    All pertinent labs within  the past 24 hours have been reviewed.    Significant Imaging:  I have reviewed and interpreted all pertinent imaging results/findings within the past 24 hours.    Assessment/Plan:     Neuro   Acute encephalopathy    --see above, likely multifactorial (metabolic considering persistent hypernatremia)  --continue keppra 500 BID for seizure prophylaxis.  --  Hold gabapentin for now given increased somnolence associated with dosing (discussed with NSGY)  -- Monitor for delirium and encourage delirium precautions  -- Avoid benzodiazepines, anticholinergics, and anti-histaminics and minimize opiates when possible as they may worsen or exacerbate delirium.  -- Ensure blinds are open with lights on during the day and that the shades are closed with lights off at night. -- Reorient pt through out the day.    -- repeat CT head 7/17/17 with no interval changes  - MRI 7/18 with no acute changes  - On Cefepime, acyclovir day 4; vanc, fluconazole d/c'd 7/21  - LP performed 7/20; LP consistent with a viral process            Secondary hypothyroidism    --continue 50mcg Levothyroxine per NGT  --T4 this admission WNL  -- Repeat in 1-2months            Primary central diabetes insipidus    --appreciate endocrine's assistance.   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    -Hx of a right frontotemporal craniotomy with partial removal of a large subfrontal and tuberculum  sellae meningioma at [facility] in 2015. 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                Pulmonary   Acute hypoxemic respiratory failure    -pt with O2 sats in the mid 80's after PEG tube placement and minimal improvement on a non rebreather, pt then placed on BiPAP and ABG showed pH 7.372, CO2 44, O2 62, HCO3 25.8 so pt switched to nasal cannula and tolerating it well  -CXray concerning for RLL infiltrate; vanc/cefepime/acyclovir day 4  -cx's lost; re-cultured 7/20        Fluids/Electrolytes/Nutrition/GI   * Hypernatremia    -pt with persistent hypernatremia since admission  -Monitor BMP q6h, continue desmopressin IV  -likely contributing to patient's AMS  -Sodium normalizing now that we have IV desmopressin on board        Malnutrition of moderate degree    -s/p PEG tube placement on 7/17/17  -will f/u nutrition recs and start feeds        Other   Tobacco abuse    --Nicotine patch           Critical Care Medicine Daily Checklist:    A: Awake: RASS Goal/Actual Goal: RASS Goal: 0-->alert and calm  Actual: Tong Agitation Sedation Scale (RASS): Alert and calm   B: Spontaneous Breathing Trial Performed?     C: SAT & SBT Coordinated?  NA                      D: Delirium: CAM-ICU Overall CAM-ICU: Positive   E: Early Mobility Performed? No   F: Feeding Goal: Goals: Pt to receive >90% of EEN via TF or po intake  Status: Nutrition Goal Status: goal met   Current Diet Order   Procedures    Diet NPO      AS: Analgesia/Sedation None   T: Thromboembolic Prophylaxis Enoxaparin d/c 7/18   H: HOB > 300 Yes   U: Stress Ulcer Prophylaxis (if needed) None   G: Glucose Control Accuechecks   B: Bowel Function Stool  Occurrence: 0   I: Indwelling Catheter (Lines & Mackey) Necessity Left IJ d/c 7/21; Mackey   D: De-escalation of Antimicrobials/Pharmacotherapies D/c Fluconazole 7/21; Vanc/Cefepime/Acyclovir Day 4    Plan for the day/ETD Continue to monitor respiratory status; stable for stepdown    Code Status:  Family/Goals of Care: Full Code          Jonas Renee MD  Critical Care Medicine  [facility]

## 2017-07-21 NOTE — PLAN OF CARE
Problem: Patient Care Overview  Goal: Plan of Care Review  Outcome: Ongoing (interventions implemented as appropriate)  No acute events during shift; VS stable; tolerating tube feeds at 45 cc/hr to PEG; average 100 cc/hr urine output per garcía; PRN laxatives administered, with large soft BM; EEG removed in afternoon, no noticeable seizure activity; neuro status unchanged-pt drowsy, only oriented to self, random talking about different things; following commands; bilateral soft wrist restraints in place, no signs of injuries, pt still attempting to grab at lines; IV abx intermittently; Na remains WNL; LP done at bedside, pt tolerated well; SCDs in place, turned q2h with wedge; family at bedside periodically

## 2017-07-21 NOTE — SIGNIFICANT EVENT
Stepdown to [facility] discussed with Twila at 12:27.    RAUL Chaves PA-C  Critical Care Medicine  [phone]

## 2017-07-21 NOTE — ASSESSMENT & PLAN NOTE
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

## 2017-07-21 NOTE — SUBJECTIVE & OBJECTIVE
Interval History/Significant Events: No acute events overnight. Patient seems to be progressing; more alert this am. Vital signs stable. Sodium continues to improve on IV desmopressin.    Review of Systems   Unable to perform ROS: Acuity of condition     Objective:     Vital Signs (Most Recent):  Temp: 97.3 °F (36.3 °C) (07/21/17 0400)  Pulse: 76 (07/21/17 0600)  Resp: 17 (07/21/17 0600)  BP: 113/81 (07/21/17 0600)  SpO2: (!) 94 % (07/21/17 0600) Vital Signs (24h Range):  Temp:  [97 °F (36.1 °C)-97.8 °F (36.6 °C)] 97.3 °F (36.3 °C)  Pulse:  [73-91] 76  Resp:  [13-26] 17  SpO2:  [92 %-97 %] 94 %  BP: ()/(58-89) 113/81   Weight: 56.2 kg (123 lb 14.4 oz)  Body mass index is 22.66 kg/m².      Intake/Output Summary (Last 24 hours) at 07/21/17 0913  Last data filed at 07/21/17 0701   Gross per 24 hour   Intake             2140 ml   Output             1970 ml   Net              170 ml       Physical Exam   Constitutional:   Thin, cachectic   HENT:   Head: Normocephalic and atraumatic.   Eyes:   Patient is legally blind  Pupils dilated L>R minimally reactive   Neck: Normal range of motion.   Cardiovascular: Normal rate and regular rhythm.    Pulmonary/Chest: No respiratory distress.   RLL rales - slight   Abdominal: Soft. There is no tenderness.   Musculoskeletal: Normal range of motion.   Neurological:   Mental Status:  Very lethargic. Follows simple commands and moves extremities. Improving.  LUE strength 2/5; RUE strength 3/5   Skin: Skin is warm and dry.   Psychiatric: Cognition and memory are impaired.   Vitals reviewed.      Vents:  Oxygen Concentration (%): 50 (07/17/17 2135)  Lines/Drains/Airways     Central Venous Catheter Line                 Percutaneous Central Line Insertion/Assessment - triple lumen  07/18/17 1934 left internal jugular 2 days          Drain                 Gastrostomy/Enterostomy 07/17/17 1520 Percutaneous endoscopic gastrostomy (PEG) LUQ feeding 3 days         Urethral Catheter 07/17/17  2335 Straight-tip 16 Fr. 3 days          Peripheral Intravenous Line                 Peripheral IV - Single Lumen 07/17/17 0800 Right Antecubital 4 days              Significant Labs:    CBC/Anemia Profile:    Recent Labs  Lab 07/20/17 0515 07/21/17 0329   WBC 8.33 10.42   HGB 8.1* 8.6*   HCT 25.9* 26.2*    265   * 96   RDW 15.5* 14.8*        Chemistries:    Recent Labs  Lab 07/19/17  2313 07/20/17 0515 07/20/17  2337 07/21/17 0329 07/21/17  0741   NA  --  143  < > 141 137 137   K  --  3.8  < > 4.1 3.8 4.5   CL  --  111*  < > 103 100 102   CO2  --  26  < > 29 28 28   BUN  --  18  < > 18 18 17   CREATININE  --  1.0  < > 0.9 0.9 0.8   CALCIUM  --  9.5  < > 10.1 10.0 10.1   MG 2.7* 2.1  --   --  1.8  --    PHOS  --  4.1  --   --  5.0*  --    < > = values in this interval not displayed.    All pertinent labs within the past 24 hours have been reviewed.    Significant Imaging:  I have reviewed and interpreted all pertinent imaging results/findings within the past 24 hours.

## 2017-07-21 NOTE — ASSESSMENT & PLAN NOTE
--see above, likely multifactorial (metabolic considering persistent hypernatremia)  --continue keppra 500 BID for seizure prophylaxis.  --  Hold gabapentin for now given increased somnolence associated with dosing (discussed with NSGY)  -- Monitor for delirium and encourage delirium precautions, pt also with q1h neuro checks   -- Avoid benzodiazepines, anticholinergics, and anti-histaminics and minimize opiates when possible as they may worsen or exacerbate delirium.  -- Ensure blinds are open with lights on during the day and that the shades are closed with lights off at night. -- Reorient pt through out the day.    -- repeat CT head 7/17/17 with no interval changes  - MRI 7/18 with no acute changes  - On Cefepime, Vanc, acyclovir day 4; fluconazole d/c'd 7/21  - LP performed 7/20; LP consistent with a viral process however minimal RBCs seen

## 2017-07-22 NOTE — SUBJECTIVE & OBJECTIVE
Interval History: Patient responsive to questions and follows commands. Oriented to self but not place or time. Endorses nausea pain near incision of PEG tube.     Review of Systems     Unable to perform ROS given AMS.    Objective:     Vital Signs (Most Recent):  Temp: 97.5 °F (36.4 °C) (07/22/17 0749)  Pulse: 72 (07/22/17 1100)  Resp: 17 (07/22/17 0749)  BP: 114/70 (07/22/17 0749)  SpO2: 95 % (07/22/17 0749) Vital Signs (24h Range):  Temp:  [96.1 °F (35.6 °C)-97.6 °F (36.4 °C)] 97.5 °F (36.4 °C)  Pulse:  [70-83] 72  Resp:  [11-17] 17  SpO2:  [94 %-96 %] 95 %  BP: (104-160)/(57-73) 114/70     Weight: 56.2 kg (123 lb 14.4 oz)  Body mass index is 22.66 kg/m².    Estimated Creatinine Clearance: 65.8 mL/min (based on Cr of 0.8).    Physical Exam   Constitutional: She appears well-developed and well-nourished.   HENT:   Head: Normocephalic.   Surgical scar   Cardiovascular: Normal rate, regular rhythm and normal heart sounds.    Pulmonary/Chest: Effort normal and breath sounds normal.   Abdominal: Soft. Bowel sounds are normal.   PEG tube in place   Musculoskeletal: She exhibits no edema or tenderness.   Neurological:   Oriented to person, not to time or place; left sided weakness; sensation in tact.   Skin: Skin is warm and dry.       Significant Labs:   Blood Culture:     Recent Labs  Lab 06/14/17  1517 07/03/17  0934 07/03/17  0954 07/20/17  1052 07/20/17  1103   LABBLOO No growth after 5 days. No growth after 5 days. No growth after 5 days. No Growth to date  No Growth to date No Growth to date  No Growth to date     CBC:     Recent Labs  Lab 07/21/17  0329 07/22/17  0413   WBC 10.42 9.96   HGB 8.6* 9.2*   HCT 26.2* 27.9*    301     CMP:     Recent Labs  Lab 07/21/17  0329 07/21/17  0741 07/22/17  0413    137 135*   K 3.8 4.5 4.0    102 95   CO2 28 28 29   * 99 131*   BUN 18 17 18   CREATININE 0.9 0.8 0.8   CALCIUM 10.0 10.1 10.5   ANIONGAP 9 7* 11   EGFRNONAA >60.0 >60.0 >60.0     CSF:      Recent Labs  Lab 07/20/17  1544   CSFCULTURE No Growth to date       Significant Imaging: None

## 2017-07-22 NOTE — PROGRESS NOTES
Progress Note  Hospital Medicine    Primary Team: [facility] HOSP MED L  Admit Date: 6/28/2017   Length of Stay:  LOS: 24 days   SUBJECTIVE:   Reason for Admission:  Hypernatremia    HPI:  Ms. Morales was discharged from [facility]  to [facility] on 6/26/17 and presented to [facility] ED on 6/28 with altered mental status and worsening hypernatremia. According to the patient's sitter, the patient was awake, verbal, and interactive with therapy on 6/23 but had been progressively less interactive and responsive when she was at [place]. Ms. Morales was admitted to Hospital Medicine but was found to be unresponsive with a sodium level of 168 on the morning of 6/29.      She has been followed by endocrinology and neurology and workup thus far significant for  unremarkable UA, neuro-imaging c/w recent left sphenoidal meningioma resection with resolution of blood products and trace intraventricular hemorrhage.  Previous EEG (6/16/17) demonstrated Moderate diffuse slowing of the overall background as described above, Superimposed left hemispheric slowing relative to the right, and NO epileptiform discharges or seizures were seen c/w ENCP and focal cortical dysfunction.  Pt's presentation is concerning for multi-factorial encephalopathy given persistent hypernatremia.  Per neurology, low suspicion for MNG currently, but patient is at high risk given recent intracranial procedure.     Critical Care consulted 7/17/17 for acute hypoxic respiratory failure. Pt had PEG tube placed earlier today and in PACU, oxygen saturations noted to be in the mid 80's. Pt was placed on a non-rebreather with minimal improvement and subsequently placed on BiPAP. ABG significant for elevated CO2 however pt was compensating. Repeat ABG showed pH 7.372, CO2 44, O2 62, HCO3 25.8. Pt transitioned to NC. Cxray concerning for aspiration pneumonia - new infiltrate in RLL so patient started on vancomycin and zosyn for HAP. She was then switched to  Vanc/Cefepime/Acyclovir. Had significant elevation in WBC count 7/18 and Needed levophed to bring BP up for the night. Switched nasal DDAVP to IV DDAVP which has normalized her sodium levels. LP performed 7/20; consistent with a viral process. BP has been stable off pressors; leukocytosis resolved. Patient more alert.    Interval history:    Pt stepped down to IM-L overnight.  This morning she is sleepy but responsive; disoriented.  Remains in restraints due to scratching her face and pulling IVs overnight.    Review of Systems:  Review of systems not obtained due to patient factors delirium.     OBJECTIVE:     Temp:  [96.1 °F (35.6 °C)-98.2 °F (36.8 °C)]   Pulse:  [70-83]   Resp:  [11-17]   BP: (104-160)/(57-73)   SpO2:  [93 %-96 %]  Body mass index is 22.66 kg/m².  Intake/[place]ake:  This Shift:  No intake/output data recorded.    Net I/O past 24h:     Intake/Output Summary (Last 24 hours) at 07/22/17 1056  Last data filed at 07/22/17 0700   Gross per 24 hour   Intake              523 ml   Output              445 ml   Net               78 ml             Physical Exam:  Gen- well-developed, well-nourished, disheveled.  Oriented to self, not [facility] time (2019[facility]   CVS- S1 and S2 present, RRR  Resp- CTA b/l, no work of breathing  Abd- BS+, soft, NT, ND  Ext- no clubbing, cyanosis, or edema    Laboratory:  CBC/Anemia Labs: Coags:      Recent Labs  Lab 07/20/17  0515 07/21/17  0329 07/22/17  0413   WBC 8.33 10.42 9.96   HGB 8.1* 8.6* 9.2*   HCT 25.9* 26.2* 27.9*    265 301   * 96 94   RDW 15.5* 14.8* 14.8*      Recent Labs  Lab 07/18/17  1534   INR 1.1        Chemistries:     Recent Labs  Lab 07/16/17  0414 07/17/17  0355  07/18/17  0855  07/20/17  0515  07/21/17  0329 07/21/17  0741 07/22/17  0413   * 160*  < > 149*  < > 143  < > 137 137 135*   K 3.6 4.0  < > 4.1  < > 3.8  < > 3.8 4.5 4.0   * 115*  < > 107  < > 111*  < > 100 102 95   CO2 31* 32*  < > 30*  < > 26  < > 28 28 29    BUN 28* 35*  < > 35*  < > 18  < > 18 17 18   CREATININE 1.2 1.1  < > 1.3  < > 1.0  < > 0.9 0.8 0.8   CALCIUM 11.6* 11.8*  < > 10.8*  < > 9.5  < > 10.0 10.1 10.5   PROT 8.3 8.5*  --  7.3  --   --   --   --   --   --    BILITOT 0.2 0.2  --  0.5  --   --   --   --   --   --    ALKPHOS 167* 185*  --  158*  --   --   --   --   --   --    ALT 32 44  --  29  --   --   --   --   --   --    AST 38 62*  --  35  --   --   --   --   --   --    MG 2.8* 3.2*  --  2.1  < > 2.1  --  1.8  --  2.2   PHOS 5.1* 5.2*  --  5.5*  < > 4.1  --  5.0*  --  5.2*   < > = values in this interval not displayed.     ABG:     Recent Labs  Lab 07/18/17  0108 07/18/17  1036 07/21/17  1320   PH 7.393 7.462* 7.418   PCO2 48.3* 41.8 50.9*   PO2 81 56* 37*   HCO3 29.4* 29.9* 32.9*   POCSATURATED 96 90* 70*   BE 5 6 8          POCT Glucose: HbA1c:      Recent Labs  Lab 07/20/17  1954 07/20/17  2343 07/21/17  0343 07/21/17  1319 07/22/17  0118 07/22/17  0630   POCTGLUCOSE 134* 86 141* 129* 99 101    No results found for: HGBA1C      Medications:  Scheduled Meds:   acyclovir  10 mg/kg Intravenous Q12H    ceFEPime (MAXIPIME) IVPB  1 g Intravenous Q8H    desmopressin  0.5 mcg Intravenous BID    enoxaparin  40 mg Subcutaneous Daily    levetiracetam oral soln  500 mg Per NG tube BID    levothyroxine  50 mcg Per NG tube Daily                             Continuous Infusions:   PRN Meds:.acetaminophen, bisacodyl, dextrose 50%, glucagon (human recombinant), guaifenesin 100 mg/5 ml, insulin aspart, ipratropium, ondansetron, senna, sodium chloride 0.9%     ASSESSMENT/PLAN:     Acute Encephalopathy  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    -Hx of a right frontotemporal craniotomy with partial removal of a large subfrontal and tuberculum sellae meningioma at [facility] in 2015. 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    -continue Keppra for seizure prophylaxis    Malnutrition of Moderate Degree  -s/p PEG 7/17  -continue TFs    DVT ppx- Lovenox  CODE Status- FULL    Dispo- will likely need NH    Ally Calloway MD  Hospital Medicine Staff

## 2017-07-22 NOTE — PLAN OF CARE
Problem: Patient Care Overview  Goal: Plan of Care Review  Outcome: Ongoing (interventions implemented as appropriate)  Pt remained fee from falls/injuries. VSS. Afebrile. Alert to self only. In bilateral soft restraints due to pulling at medical devices and scratching at her face. Attempted to remove restraints, but behavior continued and they were put back on. Skin intact. Feedings continued at goal rate of 45 ml/hr. 30 ml of residuals noted. No c/o pain. Blood glucose monitored q 6 hr. Pt is in bed with side rails up x 3, wheels locked, bed in lowest position, call light in reach. Bed alarm activated.

## 2017-07-22 NOTE — PROGRESS NOTES
[facility]  Infectious Disease  Progress Note    Patient Name: Joanna Morales  MRN: [id number]  Admission Date: 6/28/2017  Length of Stay: 24 days  Attending Physician: Ally Calloway MD  Primary Care Provider: Claudy Tse MD    Isolation Status: No active isolations  Assessment/Plan:      Acute encephalopathy    52 yo s/p meningioma resection (06/2017) presents with worsening AMS concerning for metabolic or infectious encephalopathy:   -would stop vanco given lack of MRSA on culture data  -continue cefepime for possible aspiration  -would treat x 7 days (stop date 7/24)  - continue acyclovir until HSV PCR negative (called lab and [place] may have cancelled the test, will discuss with send out lab on Monday)  -afebrile, improving leukocytosis  -LP consistent with viral infection              Thank you for your consult. I will follow-up with patient. Please contact us if you have any additional questions.    Daniel Cam MD  Infectious Disease  [facility]    Subjective:     Principal Problem:Hypernatremia    HPI: See consult note  Interval History: Patient responsive to questions and follows commands. Oriented to self but not place or time. Endorses nausea pain near incision of PEG tube.     Review of Systems     Unable to perform ROS given AMS.    Objective:     Vital Signs (Most Recent):  Temp: 97.5 °F (36.4 °C) (07/22/17 0749)  Pulse: 72 (07/22/17 1100)  Resp: 17 (07/22/17 0749)  BP: 114/70 (07/22/17 0749)  SpO2: 95 % (07/22/17 0749) Vital Signs (24h Range):  Temp:  [96.1 °F (35.6 °C)-97.6 °F (36.4 °C)] 97.5 °F (36.4 °C)  Pulse:  [70-83] 72  Resp:  [11-17] 17  SpO2:  [94 %-96 %] 95 %  BP: (104-160)/(57-73) 114/70     Weight: 56.2 kg (123 lb 14.4 oz)  Body mass index is 22.66 kg/m².    Estimated Creatinine Clearance: 65.8 mL/min (based on Cr of 0.8).    Physical Exam   Constitutional: She appears well-developed and well-nourished.   HENT:   Head: Normocephalic.    Surgical scar   Cardiovascular: Normal rate, regular rhythm and normal heart sounds.    Pulmonary/Chest: Effort normal and breath sounds normal.   Abdominal: Soft. Bowel sounds are normal.   PEG tube in place   Musculoskeletal: She exhibits no edema or tenderness.   Neurological:   Oriented to person, not to time or place; left sided weakness; sensation in tact.   Skin: Skin is warm and dry.       Significant Labs:   Blood Culture:     Recent Labs  Lab 06/14/17  1517 07/03/17  0934 07/03/17  0954 07/20/17  1052 07/20/17  1103   LABBLOO No growth after 5 days. No growth after 5 days. No growth after 5 days. No Growth to date  No Growth to date No Growth to date  No Growth to date     CBC:     Recent Labs  Lab 07/21/17  0329 07/22/17  0413   WBC 10.42 9.96   HGB 8.6* 9.2*   HCT 26.2* 27.9*    301     CMP:     Recent Labs  Lab 07/21/17  0329 07/21/17  0741 07/22/17  0413    137 135*   K 3.8 4.5 4.0    102 95   CO2 28 28 29   * 99 131*   BUN 18 17 18   CREATININE 0.9 0.8 0.8   CALCIUM 10.0 10.1 10.5   ANIONGAP 9 7* 11   EGFRNONAA >60.0 >60.0 >60.0     CSF:     Recent Labs  Lab 07/20/17  1544   CSFCULTURE No Growth to date       Significant Imaging: None

## 2017-07-23 NOTE — PROGRESS NOTES
[facility]  Infectious Disease  Progress Note    Patient Name: Joanna Morales  MRN: [id number]  Admission Date: 6/28/2017  Length of Stay: 25 days  Attending Physician: Ally Calloway MD  Primary Care Provider: Claudy Tse MD    Isolation Status: No active isolations  Assessment/Plan:      Acute encephalopathy    50 yo s/p meningioma resection (06/2017) presents with worsening AMS concerning for metabolic or infectious encephalopathy:       -continue cefepime for possible aspiration, would treat x 7 days (stop date 7/24)  - continue acyclovir until HSV PCR negative (called lab and [place] may have cancelled the test, will discuss with send out lab on Monday) if treating for HSV encephalitis will need acyclovir until at least 8/1  -afebrile  -LP consistent with viral infection                Thank you for your consult. I will follow-up with patient. Please contact us if you have any additional questions.    Daniel Cam MD  Infectious Disease  [facility]    Subjective:     Principal Problem:Hypernatremia    HPI: See consult note  Interval History: No change    Review of Systems     Unable to accurately perform ROS given AMS.    Objective:     Vital Signs (Most Recent):  Temp: 97.9 °F (36.6 °C) (07/23/17 0754)  Pulse: 86 (07/23/17 0754)  Resp: 18 (07/23/17 0754)  BP: 102/68 (07/23/17 0754)  SpO2: (!) 94 % (07/23/17 0754) Vital Signs (24h Range):  Temp:  [96.7 °F (35.9 °C)-97.9 °F (36.6 °C)] 97.9 °F (36.6 °C)  Pulse:  [72-94] 86  Resp:  [16-18] 18  SpO2:  [94 %-95 %] 94 %  BP: (100-132)/(68-84) 102/68     Weight: 56.2 kg (123 lb 14.4 oz)  Body mass index is 22.66 kg/m².    Estimated Creatinine Clearance: 75.2 mL/min (based on Cr of 0.7).    Physical Exam   Constitutional: She appears well-developed and well-nourished.   HENT:   Head: Normocephalic.   Surgical scar   Cardiovascular: Normal rate, regular rhythm and normal heart sounds.    Pulmonary/Chest: Effort normal and  breath sounds normal.   Abdominal: Soft. Bowel sounds are normal.   PEG tube in place   Musculoskeletal: She exhibits no edema or tenderness.   Neurological:   Oriented to person, not to time or place; left sided weakness; sensation in tact.   Skin: Skin is warm and dry.       Significant Labs:   Blood Culture:     Recent Labs  Lab 06/14/17  1517 07/03/17  0934 07/03/17  0954 07/20/17  1052 07/20/17  1103   LABBLOO No growth after 5 days. No growth after 5 days. No growth after 5 days. No Growth to date  No Growth to date  No Growth to date No Growth to date  No Growth to date  No Growth to date     CBC:     Recent Labs  Lab 07/22/17  0413 07/23/17  0433   WBC 9.96 10.23   HGB 9.2* 9.5*   HCT 27.9* 28.7*    358*     CMP:     Recent Labs  Lab 07/22/17  0413 07/23/17  0433   * 133*   K 4.0 4.1   CL 95 94*   CO2 29 27   * 104   BUN 18 19   CREATININE 0.8 0.7   CALCIUM 10.5 10.6*   ANIONGAP 11 12   EGFRNONAA >60.0 >60.0     CSF:     Recent Labs  Lab 07/20/17  1544   CSFCULTURE No Growth to date       Significant Imaging: None

## 2017-07-23 NOTE — PLAN OF CARE
Problem: Patient Care Overview  Goal: Plan of Care Review  Outcome: Ongoing (interventions implemented as appropriate)  Pt free of falls or injury during shift. VSS, afebrile. Tube feedings continued, tolerating well, no residual. IV Abx administered as ordered. Pt still in restraints, continues to attempt to pull IVs out and scratching head/ creating skin tears. Bed locked and in lowest position, side rails raised x3, call light within reach. Will continue to monitor.

## 2017-07-23 NOTE — SUBJECTIVE & OBJECTIVE
Interval History: No change    Review of Systems     Unable to accurately perform ROS given AMS.    Objective:     Vital Signs (Most Recent):  Temp: 97.9 °F (36.6 °C) (07/23/17 0754)  Pulse: 86 (07/23/17 0754)  Resp: 18 (07/23/17 0754)  BP: 102/68 (07/23/17 0754)  SpO2: (!) 94 % (07/23/17 0754) Vital Signs (24h Range):  Temp:  [96.7 °F (35.9 °C)-97.9 °F (36.6 °C)] 97.9 °F (36.6 °C)  Pulse:  [72-94] 86  Resp:  [16-18] 18  SpO2:  [94 %-95 %] 94 %  BP: (100-132)/(68-84) 102/68     Weight: 56.2 kg (123 lb 14.4 oz)  Body mass index is 22.66 kg/m².    Estimated Creatinine Clearance: 75.2 mL/min (based on Cr of 0.7).    Physical Exam   Constitutional: She appears well-developed and well-nourished.   HENT:   Head: Normocephalic.   Surgical scar   Cardiovascular: Normal rate, regular rhythm and normal heart sounds.    Pulmonary/Chest: Effort normal and breath sounds normal.   Abdominal: Soft. Bowel sounds are normal.   PEG tube in place   Musculoskeletal: She exhibits no edema or tenderness.   Neurological:   Oriented to person, not to time or place; left sided weakness; sensation in tact.   Skin: Skin is warm and dry.       Significant Labs:   Blood Culture:     Recent Labs  Lab 06/14/17  1517 07/03/17  0934 07/03/17  0954 07/20/17  1052 07/20/17  1103   LABBLOO No growth after 5 days. No growth after 5 days. No growth after 5 days. No Growth to date  No Growth to date  No Growth to date No Growth to date  No Growth to date  No Growth to date     CBC:     Recent Labs  Lab 07/22/17 0413 07/23/17  0433   WBC 9.96 10.23   HGB 9.2* 9.5*   HCT 27.9* 28.7*    358*     CMP:     Recent Labs  Lab 07/22/17 0413 07/23/17  0433   * 133*   K 4.0 4.1   CL 95 94*   CO2 29 27   * 104   BUN 18 19   CREATININE 0.8 0.7   CALCIUM 10.5 10.6*   ANIONGAP 11 12   EGFRNONAA >60.0 >60.0     CSF:     Recent Labs  Lab 07/20/17  1544   CSFCULTURE No Growth to date       Significant Imaging: None

## 2017-07-23 NOTE — SUBJECTIVE & OBJECTIVE
Interval History: NAEON.    Medications:  Continuous Infusions:     Scheduled Meds:   acyclovir  10 mg/kg Intravenous Q12H    ceFEPime (MAXIPIME) IVPB  1 g Intravenous Q8H    desmopressin  0.5 mcg Intravenous BID    enoxaparin  40 mg Subcutaneous Daily    levetiracetam oral soln  500 mg Per NG tube BID    levothyroxine  50 mcg Per NG tube Daily     PRN Meds:acetaminophen, bisacodyl, dextrose 50%, glucagon (human recombinant), guaifenesin 100 mg/5 ml, insulin aspart, ipratropium, ondansetron, senna, sodium chloride 0.9%     Review of Systems    Objective:     Weight: 56.2 kg (123 lb 14.4 oz)  Body mass index is 22.66 kg/m².  Vital Signs (Most Recent):  Temp: 97.3 °F (36.3 °C) (17 1200)  Pulse: 93 (17 1451)  Resp: 18 (17 1200)  BP: (!) 107/58 (17 1200)  SpO2: (!) 94 % (17 1200) Vital Signs (24h Range):  Temp:  [96.7 °F (35.9 °C)-97.9 °F (36.6 °C)] 97.3 °F (36.3 °C)  Pulse:  [82-94] 93  Resp:  [16-18] 18  SpO2:  [94 %-95 %] 94 %  BP: (100-132)/(58-84) 107/58            Temp (24hrs), Av.3 °F (36.3 °C), Min:96.7 °F (35.9 °C), Max:97.9 °F (36.6 °C)               Gastrostomy/Enterostomy 17 1520 Percutaneous endoscopic gastrostomy (PEG) LUQ feeding (Active)   Securement taped to abdomen 2017 11:15 AM   Clamp Status/Tolerance clamped 2017 11:15 AM   Dressing dry and intact 2017 11:15 AM   Insertion Site no redness;no warmth;no drainage;no tenderness;no swelling 2017 11:15 AM   Intake (mL) 60 mL 2017  9:44 PM   Water Bolus (mL) 180 mL 2017  9:00 AM            Urethral Catheter 17 2335 Straight-tip 16 Fr. (Active)   Site Assessment Clean;Intact 2017 11:15 AM   Collection Container Urimeter 2017 11:15 AM   Securement Method secured to top of thigh w/ adhesive device 2017 11:15 AM   Catheter Care Performed yes 2017 11:15 AM   Reason for Continuing Urinary Catheterization Critically ill in ICU requiring intensive monitoring  "7/18/2017 11:15 AM   CAUTI Prevention Bundle StatLock in place w 1" slack;Intact seal between catheter & drainage tubing;No dependent loops or kinks;Drainage bag below bladder 7/17/2017 11:00 PM   Output (mL) 23 mL 7/18/2017 12:00 PM       Neurosurgery Physical Exam    General: well developed, well nourished, no distress.   Head: normocephalic  Neurologic: Awake. Minimal verbal responses.   GCS: Motor: 6/Verbal: 4 /Eyes: 4 GCS Total: 14  Mental Status: Able to state name. Intermittently follows simple commands.   Cranial nerves: face symmetric, CN II-XII grossly intact.   Eyes: pupils equal, round, reactive to light.  Motor Strength: Moves all extremities spontaneously with good strength and tone. Intermittently follows simple commands.     Significant Labs:    Recent Labs  Lab 07/23/17  0433      *   K 4.1   CL 94*   CO2 27   BUN 19   CREATININE 0.7   CALCIUM 10.6*   MG 1.9       Recent Labs  Lab 07/22/17  0413 07/23/17  0433   WBC 9.96 10.23   HGB 9.2* 9.5*   HCT 27.9* 28.7*    358*     No results for input(s): LABPT, INR, APTT in the last 48 hours.  Microbiology Results (last 7 days)     Procedure Component Value Units Date/Time    Blood culture [[id number]] Collected:  07/20/17 1052    Order Status:  Completed Specimen:  Blood from Peripheral, Upper Arm, Left Updated:  07/23/17 1412     Blood Culture, Routine No Growth to date     Blood Culture, Routine No Growth to date     Blood Culture, Routine No Growth to date     Blood Culture, Routine No Growth to date    Blood culture [[id number]] Collected:  07/20/17 1103    Order Status:  Completed Specimen:  Blood from Peripheral, Upper Arm, Right Updated:  07/23/17 1412     Blood Culture, Routine No Growth to date     Blood Culture, Routine No Growth to date     Blood Culture, Routine No Growth to date     Blood Culture, Routine No Growth to date    CSF culture [[id number]] Collected:  07/20/17 1544    Order Status:  Completed Specimen:  CSF (Spinal " Fluid) from CSF Tap, Tube 1 Updated:  07/23/17 0719     CSF CULTURE No Growth to date     Gram Stain Result Cytospin indicates:      Moderate WBC's       No organisms seen    Narrative:       On which sequentially labeled tube should this analysis be  performed?->3    Culture, Respiratory with Gram Stain [[id number]] Collected:  07/19/17 1541    Order Status:  Completed Specimen:  Respiratory from Sputum, Expectorated Updated:  07/22/17 1243     Respiratory Culture Normal respiratory tram     Gram Stain (Respiratory) <10 epithelial cells per low power field.     Gram Stain (Respiratory) Few WBC's     Gram Stain (Respiratory) No organisms seen    Cryptococcal antigen, CSF [[id number]] Collected:  07/20/17 1544    Order Status:  Completed Specimen:  CSF (Spinal Fluid) from CSF Tap, Tube 1 Updated:  07/21/17 1226     Crypto Ag, CSF Negative    Narrative:       On which sequentially labeled tube should this analysis be  performed?->4    Stool culture [[id number]]     Order Status:  Canceled Specimen:  Stool from Stool     Blood culture [[id number]]     Order Status:  Canceled Specimen:  Blood     Cryptococcal antigen [[id number]] Collected:  07/18/17 2030    Order Status:  Completed Specimen:  Blood from Blood Updated:  07/19/17 0923     Cryptococcal Ag, Blood Negative    Blood culture [[id number]] Collected:  07/17/17 2232    Order Status:  Sent Specimen:  Blood Updated:  07/17/17 2232    Blood culture [[id number]] Collected:  07/17/17 2232    Order Status:  Sent Specimen:  Blood Updated:  07/17/17 2232

## 2017-07-23 NOTE — PROGRESS NOTES
Progress Note  Hospital Medicine    Primary Team: [facility] HOSP MED L  Admit Date: 6/28/2017   Length of Stay:  LOS: 25 days   SUBJECTIVE:   Reason for Admission:  Hypernatremia    HPI:  Ms. Morales was discharged from [facility]  to [facility] on 6/26/17 and presented to [facility] ED on 6/28 with altered mental status and worsening hypernatremia. According to the patient's sitter, the patient was awake, verbal, and interactive with therapy on 6/23 but had been progressively less interactive and responsive when she was at [place]. Ms. Morales was admitted to Hospital Medicine but was found to be unresponsive with a sodium level of 168 on the morning of 6/29.      She has been followed by endocrinology and neurology and workup thus far significant for  unremarkable UA, neuro-imaging c/w recent left sphenoidal meningioma resection with resolution of blood products and trace intraventricular hemorrhage.  Previous EEG (6/16/17) demonstrated Moderate diffuse slowing of the overall background as described above, Superimposed left hemispheric slowing relative to the right, and NO epileptiform discharges or seizures were seen c/w ENCP and focal cortical dysfunction.  Pt's presentation is concerning for multi-factorial encephalopathy given persistent hypernatremia.  Per neurology, low suspicion for MNG currently, but patient is at high risk given recent intracranial procedure.     Critical Care consulted 7/17/17 for acute hypoxic respiratory failure. Pt had PEG tube placed earlier today and in PACU, oxygen saturations noted to be in the mid 80's. Pt was placed on a non-rebreather with minimal improvement and subsequently placed on BiPAP. ABG significant for elevated CO2 however pt was compensating. Repeat ABG showed pH 7.372, CO2 44, O2 62, HCO3 25.8. Pt transitioned to NC. Cxray concerning for aspiration pneumonia - new infiltrate in RLL so patient started on vancomycin and zosyn for HAP. She was then switched to  Vanc/Cefepime/Acyclovir. Had significant elevation in WBC count 7/18 and Needed levophed to bring BP up for the night. Switched nasal DDAVP to IV DDAVP which has normalized her sodium levels. LP performed 7/20; consistent with a viral process. BP has been stable off pressors; leukocytosis resolved. Patient more alert.    Interval history:    No acute events overnight.  Pt remains disoriented today and could not tolerate being out of restraints yesterday due to scratching herself.    Review of Systems:  Review of systems not obtained due to patient factors delirium.     OBJECTIVE:     Temp:  [96.7 °F (35.9 °C)-97.9 °F (36.6 °C)]   Pulse:  [82-94]   Resp:  [16-18]   BP: (100-132)/(68-84)   SpO2:  [94 %-95 %]  Body mass index is 22.66 kg/m².  Intake/Outake:  This Shift:  No intake/output data recorded.    Net I/O past 24h:     Intake/Output Summary (Last 24 hours) at 07/23/17 1130  Last data filed at 07/23/17 0600   Gross per 24 hour   Intake             1250 ml   Output                0 ml   Net             1250 ml             Physical Exam:  Gen- well-developed, well-nourished, disheveled.    CVS- S1 and S2 present, RRR  Resp- CTA b/l, no work of breathing  Abd- BS+, soft, NT, ND  Ext- no clubbing, cyanosis, or edema    Laboratory:  CBC/Anemia Labs: Coags:      Recent Labs  Lab 07/21/17  0329 07/22/17  0413 07/23/17  0433   WBC 10.42 9.96 10.23   HGB 8.6* 9.2* 9.5*   HCT 26.2* 27.9* 28.7*    301 358*   MCV 96 94 94   RDW 14.8* 14.8* 14.7*      Recent Labs  Lab 07/18/17  1534   INR 1.1        Chemistries:     Recent Labs  Lab 07/17/17  0355  07/18/17  0855  07/21/17  0329 07/21/17  0741 07/22/17  0413 07/23/17  0433   *  < > 149*  < > 137 137 135* 133*   K 4.0  < > 4.1  < > 3.8 4.5 4.0 4.1   *  < > 107  < > 100 102 95 94*   CO2 32*  < > 30*  < > 28 28 29 27   BUN 35*  < > 35*  < > 18 17 18 19   CREATININE 1.1  < > 1.3  < > 0.9 0.8 0.8 0.7   CALCIUM 11.8*  < > 10.8*  < > 10.0 10.1 10.5 10.6*   PROT  8.5*  --  7.3  --   --   --   --   --    BILITOT 0.2  --  0.5  --   --   --   --   --    ALKPHOS 185*  --  158*  --   --   --   --   --    ALT 44  --  29  --   --   --   --   --    AST 62*  --  35  --   --   --   --   --    MG 3.2*  --  2.1  < > 1.8  --  2.2 1.9   PHOS 5.2*  --  5.5*  < > 5.0*  --  5.2* 5.2*   < > = values in this interval not displayed.     ABG:     Recent Labs  Lab 07/18/17  0108 07/18/17  1036 07/21/17  1320   PH 7.393 7.462* 7.418   PCO2 48.3* 41.8 50.9*   PO2 81 56* 37*   HCO3 29.4* 29.9* 32.9*   POCSATURATED 96 90* 70*   BE 5 6 8          POCT Glucose: HbA1c:      Recent Labs  Lab 07/22/17  0118 07/22/17  0630 07/22/17  1150 07/22/17  1701 07/23/17  0109 07/23/17  0607   POCTGLUCOSE 99 101 126* 150* 117* 84    No results found for: HGBA1C      Medications:  Scheduled Meds:   acyclovir  10 mg/kg Intravenous Q12H    ceFEPime (MAXIPIME) IVPB  1 g Intravenous Q8H    desmopressin  0.5 mcg Intravenous BID    enoxaparin  40 mg Subcutaneous Daily    levetiracetam oral soln  500 mg Per NG tube BID    levothyroxine  50 mcg Per NG tube Daily                             Continuous Infusions:   PRN Meds:.acetaminophen, bisacodyl, dextrose 50%, glucagon (human recombinant), guaifenesin 100 mg/5 ml, insulin aspart, ipratropium, ondansetron, senna, sodium chloride 0.9%     ASSESSMENT/PLAN:     Acute Encephalopathy  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  -likely multifactorial, 2/2 hypernatremia, HSV Encephalitis, ICU delirium, surgery  -Continue Acyclovir per ID recs, until HSV PCR negative or until at least 8/1    Aspiration Pneumonia  -continue Cefepime x 7 days, end date 7/24    Secondary Hypothyroidism  -Continue Levothyroxine 50mcg    Primary Central Diabetes Insipidus  Hypernatremia  -Improving with IV Desmopressin 0.5 BID  -Now slightly hyponatremic, will monitor closely and d/w Endocrine if continues to trend down    Meningioma of Brain  -7/12 CT: postoperative changes of recent left sphenoidal meningioma  resection + mass effect  -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    -Hx of a right frontotemporal craniotomy with partial removal of a large subfrontal and tuberculum sellae meningioma at [facility] in 2015. 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    -continue Keppra for seizure prophylaxis    Malnutrition of Moderate Degree  -s/p PEG 7/17  -continue TFs    DVT ppx- Lovenox  CODE Status- FULL    Dispo- will likely need NH    Ally Calloway MD  Hospital Medicine Staff

## 2017-07-23 NOTE — PROGRESS NOTES
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 Chew and discharged on 17 to [facility].  She presents with altered mental status and worsening hypernatremia for 1 day. She was treated for DI her last admission and labs are trending up today.  Spoke with Dr. Fairchild at [facility], patient with waxing/waning mental status yesterday, but was appropriate & following some complex commands yesterday.  There was a period yesterday of significant lethargy.  Patient is unable to give history, and information is taken from the chart.     Post-Op Info:  Procedure(s) (LRB):  PLACEMENT-TUBE-PEG (N/A)   6 Days Post-Op     Interval History: NAEON.    Medications:  Continuous Infusions:     Scheduled Meds:   acyclovir  10 mg/kg Intravenous Q12H    ceFEPime (MAXIPIME) IVPB  1 g Intravenous Q8H    desmopressin  0.5 mcg Intravenous BID    enoxaparin  40 mg Subcutaneous Daily    levetiracetam oral soln  500 mg Per NG tube BID    levothyroxine  50 mcg Per NG tube Daily     PRN Meds:acetaminophen, bisacodyl, dextrose 50%, glucagon (human recombinant), guaifenesin 100 mg/5 ml, insulin aspart, ipratropium, ondansetron, senna, sodium chloride 0.9%       Objective:     Weight: 56.2 kg (123 lb 14.4 oz)  Body mass index is 22.66 kg/m².  Vital Signs (Most Recent):  Temp: 97.3 °F (36.3 °C) (17 1200)  Pulse: 93 (17 1451)  Resp: 18 (17 1200)  BP: (!) 107/58 (17 1200)  SpO2: (!) 94 % (17 1200) Vital Signs (24h Range):  Temp:  [96.7 °F (35.9 °C)-97.9 °F (36.6 °C)] 97.3 °F (36.3 °C)  Pulse:  [82-94] 93  Resp:  [16-18] 18  SpO2:  [94 %-95 %] 94 %  BP: (100-132)/(58-84) 107/58     Temp (24hrs), Av.3 °F (36.3 °C), Min:96.7 °F (35.9 °C), Max:97.9 °F (36.6 °C)       Gastrostomy/Enterostomy 17 1520 Percutaneous endoscopic gastrostomy (PEG) LUQ feeding  "(Active)   Securement taped to abdomen 7/18/2017 11:15 AM   Clamp Status/Tolerance clamped 7/18/2017 11:15 AM   Dressing dry and intact 7/18/2017 11:15 AM   Insertion Site no redness;no warmth;no drainage;no tenderness;no swelling 7/18/2017 11:15 AM   Intake (mL) 60 mL 7/17/2017  9:44 PM   Water Bolus (mL) 180 mL 7/18/2017  9:00 AM            Urethral Catheter 07/17/17 2335 Straight-tip 16 Fr. (Active)   Site Assessment Clean;Intact 7/18/2017 11:15 AM   Collection Container Urimeter 7/18/2017 11:15 AM   Securement Method secured to top of thigh w/ adhesive device 7/18/2017 11:15 AM   Catheter Care Performed yes 7/18/2017 11:15 AM   Reason for Continuing Urinary Catheterization Critically ill in ICU requiring intensive monitoring 7/18/2017 11:15 AM   CAUTI Prevention Bundle StatLock in place w 1" slack;Intact seal between catheter & drainage tubing;No dependent loops or kinks;Drainage bag below bladder 7/17/2017 11:00 PM   Output (mL) 23 mL 7/18/2017 12:00 PM       Neurosurgery Physical Exam  General: well developed, well nourished, no distress.   Head: normocephalic  Neurologic: Awake. Minimal verbal responses.   GCS: Motor: 5/Verbal: 4 /Eyes: 4 GCS Total: 12  Mental Status: Able to state name. Intermittently follows simple commands.   Cranial nerves: face symmetric, CN II-XII grossly intact.   Eyes: pupils equal, round, reactive to light.  Motor Strength: Moves all extremities spontaneously with good strength and tone. Intermittently follows simple commands.     Significant Labs:    Recent Labs  Lab 07/23/17 0433      *   K 4.1   CL 94*   CO2 27   BUN 19   CREATININE 0.7   CALCIUM 10.6*   MG 1.9       Recent Labs  Lab 07/22/17 0413 07/23/17 0433   WBC 9.96 10.23   HGB 9.2* 9.5*   HCT 27.9* 28.7*    358*     No results for input(s): LABPT, INR, APTT in the last 48 hours.  Microbiology Results (last 7 days)     Procedure Component Value Units Date/Time    Blood culture [[id number]] Collected:  " 07/20/17 1052    Order Status:  Completed Specimen:  Blood from Peripheral, Upper Arm, Left Updated:  07/23/17 1412     Blood Culture, Routine No Growth to date     Blood Culture, Routine No Growth to date     Blood Culture, Routine No Growth to date     Blood Culture, Routine No Growth to date    Blood culture [[id number]] Collected:  07/20/17 1103    Order Status:  Completed Specimen:  Blood from Peripheral, Upper Arm, Right Updated:  07/23/17 1412     Blood Culture, Routine No Growth to date     Blood Culture, Routine No Growth to date     Blood Culture, Routine No Growth to date     Blood Culture, Routine No Growth to date    CSF culture [[id number]] Collected:  07/20/17 1544    Order Status:  Completed Specimen:  CSF (Spinal Fluid) from CSF Tap, Tube 1 Updated:  07/23/17 0719     CSF CULTURE No Growth to date     Gram Stain Result Cytospin indicates:      Moderate WBC's       No organisms seen    Narrative:       On which sequentially labeled tube should this analysis be  performed?->3    Culture, Respiratory with Gram Stain [[id number]] Collected:  07/19/17 1541    Order Status:  Completed Specimen:  Respiratory from Sputum, Expectorated Updated:  07/22/17 1243     Respiratory Culture Normal respiratory tram     Gram Stain (Respiratory) <10 epithelial cells per low power field.     Gram Stain (Respiratory) Few WBC's     Gram Stain (Respiratory) No organisms seen    Cryptococcal antigen, CSF [[id number]] Collected:  07/20/17 1544    Order Status:  Completed Specimen:  CSF (Spinal Fluid) from CSF Tap, Tube 1 Updated:  07/21/17 1226     Crypto Ag, CSF Negative    Narrative:       On which sequentially labeled tube should this analysis be  performed?->4    Stool culture [[id number]]     Order Status:  Canceled Specimen:  Stool from Stool     Blood culture [[id number]]     Order Status:  Canceled Specimen:  Blood     Cryptococcal antigen [[id number]] Collected:  07/18/17 2030    Order Status:  Completed Specimen:  Blood  from Blood Updated:  07/19/17 0923     Cryptococcal Ag, Blood Negative    Blood culture [[id number]] Collected:  07/17/17 2232    Order Status:  Sent Specimen:  Blood Updated:  07/17/17 2232    Blood culture [[id number]] Collected:  07/17/17 2232    Order Status:  Sent Specimen:  Blood Updated:  07/17/17 2232        Assessment/Plan:     Meningioma, recurrent of brain    50 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    -Continue neuro checks q4 hours. Notify NSGY for any changes.   -Continue Keppra for seizure prophylaxis  -Continue Lovenox for DVT prophylaxis  -Continue aggressive PT/OT  -LP performed, follow up studies.  -Medical management per primary team.  -Will continue to follow, please call with questions             George New MD  Neurosurgery  [facility]

## 2017-07-23 NOTE — ASSESSMENT & PLAN NOTE
50 yo s/p meningioma resection (06/2017) presents with worsening AMS concerning for metabolic or infectious encephalopathy:       -continue cefepime for possible aspiration, would treat x 7 days (stop date 7/24)  - continue acyclovir until HSV PCR negative (called lab and [place] may have cancelled the test, will discuss with send out lab on Monday) if treating for HSV encephalitis will need acyclovir until at least 8/1  -afebrile  -LP consistent with viral infection

## 2017-07-23 NOTE — ASSESSMENT & PLAN NOTE
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    -Continue neuro checks q4 hours. Notify NSGY for any changes.   -Continue Keppra for seizure prophylaxis  -Continue Lovenox for DVT prophylaxis  -Continue aggressive PT/OT  -LP performed, follow up studies.  -Medical management per primary team.  -Will continue to follow, please call with questions

## 2017-07-23 NOTE — PLAN OF CARE
Problem: Patient Care Overview  Goal: Plan of Care Review  Outcome: Ongoing (interventions implemented as appropriate)  Pt remained free from injuries/falls. VSS. Afebrile. Skin intact. Soft restraints continued. Pt still picks at her face continuously when out of restraints and pulls at her lines. Alert to self only. No c/o pain. Pt is in bed with side rails up x 3, wheels locked bed in lowest position, call light in reach. Bed alarm activated.

## 2017-07-24 NOTE — MEDICAL/APP STUDENT
General Infectious Diseases: Consult Note    Consult Requested By: Ally Calloway MD    Reason for Consult:       IMPRESSION:    IMPRESSION:  Patient is a 51 y.o. female with a PMH of tobacco abuse, secondary hypothyroidism (TSH 0.241 on 6/17/17), and meningioma s/p resection in 2015 and 6/7/17. Most recent surgery complicated by DI. Pt was discharged after sx to [facility] rehab 6/26/17 and presented on 6/2817 due to worsening hypernatremia and altered mental status. Ms. Morales has been followed by Neurology and Endocrinology since re-admission on 6/28/17 with an unremarkable w/u to date. PEG tube placement on  7/17/17 complicated by post-surgical acute hypoxic respiratory failure. CXR demonstrated new RLL infiltrate most likely due to aspiration pneumonia. Pt being treated for aspiration pneumonia vs meningitis.    PLAN:  - Continue the following:                        - Cefepime - will stop tomorrow after 7 days tx s/p aspiration pneumonia event  - Will stop Acyclovir - HSV PCR negative  - LP on 7/20/17 does not show WBC elevation of CSF. VDRL and HSV PCR negative. Discontinued Fluconazole and Vanc.   - Sputum culture 7/19/17 - f/u - no organisms seen  - blood cx - NGTD      Olamide Holguin MS 4    SUBJECTIVE:     History of Present Illness:  Patient is a 51 y.o. female with a PMH of tobacco abuse, secondary hypothyroidism (TSH 0.241 on 6/17/17), and meningioma s/p resection in 2015 and 6/7/17. Most recent surgery complicated by DI. Pt was discharged after sx to [facility] rehab 6/26/17 and presented on 6/2817 due to worsening hypernatremia and altered mental status. Ms. Morales has been followed by Neurology and Endocrinology since re-admission on 6/28/17 with an unremarkable w/u to date.      PEG tube placement on  7/17/17 complicated by post-surgical acute hypoxic respiratory failure. CXR demonstrated new RLL infiltrate most likely due to aspiration pneumonia. One dose of Rocephin given. Patient then  started on Zosyn and Vancomycin on 7/17/17. WBC 34 on 7/18/17. Changed to Cefepime and Vancomycin on 7/18/17. Fluconazole and Acyclovir also added on 7/18/17. WBC 18 on 7/19/17. UA negative on 7/17/17.      Neurology re-consulted due to facial weakness noted s/p PEG tube placement for concern of CVA vs seizure on 7/18/17. MRI unremarkable.      Interval Hx - Pt did well o/n with no acute events. HSV PCR negative. WBC stable at 9.29. Pt remained afebrile. Remains in soft restraints due to scratching face.     Past Medical History:  Past Medical History:   Diagnosis Date    Allergy     Basal cell carcinoma     Depression 11/1/2016    Essential hypertension 6/9/2017    Eye disorder     Fever blister     Normocytic anemia 6/9/2017    Secondary hypothyroidism 6/26/2017       Past Surgical History:  Past Surgical History:   Procedure Laterality Date    BASAL CELL CARCINOMA EXCISION      forehead    BRAIN SURGERY      SKIN BIOPSY         Family History:  Family History   Problem Relation Age of Onset    Diabetes Father     Hypertension Father     Hepatitis Father     No Known Problems Mother        Social History:  Social History   Substance Use Topics    Smoking status: Current Every Day Smoker     Packs/day: 1.00     Years: 40.00     Types: Cigarettes    Smokeless tobacco: Never Used    Alcohol use No       Allergies:  Review of patient's allergies indicates:   Allergen Reactions    Codeine         Pertinent Medications:  Antibiotics     Start     Stop Route Frequency Ordered    07/21/17 0934  cefepime in dextrose 5 % 1 gram/50 mL IVPB 1 g      07/28 0944 IV Every 8 hours (non-standard times) 07/21/17 0822            Review of Symptoms:  Unable to obtain due to AMS.     OBJECTIVE:     Vital Signs (Most Recent)  Temp: 97.6 °F (36.4 °C) (07/24/17 1100)  Pulse: 87 (07/24/17 1100)  Resp: 18 (07/24/17 1100)  BP: 104/72 (07/24/17 1100)  SpO2: (!) 94 % (07/24/17 1100)    Temperature Range Min/Max (Last  24H):  Temp:  [97.4 °F (36.3 °C)-98 °F (36.7 °C)]     Physical Exam:  Gen: lethargic. Pt with eyes open. Oriented to person but not time or place.  HEENT- pupils reactive to light. L pupil > R pupil  Pulmonary: Non labored. Clear to auscultation A/P/L. No wheezing, crackles, or rhonchi.   Cardiac: Normal S1 & S2 w/o rubs/murmurs/gallops.   Abdomen: Normal bowel sounds. Nondistended. Pt winces to palpation over epigastric region. No rebound or guarding. PEG tube placement site without discharge or erythema.   Extremities: No cyanosis, or peripheral edema.   Neurological: Lethargic. Strength 2/5 LUE and LLE, 4/5 RLE and RUE.   Skin: No jaundice, rashes, or visible lesions.      Laboratory:  CBC:   Lab Results   Component Value Date    WBC 9.29 07/24/2017    HGB 9.5 (L) 07/24/2017    HCT 28.2 (L) 07/24/2017    MCV 93 07/24/2017     07/24/2017       BMP:   Recent Labs  Lab 07/24/17  0420      *   K 4.1   CL 91*   CO2 28   BUN 18   CREATININE 0.7   CALCIUM 10.6*   MG 1.9       LFT: Lab Results   Component Value Date    ALT 29 07/18/2017    AST 35 07/18/2017    ALKPHOS 158 (H) 07/18/2017    BILITOT 0.5 07/18/2017       Microbiology:  7/19/17- Sputum Cx - No organisms seen   7/20/17 - Blood Cx - NGTD    Diagnostic Results:      ASSESSMENT/PLAN:     Active Hospital Problems    Diagnosis  POA    *Hypernatremia [E87.0]  Yes    Acquired neurogenic diabetes insipidus [E23.2]  Yes    Acute hypoxemic respiratory failure [J96.01]  Unknown    Malnutrition of moderate degree [E44.0]  No    Acute encephalopathy [G93.40]  Yes    Tobacco abuse [Z72.0]  Yes     Chronic    Secondary hypothyroidism [E03.8]  Yes     Chronic    Primary central diabetes insipidus [E23.2]  Yes    Meningioma, recurrent of brain [D32.0]  Yes      Resolved Hospital Problems    Diagnosis Date Resolved POA    Hypothermia [T68.XXXA] 07/03/2017 No    Goals of care, counseling/discussion [Z71.89] 07/03/2017 Not Applicable     Hypernatremia [E87.0] 07/13/2017 Yes       Plan: Please see top of page

## 2017-07-24 NOTE — SUBJECTIVE & OBJECTIVE
Interval History: NAEON. LP done 7/20 and results likely viral process. Patient completed 7 day course of cefepime for pna per ID. She remains in restraint. Awake on exam.     Medications:  Continuous Infusions:   Scheduled Meds:   acyclovir  10 mg/kg Intravenous Q12H    ceFEPime (MAXIPIME) IVPB  1 g Intravenous Q8H    enoxaparin  40 mg Subcutaneous Daily    levetiracetam oral soln  500 mg Per NG tube BID    levothyroxine  50 mcg Per NG tube Daily     PRN Meds:acetaminophen, bisacodyl, dextrose 50%, glucagon (human recombinant), guaifenesin 100 mg/5 ml, insulin aspart, ipratropium, ondansetron, senna, sodium chloride 0.9%     Review of Systems  Objective:     Weight: 56.2 kg (123 lb 14.4 oz)  Body mass index is 22.66 kg/m².  Vital Signs (Most Recent):  Temp: 97.3 °F (36.3 °C) (07/24/17 1500)  Pulse: 98 (07/24/17 1500)  Resp: 18 (07/24/17 1500)  BP: 101/62 (07/24/17 1500)  SpO2: (!) 92 % (07/24/17 1500) Vital Signs (24h Range):  Temp:  [97.3 °F (36.3 °C)-98 °F (36.7 °C)] 97.3 °F (36.3 °C)  Pulse:  [] 98  Resp:  [16-18] 18  SpO2:  [92 %-95 %] 92 %  BP: ()/(62-84) 101/62       Date 07/24/17 0700 - 07/25/17 0659   Shift 0113-8429 5673-9055 0886-3906 24 Hour Total   I  N  T  A  K  E   NG/ 519  619    Shift Total  (mL/kg) 100  (1.8) 519  (9.2)  619  (11)   O  U  T  P  U  T   Shift Total  (mL/kg)       Weight (kg) 56.2 56.2 56.2 56.2                        Gastrostomy/Enterostomy 07/17/17 1520 Percutaneous endoscopic gastrostomy (PEG) LUQ feeding (Active)   Securement sutured to abdomen 7/24/2017  2:10 PM   Interventions Prior to Feeding patency checked;residual checked;residual returned 7/24/2017  2:10 PM   Drainage thin 7/21/2017  7:54 PM   Feeding Type continuous 7/24/2017  5:45 PM   Clamp Status/Tolerance unclamped 7/24/2017  5:45 PM   Feeding Action feeding continued 7/24/2017  5:45 PM   Dressing dry and intact 7/24/2017  5:45 PM   Insertion Site dry;no redness;no warmth;no drainage;no  tenderness;no swelling 7/24/2017  5:45 PM   Site Care sterile 4 x 4 gauze dressing applied 7/24/2017  5:45 PM   Flush/Irrigation flushed w/;water;no resistance met 7/24/2017  5:45 PM   Current Rate (mL/hr) 45 mL/hr 7/24/2017  5:45 PM   Goal Rate (mL/hr) 45 mL/hr 7/24/2017  5:45 PM   Intake (mL) 20 mL 7/20/2017  8:00 PM   Water Bolus (mL) 50 mL 7/24/2017  5:45 PM   Tube Feeding Intake (mL) 469 7/24/2017  5:45 PM   Residual Amount (ml) 100 ml 7/24/2017  5:45 PM       Neurosurgery Physical Exam   General: well developed, no acute distress.   Head: normocephalic  Neurologic: Alert. Awake. Minimal verbal responses.   GCS: Motor: 6/Verbal: 4 /Eyes: 4 GCS Total: 14  Mental Status: Able to state name. Intermittently follows simple commands.   Cranial nerves: face symmetric, CN II-XII grossly intact.   Eyes: pupils equal, round, reactive to light.  Motor Strength: Moves all extremities spontaneously with good strength and tone. Intermittently follows simple commands.     Incision well healed    Significant Labs:    Recent Labs  Lab 07/23/17  0433 07/24/17  0420    101   * 131*   K 4.1 4.1   CL 94* 91*   CO2 27 28   BUN 19 18   CREATININE 0.7 0.7   CALCIUM 10.6* 10.6*   MG 1.9 1.9       Recent Labs  Lab 07/23/17  0433 07/24/17  0420   WBC 10.23 9.29   HGB 9.5* 9.5*   HCT 28.7* 28.2*   * 338     No results for input(s): LABPT, INR, APTT in the last 48 hours.  Microbiology Results (last 7 days)     Procedure Component Value Units Date/Time    Blood culture [[id number]] Collected:  07/20/17 1052    Order Status:  Completed Specimen:  Blood from Peripheral, Upper Arm, Left Updated:  07/24/17 1412     Blood Culture, Routine No Growth to date     Blood Culture, Routine No Growth to date     Blood Culture, Routine No Growth to date     Blood Culture, Routine No Growth to date     Blood Culture, Routine No Growth to date    Blood culture [[id number]] Collected:  07/20/17 1103    Order Status:  Completed Specimen:   Blood from Peripheral, Upper Arm, Right Updated:  07/24/17 1412     Blood Culture, Routine No Growth to date     Blood Culture, Routine No Growth to date     Blood Culture, Routine No Growth to date     Blood Culture, Routine No Growth to date     Blood Culture, Routine No Growth to date    CSF culture [[id number]] Collected:  07/20/17 1544    Order Status:  Completed Specimen:  CSF (Spinal Fluid) from CSF Tap, Tube 1 Updated:  07/24/17 0735     CSF CULTURE No Growth to date     Gram Stain Result Cytospin indicates:      Moderate WBC's       No organisms seen    Narrative:       On which sequentially labeled tube should this analysis be  performed?->3    Culture, Respiratory with Gram Stain [[id number]] Collected:  07/19/17 1541    Order Status:  Completed Specimen:  Respiratory from Sputum, Expectorated Updated:  07/22/17 1243     Respiratory Culture Normal respiratory tram     Gram Stain (Respiratory) <10 epithelial cells per low power field.     Gram Stain (Respiratory) Few WBC's     Gram Stain (Respiratory) No organisms seen    Cryptococcal antigen, CSF [[id number]] Collected:  07/20/17 1544    Order Status:  Completed Specimen:  CSF (Spinal Fluid) from CSF Tap, Tube 1 Updated:  07/21/17 1226     Crypto Ag, CSF Negative    Narrative:       On which sequentially labeled tube should this analysis be  performed?->4    Stool culture [[id number]]     Order Status:  Canceled Specimen:  Stool from Stool     Blood culture [[id number]]     Order Status:  Canceled Specimen:  Blood     Cryptococcal antigen [[id number]] Collected:  07/18/17 2030    Order Status:  Completed Specimen:  Blood from Blood Updated:  07/19/17 0923     Cryptococcal Ag, Blood Negative    Blood culture [[id number]] Collected:  07/17/17 2232    Order Status:  Sent Specimen:  Blood Updated:  07/17/17 2232    Blood culture [[id number]] Collected:  07/17/17 2232    Order Status:  Sent Specimen:  Blood Updated:  07/17/17 2232            Significant  Diagnostics:  None new

## 2017-07-24 NOTE — SUBJECTIVE & OBJECTIVE
Interval History: No changes today.    Review of Systems     Unable to accurately perform ROS given AMS.    Objective:     Vital Signs (Most Recent):  Temp: 97.3 °F (36.3 °C) (07/24/17 1500)  Pulse: 98 (07/24/17 1500)  Resp: 18 (07/24/17 1500)  BP: 101/62 (07/24/17 1500)  SpO2: (!) 92 % (07/24/17 1500) Vital Signs (24h Range):  Temp:  [97.3 °F (36.3 °C)-98 °F (36.7 °C)] 97.3 °F (36.3 °C)  Pulse:  [] 98  Resp:  [16-18] 18  SpO2:  [92 %-95 %] 92 %  BP: ()/(62-84) 101/62     Weight: 56.2 kg (123 lb 14.4 oz)  Body mass index is 22.66 kg/m².    Estimated Creatinine Clearance: 75.2 mL/min (based on Cr of 0.7).    Physical Exam   Constitutional: She appears well-developed and well-nourished.   HENT:   Head: Normocephalic.   Surgical scar   Cardiovascular: Normal rate, regular rhythm and normal heart sounds.    Pulmonary/Chest: Effort normal and breath sounds normal.   Abdominal: Soft. Bowel sounds are normal.   PEG tube in place   Musculoskeletal: She exhibits no edema or tenderness.   Neurological:   Oriented to person, not to time or place; left sided weakness; sensation in tact.   Skin: Skin is warm and dry.       Significant Labs:   Blood Culture:     Recent Labs  Lab 06/14/17  1517 07/03/17  0934 07/03/17  0954 07/20/17  1052 07/20/17  1103   LABBLOO No growth after 5 days. No growth after 5 days. No growth after 5 days. No Growth to date  No Growth to date  No Growth to date  No Growth to date  No Growth to date No Growth to date  No Growth to date  No Growth to date  No Growth to date  No Growth to date     CBC:     Recent Labs  Lab 07/23/17  0433 07/24/17  0420   WBC 10.23 9.29   HGB 9.5* 9.5*   HCT 28.7* 28.2*   * 338     CMP:     Recent Labs  Lab 07/23/17  0433 07/24/17  0420   * 131*   K 4.1 4.1   CL 94* 91*   CO2 27 28    101   BUN 19 18   CREATININE 0.7 0.7   CALCIUM 10.6* 10.6*   ANIONGAP 12 12   EGFRNONAA >60.0 >60.0     CSF:     Recent Labs  Lab 07/20/17  5939    CSFCULTURE No Growth to date       Significant Imaging: None

## 2017-07-24 NOTE — PROGRESS NOTES
[facility]  Infectious Disease  Progress Note    Patient Name: Joanna Morales  MRN: [id number]  Admission Date: 6/28/2017  Length of Stay: 26 days  Attending Physician: Ally Calloway MD  Primary Care Provider: Claudy Tse MD    Isolation Status: No active isolations    Subjective:     Principal Problem:Hypernatremia    HPI: See consult note  Interval History: No changes today.    Review of Systems     Unable to accurately perform ROS given AMS.    Objective:     Vital Signs (Most Recent):  Temp: 97.3 °F (36.3 °C) (07/24/17 1500)  Pulse: 98 (07/24/17 1500)  Resp: 18 (07/24/17 1500)  BP: 101/62 (07/24/17 1500)  SpO2: (!) 92 % (07/24/17 1500) Vital Signs (24h Range):  Temp:  [97.3 °F (36.3 °C)-98 °F (36.7 °C)] 97.3 °F (36.3 °C)  Pulse:  [] 98  Resp:  [16-18] 18  SpO2:  [92 %-95 %] 92 %  BP: ()/(62-84) 101/62     Weight: 56.2 kg (123 lb 14.4 oz)  Body mass index is 22.66 kg/m².    Estimated Creatinine Clearance: 75.2 mL/min (based on Cr of 0.7).    Physical Exam   Constitutional: She appears well-developed and well-nourished.   HENT:   Head: Normocephalic.   Surgical scar   Cardiovascular: Normal rate, regular rhythm and normal heart sounds.    Pulmonary/Chest: Effort normal and breath sounds normal.   Abdominal: Soft. Bowel sounds are normal.   PEG tube in place   Musculoskeletal: She exhibits no edema or tenderness.   Neurological:   Oriented to person, not to time or place; left sided weakness; sensation in tact.   Skin: Skin is warm and dry.       Significant Labs:   Blood Culture:     Recent Labs  Lab 06/14/17  1517 07/03/17  0934 07/03/17  0954 07/20/17  1052 07/20/17  1103   LABBLOO No growth after 5 days. No growth after 5 days. No growth after 5 days. No Growth to date  No Growth to date  No Growth to date  No Growth to date  No Growth to date No Growth to date  No Growth to date  No Growth to date  No Growth to date  No Growth to date     CBC:     Recent  Labs  Lab 07/23/17  0433 07/24/17  0420   WBC 10.23 9.29   HGB 9.5* 9.5*   HCT 28.7* 28.2*   * 338     CMP:     Recent Labs  Lab 07/23/17  0433 07/24/17  0420   * 131*   K 4.1 4.1   CL 94* 91*   CO2 27 28    101   BUN 19 18   CREATININE 0.7 0.7   CALCIUM 10.6* 10.6*   ANIONGAP 12 12   EGFRNONAA >60.0 >60.0     CSF:     Recent Labs  Lab 07/20/17  1544   CSFCULTURE No Growth to date       Significant Imaging: None    Assessment/Plan:      Acute encephalopathy    52 yo s/p meningioma resection (06/2017) presents with worsening AMS concerning for metabolic or infectious encephalopathy:     -afebrile, leukocytosis resolved (9.29 today)  -cefepime for possible aspiration, would treat x 7 days (stop date 7/24)  -CSF HSV PCR negative-can discon't acyclovir                   Angie Nassar MD  Infectious Disease  [facility]

## 2017-07-24 NOTE — ASSESSMENT & PLAN NOTE
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    -Patient completed 7 day course of cefepime for pna today 07/24/2017. ID dc'ed acyclovir since negative studies and signed off today.    -Continue neuro checks q4 hours. Notify NSGY for any changes.   -Continue Keppra for seizure prophylaxis  -Continue Lovenox for DVT prophylaxis  -Continue aggressive PT/OT  -LP performed 7/20, NGTD. Likely viral process.   -Endocrine following for DI.   -Medical management per primary team.  -Dispo: per primary, awaiting [facility] once out of restraints.   -Will continue to follow, please call with questions     Discussed with Dr. Chew

## 2017-07-24 NOTE — PROGRESS NOTES
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 Chew and discharged on 6/26/17 to [facility].  She presents with altered mental status and worsening hypernatremia for 1 day. She was treated for DI her last admission and labs are trending up today.  Spoke with Dr. Fairchild at [facility], patient with waxing/waning mental status yesterday, but was appropriate & following some complex commands yesterday.  There was a period yesterday of significant lethargy.  Patient is unable to give history, and information is taken from the chart.     Post-Op Info:  Procedure(s) (LRB):  PLACEMENT-TUBE-PEG (N/A)   7 Days Post-Op     Interval History: NAEON. LP done 7/20 and results likely viral process. Patient completed 7 day course of cefepime for pna per ID. She remains in restraint. Awake on exam.     Medications:  Continuous Infusions:   Scheduled Meds:   acyclovir  10 mg/kg Intravenous Q12H    ceFEPime (MAXIPIME) IVPB  1 g Intravenous Q8H    enoxaparin  40 mg Subcutaneous Daily    levetiracetam oral soln  500 mg Per NG tube BID    levothyroxine  50 mcg Per NG tube Daily     PRN Meds:acetaminophen, bisacodyl, dextrose 50%, glucagon (human recombinant), guaifenesin 100 mg/5 ml, insulin aspart, ipratropium, ondansetron, senna, sodium chloride 0.9%     Review of Systems  Objective:     Weight: 56.2 kg (123 lb 14.4 oz)  Body mass index is 22.66 kg/m².  Vital Signs (Most Recent):  Temp: 97.3 °F (36.3 °C) (07/24/17 1500)  Pulse: 98 (07/24/17 1500)  Resp: 18 (07/24/17 1500)  BP: 101/62 (07/24/17 1500)  SpO2: (!) 92 % (07/24/17 1500) Vital Signs (24h Range):  Temp:  [97.3 °F (36.3 °C)-98 °F (36.7 °C)] 97.3 °F (36.3 °C)  Pulse:  [] 98  Resp:  [16-18] 18  SpO2:  [92 %-95 %] 92 %  BP: ()/(62-84) 101/62       Date 07/24/17 0700 - 07/25/17 0659   Shift 8418-1979 5175-8841  3501-8168 24 Hour Total   I  N  T  A  K  E   NG/ 519  619    Shift Total  (mL/kg) 100  (1.8) 519  (9.2)  619  (11)   O  U  T  P  U  T   Shift Total  (mL/kg)       Weight (kg) 56.2 56.2 56.2 56.2                        Gastrostomy/Enterostomy 07/17/17 1520 Percutaneous endoscopic gastrostomy (PEG) LUQ feeding (Active)   Securement sutured to abdomen 7/24/2017  2:10 PM   Interventions Prior to Feeding patency checked;residual checked;residual returned 7/24/2017  2:10 PM   Drainage thin 7/21/2017  7:54 PM   Feeding Type continuous 7/24/2017  5:45 PM   Clamp Status/Tolerance unclamped 7/24/2017  5:45 PM   Feeding Action feeding continued 7/24/2017  5:45 PM   Dressing dry and intact 7/24/2017  5:45 PM   Insertion Site dry;no redness;no warmth;no drainage;no tenderness;no swelling 7/24/2017  5:45 PM   Site Care sterile 4 x 4 gauze dressing applied 7/24/2017  5:45 PM   Flush/Irrigation flushed w/;water;no resistance met 7/24/2017  5:45 PM   Current Rate (mL/hr) 45 mL/hr 7/24/2017  5:45 PM   Goal Rate (mL/hr) 45 mL/hr 7/24/2017  5:45 PM   Intake (mL) 20 mL 7/20/2017  8:00 PM   Water Bolus (mL) 50 mL 7/24/2017  5:45 PM   Tube Feeding Intake (mL) 469 7/24/2017  5:45 PM   Residual Amount (ml) 100 ml 7/24/2017  5:45 PM       Neurosurgery Physical Exam   General: well developed, no acute distress.   Head: normocephalic  Neurologic: Alert. Awake. Minimal verbal responses.   GCS: Motor: 6/Verbal: 4 /Eyes: 4 GCS Total: 14  Mental Status: Able to state name. Intermittently follows simple commands.   Cranial nerves: face symmetric, CN II-XII grossly intact.   Eyes: pupils equal, round, reactive to light.  Motor Strength: Moves all extremities spontaneously with good strength and tone. Intermittently follows simple commands.     Incision well healed    Significant Labs:    Recent Labs  Lab 07/23/17  0433 07/24/17  0420    101   * 131*   K 4.1 4.1   CL 94* 91*   CO2 27 28   BUN 19 18   CREATININE 0.7 0.7   CALCIUM  10.6* 10.6*   MG 1.9 1.9       Recent Labs  Lab 07/23/17  0433 07/24/17  0420   WBC 10.23 9.29   HGB 9.5* 9.5*   HCT 28.7* 28.2*   * 338     No results for input(s): LABPT, INR, APTT in the last 48 hours.  Microbiology Results (last 7 days)     Procedure Component Value Units Date/Time    Blood culture [[id number]] Collected:  07/20/17 1052    Order Status:  Completed Specimen:  Blood from Peripheral, Upper Arm, Left Updated:  07/24/17 1412     Blood Culture, Routine No Growth to date     Blood Culture, Routine No Growth to date     Blood Culture, Routine No Growth to date     Blood Culture, Routine No Growth to date     Blood Culture, Routine No Growth to date    Blood culture [[id number]] Collected:  07/20/17 1103    Order Status:  Completed Specimen:  Blood from Peripheral, Upper Arm, Right Updated:  07/24/17 1412     Blood Culture, Routine No Growth to date     Blood Culture, Routine No Growth to date     Blood Culture, Routine No Growth to date     Blood Culture, Routine No Growth to date     Blood Culture, Routine No Growth to date    CSF culture [[id number]] Collected:  07/20/17 1544    Order Status:  Completed Specimen:  CSF (Spinal Fluid) from CSF Tap, Tube 1 Updated:  07/24/17 0735     CSF CULTURE No Growth to date     Gram Stain Result Cytospin indicates:      Moderate WBC's       No organisms seen    Narrative:       On which sequentially labeled tube should this analysis be  performed?->3    Culture, Respiratory with Gram Stain [[id number]] Collected:  07/19/17 1541    Order Status:  Completed Specimen:  Respiratory from Sputum, Expectorated Updated:  07/22/17 1243     Respiratory Culture Normal respiratory tram     Gram Stain (Respiratory) <10 epithelial cells per low power field.     Gram Stain (Respiratory) Few WBC's     Gram Stain (Respiratory) No organisms seen    Cryptococcal antigen, CSF [[id number]] Collected:  07/20/17 1544    Order Status:  Completed Specimen:  CSF (Spinal Fluid) from CSF  Tap, Tube 1 Updated:  07/21/17 1226     Crypto Ag, CSF Negative    Narrative:       On which sequentially labeled tube should this analysis be  performed?->4    Stool culture [[id number]]     Order Status:  Canceled Specimen:  Stool from Stool     Blood culture [[id number]]     Order Status:  Canceled Specimen:  Blood     Cryptococcal antigen [[id number]] Collected:  07/18/17 2030    Order Status:  Completed Specimen:  Blood from Blood Updated:  07/19/17 0923     Cryptococcal Ag, Blood Negative    Blood culture [[id number]] Collected:  07/17/17 2232    Order Status:  Sent Specimen:  Blood Updated:  07/17/17 2232    Blood culture [[id number]] Collected:  07/17/17 2232    Order Status:  Sent Specimen:  Blood Updated:  07/17/17 2232            Significant Diagnostics:  None new    Assessment/Plan:     Meningioma, recurrent of brain    52 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    -Patient completed 7 day course of cefepime for pna today 07/24/2017. ID dc'ed acyclovir since negative studies and signed off today.    -Continue neuro checks q4 hours. Notify NSGY for any changes.   -Continue Keppra for seizure prophylaxis  -Continue Lovenox for DVT prophylaxis  -Continue aggressive PT/OT  -LP performed 7/20, NGTD. Likely viral process.   -Endocrine following for DI.   -Medical management per primary team.  -Dispo: per primary, awaiting [facility] once out of restraints.   -Will continue to follow, please call with questions     Discussed with Dr. Jeevan Kirkpatrick PA-C  Neurosurgery  [facility]

## 2017-07-24 NOTE — PLAN OF CARE
Problem: Patient Care Overview  Goal: Plan of Care Review  Outcome: Ongoing (interventions implemented as appropriate)  Pt free of falls or injury during shift. Pain assessed and denied. VSS, disoriented to place, time, and situation. IV Abx administered per MD order. Restraints continued, pt attempts to remove IVs and scratches incision when restraints are removed. Bed locked and in lowest position, side rails raised x3, call light within reach. Will continue to monitor.

## 2017-07-24 NOTE — PROGRESS NOTES
Progress Note  Hospital Medicine    Primary Team: [facility]OS[facility] MED [facility]  Admit Date: 6/28/2017   Length of Stay:  LOS: 26 days   SUBJECTIVE:   Reason for Admission:  Hypernatremia    HPI:  Ms. Morales was discharged [facility]  to [facility] on 6/26/17 and presented to [facility] ED on 6/28 with altered mental status and worsening hypernatremia. According to the patient's sitter, the patient was awake, verbal, and interactive with therapy on 6/23 but had been progressively less interactive and responsive when she was at [place]. Ms. Morales was admitted to Hospital Medicine but was found to be unresponsive with a sodium level of 168 on the morning of 6/29.      She has been followed by endocrinology and neurology and workup thus far significant for  unremarkable UA, neuro-imaging c/w recent left sphenoidal meningioma resection with resolution of blood products and trace intraventricular hemorrhage.  Previous EEG (6/16/17) demonstrated Moderate diffuse slowing of the overall background as described above, Superimposed left hemispheric slowing relative to the right, and NO epileptiform discharges or seizures were seen c/w ENCP and focal cortical dysfunction.  Pt's presentation is concerning for multi-factorial encephalopathy given persistent hypernatremia.  Per neurology, low suspicion for MNG currently, but patient is at high risk given recent intracranial procedure.     Critical Care consulted 7/17/17 for acute hypoxic respiratory failure. Pt had PEG tube placed earlier today and in PACU, oxygen saturations noted to be in the mid 80's. Pt was placed on a non-rebreather with minimal improvement and subsequently placed on BiPAP. ABG significant for elevated CO2 however pt was compensating. Repeat ABG showed pH 7.372, CO2 44, O2 62, HCO3 25.8. Pt transitioned to NC. Cxray concerning for aspiration pneumonia - new infiltrate in RLL so patient started on vancomycin and zosyn for HAP. She was then switched to  Vanc/Cefepime/Acyclovir. Had significant elevation in WBC count 7/18 and Needed levophed to bring BP up for the night. Switched nasal DDAVP to IV DDAVP which has normalized her sodium levels. LP performed 7/20; consistent with a viral process. BP has been stable off pressors; leukocytosis resolved. Patient more alert.    Interval history:    No acute events overnight.  Pt remains disoriented today but more alert and opens eyes to name.      Review of Systems:  Review of systems not obtained due to patient factors delirium.     OBJECTIVE:     Temp:  [97.3 °F (36.3 °C)-98 °F (36.7 °C)]   Pulse:  []   Resp:  [16-18]   BP: ()/(58-84)   SpO2:  [92 %-95 %]  Body mass index is 22.66 kg/m².  Intake/Outake:  This Shift:  No intake/output data recorded.    Net I/O past 24h:     Intake/Output Summary (Last 24 hours) at 07/24/17 0906  Last data filed at 07/24/17 0600   Gross per 24 hour   Intake             1512 ml   Output                0 ml   Net             1512 ml             Physical Exam:  Gen- well-developed, well-nourished, NAD  CVS- S1 and S2 present, RRR  Resp- CTA b/l, no work of breathing  Abd- BS+, soft, NT, ND  Ext- no clubbing, cyanosis, or edema    Laboratory:  CBC/Anemia Labs: Coags:      Recent Labs  Lab 07/22/17  0413 07/23/17  0433 07/24/17  0420   WBC 9.96 10.23 9.29   HGB 9.2* 9.5* 9.5*   HCT 27.9* 28.7* 28.2*    358* 338   MCV 94 94 93   RDW 14.8* 14.7* 14.9*      Recent Labs  Lab 07/18/17  1534   INR 1.1        Chemistries:     Recent Labs  Lab 07/18/17  0855  07/22/17  0413 07/23/17  0433 07/24/17  0420   *  < > 135* 133* 131*   K 4.1  < > 4.0 4.1 4.1     < > 95 94* 91*   CO2 30*  < > 29 27 28   BUN 35*  < > 18 19 18   CREATININE 1.3  < > 0.8 0.7 0.7   CALCIUM 10.8*  < > 10.5 10.6* 10.6*   PROT 7.3  --   --   --   --    BILITOT 0.5  --   --   --   --    ALKPHOS 158*  --   --   --   --    ALT 29  --   --   --   --    AST 35  --   --   --   --    MG 2.1  < > 2.2 1.9 1.9    PHOS 5.5*  < > 5.2* 5.2* 4.3   < > = values in this interval not displayed.     ABG:     Recent Labs  Lab 07/18/17  0108 07/18/17  1036 07/21/17  1320   PH 7.393 7.462* 7.418   PCO2 48.3* 41.8 50.9*   PO2 81 56* 37*   HCO3 29.4* 29.9* 32.9*   POCSATURATED 96 90* 70*   BE 5 6 8          POCT Glucose: HbA1c:      Recent Labs  Lab 07/23/17  0109 07/23/17  0607 07/23/17  1200 07/23/17  1645 07/24/17  0011 07/24/17  0555   POCTGLUCOSE 117* 84 109 111* 98 106    No results found for: HGBA1C      Medications:  Scheduled Meds:   acyclovir  10 mg/kg Intravenous Q12H    ceFEPime (MAXIPIME) IVPB  1 g Intravenous Q8H    enoxaparin  40 mg Subcutaneous Daily    levetiracetam oral soln  500 mg Per NG tube BID    levothyroxine  50 mcg Per NG tube Daily                             Continuous Infusions:   PRN Meds:.acetaminophen, bisacodyl, dextrose 50%, glucagon (human recombinant), guaifenesin 100 mg/5 ml, insulin aspart, ipratropium, ondansetron, senna, sodium chloride 0.9%     ASSESSMENT/PLAN:     Acute Encephalopathy  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  -likely multifactorial, 2/2 hypernatremia, HSV Encephalitis, ICU delirium, surgery  -Continue Acyclovir per ID recs, until HSV PCR negative or until at least 8/1    Aspiration Pneumonia  -continue Cefepime x 7 days, end date 7/24    Secondary Hypothyroidism  -Continue Levothyroxine 50mcg    Primary Central Diabetes Insipidus  Hypernatremia  -Stopped IV Desmopressin 0.5mcg BID as sodium decreasing  -Will d/w Endocrine    Meningioma of Brain  -7/12 CT: postoperative changes of recent left sphenoidal meningioma resection + mass effect  -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    -Hx of a right frontotemporal craniotomy with partial removal of a large subfrontal and tuberculum sellae meningioma at [facility] in 2015. 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    -continue Keppra for seizure prophylaxis    Malnutrition of Moderate Degree  -s/p PEG 7/17  -continue TFs    DVT ppx- Lovenox  CODE Status- FULL    Dispo- will likely need [facility] NH placement; will need to be out of restraints    Ally Calloway MD  Hospital Medicine Staff

## 2017-07-24 NOTE — PT/OT/SLP PROGRESS
Physical Therapy      Joanna Morales  MRN: [id number]    Patient not seen today secondary to pt very lethargic and unable to arouse to follow commands. 2 attempt made. Will follow-up on next eval attempt.    Dimple Romero, PT   7/24/17

## 2017-07-24 NOTE — ASSESSMENT & PLAN NOTE
52 yo s/p meningioma resection (06/2017) presents with worsening AMS concerning for metabolic or infectious encephalopathy:     -afebrile, leukocytosis resolved (9.29 today)  -cefepime for possible aspiration, would treat x 7 days (stop date 7/24)  -CSF HSV PCR negative-can discon't acyclovir

## 2017-07-25 PROBLEM — J69.0 ASPIRATION PNEUMONIA: Status: ACTIVE | Noted: 2017-01-01

## 2017-07-25 NOTE — ASSESSMENT & PLAN NOTE
High Ca with low normal PTH, this is most likely 2/2 immobilization and may benefit from anti-resorptive therapy, but before doing so, would complete w/u for non-pth mediated hypercalcemia (PTHrp, 1,25-vit D, SPEP).   IVF, avoid dehydration.   Cont physical therapy.

## 2017-07-25 NOTE — PROGRESS NOTES
"[facility]  Endocrinology  Progress Note    Admit Date: 6/28/2017     Reason for Consult: Management of hypernatremia    Admit Date: 6/28/2017      Reason for Consult: Diabetes insipidus      Surgical Procedure and Date:   CRANIOTOMY 6/8/2017           HPI:   Patient is a 51 y.o. female with a diagnosis of  meningioma s/p resection (6/7). 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  She underwent partial resection of the tumor in January 2015 at [facility]. Followup scan done in 2016 showing a continued large meningioma. Endocrine had been previously involved in her care during earlier June hospitalization for post-operative diabetes insipidus and additional concern for secondary adrenal insufficiency and secondary hypothyroidism. Had been discharged to [facility] on 6/26/17. Re-admitted to [facility] on 6/28/17 for worsening hypernatremia and mental status. Endocrine had been consulted this admission for severe hypernatremia with concerns for diabetes insipidus.             Interval HPI:   Overnight events:  Was getting DDAVP 10mcg nasal in addition to free water boluses and Na was ranging 147-160.   On 7/19 DDAVP was changed to IV 0.5mcg bid, which she received through 7/23, Na gradually decreased to 133, at which point DDAVP stopped since 7/24.   Free water bolus last received 7/22  Currently PEG and getting TF boluses.     BP (!) 128/96 (BP Location: Left arm, Patient Position: Lying, BP Method: Automatic)   Pulse 97   Temp 97.8 °F (36.6 °C) (Axillary)   Resp 16   Ht 5' 2" (1.575 m)   Wt 56.2 kg (123 lb 14.4 oz)   SpO2 96%   Breastfeeding? No   BMI 22.66 kg/m²       Labs Reviewed and Include      Recent Labs  Lab 07/25/17  0338   GLU 99   CALCIUM 11.4*      K 4.0   CO2 28   CL 98   BUN 14   CREATININE 0.7     Lab Results   Component Value Date    WBC 7.40 07/25/2017 "    HGB 10.4 (L) 07/25/2017    HCT 31.1 (L) 07/25/2017    MCV 95 07/25/2017     (H) 07/25/2017     No results for input(s): TSH, FREET4 in the last 168 hours.  No results found for: HGBA1C    Nutritional status:   Body mass index is 22.66 kg/m².  Lab Results   Component Value Date    ALBUMIN 2.3 (L) 07/18/2017    ALBUMIN 3.0 (L) 07/17/2017    ALBUMIN 3.0 (L) 07/16/2017     Lab Results   Component Value Date    PREALBUMIN 26 06/27/2017       Estimated Creatinine Clearance: 75.2 mL/min (based on Cr of 0.7).    Accu-Checks  Recent Labs      07/23/17   0109  07/23/17   0607  07/23/17   1200  07/23/17   1645  07/24/17   0011  07/24/17   0555  07/24/17   1437  07/24/17   1739  07/25/17   0013  07/25/17   0758   POCTGLUCOSE  117*  84  109  111*  98  106  115*  140*  100  126*       Current Medications and/or Treatments Impacting Glycemic Control  Immunotherapy:  Immunosuppressants     None        Steroids:   Hormones     None        Pressors:    Autonomic Drugs     None        Hyperglycemia/Diabetes Medications: Antihyperglycemics     Start     Stop Route Frequency Ordered    06/29/17 1922  insulin aspart pen 0-5 Units      -- SubQ Every 6 hours PRN 06/29/17 1822          ASSESSMENT and PLAN    Primary central diabetes insipidus    post-operative diabetes insipidus  Recommend restarting desmopressin 10mcg nightly   If Na uncontrolled on 10mcg qhs, would restart free water boluses 4x daily/or with TF bolus timing.   Na daily            Hypercalcemia    High Ca with low normal PTH, this is most likely 2/2 immobilization and may benefit from anti-resorptive therapy, but before doing so, would complete w/u for non-pth mediated hypercalcemia (PTHrp, 1,25-vit D, SPEP).   IVF, avoid dehydration.   Cont physical therapy.         Secondary hypothyroidism    Continue levothyroxine 50mcg daily, needs to be given at least 1 hr apart from TF to ensure absorption   Last free T4 normal, please repeat FT4 at this time.                Jennifer Hsu MD  Endocrinology  [facility]

## 2017-07-25 NOTE — ASSESSMENT & PLAN NOTE
Continue levothyroxine 50mcg daily, needs to be given at least 1 hr apart from TF to ensure absorption   Last free T4 normal, please repeat FT4 at this time.

## 2017-07-25 NOTE — ASSESSMENT & PLAN NOTE
post-operative diabetes insipidus  Recommend restarting desmopressin 10mcg nightly   If Na uncontrolled on 10mcg qhs, would restart free water boluses 4x daily/or with TF bolus timing.   Na daily

## 2017-07-25 NOTE — PT/OT/SLP RE-EVAL
Physical Therapy  Re-evaluation    Joanna Morales   MRN: [id number]   Admitting Diagnosis: Hypernatremia    PT Received On: 07/25/17  PT Start Time: 1029     PT Stop Time: 1046    PT Total Time (min): 17 min       Billable Minutes:  Re-eval 17    Diagnosis: Hypernatremia      Past Medical History:   Diagnosis Date    Allergy     Basal cell carcinoma     Depression 11/1/2016    Essential hypertension 6/9/2017    Eye disorder     Fever blister     Normocytic anemia 6/9/2017    Secondary hypothyroidism 6/26/2017      Past Surgical History:   Procedure Laterality Date    BASAL CELL CARCINOMA EXCISION      forehead    BRAIN SURGERY      SKIN BIOPSY         Referring physician: Jonas Renee MD  Date referred to PT: 07/19/17     General Precautions: Standard, aspiration, NPO, fall, blind  Orthopedic Precautions: N/A   Braces: N/A       Do you have any cultural, spiritual, Church conflicts, given your current situation?: none stated    Patient History:  Per previous admission, history obtained from chart:    Lives in: apartment, 2nd floor with elevator access  Lives with: cousin  PLOF/Level of assist: Mod (I) RW from previous admission & per son; pt recently d/c to IP rehab  DME: SPC, RW    Roles/responsibilities: mother, cousin, friend  Hobbies/Interests: unable to assess    Assist available upon d/c: unable to determine, no family present at time of visit      Subjective:  Communicated with RN prior to session.  Pt appropriate to participate with therapy.     Pt sleeping upon PT entering, able to arouse with increase time & stimulation.    Chief Complaint: Pt reports headache, but no rating stated. RN notified  Patient goals: none stated this visit         Objective:   Patient found with: restraints, telemetry, peripheral IV (PEG)     Cognitive Exam:  Oriented to: responds to name, but unable to state on command. Daily orientation provided.     Pt reported hearing a dog during session. PT able to  redirect and reorient pt to surrounding and no dog present.     Follows Commands/attention: Inattentive and Follows one-step simple commands; requires increase time to process with delayed response  Communication: dysarthria  Safety awareness/insight to disability: impaired; impulsive movements occasionally but able to redirect this session.     Physical Exam:  Postural examination/scapula alignment: Rounded shoulder, Head forward and Posterior pelvic tilt    Skin integrity: Visible skin intact  Edema: None noted     Sensation:   Intact light touch    Upper Extremity Range of Motion:  Right Upper Extremity: WFL  Left Upper Extremity: WFL    Upper Extremity Strength:  Right Upper Extremity: demonstrated at least 3/5 with functional mobility  Left Upper Extremity: demonstrated at least 3/5 with functional mobility    Lower Extremity Range of Motion:  Right Lower Extremity: WFL  Left Lower Extremity: WFL    Lower Extremity Strength:  Right Lower Extremity: demonstrated at least 3/5 with functional mobility  Left Lower Extremity: demonstrated at least 3/5 with functional mobility     Fine motor coordination:  Impaired    Gross motor coordination: Impaired    Functional Mobility:  Bed Mobility:  Rolling: Minimal Assistance to the right; assist for initiation  Supine to Sit:  Maximum Assistance assist with trunk and B LE's  Sit to supine: Maximum Assistance assist with trunk and B LE's  Scooting: Contact Guard Assistance towards EOB    Transfers:   Sit to stand:Maximum Assistance with No Assistive Device and PT in front;   posterior lean noted upon standing    Gait:   Patient ambulated ~4 side steps to the Right with PT assist in front with Maximum Assistance.  Pt demonstrated step-to gait with decrease step length bilaterally, decrease weight-shift, balance, and floor clearance with steps.  Bilateral feet remained in contact with floor throughout gait trial, requiring PT assist for balance, weight-shift, and advancing  bilateral feet.       Balance:  Static Sitting: Stand-by Assistance occasional postural sway within base of support; maintains static with B UE support on mattress  Dynamic Sitting: Contact Guard Assistance   Static Standing: Minimal Assistance due to posterior lean  Dynamic Standing: Maximum Assistance requires assist for balance and weight-shift        AM-PAC 6 CLICK MOBILITY  How much help from another person does this patient currently need?   1 = Unable, Total/Dependent Assistance  2 = A lot, Maximum/Moderate Assistance  3 = A little, Minimum/Contact Guard/Supervision  4 = None, Modified [place]/Independent    Turning over in bed (including adjusting bedclothes, sheets and blankets)?: 3  Sitting down on and standing up from a chair with arms (e.g., wheelchair, bedside commode, etc.): 2  Moving from lying on back to sitting on the side of the bed?: 2  Moving to and from a bed to a chair (including a wheelchair)?: 2  Need to walk in hospital room?: 2  Climbing 3-5 steps with a railing?: 1  Total Score: 12     AM-PAC Raw Score CMS G-Code Modifier Level of Impairment Assistance   6 % Total / Unable   7 - 9 CM 80 - 100% Maximal Assist   10 - 14 CL 60 - 80% Moderate Assist   15 - 19 CK 40 - 60% Moderate Assist   20 - 22 CJ 20 - 40% Minimal Assist   23 CI 1-20% SBA / CGA   24 CH 0% Independent/ Mod I     Patient left supine with all lines intact, call button in reach, bed alarm on, restraints reapplied at end of session and RN notified.    Assessment:   Joanna Morales is a 51 y.o. female with a medical diagnosis of Hypernatremia and presents with decrease endurance, strength, balance, coordination, and cognition with overall mobility requiring increase assistance for safety.  Pt tolerated session well.  Pt would benefit from continued skilled physical therapy for the listed impairments to improve functional independence and overall safety with mobility prior to d/c. PT recommends d/c disposition to  SNF for further PT/OT intervention to progress mobility and safety with transfers.       Education:  Education provided to pt regarding: PT role/POC; safety with mobility; daily orientation. Verbalized understanding.     Whiteboard updated with correct mobility information. RN/PCT notified.  Transfer with therapy only at this time.       .    Rehab identified problem list/impairments: Rehab identified problem list/impairments: weakness, impaired endurance, gait instability, impaired balance, decreased upper extremity function, decreased lower extremity function, impaired coordination, visual deficits, impaired self care skills, impaired functional mobilty, decreased safety awareness, pain, impaired cognition, impaired fine motor    Rehab potential is fair.    Activity tolerance: Fair    Discharge recommendations: Discharge Facility/Level Of Care Needs: nursing facility, skilled     Barriers to discharge: Barriers to Discharge: Inaccessible home environment, Decreased caregiver support    Equipment recommendations: Equipment Needed After Discharge:  (TBD)     GOALS:    Physical Therapy Goals        Problem: Physical Therapy Goal    Goal Priority Disciplines Outcome Goal Variances Interventions   Physical Therapy Goal     PT/OT, PT      Description:  Goals to be met by: 17     Patient will increase functional independence with mobility by performin. Supine to sit with Moderate Assistance  2. Sit to supine with Moderate Assistance  3. Sit to stand transfer with Moderate Assistance  4. Gait  x 5 feet with Maximum Assistance using appropriate AD while maintaining midline orientation and no posterior lean.   5. Sitting at edge of bed x10 minutes with Supervision while maintaining midline orientation and performing 3/5 reps of reaching activity outside base of support.   6. Stand for 2 minutes with Moderate Assistance using appropriate UE support while demonstrating midline orientation with no posterior lean &  equal weight-bearing through bilateral LE's.   7. Lower extremity exercise program x10 reps with assistance as needed for strengthening and endurance with functional mobility.                 Multidisciplinary Problems (Resolved)        Problem: Physical Therapy Goal    Goal Priority Disciplines Outcome Goal Variances Interventions   Physical Therapy Goal   (Resolved)     PT/OT, PT Outcome(s) achieved     Description:  Goals to be met by: 17    Patient will increase functional independence with mobility by performin. Supine to sit with Minimal Assistance  2. Sit to supine with Minimal Assistance  3. Sit to stand transfer with Minimal Assistance met (2017)   Revised: sit to stand transfer with SBA  4. Standing for 3 minutes with Minimal Assistance and weight evenly distributed through (B) LE.   5. Gait  x 20 feet with Moderate Assistance using AD if needed   6. Pt will perform (B) LE therapeutic exercises x 20 reps to improve muscular strength and endurance.                       PLAN:    Patient to be seen 4 x/week to address the above listed problems via gait training, therapeutic activities, therapeutic exercises, neuromuscular re-education  Plan of Care expires: 17  Plan of Care reviewed with: patient          Mercedes Hernandez, PT  2017

## 2017-07-25 NOTE — PLAN OF CARE
11:14 AM  SW called in LOCET to state. [facility]. Will await 142.     Jodi Royal LMSW   [facility]  Ext [phone]

## 2017-07-25 NOTE — PLAN OF CARE
2:55 PM  SW received notification that [facility] have denied pt. Will f/u with Monica with [facility] to indicate if they will accept pt. Will update  and physician.      Jodi Royal LMSW   [facility]  Ext [phone]

## 2017-07-25 NOTE — PLAN OF CARE
10:36 AM  SW called and spoke with friend, Reina at length regarding nursing home placement. Reina stated she and pt's additional family members live in the [place] area. Reina stated she would like for me to send referral to [facility], [facility] and [facility]. Reina stated she would be researching facilities online. Reina also stated she is in [place] for a  at this time but will be back in [place] on Friday. Reina stated she is primary caretaker for pt. Will faxed referrals to facilities.     Jodi Royal LMSW   [facility]  Ext [phone]

## 2017-07-25 NOTE — SUBJECTIVE & OBJECTIVE
"Interval HPI:   Overnight events:  Was getting DDAVP 10mcg nasal in addition to free water boluses and Na was ranging 147-160.   On 7/19 DDAVP was changed to IV 0.5mcg bid, which she received through 7/23, Na gradually decreased to 133, at which point DDAVP stopped since 7/24.   Free water bolus last received 7/22  Currently PEG and getting TF boluses.     BP (!) 128/96 (BP Location: Left arm, Patient Position: Lying, BP Method: Automatic)   Pulse 97   Temp 97.8 °F (36.6 °C) (Axillary)   Resp 16   Ht 5' 2" (1.575 m)   Wt 56.2 kg (123 lb 14.4 oz)   SpO2 96%   Breastfeeding? No   BMI 22.66 kg/m²     Labs Reviewed and Include      Recent Labs  Lab 07/25/17  0338   GLU 99   CALCIUM 11.4*      K 4.0   CO2 28   CL 98   BUN 14   CREATININE 0.7     Lab Results   Component Value Date    WBC 7.40 07/25/2017    HGB 10.4 (L) 07/25/2017    HCT 31.1 (L) 07/25/2017    MCV 95 07/25/2017     (H) 07/25/2017     No results for input(s): TSH, FREET4 in the last 168 hours.  No results found for: HGBA1C    Nutritional status:   Body mass index is 22.66 kg/m².  Lab Results   Component Value Date    ALBUMIN 2.3 (L) 07/18/2017    ALBUMIN 3.0 (L) 07/17/2017    ALBUMIN 3.0 (L) 07/16/2017     Lab Results   Component Value Date    PREALBUMIN 26 06/27/2017       Estimated Creatinine Clearance: 75.2 mL/min (based on Cr of 0.7).    Accu-Checks  Recent Labs      07/23/17   0109  07/23/17   0607  07/23/17   1200  07/23/17   1645  07/24/17   0011  07/24/17   0555  07/24/17   1437  07/24/17   1739  07/25/17   0013  07/25/17   0758   POCTGLUCOSE  117*  84  109  111*  98  106  115*  140*  100  126*       Current Medications and/or Treatments Impacting Glycemic Control  Immunotherapy:  Immunosuppressants     None        Steroids:   Hormones     None        Pressors:    Autonomic Drugs     None        Hyperglycemia/Diabetes Medications: Antihyperglycemics     Start     Stop Route Frequency Ordered    06/29/17 1922  insulin aspart pen " 0-5 Units      -- SubQ Every 6 hours PRN 06/29/17 1821

## 2017-07-25 NOTE — PLAN OF CARE
Problem: Restraint, Nonbehavioral (nonviolent)  Intervention: Restraint Monitoring Q2 hours w/Assessment for Injury  Frequent rounds made on patient. Patient slept most of the day. Patient tolerated mitts well. Vital signs stable. No distress noted. Call bell in reach. Bed in lowest position and at 30 degrees at all times.. Safety maintained. Will continue to monitor.

## 2017-07-25 NOTE — PLAN OF CARE
Problem: Physical Therapy Goal  Goal: Physical Therapy Goal  Goals to be met by: 17     Patient will increase functional independence with mobility by performin. Supine to sit with Moderate Assistance  2. Sit to supine with Moderate Assistance  3. Sit to stand transfer with Moderate Assistance  4. Gait  x 5 feet with Maximum Assistance using appropriate AD while maintaining midline orientation and no posterior lean.   5. Sitting at edge of bed x10 minutes with Supervision while maintaining midline orientation and performing 3/5 reps of reaching activity outside base of support.   6. Stand for 2 minutes with Moderate Assistance using appropriate UE support while demonstrating midline orientation with no posterior lean & equal weight-bearing through bilateral LE's.   7. Lower extremity exercise program x10 reps with assistance as needed for strengthening and endurance with functional mobility.           PT re-evaluation completed. POC and goals updated/re-established.    Mercedes Hernandez, PT, DPT  2017

## 2017-07-25 NOTE — PLAN OF CARE
Need updated therapy notes. Pt in restraints this am and sleeping.  Discussed in huddle: possible nsg home placement   ID following     07/25/17 1019   Right Care Assessment   Can the patient answer the patient profile reliably? No, cognitively impaired   How often would a person be available to care for the patient? Infrequently   Describe the patient's ability to walk at the present time. Major restrictions/daily assistance from another person   How does the patient rate their overall health at the present time? Poor   Number of comorbid conditions (as recorded on the chart) Five or more   Have you felt down, depressed, or hopeless? Unable to Assess   During the past month, has the patient often been bothered by little interest or pleasure in doing things? No   Have you felt little interest or pleasure in doing things? Unable to assess     Endo following for DI  NS following.

## 2017-07-25 NOTE — CONSULTS
Nutrition consult regarding bolus TF recommendations.     Please see note from 07/20 for full RD assessment.     Recommendations/Interventions:  1) Continuous: Isosource 1.5 @ 45 mL/hr to provide 1620 calories, 73 grams of protein, 825 mL fluid.  2) Bolus: Isosource 1.5 - 270 mL - 4x/day to provide 1620 calories, 73 grams of protein, 840 mL fluid.   - Water flush suggestion: 190 mL - 4x/day.  3) RD to monitor & follow-up.    Thanks! GUY Contreras [id number]

## 2017-07-26 PROBLEM — Z75.8 DISCHARGE PLANNING ISSUES: Status: ACTIVE | Noted: 2017-01-01

## 2017-07-26 NOTE — ASSESSMENT & PLAN NOTE
Secondary to central DI. Started nasal desmopressin however today with hypernatremia. Appreciate Endocrinology assistance.  1. Desmopressin 200 mcg BID.  2. Monitor I&O and sodium levels.   3. Free water bolus 4 times daily.

## 2017-07-26 NOTE — ASSESSMENT & PLAN NOTE
post-operative diabetes insipidus  Na elevated despite receiving DDAVP 10mcg last night.   I am concerned about efficacy of nasal formulation in this pt with minimal cooperation.   rec switching to equivalent tablet dose: 200mcg bid per PEG, first dose now.   Agree with free water boluses 190ml 4x daily with TF boluses.   Na daily

## 2017-07-26 NOTE — PROGRESS NOTES
[facility] Hospital Medicine  Progress Note    Patient Name: Joanna Morales  MRN: [id number]  Patient Class: IP- Inpatient   Admission Date: 6/28/2017  Length of Stay: 27 days  Attending Physician: Madalyn Montesinos MD  Primary Care Provider: Claudy Tse MD    [facility] Medicine Team: [facility] HOSP MED L Madalyn Montesinos MD    Subjective:     Principal Problem:Hypernatremia    HPI:  Ms. Joanna Morales is a 51 y.o. female with tobacco abuse, secondary hypothyroidism (TSH 0.241 Jun 2017), and history of meningioma s/p resection complicated by diabetes insipidus who presents to [facility] ED from [facility] after being found with abnormal labwork.  She had been more altered in the last day or so and upon checking labwork, she was noted to be with hypernatremia.  She contributed very minimally to her history but does report some abdominal pain.  Of note, she was recently admitted to this facility under the Neurosurgery service for resection of a meningioma that was complicated by diabetes insipidus for which she received desmopressin a few times before being discharged to rehabilitation at [facility].  Further history is limited at this time.    Hospital Course:  Ms. Morales was discharged from [facility]  to [facility] on 6/26/17 and presented to [facility] ED on 6/28 with altered mental status and worsening hypernatremia. According to the patient's sitter, the patient was awake, verbal, and interactive with therapy on 6/23 but had been progressively less interactive and responsive when she was at [place]. Ms. Morales was admitted to Hospital Medicine but was found to be unresponsive with a sodium level of 168 on the morning of 6/29.      She has been followed by endocrinology and neurology and workup thus far significant for  unremarkable UA, neuro-imaging c/w recent left sphenoidal meningioma resection with resolution of blood products and trace intraventricular  "hemorrhage.  Previous EEG (6/16/17) demonstrated Moderate diffuse slowing of the overall background as described above, Superimposed left hemispheric slowing relative to the right, and NO epileptiform discharges or seizures were seen c/w ENCP and focal cortical dysfunction.  Pt's presentation is concerning for multi-factorial encephalopathy given persistent hypernatremia.  Per neurology, low suspicion for MNG currently, but patient is at high risk given recent intracranial procedure.     Critical Care consulted 7/17/17 for acute hypoxic respiratory failure. Pt had PEG tube placed earlier today and in PACU, oxygen saturations noted to be in the mid 80's. Pt was placed on a non-rebreather with minimal improvement and subsequently placed on BiPAP. ABG significant for elevated CO2 however pt was compensating. Repeat ABG showed pH 7.372, CO2 44, O2 62, HCO3 25.8. Pt transitioned to NC. Cxray concerning for aspiration pneumonia - new infiltrate in RLL so patient started on vancomycin and zosyn for HAP. She was then switched to Vanc/Cefepime/Acyclovir. Had significant elevation in WBC count 7/18 and Needed levophed to bring BP up for the night. Switched nasal DDAVP to IV DDAVP which has normalized her sodium levels. LP performed 7/20; consistent with a viral process. BP has been stable off pressors; leukocytosis resolved. Patient more alert.    Patient stepped down to Hospital Medicine team L on 7/22 pending viral etiology work up. She remained lethargic and minimally participatory despite correction of metabolic derangements.     Interval History: "yes ma'am". No acute overnight events. Only answers two words inconsistently.    Review of Systems   Unable to perform ROS: Mental status change     Objective:     Vital Signs (Most Recent):  Temp: (!) 95.8 °F (35.4 °C) (07/25/17 1459)  Pulse: 109 (07/25/17 1900)  Resp: 16 (07/25/17 1459)  BP: (!) 124/98 (07/25/17 1459)  SpO2: 97 % (07/25/17 1459) Vital Signs (24h " Range):  Temp:  [95.8 °F (35.4 °C)-97.8 °F (36.6 °C)] 95.8 °F (35.4 °C)  Pulse:  [] 109  Resp:  [16-18] 16  SpO2:  [95 %-97 %] 97 %  BP: ()/(66-98) 124/98     Weight: 56.2 kg (123 lb 14.4 oz)  Body mass index is 22.66 kg/m².    Intake/Output Summary (Last 24 hours) at 07/25/17 2052  Last data filed at 07/25/17 0500   Gross per 24 hour   Intake              555 ml   Output                0 ml   Net              555 ml      Physical Exam   Constitutional: She appears well-developed and well-nourished. She appears lethargic. She is easily aroused. No distress.   Cardiovascular: Normal rate and regular rhythm.    No murmur heard.  Pulmonary/Chest: Effort normal and breath sounds normal.   Abdominal: Soft. Bowel sounds are normal. She exhibits no distension. There is no tenderness.   PEG in place   Musculoskeletal: Normal range of motion. She exhibits no edema or tenderness.   Neurological: She is easily aroused. She appears lethargic.       Significant Labs:   BMP:   Recent Labs  Lab 07/25/17  0338   GLU 99      K 4.0   CL 98   CO2 28   BUN 14   CREATININE 0.7   CALCIUM 11.4*   MG 2.2     CBC:   Recent Labs  Lab 07/24/17  0420 07/25/17  0338   WBC 9.29 7.40   HGB 9.5* 10.4*   HCT 28.2* 31.1*    431*       Significant Imaging: I have reviewed all pertinent imaging results/findings within the past 24 hours.    Assessment/Plan:      Acute encephalopathy    Likely multifactorial in nature; ICU delirium, hypernatremia (now resolved), and perhaps prior surgical procedures. No evidence of HSV encephalitis.  1. Delirium precautions.  2. Discontinue acyclovir.  3. Mittens to prevent scratching.          Aspiration pneumonia    Patient completed 7 days of cefepime therapy.          Primary central diabetes insipidus    See above.          * Hypernatremia    Secondary to central DI. Now resolved.  1. Desmopressin 10 mcg nightly.  2. Monitor I&O and sodium levels.           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    1. Keppra for seizure prophylaxis.          Malnutrition of moderate degree    S/P PEG placement on 7/17.  1. Continue enteral tube feedings.  2. Will ask nutritionist to evaluate bolus feeds rather than continuous infusion.          Oral phase dysphagia    Failed multiple ST trials, needs PEG, MS changes interferring with eating.        Palliative care encounter    Non-hospice Palliative Care:   Does patient have Capacity? no  Goals-   7/15 - discussed LTC plan with her son. Need consent for PEG  7/13 - discussed care with  Reina Hernandez  -  [phone], pt's POA.    This is turning into a long post operative course, with inability to nourish patient.  Reina agreed to PEG tube.  Pt needs regular water intake, with mental status changes pulls out NG tube frequently..  NG could be contributing to delirium as well.  willl consult GI  Code Status- FULL  Pain management- stable, no pain  Prognosis-  Poor given numerous complications, but too soon to give up.  Would like another neurology eval, give effort another 1-2 months at least. Discussed prognosis w Reina on 7/13.  Family discussions- 7/13 - Called Reina Britoson  -  [phone]  Functional Status - limited to bed, presently    Post acute care plan:  Rehab if MS improves.              Hypercalcemia    7/17 - kevan 11.8,  IV water started  1/16 - kevan 11.6, last iCal was nl, monitoring  7/15 - resume water bolus 150 q 6 hours  7/14 - discussed w ENDOcrine, I Kevan nl, no changes nedded  7/13 - increase I8cstgx +lasix 20 po.  Consider bisphosphenate. Will discuss with endocrine  7/12 - kevan 12.2-> 10.9 ->  11.3 -> 8.3  7/11- D5W, s/p  lasix,         Tobacco abuse    Patient was counseled on smoking cessation and she will be provided a  nicotine transdermal patch applied while inpatient.  Will provide additional smoking cessation counseling prior to discharge.        Altered mental status    7/17 - continues to wax and wane  7/16 - MS seems improved  7/15 - not correlating w electrolytes na or tanya.   7/14 - Level of alertness has waxesd and waned since surgery 6 weeks ago,  Discussed with Reina, her POA.  Plan is to repeat eval for seizures and get PEG.           Secondary hypothyroidism    As addressed above.  Will continue her home regimen of levothyroxine.        Adverse effect of glucocorticoid or synthetic analogue    Not on steroids presently          Agitation    7/13 - stable          Brain mass    S/p resection of meningiomas            VTE Risk Mitigation         Ordered     enoxaparin injection 40 mg  Daily     Route:  Subcutaneous        07/21/17 1223     High Risk of VTE  Once      06/30/17 0920     Place sequential compression device  Until discontinued      06/30/17 0920          Madalyn Montesinos MD  Department of Hospital Medicine   [facility]

## 2017-07-26 NOTE — PROGRESS NOTES
[facility] Hospital Medicine  Progress Note    Patient Name: Joanna Morales  MRN: [id number]  Patient Class: IP- Inpatient   Admission Date: 6/28/2017  Length of Stay: 28 days  Attending Physician: Madalyn Montesinos MD  Primary Care Provider: Claudy Tse MD    [facility] Hospital Medicine Team: [facility] HOSP MED L Madalyn Montesinos MD    Subjective:     Principal Problem:Hypernatremia    HPI:  Ms. Joanna Morales is a 51 y.o. female with tobacco abuse, secondary hypothyroidism (TSH 0.241 Jun 2017), and history of meningioma s/p resection complicated by diabetes insipidus who presents to [facility] ED from [facility] after being found with abnormal labwork.  She had been more altered in the last day or so and upon checking labwork, she was noted to be with hypernatremia.  She contributed very minimally to her history but does report some abdominal pain.  Of note, she was recently admitted to this facility under the Neurosurgery service for resection of a meningioma that was complicated by diabetes insipidus for which she received desmopressin a few times before being discharged to rehabilitation at [facility].  Further history is limited at this time.    Hospital Course:  Ms. Morales was discharged from [facility]  to [facility] on 6/26/17 and presented to [facility] ED on 6/28 with altered mental status and worsening hypernatremia. According to the patient's sitter, the patient was awake, verbal, and interactive with therapy on 6/23 but had been progressively less interactive and responsive when she was at [place]. Ms. Morales was admitted to Hospital Medicine but was found to be unresponsive with a sodium level of 168 on the morning of 6/29.      She has been followed by endocrinology and neurology and workup thus far significant for  unremarkable UA, neuro-imaging c/w recent left sphenoidal meningioma resection with resolution of blood products and trace intraventricular  hemorrhage.  Previous EEG (6/16/17) demonstrated Moderate diffuse slowing of the overall background as described above, Superimposed left hemispheric slowing relative to the right, and NO epileptiform discharges or seizures were seen c/w ENCP and focal cortical dysfunction.  Pt's presentation is concerning for multi-factorial encephalopathy given persistent hypernatremia.  Per neurology, low suspicion for MNG currently, but patient is at high risk given recent intracranial procedure.     Critical Care consulted 7/17/17 for acute hypoxic respiratory failure. Pt had PEG tube placed earlier today and in PACU, oxygen saturations noted to be in the mid 80's. Pt was placed on a non-rebreather with minimal improvement and subsequently placed on BiPAP. ABG significant for elevated CO2 however pt was compensating. Repeat ABG showed pH 7.372, CO2 44, O2 62, HCO3 25.8. Pt transitioned to NC. Cxray concerning for aspiration pneumonia - new infiltrate in RLL so patient started on vancomycin and zosyn for HAP. She was then switched to Vanc/Cefepime/Acyclovir. Had significant elevation in WBC count 7/18 and Needed levophed to bring BP up for the night. Switched nasal DDAVP to IV DDAVP which has normalized her sodium levels. LP performed 7/20; consistent with a viral process. BP has been stable off pressors; leukocytosis resolved. Patient more alert.    Patient stepped down to Hospital Medicine team L on 7/22 pending viral etiology work up. She remained lethargic and minimally participatory despite correction of metabolic derangements. Desmopressin therapy was resumed as per Endocrinology service recommendations. Enteral feedings changed to bolus with free water in order to prevent patient pulling her PEG tube.    Interval History: Patient non verbal today. Unable to assess orientation. No acute overnight events. Mittens and abdominal binder placed to prevent facial scratching and pulling of PEG tube.    Review of Systems   Unable  to perform ROS: Patient nonverbal     Objective:     Vital Signs (Most Recent):  Temp: 97.4 °F (36.3 °C) (07/26/17 0735)  Pulse: 99 (07/26/17 0735)  Resp: 18 (07/26/17 0735)  BP: 104/64 (07/26/17 0735)  SpO2: 96 % (07/26/17 0735) Vital Signs (24h Range):  Temp:  [95.8 °F (35.4 °C)-97.8 °F (36.6 °C)] 97.4 °F (36.3 °C)  Pulse:  [] 99  Resp:  [16-18] 18  SpO2:  [95 %-97 %] 96 %  BP: (100-128)/(64-98) 104/64     Weight: 56.2 kg (123 lb 14.4 oz)  Body mass index is 22.66 kg/m².    Intake/Output Summary (Last 24 hours) at 07/26/17 0904  Last data filed at 07/25/17 2200   Gross per 24 hour   Intake              195 ml   Output                0 ml   Net              195 ml      Physical Exam   Constitutional: She appears well-developed and well-nourished. She appears lethargic. She is easily aroused. No distress.   HENT:   Mouth/Throat: Mucous membranes are dry.   Cardiovascular: Normal rate and regular rhythm.    No murmur heard.  Pulmonary/Chest: Effort normal and breath sounds normal.   Abdominal: Soft. Bowel sounds are normal. She exhibits no distension. There is no tenderness.   PEG in place   Musculoskeletal: Normal range of motion. She exhibits no edema or tenderness.   Neurological: She is easily aroused. She appears lethargic.   Psychiatric: She is slowed. She is inattentive.       Significant Labs:   BMP:   Recent Labs  Lab 07/26/17  0556   *   *   K 4.5      CO2 28   BUN 25*   CREATININE 0.9   CALCIUM 12.7*   MG 2.7*     CBC:   Recent Labs  Lab 07/25/17  0338 07/26/17  0556   WBC 7.40 9.86   HGB 10.4* 11.1*   HCT 31.1* 34.4*   * 498*       Significant Imaging: I have reviewed all pertinent imaging results/findings within the past 24 hours.    Assessment/Plan:      Acute encephalopathy    Likely multifactorial in nature; ICU delirium, hypernatremia, and perhaps prior surgical procedures. No evidence of HSV encephalitis.  1. Delirium precautions.  2. Mittens to prevent  scratching.          Aspiration pneumonia    Patient completed 7 days of cefepime therapy.          Primary central diabetes insipidus    See above.          * Hypernatremia    Secondary to central DI. Started nasal desmopressin however today with hypernatremia. Appreciate Endocrinology assistance.  1. Desmopressin 200 mcg BID.  2. Monitor I&O and sodium levels.   3. Free water bolus 4 times daily.          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    1. Keppra for seizure prophylaxis.          Malnutrition of moderate degree    S/P PEG placement on 7/17.  1. Continue enteral tube feedings.  2. Will ask nutritionist to evaluate bolus feeds rather than continuous infusion.          Secondary hypothyroidism    Stable.  1. Continue levothyroxine.        Hypercalcemia    Stable. Likely due to poor activity. Further work up in process.  1. PT/OT.  2. Further management per Endocrinology service recommendations.        Discharge planning issues    Patient to need long term jail care placement. Son unavailable despite multiple attempts. Other care  of town for now. Denied placement in multiple institutions.  1. CM to continue search for secure placement.          Oral phase dysphagia    Failed multiple ST trials, needs PEG, MS changes interferring with eating.        Palliative care encounter    Non-hospice Palliative Care:   Does patient have Capacity? no  Goals-   7/15 - discussed LTC plan with her son. Need consent for PEG  7/13 - discussed care with  Reina Hernandez  -  [phone], pt's POA.    This is turning into a long post operative course, with inability to nourish patient.  Reina agreed to PEG tube.  Pt needs regular water intake, with mental status changes pulls out NG tube  frequently..  NG could be contributing to delirium as well.  willl consult GI  Code Status- FULL  Pain management- stable, no pain  Prognosis-  Poor given numerous complications, but too soon to give up.  Would like another neurology eval, give effort another 1-2 months at least. Discussed prognosis toni Huang on 7/13.  Family discussions- 7/13 - Called Reina Hernandez  -  [phone]  Functional Status - limited to bed, presently    Post acute care plan:  Rehab if MS improves.              Tobacco abuse    Patient was counseled on smoking cessation and she will be provided a nicotine transdermal patch applied while inpatient.  Will provide additional smoking cessation counseling prior to discharge.        Adverse effect of glucocorticoid or synthetic analogue    Not on steroids presently          Agitation    7/13 - stable          Brain mass    S/p resection of meningiomas            VTE Risk Mitigation         Ordered     enoxaparin injection 40 mg  Daily     Route:  Subcutaneous        07/21/17 1223     High Risk of VTE  Once      06/30/17 0920     Place sequential compression device  Until discontinued      06/30/17 0920          Madalyn Montesinos MD  Department of Hospital Medicine   [facility]

## 2017-07-26 NOTE — PLAN OF CARE
Problem: Restraint, Nonbehavioral (nonviolent)  Intervention: Restraint Injuries Monitor Q2 hours  Frequent rounds made on patient. Patient slept most of the day. Patient tolerated mitts well. Vital signs stable. No distress noted. Call bell in reach. Bed in lowest position and at 30 degrees at all times.. Safety maintained. Will continue to monitor.

## 2017-07-26 NOTE — ASSESSMENT & PLAN NOTE
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    1. Keppra for seizure prophylaxis.

## 2017-07-26 NOTE — ASSESSMENT & PLAN NOTE
Patient to need long term [facility] care placement. Son unavailable despite multiple attempts. Other care  of town for now. Denied placement in multiple institutions.  1. CM to continue search for secure placement.

## 2017-07-26 NOTE — ASSESSMENT & PLAN NOTE
High Ca with low normal PTH, this is most likely 2/2 immobilization and may benefit from anti-resorptive therapy, but before doing so, would complete w/u for non-pth mediated hypercalcemia (PTHrp, 1,25-vit D, SPEP pending).   IVF, avoid dehydration.   Cont physical therapy.

## 2017-07-26 NOTE — PLAN OF CARE
Problem: Patient Care Overview  Goal: Plan of Care Review  Outcome: Ongoing (interventions implemented as appropriate)  Pt free of fall-high fall risk, bed alarm set, bed low position, wheels locked, side rails 3x up, call light within reach. Pt was reposition frequently. No s/s of infection noted. Restraints applied for confusion and removing medical equipment. No pain noted. Pt confused. Afebrile. cbg monitored. Vss. Will monitor pt.

## 2017-07-26 NOTE — PROGRESS NOTES
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 Chew and discharged on 6/26/17 to [facility].  She presents with altered mental status and worsening hypernatremia for 1 day. She was treated for DI her last admission and labs are trending up today.  Spoke with Dr. Fairchild at [facility], patient with waxing/waning mental status yesterday, but was appropriate & following some complex commands yesterday.  There was a period yesterday of significant lethargy.  Patient is unable to give history, and information is taken from the chart.     Post-Op Info:  Procedure(s) (LRB):  PLACEMENT-TUBE-PEG (N/A)   9 Days Post-Op   Interval History: Ms. Morales continues to be lethargic and minimally responsive.    Medications:  Continuous Infusions:   Scheduled Meds:   desmopressin  200 mcg Per G Tube BID    enoxaparin  40 mg Subcutaneous Daily    levetiracetam oral soln  500 mg Per NG tube BID    levothyroxine  50 mcg Per NG tube Daily     PRN Meds:acetaminophen, bisacodyl, dextrose 50%, glucagon (human recombinant), guaifenesin 100 mg/5 ml, insulin aspart, ipratropium, ondansetron, senna, sodium chloride 0.9%     Review of Systems  Objective:     Weight: 56.2 kg (123 lb 14.4 oz)  Body mass index is 22.66 kg/m².  Vital Signs (Most Recent):  Temp: 96.9 °F (36.1 °C) (07/26/17 1510)  Pulse: 97 (07/26/17 1521)  Resp: 18 (07/26/17 1510)  BP: 115/69 (07/26/17 1510)  SpO2: 97 % (07/26/17 1510) Vital Signs (24h Range):  Temp:  [96.7 °F (35.9 °C)-97.8 °F (36.6 °C)] 96.9 °F (36.1 °C)  Pulse:  [] 97  Resp:  [18] 18  SpO2:  [95 %-97 %] 97 %  BP: ()/(50-69) 115/69                           Gastrostomy/Enterostomy 07/17/17 1520 Percutaneous endoscopic gastrostomy (PEG) LUQ feeding (Active)   Securement sutured to abdomen 7/26/2017  7:35 AM   Interventions Prior to Feeding  patency checked;residual checked;residual returned 7/26/2017  7:35 AM   Drainage thin 7/21/2017  7:54 PM   Feeding Type continuous 7/26/2017  7:35 AM   Clamp Status/Tolerance unclamped 7/26/2017  7:35 AM   Feeding Action feeding continued 7/26/2017  7:35 AM   Dressing dry and intact 7/26/2017  7:35 AM   Insertion Site dry;no redness;no warmth;no tenderness;no drainage;no swelling 7/26/2017  7:35 AM   Site Care sterile 4 x 4 gauze dressing applied 7/24/2017  5:45 PM   Flush/Irrigation flushed w/;water;no resistance met 7/25/2017  7:50 PM   Current Rate (mL/hr) 45 mL/hr 7/26/2017  7:35 AM   Goal Rate (mL/hr) 45 mL/hr 7/26/2017  7:35 AM   Intake (mL) 20 mL 7/20/2017  8:00 PM   Water Bolus (mL) 60 mL 7/25/2017 10:00 PM   Tube Feeding Intake (mL) 135 7/25/2017 10:00 PM   Residual Amount (ml) 100 ml 7/24/2017  5:45 PM       Neurosurgery Physical Exam   General: well developed, no acute distress.   Head: normocephalic  Neurologic: Alert. Awake. Minimal verbal responses.   GCS: Motor: 6/Verbal: 4 /Eyes: 4 GCS Total: 14  Mental Status: Able to state name. Intermittently follows simple commands.   Cranial nerves: face symmetric, CN II-XII grossly intact.   Eyes: pupils equal, round, reactive to light.  Motor Strength: Moves all extremities spontaneously with good strength and tone. Intermittently follows simple commands.   Incision well healed    Significant Labs:    Recent Labs  Lab 07/25/17 0338 07/26/17  0556   GLU 99 121*    148*   K 4.0 4.5   CL 98 105   CO2 28 28   BUN 14 25*   CREATININE 0.7 0.9   CALCIUM 11.4* 12.7*   MG 2.2 2.7*       Recent Labs  Lab 07/25/17 0338 07/26/17  0556   WBC 7.40 9.86   HGB 10.4* 11.1*   HCT 31.1* 34.4*   * 498*     No results for input(s): LABPT, INR, APTT in the last 48 hours.  Microbiology Results (last 7 days)     Procedure Component Value Units Date/Time    Blood culture [[id number]] Collected:  07/20/17 1052    Order Status:  Completed Specimen:  Blood from Peripheral,  Upper Arm, Left Updated:  07/25/17 1412     Blood Culture, Routine No growth after 5 days.    Blood culture [[id number]] Collected:  07/20/17 1103    Order Status:  Completed Specimen:  Blood from Peripheral, Upper Arm, Right Updated:  07/25/17 1412     Blood Culture, Routine No growth after 5 days.    CSF culture [[id number]] Collected:  07/20/17 1544    Order Status:  Completed Specimen:  CSF (Spinal Fluid) from CSF Tap, Tube 1 Updated:  07/25/17 0736     CSF CULTURE No Growth     Gram Stain Result Cytospin indicates:      Moderate WBC's       No organisms seen    Narrative:       On which sequentially labeled tube should this analysis be  performed?->3    Culture, Respiratory with Gram Stain [[id number]] Collected:  07/19/17 1541    Order Status:  Completed Specimen:  Respiratory from Sputum, Expectorated Updated:  07/22/17 1243     Respiratory Culture Normal respiratory tram     Gram Stain (Respiratory) <10 epithelial cells per low power field.     Gram Stain (Respiratory) Few WBC's     Gram Stain (Respiratory) No organisms seen    Cryptococcal antigen, CSF [[id number]] Collected:  07/20/17 1544    Order Status:  Completed Specimen:  CSF (Spinal Fluid) from CSF Tap, Tube 1 Updated:  07/21/17 1226     Crypto Ag, CSF Negative    Narrative:       On which sequentially labeled tube should this analysis be  performed?->4    Stool culture [[id number]]     Order Status:  Canceled Specimen:  Stool from Stool     Blood culture [[id number]]     Order Status:  Canceled Specimen:  Blood           Significant Diagnostics:  No new imaging    Assessment/Plan:     Meningioma, recurrent of brain    52 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  -Neurologically unchanged  -Endo changed DDAVP from nasal to PO dosing, pt slightly hypernatremic today  -Patient completed 7 day course of cefepime for pna yest 07/24/2017  -Continue neuro checks q4 hours. Notify NSGY for  any changes.   -Continue Keppra for seizure prophylaxis  -Continue Lovenox for DVT prophylaxis  -Continue aggressive PT/OT  -LP performed 7/20, NGTD. Likely viral process.   -Endocrine following for DI.   -Medical management per primary team.  -Dispo: per primary, [facility] once out of [facility].   -Will continue to follow, please call with questions     Discussed with Dr. Chew          Please feel free to call with any further questions    Calli Paulino PA-C  Neurosurgery  [phone]

## 2017-07-26 NOTE — ASSESSMENT & PLAN NOTE
Likely multifactorial in nature; ICU delirium, hypernatremia, and perhaps prior surgical procedures. No evidence of HSV encephalitis.  1. Delirium precautions.  2. Mittens to prevent scratching.

## 2017-07-26 NOTE — ASSESSMENT & PLAN NOTE
Secondary to central DI. Now resolved.  1. Desmopressin 10 mcg nightly.  2. Monitor I&O and sodium levels.

## 2017-07-26 NOTE — ASSESSMENT & PLAN NOTE
Continue levothyroxine 50mcg daily, needs to be given at least 1 hr apart from TF to ensure absorption   Last free T4 normal, FT4 ordered for tomorrow am

## 2017-07-26 NOTE — SUBJECTIVE & OBJECTIVE
"Interval HPI:   Overnight events:   Na elevated 148 this am. To be started on TF and water boluses today. Getting IVF bolus this morning.   Hypercalcemia persists.   Remains on LT4.     /64 (BP Location: Left arm, Patient Position: Lying, BP Method: Automatic)   Pulse 99   Temp 97.4 °F (36.3 °C) (Axillary)   Resp 18   Ht 5' 2" (1.575 m)   Wt 56.2 kg (123 lb 14.4 oz)   SpO2 96%   Breastfeeding? No   BMI 22.66 kg/m²     Labs Reviewed and Include      Recent Labs  Lab 07/26/17  0556   *   CALCIUM 12.7*   *   K 4.5   CO2 28      BUN 25*   CREATININE 0.9     Lab Results   Component Value Date    WBC 9.86 07/26/2017    HGB 11.1 (L) 07/26/2017    HCT 34.4 (L) 07/26/2017    MCV 98 07/26/2017     (H) 07/26/2017     No results for input(s): TSH, FREET4 in the last 168 hours.  No results found for: HGBA1C    Nutritional status:   Body mass index is 22.66 kg/m².  Lab Results   Component Value Date    ALBUMIN 2.3 (L) 07/18/2017    ALBUMIN 3.0 (L) 07/17/2017    ALBUMIN 3.0 (L) 07/16/2017     Lab Results   Component Value Date    PREALBUMIN 26 06/27/2017       Estimated Creatinine Clearance: 58.5 mL/min (based on Cr of 0.9).    Accu-Checks  Recent Labs      07/23/17   1200  07/23/17   1645  07/24/17   0011  07/24/17   0555  07/24/17   1437  07/24/17   1739  07/25/17   0013  07/25/17   0758  07/25/17   1733  07/25/17   2118   POCTGLUCOSE  109  111*  98  106  115*  140*  100  126*  120*  115*       Current Medications and/or Treatments Impacting Glycemic Control  Immunotherapy:  Immunosuppressants     None        Steroids:   Hormones     Start     Stop Route Frequency Ordered    07/25/17 2100  desmopressin nasal spray 10 mcg      -- Nasl Nightly 07/25/17 1530        Pressors:    Autonomic Drugs     None        Hyperglycemia/Diabetes Medications: Antihyperglycemics     Start     Stop Route Frequency Ordered    06/29/17 1922  insulin aspart pen 0-5 Units      -- SubQ Every 6 hours PRN 06/29/17 " 1822

## 2017-07-26 NOTE — SUBJECTIVE & OBJECTIVE
"Interval History: "yes ma'am". No acute overnight events. Only answers two words inconsistently.    Review of Systems   Unable to perform ROS: Mental status change     Objective:     Vital Signs (Most Recent):  Temp: (!) 95.8 °F (35.4 °C) (07/25/17 1459)  Pulse: 109 (07/25/17 1900)  Resp: 16 (07/25/17 1459)  BP: (!) 124/98 (07/25/17 1459)  SpO2: 97 % (07/25/17 1459) Vital Signs (24h Range):  Temp:  [95.8 °F (35.4 °C)-97.8 °F (36.6 °C)] 95.8 °F (35.4 °C)  Pulse:  [] 109  Resp:  [16-18] 16  SpO2:  [95 %-97 %] 97 %  BP: ()/(66-98) 124/98     Weight: 56.2 kg (123 lb 14.4 oz)  Body mass index is 22.66 kg/m².    Intake/Output Summary (Last 24 hours) at 07/25/17 2052  Last data filed at 07/25/17 0500   Gross per 24 hour   Intake              555 ml   Output                0 ml   Net              555 ml      Physical Exam   Constitutional: She appears well-developed and well-nourished. She appears lethargic. She is easily aroused. No distress.   Cardiovascular: Normal rate and regular rhythm.    No murmur heard.  Pulmonary/Chest: Effort normal and breath sounds normal.   Abdominal: Soft. Bowel sounds are normal. She exhibits no distension. There is no tenderness.   PEG in place   Musculoskeletal: Normal range of motion. She exhibits no edema or tenderness.   Neurological: She is easily aroused. She appears lethargic.       Significant Labs:   BMP:   Recent Labs  Lab 07/25/17  0338   GLU 99      K 4.0   CL 98   CO2 28   BUN 14   CREATININE 0.7   CALCIUM 11.4*   MG 2.2     CBC:   Recent Labs  Lab 07/24/17  0420 07/25/17  0338   WBC 9.29 7.40   HGB 9.5* 10.4*   HCT 28.2* 31.1*    431*       Significant Imaging: I have reviewed all pertinent imaging results/findings within the past 24 hours.  "

## 2017-07-26 NOTE — PLAN OF CARE
8:15 AM  SW received notification from [facility] that they have denied pt. At this time all facilities pt's caretaker and friend have chosen have all denied.     Fax[facility] ref[facility] to [facility], [facility], [facility], [facility], [facility], [facility], [facility], [facility] and [facility]. Will f/u with facilities.   9:31 AM  SW faxed referrals to [facility], [facility], Neena De[facility]irisha of [facility]. [facility] has declined as well as [facility].   12:45 PM  SW received notification from Kathy at [facility] who stated their sister facility[facility] may be able to take pt as they have one snf female bed left. Faxed referral to [facility]. [facility] has denied pt.     Jodi Royal, TENISHA   [facility]  Ext [phone]

## 2017-07-26 NOTE — PT/OT/SLP PROGRESS
Physical Therapy      Joannaclemente Morales  MRN: [id number]    Patient not seen today secondary to pt refusal both attempts despite max encouragement. Pt educated on the importance/benefits of participating with therapy.  Will follow-up at next scheduled visit.    Hoa Dowd, PTA

## 2017-07-26 NOTE — ASSESSMENT & PLAN NOTE
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  -Neurologically unchanged  -Endo changed DDAVP from nasal to PO dosing, pt slightly hypernatremic today  -Patient completed 7 day course of cefepime for pna yest 07/24/2017  -Continue neuro checks q4 hours. Notify NSGY for any changes.   -Continue Keppra for seizure prophylaxis  -Continue Lovenox for DVT prophylaxis  -Continue aggressive PT/OT  -LP performed 7/20, NGTD. Likely viral process.   -Endocrine following for DI.   -Medical management per primary team.  -Dispo: per primary, awaiting intermediate NH once out of restraints.   -Will continue to follow, please call with questions     Discussed with Dr. Chew

## 2017-07-26 NOTE — PLAN OF CARE
Problem: Patient Care Overview  Goal: Plan of Care Review  Outcome: Ongoing (interventions implemented as appropriate)  Pt. Remained free from injury and no acute distress noted. No c/o pain throughout shift. Wheels locked, siderails up x2, call light in reach. Bed in lowest position.  WCM

## 2017-07-26 NOTE — ASSESSMENT & PLAN NOTE
Likely multifactorial in nature; ICU delirium, hypernatremia (now resolved), and perhaps prior surgical procedures. No evidence of HSV encephalitis.  1. Delirium precautions.  2. Discontinue acyclovir.  3. Mittens to prevent scratching.

## 2017-07-26 NOTE — PLAN OF CARE
3:10 PM  SW received notification from [facility] who stated they have denied pt as they feel they cannot meet needs. Spoke with admissions at [facility] who stated they are reviewing referral.    Jodi Royal LMSW   [facility]  Ext [phone]

## 2017-07-26 NOTE — ASSESSMENT & PLAN NOTE
S/P PEG placement on 7/17.  1. Continue enteral tube feedings.  2. Will ask nutritionist to evaluate bolus feeds rather than continuous infusion.

## 2017-07-26 NOTE — PROGRESS NOTES
"[facility]  Endocrinology  Progress Note    Admit Date: 6/28/2017     Reason for Consult: Management of hypernatremia    Admit Date: 6/28/2017      Reason for Consult: Diabetes insipidus      Surgical Procedure and Date:   CRANIOTOMY 6/8/2017           HPI:   Patient is a 51 y.o. female with a diagnosis of  meningioma s/p resection (6/7). 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  She underwent partial resection of the tumor in January 2015 at [facility]. Followup scan done in 2016 showing a continued large meningioma. Endocrine had been previously involved in her care during earlier June hospitalization for post-operative diabetes insipidus and additional concern for secondary adrenal insufficiency and secondary hypothyroidism. Had been discharged to [facility] on 6/26/17. Re-admitted to [facility] 6[facility]17 for worsening hypernatremia and mental status. Endocrine had been consulted this admission for severe hypernatremia with concerns for diabetes insipidus.             Interval HPI:   Overnight events:   Na elevated 148 this am. To be started on TF and water boluses today. Getting IVF bolus this morning.   Hypercalcemia persists.   Remains on LT4.     /64 (BP Location: Left arm, Patient Position: Lying, BP Method: Automatic)   Pulse 99   Temp 97.4 °F (36.3 °C) (Axillary)   Resp 18   Ht 5' 2" (1.575 m)   Wt 56.2 kg (123 lb 14.4 oz)   SpO2 96%   Breastfeeding? No   BMI 22.66 kg/m²       Labs Reviewed and Include      Recent Labs  Lab 07/26/17  0556   *   CALCIUM 12.7*   *   K 4.5   CO2 28      BUN 25*   CREATININE 0.9     Lab Results   Component Value Date    WBC 9.86 07/26/2017    HGB 11.1 (L) 07/26/2017    HCT 34.4 (L) 07/26/2017    MCV 98 07/26/2017     (H) 07/26/2017     No results for input(s): TSH, FREET4 in the last 168 hours.  No " results found for: HGBA1C    Nutritional status:   Body mass index is 22.66 kg/m².  Lab Results   Component Value Date    ALBUMIN 2.3 (L) 07/18/2017    ALBUMIN 3.0 (L) 07/17/2017    ALBUMIN 3.0 (L) 07/16/2017     Lab Results   Component Value Date    PREALBUMIN 26 06/27/2017       Estimated Creatinine Clearance: 58.5 mL/min (based on Cr of 0.9).    Accu-Checks  Recent Labs      07/23/17   1200  07/23/17   1645  07/24/17   0011  07/24/17   0555  07/24/17   1437  07/24/17   1739  07/25/17   0013  07/25/17   0758  07/25/17   1733  07/25/17   2118   POCTGLUCOSE  109  111*  98  106  115*  140*  100  126*  120*  115*       Current Medications and/or Treatments Impacting Glycemic Control  Immunotherapy:  Immunosuppressants     None        Steroids:   Hormones     Start     Stop Route Frequency Ordered    07/25/17 2100  desmopressin nasal spray 10 mcg      -- Nasl Nightly 07/25/17 1530        Pressors:    Autonomic Drugs     None        Hyperglycemia/Diabetes Medications: Antihyperglycemics     Start     Stop Route Frequency Ordered    06/29/17 1922  insulin aspart pen 0-5 Units      -- SubQ Every 6 hours PRN 06/29/17 1822          ASSESSMENT and PLAN    Primary central diabetes insipidus    post-operative diabetes insipidus  Na elevated despite receiving DDAVP 10mcg last night.   I am concerned about efficacy of nasal formulation in this pt with minimal cooperation.   rec switching to equivalent tablet dose: 200mcg bid per PEG, first dose now.   Agree with free water boluses 190ml 4x daily with TF boluses.   Na daily            Hypercalcemia    High Ca with low normal PTH, this is most likely 2/2 immobilization and may benefit from anti-resorptive therapy, but before doing so, would complete w/u for non-pth mediated hypercalcemia (PTHrp, 1,25-vit D, SPEP pending).   IVF, avoid dehydration.   Cont physical therapy.         Secondary hypothyroidism    Continue levothyroxine 50mcg daily, needs to be given at least 1 hr apart  from TF to ensure absorption   Last free T4 normal, FT4 ordered for tomorrow am            Jennifer Hsu MD  Endocrinology  [facility]

## 2017-07-26 NOTE — SUBJECTIVE & OBJECTIVE
Interval History: Ms. Morales continues to be lethargic and minimally responsive.    Medications:  Continuous Infusions:   Scheduled Meds:   desmopressin  200 mcg Per G Tube BID    enoxaparin  40 mg Subcutaneous Daily    levetiracetam oral soln  500 mg Per NG tube BID    levothyroxine  50 mcg Per NG tube Daily     PRN Meds:acetaminophen, bisacodyl, dextrose 50%, glucagon (human recombinant), guaifenesin 100 mg/5 ml, insulin aspart, ipratropium, ondansetron, senna, sodium chloride 0.9%     Review of Systems  Objective:     Weight: 56.2 kg (123 lb 14.4 oz)  Body mass index is 22.66 kg/m².  Vital Signs (Most Recent):  Temp: 96.9 °F (36.1 °C) (07/26/17 1510)  Pulse: 97 (07/26/17 1521)  Resp: 18 (07/26/17 1510)  BP: 115/69 (07/26/17 1510)  SpO2: 97 % (07/26/17 1510) Vital Signs (24h Range):  Temp:  [96.7 °F (35.9 °C)-97.8 °F (36.6 °C)] 96.9 °F (36.1 °C)  Pulse:  [] 97  Resp:  [18] 18  SpO2:  [95 %-97 %] 97 %  BP: ()/(50-69) 115/69                           Gastrostomy/Enterostomy 07/17/17 1520 Percutaneous endoscopic gastrostomy (PEG) LUQ feeding (Active)   Securement sutured to abdomen 7/26/2017  7:35 AM   Interventions Prior to Feeding patency checked;residual checked;residual returned 7/26/2017  7:35 AM   Drainage thin 7/21/2017  7:54 PM   Feeding Type continuous 7/26/2017  7:35 AM   Clamp Status/Tolerance unclamped 7/26/2017  7:35 AM   Feeding Action feeding continued 7/26/2017  7:35 AM   Dressing dry and intact 7/26/2017  7:35 AM   Insertion Site dry;no redness;no warmth;no tenderness;no drainage;no swelling 7/26/2017  7:35 AM   Site Care sterile 4 x 4 gauze dressing applied 7/24/2017  5:45 PM   Flush/Irrigation flushed w/;water;no resistance met 7/25/2017  7:50 PM   Current Rate (mL/hr) 45 mL/hr 7/26/2017  7:35 AM   Goal Rate (mL/hr) 45 mL/hr 7/26/2017  7:35 AM   Intake (mL) 20 mL 7/20/2017  8:00 PM   Water Bolus (mL) 60 mL 7/25/2017 10:00 PM   Tube Feeding Intake (mL) 135 7/25/2017 10:00 PM    Residual Amount (ml) 100 ml 7/24/2017  5:45 PM       Neurosurgery Physical Exam   General: well developed, no acute distress.   Head: normocephalic  Neurologic: Alert. Awake. Minimal verbal responses.   GCS: Motor: 6/Verbal: 4 /Eyes: 4 GCS Total: 14  Mental Status: Able to state name. Intermittently follows simple commands.   Cranial nerves: face symmetric, CN II-XII grossly intact.   Eyes: pupils equal, round, reactive to light.  Motor Strength: Moves all extremities spontaneously with good strength and tone. Intermittently follows simple commands.   Incision well healed    Significant Labs:    Recent Labs  Lab 07/25/17  0338 07/26/17  0556   GLU 99 121*    148*   K 4.0 4.5   CL 98 105   CO2 28 28   BUN 14 25*   CREATININE 0.7 0.9   CALCIUM 11.4* 12.7*   MG 2.2 2.7*       Recent Labs  Lab 07/25/17 0338 07/26/17  0556   WBC 7.40 9.86   HGB 10.4* 11.1*   HCT 31.1* 34.4*   * 498*     No results for input(s): LABPT, INR, APTT in the last 48 hours.  Microbiology Results (last 7 days)     Procedure Component Value Units Date/Time    Blood culture [[id number]] Collected:  07/20/17 1052    Order Status:  Completed Specimen:  Blood from Peripheral, Upper Arm, Left Updated:  07/25/17 1412     Blood Culture, Routine No growth after 5 days.    Blood culture [[id number]] Collected:  07/20/17 1103    Order Status:  Completed Specimen:  Blood from Peripheral, Upper Arm, Right Updated:  07/25/17 1412     Blood Culture, Routine No growth after 5 days.    CSF culture [[id number]] Collected:  07/20/17 1544    Order Status:  Completed Specimen:  CSF (Spinal Fluid) from CSF Tap, Tube 1 Updated:  07/25/17 0736     CSF CULTURE No Growth     Gram Stain Result Cytospin indicates:      Moderate WBC's       No organisms seen    Narrative:       On which sequentially labeled tube should this analysis be  performed?->3    Culture, Respiratory with Gram Stain [[id number]] Collected:  07/19/17 1541    Order Status:  Completed  Specimen:  Respiratory from Sputum, Expectorated Updated:  07/22/17 1243     Respiratory Culture Normal respiratory tram     Gram Stain (Respiratory) <10 epithelial cells per low power field.     Gram Stain (Respiratory) Few WBC's     Gram Stain (Respiratory) No organisms seen    Cryptococcal antigen, CSF [[id number]] Collected:  07/20/17 1544    Order Status:  Completed Specimen:  CSF (Spinal Fluid) from CSF Tap, Tube 1 Updated:  07/21/17 1226     Crypto Ag, CSF Negative    Narrative:       On which sequentially labeled tube should this analysis be  performed?->4    Stool culture [[id number]]     Order Status:  Canceled Specimen:  Stool from Stool     Blood culture [[id number]]     Order Status:  Canceled Specimen:  Blood           Significant Diagnostics:  No new imaging

## 2017-07-26 NOTE — SUBJECTIVE & OBJECTIVE
Interval History: Patient non verbal today. Unable to assess orientation. No acute overnight events. Mittens and abdominal binder placed to prevent facial scratching and pulling of PEG tube.    Review of Systems   Unable to perform ROS: Patient nonverbal     Objective:     Vital Signs (Most Recent):  Temp: 97.4 °F (36.3 °C) (07/26/17 0735)  Pulse: 99 (07/26/17 0735)  Resp: 18 (07/26/17 0735)  BP: 104/64 (07/26/17 0735)  SpO2: 96 % (07/26/17 0735) Vital Signs (24h Range):  Temp:  [95.8 °F (35.4 °C)-97.8 °F (36.6 °C)] 97.4 °F (36.3 °C)  Pulse:  [] 99  Resp:  [16-18] 18  SpO2:  [95 %-97 %] 96 %  BP: (100-128)/(64-98) 104/64     Weight: 56.2 kg (123 lb 14.4 oz)  Body mass index is 22.66 kg/m².    Intake/Output Summary (Last 24 hours) at 07/26/17 0904  Last data filed at 07/25/17 2200   Gross per 24 hour   Intake              195 ml   Output                0 ml   Net              195 ml      Physical Exam   Constitutional: She appears well-developed and well-nourished. She appears lethargic. She is easily aroused. No distress.   HENT:   Mouth/Throat: Mucous membranes are dry.   Cardiovascular: Normal rate and regular rhythm.    No murmur heard.  Pulmonary/Chest: Effort normal and breath sounds normal.   Abdominal: Soft. Bowel sounds are normal. She exhibits no distension. There is no tenderness.   PEG in place   Musculoskeletal: Normal range of motion. She exhibits no edema or tenderness.   Neurological: She is easily aroused. She appears lethargic.   Psychiatric: She is slowed. She is inattentive.       Significant Labs:   BMP:   Recent Labs  Lab 07/26/17  0556   *   *   K 4.5      CO2 28   BUN 25*   CREATININE 0.9   CALCIUM 12.7*   MG 2.7*     CBC:   Recent Labs  Lab 07/25/17  0338 07/26/17  0556   WBC 7.40 9.86   HGB 10.4* 11.1*   HCT 31.1* 34.4*   * 498*       Significant Imaging: I have reviewed all pertinent imaging results/findings within the past 24 hours.

## 2017-07-26 NOTE — ASSESSMENT & PLAN NOTE
Stable. Likely due to poor activity. Further work up in process.  1. PT/OT.  2. Further management per Endocrinology service recommendations.

## 2017-07-27 NOTE — PROGRESS NOTES
[facility]  Adult Nutrition  Progress Note    SUMMARY     Recommendations    Recommendation/Intervention:   1. Continue current TF regimen.   2. RD to f/u with tolerance.  Goals: Pt to receive >90% of EEN via TF or po intake  Nutrition Goal Status: goal met  Communication of RD Recs: reviewed with RN    Reason for Assessment    Reason for Assessment: RD follow-up  Diagnosis: other (see comments) (AMS and hypernatremia)  Relevent Medical History: hypothryodism, olfactory grooce meningioma, s/p crani   Interdisciplinary Rounds: did not attend     General Information Comments: Tolerating TF regimen. Residuals 10mL today at 8am. Nurse reports to issues.     Nutrition Discharge Planning: Tolerance of TF.    Nutrition Prescription Ordered    Current Diet Order: NPO  Current Nutrition Support Formula Ordered: Isosource 1.5  Current Nutrition Support Frequency Ordered: 270 mL 4 times daily      Evaluation of Received Nutrients/Fluid Intake    Enteral Calories (kcal): 1620  Enteral Protein (gm): 73  Enteral (Free Water) Fluid (mL): 840  Free Water Flush Fluid (mL): 880  Energy Calories Required: meeting needs  Protein Required: meeting needs  Fluid Required: meeting needs  Comments: I/O noted  Tolerance: tolerating  % Intake of Estimated Energy Needs: 0 - 25 %  % Meal Intake: NPO     Nutrition Risk Screen     Nutrition Risk Screen: dysphagia or difficulty swallowing    Nutrition/Diet History    Patient Reported Diet/Restrictions/Preferences:  (JAVIER)  Typical Food/Fluid Intake: -  Food Preferences: JAVIER        Factors Affecting Nutritional Intake: NPO                Labs/Tests/Procedures/Meds       Pertinent Labs Reviewed: reviewed  Pertinent Labs Comments: Na 146H, BUN 33H, Ca 11.8H  Pertinent Medications Reviewed: reviewed, pertinent  Pertinent Medications Comments: levothyroxine, enoxaparin    Physical Findings    Overall Physical Appearance: nourished, lethargic  Tubes: gastrostomy tube     Skin:  "incision    Anthropometrics    Temp: 96.1 °F (35.6 °C)     Height: 5' 2.01" (157.5 cm)  Weight Method: Bed Scale  Weight: 56.2 kg (123 lb 14.4 oz)     Ideal Body Weight (IBW), Female: 110.05 lb     % Ideal Body Weight, Female (lb): 112.59 lb  BMI (Calculated): 22.7  BMI Grade: 18.5-24.9 - normal                            Estimated/Assessed Needs    Weight Used For Calorie Calculations: 56.2 kg (123 lb 14.4 oz)      Energy Calorie Requirements (kcal): 1412 kcal/day (Dodson x1.25 PAL)  Energy Need Method:  (1412 kcal/day)       RMR (Dodson-St. Jeor Equation): 1130.38     Weight Used For Protein Calculations: 56.2 kg (123 lb 14.4 oz)  Protein Requirements: 56-68 g/d (1.0-1.2 gm/kg)  1.0 gm Protein (gm): 56.32 and 1.2 gm Protein (gm): 67.58  Fluid Requirements (mL): 1ml/kcal or per MD  Fluid Need Method: RDA Method, other (see comments) (1 mL/kcal or per MD)     RDA Method (mL): 1412    Assessment and Plan    No nutrition diagnosis at this time.     Monitor and Evaluation    Food and Nutrient Intake: enteral nutrition intake  Food and Nutrient Adminstration: enteral and parenteral nutrition administration     Anthropometric Measurements: weight, weight change, body mass index  Biochemical Data, Medical Tests and Procedures: electrolyte and renal panel, glucose/endocrine profile, gastrointestinal profile, inflammatory profile, lipid profile  Nutrition-Focused Physical Findings: overall appearance    Nutrition Risk    Level of Risk: other (see comments) (f/u 1x/week)    Nutrition Follow-Up    RD Follow-up?: Yes    "

## 2017-07-27 NOTE — PLAN OF CARE
2:05 PM  SW received notification from Arcadio with [facility] who stated they have accepted pt. Will contact pt's friend and inform her. Will also follow up with Mac. Spoke with Suki from [place] and informed will speak with friend regarding paperwork and financials.  3:04 PM  SW spoke with pt's friend, Reina who stated will be driving from [place] tomorrow after her family member's  and will be able to sign paperwork for nursing home admission on Monday of next week. She also has financials for pt. Updated physician. Will update CM.     Jodi Royal, HOLDEN   [facility]  Ext [phone]

## 2017-07-27 NOTE — PROGRESS NOTES
[facility] Hospital Medicine  Progress Note    Patient Name: Joanna Morales  MRN: [id number]  Patient Class: IP- Inpatient   Admission Date: 6/28/2017  Length of Stay: 29 days  Attending Physician: Madalyn Montesinos MD  Primary Care Provider: Claudy Tse MD    [facility] Medicine Team: [facility] HOSP MED L Madalyn Montesinos MD    Subjective:     Principal Problem:Hypernatremia    HPI:  Ms. Joanna Morales is a 51 y.o. female with tobacco abuse, secondary hypothyroidism (TSH 0.241 Jun 2017), and history of meningioma s/p resection complicated by diabetes insipidus who presents to [facility] ED from [facility] after being found with abnormal labwork.  She had been more altered in the last day or so and upon checking labwork, she was noted to be with hypernatremia.  She contributed very minimally to her history but does report some abdominal pain.  Of note, she was recently admitted to this facility under the Neurosurgery service for resection of a meningioma that was complicated by diabetes insipidus for which she received desmopressin a few times before being discharged to rehabilitation at [facility].  Further history is limited at this time.    Hospital Course:  Ms. Morales was discharged from [facility]  to [facility] on 6/26/17 and presented to [facility] ED on 6/28 with altered mental status and worsening hypernatremia. According to the patient's sitter, the patient was awake, verbal, and interactive with therapy on 6/23 but had been progressively less interactive and responsive when she was at [facility]. Ms. Morales was admitted to Hospital Medicine but was found to be unresponsive with a sodium level of 168 on the morning of 6/29.      She has been followed by endocrinology and neurology and workup thus far significant for  unremarkable UA, neuro-imaging c/w recent left sphenoidal meningioma resection with resolution of blood products and trace intraventricular  "hemorrhage.  Previous EEG (6/16/17) demonstrated Moderate diffuse slowing of the overall background as described above, Superimposed left hemispheric slowing relative to the right, and NO epileptiform discharges or seizures were seen c/w ENCP and focal cortical dysfunction.  Pt's presentation is concerning for multi-factorial encephalopathy given persistent hypernatremia.  Per neurology, low suspicion for MNG currently, but patient is at high risk given recent intracranial procedure.     Critical Care consulted 7/17/17 for acute hypoxic respiratory failure. Pt had PEG tube placed earlier today and in PACU, oxygen saturations noted to be in the mid 80's. Pt was placed on a non-rebreather with minimal improvement and subsequently placed on BiPAP. ABG significant for elevated CO2 however pt was compensating. Repeat ABG showed pH 7.372, CO2 44, O2 62, HCO3 25.8. Pt transitioned to NC. Cxray concerning for aspiration pneumonia - new infiltrate in RLL so patient started on vancomycin and zosyn for HAP. She was then switched to Vanc/Cefepime/Acyclovir. Had significant elevation in WBC count 7/18 and Needed levophed to bring BP up for the night. Switched nasal DDAVP to IV DDAVP which has normalized her sodium levels. LP performed 7/20; consistent with a viral process. BP has been stable off pressors; leukocytosis resolved. Patient more alert.    Patient stepped down to Hospital Medicine team L on 7/22 pending viral etiology work up. She remained lethargic and minimally participatory despite correction of metabolic derangements. Desmopressin therapy was resumed as per Endocrinology service recommendations. Enteral feedings changed to bolus with free water in order to prevent patient pulling her PEG tube. Hypernatremia continued to improve slowly with free water replacement and optimization of desmopressin therapy.    Interval History: "Thirsty". No acute overnight events. Talkative today. Complained of thirst.    Review of " Systems   Unable to perform ROS: Acuity of condition     Objective:     Vital Signs (Most Recent):  Temp: (!) 95.9 °F (35.5 °C) (07/27/17 1526)  Pulse: 83 (07/27/17 1526)  Resp: 18 (07/27/17 1526)  BP: 130/76 (07/27/17 1526)  SpO2: 96 % (07/27/17 1526) Vital Signs (24h Range):  Temp:  [95.9 °F (35.5 °C)-98.3 °F (36.8 °C)] 95.9 °F (35.5 °C)  Pulse:  [78-99] 83  Resp:  [17-18] 18  SpO2:  [95 %-97 %] 96 %  BP: ()/(56-76) 130/76     Weight: 56.2 kg (123 lb 14.4 oz)  Body mass index is 22.66 kg/m².    Intake/Output Summary (Last 24 hours) at 07/27/17 1707  Last data filed at 07/27/17 0758   Gross per 24 hour   Intake             1320 ml   Output                0 ml   Net             1320 ml      Physical Exam   Constitutional: She appears well-developed and well-nourished. She appears lethargic. She is easily aroused. No distress.   HENT:   Mouth/Throat: Mucous membranes are dry.   Cardiovascular: Normal rate and regular rhythm.    No murmur heard.  Pulmonary/Chest: Effort normal and breath sounds normal.   Abdominal: Soft. Bowel sounds are normal. She exhibits no distension. There is no tenderness.   PEG in place   Musculoskeletal: Normal range of motion. She exhibits no edema or tenderness.   Neurological: She is easily aroused. She appears lethargic.   Oriented in person   Psychiatric: She is slowed. She is inattentive.       Significant Labs:   BMP:   Recent Labs  Lab 07/27/17  0411   GLU 98   *   K 4.5      CO2 32*   BUN 33*   CREATININE 0.8   CALCIUM 11.8*   MG 2.2     CBC:   Recent Labs  Lab 07/26/17  0556 07/27/17  0413   WBC 9.86 8.61   HGB 11.1* 9.9*   HCT 34.4* 30.7*   * 533*       Significant Imaging: I have reviewed all pertinent imaging results/findings within the past 24 hours.    Assessment/Plan:      Acute encephalopathy    Likely multifactorial in nature; ICU delirium, hypernatremia, and perhaps prior surgical procedures. No evidence of HSV encephalitis. More talkative  today.  1. Delirium precautions.  2. Mittens to prevent scratching of face.          Aspiration pneumonia    Patient completed 7 days of cefepime therapy.          Primary central diabetes insipidus    See above.          * Hypernatremia    Secondary to central DI. Started nasal desmopressin however today with hypernatremia. Appreciate Endocrinology assistance.  1. Desmopressin 400 mcg nightly.  2. Monitor I&O and sodium levels.   3. Free water bolus 4 times daily. Increased to 220 ml.          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    1. Keppra for seizure prophylaxis.          Malnutrition of moderate degree    S/P PEG placement on 7/17.  1. Continue enteral tube feedings.          Secondary hypothyroidism    Stable.  1. Continue levothyroxine. Dose optimized to 75 mcg on 7/27.        Hypercalcemia    Stable. Likely due to poor activity. .  1. PT/OT.  2. Pamidronate 60 mg once.        Discharge planning issues    Patient accepted to institution however son unreachable and friend with financial information out of town.  1. Transfer to facility once family able to arrange financial information.          Oral phase dysphagia    Failed multiple ST trials, needs PEG, MS changes interferring with eating.        Palliative care encounter    Non-hospice Palliative Care:   Does patient have Capacity? no  Goals-   7/15 - discussed LTC plan with her son. Need consent for PEG  7/13 - discussed care with  Reina Hernandez  -  [phone], pt's POA.    This is turning into a long post operative course, with inability to nourish patient.  Reina agreed to PEG tube.  Pt needs regular water intake, with mental status changes pulls out NG tube frequently..  NG could be contributing to delirium as well.   willl consult GI  Code Status- FULL  Pain management- stable, no pain  Prognosis-  Poor given numerous complications, but too soon to give up.  Would like another neurology eval, give effort another 1-2 months at least. Discussed prognosis toni Huang on 7/13.  Family discussions- 7/13 - Called Reina Hernandez  -  [phone]  Functional Status - limited to bed, presently    Post acute care plan:  Rehab if MS improves.              Tobacco abuse    Patient was counseled on smoking cessation and she will be provided a nicotine transdermal patch applied while inpatient.  Will provide additional smoking cessation counseling prior to discharge.        Adverse effect of glucocorticoid or synthetic analogue    Not on steroids presently          Agitation    7/13 - stable          Brain mass    S/p resection of meningiomas            VTE Risk Mitigation         Ordered     enoxaparin injection 40 mg  Daily     Route:  Subcutaneous        07/21/17 1223     High Risk of VTE  Once      06/30/17 0920     Place sequential compression device  Until discontinued      06/30/17 0920          Madalyn Montesinos MD  Department of Hospital Medicine   [facility]

## 2017-07-27 NOTE — ASSESSMENT & PLAN NOTE
post-operative diabetes insipidus  Since she is getting free water boluses in day, will change DDAVP regimen to 400mcg qhs per PEG  Agree with free water boluses 190ml 4x daily with TF boluses.   Na daily

## 2017-07-27 NOTE — PT/OT/SLP PROGRESS
Physical Therapy      Joanna Morales  MRN: [id number]    Patient not seen today secondary to pt refusal both attempts. Pt refused both attempts previous day as well. Pt educated on the importance/benefits of working with therapy. Will follow-up at next scheduled apt per PT POC.    Hoa Dowd, PTA

## 2017-07-27 NOTE — ASSESSMENT & PLAN NOTE
Secondary to central DI. Started nasal desmopressin however today with hypernatremia. Appreciate Endocrinology assistance.  1. Desmopressin 400 mcg nightly.  2. Monitor I&O and sodium levels.   3. Free water bolus 4 times daily. Increased to 220 ml.

## 2017-07-27 NOTE — ASSESSMENT & PLAN NOTE
Likely multifactorial in nature; ICU delirium, hypernatremia, and perhaps prior surgical procedures. No evidence of HSV encephalitis. More talkative today.  1. Delirium precautions.  2. Mittens to prevent scratching of face.

## 2017-07-27 NOTE — SUBJECTIVE & OBJECTIVE
"Interval HPI:   Overnight events:  Na 148 this am, received 200mcg x2 yesterday evening.   FT4 down 0.9-->0.81    /71 (BP Location: Left arm, Patient Position: Lying, BP Method: Automatic)   Pulse 85   Temp 97.2 °F (36.2 °C) (Axillary)   Resp 18   Ht 5' 2" (1.575 m)   Wt 56.2 kg (123 lb 14.4 oz)   SpO2 96%   Breastfeeding? No   BMI 22.66 kg/m²     Labs Reviewed and Include      Recent Labs  Lab 07/27/17  0411   GLU 98   CALCIUM 11.8*   *   K 4.5   CO2 32*      BUN 33*   CREATININE 0.8     Lab Results   Component Value Date    WBC 8.61 07/27/2017    HGB 9.9 (L) 07/27/2017    HCT 30.7 (L) 07/27/2017     (H) 07/27/2017     (H) 07/27/2017       Recent Labs  Lab 07/27/17  0411   FREET4 0.81     No results found for: HGBA1C    Nutritional status:   Body mass index is 22.66 kg/m².  Lab Results   Component Value Date    ALBUMIN 2.3 (L) 07/18/2017    ALBUMIN 3.0 (L) 07/17/2017    ALBUMIN 3.0 (L) 07/16/2017     Lab Results   Component Value Date    PREALBUMIN 26 06/27/2017       Estimated Creatinine Clearance: 65.8 mL/min (based on Cr of 0.8).    Accu-Checks  Recent Labs      07/24/17   1437  07/24/17   1739  07/25/17   0013  07/25/17   0758  07/25/17   1733  07/25/17   2118  07/26/17   0740  07/26/17   1704  07/26/17   2349  07/27/17   0603   POCTGLUCOSE  115*  140*  100  126*  120*  115*  135*  121*  91  86       Current Medications and/or Treatments Impacting Glycemic Control  Immunotherapy:  Immunosuppressants     None        Steroids:   Hormones     Start     Stop Route Frequency Ordered    07/27/17 2100  desmopressin tablet 400 mcg      -- PER G TUBE Nightly 07/27/17 0849        Pressors:    Autonomic Drugs     None        Hyperglycemia/Diabetes Medications: Antihyperglycemics     Start     Stop Route Frequency Ordered    06/29/17 1922  insulin aspart pen 0-5 Units      -- SubQ Every 6 hours PRN 06/29/17 1822        "

## 2017-07-27 NOTE — SUBJECTIVE & OBJECTIVE
"Interval History: "Thirsty". No acute overnight events. Talkative today. Complained of thirst.    Review of Systems   Unable to perform ROS: Acuity of condition     Objective:     Vital Signs (Most Recent):  Temp: (!) 95.9 °F (35.5 °C) (07/27/17 1526)  Pulse: 83 (07/27/17 1526)  Resp: 18 (07/27/17 1526)  BP: 130/76 (07/27/17 1526)  SpO2: 96 % (07/27/17 1526) Vital Signs (24h Range):  Temp:  [95.9 °F (35.5 °C)-98.3 °F (36.8 °C)] 95.9 °F (35.5 °C)  Pulse:  [78-99] 83  Resp:  [17-18] 18  SpO2:  [95 %-97 %] 96 %  BP: ()/(56-76) 130/76     Weight: 56.2 kg (123 lb 14.4 oz)  Body mass index is 22.66 kg/m².    Intake/Output Summary (Last 24 hours) at 07/27/17 1707  Last data filed at 07/27/17 0758   Gross per 24 hour   Intake             1320 ml   Output                0 ml   Net             1320 ml      Physical Exam   Constitutional: She appears well-developed and well-nourished. She appears lethargic. She is easily aroused. No distress.   HENT:   Mouth/Throat: Mucous membranes are dry.   Cardiovascular: Normal rate and regular rhythm.    No murmur heard.  Pulmonary/Chest: Effort normal and breath sounds normal.   Abdominal: Soft. Bowel sounds are normal. She exhibits no distension. There is no tenderness.   PEG in place   Musculoskeletal: Normal range of motion. She exhibits no edema or tenderness.   Neurological: She is easily aroused. She appears lethargic.   Oriented in person   Psychiatric: She is slowed. She is inattentive.       Significant Labs:   BMP:   Recent Labs  Lab 07/27/17 0411   GLU 98   *   K 4.5      CO2 32*   BUN 33*   CREATININE 0.8   CALCIUM 11.8*   MG 2.2     CBC:   Recent Labs  Lab 07/26/17  0556 07/27/17  0413   WBC 9.86 8.61   HGB 11.1* 9.9*   HCT 34.4* 30.7*   * 533*       Significant Imaging: I have reviewed all pertinent imaging results/findings within the past 24 hours.  "

## 2017-07-27 NOTE — ASSESSMENT & PLAN NOTE
High Ca with low normal PTH, this is most likely 2/2 immobilization and may benefit from anti-resorptive therapy, but before doing so, would r/o non-pth mediated hypercalcemia (PTHrp, 1,25-vit D, SPEP pending).   rec IV pamidronate 60mg x1, onset of action 2-3 days, duration of action ~4 weeks.  IVF, avoid dehydration.   Cont physical therapy.

## 2017-07-27 NOTE — PLAN OF CARE
Problem: Fall Risk (Adult)  Goal: Absence of Falls  Patient will demonstrate the desired outcomes by discharge/transition of care.   Outcome: Ongoing (interventions implemented as appropriate)  Patient remained free of falls during shift.  Avasys in use and bedrails where kept up.

## 2017-07-27 NOTE — ASSESSMENT & PLAN NOTE
Goal FT4 mid-upper limit of normal, increase levothyroxine 75mcg daily, needs to be given at least 1 hr apart from TF to ensure absorption   Repeat FT4 in 6 weeks (~9/1)

## 2017-07-27 NOTE — PROGRESS NOTES
"[facility]  Endocrinology  Progress Note    Admit Date: 6/28/2017     Reason for Consult: Management of hypernatremia    Admit Date: 6/28/2017      Reason for Consult: Diabetes insipidus      Surgical Procedure and Date:   CRANIOTOMY 6/8/2017           HPI:   Patient is a 51 y.o. female with a diagnosis of  meningioma s/p resection (6/7). 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  She underwent partial resection of the tumor in January 2015 at [facility]. Followup scan done in 2016 showing a continued large meningioma. Endocrine had been previously involved in her care during earlier June hospitalization for post-operative diabetes insipidus and additional concern for secondary adrenal insufficiency and secondary hypothyroidism. Had been discharged to [facility] on 6/26/17. Re-admitted to [facility] on 6/28/17 for worsening hypernatremia and mental status. Endocrine had been consulted this admission for severe hypernatremia with concerns for diabetes insipidus.             Interval HPI:   Overnight events:  Na 148 this am, received 200mcg x2 yesterday evening.   FT4 down 0.9-->0.81    /71 (BP Location: Left arm, Patient Position: Lying, BP Method: Automatic)   Pulse 85   Temp 97.2 °F (36.2 °C) (Axillary)   Resp 18   Ht 5' 2" (1.575 m)   Wt 56.2 kg (123 lb 14.4 oz)   SpO2 96%   Breastfeeding? No   BMI 22.66 kg/m²       Labs Reviewed and Include      Recent Labs  Lab 07/27/17 0411   GLU 98   CALCIUM 11.8*   *   K 4.5   CO2 32*      BUN 33*   CREATININE 0.8     Lab Results   Component Value Date    WBC 8.61 07/27/2017    HGB 9.9 (L) 07/27/2017    HCT 30.7 (L) 07/27/2017     (H) 07/27/2017     (H) 07/27/2017       Recent Labs  Lab 07/27/17 0411   FREET4 0.81     No results found for: HGBA1C    Nutritional status:   Body mass index is 22.66 " kg/m².  Lab Results   Component Value Date    ALBUMIN 2.3 (L) 07/18/2017    ALBUMIN 3.0 (L) 07/17/2017    ALBUMIN 3.0 (L) 07/16/2017     Lab Results   Component Value Date    PREALBUMIN 26 06/27/2017       Estimated Creatinine Clearance: 65.8 mL/min (based on Cr of 0.8).    Accu-Checks  Recent Labs      07/24/17   1437  07/24/17   1739  07/25/17   0013  07/25/17   0758  07/25/17   1733  07/25/17   2118  07/26/17   0740  07/26/17   1704  07/26/17   2349  07/27/17   0603   POCTGLUCOSE  115*  140*  100  126*  120*  115*  135*  121*  91  86       Current Medications and/or Treatments Impacting Glycemic Control  Immunotherapy:  Immunosuppressants     None        Steroids:   Hormones     Start     Stop Route Frequency Ordered    07/27/17 2100  desmopressin tablet 400 mcg      -- PER G TUBE Nightly 07/27/17 0849        Pressors:    Autonomic Drugs     None        Hyperglycemia/Diabetes Medications: Antihyperglycemics     Start     Stop Route Frequency Ordered    06/29/17 1922  insulin aspart pen 0-5 Units      -- SubQ Every 6 hours PRN 06/29/17 1822          ASSESSMENT and PLAN    Primary central diabetes insipidus    post-operative diabetes insipidus  Since she is getting free water boluses in day, will change DDAVP regimen to 400mcg qhs per PEG  Agree with free water boluses 190ml 4x daily with TF boluses.   Na daily            Hypercalcemia    High Ca with low normal PTH, this is most likely 2/2 immobilization and may benefit from anti-resorptive therapy, but before doing so, would r/o non-pth mediated hypercalcemia (PTHrp, 1,25-vit D, SPEP pending).   rec IV pamidronate 60mg x1, onset of action 2-3 days, duration of action ~4 weeks.  IVF, avoid dehydration.   Cont physical therapy.         Secondary hypothyroidism    Goal FT4 mid-upper limit of normal, increase levothyroxine 75mcg daily, needs to be given at least 1 hr apart from TF to ensure absorption   Repeat FT4 in 6 weeks (~9/1)        Meningioma, recurrent of  brain    S/p craniotomy 6/8              Jennifer Hsu MD  Endocrinology  [facility]

## 2017-07-27 NOTE — ASSESSMENT & PLAN NOTE
Patient accepted to institution however son unreachable and friend with financial information out of town.  1. Transfer to facility once family able to arrange financial information.

## 2017-07-28 PROBLEM — R13.12 OROPHARYNGEAL DYSPHAGIA: Status: ACTIVE | Noted: 2017-01-01

## 2017-07-28 NOTE — NURSING
Nurse unable to assess temp orally or axillary. Rectal temp 93.9. Notified Dr. Jaguar Montesinos of temp;instructed to put blankets on pt and reassess. Temperature in pt's room adjusted as well. Will continue to monitor.

## 2017-07-28 NOTE — PLAN OF CARE
Problem: SLP Goal  Goal: SLP Goal  Updated SLP goals expected to be met by 8/4:  1. Pt will participate in ongoing assessment of swallow in order to determine safest, least restrictive diet.     SLP goals expected to be met by 7/18:  1. Pt will participate in going assessment of swallow in order to determine safest, least restrictive diet.    Outcome: Ongoing (interventions implemented as appropriate)  Recommend continue NPO at this time with continued alternative means nutrition/ hydration/ medication.     HILDA Rascon, CCC-SLP  [phone]  7/28/2017

## 2017-07-28 NOTE — PLAN OF CARE
Problem: Fall Risk (Adult)  Goal: Absence of Falls  Patient will demonstrate the desired outcomes by discharge/transition of care.   Outcome: Ongoing (interventions implemented as appropriate)  Patient remained free of falls during shift.  Side rails on bed where kept up and bed was kept in lowest position. Avasys monitoring in use.

## 2017-07-28 NOTE — PLAN OF CARE
Problem: Patient Care Overview  Goal: Plan of Care Review  Outcome: Ongoing (interventions implemented as appropriate)  Pt free of falls/trauma/injuries; Bed in low position, wheels locked, call light within reach, Avasys in place. Skin integrity remains unchanged; incontinence care provided throughout shift.  CBG monitiored per orders. No distress noted/reported at this time. Will continue to monitor.

## 2017-07-28 NOTE — ASSESSMENT & PLAN NOTE
Likely multifactorial in nature; ICU delirium, hypernatremia, and perhaps prior surgical procedures. No evidence of HSV encephalitis. Improving.  1. Delirium precautions.  2. Mittens to prevent scratching of face.

## 2017-07-28 NOTE — SUBJECTIVE & OBJECTIVE
"Interval History: "OK". Patient continues to be talkative however not long lasting.     Review of Systems   Constitutional: Negative for fatigue and fever.   Respiratory: Negative for cough and shortness of breath.    Cardiovascular: Negative for chest pain and palpitations.   Gastrointestinal: Negative for abdominal pain, constipation, diarrhea and nausea.     Objective:     Vital Signs (Most Recent):  Temp: (!) 93.9 °F (34.4 °C) (07/28/17 1334)  Pulse: 72 (07/28/17 1250)  Resp: 18 (07/28/17 1250)  BP: 108/67 (07/28/17 1250)  SpO2: 97 % (07/28/17 1250) Vital Signs (24h Range):  Temp:  [93.9 °F (34.4 °C)-98 °F (36.7 °C)] 93.9 °F (34.4 °C)  Pulse:  [63-90] 72  Resp:  [17-18] 18  SpO2:  [95 %-99 %] 97 %  BP: ()/(55-76) 108/67     Weight: 56.2 kg (123 lb 14.4 oz)  Body mass index is 22.66 kg/m².    Intake/Output Summary (Last 24 hours) at 07/28/17 1433  Last data filed at 07/28/17 1300   Gross per 24 hour   Intake             1410 ml   Output                0 ml   Net             1410 ml      Physical Exam   Constitutional: She appears well-developed and well-nourished. She appears lethargic. She is easily aroused. No distress.   HENT:   Mouth/Throat: Mucous membranes are dry.   Cardiovascular: Normal rate and regular rhythm.    No murmur heard.  Pulmonary/Chest: Effort normal and breath sounds normal.   Abdominal: Soft. Bowel sounds are normal. She exhibits no distension. There is no tenderness.   PEG in place   Musculoskeletal: Normal range of motion. She exhibits no edema or tenderness.   Neurological: She is easily aroused. She appears lethargic.   Oriented in person   Psychiatric: She is slowed. She is inattentive.       Significant Labs:   BMP:   Recent Labs  Lab 07/28/17  0517   GLU 74   *   K 4.1      CO2 31*   BUN 32*   CREATININE 0.7   CALCIUM 11.0*   MG 2.0     CBC:   Recent Labs  Lab 07/27/17  0413   WBC 8.61   HGB 9.9*   HCT 30.7*   *       Significant Imaging: I have reviewed all " pertinent imaging results/findings within the past 24 hours.

## 2017-07-28 NOTE — ASSESSMENT & PLAN NOTE
Secondary to central DI. Started nasal desmopressin however today with hypernatremia. Appreciate Endocrinology assistance.  1. Desmopressin 400 mcg nightly.  2. Monitor I&O and sodium levels.   3. Free water bolus 4 times daily: 220 ml.

## 2017-07-28 NOTE — ASSESSMENT & PLAN NOTE
Stable. Likely due to poor activity.  1. PT/OT.  2. Pamidronate 60 mg on 7/27/17.  3. Monitor calcium level.

## 2017-07-28 NOTE — PROGRESS NOTES
[facility] Hospital Medicine  Progress Note    Patient Name: Joanna Morales  MRN: [id number]  Patient Class: IP- Inpatient   Admission Date: 6/28/2017  Length of Stay: 30 days  Attending Physician: Madalyn Montesinos MD  Primary Care Provider: Claudy Tse MD    [place] [facility] Medicine Team: [facility] HOSP MED L Madalyn Montesinos MD    Subjective:     Principal Problem:Hypernatremia    HPI:  Ms. Joanna Morales is a 51 y.o. female with tobacco abuse, secondary hypothyroidism (TSH 0.241 Jun 2017), and history of meningioma s/p resection complicated by diabetes insipidus who presents to [facility] ED from [facility] after being found with abnormal labwork.  She had been more altered in the last day or so and upon checking labwork, she was noted to be with hypernatremia.  She contributed very minimally to her history but does report some abdominal pain.  Of note, she was recently admitted to this facility under the Neurosurgery service for resection of a meningioma that was complicated by diabetes insipidus for which she received desmopressin a few times before being discharged to rehabilitation at [facility].  Further history is limited at this time.    Hospital Course:  Ms. Morales was discharged from [facility]  to [facility] on 6/26/17 and presented to [facility] ED on 6/28 with altered mental status and worsening hypernatremia. According to the patient's sitter, the patient was awake, verbal, and interactive with therapy on 6/23 but had been progressively less interactive and responsive when she was at [facility]. Ms. Morales was admitted to Hospital Medicine but was found to be unresponsive with a sodium level of 168 on the morning of 6/29.      She has been followed by endocrinology and neurology and workup thus far significant for  unremarkable UA, neuro-imaging c/w recent left sphenoidal meningioma resection with resolution of blood products and trace intraventricular  "hemorrhage.  Previous EEG (6/16/17) demonstrated Moderate diffuse slowing of the overall background as described above, Superimposed left hemispheric slowing relative to the right, and NO epileptiform discharges or seizures were seen c/w ENCP and focal cortical dysfunction.  Pt's presentation is concerning for multi-factorial encephalopathy given persistent hypernatremia.  Per neurology, low suspicion for MNG currently, but patient is at high risk given recent intracranial procedure.     Critical Care consulted 7/17/17 for acute hypoxic respiratory failure. Pt had PEG tube placed earlier today and in PACU, oxygen saturations noted to be in the mid 80's. Pt was placed on a non-rebreather with minimal improvement and subsequently placed on BiPAP. ABG significant for elevated CO2 however pt was compensating. Repeat ABG showed pH 7.372, CO2 44, O2 62, HCO3 25.8. Pt transitioned to NC. Cxray concerning for aspiration pneumonia - new infiltrate in RLL so patient started on vancomycin and zosyn for HAP. She was then switched to Vanc/Cefepime/Acyclovir. Had significant elevation in WBC count 7/18 and Needed levophed to bring BP up for the night. Switched nasal DDAVP to IV DDAVP which has normalized her sodium levels. LP performed 7/20; consistent with a viral process. BP has been stable off pressors; leukocytosis resolved. Patient more alert.    Patient stepped down to Hospital Medicine team L on 7/22 pending viral etiology work up. She remained lethargic and minimally participatory despite correction of metabolic derangements. Desmopressin therapy was resumed as per Endocrinology service recommendations. Enteral feedings changed to bolus with free water in order to prevent patient pulling her PEG tube. Hypernatremia continued to improve slowly with free water replacement and optimization of desmopressin therapy.    Interval History: "OK". Patient continues to be talkative however not long lasting.     Review of Systems "   Constitutional: Negative for fatigue and fever.   Respiratory: Negative for cough and shortness of breath.    Cardiovascular: Negative for chest pain and palpitations.   Gastrointestinal: Negative for abdominal pain, constipation, diarrhea and nausea.     Objective:     Vital Signs (Most Recent):  Temp: (!) 93.9 °F (34.4 °C) (07/28/17 1334)  Pulse: 72 (07/28/17 1250)  Resp: 18 (07/28/17 1250)  BP: 108/67 (07/28/17 1250)  SpO2: 97 % (07/28/17 1250) Vital Signs (24h Range):  Temp:  [93.9 °F (34.4 °C)-98 °F (36.7 °C)] 93.9 °F (34.4 °C)  Pulse:  [63-90] 72  Resp:  [17-18] 18  SpO2:  [95 %-99 %] 97 %  BP: ()/(55-76) 108/67     Weight: 56.2 kg (123 lb 14.4 oz)  Body mass index is 22.66 kg/m².    Intake/Output Summary (Last 24 hours) at 07/28/17 1433  Last data filed at 07/28/17 1300   Gross per 24 hour   Intake             1410 ml   Output                0 ml   Net             1410 ml      Physical Exam   Constitutional: She appears well-developed and well-nourished. She appears lethargic. She is easily aroused. No distress.   HENT:   Mouth/Throat: Mucous membranes are dry.   Cardiovascular: Normal rate and regular rhythm.    No murmur heard.  Pulmonary/Chest: Effort normal and breath sounds normal.   Abdominal: Soft. Bowel sounds are normal. She exhibits no distension. There is no tenderness.   PEG in place   Musculoskeletal: Normal range of motion. She exhibits no edema or tenderness.   Neurological: She is easily aroused. She appears lethargic.   Oriented in person   Psychiatric: She is slowed. She is inattentive.       Significant Labs:   BMP:   Recent Labs  Lab 07/28/17  0517   GLU 74   *   K 4.1      CO2 31*   BUN 32*   CREATININE 0.7   CALCIUM 11.0*   MG 2.0     CBC:   Recent Labs  Lab 07/27/17  0413   WBC 8.61   HGB 9.9*   HCT 30.7*   *       Significant Imaging: I have reviewed all pertinent imaging results/findings within the past 24 hours.    Assessment/Plan:      Acute encephalopathy     Likely multifactorial in nature; ICU delirium, hypernatremia, and perhaps prior surgical procedures. No evidence of HSV encephalitis. Improving.  1. Delirium precautions.  2. Mittens to prevent scratching of face.          Aspiration pneumonia    Patient completed 7 days of cefepime therapy.          Primary central diabetes insipidus    See above.          * Hypernatremia    Secondary to central DI. Started nasal desmopressin however today with hypernatremia. Appreciate Endocrinology assistance.  1. Desmopressin 400 mcg nightly.  2. Monitor I&O and sodium levels.   3. Free water bolus 4 times daily: 220 ml.          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    1. Keppra for seizure prophylaxis.          Malnutrition of moderate degree    S/P PEG placement on 7/17.  1. Continue enteral tube feedings.          Secondary hypothyroidism    Stable.  1. Continue levothyroxine. Dose optimized to 75 mcg on 7/27.        Hypercalcemia    Stable. Likely due to poor activity.  1. PT/OT.  2. Pamidronate 60 mg on 7/27/17.  3. Monitor calcium level.        Oropharyngeal dysphagia    Evaluated by SLP.  1. Continue NPO.  2. SLP to continue working with patient.          Discharge planning issues    Patient accepted to institution however son unreachable and friend with financial information out of town.  1. Transfer to facility once family able to arrange financial information.          Oral phase dysphagia    Failed multiple ST trials, needs PEG, MS changes interferring with eating.        Palliative care encounter    Non-hospice Palliative Care:   Does patient have Capacity? no  Goals-   7/15 - discussed LTC plan with her son. Need consent for PEG  7/13 - discussed care with  Reina Hernandez  -   [phone], pt's POA.    This is turning into a long post operative course, with inability to nourish patient.  Reina agreed to PEG tube.  Pt needs regular water intake, with mental status changes pulls out NG tube frequently..  NG could be contributing to delirium as well.  willl consult GI  Code Status- FULL  Pain management- stable, no pain  Prognosis-  Poor given numerous complications, but too soon to give up.  Would like another neurology eval, give effort another 1-2 months at least. Discussed prognosis toni Huang on 7/13.  Family discussions- 7/13 - Called Reina Hernandez  -  [phone]  Functional Status - limited to bed, presently    Post acute care plan:  Rehab if MS improves.              Tobacco abuse    Patient was counseled on smoking cessation and she will be provided a nicotine transdermal patch applied while inpatient.  Will provide additional smoking cessation counseling prior to discharge.        Adverse effect of glucocorticoid or synthetic analogue    Not on steroids presently          Agitation    7/13 - stable          Brain mass    S/p resection of meningiomas            VTE Risk Mitigation         Ordered     enoxaparin injection 40 mg  Daily     Route:  Subcutaneous        07/21/17 1223     High Risk of VTE  Once      06/30/17 0920     Place sequential compression device  Until discontinued      06/30/17 0920          Madalyn Montesinos MD  Department of Hospital Medicine   [facility]

## 2017-07-28 NOTE — PT/OT/SLP EVAL
"Speech Language Pathology  Clinical Evaluation of Swallow    Joanna Morales   MRN: [id number]   1150/1150 A    Admitting Diagnosis: Hypernatremia    Diet recommendations: Solid Diet Level: NPO  Liquid Diet Level: NPO     SLP Treatment Date: 07/28/17  Speech Start Time: 1124     Speech Stop Time: 1152     Speech Total (min): 28 min       TREATMENT BILLABLE MINUTES:  Eval Swallow and Oral Function 28    Diagnosis: Hypernatremia      Past Medical History:   Diagnosis Date    Allergy     Basal cell carcinoma     Depression 11/1/2016    Essential hypertension 6/9/2017    Eye disorder     Fever blister     Normocytic anemia 6/9/2017    Secondary hypothyroidism 6/26/2017     Past Surgical History:   Procedure Laterality Date    BASAL CELL CARCINOMA EXCISION      forehead    BRAIN SURGERY      SKIN BIOPSY         Has the patient been evaluated by SLP for swallowing? : Yes  Keep patient NPO?: Yes   General Precautions: Standard, aspiration, NPO, blind    Current Respiratory Status:  (room air)     Prior diet: Pt has been NPO since this hospital admittance on 06/28/17.     Subjective:  "I can see and scope ya out better on my right side."    Pain/Comfort  Pain Rating 1: 0/10  Pain Rating Post-Intervention 1: 0/10    Objective:        Oral Musculature Evaluation  Oral Musculature: general weakness  Dentition: present and adequate  Mucosal Quality: good  Mandibular Strength and Mobility:  (General weakness)  Oral Labial Strength and Mobility: impaired retraction, impaired pursing (dec'd left side retraction, overall dec'd ROM for retraction and pursing)  Lingual Strength and Mobility: other (see comments) (Unable to assess 2/2 dec'd understanding)  Volitional Cough: Pt did not elicit  Volitional Swallow: Pt did not elicit  Voice Prior to PO Intake: clear, dry, adequate intensity      Bedside Swallow Eval:  Consistencies Assessed: tsp thin water x2, cup sips thin water x4, nectar thick cup sips water x4, 1/2 tsp " pureed apple sauce x2  Oral Phase: With nectar thick cup sips water, observed to hold bolus on 3/4 trials, requiring 3-4 prompts to swallow. Pt with inappropriate acceptance of pureed bolus, as she slurped material from spoon, followed by inappropriate chewing cx by munching pattern.   Pharyngeal Phase: With cup sips thin water, spontaneous double swallow observed on 4/4 trials, pt began to speak prior to completion of swallow on 1/4 trials, and significant coughing elicited on 1/4 trials. Thin trials terminated upon significant coughing. No overt s/s aspiration observed on 2/2 pureed trials.     Additional Treatment:    Pt awake upon entry. Pt very familiar to SLP services. Compared to prior to attempts to provide pt with SLP services, pt with greatly increased participation and significantly dec'd confusion, warranting much improved prognosis for improvement provided SLP services. Pt educated re: role of SLP and SLP POC. White board updated. No further questions.     Assessment:  Joanna Morales is a 51 y.o. female with a medical diagnosis of Hypernatremia and presents with oropharyngeal dysphagia.           Discharge recommendations: Discharge Facility/Level Of Care Needs: nursing facility, skilled     Goals:    SLP Goals        Problem: SLP Goal    Goal Priority Disciplines Outcome   SLP Goal     SLP Unable to achieve outcome(s) by discharge   Description:  Speech Language Pathology Goals  Goals expected to be met by 7/7  1. Patient will successfully participate in ongoing clinical swallow evaluation and tolerate po trials with no overt s/s of aspiration.                  Problem: SLP Goal    Goal Priority Disciplines Outcome   SLP Goal     SLP Ongoing (interventions implemented as appropriate)   Description:  Updated SLP goals expected to be met by 8/4:  1. Pt will participate in ongoing assessment of swallow in order to determine safest, least restrictive diet.     SLP goals expected to be met by  7/18:  1. Pt will participate in going assessment of swallow in order to determine safest, least restrictive diet.                       Plan:   Patient to be seen Therapy Frequency: 5 x/week   Plan of Care expires: 08/26/17  Plan of Care reviewed with: patient  SLP Follow-up?: Yes              HILDA Rascon, CCC-SLP  [phone]  7/28/2017

## 2017-07-29 NOTE — NURSING
Pt tachypneac at midnight vitals and tachycardic. Pt temp 99.1 Axillary. Spoke with Dr. Hitchcock about vitals. Orders for lactic and chest xray. Monitoring.

## 2017-07-29 NOTE — PLAN OF CARE
Problem: Patient Care Overview  Goal: Plan of Care Review  Outcome: Ongoing (interventions implemented as appropriate)  Pt in room with [employer] monitor. Pt overnight had low grade temp 99.1. Pt tachypneic and tachycardic. Lactate drawn and chest xray. Pt received bolus feeding this evening with free water flushes. Monitoring.

## 2017-07-29 NOTE — ASSESSMENT & PLAN NOTE
Secondary to central DI. Appears to have resolved.  1. Desmopressin 400 mcg nightly.  2. Monitor I&O and sodium levels.   3. Free water bolus 4 times daily: 220 ml.

## 2017-07-29 NOTE — ASSESSMENT & PLAN NOTE
Stable. Likely due to poor activity. Improved with administration of pamidronate on 7/27.  1. PT/OT.  2. Monitor calcium level.

## 2017-07-29 NOTE — PLAN OF CARE
Problem: Physical Therapy Goal  Goal: Physical Therapy Goal  Goals to be met by: 17     Patient will increase functional independence with mobility by performin. Supine to sit with Moderate Assistance  2. Sit to supine with Moderate Assistance  3. Sit to stand transfer with Moderate Assistance  4. Gait  x 5 feet with Maximum Assistance using appropriate AD while maintaining midline orientation and no posterior lean.   5. Sitting at edge of bed x10 minutes with Supervision while maintaining midline orientation and performing 3/5 reps of reaching activity outside base of support.   6. Stand for 2 minutes with Moderate Assistance using appropriate UE support while demonstrating midline orientation with no posterior lean & equal weight-bearing through bilateral LE's.   7. Lower extremity exercise program x10 reps with assistance as needed for strengthening and endurance with functional mobility.      Outcome: Ongoing (interventions implemented as appropriate)  Functional mobility continues to require substantial assistance due to balance, cognitive and motor planning deficits.

## 2017-07-29 NOTE — PROGRESS NOTES
[facility] Hospital Medicine  Progress Note    Patient Name: Joanna Morales  MRN: [id number]  Patient Class: IP- Inpatient   Admission Date: 6/28/2017  Length of Stay: 31 days  Attending Physician: Madalyn Montesinos MD  Primary Care Provider: Claudy Tse MD    [facility] Medicine Team: [facility] HOSP MED L Madalyn Montesinos MD    Subjective:     Principal Problem:Hypernatremia    HPI:  Ms. Joanna Morales is a 51 y.o. female with tobacco abuse, secondary hypothyroidism (TSH 0.241 Jun 2017), and history of meningioma s/p resection complicated by diabetes insipidus who presents to [facility] ED from [facility] after being found with abnormal labwork.  She had been more altered in the last day or so and upon checking labwork, she was noted to be with hypernatremia.  She contributed very minimally to her history but does report some abdominal pain.  Of note, she was recently admitted to this facility under the Neurosurgery service for resection of a meningioma that was complicated by diabetes insipidus for which she received desmopressin a few times before being discharged to rehabilitation at [facility].  Further history is limited at this time.    Hospital Course:  Ms. Morales was discharged from [facility]  to [facility] on 6/26/17 and presented to [facility] ED on 6/28 with altered mental status and worsening hypernatremia. According to the patient's sitter, the patient was awake, verbal, and interactive with therapy on 6/23 but had been progressively less interactive and responsive when she was at [place]. Ms. Morales was admitted to Hospital Medicine but was found to be unresponsive with a sodium level of 168 on the morning of 6/29.      She has been followed by endocrinology and neurology and workup thus far significant for  unremarkable UA, neuro-imaging c/w recent left sphenoidal meningioma resection with resolution of blood products and trace intraventricular  hemorrhage.  Previous EEG (6/16/17) demonstrated Moderate diffuse slowing of the overall background as described above, Superimposed left hemispheric slowing relative to the right, and NO epileptiform discharges or seizures were seen c/w ENCP and focal cortical dysfunction.  Pt's presentation is concerning for multi-factorial encephalopathy given persistent hypernatremia.  Per neurology, low suspicion for MNG currently, but patient is at high risk given recent intracranial procedure.     Critical Care consulted 7/17/17 for acute hypoxic respiratory failure. Pt had PEG tube placed earlier today and in PACU, oxygen saturations noted to be in the mid 80's. Pt was placed on a non-rebreather with minimal improvement and subsequently placed on BiPAP. ABG significant for elevated CO2 however pt was compensating. Repeat ABG showed pH 7.372, CO2 44, O2 62, HCO3 25.8. Pt transitioned to NC. Cxray concerning for aspiration pneumonia - new infiltrate in RLL so patient started on vancomycin and zosyn for HAP. She was then switched to Vanc/Cefepime/Acyclovir. Had significant elevation in WBC count 7/18 and Needed levophed to bring BP up for the night. Switched nasal DDAVP to IV DDAVP which has normalized her sodium levels. LP performed 7/20; consistent with a viral process. BP has been stable off pressors; leukocytosis resolved. Patient more alert.    Patient stepped down to Hospital Medicine team L on 7/22 pending viral etiology work up. She remained lethargic and minimally participatory despite correction of metabolic derangements. Desmopressin therapy was resumed as per Endocrinology service recommendations. Enteral feedings changed to bolus with free water in order to prevent patient pulling her PEG tube. Hypernatremia continued to improve slowly with free water replacement and optimization of desmopressin therapy.    Hyponatremia resolved and patient medically stable for discharge pending securement of placement.    Interval  "History: "Where's my lollipop". No acute overnight events. Oriented to person.    Review of Systems   Unable to perform ROS: Acuity of condition     Objective:     Vital Signs (Most Recent):  Temp: 98.4 °F (36.9 °C) (07/29/17 1153)  Pulse: 92 (07/29/17 1153)  Resp: 20 (07/29/17 1153)  BP: (!) 98/57 (07/29/17 1153)  SpO2: 97 % (07/29/17 1154) Vital Signs (24h Range):  Temp:  [97.3 °F (36.3 °C)-99.3 °F (37.4 °C)] 98.4 °F (36.9 °C)  Pulse:  [] 92  Resp:  [18-28] 20  SpO2:  [93 %-97 %] 97 %  BP: ()/(57-76) 98/57     Weight: 56.2 kg (123 lb 14.4 oz)  Body mass index is 22.66 kg/m².    Intake/Output Summary (Last 24 hours) at 07/29/17 1446  Last data filed at 07/28/17 1800   Gross per 24 hour   Intake              490 ml   Output                0 ml   Net              490 ml      Physical Exam   Constitutional: She appears well-developed and well-nourished. She appears lethargic. She is easily aroused. No distress.   HENT:   Mouth/Throat: Mucous membranes are dry.   Cardiovascular: Normal rate and regular rhythm.    No murmur heard.  Pulmonary/Chest: Effort normal and breath sounds normal.   Abdominal: Soft. Bowel sounds are normal. She exhibits no distension. There is no tenderness.   PEG in place   Musculoskeletal: Normal range of motion. She exhibits no edema or tenderness.   Neurological: She is easily aroused. She appears lethargic.   Oriented in person   Psychiatric: She is slowed. She is inattentive.       Significant Labs:   BMP:   Recent Labs  Lab 07/29/17  0554   GLU 76      K 4.4      CO2 25   BUN 26*   CREATININE 0.7   CALCIUM 10.1   MG 1.9     CBC:   Recent Labs  Lab 07/29/17  0554   WBC 5.55   HGB 9.3*   HCT 28.9*   *       Significant Imaging: I have reviewed all pertinent imaging results/findings within the past 24 hours.    Assessment/Plan:      Acute encephalopathy    Likely multifactorial in nature; ICU delirium, hypernatremia, and perhaps prior surgical procedures. No " evidence of HSV encephalitis. Improving.  1. Delirium precautions.  2. Mittens to prevent scratching of face.          Aspiration pneumonia    Patient completed 7 days of cefepime therapy.          Primary central diabetes insipidus    See above.          * Hypernatremia    Secondary to central DI. Appears to have resolved.  1. Desmopressin 400 mcg nightly.  2. Monitor I&O and sodium levels.   3. Free water bolus 4 times daily: 220 ml.          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    1. Keppra for seizure prophylaxis.          Malnutrition of moderate degree    S/P PEG placement on 7/17.  1. Continue enteral tube feedings.          Secondary hypothyroidism    Stable.  1. Continue levothyroxine. Dose optimized to 75 mcg on 7/27.        Hypercalcemia    Stable. Likely due to poor activity. Improved with administration of pamidronate on 7/27.  1. PT/OT.  2. Monitor calcium level.        Oropharyngeal dysphagia    Evaluated by SLP.  1. Continue NPO.  2. SLP to continue working with patient.          Discharge planning issues    Patient accepted to institution however son unreachable and friend with financial information out of town.  1. Transfer to facility once family able to arrange financial information.          Oral phase dysphagia    Failed multiple ST trials, needs PEG, MS changes interferring with eating.        Palliative care encounter    Non-hospice Palliative Care:   Does patient have Capacity? no  Goals-   7/15 - discussed LTC plan with her son. Need consent for PEG  7/13 - discussed care with  Reina Hernandez  -  [phone], pt's POA.    This is turning into a long post operative course, with inability to nourish patient.  Reina agreed to PEG tube.  Pt needs regular water  intake, with mental status changes pulls out NG tube frequently..  NG could be contributing to delirium as well.  willl consult GI  Code Status- FULL  Pain management- stable, no pain  Prognosis-  Poor given numerous complications, but too soon to give up.  Would like another neurology eval, give effort another 1-2 months at least. Discussed prognosis toni Huang on 7/13.  Family discussions- 7/13 - Called Reina Hernandez  -  [phone]  Functional Status - limited to bed, presently    Post acute care plan:  Rehab if MS improves.              Tobacco abuse    Patient was counseled on smoking cessation and she will be provided a nicotine transdermal patch applied while inpatient.  Will provide additional smoking cessation counseling prior to discharge.        Adverse effect of glucocorticoid or synthetic analogue    Not on steroids presently          Agitation    7/13 - stable          Brain mass    S/p resection of meningiomas            VTE Risk Mitigation         Ordered     enoxaparin injection 40 mg  Daily     Route:  Subcutaneous        07/21/17 1223     High Risk of VTE  Once      06/30/17 0920     Place sequential compression device  Until discontinued      06/30/17 0920          Madalyn Montesinos MD  Department of Hospital Medicine   [facility]

## 2017-07-29 NOTE — SUBJECTIVE & OBJECTIVE
"Interval History: "Where's my lollipop". No acute overnight events. Oriented to person.    Review of Systems   Unable to perform ROS: Acuity of condition     Objective:     Vital Signs (Most Recent):  Temp: 98.4 °F (36.9 °C) (07/29/17 1153)  Pulse: 92 (07/29/17 1153)  Resp: 20 (07/29/17 1153)  BP: (!) 98/57 (07/29/17 1153)  SpO2: 97 % (07/29/17 1154) Vital Signs (24h Range):  Temp:  [97.3 °F (36.3 °C)-99.3 °F (37.4 °C)] 98.4 °F (36.9 °C)  Pulse:  [] 92  Resp:  [18-28] 20  SpO2:  [93 %-97 %] 97 %  BP: ()/(57-76) 98/57     Weight: 56.2 kg (123 lb 14.4 oz)  Body mass index is 22.66 kg/m².    Intake/Output Summary (Last 24 hours) at 07/29/17 1446  Last data filed at 07/28/17 1800   Gross per 24 hour   Intake              490 ml   Output                0 ml   Net              490 ml      Physical Exam   Constitutional: She appears well-developed and well-nourished. She appears lethargic. She is easily aroused. No distress.   HENT:   Mouth/Throat: Mucous membranes are dry.   Cardiovascular: Normal rate and regular rhythm.    No murmur heard.  Pulmonary/Chest: Effort normal and breath sounds normal.   Abdominal: Soft. Bowel sounds are normal. She exhibits no distension. There is no tenderness.   PEG in place   Musculoskeletal: Normal range of motion. She exhibits no edema or tenderness.   Neurological: She is easily aroused. She appears lethargic.   Oriented in person   Psychiatric: She is slowed. She is inattentive.       Significant Labs:   BMP:   Recent Labs  Lab 07/29/17  0554   GLU 76      K 4.4      CO2 25   BUN 26*   CREATININE 0.7   CALCIUM 10.1   MG 1.9     CBC:   Recent Labs  Lab 07/29/17  0554   WBC 5.55   HGB 9.3*   HCT 28.9*   *       Significant Imaging: I have reviewed all pertinent imaging results/findings within the past 24 hours.  "

## 2017-07-29 NOTE — PT/OT/SLP PROGRESS
Physical Therapy  Treatment    Joanna Morales   MRN: [id number]   Admitting Diagnosis: Hypernatremia    PT Received On: 07/29/17  PT Start Time: 1101     PT Stop Time: 1125    PT Total Time (min): 24 min       Billable Minutes:  Therapeutic Activity  13 and Therapeutic Exercise 11    Treatment Type: Treatment  PT/PTA: PTA     PTA Visit Number: 1       General Precautions: Standard, aspiration, NPO, blind  Orthopedic Precautions: N/A   Braces:      Do you have any cultural, spiritual, Muslim conflicts, given your current situation?: none stated    Subjective:  Communicated with NSG prior to session.  Patient awake and incoherent through out her treatment.    Pain/Comfort  Pain Rating 1: 0/10  Pain Rating Post-Intervention 1: 0/10    Objective:   Patient found with: restraints, telemetry, bed alarm ([place])    Functional Mobility:  Bed Mobility:   Rolling/Turning to Left: Minimum assistance  Scooting/Bridging: Moderate Assistance  Supine to Sit: Maximum Assistance  Sit to Supine: Maximum Assistance    Transfers:  Sit <> Stand Assistance: Moderate Assistance  Sit <> Stand Assistive Device: Rolling Walker    Gait:   Gait Distance: 4 side steps to HOB with max. cues.  Assistance 1: Maximum assistance  Gait Assistive Device: Rolling walker  Gait Pattern: swing-to gait  Gait Deviation(s): decreased marycarmen, increased time in double stance, decreased step length, decreased stride length, decreased swing-to-stance ratio, decreased toe-to-floor clearance, decreased weight-shifting ability, toe clawing    Stairs:      Balance:   Static Sit: FAIR: Maintains without assist, but unable to take any challenges   Dynamic Sit: FAIR: Cannot move trunk without losing balance  Static Stand: POOR+: Needs MINIMAL assist to maintain  Dynamic stand: POOR: N/A     Therapeutic Activities and Exercises:  Patient sat at EOB x 10 minutes with close SBA. Static standing balance 2x30 secs using RW for support. B LE PROM x 30 reps on all  available planes of motion.      AM-PAC 6 CLICK MOBILITY  How much help from another person does this patient currently need?   1 = Unable, Total/Dependent Assistance  2 = A lot, Maximum/Moderate Assistance  3 = A little, Minimum/Contact Guard/Supervision  4 = None, Modified [place]/Independent    Turning over in bed (including adjusting bedclothes, sheets and blankets)?: 3  Sitting down on and standing up from a chair with arms (e.g., wheelchair, bedside commode, etc.): 2  Moving from lying on back to sitting on the side of the bed?: 2  Moving to and from a bed to a chair (including a wheelchair)?: 2  Need to walk in hospital room?: 2  Climbing 3-5 steps with a railing?: 1  Total Score: 12    AM-PAC Raw Score CMS G-Code Modifier Level of Impairment Assistance   6 % Total / Unable   7 - 9 CM 80 - 100% Maximal Assist   10 - 14 CL 60 - 80% Moderate Assist   15 - 19 CK 40 - 60% Moderate Assist   20 - 22 CJ 20 - 40% Minimal Assist   23 CI 1-20% SBA / CGA   24 CH 0% Independent/ Mod I     Patient left HOB elevated with all lines intact, call button in reach and bed alarm on.    Assessment:  Joanna Morales is a 51 y.o. female with a medical diagnosis of Hypernatremia and presents with decreased functional mobility. Patient with limited coordination and scissoring pattern of the L LE when in standing or taking steps . Patient was awake and alert, but with limited ability to follow motor commands. Patient would benefit from continued P.T. To address deficits .    Rehab identified problem list/impairments: Rehab identified problem list/impairments: weakness, impaired endurance, impaired self care skills, impaired functional mobilty, gait instability, impaired balance, decreased lower extremity function, visual deficits, decreased safety awareness, impaired cognition, decreased coordination, decreased upper extremity function    Rehab potential is fair.    Activity tolerance: Fair    Discharge  recommendations: Discharge Facility/Level Of Care Needs: nursing facility, skilled     Barriers to discharge: Barriers to Discharge: Inaccessible home environment, Decreased caregiver support    Equipment recommendations: Equipment Needed After Discharge: other (see comments) (SNEHA mayorga progress.)     GOALS:    Physical Therapy Goals        Problem: Physical Therapy Goal    Goal Priority Disciplines Outcome Goal Variances Interventions   Physical Therapy Goal     PT/OT, PT Ongoing (interventions implemented as appropriate)     Description:  Goals to be met by: 17     Patient will increase functional independence with mobility by performin. Supine to sit with Moderate Assistance  2. Sit to supine with Moderate Assistance  3. Sit to stand transfer with Moderate Assistance  4. Gait  x 5 feet with Maximum Assistance using appropriate AD while maintaining midline orientation and no posterior lean.   5. Sitting at edge of bed x10 minutes with Supervision while maintaining midline orientation and performing 3/5 reps of reaching activity outside base of support.   6. Stand for 2 minutes with Moderate Assistance using appropriate UE support while demonstrating midline orientation with no posterior lean & equal weight-bearing through bilateral LE's.   7. Lower extremity exercise program x10 reps with assistance as needed for strengthening and endurance with functional mobility.                 Multidisciplinary Problems (Resolved)        Problem: Physical Therapy Goal    Goal Priority Disciplines Outcome Goal Variances Interventions   Physical Therapy Goal   (Resolved)     PT/OT, PT Outcome(s) achieved     Description:  Goals to be met by: 17    Patient will increase functional independence with mobility by performin. Supine to sit with Minimal Assistance  2. Sit to supine with Minimal Assistance  3. Sit to stand transfer with Minimal Assistance met (2017)   Revised: sit to stand transfer with  SBA  4. Standing for 3 minutes with Minimal Assistance and weight evenly distributed through (B) LE.   5. Gait  x 20 feet with Moderate Assistance using AD if needed   6. Pt will perform (B) LE therapeutic exercises x 20 reps to improve muscular strength and endurance.                       PLAN:    Patient to be seen 4 x/week  to address the above listed problems via gait training, therapeutic activities, therapeutic exercises, neuromuscular re-education  Plan of Care expires: 08/24/17  Plan of Care reviewed with: patient         Manish Alex, PTA  07/29/2017

## 2017-07-30 NOTE — SUBJECTIVE & OBJECTIVE
"Interval History: "pain in my back. No acute overnight events. Oriented to person.    Review of Systems   Unable to perform ROS: Acuity of condition     Objective:     Vital Signs (Most Recent):  Temp: 97.5 °F (36.4 °C) (07/30/17 0900)  Pulse: 68 (07/30/17 0900)  Resp: 17 (07/30/17 0900)  BP: 121/65 (07/30/17 0900)  SpO2: 97 % (07/30/17 0900) Vital Signs (24h Range):  Temp:  [97.5 °F (36.4 °C)-98.5 °F (36.9 °C)] 97.5 °F (36.4 °C)  Pulse:  [] 68  Resp:  [16-20] 17  SpO2:  [95 %-98 %] 97 %  BP: ()/(55-68) 121/65     Weight: 56.2 kg (123 lb 14.4 oz)  Body mass index is 22.66 kg/m².    Intake/Output Summary (Last 24 hours) at 07/30/17 1026  Last data filed at 07/30/17 0000   Gross per 24 hour   Intake              470 ml   Output                0 ml   Net              470 ml      Physical Exam   Constitutional: She appears well-developed and well-nourished. She appears lethargic. She is easily aroused. No distress.   HENT:   Mouth/Throat: Mucous membranes are dry.   Cardiovascular: Normal rate and regular rhythm.    No murmur heard.  Pulmonary/Chest: Effort normal and breath sounds normal.   Abdominal: Soft. Bowel sounds are normal. She exhibits no distension. There is no tenderness.   PEG in place   Musculoskeletal: Normal range of motion. She exhibits no edema or tenderness.   Neurological: She is easily aroused. She appears lethargic.   Oriented in person   Psychiatric: She is slowed. She is inattentive.       Significant Labs:   BMP:     Recent Labs  Lab 07/30/17  0411   GLU 83      K 4.7      CO2 23   BUN 17   CREATININE 0.6   CALCIUM 9.7   MG 2.1     CBC:     Recent Labs  Lab 07/29/17  0554   WBC 5.55   HGB 9.3*   HCT 28.9*   *       Significant Imaging: I have reviewed all pertinent imaging results/findings within the past 24 hours.  "

## 2017-07-30 NOTE — ASSESSMENT & PLAN NOTE
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    1. Keppra for seizure prophylaxis.

## 2017-07-30 NOTE — PROGRESS NOTES
Pt heart rate bradycardic, running between 44-58 bpm. BP 99/64. RR 16. O2 sat 96% on room air. Dr Montesinos with IM L notified. Will continue to monitor.

## 2017-07-30 NOTE — PROGRESS NOTES
[facility] Hospital Medicine  Progress Note    Patient Name: Joanna Morales  MRN: [id number]  Patient Class: IP- Inpatient   Admission Date: 6/28/2017  Length of Stay: 32 days  Attending Physician: Madalyn Montesinos MD  Primary Care Provider: Claudy Tse MD    [facility] Medicine Team: [facility] [place] [place] Madalyn Montesinos MD    Subjective:     Principal Problem:Hypernatremia    HPI:  Ms. Joanna Morales is a 51 y.o. female with tobacco abuse, secondary hypothyroidism (TSH 0.241 Jun 2017), and history of meningioma s/p resection complicated by diabetes insipidus who presents to [facility] ED from [facility] after being found with abnormal labwork.  She had been more altered in the last day or so and upon checking labwork, she was noted to be with hypernatremia.  She contributed very minimally to her history but does report some abdominal pain.  Of note, she was recently admitted to this facility under the Neurosurgery service for resection of a meningioma that was complicated by diabetes insipidus for which she received desmopressin a few times before being discharged to rehabilitation at [facility].  Further history is limited at this time.    Hospital Course:  Ms. Morales was [place] [facility]  [place] [facility] on 6/26/17 and presented to [facility] [place] on 6/28 with altered mental status and worsening hypernatremia. According to the patient's sitter, the patient was awake, verbal, and interactive with therapy on 6/23 but had been progressively less interactive and responsive when she was at [place]. Ms. Morales was admitted to Hospital Medicine but was found to be unresponsive with a sodium level of 168 on the morning of 6/29.      She has been followed by endocrinology and neurology and workup thus far significant for  unremarkable UA, neuro-imaging c/w recent left sphenoidal meningioma resection with resolution of blood products and trace intraventricular  hemorrhage.  Previous EEG (6/16/17) demonstrated Moderate diffuse slowing of the overall background as described above, Superimposed left hemispheric slowing relative to the right, and NO epileptiform discharges or seizures were seen c/w ENCP and focal cortical dysfunction.  Pt's presentation is concerning for multi-factorial encephalopathy given persistent hypernatremia.  Per neurology, low suspicion for MNG currently, but patient is at high risk given recent intracranial procedure.     Critical Care consulted 7/17/17 for acute hypoxic respiratory failure. Pt had PEG tube placed earlier today and in PACU, oxygen saturations noted to be in the mid 80's. Pt was placed on a non-rebreather with minimal improvement and subsequently placed on BiPAP. ABG significant for elevated CO2 however pt was compensating. Repeat ABG showed pH 7.372, CO2 44, O2 62, HCO3 25.8. Pt transitioned to NC. Cxray concerning for aspiration pneumonia - new infiltrate in RLL so patient started on vancomycin and zosyn for HAP. She was then switched to Vanc/Cefepime/Acyclovir. Had significant elevation in WBC count 7/18 and Needed levophed to bring BP up for the night. Switched nasal DDAVP to IV DDAVP which has normalized her sodium levels. LP performed 7/20; consistent with a viral process. BP has been stable off pressors; leukocytosis resolved. Patient more alert.    Patient stepped down to Hospital Medicine team L on 7/22 pending viral etiology work up. She remained lethargic and minimally participatory despite correction of metabolic derangements. Desmopressin therapy was resumed as per Endocrinology service recommendations. Enteral feedings changed to bolus with free water in order to prevent patient pulling her PEG tube. Hypernatremia continued to improve slowly with free water replacement and optimization of desmopressin therapy.    Hyponatremia resolved and patient medically stable for discharge pending securement of placement.    Interval  "History: "pain in my back. No acute overnight events. Oriented to person.    Review of Systems   Unable to perform ROS: Acuity of condition     Objective:     Vital Signs (Most Recent):  Temp: 97.5 °F (36.4 °C) (07/30/17 0900)  Pulse: 68 (07/30/17 0900)  Resp: 17 (07/30/17 0900)  BP: 121/65 (07/30/17 0900)  SpO2: 97 % (07/30/17 0900) Vital Signs (24h Range):  Temp:  [97.5 °F (36.4 °C)-98.5 °F (36.9 °C)] 97.5 °F (36.4 °C)  Pulse:  [] 68  Resp:  [16-20] 17  SpO2:  [95 %-98 %] 97 %  BP: ()/(55-68) 121/65     Weight: 56.2 kg (123 lb 14.4 oz)  Body mass index is 22.66 kg/m².    Intake/Output Summary (Last 24 hours) at 07/30/17 1026  Last data filed at 07/30/17 0000   Gross per 24 hour   Intake              470 ml   Output                0 ml   Net              470 ml      Physical Exam   Constitutional: She appears well-developed and well-nourished. She appears lethargic. She is easily aroused. No distress.   HENT:   Mouth/Throat: Mucous membranes are dry.   Cardiovascular: Normal rate and regular rhythm.    No murmur heard.  Pulmonary/Chest: Effort normal and breath sounds normal.   Abdominal: Soft. Bowel sounds are normal. She exhibits no distension. There is no tenderness.   PEG in place   Musculoskeletal: Normal range of motion. She exhibits no edema or tenderness.   Neurological: She is easily aroused. She appears lethargic.   Oriented in person   Psychiatric: She is slowed. She is inattentive.       Significant Labs:   BMP:     Recent Labs  Lab 07/30/17  0411   GLU 83      K 4.7      CO2 23   BUN 17   CREATININE 0.6   CALCIUM 9.7   MG 2.1     CBC:     Recent Labs  Lab 07/29/17  0554   WBC 5.55   HGB 9.3*   HCT 28.9*   *       Significant Imaging: I have reviewed all pertinent imaging results/findings within the past 24 hours.    Assessment/Plan:      Acute encephalopathy    Likely multifactorial in nature; ICU delirium, hypernatremia, and perhaps prior surgical procedures. No evidence " of HSV encephalitis. Improving.  1. Delirium precautions.  2. Mittens to prevent scratching of face.          Aspiration pneumonia    Patient completed 7 days of cefepime therapy.          Primary central diabetes insipidus    See above.          * Hypernatremia    Secondary to central DI. Appears to have resolved.  1. Desmopressin 400 mcg nightly.  2. Monitor I&O and sodium levels.   3. Free water bolus 4 times daily: 190 ml.          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    1. Keppra for seizure prophylaxis.          Malnutrition of moderate degree    S/P PEG placement on 7/17.  1. Continue enteral tube feedings.          Secondary hypothyroidism    Stable.  1. Continue levothyroxine. Dose optimized to 75 mcg on 7/27.        Hypercalcemia    Stable. Likely due to poor activity. Improved with administration of pamidronate on 7/27.  1. PT/OT.  2. Monitor calcium level.        Oropharyngeal dysphagia    Evaluated by SLP.  1. Continue NPO.  2. SLP to continue working with patient.          Discharge planning issues    Patient accepted to institution however son unreachable and friend with financial information out of town.  1. Transfer to facility once family able to arrange financial information.          Oral phase dysphagia    Failed multiple ST trials, needs PEG, MS changes interferring with eating.        Palliative care encounter    Non-hospice Palliative Care:   Does patient have Capacity? no  Goals-   7/15 - discussed LTC plan with her son. Need consent for PEG  7/13 - discussed care with  Reina Hernandez  -  [phone], pt's POA.    This is turning into a long post operative course, with inability to nourish patient.  Reina agreed to PEG tube.  Pt needs regular water intake, with  mental status changes pulls out NG tube frequently..  NG could be contributing to delirium as well.  willl consult GI  Code Status- FULL  Pain management- stable, no pain  Prognosis-  Poor given numerous complications, but too soon to give up.  Would like another neurology eval, give effort another 1-2 months at least. Discussed prognosis toni Huang on 7/13.  Family discussions- 7/13 - Called Reina Hernandez  -  [phone]  Functional Status - limited to bed, presently    Post acute care plan:  Rehab if MS improves.              Tobacco abuse    Patient was counseled on smoking cessation and she will be provided a nicotine transdermal patch applied while inpatient.  Will provide additional smoking cessation counseling prior to discharge.        Adverse effect of glucocorticoid or synthetic analogue    Not on steroids presently          Agitation    7/13 - stable          Brain mass    S/p resection of meningiomas            VTE Risk Mitigation         Ordered     enoxaparin injection 40 mg  Daily     Route:  Subcutaneous        07/21/17 1223     High Risk of VTE  Once      06/30/17 0920     Place sequential compression device  Until discontinued      06/30/17 0920          Madalyn Montesinos MD  Department of Hospital Medicine   [facility]

## 2017-07-30 NOTE — PROGRESS NOTES
Pt very agitated and restless. Continuously trying to get out of bed even after repeatedly being redirected by nurse, PCT, and AVASYS. Bed alarm on. Side rails up x 3. Dr Montesinos with IM L notified. Will continue to monitor.

## 2017-07-30 NOTE — ASSESSMENT & PLAN NOTE
Secondary to central DI. Appears to have resolved.  1. Desmopressin 400 mcg nightly.  2. Monitor I&O and sodium levels.   3. Free water bolus 4 times daily: 190 ml.

## 2017-07-30 NOTE — PLAN OF CARE
Problem: Patient Care Overview  Goal: Plan of Care Review  Outcome: Ongoing (interventions implemented as appropriate)  Pt is free from injury and falls during shift. Pt shows no signs of acute distress. Non verbal signs of pain absent. Oriented to self. Vitals stable. Blood sugar checked per orders- remain WNL throughout shift. Pt able to reposition self frequently throughout shift- skin remains intact. AVASYS monitoring at bedside. Bolus feedings/free water boluses given as ordered during shift. Bed is in low position with breaks locked. Side rails are up x 2. Environment is clutter free. Call light is within reach of the patient. Will continue to monitor.

## 2017-07-31 NOTE — PROGRESS NOTES
"[facility]  Endocrinology  Progress Note    Admit Date: 6/28/2017     Reason for Consult: Management of hypernatremia    Admit Date: 6/28/2017      Reason for Consult: Diabetes insipidus      Surgical Procedure and Date:   CRANIOTOMY 6/8/2017           HPI:   Patient is a 51 y.o. female with a diagnosis of  meningioma s/p resection (6/7). 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  She underwent partial resection of the tumor in January 2015 at [facility]. Followup scan done in 2016 showing a continued large meningioma. Endocrine had been previously involved in her care during earlier June hospitalization for post-operative diabetes insipidus and additional concern for secondary adrenal insufficiency and secondary hypothyroidism. Had been discharged to [facility] on 6/26/17. Re-admitted to [facility] 6/[facility]17 for worsening hypernatremia and mental status. Endocrine had been consulted this admission for severe hypernatremia with concerns for diabetes insipidus.             Interval HPI:   Overnight events: No acute events overnight.   Eating:   NPO  Nausea: No  Hypoglycemia and intervention: No  Fever: No  TPN and/or TF: Yes  If yes, type of TF/TPN and rate: Isosource 1.5 tanya, 260 mL, q6h; Free water bolus 190 mL q6h    BP (!) 94/56 (BP Location: Left arm, Patient Position: Lying, BP Method: Automatic)   Pulse (!) 58   Temp 97.5 °F (36.4 °C) (Axillary)   Resp 17   Ht 5' 2.01" (1.575 m)   Wt 56.2 kg (123 lb 14.4 oz)   SpO2 100%   Breastfeeding? No   BMI 22.66 kg/m²       Labs Reviewed and Include      Recent Labs  Lab 07/31/17  0432      CALCIUM 9.4      K 3.7   CO2 26      BUN 17   CREATININE 0.5     Lab Results   Component Value Date    WBC 4.12 07/31/2017    HGB 9.3 (L) 07/31/2017    HCT 27.6 (L) 07/31/2017    MCV 94 07/31/2017     07/31/2017 "       Recent Labs  Lab 07/27/17  0411   FREET4 0.81     No results found for: HGBA1C    Nutritional status:   Body mass index is 22.66 kg/m².  Lab Results   Component Value Date    ALBUMIN 2.3 (L) 07/18/2017    ALBUMIN 3.0 (L) 07/17/2017    ALBUMIN 3.0 (L) 07/16/2017     Lab Results   Component Value Date    PREALBUMIN 26 06/27/2017       Estimated Creatinine Clearance: 105.3 mL/min (based on Cr of 0.5).    Accu-Checks  Recent Labs      07/29/17   0556  07/29/17   1236  07/29/17   1654  07/29/17   2100  07/30/17   0813  07/30/17   1205  07/30/17   1726  07/30/17   2119  07/31/17   0753  07/31/17   1202   POCTGLUCOSE  86  92  78  90  95  87  88  146*  143*  75       Current Medications and/or Treatments Impacting Glycemic Control  Immunotherapy:  Immunosuppressants     None        Steroids:   Hormones     Start     Stop Route Frequency Ordered    07/27/17 2100  desmopressin tablet 400 mcg      -- PER G TUBE Nightly 07/27/17 0849        Pressors:    Autonomic Drugs     None        Hyperglycemia/Diabetes Medications: Antihyperglycemics     Start     Stop Route Frequency Ordered    06/29/17 1922  insulin aspart pen 0-5 Units      -- SubQ Every 6 hours PRN 06/29/17 1822          ASSESSMENT and PLAN    Hypercalcemia    -Workup indicative of hypercalcemia of immobilization most likely in setting of hypovolemia  -PT as tolerated  -s/p IV pamidronate 60mg x1 on 7/27, duration of action of about 4 weeks should hopefully help through this admission since she was given the one time dose         Secondary hypothyroidism    -Goal FT4 mid-upper limit of normal  -Continue levothyroxine 75mcg daily, needs to be given at least 1 hr apart from TF to ensure absorption   -Repeat FT4 ~9/1        Primary central diabetes insipidus    -Post-operative diabetes insipidus  -Since she is getting free water boluses in day, will change DDAVP regimen to 400mcg qhs per PEG.  If Na continues to decrease to < 130, will decrease DDAVP to 200mcg qHS.    -Agree with free water boluses 190ml 4x daily with TF boluses.   -Na daily            Pepe Juan MD  Endocrinology  [facility],  have personally taken the history and examined the patient and agree with the fellow's note as stated above.Na stable on PO DDAVP. Continue current. Recommend repeat FT4 as rec above

## 2017-07-31 NOTE — PLAN OF CARE
Pending discharge/placement. See msw 's notes.     07/31/17 1334   Right Care Assessment   Can the patient answer the patient profile reliably? No, cognitively impaired   How often would a person be available to care for the patient? Infrequently   Describe the patient's ability to walk at the present time. Major restrictions/daily assistance from another person   How does the patient rate their overall health at the present time? Poor   Number of comorbid conditions (as recorded on the chart) Five or more   During the past month, has the patient often been bothered by feeling down, depressed or hopeless? No   Have you felt down, depressed, or hopeless? 0   During the past month, has the patient often been bothered by little interest or pleasure in doing things? No   Have you felt little interest or pleasure in doing things? 0

## 2017-07-31 NOTE — ASSESSMENT & PLAN NOTE
-Goal FT4 mid-upper limit of normal  -Continue levothyroxine 75mcg daily, needs to be given at least 1 hr apart from TF to ensure absorption   -Repeat FT4 ~9/1

## 2017-07-31 NOTE — PT/OT/SLP PROGRESS
"Speech Language Pathology  Treatment    Joanna Morales   MRN: [id number]   1150/1150 A    Admitting Diagnosis: Hypernatremia    Diet recommendations: Solid Diet Level: NPO  Liquid Diet Level: NPO   - Pt appropriate for cup sips thin water x5, NO MORE THAN 3X/DAY, when FULLY awake and upright.     SLP Treatment Date: 07/31/17  Speech Start Time: 1601     Speech Stop Time: 1617     Speech Total (min): 16 min       TREATMENT BILLABLE MINUTES:  Treatment Swallowing Dysfunction 16    Has the patient been evaluated by SLP for swallowing? : Yes  Keep patient NPO?: Yes   General Precautions: Standard, aspiration, fall  Current Respiratory Status:  (room air)       Subjective:  "She gonna give me a shot to see if I can find my water." Pt demonstrating confusion this session.     Pain/Comfort  Pain Rating 1: 0/10  Pain Rating Post-Intervention 1: 0/10    Objective:     Pt awake upon entry with friend, son, and another friend or relative at b/s who left upon initiation of this session. HOB raised. Pt observed to produce tangential, confused speech majority of session, requiring multiple prompts to refrain from speaking in order to accept PO trials. Pt provided with multiple cup sips thin water. No overt s/s aspiration and oral phase WFL. When provided with 1/2 tsp pureed apple sauce x1, pt required multiple verbal prompts to strip bolus from spoon, despite spoon touching labial surface as tactile cue. Inappropriate mastication given pureed consistency observed. Despite spoon touching labial surface as tactile cue and multiple verbal prompts, pt refused to accept additional 1/2 tsp apple sauce x1. Pt educated re: SLP POC. White board updated. No further questions.       Assessment:  Joanna Morales is a 51 y.o. female with a medical diagnosis of Hypernatremia and presents with oral dysphagia and risk of aspiration.     Discharge recommendations: Discharge Facility/Level Of Care Needs: nursing facility, skilled "     Goals:    SLP Goals        Problem: SLP Goal    Goal Priority Disciplines Outcome   SLP Goal     SLP Unable to achieve outcome(s) by discharge   Description:  Speech Language Pathology Goals  Goals expected to be met by 7/7  1. Patient will successfully participate in ongoing clinical swallow evaluation and tolerate po trials with no overt s/s of aspiration.                  Problem: SLP Goal    Goal Priority Disciplines Outcome   SLP Goal     SLP Ongoing (interventions implemented as appropriate)   Description:  Updated SLP goals expected to be met by 8/4:  1. Pt will participate in ongoing assessment of swallow in order to determine safest, least restrictive diet.     SLP goals expected to be met by 7/18:  1. Pt will participate in going assessment of swallow in order to determine safest, least restrictive diet.                       Plan:   Patient to be seen Therapy Frequency: 5 x/week   Plan of Care expires: 08/26/17  Plan of Care reviewed with: patient  SLP Follow-up?: Yes            HILDA Rascon, CCC-SLP  [phone]  7/31/2017

## 2017-07-31 NOTE — ASSESSMENT & PLAN NOTE
-Workup indicative of hypercalcemia of immobilization most likely in setting of hypovolemia  -PT as tolerated  -Ca today at   -s/p IV pamidronate 60mg x1 on 7/27, duration of action of about 4 weeks should hopefully help through this admission since she was given the one time dose

## 2017-07-31 NOTE — PROGRESS NOTES
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 Chew and discharged on 6/26/17 to [facility].  She presents with altered mental status and worsening hypernatremia for 1 day. She was treated for DI her last admission and labs are trending up today.  Spoke with Dr. Fairchild at [facility], patient with waxing/waning mental status yesterday, but was appropriate & following some complex commands yesterday.  There was a period yesterday of significant lethargy.  Patient is unable to give history, and information is taken from the chart.     Post-Op Info:  Procedure(s) (LRB):  PLACEMENT-TUBE-PEG (N/A)   14 Days Post-Op     Interval History:  NAEON.   Denies HAs, N/V, visual changes, weakness, or seizures.        Medications:  Continuous Infusions:   Scheduled Meds:   desmopressin  400 mcg Per G Tube QHS    enoxaparin  40 mg Subcutaneous Daily    levetiracetam oral soln  500 mg Per NG tube BID    levothyroxine  75 mcg Per NG tube Daily     PRN Meds:acetaminophen, bisacodyl, dextrose 50%, glucagon (human recombinant), guaifenesin 100 mg/5 ml, insulin aspart, ipratropium, olanzapine, ondansetron, senna, sodium chloride 0.9%     Review of Systems  Objective:     Weight: 56.2 kg (123 lb 14.4 oz)  Body mass index is 22.66 kg/m².  Vital Signs (Most Recent):  Temp: 97.5 °F (36.4 °C) (07/31/17 1502)  Pulse: 63 (07/31/17 1502)  Resp: 18 (07/31/17 1502)  BP: 99/62 (07/31/17 1502)  SpO2: 100 % (07/31/17 1502) Vital Signs (24h Range):  Temp:  [97.4 °F (36.3 °C)-98.3 °F (36.8 °C)] 97.5 °F (36.4 °C)  Pulse:  [53-63] 63  Resp:  [16-18] 18  SpO2:  [95 %-100 %] 100 %  BP: ()/(56-64) 99/62       Date 07/31/17 0700 - 08/01/17 0659   Shift 8017-0742 0260-1403 3459-5497 24 Hour Total   I  N  T  A  K  E   NG/   920    Shift Total  (mL/kg) 920  (16.4)   920  (16.4)    O  U  T  P  U  T   Shift Total  (mL/kg)       Weight (kg) 56.2 56.2 56.2 56.2                        Gastrostomy/Enterostomy 07/17/17 1520 Percutaneous endoscopic gastrostomy (PEG) LUQ feeding (Active)   Securement sutured to abdomen 7/31/2017  8:00 AM   Interventions Prior to Feeding patency checked 7/31/2017  8:00 AM   Drainage thin 7/21/2017  7:54 PM   Feeding Type bolus 7/31/2017  8:00 AM   Clamp Status/Tolerance clamped;no abdominal discomfort;no abdominal distention;no emesis;no nausea;no restlessness 7/31/2017  8:00 AM   Feeding Action feeding continued 7/26/2017  7:35 AM   Dressing dry and intact 7/31/2017  8:00 AM   Insertion Site dry;no redness;no warmth;no drainage;no tenderness;no swelling 7/31/2017  8:00 AM   Site Care sterile precut T-slit dressing applied 7/27/2017  7:58 AM   Flush/Irrigation flushed w/;water;no resistance met 7/31/2017  8:00 AM   Current Rate (mL/hr) 45 mL/hr 7/26/2017  7:35 AM   Goal Rate (mL/hr) 45 mL/hr 7/26/2017  7:35 AM   Intake (mL) 20 mL 7/20/2017  8:00 PM   Water Bolus (mL) 190 mL 7/31/2017 12:03 PM   Tube Feeding Intake (mL) 270 7/31/2017 12:03 PM   Residual Amount (ml) 0 ml 7/30/2017  6:17 PM       Neurosurgery Physical Exam   General: well developed, no acute distress.   Head: normocephalic. surgical Incision well healed  Neurologic: Alert. Awake. Minimal verbal responses.   GCS: Motor: 6/Verbal: 4 /Eyes: 4 GCS Total: 14  Mental Status: Able to state name. Intermittently follows simple commands.   Cranial nerves: face symmetric, CN II-XII grossly intact.   Eyes: pupils equal, round, reactive to light.  Motor Strength: Moves all extremities spontaneously with good strength and tone. Intermittently follows simple commands.       Significant Labs:    Recent Labs  Lab 07/30/17  0411 07/31/17  0432   GLU 83 104    136   K 4.7 3.7    101   CO2 23 26   BUN 17 17   CREATININE 0.6 0.5   CALCIUM 9.7 9.4   MG 2.1 2.0       Recent Labs  Lab 07/31/17  0432   WBC 4.12    HGB 9.3*   HCT 27.6*        No results for input(s): LABPT, INR, APTT in the last 48 hours.  Microbiology Results (last 7 days)     Procedure Component Value Units Date/Time    Blood culture [[id number]] Collected:  07/20/17 1052    Order Status:  Completed Specimen:  Blood from Peripheral, Upper Arm, Left Updated:  07/25/17 1412     Blood Culture, Routine No growth after 5 days.    Blood culture [[id number]] Collected:  07/20/17 1103    Order Status:  Completed Specimen:  Blood from Peripheral, Upper Arm, Right Updated:  07/25/17 1412     Blood Culture, Routine No growth after 5 days.    CSF culture [[id number]] Collected:  07/20/17 1544    Order Status:  Completed Specimen:  CSF (Spinal Fluid) from CSF Tap, Tube 1 Updated:  07/25/17 0736     CSF CULTURE No Growth     Gram Stain Result Cytospin indicates:      Moderate WBC's       No organisms seen    Narrative:       On which sequentially labeled tube should this analysis be  performed?->3            Assessment/Plan:     Meningioma, recurrent of brain    50 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  -Pt is neurologically stable  -DI continue current management per Endocrinology.  Na 136 today  -Patient completed 7 day course of cefepime   -Continue neuro checks q4 hours. Notify NSGY for any changes.   -Continue Keppra for seizure prophylaxis  -Continue Lovenox for DVT prophylaxis  -Continue aggressive PT/OT  -LP performed 7/20, NGTD. Likely viral process.   -Endocrine following for DI.   -Medical management per primary team.  -Dispo: per primary, awaiting [facility] once out of restraints.   -Plan for 6 week f/u with Dr. Chew upon DC  -Will continue to follow, please call with questions                 ALANNAH Peña  Neurosurgery  [facility]

## 2017-07-31 NOTE — PLAN OF CARE
3:09 PM  SW spoke with Mac from [facility] who stated D.O.N. Will not accept pt with mittens on as they are considered a restraint. Informed physician. Will f/u as needed.    Jodi Royal LMSW   [facility]  Ext [phone]

## 2017-07-31 NOTE — ASSESSMENT & PLAN NOTE
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  -Pt is neurologically stable  -DI continue current management per Endocrinology.  Na 136 today  -Patient completed 7 day course of cefepime   -Continue neuro checks q4 hours. Notify NSGY for any changes.   -Continue Keppra for seizure prophylaxis  -Continue Lovenox for DVT prophylaxis  -Continue aggressive PT/OT  -LP performed 7/20, NGTD. Likely viral process.   -Endocrine following for DI.   -Medical management per primary team.  -Dispo: per primary, awaiting long-term NH once out of restraints.   -Plan for 6 week f/u with Dr. Chew upon DC  -Will continue to follow, please call with questions

## 2017-07-31 NOTE — PLAN OF CARE
After agreeing to Nursing Home placement, her HPOA and friend,  Reina showed up to the hospital with 2 of the patient's sons who did not introduce themselves to say we were just throwing her to a nursing home were no one could see her. We spoke to them at length and explained that she would require 24 hour care as the patient can not care for herself and is at this time fully dependent. That although she would perhaps benefit from PT/OT she does not participate during multiple attempted trials. They asked if she could be discharged home with home health and we further explained that she would require 24 hour, 7 day a week continuous care plus home health would only be a temporary measure. Reina asked for some time to discuss with the patients 7 sons before giving a final decision. I have asked her to give us a decision by tomorrow as patient has been medically stable for discharge pending placement since Friday. Reina agreed to give us a decision by tomorrow.  Madalyn Montesinos MD  Hospital Medicine Staff  [phone]

## 2017-07-31 NOTE — ASSESSMENT & PLAN NOTE
Likely multifactorial in nature; ICU delirium, hypernatremia, and perhaps prior surgical procedures. No evidence of HSV encephalitis. Lethargic and non verbal again. Likely new baseline.  1. Delirium precautions.  2. Mittens to prevent scratching of face.  3. Discharge to NH once bed is secured.

## 2017-07-31 NOTE — PT/OT/SLP PROGRESS
"Physical Therapy  Treatment    Joanna Morales   MRN: [id number]   Admitting Diagnosis: Hypernatremia, s/p meningioma resection     PT Received On: 07/31/17  PT Start Time: 1400     PT Stop Time: 1430    PT Total Time (min): 30 min       Billable Minutes:  Gait [id number] and Therapeutic Activity 10    Treatment Type: Treatment  PT/PTA: PT     PTA Visit Number: 1       General Precautions: Standard, fall  Orthopedic Precautions: N/A   Braces:      Do you have any cultural, spiritual, Caodaism conflicts, given your current situation?: none stated    Subjective:  Communicated with RN prior to session.  "I am in my yard on the ground. No one pays attention to me.  They just left me here. I need to get up and finish the laundry. I can't see."    Pain/Comfort  Pain Rating 1: 0/10  Pain Rating Post-Intervention 1: 0/10    Objective:   Patient found with:  (avast virtual sitter, soft mitten restraints)  Patient found supine in bed with virtual sitter, 4 bed rails up and soft restraints in place.  She was very confused.  Therapist attempted to re-orient patient to hospital and situation.  Pt dismissed therapist reorientation attempts.  She requested to get up and walk.  Bed mobility mod to max assist 2* cognitive status.  Sit to stand from bed moderate assistance.  Gait 40 feet moderate assistance of therapist and min assistance of rehab tech for safety. Patient practiced sit to stand from chair 5x mod assist.  Chair to bed transfer mod assist due to cognitive deficits.     Functional Mobility:  Bed Mobility:    supine <> sit moderate to max assist 2* cognition    Gait:    Pt ambulated 40 feet with 2 person assist using RW.  Therapist provided moderate assistance for Rw management and for balance (inconsistent) and rehab tech provided constant redirection to task and min assist for patient safety.  Patient able to take small steps independently with step to gait pattern on the L (leaves LLE behind at times).  She did not " exhibit knee buckling, but was very inconsistent with balance 2* cognitive deficits and inattention to task.      Therapeutic Activities and Exercises:  Sit to stand from chair 5 x mod assist with no assistive device.  Pt able to perform with min assist on one occasion when due to improved understanding of command.      AM-PAC 6 CLICK MOBILITY  How much help from another person does this patient currently need?   1 = Unable, Total/Dependent Assistance  2 = A lot, Maximum/Moderate Assistance  3 = A little, Minimum/Contact Guard/Supervision  4 = None, Modified [place]/Independent    Turning over in bed (including adjusting bedclothes, sheets and blankets)?: 4  Sitting down on and standing up from a chair with arms (e.g., wheelchair, bedside commode, etc.): 2  Moving from lying on back to sitting on the side of the bed?: 2  Moving to and from a bed to a chair (including a wheelchair)?: 2  Need to walk in hospital room?: 2  Climbing 3-5 steps with a railing?: 1  Total Score: 13    AM-PAC Raw Score CMS G-Code Modifier Level of Impairment Assistance   6 % Total / Unable   7 - 9 CM 80 - 100% Maximal Assist   10 - 14 CL 60 - 80% Moderate Assist   15 - 19 CK 40 - 60% Moderate Assist   20 - 22 CJ 20 - 40% Minimal Assist   23 CI 1-20% SBA / CGA   24 CH 0% Independent/ Mod I     Patient left supine with all lines intact, call button in reach, bed alarm on, restraints reapplied at end of session and RN notified.    Assessment:  Joanna Morales is a 51 y.o. female with a medical diagnosis of Hypernatremia.  Pt demonstrated improved participation and performance during therapy session today.  She ambulated 40 feet with 2 person assist using a RW.  Patient is only safe to ambulate with therapy at this time due to severe cognitive deficits and significant safety concerns.  She was able to participate meaningfully in therapy despite impaired cognition. She would benefit from continued skilled PT services to progress  mobility.     Rehab identified problem list/impairments: Rehab identified problem list/impairments: weakness, impaired self care skills, impaired functional mobilty, gait instability, impaired balance, impaired cognition, decreased safety awareness    Rehab potential is fair.    Activity tolerance: Fair    Discharge recommendations: Discharge Facility/Level Of Care Needs: nursing facility, skilled     Barriers to discharge: Barriers to Discharge: Decreased caregiver support    Equipment recommendations: Equipment Needed After Discharge:  (TBD)     GOALS:    Physical Therapy Goals        Problem: Physical Therapy Goal    Goal Priority Disciplines Outcome Goal Variances Interventions   Physical Therapy Goal     PT/OT, PT Ongoing (interventions implemented as appropriate)     Description:  Goals to be met by: 17     Patient will increase functional independence with mobility by performin. Supine to sit with Moderate Assistance  2. Sit to supine with Moderate Assistance  3. Sit to stand transfer with Moderate Assistance- met       Revised: Sit to stand transfer with CGA  4. Gait  x 5 feet with Maximum Assistance using appropriate AD while maintaining midline orientation and no posterior lean. - Partially met       Revised: Gait 50 feet min assist with RW and no LOB.  5. Sitting at edge of bed x10 minutes with Supervision while maintaining midline orientation and performing 3/5 reps of reaching activity outside base of support.   6. Revised: Stand for 2 minutes with contact guard Assistance using appropriate UE support while demonstrating midline orientation with no posterior lean & equal weight-bearing through bilateral LE's.   7. Lower extremity exercise program x10 reps with assistance as needed for strengthening and endurance with functional mobility.                  Multidisciplinary Problems (Resolved)        Problem: Physical Therapy Goal    Goal Priority Disciplines Outcome Goal Variances  Interventions   Physical Therapy Goal   (Resolved)     PT/OT, PT Outcome(s) achieved     Description:  Goals to be met by: 17    Patient will increase functional independence with mobility by performin. Supine to sit with Minimal Assistance  2. Sit to supine with Minimal Assistance  3. Sit to stand transfer with Minimal Assistance met (2017)   Revised: sit to stand transfer with SBA  4. Standing for 3 minutes with Minimal Assistance and weight evenly distributed through (B) LE.   5. Gait  x 20 feet with Moderate Assistance using AD if needed   6. Pt will perform (B) LE therapeutic exercises x 20 reps to improve muscular strength and endurance.                       PLAN:    Patient to be seen 5 x/week  to address the above listed problems via gait training, therapeutic activities, therapeutic exercises, neuromuscular re-education  Plan of Care expires: 17  Plan of Care reviewed with: patient         Laina TONY Linton, PT  2017

## 2017-07-31 NOTE — PLAN OF CARE
Problem: Patient Care Overview  Goal: Plan of Care Review  Outcome: Ongoing (interventions implemented as appropriate)  Pt is free from injury and falls during shift. Pt shows no signs of acute distress. Non verbal signs of pain absent. Oriented to self. Vitals stable. Blood sugar checked per orders- remain WNL. Bolus feedings/free water boluses given during shift as ordered. Pt able to reposition self frequently throughout shift-skin remains intact. AVASYS monitor at bedside. Bed alarm set.  Bed is in low position with breaks locked. Side rails are up x 2. Environment is clutter free. Call light is within reach of the patient. Will continue to monitor.

## 2017-07-31 NOTE — PLAN OF CARE
Problem: Physical Therapy Goal  Goal: Physical Therapy Goal  Goals to be met by: 17     Patient will increase functional independence with mobility by performin. Supine to sit with Moderate Assistance  2. Sit to supine with Moderate Assistance  3. Sit to stand transfer with Moderate Assistance- met       Revised: Sit to stand transfer with CGA  4. Gait  x 5 feet with Maximum Assistance using appropriate AD while maintaining midline orientation and no posterior lean. - Partially met       Revised: Gait 50 feet min assist with RW and no LOB.  5. Sitting at edge of bed x10 minutes with Supervision while maintaining midline orientation and performing 3/5 reps of reaching activity outside base of support.   6. Revised: Stand for 2 minutes with contact guard Assistance using appropriate UE support while demonstrating midline orientation with no posterior lean & equal weight-bearing through bilateral LE's.   7. Lower extremity exercise program x10 reps with assistance as needed for strengthening and endurance with functional mobility.      Outcome: Ongoing (interventions implemented as appropriate)  Patient able to progress toward goals today.  Goals revised.

## 2017-07-31 NOTE — SUBJECTIVE & OBJECTIVE
Interval History: Patient lethargic and once again non verbal. No acute overnight events. Pending placement.    Review of Systems   Unable to perform ROS: Patient nonverbal     Objective:     Vital Signs (Most Recent):  Temp: 97.8 °F (36.6 °C) (07/31/17 0800)  Pulse: (!) 57 (07/31/17 0800)  Resp: 16 (07/31/17 0800)  BP: 97/63 (07/31/17 0800)  SpO2: 100 % (07/31/17 0800) Vital Signs (24h Range):  Temp:  [97.4 °F (36.3 °C)-98.7 °F (37.1 °C)] 97.8 °F (36.6 °C)  Pulse:  [53-67] 57  Resp:  [16-18] 16  SpO2:  [95 %-100 %] 100 %  BP: ()/(59-64) 97/63     Weight: 56.2 kg (123 lb 14.4 oz)  Body mass index is 22.66 kg/m².    Intake/Output Summary (Last 24 hours) at 07/31/17 1010  Last data filed at 07/31/17 0700   Gross per 24 hour   Intake             1840 ml   Output                0 ml   Net             1840 ml      Physical Exam   Constitutional: She appears well-developed and well-nourished. She appears lethargic. She is easily aroused. No distress.   HENT:   Mouth/Throat: Mucous membranes are dry.   Cardiovascular: Normal rate and regular rhythm.    No murmur heard.  Pulmonary/Chest: Effort normal and breath sounds normal.   Abdominal: Soft. Bowel sounds are normal. She exhibits no distension. There is no tenderness.   PEG in place   Musculoskeletal: Normal range of motion. She exhibits no edema or tenderness.   Neurological: She is easily aroused. She appears lethargic.   Oriented in person   Psychiatric: She is slowed. She is inattentive.       Significant Labs:   BMP:   Recent Labs  Lab 07/31/17  0432         K 3.7      CO2 26   BUN 17   CREATININE 0.5   CALCIUM 9.4   MG 2.0     CBC:   Recent Labs  Lab 07/31/17  0432   WBC 4.12   HGB 9.3*   HCT 27.6*          Significant Imaging: I have reviewed all pertinent imaging results/findings within the past 24 hours.

## 2017-07-31 NOTE — PLAN OF CARE
Problem: SLP Goal  Goal: SLP Goal  Updated SLP goals expected to be met by 8/4:  1. Pt will participate in ongoing assessment of swallow in order to determine safest, least restrictive diet.     SLP goals expected to be met by 7/18:  1. Pt will participate in going assessment of swallow in order to determine safest, least restrictive diet.     Outcome: Ongoing (interventions implemented as appropriate)  Recommend continue NPO at this time with continued alternative means nutrition/ hydration/ medication. Pt appropriate for cup sips thin water x5, no more than 3x per day for pleasure when fully awake and fully upright.     HILDA Rascon, CCC-SLP  [phone]  7/31/2017

## 2017-07-31 NOTE — PROGRESS NOTES
[facility] Hospital Medicine  Progress Note    Patient Name: Joanna Morales  MRN: [id number]  Patient Class: IP- Inpatient   Admission Date: 6/28/2017  Length of Stay: 33 days  Attending Physician: Madalyn Montesinos MD  Primary Care Provider: Claudy Tse MD    [facility] Medicine Team: [facility] HOSP MED L Madalyn Montesinos MD    Subjective:     Principal Problem:Hypernatremia    HPI:  Ms. Joanna Morales is a 51 y.o. female with tobacco abuse, secondary hypothyroidism (TSH 0.241 Jun 2017), and history of meningioma s/p resection complicated by diabetes insipidus who presents to [facility] ED from [facility] after being found with abnormal labwork.  She had been more altered in the last day or so and upon checking labwork, she was noted to be with hypernatremia.  She contributed very minimally to her history but does report some abdominal pain.  Of note, she was recently admitted to this facility under the Neurosurgery service for resection of a meningioma that was complicated by diabetes insipidus for which she received desmopressin a few times before being discharged to rehabilitation at [facility].  Further history is limited at this time.    Hospital Course:  Ms. Morales was discharged from [facility]  to [facility] on 6/26/17 and presented to [facility] ED on 6/28 with altered mental status and worsening hypernatremia. According to the patient's sitter, the patient was awake, verbal, and interactive with therapy on 6/23 but had been progressively less interactive and responsive when she was at [place]. Ms. Morales was admitted to Hospital Medicine but was found to be unresponsive with a sodium level of 168 on the morning of 6/29.      She has been followed by endocrinology and neurology and workup thus far significant for  unremarkable UA, neuro-imaging c/w recent left sphenoidal meningioma resection with resolution of blood products and trace intraventricular  hemorrhage.  Previous EEG (6/16/17) demonstrated Moderate diffuse slowing of the overall background as described above, Superimposed left hemispheric slowing relative to the right, and NO epileptiform discharges or seizures were seen c/w ENCP and focal cortical dysfunction.  Pt's presentation is concerning for multi-factorial encephalopathy given persistent hypernatremia.  Per neurology, low suspicion for MNG currently, but patient is at high risk given recent intracranial procedure.     Critical Care consulted 7/17/17 for acute hypoxic respiratory failure. Pt had PEG tube placed earlier today and in PACU, oxygen saturations noted to be in the mid 80's. Pt was placed on a non-rebreather with minimal improvement and subsequently placed on BiPAP. ABG significant for elevated CO2 however pt was compensating. Repeat ABG showed pH 7.372, CO2 44, O2 62, HCO3 25.8. Pt transitioned to NC. Cxray concerning for aspiration pneumonia - new infiltrate in RLL so patient started on vancomycin and zosyn for HAP. She was then switched to Vanc/Cefepime/Acyclovir. Had significant elevation in WBC count 7/18 and Needed levophed to bring BP up for the night. Switched nasal DDAVP to IV DDAVP which has normalized her sodium levels. LP performed 7/20; consistent with a viral process. BP has been stable off pressors; leukocytosis resolved. Patient more alert.    Patient stepped down to Hospital Medicine team L on 7/22 pending viral etiology work up. She remained lethargic and minimally participatory despite correction of metabolic derangements. Desmopressin therapy was resumed as per Endocrinology service recommendations. Enteral feedings changed to bolus with free water in order to prevent patient pulling her PEG tube. Hypernatremia continued to improve slowly with free water replacement and optimization of desmopressin therapy.    Hyponatremia resolved and patient medically stable for discharge pending securement of placement.    Interval  History: Patient lethargic and once again non verbal. No acute overnight events. Pending placement.    Review of Systems   Unable to perform ROS: Patient nonverbal     Objective:     Vital Signs (Most Recent):  Temp: 97.8 °F (36.6 °C) (07/31/17 0800)  Pulse: (!) 57 (07/31/17 0800)  Resp: 16 (07/31/17 0800)  BP: 97/63 (07/31/17 0800)  SpO2: 100 % (07/31/17 0800) Vital Signs (24h Range):  Temp:  [97.4 °F (36.3 °C)-98.7 °F (37.1 °C)] 97.8 °F (36.6 °C)  Pulse:  [53-67] 57  Resp:  [16-18] 16  SpO2:  [95 %-100 %] 100 %  BP: ()/(59-64) 97/63     Weight: 56.2 kg (123 lb 14.4 oz)  Body mass index is 22.66 kg/m².    Intake/Output Summary (Last 24 hours) at 07/31/17 1010  Last data filed at 07/31/17 0700   Gross per 24 hour   Intake             1840 ml   Output                0 ml   Net             1840 ml      Physical Exam   Constitutional: She appears well-developed and well-nourished. She appears lethargic. She is easily aroused. No distress.   HENT:   Mouth/Throat: Mucous membranes are dry.   Cardiovascular: Normal rate and regular rhythm.    No murmur heard.  Pulmonary/Chest: Effort normal and breath sounds normal.   Abdominal: Soft. Bowel sounds are normal. She exhibits no distension. There is no tenderness.   PEG in place   Musculoskeletal: Normal range of motion. She exhibits no edema or tenderness.   Neurological: She is easily aroused. She appears lethargic.   Oriented in person   Psychiatric: She is slowed. She is inattentive.       Significant Labs:   BMP:   Recent Labs  Lab 07/31/17  0432         K 3.7      CO2 26   BUN 17   CREATININE 0.5   CALCIUM 9.4   MG 2.0     CBC:   Recent Labs  Lab 07/31/17  0432   WBC 4.12   HGB 9.3*   HCT 27.6*          Significant Imaging: I have reviewed all pertinent imaging results/findings within the past 24 hours.    Assessment/Plan:      Acute encephalopathy    Likely multifactorial in nature; ICU delirium, hypernatremia, and perhaps prior surgical  procedures. No evidence of HSV encephalitis. Lethargic and non verbal again. Likely new baseline.  1. Delirium precautions.  2. Mittens to prevent scratching of face.  3. Discharge to NH once bed is secured.          Primary central diabetes insipidus    See above.          * Hypernatremia    Secondary to central DI. Appears to have resolved.  1. Desmopressin 400 mcg nightly.  2. Monitor I&O and sodium levels.   3. Free water bolus 4 times daily: 190 ml.          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    1. Keppra for seizure prophylaxis.          Malnutrition of moderate degree    S/P PEG placement on 7/17.  1. Continue enteral tube feedings.          Secondary hypothyroidism    Stable.  1. Continue levothyroxine. Dose optimized to 75 mcg on 7/27.        Hypercalcemia    Stable. Likely due to poor activity. Improved with administration of pamidronate on 7/27.  1. PT/OT.  2. Monitor calcium level.        Oropharyngeal dysphagia    Evaluated by SLP.  1. Continue NPO.  2. SLP to continue working with patient.          Discharge planning issues    Patient accepted to institution however son unreachable and friend with financial information out of town.  1. Transfer to facility once family able to arrange financial information.          Aspiration pneumonia    Patient completed 7 days of cefepime therapy.          Oral phase dysphagia    Failed multiple ST trials, needs PEG, MS changes interferring with eating.        Palliative care encounter    Non-hospice Palliative Care:   Does patient have Capacity? no  Goals-   7/15 - discussed LTC plan with her son. Need consent for PEG  7/13 - discussed care with  Reina Hernandez  -  [phone], pt's POA.    This is turning into a long post operative  course, with inability to nourish patient.  Reina agreed to PEG tube.  Pt needs regular water intake, with mental status changes pulls out NG tube frequently..  NG could be contributing to delirium as well.  willl consult GI  Code Status- FULL  Pain management- stable, no pain  Prognosis-  Poor given numerous complications, but too soon to give up.  Would like another neurology eval, give effort another 1-2 months at least. Discussed prognosis toni Huang on 7/13.  Family discussions- 7/13 - Called Riena Hernandez  -  [phone]  Functional Status - limited to bed, presently    Post acute care plan:  Rehab if MS improves.              Tobacco abuse    Patient was counseled on smoking cessation and she will be provided a nicotine transdermal patch applied while inpatient.  Will provide additional smoking cessation counseling prior to discharge.        Adverse effect of glucocorticoid or synthetic analogue    Not on steroids presently          Agitation    7/13 - stable          Brain mass    S/p resection of meningiomas            VTE Risk Mitigation         Ordered     enoxaparin injection 40 mg  Daily     Route:  Subcutaneous        07/21/17 1223     High Risk of VTE  Once      06/30/17 0920     Place sequential compression device  Until discontinued      06/30/17 0920          Madalyn Montesinos MD  Department of Hospital Medicine   [facility]

## 2017-07-31 NOTE — SUBJECTIVE & OBJECTIVE
Interval History:  NAEON.   Denies HAs, N/V, visual changes, weakness, or seizures.        Medications:  Continuous Infusions:   Scheduled Meds:   desmopressin  400 mcg Per G Tube QHS    enoxaparin  40 mg Subcutaneous Daily    levetiracetam oral soln  500 mg Per NG tube BID    levothyroxine  75 mcg Per NG tube Daily     PRN Meds:acetaminophen, bisacodyl, dextrose 50%, glucagon (human recombinant), guaifenesin 100 mg/5 ml, insulin aspart, ipratropium, olanzapine, ondansetron, senna, sodium chloride 0.9%     Review of Systems  Objective:     Weight: 56.2 kg (123 lb 14.4 oz)  Body mass index is 22.66 kg/m².  Vital Signs (Most Recent):  Temp: 97.5 °F (36.4 °C) (07/31/17 1502)  Pulse: 63 (07/31/17 1502)  Resp: 18 (07/31/17 1502)  BP: 99/62 (07/31/17 1502)  SpO2: 100 % (07/31/17 1502) Vital Signs (24h Range):  Temp:  [97.4 °F (36.3 °C)-98.3 °F (36.8 °C)] 97.5 °F (36.4 °C)  Pulse:  [53-63] 63  Resp:  [16-18] 18  SpO2:  [95 %-100 %] 100 %  BP: ()/(56-64) 99/62       Date 07/31/17 0700 - 08/01/17 0659   Shift 1774-6056 5496-1968 4046-7096 24 Hour Total   I  N  T  A  K  E   NG/   920    Shift Total  (mL/kg) 920  (16.4)   920  (16.4)   O  U  T  P  U  T   Shift Total  (mL/kg)       Weight (kg) 56.2 56.2 56.2 56.2                        Gastrostomy/Enterostomy 07/17/17 1520 Percutaneous endoscopic gastrostomy (PEG) LUQ feeding (Active)   Securement sutured to abdomen 7/31/2017  8:00 AM   Interventions Prior to Feeding patency checked 7/31/2017  8:00 AM   Drainage thin 7/21/2017  7:54 PM   Feeding Type bolus 7/31/2017  8:00 AM   Clamp Status/Tolerance clamped;no abdominal discomfort;no abdominal distention;no emesis;no nausea;no restlessness 7/31/2017  8:00 AM   Feeding Action feeding continued 7/26/2017  7:35 AM   Dressing dry and intact 7/31/2017  8:00 AM   Insertion Site dry;no redness;no warmth;no drainage;no tenderness;no swelling 7/31/2017  8:00 AM   Site Care sterile precut T-slit dressing applied 7/27/2017   7:58 AM   Flush/Irrigation flushed w/;water;no resistance met 7/31/2017  8:00 AM   Current Rate (mL/hr) 45 mL/hr 7/26/2017  7:35 AM   Goal Rate (mL/hr) 45 mL/hr 7/26/2017  7:35 AM   Intake (mL) 20 mL 7/20/2017  8:00 PM   Water Bolus (mL) 190 mL 7/31/2017 12:03 PM   Tube Feeding Intake (mL) 270 7/31/2017 12:03 PM   Residual Amount (ml) 0 ml 7/30/2017  6:17 PM       Neurosurgery Physical Exam   General: well developed, no acute distress.   Head: normocephalic. surgical Incision well healed  Neurologic: Alert. Awake. Minimal verbal responses.   GCS: Motor: 6/Verbal: 4 /Eyes: 4 GCS Total: 14  Mental Status: Able to state name. Intermittently follows simple commands.   Cranial nerves: face symmetric, CN II-XII grossly intact.   Eyes: pupils equal, round, reactive to light.  Motor Strength: Moves all extremities spontaneously with good strength and tone. Intermittently follows simple commands.       Significant Labs:    Recent Labs  Lab 07/30/17  0411 07/31/17  0432   GLU 83 104    136   K 4.7 3.7    101   CO2 23 26   BUN 17 17   CREATININE 0.6 0.5   CALCIUM 9.7 9.4   MG 2.1 2.0       Recent Labs  Lab 07/31/17  0432   WBC 4.12   HGB 9.3*   HCT 27.6*        No results for input(s): LABPT, INR, APTT in the last 48 hours.  Microbiology Results (last 7 days)     Procedure Component Value Units Date/Time    Blood culture [[id number]] Collected:  07/20/17 1052    Order Status:  Completed Specimen:  Blood from Peripheral, Upper Arm, Left Updated:  07/25/17 1412     Blood Culture, Routine No growth after 5 days.    Blood culture [[id number]] Collected:  07/20/17 1103    Order Status:  Completed Specimen:  Blood from Peripheral, Upper Arm, Right Updated:  07/25/17 1412     Blood Culture, Routine No growth after 5 days.    CSF culture [[id number]] Collected:  07/20/17 1544    Order Status:  Completed Specimen:  CSF (Spinal Fluid) from CSF Tap, Tube 1 Updated:  07/25/17 0736     CSF CULTURE No Growth     Gram Stain  Result Cytospin indicates:      Moderate WBC's       No organisms seen    Narrative:       On which sequentially labeled tube should this analysis be  performed?->3

## 2017-07-31 NOTE — PLAN OF CARE
Problem: Patient Care Overview  Goal: Plan of Care Review  Outcome: Ongoing (interventions implemented as appropriate)  Patient remained free from falls and physical injury throughout shift. Tele intact. [employer] monitoring system at bedside. Will continue to monitor.

## 2017-07-31 NOTE — ASSESSMENT & PLAN NOTE
-Post-operative diabetes insipidus  -Since she is getting free water boluses in day, will change DDAVP regimen to 400mcg qhs per PEG.  If Na continues to decrease to < 130, will decrease DDAVP to 200mcg qHS.   -Agree with free water boluses 190ml 4x daily with TF boluses.   -Na daily

## 2017-07-31 NOTE — PLAN OF CARE
Nursing home refusing to take patient with mittens, even if to prevent scratching, as it will make them loose one star in their rating. Patient has to be without physical restraints for 72 hours prior to transfer. Discontinue IV in preparation for transfer. Start zyprexa 5 mg daily as needed for agitation.  Madalyn Montesinos MD  [facility] Medicine Staff  [phone]

## 2017-07-31 NOTE — SUBJECTIVE & OBJECTIVE
"Interval HPI:   Overnight events: No acute events overnight.   Eating:   NPO  Nausea: No  Hypoglycemia and intervention: No  Fever: No  TPN and/or TF: Yes  If yes, type of TF/TPN and rate: Isosource 1.5 tanya, 260 mL, q6h; Free water bolus 190 mL q6h    BP (!) 94/56 (BP Location: Left arm, Patient Position: Lying, BP Method: Automatic)   Pulse (!) 58   Temp 97.5 °F (36.4 °C) (Axillary)   Resp 17   Ht 5' 2.01" (1.575 m)   Wt 56.2 kg (123 lb 14.4 oz)   SpO2 100%   Breastfeeding? No   BMI 22.66 kg/m²     Labs Reviewed and Include      Recent Labs  Lab 07/31/17  0432      CALCIUM 9.4      K 3.7   CO2 26      BUN 17   CREATININE 0.5     Lab Results   Component Value Date    WBC 4.12 07/31/2017    HGB 9.3 (L) 07/31/2017    HCT 27.6 (L) 07/31/2017    MCV 94 07/31/2017     07/31/2017       Recent Labs  Lab 07/27/17  0411   FREET4 0.81     No results found for: HGBA1C    Nutritional status:   Body mass index is 22.66 kg/m².  Lab Results   Component Value Date    ALBUMIN 2.3 (L) 07/18/2017    ALBUMIN 3.0 (L) 07/17/2017    ALBUMIN 3.0 (L) 07/16/2017     Lab Results   Component Value Date    PREALBUMIN 26 06/27/2017       Estimated Creatinine Clearance: 105.3 mL/min (based on Cr of 0.5).    Accu-Checks  Recent Labs      07/29/17   0556  07/29/17   1236  07/29/17   1654  07/29/17   2100  07/30/17   0813  07/30/17   1205  07/30/17   1726  07/30/17   2119  07/31/17   0753  07/31/17   1202   POCTGLUCOSE  86  92  78  90  95  87  88  146*  143*  75       Current Medications and/or Treatments Impacting Glycemic Control  Immunotherapy:  Immunosuppressants     None        Steroids:   Hormones     Start     Stop Route Frequency Ordered    07/27/17 2100  desmopressin tablet 400 mcg      -- PER G TUBE Nightly 07/27/17 0849        Pressors:    Autonomic Drugs     None        Hyperglycemia/Diabetes Medications: Antihyperglycemics     Start     Stop Route Frequency Ordered    06/29/17 1922  insulin aspart pen " 0-5 Units      -- SubQ Every 6 hours PRN 06/29/17 1825

## 2017-07-31 NOTE — MEDICAL/APP STUDENT
[facility]  Endocrinology  Progress Note       Consult Requested by: Madalyn Montesinos MD   Reason for admit: Hypernatremia  Admit Date: 6/28/2017   Surgical Procedure and Date: Craniotomy (meningioma resection revision 06/28/17)    Reason for Consult: Management of hypernatremia, CDI     HPI:    Joanna Morales is a 51 y.o. female with a current diagnosis of frontal lobe meningioma s/p resection (6/28/17) with complications of DI following surgery.  Pt initially presented with HA and B/L vision loss, R>L in 2015.  Underwent partial resection Jan 2015 but developed a recurrence of sxs in 2016 and additional scans showed lesion recurrence; revision of resection performed in June 2017.  She was followed by endocrinology during her stay in the [facility] in June for post-op diabetes insipidus and for concerns about secondary hypothyroidism and adrenal insufficiency.  She was discharged to [facility] on 6/26/17.  She presented to [facility] on 6/28/17 for AMS after being found unresponsive with Na level of 168 on presentation and admitted to the ICU for hypoxic respiratory failure and HAP.  She is now being followed by endocrinology for her severe hypernatremia and DI.  During this stay her nasal DDAVP was switched to oral initially and her oral dose was changed from 200mcg BID to 400mcg nightly; her Na has been wnl since the change on 7/27/17. Her levothyroxine was increased to 75mcg from 50mcg to increase her FT4 levels closer to the upper-limit of nml.    Interval Hx:  NAEON per RN.  Pt unable to contribute much information to history owing to altered mental status but is able to answer direct yes/no questions.  She denies any pain.  Denies thirst.  No dysuria or complaints of polyuria.  Per primary team pt has been accepted to nursing home and is ready for d/c.    Pt currently receiving 400mcg DDAVP nightly; last dose at 2120 yesterday (7/30/17)  Na+ 136 today  UOP currently not measured; pt  "is wearing adult diaper.  Intake of free water boluses 270mL q4h via PEG tube; total intake over 24h of 1380mL.       Nutritional status:   Body mass index is 22.66 kg/m².  Lab Results   Component Value Date    ALBUMIN 2.3 (L) 07/18/2017    ALBUMIN 3.0 (L) 07/17/2017    ALBUMIN 3.0 (L) 07/16/2017     Lab Results   Component Value Date    PREALBUMIN 26 06/27/2017         ROS:  Unable to obtain due to: Altered mental status    REVIEW OF SYSTEMS  Endocrine: Negative for polyuria, polydipsia.  Musculoskeletal: Positive for neck pain (stiffness with movement)    Steroids:   Hormones     Start     Stop Route Frequency Ordered    07/27/17 2100  desmopressin tablet 400 mcg      -- PER G TUBE Nightly 07/27/17 0849          Hyperglycemia/Diabetes Medications: Antihyperglycemics     Start     Stop Route Frequency Ordered    06/29/17 1922  insulin aspart pen 0-5 Units      -- SubQ Every 6 hours PRN 06/29/17 1822        PHYSICAL EXAMINATION  BP (!) 94/56 (BP Location: Left arm, Patient Position: Lying, BP Method: Automatic)   Pulse (!) 58   Temp 97.5 °F (36.4 °C) (Axillary)   Resp 17   Ht 5' 2.01" (1.575 m)   Wt 56.2 kg (123 lb 14.4 oz)   SpO2 100%   Breastfeeding? No   BMI 22.66 kg/m²   Constitutional:  Lying in bed, surgical scar well-healed, clean, and dry.  No acute distress.  ENT: normal hearing.   Neck:  Supple; no midline spinal tenderness. FROM.  Cardiovascular: Normal heart sounds.     Lungs:  Normal work of breathing.  MS: Unable to assess gait.  FROM of upper and lower extremities.  Skin: Well-healing craniotomy scar; clean and dry without discharge.  Neurological: Oriented to person only.  Pupils unreactive B/L and 5mm.  Visual acuity: Subjective light seen on L side, no response on R.      Labs Reviewed and Include     Recent Labs  Lab 07/31/17  0432      CALCIUM 9.4      K 3.7   CO2 26      BUN 17   CREATININE 0.5     Lab Results   Component Value Date    WBC 4.12 07/31/2017    HGB 9.3 " (L) 07/31/2017    HCT 27.6 (L) 07/31/2017    MCV 94 07/31/2017     07/31/2017       Recent Labs  Lab 07/27/17  0411   FREET4 0.81       Lab Results   Component Value Date    PTHrp <0.2 07/26/17    TSH 0.241 (L) 06/28/17    T4 Total 7.1 06/27/17    Free T4 0.81 07/27/17    PTH 29.0 07/11/17    Vit D, 25-hydroxy 28 (L) 07/11/17    Vit D, 1,25-Dihydroxy 8 (LL) 07/26/17       Serum Protein (07/26/17): 7.8     Accu-Checks  Recent Labs      07/28/17   2118  07/29/17   0556  07/29/17   1236  07/29/17   1654  07/29/17   2100  07/30/17   0813  07/30/17   1205  07/30/17   1726  07/30/17   2119  07/31/17   0753   POCTGLUCOSE  89  86  92  78  90  95  87  88  146*  143*        ASSESSMENT and PLAN  Joanna Morales is a 51 y.o. female admitted for hypernatremia (Na= 168) and AMS s/p meningioma resection and subsequent development of post-operative central diabetes insipidus and secondary hypothyroidism. Pt's Na wnl at 136 today; she is receiving 400mcg levothyroxine nightly with her last dose at 2120 last NOC.  All labs wnl today and she has no complaints; will be d/c'd to nursing home per primary team.    1. Primary central diabetes insipidus, post-operative  Pt still receiving 270mL free water boluses via PEG; last dose DDAVP 2120 last NOC.  Na 136 today.    --continue DDAVP 400mcg PO nightly  --270mL bolus q6h per PEG  --daily Na checks  --Monitor I&O for decreased intake or increased output        2. Secondary hypothyroidism  Free T4 0.81 on 6/27/17; currently wnl on current dose of levothyroxine.  --Continue levothyroxine 75mcg daily in nursing home  --Repeat free T4 levels in 6 weeks to check for medication adjustment    3. Hypercalcemia (resolved)   Ca 9.4 today with low normal PTH (29.0) this is most likely 2/2 immobilization over other causes (hypervitaminosis A, thyrotoxicosis, multiple myeloma)  PTHrp <0.2, 1.25 vitamin D 8.0; SPEP 7.8.  IV pamidronate given 6/28/17 60mg x1.  --Avoid dehydration  --Activity  as tolerated with PT/OT    Disposition: D/c to nursing home ?today    The above will be discussed with the endocrinology team and changed as needed.       Nurys Bowles, MS4  Endocrinology  -[facility]

## 2017-07-31 NOTE — PLAN OF CARE
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]  Yes     Priority: 3     Meningioma, recurrent of brain [D32.0]  Yes     Priority: 4     Malnutrition of moderate degree [E44.0]  No     Priority: 5     Secondary hypothyroidism [E03.8]  Yes     Priority: 6      Chronic    Hypercalcemia [E83.52]  Yes     Priority: 7     Oropharyngeal dysphagia [R13.12]  Yes     Priority: 8     Discharge planning issues [Z02.9]  Not Applicable     Priority: 9     Acquired neurogenic diabetes insipidus [E23.2]  Yes    Acute hypoxemic respiratory failure [J96.01]  Clinically Undetermined    Tobacco abuse [Z72.0]  Yes     Chronic      Resolved Hospital Problems    Diagnosis Date Resolved POA    Hypothermia [T68.XXXA] 07/03/2017 No    Goals of care, counseling/discussion [Z71.89] 07/03/2017 Not Applicable    Hypernatremia [E87.0] 07/13/2017 Yes       Patient is homebound due to:  Hypernatremia    Allergies:  Review of patient's allergies indicates:   Allergen Reactions    Codeine        Vitals:       Routine, once monthly       Diet: Isosource 1.5 tanya, full strength     Tube Feeding:      - Bolus 270 ml  Every 6 hours    - Enteral feeding water bolus: 190 ml four times daily.    Acitivities:      - Up in a chair each morning as tolerated    LABS:  Per facility protocol   CMP, CBC each weekly 3 months    Nursing Precautions:     - Aspiration precautions:             - Total assistance with meals            -  Upright 90 degrees  befor during and after meals             -  Suction at bedside          - Fall precautions per nursing home protocol   - Seizure precaution per senior living protocol   - Decubitus precautions:        -  for positioning   - Pressure reducing foam mattress   - Turn patient every two hours. Use wedge pillows to anchor patient    CONSULTS:     Physical Therapy to evaluate and treat     Occupational Therapy to evaluate and treat     Speech Therapy  to evaluate and treat     Nutrition to evaluate and recommend diet     Psychiatry to evaluate and follow patients for delirium    MISCELLANEOUS CARE:               PEG Care:  Clean site every 24 hours     Routine Skin for Bedridden Patients:  Apply moisture barrier cream to all    skin folds and wet areas in perineal area daily and after baths and                           all bowel movements.      Medications: Discontinue all previous medication orders, if any. See new list below.     Joanna Morales [facility] Medication Instructions NIMO:[id number]    Printed on:07/31/17 1006   Medication Information                      acetaminophen (TYLENOL) 500 MG tablet  Take 1 tablet (500 mg total) by mouth every 6 (six) hours as needed.             desmopressin (DDAVP) 0.2 MG tablet  2 tablets (400 mcg total) by Per G Tube route every evening.             EUCALYPTUS OIL/MENTHOL/CAMPHOR (VICKS VAPORUB TOP)  Apply topically daily as needed.             levetiracetam oral soln 500 mg/5 mL (5 mL) Soln  5 mLs (500 mg total) by Per NG tube route 2 (two) times daily.             levothyroxine (SYNTHROID) 75 MCG tablet  Take 1 tablet (75 mcg total) by mouth once daily.                       _________________________________  Madalyn Montesinos MD  07/31/2017

## 2017-08-01 NOTE — MEDICAL/APP STUDENT
[facility]  Endocrinology  Progress Note        Consult Requested by: Madalyn Montesinos MD   Reason for admit: Hypernatremia  Admit Date: 6/28/2017   Surgical Procedure and Date: Craniotomy (meningioma resection revision 06/28/17)     Reason for Consult: Management of hypernatremia, CDI     HPI:     Joanna Morales is a 51 y.o. female with a current diagnosis of frontal lobe meningioma s/p resection (6/28/17) with complications of DI following surgery.  Pt initially presented with HA and B/L vision loss, R>L in 2015.  Underwent partial resection Jan 2015 but developed a recurrence of sxs in 2016 and additional scans showed lesion recurrence; revision of resection performed in June 2017.  She was followed by endocrinology during her stay in the [facility] in June for post-op diabetes insipidus and for concerns about secondary hypothyroidism and adrenal insufficiency.  She was discharged to [facility] on 6/26/17.  She presented to [facility] on 6/28/17 for AMS after being found unresponsive with Na level of 168 on presentation and admitted to the ICU for hypoxic respiratory failure and HAP.  She is now being followed by endocrinology for her severe hypernatremia and DI.  During this stay her nasal DDAVP was switched to oral initially and her oral dose was changed from 200mcg BID to 400mcg nightly; her Na has been wnl since the change on 7/27/17. Her levothyroxine was increased to 75mcg from 50mcg to increase her FT4 levels closer to the upper-limit of nml.    Interval Hx:   Overnight events:  No acute.  Per RN patient slept this morning and is more oriented than last night but continues to be confused.  2 wet diaper overnight, receiving 190mL free water bolus and 270mL nutrition q6h.  Pt out of bed and walking for the first time today with PT/OT.  Per SLP pt permitted sips of fluids for pleasure but is still unable to tolerate PO diet.    Eating:   NPO  Nausea: No  Hypoglycemia and  "intervention: No  Fever: No  TPN and/or TF: Yes  If yes, type of TF/TPN and rate: 270mL q6h    ROS:  ROS limited d/t patient's mental status  Pt c/o generalized aches when asked about pain but no specific location.  Per RN pt has urinated 2-3x in the past 24h and is not complaining of thirst.  She has been having problems moving her bowels but had a BM this AM after senna.  No other complaints.    /69 (BP Location: Left arm, Patient Position: Lying, BP Method: Automatic)   Pulse (!) 50   Temp 97.5 °F (36.4 °C) (Axillary)   Resp 16   Ht 5' 2.01" (1.575 m)   Wt 56.2 kg (123 lb 14.4 oz)   SpO2 100%   Breastfeeding? No   BMI 22.66 kg/m²     Labs Reviewed and Include      Recent Labs  Lab 08/01/17  0535      CALCIUM 9.4   *   K 3.9   CO2 30*   CL 97   BUN 18   CREATININE 0.5   MG     3.7  PHOS     2.1    Recent Labs  Lab 07/27/17  0411   FREET4 0.81       Nutritional status:   Body mass index is 22.66 kg/m².  Lab Results   Component Value Date    ALBUMIN 2.3 (L) 07/18/2017    ALBUMIN 3.0 (L) 07/17/2017    ALBUMIN 3.0 (L) 07/16/2017     Lab Results   Component Value Date    PREALBUMIN 26 06/27/2017     Accu-Checks  Recent Labs      07/29/17   2100  07/30/17   0813  07/30/17   1205  07/30/17   1726  07/30/17   2119  07/31/17   0753  07/31/17   1202  07/31/17   1802  07/31/17   2103  08/01/17   0740   POCTGLUCOSE  90  95  87  88  146*  143*  75  95  122*  140*     Steroids:   Hormones     Start     Stop Route Frequency Ordered    07/27/17 2100  desmopressin tablet 400 mcg      -- PER G TUBE Nightly 07/27/17 0849        Hyperglycemia/Diabetes Medications: Antihyperglycemics     Start     Stop Route Frequency Ordered    06/29/17 1922  insulin aspart pen 0-5 Units      -- SubQ Every 6 hours PRN 06/29/17 1822        ASSESSMENT and PLAN  Joanna Morales is a 51 y.o. female admitted for hypernatremia (Na= 168) and AMS s/p meningioma resection and subsequent development of post-operative central " diabetes insipidus and secondary hypothyroidism. Pt's Na 134 today; she is receiving 400mcg DDAVP nightly with her last dose at 2050 last NOC.  She has no complaints; VSS.  Voiding urine and receiving scheduled fluids.     1. Primary central diabetes insipidus, post-operative  Pt still receiving 190mL free water boluses via PEG and 270mL nutrition q6h; last dose DDAVP 2050 last NOC.  Na 134 today.    --continue DDAVP 400mcg PO nightly; will decrease to 200mcg daily if Na continues to decline and drops <132  --190mL bolus q6h per PEG and 270mL TF q6h  --daily Na checks   --Monitor I&O for decreased intake or increased output        2. Secondary hypothyroidism  Free T4 0.81 on 6/27/17; currently wnl on current dose of levothyroxine.  --Continue levothyroxine 75mcg daily in nursing home  --Repeat free T4 levels in 6 weeks to check for medication adjustment     3. Hypercalcemia (resolved)  Repeated Ca 9.4 again today with low normal PTH (29.0) this is most likely 2/2 immobilization over other causes (hypervitaminosis A, thyrotoxicosis, multiple myeloma)  PTHrp <0.2, 1.25 vitamin D 8.0; SPEP 7.8.  IV pamidronate given 6/28/17 60mg x1.  Pt out of bed today with PT/OT.  --Avoid dehydration (but beware over-hydration with regards to maintaining Na levels)  --Activity as tolerated with PT/OT     Disposition: D/c to nursing home once accepted     The above will be discussed with the endocrinology team and changed as needed.        Nurys Bowles, MS4  Endocrinology  [facility]

## 2017-08-01 NOTE — PLAN OF CARE
Problem: Patient Care Overview  Goal: Plan of Care Review  Outcome: Ongoing (interventions implemented as appropriate)  Pt is free from injury and falls during shift. Non verbal signs of pain absent. Oriented to self only. Vitals stable. Blood sugar checked per orders during shift- remained WLN. Bolus feedings/water boluses given as ordered during shift- pt tolerated well. Pt able to reposition self frequently throughout shift- skin remains intact. AVASYS at beside. Bed is in low position with breaks locked. Side rails are up x 2. Environment is clutter free. Call light is within reach of the patient. Will continue to monitor.

## 2017-08-01 NOTE — PROGRESS NOTES
Progress Note  Hospital Medicine    Primary Team: [facility] HOSP MED L  Admit Date: 6/28/2017   Length of Stay:  LOS: 34 days   SUBJECTIVE:   Reason for Admission:  Hypernatremia    HPI:  Ms. Morales was discharged from [facility]  to [facility] on 6/26/17 and presented to [facility] ED on 6/28 with altered mental status and worsening hypernatremia. According to the patient's sitter, the patient was awake, verbal, and interactive with therapy on 6/23 but had been progressively less interactive and responsive when she was at [place]. Ms. Morales was admitted to Hospital Medicine but was found to be unresponsive with a sodium level of 168 on the morning of 6/29.      She has been followed by endocrinology and neurology and workup thus far significant for  unremarkable UA, neuro-imaging c/w recent left sphenoidal meningioma resection with resolution of blood products and trace intraventricular hemorrhage.  Previous EEG (6/16/17) demonstrated Moderate diffuse slowing of the overall background as described above, Superimposed left hemispheric slowing relative to the right, and NO epileptiform discharges or seizures were seen c/w ENCP and focal cortical dysfunction.  Pt's presentation is concerning for multi-factorial encephalopathy given persistent hypernatremia.  Per neurology, low suspicion for MNG currently, but patient is at high risk given recent intracranial procedure.     Critical Care consulted 7/17/17 for acute hypoxic respiratory failure. Pt had PEG tube placed earlier today and in PACU, oxygen saturations noted to be in the mid 80's. Pt was placed on a non-rebreather with minimal improvement and subsequently placed on BiPAP. ABG significant for elevated CO2 however pt was compensating. Repeat ABG showed pH 7.372, CO2 44, O2 62, HCO3 25.8. Pt transitioned to NC. Cxray concerning for aspiration pneumonia - new infiltrate in RLL so patient started on vancomycin and zosyn for HAP. She was then switched to  "Vanc/Cefepime/Acyclovir. Had significant elevation in WBC count 7/18 and Needed levophed to bring BP up for the night. Switched nasal DDAVP to IV DDAVP which has normalized her sodium levels. LP performed 7/20; consistent with a viral process. BP has been stable off pressors; leukocytosis resolved. Patient more alert.    Patient stepped down to Hospital Medicine team L on 7/22 pending viral etiology work up. She remained lethargic and minimally participatory despite correction of metabolic derangements. Desmopressin therapy was resumed as per Endocrinology service recommendations. Enteral feedings changed to bolus with free water in order to prevent patient pulling her PEG tube. Hypernatremia continued to improve slowly with free water replacement and optimization of desmopressin therapy.    Interval history:    No acute events overnight.  Pt remains out of mittens, still disoriented. Opens eyes to name.    Review of Systems:  Review of systems not obtained due to patient factors delirium.     OBJECTIVE:     Temp:  [96.8 °F (36 °C)-97.5 °F (36.4 °C)]   Pulse:  [50-63]   Resp:  [16-18]   BP: ()/(56-69)   SpO2:  [99 %-100 %]  Body mass index is 22.66 kg/m².  Intake/Outake:  This Shift:  No intake/output data recorded.    Net I/O past 24h:     Intake/Output Summary (Last 24 hours) at 08/01/17 0930  Last data filed at 08/01/17 0600   Gross per 24 hour   Intake             1840 ml   Output                0 ml   Net             1840 ml             Physical Exam:  Gen- well-developed, well-nourished, NAD.  Lethargic.  Oriented to self only ("we're at [employer]; 1996")  CVS- S1 and S2 present, RRR  Resp- CTA b/l, no work of breathing  Abd- BS+, soft, NT, ND  Ext- no clubbing, cyanosis, or edema    Laboratory:  CBC/Anemia Labs: Coags:      Recent Labs  Lab 07/27/17  0413 07/28/17  0516 07/29/17  0554 07/31/17  0432   WBC 8.61  --  5.55 4.12   HGB 9.9*  --  9.3* 9.3*   HCT 30.7*  --  28.9* 27.6*   *  --  408* " 347   *  --  99* 94   RDW 16.0*  --  16.1* 16.1*   EGOATCCT59  --  1089*  --   --     No results for input(s): INR, APTT in the last 168 hours.    Invalid input(s): PT     Chemistries:     Recent Labs  Lab 07/30/17  0411 07/31/17  0432 08/01/17  0535    136 134*   K 4.7 3.7 3.9    101 97   CO2 23 26 30*   BUN 17 17 18   CREATININE 0.6 0.5 0.5   CALCIUM 9.7 9.4 9.4   MG 2.1 2.0 2.1   PHOS 3.9 4.0 3.7          POCT Glucose: HbA1c:      Recent Labs  Lab 07/30/17  2119 07/31/17  0753 07/31/17  1202 07/31/17  1802 07/31/17  2103 08/01/17  0740   POCTGLUCOSE 146* 143* 75 95 122* 140*    No results found for: HGBA1C      Medications:  Scheduled Meds:   desmopressin  400 mcg Per G Tube QHS    enoxaparin  40 mg Subcutaneous Daily    levetiracetam oral soln  500 mg Per NG tube BID    levothyroxine  75 mcg Per NG tube Daily                             Continuous Infusions:   PRN Meds:.acetaminophen, bisacodyl, dextrose 50%, glucagon (human recombinant), guaifenesin 100 mg/5 ml, insulin aspart, ipratropium, olanzapine, ondansetron, senna, sodium chloride 0.9%     ASSESSMENT/PLAN:     Acute encephalopathy     Likely multifactorial in nature; ICU delirium, hypernatremia, and perhaps prior surgical procedures. No evidence of HSV encephalitis. Lethargic and minimally verbal. Likely new baseline.  1. Delirium precautions.  2. Out of mittens x 24h now  3. Discharge to NH once bed is secured.          Primary central diabetes insipidus     See above.          * Hypernatremia     Secondary to central DI. Appears to have resolved.  1. Desmopressin to 200mcg nightly; appreciate Endocrinology recs  2. Monitor I&O and sodium levels.   3. Free water bolus 4 times daily: 190 ml.          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    1. Keppra for seizure prophylaxis.          Malnutrition of moderate degree     S/P PEG placement on 7/17.  1. Continue enteral tube feedings.          Secondary hypothyroidism     Stable.  1. Continue levothyroxine. Dose optimized to 75 mcg on 7/27.       Hypercalcemia     Stable. Likely due to poor activity. Improved with administration of pamidronate on 7/27.  1. PT/OT.  2. Monitor calcium level.       Oropharyngeal dysphagia     Evaluated by SLP.  1. Continue NPO.  2. SLP to continue working with patient.          Discharge planning issues     Patient accepted to institution however son unreachable and friend with financial information out of town.  1. Transfer to facility once family able to arrange financial information.          Aspiration pneumonia     Patient completed 7 days of cefepime therapy.          Oral phase dysphagia     Failed multiple ST trials, required PEG, MS changes interferring with eating.                                                 Brain mass     S/p resection of meningiomas       DVT ppx- Lovenox  CODE Status- FULL    Dispo- placement pending    Ally Calloway MD  Hospital Medicine Staff

## 2017-08-01 NOTE — PT/OT/SLP PROGRESS
"Speech Language Pathology  Treatment    Joanna Morales   MRN: [id number]   1150/1150 A    Admitting Diagnosis: Hypernatremia    Diet recommendations: Solid Diet Level: NPO  Liquid Diet Level: NPO   - If pt fully awake, pt appropriate for cup sips thin water x5, no more than 3x/day.    - Must be fully awake   - Fully upright   - Small sips   - Slow rate of administering    - Discontinue should coughing/ choking/ throat clearing/ wet vocal quality be observed     SLP Treatment Date: 08/02/17  Speech Start Time: 1054     Speech Stop Time: 1111     Speech Total (min): 17 min       TREATMENT BILLABLE MINUTES:  Treatment Swallowing Dysfunction 17    Has the patient been evaluated by SLP for swallowing? : Yes  Keep patient NPO?: Yes   General Precautions: Standard, aspiration, fall  Current Respiratory Status:  (room air)       Subjective:  Despite extensive cues, pt unable to orient to place, stating place to be "A house."    Pain/Comfort  Pain Rating 1: 0/10  Pain Rating Post-Intervention 1: 0/10    Objective:     Pt asleep upon entry, required moderate verbal stimuli to awaken. Repositioned and HOB raised. Pt oriented to self, however unable to orient to year and place. Pt with minimal vocalizations this session and inc'd lethargy. Pt administered cup sips thin water x3, with dec'd labial seal around cup edge noted x2. Pt observed to complete multiple spontaneous swallows on each trial. Voice remained clear/ dry throughout session. No further trials administered 2/2 lethargy. Pt deemed unsafe to begin clear liquid diet 2/2 fluctuating confusion. Pt educated role of SLP. White board updated. No further questions.     Assessment:  Joanna Morales is a 51 y.o. female with a medical diagnosis of Hypernatremia and presents with oropharyngeal dysphagia.     Discharge recommendations: Discharge Facility/Level Of Care Needs: nursing facility, skilled     Goals:    SLP Goals        Problem: SLP Goal    Goal Priority " Disciplines Outcome   SLP Goal     SLP Unable to achieve outcome(s) by discharge   Description:  Speech Language Pathology Goals  Goals expected to be met by 7/7  1. Patient will successfully participate in ongoing clinical swallow evaluation and tolerate po trials with no overt s/s of aspiration.                  Problem: SLP Goal    Goal Priority Disciplines Outcome   SLP Goal     SLP Ongoing (interventions implemented as appropriate)   Description:  Updated SLP goals expected to be met by 8/4:  1. Pt will participate in ongoing assessment of swallow in order to determine safest, least restrictive diet.     SLP goals expected to be met by 7/18:  1. Pt will participate in going assessment of swallow in order to determine safest, least restrictive diet.                       Plan:   Patient to be seen Therapy Frequency: 5 x/week   Plan of Care expires: 08/26/17  Plan of Care reviewed with: patient  SLP Follow-up?: Yes              HILDA Rascon, CCC-SLP  [phone]  8/1/2017

## 2017-08-01 NOTE — ASSESSMENT & PLAN NOTE
Workup negative for malignant/humoral etiology of hypercalcemia.  Indicative of hypercalcemia of immobilization  PT as tolerated     S/P IV pamidronate 60mg x1 on 7/27

## 2017-08-01 NOTE — PROGRESS NOTES
"[facility]  Endocrinology  Progress Note    Admit Date: 6/28/2017     Reason for Consult: Management of hypernatremia    Admit Date: 6/28/2017      Reason for Consult: Diabetes insipidus      Surgical Procedure and Date:   CRANIOTOMY 6/8/2017           HPI:   Patient is a 51 y.o. female with a diagnosis of  meningioma s/p resection (6/7). 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  She underwent partial resection of the tumor in January 2015 at [facility]. Followup scan done in 2016 showing a continued large meningioma. Endocrine had been previously involved in her care during earlier June hospitalization for post-operative diabetes insipidus and additional concern for secondary adrenal insufficiency and secondary hypothyroidism. Had been discharged to [facility] on 6/26/17. Re-admitted to [facility] 6/[facility]17 for worsening hypernatremia and mental status. Endocrine had been consulted this admission for severe hypernatremia with concerns for diabetes insipidus.             Interval HPI:   Overnight events:  NAEO.  Sodium 134 with morning labs. Still no accurate measures of Is and Os given patient in adult diaper. Receiving TF and free water boluses q6H      /69 (BP Location: Left arm, Patient Position: Lying, BP Method: Automatic)   Pulse (!) 50   Temp 97.5 °F (36.4 °C) (Axillary)   Resp 16   Ht 5' 2.01" (1.575 m)   Wt 56.2 kg (123 lb 14.4 oz)   SpO2 100%   Breastfeeding? No   BMI 22.66 kg/m²       Labs Reviewed and Include      Recent Labs  Lab 08/01/17  0535      CALCIUM 9.4   *   K 3.9   CO2 30*   CL 97   BUN 18   CREATININE 0.5     Lab Results   Component Value Date    WBC 4.12 07/31/2017    HGB 9.3 (L) 07/31/2017    HCT 27.6 (L) 07/31/2017    MCV 94 07/31/2017     07/31/2017       Recent Labs  Lab 07/27/17  0411   FREET4 0.81     No results " found for: HGBA1C    Nutritional status:   Body mass index is 22.66 kg/m².  Lab Results   Component Value Date    ALBUMIN 2.3 (L) 07/18/2017    ALBUMIN 3.0 (L) 07/17/2017    ALBUMIN 3.0 (L) 07/16/2017     Lab Results   Component Value Date    PREALBUMIN 26 06/27/2017       Estimated Creatinine Clearance: 105.3 mL/min (based on Cr of 0.5).    Accu-Checks  Recent Labs      07/29/17   2100  07/30/17   0813  07/30/17   1205  07/30/17   1726  07/30/17   2119  07/31/17   0753  07/31/17   1202  07/31/17   1802  07/31/17   2103  08/01/17   0740   POCTGLUCOSE  90  95  87  88  146*  143*  75  95  122*  140*       Current Medications and/or Treatments Impacting Glycemic Control  Immunotherapy:  Immunosuppressants     None        Steroids:   Hormones     Start     Stop Route Frequency Ordered    07/27/17 2100  desmopressin tablet 400 mcg      -- PER G TUBE Nightly 07/27/17 0849        Pressors:    Autonomic Drugs     None        Hyperglycemia/Diabetes Medications: Antihyperglycemics     Start     Stop Route Frequency Ordered    06/29/17 1922  insulin aspart pen 0-5 Units      -- SubQ Every 6 hours PRN 06/29/17 1822          ASSESSMENT and PLAN    Primary central diabetes insipidus    Post-operative diabetes insipidus    Currently on DDAVP 400mcg qHS per PEG.    Agree with free water boluses 190ml 4x daily with TF boluses.     Since she is getting free water boluses in day, serum Na continues to decrease.  Will decrease DDAVP to 200mcg qHS if sodium continues to decrease, in efforts to avoid hyponatremia.    Na daily            Latahsa Hernandez MD  Endocrinology  [facility]

## 2017-08-01 NOTE — ASSESSMENT & PLAN NOTE
Post-operative diabetes insipidus    Agree with free water boluses 190ml 4x daily with TF boluses.     Currently on DDAVP 400mcg qHS per PEG.  Since she is getting free water boluses in day, serum Na continues to decrease.  Will decrease DDAVP to 200mcg qHS, in efforts to help normalize hyponatremia.    Na daily    Still without accurate measures of Is and Os.

## 2017-08-01 NOTE — SUBJECTIVE & OBJECTIVE
"Interval HPI:   Overnight events:  NAEO.  Sodium 134 with morning labs. Still no accurate measures of Is and Os given patient in adult diaper. Receiving TF and free water boluses q6H      /69 (BP Location: Left arm, Patient Position: Lying, BP Method: Automatic)   Pulse (!) 50   Temp 97.5 °F (36.4 °C) (Axillary)   Resp 16   Ht 5' 2.01" (1.575 m)   Wt 56.2 kg (123 lb 14.4 oz)   SpO2 100%   Breastfeeding? No   BMI 22.66 kg/m²     Labs Reviewed and Include      Recent Labs  Lab 08/01/17  0535      CALCIUM 9.4   *   K 3.9   CO2 30*   CL 97   BUN 18   CREATININE 0.5     Lab Results   Component Value Date    WBC 4.12 07/31/2017    HGB 9.3 (L) 07/31/2017    HCT 27.6 (L) 07/31/2017    MCV 94 07/31/2017     07/31/2017       Recent Labs  Lab 07/27/17  0411   FREET4 0.81     No results found for: HGBA1C    Nutritional status:   Body mass index is 22.66 kg/m².  Lab Results   Component Value Date    ALBUMIN 2.3 (L) 07/18/2017    ALBUMIN 3.0 (L) 07/17/2017    ALBUMIN 3.0 (L) 07/16/2017     Lab Results   Component Value Date    PREALBUMIN 26 06/27/2017       Estimated Creatinine Clearance: 105.3 mL/min (based on Cr of 0.5).    Accu-Checks  Recent Labs      07/29/17   2100  07/30/17   0813  07/30/17   1205  07/30/17   1726  07/30/17   2119  07/31/17   0753  07/31/17   1202  07/31/17   1802  07/31/17   2103  08/01/17   0740   POCTGLUCOSE  90  95  87  88  146*  143*  75  95  122*  140*       Current Medications and/or Treatments Impacting Glycemic Control  Immunotherapy:  Immunosuppressants     None        Steroids:   Hormones     Start     Stop Route Frequency Ordered    07/27/17 2100  desmopressin tablet 400 mcg      -- PER G TUBE Nightly 07/27/17 0849        Pressors:    Autonomic Drugs     None        Hyperglycemia/Diabetes Medications: Antihyperglycemics     Start     Stop Route Frequency Ordered    06/29/17 1922  insulin aspart pen 0-5 Units      -- SubQ Every 6 hours PRN 06/29/17 1822        "

## 2017-08-01 NOTE — PLAN OF CARE
1:33 PM  TENISHA and Dr. Montesinos spoke with pt, sons, and friend (POA) at length yesterday, 7/31/17 regarding nursing home placement. Informed sons and friend that only nursing home to accept pt is [facility]. Family verbalized understanding. Friend stated she would make final decision today.      TENISHA spoke with friend today regarding nursing home placement. Friend stated she was going tomorrow to sign paperwork with [facility] and she has been in contact with Ar[facility]io and Ghazal.     Jodi Royal, HOLDEN   [facility]  Ext [phone]

## 2017-08-01 NOTE — PLAN OF CARE
Michael called Cortney with [facility] who took a look at pt and rec Nsg Home placement.  Possible need for Psych/Nsg Home

## 2017-08-01 NOTE — ASSESSMENT & PLAN NOTE
Post-operative diabetes insipidus    Currently on DDAVP 400mcg qHS per PEG.    Agree with free water boluses 190ml 4x daily with TF boluses.     Since she is getting free water boluses in day, serum Na continues to decrease.  Will decrease DDAVP to 200mcg qHS today, in efforts to avoid hyponatremia.    Na daily

## 2017-08-01 NOTE — PLAN OF CARE
Problem: SLP Goal  Goal: SLP Goal  Updated SLP goals expected to be met by 8/4:  1. Pt will participate in ongoing assessment of swallow in order to determine safest, least restrictive diet.     SLP goals expected to be met by 7/18:  1. Pt will participate in going assessment of swallow in order to determine safest, least restrictive diet.     Outcome: Ongoing (interventions implemented as appropriate)  Recommend continue NPO with continued alternative means nutrition/ hydration/ medication. If pt fully awake, pt appropriate for small cup sips thin water x5, no more than 3x/day.     HILDA Rascon, CCC-SLP  [phone]  8/1/2017

## 2017-08-01 NOTE — PROGRESS NOTES
Pt extremely restless and agitated. Pt screaming out loud, yelling foul language, undressing self, and laying sideways in bed with legs over side rail. Pt redirected by nurse and PCT several times. No evidence of understanding. PRN 5 mg olanzapine per PEG administered. Side rails up, bed low, breaks locked, bed alarm on, AVASYS at bedside. Will continue to monitor.

## 2017-08-01 NOTE — PLAN OF CARE
"Problem: Patient Care Overview  Goal: Plan of Care Review  Pt free from falls/injuries entire shift. Pt disoriented to time, place, situation. Pt agitated and restless throughout entire shift. Can turn herself in bed. No skin breakdown to pressure areas. Did not sleep at all during shift. Constantly trying to get out of bed. Throwing legs over side of bed. Pt is able to walk with assistance to bed side commode but is weak. Thinks she is at home and wants to " go out on the back porch and smoke a cigarette". All ordered meds and food and water bolus given as ordered. Pt had one BM last night. VSS. No s/s distress noted. Bed alarm set. Bed in low-locked position. Avasys at bedside. Will continue to monitor pt      "

## 2017-08-01 NOTE — ASSESSMENT & PLAN NOTE
Goal FT4 mid-upper limit of normal    Continue levothyroxine 75mcg daily, needs to be given at least 1 hr apart from TF to ensure absorption     Repeat FT4 ~9/1

## 2017-08-02 NOTE — ASSESSMENT & PLAN NOTE
Post-operative diabetes insipidus.  While her Na only went up a slight bit, we will recommend contiuing the current treatment regiment.  -Agree with free water boluses 190ml 4x daily with TF boluses.   -We recommend continuing DDAVP 300mcg qHS (ordered), and continue to monitor daily labs.   -Still without accurate measures of Is and Os.

## 2017-08-02 NOTE — PLAN OF CARE
Problem: Fall Risk (Adult)  Goal: Absence of Falls  Patient will demonstrate the desired outcomes by discharge/transition of care.   Outcome: Ongoing (interventions implemented as appropriate)  Patient remained free of falls during shift. Avasys in use in room. Patient reminded to stay in bed.  Bed kept in lowest position.

## 2017-08-02 NOTE — NURSING
Patient vitals accessed during normal rounds. temperature was unable to assess via axillary and anal attempts. Pulse was down to 45.  Blood pressure read 86/52 on monitor and when reassessed manually was 76 systolic with diastolic unable to be heard.  Rapid response was called after doctor was informed of changes.

## 2017-08-02 NOTE — MEDICAL/APP STUDENT
[facility]  Endocrinology  Progress Note    Admit Date: 6/28/2017     Reason for Consult: Management of hypernatremia    Admit Date: 6/28/2017      Reason for Consult: Diabetes insipidus      Surgical Procedure and Date:   CRANIOTOMY 6/8/2017       HPI:     Joanna Morales is a 51 y.o. 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  Pt initially presented with HA and B/L vision loss, R>L in 2015.  Underwent partial resection Jan 2015 but developed a recurrence of sxs in 2016 and additional scans showed lesion recurrence; revision of resection performed in June 2017.  She was followed by endocrinology during her stay in the [facility] in June for post-op diabetes insipidus and for concerns about secondary hypothyroidism and adrenal insufficiency.  She was discharged to [facility] on 6/26/17.  She presented to [facility] on 6/28/17 for AMS after being found unresponsive with Na level of 168 on presentation and admitted to the ICU for hypoxic respiratory failure and HAP.  She is now being followed by endocrinology for her severe hypernatremia and DI.  During this stay her nasal DDAVP was switched to oral initially and her oral dose was changed from 200mcg BID to 400mcg nightly; her Na has been wnl since the change on 7/27/17. Her levothyroxine was increased to 75mcg from 50mcg to increase her FT4 levels closer to the upper-limit of nml.     Interval Hx:   Overnight events:  Hospital medicine consulted this AM at 0500 for bradycardia and hypotension; per PA notes pt's SBP was 87, improved to ~110 after 1L NS bolus.  HR improved from 47 to 60's with IVF. Rectal temp taken at 0800 and resulted at 94.1.  Bear-hugger applied.  Repeat rectal temp performed in the room 95.2.  Bcx, UA, CBC, CMO, lactate, Mg, Phos ordered.  ECG showed NAD.  Pt pulling on lines and attempting to get OOB.  Na 131 this AM.  Urinated 2x this  "morning.  Still on scheduled free water (190mL) and nutrition (270mL) boluses via PEG q6h  Eating:   NPO  Hypoglycemia and intervention: No  Fever: No  TPN and/or TF: Yes  If yes, type of TF/TPN and rate: 270mL q6h    ROS:  ROS limited d/t patient's mental status  Pt shows no visible signs of pain.  Urinating freely into adult briefs.  No complaints of thirst.    /66 (BP Location: Left arm, Patient Position: Lying, BP Method: Automatic)   Pulse 100   Temp (!) 94.1 °F (34.5 °C) (Rectal)   Resp 15   Ht 5' 2.01" (1.575 m)   Wt 56.2 kg (123 lb 14.4 oz)   SpO2 99%   Breastfeeding? No   BMI 22.66 kg/m²     LABS:  Lab Results   Component Value Date    WBC 3.72 (L) 08/02/2017    HGB 8.3 (L) 08/02/2017    HCT 24.7 (L) 08/02/2017    MCV 94 08/02/2017     08/02/2017     CMP  Sodium   Date Value Ref Range Status   08/02/2017 131 (L) 136 - 145 mmol/L Final     Potassium   Date Value Ref Range Status   08/02/2017 4.0 3.5 - 5.1 mmol/L Final     Chloride   Date Value Ref Range Status   08/02/2017 97 95 - 110 mmol/L Final     CO2   Date Value Ref Range Status   08/02/2017 27 23 - 29 mmol/L Final     Glucose   Date Value Ref Range Status   08/02/2017 94 70 - 110 mg/dL Final     BUN, Bld   Date Value Ref Range Status   08/02/2017 17 6 - 20 mg/dL Final     Creatinine   Date Value Ref Range Status   08/02/2017 0.5 0.5 - 1.4 mg/dL Final     Calcium   Date Value Ref Range Status   08/02/2017 9.0 8.7 - 10.5 mg/dL Final     Total Protein   Date Value Ref Range Status   08/02/2017 6.3 6.0 - 8.4 g/dL Final     Albumin   Date Value Ref Range Status   08/02/2017 2.5 (L) 3.5 - 5.2 g/dL Final     Total Bilirubin   Date Value Ref Range Status   08/02/2017 0.1 0.1 - 1.0 mg/dL Final     Comment:     For infants and newborns, interpretation of results should be based  on gestational age, weight and in agreement with clinical  observations.  Premature Infant recommended reference ranges:  Up to 24 hours.............<8.0 mg/dL  Up " to 48 hours............<12.0 mg/dL  3-5 days..................<15.0 mg/dL  6-29 days.................<15.0 mg/dL       Alkaline Phosphatase   Date Value Ref Range Status   08/02/2017 107 55 - 135 U/L Final     AST   Date Value Ref Range Status   08/02/2017 44 (H) 10 - 40 U/L Final     ALT   Date Value Ref Range Status   08/02/2017 32 10 - 44 U/L Final     Anion Gap   Date Value Ref Range Status   08/02/2017 7 (L) 8 - 16 mmol/L Final     Lab Results   Component Value Date    CALCIUM 9.0 08/02/2017    PHOS 3.8 08/02/2017       Recent Labs  Lab 08/02/17  0452   MG 2.1         Recent Labs  Lab 07/27/17  0411   FREET4 0.81       Recent Labs  Lab 08/02/17  0452   TROPONINI <0.006       Recent Labs  Lab 07/29/17  0554 08/02/17  0514   LACTATE 1.0 0.9           ASSESSMENT and PLAN  ASSESSMENT and PLAN  Joanna Morales is a 51 y.o. female admitted for hypernatremia (Na= 168) and AMS s/p meningioma resection and subsequent development of post-operative central diabetes insipidus and secondary hypothyroidism. Pt's Na 131 today; she is receiving 400mcg DDAVP nightly.  She has no complaints; VSS.  Voiding urine and receiving scheduled fluids.     1. Primary central diabetes insipidus, post-operative  Pt still receiving 190mL free water boluses via PEG and 270mL nutrition q6h; last dose DDAVP last NOC.  Na 131 today.    --decrease to 200mcg daily as Na continuing to drop and pt received NS bolus  --190mL bolus q6h per PEG and 270mL TF q6h  --caution with extra fluids as pt hypernatremic  --daily Na checks   --Weigh briefs for estimates of output        2. Secondary hypothyroidism  Free T4 0.81 on 6/27/17; TSH and free T4 ordered per primary team to investigate hypothermia  --Continue levothyroxine 75mcg, will adjust if change in FT4     3. Hypercalcemia (resolved)  Repeated Ca 9.0 today with corrected value of 10.2 which is wnl.  Workup for etiologies of hypercalcemia negative; suspected etiology r/t immobilization.  Pt s/p  one-off dose of pamidronate 60mg dated 07/27/17.  --Activity as tolerated with PT/OT     Disposition: D/c to nursing home once accepted     The above will be discussed with the endocrinology team and changed as needed.          Nurys Bowles, MS4  Endocrinology  [facility]

## 2017-08-02 NOTE — PT/OT/SLP PROGRESS
"Speech Language Pathology  Treatment    Joanna Morales   MRN: [id number]   1150/1150 A    Admitting Diagnosis: Hypernatremia    Diet recommendations: Solid Diet Level: NPO  Liquid Diet Level: NPO   - Pt appropriate for cup sips thin water x5, no more than 3x/day if:   - Fully awake and upright   - NO straws   - Small sips   - Slow rate of administration   - Discontinue if coughing/ choking/ throat clearing/ wet vocal quality observed     SLP Treatment Date: 08/02/17  Speech Start Time: 1442     Speech Stop Time: 1450     Speech Total (min): 8 min       TREATMENT BILLABLE MINUTES:  Treatment Swallowing Dysfunction 8    Has the patient been evaluated by SLP for swallowing? : Yes  Keep patient NPO?: Yes   General Precautions: Standard, aspiration, fall  Current Respiratory Status:  (room air)       Subjective:  "I didn't know my mama was back."    Pain/Comfort  Pain Rating 1: 0/10  Pain Rating Post-Intervention 1: 0/10    Objective:    Pt awake upon entry, however pt observed to be increasingly confused each day this week. HOB raised. Provided with first cup sip touching labial surface as tactile cue to accept trial, pt began talking with no attempt to create labial seal around cup edge to accept bolus. Although pt accepted second cup sip presented in the same manner, immediate cough elicited. No further cup sips attempted. Pt provided with 1/2 tsp pureed apple sauce, however only accepted 1/8 tsp. Inappropriate mastication noted given pureed consistency, cx by munching pattern. No further PO trials attempted this date. Pt educated re: SLP POC. White board updated. No further questions.     Assessment:  Joanna Morales is a 51 y.o. female with a medical diagnosis of Hypernatremia and presents with oropharyngeal dysphagia.     Discharge recommendations: Discharge Facility/Level Of Care Needs: nursing facility, skilled     Goals:    SLP Goals        Problem: SLP Goal    Goal Priority Disciplines Outcome   SLP " Goal     SLP Unable to achieve outcome(s) by discharge   Description:  Speech Language Pathology Goals  Goals expected to be met by 7/7  1. Patient will successfully participate in ongoing clinical swallow evaluation and tolerate po trials with no overt s/s of aspiration.                  Problem: SLP Goal    Goal Priority Disciplines Outcome   SLP Goal     SLP Ongoing (interventions implemented as appropriate)   Description:  Updated SLP goals expected to be met by 8/4:  1. Pt will participate in ongoing assessment of swallow in order to determine safest, least restrictive diet.     SLP goals expected to be met by 7/18:  1. Pt will participate in going assessment of swallow in order to determine safest, least restrictive diet.                       Plan:   Patient to be seen Therapy Frequency: 5 x/week   Plan of Care expires: 08/26/17  Plan of Care reviewed with: patient  SLP Follow-up?: Yes         HILDA Rascon, CCC-SLP  [phone]  8/2/2017

## 2017-08-02 NOTE — PLAN OF CARE
Problem: SLP Goal  Goal: SLP Goal  Updated SLP goals expected to be met by 8/4:  1. Pt will participate in ongoing assessment of swallow in order to determine safest, least restrictive diet.     SLP goals expected to be met by 7/18:  1. Pt will participate in going assessment of swallow in order to determine safest, least restrictive diet.     Outcome: Ongoing (interventions implemented as appropriate)  Recommend continue NPO with alternative means nutrition, hydration, medication. If fully awake, pt appropriate for cup sips thin water x5, no more than 3x/day; discontinue upon coughing/ choking/ throat clearing.     HILDA Rascon, CCC-SLP  [phone]  8/2/2017

## 2017-08-02 NOTE — PROGRESS NOTES
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 Chew and discharged on 6/26/17 to [facility].  She presents with altered mental status and worsening hypernatremia for 1 day. She was treated for DI her last admission and labs are trending up today.  Spoke with Dr. Fairchild at [facility], patient with waxing/waning mental status yesterday, but was appropriate & following some complex commands yesterday.  There was a period yesterday of significant lethargy.  Patient is unable to give history, and information is taken from the chart.     Post-Op Info:  Procedure(s) (LRB):  PLACEMENT-TUBE-PEG (N/A)   16 Days Post-Op     Interval History: no acute events overnight. Speech eval --> nil per os. Sips of water okay    Medications:  Continuous Infusions:   Scheduled Meds:   cefTRIAXone (ROCEPHIN) IVPB  1 g Intravenous Q24H    desmopressin  300 mcg Per G Tube QHS    enoxaparin  40 mg Subcutaneous Daily    levetiracetam oral soln  500 mg Per NG tube BID    levothyroxine  75 mcg Per NG tube Daily     PRN Meds:acetaminophen, bisacodyl, dextrose 50%, glucagon (human recombinant), guaifenesin 100 mg/5 ml, insulin aspart, ipratropium, olanzapine, ondansetron, senna, sodium chloride 0.9%     Review of Systems  Objective:     Weight: 56.2 kg (123 lb 14.4 oz)  Body mass index is 22.66 kg/m².  Vital Signs (Most Recent):  Temp: 97.7 °F (36.5 °C) (08/02/17 1500)  Pulse: 103 (08/02/17 1527)  Resp: 18 (08/02/17 1500)  BP: 99/63 (08/02/17 1500)  SpO2: 100 % (08/02/17 1500) Vital Signs (24h Range):  Temp:  [94.1 °F (34.5 °C)-98.2 °F (36.8 °C)] 97.7 °F (36.5 °C)  Pulse:  [] 103  Resp:  [11-18] 18  SpO2:  [97 %-100 %] 100 %  BP: ()/(54-68) 99/63       Date 08/02/17 0700 - 08/03/17 0659   Shift 8291-7295 7720-5971 1935-9026 24 Hour Total   I  N  T  A  K  E   NG/ 328     Shift Total  (mL/kg) 460  (8.2)   460  (8.2)   O  U  T  P  U  T   Urine  (mL/kg/hr) 500  (1.1)   500    Shift Total  (mL/kg) 500  (8.9)   500  (8.9)   Weight (kg) 56.2 56.2 56.2 56.2                        Gastrostomy/Enterostomy 07/17/17 1520 Percutaneous endoscopic gastrostomy (PEG) LUQ feeding (Active)   Securement sutured to abdomen 8/1/2017  7:40 PM   Interventions Prior to Feeding patency checked 8/1/2017  7:40 PM   Drainage thin 8/1/2017  7:40 PM   Feeding Type bolus 8/1/2017  7:40 PM   Clamp Status/Tolerance clamped;no abdominal distention;no emesis;no nausea;no restlessness 8/1/2017  7:40 PM   Feeding Action feeding continued 7/31/2017  9:00 PM   Dressing dry and intact 8/1/2017  8:03 AM   Insertion Site no warmth;no redness;no drainage;no tenderness;no swelling 8/1/2017  7:40 PM   Site Care sterile precut T-slit dressing applied 7/27/2017  7:58 AM   Flush/Irrigation flushed w/;water;no resistance met 8/1/2017  8:03 AM   Current Rate (mL/hr) 45 mL/hr 7/26/2017  7:35 AM   Goal Rate (mL/hr) 45 mL/hr 7/26/2017  7:35 AM   Intake (mL) 20 mL 7/20/2017  8:00 PM   Water Bolus (mL) 190 mL 8/2/2017 12:00 PM   Tube Feeding Intake (mL) 270 8/2/2017 12:00 PM   Residual Amount (ml) 0 ml 7/30/2017  6:17 PM       Neurosurgery Physical Exam  General: well developed, no acute distress.   Head: normocephalic. surgical Incision well healed  Neurologic: Alert. Awake. Minimal verbal responses.   GCS: Motor: 6/Verbal: 4 /Eyes: 4 GCS Total: 14   Cranial nerves: face symmetric, CN II-XII grossly intact.   Eyes: pupils equal, round, reactive to light.  Motor Strength: Moves all extremities spontaneously with good strength and tone. Intermittently follows simple commands.         Significant Labs:    Recent Labs  Lab 08/01/17  0535 08/02/17  0452    94   * 131*   K 3.9 4.0   CL 97 97   CO2 30* 27   BUN 18 17   CREATININE 0.5 0.5   CALCIUM 9.4 9.0   MG 2.1 2.1       Recent Labs  Lab 08/02/17  0452   WBC 3.72*   HGB 8.3*    HCT 24.7*        Assessment/Plan:     Meningioma, recurrent of brain    50 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  -Pt is neurologically stable  -DI continue current management per Endocrinology.  Na 131 today  -Patient completed 7 day course of cefepime   -Continue neuro checks q4 hours. Notify NSGY for any changes.   -Continue Keppra for seizure prophylaxis  -Continue Lovenox for DVT prophylaxis  -Continue aggressive PT/OT  -LP performed 7/20, NGTD. Likely viral process.   -Endocrine following for DI.   -Medical management per primary team.  -Dispo: per primary, awaiting retirement NH once out of restraints.   -Plan for 6 week f/u with Dr. Chew upon DC  -Will continue to follow, please call with questions                 Dylan austin MD  Neurosurgery  [facility]

## 2017-08-02 NOTE — PLAN OF CARE
Problem: Physical Therapy Goal  Goal: Physical Therapy Goal  Goals to be met by: 17     Patient will increase functional independence with mobility by performin. Supine to sit with Moderate Assistance  2. Sit to supine with Moderate Assistance  3. Sit to stand transfer with Moderate Assistance- met       Revised: Sit to stand transfer with CGA  4. Gait  x 5 feet with Maximum Assistance using appropriate AD while maintaining midline orientation and no posterior lean. - Partially met       Revised: Gait 50 feet min assist with RW and no LOB.  5. Sitting at edge of bed x10 minutes with Supervision while maintaining midline orientation and performing 3/5 reps of reaching activity outside base of support.   6. Revised: Stand for 2 minutes with contact guard Assistance using appropriate UE support while demonstrating midline orientation with no posterior lean & equal weight-bearing through bilateral LE's.   7. Lower extremity exercise program x10 reps with assistance as needed for strengthening and endurance with functional mobility.           Pt progressing towards goals. All goals remain appropriate at this time.    Mercedes Hernandez, PT, DPT  2017

## 2017-08-02 NOTE — ASSESSMENT & PLAN NOTE
Calcium corrected to 10.2  Workup negative for malignant/humoral etiology of hypercalcemia.  Indicative of hypercalcemia of immobilization  PT as tolerated     S/P IV pamidronate 60mg x1 on 7/27

## 2017-08-02 NOTE — PROGRESS NOTES
Progress Note  Hospital Medicine    Primary Team: [facility] HOSP MED L  Admit Date: 6/28/2017   Length of Stay:  LOS: 35 days   SUBJECTIVE:   Reason for Admission:  Hypernatremia    HPI:  Ms. Morales was discharged from [facility]  to [facility] on 6/26/17 and presented to [facility] ED on 6/28 with altered mental status and worsening hypernatremia. According to the patient's sitter, the patient was awake, verbal, and interactive with therapy on 6/23 but had been progressively less interactive and responsive when she was at [place]. Ms. Morales was admitted to Hospital Medicine but was found to be unresponsive with a sodium level of 168 on the morning of 6/29.      She has been followed by endocrinology and neurology and workup thus far significant for  unremarkable UA, neuro-imaging c/w recent left sphenoidal meningioma resection with resolution of blood products and trace intraventricular hemorrhage.  Previous EEG (6/16/17) demonstrated Moderate diffuse slowing of the overall background as described above, Superimposed left hemispheric slowing relative to the right, and NO epileptiform discharges or seizures were seen c/w ENCP and focal cortical dysfunction.  Pt's presentation is concerning for multi-factorial encephalopathy given persistent hypernatremia.  Per neurology, low suspicion for MNG currently, but patient is at high risk given recent intracranial procedure.     Critical Care consulted 7/17/17 for acute hypoxic respiratory failure. Pt had PEG tube placed earlier today and in PACU, oxygen saturations noted to be in the mid 80's. Pt was placed on a non-rebreather with minimal improvement and subsequently placed on BiPAP. ABG significant for elevated CO2 however pt was compensating. Repeat ABG showed pH 7.372, CO2 44, O2 62, HCO3 25.8. Pt transitioned to NC. Cxray concerning for aspiration pneumonia - new infiltrate in RLL so patient started on vancomycin and zosyn for HAP. She was then switched to  Vanc/Cefepime/Acyclovir. Had significant elevation in WBC count 7/18 and Needed levophed to bring BP up for the night. Switched nasal DDAVP to IV DDAVP which has normalized her sodium levels. LP performed 7/20; consistent with a viral process. BP has been stable off pressors; leukocytosis resolved. Patient more alert.    Patient stepped down to Hospital Medicine team L on 7/22 pending viral etiology work up. She remained lethargic and minimally participatory despite correction of metabolic derangements. Desmopressin therapy was resumed as per Endocrinology service recommendations. Enteral feedings changed to bolus with free water in order to prevent patient pulling her PEG tube. Hypernatremia continued to improve slowly with free water replacement and optimization of desmopressin therapy.    Interval history:    Overnight, pt became bradycardic to 45, hypotensive to 86/52, and hypothermic.  CORE call, assessed by overnight team, bear hugger placed and labs/cultures ordered.  This morning pt is normothermic when keeping bear hugger on, and more verbal than usual.    Review of Systems:  Review of systems not obtained due to patient factors delirium.     OBJECTIVE:     Temp:  [94.1 °F (34.5 °C)-98.2 °F (36.8 °C)]   Pulse:  []   Resp:  [11-17]   BP: ()/(54-68)   SpO2:  [97 %-100 %]  Body mass index is 22.66 kg/m².  Intake/Outake:  This Shift:  I/O this shift:  In: -   Out: 500 [Urine:500]    Net I/O past 24h:     Intake/Output Summary (Last 24 hours) at 08/02/17 1559  Last data filed at 08/02/17 1100   Gross per 24 hour   Intake             1360 ml   Output              500 ml   Net              860 ml             Physical Exam:  Gen- well-developed, well-nourished, NAD  CVS- S1 and S2 present, RRR  Resp- CTA b/l, no work of breathing  Abd- BS+, soft, NT, ND.  Binder over PEG  Ext- no clubbing, cyanosis, or edema    Laboratory:  CBC/Anemia Labs: Coags:      Recent Labs  Lab 07/28/17  0516 07/29/17  0543  07/31/17 0432 08/02/17  0452   WBC  --  5.55 4.12 3.72*   HGB  --  9.3* 9.3* 8.3*   HCT  --  28.9* 27.6* 24.7*   PLT  --  408* 347 289   MCV  --  99* 94 94   RDW  --  16.1* 16.1* 16.4*   [id number] 1089*  --   --   --     No results for input(s): INR, APTT in the last 168 hours.    Invalid input(s): PT     Chemistries:     Recent Labs  Lab 07/31/17  0432 08/01/17  0535 08/02/17  0452    134* 131*   K 3.7 3.9 4.0    97 97   CO2 26 30* 27   BUN 17 18 17   CREATININE 0.5 0.5 0.5   CALCIUM 9.4 9.4 9.0   PROT  --   --  6.3   BILITOT  --   --  0.1   ALKPHOS  --   --  107   ALT  --   --  32   AST  --   --  44*   MG 2.0 2.1 2.1   PHOS 4.0 3.7 3.8          POCT Glucose: HbA1c:      Recent Labs  Lab 08/01/17  1216 08/01/17  1633 08/01/17  2130 08/02/17  0449 08/02/17  1141 08/02/17  1148   POCTGLUCOSE 103 114* 88 109 61* 87    No results found for: HGBA1C      Medications:  Scheduled Meds:   cefTRIAXone (ROCEPHIN) IVPB  1 g Intravenous Q24H    desmopressin  300 mcg Per G Tube QHS    enoxaparin  40 mg Subcutaneous Daily    levetiracetam oral soln  500 mg Per NG tube BID    levothyroxine  75 mcg Per NG tube Daily                             Continuous Infusions:   PRN Meds:.acetaminophen, bisacodyl, dextrose 50%, glucagon (human recombinant), guaifenesin 100 mg/5 ml, insulin aspart, ipratropium, olanzapine, ondansetron, senna, sodium chloride 0.9%     ASSESSMENT/PLAN:     Acute encephalopathy     Likely multifactorial in nature; ICU delirium, hypernatremia, and perhaps prior surgical procedures. No evidence of HSV encephalitis. Lethargic and minimally verbal. Likely new baseline.  1. Delirium precautions.  2. Out of mittens x 48h now  3. Discharge to NH once bed is secured.          Primary central diabetes insipidus     See above.          * Hypernatremia     Secondary to central DI. Appears to have resolved.  1. Desmopressin to 200mcg nightly; appreciate Endocrinology recs  2. Monitor I&O and sodium levels.    3. Free water bolus 4 times daily: 190 ml.          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    1. Keppra for seizure prophylaxis.          Malnutrition of moderate degree     S/P PEG placement on 7/17.  1. Continue enteral tube feedings.          Secondary hypothyroidism     Stable.  1. Continue levothyroxine. Dose optimized to 75 mcg on 7/27.       Hypercalcemia     Stable. Likely due to poor activity. Improved with administration of pamidronate on 7/27.  1. PT/OT.  2. Monitor calcium level.       Oropharyngeal dysphagia     Evaluated by SLP.  1. Continue NPO.  2. SLP to continue working with patient.          Discharge planning issues     Patient accepted to institution however son unreachable and friend with financial information out of town.  1. Transfer to facility once family able to arrange financial information.          Aspiration pneumonia     Patient completed 7 days of cefepime therapy.          Oral phase dysphagia     Failed multiple ST trials, required PEG, MS changes interferring with eating.       Hypothermia  -Unclear etiology; CXR, labs, and UA unremarkable  -Slight leukopenia but lymphocyte predominant  -procalcitonin normal, making bacterial infection less likely  -however continue Rocephin for now until cultures result  -? Endocrine related?  Check AM cortisol                                           Brain mass     S/p resection of meningiomas       DVT ppx- Lovenox  CODE Status- FULL    Dispo- placement pending    Ally Calloway MD  Hospital Medicine Staff

## 2017-08-02 NOTE — SUBJECTIVE & OBJECTIVE
"Interval HPI:   Overnight events:  NAEO. Hypothermic on vitals this morning.  Sodium 131 with morning labs.  Still without accurate measures of Is and Os.  Continues to receive TF and 190cc free water boluses four times per day.     /61 (BP Location: Right arm, Patient Position: Lying, BP Method: Automatic)   Pulse 104   Temp 98.2 °F (36.8 °C) (Oral)   Resp 14   Ht 5' 2.01" (1.575 m)   Wt 56.2 kg (123 lb 14.4 oz)   SpO2 97%   Breastfeeding? No   BMI 22.66 kg/m²     Labs Reviewed and Include      Recent Labs  Lab 08/02/17  0452   GLU 94   CALCIUM 9.0   ALBUMIN 2.5*   PROT 6.3   *   K 4.0   CO2 27   CL 97   BUN 17   CREATININE 0.5   ALKPHOS 107   ALT 32   AST 44*   BILITOT 0.1     Lab Results   Component Value Date    WBC 3.72 (L) 08/02/2017    HGB 8.3 (L) 08/02/2017    HCT 24.7 (L) 08/02/2017    MCV 94 08/02/2017     08/02/2017       Recent Labs  Lab 07/27/17  0411 08/02/17  0452   FREET4 0.81 0.84     No results found for: HGBA1C    Nutritional status:   Body mass index is 22.66 kg/m².  Lab Results   Component Value Date    ALBUMIN 2.5 (L) 08/02/2017    ALBUMIN 2.3 (L) 07/18/2017    ALBUMIN 3.0 (L) 07/17/2017     Lab Results   Component Value Date    PREALBUMIN 26 06/27/2017       Estimated Creatinine Clearance: 105.3 mL/min (based on Cr of 0.5).    Accu-Checks  Recent Labs      07/31/17   1202  07/31/17   1802  07/31/17   2103  08/01/17   0740  08/01/17   1216  08/01/17   1633  08/01/17   2130  08/02/17   0449  08/02/17   1141  08/02/17   1148   POCTGLUCOSE  75  95  122*  140*  103  114*  88  109  61*  87       Current Medications and/or Treatments Impacting Glycemic Control  Immunotherapy:  Immunosuppressants     None        Steroids:   Hormones     Start     Stop Route Frequency Ordered    08/02/17 2100  desmopressin tablet 200 mcg      -- PER G TUBE Nightly 08/02/17 0802        Pressors:    Autonomic Drugs     None        Hyperglycemia/Diabetes Medications: Antihyperglycemics     Start "     Stop Route Frequency Ordered    06/29/17 1922  insulin aspart pen 0-5 Units      -- SubQ Every 6 hours PRN 06/29/17 4282

## 2017-08-02 NOTE — PLAN OF CARE
Problem: Patient Care Overview  Goal: Plan of Care Review  Outcome: Ongoing (interventions implemented as appropriate)  POC reviewed with PT. Pt reminded of fall risk and reminded to call and ask for help before attempting to stand. Pt has Avysys at bedside to prevent pt from getting out of bed unassisted. Pt is turning/repositioning self without difficulty. No s/s of acute distress noted. VSS.

## 2017-08-02 NOTE — PT/OT/SLP PROGRESS
Physical Therapy  Treatment    Joanna Morales   MRN: [id number]   Admitting Diagnosis: Hypernatremia    PT Received On: 08/02/17  PT Start Time: 1256     PT Stop Time: 1320    PT Total Time (min): 24 min       Billable Minutes:  Therapeutic Activity 24    Treatment Type: Treatment  PT/PTA: PT     PTA Visit Number: 1       General Precautions: Standard, aspiration, fall  Orthopedic Precautions: N/A   Braces: N/A    Do you have any cultural, spiritual, Sikhism conflicts, given your current situation?: none stated    Subjective:  Communicated with RN prior to session. Per RN, pt was appropriate for PT.     Pt found supine with bilateral knees in hooklying. Pt confused and disoriented with visual and auditory hallucinations.     Pt stated she had back pain upon standing, but unable to rate. Only mentioned once.          Objective:   Patient found with: telemetry, peripheral IV (Ava[employer] camera; PEG; abdominal binder)    Functional Mobility:  Bed Mobility:  Rolling: Minimal Assistance to the Left  Supine to Sit:  Maximum Assistance requires assist with trunk and B LE's; assist for initiation mvmt  Sit to supine: Maximum Assistance assist with both trunk and LE's  Scooting: total via drawsheet towards HOB     Transfers:   Sit to stand:Moderate Assistance with No Assistive Device from EOB   Posterior lean noted upon standing;     Gait:   Patient ambulated ~ 2 steps fwd/back with Hand held assist with Maximum Assistance.  Pt demonstrated step-to gait with posterior trunk lean and occasional knee buckling.  Required assist with balance, posture, coordination of steps, and knee stability with gait.     Unable to progress gait this visit due to pt's confusion and hallucinations. Pt impulsive with movements and unsafe to ambulate far this visit.     Balance:  Static Sitting: Supervision or Set-up Assistance   Dynamic Sitting: Minimal Assistance   Static Standing: Minimal Assistance   Dynamic Standing: Moderate Assistance        Therapeutic Activities/Exercises:  Performed sit to stand x3 trials during visit.   Pt stated she needed to use the bathroom; pt attempted to transfer to BSC but pt resistant and impulsive. Unsafe to transfer and maintain sitting on BSC, so pt return to supine.       AM-PAC 6 CLICK MOBILITY  How much help from another person does this patient currently need?   1 = Unable, Total/Dependent Assistance  2 = A lot, Maximum/Moderate Assistance  3 = A little, Minimum/Contact Guard/Supervision  4 = None, Modified [place]/Independent    Turning over in bed (including adjusting bedclothes, sheets and blankets)?: 4  Sitting down on and standing up from a chair with arms (e.g., wheelchair, bedside commode, etc.): 2  Moving from lying on back to sitting on the side of the bed?: 2  Moving to and from a bed to a chair (including a wheelchair)?: 2  Need to walk in hospital room?: 2  Climbing 3-5 steps with a railing?: 1  Total Score: 13    AM-PAC Raw Score CMS G-Code Modifier Level of Impairment Assistance   6 % Total / Unable   7 - 9 CM 80 - 100% Maximal Assist   10 - 14 CL 60 - 80% Moderate Assist   15 - 19 CK 40 - 60% Moderate Assist   20 - 22 CJ 20 - 40% Minimal Assist   23 CI 1-20% SBA / CGA   24 CH 0% Independent/ Mod I     Patient left supine with all lines intact, call button in reach and AvaSys camera in place.    Assessment:  Joanna Morales is a 51 y.o. female with a medical diagnosis of Hypernatremia and presents with decrease endurance, coordination, balance, strength, and cognition impairing pt's ability to safely perform functional mobility, transfers and gait without assist.  Pt demonstrated improved movement, but still requires assist to initiate.. Progress towards goals limited/impacted by cognition this visit due to auditory and visual hallucinations.  Pt continuously talking with her mom in regards to getting tapes. Pt would repeatedly talk back and answer hallucinations.   Pt would  benefit from continued skilled physical therapy to address listed impairments, improve functional independence, and maximize safety with mobility.  PT recommends dc disposition to SNF for further PT/OT/SLP intervention to progress safety with mobility.       Education:  Education provided to pt regarding: PT role/POC; daily orientation. Verbalized understanding.     Whiteboard updated with correct mobility information. RN/PCT notified.  Transfer with therapy only at this time.  Pt may be appropriate to transfer to [facility] but requires at least 2 person assist depending on cognition/confusion due to impulsivity. Requires 1 person assist for transfer only; 2nd person only for safety.       Rehab identified problem list/impairments: Rehab identified problem list/impairments: weakness, impaired endurance, impaired self care skills, gait instability, impaired functional mobilty, impaired balance, impaired cognition, visual deficits, impaired coordination, decreased safety awareness, decreased lower extremity function, decreased upper extremity function, impaired fine motor    Rehab potential is good.    Activity tolerance: Fair    Discharge recommendations: Discharge Facility/Level Of Care Needs: nursing facility, skilled     Barriers to discharge: Barriers to Discharge: Decreased caregiver support    Equipment recommendations: Equipment Needed After Discharge:  (TBD pending progress)     GOALS:    Physical Therapy Goals        Problem: Physical Therapy Goal    Goal Priority Disciplines Outcome Goal Variances Interventions   Physical Therapy Goal     PT/OT, PT Ongoing (interventions implemented as appropriate)     Description:  Goals to be met by: 17     Patient will increase functional independence with mobility by performin. Supine to sit with Moderate Assistance  2. Sit to supine with Moderate Assistance  3. Sit to stand transfer with Moderate Assistance- met       Revised: Sit to stand transfer with  CGA  4. Gait  x 5 feet with Maximum Assistance using appropriate AD while maintaining midline orientation and no posterior lean. - Partially met       Revised: Gait 50 feet min assist with RW and no LOB.  5. Sitting at edge of bed x10 minutes with Supervision while maintaining midline orientation and performing 3/5 reps of reaching activity outside base of support.   6. Revised: Stand for 2 minutes with contact guard Assistance using appropriate UE support while demonstrating midline orientation with no posterior lean & equal weight-bearing through bilateral LE's.   7. Lower extremity exercise program x10 reps with assistance as needed for strengthening and endurance with functional mobility.                  Multidisciplinary Problems (Resolved)        Problem: Physical Therapy Goal    Goal Priority Disciplines Outcome Goal Variances Interventions   Physical Therapy Goal   (Resolved)     PT/OT, PT Outcome(s) achieved     Description:  Goals to be met by: 17    Patient will increase functional independence with mobility by performin. Supine to sit with Minimal Assistance  2. Sit to supine with Minimal Assistance  3. Sit to stand transfer with Minimal Assistance met (2017)   Revised: sit to stand transfer with SBA  4. Standing for 3 minutes with Minimal Assistance and weight evenly distributed through (B) LE.   5. Gait  x 20 feet with Moderate Assistance using AD if needed   6. Pt will perform (B) LE therapeutic exercises x 20 reps to improve muscular strength and endurance.                       PLAN:    Patient to be seen 5 x/week  to address the above listed problems via gait training, therapeutic activities, therapeutic exercises, neuromuscular re-education  Plan of Care expires: 17  Plan of Care reviewed with: patient         Mercedes Hernandez, PT  2017

## 2017-08-02 NOTE — SIGNIFICANT EVENT
Significant Event  Hospital Medicine    CC:  Hypernatremia  Date:  08/02/2017  Admit Date:  6/28/2017  Hospital Length of Stay:  35    Code Status:  Full Code     SUBJECTIVE:     Significant Events:  Called urgently to the bedside by nurse to evaluate patient for hypotension and hypothermia.  Unable to obtain rectal temp.  Bear hugger placed a few minutes before rapid was called.  SBP of 91/60, up from 80s with IV bolus.  HR noted to be in high 40s.  ECG showed sinus mary.  Baseline HR appears to mainly be in the 50s.  CBC, CMP, lactic acid, mag, phos ordered.  Glucose unremarkable.        OBJECTIVE:     Physical Exam:  Last Vitals:   Vitals:    08/02/17 0439   BP:    Pulse: (!) 50   Resp:    Temp:      GA:  NAD  HEENT:  PERRL.  No scleral icterus or JVD.   Pulmonary:  Clear to auscultation A/P/L.  No wheezing, crackles, or rhonchi.  Cardiac:  RRR, S1 & S2 w/o rubs/murmurs/gallops.   Abdominal:  Bowel sounds present x 4. No appreciable hepatosplenomegaly.  Neuro:  --Mental Status:  Oriented to person only.    ASSESSMENT AND PLAN/INTERVENTIONS:     1) Hypotension/Hypothermia  Plan/Interventions:  - Bear hugger placed.  - ECG showed sinus mary with HR 47.  Monitor on telemetry.  - CBC, CMP, lactic acid, Mg, Phos pending.  - Blood cultures drawn.  UA ordered.      Uninterrupted Critical Care/Counseling Time (not including procedures):  60 minutes    BLAINE Hernandez, PA-C  Hospital Medicine Department  Staff:  Dr. Grissom

## 2017-08-02 NOTE — PROGRESS NOTES
[facility]  Adult Nutrition  Progress Note    SUMMARY     Recommendations    Recommendation/Intervention:   1. Continue current EN regimen, Isosource 1.5 270mL 4x daily +  mL q 6hr.   2. RD following to ensure tolerance    Goals: Pt to receive >90% of EEN via TF or po intake  Nutrition Goal Status: goal met  Communication of RD Recs: reviewed with RN      Reason for Assessment    Reason for Assessment: RD follow-up  Diagnosis: cancer diagnosis/related complications, other (see comments)  Relevent Medical History: hypothryodism, olfactory grooce meningioma, s/p crani   Interdisciplinary Rounds: did not attend     General Information Comments: Spoke with RN - Pt tolerateing EN regimen, 270 mL 4x/day + 190 mL FWF q 6 hrs.     Nutrition Discharge Planning: tolerance of EN regimen    Nutrition Prescription Ordered    Current Diet Order: NPO  Nutrition Order Comments: -  Current Nutrition Support Formula Ordered: Isosource 1.5  Current Nutrition Support Rate Ordered:  (-)  Current Nutrition Support Frequency Ordered: 270 mL 4x/day        Evaluation of Received Nutrients/Fluid Intake    Enteral Calories (kcal): 1620  Enteral Protein (gm): 73  Enteral (Free Water) Fluid (mL): 840  Free Water Flush Fluid (mL): 880                 Oral Fluid (mL): 0           Energy Calories Required: meeting needs  % Kcal Needs:  100%              Protein Required: meeting needs  % Protein Needs:  100%   IV Fluid (mL): 0         Fluid Required: meeting needs  Comments: I/O noted  Tolerance: tolerating  % Intake of Estimated Energy Needs: 75 - 100 %  % Meal Intake: NPO     Nutrition Risk Screen     Nutrition Risk Screen: dysphagia or difficulty swallowing    Nutrition/Diet History    Patient Reported Diet/Restrictions/Preferences:  (JAVIER)  Typical Food/Fluid Intake: -  Food Preferences: JAVIER        Factors Affecting Nutritional Intake: NPO                Labs/Tests/Procedures/Meds       Pertinent Labs Reviewed:  "reviewed, pertinent  Pertinent Labs Comments: Na 131, alb 2.5  Pertinent Medications Reviewed: reviewed, pertinent  Pertinent Medications Comments: bisacodyl, insulin, zofran    Physical Findings    Overall Physical Appearance: nourished, lethargic  Tubes: gastrostomy tube  Oral/Mouth Cavity:  (JAVIER)  Skin: intact    Anthropometrics    Temp: 98.2 °F (36.8 °C)     Height: 5' 2.01" (157.5 cm)  Weight Method: Bed Scale  Weight: 56.2 kg (123 lb 14.4 oz)     Ideal Body Weight (IBW), Female: 110.05 lb     % Ideal Body Weight, Female (lb): 112.59 lb  BMI (Calculated): 22.7  BMI Grade: 18.5-24.9 - normal                            Estimated/Assessed Needs    Weight Used For Calorie Calculations: 56.2 kg (123 lb 14.4 oz)   Height (cm): 157.5 cm  Energy Calorie Requirements (kcal): 1412 kcal/day (Moran x1.25 PAL)  Energy Need Method:  (1412 kcal/day)        RMR (Moran-St. Jeor Equation): 1130.38        Weight Used For Protein Calculations: 56.2 kg (123 lb 14.4 oz)  Protein Requirements: 56-68 g/d (1.0-1.2 gm/kg)    Fluid Requirements (mL): 1ml/kcal or per MD  Fluid Need Method: RDA Method, other (see comments) (1 mL/kcal or per MD)           RDA Method (mL): 1412               Assessment and Plan    No new Assessment & Plan notes have been filed under this hospital service since the last note was generated.  Service: Nutrition      Nutrition Problem  Inadequate oral intake    Related to (etiology):   Difficulty swallowing 2' AMS     Signs and Symptoms (as evidenced by):   Pt NPO and need for supplemental nutrition     Interventions/Recommendations (treatment strategy):  Continue current EN regimen, following SLP notes for diet advancement when medically able.     Nutrition Diagnosis Status:   New      Monitor and Evaluation    Food and Nutrient Intake: energy intake, enteral nutrition intake  Food and Nutrient Adminstration: enteral and parenteral nutrition administration        Anthropometric Measurements: weight, weight " change, body mass index  Biochemical Data, Medical Tests and Procedures: electrolyte and renal panel, gastrointestinal profile, glucose/endocrine profile, inflammatory profile, lipid profile  Nutrition-Focused Physical Findings: overall appearance    Nutrition Risk    Level of Risk:  (1x/week)    Nutrition Follow-Up    RD Follow-up?: Yes    Calli Junior RDN

## 2017-08-02 NOTE — ASSESSMENT & PLAN NOTE
Post-operative diabetes insipidus    Agree with free water boluses 190ml 4x daily with TF boluses.     Currently on DDAVP 400mcg qHS per PEG.  Since she is getting free water boluses in day, serum Na continues to decrease.  We have considered decreasing DDAVP to 200mcg, but in the past this has caused patient to become hypernatremic.    We recommend decreasing free water bolus quantity to 150cc four times daily, and continue to monitor daily labs.     Na daily    Still without accurate measures of Is and Os.

## 2017-08-02 NOTE — PLAN OF CARE
Core call this am. TIMOTHY planning to [facility]   when medically stable.  Right Care Assessment   Can the patient answer the patient profile reliably? No, cognitively impaired   How often would a person be available to care for the patient? Infrequently   Describe the patient's ability to walk at the present time. Major restrictions/daily assistance from another person   How does the patient rate their overall health at the present time? Poor   Number of comorbid conditions (as recorded on the chart) Five or more   During the past month, has the patient often been bothered by feeling down, depressed or hopeless? No   Have you felt down, depressed, or hopeless? 0   During the past month, has the patient often been bothered by little interest or pleasure in doing things? No   Have you felt little interest or pleasure in doing things? no

## 2017-08-02 NOTE — ASSESSMENT & PLAN NOTE
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  -Pt is neurologically stable  -DI continue current management per Endocrinology.  Na 131 today  -Patient completed 7 day course of cefepime   -Continue neuro checks q4 hours. Notify NSGY for any changes.   -Continue Keppra for seizure prophylaxis  -Continue Lovenox for DVT prophylaxis  -Continue aggressive PT/OT  -LP performed 7/20, NGTD. Likely viral process.   -Endocrine following for DI.   -Medical management per primary team.  -Dispo: per primary, awaiting long term NH once out of restraints.   -Plan for 6 week f/u with Dr. Chew upon DC  -Will continue to follow, please call with questions

## 2017-08-02 NOTE — PROGRESS NOTES
"[facility]  Endocrinology  Progress Note    Admit Date: 6/28/2017     Reason for Consult: Management of hypernatremia    Admit Date: 6/28/2017      Reason for Consult: Diabetes insipidus      Surgical Procedure and Date:   CRANIOTOMY 6/8/2017           HPI:   Patient is a 51 y.o. female with a diagnosis of  meningioma s/p resection (6/7). 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  She underwent partial resection of the tumor in January 2015 at [facility]. Followup scan done in 2016 showing a continued large meningioma. Endocrine had been previously involved in her care during earlier June hospitalization for post-operative diabetes insipidus and additional concern for secondary adrenal insufficiency and secondary hypothyroidism. Had been discharged to [facility] on 6/26/17. Re-admitted to [facility] on 6/28/17 for worsening hypernatremia and mental status. Endocrine had been consulted this admission for severe hypernatremia with concerns for diabetes insipidus.           Interval HPI:   Overnight events:  NAEO. Hypothermic on vitals this morning.  Sodium 131 with morning labs.  Still without accurate measures of Is and Os.  Continues to receive TF and 190cc free water boluses four times per day.     /61 (BP Location: Right arm, Patient Position: Lying, BP Method: Automatic)   Pulse 104   Temp 98.2 °F (36.8 °C) (Oral)   Resp 14   Ht 5' 2.01" (1.575 m)   Wt 56.2 kg (123 lb 14.4 oz)   SpO2 97%   Breastfeeding? No   BMI 22.66 kg/m²      Labs Reviewed and Include       Recent Labs  Lab 08/02/17  0452   GLU 94   CALCIUM 9.0   ALBUMIN 2.5*   PROT 6.3   *   K 4.0   CO2 27   CL 97   BUN 17   CREATININE 0.5   ALKPHOS 107   ALT 32   AST 44*   BILITOT 0.1            Lab Results   Component Value Date     WBC 3.72 (L) 08/02/2017     HGB 8.3 (L) 08/02/2017     HCT 24.7 (L) " 08/02/2017     MCV 94 08/02/2017      08/02/2017         Recent Labs  Lab 07/27/17  0411 08/02/17  0452   FREET4 0.81 0.84      No results found for: HGBA1C     Nutritional status:   Body mass index is 22.66 kg/m².        Lab Results   Component Value Date     ALBUMIN 2.5 (L) 08/02/2017     ALBUMIN 2.3 (L) 07/18/2017     ALBUMIN 3.0 (L) 07/17/2017            Lab Results   Component Value Date     PREALBUMIN 26 06/27/2017         Estimated Creatinine Clearance: 105.3 mL/min (based on Cr of 0.5).     Accu-Checks               Recent Labs      07/31/17   1202  07/31/17   1802  07/31/17   2103  08/01/17   0740  08/01/17   1216  08/01/17   1633  08/01/17   2130  08/02/17   0449  08/02/17   1141  08/02/17   1148   POCTGLUCOSE  75  95  122*  140*  103  114*  88  109  61*  87         Current Medications and/or Treatments Impacting Glycemic Control      Immunotherapy:  Immunosuppressants      None          Steroids:             Hormones      Start     Stop Route Frequency Ordered     08/02/17 2100   desmopressin tablet 200 mcg       -- PER G TUBE Nightly 08/02/17 0802                       Hyperglycemia/Diabetes Medications: Antihyperglycemics      Start     Stop Route Frequency Ordered     06/29/17 1922   insulin aspart pen 0-5 Units       -- SubQ Every 6 hours PRN               ASSESSMENT and PLAN    Primary central diabetes insipidus    Post-operative diabetes insipidus    Agree with free water boluses 190ml 4x daily with TF boluses.     Currently on DDAVP 400mcg qHS per PEG.  Since she is getting free water boluses in day, serum Na continues to decrease.  We have considered decreasing DDAVP to 200mcg, but in the past this has caused patient to become hypernatremic.    We recommend decreasing DDAVP to 300mcg qHS (ordered), and continue to monitor daily labs.     Na daily    Still without accurate measures of Is and Os.             Latasha Hernandez MD  Endocrinology  [facility]

## 2017-08-02 NOTE — SUBJECTIVE & OBJECTIVE
Interval History: no acute events overnight. Speech eval --> nil per os. Sips of water okay    Medications:  Continuous Infusions:   Scheduled Meds:   cefTRIAXone (ROCEPHIN) IVPB  1 g Intravenous Q24H    desmopressin  300 mcg Per G Tube QHS    enoxaparin  40 mg Subcutaneous Daily    levetiracetam oral soln  500 mg Per NG tube BID    levothyroxine  75 mcg Per NG tube Daily     PRN Meds:acetaminophen, bisacodyl, dextrose 50%, glucagon (human recombinant), guaifenesin 100 mg/5 ml, insulin aspart, ipratropium, olanzapine, ondansetron, senna, sodium chloride 0.9%     Review of Systems  Objective:     Weight: 56.2 kg (123 lb 14.4 oz)  Body mass index is 22.66 kg/m².  Vital Signs (Most Recent):  Temp: 97.7 °F (36.5 °C) (08/02/17 1500)  Pulse: 103 (08/02/17 1527)  Resp: 18 (08/02/17 1500)  BP: 99/63 (08/02/17 1500)  SpO2: 100 % (08/02/17 1500) Vital Signs (24h Range):  Temp:  [94.1 °F (34.5 °C)-98.2 °F (36.8 °C)] 97.7 °F (36.5 °C)  Pulse:  [] 103  Resp:  [11-18] 18  SpO2:  [97 %-100 %] 100 %  BP: ()/(54-68) 99/63       Date 08/02/17 0700 - 08/03/17 0659   Shift 0775-0653 6080-9397 7527-5799 24 Hour Total   I  N  T  A  K  E   NG/   460    Shift Total  (mL/kg) 460  (8.2)   460  (8.2)   O  U  T  P  U  T   Urine  (mL/kg/hr) 500  (1.1)   500    Shift Total  (mL/kg) 500  (8.9)   500  (8.9)   Weight (kg) 56.2 56.2 56.2 56.2                        Gastrostomy/Enterostomy 07/17/17 1520 Percutaneous endoscopic gastrostomy (PEG) LUQ feeding (Active)   Securement sutured to abdomen 8/1/2017  7:40 PM   Interventions Prior to Feeding patency checked 8/1/2017  7:40 PM   Drainage thin 8/1/2017  7:40 PM   Feeding Type bolus 8/1/2017  7:40 PM   Clamp Status/Tolerance clamped;no abdominal distention;no emesis;no nausea;no restlessness 8/1/2017  7:40 PM   Feeding Action feeding continued 7/31/2017  9:00 PM   Dressing dry and intact 8/1/2017  8:03 AM   Insertion Site no warmth;no redness;no drainage;no tenderness;no  swelling 8/1/2017  7:40 PM   Site Care sterile precut T-slit dressing applied 7/27/2017  7:58 AM   Flush/Irrigation flushed w/;water;no resistance met 8/1/2017  8:03 AM   Current Rate (mL/hr) 45 mL/hr 7/26/2017  7:35 AM   Goal Rate (mL/hr) 45 mL/hr 7/26/2017  7:35 AM   Intake (mL) 20 mL 7/20/2017  8:00 PM   Water Bolus (mL) 190 mL 8/2/2017 12:00 PM   Tube Feeding Intake (mL) 270 8/2/2017 12:00 PM   Residual Amount (ml) 0 ml 7/30/2017  6:17 PM       Neurosurgery Physical Exam  General: well developed, no acute distress.   Head: normocephalic. surgical Incision well healed  Neurologic: Alert. Awake. Minimal verbal responses.   GCS: Motor: 6/Verbal: 4 /Eyes: 4 GCS Total: 14   Cranial nerves: face symmetric, CN II-XII grossly intact.   Eyes: pupils equal, round, reactive to light.  Motor Strength: Moves all extremities spontaneously with good strength and tone. Intermittently follows simple commands.         Significant Labs:    Recent Labs  Lab 08/01/17  0535 08/02/17  0452    94   * 131*   K 3.9 4.0   CL 97 97   CO2 30* 27   BUN 18 17   CREATININE 0.5 0.5   CALCIUM 9.4 9.0   MG 2.1 2.1       Recent Labs  Lab 08/02/17  0452   WBC 3.72*   HGB 8.3*   HCT 24.7*

## 2017-08-03 NOTE — ASSESSMENT & PLAN NOTE
Post-operative diabetes insipidus.  Her sodium went up to 136 today from 132, but we would like to hold the current course for one more day before making any additional changes.  -Agree with free water boluses 190ml q6h with TF boluses  -Continue DDVAP 100 mg TID

## 2017-08-03 NOTE — ASSESSMENT & PLAN NOTE
-Workup negative for malignant/humoral etiology of hypercalcemia.  -Indicative of hypercalcemia of immobilization  -PT as tolerated  -S/P IV pamidronate 60mg x1 on 7/27

## 2017-08-03 NOTE — PROGRESS NOTES
Progress Note  Hospital Medicine    Primary Team: [facility] [place] [place] HOSP MED L  Admit Date: 6/28/2017   Length of Stay:  LOS: 36 days   SUBJECTIVE:   Reason for Admission:  Hypernatremia    HPI:  Ms. Morales was discharged from [facility] [place] [place]  to [facility] on 6/26/17 and presented to [facility] [place] [place] ED on 6/28 with altered mental status and worsening hypernatremia. According to the patient's sitter, the patient was awake, verbal, and interactive with therapy on 6/23 but had been progressively less interactive and responsive when she was at [place]. Ms. Morales was admitted to Hospital Medicine but was found to be unresponsive with a sodium level of 168 on the morning of 6/29.      She has been followed by endocrinology and neurology and workup thus far significant for  unremarkable UA, neuro-imaging c/w recent left sphenoidal meningioma resection with resolution of blood products and trace intraventricular hemorrhage.  Previous EEG (6/16/17) demonstrated Moderate diffuse slowing of the overall background as described above, Superimposed left hemispheric slowing relative to the right, and NO epileptiform discharges or seizures were seen c/w ENCP and focal cortical dysfunction.  Pt's presentation is concerning for multi-factorial encephalopathy given persistent hypernatremia.  Per neurology, low suspicion for MNG currently, but patient is at high risk given recent intracranial procedure.     Critical Care consulted 7/17/17 for acute hypoxic respiratory failure. Pt had PEG tube placed earlier today and in PACU, oxygen saturations noted to be in the mid 80's. Pt was placed on a non-rebreather with minimal improvement and subsequently placed on BiPAP. ABG significant for elevated CO2 however pt was compensating. Repeat ABG showed pH 7.372, CO2 44, O2 62, HCO3 25.8. Pt transitioned to NC. Cxray concerning for aspiration pneumonia - new infiltrate in RLL so patient started on vancomycin and zosyn for HAP. She was then switched to  Vanc/Cefepime/Acyclovir. Had significant elevation in WBC count 7/18 and Needed levophed to bring BP up for the night. Switched nasal DDAVP to IV DDAVP which has normalized her sodium levels. LP performed 7/20; consistent with a viral process. BP has been stable off pressors; leukocytosis resolved. Patient more alert.    Patient stepped down to Hospital Medicine team L on 7/22 pending viral etiology work up. She remained lethargic and minimally participatory despite correction of metabolic derangements. Desmopressin therapy was resumed as per Endocrinology service recommendations. Enteral feedings changed to bolus with free water in order to prevent patient pulling her PEG tube. Hypernatremia continued to improve slowly with free water replacement and optimization of desmopressin therapy.    Interval history:    No acute events overnight.  Pt remains normothermic without bear hugger.     Review of Systems:  Review of systems not obtained due to patient factors delirium.     OBJECTIVE:     Temp:  [96.1 °F (35.6 °C)-98.7 °F (37.1 °C)]   Pulse:  []   Resp:  [14-20]   BP: ()/(61-87)   SpO2:  [91 %-100 %]  Body mass index is 22.66 kg/m².  Intake/Outake:  This Shift:  No intake/output data recorded.    Net I/O past 24h:     Intake/Output Summary (Last 24 hours) at 08/03/17 1032  Last data filed at 08/03/17 0440   Gross per 24 hour   Intake             1380 ml   Output              500 ml   Net              880 ml             Physical Exam:  Gen- well-developed, well-nourished, NAD  CVS- S1 and S2 present, RRR  Resp- CTA b/l, no work of breathing  Abd- BS+, soft, NT, ND.  Binder over PEG  Ext- no clubbing, cyanosis, or edema    Laboratory:  CBC/Anemia Labs: Coags:      Recent Labs  Lab 07/28/17  0516 07/29/17  0554 07/31/17  0432 08/02/17  0452   WBC  --  5.55 4.12 3.72*   HGB  --  9.3* 9.3* 8.3*   HCT  --  28.9* 27.6* 24.7*   PLT  --  408* 347 289   MCV  --  99* 94 94   RDW  --  16.1* 16.1* 16.4*   [id number]  1089*  --   --   --     No results for input(s): INR, APTT in the last 168 hours.    Invalid input(s): PT     Chemistries:     Recent Labs  Lab 08/01/17  0535 08/02/17  0452 08/03/17  0510   * 131* 132*   K 3.9 4.0 4.2   CL 97 97 98   CO2 30* 27 25   BUN 18 17 14   CREATININE 0.5 0.5 0.5   CALCIUM 9.4 9.0 9.3   PROT  --  6.3  --    BILITOT  --  0.1  --    ALKPHOS  --  107  --    ALT  --  32  --    AST  --  44*  --    MG 2.1 2.1 2.0   PHOS 3.7 3.8 3.7          POCT Glucose: HbA1c:      Recent Labs  Lab 08/01/17  1633 08/01/17  2130 08/02/17  0449 08/02/17  1141 08/02/17  1148 08/02/17  1802   POCTGLUCOSE 114* 88 109 61* 87 110    No results found for: HGBA1C      Medications:  Scheduled Meds:   desmopressin  300 mcg Per G Tube QHS    enoxaparin  40 mg Subcutaneous Daily    levetiracetam oral soln  500 mg Per NG tube BID    levothyroxine  75 mcg Per NG tube Daily                             Continuous Infusions:   PRN Meds:.acetaminophen, bisacodyl, dextrose 50%, glucagon (human recombinant), guaifenesin 100 mg/5 ml, insulin aspart, ipratropium, olanzapine, ondansetron, senna, sodium chloride 0.9%     ASSESSMENT/PLAN:     Acute encephalopathy     Likely multifactorial in nature; ICU delirium, hypernatremia, and perhaps prior surgical procedures. No evidence of HSV encephalitis. Lethargic and minimally verbal. Likely new baseline.  1. Delirium precautions.  2. Out of mittens x 72h now  3. Discharge to NH once bed is secured.          Primary central diabetes insipidus     See above.          * Hypernatremia     Secondary to central DI. Appears to have resolved.  1. Desmopressin to 300mcg nightly; appreciate Endocrinology recs  2. Monitor I&O and sodium levels.   3. Free water bolus 4 times daily: 190 ml.          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    1. Keppra for seizure prophylaxis.          Malnutrition of moderate degree     S/P PEG placement on 7/17.  1. Continue enteral tube feedings.          Secondary hypothyroidism     Stable.  1. Continue levothyroxine. Dose optimized to 75 mcg on 7/27.       Hypercalcemia     Stable. Likely due to poor activity. Improved with administration of pamidronate on 7/27.  1. PT/OT.  2. Monitor calcium level.       Oropharyngeal dysphagia     Evaluated by SLP.  1. Continue NPO.  2. SLP to continue working with patient.          Discharge planning issues     Patient accepted to institution however son unreachable and friend with financial information out of town.  1. Transfer to facility once family able to arrange financial information.          Aspiration pneumonia     Patient completed 7 days of cefepime therapy.          Oral phase dysphagia     Failed multiple ST trials, required PEG, MS changes interferring with eating.       Hypothermia  -Unclear etiology; CXR, labs, and UA unremarkable  -Slight leukopenia but lymphocyte predominant  -procalcitonin normal, making bacterial infection less likely  -will d/c Rocephin and monitor                                          Brain mass     S/p resection of meningiomas       DVT ppx- Lovenox  CODE Status- FULL    Dispo- placement pending    Ally Calloway MD  Hospital Medicine Staff

## 2017-08-03 NOTE — ASSESSMENT & PLAN NOTE
Goal FT4 mid-upper limit of normal  -Continue levothyroxine 75mcg daily, needs to be given at least 1 hr apart from TF to ensure absorption   -Repeat FT4 ~9/1

## 2017-08-03 NOTE — SUBJECTIVE & OBJECTIVE
"Interval HPI:   Overnight events: No acute events overnight.  After decreasing her DDVAP from 400 to 300, Na slightly increased from 131 yesterday to 132 today.  Eating:   NPO  Nausea: No  Hypoglycemia and intervention: No  Fever: No  TPN and/or TF: Yes  If yes, type of TF/TPN and rate: Isosource 1.5 270 cc + 190 cc free water bolus q6h    BP (!) 117/56 (BP Location: Left arm, Patient Position: Lying, BP Method: Automatic)   Pulse (!) 55   Temp 98.5 °F (36.9 °C) (Oral)   Resp 20   Ht 5' 2.01" (1.575 m)   Wt 56.2 kg (123 lb 14.4 oz)   SpO2 100%   Breastfeeding? No   BMI 22.66 kg/m²     Labs Reviewed and Include      Recent Labs  Lab 08/03/17  0510   GLU 83   CALCIUM 9.3   *   K 4.2   CO2 25   CL 98   BUN 14   CREATININE 0.5     Lab Results   Component Value Date    WBC 3.72 (L) 08/02/2017    HGB 8.3 (L) 08/02/2017    HCT 24.7 (L) 08/02/2017    MCV 94 08/02/2017     08/02/2017       Recent Labs  Lab 08/02/17  0452   FREET4 0.84     No results found for: HGBA1C    Nutritional status:   Body mass index is 22.66 kg/m².  Lab Results   Component Value Date    ALBUMIN 2.5 (L) 08/02/2017    ALBUMIN 2.3 (L) 07/18/2017    ALBUMIN 3.0 (L) 07/17/2017     Lab Results   Component Value Date    PREALBUMIN 26 06/27/2017       Estimated Creatinine Clearance: 105.3 mL/min (based on Cr of 0.5).    Accu-Checks  Recent Labs      07/31/17   1802  07/31/17   2103  08/01/17   0740  08/01/17   1216  08/01/17   1633  08/01/17   2130  08/02/17   0449  08/02/17   1141  08/02/17   1148  08/02/17   1802   POCTGLUCOSE  95  122*  140*  103  114*  88  109  61*  87  110       Current Medications and/or Treatments Impacting Glycemic Control  Immunotherapy:  Immunosuppressants     None        Steroids:   Hormones     Start     Stop Route Frequency Ordered    08/02/17 2100  desmopressin tablet 300 mcg      -- PER G TUBE Nightly 08/02/17 1516        Pressors:    Autonomic Drugs     None        Hyperglycemia/Diabetes Medications: " Antihyperglycemics     Start     Stop Route Frequency Ordered    06/29/17 1922  insulin aspart pen 0-5 Units      -- SubQ Every 6 hours PRN 06/29/17 1823

## 2017-08-03 NOTE — PLAN OF CARE
12:01 PM  TENISHA contacted [facility] with [facility] regarding referral for long term placement. Mac stated friend has not signed paperwork. Called friend and she stated she was going to sign paperwork at 11:00AM today.     Jodi Royal LMSW   [facility]  Ext [phone]

## 2017-08-03 NOTE — PT/OT/SLP PROGRESS
Speech Language Pathology      Joanna Morales  MRN: [id number]    Patient not seen today secondary to Other (Comment) (Despite verbal and tactile stimuli, unable to awake pt to participate in SLP session. ). Will follow-up at next regularly scheduled session.    HILDA Rascon, CCC-SLP  [phone]  8/3/2017

## 2017-08-03 NOTE — PROGRESS NOTES
"[facility]  Endocrinology  Progress Note    Admit Date: 6/28/2017     Interval HPI:   Overnight events: No acute events overnight.  After decreasing her DDVAP from 400 to 300, Na slightly increased from 131 yesterday to 132 today.  Eating:   NPO  Nausea: No  Hypoglycemia and intervention: No  Fever: No  TPN and/or TF: Yes  If yes, type of TF/TPN and rate: Isosource 1.5 270 cc + 190 cc free water bolus q6h    BP (!) 117/56 (BP Location: Left arm, Patient Position: Lying, BP Method: Automatic)   Pulse (!) 55   Temp 98.5 °F (36.9 °C) (Oral)   Resp 20   Ht 5' 2.01" (1.575 m)   Wt 56.2 kg (123 lb 14.4 oz)   SpO2 100%   Breastfeeding? No   BMI 22.66 kg/m²       Labs Reviewed and Include      Recent Labs  Lab 08/03/17  0510   GLU 83   CALCIUM 9.3   *   K 4.2   CO2 25   CL 98   BUN 14   CREATININE 0.5     Lab Results   Component Value Date    WBC 3.72 (L) 08/02/2017    HGB 8.3 (L) 08/02/2017    HCT 24.7 (L) 08/02/2017    MCV 94 08/02/2017     08/02/2017       Recent Labs  Lab 08/02/17  0452   FREET4 0.84     No results found for: HGBA1C    Nutritional status:   Body mass index is 22.66 kg/m².  Lab Results   Component Value Date    ALBUMIN 2.5 (L) 08/02/2017    ALBUMIN 2.3 (L) 07/18/2017    ALBUMIN 3.0 (L) 07/17/2017     Lab Results   Component Value Date    PREALBUMIN 26 06/27/2017       Estimated Creatinine Clearance: 105.3 mL/min (based on Cr of 0.5).    Accu-Checks  Recent Labs      07/31/17   1802  07/31/17   2103  08/01/17   0740  08/01/17   1216  08/01/17   1633  08/01/17   2130  08/02/17   0449  08/02/17   1141  08/02/17   1148  08/02/17   1802   POCTGLUCOSE  95  122*  140*  103  114*  88  109  61*  87  110       Current Medications and/or Treatments Impacting Glycemic Control  Immunotherapy:  Immunosuppressants     None        Steroids:   Hormones     Start     Stop Route Frequency Ordered    08/02/17 2100  desmopressin tablet 300 mcg      -- PER G TUBE Nightly 08/02/17 1516 "        Pressors:    Autonomic Drugs     None        Hyperglycemia/Diabetes Medications: Antihyperglycemics     Start     Stop Route Frequency Ordered    06/29/17 1922  insulin aspart pen 0-5 Units      -- SubQ Every 6 hours PRN 06/29/17 1822          ASSESSMENT and PLAN    Hypercalcemia    -Workup negative for malignant/humoral etiology of hypercalcemia.  -Indicative of hypercalcemia of immobilization  -PT as tolerated  -S/P IV pamidronate 60mg x1 on 7/27         Secondary hypothyroidism    Goal FT4 mid-upper limit of normal  -Continue levothyroxine 75mcg daily, needs to be given at least 1 hr apart from TF to ensure absorption   -Repeat FT4 ~9/1        Primary central diabetes insipidus    Post-operative diabetes insipidus.  While her Na only went up a slight bit, we would like to change up the dosing schedule from 300 qhs to 100 TID.  -Agree with free water boluses 190ml 4x daily with TF boluses  -We recommend changing the DDVAP from 300 mg qhs to 100 mg TID  -Still without accurate measures of Is and Os            Pepe Juan MD  Endocrinology  [facility]

## 2017-08-04 NOTE — MEDICAL/APP STUDENT
[facility]  Endocrinology  Progress Note     Admit Date: 6/28/2017      Reason for Consult: Management of hypernatremia     Admit Date: 6/28/2017      Reason for Consult: Diabetes insipidus      Surgical Procedure and Date:   CRANIOTOMY 6/8/2017    Interval HPI:   Overnight events: Agitated per RN overnight without sleep until 5am today; received 1x PRN dose of Zyprexa 5mg.  Has made 4 wet briefs since yesterday afternoon but no accurate UOP mesurement.  Still getting 190mL free water and 270mL nutrition q6h.  Temperature has been increased without the bear-hugger; last axillary temp 97.4 this AM. Currently on DDAVP 100mg TID via PEG; Na 136.  Workup for etiology of hypothermia has thus far been negative.  Eating:   NPO  Nausea: No  Hypoglycemia and intervention: No  Fever: No  TPN and/or TF: Yes  If yes, type of TF/TPN and rate: Isosource 270mL q6h    Initial HPI:     Joanna Morales is a 51 y.o. 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  Pt initially presented with HA and B/L vision loss, R>L in 2015.  Underwent partial resection Jan 2015 but developed a recurrence of sxs in 2016 and additional scans showed lesion recurrence; revision of resection performed in June 2017.  She was followed by endocrinology during her stay in the [facility] in June for post-op diabetes insipidus and for concerns about secondary hypothyroidism and adrenal insufficiency.  She was discharged to [facility] on 6/26/17.  She presented to [facility] on 6/28/17 for AMS after being found unresponsive with Na level of 168 on presentation and admitted to the ICU for hypoxic respiratory failure and HAP.  She is now being followed by endocrinology for her severe hypernatremia and DI.  During this stay her nasal DDAVP was switched to oral initially and her oral dose was changed from 200mcg BID to 400mcg nightly; her Na has been wnl since the  "change on 7/27/17. Her levothyroxine was increased to 75mcg from 50mcg to increase her FT4 levels closer to the upper-limit of nml.    BP (!) 104/55 (BP Location: Left arm, Patient Position: Lying, BP Method: Automatic)   Pulse 61   Temp 97.4 °F (36.3 °C) (Axillary)   Resp 20   Ht 5' 2.01" (1.575 m)   Wt 56.2 kg (123 lb 14.4 oz)   SpO2 99%   Breastfeeding? No   BMI 22.66 kg/m²     Labs Reviewed and Include      Recent Labs  Lab 08/04/17  0407   GLU 80   CALCIUM 9.7      K 4.2   CO2 26      BUN 15   CREATININE 0.5     Lab Results   Component Value Date    WBC 4.28 08/04/2017    HGB 8.8 (L) 08/04/2017    HCT 25.8 (L) 08/04/2017    MCV 95 08/04/2017     (H) 08/04/2017       Recent Labs  Lab 08/02/17  0452   FREET4 0.84     Nutritional status:   Body mass index is 22.66 kg/m².  Lab Results   Component Value Date    ALBUMIN 2.5 (L) 08/02/2017    ALBUMIN 2.3 (L) 07/18/2017    ALBUMIN 3.0 (L) 07/17/2017       Accu-Checks  Recent Labs      08/01/17   1216  08/01/17   1633  08/01/17   2130  08/02/17   0449  08/02/17   1141  08/02/17   1148  08/02/17   1802  08/02/17   2039  08/03/17   1345  08/03/17   1913   POCTGLUCOSE  103  114*  88  109  61*  87  110  85  99  122*       Steroids:   Hormones     Start     Stop Route Frequency Ordered    08/03/17 1630  desmopressin tablet 100 mcg      -- PER G TUBE 3 times daily 08/03/17 1618        Hyperglycemia/Diabetes Medications: Antihyperglycemics     Start     Stop Route Frequency Ordered    06/29/17 1922  insulin aspart pen 0-5 Units      -- SubQ Every 6 hours PRN 06/29/17 1822        ASSESSMENT and PLAN  ASSESSMENT and PLAN  Joanna Morales is a 51 y.o. female admitted for hypernatremia (Na= 168) and AMS s/p meningioma resection and subsequent development of post-operative central diabetes insipidus and secondary hypothyroidism. Pt's Na 136 today with increased urination following spaced dosing of DDAVP (100mg TID).  T 97.4 axillary; other " VSS.     1. Primary central diabetes insipidus, post-operative  Pt still receiving 190mL free water boluses via PEG and 270mL nutrition q6h; now on spaced DDAVP regimen.  Na 136 today from 132 yesterday.    --continue DDAVP 100mg TID; pt's Na now within normal range with increased urination  --190mL bolus q6h per PEG and 270mL TF q6h  --daily Na checks   --Weigh briefs for estimates of output        2. Secondary hypothyroidism  Free T4 0.81 on 6/27/17; TSH and free T4 ordered per primary team to investigate hypothermia  --Continue levothyroxine 75mcg, will adjust if change in FT4     3. Hypercalcemia (resolved)  Ca increased to 9.7 today.  Workup for etiologies of hypercalcemia negative; suspected etiology r/t immobilization.  Pt s/p one-off dose of pamidronate 60mg dated 07/27/17.  --Encourage activity as tolerated with PT/OT     Disposition: D/c to nursing home today per RN     The above will be discussed with the endocrinology team and changed as needed.       Nurys Bowles, MS4  Endocrinology  [facility]

## 2017-08-04 NOTE — PLAN OF CARE
Problem: SLP Goal  Goal: SLP Goal  Updated SLP goals expected to be met by 8/4- CONTINUE until 8/11:  1. Pt will participate in ongoing assessment of swallow in order to determine safest, least restrictive diet. - ONGOING    SLP goals expected to be met by 7/18:  1. Pt will participate in going assessment of swallow in order to determine safest, least restrictive diet.     Outcome: Ongoing (interventions implemented as appropriate)  Recommend NPO. Pt no longer appropriate for cup sips thin water x5, no more than 3x/day for pleasure.     HILDA Rascon, CCC-SLP  [phone]  8/4/2017

## 2017-08-04 NOTE — PT/OT/SLP PROGRESS
"Speech Language Pathology  Treatment    Joanna Morales   MRN: [id number]   1150/1150 A    Admitting Diagnosis: Hypernatremia    Diet recommendations: Solid Diet Level: NPO  Liquid Diet Level: NPO   - NOT appropriate any any PO intake for pleasure    SLP Treatment Date: 08/04/17  Speech Start Time: 1131     Speech Stop Time: 1139     Speech Total (min): 8 min       TREATMENT BILLABLE MINUTES:  Treatment Swallowing Dysfunction 8    Has the patient been evaluated by SLP for swallowing? : Yes  Keep patient NPO?: Yes   General Precautions: Standard, aspiration, fall  Current Respiratory Status: other (comment) (room air)       Subjective:  "Ahh"    Pain/Comfort  Pain Rating 1: 0/10  Pain Rating Post-Intervention 1: 0/10    Objective:     Pt awake upon entry. HOB raised. Pt provided with PO trials cup sips water x3, requiring prompt to swallow trial x1, and cough elicited following 3rd trial. No further trials administered. Pt with dec'd labial closure around cup edge, requiring cup to touch labial surface as tactile cue in order for her to attempt to accept bolus. Pt no longer appropriate for cup sips thin water x5, no more than 3x/day for pleasure, as pt exhibiting inc'd confusion and overt s/s aspiration. Pt educated SLP POC. White board updated. No further questions.     Assessment:  Joanna Morales is a 51 y.o. female with a medical diagnosis of Hypernatremia and presents with oropharyngeal dysphagia.     Discharge recommendations: Discharge Facility/Level Of Care Needs: nursing facility, skilled     Goals:    SLP Goals        Problem: SLP Goal    Goal Priority Disciplines Outcome   SLP Goal     SLP Unable to achieve outcome(s) by discharge   Description:  Speech Language Pathology Goals  Goals expected to be met by 7/7  1. Patient will successfully participate in ongoing clinical swallow evaluation and tolerate po trials with no overt s/s of aspiration.                  Problem: SLP Goal    Goal Priority " Disciplines Outcome   SLP Goal     SLP Ongoing (interventions implemented as appropriate)   Description:  Updated SLP goals expected to be met by 8/4:  1. Pt will participate in ongoing assessment of swallow in order to determine safest, least restrictive diet.     SLP goals expected to be met by 7/18:  1. Pt will participate in going assessment of swallow in order to determine safest, least restrictive diet.                       Plan:   Patient to be seen Therapy Frequency: 3 x/week   Plan of Care expires: 08/26/17  Plan of Care reviewed with: patient  SLP Follow-up?: Yes            HILDA Rascon, CCC-SLP  [phone]  8/4/2017

## 2017-08-04 NOTE — PLAN OF CARE
10:14 AM  TENISHA received nursing home orders. Faxed orders to Mack at [facility]. Mack stated they are waiting on one piece of information regarding finances from friend. Called friend, friend did not answer, could not leave voicemail. Will f/u as needed.     Jodi Royal LMSW   [facility]  Ext [phone]

## 2017-08-04 NOTE — PLAN OF CARE
Problem: Patient Care Overview  Goal: Plan of Care Review  Outcome: Ongoing (interventions implemented as appropriate)  Pt free of any injury or falls, VSS, skin intact. Complains of no pain or discomfort at this time. Pt fed through peg tube at 0000 and 0600 with no complications. Water bolus of 190ml given per MD orders. Pt very anxious throughout the night and tried to get out of bed multiple times, needing to be redirected, will continue to monitor.

## 2017-08-04 NOTE — PLAN OF CARE
[facility]     Department of Hospital Medicine     [address] [place] [address]     [phone] after hours  [phone] fax       NURSING HOME ORDERS    08/04/2017    Admit to Nursing Home:  Regular Bed            Diagnoses:         Active Hospital Problems     Diagnosis   POA    *Hypernatremia [E87.0]   Yes       Priority: 3     Acute encephalopathy [G93.40]   Yes       Priority: 1 - High    Aspiration pneumonia [J69.0]   Clinically Undetermined       Priority: 2     Primary central diabetes insipidus [E23.2]   Yes       Priority: 3     Meningioma, recurrent of brain [D32.0]   Yes       Priority: 4     Malnutrition of moderate degree [E44.0]   No       Priority: 5     Secondary hypothyroidism [E03.8]   Yes       Priority: 6        Chronic    Hypercalcemia [E83.52]   Yes       Priority: 7     Oropharyngeal dysphagia [R13.12]   Yes       Priority: 8     Discharge planning issues [Z02.9]   Not Applicable       Priority: 9     Acquired neurogenic diabetes insipidus [E23.2]   Yes    Acute hypoxemic respiratory failure [J96.01]   Clinically Undetermined    Tobacco abuse [Z72.0]   Yes       Chronic       Resolved Hospital Problems     Diagnosis Date Resolved POA    Hypothermia [T68.XXXA] 07/03/2017 No    Goals of care, counseling/discussion [Z71.89] 07/03/2017 Not Applicable    Hypernatremia [E87.0] 07/13/2017 Yes         Patient is homebound due to:  Hypernatremia     Allergies:  Review of patient's allergies indicates:   Allergen Reactions    Codeine           Vitals:       Routine, once monthly       Diet: Isosource 1.5 tanya, full strength                           Tube Feeding:                                                - Bolus 270 ml  Every 6 hours                                              - Enteral feeding water bolus: 190 ml four times daily.     Acitivities:      - Up in a chair each morning as tolerated     LABS:  Per facility  protocol                        CMP, CBC each weekly 3 months     Nursing Precautions:     - Aspiration precautions:                         - Total assistance with meals                        -  Upright 90 degrees befor during and after meals                         -  Suction at bedside          - Fall precautions per nursing home protocol   - Seizure precaution per alf protocol   - Decubitus precautions:                        -  for positioning                        - Pressure reducing foam mattress                        - Turn patient every two hours. Use wedge pillows to anchor patient     CONSULTS:                          Physical Therapy to evaluate and treat                           Occupational Therapy to evaluate and treat                           Speech Therapy  to evaluate and treat                           Nutrition to evaluate and recommend diet                           Psychiatry to evaluate and follow patients for delirium     MISCELLANEOUS CARE:                              PEG Care:  Clean site every 24 hours                           Routine Skin for Bedridden Patients:  Apply moisture barrier cream to all                                              skin folds and wet areas in perineal area daily and after baths and                                     all bowel movements.        Medications: Discontinue all previous medication orders, if any. See new list below.     Joanna Morales [facility] Medication Instructions NIMO:[id number]    Printed on:08/04/17 1003   Medication Information                      acetaminophen (TYLENOL) 500 MG tablet  Take 1 tablet (500 mg total) by mouth every 6 (six) hours as needed.             desmopressin (DDAVP) 0.1 MG Tab  1 tablet (100 mcg total) by Per G Tube route 3 (three) times daily.             EUCALYPTUS OIL/MENTHOL/CAMPHOR (VICKS VAPORUB TOP)  Apply topically daily as needed.             levetiracetam oral soln 500 mg/5 mL (5 mL)  Soln  5 mLs (500 mg total) by Per NG tube route 2 (two) times daily.             levothyroxine (SYNTHROID) 75 MCG tablet  Take 1 tablet (75 mcg total) by mouth once daily.             olanzapine (ZYPREXA) 5 MG tablet  1 tablet (5 mg total) by Per G Tube route daily as needed (agitation).                       _________________________________  Ally Calloway MD  08/04/2017

## 2017-08-04 NOTE — PROGRESS NOTES
"[facility]  Endocrinology  Progress Note    Admit Date: 6/28/2017     Interval HPI:   Overnight events: No acute events overnight.  Sodium has risen to 136 today from 132 yesterday; desmopressin now being dosed 100 mcg TID instead of 300 mg qhs.  Patient more interactive and communicative today compared to yesterday.  Eating:   TF  Nausea: No  Hypoglycemia and intervention: No  Fever: No  TPN and/or TF: Yes  If yes, type of TF/TPN and rate: Isosource 1.5 Kevan, 270 cc bolus, full strength q6h w/ 190 cc free water bolus    BP (!) 104/55 (BP Location: Left arm, Patient Position: Lying, BP Method: Automatic)   Pulse 61   Temp 97.4 °F (36.3 °C) (Axillary)   Resp 20   Ht 5' 2.01" (1.575 m)   Wt 56.2 kg (123 lb 14.4 oz)   SpO2 99%   Breastfeeding? No   BMI 22.66 kg/m²       Labs Reviewed and Include      Recent Labs  Lab 08/04/17  0407   GLU 80   CALCIUM 9.7      K 4.2   CO2 26      BUN 15   CREATININE 0.5     Lab Results   Component Value Date    WBC 4.28 08/04/2017    HGB 8.8 (L) 08/04/2017    HCT 25.8 (L) 08/04/2017    MCV 95 08/04/2017     (H) 08/04/2017       Recent Labs  Lab 08/02/17  0452   FREET4 0.84     No results found for: HGBA1C    Nutritional status:   Body mass index is 22.66 kg/m².  Lab Results   Component Value Date    ALBUMIN 2.5 (L) 08/02/2017    ALBUMIN 2.3 (L) 07/18/2017    ALBUMIN 3.0 (L) 07/17/2017     Lab Results   Component Value Date    PREALBUMIN 26 06/27/2017       Estimated Creatinine Clearance: 105.3 mL/min (based on Cr of 0.5).    Accu-Checks  Recent Labs      08/01/17   1216  08/01/17   1633  08/01/17   2130  08/02/17   0449  08/02/17   1141  08/02/17   1148  08/02/17   1802  08/02/17   2039  08/03/17   1345  08/03/17   1913   POCTGLUCOSE  103  114*  88  109  61*  87  110  85  99  122*       Current Medications and/or Treatments Impacting Glycemic Control  Immunotherapy:  Immunosuppressants     None        Steroids:   Hormones     Start     Stop " Route Frequency Ordered    08/03/17 1630  desmopressin tablet 100 mcg      -- PER G TUBE 3 times daily 08/03/17 1618        Pressors:    Autonomic Drugs     None        Hyperglycemia/Diabetes Medications: Antihyperglycemics     Start     Stop Route Frequency Ordered    06/29/17 1922  insulin aspart pen 0-5 Units      -- SubQ Every 6 hours PRN 06/29/17 1822          ASSESSMENT and PLAN    Hypercalcemia    -Workup negative for malignant/humoral etiology of hypercalcemia.  -Indicative of hypercalcemia of immobilization  -PT as tolerated  -S/P IV pamidronate 60mg x1 on 7/27         Secondary hypothyroidism    Goal FT4 mid-upper limit of normal  -Continue levothyroxine 75mcg daily, needs to be given at least 1 hr apart from TF to ensure absorption   -Repeat FT4 ~9/1        Primary central diabetes insipidus    Post-operative diabetes insipidus.  Her sodium went up to 136 today from 132, but we would like to hold the current course for one more day before making any additional changes.  -Agree with free water boluses 190ml q6h with TF boluses  -Continue DDVAP 100 mg TID          Discharge recommendations:  CMP in 2 weeks.  DDAVP 100mg TID  Conitnue synthroid 75mcg PO QD  Repeat fT4 ~9/1      Pepe Juan MD  Endocrinology  [facility]

## 2017-08-04 NOTE — PLAN OF CARE
Problem: Physical Therapy Goal  Goal: Physical Therapy Goal  Goals to be met by: 17    Patient will increase functional independence with mobility by performin. Supine to sit with Minimal Assistance  2. Sit to supine with Minimal Assistance  3. Sit to stand transfer with Minimal Assistance met (2017)   Revised: sit to stand transfer with SBA  4. Standing for 3 minutes with Minimal Assistance and weight evenly distributed through (B) LE.   5. Gait  x 20 feet with Moderate Assistance using AD if needed   6. Pt will perform (B) LE therapeutic exercises x 20 reps to improve muscular strength and endurance.      Goals remain appropriate at time. Continue with PT POC as indicated.

## 2017-08-04 NOTE — SUBJECTIVE & OBJECTIVE
"Interval HPI:   Overnight events: No acute events overnight.  Sodium has risen to 136 today from 132 yesterday; desmopressin now being dosed 100 mcg TID instead of 300 mg qhs.  Patient more interactive and communicative today compared to yesterday.  Eating:   TF  Nausea: No  Hypoglycemia and intervention: No  Fever: No  TPN and/or TF: Yes  If yes, type of TF/TPN and rate: Isosource 1.5 Kevan, 270 cc bolus, full strength q6h w/ 190 cc free water bolus    BP (!) 104/55 (BP Location: Left arm, Patient Position: Lying, BP Method: Automatic)   Pulse 61   Temp 97.4 °F (36.3 °C) (Axillary)   Resp 20   Ht 5' 2.01" (1.575 m)   Wt 56.2 kg (123 lb 14.4 oz)   SpO2 99%   Breastfeeding? No   BMI 22.66 kg/m²     Labs Reviewed and Include      Recent Labs  Lab 08/04/17  0407   GLU 80   CALCIUM 9.7      K 4.2   CO2 26      BUN 15   CREATININE 0.5     Lab Results   Component Value Date    WBC 4.28 08/04/2017    HGB 8.8 (L) 08/04/2017    HCT 25.8 (L) 08/04/2017    MCV 95 08/04/2017     (H) 08/04/2017       Recent Labs  Lab 08/02/17  0452   FREET4 0.84     No results found for: HGBA1C    Nutritional status:   Body mass index is 22.66 kg/m².  Lab Results   Component Value Date    ALBUMIN 2.5 (L) 08/02/2017    ALBUMIN 2.3 (L) 07/18/2017    ALBUMIN 3.0 (L) 07/17/2017     Lab Results   Component Value Date    PREALBUMIN 26 06/27/2017       Estimated Creatinine Clearance: 105.3 mL/min (based on Cr of 0.5).    Accu-Checks  Recent Labs      08/01/17   1216  08/01/17   1633  08/01/17   2130  08/02/17   0449  08/02/17   1141  08/02/17   1148  08/02/17   1802  08/02/17   2039  08/03/17   1345  08/03/17   1913   POCTGLUCOSE  103  114*  88  109  61*  87  110  85  99  122*       Current Medications and/or Treatments Impacting Glycemic Control  Immunotherapy:  Immunosuppressants     None        Steroids:   Hormones     Start     Stop Route Frequency Ordered    08/03/17 1630  desmopressin tablet 100 mcg      -- PER G TUBE 3 " times daily 08/03/17 1618        Pressors:    Autonomic Drugs     None        Hyperglycemia/Diabetes Medications: Antihyperglycemics     Start     Stop Route Frequency Ordered    06/29/17 1922  insulin aspart pen 0-5 Units      -- SubQ Every 6 hours PRN 06/29/17 1828

## 2017-08-04 NOTE — PLAN OF CARE
1:12 PM  SW received call from Ishan with [facility] who stated she spoke with pt's friend and pt does not have any money in her bank account, therefore [facility] cannot accept her at this time. Ishan stated his  will be back Monday and they can discuss payment plan at that time. Informed physician. Updated CM.    Jodi Royal LMSW   [facility]  Ext [phone]

## 2017-08-04 NOTE — PROGRESS NOTES
Progress Note  Hospital Medicine    Primary Team: [facility] HOSP MED L  Admit Date: 6/28/2017   Length of Stay:  LOS: 37 days   SUBJECTIVE:   Reason for Admission:  Hypernatremia    HPI:  Ms. Morales was discharged from [facility]  to [facility] on 6/26/17 and presented to [facility] ED on 6/28 with altered mental status and worsening hypernatremia. According to the patient's sitter, the patient was awake, verbal, and interactive with therapy on 6/23 but had been progressively less interactive and responsive when she was at [place]. Ms. Morales was admitted to Hospital Medicine but was found to be unresponsive with a sodium level of 168 on the morning of 6/29.      She has been followed by endocrinology and neurology and workup thus far significant for  unremarkable UA, neuro-imaging c/w recent left sphenoidal meningioma resection with resolution of blood products and trace intraventricular hemorrhage.  Previous EEG (6/16/17) demonstrated Moderate diffuse slowing of the overall background as described above, Superimposed left hemispheric slowing relative to the right, and NO epileptiform discharges or seizures were seen c/w ENCP and focal cortical dysfunction.  Pt's presentation is concerning for multi-factorial encephalopathy given persistent hypernatremia.  Per neurology, low suspicion for MNG currently, but patient is at high risk given recent intracranial procedure.     Critical Care consulted 7/17/17 for acute hypoxic respiratory failure. Pt had PEG tube placed earlier today and in PACU, oxygen saturations noted to be in the mid 80's. Pt was placed on a non-rebreather with minimal improvement and subsequently placed on BiPAP. ABG significant for elevated CO2 however pt was compensating. Repeat ABG showed pH 7.372, CO2 44, O2 62, HCO3 25.8. Pt transitioned to NC. Cxray concerning for aspiration pneumonia - new infiltrate in RLL so patient started on vancomycin and zosyn for HAP. She was then switched to  Vanc/Cefepime/Acyclovir. Had significant elevation in WBC count 7/18 and Needed levophed to bring BP up for the night. Switched nasal DDAVP to IV DDAVP which has normalized her sodium levels. LP performed 7/20; consistent with a viral process. BP has been stable off pressors; leukocytosis resolved. Patient more alert.    Patient stepped down to Hospital Medicine team L on 7/22 pending viral etiology work up. She remained lethargic and minimally participatory despite correction of metabolic derangements. Desmopressin therapy was resumed as per Endocrinology service recommendations. Enteral feedings changed to bolus with free water in order to prevent patient pulling her PEG tube. Hypernatremia continued to improve slowly with free water replacement and optimization of desmopressin therapy.    Interval history:    No acute events overnight.  Pt remains normothermic without bear hugger.     Review of Systems:  Review of systems not obtained due to patient factors delirium.     OBJECTIVE:     Temp:  [97.1 °F (36.2 °C)-98.5 °F (36.9 °C)]   Pulse:  [49-75]   Resp:  [16-20]   BP: ()/(52-72)   SpO2:  [96 %-100 %]  Body mass index is 22.66 kg/m².  Intake/Outake:  This Shift:  No intake/output data recorded.    Net I/O past 24h:   No intake or output data in the 24 hours ending 08/04/17 1007          Physical Exam:  Gen- well-developed, well-nourished, NAD  CVS- S1 and S2 present, RRR  Resp- CTA b/l, no work of breathing  Abd- BS+, soft, NT, ND.  Binder over PEG  Ext- no clubbing, cyanosis, or edema    Laboratory:  CBC/Anemia Labs: Coags:      Recent Labs  Lab 07/31/17  0432 08/02/17  0452 08/04/17  0407   WBC 4.12 3.72* 4.28   HGB 9.3* 8.3* 8.8*   HCT 27.6* 24.7* 25.8*    289 390*   MCV 94 94 95   RDW 16.1* 16.4* 16.6*    No results for input(s): INR, APTT in the last 168 hours.    Invalid input(s): PT     Chemistries:     Recent Labs  Lab 08/02/17  0452 08/03/17  0510 08/04/17  0407   * 132* 136   K 4.0  4.2 4.2   CL 97 98 101   CO2 27 25 26   BUN 17 14 15   CREATININE 0.5 0.5 0.5   CALCIUM 9.0 9.3 9.7   PROT 6.3  --   --    BILITOT 0.1  --   --    ALKPHOS 107  --   --    ALT 32  --   --    AST 44*  --   --    MG 2.1 2.0 2.1   PHOS 3.8 3.7 4.1          POCT Glucose: HbA1c:      Recent Labs  Lab 08/02/17  1141 08/02/17  1148 08/02/17  1802 08/02/17  2039 08/03/17  1345 08/03/17  1913   POCTGLUCOSE 61* 87 110 85 99 122*    No results found for: HGBA1C      Medications:  Scheduled Meds:   desmopressin  100 mcg Per G Tube TID    enoxaparin  40 mg Subcutaneous Daily    levetiracetam oral soln  500 mg Per NG tube BID    levothyroxine  75 mcg Per NG tube Daily                             Continuous Infusions:   PRN Meds:.acetaminophen, bisacodyl, dextrose 50%, glucagon (human recombinant), guaifenesin 100 mg/5 ml, insulin aspart, ipratropium, olanzapine, ondansetron, senna, sodium chloride 0.9%     ASSESSMENT/PLAN:     Acute encephalopathy     Likely multifactorial in nature; ICU delirium, hypernatremia, and perhaps prior surgical procedures. No evidence of HSV encephalitis. Lethargic and minimally verbal. Likely new baseline.  1. Delirium precautions.  2. Out of mittens > 72h now  3. Discharge to NH once bed is secured.          Primary central diabetes insipidus     See above.          * Hypernatremia     Secondary to central DI. Appears to have resolved.  1. Desmopressin changed to 100mcg TID; appreciate Endocrinology recs  2. Monitor I&O and sodium levels.   3. Free water bolus 4 times daily: 190 ml.          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    1. Keppra for seizure prophylaxis.          Malnutrition of moderate degree     S/P PEG placement on 7/17.  1. Continue enteral tube  feedings.          Secondary hypothyroidism     Stable.  1. Continue levothyroxine. Dose optimized to 75 mcg on 7/27.       Hypercalcemia     Stable. Likely due to poor activity. Improved with administration of pamidronate on 7/27.  1. PT/OT.  2. Monitor calcium level.       Oropharyngeal dysphagia     Evaluated by SLP.  1. Continue NPO.  2. SLP to continue working with patient.          Discharge planning issues     Patient accepted to institution however son unreachable and friend with financial information out of town.  1. Transfer to facility once family able to arrange financial information.          Aspiration pneumonia     Patient completed 7 days of cefepime therapy.          Oral phase dysphagia     Failed multiple ST trials, required PEG, MS changes interferring with eating.       Hypothermia  -Unclear etiology; CXR, labs, and UA unremarkable  -Slight leukopenia has resolved  -procalcitonin normal, making bacterial infection less likely  -no further episodes off of antibiotics and bear hugger                                          Brain mass     S/p resection of meningiomas       DVT ppx- Lovenox  CODE Status- FULL    Dispo- placement pending    Ally Calloway MD  Hospital Medicine Staff

## 2017-08-04 NOTE — PROGRESS NOTES
Pt very agitated. Continuously trying to climb out of bed and remove medical devices. Team notified once and a one time dose of PRN zyprexa given. Pt still trying to climb out of bed after zyprexa given.

## 2017-08-04 NOTE — PT/OT/SLP PROGRESS
Physical Therapy  Treatment    Joanna Morales   MRN: [id number]   Admitting Diagnosis: Hypernatremia    PT Received On: 08/04/17  PT Start Time: 1046     PT Stop Time: 1109    PT Total Time (min): 23 min       Billable Minutes:  Therapeutic Activity 23    Treatment Type: Treatment  PT/PTA: PTA     PTA Visit Number: 1       General Precautions: Standard, aspiration, fall  Orthopedic Precautions: N/A   Braces: N/A    Do you have any cultural, spiritual, Nondenominational conflicts, given your current situation?: none stated    Subjective:  Communicated with nursing prior to session.  Pt agreed to work with therapy.     Pain/Comfort  Pain Rating 1: 0/10  Pain Rating 2: 0/10    Objective:        Functional Mobility:  Bed Mobility:   Supine to Sit: Maximum Assistance  Sit to Supine: Maximum Assistance    Transfers:  Sit <> Stand Assistance: Moderate Assistance (x 2 trials)  Sit <> Stand Assistive Device: No Assistive Device    Gait:   Gait Distance:  (Pt took 2 steps forward and 4 side steps towards HOB with Hand Held Assistance with Max (A))  Assistance 1: Maximum assistance  Gait Assistive Device: Hand held assist    Balance:   Static Sit: SPV-SBA  Dynamic Sit: Min A   Static Stand: Min A   Dynamic stand: Mod A    Therapeutic Activities and Exercises:  B LE therex x15 reps with assistance as needed: AP, LAQ, Hip Flexion, and GS     AM-PAC 6 CLICK MOBILITY  How much help from another person does this patient currently need?   1 = Unable, Total/Dependent Assistance  2 = A lot, Maximum/Moderate Assistance  3 = A little, Minimum/Contact Guard/Supervision  4 = None, Modified [place]/Independent    Turning over in bed (including adjusting bedclothes, sheets and blankets)?: 4  Sitting down on and standing up from a chair with arms (e.g., wheelchair, bedside commode, etc.): 2  Moving from lying on back to sitting on the side of the bed?: 2  Moving to and from a bed to a chair (including a wheelchair)?: 2  Need to walk in  hospital room?: 2  Climbing 3-5 steps with a railing?: 1  Total Score: 13    AM-PAC Raw Score CMS G-Code Modifier Level of Impairment Assistance   6 % Total / Unable   7 - 9 CM 80 - 100% Maximal Assist   10 - 14 CL 60 - 80% Moderate Assist   15 - 19 CK 40 - 60% Moderate Assist   20 - 22 CJ 20 - 40% Minimal Assist   23 CI 1-20% SBA / CGA   24 CH 0% Independent/ Mod I     Patient left supine with all lines intact, call button in reach and bed alarm on.    Assessment:  Joanna Morales is a 51 y.o. female with a medical diagnosis of Hypernatremia and presents with all deficits noted below. Pt fair to treatment session and will continue to benefit from PT services at this time. Continue with PT POC as indicated.    Rehab identified problem list/impairments: Rehab identified problem list/impairments: weakness, impaired endurance, impaired self care skills, impaired functional mobilty, impaired balance, impaired cognition, visual deficits, impaired coordination, decreased lower extremity function, decreased upper extremity function, impaired fine motor, decreased safety awareness    Rehab potential is good.    Activity tolerance: Fair    Discharge recommendations: Discharge Facility/Level Of Care Needs: nursing facility, skilled     Barriers to discharge: Barriers to Discharge: Decreased caregiver support    Equipment recommendations: Equipment Needed After Discharge:  (TBD pending progress)     GOALS:    Physical Therapy Goals        Problem: Physical Therapy Goal    Goal Priority Disciplines Outcome Goal Variances Interventions   Physical Therapy Goal     PT/OT, PT      Description:  Goals to be met by: 17    Patient will increase functional independence with mobility by performin. Supine to sit with Minimal Assistance  2. Sit to supine with Minimal Assistance  3. Sit to stand transfer with Minimal Assistance met (2017)   Revised: sit to stand transfer with SBA  4. Standing for 3 minutes  with Minimal Assistance and weight evenly distributed through (B) LE.   5. Gait  x 20 feet with Moderate Assistance using AD if needed   6. Pt will perform (B) LE therapeutic exercises x 20 reps to improve muscular strength and endurance.               Problem: Physical Therapy Goal    Goal Priority Disciplines Outcome Goal Variances Interventions   Physical Therapy Goal     PT/OT, PT Ongoing (interventions implemented as appropriate)     Description:  Goals to be met by: 17     Patient will increase functional independence with mobility by performin. Supine to sit with Moderate Assistance  2. Sit to supine with Moderate Assistance  3. Sit to stand transfer with Moderate Assistance- met       Revised: Sit to stand transfer with CGA  4. Gait  x 5 feet with Maximum Assistance using appropriate AD while maintaining midline orientation and no posterior lean. - Partially met       Revised: Gait 50 feet min assist with RW and no LOB.  5. Sitting at edge of bed x10 minutes with Supervision while maintaining midline orientation and performing 3/5 reps of reaching activity outside base of support.   6. Revised: Stand for 2 minutes with contact guard Assistance using appropriate UE support while demonstrating midline orientation with no posterior lean & equal weight-bearing through bilateral LE's.   7. Lower extremity exercise program x10 reps with assistance as needed for strengthening and endurance with functional mobility.                        PLAN:    Patient to be seen 5 x/week  to address the above listed problems via gait training, therapeutic activities, therapeutic exercises, neuromuscular re-education  Plan of Care expires: 17  Plan of Care reviewed with: patient         Hoa Dowd, PTA  2017

## 2017-08-05 NOTE — PROGRESS NOTES
Progress Note  Hospital Medicine    Primary Team: [facility] HOSP MED L  Admit Date: 6/28/2017   Length of Stay:  LOS: 38 days   SUBJECTIVE:   Reason for Admission:  Hypernatremia    HPI:  Ms. Morales was discharged from [facility]  to [facility] on 6/26/17 and presented to [facility] ED on 6/28 with altered mental status and worsening hypernatremia. According to the patient's sitter, the patient was awake, verbal, and interactive with therapy on 6/23 but had been progressively less interactive and responsive when she was at [place]. Ms. Morales was admitted to Hospital Medicine but was found to be unresponsive with a sodium level of 168 on the morning of 6/29.      She has been followed by endocrinology and neurology and workup thus far significant for  unremarkable UA, neuro-imaging c/w recent left sphenoidal meningioma resection with resolution of blood products and trace intraventricular hemorrhage.  Previous EEG (6/16/17) demonstrated Moderate diffuse slowing of the overall background as described above, Superimposed left hemispheric slowing relative to the right, and NO epileptiform discharges or seizures were seen c/w ENCP and focal cortical dysfunction.  Pt's presentation is concerning for multi-factorial encephalopathy given persistent hypernatremia.  Per neurology, low suspicion for MNG currently, but patient is at high risk given recent intracranial procedure.     Critical Care consulted 7/17/17 for acute hypoxic respiratory failure. Pt had PEG tube placed earlier today and in PACU, oxygen saturations noted to be in the mid 80's. Pt was placed on a non-rebreather with minimal improvement and subsequently placed on BiPAP. ABG significant for elevated CO2 however pt was compensating. Repeat ABG showed pH 7.372, CO2 44, O2 62, HCO3 25.8. Pt transitioned to NC. Cxray concerning for aspiration pneumonia - new infiltrate in RLL so patient started on vancomycin and zosyn for HAP. She was then switched to  Vanc/Cefepime/Acyclovir. Had significant elevation in WBC count 7/18 and Needed levophed to bring BP up for the night. Switched nasal DDAVP to IV DDAVP which has normalized her sodium levels. LP performed 7/20; consistent with a viral process. BP has been stable off pressors; leukocytosis resolved. Patient more alert.    Patient stepped down to Hospital Medicine team L on 7/22 pending viral etiology work up. She remained lethargic and minimally participatory despite correction of metabolic derangements. Desmopressin therapy was resumed as per Endocrinology service recommendations. Enteral feedings changed to bolus with free water in order to prevent patient pulling her PEG tube. Hypernatremia continued to improve slowly with free water replacement and optimization of desmopressin therapy.    Interval history:    No acute events overnight.  Pt remains normothermic without bear hugger.  Sleeping this morning. Took several steps with PT/OT yesterday.    Review of Systems:  Review of systems not obtained due to patient factors delirium.     OBJECTIVE:     Temp:  [97.1 °F (36.2 °C)-97.7 °F (36.5 °C)]   Pulse:  [58-80]   Resp:  [16-20]   BP: ()/(55-72)   SpO2:  [95 %-97 %]  Body mass index is 22.66 kg/m².  Intake/Outake:  This Shift:  No intake/output data recorded.    Net I/O past 24h:     Intake/Output Summary (Last 24 hours) at 08/05/17 0938  Last data filed at 08/05/17 0600   Gross per 24 hour   Intake              940 ml   Output                0 ml   Net              940 ml             Physical Exam:  Gen- well-developed, well-nourished, NAD  CVS- S1 and S2 present, RRR  Resp- CTA b/l, no work of breathing  Abd- BS+, soft, NT, ND.  Binder over PEG  Ext- no clubbing, cyanosis, or edema    Laboratory:  CBC/Anemia Labs: Coags:      Recent Labs  Lab 07/31/17  0432 08/02/17  0452 08/04/17  0407   WBC 4.12 3.72* 4.28   HGB 9.3* 8.3* 8.8*   HCT 27.6* 24.7* 25.8*    289 390*   MCV 94 94 95   RDW 16.1* 16.4*  16.6*    No results for input(s): INR, APTT in the last 168 hours.    Invalid input(s): PT     Chemistries:     Recent Labs  Lab 08/02/17  0452 08/03/17  0510 08/04/17  0407 08/05/17  0554   * 132* 136 138   K 4.0 4.2 4.2 4.5   CL 97 98 101 103   CO2 27 25 26 27   BUN 17 14 15 17   CREATININE 0.5 0.5 0.5 0.5   CALCIUM 9.0 9.3 9.7 10.4   PROT 6.3  --   --   --    BILITOT 0.1  --   --   --    ALKPHOS 107  --   --   --    ALT 32  --   --   --    AST 44*  --   --   --    MG 2.1 2.0 2.1 2.1   PHOS 3.8 3.7 4.1 4.6*          POCT Glucose: HbA1c:      Recent Labs  Lab 08/03/17  1913 08/04/17  0528 08/04/17  1416 08/04/17  1833 08/05/17  0021 08/05/17  0553   POCTGLUCOSE 122* 87 113* 132* 77 72    No results found for: HGBA1C      Medications:  Scheduled Meds:   desmopressin  100 mcg Per G Tube TID    enoxaparin  40 mg Subcutaneous Daily    levetiracetam oral soln  500 mg Per NG tube BID    levothyroxine  75 mcg Per NG tube Daily                             Continuous Infusions:   PRN Meds:.acetaminophen, bisacodyl, dextrose 50%, glucagon (human recombinant), guaifenesin 100 mg/5 ml, insulin aspart, ipratropium, olanzapine, ondansetron, senna, sodium chloride 0.9%     ASSESSMENT/PLAN:     Acute encephalopathy     Likely multifactorial in nature; ICU delirium, hypernatremia, and perhaps prior surgical procedures. No evidence of HSV encephalitis. Lethargic and minimally verbal. Likely new baseline.  1. Delirium precautions.  2. Out of mittens > 72h now  3. Discharge to NH once bed is secured.          Primary central diabetes insipidus     See above.          * Hypernatremia     Secondary to central DI. Appears to have resolved.  1. Desmopressin changed to 100mcg TID; appreciate Endocrinology recs  2. Monitor I&O and sodium levels.   3. Free water bolus 4 times daily: 190 ml.          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    1. Keppra for seizure prophylaxis.          Malnutrition of moderate degree     S/P PEG placement on 7/17.  1. Continue enteral tube feedings.          Secondary hypothyroidism     Stable.  1. Continue levothyroxine. Dose optimized to 75 mcg on 7/27.       Hypercalcemia     Stable. Likely due to poor activity. Improved with administration of pamidronate on 7/27.  1. PT/OT.  2. Monitor calcium level.       Oropharyngeal dysphagia     Evaluated by SLP.  1. Continue NPO.  2. SLP to continue working with patient.          Discharge planning issues     Patient accepted to institution however son unreachable and friend with financial information out of town.  1. Transfer to facility once family able to arrange financial information.          Aspiration pneumonia     Patient completed 7 days of cefepime therapy.          Oral phase dysphagia     Failed multiple ST trials, required PEG, MS changes interferring with eating.       Hypothermia  -Unclear etiology; CXR, labs, and UA unremarkable  -Slight leukopenia has resolved  -procalcitonin normal, making bacterial infection less likely  -no further episodes off of antibiotics and bear hugger                                          Brain mass     S/p resection of meningiomas       DVT ppx- Lovenox  CODE Status- FULL    Dispo- placement pending    Ally Calloway MD  Hospital Medicine Staff

## 2017-08-05 NOTE — MEDICAL/APP STUDENT
[facility]  Endocrinology  Progress Note    Admit Date: 6/28/2017     Reason for Consult: Management of hypernatremia    Admit Date: 6/28/2017      Reason for Consult: Diabetes insipidus      Surgical Procedure and Date:   CRANIOTOMY 6/8/2017     Interval HPI:   Overnight events: No PRN Zyprexa overnight. No accurate UOP measurement.  Still getting 190mL free water and 270mL nutrition q6h.  Temperature stable. Currently on DDAVP 100mg TID via PEG; Na 138.  Workup for etiology of hypothermia has thus far been negative.  Eating:   NPO  Nausea: No  Hypoglycemia and intervention: No  Fever: No  TPN and/or TF: Yes  If yes, type of TF/TPN and rate: Isosource 270mL q6h     Initial HPI:     Joanna Morales is a 51 y.o. 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  Pt initially presented with HA and B/L vision loss, R>L in 2015.  Underwent partial resection Jan 2015 but developed a recurrence of sxs in 2016 and additional scans showed lesion recurrence; revision of resection performed in June 2017.  She was followed by endocrinology during her stay in the [facility] in June for post-op diabetes insipidus and for concerns about secondary hypothyroidism and adrenal insufficiency.  She was discharged to [facility] on 6/26/17.  She presented to [facility] on 6/28/17 for AMS after being found unresponsive with Na level of 168 on presentation and admitted to the ICU for hypoxic respiratory failure and HAP.  She is now being followed by endocrinology for her severe hypernatremia and DI.  During this stay her nasal DDAVP was switched to oral initially and her oral dose was changed from 200mcg BID to 400mcg nightly; her Na has been wnl since the change on 7/27/17. Her levothyroxine was increased to 75mcg from 50mcg to increase her FT4 levels closer to the upper-limit of nml.    ROS unable to be performed due to patient's  AMS.    Vitals:    08/05/17 1100   BP: 107/68   Pulse: (!) 57   Resp: 16   Temp:      LABS  BMP  Lab Results   Component Value Date     08/05/2017    K 4.5 08/05/2017     08/05/2017    CO2 27 08/05/2017    BUN 17 08/05/2017    CREATININE 0.5 08/05/2017    CALCIUM 10.4 08/05/2017    ANIONGAP 8 08/05/2017    ESTGFRAFRICA >60.0 08/05/2017    EGFRNONAA >60.0 08/05/2017       Lab Results   Component Value Date    CALCIUM 10.4 08/05/2017    PHOS 4.6 (H) 08/05/2017         MG   2.1      08/05/2017    POCT Glucose   Date Value Ref Range Status   08/05/2017 72 70 - 110 mg/dL Final   08/05/2017 77 70 - 110 mg/dL Final   08/04/2017 132 (H) 70 - 110 mg/dL Final   08/04/2017 113 (H) 70 - 110 mg/dL Final   08/04/2017 87 70 - 110 mg/dL Final       ASSESSMENT and PLAN  Joanna Morales is a 51 y.o. female admitted for hypernatremia (Na= 168) and AMS s/p meningioma resection and subsequent development of post-operative central diabetes insipidus and secondary hypothyroidism. Pt's Na 138 today with increased urination following spaced dosing of DDAVP (100mg TID).  T in the 97 range x24h; other VSS.     1. Primary central diabetes insipidus, post-operative  Pt still receiving 190mL free water boluses via PEG and 270mL nutrition q6h; now on spaced DDAVP regimen.  Na 138 today from 136 yesterday.    --continue DDAVP 100mg TID; pt's Na now within normal range with increased urination  --change free water bolus to 100mL qAM, 100mL with noon nutrition, and 200mL at night to check for response  --daily Na checks   --Weigh briefs for estimates of output        2. Secondary hypothyroidism  Free T4 0.81 on 6/27/17; TSH and free T4 ordered per primary team to investigate hypothermia  --Continue levothyroxine 75mcg, will adjust if change in FT4     3. Hypercalcemia (resolved)  Ca increased to 10.4 today.  Workup for etiologies of hypercalcemia negative; suspected etiology r/t immobilization.  Pt s/p one-off dose of pamidronate  60mg dated 07/27/17.  Pt has been less mobile in the past 24 hours r/t AMS  --Encourage activity as tolerated with PT/OT     Disposition: D/c to nursing home once accepted     The above will be discussed with the endocrinology team and changed as needed.        Nurys Bowles  Endocrinology  [facility]

## 2017-08-05 NOTE — SUBJECTIVE & OBJECTIVE
"Interval HPI:   Overnight events: No acute events overnight.  Patient less interactive today than on previous mornings w/o any verbal responses noted to questioning.  Eatin%  Nausea: No  Hypoglycemia and intervention: No  Fever: No  TPN and/or TF: Yes  If yes, type of TF/TPN and rate:  Isosource 1.5 Kevan, 270 cc bolus, full strength q6h w/ 190 cc free water bolus    BP 97/60 (BP Location: Left arm, Patient Position: Lying, BP Method: Automatic)   Pulse (!) 59   Temp 97.2 °F (36.2 °C) (Axillary)   Resp 16   Ht 5' 2.01" (1.575 m)   Wt 56.2 kg (123 lb 14.4 oz)   SpO2 97%   Breastfeeding? No   BMI 22.66 kg/m²     Labs Reviewed and Include      Recent Labs  Lab 17  0554   GLU 62*   CALCIUM 10.4      K 4.5   CO2 27      BUN 17   CREATININE 0.5     Lab Results   Component Value Date    WBC 4.28 2017    HGB 8.8 (L) 2017    HCT 25.8 (L) 2017    MCV 95 2017     (H) 2017       Recent Labs  Lab 17  0452   FREET4 0.84     No results found for: HGBA1C    Nutritional status:   Body mass index is 22.66 kg/m².  Lab Results   Component Value Date    ALBUMIN 2.5 (L) 2017    ALBUMIN 2.3 (L) 2017    ALBUMIN 3.0 (L) 2017     Lab Results   Component Value Date    PREALBUMIN 26 2017       Estimated Creatinine Clearance: 105.3 mL/min (based on Cr of 0.5).    Accu-Checks  Recent Labs      17   1148  17   1802  17   2039  17   1345  17   1913  17   0528  17   1416  17   1833  17   0021  17   0553   POCTGLUCOSE  87  110  85  99  122*  87  113*  132*  77  72       Current Medications and/or Treatments Impacting Glycemic Control  Immunotherapy:  Immunosuppressants     None        Steroids:   Hormones     Start     Stop Route Frequency Ordered    17 1630  desmopressin tablet 100 mcg      -- PER G TUBE 3 times daily 17 1618        Pressors:    Autonomic Drugs     None    "     Hyperglycemia/Diabetes Medications: Antihyperglycemics     Start     Stop Route Frequency Ordered    06/29/17 1922  insulin aspart pen 0-5 Units      -- SubQ Every 6 hours PRN 06/29/17 1826

## 2017-08-05 NOTE — ASSESSMENT & PLAN NOTE
Post-operative diabetes insipidus.  Sodium is still increasing, now up to 138 today from 136 yesterday.  While the velocity of rise has decreased, Her current treatment regiment will need to be changed.  In light of her sensitivity to DDVAP, we recommend changing her free water bolus volume instead of changing the DDVAP dose.  -Increase free water boluses fomr 190 cc to 220 cc q6h with TF boluses  -Continue DDVAP 100 mcg per G tube TID

## 2017-08-05 NOTE — PLAN OF CARE
Problem: Patient Care Overview  Goal: Plan of Care Review  Fall precaution maintained this shift call bell in reach bed in low position . No acute distress noted.  Vs stable. No new breakdown noted. Side rail up x3 will monitor

## 2017-08-05 NOTE — PROGRESS NOTES
"[facility]  Endocrinology  Progress Note    Admit Date: 2017     Interval HPI:   Overnight events: No acute events overnight.  Patient less interactive today than on previous mornings w/o any verbal responses noted to questioning.  Eatin%  Nausea: No  Hypoglycemia and intervention: No  Fever: No  TPN and/or TF: Yes  If yes, type of TF/TPN and rate:  Isosource 1.5 Kevan, 270 cc bolus, full strength q6h w/ 190 cc free water bolus    BP 97/60 (BP Location: Left arm, Patient Position: Lying, BP Method: Automatic)   Pulse (!) 59   Temp 97.2 °F (36.2 °C) (Axillary)   Resp 16   Ht 5' 2.01" (1.575 m)   Wt 56.2 kg (123 lb 14.4 oz)   SpO2 97%   Breastfeeding? No   BMI 22.66 kg/m²       Labs Reviewed and Include      Recent Labs  Lab 17  0554   GLU 62*   CALCIUM 10.4      K 4.5   CO2 27      BUN 17   CREATININE 0.5     Lab Results   Component Value Date    WBC 4.28 2017    HGB 8.8 (L) 2017    HCT 25.8 (L) 2017    MCV 95 2017     (H) 2017       Recent Labs  Lab 17  0452   FREET4 0.84     No results found for: HGBA1C    Nutritional status:   Body mass index is 22.66 kg/m².  Lab Results   Component Value Date    ALBUMIN 2.5 (L) 2017    ALBUMIN 2.3 (L) 2017    ALBUMIN 3.0 (L) 2017     Lab Results   Component Value Date    PREALBUMIN 26 2017       Estimated Creatinine Clearance: 105.3 mL/min (based on Cr of 0.5).    Accu-Checks  Recent Labs      17   1148  17   1802  17   2039  17   1345  17   1913  17   0528  17   1416  17   1833  17   0021  17   0553   POCTGLUCOSE  87  110  85  99  122*  87  113*  132*  77  72       Current Medications and/or Treatments Impacting Glycemic Control  Immunotherapy:  Immunosuppressants     None        Steroids:   Hormones     Start     Stop Route Frequency Ordered    17 1630  desmopressin tablet 100 mcg      -- " PER G TUBE 3 times daily 08/03/17 1618        Pressors:    Autonomic Drugs     None        Hyperglycemia/Diabetes Medications: Antihyperglycemics     Start     Stop Route Frequency Ordered    06/29/17 1922  insulin aspart pen 0-5 Units      -- SubQ Every 6 hours PRN 06/29/17 1822          ASSESSMENT and PLAN    Hypercalcemia    -Workup negative for malignant/humoral etiology of hypercalcemia.  -Indicative of hypercalcemia of immobilization  -PT as tolerated  -S/P IV pamidronate 60mg x1 on 7/27         Secondary hypothyroidism    Goal FT4 mid-upper limit of normal  -Continue levothyroxine 75mcg daily, needs to be given at least 1 hr apart from TF to ensure absorption   -Repeat FT4 ~9/1        Primary central diabetes insipidus    Post-operative diabetes insipidus.  Sodium is still increasing, now up to 138 today from 136 yesterday.  While the velocity of rise has decreased, Her current treatment regiment will need to be changed.  In light of her sensitivity to DDVAP, we recommend changing her free water bolus volume instead of changing the DDVAP dose.  -Increase free water boluses fomr 190 cc to 220 cc q6h with TF boluses  -Continue DDVAP 100 mcg per G tube TID            Pepe Juan MD  Endocrinology  [facility]

## 2017-08-06 NOTE — ASSESSMENT & PLAN NOTE
Post-operative diabetes insipidus.  Sodium at 137 today from 138.  We recommend continuing current course of treatment.  -Conitnue free water boluses at 220 cc q6h with TF boluses  -Continue DDVAP 100 mcg per G tube TID

## 2017-08-06 NOTE — PLAN OF CARE
Problem: Physical Therapy Goal  Goal: Physical Therapy Goal  Goals to be met by: 17     Patient will increase functional independence with mobility by performin. Supine to sit with Moderate Assistance  2. Sit to supine with Moderate Assistance  3. Sit to stand transfer with Moderate Assistance- met       Revised: Sit to stand transfer with CGA  4. Gait  x 5 feet with Maximum Assistance using appropriate AD while maintaining midline orientation and no posterior lean. - Partially met       Revised: Gait 50 feet min assist with RW and no LOB.  5. Sitting at edge of bed x10 minutes with Supervision while maintaining midline orientation and performing 3/5 reps of reaching activity outside base of support.   6. Revised: Stand for 2 minutes with contact guard Assistance using appropriate UE support while demonstrating midline orientation with no posterior lean & equal weight-bearing through bilateral LE's.   7. Lower extremity exercise program x10 reps with assistance as needed for strengthening and endurance with functional mobility.       Outcome: Ongoing (interventions implemented as appropriate)  Pt progressing towards goals, though unable to follow single-step commands

## 2017-08-06 NOTE — ASSESSMENT & PLAN NOTE
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 Notify NSGY for any changes.   -Continue Keppra for seizure prophylaxis  -Continue Lovenox for DVT prophylaxis  -Continue aggressive PT/OT  -LP performed 7/20, NGTD. Likely viral process.   -Endocrine following for DI.   -Medical management per primary team.  -Dispo: per primary, awaiting nursing home placement   -Plan for 6 week f/u with Dr. Chew upon DC  -Will continue to follow, please call with questions

## 2017-08-06 NOTE — SUBJECTIVE & OBJECTIVE
Interval History: No acute events overnight. Pending financial data for NH placement        Medications:  Continuous Infusions:   Scheduled Meds:   desmopressin  100 mcg Per G Tube TID    enoxaparin  40 mg Subcutaneous Daily    levetiracetam oral soln  500 mg Per NG tube BID    levothyroxine  75 mcg Per NG tube Daily     PRN Meds:acetaminophen, bisacodyl, dextrose 50%, glucagon (human recombinant), guaifenesin 100 mg/5 ml, insulin aspart, ipratropium, olanzapine, ondansetron, senna, sodium chloride 0.9%     Review of Systems    Objective:     Weight: 56.2 kg (123 lb 14.4 oz)  Body mass index is 22.66 kg/m².  Vital Signs (Most Recent):  Temp: 96.6 °F (35.9 °C) (08/06/17 0400)  Pulse: 88 (08/06/17 1157)  Resp: 18 (08/06/17 1124)  BP: 117/61 (08/06/17 1124)  SpO2: 95 % (08/06/17 1124) Vital Signs (24h Range):  Temp:  [96.6 °F (35.9 °C)-97.5 °F (36.4 °C)] 96.6 °F (35.9 °C)  Pulse:  [57-88] 88  Resp:  [16-18] 18  SpO2:  [93 %-98 %] 95 %  BP: ()/(48-78) 117/61                           Gastrostomy/Enterostomy 07/17/17 1520 Percutaneous endoscopic gastrostomy (PEG) LUQ feeding (Active)   Securement sutured to abdomen 7/31/2017  8:00 AM   Interventions Prior to Feeding patency checked 7/31/2017  8:00 AM   Drainage thin 7/21/2017  7:54 PM   Feeding Type bolus 7/31/2017  8:00 AM   Clamp Status/Tolerance clamped;no abdominal discomfort;no abdominal distention;no emesis;no nausea;no restlessness 7/31/2017  8:00 AM   Feeding Action feeding continued 7/26/2017  7:35 AM   Dressing dry and intact 7/31/2017  8:00 AM   Insertion Site dry;no redness;no warmth;no drainage;no tenderness;no swelling 7/31/2017  8:00 AM   Site Care sterile precut T-slit dressing applied 7/27/2017  7:58 AM   Flush/Irrigation flushed w/;water;no resistance met 7/31/2017  8:00 AM   Current Rate (mL/hr) 45 mL/hr 7/26/2017  7:35 AM   Goal Rate (mL/hr) 45 mL/hr 7/26/2017  7:35 AM   Intake (mL) 20 mL 7/20/2017  8:00 PM   Water Bolus (mL) 190 mL  7/31/2017 12:03 PM   Tube Feeding Intake (mL) 270 7/31/2017 12:03 PM   Residual Amount (ml) 0 ml 7/30/2017  6:17 PM       Neurosurgery Physical Exam     General: well developed, no acute distress.   Head: normocephalic. surgical Incision well healed  Neurologic: Alert. Awake. Minimal verbal responses.   GCS: Motor: 6/Verbal: 4 /Eyes: 4 GCS Total: 14  Mental Status: Able to state name. Intermittently follows simple commands.   Cranial nerves: face symmetric, CN II-XII grossly intact.   Eyes: pupils equal, round, reactive to light.  Motor Strength: Moves all extremities spontaneously with good strength and tone. Intermittently follows simple commands.       Significant Labs:    Recent Labs  Lab 08/05/17 0554 08/06/17 0415   GLU 62* 68*    137   K 4.5 4.6    101   CO2 27 26   BUN 17 22*   CREATININE 0.5 0.5   CALCIUM 10.4 10.4   MG 2.1 2.0       Recent Labs  Lab 08/06/17 0415   WBC 4.92   HGB 9.9*   HCT 30.8*   *     No results for input(s): LABPT, INR, APTT in the last 48 hours.  Microbiology Results (last 7 days)     Procedure Component Value Units Date/Time    Blood culture [[id number]] Collected:  08/02/17 0512    Order Status:  Completed Specimen:  Blood from Peripheral, Upper Arm, Left Updated:  08/06/17 0812     Blood Culture, Routine No Growth to date     Blood Culture, Routine No Growth to date     Blood Culture, Routine No Growth to date     Blood Culture, Routine No Growth to date     Blood Culture, Routine No Growth to date    Blood culture [[id number]]     Order Status:  Canceled Specimen:  Blood     Blood culture [[id number]]     Order Status:  Canceled Specimen:  Blood

## 2017-08-06 NOTE — PROGRESS NOTES
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 Chew and discharged on 6/26/17 to [facility].  She presents with altered mental status and worsening hypernatremia for 1 day. She was treated for DI her last admission and labs are trending up today.  Spoke with Dr. Fairchild at [facility], patient with waxing/waning mental status yesterday, but was appropriate & following some complex commands yesterday.  There was a period yesterday of significant lethargy.  Patient is unable to give history, and information is taken from the chart.     Post-Op Info:  Procedure(s) (LRB):  PLACEMENT-TUBE-PEG (N/A)   20 Days Post-Op     Interval History: No acute events overnight. Pending financial data for NH placement        Medications:  Continuous Infusions:   Scheduled Meds:   desmopressin  100 mcg Per G Tube TID    enoxaparin  40 mg Subcutaneous Daily    levetiracetam oral soln  500 mg Per NG tube BID    levothyroxine  75 mcg Per NG tube Daily     PRN Meds:acetaminophen, bisacodyl, dextrose 50%, glucagon (human recombinant), guaifenesin 100 mg/5 ml, insulin aspart, ipratropium, olanzapine, ondansetron, senna, sodium chloride 0.9%     Review of Systems    Objective:     Weight: 56.2 kg (123 lb 14.4 oz)  Body mass index is 22.66 kg/m².  Vital Signs (Most Recent):  Temp: 96.6 °F (35.9 °C) (08/06/17 0400)  Pulse: 88 (08/06/17 1157)  Resp: 18 (08/06/17 1124)  BP: 117/61 (08/06/17 1124)  SpO2: 95 % (08/06/17 1124) Vital Signs (24h Range):  Temp:  [96.6 °F (35.9 °C)-97.5 °F (36.4 °C)] 96.6 °F (35.9 °C)  Pulse:  [57-88] 88  Resp:  [16-18] 18  SpO2:  [93 %-98 %] 95 %  BP: ()/(48-78) 117/61                           Gastrostomy/Enterostomy 07/17/17 1520 Percutaneous endoscopic gastrostomy (PEG) LUQ feeding (Active)   Securement sutured to abdomen 7/31/2017  8:00 AM    Interventions Prior to Feeding patency checked 7/31/2017  8:00 AM   Drainage thin 7/21/2017  7:54 PM   Feeding Type bolus 7/31/2017  8:00 AM   Clamp Status/Tolerance clamped;no abdominal discomfort;no abdominal distention;no emesis;no nausea;no restlessness 7/31/2017  8:00 AM   Feeding Action feeding continued 7/26/2017  7:35 AM   Dressing dry and intact 7/31/2017  8:00 AM   Insertion Site dry;no redness;no warmth;no drainage;no tenderness;no swelling 7/31/2017  8:00 AM   Site Care sterile precut T-slit dressing applied 7/27/2017  7:58 AM   Flush/Irrigation flushed w/;water;no resistance met 7/31/2017  8:00 AM   Current Rate (mL/hr) 45 mL/hr 7/26/2017  7:35 AM   Goal Rate (mL/hr) 45 mL/hr 7/26/2017  7:35 AM   Intake (mL) 20 mL 7/20/2017  8:00 PM   Water Bolus (mL) 190 mL 7/31/2017 12:03 PM   Tube Feeding Intake (mL) 270 7/31/2017 12:03 PM   Residual Amount (ml) 0 ml 7/30/2017  6:17 PM       Neurosurgery Physical Exam     General: well developed, no acute distress.   Head: normocephalic. surgical Incision well healed  Neurologic: Alert. Awake. Minimal verbal responses.   GCS: Motor: 6/Verbal: 4 /Eyes: 4 GCS Total: 14  Mental Status: Able to state name. Intermittently follows simple commands.   Cranial nerves: face symmetric, CN II-XII grossly intact.   Eyes: pupils equal, round, reactive to light.  Motor Strength: Moves all extremities spontaneously with good strength and tone. Intermittently follows simple commands.       Significant Labs:    Recent Labs  Lab 08/05/17  0554 08/06/17  0415   GLU 62* 68*    137   K 4.5 4.6    101   CO2 27 26   BUN 17 22*   CREATININE 0.5 0.5   CALCIUM 10.4 10.4   MG 2.1 2.0       Recent Labs  Lab 08/06/17  0415   WBC 4.92   HGB 9.9*   HCT 30.8*   *     No results for input(s): LABPT, INR, APTT in the last 48 hours.  Microbiology Results (last 7 days)     Procedure Component Value Units Date/Time    Blood culture [[id number]] Collected:  08/02/17 0512    Order  Status:  Completed Specimen:  Blood from Peripheral, Upper Arm, Left Updated:  08/06/17 0812     Blood Culture, Routine No Growth to date     Blood Culture, Routine No Growth to date     Blood Culture, Routine No Growth to date     Blood Culture, Routine No Growth to date     Blood Culture, Routine No Growth to date    Blood culture [[id number]]     Order Status:  Canceled Specimen:  Blood     Blood culture [[id number]]     Order Status:  Canceled Specimen:  Blood             Assessment/Plan:     Meningioma, recurrent of brain    50 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 Notify NSGY for any changes.   -Continue Keppra for seizure prophylaxis  -Continue Lovenox for DVT prophylaxis  -Continue aggressive PT/OT  -LP performed 7/20, NGTD. Likely viral process.   -Endocrine following for DI.   -Medical management per primary team.  -Dispo: per primary, awaiting nursing home placement   -Plan for 6 week f/u with Dr. Chew upon DC  -Will continue to follow, please call with questions                 Lamar Levin MD  Neurosurgery  [facility]

## 2017-08-06 NOTE — SUBJECTIVE & OBJECTIVE
"Interval HPI:   Overnight events: No acute events overnight.  Patient more interactive this morning.  Eating:   NPO  Nausea: No  Hypoglycemia and intervention: No  Fever: No  TPN and/or TF: Yes  If yes, type of TF/TPN and rate: Isosource 1.5 Kevan, 270 cc bolus, full strength q6h w/ 220 cc free water bolus    BP (!) 87/48 (BP Location: Left arm, Patient Position: Lying, BP Method: Automatic)   Pulse 65   Temp 96.6 °F (35.9 °C) (Axillary)   Resp 18   Ht 5' 2.01" (1.575 m)   Wt 56.2 kg (123 lb 14.4 oz)   SpO2 98%   Breastfeeding? No   BMI 22.66 kg/m²     Labs Reviewed and Include      Recent Labs  Lab 08/06/17  0415   GLU 68*   CALCIUM 10.4      K 4.6   CO2 26      BUN 22*   CREATININE 0.5     Lab Results   Component Value Date    WBC 4.92 08/06/2017    HGB 9.9 (L) 08/06/2017    HCT 30.8 (L) 08/06/2017    MCV 99 (H) 08/06/2017     (H) 08/06/2017       Recent Labs  Lab 08/02/17  0452   FREET4 0.84     No results found for: HGBA1C    Nutritional status:   Body mass index is 22.66 kg/m².  Lab Results   Component Value Date    ALBUMIN 2.5 (L) 08/02/2017    ALBUMIN 2.3 (L) 07/18/2017    ALBUMIN 3.0 (L) 07/17/2017     Lab Results   Component Value Date    PREALBUMIN 26 06/27/2017       Estimated Creatinine Clearance: 105.3 mL/min (based on Cr of 0.5).    Accu-Checks  Recent Labs      08/03/17   1345  08/03/17   1913  08/04/17   0528  08/04/17   1416  08/04/17   1833  08/05/17   0021  08/05/17   0553  08/05/17   1407  08/05/17   1851   POCTGLUCOSE  99  122*  87  113*  132*  77  72  127*  85       Current Medications and/or Treatments Impacting Glycemic Control  Immunotherapy:  Immunosuppressants     None        Steroids:   Hormones     Start     Stop Route Frequency Ordered    08/03/17 1630  desmopressin tablet 100 mcg      -- PER G TUBE 3 times daily 08/03/17 1618        Pressors:    Autonomic Drugs     None        Hyperglycemia/Diabetes Medications: Antihyperglycemics     Start     Stop Route " Frequency Ordered    06/29/17 1922  insulin aspart pen 0-5 Units      -- SubQ Every 6 hours PRN 06/29/17 1827

## 2017-08-06 NOTE — PLAN OF CARE
Problem: Patient Care Overview  Goal: Plan of Care Review  Pt has been free from falls/injuries throughout shift. Did not sleep all shift. Disoriented to place, time, situation. Trying to jump out of bed throughout entire shift. Pulling at PEG tube, tele leads, and blankets all shift. Bolus feedings given as ordered. All scheduled meds given as ordered. Turns herself in bed. VSS. No s/s distress noted. Bed in low-locked position. Bed alarm set. Call light within reach. Avasys in room. Will continue to monitor pt

## 2017-08-06 NOTE — PT/OT/SLP PROGRESS
"Physical Therapy  Treatment    Joanna Morales   MRN: [id number]   Admitting Diagnosis: Hypernatremia    PT Received On: 08/06/17  PT Start Time: 1552     PT Stop Time: 1618    PT Total Time (min): 26 min       Billable Minutes:  Therapeutic Activity 24    Treatment Type: Treatment  PT/PTA: PT     PTA Visit Number: 1       General Precautions: Standard, aspiration, fall  Orthopedic Precautions: N/A   Braces: N/A    Do you have any cultural, spiritual, Hinduism conflicts, given your current situation?: none stated    Subjective:  Communicated with nursing prior to session.  "Shut the door."  "I need to get the girl and a tree."    Pain/Comfort  Pain Rating 1: 0/10  Pain Rating Post-Intervention 1:  (Pt reported back pain during sup<-->sit transfer training, unable to quantify sec to cognitive deficits)    Objective:   Patient found with: telemetry, peripheral IV (PEG tube)    Functional Mobility:  Bed Mobility:   Rolling/Turning to Left: Moderate assistance  Rolling/Turning Right: Moderate assistance  Scooting/Bridging: Maximum Assistance (To scoot to ant in sit, to scoot to HOB in supine)  Supine to Sit: Minimum Assistance, Moderate Assistance, Maximum Assistance (Pt perf 4xs, Judy 1x, ModA 2xs, MaxA 1x)  Sit to Supine: Minimum Assistance, Moderate Assistance (Pt perf 4xs, Judy 1x, ModA 3xs)    Transfers:  Sit <> Stand Assistance: Moderate Assistance (Pt perf 4xs)  Sit <> Stand Assistive Device: No Assistive Device    Gait:   Gait Distance: Pt able to perf lateral stepping B, approx 4 steps in each direction, approx 2' in each direction, pt perf 4xs, able to take 1 step with R LE ant  Assistance 1: Moderate assistance  Gait Assistive Device: Rolling walker  Gait Pattern: swing-to gait  Gait Deviation(s): decreased marycarmen, increased time in double stance, decreased stride length, decreased toe-to-floor clearance, decreased swing-to-stance ratio, decreased step length    Balance:   Static Sit: POOR+: Needs MINIMAL " assist to maintain  Dynamic Sit: FAIR+: Maintains balance through MINIMAL excursions of active trunk motion  Static Stand: POOR: Needs MODERATE assist to maintain  Dynamic stand: POOR: N/A     Therapeutic Activities and Exercises:  Reaching outside of OTONIEL in sit, EOB, with Lisseth  Ed on hand placement with transfer training with Lisseth to [facility]     AM-PAC 6 CLICK MOBILITY  How much help from another person does this patient currently need?   1 = Unable, Total/Dependent Assistance  2 = A lot, Maximum/Moderate Assistance  3 = A little, Minimum/Contact Guard/Supervision  4 = None, Modified [place]/Independent    Turning over in bed (including adjusting bedclothes, sheets and blankets)?: 2  Sitting down on and standing up from a chair with arms (e.g., wheelchair, bedside commode, etc.): 2  Moving from lying on back to sitting on the side of the bed?: 2  Moving to and from a bed to a chair (including a wheelchair)?: 2  Need to walk in hospital room?: 2  Climbing 3-5 steps with a railing?: 1  Total Score: 11    AM-PAC Raw Score CMS G-Code Modifier Level of Impairment Assistance   6 % Total / Unable   7 - 9 CM 80 - 100% Maximal Assist   10 - 14 CL 60 - 80% Moderate Assist   15 - 19 CK 40 - 60% Moderate Assist   20 - 22 CJ 20 - 40% Minimal Assist   23 CI 1-20% SBA / CGA   24 CH 0% Independent/ Mod I     Patient left supine with all lines intact, call button in reach, bed alarm on and sitter present.    Assessment:  Joanna Morales is a 51 y.o. female with a medical diagnosis of Hypernatremia.  Pt was able to participate in PT tx session, compliant, automatic and non-combative, though confused and hallucinogenic throughout.  Pt does not follow single-step commands, and is unable to appropriately participate in conversation.  Pt is able to WB through feet in stance, though is unsafe and attempts to sit when bed is not directly behind her.  Continuous repetitions to improve mm memory and mm strength is most likely  the best POC for this patient at this time.  Therefore perf transfer training multiple times.  Pt will cont to require skilled PT services to improve functional transfers and gait.      Rehab identified problem list/impairments: Rehab identified problem list/impairments: decreased safety awareness, impaired coordination, impaired functional mobilty, impaired balance, impaired self care skills, gait instability, impaired endurance, impaired cardiopulmonary response to activity, weakness    Rehab potential is poor.    Activity tolerance: Poor    Discharge recommendations: Discharge Facility/Level Of Care Needs: nursing facility, skilled     Barriers to discharge: Barriers to Discharge: Decreased caregiver support    Equipment recommendations: Equipment Needed After Discharge: wheelchair     GOALS:    Physical Therapy Goals        Problem: Physical Therapy Goal    Goal Priority Disciplines Outcome Goal Variances Interventions   Physical Therapy Goal     PT/OT, PT      Description:  Goals to be met by: 17    Patient will increase functional independence with mobility by performin. Supine to sit with Minimal Assistance  2. Sit to supine with Minimal Assistance  3. Sit to stand transfer with Minimal Assistance met (2017)   Revised: sit to stand transfer with SBA  4. Standing for 3 minutes with Minimal Assistance and weight evenly distributed through (B) LE.   5. Gait  x 20 feet with Moderate Assistance using AD if needed   6. Pt will perform (B) LE therapeutic exercises x 20 reps to improve muscular strength and endurance.               Problem: Physical Therapy Goal    Goal Priority Disciplines Outcome Goal Variances Interventions   Physical Therapy Goal     PT/OT, PT Ongoing (interventions implemented as appropriate)     Description:  Goals to be met by: 17     Patient will increase functional independence with mobility by performin. Supine to sit with Moderate Assistance  2. Sit to supine  with Moderate Assistance  3. Sit to stand transfer with Moderate Assistance- met 7/31      Revised: Sit to stand transfer with CGA  4. Gait  x 5 feet with Maximum Assistance using appropriate AD while maintaining midline orientation and no posterior lean. - Partially met 7/31      Revised: Gait 50 feet min assist with RW and no LOB.  5. Sitting at edge of bed x10 minutes with Supervision while maintaining midline orientation and performing 3/5 reps of reaching activity outside base of support.   6. Revised: Stand for 2 minutes with contact guard Assistance using appropriate UE support while demonstrating midline orientation with no posterior lean & equal weight-bearing through bilateral LE's.   7. Lower extremity exercise program x10 reps with assistance as needed for strengthening and endurance with functional mobility.                        PLAN:    Patient to be seen 5 x/week  to address the above listed problems via gait training, therapeutic activities, therapeutic exercises, neuromuscular re-education  Plan of Care expires: 08/24/17  Plan of Care reviewed with: patient         Amy Stallingsin, PT  08/06/2017

## 2017-08-06 NOTE — PROGRESS NOTES
"[facility]  Endocrinology  Progress Note    Admit Date: 6/28/2017     Interval HPI:   Overnight events: No acute events overnight.  Patient more interactive this morning.  Eating:   NPO  Nausea: No  Hypoglycemia and intervention: No  Fever: No  TPN and/or TF: Yes  If yes, type of TF/TPN and rate: Isosource 1.5 Kevan, 270 cc bolus, full strength q6h w/ 220 cc free water bolus    BP (!) 87/48 (BP Location: Left arm, Patient Position: Lying, BP Method: Automatic)   Pulse 65   Temp 96.6 °F (35.9 °C) (Axillary)   Resp 18   Ht 5' 2.01" (1.575 m)   Wt 56.2 kg (123 lb 14.4 oz)   SpO2 98%   Breastfeeding? No   BMI 22.66 kg/m²       Labs Reviewed and Include      Recent Labs  Lab 08/06/17  0415   GLU 68*   CALCIUM 10.4      K 4.6   CO2 26      BUN 22*   CREATININE 0.5     Lab Results   Component Value Date    WBC 4.92 08/06/2017    HGB 9.9 (L) 08/06/2017    HCT 30.8 (L) 08/06/2017    MCV 99 (H) 08/06/2017     (H) 08/06/2017       Recent Labs  Lab 08/02/17  0452   FREET4 0.84     No results found for: HGBA1C    Nutritional status:   Body mass index is 22.66 kg/m².  Lab Results   Component Value Date    ALBUMIN 2.5 (L) 08/02/2017    ALBUMIN 2.3 (L) 07/18/2017    ALBUMIN 3.0 (L) 07/17/2017     Lab Results   Component Value Date    PREALBUMIN 26 06/27/2017       Estimated Creatinine Clearance: 105.3 mL/min (based on Cr of 0.5).    Accu-Checks  Recent Labs      08/03/17   1345  08/03/17   1913  08/04/17   0528  08/04/17   1416  08/04/17   1833  08/05/17   0021  08/05/17   0553  08/05/17   1407  08/05/17   1851   POCTGLUCOSE  99  122*  87  113*  132*  77  72  127*  85       Current Medications and/or Treatments Impacting Glycemic Control  Immunotherapy:  Immunosuppressants     None        Steroids:   Hormones     Start     Stop Route Frequency Ordered    08/03/17 1630  desmopressin tablet 100 mcg      -- PER G TUBE 3 times daily 08/03/17 1618        Pressors:    Autonomic Drugs     None "        Hyperglycemia/Diabetes Medications: Antihyperglycemics     Start     Stop Route Frequency Ordered    06/29/17 1922  insulin aspart pen 0-5 Units      -- SubQ Every 6 hours PRN 06/29/17 1822          ASSESSMENT and PLAN    Hypercalcemia    -Workup negative for malignant/humoral etiology of hypercalcemia.  -Indicative of hypercalcemia of immobilization  -PT as tolerated  -S/P IV pamidronate 60mg x1 on 7/27         Secondary hypothyroidism    Goal FT4 mid-upper limit of normal  -Continue levothyroxine 75mcg daily, needs to be given at least 1 hr apart from TF to ensure absorption   -Repeat FT4 ~9/1        Primary central diabetes insipidus    Post-operative diabetes insipidus.  Sodium at 137 today from 138.  We recommend continuing current course of treatment.  -Conitnue free water boluses at 220 cc q6h with TF boluses  -Continue DDVAP 100 mcg per G tube TID            Pepe Juan MD  Endocrinology  [facility]

## 2017-08-07 PROBLEM — Z72.0 TOBACCO ABUSE: Chronic | Status: RESOLVED | Noted: 2017-01-01 | Resolved: 2017-01-01

## 2017-08-07 PROBLEM — R41.82 ALTERED MENTAL STATUS: Status: ACTIVE | Noted: 2017-01-01

## 2017-08-07 PROBLEM — E87.0 HYPERNATREMIA: Status: RESOLVED | Noted: 2017-01-01 | Resolved: 2017-01-01

## 2017-08-07 NOTE — ASSESSMENT & PLAN NOTE
Low random early morning cortisols from August labs  Recommend starting hydrocortisone 15mg qam, 5mg afternoon

## 2017-08-07 NOTE — ASSESSMENT & PLAN NOTE
Post-operative diabetes insipidus.    Recommend decreasing DDAVP frequency 100mcg to BID  Conitnue free water boluses q6hr

## 2017-08-07 NOTE — PT/OT/SLP PROGRESS
"Speech Language Pathology  Treatment    Joanna Morales   MRN: [id number]   Admitting Diagnosis: Hypernatremia    Diet recommendations: Solid Diet Level: NPO  Liquid Diet Level: NPO   Continue alternate means of nutrition/hydration/medication at this time.  Ongoing ST for dysphagia.    SLP Treatment Date: 08/07/17  Speech Start Time: 1520     Speech Stop Time: 1530     Speech Total (min): 10 min       TREATMENT BILLABLE MINUTES:  Treatment Swallowing Dysfunction 10    Has the patient been evaluated by SLP for swallowing? : Yes  Keep patient NPO?: Yes   General Precautions: Standard, fall, aspiration  Current Respiratory Status: other (comment) (room air)       Subjective:  "How are you?" (pt stated when ST asked if she had any questions following session)    Pain/Comfort  Pain Rating 1: 0/10  Pain Rating Post-Intervention 1: 0/10    Objective:     Pt seen this afternoon with no family present following nursing approval.  Ms. Morales was awake/alert throughout session, but confusion remains evident.  Intermittent spontaneous intelligible utterances were noted; however, most verbal output was nonsensical. Pt was sitting up in bed attempting to cross legs.  She appeared to be grabbing for items that were not there.  Pt was able to complete Falsetto Exercise x4 and simple tongue base x3 with max cues.  She was unable to accurately follow multi-step commands for more complex exercises.  Pt was presented with ice chips x3 and tsp water x3.  Poor bolus awareness was evident.  Pt did not readily accept boluses. Once placed in oral cavity, mild anterior loss was noted due to forward leaning position .  Initiation of pharyngeal phase was delayed and laryngeal elevation reduced.  No overt s/s of aspiration were observed with the above small/limited boluses.  Pt refused further trials. Education provided throughout session regarding ST role, ongoing swallow assessment, and continued ST poc.  Pt unable to verbalize " understanding at this time.   Whiteboard current.    Assessment:  Joanna Morales is a 51 y.o. female with a medical diagnosis of Hypernatremia and presents with oropharyngeal dysphagia complicated by confusion.    Discharge recommendations: Discharge Facility/Level Of Care Needs: nursing facility, skilled     Goals:    SLP Goals        Problem: SLP Goal    Goal Priority Disciplines Outcome   SLP Goal     SLP Unable to achieve outcome(s) by discharge   Description:  Speech Language Pathology Goals  Goals expected to be met by 7/7  1. Patient will successfully participate in ongoing clinical swallow evaluation and tolerate po trials with no overt s/s of aspiration.                  Problem: SLP Goal    Goal Priority Disciplines Outcome   SLP Goal     SLP Ongoing (interventions implemented as appropriate)   Description:  Updated SLP goals expected to be met by 8/4- CONTINUE until 8/11:  1. Pt will participate in ongoing assessment of swallow in order to determine safest, least restrictive diet. - ONGOING    SLP goals expected to be met by 7/18:  1. Pt will participate in going assessment of swallow in order to determine safest, least restrictive diet.                        Plan:   Patient to be seen Therapy Frequency: 3 x/week   Plan of Care expires: 08/26/17  Plan of Care reviewed with: patient  SLP Follow-up?: Yes             HILDA Bennett, CCC-SLP  Speech Language Pathologist  [phone]  8/7/2017

## 2017-08-07 NOTE — PROGRESS NOTES
"[facility]  Endocrinology  Progress Note      Reason for Consult: Management of hypernatremia    Admit Date: 6/28/2017      Reason for Consult: Diabetes insipidus      Surgical Procedure and Date:   CRANIOTOMY 6/8/2017           HPI:   Patient is a 51 y.o. female with a diagnosis of  meningioma s/p resection (6/7). 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  She underwent partial resection of the tumor in January 2015 at [facility]. Followup scan done in 2016 showing a continued large meningioma. Endocrine had been previously involved in her care during earlier June hospitalization for post-operative diabetes insipidus and additional concern for secondary adrenal insufficiency and secondary hypothyroidism. Had been discharged to [facility] on 6/26/17. Re-admitted to [facility] 6/[facility]/17 for worsening hypernatremia and mental status. Endocrine had been consulted this admission for severe hypernatremia with concerns for diabetes insipidus.             Interval HPI:   Overnight events: none  Eating:   NPO  Nausea: No  Hypoglycemia and intervention: No  Fever: No  TPN and/or TF: Yes  If yes, type of TF/TPN and rate: q6hr with free water boluses    BP 97/64 (BP Location: Right arm, Patient Position: Lying, BP Method: Automatic)   Pulse 82   Temp 97.1 °F (36.2 °C) (Axillary)   Resp 18   Ht 5' 2.01" (1.575 m)   Wt 56.2 kg (123 lb 14.4 oz)   SpO2 95%   Breastfeeding? No   BMI 22.66 kg/m²       Labs Reviewed and Include      Recent Labs  Lab 08/07/17  0514   GLU 79   CALCIUM 9.6      K 4.2   CO2 26      BUN 21*   CREATININE 0.6     Lab Results   Component Value Date    WBC 4.92 08/06/2017    HGB 9.9 (L) 08/06/2017    HCT 30.8 (L) 08/06/2017    MCV 99 (H) 08/06/2017     (H) 08/06/2017       Recent Labs  Lab 08/02/17  0452   FREET4 0.84     No results found for: " HGBA1C    Nutritional status:   Body mass index is 22.66 kg/m².  Lab Results   Component Value Date    ALBUMIN 2.5 (L) 08/02/2017    ALBUMIN 2.3 (L) 07/18/2017    ALBUMIN 3.0 (L) 07/17/2017     Lab Results   Component Value Date    PREALBUMIN 26 06/27/2017       Estimated Creatinine Clearance: 87.7 mL/min (based on Cr of 0.6).    Accu-Checks  Recent Labs      08/04/17   1416  08/04/17   1833  08/05/17   0021  08/05/17   0553  08/05/17   1407  08/05/17   1851  08/06/17   0820  08/06/17   2134  08/07/17   0546   POCTGLUCOSE  113*  132*  77  72  127*  85  161*  113*  89       Current Medications and/or Treatments Impacting Glycemic Control  Immunotherapy:  Immunosuppressants     None        Steroids:   Hormones     Start     Stop Route Frequency Ordered    08/03/17 1630  desmopressin tablet 100 mcg      -- PER G TUBE 3 times daily 08/03/17 1618        Pressors:    Autonomic Drugs     None        Hyperglycemia/Diabetes Medications: Antihyperglycemics     Start     Stop Route Frequency Ordered    06/29/17 1922  insulin aspart pen 0-5 Units      -- SubQ Every 6 hours PRN 06/29/17 1822          ASSESSMENT and PLAN    Primary central diabetes insipidus    Post-operative diabetes insipidus.    Recommend decreasing DDAVP frequency 100mcg to BID  Continue free water boluses q6hr        Secondary adrenal insufficiency    Low random early morning cortisols from August labs  Recommend starting hydrocortisone 15mg qam, 5mg afternoon          Secondary hypothyroidism    Goal FT4 mid-upper limit of normal  -Continue levothyroxine 75mcg daily, needs to be given at least 1 hr apart from TF to ensure absorption   -Repeat FT4 ~9/1            Gerry Sr MD  Endocrinology  [facility]

## 2017-08-07 NOTE — PROGRESS NOTES
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 Chew and discharged on 6/26/17 to [facility].  She presents with altered mental status and worsening hypernatremia for 1 day. She was treated for DI her last admission and labs are trending up today.  Spoke with Dr. Fairchild at [facility], patient with waxing/waning mental status yesterday, but was appropriate & following some complex commands yesterday.  There was a period yesterday of significant lethargy.  Patient is unable to give history, and information is taken from the chart.     Post-Op Info:  Procedure(s) (LRB):  PLACEMENT-TUBE-PEG (N/A)   21 Days Post-Op     Interval History: no acute events overnight. Pending placement.     Medications:  Continuous Infusions:   Scheduled Meds:   desmopressin  100 mcg Per G Tube BID    enoxaparin  40 mg Subcutaneous Daily    hydrocortisone  15 mg Per G Tube Daily    hydrocortisone  5 mg Per G Tube Daily    levetiracetam oral soln  500 mg Per NG tube BID    levothyroxine  75 mcg Per NG tube Daily     PRN Meds:acetaminophen, bisacodyl, dextrose 50%, glucagon (human recombinant), guaifenesin 100 mg/5 ml, insulin aspart, ipratropium, olanzapine, ondansetron, senna, sodium chloride 0.9%     Review of Systems  Objective:     Weight: 56.2 kg (123 lb 14.4 oz)  Body mass index is 22.66 kg/m².  Vital Signs (Most Recent):  Temp: (!) 95.8 °F (35.4 °C) (08/07/17 1107)  Pulse: 79 (08/07/17 1114)  Resp: 18 (08/07/17 1107)  BP: 105/72 (08/07/17 1107)  SpO2: 95 % (08/07/17 1107) Vital Signs (24h Range):  Temp:  [95.8 °F (35.4 °C)-98.3 °F (36.8 °C)] 95.8 °F (35.4 °C)  Pulse:  [] 79  Resp:  [17-18] 18  SpO2:  [92 %-95 %] 95 %  BP: ()/(58-78) 105/72                           Gastrostomy/Enterostomy 07/17/17 1520 Percutaneous endoscopic gastrostomy (PEG) LUQ feeding (Active)    Securement anchored to abdomen w/ adhesive device 8/5/2017  8:00 PM   Interventions Prior to Feeding patency checked;residual checked 8/5/2017  8:00 PM   Suction Setting/Drainage Method dependent drainage 8/3/2017  7:48 PM   Drainage yellow 8/4/2017  9:00 PM   Feeding Type bolus 8/6/2017  9:45 PM   Clamp Status/Tolerance clamped 8/6/2017  7:55 PM   Feeding Action feeding continued 8/4/2017  9:00 PM   Dressing dry and intact 8/6/2017  7:55 PM   Insertion Site no warmth;no redness 8/4/2017  9:00 PM   Site Care site cleansed w/ soap and water 8/4/2017  9:00 PM   Flush/Irrigation flushed w/;water;no resistance met 8/6/2017  9:45 PM   Current Rate (mL/hr) 45 mL/hr 7/26/2017  7:35 AM   Goal Rate (mL/hr) 45 mL/hr 7/26/2017  7:35 AM   Intake (mL) 270 mL 8/3/2017 12:00 AM   Water Bolus (mL) 220 mL 8/7/2017  6:00 AM   Tube Feeding Intake (mL) 250 8/7/2017  6:00 AM   Residual Amount (ml) 0 ml 7/30/2017  6:17 PM       Neurosurgery Physical Exam     General: well developed, no acute distress.   Head: normocephalic. surgical Incision well healed  Neurologic: Alert. Awake. Minimal verbal responses.   GCS: Motor: 6/Verbal: 4 /Eyes: 4 GCS Total: 14  Mental Status: Able to state name. Intermittently follows simple commands.   Cranial nerves: face symmetric, CN II-XII grossly intact.   Eyes: pupils equal, round, reactive to light.  Motor Strength: Moves all extremities spontaneously with good strength and tone. Intermittently follows simple commands.   Significant Labs:    Recent Labs  Lab 08/06/17 0415 08/07/17  0514   GLU 68* 79    136   K 4.6 4.2    100   CO2 26 26   BUN 22* 21*   CREATININE 0.5 0.6   CALCIUM 10.4 9.6   MG 2.0 1.7       Recent Labs  Lab 08/06/17 0415   WBC 4.92   HGB 9.9*   HCT 30.8*   *     No results for input(s): LABPT, INR, APTT in the last 48 hours.  Microbiology Results (last 7 days)     Procedure Component Value Units Date/Time    Blood culture [[id number]] Collected:  08/02/17 0512     Order Status:  Completed Specimen:  Blood from Peripheral, Upper Arm, Left Updated:  08/07/17 0812     Blood Culture, Routine No growth after 5 days.    Blood culture [[id number]]     Order Status:  Canceled Specimen:  Blood     Blood culture [[id number]]     Order Status:  Canceled Specimen:  Blood         All pertinent labs from the last 24 hours have been reviewed.    Significant Diagnostics:  I have reviewed all pertinent imaging results/findings within the past 24 hours.    Assessment/Plan:     Meningioma, recurrent of brain    50 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 Notify NSGY for any changes.   -Continue Keppra for seizure prophylaxis  -Continue Lovenox for DVT prophylaxis  -Continue aggressive PT/OT  -LP performed 7/20, NGTD. Likely viral process.   -Endocrine following for DI.   -Medical management per primary team.  -Dispo: per primary, awaiting nursing home placement   -Plan for 6 week f/u with Dr. Chew upon DC  -Will continue to follow, please call with questions                 Dylan austin MD  Neurosurgery  [facility]

## 2017-08-07 NOTE — PT/OT/SLP PROGRESS
Physical Therapy  Treatment    Joanna Morales   MRN: [id number]   Admitting Diagnosis: Hypernatremia    PT Received On: 08/07/17  PT Start Time: 0945     PT Stop Time: 1010    PT Total Time (min): 25 min       Billable Minutes:  Therapeutic Activity 25    Treatment Type: Treatment  PT/PTA: PTA     PTA Visit Number: 1       General Precautions: Standard, aspiration, fall  Orthopedic Precautions: N/A   Braces: N/A    Do you have any cultural, spiritual, Adventist conflicts, given your current situation?: none stated    Subjective:  Communicated with nursing prior to session.  Pt willing to participate with therapy.     Pain/Comfort  Pain Rating 1: 0/10  Pain Rating Post-Intervention 1: 0/10    Objective:   Patient found with: bed alarm (all lines intact)    Functional Mobility:  Bed Mobility:   Supine to Sit: Moderate Assistance  Sit to Supine: Minimum Assistance, Moderate Assistance    Transfers:  Sit <> Stand Assistance: Moderate Assistance (x4 trials)  Sit <> Stand Assistive Device: No Assistive Device    Gait:   Gait Distance:  (~6 lateral steps towards HOB. )  Assistance 1: Moderate assistance  Gait Assistive Device: Rolling walker  Gait Pattern: swing-to gait  Gait Deviation(s): decreased marycarmen, increased time in double stance, decreased stride length, decreased toe-to-floor clearance, decreased swing-to-stance ratio, decreased step length    Balance:   Static Sit: POOR+: Needs MINIMAL assist to maintain  Dynamic Sit: FAIR+: Maintains balance through MINIMAL excursions of active trunk motion  Static Stand: POOR: Needs MODERATE assist to maintain  Dynamic stand: POOR: N/A     Therapeutic Activities and Exercises:  Seated B LE therex x15 reps with assistance and max cueing for tech and for pt to stay on task.   -AP   -LAQ   -Hip Flexion       AM-PAC 6 CLICK MOBILITY  How much help from another person does this patient currently need?   1 = Unable, Total/Dependent Assistance  2 = A lot, Maximum/Moderate  Assistance  3 = A little, Minimum/Contact Guard/Supervision  4 = None, Modified [place]/Independent    Turning over in bed (including adjusting bedclothes, sheets and blankets)?: 2  Sitting down on and standing up from a chair with arms (e.g., wheelchair, bedside commode, etc.): 2  Moving from lying on back to sitting on the side of the bed?: 2  Moving to and from a bed to a chair (including a wheelchair)?: 2  Need to walk in hospital room?: 2  Climbing 3-5 steps with a railing?: 1  Total Score: 11    AM-PAC Raw Score CMS G-Code Modifier Level of Impairment Assistance   6 % Total / Unable   7 - 9 CM 80 - 100% Maximal Assist   10 - 14 CL 60 - 80% Moderate Assist   15 - 19 CK 40 - 60% Moderate Assist   20 - 22 CJ 20 - 40% Minimal Assist   23 CI 1-20% SBA / CGA   24 CH 0% Independent/ Mod I     Patient left supine with all lines intact, call button in reach and bed alarm on.    Assessment:  Joanna Morales is a 51 y.o. female with a medical diagnosis of Hypernatremia and presents with all deficits noted below. Pt continues to requires min-mod A with bed mobility and transfers. Pt will continue to benefit from skilled PT services at this time. Continue with PT POC as indicated.    Rehab identified problem list/impairments: Rehab identified problem list/impairments: decreased safety awareness, impaired coordination, impaired functional mobilty, impaired balance, impaired self care skills, gait instability, impaired endurance, impaired cardiopulmonary response to activity    Rehab potential is poor.    Activity tolerance: Poor    Discharge recommendations: Discharge Facility/Level Of Care Needs: nursing facility, skilled     Barriers to discharge: Barriers to Discharge: Decreased caregiver support    Equipment recommendations: Equipment Needed After Discharge: wheelchair     GOALS:    Physical Therapy Goals        Problem: Physical Therapy Goal    Goal Priority Disciplines Outcome Goal Variances  Interventions   Physical Therapy Goal     PT/OT, PT      Description:  Goals to be met by: 17    Patient will increase functional independence with mobility by performin. Supine to sit with Minimal Assistance  2. Sit to supine with Minimal Assistance  3. Sit to stand transfer with Minimal Assistance met (2017)   Revised: sit to stand transfer with SBA  4. Standing for 3 minutes with Minimal Assistance and weight evenly distributed through (B) LE.   5. Gait  x 20 feet with Moderate Assistance using AD if needed   6. Pt will perform (B) LE therapeutic exercises x 20 reps to improve muscular strength and endurance.               Problem: Physical Therapy Goal    Goal Priority Disciplines Outcome Goal Variances Interventions   Physical Therapy Goal     PT/OT, PT Ongoing (interventions implemented as appropriate)     Description:  Goals to be met by: 17     Patient will increase functional independence with mobility by performin. Supine to sit with Moderate Assistance  2. Sit to supine with Moderate Assistance  3. Sit to stand transfer with Moderate Assistance- met       Revised: Sit to stand transfer with CGA  4. Gait  x 5 feet with Maximum Assistance using appropriate AD while maintaining midline orientation and no posterior lean. - Partially met       Revised: Gait 50 feet min assist with RW and no LOB.  5. Sitting at edge of bed x10 minutes with Supervision while maintaining midline orientation and performing 3/5 reps of reaching activity outside base of support.   6. Revised: Stand for 2 minutes with contact guard Assistance using appropriate UE support while demonstrating midline orientation with no posterior lean & equal weight-bearing through bilateral LE's.   7. Lower extremity exercise program x10 reps with assistance as needed for strengthening and endurance with functional mobility.                        PLAN:    Patient to be seen 5 x/week  to address the above listed  problems via gait training, therapeutic activities, therapeutic exercises, neuromuscular re-education  Plan of Care expires: 08/24/17  Plan of Care reviewed with: patient         Hoa Noemí, PTA  08/07/2017

## 2017-08-07 NOTE — PROGRESS NOTES
Progress Note  Hospital Medicine    Primary Team: [facility] HOSP MED L  Admit Date: 6/28/2017   Length of Stay:  LOS: 40 days   SUBJECTIVE:   Reason for Admission:  Hypernatremia    HPI:  Ms. Morales was discharged from [facility]  to [facility] on 6/26/17 and presented to [facility] ED on 6/28 with altered mental status and worsening hypernatremia. According to the patient's sitter, the patient was awake, verbal, and interactive with therapy on 6/23 but had been progressively less interactive and responsive when she was at [place]. Ms. Morales was admitted to Hospital Medicine but was found to be unresponsive with a sodium level of 168 on the morning of 6/29.      She has been followed by endocrinology and neurology and workup thus far significant for  unremarkable UA, neuro-imaging c/w recent left sphenoidal meningioma resection with resolution of blood products and trace intraventricular hemorrhage.  Previous EEG (6/16/17) demonstrated Moderate diffuse slowing of the overall background as described above, Superimposed left hemispheric slowing relative to the right, and NO epileptiform discharges or seizures were seen c/w ENCP and focal cortical dysfunction.  Pt's presentation is concerning for multi-factorial encephalopathy given persistent hypernatremia.  Per neurology, low suspicion for MNG currently, but patient is at high risk given recent intracranial procedure.     Critical Care consulted 7/17/17 for acute hypoxic respiratory failure. Pt had PEG tube placed earlier today and in PACU, oxygen saturations noted to be in the mid 80's. Pt was placed on a non-rebreather with minimal improvement and subsequently placed on BiPAP. ABG significant for elevated CO2 however pt was compensating. Repeat ABG showed pH 7.372, CO2 44, O2 62, HCO3 25.8. Pt transitioned to NC. Cxray concerning for aspiration pneumonia - new infiltrate in RLL so patient started on vancomycin and zosyn for HAP. She was then switched to  Vanc/Cefepime/Acyclovir. Had significant elevation in WBC count 7/18 and Needed levophed to bring BP up for the night. Switched nasal DDAVP to IV DDAVP which has normalized her sodium levels. LP performed 7/20; consistent with a viral process. BP has been stable off pressors; leukocytosis resolved. Patient more alert.    Patient stepped down to Hospital Medicine team L on 7/22 pending viral etiology work up. She remained lethargic and minimally participatory despite correction of metabolic derangements. Desmopressin therapy was resumed as per Endocrinology service recommendations. Enteral feedings changed to bolus with free water in order to prevent patient pulling her PEG tube. Hypernatremia continued to improve slowly with free water replacement and optimization of desmopressin therapy.    Interval history:    No acute events overnight.  Had brief tachycardia that resolved to normal resting HR after given bolus feeding.  Not requiring bear hugger today.    Review of Systems:  Review of systems not obtained due to patient factors delirium.     OBJECTIVE:     Temp:  [96.6 °F (35.9 °C)-98.3 °F (36.8 °C)]   Pulse:  []   Resp:  [17-18]   BP: ()/(58-78)   SpO2:  [92 %-95 %]  Body mass index is 22.66 kg/m².  Intake/Outake:  This Shift:  No intake/output data recorded.    Net I/O past 24h:     Intake/Output Summary (Last 24 hours) at 08/07/17 0921  Last data filed at 08/07/17 0600   Gross per 24 hour   Intake              940 ml   Output                0 ml   Net              940 ml             Physical Exam:  Gen- well-developed, well-nourished, NAD  CVS- S1 and S2 present, RRR  Resp- CTA b/l, no work of breathing  Abd- BS+, soft, NT, ND.  Binder over PEG  Ext- no clubbing, cyanosis, or edema    Laboratory:  CBC/Anemia Labs: Coags:      Recent Labs  Lab 08/02/17  0452 08/04/17  0407 08/06/17  0415   WBC 3.72* 4.28 4.92   HGB 8.3* 8.8* 9.9*   HCT 24.7* 25.8* 30.8*    390* 396*   MCV 94 95 99*   RDW  16.4* 16.6* 17.2*    No results for input(s): INR, APTT in the last 168 hours.    Invalid input(s): PT     Chemistries:     Recent Labs  Lab 08/02/17  0452  08/05/17  0554 08/06/17  0415 08/07/17  0514   *  < > 138 137 136   K 4.0  < > 4.5 4.6 4.2   CL 97  < > 103 101 100   CO2 27  < > 27 26 26   BUN 17  < > 17 22* 21*   CREATININE 0.5  < > 0.5 0.5 0.6   CALCIUM 9.0  < > 10.4 10.4 9.6   PROT 6.3  --   --   --   --    BILITOT 0.1  --   --   --   --    ALKPHOS 107  --   --   --   --    ALT 32  --   --   --   --    AST 44*  --   --   --   --    MG 2.1  < > 2.1 2.0 1.7   PHOS 3.8  < > 4.6* 4.8* 4.4   < > = values in this interval not displayed.       POCT Glucose: HbA1c:      Recent Labs  Lab 08/05/17  0553 08/05/17  1407 08/05/17  1851 08/06/17  0820 08/06/17  2134 08/07/17  0546   POCTGLUCOSE 72 127* 85 161* 113* 89    No results found for: HGBA1C      Medications:  Scheduled Meds:   desmopressin  100 mcg Per G Tube TID    enoxaparin  40 mg Subcutaneous Daily    levetiracetam oral soln  500 mg Per NG tube BID    levothyroxine  75 mcg Per NG tube Daily                             Continuous Infusions:   PRN Meds:.acetaminophen, bisacodyl, dextrose 50%, glucagon (human recombinant), guaifenesin 100 mg/5 ml, insulin aspart, ipratropium, olanzapine, ondansetron, senna, sodium chloride 0.9%     ASSESSMENT/PLAN:     Acute encephalopathy     Likely multifactorial in nature; ICU delirium, hypernatremia, and perhaps prior surgical procedures. No evidence of HSV encephalitis. Lethargic and minimally verbal. Likely new baseline.  1. Delirium precautions.  2. Out of mittens > 72h now  3. Discharge to NH once bed is secured.          Primary central diabetes insipidus     See above.          * Hypernatremia     Secondary to central DI. Appears to have resolved.  1. Desmopressin changed to 100mcg BID; appreciate Endocrinology recs  2. Monitor I&O and sodium levels.   3. Free water bolus 4 times daily: 220 ml.           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    1. Keppra for seizure prophylaxis.          Malnutrition of moderate degree     S/P PEG placement on 7/17.  1. Continue enteral tube feedings.          Secondary hypothyroidism     Stable.  1. Continue levothyroxine. Dose optimized to 75 mcg on 7/27.       Hypercalcemia     Stable. Likely due to poor activity. Improved with administration of pamidronate on 7/27.  1. PT/OT.  2. Monitor calcium level.       Oropharyngeal dysphagia     Evaluated by SLP.  1. Continue NPO.  2. SLP to continue working with patient.          Discharge planning issues     Patient accepted to institution however son unreachable and friend with financial information out of town.  1. Transfer to facility once family able to arrange financial information.          Aspiration pneumonia     Patient completed 7 days of cefepime therapy.          Oral phase dysphagia     Failed multiple ST trials, required PEG, MS changes interferring with eating.       Hypothermia  -Unclear etiology; this is 2nd episode for patient- she is otherwise asymptomatic  -CXR, labs, and UA unremarkable  -Slight leukopenia has resolved  -procalcitonin normal during prior w/u, making bacterial infection less likely  -low morning cortisol x 2; appreciate Endocrinology recs- will start Hydrocortisone 15mg qAM and 5mg each afternoon                                           Brain mass     S/p resection of meningiomas       DVT ppx- Lovenox  CODE Status- FULL    Dispo- placement pending    Ally Calloway MD  Hospital Medicine Staff

## 2017-08-07 NOTE — PROGRESS NOTES
Progress Note  Hospital Medicine    Primary Team: [facility] MED L  Admit Date: 6/28/2017   Length of Stay:  LOS: 39 days   SUBJECTIVE:   Reason for Admission:  Hypernatremia    HPI:  Ms. Morales was discharged from [facility]  to [facility] on 6/26/17 and presented to [facility] ED on 6/28 with altered mental status and worsening hypernatremia. According to the patient's sitter, the patient was awake, verbal, and interactive with therapy on 6/23 but had been progressively less interactive and responsive when she was at [place]. Ms. Morales was admitted to Hospital Medicine but was found to be unresponsive with a sodium level of 168 on the morning of 6/29.      She has been followed by endocrinology and neurology and workup thus far significant for  unremarkable UA, neuro-imaging c/w recent left sphenoidal meningioma resection with resolution of blood products and trace intraventricular hemorrhage.  Previous EEG (6/16/17) demonstrated Moderate diffuse slowing of the overall background as described above, Superimposed left hemispheric slowing relative to the right, and NO epileptiform discharges or seizures were seen c/w ENCP and focal cortical dysfunction.  Pt's presentation is concerning for multi-factorial encephalopathy given persistent hypernatremia.  Per neurology, low suspicion for MNG currently, but patient is at high risk given recent intracranial procedure.     Critical Care consulted 7/17/17 for acute hypoxic respiratory failure. Pt had PEG tube placed earlier today and in PACU, oxygen saturations noted to be in the mid 80's. Pt was placed on a non-rebreather with minimal improvement and subsequently placed on BiPAP. ABG significant for elevated CO2 however pt was compensating. Repeat ABG showed pH 7.372, CO2 44, O2 62, HCO3 25.8. Pt transitioned to NC. Cxray concerning for aspiration pneumonia - new infiltrate in RLL so patient started on vancomycin and zosyn for HAP. She was then switched to  Vanc/Cefepime/Acyclovir. Had significant elevation in WBC count 7/18 and Needed levophed to bring BP up for the night. Switched nasal DDAVP to IV DDAVP which has normalized her sodium levels. LP performed 7/20; consistent with a viral process. BP has been stable off pressors; leukocytosis resolved. Patient more alert.    Patient stepped down to Hospital Medicine team L on 7/22 pending viral etiology work up. She remained lethargic and minimally participatory despite correction of metabolic derangements. Desmopressin therapy was resumed as per Endocrinology service recommendations. Enteral feedings changed to bolus with free water in order to prevent patient pulling her PEG tube. Hypernatremia continued to improve slowly with free water replacement and optimization of desmopressin therapy.    Interval history:    Notified that nursing unable to get temperature for patient this morning.  On exam, pt is otherwise unchanged, wearing bear hugger.      Review of Systems:  Review of systems not obtained due to patient factors delirium.     OBJECTIVE:     Temp:  [95.9 °F (35.5 °C)-98.3 °F (36.8 °C)]   Pulse:  []   Resp:  [17-18]   BP: ()/(48-78)   SpO2:  [93 %-98 %]  Body mass index is 22.66 kg/m².  Intake/Outake:  This Shift:  No intake/output data recorded.    Net I/O past 24h:   No intake or output data in the 24 hours ending 08/06/17 2115          Physical Exam:  Gen- well-developed, well-nourished, NAD  CVS- S1 and S2 present, RRR  Resp- CTA b/l, no work of breathing  Abd- BS+, soft, NT, ND.  Binder over PEG  Ext- no clubbing, cyanosis, or edema    Laboratory:  CBC/Anemia Labs: Coags:      Recent Labs  Lab 08/02/17  0452 08/04/17  0407 08/06/17  0415   WBC 3.72* 4.28 4.92   HGB 8.3* 8.8* 9.9*   HCT 24.7* 25.8* 30.8*    390* 396*   MCV 94 95 99*   RDW 16.4* 16.6* 17.2*    No results for input(s): INR, APTT in the last 168 hours.    Invalid input(s): PT     Chemistries:     Recent Labs  Lab  08/02/17  0452  08/04/17  0407 08/05/17  0554 08/06/17  0415   *  < > 136 138 137   K 4.0  < > 4.2 4.5 4.6   CL 97  < > 101 103 101   CO2 27  < > 26 27 26   BUN 17  < > 15 17 22*   CREATININE 0.5  < > 0.5 0.5 0.5   CALCIUM 9.0  < > 9.7 10.4 10.4   PROT 6.3  --   --   --   --    BILITOT 0.1  --   --   --   --    ALKPHOS 107  --   --   --   --    ALT 32  --   --   --   --    AST 44*  --   --   --   --    MG 2.1  < > 2.1 2.1 2.0   PHOS 3.8  < > 4.1 4.6* 4.8*   < > = values in this interval not displayed.       POCT Glucose: HbA1c:      Recent Labs  Lab 08/04/17  1833 08/05/17  0021 08/05/17  0553 08/05/17  1407 08/05/17  1851 08/06/17  0820   POCTGLUCOSE 132* 77 72 127* 85 161*    No results found for: HGBA1C      Medications:  Scheduled Meds:   desmopressin  100 mcg Per G Tube TID    enoxaparin  40 mg Subcutaneous Daily    levetiracetam oral soln  500 mg Per NG tube BID    levothyroxine  75 mcg Per NG tube Daily                             Continuous Infusions:   PRN Meds:.acetaminophen, bisacodyl, dextrose 50%, glucagon (human recombinant), guaifenesin 100 mg/5 ml, insulin aspart, ipratropium, olanzapine, ondansetron, senna, sodium chloride 0.9%     ASSESSMENT/PLAN:     Acute encephalopathy     Likely multifactorial in nature; ICU delirium, hypernatremia, and perhaps prior surgical procedures. No evidence of HSV encephalitis. Lethargic and minimally verbal. Likely new baseline.  1. Delirium precautions.  2. Out of mittens > 72h now  3. Discharge to NH once bed is secured.          Primary central diabetes insipidus     See above.          * Hypernatremia     Secondary to central DI. Appears to have resolved.  1. Desmopressin changed to 100mcg TID; appreciate Endocrinology recs  2. Monitor I&O and sodium levels.   3. Free water bolus 4 times daily: 220 ml.          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    1. Keppra for seizure prophylaxis.          Malnutrition of moderate degree     S/P PEG placement on 7/17.  1. Continue enteral tube feedings.          Secondary hypothyroidism     Stable.  1. Continue levothyroxine. Dose optimized to 75 mcg on 7/27.       Hypercalcemia     Stable. Likely due to poor activity. Improved with administration of pamidronate on 7/27.  1. PT/OT.  2. Monitor calcium level.       Oropharyngeal dysphagia     Evaluated by SLP.  1. Continue NPO.  2. SLP to continue working with patient.          Discharge planning issues     Patient accepted to institution however son unreachable and friend with financial information out of town.  1. Transfer to facility once family able to arrange financial information.          Aspiration pneumonia     Patient completed 7 days of cefepime therapy.          Oral phase dysphagia     Failed multiple ST trials, required PEG, MS changes interferring with eating.       Hypothermia  -Unclear etiology; this is 2nd episode for patient- she is otherwise asymptomatic  -CXR, labs, and UA unremarkable  -Slight leukopenia has resolved  -procalcitonin normal during prior w/u, making bacterial infection less likely  -continue bear hugger; will discuss cortisol level with Endocrine                                          Brain mass     S/p resection of meningiomas       DVT ppx- Lovenox  CODE Status- FULL    Dispo- placement pending    Ally Calloway MD  Hospital Medicine Staff

## 2017-08-07 NOTE — PLAN OF CARE
11:58 AM  SW called [facility] to indicate if they can set pt up on payment plan for her to be accepted into nursing facility as pt currently does not have any money in bank account. Left voicemail with Arcadio and or Suki in admissions. Will f/u with facility as needed.   12:19 PM  TENISHA spoke with Arcadio at [facility] who stated  is not in today but will be back tomorrow and will inform SW of payment plan for pt.     Jodi Royal LMSW   [facility]  Ext [phone]

## 2017-08-07 NOTE — PLAN OF CARE
Problem: Patient Care Overview  Goal: Plan of Care Review  Outcome: Ongoing (interventions implemented as appropriate)  Pt remained free from falls or injuries this shift. Pt independent in repositioning. No skin breakdown noticed. Pt oriented to self only. Able to follow simple commands w delayed responses. Pt rested on and off this shift. Pt talks to self and appears to be hallucinating at times as she is talking to people not present in the room. Camera in use. Bolus feeds w water bolus q6hr. 's at beginning of shift and began to improve after feed given. HR currently sR 90's

## 2017-08-07 NOTE — PLAN OF CARE
Problem: SLP Goal  Goal: SLP Goal  Updated SLP goals expected to be met by 8/4- CONTINUE until 8/11:  1. Pt will participate in ongoing assessment of swallow in order to determine safest, least restrictive diet. - ONGOING    SLP goals expected to be met by 7/18:  1. Pt will participate in going assessment of swallow in order to determine safest, least restrictive diet.      Pt alert this date but confusion remains evident.  Aspiration risk remains apparent.  Continue alternate means of nutrition and ongoing ST to address dysphagia.  HILDA Bennett, CCC-SLP  Speech Language Pathologist  [phone]  8/7/2017

## 2017-08-07 NOTE — SUBJECTIVE & OBJECTIVE
"Interval HPI:   Overnight events: none  Eating:   NPO  Nausea: No  Hypoglycemia and intervention: No  Fever: No  TPN and/or TF: Yes  If yes, type of TF/TPN and rate: q6hr with free water boluses    BP 97/64 (BP Location: Right arm, Patient Position: Lying, BP Method: Automatic)   Pulse 82   Temp 97.1 °F (36.2 °C) (Axillary)   Resp 18   Ht 5' 2.01" (1.575 m)   Wt 56.2 kg (123 lb 14.4 oz)   SpO2 95%   Breastfeeding? No   BMI 22.66 kg/m²     Labs Reviewed and Include      Recent Labs  Lab 08/07/17  0514   GLU 79   CALCIUM 9.6      K 4.2   CO2 26      BUN 21*   CREATININE 0.6     Lab Results   Component Value Date    WBC 4.92 08/06/2017    HGB 9.9 (L) 08/06/2017    HCT 30.8 (L) 08/06/2017    MCV 99 (H) 08/06/2017     (H) 08/06/2017       Recent Labs  Lab 08/02/17  0452   FREET4 0.84     No results found for: HGBA1C    Nutritional status:   Body mass index is 22.66 kg/m².  Lab Results   Component Value Date    ALBUMIN 2.5 (L) 08/02/2017    ALBUMIN 2.3 (L) 07/18/2017    ALBUMIN 3.0 (L) 07/17/2017     Lab Results   Component Value Date    PREALBUMIN 26 06/27/2017       Estimated Creatinine Clearance: 87.7 mL/min (based on Cr of 0.6).    Accu-Checks  Recent Labs      08/04/17   1416  08/04/17   1833  08/05/17   0021  08/05/17   0553  08/05/17   1407  08/05/17   1851  08/06/17   0820  08/06/17   2134  08/07/17   0546   POCTGLUCOSE  113*  132*  77  72  127*  85  161*  113*  89       Current Medications and/or Treatments Impacting Glycemic Control  Immunotherapy:  Immunosuppressants     None        Steroids:   Hormones     Start     Stop Route Frequency Ordered    08/03/17 1630  desmopressin tablet 100 mcg      -- PER G TUBE 3 times daily 08/03/17 1618        Pressors:    Autonomic Drugs     None        Hyperglycemia/Diabetes Medications: Antihyperglycemics     Start     Stop Route Frequency Ordered    06/29/17 1922  insulin aspart pen 0-5 Units      -- SubQ Every 6 hours PRN 06/29/17 1822        "

## 2017-08-07 NOTE — ASSESSMENT & PLAN NOTE
Post-operative diabetes insipidus.    Recommend decreasing DDAVP frequency 100mcg to BID  Continue free water boluses q6hr

## 2017-08-07 NOTE — NURSING
Pt 's. /78, temp 98, O2 sats 94% RA. Pt HR normally runs 60-70's. Pt has been 110-120's since 1545. Pt resting in bed talking to herself. Pt oriented to self only. Notified Dr. Grady

## 2017-08-07 NOTE — SUBJECTIVE & OBJECTIVE
Interval History: no acute events overnight. Pending placement.     Medications:  Continuous Infusions:   Scheduled Meds:   desmopressin  100 mcg Per G Tube BID    enoxaparin  40 mg Subcutaneous Daily    hydrocortisone  15 mg Per G Tube Daily    hydrocortisone  5 mg Per G Tube Daily    levetiracetam oral soln  500 mg Per NG tube BID    levothyroxine  75 mcg Per NG tube Daily     PRN Meds:acetaminophen, bisacodyl, dextrose 50%, glucagon (human recombinant), guaifenesin 100 mg/5 ml, insulin aspart, ipratropium, olanzapine, ondansetron, senna, sodium chloride 0.9%     Review of Systems  Objective:     Weight: 56.2 kg (123 lb 14.4 oz)  Body mass index is 22.66 kg/m².  Vital Signs (Most Recent):  Temp: (!) 95.8 °F (35.4 °C) (08/07/17 1107)  Pulse: 79 (08/07/17 1114)  Resp: 18 (08/07/17 1107)  BP: 105/72 (08/07/17 1107)  SpO2: 95 % (08/07/17 1107) Vital Signs (24h Range):  Temp:  [95.8 °F (35.4 °C)-98.3 °F (36.8 °C)] 95.8 °F (35.4 °C)  Pulse:  [] 79  Resp:  [17-18] 18  SpO2:  [92 %-95 %] 95 %  BP: ()/(58-78) 105/72                           Gastrostomy/Enterostomy 07/17/17 1520 Percutaneous endoscopic gastrostomy (PEG) LUQ feeding (Active)   Securement anchored to abdomen w/ adhesive device 8/5/2017  8:00 PM   Interventions Prior to Feeding patency checked;residual checked 8/5/2017  8:00 PM   Suction Setting/Drainage Method dependent drainage 8/3/2017  7:48 PM   Drainage yellow 8/4/2017  9:00 PM   Feeding Type bolus 8/6/2017  9:45 PM   Clamp Status/Tolerance clamped 8/6/2017  7:55 PM   Feeding Action feeding continued 8/4/2017  9:00 PM   Dressing dry and intact 8/6/2017  7:55 PM   Insertion Site no warmth;no redness 8/4/2017  9:00 PM   Site Care site cleansed w/ soap and water 8/4/2017  9:00 PM   Flush/Irrigation flushed w/;water;no resistance met 8/6/2017  9:45 PM   Current Rate (mL/hr) 45 mL/hr 7/26/2017  7:35 AM   Goal Rate (mL/hr) 45 mL/hr 7/26/2017  7:35 AM   Intake (mL) 270 mL 8/3/2017 12:00 AM    Water Bolus (mL) 220 mL 8/7/2017  6:00 AM   Tube Feeding Intake (mL) 250 8/7/2017  6:00 AM   Residual Amount (ml) 0 ml 7/30/2017  6:17 PM       Neurosurgery Physical Exam     General: well developed, no acute distress.   Head: normocephalic. surgical Incision well healed  Neurologic: Alert. Awake. Minimal verbal responses.   GCS: Motor: 6/Verbal: 4 /Eyes: 4 GCS Total: 14  Mental Status: Able to state name. Intermittently follows simple commands.   Cranial nerves: face symmetric, CN II-XII grossly intact.   Eyes: pupils equal, round, reactive to light.  Motor Strength: Moves all extremities spontaneously with good strength and tone. Intermittently follows simple commands.   Significant Labs:    Recent Labs  Lab 08/06/17 0415 08/07/17 0514   GLU 68* 79    136   K 4.6 4.2    100   CO2 26 26   BUN 22* 21*   CREATININE 0.5 0.6   CALCIUM 10.4 9.6   MG 2.0 1.7       Recent Labs  Lab 08/06/17 0415   WBC 4.92   HGB 9.9*   HCT 30.8*   *     No results for input(s): LABPT, INR, APTT in the last 48 hours.  Microbiology Results (last 7 days)     Procedure Component Value Units Date/Time    Blood culture [[id number]] Collected:  08/02/17 0512    Order Status:  Completed Specimen:  Blood from Peripheral, Upper Arm, Left Updated:  08/07/17 0812     Blood Culture, Routine No growth after 5 days.    Blood culture [[id number]]     Order Status:  Canceled Specimen:  Blood     Blood culture [[id number]]     Order Status:  Canceled Specimen:  Blood         All pertinent labs from the last 24 hours have been reviewed.    Significant Diagnostics:  I have reviewed all pertinent imaging results/findings within the past 24 hours.

## 2017-08-08 NOTE — PLAN OF CARE
Problem: SLP Goal  Goal: SLP Goal  Updated SLP goals expected to be met by 8/4- CONTINUE until 8/11:  1. Pt will participate in ongoing assessment of swallow in order to determine safest, least restrictive diet. - ONGOING    SLP goals expected to be met by 7/18:  1. Pt will participate in going assessment of swallow in order to determine safest, least restrictive diet.      Outcome: Ongoing (interventions implemented as appropriate)  Recommend continue NPO at this time with continued means all nutrition/ hydration/ medication.     HILDA Rascon, CCC-SLP  [phone]  8/8/2017

## 2017-08-08 NOTE — PT/OT/SLP PROGRESS
"Speech Language Pathology  Treatment    Joanna Morales   MRN: [id number]   1150/1150 A    Admitting Diagnosis: Hypernatremia    Diet recommendations: Solid Diet Level: NPO  Liquid Diet Level: NPO     SLP Treatment Date: 08/08/17  Speech Start Time: 1436     Speech Stop Time: 1446     Speech Total (min): 10 min       TREATMENT BILLABLE MINUTES:  Treatment Swallowing Dysfunction 10    Has the patient been evaluated by SLP for swallowing? : Yes  Keep patient NPO?: Yes   General Precautions: Standard, NPO, fall, aspiration  Current Respiratory Status: other (comment) (room air)       Subjective:  "She's talking to me right now." Pt re: her daughter, however no family present at b/s this session.     Pain/Comfort  Pain Rating 1: 0/10  Pain Rating Post-Intervention 1: 0/10    Objective:   Pt awake upon entry, lying with head against left b/s rail. Repositioned and HOB raised. Pt demonstrating persistent, confused speech before and after spontaneous, brief moment of somnolence. Pt completed volitional swallow x3. Upon cup sip water x1, dec'd labial closure around cup edge noted and weak cough elicited. Pt unable to complete further attempts to trigger volitional swallow. BOT exercises attempted, however pt did not complete, continuing to persistently elicit confused speech. Despite extensive verbal stimuli, pt observed to briefly experience moment of extreme somnolence. Once moment resolved, pt remained predominantly with eyes closed, resuming persistent, confused speech. No further PO trials administered. Pt educated re: role of SLP. White board updated. No further questions.   Assessment:  Joanna Morales is a 51 y.o. female with a medical diagnosis of Hypernatremia and presents with oropharyngeal dysphagia.     Discharge recommendations: Discharge Facility/Level Of Care Needs: nursing facility, skilled     Goals:    SLP Goals        Problem: SLP Goal    Goal Priority Disciplines Outcome   SLP Goal     SLP Unable " to achieve outcome(s) by discharge   Description:  Speech Language Pathology Goals  Goals expected to be met by 7/7  1. Patient will successfully participate in ongoing clinical swallow evaluation and tolerate po trials with no overt s/s of aspiration.                  Problem: SLP Goal    Goal Priority Disciplines Outcome   SLP Goal     SLP Ongoing (interventions implemented as appropriate)   Description:  Updated SLP goals expected to be met by 8/4- CONTINUE until 8/11:  1. Pt will participate in ongoing assessment of swallow in order to determine safest, least restrictive diet. - ONGOING    SLP goals expected to be met by 7/18:  1. Pt will participate in going assessment of swallow in order to determine safest, least restrictive diet.                        Plan:   Patient to be seen Therapy Frequency: 3 x/week   Plan of Care expires: 08/26/17  Plan of Care reviewed with: patient  SLP Follow-up?: Yes            HILDA Rascon, CCC-SLP  [phone]  8/8/2017

## 2017-08-08 NOTE — SUBJECTIVE & OBJECTIVE
Interval History: Patient lethargic and non verbal. No acute overnight events. Pending placement.    Review of Systems   Unable to perform ROS: Mental status change     Objective:     Vital Signs (Most Recent):  Temp: 97.5 °F (36.4 °C) (08/08/17 1510)  Pulse: 85 (08/08/17 1510)  Resp: 16 (08/08/17 1510)  BP: 105/78 (08/08/17 1510)  SpO2: 96 % (08/08/17 1510) Vital Signs (24h Range):  Temp:  [97.3 °F (36.3 °C)-98.2 °F (36.8 °C)] 97.5 °F (36.4 °C)  Pulse:  [79-98] 85  Resp:  [16-18] 16  SpO2:  [93 %-97 %] 96 %  BP: ()/(59-89) 105/78     Weight: 56.2 kg (123 lb 14.4 oz)  Body mass index is 22.66 kg/m².    Intake/Output Summary (Last 24 hours) at 08/08/17 1854  Last data filed at 08/08/17 1750   Gross per 24 hour   Intake             1940 ml   Output                0 ml   Net             1940 ml      Physical Exam   Constitutional: She appears well-developed and well-nourished. She appears lethargic. No distress.   HENT:   Mouth/Throat: Mucous membranes are dry.   Cardiovascular: Normal rate and regular rhythm.    No murmur heard.  Pulmonary/Chest: Effort normal and breath sounds normal.   Abdominal: Soft. Bowel sounds are normal. She exhibits no distension. There is no tenderness.   PEG in place   Musculoskeletal: Normal range of motion. She exhibits no edema or tenderness.   Neurological: She appears lethargic.   Psychiatric: She is slowed. She is inattentive.       Significant Labs:   BMP:   Recent Labs  Lab 08/08/17  0342   GLU 81      K 3.8   CL 99   CO2 25   BUN 19   CREATININE 0.6   CALCIUM 10.5   MG 2.0     CBC:   Recent Labs  Lab 08/08/17  0342   WBC 14.31*   HGB 9.6*   HCT 30.0*   *       Significant Imaging: I have reviewed all pertinent imaging results/findings within the past 24 hours.

## 2017-08-08 NOTE — PROGRESS NOTES
[facility]  Adult Nutrition  Progress Note    SUMMARY     Recommendations    Recommendation/Intervention:   1. Continue current EN regimen, Isosource 1.5 - 270mL 4x daily +  mL q 6hr.   2. Advance diet to regular as medically able, texture per SLP.   3. RD following to ensure tolerance    Goals: Pt to receive >90% of EEN via TF or po intake  Nutrition Goal Status: goal met  Communication of RD Recs: reviewed with RN      Reason for Assessment    Reason for Assessment: RD follow-up  Diagnosis: cancer diagnosis/related complications, other (see comments)  Relevent Medical History: hypothryodism, olfactory grooce meningioma, s/p crani   Interdisciplinary Rounds: did not attend     General Information Comments: TF continues with Isosource 270 mL qid + 190 mL FWF q 6 hrs. Pt tolerating, per RN. Pt sleeping at visit, did not awake to name.     Nutrition Discharge Planning: tolerance of EN regimen    Nutrition Prescription Ordered    Current Diet Order: NPO  Nutrition Order Comments: -  Current Nutrition Support Formula Ordered: Isosource 1.5  Current Nutrition Support Rate Ordered:  (-)  Current Nutrition Support Frequency Ordered: 270 mL 4x/day        Evaluation of Received Nutrients/Fluid Intake    Enteral Calories (kcal): 1620  Enteral Protein (gm): 73  Enteral (Free Water) Fluid (mL): 840  Free Water Flush Fluid (mL): 760                 Oral Fluid (mL): 0           Energy Calories Required: exceed needs  % Kcal Needs: 115%              Protein Required: meeting needs  % Protein Needs: 100     IV Fluid (mL): 0  Total Fluid Intake (mL): 960           Other Fluid (mL): 200      Fluid Required: meeting needs  Comments: I/O noted  Tolerance: tolerating  % Intake of Estimated Energy Needs: 75 - 100 %  % Meal Intake: NPO     Nutrition Risk Screen     Nutrition Risk Screen: dysphagia or difficulty swallowing    Nutrition/Diet History    Patient Reported Diet/Restrictions/Preferences: other (see  "comments) (aiyana)  Typical Food/Fluid Intake: -  Food Preferences: AIYANA        Factors Affecting Nutritional Intake: NPO                Labs/Tests/Procedures/Meds    Diagnostic Test/Procedure Review: reviewed, pertinent  Pertinent Labs Reviewed: reviewed, pertinent  Pertinent Labs Comments: Na 131, alb 2.5  Pertinent Medications Reviewed: reviewed, pertinent  Pertinent Medications Comments: bisacodyl, insulin, senna (all prn)    Physical Findings    Overall Physical Appearance: nourished, lethargic  Tubes: gastrostomy tube  Oral/Mouth Cavity:  (AIYANA)  Skin: intact    Anthropometrics    Temp: 97.3 °F (36.3 °C)     Height: 5' 2.01" (157.5 cm)  Weight Method: Bed Scale  Weight: 56.2 kg (123 lb 14.4 oz)     Ideal Body Weight (IBW), Female: 110.05 lb     % Ideal Body Weight, Female (lb): 112.59 lb  BMI (Calculated): 22.7  BMI Grade: 18.5-24.9 - normal                            Estimated/Assessed Needs    Weight Used For Calorie Calculations: 56.2 kg (123 lb 14.4 oz)   Height (cm): 157.5 cm  Energy Calorie Requirements (kcal): 1412 kcal/day (Bee x1.25 PAL)  Energy Need Method:  (1412 kcal/day)        RMR (Bee-St. Jeor Equation): 1130.38        Weight Used For Protein Calculations: 56.2 kg (123 lb 14.4 oz)  Protein Requirements: 56-68 g/d (1.0-1.2 gm/kg)    Fluid Requirements (mL): 1ml/kcal or per MD  Fluid Need Method: RDA Method, other (see comments) (1 mL/kcal or per MD)        RDA Method (mL): 1412               Assessment and Plan    Oropharyngeal dysphagia    Nutrition Problem  Inadequate oral intake     Related to (etiology):   Difficulty swallowing 2' AMS                  Signs and Symptoms (as evidenced by):   Pt NPO and need for supplemental nutrition                 Interventions/Recommendations (treatment strategy):  Continue current EN regimen, following SLP notes for diet advancement when medically able.      Nutrition Diagnosis Status:   New              Monitor and Evaluation    Food and Nutrient " Intake: enteral nutrition intake, energy intake  Food and Nutrient Adminstration: enteral and parenteral nutrition administration        Anthropometric Measurements: body mass index, weight change, weight  Biochemical Data, Medical Tests and Procedures: gastrointestinal profile, glucose/endocrine profile, inflammatory profile, electrolyte and renal panel, lipid profile  Nutrition-Focused Physical Findings: overall appearance    Nutrition Risk    Level of Risk:  (1x/week)    Nutrition Follow-Up    RD Follow-up?: Yes

## 2017-08-08 NOTE — ASSESSMENT & PLAN NOTE
Likely multifactorial in nature; ICU delirium, hypernatremia, and perhaps prior surgical procedures. No evidence of HSV encephalitis. Lethargic and non verbal again. Likely new baseline.  1. Delirium precautions.  2. Out of mittens for greater than 72 hours.  3. Discharge to NH once bed is secured.

## 2017-08-08 NOTE — PLAN OF CARE
12:25 PM  SW received call from Lydia with pt's insurance at [phone] ext [phone] regarding pt's discharge plan. Informed her still awaiting call from [facility] if there is a payment plan family can manage. Called [facility] and spoke with Ghazal in admissions who stated if family can pay 556 dollars up front today they will be able to admit her. If family cannot afford 556, she can take 250 dollars. Called friend to inquire if family can make this payment. Left voicemail. Will f/u as needed.   12:38 PM  SW received call from friend who stated she will be speaking with her spouse regarding payment plan and will call Ghazal to inquire further.     Jodi Royal LMSW   [facility]  Ext [phone]

## 2017-08-08 NOTE — PLAN OF CARE
Problem: Fall Risk (Adult)  Goal: Absence of Falls  Patient will demonstrate the desired outcomes by discharge/transition of care.   Outcome: Ongoing (interventions implemented as appropriate)  Patient remained free of falls during shift. Bed alarm in use. Avasys in use. Bed kept in lowest position with side rails up.

## 2017-08-08 NOTE — ASSESSMENT & PLAN NOTE
Nutrition Problem  Inadequate oral intake     Related to (etiology):   Difficulty swallowing 2' AMS                  Signs and Symptoms (as evidenced by):   Pt NPO and need for supplemental nutrition                 Interventions/Recommendations (treatment strategy):  Continue current EN regimen, following SLP notes for diet advancement when medically able.      Nutrition Diagnosis Status:   New

## 2017-08-08 NOTE — PT/OT/SLP PROGRESS
Physical Therapy      Joannaclarice Morales  MRN: [id number]    Patient not seen today secondary to pt sleepy on PT's initial attempt this am; PT unable to return in PM. Will follow-up next scheduled visit.  Goals remain appropriate and date to be extended. No units billed.     Mercedes Hernandez, PT   08/08/2017

## 2017-08-09 PROBLEM — D72.823 LEUKEMOID REACTION: Status: ACTIVE | Noted: 2017-01-01

## 2017-08-09 PROBLEM — E83.39 HYPOPHOSPHATEMIA: Status: ACTIVE | Noted: 2017-01-01

## 2017-08-09 NOTE — ASSESSMENT & PLAN NOTE
Patient accepted to NH. However upon investigation of financial information found to have bank account cleaned out. OA currently working with NH for payment plan.  1. Discharge to NH once payment plan secured and bed is available.

## 2017-08-09 NOTE — PT/OT/SLP PROGRESS
Physical Therapy  Treatment    Joanna Morales   MRN: [id number]   Admitting Diagnosis: Hypernatremia    PT Received On: 08/09/17  PT Start Time: 1314     PT Stop Time: 1327    PT Total Time (min): 13 min       Billable Minutes:  Therapeutic Activity 13    Treatment Type: Treatment  PT/PTA: PT     PTA Visit Number: 0       General Precautions: Standard, aspiration, fall, NPO  Orthopedic Precautions: N/A   Braces: N/A    Do you have any cultural, spiritual, Mandaen conflicts, given your current situation?: none stated    Subjective:  Communicated with RN prior to session.  Pt found sitting up crossed-legged in bed.     Pt confused and orientated only to name.          Objective:   Patient found with: telemetry, peripheral IV, bed alarm (PEG; abdominal binder; Avasys sitter)    Functional Mobility:  Bed Mobility:  Scooting: Minimal Assistance to EOB; requires assist to initiate    Transfers:   Sit to stand:Max A (Mod A x2 ppl) with B HHA from EOB   Pt required increase assist this visit only due to utilizing B HHA. Pt requires increase tactile/facilitation to initiate transfer due to confusion and impaired vision.     Gait:   Patient ambulated ~5 feet with B HHA with Moderate Assistance x2 ppl.   Pt demonstrated step-to gait requiring assist for weight-shift and balance to facilitate stepping. At times, pt would over exaggerate steps with R LE and then have trouble stepping with L LE due to impaired coordination.        Balance:  Static Sitting: Contact Guard Assistance   Dynamic Sitting: Minimal Assistance   Static Standing: Minimal Assistance   Dynamic Standing: Moderate Assistance         AM-PAC 6 CLICK MOBILITY  How much help from another person does this patient currently need?   1 = Unable, Total/Dependent Assistance  2 = A lot, Maximum/Moderate Assistance  3 = A little, Minimum/Contact Guard/Supervision  4 = None, Modified [place]/Independent    Turning over in bed (including adjusting bedclothes,  sheets and blankets)?: 3  Sitting down on and standing up from a chair with arms (e.g., wheelchair, bedside commode, etc.): 2  Moving from lying on back to sitting on the side of the bed?: 2  Moving to and from a bed to a chair (including a wheelchair)?: 2  Need to walk in hospital room?: 2  Climbing 3-5 steps with a railing?: 1  Total Score: 12    AM-PAC Raw Score CMS G-Code Modifier Level of Impairment Assistance   6 % Total / Unable   7 - 9 CM 80 - 100% Maximal Assist   10 - 14 CL 60 - 80% Moderate Assist   15 - 19 CK 40 - 60% Moderate Assist   20 - 22 CJ 20 - 40% Minimal Assist   23 CI 1-20% SBA / CGA   24 CH 0% Independent/ Mod I     Patient left HOB elevated with all lines intact and call button in reach.    Assessment:  Joanna Morales is a 51 y.o. female with a medical diagnosis of Hypernatremia and presents with decrease endurance, strength, balance, coordination, and impaired cognition limiting pt's overall mobility and safety. Pt tolerated session well and continues to progress towards goals.  Pt continues to progress well with mobility, but requires assistance due to decrease safety awareness, poor vision, and confusion. Progress towards goals limited/impacted by none.  Pt would benefit from continued skilled physical therapy to address listed impairments, improve functional independence, and maximize safety with mobility.  PT recommends dc disposition to SNF for further PT, OT, SLP intervention to progress functional mobility and safety.       Education:  Education provided to pt regarding: safety with mobility; orientation. Verbalized understanding.     Whiteboard updated with correct mobility information. RN/PCT notified.  Appropriate to stand pivot with 1 person assist.       Rehab identified problem list/impairments: Rehab identified problem list/impairments: weakness, impaired endurance, impaired self care skills, impaired functional mobilty, gait instability, decreased upper extremity  function, decreased lower extremity function, impaired balance, impaired cognition, impaired coordination, decreased safety awareness, impaired fine motor    Rehab potential is good.    Activity tolerance: Good    Discharge recommendations: Discharge Facility/Level Of Care Needs: nursing facility, skilled     Barriers to discharge: Barriers to Discharge: Inaccessible home environment, Decreased caregiver support (requires increase assist at this time)    Equipment recommendations: Equipment Needed After Discharge:  (TBD at next level of care)     GOALS:    Physical Therapy Goals        Problem: Physical Therapy Goal    Goal Priority Disciplines Outcome Goal Variances Interventions   Physical Therapy Goal     PT/OT, PT Ongoing (interventions implemented as appropriate)     Description:  Goals to be met by: 17     Patient will increase functional independence with mobility by performin. Supine to sit with Moderate Assistance  2. Sit to supine with Moderate Assistance MET   Updated: Sit to supine with Minimal Assistance.   3. Sit to stand transfer with Moderate Assistance- met       Revised: Sit to stand transfer with CGA  4. Gait  x 5 feet with Maximum Assistance using appropriate AD while maintaining midline orientation and no posterior lean. - Partially met       Revised: Gait 50 feet min assist with RW and no LOB.  5. Sitting at edge of bed x10 minutes with Supervision while maintaining midline orientation and performing 3/5 reps of reaching activity outside base of support.   6. Revised: Stand for 2 minutes with contact guard Assistance using appropriate UE support while demonstrating midline orientation with no posterior lean & equal weight-bearing through bilateral LE's.   7. Lower extremity exercise program x10 reps with assistance as needed for strengthening and endurance with functional mobility.                    Multidisciplinary Problems (Resolved)        Problem: Physical Therapy  Goal    Goal Priority Disciplines Outcome Goal Variances Interventions   Physical Therapy Goal   (Resolved)     PT/OT, PT Outcome(s) achieved     Description:  Goals to be met by: 17    Patient will increase functional independence with mobility by performin. Supine to sit with Minimal Assistance  2. Sit to supine with Minimal Assistance  3. Sit to stand transfer with Minimal Assistance met (2017)   Revised: sit to stand transfer with SBA  4. Standing for 3 minutes with Minimal Assistance and weight evenly distributed through (B) LE.   5. Gait  x 20 feet with Moderate Assistance using AD if needed   6. Pt will perform (B) LE therapeutic exercises x 20 reps to improve muscular strength and endurance.                        PLAN:    Patient to be seen 4 x/week  to address the above listed problems via gait training, therapeutic activities, therapeutic exercises, neuromuscular re-education  Plan of Care expires: 17  Plan of Care reviewed with: patient         Mercedes Hernandez, PT  2017

## 2017-08-09 NOTE — ASSESSMENT & PLAN NOTE
Secondary to central DI. Appears to have resolved.  1. Desmopressin 100 mcg BID.  2. Monitor I&O and sodium levels.   3. Free water bolus 4 times daily: 220 ml.

## 2017-08-09 NOTE — PLAN OF CARE
Problem: Physical Therapy Goal  Goal: Physical Therapy Goal  Goals to be met by: 17     Patient will increase functional independence with mobility by performin. Supine to sit with Moderate Assistance  2. Sit to supine with Moderate Assistance MET   Updated: Sit to supine with Minimal Assistance.   3. Sit to stand transfer with Moderate Assistance- met       Revised: Sit to stand transfer with CGA  4. Gait  x 5 feet with Maximum Assistance using appropriate AD while maintaining midline orientation and no posterior lean. - Partially met       Revised: Gait 50 feet min assist with RW and no LOB.  5. Sitting at edge of bed x10 minutes with Supervision while maintaining midline orientation and performing 3/5 reps of reaching activity outside base of support.   6. Revised: Stand for 2 minutes with contact guard Assistance using appropriate UE support while demonstrating midline orientation with no posterior lean & equal weight-bearing through bilateral LE's.   7. Lower extremity exercise program x10 reps with assistance as needed for strengthening and endurance with functional mobility.              Pt progressing towards goals. All goals remain appropriate at this time.    Mercedes Hernandez, PT, DPT  2017

## 2017-08-09 NOTE — PLAN OF CARE
Problem: SLP Goal  Goal: SLP Goal  Updated SLP goals expected to be met by 8/4- CONTINUE until 8/11:  1. Pt will participate in ongoing assessment of swallow in order to determine safest, least restrictive diet. - ONGOING    SLP goals expected to be met by 7/18:  1. Pt will participate in going assessment of swallow in order to determine safest, least restrictive diet.      Outcome: Ongoing (interventions implemented as appropriate)  Recommend continue NPO with continuation of alternative means for all nutrition/ hydration/ medication.     HILDA Rascon, CCC-SLP  [phone]  8/9/2017

## 2017-08-09 NOTE — PLAN OF CARE
Problem: Fall Risk (Adult)  Goal: Absence of Falls  Patient will demonstrate the desired outcomes by discharge/transition of care.   Outcome: Ongoing (interventions implemented as appropriate)  Patient remained free of falls during shift.  Bed kept in lowest position and avasys in room.  Patient reminded to stay in bed.

## 2017-08-09 NOTE — PROGRESS NOTES
[facility] Hospital Medicine  Progress Note    Patient Name: Joanna Morales  MRN: [id number]  Patient Class: IP- Inpatient   Admission Date: 6/28/2017  Length of Stay: 41 days  Attending Physician: Madalyn Montesinos MD  Primary Care Provider: Claudy Tse MD    [facility] Medicine Team: [facility] HOSP MED L Madalyn Montesinos MD    Subjective:     Principal Problem:Hypernatremia    HPI:  Ms. Joanna Morales is a 51 y.o. female with tobacco abuse, secondary hypothyroidism (TSH 0.241 Jun 2017), and history of meningioma s/p resection complicated by diabetes insipidus who presents to [facility] ED from [facility] after being found with abnormal labwork.  She had been more altered in the last day or so and upon checking labwork, she was noted to be with hypernatremia.  She contributed very minimally to her history but does report some abdominal pain.  Of note, she was recently admitted to this facility under the Neurosurgery service for resection of a meningioma that was complicated by diabetes insipidus for which she received desmopressin a few times before being discharged to rehabilitation at [facility].  Further history is limited at this time.    Hospital Course:  Ms. Morales was discharged from [facility]  to [facility] on 6/26/17 and presented to [facility] ED on 6/28 with altered mental status and worsening hypernatremia. According to the patient's sitter, the patient was awake, verbal, and interactive with therapy on 6/23 but had been progressively less interactive and responsive when she was at [place]. Ms. Morales was admitted to Hospital Medicine but was found to be unresponsive with a sodium level of 168 on the morning of 6/29.      She has been followed by endocrinology and neurology and workup thus far significant for  unremarkable UA, neuro-imaging c/w recent left sphenoidal meningioma resection with resolution of blood products and trace intraventricular  hemorrhage.  Previous EEG (6/16/17) demonstrated Moderate diffuse slowing of the overall background as described above, Superimposed left hemispheric slowing relative to the right, and NO epileptiform discharges or seizures were seen c/w ENCP and focal cortical dysfunction.  Pt's presentation is concerning for multi-factorial encephalopathy given persistent hypernatremia.  Per neurology, low suspicion for MNG currently, but patient is at high risk given recent intracranial procedure.     Critical Care consulted 7/17/17 for acute hypoxic respiratory failure. Pt had PEG tube placed earlier today and in PACU, oxygen saturations noted to be in the mid 80's. Pt was placed on a non-rebreather with minimal improvement and subsequently placed on BiPAP. ABG significant for elevated CO2 however pt was compensating. Repeat ABG showed pH 7.372, CO2 44, O2 62, HCO3 25.8. Pt transitioned to NC. Cxray concerning for aspiration pneumonia - new infiltrate in RLL so patient started on vancomycin and zosyn for HAP. She was then switched to Vanc/Cefepime/Acyclovir. Had significant elevation in WBC count 7/18 and Needed levophed to bring BP up for the night. Switched nasal DDAVP to IV DDAVP which has normalized her sodium levels. LP performed 7/20; consistent with a viral process. BP has been stable off pressors; leukocytosis resolved. Patient more alert.    Patient stepped down to Hospital Medicine team L on 7/22 pending viral etiology work up. She remained lethargic and minimally participatory despite correction of metabolic derangements. Desmopressin therapy was resumed as per Endocrinology service recommendations. Enteral feedings changed to bolus with free water in order to prevent patient pulling her PEG tube. Hypernatremia continued to improve slowly with free water replacement and optimization of desmopressin therapy.    Hyponatremia resolved and patient medically stable for discharge pending securement of placement.    Interval  History: Patient lethargic and non verbal. No acute overnight events. Pending placement.    Review of Systems   Unable to perform ROS: Mental status change     Objective:     Vital Signs (Most Recent):  Temp: 97.5 °F (36.4 °C) (08/08/17 1510)  Pulse: 85 (08/08/17 1510)  Resp: 16 (08/08/17 1510)  BP: 105/78 (08/08/17 1510)  SpO2: 96 % (08/08/17 1510) Vital Signs (24h Range):  Temp:  [97.3 °F (36.3 °C)-98.2 °F (36.8 °C)] 97.5 °F (36.4 °C)  Pulse:  [79-98] 85  Resp:  [16-18] 16  SpO2:  [93 %-97 %] 96 %  BP: ()/(59-89) 105/78     Weight: 56.2 kg (123 lb 14.4 oz)  Body mass index is 22.66 kg/m².    Intake/Output Summary (Last 24 hours) at 08/08/17 1854  Last data filed at 08/08/17 1750   Gross per 24 hour   Intake             1940 ml   Output                0 ml   Net             1940 ml      Physical Exam   Constitutional: She appears well-developed and well-nourished. She appears lethargic. No distress.   HENT:   Mouth/Throat: Mucous membranes are dry.   Cardiovascular: Normal rate and regular rhythm.    No murmur heard.  Pulmonary/Chest: Effort normal and breath sounds normal.   Abdominal: Soft. Bowel sounds are normal. She exhibits no distension. There is no tenderness.   PEG in place   Musculoskeletal: Normal range of motion. She exhibits no edema or tenderness.   Neurological: She appears lethargic.   Psychiatric: She is slowed. She is inattentive.       Significant Labs:   BMP:   Recent Labs  Lab 08/08/17  0342   GLU 81      K 3.8   CL 99   CO2 25   BUN 19   CREATININE 0.6   CALCIUM 10.5   MG 2.0     CBC:   Recent Labs  Lab 08/08/17  0342   WBC 14.31*   HGB 9.6*   HCT 30.0*   *       Significant Imaging: I have reviewed all pertinent imaging results/findings within the past 24 hours.    Assessment/Plan:      Acute encephalopathy    Likely multifactorial in nature; ICU delirium, hypernatremia, and perhaps prior surgical procedures. No evidence of HSV encephalitis. Lethargic and non verbal again.  Likely new baseline.  1. Delirium precautions.  2. Out of mittens for greater than 72 hours.  3. Discharge to NH once bed is secured.          * Hypernatremia    Secondary to central DI. Appears to have resolved.  1. Desmopressin 100 mcg BID.  2. Monitor I&O and sodium levels.   3. Free water bolus 4 times daily: 220 ml.          Primary central diabetes insipidus    See above.          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    1. Keppra for seizure prophylaxis.          Malnutrition of moderate degree    S/P PEG placement on 7/17.  1. Continue enteral tube feedings.          Secondary hypothyroidism    Stable.  1. Continue levothyroxine. Dose optimized to 75 mcg on 7/27.        Hypercalcemia    Stable. Likely due to poor activity. Improved with administration of pamidronate on 7/27.  1. PT/OT.  2. Monitor calcium level.        Oropharyngeal dysphagia    Evaluated by SLP.  1. Continue NPO.  2. SLP to continue working with patient.          Discharge planning issues    Patient accepted to NH. However upon investigation of financial information found to have bank account cleaned out. \[facility]\"" currently working with NH for payment plan.  1. Discharge to NH once payment plan secured and bed is available.                                                                        VTE Risk Mitigation         Ordered     enoxaparin injection 40 mg  Daily     Route:  Subcutaneous        07/21/17 1223     High Risk of VTE  Once      06/30/17 0920     Place sequential compression device  Until discontinued      06/30/17 0920              Madalyn Montesinos MD  Department of Hospital Medicine   [facility]

## 2017-08-09 NOTE — PT/OT/SLP PROGRESS
"Speech Language Pathology  Treatment    Joanna Morales   MRN: [id number]   1150/1150 A    Admitting Diagnosis: Hypernatremia    Diet recommendations: Solid Diet Level: NPO  Liquid Diet Level: NPO     SLP Treatment Date: 08/09/17  Speech Start Time: 0948     Speech Stop Time: 0957     Speech Total (min): 9 min       TREATMENT BILLABLE MINUTES:  Speech Therapy Individual 9    Has the patient been evaluated by SLP for swallowing? : Yes  Keep patient NPO?: Yes   General Precautions: Standard, aspiration, fall, NPO  Current Respiratory Status: other (comment) (room air)       Subjective:  "I thought I was getting a stroke earlier." Pt re: head and chest pain.     Pain/Comfort  Pain Rating 1: 10/10  Location 1: head  Pain Addressed 1: Distraction  Pain Rating Post-Intervention 1: 10/10  Pain Rating 2: 10/10  Location 2: chest  Pain Addressed 2: Distraction  Pain Rating Post-Intervention 2: 10/10    Objective:   Patient found with: telemetry  Pt awake upon entry, sitting fully upright in bed, without back resting against bed. Pt demonstrating less confusion compared to yesterday's SLP session, as well as cessation of consistent talking, however intermittent confusion remains evident with difficulty determining reliability of pt's pain report. Upon verbal and tactile stimuli provided via laryngeal palpation, pt did not attempt to complete effortful swallow exercise, stating "I ain't swallowing nothing." Pt complete BOT /k/ x1, however reported "so dry and harsh. It hurts." Therefore pt provided with cup sip water x2. Cup edge provided to lower labial surface in order to prompt pt to accept sip, however upon 1st attempt, pt made no attempt to create labial seal around cup edge to accept. Presented with 2nd trial, dec'd labial seal around cup edge noted, as well as delayed cough with red/ watery eyes elicited. Resumed BOT /k/ following recovery from coughing event, however completed x3 then refused further trials 2/2 " report of pain. No further PO trials or dysphagia exercises attempted this date. Pt educated re: SLP POC. White board updated. No further questions.     Assessment:  Joanna Morales is a 51 y.o. female with a medical diagnosis of Hypernatremia and presents with oropharyngeal dysphagia.     Discharge recommendations: Discharge Facility/Level Of Care Needs: nursing facility, skilled     Goals:    SLP Goals        Problem: SLP Goal    Goal Priority Disciplines Outcome   SLP Goal     SLP Unable to achieve outcome(s) by discharge   Description:  Speech Language Pathology Goals  Goals expected to be met by 7/7  1. Patient will successfully participate in ongoing clinical swallow evaluation and tolerate po trials with no overt s/s of aspiration.                  Problem: SLP Goal    Goal Priority Disciplines Outcome   SLP Goal     SLP Ongoing (interventions implemented as appropriate)   Description:  Updated SLP goals expected to be met by 8/4- CONTINUE until 8/11:  1. Pt will participate in ongoing assessment of swallow in order to determine safest, least restrictive diet. - ONGOING    SLP goals expected to be met by 7/18:  1. Pt will participate in going assessment of swallow in order to determine safest, least restrictive diet.                        Plan:   Patient to be seen Therapy Frequency: 3 x/week   Plan of Care expires: 08/26/17  Plan of Care reviewed with: patient  SLP Follow-up?: Yes            HILDA Rascon, CCC-SLP  [phone]  8/9/2017

## 2017-08-10 PROBLEM — J96.01 ACUTE HYPOXEMIC RESPIRATORY FAILURE: Status: RESOLVED | Noted: 2017-01-01 | Resolved: 2017-01-01

## 2017-08-10 PROBLEM — R41.82 ALTERED MENTAL STATUS: Status: RESOLVED | Noted: 2017-01-01 | Resolved: 2017-01-01

## 2017-08-10 PROBLEM — E87.0 HYPERNATREMIA: Status: RESOLVED | Noted: 2017-01-01 | Resolved: 2017-01-01

## 2017-08-10 PROBLEM — J69.0 ASPIRATION PNEUMONIA: Status: RESOLVED | Noted: 2017-01-01 | Resolved: 2017-01-01

## 2017-08-10 PROBLEM — E83.39 HYPOPHOSPHATEMIA: Status: RESOLVED | Noted: 2017-01-01 | Resolved: 2017-01-01

## 2017-08-10 PROBLEM — E83.52 HYPERCALCEMIA: Status: RESOLVED | Noted: 2017-01-01 | Resolved: 2017-01-01

## 2017-08-10 PROBLEM — D72.823 LEUKEMOID REACTION: Status: RESOLVED | Noted: 2017-01-01 | Resolved: 2017-01-01

## 2017-08-10 PROBLEM — Z75.8 DISCHARGE PLANNING ISSUES: Status: RESOLVED | Noted: 2017-01-01 | Resolved: 2017-01-01

## 2017-08-10 NOTE — PLAN OF CARE
Cm noted that pt dc to [facility] as discussed in huddle this am. Per NS notes, pt needs six week f/u  Cm called [facility] and Dain will send msg to Dr. Burnett's nurse for this appt to be made and they were instructed to  call [facility] nurses' station at [phone] with appt date and time.     08/10/17 1450   Final Note   Assessment Type Final Discharge Note   Discharge Disposition ([facility])   Discharge planning education complete? Yes   Hospital Follow Up  Appt(s) scheduled? Yes   Discharge plans and expectations educations in teach back method with documentation complete? Yes   Offered [facility] -- Bedside Delivery? n/a   Discharge/Hospital Encounter Summary to (non-[facility]) PCP n/a   Referral to Outpatient Case Management complete? n/a   Referral to / orders for Home Health Complete? n/a   30 day supply of medicines given at discharge, if documented non-compliance / non-adherence? n/a   Any social issues identified prior to discharge? No   Did you assess the readiness or willingness of the family or caregiver to support self management of care? n/a   Right Care Referral Info   Post Acute Recommendation Other    Pt will be going to [facility].    Cm spoke with Jodi and offered to notify family of transfer but Jodi is taking care of this.

## 2017-08-10 NOTE — PROGRESS NOTES
[facility] Hospital Medicine  Progress Note    Patient Name: Joanna Morales  MRN: [id number]  Patient Class: IP- Inpatient   Admission Date: 6/28/2017  Length of Stay: 42 days  Attending Physician: Madalyn Montesinos MD  Primary Care Provider: Claudy Tse MD    [facility] Medicine Team: [facility] MED L Madalyn Montesinos MD    Subjective:     Principal Problem:Hypernatremia    HPI:  Ms. Joanna Morales is a 51 y.o. female with tobacco abuse, secondary hypothyroidism (TSH 0.241 Jun 2017), and history of meningioma s/p resection complicated by diabetes insipidus who presents to [facility] ED from [facility] after being found with abnormal labwork.  She had been more altered in the last day or so and upon checking labwork, she was noted to be with hypernatremia.  She contributed very minimally to her history but does report some abdominal pain.  Of note, she was recently admitted to this facility under the Neurosurgery service for resection of a meningioma that was complicated by diabetes insipidus for which she received desmopressin a few times before being discharged to rehabilitation at [facility].  Further history is limited at this time.    Hospital Course:  Ms. Morales was [place] [facility]  [facility] on 6/26/17 and presented to [facility] [place] on 6/28 with altered mental status and worsening hypernatremia. According to the patient's sitter, the patient was awake, verbal, and interactive with therapy on 6/23 but had been progressively less interactive and responsive when she was at [place]. Ms. Morales was admitted to Hospital Medicine but was found to be unresponsive with a sodium level of 168 on the morning of 6/29.      She has been followed by endocrinology and neurology and workup thus far significant for  unremarkable UA, neuro-imaging c/w recent left sphenoidal meningioma resection with resolution of blood products and trace intraventricular  hemorrhage.  Previous EEG (6/16/17) demonstrated Moderate diffuse slowing of the overall background as described above, Superimposed left hemispheric slowing relative to the right, and NO epileptiform discharges or seizures were seen c/w ENCP and focal cortical dysfunction.  Pt's presentation is concerning for multi-factorial encephalopathy given persistent hypernatremia.  Per neurology, low suspicion for MNG currently, but patient is at high risk given recent intracranial procedure.     Critical Care consulted 7/17/17 for acute hypoxic respiratory failure. Pt had PEG tube placed earlier today and in PACU, oxygen saturations noted to be in the mid 80's. Pt was placed on a non-rebreather with minimal improvement and subsequently placed on BiPAP. ABG significant for elevated CO2 however pt was compensating. Repeat ABG showed pH 7.372, CO2 44, O2 62, HCO3 25.8. Pt transitioned to NC. Cxray concerning for aspiration pneumonia - new infiltrate in RLL so patient started on vancomycin and zosyn for HAP. She was then switched to Vanc/Cefepime/Acyclovir. Had significant elevation in WBC count 7/18 and Needed levophed to bring BP up for the night. Switched nasal DDAVP to IV DDAVP which has normalized her sodium levels. LP performed 7/20; consistent with a viral process. BP has been stable off pressors; leukocytosis resolved. Patient more alert.    Patient stepped down to Hospital Medicine team L on 7/22 pending viral etiology work up. She remained lethargic and minimally participatory despite correction of metabolic derangements. Desmopressin therapy was resumed as per Endocrinology service recommendations. Enteral feedings changed to bolus with free water in order to prevent patient pulling her PEG tube. Hypernatremia continued to improve slowly with free water replacement and optimization of desmopressin therapy.    Hyponatremia resolved and patient medically stable for discharge pending securement of placement.    Interval  History: Patient lethargic and non verbal. No acute overnight events. Pending placement.    Review of Systems   Unable to perform ROS: Mental status change     Objective:     Vital Signs (Most Recent):  Temp: 98.3 °F (36.8 °C) (08/09/17 1600)  Pulse: 86 (08/09/17 1600)  Resp: 17 (08/09/17 1600)  BP: 109/74 (08/09/17 1600)  SpO2: 96 % (08/09/17 1600) Vital Signs (24h Range):  Temp:  [97.4 °F (36.3 °C)-98.3 °F (36.8 °C)] 98.3 °F (36.8 °C)  Pulse:  [] 86  Resp:  [16-18] 17  SpO2:  [90 %-96 %] 96 %  BP: ()/(54-89) 109/74     Weight: 56.2 kg (123 lb 14.4 oz)  Body mass index is 22.66 kg/m².    Intake/Output Summary (Last 24 hours) at 08/09/17 1915  Last data filed at 08/09/17 1800   Gross per 24 hour   Intake             1960 ml   Output                0 ml   Net             1960 ml      Physical Exam   Constitutional: She appears well-developed and well-nourished. She appears lethargic. No distress.   HENT:   Mouth/Throat: Mucous membranes are dry.   Cardiovascular: Normal rate and regular rhythm.    No murmur heard.  Pulmonary/Chest: Effort normal and breath sounds normal.   Abdominal: Soft. Bowel sounds are normal. She exhibits no distension. There is no tenderness.   PEG in place   Musculoskeletal: Normal range of motion. She exhibits no edema or tenderness.   Neurological: She appears lethargic.   Psychiatric: She is slowed. She is inattentive.       Significant Labs:   BMP:     Recent Labs  Lab 08/09/17  0513   GLU 68*   *   K 3.9      CO2 24   BUN 17   CREATININE 0.6   CALCIUM 9.6   MG 1.7     CBC:     Recent Labs  Lab 08/08/17  0342   WBC 14.31*   HGB 9.6*   HCT 30.0*   *       Significant Imaging: I have reviewed all pertinent imaging results/findings within the past 24 hours.    Assessment/Plan:      Acute encephalopathy    Likely multifactorial in nature; ICU delirium, hypernatremia, and perhaps prior surgical procedures. No evidence of HSV encephalitis. Lethargic and non verbal  again. Likely new baseline.  1. Delirium precautions.  2. Out of mittens for greater than 72 hours.  3. Discharge to NH once bed is secured.          * Hypernatremia    Secondary to central DI. Appears to have resolved.  1. Desmopressin 100 mcg BID.  2. Monitor I&O and sodium levels.   3. Free water bolus 4 times daily: 220 ml.          Primary central diabetes insipidus    See above.          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    1. Keppra for seizure prophylaxis.          Malnutrition of moderate degree    S/P PEG placement on 7/17.  1. Continue enteral tube feedings.          Secondary hypothyroidism    Stable.  1. Continue levothyroxine. Dose optimized to 75 mcg on 7/27.        Hypercalcemia    Stable. Likely due to poor activity. Improved with administration of pamidronate on 7/27.  1. PT/OT.  2. Monitor calcium level.        Oropharyngeal dysphagia    Evaluated by SLP.  1. Continue NPO.  2. SLP to continue working with patient.          Leukemoid reaction    Patient with elevated WBC likely leukemoid reaction secondary to steroid therapy.   1. Monitor.          Discharge planning issues    Patient accepted to NH. However upon investigation of financial information found to have bank account cleaned out. [facility] currently working with NH for payment plan.  1. Discharge to NH once payment plan secured and bed is available.         VTE Risk Mitigation         Ordered     enoxaparin injection 40 mg  Daily     Route:  Subcutaneous        07/21/17 1223     High Risk of VTE  Once      06/30/17 0920     Place sequential compression device  Until discontinued      06/30/17 0920              Madalyn Montesinos MD  Department of Hospital Medicine   [facility]

## 2017-08-10 NOTE — SUBJECTIVE & OBJECTIVE
"Interval History: "I can't see". Patient more alert today although still lethargic. 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 Payment plan secured by [facility].    Review of Systems   Constitutional: Positive for fatigue. Negative for chills, fever and unexpected weight change.   HENT: Negative for ear pain, facial swelling, hearing loss, mouth sores, nosebleeds, rhinorrhea, sinus pressure, sore throat, tinnitus, trouble swallowing and voice change.    Eyes: Positive for visual disturbance (blindness). Negative for photophobia, pain and redness.   Respiratory: Negative for cough, chest tightness, shortness of breath and wheezing.    Cardiovascular: Negative for chest pain, palpitations and leg swelling.   Gastrointestinal: Negative for abdominal pain, blood in stool, constipation, diarrhea, nausea and vomiting.   Endocrine: Negative for cold intolerance, heat intolerance, polydipsia, polyphagia and polyuria.   Genitourinary: Negative for decreased urine volume, dysuria, flank pain, frequency, hematuria, menstrual problem, urgency, vaginal bleeding, vaginal discharge and vaginal pain.   Musculoskeletal: Negative for arthralgias, back pain, joint swelling, myalgias and neck pain.   Skin: Negative for rash.   Allergic/Immunologic: Negative for environmental allergies, food allergies and immunocompromised state.   Neurological: Negative for dizziness, seizures, syncope, weakness, light-headedness, numbness and headaches.   Hematological: Negative for adenopathy. Does not bruise/bleed easily.   Psychiatric/Behavioral: Positive for confusion. Negative for hallucinations, self-injury, sleep disturbance and suicidal ideas. The patient is not nervous/anxious.      Objective:     Vital Signs (Most Recent):  Temp: 97.6 °F (36.4 °C) (08/09/17 2000)  Pulse: 105 (08/10/17 0700)  Resp: 18 (08/09/17 2000)  BP: 109/76 (08/09/17 2000)  SpO2: 96 % (08/09/17 2000) Vital Signs (24h Range):  Temp:  [97.6 °F (36.4 °C)-98.3 " °F (36.8 °C)] 97.6 °F (36.4 °C)  Pulse:  [] 105  Resp:  [17-18] 18  SpO2:  [95 %-96 %] 96 %  BP: (109-132)/(74-89) 109/76     Weight: 56.2 kg (123 lb 14.4 oz)  Body mass index is 22.66 kg/m².    Intake/Output Summary (Last 24 hours) at 08/10/17 0811  Last data filed at 08/09/17 1800   Gross per 24 hour   Intake              980 ml   Output                0 ml   Net              980 ml      Physical Exam   Constitutional: She appears well-developed and well-nourished. She appears lethargic. No distress.   HENT:   Head: Atraumatic.   Right Ear: External ear normal.   Left Ear: External ear normal.   Mouth/Throat: Oropharynx is clear and moist. Mucous membranes are dry.   Neck: Normal range of motion.   Cardiovascular: Normal rate and regular rhythm.    No murmur heard.  Pulmonary/Chest: Effort normal and breath sounds normal.   Abdominal: Soft. Bowel sounds are normal. She exhibits no distension. There is no tenderness.   PEG in place   Musculoskeletal: Normal range of motion. She exhibits no edema or tenderness.   Neurological: She appears lethargic.   Oriented in person only   Psychiatric: She is slowed. She is inattentive.       Significant Labs:   BMP:   Recent Labs  Lab 08/10/17  0554   GLU 98      K 3.5      CO2 26   BUN 16   CREATININE 0.6   CALCIUM 9.5   MG 1.7     CBC:   Recent Labs  Lab 08/10/17  0554   WBC 20.63*   HGB 9.4*   HCT 28.4*   *       Significant Imaging: I have reviewed all pertinent imaging results/findings within the past 24 hours.

## 2017-08-10 NOTE — PROGRESS NOTES
Dr. Montesinos paged 2x to notified that pt vomited and coughing consistently; and pt d/c ordered in. Still awaiting call from Dr. Montesinos.

## 2017-08-10 NOTE — PROGRESS NOTES
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 Chew and discharged on 6/26/17 to [facility].  She presents with altered mental status and worsening hypernatremia for 1 day. She was treated for DI her last admission and labs are trending up today.  Spoke with Dr. Fairchild at [facility], patient with waxing/waning mental status yesterday, but was appropriate & following some complex commands yesterday.  There was a period yesterday of significant lethargy.  Patient is unable to give history, and information is taken from the chart.     Post-Op Info:  Procedure(s) (LRB):  PLACEMENT-TUBE-PEG (N/A)   24 Days Post-Op     Interval History: naeon. Pt to be discharged today    Medications:  Continuous Infusions:   Scheduled Meds:   desmopressin  100 mcg Per G Tube BID    enoxaparin  40 mg Subcutaneous Daily    hydrocortisone  15 mg Per G Tube Daily    hydrocortisone  5 mg Per G Tube Daily    levetiracetam oral soln  500 mg Per NG tube BID    levothyroxine  75 mcg Per NG tube Daily     PRN Meds:acetaminophen, bisacodyl, dextrose 50%, glucagon (human recombinant), guaifenesin 100 mg/5 ml, insulin aspart, ipratropium, olanzapine, ondansetron, senna, sodium chloride 0.9%     Review of Systems  Objective:     Weight: 56.2 kg (123 lb 14.4 oz)  Body mass index is 22.66 kg/m².  Vital Signs (Most Recent):  Temp: 99.3 °F (37.4 °C) (08/10/17 1200)  Pulse: 105 (08/10/17 1200)  Resp: 18 (08/10/17 1200)  BP: 112/70 (08/10/17 1200)  SpO2: (!) 91 % (08/10/17 1200) Vital Signs (24h Range):  Temp:  [97.6 °F (36.4 °C)-99.3 °F (37.4 °C)] 99.3 °F (37.4 °C)  Pulse:  [] 105  Resp:  [17-19] 18  SpO2:  [91 %-96 %] 91 %  BP: (101-112)/(64-76) 112/70       Date 08/10/17 0700 - 08/11/17 0659   Shift 5553-9657 0996-9065 9325-2307 24 Hour Total   I  N  T  A  K  E   NG/   420    Shift  Total  (mL/kg) 420  (7.5)   420  (7.5)   O  U  T  P  U  T   Shift Total  (mL/kg)       Weight (kg) 56.2 56.2 56.2 56.2                        Gastrostomy/Enterostomy 07/17/17 1520 Percutaneous endoscopic gastrostomy (PEG) LUQ feeding (Active)   Securement anchored to abdomen w/ adhesive device 8/10/2017  9:30 AM   Interventions Prior to Feeding patency checked;residual checked;residual returned 8/10/2017  9:30 AM   Suction Setting/Drainage Method dependent drainage 8/3/2017  7:48 PM   Drainage milky 8/8/2017  7:50 PM   Feeding Type bolus 8/10/2017  9:30 AM   Clamp Status/Tolerance unclamped 8/10/2017  9:30 AM   Feeding Action feeding continued 8/4/2017  9:00 PM   Dressing no dressing 8/9/2017  8:00 AM   Insertion Site no redness;no warmth;no drainage;no tenderness;no swelling 8/10/2017  9:30 AM   Site Care site cleansed w/ soap and water 8/9/2017  8:00 AM   Flush/Irrigation flushed w/;water 8/10/2017  9:30 AM   Current Rate (mL/hr) 45 mL/hr 7/26/2017  7:35 AM   Goal Rate (mL/hr) 45 mL/hr 7/26/2017  7:35 AM   Intake (mL) 270 mL 8/3/2017 12:00 AM   Water Bolus (mL) 150 mL 8/10/2017  9:30 AM   Tube Feeding Intake (mL) 270 8/10/2017  9:30 AM   Residual Amount (ml) 0 ml 8/10/2017  9:30 AM       Neurosurgery Physical Exam   General: well developed, no acute distress.   Head: normocephalic. surgical Incision well healed  Neurologic: Alert. Awake. Minimal verbal responses.   GCS: Motor: 6/Verbal: 4 /Eyes: 4 GCS Total: 14  Mental Status: Able to state name. Intermittently follows simple commands.   Cranial nerves: face symmetric, CN II-XII grossly intact.   Eyes: pupils equal, round, reactive to light.  Motor Strength: Moves all extremities spontaneously with good strength and tone. Intermittently follows simple commands.     Significant Labs:    Recent Labs  Lab 08/09/17  0513 08/10/17  0554   GLU 68* 98   * 137   K 3.9 3.5    101   CO2 24 26   BUN 17 16   CREATININE 0.6 0.6   CALCIUM 9.6 9.5   MG 1.7 1.7        Recent Labs  Lab 08/10/17  0554   WBC 20.63*   HGB 9.4*   HCT 28.4*   *     No results for input(s): LABPT, INR, APTT in the last 48 hours.  Microbiology Results (last 7 days)     Procedure Component Value Units Date/Time    Blood culture [[id number]] Collected:  08/02/17 0512    Order Status:  Completed Specimen:  Blood from Peripheral, Upper Arm, Left Updated:  08/07/17 0812     Blood Culture, Routine No growth after 5 days.        All pertinent labs from the last 24 hours have been reviewed.    Significant Diagnostics:  I have reviewed all pertinent imaging results/findings within the past 24 hours.    Assessment/Plan:     Meningioma, recurrent of brain    50 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 Notify NSGY for any changes.   -Continue Keppra for seizure prophylaxis  -Continue Lovenox for DVT prophylaxis  -Continue aggressive PT/OT  -LP performed 7/20, NGTD. Likely viral process.   -Endocrine following for DI.   -Medical management per primary team.  -Dispo: per primary, awaiting nursing home placement   -Plan for 6 week f/u with Dr. Chew upon DC  -Will continue to follow, please call with questions                 Dylan austin MD  Neurosurgery  [facility]

## 2017-08-10 NOTE — PLAN OF CARE
[facility]     Department of Hospital Medicine     [address] [place] [address]     [phone] after hours  [phone] fax       NURSING HOME ORDERS    08/10/2017    Admit to Nursing Home:  Regular Bed         Diagnoses:  Active Hospital Problems    Diagnosis  POA    *Hypernatremia [E87.0]  Yes     Priority: 2     Acute encephalopathy [G93.40]  Yes     Priority: 1 - High    Primary central diabetes insipidus [E23.2]  Yes     Priority: 3     Meningioma, recurrent of brain [D32.0]  Yes     Priority: 4     Malnutrition of moderate degree [E44.0]  No     Priority: 5     Secondary hypothyroidism [E03.8]  Yes     Priority: 6      Chronic    Hypercalcemia [E83.52]  Yes     Priority: 7     Oropharyngeal dysphagia [R13.12]  Yes     Priority: 8     Leukemoid reaction [D72.823]  Yes     Priority: 9     Discharge planning issues [Z02.9]  Not Applicable     Priority: 10     Hypophosphatemia [E83.39]  Unknown    Altered mental status [R41.82]  Yes    Hypotension [I95.9]  Clinically Undetermined    Bradycardia [R00.1]  Clinically Undetermined    Aspiration pneumonia [J69.0]  No    Acute hypoxemic respiratory failure [J96.01]  Clinically Undetermined    Secondary adrenal insufficiency [E27.49]  Yes      Resolved Hospital Problems    Diagnosis Date Resolved POA    Hypothermia [T68.XXXA] 07/03/2017 No    Goals of care, counseling/discussion [Z71.89] 07/03/2017 Not Applicable    Hypernatremia [E87.0] 07/13/2017 Yes    Tobacco abuse [Z72.0] 08/07/2017 Yes     Chronic       Patient is homebound due to:  Hypernatremia    Allergies:  Review of patient's allergies indicates:   Allergen Reactions    Codeine        Vitals:       Routine, once monthly      Diet: Isosource 1.5 tanya, full strength                           Tube Feeding:                                                - Bolus 270 ml  Every 6 hours                                              - Enteral  feeding water bolus: 220 ml four times daily.       Acitivities:    - Up in a chair each morning as tolerated    LABS:     CMP, CBC weekly each month for 3 months   Free T4 on 9/1/17    Nursing Precautions:   - Aspiration precautions:             - Total assistance with meals            -  Upright 90 degrees befor during and after meals             -  Suction at bedside          - Fall precautions per nursing home protocol   - Seizure precaution per halfway protocol   - Decubitus precautions:        -  for positioning   - Pressure reducing foam mattress   - Turn patient every two hours. Use wedge pillows to anchor patient    CONSULTS:      Physical Therapy to evaluate and treat     Occupational Therapy to evaluate and treat     Speech Therapy  to evaluate and treat     Nutrition to evaluate and recommend diet     Psychiatry to evaluate and follow patients for delirium    MISCELLANEOUS CARE:               PEG Care:  Clean site every 24 hours     Routine Skin for Bedridden Patients:  Apply moisture barrier cream to all    skin folds and wet areas in perineal area daily and after baths and                           all bowel movements.      DIABETES CARE:     Check blood sugar:      Fingerstick blood sugar AC and HS      Report CBG < 60 or > 400 to physician.                                          Medications: Discontinue all previous medication orders, if any. See new list below.     Joanna Morales [facility] Medication Instructions NIMO:[id number]    Printed on:08/09/17 1923   Medication Information                      acetaminophen (TYLENOL) 500 MG tablet  Take 1 tablet (500 mg total) by Per G Tube every 6 (six) hours as needed.             desmopressin (DDAVP) 0.1 MG Tab  1 tablet (100 mcg total) by Per G Tube route 2 (two) times daily.             EUCALYPTUS OIL/MENTHOL/CAMPHOR (VICKS VAPORUB TOP)  Apply topically daily as needed.             hydrocortisone (CORTEF) 5 MG Tab  3 tablets (15 mg  total) by Per G Tube route once daily.             hydrocortisone (CORTEF) 5 MG Tab  1 tablet (5 mg total) by Per G Tube route once daily.             levetiracetam oral soln 500 mg/5 mL (5 mL) Soln  5 mLs (500 mg total) by Per G tube route 2 (two) times daily.             levothyroxine (SYNTHROID) 75 MCG tablet  Take 1 tablet (75 mcg total) by Per G Tube once daily.             olanzapine (ZYPREXA) 5 MG tablet  1 tablet (5 mg total) by Per G Tube route daily as needed (agitation).                       _________________________________  Madalyn Montesinos MD  08/10/2017

## 2017-08-10 NOTE — DISCHARGE SUMMARY
[facility] Hospital Medicine  Discharge Summary      Patient Name: Joanna Morales  MRN: [id number]  Admission Date: 6/28/2017  Hospital Length of Stay: 43 days  Discharge Date and Time: 8/10/2017  1:09 PM  Attending Physician: Madalyn Montesinos MD   Discharging Provider: Madalyn Montesinos MD  Primary Care Provider: Claudy Tse MD    Hospital Medicine Team: [facility] HOSP MED  Madalyn Montesinos MD    HPI: Ms. Joanna Morales is a 51 y.o. female with tobacco abuse, secondary hypothyroidism (TSH 0.241 Jun 2017), and history of meningioma s/p resection complicated by diabetes insipidus who presents to [facility] ED from [facility] after being found with abnormal labwork.  She had been more altered in the last day or so and upon checking labwork, she was noted to be with hypernatremia.  She contributed very minimally to her history but does report some abdominal pain.  Of note, she was recently admitted to this facility under the Neurosurgery service for resection of a meningioma that was complicated by diabetes insipidus for which she received desmopressin a few times before being discharged to rehabilitation at [facility].  Further history is limited at this time.    Procedure(s) (LRB):  PLACEMENT-TUBE-PEG (N/A)      Indwelling Lines/Drains at time of discharge:   Lines/Drains/Airways     Drain                 Gastrostomy/Enterostomy 07/17/17 1520 Percutaneous endoscopic gastrostomy (PEG) LUQ feeding 23 days              Hospital Course: Ms. Morales was discharged from [facility]  to [facility] on 6/26/17 and presented to [facility] ED on 6/28 with altered mental status and worsening hypernatremia. According to the patient's sitter, the patient was awake, verbal, and interactive with therapy on 6/23 but had been progressively less interactive and responsive when she was at [place]. Ms. Morales was admitted to Hospital Medicine but was found to be unresponsive  with a sodium level of 168 on the morning of 6/29.      She has been followed by endocrinology and neurology and workup thus far significant for  unremarkable UA, neuro-imaging c/w recent left sphenoidal meningioma resection with resolution of blood products and trace intraventricular hemorrhage.  Previous EEG (6/16/17) demonstrated Moderate diffuse slowing of the overall background as described above, Superimposed left hemispheric slowing relative to the right, and NO epileptiform discharges or seizures were seen c/w ENCP and focal cortical dysfunction.  Pt's presentation is concerning for multi-factorial encephalopathy given persistent hypernatremia.  Per neurology, low suspicion for MNG currently, but patient is at high risk given recent intracranial procedure.     Critical Care consulted 7/17/17 for acute hypoxic respiratory failure. Pt had PEG tube placed earlier today and in PACU, oxygen saturations noted to be in the mid 80's. Pt was placed on a non-rebreather with minimal improvement and subsequently placed on BiPAP. ABG significant for elevated CO2 however pt was compensating. Repeat ABG showed pH 7.372, CO2 44, O2 62, HCO3 25.8. Pt transitioned to NC. Cxray concerning for aspiration pneumonia - new infiltrate in RLL so patient started on vancomycin and zosyn for HAP. She was then switched to Vanc/Cefepime/Acyclovir. Had significant elevation in WBC count 7/18 and Needed levophed to bring BP up for the night. Switched nasal DDAVP to IV DDAVP which has normalized her sodium levels. LP performed 7/20; consistent with a viral process. BP has been stable off pressors; leukocytosis resolved. Patient more alert.     Patient stepped down to Hospital Medicine team L on 7/22 pending viral etiology work up. She remained lethargic and minimally participatory despite correction of metabolic derangements. Desmopressin therapy was resumed as per Endocrinology service recommendations. Enteral feedings changed to bolus  with free water in order to prevent patient pulling her PEG tube. Hypernatremia continued to improve slowly with free water replacement and optimization of desmopressin therapy.     Hyponatremia resolved and patient medically stable for discharge pending securement of placement. HPOA able to secure placement with help of financial payment plan.     Consults:   Consults         Status Ordering Provider     Inpatient consult to Critical Care Medicine  Once     Provider:  (Not yet assigned)    Completed GREGORIA IRENE     Inpatient consult to Critical Care Medicine  Once     Provider:  (Not yet assigned)    Completed PRISCA JORDAN     Inpatient consult to Dietary  Once     Provider:  (Not yet assigned)    Completed ELIF FULLER     Inpatient consult to Dietary  Once     Provider:  (Not yet assigned)    Completed KAREY SINGER     Inpatient consult to Dietary  Once     Provider:  (Not yet assigned)    Completed GAURI ALEMAN     Inpatient consult to Endocrinology  Once     Provider:  (Not yet assigned)    Completed PRISCILLA DODD     Inpatient consult to Endocrinology  Once     Provider:  (Not yet assigned)    Completed NAT NAJERA     Inpatient consult to Endocrinology  Once     Provider:  (Not yet assigned)    Completed GAURI ALEMAN     Inpatient consult to Gastroenterology  Once     Provider:  (Not yet assigned)    Completed NAT NAJERA     Inpatient consult to Infectious Diseases  Once     Provider:  (Not yet assigned)    Completed SHIRLEY CRUZ     Inpatient consult to Neurology  Once     Provider:  (Not yet assigned)    Completed NAT NAJERA     Inpatient consult to Physical Medicine Rehab  Once     Provider:  (Not yet assigned)    Completed NAT NAJERA     IP consult to case management  Once     Provider:  (Not yet assigned)    Completed GREGORIA IRENE     IP consult to dietary  Once     Provider:  (Not yet assigned)    Completed GREGORIA IRENE           Significant Diagnostic Studies: Labs:   BMP:   Recent Labs  Lab 08/09/17  0513 08/10/17  0554   GLU 68* 98   * 137   K 3.9 3.5    101   CO2 24 26   BUN 17 16   CREATININE 0.6 0.6   CALCIUM 9.6 9.5   MG 1.7 1.7    and CBC   Recent Labs  Lab 08/10/17  0554   WBC 20.63*   HGB 9.4*   HCT 28.4*   *       Pending Diagnostic Studies:     Procedure Component Value Units Date/Time    Basic metabolic panel [[id number]]     Order Status:  Sent Lab Status:  No result     Specimen:  Blood from Blood     Basic metabolic panel [[id number]] Collected:  07/18/17 1632    Order Status:  Sent Lab Status:  In process Updated:  07/18/17 1632    Specimen:  Blood from Blood     Basic metabolic panel [[id number]] Collected:  07/17/17 2205    Order Status:  Sent Lab Status:  In process Updated:  07/17/17 2205    Specimen:  Blood from Blood     CBC auto differential [[id number]] Collected:  08/02/17 0457    Order Status:  Sent Lab Status:  In process Updated:  08/02/17 0458    Specimen:  Blood from Blood     CBC auto differential [[id number]]     Order Status:  Sent Lab Status:  No result     Specimen:  Blood from Blood     CBC auto differential [[id number]] Collected:  07/18/17 0817    Order Status:  Sent Lab Status:  In process Updated:  07/18/17 0817    Specimen:  Blood from Blood     Comprehensive metabolic panel [[id number]] Collected:  08/02/17 0457    Order Status:  Sent Lab Status:  In process Updated:  08/02/17 0458    Specimen:  Blood from Blood     Comprehensive metabolic panel [[id number]] Collected:  07/18/17 0817    Order Status:  Sent Lab Status:  In process Updated:  07/18/17 0817    Specimen:  Blood from Blood     Freeze and Hold,  [[id number]] Collected:  07/20/17 1543    Order Status:  Sent Lab Status:  No result     Specimen:  CSF (Spinal Fluid) from Cerebrospinal Fluid     Magnesium [[id number]] Collected:  08/02/17 0457    Order Status:  Sent Lab Status:  In process Updated:  08/02/17 0458     Specimen:  Blood from Blood     Magnesium [[id number]]     Order Status:  Sent Lab Status:  No result     Specimen:  Blood from Blood     Phosphorus [[id number]] Collected:  08/02/17 0457    Order Status:  Sent Lab Status:  In process Updated:  08/02/17 0458    Specimen:  Blood from Blood     Phosphorus [[id number]]     Order Status:  Sent Lab Status:  No result     Specimen:  Blood from Blood     VANCOMYCIN, TROUGH before 4th dose [[id number]] Collected:  07/17/17 2231    Order Status:  Sent Lab Status:  In process Updated:  07/17/17 2231    Specimen:  Blood from Blood     Vancomycin, random [[id number]] Collected:  07/17/17 2231    Order Status:  Sent Lab Status:  In process Updated:  07/17/17 2231    Specimen:  Blood from Blood         Final Active Diagnoses:    Diagnosis Date Noted POA    PRINCIPAL PROBLEM:  Hypernatremia [E87.0] 07/17/2017 Yes    Acute encephalopathy [G93.40] 06/29/2017 Yes    Primary central diabetes insipidus [E23.2] 06/08/2017 Yes    Meningioma, recurrent of brain [D32.0] 06/07/2017 Yes    Malnutrition of moderate degree [E44.0] 07/13/2017 No    Secondary hypothyroidism [E03.8] 06/26/2017 Yes     Chronic    Hypercalcemia [E83.52] 07/11/2017 Yes    Oropharyngeal dysphagia [R13.12] 07/28/2017 Yes    Leukemoid reaction [D72.823] 06/09/2017 Yes    Discharge planning issues [Z02.9] 07/26/2017 Not Applicable    Hypophosphatemia [E83.39] 08/09/2017 Yes    Altered mental status [R41.82] 08/07/2017 Yes    Hypotension [I95.9]  Yes    Bradycardia [R00.1]  Yes    Aspiration pneumonia [J69.0] 07/25/2017 No    Acute hypoxemic respiratory failure [J96.01] 07/18/2017 Yes    Secondary adrenal insufficiency [E27.49] 06/26/2017 Yes      Problems Resolved During this Admission:    Diagnosis Date Noted Date Resolved POA    Hypothermia [T68.XXXA] 07/02/2017 07/03/2017 No    Goals of care, counseling/discussion [Z71.89] 06/30/2017 07/03/2017 Not Applicable    Hypernatremia [E87.0] 06/28/2017  07/13/2017 Yes    Tobacco abuse [Z72.0] 06/28/2017 08/07/2017 Yes     Chronic      Discharged Condition: stable    Disposition: Nursing Facility    Follow Up:  Follow-up Information     Schedule an appointment as soon as possible for a visit with Claudy Tse MD.    Specialty:  Family Medicine  Contact information:  [address]  [place] [address]  [phone]             Donald Chew MD.    Specialty:  Neurosurgery  Contact information:  [address]  [facility] [address]  [phone]                 Patient Instructions:     Activity as tolerated       Medications:  Reconciled Home Medications:   Current Discharge Medication List      START taking these medications    Details   acetaminophen (TYLENOL) 500 MG tablet Take 1 tablet (500 mg total) by mouth every 6 (six) hours as needed.  Refills: 0      desmopressin (DDAVP) 0.1 MG Tab 1 tablet (100 mcg total) by Per G Tube route 2 (two) times daily.      !! hydrocortisone (CORTEF) 5 MG Tab 3 tablets (15 mg total) by Per G Tube route once daily.  Qty: 90 tablet, Refills: 0      !! hydrocortisone (CORTEF) 5 MG Tab 1 tablet (5 mg total) by Per G Tube route once daily.  Qty: 30 tablet, Refills: 0      levetiracetam oral soln 500 mg/5 mL (5 mL) Soln 5 mLs (500 mg total) by Per NG tube route 2 (two) times daily.  Qty: 300 mL, Refills: 0      levothyroxine (SYNTHROID) 75 MCG tablet Take 1 tablet (75 mcg total) by mouth once daily.  Qty: 30 tablet, Refills: 0      olanzapine (ZYPREXA) 5 MG tablet 1 tablet (5 mg total) by Per G Tube route daily as needed (agitation).  Qty: 30 tablet, Refills: 0       !! - Potential duplicate medications found. Please discuss with provider.      CONTINUE these medications which have NOT CHANGED    Details   EUCALYPTUS OIL/MENTHOL/CAMPHOR (VICKS VAPORUB TOP) Apply topically daily as needed.         STOP taking these medications       amitriptyline (ELAVIL) 100 MG tablet Comments:   Reason for Stopping:          gabapentin (NEURONTIN) 300 MG capsule Comments:   Reason for Stopping:         hydrocodone-acetaminophen 5-325mg (NORCO) 5-325 mg per tablet Comments:   Reason for Stopping:             Time spent on the discharge of patient: 35 minutes         Madalyn Montesinos MD  Department of Hospital Medicine  [facility]

## 2017-08-10 NOTE — ASSESSMENT & PLAN NOTE
Stable. Likely due to poor activity. Resolved with administration of pamidronate on 7/27.  1. PT/OT.  2. Monitor calcium level.

## 2017-08-10 NOTE — PLAN OF CARE
8:23 AM  TENISHA received nursing home orders. Faxed orders to [facility]. Spoke with Ghazal on 8/9/17 who stated they can accept pt this morning. Ghazal spoke with pt's friend regarding payment plan and will be able to accept pt this morning. Updated physician. Will arrange transportation once orders reviewed.       Jodi Royal LMSW   [facility]  Ext [phone]

## 2017-08-10 NOTE — PLAN OF CARE
11:22 AM  TENISHA called Ghazal at [facility] who stated nurse can call report to [phone]. Pt will be going to [address]. Arranged transportation with [facility]. [facility] will arrive at 12:20PM. Informed [facility]obson.   TENISHA called pt's friend to inform her of pt's discharge to [facility].     Jodi Royal LMSW   [facility]  Ext [phone]

## 2017-08-10 NOTE — TELEPHONE ENCOUNTER
APPT MADE FOR 3MOFU FROM [facility], MRI DONE 7/18/17, APPT SLIP MAILED TO PTS HOME ADDRESS. [facility] AND THEY HAVE THE APPT DATE & TIME.

## 2017-08-10 NOTE — PROGRESS NOTES
[facility] Hospital Medicine  Progress Note    Patient Name: Joanna Morales  MRN: [id number]  Patient Class: IP- Inpatient   Admission Date: 6/28/2017  Length of Stay: 43 days  Attending Physician: Madalyn Montesinos MD  Primary Care Provider: Claudy Tse MD    [facility] Medicine Team: [facility] HOSP MED L Madalyn Montesinos MD    Subjective:     Principal Problem:Hypernatremia    HPI:  Ms. Joanna Morales is a 51 y.o. female with tobacco abuse, secondary hypothyroidism (TSH 0.241 Jun 2017), and history of meningioma s/p resection complicated by diabetes insipidus who presents to [facility] ED from [facility] after being found with abnormal labwork.  She had been more altered in the last day or so and upon checking labwork, she was noted to be with hypernatremia.  She contributed very minimally to her history but does report some abdominal pain.  Of note, she was recently admitted to this facility under the Neurosurgery service for resection of a meningioma that was complicated by diabetes insipidus for which she received desmopressin a few times before being discharged to rehabilitation at [facility].  Further history is limited at this time.    Hospital Course:  Ms. Morales was discharged from [facility]  to [facility] on 6/26/17 and presented to [facility] ED on 6/28 with altered mental status and worsening hypernatremia. According to the patient's sitter, the patient was awake, verbal, and interactive with therapy on 6/23 but had been progressively less interactive and responsive when she was at [place]. Ms. Morales was admitted to Hospital Medicine but was found to be unresponsive with a sodium level of 168 on the morning of 6/29.      She has been followed by endocrinology and neurology and workup thus far significant for  unremarkable UA, neuro-imaging c/w recent left sphenoidal meningioma resection with resolution of blood products and trace intraventricular  hemorrhage.  Previous EEG (6/16/17) demonstrated Moderate diffuse slowing of the overall background as described above, Superimposed left hemispheric slowing relative to the right, and NO epileptiform discharges or seizures were seen c/w ENCP and focal cortical dysfunction.  Pt's presentation is concerning for multi-factorial encephalopathy given persistent hypernatremia.  Per neurology, low suspicion for MNG currently, but patient is at high risk given recent intracranial procedure.     Critical Care consulted 7/17/17 for acute hypoxic respiratory failure. Pt had PEG tube placed earlier today and in PACU, oxygen saturations noted to be in the mid 80's. Pt was placed on a non-rebreather with minimal improvement and subsequently placed on BiPAP. ABG significant for elevated CO2 however pt was compensating. Repeat ABG showed pH 7.372, CO2 44, O2 62, HCO3 25.8. Pt transitioned to NC. Cxray concerning for aspiration pneumonia - new infiltrate in RLL so patient started on vancomycin and zosyn for HAP. She was then switched to Vanc/Cefepime/Acyclovir. Had significant elevation in WBC count 7/18 and Needed levophed to bring BP up for the night. Switched nasal DDAVP to IV DDAVP which has normalized her sodium levels. LP performed 7/20; consistent with a viral process. BP has been stable off pressors; leukocytosis resolved. Patient more alert.    Patient stepped down to Hospital Medicine team L on 7/22 pending viral etiology work up. She remained lethargic and minimally participatory despite correction of metabolic derangements. Desmopressin therapy was resumed as per Endocrinology service recommendations. Enteral feedings changed to bolus with free water in order to prevent patient pulling her PEG tube. Hypernatremia continued to improve slowly with free water replacement and optimization of desmopressin therapy.    Hyponatremia resolved and patient medically stable for discharge pending securement of placement. HPOA able  "to secure placement with help of financial payment plan.     Interval History: "I can't see". Patient more alert today although still lethargic. 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 Payment plan secured by [facility].    Review of Systems   Constitutional: Positive for fatigue. Negative for chills, fever and unexpected weight change.   HENT: Negative for ear pain, facial swelling, hearing loss, mouth sores, nosebleeds, rhinorrhea, sinus pressure, sore throat, tinnitus, trouble swallowing and voice change.    Eyes: Positive for visual disturbance (blindness). Negative for photophobia, pain and redness.   Respiratory: Negative for cough, chest tightness, shortness of breath and wheezing.    Cardiovascular: Negative for chest pain, palpitations and leg swelling.   Gastrointestinal: Negative for abdominal pain, blood in stool, constipation, diarrhea, nausea and vomiting.   Endocrine: Negative for cold intolerance, heat intolerance, polydipsia, polyphagia and polyuria.   Genitourinary: Negative for decreased urine volume, dysuria, flank pain, frequency, hematuria, menstrual problem, urgency, vaginal bleeding, vaginal discharge and vaginal pain.   Musculoskeletal: Negative for arthralgias, back pain, joint swelling, myalgias and neck pain.   Skin: Negative for rash.   Allergic/Immunologic: Negative for environmental allergies, food allergies and immunocompromised state.   Neurological: Negative for dizziness, seizures, syncope, weakness, light-headedness, numbness and headaches.   Hematological: Negative for adenopathy. Does not bruise/bleed easily.   Psychiatric/Behavioral: Positive for confusion. Negative for hallucinations, self-injury, sleep disturbance and suicidal ideas. The patient is not nervous/anxious.      Objective:     Vital Signs (Most Recent):  Temp: 97.6 °F (36.4 °C) (08/09/17 2000)  Pulse: 105 (08/10/17 0700)  Resp: 18 (08/09/17 2000)  BP: 109/76 (08/09/17 2000)  SpO2: 96 % (08/09/17 " 2000) Vital Signs (24h Range):  Temp:  [97.6 °F (36.4 °C)-98.3 °F (36.8 °C)] 97.6 °F (36.4 °C)  Pulse:  [] 105  Resp:  [17-18] 18  SpO2:  [95 %-96 %] 96 %  BP: (109-132)/(74-89) 109/76     Weight: 56.2 kg (123 lb 14.4 oz)  Body mass index is 22.66 kg/m².    Intake/Output Summary (Last 24 hours) at 08/10/17 0811  Last data filed at 08/09/17 1800   Gross per 24 hour   Intake              980 ml   Output                0 ml   Net              980 ml      Physical Exam   Constitutional: She appears well-developed and well-nourished. She appears lethargic. No distress.   HENT:   Head: Atraumatic.   Right Ear: External ear normal.   Left Ear: External ear normal.   Mouth/Throat: Oropharynx is clear and moist. Mucous membranes are dry.   Neck: Normal range of motion.   Cardiovascular: Normal rate and regular rhythm.    No murmur heard.  Pulmonary/Chest: Effort normal and breath sounds normal.   Abdominal: Soft. Bowel sounds are normal. She exhibits no distension. There is no tenderness.   PEG in place   Musculoskeletal: Normal range of motion. She exhibits no edema or tenderness.   Neurological: She appears lethargic.   Oriented in person only   Psychiatric: She is slowed. She is inattentive.       Significant Labs:   BMP:   Recent Labs  Lab 08/10/17  0554   GLU 98      K 3.5      CO2 26   BUN 16   CREATININE 0.6   CALCIUM 9.5   MG 1.7     CBC:   Recent Labs  Lab 08/10/17  0554   WBC 20.63*   HGB 9.4*   HCT 28.4*   *       Significant Imaging: I have reviewed all pertinent imaging results/findings within the past 24 hours.    Assessment/Plan:      Acute encephalopathy    Likely multifactorial in nature; ICU delirium, hypernatremia, and perhaps prior surgical procedures. No evidence of HSV encephalitis. Waxes and wanes between non verbal to oriented in person. Likely new baseline.  1. Delirium precautions.  2. Out of mittens for greater than 72 hours.  3. Discharge to NH once bed is secured.           * Hypernatremia    Secondary to central DI. Appears to have resolved.  1. Desmopressin 100 mcg BID.  2. Monitor I&O and sodium levels.   3. Free water bolus 4 times daily: 220 ml.          Primary central diabetes insipidus    See above.          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    1. Keppra for seizure prophylaxis.          Malnutrition of moderate degree    S/P PEG placement on 7/17.  1. Continue enteral tube feedings.          Secondary hypothyroidism    Stable.  1. Continue levothyroxine. Dose optimized to 75 mcg on 7/27.  2. Repeat Free T4 on 9/1/17.        Hypercalcemia    Stable. Likely due to poor activity. Resolved with administration of pamidronate on 7/27.  1. PT/OT.  2. Monitor calcium level.        Oropharyngeal dysphagia    Evaluated by SLP.  1. Continue NPO.  2. SLP to continue working with patient.          Leukemoid reaction    Patient with elevated WBC likely leukemoid reaction secondary to steroid therapy.   1. Monitor.          Discharge planning issues    Patient accepted to NH. However upon investigation of financial information found to have bank account cleaned out. [facility] currently working with NH for payment plan.  1. Discharge to NH once payment plan secured and bed is available.          Aspiration pneumonia    Patient completed 7 days of cefepime therapy.          Oral phase dysphagia    Failed multiple ST trials, needs PEG, MS changes interferring with eating.        Palliative care encounter    Non-hospice Palliative Care:   Does patient have Capacity? no  Goals-   7/15 - discussed LTC plan with her son. Need consent for PEG  7/13 - discussed care with  Reina Hernandez  -  [phone], pt's POA.    This is turning into a long post operative course, with  inability to nourish patient.  Reina agreed to PEG tube.  Pt needs regular water intake, with mental status changes pulls out NG tube frequently..  NG could be contributing to delirium as well.  willl consult GI  Code Status- FULL  Pain management- stable, no pain  Prognosis-  Poor given numerous complications, but too soon to give up.  Would like another neurology eval, give effort another 1-2 months at least. Discussed prognosis toni Huang on 7/13.  Family discussions- 7/13 - Called Reina Hernandez  -  [phone]  Functional Status - limited to bed, presently    Post acute care plan:  Rehab if MS improves.              Adverse effect of glucocorticoid or synthetic analogue    Not on steroids presently          Agitation    7/13 - stable          Brain mass    S/p resection of meningiomas            VTE Risk Mitigation         Ordered     enoxaparin injection 40 mg  Daily     Route:  Subcutaneous        07/21/17 1223     High Risk of VTE  Once      06/30/17 0920     Place sequential compression device  Until discontinued      06/30/17 0920              Madalyn Montesinos MD  Department of Hospital Medicine   [facility]

## 2017-08-10 NOTE — SUBJECTIVE & OBJECTIVE
Interval History: Patient lethargic and non verbal. No acute overnight events. Pending placement.    Review of Systems   Unable to perform ROS: Mental status change     Objective:     Vital Signs (Most Recent):  Temp: 98.3 °F (36.8 °C) (08/09/17 1600)  Pulse: 86 (08/09/17 1600)  Resp: 17 (08/09/17 1600)  BP: 109/74 (08/09/17 1600)  SpO2: 96 % (08/09/17 1600) Vital Signs (24h Range):  Temp:  [97.4 °F (36.3 °C)-98.3 °F (36.8 °C)] 98.3 °F (36.8 °C)  Pulse:  [] 86  Resp:  [16-18] 17  SpO2:  [90 %-96 %] 96 %  BP: ()/(54-89) 109/74     Weight: 56.2 kg (123 lb 14.4 oz)  Body mass index is 22.66 kg/m².    Intake/Output Summary (Last 24 hours) at 08/09/17 1915  Last data filed at 08/09/17 1800   Gross per 24 hour   Intake             1960 ml   Output                0 ml   Net             1960 ml      Physical Exam   Constitutional: She appears well-developed and well-nourished. She appears lethargic. No distress.   HENT:   Mouth/Throat: Mucous membranes are dry.   Cardiovascular: Normal rate and regular rhythm.    No murmur heard.  Pulmonary/Chest: Effort normal and breath sounds normal.   Abdominal: Soft. Bowel sounds are normal. She exhibits no distension. There is no tenderness.   PEG in place   Musculoskeletal: Normal range of motion. She exhibits no edema or tenderness.   Neurological: She appears lethargic.   Psychiatric: She is slowed. She is inattentive.       Significant Labs:   BMP:     Recent Labs  Lab 08/09/17  0513   GLU 68*   *   K 3.9      CO2 24   BUN 17   CREATININE 0.6   CALCIUM 9.6   MG 1.7     CBC:     Recent Labs  Lab 08/08/17  0342   WBC 14.31*   HGB 9.6*   HCT 30.0*   *       Significant Imaging: I have reviewed all pertinent imaging results/findings within the past 24 hours.

## 2017-08-10 NOTE — ASSESSMENT & PLAN NOTE
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 Notify NSGY for any changes.   -Continue Keppra for seizure prophylaxis  -Continue Lovenox for DVT prophylaxis  -Continue aggressive PT/OT  -LP performed 7/20, NGTD. Likely viral process.   -Endocrine following for DI.   -Medical management per primary team.  -Dispo: per primary, awaiting nursing home placement   -Plan for 6 week f/u with Dr. Chew upon DC  -Will continue to follow, please call with questions

## 2017-08-10 NOTE — ASSESSMENT & PLAN NOTE
Stable.  1. Continue levothyroxine. Dose optimized to 75 mcg on 7/27.  2. Repeat Free T4 on 9/1/17.

## 2017-08-10 NOTE — TELEPHONE ENCOUNTER
----- Message from Dain Garcia sent at 8/10/2017  2:49 PM CDT -----  Contact: [facility] [phone]  Yarelis with case management is calling to schedule a 6wk post op, pls call [facility]

## 2017-08-10 NOTE — ASSESSMENT & PLAN NOTE
Likely multifactorial in nature; ICU delirium, hypernatremia, and perhaps prior surgical procedures. No evidence of HSV encephalitis. Waxes and wanes between non verbal to oriented in person. Likely new baseline.  1. Delirium precautions.  2. Out of mittens for greater than 72 hours.  3. Discharge to NH once bed is secured.

## 2017-08-10 NOTE — PLAN OF CARE
Problem: Patient Care Overview  Goal: Plan of Care Review  Outcome: Ongoing (interventions implemented as appropriate)  Pt is confused and lethargic through out shift. Continuous productive cough while awake, VSS. Blood sugar checks WNL, isosource bolus admin, water bolus admin. Abd binder in place. No acute changes, Freq checks maintained, camera in place. Pt free from injuries/falls. WCTM.      Problem: Pressure Ulcer Risk (Adam Scale) (Adult,Obstetrics,Pediatric)  Intervention: Turn/Reposition Often   08/10/17 0350   Skin Interventions   Pressure Reduction Techniques frequent weight shift encouraged;weight shift assistance provided

## 2017-08-10 NOTE — SUBJECTIVE & OBJECTIVE
Interval History: naeon. Pt to be discharged today    Medications:  Continuous Infusions:   Scheduled Meds:   desmopressin  100 mcg Per G Tube BID    enoxaparin  40 mg Subcutaneous Daily    hydrocortisone  15 mg Per G Tube Daily    hydrocortisone  5 mg Per G Tube Daily    levetiracetam oral soln  500 mg Per NG tube BID    levothyroxine  75 mcg Per NG tube Daily     PRN Meds:acetaminophen, bisacodyl, dextrose 50%, glucagon (human recombinant), guaifenesin 100 mg/5 ml, insulin aspart, ipratropium, olanzapine, ondansetron, senna, sodium chloride 0.9%     Review of Systems  Objective:     Weight: 56.2 kg (123 lb 14.4 oz)  Body mass index is 22.66 kg/m².  Vital Signs (Most Recent):  Temp: 99.3 °F (37.4 °C) (08/10/17 1200)  Pulse: 105 (08/10/17 1200)  Resp: 18 (08/10/17 1200)  BP: 112/70 (08/10/17 1200)  SpO2: (!) 91 % (08/10/17 1200) Vital Signs (24h Range):  Temp:  [97.6 °F (36.4 °C)-99.3 °F (37.4 °C)] 99.3 °F (37.4 °C)  Pulse:  [] 105  Resp:  [17-19] 18  SpO2:  [91 %-96 %] 91 %  BP: (101-112)/(64-76) 112/70       Date 08/10/17 0700 - 08/11/17 0659   Shift 4655-5413 2945-4607 6576-4940 24 Hour Total   I  N  T  A  K  E   NG/   420    Shift Total  (mL/kg) 420  (7.5)   420  (7.5)   O  U  T  P  U  T   Shift Total  (mL/kg)       Weight (kg) 56.2 56.2 56.2 56.2                        Gastrostomy/Enterostomy 07/17/17 1520 Percutaneous endoscopic gastrostomy (PEG) LUQ feeding (Active)   Securement anchored to abdomen w/ adhesive device 8/10/2017  9:30 AM   Interventions Prior to Feeding patency checked;residual checked;residual returned 8/10/2017  9:30 AM   Suction Setting/Drainage Method dependent drainage 8/3/2017  7:48 PM   Drainage milky 8/8/2017  7:50 PM   Feeding Type bolus 8/10/2017  9:30 AM   Clamp Status/Tolerance unclamped 8/10/2017  9:30 AM   Feeding Action feeding continued 8/4/2017  9:00 PM   Dressing no dressing 8/9/2017  8:00 AM   Insertion Site no redness;no warmth;no drainage;no tenderness;no  swelling 8/10/2017  9:30 AM   Site Care site cleansed w/ soap and water 8/9/2017  8:00 AM   Flush/Irrigation flushed w/;water 8/10/2017  9:30 AM   Current Rate (mL/hr) 45 mL/hr 7/26/2017  7:35 AM   Goal Rate (mL/hr) 45 mL/hr 7/26/2017  7:35 AM   Intake (mL) 270 mL 8/3/2017 12:00 AM   Water Bolus (mL) 150 mL 8/10/2017  9:30 AM   Tube Feeding Intake (mL) 270 8/10/2017  9:30 AM   Residual Amount (ml) 0 ml 8/10/2017  9:30 AM       Neurosurgery Physical Exam   General: well developed, no acute distress.   Head: normocephalic. surgical Incision well healed  Neurologic: Alert. Awake. Minimal verbal responses.   GCS: Motor: 6/Verbal: 4 /Eyes: 4 GCS Total: 14  Mental Status: Able to state name. Intermittently follows simple commands.   Cranial nerves: face symmetric, CN II-XII grossly intact.   Eyes: pupils equal, round, reactive to light.  Motor Strength: Moves all extremities spontaneously with good strength and tone. Intermittently follows simple commands.     Significant Labs:    Recent Labs  Lab 08/09/17  0513 08/10/17  0554   GLU 68* 98   * 137   K 3.9 3.5    101   CO2 24 26   BUN 17 16   CREATININE 0.6 0.6   CALCIUM 9.6 9.5   MG 1.7 1.7       Recent Labs  Lab 08/10/17  0554   WBC 20.63*   HGB 9.4*   HCT 28.4*   *     No results for input(s): LABPT, INR, APTT in the last 48 hours.  Microbiology Results (last 7 days)     Procedure Component Value Units Date/Time    Blood culture [[id number]] Collected:  08/02/17 0512    Order Status:  Completed Specimen:  Blood from Peripheral, Upper Arm, Left Updated:  08/07/17 0812     Blood Culture, Routine No growth after 5 days.        All pertinent labs from the last 24 hours have been reviewed.    Significant Diagnostics:  I have reviewed all pertinent imaging results/findings within the past 24 hours.
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
## 2017-08-11 PROBLEM — A41.9 SEPSIS: Status: ACTIVE | Noted: 2017-01-01

## 2017-08-11 PROBLEM — J18.9 HAP (HOSPITAL-ACQUIRED PNEUMONIA): Status: ACTIVE | Noted: 2017-01-01

## 2017-08-11 NOTE — ED PROVIDER NOTES
Encounter Date: 8/11/2017    SCRIBE #1 NOTE: I, Shanell Paulino, am scribing for, and in the presence of,  Dr. Alvarez. I have scribed the following portions of the note - the Resident attestation.       History     Chief Complaint   Patient presents with    Cough     from [facility] for bilateral pneumonia on CXR     HPI   52 YO F with history of hypothyroidism, s/p meningioma resection complicated by diabetes insipidus, admitted 6/28 for hypernatremia complicated by ICU stay for acute respiratory failure 2/2 aspiration pneumonia. Pt is altered in room and a poor historian, information taken from chart review. Pt returns after being discharged yesterday to [facility] from 6/28 admission. Pts family noticed her coughing and spitting up green sputum. According to EMS the nursing home took xrays and found b/l pneumonia. PEG tube in place. As per chart review patient altered at discharge yesterday despite electrolytes being normalized      Review of patient's allergies indicates:   Allergen Reactions    Codeine      Past Medical History:   Diagnosis Date    Allergy     Basal cell carcinoma     Depression 11/1/2016    Essential hypertension 6/9/2017    Eye disorder     Fever blister     Normocytic anemia 6/9/2017    Secondary hypothyroidism 6/26/2017     Past Surgical History:   Procedure Laterality Date    BASAL CELL CARCINOMA EXCISION      forehead    BRAIN SURGERY      SKIN BIOPSY       Family History   Problem Relation Age of Onset    Diabetes Father     Hypertension Father     Hepatitis Father     No Known Problems Mother      Social History   Substance Use Topics    Smoking status: Current Every Day Smoker     Packs/day: 1.00     Years: 40.00     Types: Cigarettes    Smokeless tobacco: Never Used    Alcohol use No     Review of Systems   Unable to perform ROS: Acuity of condition       Physical Exam     Initial Vitals [08/11/17 1447]   BP Pulse Resp Temp SpO2   (!) 90/56 106 20 100  °F (37.8 °C) (!) 92 %      MAP       67.33         Physical Exam    Nursing note and vitals reviewed.  Constitutional: She is not diaphoretic. No distress.   HENT:   Head: Normocephalic and atraumatic.   Eyes: EOM are normal. Pupils are equal, round, and reactive to light.   Neck: Normal range of motion.   Cardiovascular: Normal rate, regular rhythm and normal heart sounds.   Pulmonary/Chest: Breath sounds normal. No respiratory distress.   Abdominal: Soft. She exhibits no distension.   Neurological: She is disoriented.   aggitated   Skin: Skin is warm. Capillary refill takes less than 2 seconds.   Psychiatric: She is actively hallucinating.         ED Course   Procedures  Labs Reviewed   CBC W/ AUTO DIFFERENTIAL - Abnormal; Notable for the following:        Result Value    RBC 2.90 (*)     Hemoglobin 9.4 (*)     Hematocrit 28.6 (*)     MCV 99 (*)     MCH 32.4 (*)     RDW 17.3 (*)     Platelets 359 (*)     All other components within normal limits   ISTAT PROCEDURE - Abnormal; Notable for the following:     POC PH 7.492 (*)     POC HCO3 29.0 (*)     POC SATURATED O2 83 (*)     POC Sodium 150 (*)     POC Potassium 3.3 (*)     POC TCO2 30 (*)     POC Hematocrit 29 (*)     All other components within normal limits   CULTURE, BLOOD   CULTURE, BLOOD   LACTIC ACID, PLASMA   PROTIME-INR   B-TYPE NATRIURETIC PEPTIDE   COMPREHENSIVE METABOLIC PANEL   LACTIC ACID, PLASMA   LIPASE   MAGNESIUM   PHOSPHORUS   PROCALCITONIN   TROPONIN I   TSH   URINALYSIS, REFLEX TO URINE CULTURE   TOXICOLOGY SCREEN, URINE, RANDOM (COMPLIANCE)   TYPE & SCREEN   POCT GLUCOSE MONITORING CONTINUOUS        Resident MDM PGY-1  Pt presents with AMS, disoriented x4 with history of cough and green sputum production. Pts vitals were unstable at presentation with BP of 90s/60s and Tachycardic to 120s and temp of 100. Physical exam revealed lungs CTA. Recently admitted for hypernatremia and discharged yesterday (Na 137).   Differentials include HCAP  pneumonia sepsis, hyperglycemia, intoxication sympathomemetic, hyperthermia, thyroid storm  Plan vanc 15cc/kg, cefepime 2gram, 1L bolus Lr, cbc, cmp, istat lactate, chem 8, glucose, tox screen, t4, Ua, blood cultures x2, BNP, trop, lactate, lipase, mag, phos, coags, procalcitonin, VBG, ekg  Janette Marcelino MD MPH  [employer] [facility] PGY-1  16:08 PM 8/11/2017    Reevaluation PGY-1 5:17 PM 8/11/2017 - Pt has received 1L Bolus Lr. ISTAT shows patient hypernatremic at 150. BP has elevated 100s/80s Will hold fluids at this time and reevaluate as to not over resuscitate.       Reevaluation PGY-1 5:49 PM 8/11/2017 - Pulse has decreased to 90s. Will admit to medicine.    Reevaluation PGY-1 7:46 PM 8/11/2017 -  Due to prior admission administration of DDAVP and Na+ monitoring may need higher level of care. Consulted critical care medicine. Yesterday sodium 137, at presentation to  now 147 after 1L Lr. BP now 105/50s, pulse 90s afebrile 98.8 will monitor    Reevaluation PGY-1 8:46 PM 8/11/2017 - after speaking to medicine team will add Clindamycin for anaerobic coverage due to history of aspiration pneumonia                 Scribe Attestation:   Scribe #1: I performed the above scribed service and the documentation accurately describes the services I performed. I attest to the accuracy of the note.    Attending Attestation:   Physician Attestation Statement for Resident:  As the supervising MD   Physician Attestation Statement: I have personally seen and examined this patient.   I agree with the above history. -: Sepsis with PNA.   As the supervising MD I agree with the above PE.    As the supervising MD I agree with the above treatment, course, plan, and disposition.        Attending Critical Care:   Critical Care Times:   ==============================================================  · Total Critical Care Time - exclusive of procedural time: 45  minutes.  ==============================================================    Physician Attestation for Scribe:  Physician Attestation Statement for Scribe #1: I, Dr. Alvarez, reviewed documentation, as scribed by Shanell Paulino in my presence, and it is both accurate and complete.                 ED Course     Clinical Impression:   There were no encounter diagnoses.                           Audi Alvarez MD  08/11/17 3105

## 2017-08-11 NOTE — ED NOTES
LOC: The patient is awake, not alert or aware of environment with an non- appropriate affect, the patient is oriented x 0 and not speaking appropriately.  APPEARANCE: Patient resting comfortably and in no acute distress, patient is clean and well groomed  SKIN: The skin is warm and dry, color consistent with ethnicity, patient has normal skin turgor and moist mucus membranes, skin intact, no breakdown or brusing noted.  MUSCULOSKELETAL: Patient moving all extremities well, no obvious swelling or deformities noted.   RESPIRATORY: Airway is open and patent, breath sounds clear throughout all lung fields; respirations are spontaneous, patient has a normal effort and rate, no accessory muscle use noted. Pt is currently sating 95% on 2L  CARDIAC: Patient has no peripheral edema noted, capillary refill < 3 seconds. No complaints of chest pain   ABDOMEN: Soft and non tender to palpation, no distention noted. Bowel sounds present x 4  NEUROLOGIC: PERRL, 4mm bilaterally, eyes open spontaneously, behavior not appropriate to situation, does not follows commands, facial expression symmetrical, unable to assess neurologic function do to pts previous dx of a sub hematoma.

## 2017-08-12 PROBLEM — E86.0 DEHYDRATION WITH HYPERNATREMIA: Status: ACTIVE | Noted: 2017-01-01

## 2017-08-12 PROBLEM — G93.49 ENCEPHALOPATHY CHRONIC: Status: ACTIVE | Noted: 2017-01-01

## 2017-08-12 PROBLEM — D72.823 LEUKEMOID REACTION: Status: ACTIVE | Noted: 2017-01-01

## 2017-08-12 PROBLEM — D72.829 LEUKOCYTOSIS: Status: ACTIVE | Noted: 2017-01-01

## 2017-08-12 PROBLEM — E83.39 HYPOPHOSPHATEMIA: Status: ACTIVE | Noted: 2017-01-01

## 2017-08-12 PROBLEM — E87.6 HYPOKALEMIA: Status: ACTIVE | Noted: 2017-01-01

## 2017-08-12 PROBLEM — R78.81 POSITIVE BLOOD CULTURE: Status: ACTIVE | Noted: 2017-01-01

## 2017-08-12 PROBLEM — E87.0 DEHYDRATION WITH HYPERNATREMIA: Status: ACTIVE | Noted: 2017-01-01

## 2017-08-12 NOTE — ASSESSMENT & PLAN NOTE
Patient with elevated WBC count after initiation of hydrocortisone therapy. Therefore likely a leukemoid reaction to steroid therapy. In addition, has already trended down with volume replacement.  1. Management as above.

## 2017-08-12 NOTE — ASSESSMENT & PLAN NOTE
Low grade temp 100, tachy HR 90s, hypotensive SBP 90s, WBC 23, with suspected source in lung given new focal consolidation on CXR.   Lactic acid .9, PCT -ve however.   Broad spectrum antibiotics (Cef, Vanc) started in ED. Will continue empiric treatment of HAP w/ IV CTX and Vanc, 30 mls/kg IVF  BCx drawn, UA, UCx.

## 2017-08-12 NOTE — SUBJECTIVE & OBJECTIVE
"Interval History: "I feel weak". No acute overnight events. Patient at her baseline on admission.     Review of Systems   Unable to perform ROS: Mental status change   Eyes: Positive for visual disturbance (BLIND).     Objective:     Vital Signs (Most Recent):  Temp: 97.1 °F (36.2 °C) (08/12/17 1200)  Pulse: 90 (08/12/17 1532)  Resp: 20 (08/12/17 1200)  BP: 110/74 (08/12/17 1200)  SpO2: (!) 94 % (08/12/17 1200) Vital Signs (24h Range):  Temp:  [97.1 °F (36.2 °C)-98.9 °F (37.2 °C)] 97.1 °F (36.2 °C)  Pulse:  [] 90  Resp:  [16-32] 20  SpO2:  [91 %-95 %] 94 %  BP: ()/(54-74) 110/74     Weight: 59 kg (130 lb 1.1 oz)  Body mass index is 23.79 kg/m².  No intake or output data in the 24 hours ending 08/12/17 1649   Physical Exam   Constitutional: She appears well-developed and well-nourished. She appears lethargic. She is cooperative. No distress.   HENT:   Head: Atraumatic.   Right Ear: External ear normal.   Left Ear: External ear normal.   Mouth/Throat: Oropharynx is clear and moist. Mucous membranes are dry.   Neck: Normal range of motion.   Cardiovascular: Normal rate and regular rhythm.    No murmur heard.  Pulmonary/Chest: Effort normal and breath sounds normal.   Abdominal: Soft. Bowel sounds are normal. She exhibits no distension. There is no tenderness.   PEG in place   Musculoskeletal: Normal range of motion. She exhibits no edema or tenderness.   Neurological: She appears lethargic.   Oriented in person only   Psychiatric: She is slowed. She is inattentive.       Significant Labs:   Blood Culture:   Recent Labs  Lab 08/11/17  1625 08/11/17  1702   LABBLOO No Growth to date Gram stain sb bottle: Gram positive cocci in clusters resembling Staph   Results called to and read back by: Marta Kong RN  08/12/2017    12:12     BMP:   Recent Labs  Lab 08/12/17  0343      *   K 3.1*   *   CO2 26   BUN 14   CREATININE 0.8   CALCIUM 9.9   MG 2.3     CBC:   Recent Labs  Lab " 08/11/17  1632 08/11/17  1657 08/11/17  1901 08/12/17  0343   WBC 23.71*  --   --  17.88*   HGB 9.4*  --   --  9.1*   HCT 28.6* 29* 25* 29.2*   *  --   --  345     Urine Culture: No results for input(s): LABURIN in the last 48 hours.    Significant Imaging: I have reviewed all pertinent imaging results/findings within the past 24 hours.

## 2017-08-12 NOTE — SUBJECTIVE & OBJECTIVE
Past Medical History:   Diagnosis Date    Allergy     Basal cell carcinoma     Depression 11/1/2016    Essential hypertension 6/9/2017    Eye disorder     Fever blister     Normocytic anemia 6/9/2017    Secondary hypothyroidism 6/26/2017       Past Surgical History:   Procedure Laterality Date    BASAL CELL CARCINOMA EXCISION      forehead    BRAIN SURGERY      SKIN BIOPSY         Review of patient's allergies indicates:   Allergen Reactions    Codeine        Family History     Problem Relation (Age of Onset)    Diabetes Father    Hepatitis Father    Hypertension Father    No Known Problems Mother        Social History Main Topics    Smoking status: Current Every Day Smoker     Packs/day: 1.00     Years: 40.00     Types: Cigarettes    Smokeless tobacco: Never Used    Alcohol use No    Drug use: No    Sexual activity: No      Review of Systems   Constitutional: Negative for chills and fever.   HENT: Negative for sore throat and trouble swallowing.    Eyes: Negative for pain and visual disturbance.   Respiratory: Negative for cough and shortness of breath.    Cardiovascular: Negative for chest pain and palpitations.   Gastrointestinal: Negative for abdominal pain, nausea and vomiting.   Genitourinary: Negative for difficulty urinating and dysuria.   Musculoskeletal: Negative for arthralgias and myalgias.   Skin: Negative for rash and wound.   Neurological: Negative for seizures and numbness.   Psychiatric/Behavioral: Positive for confusion (chronic).     Objective:     Vital Signs (Most Recent):  Temp: 98.8 °F (37.1 °C) (08/11/17 1740)  Pulse: 96 (08/11/17 2045)  Resp: (!) 22 (08/11/17 2045)  BP: (!) 107/54 (08/11/17 2047)  SpO2: (!) 94 % (08/11/17 2045) Vital Signs (24h Range):  Temp:  [98.8 °F (37.1 °C)-100 °F (37.8 °C)] 98.8 °F (37.1 °C)  Pulse:  [] 96  Resp:  [20-32] 22  SpO2:  [92 %-95 %] 94 %  BP: ()/(54-69) 107/54   Weight: 59 kg (130 lb 1.1 oz)  Body mass index is 23.79  kg/m².    No intake or output data in the 24 hours ending 08/11/17 2057    Physical Exam   Constitutional: She appears well-developed. She appears cachectic. No distress.   HENT:   Head: Normocephalic.   Mouth/Throat: Oropharynx is clear and moist.   Postoperative scars noted   Eyes: Conjunctivae are normal. Pupils are equal, round, and reactive to light.   Cardiovascular: Normal rate, regular rhythm, normal heart sounds and intact distal pulses.    Pulmonary/Chest: Effort normal. No respiratory distress.   Difficulty auscultating lower lung fields.   Abdominal: Soft. Bowel sounds are normal. She exhibits no distension. There is no tenderness.   PEG tube in place with abdominal binder.   Neurological:   Alert, oriented x 1 (self), able to follow two-step commands intermittently   Skin: Skin is warm and dry. No rash noted.   Vitals reviewed.    Vents:     Lines/Drains/Airways     Drain                 Gastrostomy/Enterostomy 07/17/17 1520 Percutaneous endoscopic gastrostomy (PEG) LUQ feeding 25 days          Peripheral Intravenous Line                 Peripheral IV - Single Lumen 08/11/17 1600 Right Antecubital less than 1 day         Peripheral IV - Single Lumen 08/11/17 Left Antecubital less than 1 day              Significant Labs:  Recent Results (from the past 24 hour(s))   Brain natriuretic peptide    Collection Time: 08/11/17  4:32 PM   Result Value Ref Range    BNP 59 0 - 99 pg/mL   CBC auto differential    Collection Time: 08/11/17  4:32 PM   Result Value Ref Range    WBC 23.71 (H) 3.90 - 12.70 K/uL    RBC 2.90 (L) 4.00 - 5.40 M/uL    Hemoglobin 9.4 (L) 12.0 - 16.0 g/dL    Hematocrit 28.6 (L) 37.0 - 48.5 %    MCV 99 (H) 82 - 98 fL    MCH 32.4 (H) 27.0 - 31.0 pg    MCHC 32.9 32.0 - 36.0 g/dL    RDW 17.3 (H) 11.5 - 14.5 %    Platelets 359 (H) 150 - 350 K/uL    MPV 10.4 9.2 - 12.9 fL    Gran # 18.9 (H) 1.8 - 7.7 K/uL    Lymph # 2.4 1.0 - 4.8 K/uL    Mono # 2.1 (H) 0.3 - 1.0 K/uL    Eos # 0.1 0.0 - 0.5 K/uL     Baso # 0.05 0.00 - 0.20 K/uL    Gran% 80.1 (H) 38.0 - 73.0 %    Lymph% 10.2 (L) 18.0 - 48.0 %    Mono% 9.0 4.0 - 15.0 %    Eosinophil% 0.5 0.0 - 8.0 %    Basophil% 0.2 0.0 - 1.9 %    Differential Method Automated    Comprehensive metabolic panel    Collection Time: 08/11/17  4:32 PM   Result Value Ref Range    Sodium 151 (H) 136 - 145 mmol/L    Potassium 3.4 (L) 3.5 - 5.1 mmol/L    Chloride 111 (H) 95 - 110 mmol/L    CO2 28 23 - 29 mmol/L    Glucose 94 70 - 110 mg/dL    BUN, Bld 17 6 - 20 mg/dL    Creatinine 0.8 0.5 - 1.4 mg/dL    Calcium 9.9 8.7 - 10.5 mg/dL    Total Protein 7.8 6.0 - 8.4 g/dL    Albumin 2.5 (L) 3.5 - 5.2 g/dL    Total Bilirubin 0.3 0.1 - 1.0 mg/dL    Alkaline Phosphatase 218 (H) 55 - 135 U/L    AST 36 10 - 40 U/L    ALT 66 (H) 10 - 44 U/L    Anion Gap 12 8 - 16 mmol/L    eGFR if African American >60.0 >60 mL/min/1.73 m^2    eGFR if non African American >60.0 >60 mL/min/1.73 m^2   Lactic acid, plasma #1    Collection Time: 08/11/17  4:32 PM   Result Value Ref Range    Lactate (Lactic Acid) 0.9 0.5 - 2.2 mmol/L   Lipase    Collection Time: 08/11/17  4:32 PM   Result Value Ref Range    Lipase 5 4 - 60 U/L   Magnesium    Collection Time: 08/11/17  4:32 PM   Result Value Ref Range    Magnesium 2.3 1.6 - 2.6 mg/dL   Phosphorus    Collection Time: 08/11/17  4:32 PM   Result Value Ref Range    Phosphorus 1.8 (L) 2.7 - 4.5 mg/dL   Protime-INR    Collection Time: 08/11/17  4:32 PM   Result Value Ref Range    Prothrombin Time 10.8 9.0 - 12.5 sec    INR 1.0 0.8 - 1.2   Troponin I    Collection Time: 08/11/17  4:32 PM   Result Value Ref Range    Troponin I 0.012 0.000 - 0.026 ng/mL   TSH    Collection Time: 08/11/17  4:32 PM   Result Value Ref Range    TSH 0.035 (L) 0.400 - 4.000 uIU/mL   Type & Screen    Collection Time: 08/11/17  4:32 PM   Result Value Ref Range    Group & Rh A NEG     Indirect Mac NEG    T4, free    Collection Time: 08/11/17  4:32 PM   Result Value Ref Range    Free T4 1.10 0.71 - 1.51  ng/dL   ISTAT PROCEDURE    Collection Time: 08/11/17  4:57 PM   Result Value Ref Range    POC PH 7.492 (H) 7.35 - 7.45    POC PCO2 37.9 35 - 45 mmHg    POC PO2 43 40 - 60 mmHg    POC HCO3 29.0 (H) 24 - 28 mmol/L    POC BE 6 -2 to 2 mmol/L    POC SATURATED O2 83 (L) 95 - 100 %    POC Sodium 150 (H) 136 - 145 mmol/L    POC Potassium 3.3 (L) 3.5 - 5.1 mmol/L    POC TCO2 30 (H) 24 - 29 mmol/L    POC Ionized Calcium 1.25 1.06 - 1.42 mmol/L    POC Hematocrit 29 (L) 36 - 54 %PCV    Sample VENOUS     Site Other     Allens Test N/A    ISTAT Lactate    Collection Time: 08/11/17  5:09 PM   Result Value Ref Range    POC Lactate 0.89 0.5 - 2.2 mmol/L    Sample VENOUS     Site Other     Allens Test N/A    Urinalysis, Reflex to Urine Culture Urine, Clean Catch    Collection Time: 08/11/17  6:06 PM   Result Value Ref Range    Specimen UA Urine, Catheterized     Color, UA Yellow Yellow, Straw, Eugenia    Appearance, UA Hazy (A) Clear    pH, UA 5.0 5.0 - 8.0    Specific Gravity, UA 1.015 1.005 - 1.030    Protein, UA Negative Negative    Glucose, UA Negative Negative    Ketones, UA Negative Negative    Bilirubin (UA) Negative Negative    Occult Blood UA Negative Negative    Nitrite, UA Negative Negative    Urobilinogen, UA Negative <2.0 EU/dL    Leukocytes, UA Negative Negative   Urinalysis Microscopic    Collection Time: 08/11/17  6:06 PM   Result Value Ref Range    RBC, UA 1 0 - 4 /hpf    WBC, UA 3 0 - 5 /hpf    Squam Epithel, UA 0 /hpf    Hyaline Casts, UA 2 (A) 0-1/lpf /lpf    Microscopic Comment SEE COMMENT    Toxicology screen, urine    Collection Time: 08/11/17  6:07 PM   Result Value Ref Range    Alcohol, Urine <10 <10 mg/dL    Benzodiazepines Negative     Methadone metabolites Negative     Cocaine (Metab.) Negative     Opiate Scrn, Ur Negative     Barbiturate Screen, Ur Negative     Amphetamine Screen, Ur Negative     THC Negative     Phencyclidine Negative     Creatinine, Random Ur 105.0 15.0 - 325.0 mg/dL    Toxicology  Information SEE COMMENT    ISTAT PROCEDURE    Collection Time: 08/11/17  7:01 PM   Result Value Ref Range    POC Glucose 157 (H) 70 - 110 mg/dL    POC BUN 16 6 - 30 mg/dL    POC Creatinine 0.6 0.5 - 1.4 mg/dL    POC Sodium 147 (H) 136 - 145 mmol/L    POC Potassium 3.3 (L) 3.5 - 5.1 mmol/L    POC Chloride 109 95 - 110 mmol/L    POC TCO2 (MEASURED) 27 23 - 29 mmol/L    POC Ionized Calcium 1.17 1.06 - 1.42 mmol/L    POC Hematocrit 25 (L) 36 - 54 %PCV    Sample NAYA      Significant Imaging:  CXR 08/11/17:  Patchy increased interstitial and parenchymal attenuation bilaterally, could reflect dependent edema or developing infection noting this is superimposed upon bilateral interstitial edema.  Developing focal consolidation on the left may be present.

## 2017-08-12 NOTE — ASSESSMENT & PLAN NOTE
Patient has chronic encephalopathy as her new baseline. Her baseline mental status ranges from non verbal, to audiologic hallucination, to oriented to self. Her mental status has not changed from prior hospitalization and discharge.   1. Current mental status is new baseline status.  2. Monitor.

## 2017-08-12 NOTE — ASSESSMENT & PLAN NOTE
Multifactorial; infectious, metabolic, delirium. Treat infection, correct lytes, redirection and chemical restraints.

## 2017-08-12 NOTE — NURSING
Pt did not sleep during night. Talked to herself and pulled at her sheets. Rec'd PRN Olanzapine for agitation,pt seemed calm for about 2 hours and then started talking to herself and swinging arms in the air again. Safety precautions maintained. Bed alarm set, bed in lowest position. Rec'd IV antibiotics. No s/s of distress.

## 2017-08-12 NOTE — ASSESSMENT & PLAN NOTE
Patient recently completed therapy for pneumonia. Has new changes consistent with pulmonary edema vs pneumonia. Reportedly had green secretion however examination of the oral mucosa shows same dark sputum secretions from prior to discharge. It is unclear to me at this time if patient truly has pneumonia, likely aspiration of gastric contents, vs pulmonary edema which seems the more likely diagnosis with her quick clinical improvement, and lack of associated pulmonary symptoms.   1. Continue ceftriaxone and vancomycin for now.  2. Can likely de-escalate therapy to Augmentin 500 mg TID if cultures are contaminants and in preparation for discharge.

## 2017-08-12 NOTE — PROGRESS NOTES
[facility] Hospital Medicine  Progress Note    Patient Name: Joanna Morales  MRN: [id number]  Patient Class: IP- Inpatient   Admission Date: 8/11/2017  Length of Stay: 1 days  Attending Physician: Madalyn Montesinos MD  Primary Care Provider: Claudy Tse MD    [facility] Medicine Team: [facility] HOSP MED L Madalyn Montesinos MD    Subjective:     Principal Problem:Dehydration with hypernatremia    HPI:  Ms. Morales is a 50 yo F with hypothyroidism, h/o meningioma s/p resection (6/7/2017), which was complicated by diabetes insipidus, who was initially admitted 6/28 from [facility] for AMS thought 2/2 hypernatremia (see hospital course below) and had a prolonged hospital stay (complicated by ICU stay for acute respiratory failure 2/2 aspiration pneumonia), who was discharged yesterday to [facility] and presents today due to increased cough and sputum production, which prompted a CXR which was suspicious for PNA.     Patient is altered and a poor historian, thus medical history was taken primarily from chart review.     Per Dr. Murray's note from day of discharge:   Ms. Morales was discharged from [facility] following her craniotomy to [facility] on 6/26/17 and re-presented to [facility] ED on 6/28 with altered mental status and worsening hypernatremia. According to the patient's sitter, the patient was awake, verbal, and interactive with therapy on 6/23 but had been progressively less interactive and responsive when she was at [facility]. Ms. Morales was admitted to Hospital Medicine but was found to be unresponsive with a sodium level of 168 on the morning of 6/29.      She was followed by endocrinology and neurology and workup was significant for an unremarkable UA, neuro-imaging c/w recent left sphenoidal meningioma resection with resolution of blood products and trace intraventricular hemorrhage.  Previous EEG (6/16/17) demonstrated moderate diffuse slowing of the  overall background as described above, superimposed left hemispheric slowing relative to the right, and no epileptiform discharges or seizures were seen c/w ENCP and focal cortical dysfunction.  Pt's presentation was concerning for multi-factorial encephalopathy given persistent hypernatremia.  Per neurology, there was low suspicion for meningitis, but patient was high risk given recent intracranial procedure.     Critical Care consulted 7/17/17 for acute hypoxic respiratory failure following PEG tube placement earlier that day and in PACU, oxygen saturations noted to be in the mid 80's. Pt was placed on a non-rebreather with minimal improvement and subsequently placed on BiPAP. ABG significant for elevated CO2 however pt was compensating. Repeat ABG showed pH 7.372, CO2 44, O2 62, HCO3 25.8. Pt transitioned to NC. Cxray concerning for aspiration pneumonia - new infiltrate in RLL so patient was started on vancomycin and zosyn for HAP. She was then switched to Vanc/Cefepime/Acyclovir. She had a significant elevation in WBC count 7/18 and Needed levophed to bring BP up for the night. Switched nasal DDAVP to IV DDAVP which normalized her sodium levels. LP performed 7/20; consistent with a viral process. BP had been stable off pressors; leukocytosis resolved. Patient appeared more alert.     Patient stepped down to Hospital Medicine team L on 7/22 pending viral etiology work up. She remained lethargic and minimally participatory despite correction of metabolic derangements. Desmopressin therapy was resumed as per Endocrinology service recommendations. Enteral feedings changed to bolus with free water in order to prevent patient pulling her PEG tube. Hypernatremia continued to improve slowly with free water replacement and optimization of desmopressin therapy.     Hyponatremia resolved and patient was deemed medically stable for d/c to NH.     Hospital Course:  Patient admitted re-admitted to Hospital Medicine Team L for  "management of possible aspiration pneumonia with sepsis vs dehydration with hypernatremia with associated hypotension. Patient at her new baseline from prior hospitalization. She was kept on broad spectrum antibiotic therapy due to positive blood cultures.    Interval History: "I feel weak". No acute overnight events. Patient at her baseline on admission.     Review of Systems   Unable to perform ROS: Mental status change   Eyes: Positive for visual disturbance (BLIND).     Objective:     Vital Signs (Most Recent):  Temp: 97.1 °F (36.2 °C) (08/12/17 1200)  Pulse: 90 (08/12/17 1532)  Resp: 20 (08/12/17 1200)  BP: 110/74 (08/12/17 1200)  SpO2: (!) 94 % (08/12/17 1200) Vital Signs (24h Range):  Temp:  [97.1 °F (36.2 °C)-98.9 °F (37.2 °C)] 97.1 °F (36.2 °C)  Pulse:  [] 90  Resp:  [16-32] 20  SpO2:  [91 %-95 %] 94 %  BP: ()/(54-74) 110/74     Weight: 59 kg (130 lb 1.1 oz)  Body mass index is 23.79 kg/m².  No intake or output data in the 24 hours ending 08/12/17 1649   Physical Exam   Constitutional: She appears well-developed and well-nourished. She appears lethargic. She is cooperative. No distress.   HENT:   Head: Atraumatic.   Right Ear: External ear normal.   Left Ear: External ear normal.   Mouth/Throat: Oropharynx is clear and moist. Mucous membranes are dry.   Neck: Normal range of motion.   Cardiovascular: Normal rate and regular rhythm.    No murmur heard.  Pulmonary/Chest: Effort normal and breath sounds normal.   Abdominal: Soft. Bowel sounds are normal. She exhibits no distension. There is no tenderness.   PEG in place   Musculoskeletal: Normal range of motion. She exhibits no edema or tenderness.   Neurological: She appears lethargic.   Oriented in person only   Psychiatric: She is slowed. She is inattentive.       Significant Labs:   Blood Culture:   Recent Labs  Lab 08/11/17  1625 08/11/17  1702   LABBLOO No Growth to date Gram stain sb bottle: Gram positive cocci in clusters resembling Staph  "  Results called to and read back by: Martanatasha Kong RN  08/12/2017    12:12     BMP:   Recent Labs  Lab 08/12/17  0343      *   K 3.1*   *   CO2 26   BUN 14   CREATININE 0.8   CALCIUM 9.9   MG 2.3     CBC:   Recent Labs  Lab 08/11/17  1632 08/11/17  1657 08/11/17  1901 08/12/17  0343   WBC 23.71*  --   --  17.88*   HGB 9.4*  --   --  9.1*   HCT 28.6* 29* 25* 29.2*   *  --   --  345     Urine Culture: No results for input(s): LABURIN in the last 48 hours.    Significant Imaging: I have reviewed all pertinent imaging results/findings within the past 24 hours.    Assessment/Plan:      * Dehydration with hypernatremia    Secondary to DI. Unclear to me why patient has developed hypernatremia in less than 24 hours of discharge after normal sodium levels for over a week with current treatment of desmopressin BID and free water bolus. Suspect free water replacement was not matched to free water losses.   1. Desmopressin 100 mcg BID.  2. Enteral free water: 220 ml every 6 hours.  3. Monitor sodium level.           Encephalopathy chronic    Patient has chronic encephalopathy as her new baseline. Her baseline mental status ranges from non verbal, to audiologic hallucination, to oriented to self. Her mental status has not changed from prior hospitalization and discharge.   1. Current mental status is new baseline status.  2. Monitor.            Aspiration pneumonia    Patient recently completed therapy for pneumonia. Has new changes consistent with pulmonary edema vs pneumonia. Reportedly had green secretion however examination of the oral mucosa shows same dark sputum secretions from prior to discharge. It is unclear to me at this time if patient truly has pneumonia, likely aspiration of gastric contents, vs pulmonary edema which seems the more likely diagnosis with her quick clinical improvement, and lack of associated pulmonary symptoms.   1. Continue ceftriaxone and vancomycin for now.  2. Can  likely de-escalate therapy to Augmentin 500 mg TID if cultures are contaminants and in preparation for discharge.          Meningioma, recurrent of brain    Stable and without new neurologic deficits. 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   1. Keppra for seizure prophylaxis.          Malnutrition of moderate degree    Stable.  1. Isosource 1.5: 270mL 4x daily.  2. Free water: 220 ml 4 x daily.        Secondary hypothyroidism    Stable.  1. Continue levothyroxine.  2. TSH monitoring as outpatient.        Secondary adrenal insufficiency    Stable.   1. Hydrocortisone 15 mg in am and 5 mg in afternoon.        Positive blood culture    Patient with 1/4 positive blood cultures resembling Staph. Likely a CONS and if so will be considered a contaminant.   1. Continue vancomycin for now.  2. If CONS then contaminant and will discontinue vancomycin.          Hypophosphatemia    Secondary to poor nutritional intake.  1. Monitor.  2. Replace as needed.          Hypokalemia    Secondary to poor nutritional intake.  1. Monitor.  2. Replace as needed.            Agitation    Chronic and stable.  1. Olanzapine as needed.          Leukemoid reaction    Patient with elevated WBC count after initiation of hydrocortisone therapy. Therefore likely a leukemoid reaction to steroid therapy. In addition, has already trended down with volume replacement.  1. Management as above.          Discharge planning issues    Patient was difficult placement during prior hospitalization. Was at nursing home for less than 24 hours and returned with hypernatremia due to unclear etiology.   1. If sodium levels correct, no bacteremia, and no hypoxemia then will try to discharge to nursing home on Monday.          Sepsis    Low grade temp 100, tachy HR 90s, hypotensive SBP 90s, WBC 23, with suspected source in lung given new focal consolidation on CXR.   Lactic acid .9, PCT -ve however.   Broad spectrum antibiotics (Cef, Vanc) started in ED. Will  continue empiric treatment of HAP w/ IV CTX and Vanc, 30 mls/kg IVF  BCx drawn, UA, UCx.         Depression              HLD (hyperlipidemia)    Continue statin            VTE Risk Mitigation     None              Madalyn Montesinos MD  Department of Hospital Medicine   [facility]

## 2017-08-12 NOTE — HPI
Ms. Morales is a 50 yo F with hypothyroidism, h/o meningioma s/p resection (6/7/2017), which was complicated by diabetes insipidus, who was initially admitted 6/28 from [facility] for AMS thought 2/2 hypernatremia (see hospital course below) and had a prolonged hospital stay (complicated by ICU stay for acute respiratory failure 2/2 aspiration pneumonia), who was discharged yesterday to [facility] and presents today due to increased cough and sputum production, which prompted a CXR which was suspicious for PNA.     Patient is altered and a poor historian, thus medical history was taken primarily from chart review.     Per Dr. Murray's note from day of discharge:   Ms. Morales [facility] to [facility] on 6/26/17 and re-presented [facility] on 6/28 with altered mental status and worsening hypernatremia. According to the patient's sitter, the patient was awake, verbal, and interactive with therapy on 6/23 but had been progressively less interactive and responsive when she was at [facility]. Ms. Morales was admitted to Hospital Medicine but was found to be unresponsive with a sodium level of 168 on the morning of 6/29.      She was followed by endocrinology and neurology and workup was significant for an unremarkable UA, neuro-imaging c/w recent left sphenoidal meningioma resection with resolution of blood products and trace intraventricular hemorrhage.  Previous EEG (6/16/17) demonstrated moderate diffuse slowing of the overall background as described above, superimposed left hemispheric slowing relative to the right, and no epileptiform discharges or seizures were seen c/w ENCP and focal cortical dysfunction.  Pt's presentation was concerning for multi-factorial encephalopathy given persistent hypernatremia.  Per neurology, there was low suspicion for meningitis, but patient was high risk given recent intracranial procedure.     Critical Care  consulted 7/17/17 for acute hypoxic respiratory failure following PEG tube placement earlier that day and in PACU, oxygen saturations noted to be in the mid 80's. Pt was placed on a non-rebreather with minimal improvement and subsequently placed on BiPAP. ABG significant for elevated CO2 however pt was compensating. Repeat ABG showed pH 7.372, CO2 44, O2 62, HCO3 25.8. Pt transitioned to NC. Cxray concerning for aspiration pneumonia - new infiltrate in RLL so patient was started on vancomycin and zosyn for HAP. She was then switched to Vanc/Cefepime/Acyclovir. She had a significant elevation in WBC count 7/18 and Needed levophed to bring BP up for the night. Switched nasal DDAVP to IV DDAVP which normalized her sodium levels. LP performed 7/20; consistent with a viral process. BP had been stable off pressors; leukocytosis resolved. Patient appeared more alert.     Patient stepped down to Hospital Medicine team L on 7/22 pending viral etiology work up. She remained lethargic and minimally participatory despite correction of metabolic derangements. Desmopressin therapy was resumed as per Endocrinology service recommendations. Enteral feedings changed to bolus with free water in order to prevent patient pulling her PEG tube. Hypernatremia continued to improve slowly with free water replacement and optimization of desmopressin therapy.     Hyponatremia resolved and patient was deemed medically stable for d/c to NH.

## 2017-08-12 NOTE — ASSESSMENT & PLAN NOTE
Patient was difficult placement during prior hospitalization. Was at nursing home for less than 24 hours and returned with hypernatremia due to unclear etiology.   1. If sodium levels correct, no bacteremia, and no hypoxemia then will try to discharge to nursing home on Monday.

## 2017-08-12 NOTE — SUBJECTIVE & OBJECTIVE
Review of Systems   Unable to perform ROS: Mental status change     Objective:     Vital Signs (Most Recent):  Temp: 98.8 °F (37.1 °C) (08/11/17 1740)  Pulse: 92 (08/11/17 2133)  Resp: 17 (08/11/17 2133)  BP: (!) 104/58 (08/11/17 2132)  SpO2: (!) 94 % (08/11/17 2045) Vital Signs (24h Range):  Temp:  [98.8 °F (37.1 °C)-100 °F (37.8 °C)] 98.8 °F (37.1 °C)  Pulse:  [] 92  Resp:  [17-32] 17  SpO2:  [92 %-95 %] 94 %  BP: ()/(54-69) 104/58     Weight: 59 kg (130 lb 1.1 oz)  Body mass index is 23.79 kg/m².  No intake or output data in the 24 hours ending 08/11/17 2251   Physical Exam   Constitutional: She is oriented to person, place, and time. She appears cachectic. She has a sickly appearance. No distress.   HENT:   Head: Normocephalic.   Mouth/Throat: No oropharyngeal exudate.   Eyes: Conjunctivae and EOM are normal. Right pupil is not reactive. Left pupil is not reactive. Pupils are equal.   Cardiovascular: Regular rhythm.  Tachycardia present.  Exam reveals no friction rub.    No murmur heard.  Pulmonary/Chest: Effort normal. No respiratory distress. She has no wheezes. She has rales (L>R).   Abdominal: Soft. She exhibits no distension. There is no tenderness. There is no guarding.   Peg tube secured in place; no peristomal erythema, discharge or induration   Musculoskeletal: Normal range of motion. She exhibits no edema, tenderness or deformity.   Neurological: She is alert and oriented to person, place, and time. No cranial nerve deficit.   Patient oriented to self, place, and (somewhat) to situation; not oriented to time. Hallucinating.    Skin: Skin is warm and dry.   Psychiatric: She has a normal mood and affect. Her speech is tangential. She is slowed and actively hallucinating (speaking to people not in the room). Cognition and memory are impaired.       Significant Labs:   CBC:   Recent Labs  Lab 08/10/17  0554 08/11/17  1632 08/11/17  1657 08/11/17  1901   WBC 20.63* 23.71*  --   --    HGB 9.4*  9.4*  --   --    HCT 28.4* 28.6* 29* 25*   * 359*  --   --      CMP:   Recent Labs  Lab 08/10/17  0554 08/11/17  1632    151*   K 3.5 3.4*    111*   CO2 26 28   GLU 98 94   BUN 16 17   CREATININE 0.6 0.8   CALCIUM 9.5 9.9   PROT  --  7.8   ALBUMIN  --  2.5*   BILITOT  --  0.3   ALKPHOS  --  218*   AST  --  36   ALT  --  66*   ANIONGAP 10 12   EGFRNONAA >60.0 >60.0     Cardiac Markers:   Recent Labs  Lab 08/11/17  1632   BNP 59     Coagulation:   Recent Labs  Lab 08/11/17  1632   INR 1.0     Lactic Acid:   Recent Labs  Lab 08/11/17  1632   LACTATE 0.9     Magnesium:   Recent Labs  Lab 08/10/17  0554 08/11/17  1632   MG 1.7 2.3     Troponin:   Recent Labs  Lab 08/11/17  1632   TROPONINI 0.012     TSH:   Recent Labs  Lab 08/11/17  1632   TSH 0.035*     Urine Culture: No results for input(s): LABURIN in the last 48 hours.  Urine Studies:   Recent Labs  Lab 08/11/17  1806   COLORU Yellow   APPEARANCEUA Hazy*   PHUR 5.0   SPECGRAV 1.015   PROTEINUA Negative   GLUCUA Negative   KETONESU Negative   BILIRUBINUA Negative   OCCULTUA Negative   NITRITE Negative   UROBILINOGEN Negative   LEUKOCYTESUR Negative   RBCUA 1   WBCUA 3   SQUAMEPITHEL 0   HYALINECASTS 2*       Significant Imaging: CXR: I have reviewed all pertinent results/findings within the past 24 hours and my personal findings are:  Increased, focal areas of interstitial attenuation suspicious for b/l PNA, L>R

## 2017-08-12 NOTE — ASSESSMENT & PLAN NOTE
Secondary to DI. Unclear to me why patient has developed hypernatremia in less than 24 hours of discharge after normal sodium levels for over a week with current treatment of desmopressin BID and free water bolus. Suspect free water replacement was not matched to free water losses.   1. Desmopressin 100 mcg BID.  2. Enteral free water: 220 ml every 6 hours.  3. Monitor sodium level.

## 2017-08-12 NOTE — ASSESSMENT & PLAN NOTE
Q4H neurochecks  Appears stable; no focal neurological deficits.  Continue seizure ppx w/ Keppra

## 2017-08-12 NOTE — H&P
[facility] Hospital Medicine  Progress Note    Patient Name: Joanna Morales  MRN: [id number]  Patient Class: IP- Inpatient   Admission Date: 8/11/2017  Length of Stay: 0 days  Attending Physician: Madalyn Montesinos MD  Primary Care Provider: Claudy Tse MD    [facility] Medicine Team: [facility] HOSP MED L Nicole Grady MD    Subjective:     Principal Problem:<principal problem not specified>    HPI:  Ms. Morales is a 52 yo F with hypothyroidism, h/o meningioma s/p resection (6/7/2017), which was complicated by diabetes insipidus, who was initially admitted 6/28 from [facility] for AMS thought 2/2 hypernatremia (see hospital course below) and had a prolonged hospital stay (complicated by ICU stay for acute respiratory failure 2/2 aspiration pneumonia), who was discharged yesterday to [facility] and presents today due to increased cough and sputum production, which prompted a CXR which was suspicious for PNA.     Patient is altered and a poor historian, thus medical history was taken primarily from chart review.     Hospital Course:  Per Dr. Murray's note:   Ms. Morales was discharged from [facility] following her craniotomy to [facility] on 6/26/17 and re-presented to [facility] ED on 6/28 with altered mental status and worsening hypernatremia. According to the patient's sitter, the patient was awake, verbal, and interactive with therapy on 6/23 but had been progressively less interactive and responsive when she was at [facility]. Ms. Morales was admitted to Hospital Medicine but was found to be unresponsive with a sodium level of 168 on the morning of 6/29.      She was followed by endocrinology and neurology and workup was significant for an unremarkable UA, neuro-imaging c/w recent left sphenoidal meningioma resection with resolution of blood products and trace intraventricular hemorrhage.  Previous EEG (6/16/17) demonstrated moderate diffuse slowing of the overall  background as described above, superimposed left hemispheric slowing relative to the right, and no epileptiform discharges or seizures were seen c/w ENCP and focal cortical dysfunction.  Pt's presentation was concerning for multi-factorial encephalopathy given persistent hypernatremia.  Per neurology, there was low suspicion for meningitis, but patient was high risk given recent intracranial procedure.     Critical Care consulted 7/17/17 for acute hypoxic respiratory failure following PEG tube placement earlier that day and in PACU, oxygen saturations noted to be in the mid 80's. Pt was placed on a non-rebreather with minimal improvement and subsequently placed on BiPAP. ABG significant for elevated CO2 however pt was compensating. Repeat ABG showed pH 7.372, CO2 44, O2 62, HCO3 25.8. Pt transitioned to NC. Cxray concerning for aspiration pneumonia - new infiltrate in RLL so patient was started on vancomycin and zosyn for HAP. She was then switched to Vanc/Cefepime/Acyclovir. She had a significant elevation in WBC count 7/18 and Needed levophed to bring BP up for the night. Switched nasal DDAVP to IV DDAVP which normalized her sodium levels. LP performed 7/20; consistent with a viral process. BP had been stable off pressors; leukocytosis resolved. Patient appeared more alert.     Patient stepped down to Hospital Medicine team L on 7/22 pending viral etiology work up. She remained lethargic and minimally participatory despite correction of metabolic derangements. Desmopressin therapy was resumed as per Endocrinology service recommendations. Enteral feedings changed to bolus with free water in order to prevent patient pulling her PEG tube. Hypernatremia continued to improve slowly with free water replacement and optimization of desmopressin therapy.     Hyponatremia resolved and patient was deemed medically stable for d/c to NH.       Review of Systems   Unable to perform ROS: Mental status change     Past Medical  History:   Diagnosis Date    Allergy     Basal cell carcinoma     Depression 11/1/2016    Essential hypertension 6/9/2017    Eye disorder     Fever blister     Normocytic anemia 6/9/2017    Secondary hypothyroidism 6/26/2017     Past Surgical History:   Procedure Laterality Date    BASAL CELL CARCINOMA EXCISION      forehead    BRAIN SURGERY      SKIN BIOPSY       Family History   Problem Relation Age of Onset    Diabetes Father     Hypertension Father     Hepatitis Father     No Known Problems Mother      Social History     Social History    Marital status:      Spouse name: N/A    Number of children: N/A    Years of education: 10     Social History Main Topics    Smoking status: Current Every Day Smoker     Packs/day: 1.00     Years: 40.00     Types: Cigarettes    Smokeless tobacco: Never Used    Alcohol use No    Drug use: No    Sexual activity: No     Other Topics Concern    Are You Pregnant Or Think You May Be? No    Breast-Feeding No     Social History Narrative    None       Objective:     Vital Signs (Most Recent):  Temp: 98.8 °F (37.1 °C) (08/11/17 1740)  Pulse: 92 (08/11/17 2133)  Resp: 17 (08/11/17 2133)  BP: (!) 104/58 (08/11/17 2132)  SpO2: (!) 94 % (08/11/17 2045) Vital Signs (24h Range):  Temp:  [98.8 °F (37.1 °C)-100 °F (37.8 °C)] 98.8 °F (37.1 °C)  Pulse:  [] 92  Resp:  [17-32] 17  SpO2:  [92 %-95 %] 94 %  BP: ()/(54-69) 104/58     Weight: 59 kg (130 lb 1.1 oz)  Body mass index is 23.79 kg/m².  No intake or output data in the 24 hours ending 08/11/17 2251   Physical Exam   Constitutional: She is oriented to person, place, and time. She appears cachectic. She has a sickly appearance. No distress.   HENT:   Head: Normocephalic.   Mouth/Throat: No oropharyngeal exudate.   Eyes: Conjunctivae and EOM are normal. Right pupil is not reactive. Left pupil is not reactive. Pupils are equal.   Cardiovascular: Regular rhythm.  Tachycardia present.  Exam reveals no  friction rub.    No murmur heard.  Pulmonary/Chest: Effort normal. No respiratory distress. She has no wheezes. She has rales (L>R).   Abdominal: Soft. She exhibits no distension. There is no tenderness. There is no guarding.   Peg tube secured in place; no peristomal erythema, discharge or induration   Musculoskeletal: Normal range of motion. She exhibits no edema, tenderness or deformity.   Neurological: She is alert and oriented to person, place, and time. No cranial nerve deficit.   Patient oriented to self, place, and (somewhat) to situation; not oriented to time. Hallucinating.    Skin: Skin is warm and dry.   Psychiatric: She has a normal mood and affect. Her speech is tangential. She is slowed and actively hallucinating (speaking to people not in the room). Cognition and memory are impaired.       Significant Labs:   CBC:   Recent Labs  Lab 08/10/17  0554 08/11/17  1632 08/11/17  1657 08/11/17  1901   WBC 20.63* 23.71*  --   --    HGB 9.4* 9.4*  --   --    HCT 28.4* 28.6* 29* 25*   * 359*  --   --      CMP:   Recent Labs  Lab 08/10/17  0554 08/11/17  1632    151*   K 3.5 3.4*    111*   CO2 26 28   GLU 98 94   BUN 16 17   CREATININE 0.6 0.8   CALCIUM 9.5 9.9   PROT  --  7.8   ALBUMIN  --  2.5*   BILITOT  --  0.3   ALKPHOS  --  218*   AST  --  36   ALT  --  66*   ANIONGAP 10 12   EGFRNONAA >60.0 >60.0     Cardiac Markers:   Recent Labs  Lab 08/11/17  1632   BNP 59     Coagulation:   Recent Labs  Lab 08/11/17  1632   INR 1.0     Lactic Acid:   Recent Labs  Lab 08/11/17  1632   LACTATE 0.9     Magnesium:   Recent Labs  Lab 08/10/17  0554 08/11/17  1632   MG 1.7 2.3     Troponin:   Recent Labs  Lab 08/11/17  1632   TROPONINI 0.012     TSH:   Recent Labs  Lab 08/11/17  1632   TSH 0.035*     Urine Culture: No results for input(s): LABURIN in the last 48 hours.  Urine Studies:   Recent Labs  Lab 08/11/17  1806   COLORU Yellow   APPEARANCEUA Hazy*   PHUR 5.0   SPECGRAV 1.015   PROTEINUA Negative    GLUCUA Negative   KETONESU Negative   BILIRUBINUA Negative   OCCULTUA Negative   NITRITE Negative   UROBILINOGEN Negative   LEUKOCYTESUR Negative   RBCUA 1   WBCUA 3   SQUAMEPITHEL 0   HYALINECASTS 2*       Significant Imaging: CXR: I have reviewed all pertinent results/findings within the past 24 hours and my personal findings are:  Increased, focal areas of interstitial attenuation suspicious for b/l PNA, L>R    Assessment/Plan:      Sepsis    Low grade temp 100, tachy HR 90s, hypotensive SBP 90s, WBC 23, with suspected source in lung given new focal consolidation on CXR.   Lactic acid .9, PCT -ve however.   Broad spectrum antibiotics (Cef, Vanc) started in ED. Will continue empiric treatment of HAP w/ IV CTX and Vanc, 30 mls/kg IVF  BCx drawn, UA, UCx.         HAP (hospital-acquired pneumonia)    Per sepsis mgmt above, continue IV antibiotics (CTX, Vanco)          Hypernatremia    2/2 DI. Resolving following IVF administration in ED (initially 151, improving to 147).    Will continue DDAVP and free water boluses through G-tube.  Recheck BMP in AM.           Acute encephalopathy    Multifactorial; infectious, metabolic, delirium. Treat infection, correct lytes, redirection and chemical restraints.              Malnutrition of moderate degree    Continue current EN regimen, Isosource 1.5 - 270mL 4x daily +  mL q 6hr.           Secondary adrenal insufficiency    Per endocrinology recommendations, 15 mg hydrocortisone in am, 5mg in afternoon.  Not currently hemodynamically unstable and responding to therapy. Will not give stress-dose steroids unless patient decompensates.           Secondary hypothyroidism    Continue synthroid          Agitation    PRN olanzapine           Meningioma, recurrent of brain    Q4H neurochecks  Appears stable; no focal neurological deficits.  Continue seizure ppx w/ Keppra          Depression              HLD (hyperlipidemia)    Continue statin            VTE Risk Mitigation      None              Nicole Grady MD  Department of Hospital Medicine   [facility]

## 2017-08-12 NOTE — CONSULTS
"[facility]  Critical Care Medicine  Consult Note    Patient Name: Joanna Morales  MRN: [id number]  Admission Date: 8/11/2017  Hospital Length of Stay: 0 days  Code Status: Prior  Attending Physician: Madalyn Montesinos MD   Primary Care Provider: Claudy Tse MD   Principal Problem: <principal problem not specified>    Consults  Subjective:     HPI:  Ms. Morales is a 51/F with PMH secondary hypothyroidism, h/o meningioma s/p resection complicated by diabetes insipidus with prior administration of desmopressin who presents to ED from [facility] (recent prior discharge from [facility] on 08/10) presented with cough, greenish sputum. History is obtained from ED and past medical records; the patient is a poor historian. ED reported CXR being performed at NH with concern for "bilateral pneumonia." Patient has had poor oral intake and had PEG tube recently placed for nutrition. Of note, labs at time of ED evaluation notable for hypernatremia with sodium of 150; as of 08/10 from her prior admission her sodium had corrected to normal range.    Past Medical History:   Diagnosis Date    Allergy     Basal cell carcinoma     Depression 11/1/2016    Essential hypertension 6/9/2017    Eye disorder     Fever blister     Normocytic anemia 6/9/2017    Secondary hypothyroidism 6/26/2017       Past Surgical History:   Procedure Laterality Date    BASAL CELL CARCINOMA EXCISION      forehead    BRAIN SURGERY      SKIN BIOPSY         Review of patient's allergies indicates:   Allergen Reactions    Codeine        Family History     Problem Relation (Age of Onset)    Diabetes Father    Hepatitis Father    Hypertension Father    No Known Problems Mother        Social History Main Topics    Smoking status: Current Every Day Smoker     Packs/day: 1.00     Years: 40.00     Types: Cigarettes    Smokeless tobacco: Never Used    Alcohol use No    Drug use: No    Sexual activity: No      Review of " Systems   Constitutional: Negative for chills and fever.   HENT: Negative for sore throat and trouble swallowing.    Eyes: Negative for pain and visual disturbance.   Respiratory: Negative for cough and shortness of breath.    Cardiovascular: Negative for chest pain and palpitations.   Gastrointestinal: Negative for abdominal pain, nausea and vomiting.   Genitourinary: Negative for difficulty urinating and dysuria.   Musculoskeletal: Negative for arthralgias and myalgias.   Skin: Negative for rash and wound.   Neurological: Negative for seizures and numbness.   Psychiatric/Behavioral: Positive for confusion (chronic).     Objective:     Vital Signs (Most Recent):  Temp: 98.8 °F (37.1 °C) (08/11/17 1740)  Pulse: 96 (08/11/17 2045)  Resp: (!) 22 (08/11/17 2045)  BP: (!) 107/54 (08/11/17 2047)  SpO2: (!) 94 % (08/11/17 2045) Vital Signs (24h Range):  Temp:  [98.8 °F (37.1 °C)-100 °F (37.8 °C)] 98.8 °F (37.1 °C)  Pulse:  [] 96  Resp:  [20-32] 22  SpO2:  [92 %-95 %] 94 %  BP: ()/(54-69) 107/54   Weight: 59 kg (130 lb 1.1 oz)  Body mass index is 23.79 kg/m².    No intake or output data in the 24 hours ending 08/11/17 2057    Physical Exam   Constitutional: She appears well-developed. She appears cachectic. No distress.   HENT:   Head: Normocephalic.   Mouth/Throat: Oropharynx is clear and moist.   Postoperative scars noted   Eyes: Conjunctivae are normal. Pupils are equal, round, and reactive to light.   Cardiovascular: Normal rate, regular rhythm, normal heart sounds and intact distal pulses.    Pulmonary/Chest: Effort normal. No respiratory distress.   Difficulty auscultating lower lung fields.   Abdominal: Soft. Bowel sounds are normal. She exhibits no distension. There is no tenderness.   PEG tube in place with abdominal binder.   Neurological:   Alert, oriented x 1 (self), able to follow two-step commands intermittently   Skin: Skin is warm and dry. No rash noted.   Vitals reviewed.    Vents:      Lines/Drains/Airways     Drain                 Gastrostomy/Enterostomy 07/17/17 1520 Percutaneous endoscopic gastrostomy (PEG) LUQ feeding 25 days          Peripheral Intravenous Line                 Peripheral IV - Single Lumen 08/11/17 1600 Right Antecubital less than 1 day         Peripheral IV - Single Lumen 08/11/17 Left Antecubital less than 1 day              Significant Labs:  Recent Results (from the past 24 hour(s))   Brain natriuretic peptide    Collection Time: 08/11/17  4:32 PM   Result Value Ref Range    BNP 59 0 - 99 pg/mL   CBC auto differential    Collection Time: 08/11/17  4:32 PM   Result Value Ref Range    WBC 23.71 (H) 3.90 - 12.70 K/uL    RBC 2.90 (L) 4.00 - 5.40 M/uL    Hemoglobin 9.4 (L) 12.0 - 16.0 g/dL    Hematocrit 28.6 (L) 37.0 - 48.5 %    MCV 99 (H) 82 - 98 fL    MCH 32.4 (H) 27.0 - 31.0 pg    MCHC 32.9 32.0 - 36.0 g/dL    RDW 17.3 (H) 11.5 - 14.5 %    Platelets 359 (H) 150 - 350 K/uL    MPV 10.4 9.2 - 12.9 fL    Gran # 18.9 (H) 1.8 - 7.7 K/uL    Lymph # 2.4 1.0 - 4.8 K/uL    Mono # 2.1 (H) 0.3 - 1.0 K/uL    Eos # 0.1 0.0 - 0.5 K/uL    Baso # 0.05 0.00 - 0.20 K/uL    Gran% 80.1 (H) 38.0 - 73.0 %    Lymph% 10.2 (L) 18.0 - 48.0 %    Mono% 9.0 4.0 - 15.0 %    Eosinophil% 0.5 0.0 - 8.0 %    Basophil% 0.2 0.0 - 1.9 %    Differential Method Automated    Comprehensive metabolic panel    Collection Time: 08/11/17  4:32 PM   Result Value Ref Range    Sodium 151 (H) 136 - 145 mmol/L    Potassium 3.4 (L) 3.5 - 5.1 mmol/L    Chloride 111 (H) 95 - 110 mmol/L    CO2 28 23 - 29 mmol/L    Glucose 94 70 - 110 mg/dL    BUN, Bld 17 6 - 20 mg/dL    Creatinine 0.8 0.5 - 1.4 mg/dL    Calcium 9.9 8.7 - 10.5 mg/dL    Total Protein 7.8 6.0 - 8.4 g/dL    Albumin 2.5 (L) 3.5 - 5.2 g/dL    Total Bilirubin 0.3 0.1 - 1.0 mg/dL    Alkaline Phosphatase 218 (H) 55 - 135 U/L    AST 36 10 - 40 U/L    ALT 66 (H) 10 - 44 U/L    Anion Gap 12 8 - 16 mmol/L    eGFR if African American >60.0 >60 mL/min/1.73 m^2    eGFR if non  African American >60.0 >60 mL/min/1.73 m^2   Lactic acid, plasma #1    Collection Time: 08/11/17  4:32 PM   Result Value Ref Range    Lactate (Lactic Acid) 0.9 0.5 - 2.2 mmol/L   Lipase    Collection Time: 08/11/17  4:32 PM   Result Value Ref Range    Lipase 5 4 - 60 U/L   Magnesium    Collection Time: 08/11/17  4:32 PM   Result Value Ref Range    Magnesium 2.3 1.6 - 2.6 mg/dL   Phosphorus    Collection Time: 08/11/17  4:32 PM   Result Value Ref Range    Phosphorus 1.8 (L) 2.7 - 4.5 mg/dL   Protime-INR    Collection Time: 08/11/17  4:32 PM   Result Value Ref Range    Prothrombin Time 10.8 9.0 - 12.5 sec    INR 1.0 0.8 - 1.2   Troponin I    Collection Time: 08/11/17  4:32 PM   Result Value Ref Range    Troponin I 0.012 0.000 - 0.026 ng/mL   TSH    Collection Time: 08/11/17  4:32 PM   Result Value Ref Range    TSH 0.035 (L) 0.400 - 4.000 uIU/mL   Type & Screen    Collection Time: 08/11/17  4:32 PM   Result Value Ref Range    Group & Rh A NEG     Indirect Mac NEG    T4, free    Collection Time: 08/11/17  4:32 PM   Result Value Ref Range    Free T4 1.10 0.71 - 1.51 ng/dL   ISTAT PROCEDURE    Collection Time: 08/11/17  4:57 PM   Result Value Ref Range    POC PH 7.492 (H) 7.35 - 7.45    POC PCO2 37.9 35 - 45 mmHg    POC PO2 43 40 - 60 mmHg    POC HCO3 29.0 (H) 24 - 28 mmol/L    POC BE 6 -2 to 2 mmol/L    POC SATURATED O2 83 (L) 95 - 100 %    POC Sodium 150 (H) 136 - 145 mmol/L    POC Potassium 3.3 (L) 3.5 - 5.1 mmol/L    POC TCO2 30 (H) 24 - 29 mmol/L    POC Ionized Calcium 1.25 1.06 - 1.42 mmol/L    POC Hematocrit 29 (L) 36 - 54 %PCV    Sample VENOUS     Site Other     Allens Test N/A    ISTAT Lactate    Collection Time: 08/11/17  5:09 PM   Result Value Ref Range    POC Lactate 0.89 0.5 - 2.2 mmol/L    Sample VENOUS     Site Other     Allens Test N/A    Urinalysis, Reflex to Urine Culture Urine, Clean Catch    Collection Time: 08/11/17  6:06 PM   Result Value Ref Range    Specimen UA Urine, Catheterized     Color, UA  Yellow Yellow, Straw, Eugenia    Appearance, UA Hazy (A) Clear    pH, UA 5.0 5.0 - 8.0    Specific Gravity, UA 1.015 1.005 - 1.030    Protein, UA Negative Negative    Glucose, UA Negative Negative    Ketones, UA Negative Negative    Bilirubin (UA) Negative Negative    Occult Blood UA Negative Negative    Nitrite, UA Negative Negative    Urobilinogen, UA Negative <2.0 EU/dL    Leukocytes, UA Negative Negative   Urinalysis Microscopic    Collection Time: 08/11/17  6:06 PM   Result Value Ref Range    RBC, UA 1 0 - 4 /hpf    WBC, UA 3 0 - 5 /hpf    Squam Epithel, UA 0 /hpf    Hyaline Casts, UA 2 (A) 0-1/lpf /lpf    Microscopic Comment SEE COMMENT    Toxicology screen, urine    Collection Time: 08/11/17  6:07 PM   Result Value Ref Range    Alcohol, Urine <10 <10 mg/dL    Benzodiazepines Negative     Methadone metabolites Negative     Cocaine (Metab.) Negative     Opiate Scrn, Ur Negative     Barbiturate Screen, Ur Negative     Amphetamine Screen, Ur Negative     THC Negative     Phencyclidine Negative     Creatinine, Random Ur 105.0 15.0 - 325.0 mg/dL    Toxicology Information SEE COMMENT    ISTAT PROCEDURE    Collection Time: 08/11/17  7:01 PM   Result Value Ref Range    POC Glucose 157 (H) 70 - 110 mg/dL    POC BUN 16 6 - 30 mg/dL    POC Creatinine 0.6 0.5 - 1.4 mg/dL    POC Sodium 147 (H) 136 - 145 mmol/L    POC Potassium 3.3 (L) 3.5 - 5.1 mmol/L    POC Chloride 109 95 - 110 mmol/L    POC TCO2 (MEASURED) 27 23 - 29 mmol/L    POC Ionized Calcium 1.17 1.06 - 1.42 mmol/L    POC Hematocrit 25 (L) 36 - 54 %PCV    Sample NAYA      Significant Imaging:  CXR 08/11/17:  Patchy increased interstitial and parenchymal attenuation bilaterally, could reflect dependent edema or developing infection noting this is superimposed upon bilateral interstitial edema.  Developing focal consolidation on the left may be present.    Assessment/Plan:     Renal/   Hypernatremia    - Sodium elevated at 151 initially; repeat ISTAT with sodium  147.  - History of diabetes insipidus with prior administration of desmopressin.  - s/p 1L LR in ED.  - Would recommend continued fluids especially in setting of suspected infection.  - Would continue to monitor, ~q4-6h.        ID   Sepsis    - Unclear source of sepsis at this time; patient poor historian.  - CXR with bilateral basilar markings that could be consistent with pneumonia; UA unremarkable.  - Received cefepime, vancomycin in ED.  - Continue broad spectrum antibiotics with decent coverage for potential pneumonia and investigate for other potential etiologies.          Thank you for the consult - we will sign off. Please do not hesitate to contact us at [phone] with any questions.    Discussed with fellow, Dr. Mcguire.     KIN Schmitz MD   PGY-3  [phone]

## 2017-08-12 NOTE — ASSESSMENT & PLAN NOTE
Patient with 1/4 positive blood cultures resembling Staph. Likely a CONS and if so will be considered a contaminant.   1. Continue vancomycin for now.  2. If CONS then contaminant and will discontinue vancomycin.

## 2017-08-12 NOTE — NURSING
Pt arrived to unit on a stretcher awake and confused. Oriented x 0. Talking to self and reaching for something in the air. No s/s of distress. Bed alarm in place, bed in lowest position, will continue to assess.

## 2017-08-12 NOTE — HOSPITAL COURSE
Patient admitted re-admitted to Hospital Medicine Team L for management of possible aspiration pneumonia with sepsis vs dehydration with hypernatremia with associated hypotension. Patient at her new baseline from prior hospitalization. Antimicrobial therapy was continued. Blood cultures positive for MRSA with negative TTE for endocarditis. Patient evaluated by ID service with recommendations for a 2 week course of therapy.

## 2017-08-12 NOTE — ASSESSMENT & PLAN NOTE
Per endocrinology recommendations, 15 mg hydrocortisone in am, 5mg in afternoon.  Not currently hemodynamically unstable and responding to therapy. Will not give stress-dose steroids unless patient decompensates.

## 2017-08-12 NOTE — HPI
"Ms. Morales is a 51/F with PMH secondary hypothyroidism, h/o meningioma s/p resection complicated by diabetes insipidus with prior administration of desmopressin who presents to ED from [facility] (recent prior discharge from [facility] on 08/10) presented with cough, greenish sputum. History is obtained from ED and past medical records; the patient is a poor historian. ED reported CXR being performed at NH with concern for "bilateral pneumonia." Patient has had poor oral intake and had PEG tube recently placed for nutrition. Of note, labs at time of ED evaluation notable for hypernatremia with sodium of 150; as of 08/10 from her prior admission her sodium had corrected to normal range.  "

## 2017-08-12 NOTE — ASSESSMENT & PLAN NOTE
- Unclear source of sepsis at this time; patient poor historian.  - CXR with bilateral basilar markings that could be consistent with pneumonia; UA unremarkable.  - Received cefepime, vancomycin in ED.  - Continue broad spectrum antibiotics with decent coverage for potential pneumonia and investigate for other potential etiologies.

## 2017-08-12 NOTE — ASSESSMENT & PLAN NOTE
2/2 DI. Resolving following IVF administration in ED (initially 151, improving to 147).    Will continue DDAVP and free water boluses through G-tube.  Recheck BMP in AM.

## 2017-08-12 NOTE — ASSESSMENT & PLAN NOTE
Stable and without new neurologic deficits. 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   1. Keppra for seizure prophylaxis.

## 2017-08-13 PROBLEM — A41.01 STAPHYLOCOCCUS AUREUS SEPTICEMIA: Status: ACTIVE | Noted: 2017-01-01

## 2017-08-13 NOTE — ASSESSMENT & PLAN NOTE
Afebrile and with leukocytosis. Organism identified as S aureus therefor indicative of true infection. Suspect her sepsis is secondary to bacteremia. Unclear how patient acquired bacteremia however likely that its from skin source from her multiple skin breaks. Repeat blood cultures NGTD.  1. Continue ceftriaxone and vancomycin for now.  2. Blood cultures x 2 tomorrow.  3. TTE to assess for endocarditis.   4. Patient to require at a minimum 14 days of IV antibiotic therapy, longer if PEMA positive for endocarditis.  5. ID consult tomorrow.

## 2017-08-13 NOTE — ASSESSMENT & PLAN NOTE
Secondary to DI. Hypernatremia worsening to 159 today.    1. Desmopressin 100 mcg TID.  2. Enteral free water: 240 ml every 6 hours.  3. Monitor sodium level.   4. Unable to get accurate I&O as patient tends to pull lines out.

## 2017-08-13 NOTE — SUBJECTIVE & OBJECTIVE
"Interval History: "I don't know. They are bad and the can't go to school". No acute overnight events. Blood cultures positive for S aureus. Remains at baseline mental status.     Review of Systems   Unable to perform ROS: Mental status change   Eyes: Positive for visual disturbance (BLIND).     Objective:     Vital Signs (Most Recent):  Temp: 97.5 °F (36.4 °C) (08/13/17 0825)  Pulse: 77 (08/13/17 0825)  Resp: 20 (08/13/17 0825)  BP: 113/60 (08/13/17 0825)  SpO2: 95 % (08/13/17 0825) Vital Signs (24h Range):  Temp:  [97.1 °F (36.2 °C)-98.5 °F (36.9 °C)] 97.5 °F (36.4 °C)  Pulse:  [77-93] 77  Resp:  [18-20] 20  SpO2:  [94 %-95 %] 95 %  BP: (101-114)/(56-74) 113/60     Weight: 59 kg (130 lb 1.1 oz)  Body mass index is 23.79 kg/m².    Intake/Output Summary (Last 24 hours) at 08/13/17 1034  Last data filed at 08/12/17 1605   Gross per 24 hour   Intake              490 ml   Output                0 ml   Net              490 ml      Physical Exam   Constitutional: She appears well-developed and well-nourished. She appears lethargic. She is cooperative. No distress.   HENT:   Head: Atraumatic.   Right Ear: External ear normal.   Left Ear: External ear normal.   Mouth/Throat: Oropharynx is clear and moist. Mucous membranes are dry.   Eyes: Conjunctivae and lids are normal. Right pupil is reactive (dilated). Left pupil is reactive (dilated).   Neck: Normal range of motion.   Cardiovascular: Normal rate and regular rhythm.    No murmur heard.  Pulmonary/Chest: Effort normal and breath sounds normal.   Abdominal: Soft. Bowel sounds are normal. She exhibits no distension. There is no tenderness.   PEG in place   Musculoskeletal: Normal range of motion. She exhibits no edema or tenderness.   Neurological: She appears lethargic.   Oriented in person only   Psychiatric: She is slowed. She is inattentive.       Significant Labs:   Blood Culture:   Recent Labs  Lab 08/11/17  1625 08/11/17  1702   [facility] No Growth to date  No Growth " to date Gram stain sb bottle: Gram positive cocci in clusters resembling Staph   Results called to and read back by: Marta Kong RN  08/12/2017    12:12  STAPHYLOCOCCUS AUREUSSusceptibility pendingID [facility].     BMP:   Recent Labs  Lab 08/12/17  0343 08/13/17  0334    94   * 159*   K 3.1* 3.5   * 122*   CO2 26 25   BUN 14 19   CREATININE 0.8 0.7   CALCIUM 9.9 9.4   MG 2.3  --      CBC:   Recent Labs  Lab 08/11/17  1632 08/11/17  1657 08/11/17  1901 08/12/17  0343   WBC 23.71*  --   --  17.88*   HGB 9.4*  --   --  9.1*   HCT 28.6* 29* 25* 29.2*   *  --   --  345       Significant Imaging: I have reviewed all pertinent imaging results/findings within the past 24 hours.

## 2017-08-13 NOTE — NURSING
Pt stayed awake throughout the night, PRN olanzapine administered for agitation. Turned q 2 hrs, safety precautions maintained, no distress noted.

## 2017-08-13 NOTE — ASSESSMENT & PLAN NOTE
Patient was difficult placement during prior hospitalization. In nursing home for less than 24 hours before readmission.   1. Unclear if nursing home will be able to provide IV antibiotics. Will ask CM to verify.

## 2017-08-13 NOTE — PROGRESS NOTES
[facility] Hospital Medicine  Progress Note    Patient Name: Joanna Morales  MRN: [id number]  Patient Class: IP- Inpatient   Admission Date: 8/11/2017  Length of Stay: 2 days  Attending Physician: Madalyn Montesinos MD  Primary Care Provider: Claudy Tse MD    [facility] Medicine Team: [facility] HOSP MED L Madalyn Montesinos MD    Subjective:     Principal Problem:Staphylococcus aureus septicemia    HPI:  Ms. Morales is a 50 yo F with hypothyroidism, h/o meningioma s/p resection (6/7/2017), which was complicated by diabetes insipidus, who was initially admitted 6/28 from [facility] for AMS thought 2/2 hypernatremia (see hospital course below) and had a prolonged hospital stay (complicated by ICU stay for acute respiratory failure 2/2 aspiration pneumonia), who was discharged yesterday to [facility] and presents today due to increased cough and sputum production, which prompted a CXR which was suspicious for PNA.     Patient is altered and a poor historian, thus medical history was taken primarily from chart review.     Per Dr. Murray's note from day of discharge:   Ms. Morales was discharged from [facility] following her craniotomy to [facility] on 6/26/17 and re-presented to [facility] ED on 6/28 with altered mental status and worsening hypernatremia. According to the patient's sitter, the patient was awake, verbal, and interactive with therapy on 6/23 but had been progressively less interactive and responsive when she was at [facility]. Ms. Morales was admitted to Hospital Medicine but was found to be unresponsive with a sodium level of 168 on the morning of 6/29.      She was followed by endocrinology and neurology and workup was significant for an unremarkable UA, neuro-imaging c/w recent left sphenoidal meningioma resection with resolution of blood products and trace intraventricular hemorrhage.  Previous EEG (6/16/17) demonstrated moderate diffuse slowing of the  overall background as described above, superimposed left hemispheric slowing relative to the right, and no epileptiform discharges or seizures were seen c/w ENCP and focal cortical dysfunction.  Pt's presentation was concerning for multi-factorial encephalopathy given persistent hypernatremia.  Per neurology, there was low suspicion for meningitis, but patient was high risk given recent intracranial procedure.     Critical Care consulted 7/17/17 for acute hypoxic respiratory failure following PEG tube placement earlier that day and in PACU, oxygen saturations noted to be in the mid 80's. Pt was placed on a non-rebreather with minimal improvement and subsequently placed on BiPAP. ABG significant for elevated CO2 however pt was compensating. Repeat ABG showed pH 7.372, CO2 44, O2 62, HCO3 25.8. Pt transitioned to NC. Cxray concerning for aspiration pneumonia - new infiltrate in RLL so patient was started on vancomycin and zosyn for HAP. She was then switched to Vanc/Cefepime/Acyclovir. She had a significant elevation in WBC count 7/18 and Needed levophed to bring BP up for the night. Switched nasal DDAVP to IV DDAVP which normalized her sodium levels. LP performed 7/20; consistent with a viral process. BP had been stable off pressors; leukocytosis resolved. Patient appeared more alert.     Patient stepped down to Hospital Medicine team L on 7/22 pending viral etiology work up. She remained lethargic and minimally participatory despite correction of metabolic derangements. Desmopressin therapy was resumed as per Endocrinology service recommendations. Enteral feedings changed to bolus with free water in order to prevent patient pulling her PEG tube. Hypernatremia continued to improve slowly with free water replacement and optimization of desmopressin therapy.     Hyponatremia resolved and patient was deemed medically stable for d/c to NH.     Hospital Course:  Patient admitted re-admitted to Hospital Medicine Team L for  "management of possible aspiration pneumonia with sepsis vs dehydration with hypernatremia with associated hypotension. Patient at her new baseline from prior hospitalization. Antimicrobial therapy was continued. Blood cultures positive for S aureus.     Interval History: "I don't know. They are bad and the can't go to school". No acute overnight events. Blood cultures positive for S aureus. Remains at baseline mental status.     Review of Systems   Unable to perform ROS: Mental status change   Eyes: Positive for visual disturbance (BLIND).     Objective:     Vital Signs (Most Recent):  Temp: 97.5 °F (36.4 °C) (08/13/17 0825)  Pulse: 77 (08/13/17 0825)  Resp: 20 (08/13/17 0825)  BP: 113/60 (08/13/17 0825)  SpO2: 95 % (08/13/17 0825) Vital Signs (24h Range):  Temp:  [97.1 °F (36.2 °C)-98.5 °F (36.9 °C)] 97.5 °F (36.4 °C)  Pulse:  [77-93] 77  Resp:  [18-20] 20  SpO2:  [94 %-95 %] 95 %  BP: (101-114)/(56-74) 113/60     Weight: 59 kg (130 lb 1.1 oz)  Body mass index is 23.79 kg/m².    Intake/Output Summary (Last 24 hours) at 08/13/17 1034  Last data filed at 08/12/17 1605   Gross per 24 hour   Intake              490 ml   Output                0 ml   Net              490 ml      Physical Exam   Constitutional: She appears well-developed and well-nourished. She appears lethargic. She is cooperative. No distress.   HENT:   Head: Atraumatic.   Right Ear: External ear normal.   Left Ear: External ear normal.   Mouth/Throat: Oropharynx is clear and moist. Mucous membranes are dry.   Eyes: Conjunctivae and lids are normal. Right pupil is reactive (dilated). Left pupil is reactive (dilated).   Neck: Normal range of motion.   Cardiovascular: Normal rate and regular rhythm.    No murmur heard.  Pulmonary/Chest: Effort normal and breath sounds normal.   Abdominal: Soft. Bowel sounds are normal. She exhibits no distension. There is no tenderness.   PEG in place   Musculoskeletal: Normal range of motion. She exhibits no edema or " tenderness.   Neurological: She appears lethargic.   Oriented in person only   Psychiatric: She is slowed. She is inattentive.       Significant Labs:   Blood Culture:   Recent Labs  Lab 08/11/17  1625 08/11/17  1702   LAB[facility]OO No Growth to date  No Growth to date Gram stain sb bottle: Gram positive cocci in clusters resembling Staph   Results called to and read back by: Marta Kong RN  08/12/2017    12:12  STAPHYLOCOCCUS AUREUSSusceptibility pendingID consult required at [facility].     BMP:   Recent Labs  Lab 08/12/17  0343 08/13/17  0334    94   * 159*   K 3.1* 3.5   * 122*   CO2 26 25   BUN 14 19   CREATININE 0.8 0.7   CALCIUM 9.9 9.4   MG 2.3  --      CBC:   Recent Labs  Lab 08/11/17  1632 08/11/17  1657 08/11/17  1901 08/12/17  0343   WBC 23.71*  --   --  17.88*   HGB 9.4*  --   --  9.1*   HCT 28.6* 29* 25* 29.2*   *  --   --  345       Significant Imaging: I have reviewed all pertinent imaging results/findings within the past 24 hours.    Assessment/Plan:      * Staphylococcus aureus septicemia    Afebrile and with leukocytosis. Organism identified as S aureus therefor indicative of true infection. Suspect her sepsis is secondary to bacteremia. Unclear how patient acquired bacteremia however likely that its from skin source from her multiple skin breaks. Repeat blood cultures NGTD.  1. Continue ceftriaxone and vancomycin for now.  2. Blood cultures x 2 tomorrow.  3. TTE to assess for endocarditis.   4. Patient to require at a minimum 14 days of IV antibiotic therapy, longer if PEMA positive for endocarditis.  5. ID consult tomorrow.          Encephalopathy chronic    Patient has chronic encephalopathy as her new baseline. Her baseline mental status ranges from non verbal, to audiologic hallucination, to oriented to self. Her mental status has not changed from prior hospitalization and discharge.   1. Current mental status is new baseline status.  2.  Monitor.            Dehydration with hypernatremia    Secondary to DI. Hypernatremia worsening to 159 today.    1. Desmopressin 100 mcg TID.  2. Enteral free water: 240 ml every 6 hours.  3. Monitor sodium level.   4. Unable to get accurate I&O as patient tends to pull lines out.           Primary central diabetes insipidus    Etiology of hypernatremia.  1. Management as above.          Aspiration pneumonia    Likely aspiration of gastric contents. Patient unable to follow commands to adequately collect sputum sample for culture.    1. Continue ceftriaxone and vancomycin for now.          Meningioma, recurrent of brain    Stable and without new neurologic deficits. 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   1. Keppra for seizure prophylaxis.          Malnutrition of moderate degree    Stable.  1. Isosource 1.5: 270mL 4x daily.  2. Free water: 220 ml 4 x daily.        Secondary hypothyroidism    Stable.  1. Continue levothyroxine.  2. TSH monitoring as outpatient.        Secondary adrenal insufficiency    Stable.   1. Hydrocortisone 15 mg in am and 5 mg in afternoon.        Hypophosphatemia    Secondary to poor nutritional intake.  1. Monitor.  2. Replace as needed.          Hypokalemia    Secondary to poor nutritional intake. Within normal limits after replacement.  1. Monitor and replace as needed.            Agitation    Chronic and stable.  1. Olanzapine as needed.          Discharge planning issues    Patient was difficult placement during prior hospitalization. In nursing home for less than 24 hours before readmission.   1. Unclear if nursing home will be able to provide IV antibiotics. Will ask CM to verify.        Depression              HLD (hyperlipidemia)    Continue statin            VTE Risk Mitigation     None              Madalyn Montesinos MD  Department of Hospital Medicine   [facility]

## 2017-08-13 NOTE — ASSESSMENT & PLAN NOTE
Secondary to poor nutritional intake. Within normal limits after replacement.  1. Monitor and replace as needed.

## 2017-08-13 NOTE — PLAN OF CARE
Problem: Patient Care Overview  Goal: Plan of Care Review  Outcome: Ongoing (interventions implemented as appropriate)  Patient had uneventful day at time of note. Zero falls/ skin tears or retention of secretions. No family at bedside but CG did call and ask for update. Tolerating Q6 hour bolus feeds w zero residuals noted. Neuro status labile, more appropriate this am re responding appropriately to simple questions, less appropriate this afternoon. Dr. Montesinos @ the bedside w no new orders noted. POC being adhered to.

## 2017-08-13 NOTE — ASSESSMENT & PLAN NOTE
Likely aspiration of gastric contents. Patient unable to follow commands to adequately collect sputum sample for culture.    1. Continue ceftriaxone and vancomycin for now.

## 2017-08-14 PROBLEM — R78.81 MRSA BACTEREMIA: Status: ACTIVE | Noted: 2017-01-01

## 2017-08-14 PROBLEM — J15.212 PNEUMONIA OF BOTH LUNGS DUE TO METHICILLIN RESISTANT STAPHYLOCOCCUS AUREUS (MRSA): Status: ACTIVE | Noted: 2017-01-01

## 2017-08-14 PROBLEM — B95.62 MRSA BACTEREMIA: Status: ACTIVE | Noted: 2017-01-01

## 2017-08-14 PROBLEM — A41.02 MRSA (METHICILLIN RESISTANT STAPHYLOCOCCUS AUREUS) SEPTICEMIA: Status: ACTIVE | Noted: 2017-01-01

## 2017-08-14 NOTE — PLAN OF CARE
DR. JASKARAN PHILLIPS,    PT TRANSFERRED FROM [facility]    ATTEMPTED TO REACH FAMILY MEMBERS X 2       08/14/17 1830   Discharge Assessment   Assessment Type Discharge Planning Assessment   Confirmed/corrected address and phone number on facesheet? Yes   Assessment information obtained from? Medical Record   Expected Length of Stay (days) 2   Communicated expected length of stay with patient/caregiver yes   Prior to hospitilization cognitive status: Not Oriented to Time;Not Oriented to Place   Prior to hospitalization functional status: Partially Dependent   Current cognitive status: Not Oriented to Time;Not Oriented to Place   Current Functional Status: Partially Dependent   Arrived From rehab facility   Lives With facility resident   Able to Return to Prior Arrangements unable to determine at this time (comments)   Is patient able to care for self after discharge? No   Patient's perception of discharge disposition skilled nursing facility   Readmission Within The Last 30 Days current reason for admission unrelated to previous admission   Patient currently being followed by outpatient case management? No   Patient currently receives home health services? No   Does the patient currently use HME? No   Patient currently receives private duty nursing? No   Patient currently receives any other outside agency services? No   Equipment Currently Used at Home cane, straight;walker, rolling   Do you have any problems affording any of your prescribed medications? No   Is the patient taking medications as prescribed? yes   Do you have any financial concerns preventing you from receiving the healthcare you need? No   Does the patient have transportation to healthcare appointments? Yes   Transportation Available car;family or friend will provide   On Dialysis? No   Does the patient receive services at the [facility]madin Clinic? No   Are there any open cases? No   Discharge Plan A Rehab   Discharge Plan B Skilled Nursing  Facility   Patient/Family In Agreement With Plan unable to assess

## 2017-08-14 NOTE — PLAN OF CARE
Problem: Patient Care Overview  Goal: Plan of Care Review  Outcome: Ongoing (interventions implemented as appropriate)  Afebrile,IV abx given,no residuals from PEG feedings,disoriented ,angie-sys camera maintained for safety, pt given zyprexa for agitation and picking at lines and attempting to get oob no falls at this entry, turns self.

## 2017-08-14 NOTE — CONSULTS
[facility]  Infectious Disease  Consult Note    Patient Name: Joanna Morales  MRN: [id number]  Admission Date: 8/11/2017  Hospital Length of Stay: 3 days  Attending Physician: Gauri Montesinos MD  Primary Care Provider: Claudy Tse MD     Isolation Status: Contact    Patient information was obtained from patient and past medical records.      Inpatient consult to Infectious Diseases  Consult performed by: JOHNNA MCDANIELS  Consult ordered by: GAURI ALEMAN        Assessment/Plan:     MRSA bacteremia    - blood cultures from 8/11 with MRSA  - Repeat cultures from 8/13 and 8/14 NGTD  - CXR and physical exam consistent with LLL pneumonia  - likely MRSA pneumonia given positive blood cultures  - recommend 2 weeks of vancomycin  - d/c rocephin  - okay to place midline  - vanc trough: 15-20  - check weekly CBC, CMP, and vanc trough while on IV therapy with results faxed to ID at [phone] - f/u in ID clinic 2 weeks  - will sign off          Thank you for your consult. I will sign off. Please contact us if you have any additional questions.  ALANNAH Will, pager: [phone]  Infectious Disease  [facility]    Subjective:     Principal Problem: Staphylococcus aureus septicemia    HPI:  This is a 51 year old female with PMH of meningioma s/p resection complicated by diabetes insipidus who presented with cough and green sputum. Patient had a CXR performed at nursing home concerning for bilateral pneumonia. Patient has a PEG tube in place for nutrition. Patient reports a cough. She is a poor historian and is hallucinating during exam. Patient is afebrile and without leukocytosis. Blood cultures show 1 bottle with MRSA. She is on vanc and rocephin. CXR with left lower lobe consolidation.     Past Medical History:   Diagnosis Date    Allergy     Basal cell carcinoma     Depression 11/1/2016    Essential hypertension 6/9/2017    Eye disorder     Fever blister      Normocytic anemia 6/9/2017    Secondary hypothyroidism 6/26/2017       Past Surgical History:   Procedure Laterality Date    BASAL CELL CARCINOMA EXCISION      forehead    BRAIN SURGERY      SKIN BIOPSY         Review of patient's allergies indicates:   Allergen Reactions    Codeine        Medications:  Prescriptions Prior to Admission   Medication Sig    acetaminophen (TYLENOL) 500 MG tablet Take 1 tablet (500 mg total) by mouth every 6 (six) hours as needed.    desmopressin (DDAVP) 0.1 MG Tab 1 tablet (100 mcg total) by Per G Tube route 2 (two) times daily.    EUCALYPTUS OIL/MENTHOL/CAMPHOR (VICKS VAPORUB TOP) Apply topically daily as needed.    hydrocortisone (CORTEF) 5 MG Tab 3 tablets (15 mg total) by Per G Tube route once daily.    hydrocortisone (CORTEF) 5 MG Tab 1 tablet (5 mg total) by Per G Tube route once daily.    levetiracetam oral soln 500 mg/5 mL (5 mL) Soln 5 mLs (500 mg total) by Per NG tube route 2 (two) times daily.    levothyroxine (SYNTHROID) 75 MCG tablet Take 1 tablet (75 mcg total) by mouth once daily.    olanzapine (ZYPREXA) 5 MG tablet 1 tablet (5 mg total) by Per G Tube route daily as needed (agitation).     Antibiotics     Start     Stop Route Frequency Ordered    08/12/17 1800  vancomycin 1 g in dextrose 5 % 250 mL IVPB (ready to mix system)  (Vancomycin IVPB with levels panel)      -- IV Every 12 hours (non-standard times) 08/12/17 1324        Antifungals     None        Antivirals     None           Immunization History   Administered Date(s) Administered    PPD Test 07/25/2017    Pneumococcal Polysaccharide - 23 Valent 02/01/2017    Tdap 02/01/2017    influenza - Quadrivalent - PF (ADULT) 02/01/2017       Family History     Problem Relation (Age of Onset)    Diabetes Father    Hepatitis Father    Hypertension Father    No Known Problems Mother        Social History     Social History    Marital status:      Spouse name: N/A    Number of children: N/A     Years of education: 10     Social History Main Topics    Smoking status: Current Every Day Smoker     Packs/day: 1.00     Years: 40.00     Types: Cigarettes    Smokeless tobacco: Never Used    Alcohol use No    Drug use: No    Sexual activity: No     Other Topics Concern    Are You Pregnant Or Think You May Be? No    Breast-Feeding No     Social History Narrative    None     Review of Systems   Constitutional: Positive for diaphoresis. Negative for chills and fever.   Respiratory: Positive for cough and shortness of breath. Negative for wheezing.    Cardiovascular: Positive for chest pain and leg swelling.   Gastrointestinal: Negative for diarrhea, nausea and vomiting.   Genitourinary: Negative for dysuria and frequency.   Neurological: Negative for dizziness and headaches.     Objective:     Vital Signs (Most Recent):  Temp: 98.2 °F (36.8 °C) (08/14/17 1157)  Pulse: 84 (08/14/17 1500)  Resp: 20 (08/14/17 0804)  BP: (!) 93/52 (08/14/17 1157)  SpO2: (!) 92 % (08/14/17 1157) Vital Signs (24h Range):  Temp:  [97.7 °F (36.5 °C)-98.2 °F (36.8 °C)] 98.2 °F (36.8 °C)  Pulse:  [62-98] 84  Resp:  [18-20] 20  SpO2:  [92 %-97 %] 92 %  BP: ()/(52-66) 93/52     Weight: 59 kg (130 lb 1.1 oz)  Body mass index is 23.79 kg/m².    Estimated Creatinine Clearance: 87.7 mL/min (based on Cr of 0.6).    Physical Exam   Constitutional: She is oriented to person, place, and time. She appears well-developed and well-nourished. No distress.   Cardiovascular: Normal rate and regular rhythm.    No murmur heard.  Pulmonary/Chest: Effort normal. No respiratory distress. She has rhonchi in the right lower field.   Abdominal: Soft. Bowel sounds are normal. She exhibits no distension.   Musculoskeletal: Normal range of motion. She exhibits no edema.   Neurological: She is alert and oriented to person, place, and time.   Skin: Skin is warm and dry.   Psychiatric: She has a normal mood and affect. Her behavior is normal.        Significant Labs:   Blood Culture:   Recent Labs  Lab 08/11/17  1625 08/11/17  1702 08/13/17  0730 08/14/17  0620 08/14/17  0621   LABBLOO No Growth to date  No Growth to date  No Growth to date Gram stain sb bottle: Gram positive cocci in clusters resembling Staph   Results called to and read back by: Marta Kong RN  08/12/2017    12:12  METHICILLIN RESISTANT STAPHYLOCOCCUS AUREUSID consult required at [facility]. No Growth to date  No Growth to date  No Growth to date  No Growth to date No Growth to date No Growth to date     CBC:   Recent Labs  Lab 08/14/17  0811   WBC 10.19   HGB 8.4*   HCT 27.2*        CMP:   Recent Labs  Lab 08/13/17  0334 08/14/17  0811   * 151*   K 3.5 3.2*   * 115*   CO2 25 28   GLU 94 182*   BUN 19 18   CREATININE 0.7 0.6   CALCIUM 9.4 8.8   ANIONGAP 12 8   EGFRNONAA >60.0 >60.0       Significant Imaging: I have reviewed all pertinent imaging results/findings within the past 24 hours.

## 2017-08-14 NOTE — ASSESSMENT & PLAN NOTE
- blood cultures from 8/11 with MRSA  - Repeat cultures from 8/13 and 8/14 NGTD  - CXR and physical exam consistent with LLL pneumonia  - likely MRSA pneumonia given positive blood cultures  - recommend 2 weeks of vancomycin  - d/c rocephin  - okay to place midline  - vanc trough: 15-20  - check weekly CBC, CMP, and vanc trough while on IV therapy with results faxed to ID at [phone]  - f/u in ID clinic 2 weeks  - will sign off

## 2017-08-14 NOTE — SUBJECTIVE & OBJECTIVE
Past Medical History:   Diagnosis Date    Allergy     Basal cell carcinoma     Depression 11/1/2016    Essential hypertension 6/9/2017    Eye disorder     Fever blister     Normocytic anemia 6/9/2017    Secondary hypothyroidism 6/26/2017       Past Surgical History:   Procedure Laterality Date    BASAL CELL CARCINOMA EXCISION      forehead    BRAIN SURGERY      SKIN BIOPSY         Review of patient's allergies indicates:   Allergen Reactions    Codeine        Medications:  Prescriptions Prior to Admission   Medication Sig    acetaminophen (TYLENOL) 500 MG tablet Take 1 tablet (500 mg total) by mouth every 6 (six) hours as needed.    desmopressin (DDAVP) 0.1 MG Tab 1 tablet (100 mcg total) by Per G Tube route 2 (two) times daily.    EUCALYPTUS OIL/MENTHOL/CAMPHOR (VICKS VAPORUB TOP) Apply topically daily as needed.    hydrocortisone (CORTEF) 5 MG Tab 3 tablets (15 mg total) by Per G Tube route once daily.    hydrocortisone (CORTEF) 5 MG Tab 1 tablet (5 mg total) by Per G Tube route once daily.    levetiracetam oral soln 500 mg/5 mL (5 mL) Soln 5 mLs (500 mg total) by Per NG tube route 2 (two) times daily.    levothyroxine (SYNTHROID) 75 MCG tablet Take 1 tablet (75 mcg total) by mouth once daily.    olanzapine (ZYPREXA) 5 MG tablet 1 tablet (5 mg total) by Per G Tube route daily as needed (agitation).     Antibiotics     Start     Stop Route Frequency Ordered    08/12/17 1800  vancomycin 1 g in dextrose 5 % 250 mL IVPB (ready to mix system)  (Vancomycin IVPB with levels panel)      -- IV Every 12 hours (non-standard times) 08/12/17 1324        Antifungals     None        Antivirals     None           Immunization History   Administered Date(s) Administered    PPD Test 07/25/2017    Pneumococcal Polysaccharide - 23 Valent 02/01/2017    Tdap 02/01/2017    influenza - Quadrivalent - PF (ADULT) 02/01/2017       Family History     Problem Relation (Age of Onset)    Diabetes Father    Hepatitis  Father    Hypertension Father    No Known Problems Mother        Social History     Social History    Marital status:      Spouse name: N/A    Number of children: N/A    Years of education: 10     Social History Main Topics    Smoking status: Current Every Day Smoker     Packs/day: 1.00     Years: 40.00     Types: Cigarettes    Smokeless tobacco: Never Used    Alcohol use No    Drug use: No    Sexual activity: No     Other Topics Concern    Are You Pregnant Or Think You May Be? No    Breast-Feeding No     Social History Narrative    None     Review of Systems   Constitutional: Positive for diaphoresis. Negative for chills and fever.   Respiratory: Positive for cough and shortness of breath. Negative for wheezing.    Cardiovascular: Positive for chest pain and leg swelling.   Gastrointestinal: Negative for diarrhea, nausea and vomiting.   Genitourinary: Negative for dysuria and frequency.   Neurological: Negative for dizziness and headaches.     Objective:     Vital Signs (Most Recent):  Temp: 98.2 °F (36.8 °C) (08/14/17 1157)  Pulse: 84 (08/14/17 1500)  Resp: 20 (08/14/17 0804)  BP: (!) 93/52 (08/14/17 1157)  SpO2: (!) 92 % (08/14/17 1157) Vital Signs (24h Range):  Temp:  [97.7 °F (36.5 °C)-98.2 °F (36.8 °C)] 98.2 °F (36.8 °C)  Pulse:  [62-98] 84  Resp:  [18-20] 20  SpO2:  [92 %-97 %] 92 %  BP: ()/(52-66) 93/52     Weight: 59 kg (130 lb 1.1 oz)  Body mass index is 23.79 kg/m².    Estimated Creatinine Clearance: 87.7 mL/min (based on Cr of 0.6).    Physical Exam   Constitutional: She is oriented to person, place, and time. She appears well-developed and well-nourished. No distress.   Cardiovascular: Normal rate and regular rhythm.    No murmur heard.  Pulmonary/Chest: Effort normal. No respiratory distress. She has rhonchi in the right lower field.   Abdominal: Soft. Bowel sounds are normal. She exhibits no distension.   Musculoskeletal: Normal range of motion. She exhibits no edema.    Neurological: She is alert and oriented to person, place, and time.   Skin: Skin is warm and dry.   Psychiatric: She has a normal mood and affect. Her behavior is normal.       Significant Labs:   Blood Culture:   Recent Labs  Lab 08/11/17  1625 08/11/17  1702 08/13/17  0730 08/14/17  0620 08/14/17  0621   LABBLOO No Growth to date  No Growth to date  No Growth to date Gram stain sb bottle: Gram positive cocci in clusters resembling Staph   Results called to and read back by: Marta Kong RN  08/12/2017    12:12  METHICILLIN RESISTANT STAPHYLOCOCCUS AUREUSID consult required at [facility] and [facility]. No Growth to date  No Growth to date  No Growth to date  No Growth to date No Growth to date No Growth to date     CBC:   Recent Labs  Lab 08/14/17  0811   WBC 10.19   HGB 8.4*   HCT 27.2*        CMP:   Recent Labs  Lab 08/13/17  0334 08/14/17  0811   * 151*   K 3.5 3.2*   * 115*   CO2 25 28   GLU 94 182*   BUN 19 18   CREATININE 0.7 0.6   CALCIUM 9.4 8.8   ANIONGAP 12 8   EGFRNONAA >60.0 >60.0       Significant Imaging: I have reviewed all pertinent imaging results/findings within the past 24 hours.

## 2017-08-14 NOTE — HPI
This is a 51 year old female with PMH of meningioma s/p resection complicated by diabetes insipidus who presented with cough and green sputum. Patient had a CXR performed at nursing home concerning for bilateral pneumonia. Patient has a PEG tube in place for nutrition. Patient reports a cough. She is a poor historian and is hallucinating during exam. Patient is afebrile and without leukocytosis. Blood cultures show 1 bottle with MRSA. She is on vanc and rocephin. CXR with left lower lobe consolidation.

## 2017-08-15 NOTE — ASSESSMENT & PLAN NOTE
Likely MRSA pneumonia and source of bacteremia as TTE is negative for endocarditis.   - Vancomycin as above.

## 2017-08-15 NOTE — PLAN OF CARE
Left message with Ghazal at [facility] stating patient will need IV ABX on discharge, will follow and update.    1655  Ghazal at [facility] is okay with IV ABX and stated to make sure it is included on her nursing home orders with a start and stop date, will follow.

## 2017-08-15 NOTE — PLAN OF CARE
Problem: Patient Care Overview  Goal: Plan of Care Review  Outcome: Ongoing (interventions implemented as appropriate)  Isolation maintained for MRSA in blood,AXEL sys monitoring and sitter in use for safety reasons,pt remains altered ,disoriented x4,bolus TF q6hrs w water boluses tolerated,IV antibiotics given for mrsa in blood,turned q 2hr,incontinence care given as needed.

## 2017-08-15 NOTE — ASSESSMENT & PLAN NOTE
Patient has chronic encephalopathy as her new baseline. Her baseline mental status ranges from non verbal, to audiologic hallucination, to oriented to self. Her mental status has not changed from prior hospitalization and discharge.   - Current mental status is new baseline status.  - Monitor.

## 2017-08-15 NOTE — ASSESSMENT & PLAN NOTE
Secondary to DI. Hypernatremia improved to 151.    - Desmopressin 100 mcg TID.  - Enteral free water: 240 ml every 6 hours.  - Monitor sodium level.   - Unable to get accurate I&O as patient tends to pull lines out.

## 2017-08-15 NOTE — CONSULTS
Single lumen 18 x 8 midline placed to right brachial vein.  Max dwell date 9/13/17. Lot# [id number]. Needle advanced using realtime ultrasound guidance. Image recorded and saved.

## 2017-08-15 NOTE — PLAN OF CARE
Problem: Patient Care Overview  Goal: Plan of Care Review  Outcome: Ongoing (interventions implemented as appropriate)  New DX of MRSA in blood, contact isolation initiated. Tolerating ordered bolus feeds well w neglible residual noted. In and out neurologically re orientation to situation. Vitals stable, zero falls but much more active toward the end of the shift w the camera catching her placing her legs over the rails etc. Repositioned. POC being adhered to. Family verbalize understanding of all instructions re mental status and isolation procedures.

## 2017-08-15 NOTE — PROGRESS NOTES
Progress Note  Hospital Medicine    Primary Team: [facility] HOSP MED L  Admit Date: 8/11/2017   Length of Stay:  LOS: 4 days   SUBJECTIVE:   Reason for Admission:  MRSA (methicillin resistant Staphylococcus aureus) septicemia    HPI:  Ms. Morales is a 50 yo F with hypothyroidism, h/o meningioma s/p resection (6/7/2017), which was complicated by diabetes insipidus, who was initially admitted 6/28 from [facility] for AMS thought 2/2 hypernatremia (see hospital course below) and had a prolonged hospital stay (complicated by ICU stay for acute respiratory failure 2/2 aspiration pneumonia), who was discharged yesterday to [facility] and presents today due to increased cough and sputum production, which prompted a CXR which was suspicious for PNA.      Patient is altered and a poor historian, thus medical history was taken primarily from chart review.      Per Dr. Murray's note from day of discharge:   Ms. Morales was discharged from [facility] following her craniotomy to [facility] on 6/26/17 and re-presented to [facility] ED on 6/28 with altered mental status and worsening hypernatremia. According to the patient's sitter, the patient was awake, verbal, and interactive with therapy on 6/23 but had been progressively less interactive and responsive when she was at [facility]. Ms. Morales was admitted to Hospital Medicine but was found to be unresponsive with a sodium level of 168 on the morning of 6/29.      She was followed by endocrinology and neurology and workup was significant for an unremarkable UA, neuro-imaging c/w recent left sphenoidal meningioma resection with resolution of blood products and trace intraventricular hemorrhage.  Previous EEG (6/16/17) demonstrated moderate diffuse slowing of the overall background as described above, superimposed left hemispheric slowing relative to the right, and no epileptiform discharges or seizures were seen c/w ENCP and focal cortical dysfunction.  Pt's  presentation was concerning for multi-factorial encephalopathy given persistent hypernatremia.  Per neurology, there was low suspicion for meningitis, but patient was high risk given recent intracranial procedure.     Critical Care consulted 7/17/17 for acute hypoxic respiratory failure following PEG tube placement earlier that day and in PACU, oxygen saturations noted to be in the mid 80's. Pt was placed on a non-rebreather with minimal improvement and subsequently placed on BiPAP. ABG significant for elevated CO2 however pt was compensating. Repeat ABG showed pH 7.372, CO2 44, O2 62, HCO3 25.8. Pt transitioned to NC. Cxray concerning for aspiration pneumonia - new infiltrate in RLL so patient was started on vancomycin and zosyn for HAP. She was then switched to Vanc/Cefepime/Acyclovir. She had a significant elevation in WBC count 7/18 and Needed levophed to bring BP up for the night. Switched nasal DDAVP to IV DDAVP which normalized her sodium levels. LP performed 7/20; consistent with a viral process. BP had been stable off pressors; leukocytosis resolved. Patient appeared more alert.     Patient stepped down to Hospital Medicine team L on 7/22 pending viral etiology work up. She remained lethargic and minimally participatory despite correction of metabolic derangements. Desmopressin therapy was resumed as per Endocrinology service recommendations. Enteral feedings changed to bolus with free water in order to prevent patient pulling her PEG tube. Hypernatremia continued to improve slowly with free water replacement and optimization of desmopressin therapy.     Hyponatremia resolved and patient was deemed medically stable for d/c to NH.      Hospital Course:  Patient admitted re-admitted to Hospital Medicine Team L for management of possible aspiration pneumonia with sepsis vs dehydration with hypernatremia with associated hypotension. Patient at her new baseline from prior hospitalization. Antimicrobial therapy  was continued. Blood cultures positive for MRSA with negative TTE for endocarditis. Patient evaluated by ID service with recommendations for a 2 week course of therapy.     Interval History: No acute events overnight.  Pt more alert and responsive than during prior admission.    Review of Systems:  Review of systems not obtained due to patient factors delirium.     OBJECTIVE:     Temp:  [97.5 °F (36.4 °C)-98.2 °F (36.8 °C)]   Pulse:  [68-85]   Resp:  [16-20]   BP: ()/(52-67)   SpO2:  [90 %-96 %]  Body mass index is 23.79 kg/m².  Intake/Outake:  This Shift:  No intake/output data recorded.    Net I/O past 24h:     Intake/Output Summary (Last 24 hours) at 08/15/17 0814  Last data filed at 08/15/17 0557   Gross per 24 hour   Intake             2200 ml   Output                4 ml   Net             2196 ml             Physical Exam:  Gen- well-developed, well-nourished, NAD  CVS- S1 and S2 present, RRR, no murmurs  Resp- CTA b/l, no work of breathing  Abd- BS+, soft, NT, ND.  Binder covering PEG tube  Ext- no clubbing, cyanosis, edema    Laboratory:  CBC/Anemia Labs: Coags:      Recent Labs  Lab 08/11/17  1632  08/11/17  1901 08/12/17  0343 08/14/17  0811   WBC 23.71*  --   --  17.88* 10.19   HGB 9.4*  --   --  9.1* 8.4*   HCT 28.6*  < > 25* 29.2* 27.2*   *  --   --  345 303   MCV 99*  --   --  101* 102*   RDW 17.3*  --   --  17.4* 17.3*   < > = values in this interval not displayed.   Recent Labs  Lab 08/11/17  1632   INR 1.0        Chemistries:     Recent Labs  Lab 08/11/17  1632 08/12/17  0343 08/13/17  0334 08/14/17  0811 08/15/17  0439   * 155* 159* 151* 146*   K 3.4* 3.1* 3.5 3.2* 3.8   * 116* 122* 115* 110   CO2 28 26 25 28 28   BUN 17 14 19 18 16   CREATININE 0.8 0.8 0.7 0.6 0.6   CALCIUM 9.9 9.9 9.4 8.8 9.3   PROT 7.8  --   --   --   --    BILITOT 0.3  --   --   --   --    ALKPHOS 218*  --   --   --   --    ALT 66*  --   --   --   --    AST 36  --   --   --   --    MG 2.3 2.3  --   --   1.9   PHOS 1.8* 2.4*  --   --  4.1        ABG:     Recent Labs  Lab 08/11/17  1657   PH 7.492*   PCO2 37.9   PO2 43   HCO3 29.0*   POCSATURATED 83*   BE 6        Cardiac Enzymes: Ejection Fractions:    No results for input(s): CPK, CPKMB, MB, TROPONINI in the last 72 hours. EF   Date Value Ref Range Status   08/14/2017 60 55 - 65         POCT Glucose: HbA1c:      Recent Labs  Lab 08/09/17  1313 08/09/17  1725 08/10/17  0101 08/10/17  0529 08/10/17  1243 08/12/17  0424   POCTGLUCOSE 108 115* 115* 110 114* 98    No results found for: HGBA1C      Medications:  Scheduled Meds:   desmopressin  100 mcg Per G Tube TID    hydrocortisone  15 mg Per G Tube Daily    hydrocortisone  5 mg Per G Tube Daily    levetiracetam oral soln  500 mg Per NG tube BID    levothyroxine  75 mcg Oral Daily    vancomycin (VANCOCIN) IVPB  15 mg/kg Intravenous Q12H                             Continuous Infusions:   PRN Meds:.acetaminophen, olanzapine     ASSESSMENT/PLAN:     MRSA (methicillin resistant Staphylococcus aureus) septicemia     Afebrile and without leukocytosis. Organism identified as MRSA. Repeat blood cultures NGTD. Evaluated by ID service. TTE without evidence of endocarditis.   - Discontinue ceftriaxone.  - Vancomycin 1 gm every 12 hours.  - Midline team consult for antibiotic therapy;  Placed today  - Treat for a total of 14 days. End date: 8/28/17.  - Weekly CBC and CMP while on antibiotic therapy.          Encephalopathy chronic     Patient has chronic encephalopathy as her new baseline. Her baseline mental status ranges from non verbal, to audiologic hallucination, to oriented to self. Her mental status has not changed from prior hospitalization and discharge.   - Current mental status is new baseline status.  - Monitor.             Dehydration with hypernatremia     Secondary to DI. Hypernatremia improved to 146  - Desmopressin 100 mcg TID.  - Enteral free water: 240 ml every 6 hours.  - Monitor sodium level.   - Unable  to get accurate I&O as patient tends to pull lines out.           Primary central diabetes insipidus     Etiology of hypernatremia.  - Management as above.          Pneumonia of both lungs due to methicillin resistant Staphylococcus aureus (MRSA)     Likely MRSA pneumonia and source of bacteremia as TTE is negative for endocarditis.   - Vancomycin as above.          Meningioma, recurrent of brain     Stable and without new neurologic deficits. 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   - Keppra for seizure prophylaxis.          Malnutrition of moderate degree     Stable.  - Isosource 1.5: 270mL 4x daily.  - Free water: 220 ml 4 x daily.       Secondary hypothyroidism     Stable.  - Continue levothyroxine.  - TSH monitoring as outpatient.       Secondary adrenal insufficiency     Stable.   - Hydrocortisone 15 mg in am and 5 mg in afternoon.       Hypophosphatemia     Secondary to poor nutritional intake.  - Monitor.  - Replace as needed.          Hypokalemia     Secondary to poor nutritional intake.  - Monitor and replace as needed via G tube today.             Agitation     Chronic and stable.  - Olanzapine as needed.          Discharge planning issues     Patient was difficult placement during prior hospitalization. In nursing home for less than 24 hours before readmission. Discussed with CM and Nursing Home will be able to give IV antibiotics.  - If sodium level is within normal limits then can likely discharge after midline placement and antibiotic approval.       DVT ppx- SCD  CODE Status- FULL     Dispo- possible transfer to NH tomorrow if medically stable    Ally Calloway MD  Hospital Medicine Staff

## 2017-08-15 NOTE — ASSESSMENT & PLAN NOTE
Patient was difficult placement during prior hospitalization. In nursing home for less than 24 hours before readmission. Discussed with CM and Nursing Home will be able to give IV antibiotics.  - If sodium level is within normal limits then can likely discharge after midline placement and antibiotic approval.

## 2017-08-15 NOTE — PLAN OF CARE
Problem: Patient Care Overview  Goal: Plan of Care Review  Outcome: Ongoing (interventions implemented as appropriate)   08/15/17 5062   Coping/Psychosocial   Plan Of Care Reviewed With patient

## 2017-08-15 NOTE — SUBJECTIVE & OBJECTIVE
"Interval History: "Whose that standing by the barn door". No acute overnight events. Patient at baseline status. TTE negative for endocarditis.    Review of Systems   Unable to perform ROS: Mental status change   Eyes: Positive for visual disturbance (BLIND).     Objective:     Vital Signs (Most Recent):  Temp: 97.8 °F (36.6 °C) (08/14/17 2032)  Pulse: 70 (08/14/17 2032)  Resp: 19 (08/14/17 2032)  BP: 101/67 (08/14/17 2032)  SpO2: 96 % (08/14/17 2032) Vital Signs (24h Range):  Temp:  [97.7 °F (36.5 °C)-98.2 °F (36.8 °C)] 97.8 °F (36.6 °C)  Pulse:  [62-98] 70  Resp:  [16-20] 19  SpO2:  [92 %-97 %] 96 %  BP: ()/(52-67) 101/67     Weight: 59 kg (130 lb 1.1 oz)  Body mass index is 23.79 kg/m².    Intake/Output Summary (Last 24 hours) at 08/14/17 2054  Last data filed at 08/14/17 1800   Gross per 24 hour   Intake             2310 ml   Output                0 ml   Net             2310 ml      Physical Exam   Constitutional: She appears well-developed and well-nourished. She appears lethargic. She is cooperative. No distress.   HENT:   Head: Atraumatic.   Right Ear: External ear normal.   Left Ear: External ear normal.   Mouth/Throat: Oropharynx is clear and moist. Mucous membranes are dry.   Eyes: Conjunctivae and lids are normal. Right pupil is reactive (dilated). Left pupil is reactive (dilated).   Neck: Normal range of motion.   Cardiovascular: Normal rate and regular rhythm.    No murmur heard.  Pulmonary/Chest: Effort normal and breath sounds normal.   Abdominal: Soft. Bowel sounds are normal. She exhibits no distension. There is no tenderness.   PEG in place   Musculoskeletal: Normal range of motion. She exhibits no edema or tenderness.   Neurological: She appears lethargic.   Oriented in person only   Psychiatric: She is slowed. She is inattentive.       Significant Labs:   Blood Culture:   Recent Labs  Lab 08/13/17  0730 08/14/17  0620 08/14/17  0621   LABBLOO No Growth to date  No Growth to date  No " Growth to date  No Growth to date No Growth to date No Growth to date     BMP:   Recent Labs  Lab 08/14/17  0811   *   *   K 3.2*   *   CO2 28   BUN 18   CREATININE 0.6   CALCIUM 8.8     CBC:   Recent Labs  Lab 08/14/17  0811   WBC 10.19   HGB 8.4*   HCT 27.2*          Significant Imaging: I have reviewed all pertinent imaging results/findings within the past 24 hours.

## 2017-08-15 NOTE — PROGRESS NOTES
[facility] Hospital Medicine  Progress Note    Patient Name: Joanna Morales  MRN: [id number]  Patient Class: IP- Inpatient   Admission Date: 8/11/2017  Length of Stay: 3 days  Attending Physician: Madalyn Montesinos MD  Primary Care Provider: Claudy Tse MD    [facility] Medicine Team: [facility] HOSP MED L Madalyn Montesinos MD    Subjective:     Principal Problem:MRSA (methicillin resistant Staphylococcus aureus) septicemia    HPI:  Ms. Morales is a 52 yo F with hypothyroidism, h/o meningioma s/p resection (6/7/2017), which was complicated by diabetes insipidus, who was initially admitted 6/28 from [facility] for AMS thought 2/2 hypernatremia (see hospital course below) and had a prolonged hospital stay (complicated by ICU stay for acute respiratory failure 2/2 aspiration pneumonia), who was discharged yesterday to [facility] and presents today due to increased cough and sputum production, which prompted a CXR which was suspicious for PNA.     Patient is altered and a poor historian, thus medical history was taken primarily from chart review.     Per Dr. Murray's note from day of discharge:   Ms. Morales was discharged from [facility] following her craniotomy to [facility] on 6/26/17 and re-presented to [facility] ED on 6/28 with altered mental status and worsening hypernatremia. According to the patient's sitter, the patient was awake, verbal, and interactive with therapy on 6/23 but had been progressively less interactive and responsive when she was at [facility]. Ms. Morales was admitted to Hospital Medicine but was found to be unresponsive with a sodium level of 168 on the morning of 6/29.      She was followed by endocrinology and neurology and workup was significant for an unremarkable UA, neuro-imaging c/w recent left sphenoidal meningioma resection with resolution of blood products and trace intraventricular hemorrhage.  Previous EEG (6/16/17) demonstrated  moderate diffuse slowing of the overall background as described above, superimposed left hemispheric slowing relative to the right, and no epileptiform discharges or seizures were seen c/w ENCP and focal cortical dysfunction.  Pt's presentation was concerning for multi-factorial encephalopathy given persistent hypernatremia.  Per neurology, there was low suspicion for meningitis, but patient was high risk given recent intracranial procedure.     Critical Care consulted 7/17/17 for acute hypoxic respiratory failure following PEG tube placement earlier that day and in PACU, oxygen saturations noted to be in the mid 80's. Pt was placed on a non-rebreather with minimal improvement and subsequently placed on BiPAP. ABG significant for elevated CO2 however pt was compensating. Repeat ABG showed pH 7.372, CO2 44, O2 62, HCO3 25.8. Pt transitioned to NC. Cxray concerning for aspiration pneumonia - new infiltrate in RLL so patient was started on vancomycin and zosyn for HAP. She was then switched to Vanc/Cefepime/Acyclovir. She had a significant elevation in WBC count 7/18 and Needed levophed to bring BP up for the night. Switched nasal DDAVP to IV DDAVP which normalized her sodium levels. LP performed 7/20; consistent with a viral process. BP had been stable off pressors; leukocytosis resolved. Patient appeared more alert.     Patient stepped down to Hospital Medicine team L on 7/22 pending viral etiology work up. She remained lethargic and minimally participatory despite correction of metabolic derangements. Desmopressin therapy was resumed as per Endocrinology service recommendations. Enteral feedings changed to bolus with free water in order to prevent patient pulling her PEG tube. Hypernatremia continued to improve slowly with free water replacement and optimization of desmopressin therapy.     Hyponatremia resolved and patient was deemed medically stable for d/c to NH.     Hospital Course:  Patient admitted re-admitted  "to Hospital Medicine Team L for management of possible aspiration pneumonia with sepsis vs dehydration with hypernatremia with associated hypotension. Patient at her new baseline from prior hospitalization. Antimicrobial therapy was continued. Blood cultures positive for MRSA with negative TTE for endocarditis. Patient evaluated by ID service with recommendations for a 2 week course of therapy.    Interval History: "Whose that standing by the barn door". No acute overnight events. Patient at baseline status. TTE negative for endocarditis.    Review of Systems   Unable to perform ROS: Mental status change   Eyes: Positive for visual disturbance (BLIND).     Objective:     Vital Signs (Most Recent):  Temp: 97.8 °F (36.6 °C) (08/14/17 2032)  Pulse: 70 (08/14/17 2032)  Resp: 19 (08/14/17 2032)  BP: 101/67 (08/14/17 2032)  SpO2: 96 % (08/14/17 2032) Vital Signs (24h Range):  Temp:  [97.7 °F (36.5 °C)-98.2 °F (36.8 °C)] 97.8 °F (36.6 °C)  Pulse:  [62-98] 70  Resp:  [16-20] 19  SpO2:  [92 %-97 %] 96 %  BP: ()/(52-67) 101/67     Weight: 59 kg (130 lb 1.1 oz)  Body mass index is 23.79 kg/m².    Intake/Output Summary (Last 24 hours) at 08/14/17 2054  Last data filed at 08/14/17 1800   Gross per 24 hour   Intake             2310 ml   Output                0 ml   Net             2310 ml      Physical Exam   Constitutional: She appears well-developed and well-nourished. She appears lethargic. She is cooperative. No distress.   HENT:   Head: Atraumatic.   Right Ear: External ear normal.   Left Ear: External ear normal.   Mouth/Throat: Oropharynx is clear and moist. Mucous membranes are dry.   Eyes: Conjunctivae and lids are normal. Right pupil is reactive (dilated). Left pupil is reactive (dilated).   Neck: Normal range of motion.   Cardiovascular: Normal rate and regular rhythm.    No murmur heard.  Pulmonary/Chest: Effort normal and breath sounds normal.   Abdominal: Soft. Bowel sounds are normal. She exhibits no " distension. There is no tenderness.   PEG in place   Musculoskeletal: Normal range of motion. She exhibits no edema or tenderness.   Neurological: She appears lethargic.   Oriented in person only   Psychiatric: She is slowed. She is inattentive.       Significant Labs:   Blood Culture:   Recent Labs  Lab 08/13/17  0730 08/14/17  0620 08/14/17  0621   LABBLOO No Growth to date  No Growth to date  No Growth to date  No Growth to date No Growth to date No Growth to date     BMP:   Recent Labs  Lab 08/14/17  0811   *   *   K 3.2*   *   CO2 28   BUN 18   CREATININE 0.6   CALCIUM 8.8     CBC:   Recent Labs  Lab 08/14/17  0811   WBC 10.19   HGB 8.4*   HCT 27.2*          Significant Imaging: I have reviewed all pertinent imaging results/findings within the past 24 hours.    Assessment/Plan:      * MRSA (methicillin resistant Staphylococcus aureus) septicemia    Afebrile and without leukocytosis. Organism identified as MRSA. Repeat blood cultures NGTD. Evaluated by ID service. TTE without evidence of endocarditis.   - Discontinue ceftriaxone.  - Vancomycin 1 gm every 12 hours.  - Midline team consult for antibiotic therapy.  - Treat for a total of 14 days. End date: 8/28/17.  - Weekly CBC and CMP while on antibiotic therapy.          Encephalopathy chronic    Patient has chronic encephalopathy as her new baseline. Her baseline mental status ranges from non verbal, to audiologic hallucination, to oriented to self. Her mental status has not changed from prior hospitalization and discharge.   - Current mental status is new baseline status.  - Monitor.            Dehydration with hypernatremia    Secondary to DI. Hypernatremia improved to 151.    - Desmopressin 100 mcg TID.  - Enteral free water: 240 ml every 6 hours.  - Monitor sodium level.   - Unable to get accurate I&O as patient tends to pull lines out.           Primary central diabetes insipidus    Etiology of hypernatremia.  - Management as  above.          Pneumonia of both lungs due to methicillin resistant Staphylococcus aureus (MRSA)    Likely MRSA pneumonia and source of bacteremia as TTE is negative for endocarditis.   - Vancomycin as above.          Meningioma, recurrent of brain    Stable and without new neurologic deficits. 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   - Keppra for seizure prophylaxis.          Malnutrition of moderate degree    Stable.  - Isosource 1.5: 270mL 4x daily.  - Free water: 220 ml 4 x daily.        Secondary hypothyroidism    Stable.  - Continue levothyroxine.  - TSH monitoring as outpatient.        Secondary adrenal insufficiency    Stable.   - Hydrocortisone 15 mg in am and 5 mg in afternoon.        Hypophosphatemia    Secondary to poor nutritional intake.  - Monitor.  - Replace as needed.          Hypokalemia    Secondary to poor nutritional intake.  - Monitor and replace as needed via G tube today.            Agitation    Chronic and stable.  - Olanzapine as needed.          Discharge planning issues    Patient was difficult placement during prior hospitalization. In nursing home for less than 24 hours before readmission. Discussed with CM and Nursing Home will be able to give IV antibiotics.  - If sodium level is within normal limits then can likely discharge after midline placement and antibiotic approval.          Depression              HLD (hyperlipidemia)    Continue statin            VTE Risk Mitigation     None              Madalyn Montesinos MD  Department of Hospital Medicine   [facility]

## 2017-08-15 NOTE — ASSESSMENT & PLAN NOTE
Stable and without new neurologic deficits. 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   - Keppra for seizure prophylaxis.

## 2017-08-15 NOTE — ASSESSMENT & PLAN NOTE
Afebrile and without leukocytosis. Organism identified as MRSA. Repeat blood cultures NGTD. Evaluated by ID service. TTE without evidence of endocarditis.   - Discontinue ceftriaxone.  - Vancomycin 1 gm every 12 hours.  - Midline team consult for antibiotic therapy.  - Treat for a total of 14 days. End date: 8/28/17.  - Weekly CBC and CMP while on antibiotic therapy.

## 2017-08-16 NOTE — PLAN OF CARE
Problem: Patient Care Overview  Goal: Plan of Care Review  Outcome: Ongoing (interventions implemented as appropriate)  Patient is confused. Vital signs stable. No falls throughout shift. Patient is incontinent of bowel and bladder. Patient receiving IV antibiotics. Patient changes positions independently.

## 2017-08-16 NOTE — PLAN OF CARE
Problem: Patient Care Overview  Goal: Plan of Care Review  Outcome: Ongoing (interventions implemented as appropriate)  Pt disoriented to place, time, situation.  Pt picking at PIVs and abdominal binders.  Pt calmly redirected.  PIV in R wrist removed by pt.  Midline dressing reinforced.  Pt attempting to get OOB.  Avasys monitor in place.  Bed alarm set.  This RN charted outside pt room.      Pt c/o pain to bilateral legs, 10/10.  PRN tylenol given x 1.  No further complaints of pain.  Tube feeds and water boluses provided every 6 hours.  Residuals 0-20 mL.  Tolerating well.  Morning .  Pt incontinent of urine and stool x 1.  Telemetry 60s-70s bpm NSR.

## 2017-08-16 NOTE — PLAN OF CARE
SW sent NHSNF orders to Ghazal at [facility].  NHSNF orders need a couple of med changes and then pt can return.  Updated CM.  tg revision.    Shirin Mahan LCSW     [phone]  Critical Care (MICU)

## 2017-08-16 NOTE — PLAN OF CARE
TENISHA spoke to Ghazal at [facility].  Ghazal confirmed that pt can return today.  TENISHA spoke to RN and gave her the number for report ([phone], Room 20).      TENISHA called [facility] and arranged for a WC van to transfer pt back to [facility] at 330pm.    TENISHA called pt's friend, Aga Hernandez ([phone]), and informed her that pt will be picked up at 330pm.    Shirin Mahan LCSW     [phone]  Critical Care (MICU)

## 2017-08-16 NOTE — PROGRESS NOTES
Progress Note  Hospital Medicine    Primary Team: [facility] HOSP MED L  Admit Date: 8/11/2017   Length of Stay:  LOS: 5 days   SUBJECTIVE:   Reason for Admission:  MRSA (methicillin resistant Staphylococcus aureus) septicemia    HPI:  Ms. Morales is a 52 yo F with hypothyroidism, h/o meningioma s/p resection (6/7/2017), which was complicated by diabetes insipidus, who was initially admitted 6/28 from [facility] for AMS thought 2/2 hypernatremia (see hospital course below) and had a prolonged hospital stay (complicated by ICU stay for acute respiratory failure 2/2 aspiration pneumonia), who was discharged yesterday to [facility] and presents today due to increased cough and sputum production, which prompted a CXR which was suspicious for PNA.      Patient is altered and a poor historian, thus medical history was taken primarily from chart review.      Per Dr. Murray's note from day of discharge:   Ms. Morales was discharged from [facility] following her craniotomy to [facility] on 6/26/17 and re-presented to [facility] ED on 6/28 with altered mental status and worsening hypernatremia. According to the patient's sitter, the patient was awake, verbal, and interactive with therapy on 6/23 but had been progressively less interactive and responsive when she was at [facility]. Ms. Morales was admitted to Hospital Medicine but was found to be unresponsive with a sodium level of 168 on the morning of 6/29.      She was followed by endocrinology and neurology and workup was significant for an unremarkable UA, neuro-imaging c/w recent left sphenoidal meningioma resection with resolution of blood products and trace intraventricular hemorrhage.  Previous EEG (6/16/17) demonstrated moderate diffuse slowing of the overall background as described above, superimposed left hemispheric slowing relative to the right, and no epileptiform discharges or seizures were seen c/w ENCP and focal cortical dysfunction.  Pt's  presentation was concerning for multi-factorial encephalopathy given persistent hypernatremia.  Per neurology, there was low suspicion for meningitis, but patient was high risk given recent intracranial procedure.     Critical Care consulted 7/17/17 for acute hypoxic respiratory failure following PEG tube placement earlier that day and in PACU, oxygen saturations noted to be in the mid 80's. Pt was placed on a non-rebreather with minimal improvement and subsequently placed on BiPAP. ABG significant for elevated CO2 however pt was compensating. Repeat ABG showed pH 7.372, CO2 44, O2 62, HCO3 25.8. Pt transitioned to NC. Cxray concerning for aspiration pneumonia - new infiltrate in RLL so patient was started on vancomycin and zosyn for HAP. She was then switched to Vanc/Cefepime/Acyclovir. She had a significant elevation in WBC count 7/18 and Needed levophed to bring BP up for the night. Switched nasal DDAVP to IV DDAVP which normalized her sodium levels. LP performed 7/20; consistent with a viral process. BP had been stable off pressors; leukocytosis resolved. Patient appeared more alert.     Patient stepped down to Hospital Medicine team L on 7/22 pending viral etiology work up. She remained lethargic and minimally participatory despite correction of metabolic derangements. Desmopressin therapy was resumed as per Endocrinology service recommendations. Enteral feedings changed to bolus with free water in order to prevent patient pulling her PEG tube. Hypernatremia continued to improve slowly with free water replacement and optimization of desmopressin therapy.     Hyponatremia resolved and patient was deemed medically stable for d/c to NH.      Hospital Course:  Patient admitted re-admitted to Hospital Medicine Team L for management of possible aspiration pneumonia with sepsis vs dehydration with hypernatremia with associated hypotension. Patient at her new baseline from prior hospitalization. Antimicrobial therapy  was continued. Blood cultures positive for MRSA with negative TTE for endocarditis. Patient evaluated by ID service with recommendations for a 2 week course of therapy.     Interval History: No acute events overnight.  Pt continues to be alert and conversant today, though not appropriate.  Two of pt's children are at bedside this morning.    Review of Systems:  Review of systems not obtained due to patient factors delirium.     OBJECTIVE:     Temp:  [97.3 °F (36.3 °C)-98.3 °F (36.8 °C)]   Pulse:  [58-77]   Resp:  [16-18]   BP: (101-125)/(57-74)   SpO2:  [92 %-100 %]  Body mass index is 23.79 kg/m².  Intake/Outake:  This Shift:  I/O this shift:  In: 700 [NG/GT:450; IV Piggyback:250]  Out: -     Net I/O past 24h:     Intake/Output Summary (Last 24 hours) at 08/16/17 1330  Last data filed at 08/16/17 1200   Gross per 24 hour   Intake             1680 ml   Output                0 ml   Net             1680 ml             Physical Exam:  Gen- well-developed, well-nourished, NAD  CVS- S1 and S2 present, RRR, no murmurs  Resp- CTA b/l, no work of breathing  Abd- BS+, soft, NT, ND.  Binder covering PEG tube  Ext- no clubbing, cyanosis, edema    Laboratory:  CBC/Anemia Labs: Coags:      Recent Labs  Lab 08/12/17  0343 08/14/17  0811 08/16/17  0341   WBC 17.88* 10.19 9.83   HGB 9.1* 8.4* 9.2*   HCT 29.2* 27.2* 29.6*    303 413*   * 102* 99*   RDW 17.4* 17.3* 16.5*      Recent Labs  Lab 08/11/17  1632   INR 1.0        Chemistries:     Recent Labs  Lab 08/11/17  1632 08/12/17  0343  08/14/17  0811 08/15/17  0439 08/16/17  0341   * 155*  < > 151* 146* 140   K 3.4* 3.1*  < > 3.2* 3.8 3.9   * 116*  < > 115* 110 105   CO2 28 26  < > 28 28 25   BUN 17 14  < > 18 16 17   CREATININE 0.8 0.8  < > 0.6 0.6 0.6   CALCIUM 9.9 9.9  < > 8.8 9.3 9.3   PROT 7.8  --   --   --   --   --    BILITOT 0.3  --   --   --   --   --    ALKPHOS 218*  --   --   --   --   --    ALT 66*  --   --   --   --   --    AST 36  --   --    --   --   --    MG 2.3 2.3  --   --  1.9  --    PHOS 1.8* 2.4*  --   --  4.1  --    < > = values in this interval not displayed.     ABG:     Recent Labs  Lab 08/11/17  1657   PH 7.492*   PCO2 37.9   PO2 43   HCO3 29.0*   POCSATURATED 83*   BE 6        Cardiac Enzymes: Ejection Fractions:    No results for input(s): CPK, CPKMB, MB, TROPONINI in the last 72 hours. EF   Date Value Ref Range Status   08/14/2017 60 55 - 65         POCT Glucose: HbA1c:      Recent Labs  Lab 08/09/17  1725 08/10/17  0101 08/10/17  0529 08/10/17  1243 08/12/17  0424 08/16/17  0747   POCTGLUCOSE 115* 115* 110 114* 98 159*    No results found for: HGBA1C      Medications:  Scheduled Meds:   desmopressin  100 mcg Per G Tube TID    hydrocortisone  15 mg Per G Tube Daily    hydrocortisone  5 mg Per G Tube Daily    levetiracetam oral soln  500 mg Per NG tube BID    levothyroxine  75 mcg Oral Daily    vancomycin (VANCOCIN) IVPB  15 mg/kg Intravenous Q12H                             Continuous Infusions:   PRN Meds:.acetaminophen, olanzapine     ASSESSMENT/PLAN:     MRSA (methicillin resistant Staphylococcus aureus) septicemia     Afebrile and without leukocytosis. Organism identified as MRSA. Repeat blood cultures NGTD. Evaluated by ID service. TTE without evidence of endocarditis.   - Discontinue ceftriaxone.  - Vancomycin 1 gm every 12 hours.  - Midline placed 8/15  - Treat for a total of 14 days. End date: 8/28/17.  - Weekly CBC and CMP while on antibiotic therapy.          Encephalopathy chronic     Patient has chronic encephalopathy as her new baseline. Her baseline mental status ranges from non verbal, to audiologic hallucination, to oriented to self. Her mental status has not changed from prior hospitalization and discharge.   - Current mental status is new baseline status.  - Monitor.             Dehydration with hypernatremia     Secondary to DI. Hypernatremia improved to 146  - Desmopressin 100 mcg TID.  - Enteral free water: 220  ml every 6 hours.  - Monitor sodium level.   - Unable to get accurate I&O as patient tends to pull lines out.           Primary central diabetes insipidus     Etiology of hypernatremia.  - Management as above.          Pneumonia of both lungs due to methicillin resistant Staphylococcus aureus (MRSA)     Likely MRSA pneumonia and source of bacteremia as TTE is negative for endocarditis.   - Vancomycin as above.          Meningioma, recurrent of brain     Stable and without new neurologic deficits. 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   - Keppra for seizure prophylaxis.          Malnutrition of moderate degree     Stable.  - Isosource 1.5: 270mL 4x daily.  - Free water: 220 ml 4 x daily.       Secondary hypothyroidism     Stable.  - Continue levothyroxine.  - TSH monitoring as outpatient.       Secondary adrenal insufficiency     Stable.   - Hydrocortisone 15 mg in am and 5 mg in afternoon.       Hypophosphatemia     Secondary to poor nutritional intake.  - Monitor.  - Replace as needed.          Hypokalemia     Secondary to poor nutritional intake.  - Monitor and replace as needed via G tube today.             Agitation     Chronic and stable.  - Olanzapine as needed.          Discharge planning issues     Patient was difficult placement during prior hospitalization. In nursing home for less than 24 hours before readmission. Discussed with CM and Nursing Home will be able to give IV antibiotics.         DVT ppx- SCD  CODE Status- FULL     Dispo- transfer to NH today    Ally Calloway MD  Hospital Medicine Staff

## 2017-08-16 NOTE — PLAN OF CARE
[facility]     Department of Hospital Medicine     [address] [place] [address]     [phone] after hours  [phone] fax       NURSING HOME ORDERS    08/16/2017    Admit to Nursing Home:  Regular Bed        Diagnoses:  Active Hospital Problems    Diagnosis  POA    *MRSA (methicillin resistant Staphylococcus aureus) septicemia [A41.02]  Yes    MRSA bacteremia [R78.81]  Yes    Dehydration with hypernatremia [E87.0]  Yes    Hypokalemia [E87.6]  Yes    Hypophosphatemia [E83.39]  Yes    Discharge planning issues [Z02.9]  Not Applicable    Pneumonia of both lungs due to methicillin resistant Staphylococcus aureus (MRSA) [J15.212]  Yes    Malnutrition of moderate degree [E44.0]  Yes    Brain mass [G93.9]  Yes    Encephalopathy chronic [G93.49]  Yes    Secondary hypothyroidism [E03.8]  Yes     Chronic    Secondary adrenal insufficiency [E27.49]  Yes    S/P craniotomy [Z98.890]  Not Applicable    Agitation [R45.1]  Yes    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       Patient is homebound due to:  MRSA (methicillin resistant Staphylococcus aureus) septicemia    Allergies:  Review of patient's allergies indicates:   Allergen Reactions    Codeine        Vitals:      Every shift     Diet: Isosource 1.5 tanya, full strength                           Tube Feeding:                                                - Bolus 270 ml  Every 6 hours                                              - Enteral feeding water bolus: 220 ml four times daily.    Activities:      - Up in a chair each morning as tolerated      LABS:    CMP, CBC and Vanc trough weekly while on IV therapy- faxed results to ID clinic at [phone]  -then CBC, CMP weekly for 2 months                        Free T4 on  9/1/17    Nursing Precautions:    - Aspiration precautions:             - Total assistance with meals            -  Upright 90 degrees befor during and after meals             -  Suction at bedside          - Fall precautions per nursing home protocol   - Seizure precaution per residential protocol   - Decubitus precautions:        -  for positioning   - Pressure reducing foam mattress   - Turn patient every two hours. Use wedge pillows to anchor patient    CONSULTS:      Physical Therapy to evaluate and treat     Occupational Therapy to evaluate and treat     Speech Therapy  to evaluate and treat     Nutrition to evaluate and recommend diet     Psychiatry to evaluate and follow patients for delirium    MISCELLANEOUS CARE:      PEG Care:  Clean site every 24 hours     Routine Skin for Bedridden Patients:  Apply moisture barrier cream to all    skin folds and wet areas in perineal area daily and after baths and                           all bowel movements.    -Remove midline after completing IV antibiotic therapy 8/26/2017 or as instructed by ID      Medications: Discontinue all previous medication orders, if any. See new list below.     Joanna Morales [facility] Medication Instructions NIMO:[id number]    Printed on:08/16/17 8530   Medication Information                      acetaminophen (TYLENOL) 500 MG tablet  Take 1 tablet (500 mg total) by mouth every 6 (six) hours as needed.             desmopressin (DDAVP) 0.1 MG Tab  1 tablet (100 mcg total) by Per G Tube route 2 (two) times daily.             EUCALYPTUS OIL/MENTHOL/CAMPHOR (VICKS VAPORUB TOP)  Apply topically daily as needed.             hydrocortisone (CORTEF) 5 MG Tab  3 tablets (15 mg total) by Per G Tube route once daily.             hydrocortisone (CORTEF) 5 MG Tab  1 tablet (5 mg total) by Per G Tube route once daily at 1600.             levetiracetam oral soln 500 mg/5 mL (5 mL) Soln  5 mLs (500 mg total) by Per G tube route 2 (two) times  daily.             levothyroxine (SYNTHROID) 75 MCG tablet  Take 1 tablet (75 mcg total) per G-tube once daily.                          VANCOMYCIN HCL (VANCOMYCIN 1 G/250 ML D5W, READY TO MIX SYSTEM,)  Inject 1g into the vein every 12 (twelve) hours.  END DATE 8/26/17                     _________________________________  Ally Calloway MD  08/16/2017

## 2017-08-16 NOTE — PLAN OF CARE
Aga Hernandez Friend [phone]     EDUARDO updated pt's POA/friend that pt may be dc back to [facility] today per Dr. Calloway in [facility]. Aga verbalized understanding and asked for shirin's contact number which was provided since Jodi off today.  MSW Shirin reaching out to Ghazal at [facility] regarding dc today.    Eduardo notified Nurse Calli [phone] of dc plan and MSW Shirin will call Calli w report contact name and number  Dr. Calloway will place any narcotic hard scripts in chart for MSW.  MSW Shirin faxing [facility] orders over to Ghazal this am for review.  Nurse Mccurdy confirmed that pt did receive a dose of Vanc this am.

## 2017-08-16 NOTE — PROGRESS NOTES
Patient to be discharged to [facility]. Report called to Angelique ([phone]). Patient to be discharged with IV. Patient disconnected from telemetry. Patient in room waiting for [facility] transport. Will continue to monitor.

## 2017-08-17 NOTE — DISCHARGE SUMMARY
DISCHARGE SUMMARY  Hospital Medicine    Team: [facility] HOSP MED L    Patient Name: Joanna Morales  YOB: 1965    Admit Date: 8/11/2017    Discharge Date: 08/16/2017    Discharge Attending Physician: Ally Calloway MD     Principal Diagnoses:  Active Hospital Problems    Diagnosis  POA    *MRSA (methicillin resistant Staphylococcus aureus) septicemia [A41.02]  Yes    MRSA bacteremia [R78.81]  Yes    Dehydration with hypernatremia [E87.0]  Yes    Hypokalemia [E87.6]  Yes    Hypophosphatemia [E83.39]  Yes    Discharge planning issues [Z02.9]  Not Applicable    Pneumonia of both lungs due to methicillin resistant Staphylococcus aureus (MRSA) [J15.212]  Yes    Malnutrition of moderate degree [E44.0]  Yes    Brain mass [G93.9]  Yes    Encephalopathy chronic [G93.49]  Yes    Secondary hypothyroidism [E03.8]  Yes     Chronic    Secondary adrenal insufficiency [E27.49]  Yes    S/P craniotomy [Z98.890]  Not Applicable    Agitation [R45.1]  Yes    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       Discharged Condition: stable    HOSPITAL COURSE:      Initial Presentation:    Ms. Morales is a 50 yo F with hypothyroidism, h/o meningioma s/p resection (6/7/2017), which was complicated by diabetes insipidus, who was initially admitted 6/28 from [facility] for AMS thought 2/2 hypernatremia (see hospital course below) and had a prolonged hospital stay (complicated by ICU stay for acute respiratory failure 2/2 aspiration pneumonia), who was discharged yesterday to [facility] and presents today due to increased cough and sputum production, which prompted a CXR which was suspicious for PNA.      Patient is altered and a poor historian, thus medical history was taken primarily from chart review.      Per Dr. Murray's  note from day of discharge:   Ms. Morales was discharged from [facility] following her craniotomy to [facility] on 6/26/17 and re-presented to [facility] ED on 6/28 with altered mental status and worsening hypernatremia. According to the patient's sitter, the patient was awake, verbal, and interactive with therapy on 6/23 but had been progressively less interactive and responsive when she was at [facility]. Ms. Morales was admitted to Hospital Medicine but was found to be unresponsive with a sodium level of 168 on the morning of 6/29.      She was followed by endocrinology and neurology and workup was significant for an unremarkable UA, neuro-imaging c/w recent left sphenoidal meningioma resection with resolution of blood products and trace intraventricular hemorrhage.  Previous EEG (6/16/17) demonstrated moderate diffuse slowing of the overall background as described above, superimposed left hemispheric slowing relative to the right, and no epileptiform discharges or seizures were seen c/w ENCP and focal cortical dysfunction.  Pt's presentation was concerning for multi-factorial encephalopathy given persistent hypernatremia.  Per neurology, there was low suspicion for meningitis, but patient was high risk given recent intracranial procedure.     Critical Care consulted 7/17/17 for acute hypoxic respiratory failure following PEG tube placement earlier that day and in PACU, oxygen saturations noted to be in the mid 80's. Pt was placed on a non-rebreather with minimal improvement and subsequently placed on BiPAP. ABG significant for elevated CO2 however pt was compensating. Repeat ABG showed pH 7.372, CO2 44, O2 62, HCO3 25.8. Pt transitioned to NC. Cxray concerning for aspiration pneumonia - new infiltrate in RLL so patient was started on vancomycin and zosyn for HAP. She was then switched to Vanc/Cefepime/Acyclovir. She had a significant elevation in WBC count 7/18 and Needed levophed to bring BP up for the  night. Switched nasal DDAVP to IV DDAVP which normalized her sodium levels. LP performed 7/20; consistent with a viral process. BP had been stable off pressors; leukocytosis resolved. Patient appeared more alert.     Patient stepped down to Hospital Medicine team L on 7/22 pending viral etiology work up. She remained lethargic and minimally participatory despite correction of metabolic derangements. Desmopressin therapy was resumed as per Endocrinology service recommendations. Enteral feedings changed to bolus with free water in order to prevent patient pulling her PEG tube. Hypernatremia continued to improve slowly with free water replacement and optimization of desmopressin therapy.     Hyponatremia resolved and patient was deemed medically stable for d/c to NH.     Course of Principle Problem for Admission:    Patient admitted re-admitted to Hospital Medicine Team L for management of possible aspiration pneumonia with sepsis vs dehydration with hypernatremia with associated hypotension. Patient at her new baseline from prior hospitalization. Antimicrobial therapy was continued. Blood cultures positive for MRSA with negative TTE for endocarditis. Patient evaluated by ID service with recommendations for a 2 week course of Vancomycin therapy.    Other Medical Problems Addressed in the Hospital:    MRSA (methicillin resistant Staphylococcus aureus) septicemia     Afebrile and without leukocytosis. Organism identified as MRSA. Repeat blood cultures NGTD. Evaluated by ID service. TTE without evidence of endocarditis.   - Discontinue ceftriaxone.  - Vancomycin 1 gm every 12 hours.  - Midline placed 8/15  - Treat for a total of 14 days. End date: 8/28/17.  - Weekly CBC and CMP while on antibiotic therapy.          Encephalopathy chronic     Patient has chronic encephalopathy as her new baseline. Her baseline mental status ranges from non verbal, to audiologic hallucination, to oriented to self. Her mental status has not  changed from prior hospitalization and discharge.   - Current mental status is new baseline status.  - Monitor.             Dehydration with hypernatremia     Secondary to DI. Hypernatremia improved to 146  - Desmopressin 100 mcg TID.  - Enteral free water: 220 ml every 6 hours.  - Monitor sodium level.   - Unable to get accurate I&O as patient tends to pull lines out.           Primary central diabetes insipidus     Etiology of hypernatremia.  - Management as above.          Pneumonia of both lungs due to methicillin resistant Staphylococcus aureus (MRSA)     Likely MRSA pneumonia and source of bacteremia as TTE is negative for endocarditis.   - Vancomycin as above.          Meningioma, recurrent of brain     Stable and without new neurologic deficits. 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   - Keppra for seizure prophylaxis.          Malnutrition of moderate degree     Stable.  - Isosource 1.5: 270mL 4x daily.  - Free water: 220 ml 4 x daily.       Secondary hypothyroidism     Stable.  - Continue levothyroxine.  - TSH monitoring as outpatient.       Secondary adrenal insufficiency     Stable.   - Hydrocortisone 15 mg in am and 5 mg in afternoon.       Hypophosphatemia     Secondary to poor nutritional intake.  - Monitor.  - Replace as needed.          Hypokalemia     Secondary to poor nutritional intake.  - Monitor and replace as needed via G tube today.             Agitation     Chronic and stable.  - Olanzapine as needed.           Consults: Infectious Disease    Last CBC/BMP:    CBC/Anemia Labs: Coags:      Recent Labs  Lab 08/12/17  0343 08/14/17  0811 08/16/17  0341   WBC 17.88* 10.19 9.83   HGB 9.1* 8.4* 9.2*   HCT 29.2* 27.2* 29.6*    303 413*   * 102* 99*   RDW 17.4* 17.3* 16.5*      Recent Labs  Lab 08/11/17  1632   INR 1.0        Chemistries:     Recent Labs  Lab 08/11/17  1632 08/12/17  0343  08/14/17  0811 08/15/17  0439 08/16/17  0341   * 155*  < > 151* 146* 140   K 3.4* 3.1*   < > 3.2* 3.8 3.9   * 116*  < > 115* 110 105   CO2 28 26  < > 28 28 25   BUN 17 14  < > 18 16 17   CREATININE 0.8 0.8  < > 0.6 0.6 0.6   CALCIUM 9.9 9.9  < > 8.8 9.3 9.3   PROT 7.8  --   --   --   --   --    BILITOT 0.3  --   --   --   --   --    ALKPHOS 218*  --   --   --   --   --    ALT 66*  --   --   --   --   --    AST 36  --   --   --   --   --    MG 2.3 2.3  --   --  1.9  --    PHOS 1.8* 2.4*  --   --  4.1  --    < > = values in this interval not displayed.     Significant Diagnostic Studies: as above    Special Treatments/Procedures:   * No surgery found *     Disposition: Nursing Facility      Future Scheduled Appointments:  Future Appointments  Date Time Provider Department Center   9/26/2017 8:15 AM LAB, HEMONC SAME DAY [facility] LAB HO [facility]   9/26/2017 10:30 AM Milagros Smith MD [facility] ENDO PI [facility]   11/6/2017 12:00 PM Donald Chew MD [facility] [id number] [facility]   11/10/2017 9:00 AM [facility] US1 ANA MARIAISELA ULSOUND [facility]   11/10/2017 9:30 AM CHAH MAMMO1 ANA MARIAH MAMMO [facility]   11/15/2017 8:15 AM [facility]       Discharge Medication List:       Joanna Morales [facility] Medication Instructions NIMO:[id number]    Printed on:08/16/17 9607   Medication Information                      acetaminophen (TYLENOL) 500 MG tablet  Take 1 tablet (500 mg total) by mouth every 6 (six) hours as needed.             desmopressin (DDAVP) 0.1 MG Tab  1 tablet (100 mcg total) by Per G Tube route 2 (two) times daily.             EUCALYPTUS OIL/MENTHOL/CAMPHOR (VICKS VAPORUB TOP)  Apply topically daily as needed.             hydrocortisone (CORTEF) 5 MG Tab  3 tablets (15 mg total) by Per G Tube route once daily.             hydrocortisone (CORTEF) 5 MG Tab  1 tablet (5 mg total) by Per G Tube route once daily.             levetiracetam oral soln 500 mg/5 mL (5 mL) Soln  5 mLs (500 mg total) by Per NG tube route 2 (two) times daily.             levothyroxine (SYNTHROID) 75 MCG  tablet  Take 1 tablet (75 mcg total) by mouth once daily.             VANCOMYCIN HCL (VANCOMYCIN 1 G/250 ML D5W, READY TO MIX SYSTEM,)  Inject 221.3 mLs (885.2 mg total) into the vein every 12 (twelve) hours.                 Patient Instructions:    Discharge Procedure Orders  Diet general     Activity as tolerated         At the time of discharge patient was told to take all medications as prescribed, to keep all followup appointments, and to call their primary care physician or return to the emergency room if they have any worsening or concerning symptoms.    Signing Physician:  Ally Calloway MD

## 2017-08-22 NOTE — PROCEDURES
INPATIENT ROUTINE EEG    DATE OF PROCEDURE:  07/07/2017.    DURATION:  29 minutes 23 seconds.    EEG FINDINGS:  The background of this study is disorganized with predominantly   theta and delta frequencies seen bilaterally at medium to high amplitudes.  The   background theta activities in the 5 to 7 Hz range and there is frequent admixed   delta in the 2 to 4 Hz range seen, which is at times semi-rhythmic.  There is a   fluctuating asymmetry, but no consistent asymmetries or focal findings are   noted.  There are no epileptiform discharges.  There are no seizures.  No other   events are noted.    INTERPRETATION:  Abnormal EEG due to moderately severe fluctuating   encephalopathy with predominantly theta and delta activities seen in this   unresponsive hospitalized patient.  No qualitatively normal sleep or normal   waking rhythms were seen.  There was no evidence of epilepsy.      NBB/HN  dd: 08/21/2017 15:13:02 (CDT)  td: 08/21/2017 19:55:48 (CDT)  Doc ID   #[id number]  Job ID #[id number]    CC:

## 2017-08-30 PROBLEM — K85.90 ACUTE PANCREATITIS: Status: ACTIVE | Noted: 2017-01-01

## 2017-08-30 PROBLEM — R65.21 SEPTIC SHOCK: Status: ACTIVE | Noted: 2017-01-01

## 2017-08-30 PROBLEM — Z01.818 ENCOUNTER FOR INTUBATION: Status: ACTIVE | Noted: 2017-01-01

## 2017-08-30 PROBLEM — E87.20 ACIDOSIS: Status: ACTIVE | Noted: 2017-01-01

## 2017-08-30 PROBLEM — A41.9 SEPTIC SHOCK: Status: ACTIVE | Noted: 2017-01-01

## 2017-08-30 PROBLEM — G93.41 ENCEPHALOPATHY, METABOLIC: Status: ACTIVE | Noted: 2017-01-01

## 2017-08-30 PROBLEM — E87.1 HYPONATREMIA: Status: ACTIVE | Noted: 2017-01-01

## 2017-08-30 NOTE — ASSESSMENT & PLAN NOTE
- pt hypotensive despite fluid resuscitation, bradycardic, hypothermic  - does not appear to be in cardiogenic shock on bedside US, no evidence of pericardial effusion  - likely multifactorial including known MRSA bacteremia and pancreatitis compounded by likely adrenal insufficiency  - s/p volume resuscitation 30cc/kg in ED > will continue resuscitation with isotonic fluids prn (via fluid warmer)  - norepinephrine & vasopressin to maintain MAP > 65. If further pressor support needed then pt will likely need core warming via G tube  - given first doses vancomycin & cefepime in ED > continue on admission  - d/c'ing PTA midline as possible souce  - f/u Bcx, Ucx, sputum cx   - trend lactic Q4 until normalized

## 2017-08-30 NOTE — ED NOTES
Spoke to representative at [facility], responsible party, Aga Britoson [phone]  (friend of pt) was called and message was left by home.  I was able to make contact with Aga who states she lives in [place] and will be here as soon as she can.

## 2017-08-30 NOTE — PROCEDURES
"Joanna Morales is a 51 y.o. female patient.    Temp: (!) 81.3 °F (27.4 °C) (08/30/17 1619)  Pulse: (!) 58 (08/30/17 1619)  BP: (!) 71/51 (08/30/17 1619)  SpO2: (!) 91 % (08/30/17 1619)  Weight: 59 kg (130 lb 1.1 oz) (08/30/17 1225)  Height: 5' 2" (157.5 cm) (08/30/17 1225)       Arterial Line  Date/Time: 8/30/2017 5:12 PM  Location procedure was performed: [facility] EMERGENCY DEPARTMENT  Performed by: JÚNIOR AGUDELO  Authorized by: JÚNIOR AGUDELO   Pre-op Diagnosis: shock  Post-operative diagnosis: shock  Consent Done: Emergent Situation  Preparation: Patient was prepped and draped in the usual sterile fashion.  Indications: multiple ABGs, respiratory failure and hemodynamic monitoring  Location: left radial  Adis's test normal: yes  Needle gauge: 20  Seldinger technique: Seldinger technique used  Number of attempts: 1  Complications: No  Estimated blood loss (mL): 2  Specimens: No  Implants: No  Post-procedure: line sutured and dressing applied  Post-procedure CMS: normal  Patient tolerance: Patient tolerated the procedure well with no immediate complications  Comments: Emergent (implied) consent was performed for this procedure because the patient was not able to consent themselves due to severe encephalopathy. There were no available surrogates with whom to discuss the risks, benefits, and alternatives of the procedure known to me at the time. Without immediate intervention, I believe the patient would suffer an immediate danger of death or permanent impairment of health.     RAUL Agudelo PA-C  Critical Care Medicine  [phone]            Júnior Agudelo  8/30/2017  "

## 2017-08-30 NOTE — ASSESSMENT & PLAN NOTE
- intubated in ED for hypoxemia & inability to protect airway  - cxry with bibasilar opacities and possible interstitial edema  - monitor pulmonary status and oxygen requirements given high volume of fluid resuscitation  - daily SAT/SBT once hemodynamically stable & appropriate for vent weaning.

## 2017-08-30 NOTE — ED PROVIDER NOTES
Encounter Date: 8/30/2017    SCRIBE #1 NOTE: IPal, am scribing for, and in the presence of, Ede Bar MD.       History     Chief Complaint   Patient presents with    Altered Mental Status     This is a 51 y.o. female with PMH of traumatic brain injury, hypothyroidism, meningioma s/p resection (6/7/2017), which was complicated by diabetes insipidus, and recent admission for MRSA, bilateral PNA and Sepsis who presents to the ED for evaluation of AMS and hypotension.  Per EMS, nursing home staff made the call out because the pt became hypotensive and altered. On arrival, EMS unable to get BP reading. Paramedic attempted intubation en route but was unsuccessful. Pt placed on non re breather en route and treated with IV fluids. Able to get BP reading on arrival to ED. Blood sugar noted to be normal. No further complaints or concerns at this time.       The history is provided by medical records and the EMS personnel. The history is limited by the condition of the patient.     Review of patient's allergies indicates:   Allergen Reactions    Codeine      Past Medical History:   Diagnosis Date    Allergy     Basal cell carcinoma     Depression 11/1/2016    Essential hypertension 6/9/2017    Eye disorder     Fever blister     Normocytic anemia 6/9/2017    Secondary hypothyroidism 6/26/2017     Past Surgical History:   Procedure Laterality Date    BASAL CELL CARCINOMA EXCISION      forehead    BRAIN SURGERY      SKIN BIOPSY       Family History   Problem Relation Age of Onset    Diabetes Father     Hypertension Father     Hepatitis Father     No Known Problems Mother      Social History   Substance Use Topics    Smoking status: Current Every Day Smoker     Packs/day: 1.00     Years: 40.00     Types: Cigarettes    Smokeless tobacco: Never Used    Alcohol use No     Review of Systems   Unable to perform ROS: Acuity of condition       Physical Exam     Initial Vitals   BP Pulse Resp Temp  SpO2   08/30/17 1223 08/30/17 1223 -- -- 08/30/17 1225   (!) 62/45 (!) 46   100 %      MAP       08/30/17 1223       50.67         Physical Exam    Nursing note and vitals reviewed.  Constitutional: She is not diaphoretic. She has a sickly appearance. She appears ill. No distress. Face mask in place.   HENT:   Head: Head is without laceration. Hair is abnormal.       Right Ear: External ear normal.   Left Ear: External ear normal.   Nose: Nose normal.   Eyes: Lids are normal. No scleral icterus. Right eye exhibits no nystagmus. Left eye exhibits no nystagmus. Right pupil is not reactive. Right pupil is round. Left pupil is not reactive. Left pupil is round. Pupils are equal.   Pupils 5mm bilaterally   Neck: No JVD present.   Cardiovascular: Regular rhythm, normal heart sounds and intact distal pulses.  No extrasystoles are present. Bradycardia present.    Pulmonary/Chest: No stridor. She is in respiratory distress. She has rhonchi. She has rales.   Abdominal: Soft. Bowel sounds are normal. She exhibits no distension.   Lymphadenopathy:     She has no cervical adenopathy.   Neurological: She is unresponsive. GCS eye subscore is 4. GCS verbal subscore is 2. GCS motor subscore is 4.   No purposeful movement, LLE, RUE tremor   Skin: Skin is dry. No abrasion noted. No erythema. There is pallor.         ED Course   Intubation  Date/Time: 8/30/2017 12:50 PM  Location procedure was performed: [facility] EMERGENCY DEPARTMENT  Performed by: JORJE HERNANDEZ  Authorized by: JAYNA CLARK   Assisting provider: JORJE HERNANDEZ  Pre-operative diagnosis: altered mental state  Post-operative diagnosis: altered mental state  Consent Done: Emergent Situation  Indications: airway protection  Intubation method: video-assisted (Glidescope)  Patient status: paralyzed (RSI)  Preoxygenation: BVM  Pretreatment medications: fentanyl  Sedatives: etomidate  Paralytic: rocuronium  Tube size: 7.5 mm  Tube type: cuffed  Number of attempts:  "1  Cricoid pressure: no  Cords visualized: yes  Post-procedure assessment: chest rise and CO2 detector  Breath sounds: rales/crackles  Cuff inflated: yes  ETT to teeth: 25 cm  Tube secured with: ETT perez  Chest x-ray findings: endotracheal tube in appropriate position  Patient tolerance: Patient tolerated the procedure well with no immediate complications  Complications: No  Specimens: No  Implants: No    Central Line  Date/Time: 8/30/2017 1:18 PM  Performed by: JAYNA CLARK  Pre-operative Diagnosis: Hypotension  Post-operative diagnosis: Hypotension  Consent Done: Emergent Situation  Time out: Immediately prior to procedure a "time out" was called to verify the correct patient, procedure, equipment, support staff and site/side marked as required.  Indications: med administration and vascular access  Preparation: skin prepped with ChloraPrep  Skin prep agent dried: skin prep agent completely dried prior to procedure  Sterile barriers: all five maximum sterile barriers used - cap, mask, sterile gown, sterile gloves, and large sterile sheet  Hand hygiene: hand hygiene performed prior to central venous catheter insertion  Location details: right internal jugular  Catheter type: triple lumen  Ultrasound guidance: yes  , compressibility normal  Needle advanced into vessel with real time Ultrasound guidance.  Manometry: Yes  Number of attempts: 3 (Unable to cannulate)  Complications: none  Comments: Unsuccessful    Critical Care  Date/Time: 8/30/2017 12:30 PM  Performed by: JAYNA CLARK  Authorized by: JAYNA CLARK   Direct patient critical care time: 15 minutes  Additional history critical care time: 5 minutes  Ordering / reviewing critical care time: 10 minutes  Documentation critical care time: 5 minutes  Consulting other physicians critical care time: 10 minutes  Total critical care time (exclusive of procedural time) : 45 minutes  Critical care time was exclusive of teaching " time.  Critical care was necessary to treat or prevent imminent or life-threatening deterioration of the following conditions: CNS failure or compromise.        Labs Reviewed   CBC W/ AUTO DIFFERENTIAL - Abnormal; Notable for the following:        Result Value    RBC 3.11 (*)     Hemoglobin 9.7 (*)     Hematocrit 28.7 (*)     MCH 31.2 (*)     RDW 17.2 (*)     All other components within normal limits   COMPREHENSIVE METABOLIC PANEL - Abnormal; Notable for the following:     Sodium 128 (*)     Potassium 3.4 (*)     CO2 21 (*)     BUN, Bld 31 (*)     Total Protein 5.4 (*)     Albumin 2.2 (*)     AST 91 (*)     ALT 64 (*)     All other components within normal limits   LACTIC ACID, PLASMA - Abnormal; Notable for the following:     Lactate (Lactic Acid) 4.7 (*)     All other components within normal limits    Narrative:        LACTIC ACID critical result(s) called and verbal readback obtained   from СЕРГЕЙ TRAN RN , 08/30/2017 15:39   LIPASE - Abnormal; Notable for the following:     Lipase >1000 (*)     All other components within normal limits   TROPONIN I - Abnormal; Notable for the following:     Troponin I 0.052 (*)     All other components within normal limits   TSH - Abnormal; Notable for the following:     TSH 0.015 (*)     All other components within normal limits   URINALYSIS, REFLEX TO URINE CULTURE - Abnormal; Notable for the following:     Appearance, UA Cloudy (*)     Protein, UA 1+ (*)     Glucose, UA 1+ (*)     All other components within normal limits    Narrative:     Preferred Collection Type->Urine, Clean Catch   URINALYSIS MICROSCOPIC - Abnormal; Notable for the following:     Amorphous, UA Many (*)     All other components within normal limits    Narrative:     Preferred Collection Type->Urine, Clean Catch   CULTURE, BLOOD   CULTURE, BLOOD   APTT   B-TYPE NATRIURETIC PEPTIDE   CORTISOL, RANDOM   MAGNESIUM   PHOSPHORUS   PROTIME-INR   PROCALCITONIN   T4, FREE   LACTIC ACID, PLASMA   LACTIC  ACID, PLASMA   TYPE & SCREEN     EKG Readings: (Independently Interpreted)   Initial Reading: No STEMI. Rhythm: Sinus Bradycardia. Heart Rate: 40. Conduction: LBBB. Axis: Normal.     Imaging Results          X-Ray Chest AP Portable (Final result)  Result time 08/30/17 16:29:34    Final result by Shaheed Gomes DO (08/30/17 16:29:34)                 Impression:        See Above       Electronically signed by: SHAHEED GOMES DO  Date:     08/30/17  Time:    16:29              Narrative:    Single portable frontal view of the chest    Comparison: 08/30/2017 at 1421    Results: Interval placement of a left-sided central venous catheter with tip projected over the mid SVC. Endotracheal tube and external pacer device is unchanged. No large pneumothorax. Continued basilar lung opacities which may represent effusions. Ill-defined interstitial opacities which may represent component of interstitial edema unchanged. Clinical correlation and followup advised. Stable hyperdensities project over the left upper abdomen likely suggestive for splenic granulomas.                             CT Head Without Contrast (Final result)     Abnormal  Result time 08/30/17 15:23:16    Final result by Booker Son MD (08/30/17 15:23:16)                 Impression:         Stable postsurgical changes the anterior fossa as above.     Subtle crowding of the cerebral sulci near the vertex, new from prior exam. Findings are nonspecific, could indicate mild diffuse cerebral edema. No new focal parenchymal abnormalities are apparent.    Small focus of extracranial soft tissue swelling over the left parietal region, new from prior study. This may be posttraumatic in nature.    Epic notification system activated.   ______________________________________     Electronically signed by resident: MIRIAN LE  Date:     08/30/17  Time:    15:16            As the supervising and teaching physician, I personally reviewed the images and resident's  interpretation and I agree with the findings.          Electronically signed by: SHEA NUÑEZ MD  Date:     08/30/17  Time:    15:23              Narrative:    CT head without contrast    08/30/17 14:35:05    Accession# [id number]    CLINICAL INDICATION: 51 year old F with altered mental status and prior sphenoid meningioma resection    TECHNIQUE: Axial CT images obtained throughout the region of the head without the use of intravenous contrast. Axial, sagittal and coronal reconstructions were performed.    COMPARISON: MRI 7/18/17, CT 7/17/17    FINDINGS:    The ventricles are stable in size without evidence of hydrocephalus. Subtle crowding of the cerebral sulci near the vertex, new from prior study.    There are postoperative changes of bilateral craniotomy for large anterior cranial fossa meningioma resection. There is again a fluid collection at the resection site, which is contiguous with the inferior aspect of the frontal horn of the left lateral ventricle. This collection extends into the subarachnoid space adjacent to the left frontal lobe tracks along surgical tract. This is stable from prior examinations There is stable adjacent bifrontal encephalomalacia. No new focal parenchymal mass, hemorrhage, edema or major vascular distribution infarct. No new extra-axial blood or fluid collections.     Mastoid air cells and paranasal sinuses are essentially clear. Extracranial soft tissue edema is noted over the left posterior parietal calvarium.                             X-Ray Chest AP Portable (Final result)  Result time 08/30/17 14:35:51    Final result by Sabra Wild MD (08/30/17 14:35:51)                 Impression:      Endotracheal tube is in good location.      Electronically signed by: Sabra Wild MD  Date:     08/30/17  Time:    14:35              Narrative:    Time of Procedure: 08/30/17 14:13:35  Accession # [id number]    Comparison: 8/11/2017.    Number of views: 1.    Findings:  Endotracheal  tube tip is in good location between the thoracic inlet and bladimir.  External pacing patches project over the LEFT hemithorax obscuring some detail.    Lung volumes are normal and symmetric.  Mediastinal structures are midline.    Pulmonary aeration is slightly improved compared to 8/11/2017 suggesting interval diuresis.  Time course for the improvement is less typical for pneumonia.  I detect no new pulmonary disease.    I detect no pleural fluid, cardiac decompensation, pneumothorax, pneumomediastinum, pneumoperitoneum or significant osseous abnormality.                                 Medical Decision Making:   History:   I obtained history from: EMS provider.       <> Summary of History: 52yo recently admitted for MRSA septicemia stepped down to LTAC for IV abx last seen normal this morning BIBEMS for AMS and hypotension  Old Medical Records: I decided to obtain old medical records.  Initial Assessment:   52yo F with presenting with decreased GCS, agonal breathing, hypotension requiring pressors and intubated for anticipated clinical decompensation. Pt initially GCS3 but began to move without purposeful movement after initiation of pressors.   Differential Diagnosis:   1401: Sepsis, ICH/herniation, hypoxia, electrolyte abnormality   1554: Pt to de admitted to MICU for further eval and mgmt, pt with no leukocytosis  But elevated lipase.     Sonny Sidhu MD, MPH  Emergency Med/Pediatrics PGY-4  8/30/2017     Clinical Tests:   Radiological Study: Ordered and Reviewed            Scribe Attestation:   Scribe #1: I performed the above scribed service and the documentation accurately describes the services I performed. I attest to the accuracy of the note.    Attending Attestation:   Physician Attestation Statement for Resident:  As the supervising MD   Physician Attestation Statement: I have personally seen and examined this patient.   I agree with the above history. -: 51-year-old woman with history of traumatic brain  injury presents from the nursing facility at which she resides for evaluation of altered mental status and hypotension.   As the supervising MD I agree with the above PE.    As the supervising MD I agree with the above treatment, course, plan, and disposition.  I was personally present during the entire procedure.  I have reviewed and agree with the residents interpretation of the following: lab data, x-rays, CT scans and EKG.  I have reviewed the following: old records at this facility.          Physician Attestation for Scribe:  Physician Attestation Statement for Scribe #1: I, Ede Bar MD, reviewed documentation, as scribed by Pal Paz in my presence, and it is both accurate and complete.                 ED Course     Clinical Impression:   The primary encounter diagnosis was Stupor. Diagnoses of Encounter for intubation and Hypotension, unspecified hypotension type were also pertinent to this visit.    Disposition:   Disposition: Admitted  Condition: Critical                        Jarek Sidhu MD  Resident  08/30/17 2388       Ede Bar MD  08/31/17 3508

## 2017-08-30 NOTE — ASSESSMENT & PLAN NOTE
- previous admissions with hypernatremia (up to mid 160s) requiring ddvap (also on ddvap at home)  - hyponatremia noted on admission (Na 128) with CT findings consistent with mild cerebral edema  - holding home DDAVP on admission   - monitor Na Q4

## 2017-08-30 NOTE — HOSPITAL COURSE
Sepsis w/u was initiated in the ED. Patient was admitted to Critical Care Medicine for further w/u and management. Central venous access and arterial line placed in ED.     Pt continued with high dose pressor requirements overnight (norepinephrine and vasopressin). Low dose fentanyl started overnight after pt had episode of vomiting followed by mild agitation. Hypothermia corrected and pt remained minimally responsive. Family to arrive today to form consensus on goals of care.

## 2017-08-30 NOTE — HPI
HPI obtained from chart review as pt is currently intubated and unresponsive.     50 yo F with hypothyroidism, h/o meningioma s/p resection (6/7/2017), which was complicated by diabetes insipidus and adrenal insufficiency who was recently admitted 6/28 from [facility] for AMS 2/2 hypernatremia. Pt was evaluated by both Neurology and Endocrine; w/u concerning for multifactorial encephalopathy. Hospital course complicated by acute respiratory failure 2/2 aspiration pna necessitating transfer to ICU and initiation of norepinephrine and IV DDAVP to correct hypernatremia. Pt was stepped back down to Hospital Medicine, however remained lethargic and minimally participatory despite correction of metabolic derangements.Pt was deemed medically stable and discharged to [facility] 8/10. She was readmitted 8/11 with pna, MRSA bacteremia, hypernatremia and hypotension. TTE was neg for endocarditis at that time. Pt was evaluated by ID and was placed on 2 week course of Vancomycin.  (See discharge summary dated 8/16 for further hospital course).     Per ED provider, pt was brought in by EMS after patient was found minimally responsive and hypotensive at the nursing home. Per chart review, pt's baseline mental status ranges from non verbal, to audiologic hallucination, to oriented to self. Pt was GCS 3 upon arrival and improved to verbal response of moaning after initiation of pressors. Pt was found to be hypotensive (53/33), bradycardic (42) and hypothermic (26.1 C). The patient was intubated in the ED for airway protection.     Workup revealed WBC 8.9, H/H 9.7/28.7, plts 250, INR 1.1, hyponatremia (Na 128), hypokalemia (K 3.4), creatinine 0.9, mild transaminitis (AST/ALT 91/64) with lipase > 93236. Troponin was mildly elevated at 0.05, BNP WNL. Lactic was elevated at 4.7 and procalcitonin was 0.13. Random cortisol level was 2.7. TSH was low at 0.015 however free T4 was WNL at 1. UA was neg for infection. Head CT revealed  stable postsurgical changes to the anterior fossa,  Subtle crowding of the cerebral sulci near the vertex (non-specific, possibly indicated mild diffuse cerebral edema)), new from prior exam. No new focal parenchymal abnormalities are apparent. Chest xray revealed Continued basilar lung opacities which may represent effusions. Ill-defined interstitial opacities which may represent component of interstitial edema unchanged.     Sepsis w/u was initiated in the ED. Patient was admitted to Critical Care Medicine for further w/u and management. Central venous access and arterial line placed in ED.

## 2017-08-30 NOTE — ED TRIAGE NOTES
Patient presents from [facility] for hypotension and altered mental status. Patient has a known TBI in the past . Patient was recently in hospital for MRSA treatment.

## 2017-08-30 NOTE — SUBJECTIVE & OBJECTIVE
Past Medical History:   Diagnosis Date    Allergy     Basal cell carcinoma     Depression 11/1/2016    Essential hypertension 6/9/2017    Eye disorder     Fever blister     Normocytic anemia 6/9/2017    Secondary hypothyroidism 6/26/2017       Past Surgical History:   Procedure Laterality Date    BASAL CELL CARCINOMA EXCISION      forehead    BRAIN SURGERY      SKIN BIOPSY         Review of patient's allergies indicates:   Allergen Reactions    Codeine        Family History     Problem Relation (Age of Onset)    Diabetes Father    Hepatitis Father    Hypertension Father    No Known Problems Mother        Social History Main Topics    Smoking status: Current Every Day Smoker     Packs/day: 1.00     Years: 40.00     Types: Cigarettes    Smokeless tobacco: Never Used    Alcohol use No    Drug use: No    Sexual activity: No      Review of Systems   Unable to perform ROS: Patient unresponsive     Objective:     Vital Signs (Most Recent):  Temp: (!) 81.7 °F (27.6 °C) (08/30/17 1738)  Pulse: (!) 52 (08/30/17 1735)  BP: 102/66 (08/30/17 1738)  SpO2: (!) 92 % (08/30/17 1738) Vital Signs (24h Range):  Temp:  [79 °F (26.1 °C)-81.7 °F (27.6 °C)] 81.7 °F (27.6 °C)  Pulse:  [42-68] 52  SpO2:  [91 %-100 %] 92 %  BP: ()/(33-79) 102/66  Arterial Line BP: ()/(30-68) 109/64   Weight: 59 kg (130 lb 1.1 oz)  Body mass index is 23.79 kg/m².    No intake or output data in the 24 hours ending 08/30/17 1754    Physical Exam   Constitutional: She appears well-developed and well-nourished. She has a sickly appearance. She is sedated and intubated.   HENT:   Head: Normocephalic and atraumatic.   Right Ear: External ear normal.   Left Ear: External ear normal.   Nose: Nose normal.   Mouth/Throat: Oropharynx is clear and moist and mucous membranes are normal.   Eyes: Lids are normal.   Neck: Neck supple. No JVD present. No tracheal deviation present.   Cardiovascular: Bradycardia present.    Heart tones distant.  Radial and DP pulses faint.    Pulmonary/Chest: No stridor. She is intubated.   Coarse breath sounds bilaterally   Abdominal: Normal appearance. She exhibits ascites. She exhibits no distension. Bowel sounds are absent.   G tube present LUQ   Genitourinary: Rectal exam shows external hemorrhoid.   Genitourinary Comments: Mackey draining yellow urine   Musculoskeletal:   No deformities or edema noted   Neurological: She is unresponsive. GCS eye subscore is 1. GCS verbal subscore is 1. GCS motor subscore is 1.   Currently sedated with propofol   Skin: Skin is dry. Capillary refill takes more than 3 seconds.        Psychiatric: Cognition and memory are impaired.   Nursing note and vitals reviewed.      Vents:  Vent Mode: A/C (08/30/17 1720)  Ventilator Initiated: Yes (08/30/17 1307)  Set Rate: 20 bmp (08/30/17 1720)  Vt Set: 300 mL (08/30/17 1720)  Pressure Support: 0 cmH20 (08/30/17 1720)  PEEP/CPAP: 5 cmH20 (08/30/17 1720)  Oxygen Concentration (%): 50 (08/30/17 1720)  Peak Airway Pressure: 29 cmH2O (08/30/17 1720)  Plateau Pressure: 0 cmH20 (08/30/17 1720)  Total Ve: 6.17 mL (08/30/17 1720)  Lines/Drains/Airways     Central Venous Catheter Line                 Percutaneous Central Line Insertion/Assessment - triple lumen  08/30/17 1600 left subclavian less than 1 day          Drain                 Gastrostomy/Enterostomy 07/17/17 1520 Percutaneous endoscopic gastrostomy (PEG) LUQ feeding 44 days         Urethral Catheter 08/30/17 1354 Temperature probe 16 Fr. less than 1 day          Airway                 Airway - Non-Surgical 08/30/17 1250 Endotracheal Tube less than 1 day          Peripheral Intravenous Line                 Midline Catheter Insertion/Assessment  - Single Lumen 08/15/17 0915 Right brachial vein 18g x 8cm 15 days         Peripheral IV - Single Lumen 08/30/17 1229 Left Hand less than 1 day              Significant Labs:    CBC/Anemia Profile:    Recent Labs  Lab 08/30/17  1357   WBC 8.96   HGB 9.7*    HCT 28.7*      MCV 92   RDW 17.2*        Chemistries:    Recent Labs  Lab 08/30/17  1357   *   K 3.4*   CL 95   CO2 21*   BUN 31*   CREATININE 0.9   CALCIUM 9.6   ALBUMIN 2.2*   PROT 5.4*   BILITOT 0.1   ALKPHOS 84   ALT 64*   AST 91*   MG 1.6   PHOS 3.8       All pertinent labs within the past 24 hours have been reviewed.    Significant Imaging: I have reviewed all pertinent imaging results/findings within the past 24 hours.

## 2017-08-30 NOTE — ASSESSMENT & PLAN NOTE
- likely contributing to pt's presenting symptoms  - random cortisol level 2.7  - on hydrocortisone at home at   - start stress dose steroids: hydrocortison 100mg Q8

## 2017-08-30 NOTE — PROGRESS NOTES
Patient received from ED intubated size 7.5 ETT ; 26 @lips. Placed on documented vent settings. Sats 100%. Will continue to monitor.

## 2017-08-30 NOTE — ASSESSMENT & PLAN NOTE
- lipase > 1000 on admission   - triglycerides 291; unknown h/o ETOH use, however patient appears to have been bouncing between healthcare institutions for the last 3 months and given reported baseline mental status, likely does not have capacity to obtain ETOH  - checking CT abd for further characterization

## 2017-08-30 NOTE — ASSESSMENT & PLAN NOTE
- known from previous admission, placed on 2 week course of vancomycin with end date 8/28.   - f/u repeat bcx drawn in ED  - d/c RUE midline as it may be possible source of sepsis  - continue vancomycin

## 2017-08-30 NOTE — ED NOTES
Pt with redness to bilat buttocks, blanches to touch.  Pt repositioned, turned to left side with foam wedge in place.

## 2017-08-30 NOTE — PROCEDURES
"Joanna Morales is a 51 y.o. female patient.    Temp: (!) 80.6 °F (27 °C) (08/30/17 1536)  Pulse: (!) 56 (08/30/17 1536)  BP: (!) 90/53 (08/30/17 1536)  SpO2: (!) 93 % (08/30/17 1536)  Weight: 59 kg (130 lb 1.1 oz) (08/30/17 1225)  Height: 5' 2" (157.5 cm) (08/30/17 1225)       Central Line  Date/Time: 8/30/2017 4:03 PM  Location procedure was performed: [facility] EMERGENCY DEPARTMENT  Performed by: JOCELYNN AGUDELO  Assisting provider: SAMARIA POLLOCK  Pre-operative Diagnosis: shock  Post-operative diagnosis: shock  Consent Done: Emergent Situation  Time out: Immediately prior to procedure a "time out" was called to verify the correct patient, procedure, equipment, support staff and site/side marked as required.  Indications: med administration and vascular access  Anesthesia: local infiltration    Anesthesia:  Local Anesthetic: lidocaine 1% without epinephrine  Anesthetic total: 3 mL  Preparation: skin prepped with ChloraPrep  Skin prep agent dried: skin prep agent completely dried prior to procedure  Sterile barriers: all five maximum sterile barriers used - cap, mask, sterile gown, sterile gloves, and large sterile sheet  Hand hygiene: hand hygiene performed prior to central venous catheter insertion  Location details: left internal jugular  Catheter type: triple lumen  Catheter size: 7 Fr  Ultrasound guidance: yes  Vessel Caliber: medium, patent, compressibility normal  Vascular Doppler: not done  Needle advanced into vessel with real time Ultrasound guidance.  Guidewire confirmed in vessel.  Sterile sheath used.  Manometry: Yes  Number of attempts: 1  Assessment: placement verified by x-ray and no pneumothorax on x-ray  Complications: none  Estimated blood loss (mL): 3  Specimens: No  Implants: No  Post-procedure: line sutured,  chlorhexidine patch,  sterile dressing applied and blood return through all ports  Complications: No  Comments: Emergent (implied) consent was performed for this procedure because " the patient was not able to consent themselves due to severe encephalopathy. There were no available surrogates with whom to discuss the risks, benefits, and alternatives of the procedure known to me at the time. Without immediate intervention, I believe the patient would suffer an immediate danger of death or permanent impairment of health.         RAUL Chaves PA-C  Critical Care Medicine  [phone]            Júnior Chaves  8/30/2017

## 2017-08-31 PROBLEM — E87.6 HYPOKALEMIA: Status: RESOLVED | Noted: 2017-01-01 | Resolved: 2017-01-01

## 2017-08-31 PROBLEM — Z66 DNR (DO NOT RESUSCITATE): Status: ACTIVE | Noted: 2017-01-01

## 2017-08-31 PROBLEM — E87.5 ACUTE HYPERKALEMIA: Status: ACTIVE | Noted: 2017-01-01

## 2017-08-31 NOTE — ASSESSMENT & PLAN NOTE
- multifactorial: hyponatremia, adrenal insufficiency, septic shock; all compounding on poor baseline mental status  - no improvement overnight   - ddx: meningitis, subclinical sz (EEG ordered however has not yet been performed), CVA (head CT neg for hemorrhagic CVA, deferring MRI at this time)    - deferring further work up as comfort measures have been instituted

## 2017-08-31 NOTE — ASSESSMENT & PLAN NOTE
- likely contributing to pt's presenting symptoms  - random cortisol level 2.7  - on hydrocortisone at home   - am labs with hypoglycemia, worsening hyponatremia and hyperkalemia suggestive of continuing adrenal insufficiency despite stress dose steriods    - hydrocortisone d/c'd with initiation of comfort measures

## 2017-08-31 NOTE — SUBJECTIVE & OBJECTIVE
Interval History/Significant Events: CT abd not completed yesterday secondary to hemodynamic instability. Yesterday evening family requesting withdrawal of care, however uncertainty re: legal POA arose and family had not come to consensus about goals of care. Continued high dose pressor requirements overnight. Fentanyl added for agitation. Worsening Na overnight. Hyperkalemia noted early this am.     Review of Systems   Unable to perform ROS: Intubated     Objective:     Vital Signs (Most Recent):  Temp: 99.3 °F (37.4 °C) (08/31/17 0931)  Pulse: 91 (08/31/17 0931)  Resp: (!) 26 (08/31/17 0931)  BP: 137/68 (08/31/17 0800)  SpO2: 100 % (08/31/17 0931) Vital Signs (24h Range):  Temp:  [79 °F (26.1 °C)-101.1 °F (38.4 °C)] 99.3 °F (37.4 °C)  Pulse:  [] 91  Resp:  [20-34] 26  SpO2:  [91 %-100 %] 100 %  BP: ()/(33-79) 137/68  Arterial Line BP: ()/(30-74) 133/74   Weight: 59 kg (130 lb 1.1 oz)  Body mass index is 23.79 kg/m².      Intake/Output Summary (Last 24 hours) at 08/31/17 1211  Last data filed at 08/31/17 1100   Gross per 24 hour   Intake          4315.88 ml   Output              730 ml   Net          3585.88 ml       Physical Exam   Constitutional: She appears well-developed and well-nourished. No distress. She is intubated.   HENT:   Head: Normocephalic and atraumatic.   Right Ear: External ear normal.   Left Ear: External ear normal.   Nose: Nose normal.   Bleeding noted from mouth, unable to locate source   Eyes: Conjunctivae are normal.   Pupils equal, rotational nystagmus noted with right lateral gaze   Neck: Neck supple.   Cardiovascular: Normal rate, regular rhythm and normal heart sounds.  Exam reveals decreased pulses.    Pulses continue to be thready, although improved from yesterday   Pulmonary/Chest: Breath sounds normal. She is intubated. No respiratory distress. She has no wheezes.   Abdominal: Soft. She exhibits no distension. Bowel sounds are decreased.   G tube present RUQ    Genitourinary:   Genitourinary Comments: Mackey draining clear yellow urine   Musculoskeletal:   No deformities noted. Mild BUE edema, no edema noted BLE   Neurological: She is unresponsive. GCS eye subscore is 1. GCS verbal subscore is 1. GCS motor subscore is 1.   + gag reflex noted    Skin: Skin is warm. Capillary refill takes 2 to 3 seconds. She is not diaphoretic.   Psychiatric: Cognition and memory are impaired.   Nursing note and vitals reviewed.      Vents:  Vent Mode: A/C (08/31/17 0931)  Ventilator Initiated: Yes (08/30/17 1307)  Set Rate: 20 bmp (08/31/17 0931)  Vt Set: 315 mL (08/31/17 0931)  Pressure Support: 0 cmH20 (08/30/17 1720)  PEEP/CPAP: 5 cmH20 (08/31/17 0931)  Oxygen Concentration (%): 40 (08/31/17 1100)  Peak Airway Pressure: 24 cmH2O (08/31/17 0931)  Plateau Pressure: 14 cmH20 (08/31/17 0334)  Total Ve: 11.8 mL (08/31/17 0931)  F/VT Ratio<105 (RSBI): (!) 42.21 (08/31/17 0931)  Lines/Drains/Airways     Central Venous Catheter Line                 Percutaneous Central Line Insertion/Assessment - triple lumen  08/30/17 1600 left subclavian less than 1 day          Drain                 Gastrostomy/Enterostomy 07/17/17 1520 Percutaneous endoscopic gastrostomy (PEG) LUQ feeding 44 days         Urethral Catheter 08/30/17 1354 Temperature probe 16 Fr. less than 1 day          Airway                 Airway - Non-Surgical 08/30/17 1250 Endotracheal Tube less than 1 day          Arterial Line                 Arterial Line 08/30/17 1200 Left Radial 1 day          Peripheral Intravenous Line                 Peripheral IV - Single Lumen 08/30/17 1229 Left Hand less than 1 day              Significant Labs:    CBC/Anemia Profile:    Recent Labs  Lab 08/30/17  1357 08/31/17  0355 08/31/17  0618 08/31/17  0820   WBC 8.96 15.95*  --  17.26*   HGB 9.7* 10.0*  --  8.9*   HCT 28.7* 29.5* 30* 25.8*    SEE COMMENT  --  108*   MCV 92 93  --  91   RDW 17.2* 16.9*  --  17.1*        Chemistries:    Recent  Labs  Lab 08/30/17  1357  08/31/17  0355 08/31/17  0820 08/31/17  1012   *  < > 124*  124* 121* 124*   K 3.4*  < > 6.4*  6.4* 5.9* 4.9   CL 95  < > 96  96 98 98   CO2 21*  < > 18*  18* 17* 17*   BUN 31*  < > 27*  27* 24* 23*   CREATININE 0.9  < > 0.9  0.9 1.0 0.9   CALCIUM 9.6  < > 8.5*  8.5* 7.5* 8.0*   ALBUMIN 2.2*  --  2.2*  --   --    PROT 5.4*  --  5.8*  --   --    BILITOT 0.1  --  0.2  --   --    ALKPHOS 84  --  89  --   --    ALT 64*  --  67*  --   --    AST 91*  --  104*  --   --    MG 1.6  --  1.1*  --   --    PHOS 3.8  --  1.9*  --   --    < > = values in this interval not displayed.    All pertinent labs within the past 24 hours have been reviewed.    Significant Imaging:  I have reviewed all pertinent imaging results/findings within the past 24 hours.

## 2017-08-31 NOTE — PROGRESS NOTES
DAKOTAH notified time of death 1700. At this time pt rules out for tissue donation. [facility] will return communication regarding eligibility for eye donation.

## 2017-08-31 NOTE — ASSESSMENT & PLAN NOTE
- worsening hyponatremia overnight (128 > 124 > 122 > 121)  - exam, urine/serum osmos consistent with hypotonic, euvolemic hyponatremia. Ddx include hypothyroidism (however free T4 WNL), glucocorticoid deficiency (stress dose steroids being administered), drug induced and SIADH    - further w/u deferred as pt has been placed on comfort measures

## 2017-08-31 NOTE — LOPA/MORA/SWTA/AOC/AEB
Thank you for the referral. Unfortanely, this patient -is not a candidate for organ donation. Please call [facility] ([phone]) with time of death so that we can screen for tissue and eye donation.

## 2017-08-31 NOTE — PROGRESS NOTES
Patients family including son and daughter as well as friend present at bedside. After discussion, they would like to withdraw care stating that patient would not want mechanical ventilation. Complicated family dynamic with several other children who are not present and some that can not be present secondary to incarceration. It is my understanding that son and friend have joint POA but no documentation is available. On chart review she does have a recent advanced directive (8/10, [facility]) signed by friend stating OK for use of mechanical ventilation in short term. I will keep current care unchanged at present and have stressed to family that in absence of legal documentation of POA I can not withdraw care.. Will initiate fentanyl infusion at low dose for sedation as patient with increased eye opening and agitation. Family is aware that despite her critical illness there may still be some chance of recovery and not yet terminal condition.       Sotero Dey M.D.   Pulmonary/Critical Care Fellow- PGY VIII   [phone]

## 2017-08-31 NOTE — SIGNIFICANT EVENT
Declaration of Death Note    Called to patients bedside by PA. Told that patient had . Patient's lungs auscultated with absent breath sounds.  Patient's chest wall without rise and fall.  Auscultated patients's heart.  Patient with absent heart sounds.  Patient without peripheral pulses on palpation of the radial arteries in the wrist.  Patient's pupils unreactive to light.  Time of Death 1700.  Patient's preliminary cause of death is multiple organ failure secondary to septic shock.

## 2017-08-31 NOTE — PLAN OF CARE
08/31/17 1242   Discharge Assessment   Assessment Type Discharge Planning Assessment   Patient transitioned to comfort care measures.  CM will inquire if patient is appropriate for hospice care.  Patient inappropriate for d/c assessment at this time.      Cathy Aguilar RN, BSN  Case Management  [facility]  Ext. [phone]

## 2017-08-31 NOTE — ASSESSMENT & PLAN NOTE
- previous admissions with hypernatremia (up to mid 160s) requiring ddvap (also on ddvap at home)  - hyponatremia noted on admission (Na 128) with CT findings consistent with mild cerebral edema; worsening hyponatremia noted overnight  - holding home DDAVP on admission     - sodium monitoring discontinued with initiation of comfort measures

## 2017-08-31 NOTE — DISCHARGE SUMMARY
Death Note  Critical Care Medicine      Admit Date: 2017    Date of Death: 2017     Time of Death: 17:00    Attending Physician: William Wells MD    Principal Diagnoses: Shock, multifactorial    Preliminary Cause of Death: Shock, multifactorial    Secondary Diagnoses:   Active Hospital Problems    Diagnosis  POA    *Septic shock [A41.9, R65.21]  Yes    Acute hyperkalemia [E87.5]  Unknown    DNR (do not resuscitate) [Z66]  Yes    Encounter for intubation [Z01.818]  Not Applicable    Acute pancreatitis [K85.90]  Yes    Encephalopathy, metabolic [G93.41]  Yes    Hyponatremia [E87.1]  Yes    Acidosis [E87.2]  Yes    MRSA bacteremia [R78.81]  Yes    Acute hypoxemic respiratory failure [J96.01]  Yes    Malnutrition of moderate degree [E44.0]  Yes    Secondary hypothyroidism [E03.8]  Yes     Chronic    Secondary adrenal insufficiency [E27.49]  Yes    Normocytic anemia [D64.9]  Yes    Essential hypertension [I10]  Yes    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     50 yo F with hypothyroidism, h/o meningioma s/p resection (2017), which was complicated by diabetes insipidus and adrenal insufficiency who was [facility] 2/2 hypernatremia. Pt was evaluated by both Neurology and Endocrine; w/u concerning for multifactorial encephalopathy. Hospital course complicated by acute respiratory failure 2/2 aspiration pna necessitating transfer to ICU and initiation of norepinephrine and IV DDAVP to correct hypernatremia. Pt was stepped back down to Hospital Medicine, however remained lethargic and minimally participatory despite correction of metabolic derangements.Pt was deemed  medically stable and discharged to [facility] 8/10. She was readmitted 8/11 with pna, MRSA bacteremia, hypernatremia and hypotension. TTE was neg for endocarditis at that time. Pt was evaluated by ID and was placed on 2 week course of Vancomycin.  (See discharge summary dated 8/16 for further hospital course).     Per ED provider, pt was brought in by EMS after patient was found minimally responsive and hypotensive at the nursing home. Per chart review, pt's baseline mental status ranges from non verbal, to audiologic hallucination, to oriented to self. Pt was GCS 3 upon arrival and improved to verbal response of moaning after initiation of pressors. Pt was found to be hypotensive (53/33), bradycardic (42) and hypothermic (26.1 C). The patient was intubated in the ED for airway protection.     Workup revealed WBC 8.9, H/H 9.7/28.7, plts 250, INR 1.1, hyponatremia (Na 128), hypokalemia (K 3.4), creatinine 0.9, mild transaminitis (AST/ALT 91/64) with lipase > 48300. Troponin was mildly elevated at 0.05, BNP WNL. Lactic was elevated at 4.7 and procalcitonin was 0.13. Random cortisol level was 2.7. TSH was low at 0.015 however free T4 was WNL at 1. UA was neg for infection. Head CT revealed stable postsurgical changes to the anterior fossa,  Subtle crowding of the cerebral sulci near the vertex (non-specific, possibly indicated mild diffuse cerebral edema)), new from prior exam. No new focal parenchymal abnormalities are apparent. Chest xray revealed Continued basilar lung opacities which may represent effusions. Ill-defined interstitial opacities which may represent component of interstitial edema unchanged.     Sepsis w/u was initiated in the ED. Patient was admitted to Critical Care Medicine for further w/u and management. Central venous access and arterial line placed in ED.            Hospital/ICU Course:  Sepsis w/u was initiated in the ED. Patient was admitted to Critical Care Medicine for further w/u and  management. Central venous access and arterial line placed in ED.     Pt continued with high dose pressor requirements overnight (norepinephrine and vasopressin). Low dose fentanyl started overnight after pt had episode of vomiting followed by mild agitation. Hypothermia corrected and pt remained minimally responsive. Family to arrive today to form consensus on goals of care.     Consultations were held with the family regarding the patient's expected poor prognosis. At the direction of the family, the patient was extubated and measures to ensure the comfort of the patient including, but not limited to, morphine as needed for pain and air hunger as well as benzodiazepines as needed for agitation. The patient was subsequently declared dead.      RAUL Chaves PA-C  Critical Care Medicine  [phone]

## 2017-08-31 NOTE — SIGNIFICANT EVENT
Daughter Mercedes notified via telephone of Ms. Morales's death at 16:58.    RAUL Chaves PA-C  Critical Care Medicine  [phone]

## 2017-08-31 NOTE — ASSESSMENT & PLAN NOTE
- lipase > 1000 on admission   - triglycerides 291; unknown h/o ETOH use  - CT abd not obtained yesterday 2/2 hemodynamic instability    - defer further w/u at this time as pt has been placed on comfort measures

## 2017-08-31 NOTE — ASSESSMENT & PLAN NOTE
- pt hypotensive despite fluid resuscitation, bradycardic, hypothermic on presentation  - does not appear to be in cardiogenic shock on bedside US, small pericardial effusion on bedside US without evidence of cardiac tamponade  - likely multifactorial including known MRSA bacteremia and pancreatitis compounded by likely adrenal insufficiency  - s/p volume resuscitation 30cc/kg in ED + additional 2L upon arrival to ICU  - norepinephrine & vasopressin to maintain MAP > 65.   - given first doses vancomycin & cefepime in ED > continue on admission  - d/c'ing PTA midline as possible souce  - f/u Bcx, Ucx, sputum cx     - following family meeting that occurred ~ 10 am on 8/31 it has been decided to change treatment plan to likely terminal extubation and comfort care

## 2017-08-31 NOTE — ASSESSMENT & PLAN NOTE
- known from previous admission, placed on 2 week course of vancomycin with end date 8/28.   - f/u repeat bcx drawn in ED  - d/c'd RUE midline as it may be possible source of sepsis    - abx discontinued with initiation of comfort measures

## 2017-08-31 NOTE — ASSESSMENT & PLAN NOTE
- G tube present with record on bolus feedings on previous admission  - deferring enteral nutrition at this time given high pressor requirements  - will re-visit nutritional status once pt more medically stable

## 2017-08-31 NOTE — PROGRESS NOTES
[facility]  Critical Care Medicine  Progress Note    Patient Name: Joanna Morales  MRN: [id number]  Admission Date: 8/30/2017  Hospital Length of Stay: 1 days  Code Status: DNR  Attending Provider: William Wells MD  Primary Care Provider: Claudy Tse MD   Principal Problem: Septic shock    Subjective:     HPI:  HPI obtained from chart review as pt is currently intubated and unresponsive.     50 yo F with hypothyroidism, h/o meningioma s/p resection (6/7/2017), which was complicated by diabetes insipidus and adrenal insufficiency who was recently admitted 6/28 from [facility] for AMS 2/2 hypernatremia. Pt was evaluated by both Neurology and Endocrine; w/u concerning for multifactorial encephalopathy. Hospital course complicated by acute respiratory failure 2/2 aspiration pna necessitating transfer to ICU and initiation of norepinephrine and IV DDAVP to correct hypernatremia. Pt was stepped back down to Hospital Medicine, however remained lethargic and minimally participatory despite correction of metabolic derangements.Pt was deemed medically stable and discharged to [facility] 8/10. She was readmitted 8/11 with pna, MRSA bacteremia, hypernatremia and hypotension. TTE was neg for endocarditis at that time. Pt was evaluated by ID and was placed on 2 week course of Vancomycin.  (See discharge summary dated 8/16 for further hospital course).     Per ED provider, pt was brought in by EMS after patient was found minimally responsive and hypotensive at the nursing home. Per chart review, pt's baseline mental status ranges from non verbal, to audiologic hallucination, to oriented to self. Pt was GCS 3 upon arrival and improved to verbal response of moaning after initiation of pressors. Pt was found to be hypotensive (53/33), bradycardic (42) and hypothermic (26.1 C). The patient was intubated in the ED for airway protection.     Workup revealed WBC 8.9, H/H 9.7/28.7, plts 250, INR  1.1, hyponatremia (Na 128), hypokalemia (K 3.4), creatinine 0.9, mild transaminitis (AST/ALT 91/64) with lipase > 99517. Troponin was mildly elevated at 0.05, BNP WNL. Lactic was elevated at 4.7 and procalcitonin was 0.13. Random cortisol level was 2.7. TSH was low at 0.015 however free T4 was WNL at 1. UA was neg for infection. Head CT revealed stable postsurgical changes to the anterior fossa,  Subtle crowding of the cerebral sulci near the vertex (non-specific, possibly indicated mild diffuse cerebral edema)), new from prior exam. No new focal parenchymal abnormalities are apparent. Chest xray revealed Continued basilar lung opacities which may represent effusions. Ill-defined interstitial opacities which may represent component of interstitial edema unchanged.     Sepsis w/u was initiated in the ED. Patient was admitted to Critical Care Medicine for further w/u and management. Central venous access and arterial line placed in ED.                Hospital/ICU Course:  Sepsis w/u was initiated in the ED. Patient was admitted to Critical Care Medicine for further w/u and management. Central venous access and arterial line placed in ED.     Pt continued with high dose pressor requirements overnight (norepinephrine and vasopressin). Low dose fentanyl started overnight after pt had episode of vomiting followed by mild agitation. Hypothermia corrected and pt remained minimally responsive. Family to arrive today to form consensus on goals of care.     Interval History/Significant Events: CT abd not completed yesterday secondary to hemodynamic instability. Yesterday evening family requesting withdrawal of care, however uncertainty re: legal POA arose and family had not come to consensus about goals of care. Continued high dose pressor requirements overnight. Fentanyl added for agitation. Worsening Na overnight. Hyperkalemia noted early this am.     Review of Systems   Unable to perform ROS: Intubated     Objective:      Vital Signs (Most Recent):  Temp: 99.3 °F (37.4 °C) (08/31/17 0931)  Pulse: 91 (08/31/17 0931)  Resp: (!) 26 (08/31/17 0931)  BP: 137/68 (08/31/17 0800)  SpO2: 100 % (08/31/17 0931) Vital Signs (24h Range):  Temp:  [79 °F (26.1 °C)-101.1 °F (38.4 °C)] 99.3 °F (37.4 °C)  Pulse:  [] 91  Resp:  [20-34] 26  SpO2:  [91 %-100 %] 100 %  BP: ()/(33-79) 137/68  Arterial Line BP: ()/(30-74) 133/74   Weight: 59 kg (130 lb 1.1 oz)  Body mass index is 23.79 kg/m².      Intake/Output Summary (Last 24 hours) at 08/31/17 1211  Last data filed at 08/31/17 1100   Gross per 24 hour   Intake          4315.88 ml   Output              730 ml   Net          3585.88 ml       Physical Exam   Constitutional: She appears well-developed and well-nourished. No distress. She is intubated.   HENT:   Head: Normocephalic and atraumatic.   Right Ear: External ear normal.   Left Ear: External ear normal.   Nose: Nose normal.   Bleeding noted from mouth, unable to locate source   Eyes: Conjunctivae are normal.   Pupils equal, rotational nystagmus noted with right lateral gaze   Neck: Neck supple.   Cardiovascular: Normal rate, regular rhythm and normal heart sounds.  Exam reveals decreased pulses.    Pulses continue to be thready, although improved from yesterday   Pulmonary/Chest: Breath sounds normal. She is intubated. No respiratory distress. She has no wheezes.   Abdominal: Soft. She exhibits no distension. Bowel sounds are decreased.   G tube present RUQ   Genitourinary:   Genitourinary Comments: Mackey draining clear yellow urine   Musculoskeletal:   No deformities noted. Mild BUE edema, no edema noted BLE   Neurological: She is unresponsive. GCS eye subscore is 1. GCS verbal subscore is 1. GCS motor subscore is 1.   + gag reflex noted    Skin: Skin is warm. Capillary refill takes 2 to 3 seconds. She is not diaphoretic.   Psychiatric: Cognition and memory are impaired.   Nursing note and vitals reviewed.      Vents:  Vent  Mode: A/C (08/31/17 0931)  Ventilator Initiated: Yes (08/30/17 1307)  Set Rate: 20 bmp (08/31/17 0931)  Vt Set: 315 mL (08/31/17 0931)  Pressure Support: 0 cmH20 (08/30/17 1720)  PEEP/CPAP: 5 cmH20 (08/31/17 0931)  Oxygen Concentration (%): 40 (08/31/17 1100)  Peak Airway Pressure: 24 cmH2O (08/31/17 0931)  Plateau Pressure: 14 cmH20 (08/31/17 0334)  Total Ve: 11.8 mL (08/31/17 0931)  F/VT Ratio<105 (RSBI): (!) 42.21 (08/31/17 0931)  Lines/Drains/Airways     Central Venous Catheter Line                 Percutaneous Central Line Insertion/Assessment - triple lumen  08/30/17 1600 left subclavian less than 1 day          Drain                 Gastrostomy/Enterostomy 07/17/17 1520 Percutaneous endoscopic gastrostomy (PEG) LUQ feeding 44 days         Urethral Catheter 08/30/17 1354 Temperature probe 16 Fr. less than 1 day          Airway                 Airway - Non-Surgical 08/30/17 1250 Endotracheal Tube less than 1 day          Arterial Line                 Arterial Line 08/30/17 1200 Left Radial 1 day          Peripheral Intravenous Line                 Peripheral IV - Single Lumen 08/30/17 1229 Left Hand less than 1 day              Significant Labs:    CBC/Anemia Profile:    Recent Labs  Lab 08/30/17  1357 08/31/17  0355 08/31/17  0618 08/31/17  0820   WBC 8.96 15.95*  --  17.26*   HGB 9.7* 10.0*  --  8.9*   HCT 28.7* 29.5* 30* 25.8*    SEE COMMENT  --  108*   MCV 92 93  --  91   RDW 17.2* 16.9*  --  17.1*        Chemistries:    Recent Labs  Lab 08/30/17  1357  08/31/17  0355 08/31/17  0820 08/31/17  1012   *  < > 124*  124* 121* 124*   K 3.4*  < > 6.4*  6.4* 5.9* 4.9   CL 95  < > 96  96 98 98   CO2 21*  < > 18*  18* 17* 17*   BUN 31*  < > 27*  27* 24* 23*   CREATININE 0.9  < > 0.9  0.9 1.0 0.9   CALCIUM 9.6  < > 8.5*  8.5* 7.5* 8.0*   ALBUMIN 2.2*  --  2.2*  --   --    PROT 5.4*  --  5.8*  --   --    BILITOT 0.1  --  0.2  --   --    ALKPHOS 84  --  89  --   --    ALT 64*  --  67*  --   --     AST 91*  --  104*  --   --    MG 1.6  --  1.1*  --   --    PHOS 3.8  --  1.9*  --   --    < > = values in this interval not displayed.    All pertinent labs within the past 24 hours have been reviewed.    Significant Imaging:  I have reviewed all pertinent imaging results/findings within the past 24 hours.    Assessment/Plan:     Neuro   Encephalopathy, metabolic    - multifactorial: hyponatremia, adrenal insufficiency, septic shock; all compounding on poor baseline mental status  - no improvement overnight   - ddx: meningitis, subclinical sz (EEG ordered however has not yet been performed), CVA (head CT neg for hemorrhagic CVA, deferring MRI at this time)    - deferring further work up as comfort measures have been instituted        Pulmonary   Acute hypoxemic respiratory failure    - intubated in ED for hypoxemia & inability to protect airway  - cxry with bibasilar opacities and possible interstitial edema  - monitor pulmonary status and oxygen requirements given high volume of fluid resuscitation    - pt extubated to comfort measures this afternoon with family present. Atropine, opioids & ativan given prior to and following extubation to assist with secretions and sensation of air hunger        Cardiac/Vascular   Essential hypertension    - hypotensive on admission in setting of septic shock and adrenal insufficiency  - see plans for each        Renal/   Acute hyperkalemia    - K 6.7 on am labs, suspect 2/2 adrenal insufficiency  - shifted with 6 units insulin, dextrose & calcium gluconate > repeat K 4.9    - deferring further lab draws at pt has been placed on comfort measures        Acidosis    - metabolic acidosis in setting of septic shock  - see septic shock        Hyponatremia    - worsening hyponatremia overnight (128 > 124 > 122 > 121)  - exam, urine/serum osmos consistent with hypotonic, euvolemic hyponatremia. Ddx include hypothyroidism (however free T4 WNL), glucocorticoid deficiency (stress dose  steroids being administered), drug induced and SIADH    - further w/u deferred as pt has been placed on comfort measures        ID   * Septic shock    - pt hypotensive despite fluid resuscitation, bradycardic, hypothermic on presentation  - does not appear to be in cardiogenic shock on bedside US, small pericardial effusion on bedside US without evidence of cardiac tamponade  - likely multifactorial including known MRSA bacteremia and pancreatitis compounded by likely adrenal insufficiency  - s/p volume resuscitation 30cc/kg in ED + additional 2L upon arrival to ICU  - norepinephrine & vasopressin to maintain MAP > 65.   - given first doses vancomycin & cefepime in ED > continue on admission  - d/c'ing PTA midline as possible souce  - f/u Bcx, Ucx, sputum cx     - following family meeting that occurred ~ 10 am on 8/31 it has been decided to change treatment plan to likely terminal extubation and comfort care          MRSA bacteremia    - known from previous admission, placed on 2 week course of vancomycin with end date 8/28.   - f/u repeat bcx drawn in ED  - d/c'd RUE midline as it may be possible source of sepsis    - abx discontinued with initiation of comfort measures        Oncology   Normocytic anemia    - suspect anemia of chronic disease   - H/H on admission 9.7/29 (at baseline)  - monitor        Meningioma, recurrent of brain    - s/p resection x2  - stable post-surgical changes on CT  - EEG on previous admission without epileptiform discharges or seizures     - sz ppx d/c'd with initiation of comfort measures        Endocrine   Malnutrition of moderate degree    - G tube present with record on bolus feedings on previous admission  - will re-visit nutritional status once pt more medically stable        Secondary hypothyroidism    - TSH 0.015, free T4 1    - home levothyroxine d/c'd with initiation of comfort measures        Primary central diabetes insipidus    - previous admissions with hypernatremia (up to  mid 160s) requiring ddvap (also on ddvap at home)  - hyponatremia noted on admission (Na 128) with CT findings consistent with mild cerebral edema; worsening hyponatremia noted overnight  - holding home DDAVP on admission     - sodium monitoring discontinued with initiation of comfort measures            Secondary adrenal insufficiency    - likely contributing to pt's presenting symptoms  - random cortisol level 2.7  - on hydrocortisone at home   - am labs with hypoglycemia, worsening hyponatremia and hyperkalemia suggestive of continuing adrenal insufficiency despite stress dose steriods    - hydrocortisone d/c'd with initiation of comfort measures          GI   Acute pancreatitis    - lipase > 1000 on admission   - triglycerides 291; unknown h/o ETOH use  - CT abd not obtained yesterday 2/2 hemodynamic instability    - defer further w/u at this time as pt has been placed on comfort measures           Critical Care Daily Checklist:    A: Awake: RASS Goal/Actual Goal:    Actual: Tong Agitation Sedation Scale (RASS): Unarousable   B: Spontaneous Breathing Trial Performed?  n/a   C: SAT & SBT Coordinated?  n/a                 D: Delirium: CAM-ICU Overall CAM-ICU: Positive   E: Early Mobility Performed? No   F: Feeding Goal:    Status:     Current Diet Order   Procedures    Diet NPO Except for: Medication     Order Specific Question:   Except for     Answer:   Medication      AS: Analgesia/Sedation fentanyl   T: Thromboembolic Prophylaxis lovenox   H: HOB > 300 Yes   U: Stress Ulcer Prophylaxis (if needed) pepcid   G: Glucose Control Dextrose prn   B: Bowel Function Stool Occurrence: 0   I: Indwelling Catheter (Lines & García) Necessity TLC, garcía   D: De-escalation of Antimicrobials/Pharmacotherapies abx therapy d/c'd upon initiation of comfort measures    Plan for the day/ETD Terminal extubation with comfort measures    Code Status:  Family/Goals of Care: DNR  See below       Critical Care Medicine  Family  Conference    CC: Septic shock    Code Status: DNR     Staff/Interdisciplinary Team Members Present:     Dr. Wells, Ankita Bah NP, Lucille Geiger NP, Ivania RN.     Patient/Family Members Present:     Raj Gaytan (patient's son and power of ), Aga Hernandez (patient's friend and POA alternate), 2 of patient's daughters, 2nd son and his wife, patient's mother        Family Conference Goal:     The purpose of the family conference today is to discuss goals of care and option of pursuing current therapy vs extubation with initiation of comfort measures.       Discussion:     Family impression of current state: Family aware of multi system organ dysfunction and shock that has been non-responsive to current therapies provided. Family understand that patient has very poor prognosis of meaningful recovery.     Treatment Options: Continue current therapy with vasopressors, antibiotics, stress dose steroids. Alterate option of extubation (likely terminal) with initiation of comfort measures (scopolamine, opioids & ativan for symptom management)    Patient/Family Goals of Care: Family does not believe that patient would want to remain on ventilator with aggressive measures if prognosis was poor. POA and all family members are in agreement with this and do not wish to prolong pt's suffering.         Outcome:     Family has decided to withdraw care. Patient will be extubated and vasopressors will be turned off. Family aware of expected symptoms patient will experience and therapies to alleviate pt's discomfort.  has been contacted and last rights were performed prior to extubation. All family members who wished to be present at the bedside were. DNR form signed prior to withdrawal of life sustaining therapies.     Code Status: DNR      Date/Time of Follow-Up:       Will check in with family periodically to assess needs and assess comfort level of patient and administer any further medications as needed.      Case discussed with Dr. Wells.     Uninterrupted Critical Care/Counseling Time (not including procedures): 180 minutes     Critical care was time spent personally by me on the following activities: development of treatment plan with patient or surrogate and bedside caregivers, discussions with consultants, evaluation of patient's response to treatment, examination of patient, ordering and performing treatments and interventions, ordering and review of laboratory studies, ordering and review of radiographic studies, pulse oximetry, re-evaluation of patient's condition. This critical care time did not overlap with that of any other provider or involve time for any procedures.     Ankita Bah NP  Critical Care Medicine  [facility]

## 2017-08-31 NOTE — ASSESSMENT & PLAN NOTE
- K 6.7 on am labs, suspect 2/2 adrenal insufficiency  - shifted with 6 units insulin, dextrose & calcium gluconate > repeat K 4.9    - deferring further lab draws at pt has been placed on comfort measures

## 2017-08-31 NOTE — ASSESSMENT & PLAN NOTE
- hypotensive on admission in setting of septic shock and adrenal insufficiency  - see plans for each

## 2017-08-31 NOTE — PROGRESS NOTES
Notified Aga, patient's close family friend pt's heart rate is beginning to slow into 50's with low O2 levels. Unable to reach daughter via telephone with two number provided. Aga states she is with daughter and will both be arriving at bedside. CCS updated.

## 2017-08-31 NOTE — PROGRESS NOTES
Pt extubated/ all vasopressors stopped per Juanjo NP order. Comfort measures initiated.  Ativan, morphine, and atropine administered per NP order. Family at bedside updated on POC.

## 2017-08-31 NOTE — PROGRESS NOTES
Family present at bedside. MD Russell also present with THOMAS.  to administer last rights. Following administration, MD ordered to contact respiratory for extubation/ stop all vasopressors. PRN pain medications/ anxiolytics available. Family updated on POC.

## 2017-08-31 NOTE — PROGRESS NOTES
Bedside monitor alarming asystole. Assessment of patient reveals no pulse, no heart sounds, and no breath sounds. Patient completely unresponsive. Primary team notified.  to come to bedside shortly.

## 2017-08-31 NOTE — NURSING TRANSFER
Nursing Transfer Note      8/30/2017     Transfer From: ED    Transfer via stretcher    Transfer with cardiac monitoring, ambu, o2    Transported by Marleny, ED RN  Medicines sent: none except IV's hanging    Chart send with patient: Yes    Notified: Pt;s HPA, ELLA Hernandez, was called at 1545 by ED RN (per report).  SHe is on her was but would take 2-3 hours before arrival    Patient reassessed at: 08/30/17 @1847  Upon arrival to floor: cardiac monitor applied, patient oriented to room (no evidence of understanding), call bell in reach and bed in lowest position.  Bear Hugger resumed/extra blankets placed; fluid warmer added to LR bolus

## 2017-08-31 NOTE — PROGRESS NOTES
Jovany with [facility] at bedside. Confirmed pt rules out for organ donation/ okay with proceed to withdrawal of care at this time. Will notify [facility] at time of death to determine eligibility for eye/tissue donation.

## 2017-08-31 NOTE — ASSESSMENT & PLAN NOTE
- G tube present with record on bolus feedings on previous admission  - will re-visit nutritional status once pt more medically stable

## 2017-08-31 NOTE — NURSING
Phone report was rec;d from LUI Olivo prior to pt's arival and an  updte was also called with her worsened status (BP had dropped and pt req 3 Curly bumps as well as Vasopressin was started and Levo increased IV.      Pt arrived as per report and was transferred to unit bed without incidence. Pt's VS as charted and Vasopressin/Levophed and LR infused as charted.  Bear hugger was placed and Fluid warmer placed  before LR was started. PT's temp 28 C.  Neuro assessment charted.  Awaiting ELLA DAMON to come to bedside.

## 2017-08-31 NOTE — PROGRESS NOTES
Notified by Chapin with [facility], representative being sent to site to further evaluate pt's donation eligibility.  CCS notified.

## 2017-08-31 NOTE — ASSESSMENT & PLAN NOTE
- intubated in ED for hypoxemia & inability to protect airway  - cxry with bibasilar opacities and possible interstitial edema  - monitor pulmonary status and oxygen requirements given high volume of fluid resuscitation    - pt extubated to comfort measures this afternoon with family present. Atropine, opioids & ativan given prior to and following extubation to assist with secretions and sensation of air hunger

## 2017-08-31 NOTE — ASSESSMENT & PLAN NOTE
- s/p resection x2  - stable post-surgical changes on CT  - EEG on previous admission without epileptiform discharges or seizures   - continue home keppra BID for sz ppx

## 2017-08-31 NOTE — CARE UPDATE
Family arrived to patient's bedside, Aga (friend and power of ), Son Raj and daughter. Discussed severity of pt's illness with family and poor prognosis. Discussed that patient is currently in multi system organ failure requiring high doses of vasopressors. Family aware that patient is critically ill and may not survive the next 24 hours. Have discussed with family that if patient arrests, CPR and resuscitation is likely to be futile, and if pt does survive she will likely not have any meaningful recovery. Family verbalized understanding and all questions were answered. Family present are attempting to contact additional family members regarding code status and possible initiation of comfort measures.     Ankita Bah NP  Critical Care Medicine

## 2017-08-31 NOTE — ASSESSMENT & PLAN NOTE
- multifactorial: hyponatremia, adrenal insufficiency, septic shock; all compounding on poor baseline mental status  - fix contributing pathologies  - monitor for return to baseline

## 2017-08-31 NOTE — ASSESSMENT & PLAN NOTE
- Na on admission 128  - monitor BMP Q4  - attempting to avoid over correction given mild cerebral edema present on CT, however this will be difficult given profound hypotension and fluid needs in setting of septic shock

## 2017-08-31 NOTE — ASSESSMENT & PLAN NOTE
- s/p resection x2  - stable post-surgical changes on CT  - EEG on previous admission without epileptiform discharges or seizures     - sz ppx d/c'd with initiation of comfort measures

## 2017-08-31 NOTE — PT/OT/SLP PROGRESS
Physical Therapy      Joanna Morales  MRN: [id number]    Patient not seen today secondary to  (pt not appropriate for skilled PT at this time). Will follow-up as appropriate.    Elizabeth West, PT   8/31/2017

## 2017-08-31 NOTE — H&P
[facility]  Critical Care Medicine  History & Physical    Patient Name: Joanna Morales  MRN: [id number]  Admission Date: 8/30/2017  Hospital Length of Stay: 0 days  Code Status: Full Code  Attending Physician: William Wells MD   Primary Care Provider: Claudy Tse MD   Principal Problem: Septic shock    Subjective:     HPI:  HPI obtained from chart review as pt is currently intubated and unresponsive.     50 yo F with hypothyroidism, h/o meningioma s/p resection (6/7/2017), which was complicated by diabetes insipidus and adrenal insufficiency who was recently admitted 6/28 from [facility] for AMS 2/2 hypernatremia. Pt was evaluated by both Neurology and Endocrine; w/u concerning for multifactorial encephalopathy. Hospital course complicated by acute respiratory failure 2/2 aspiration pna necessitating transfer to ICU and initiation of norepinephrine and IV DDAVP to correct hypernatremia. Pt was stepped back down to Hospital Medicine, however remained lethargic and minimally participatory despite correction of metabolic derangements.Pt was deemed medically stable and discharged to [facility] 8/10. She was readmitted 8/11 with pna, MRSA bacteremia, hypernatremia and hypotension. TTE was neg for endocarditis at that time. Pt was evaluated by ID and was placed on 2 week course of Vancomycin.  (See discharge summary dated 8/16 for further hospital course).     Per ED provider, pt was brought in by EMS after patient was found minimally responsive and hypotensive at the nursing home. Per chart review, pt's baseline mental status ranges from non verbal, to audiologic hallucination, to oriented to self. Pt was GCS 3 upon arrival and improved to verbal response of moaning after initiation of pressors. Pt was found to be hypotensive (53/33), bradycardic (42) and hypothermic (26.1 C). The patient was intubated in the ED for airway protection.     Workup revealed WBC 8.9, H/H 9.7/28.7,  plts 250, INR 1.1, hyponatremia (Na 128), hypokalemia (K 3.4), creatinine 0.9, mild transaminitis (AST/ALT 91/64) with lipase > 04221. Troponin was mildly elevated at 0.05, BNP WNL. Lactic was elevated at 4.7 and procalcitonin was 0.13. Random cortisol level was 2.7. TSH was low at 0.015 however free T4 was WNL at 1. UA was neg for infection. Head CT revealed stable postsurgical changes to the anterior fossa,  Subtle crowding of the cerebral sulci near the vertex (non-specific, possibly indicated mild diffuse cerebral edema)), new from prior exam. No new focal parenchymal abnormalities are apparent. Chest xray revealed Continued basilar lung opacities which may represent effusions. Ill-defined interstitial opacities which may represent component of interstitial edema unchanged.     Sepsis w/u was initiated in the ED. Patient was admitted to Critical Care Medicine for further w/u and management. Central venous access and arterial line placed in ED.                Hospital/ICU Course:  Sepsis w/u was initiated in the ED. Patient was admitted to Critical Care Medicine for further w/u and management. Central venous access and arterial line placed in ED.      Past Medical History:   Diagnosis Date    Allergy     Basal cell carcinoma     Depression 11/1/2016    Essential hypertension 6/9/2017    Eye disorder     Fever blister     Normocytic anemia 6/9/2017    Secondary hypothyroidism 6/26/2017       Past Surgical History:   Procedure Laterality Date    BASAL CELL CARCINOMA EXCISION      forehead    BRAIN SURGERY      SKIN BIOPSY         Review of patient's allergies indicates:   Allergen Reactions    Codeine        Family History     Problem Relation (Age of Onset)    Diabetes Father    Hepatitis Father    Hypertension Father    No Known Problems Mother        Social History Main Topics    Smoking status: Current Every Day Smoker     Packs/day: 1.00     Years: 40.00     Types: Cigarettes    Smokeless  tobacco: Never Used    Alcohol use No    Drug use: No    Sexual activity: No      Review of Systems   Unable to perform ROS: Patient unresponsive     Objective:     Vital Signs (Most Recent):  Temp: (!) 81.7 °F (27.6 °C) (08/30/17 1738)  Pulse: (!) 52 (08/30/17 1735)  BP: 102/66 (08/30/17 1738)  SpO2: (!) 92 % (08/30/17 1738) Vital Signs (24h Range):  Temp:  [79 °F (26.1 °C)-81.7 °F (27.6 °C)] 81.7 °F (27.6 °C)  Pulse:  [42-68] 52  SpO2:  [91 %-100 %] 92 %  BP: ()/(33-79) 102/66  Arterial Line BP: ()/(30-68) 109/64   Weight: 59 kg (130 lb 1.1 oz)  Body mass index is 23.79 kg/m².    No intake or output data in the 24 hours ending 08/30/17 1754    Physical Exam   Constitutional: She appears well-developed and well-nourished. She has a sickly appearance. She is sedated and intubated.   HENT:   Head: Normocephalic and atraumatic.   Right Ear: External ear normal.   Left Ear: External ear normal.   Nose: Nose normal.   Mouth/Throat: Oropharynx is clear and moist and mucous membranes are normal.   Eyes: Lids are normal.   Neck: Neck supple. No JVD present. No tracheal deviation present.   Cardiovascular: Bradycardia present.    Heart tones distant. Radial and DP pulses faint.    Pulmonary/Chest: No stridor. She is intubated.   Coarse breath sounds bilaterally   Abdominal: Normal appearance. She exhibits ascites. She exhibits no distension. Bowel sounds are absent.   G tube present LUQ   Genitourinary: Rectal exam shows external hemorrhoid.   Genitourinary Comments: Mackey draining yellow urine   Musculoskeletal:   No deformities or edema noted   Neurological: She is unresponsive. GCS eye subscore is 1. GCS verbal subscore is 1. GCS motor subscore is 1.   Currently sedated with propofol   Skin: Skin is dry. Capillary refill takes more than 3 seconds.        Psychiatric: Cognition and memory are impaired.   Nursing note and vitals reviewed.      Vents:  Vent Mode: A/C (08/30/17 1707)  Ventilator Initiated: Yes  (08/30/17 1307)  Set Rate: 20 bmp (08/30/17 1720)  Vt Set: 300 mL (08/30/17 1720)  Pressure Support: 0 cmH20 (08/30/17 1720)  PEEP/CPAP: 5 cmH20 (08/30/17 1720)  Oxygen Concentration (%): 50 (08/30/17 1720)  Peak Airway Pressure: 29 cmH2O (08/30/17 1720)  Plateau Pressure: 0 cmH20 (08/30/17 1720)  Total Ve: 6.17 mL (08/30/17 1720)  Lines/Drains/Airways     Central Venous Catheter Line                 Percutaneous Central Line Insertion/Assessment - triple lumen  08/30/17 1600 left subclavian less than 1 day          Drain                 Gastrostomy/Enterostomy 07/17/17 1520 Percutaneous endoscopic gastrostomy (PEG) LUQ feeding 44 days         Urethral Catheter 08/30/17 1354 Temperature probe 16 Fr. less than 1 day          Airway                 Airway - Non-Surgical 08/30/17 1250 Endotracheal Tube less than 1 day          Peripheral Intravenous Line                 Midline Catheter Insertion/Assessment  - Single Lumen 08/15/17 0915 Right brachial vein 18g x 8cm 15 days         Peripheral IV - Single Lumen 08/30/17 1229 Left Hand less than 1 day              Significant Labs:    CBC/Anemia Profile:    Recent Labs  Lab 08/30/17  1357   WBC 8.96   HGB 9.7*   HCT 28.7*      MCV 92   RDW 17.2*        Chemistries:    Recent Labs  Lab 08/30/17  1357   *   K 3.4*   CL 95   CO2 21*   BUN 31*   CREATININE 0.9   CALCIUM 9.6   ALBUMIN 2.2*   PROT 5.4*   BILITOT 0.1   ALKPHOS 84   ALT 64*   AST 91*   MG 1.6   PHOS 3.8       All pertinent labs within the past 24 hours have been reviewed.    Significant Imaging: I have reviewed all pertinent imaging results/findings within the past 24 hours.    Assessment/Plan:     Neuro   Encephalopathy, metabolic    - multifactorial: hyponatremia, adrenal insufficiency, septic shock; all compounding on poor baseline mental status  - fix contributing pathologies  - monitor for return to baseline        Pulmonary   Acute hypoxemic respiratory failure    - intubated in ED for  hypoxemia & inability to protect airway  - cxry with bibasilar opacities and possible interstitial edema  - monitor pulmonary status and oxygen requirements given high volume of fluid resuscitation  - daily SAT/SBT once hemodynamically stable & appropriate for vent weaning.         Cardiac/Vascular   Essential hypertension    - hypotensive on admission in setting of septic shock and adrenal insufficiency  - see plans for each        Renal/   Acidosis    - metabolic acidosis in setting of septic shock  - see septic shock        Hypokalemia    - K on admission 3.4, likely 2/2 poor dietary intake; mag 1.6  - replace prn        Hyponatremia    - Na on admission 128  - monitor BMP Q4  - attempting to avoid over correction given mild cerebral edema present on CT, however this will be difficult given profound hypotension and fluid needs in setting of septic shock        ID   * Septic shock    - pt hypotensive despite fluid resuscitation, bradycardic, hypothermic  - does not appear to be in cardiogenic shock on bedside US, no evidence of pericardial effusion  - likely multifactorial including known MRSA bacteremia and pancreatitis compounded by likely adrenal insufficiency  - s/p volume resuscitation 30cc/kg in ED > will continue resuscitation with isotonic fluids prn (via fluid warmer)  - norepinephrine & vasopressin to maintain MAP > 65. If further pressor support needed then pt will likely need core warming via G tube  - given first doses vancomycin & cefepime in ED > continue on admission  - d/c'ing PTA midline as possible souce  - f/u Bcx, Ucx, sputum cx   - trend lactic Q4 until normalized        MRSA bacteremia    - known from previous admission, placed on 2 week course of vancomycin with end date 8/28.   - f/u repeat bcx drawn in ED  - d/c RUE midline as it may be possible source of sepsis  - continue vancomycin        Oncology   Normocytic anemia    - suspect anemia of chronic disease   - H/H on admission 9.7/29  (at baseline)  - monitor        Meningioma, recurrent of brain    - s/p resection x2  - stable post-surgical changes on CT  - EEG on previous admission without epileptiform discharges or seizures   - continue home keppra BID for sz ppx        Endocrine   Malnutrition of moderate degree    - G tube present with record on bolus feedings on previous admission  - deferring enteral nutrition at this time given high pressor requirements  - will re-visit nutritional status once pt more medically stable        Secondary hypothyroidism    - TSH 0.015, free T4 1  - continue home levothyroxine        Primary central diabetes insipidus    - previous admissions with hypernatremia (up to mid 160s) requiring ddvap (also on ddvap at home)  - hyponatremia noted on admission (Na 128) with CT findings consistent with mild cerebral edema  - holding home DDAVP on admission   - monitor Na Q4          Secondary adrenal insufficiency    - likely contributing to pt's presenting symptoms  - random cortisol level 2.7  - on hydrocortisone at home at   - start stress dose steroids: hydrocortison 100mg Q8        GI   Acute pancreatitis    - lipase > 1000 on admission   - triglycerides 291; unknown h/o ETOH use, however patient appears to have been bouncing between healthcare institutions for the last 3 months and given reported baseline mental status, likely does not have capacity to obtain ETOH  - checking CT abd for further characterization            Critical Care Daily Checklist:    A: Awake: RASS Goal/Actual Goal:    Actual:     B: Spontaneous Breathing Trial Performed?  n/a   C: SAT & SBT Coordinated?  n/a                     D: Delirium: CAM-ICU  n/a   E: Early Mobility Performed? No   F: Feeding Goal:    Status:     Current Diet Order   Procedures    Diet NPO Except for: Medication     Order Specific Question:   Except for     Answer:   Medication      AS: Analgesia/Sedation propofol   T: Thromboembolic Prophylaxis Teds, scds, lovenox    H: HOB > 300 Yes   U: Stress Ulcer Prophylaxis (if needed) famotidine   G: Glucose Control Monitor in setting of exogenous glucocorticoid administration   B: Bowel Function     I: Indwelling Catheter (Lines & García) Necessity TLC, PIV, garcía. Currently necessary given critical status   D: De-escalation of Antimicrobials/Pharmacotherapies Vanc, cefepime initiated 8/30     Plan for the day/ETD resuscitation    Code Status:  Family/Goals of Care: Full Code  Will discuss severity of illness with family     Case discussed with Dr. Thomas    Critical Care Time: 80 minutes  Critical secondary to Patient has a condition that poses threat to life and bodily function: Severe Respiratory Distress, septic shock adrenal insufficiency     Critical care was time spent personally by me on the following activities: development of treatment plan with patient or surrogate and bedside caregivers, discussions with consultants, evaluation of patient's response to treatment, examination of patient, ordering and performing treatments and interventions, ordering and review of laboratory studies, ordering and review of radiographic studies, pulse oximetry, re-evaluation of patient's condition. This critical care time did not overlap with that of any other provider or involve time for any procedures.     Ankita Bah NP  Critical Care Medicine  [facility]

## 2017-09-01 NOTE — PLAN OF CARE
17 0934   Final Note   Assessment Type Final Discharge Note   Discharge Disposition Exp Medical   Hospital Follow Up  Appt(s) scheduled? No   Discharge plans and expectations educations in teach back method with documentation complete? No   Right Care Referral Info   Post Acute Recommendation Other  ( in medical facility)   Cathy Aguilar, RN, BSN  Case Management  [facility]  Ext. [phone]

## 2017-09-02 LAB
BACTERIA SPEC AEROBE CULT: NORMAL
BACTERIA SPEC AEROBE CULT: NORMAL
GRAM STN SPEC: NORMAL
GRAM STN SPEC: NORMAL

## 2017-09-04 LAB
BACTERIA BLD CULT: NORMAL
BACTERIA BLD CULT: NORMAL

## 2017-09-28 NOTE — PROCEDURES
DATE OF PROCEDURE:  07/16/2017    EEG #:  [id number].    LOCATION OF SERVICE:  [facility].    REQUESTING PHYSICIAN:  Dr. Caraballo.    ELECTROENCEPHALOGRAM REPORT     METHODOLOGY:  Electroencephalographic (EEG) recording is recorded with   electrodes placed according to the International 10-20 placement system.  Thirty   two (32) channels of digital signal (sampling rate of 512/sec), including T1   and T2, were simultaneously recorded from the scalp and may include EKG, EMG,   and/or eye monitors.  Recording band pass was 0.1 to 512 Hz.  Digital video   recording of the patient is simultaneously recorded with the EEG.  The patient   is instructed to report clinical symptoms which may occur during the recording   session.  EEG and video recording are stored and archived in digital format.    Activation procedures, which include photic stimulation, hyperventilation and   instructing patients to perform simple tasks, are done in selected patients  The EEG is displayed on a monitor screen and can be reviewed using different   montages.  Computer assisted-analysis is employed to detect spike and   electrographic seizure activity.   The entire record is submitted for computer   analysis.  The entire recording is visually reviewed, and the times identified   by computer analysis as being spikes or seizures are reviewed again.    Compressed spectral analysis (CSA) is also performed on the activity recorded   from each individual channel.  This is displayed as a power display of   frequencies from 0 to 30 Hz over time.   The CSA is reviewed looking for   asymmetries in power between homologous areas of the scalp, then compared with   the original EEG recording.    [employer] software was also utilized in the review of this study.  This software   suite analyzes the EEG recording in multiple domains.  Coherence and rhythmicity   are computed to identify EEG sections which may contain organized seizures.    Each channel undergoes analysis  to detect the presence of spike and sharp waves   which have special and morphological characteristics of epileptic activity.  The   routine EEG recording is converted from special into frequency domain.  This is   then displayed comparing homologous areas to identify areas of significant   asymmetry.  Algorithm to identify non-cortically generated artifact is used to   separate artifact from the EEG.      EEG FINDINGS:  Recording was obtained at the patient's bedside in the patient's   hospital room.  The patient was awake and moving around during the recording.    Background was a mixture of frequencies.  Sections in which irregular 6 to 7 Hz   theta was seen over both hemispheres symmetrically.  Other times, dominant   background was 1/2 to 1 Hz delta, which was seen bilaterally with a frontal maximum.    There is intermixed theta and delta frequencies present.  There were no   lateralized or focal changes noted and no spike or sharp wave activity was seen.    Activation procedures were not carried out.    IMPRESSION:  Markedly abnormal EEG with background very disorganized and slow   indicative of a marked diffuse and nonspecific encephalopathy.  No evidence of   focal changes nor an epileptic process.      RR/HN  dd: 09/28/2017 14:09:55 (CDT)  td: 09/28/2017 14:36:49 (CDT)  Doc ID   #[id number]  Job ID #[id number]    CC:

## 2017-09-29 PROBLEM — G93.40 ACUTE ENCEPHALOPATHY: Status: ACTIVE | Noted: 2017-09-29

## 2018-03-01 NOTE — MEDICAL/APP STUDENT
General Infectious Diseases: Consult Note    Consult Requested By: Sonya Dc MD    Reason for Consult:       IMPRESSION:  Patient is a 51 y.o. female with a PMH of tobacco abuse, secondary hypothyroidism (TSH 0.241 on 6/17/17), and meningioma s/p resection in 2015 and 6/7/17. Most recent surgery complicated by DI. Pt was discharged after sx to [facility] rehab 6/26/17 and presented on 6/2817 due to worsening hypernatremia and altered mental status. Ms. Morales has been followed by Neurology and Endocrinology since re-admission on 6/28/17 with an unremarkable w/u to date. PEG tube placement on  7/17/17 complicated by post-surgical acute hypoxic respiratory failure. CXR demonstrated new RLL infiltrate most likely due to aspiration pneumonia. Pt being treated for aspiration pneumonia vs meningitis.     PLAN:  - Continue the following:                        - Cefepime                        - Acyclovir  - LP on 7/20/17 does not show WBC elevation of CSF. VDRL and HSV PCR still pending. Discontinued Fluconazole and Vanc.   - Sputum culture - f/u - no organisms seen 7/19/17  - blood cx - NGTD      Olamide Holguin MS 4    SUBJECTIVE:     History of Present Illness:  Patient is a 51 y.o. female with a PMH of tobacco abuse, secondary hypothyroidism (TSH 0.241 on 6/17/17), and meningioma s/p resection in 2015 and 6/7/17. Most recent surgery complicated by DI. Pt was discharged after sx to [facility] rehab 6/26/17 and presented on 6/2817 due to worsening hypernatremia and altered mental status. Ms. Morales has been followed by Neurology and Endocrinology since re-admission on 6/28/17 with an unremarkable w/u to date.      PEG tube placement on  7/17/17 complicated by post-surgical acute hypoxic respiratory failure. CXR demonstrated new RLL infiltrate most likely due to aspiration pneumonia. One dose of Rocephin given. Patient then started on Zosyn and Vancomycin on 7/17/17. WBC 34 on 7/18/17. Changed to  Zantac refilled per protocol. LOV 02/15/2018. Cefepime and Vancomycin on 7/18/17. Fluconazole and Acyclovir also added on 7/18/17. WBC 18 on 7/19/17. UA negative on 7/17/17.      Neurology re-consulted due to facial weakness noted s/p PEG tube placement for concern of CVA vs seizure on 7/18/17. MRI unremarkable.     Interval Hx - Pt did well o/n with no acute events. LP was performed yesterday, 6/20/17. Vanc Trough 23.5, so 1450 dose held. Blood cx for 7/17/17 lost, so blood cx redrawn on 7/20/17.  WBC stable at 10.42. Pt remained afebrile.   EEG completed by neurology depicting waking background slower than expected - mild static encephalopathy with focal slowing over L side.     Past Medical History:  Past Medical History:   Diagnosis Date    Allergy     Basal cell carcinoma     Depression 11/1/2016    Essential hypertension 6/9/2017    Eye disorder     Fever blister     Normocytic anemia 6/9/2017    Secondary hypothyroidism 6/26/2017       Past Surgical History:  Past Surgical History:   Procedure Laterality Date    BASAL CELL CARCINOMA EXCISION      forehead    BRAIN SURGERY      SKIN BIOPSY         Family History:  Family History   Problem Relation Age of Onset    Diabetes Father     Hypertension Father     Hepatitis Father     No Known Problems Mother        Social History:  Social History   Substance Use Topics    Smoking status: Current Every Day Smoker     Packs/day: 1.00     Years: 40.00     Types: Cigarettes    Smokeless tobacco: Never Used    Alcohol use No       Allergies:  Review of patient's allergies indicates:   Allergen Reactions    Codeine         Pertinent Medications:  Antibiotics     Start     Stop Route Frequency Ordered    07/21/17 0934  cefepime in dextrose 5 % 1 gram/50 mL IVPB 1 g      07/28 0944 IV Every 8 hours (non-standard times) 07/21/17 0822        acyclovir (ZOVIRAX) 600 mg in dextrose 5 % 100 mL IVPB  Dose: 10 mg/kg Freq: Every 12 hours (non-standard times) Route: IV  Start: 07/18/17 1230      Review of  Symptoms:  Unable to obtain due to AMS.     OBJECTIVE:     Vital Signs (Most Recent)  Temp: 98 °F (36.7 °C) (07/21/17 0700)  Pulse: 75 (07/21/17 1000)  Resp: 16 (07/21/17 1000)  BP: (!) 109/59 (07/21/17 1000)  SpO2: 95 % (07/21/17 1000)    Temperature Range Min/Max (Last 24H):  Temp:  [97 °F (36.1 °C)-98 °F (36.7 °C)]     Physical Exam:  Gen: lethargic. Pt with eyes open. Oriented to person but not time or place.  HEENT- pupils reactive to light. L pupil > R pupil  Pulmonary: Non labored. Clear to auscultation A/P/L. No wheezing, crackles, or rhonchi.   Cardiac: Normal S1 & S2 w/o rubs/murmurs/gallops.   Abdomen: Normal bowel sounds. Nondistended. Pt winces to palpation over epigastric region. No rebound or guarding. PEG tube placement site without discharge or erythema.   Extremities: No cyanosis, or peripheral edema.   Neurological: Lethargic. Strength 2/5 LUE and LLE, 4/5 RLE and RUE.   Skin: No jaundice, rashes, or visible lesions.    Laboratory:  CBC:   Lab Results   Component Value Date    WBC 10.42 07/21/2017    HGB 8.6 (L) 07/21/2017    HCT 26.2 (L) 07/21/2017    MCV 96 07/21/2017     07/21/2017       BMP:   Recent Labs  Lab 07/21/17  0329 07/21/17  0741   * 99    137   K 3.8 4.5    102   CO2 28 28   BUN 18 17   CREATININE 0.9 0.8   CALCIUM 10.0 10.1   MG 1.8  --        LFT: Lab Results   Component Value Date    ALT 29 07/18/2017    AST 35 07/18/2017    ALKPHOS 158 (H) 07/18/2017    BILITOT 0.5 07/18/2017     CSF results:  WBC 74 RBC 9 Segs 1 Lymps 91 Mono 8 Eos 1 Glu 53 Protein 49. Crypto Ag negative. HSV PCR pending. VDRL pending.       Microbiology:  7/19/17- Sputum Cx - No organisms seen   7/20/17 - Blood Cx - NGTD      Diagnostic Results:  CXR- 7/19/17- Lung zones appear stable, with some continued demonstration of minimal patchy airspace consolidation in the right lower lung zone, but with no evidence of superimposed airspace consolidation or volume loss noted.  The right  lower lung zone appears better aerated on this examination than on earlier studies of 7/18/17 at 10:35 AM and 7/17/17.     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

## 2019-11-18 NOTE — ASSESSMENT & PLAN NOTE
Pt stated that the pharmacy is needing a different diagnosis code on the test strips script. Pharmacy # [phone]   Stable. Likely due to poor activity. Improved with administration of pamidronate on 7/27.  1. PT/OT.  2. Monitor calcium level.

## 2020-04-17 NOTE — PT/OT/SLP PROGRESS
Problem: Occupational Therapy Goal  Goal: Occupational Therapy Goal  Description  Goals to be met by: 5/13/20     Patient will increase functional independence with ADLs by performing:    UE Dressing with Modified [place] and Supervision.  LE Dressing with Modified [place] and Supervision.  Grooming while standing at sink with Modified [place] and Supervision.  Supine to sit with Modified [place] and Supervision.  Step transfer with Modified [place] and Supervision  Toilet transfer to toilet with Modified [place] and Supervision.  Upper extremity exercise program x10 reps per handout, with independence.  Educate the patient re: Home Safety and Energy Conservation     Outcome: Ongoing, Progressing   The patient participated in OT with max encouragement. The patient had a flat affect and was weaker than in previous OT treatments.    Speech Language Pathology  Discharge      Joanna Morales  MRN: [id number]    Patient not seen today secondary to Other (Comment) ((Pt unable to rouse to safely participate in ongoing assessment of swallow)). Also unable to rouse to safely participate in ongoing assessment of swallow on 6/30 and 7/3. Please re-consult when more appropriate.     HILDA Rascon, CCC-SLP  [phone]  7/5/2017

## 2020-12-16 NOTE — ED TRIAGE NOTES
Pt comes from [facility], pt was discharged home from the hospital yesterday. Family of the pt states they noticed the pt spitting up green stuff. According to EMS, Nursing home took 2 Xrays and found bilateral pneumonia. Pt is currently not alert or oriented ( Previous head bleed in the past)   Yes

## 2021-07-13 NOTE — PROGRESS NOTES
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  She underwent operation in January 2015 on the [facility] service at [facility].  It   was apparently only possible to get a partial resection of the tumor.  She did   have a followup scan done in 2016 showing a continued large meningioma in this   area.  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  She had a   followup MRI scan done at [facility] on 01/20/17, showing this large tumor.  She   was seen by you in Ophthalmology and then referred to Neurosurgery for further   evaluation.  At this point, she has some preserved vision in the right eye.  She   complains of constant headaches.  She has no sense of smell.  She feels off   balance.  She is taking Neurontin apparently for seizure prevention. Past   medical history relates primarily to the present illness.  She does not have   chronic medical illnesses such as diabetes or hypertension.  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  She is  and has good family support.     On physical examination, she is a well-developed, well-nourished white lady, who   is alert and oriented.  Examination of the head shows a somewhat anterior, but   well-healed bicoronal incision.  She says that this is a little tender in the   left frontal area.  Eyes show full extraocular movements.  The pupils are small,   the left is not reactive.  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  The right optic nerve is probably normal.  She   sees things as doubles.  When I held up two  fingers she counted four, although   she knew that there were two.  Hearing seems preserved.  The neck is supple.  On   neurological examination, she is speaking clearly.  She is obviously anxious   and depressed over her illness.  Finger-to-nose is somewhat diminished on the   left side and gait was mildly unsteady.  Cranial nerves are otherwise intact.    She has normal facial sensation and movement.  The tongue protrudes in the   midline.  Strength in the extremities seems generally good, sensation normal and   deep tendon reflexes symmetrical.     MRI of the brain performed at [facility] on 01/20/17, shows a   previous bifrontal craniotomy.  There is a large lobular meningioma, which   occupies the sella, planum sphenoidale and extends back on to the clivus.  The   optic chiasm and optic nerves are not visualized.  The carotid arteries and   anterior cerebral arteries run done through the tumor.  The tumor elevates and   pushes the third ventricle back, but there is no hydrocephalus    Post-Op Info:  Procedure(s) (LRB):  CRANIOTOMY WITH STEALTH (N/A)   7 Days Post-Op     Interval History: No AE. AFVSS.     Medications:  Continuous Infusions:   sodium chloride 0.9% 75 mL/hr at 06/14/17 0900     Scheduled Meds:   amitriptyline  100 mg Oral Nightly    bisacodyl  10 mg Rectal Daily    desmopressin  10 mcg Nasal BID    gabapentin  300 mg Oral TID    heparin (porcine)  5,000 Units Subcutaneous Q8H    hydrocortisone  10 mg Oral QAM    hydrocortisone  5 mg Oral QHS    nicotine  1 patch Transdermal Daily    pantoprazole  40 mg Oral BID    polyethylene glycol  17 g Oral Daily     PRN Meds:acetaminophen, acetaminophen, acetaminophen, calcium gluconate IVPB, calcium gluconate IVPB, calcium gluconate IVPB, desmopressin, haloperidol lactate, hydrALAZINE, hydrocodone-acetaminophen 5-325mg, magnesium sulfate IVPB, magnesium sulfate IVPB, magnesium sulfate IVPB, magnesium sulfate IVPB, metoclopramide  HCl, metoclopramide HCl, potassium chloride **AND** potassium chloride **AND** potassium chloride, potassium chloride 10%, potassium chloride 10%, potassium chloride 10%, potassium, sodium phosphates, potassium, sodium phosphates, potassium, sodium phosphates, ramelteon, sodium phosphate IVPB, sodium phosphate IVPB, sodium phosphate IVPB     Review of Systems    Objective:     Weight: 60 kg (132 lb 4.4 oz)  Body mass index is 24.19 kg/m².  Vital Signs (Most Recent):  Temp: 97.8 °F (36.6 °C) (17 0700)  Pulse: 66 (17 0900)  Resp: 14 (17 0900)  BP: 118/74 (17 0900)  SpO2: 100 % (17 09) Vital Signs (24h Range):  Temp:  [97.1 °F (36.2 °C)-97.8 °F (36.6 °C)] 97.8 °F (36.6 °C)  Pulse:  [61-95] 66  Resp:  [13-45] 14  SpO2:  [91 %-100 %] 100 %  BP: (107-144)/(59-94) 118/74              Temp (24hrs), Av.5 °F (36.4 °C), Min:97.1 °F (36.2 °C), Max:97.8 °F (36.6 °C)           Date 17 0700 - 06/15/17 0659   Shift 2028-7634 3073-0016 5396-4724 24 Hour Total   I  N  T  A  K  E   I.V.  (mL/kg) 225  (3.8)   225  (3.8)    Shift Total  (mL/kg) 225  (3.8)   225  (3.8)   O  U  T  P  U  T   Urine  (mL/kg/hr) 325   325    Shift Total  (mL/kg) 325  (5.4)   325  (5.4)   Weight (kg) 60 60 60 60          Closed/Suction Drain 17 1800 Anterior;Left Scalp Accordion 10 Fr. (Active)   Site Description Unable to view 2017  3:05 AM   Dressing Type [facility] 2017  3:05 AM   Dressing Status Clean;Intact;Dry 2017  3:05 AM   Dressing Intervention Dressing reinforced 2017  3:05 AM   Drainage Serosanguineous 2017  3:05 AM   Status To bulb suction 2017  3:05 AM   Output (mL) 0 mL 2017 10:00 PM            Urethral Catheter 17 0823 Straight-tip;Non-latex 16 Fr. (Active)   Site Assessment Clean;Intact 2017  3:05 AM   Collection Container Urimeter 2017  3:05 AM   Securement Method secured to top of thigh w/ adhesive device 2017  3:05 AM   Catheter Care  "Performed yes 6/13/2017  3:05 AM   Reason for Continuing Urinary Catheterization Critically ill in ICU requiring intensive monitoring 6/13/2017  3:05 AM   CAUTI Prevention Bundle StatLock in place w 1" slack;Intact seal between catheter & drainage tubing;Drainage bag off the floor;Green sheeting clip in use;No dependent loops or kinks;Drainage bag not overfilled (<2/3 full) 6/12/2017 11:00 AM   Output (mL) 35 mL 6/13/2017  8:00 AM       Neurosurgery Physical Exam     AA. NAD. Not oriented to time or place.   Pupils nonreactive bilaterally. EOMI. Unable to sense light.   ALEMAN symm  SILT  No drift    Significant Labs:    Recent Labs  Lab 06/14/17  0316 06/14/17  0936   GLU 89  --    *  153* 157*   K 3.7  --    *  --    CO2 29  --    BUN 20  --    CREATININE 0.8  --    CALCIUM 9.9  --    MG 2.3  --        Recent Labs  Lab 06/13/17  0400 06/14/17 0316   WBC 10.46 13.55*   HGB 14.3 14.6   HCT 42.9 45.2    309       Recent Labs  Lab 06/14/17 0316   INR 1.0   APTT 22.4     Microbiology Results (last 7 days)     Procedure Component Value Units Date/Time    Blood culture [[id number]]     Order Status:  No result Specimen:  Blood     Blood culture [[id number]]     Order Status:  No result Specimen:  Blood     Culture, Respiratory with Gram Stain [[id number]]     Order Status:  No result Specimen:  Respiratory             Significant Diagnostics:      Assessment/Plan:     Meningioma, recurrent of brain    50 y/o F s/p crani resection olfactory groove meningioma POD#7    Neuro stable  Cont neuro checks  Continue cortef 5 q am, 10 pm.  Monitor for DI  HV out  CV- SBP < 160  Pulm- on RA.   FENGI- monitor for DI, fu urine studies, fu endocrine recs.  Heme/ID- afebrile. hgb/hct stable.   ppx- ANA/SCD's. Gi ppx. Sqh.   PT, OT, ST.   Dispo- cont ICU care. F.u social work re: placement planning/dischargeneeds.             Andrew Balderrama MD  Neurosurgery  [facility]  " Admission Reconciliation is Completed  Discharge Reconciliation is Not Complete Admission Reconciliation is Completed  Discharge Reconciliation is Completed

## 2021-09-20 NOTE — SUBJECTIVE & OBJECTIVE
"Interval HPI:   Overnight events:  Serum Na  148 this am    Net -2655 cc   Patient currently receiving Desmopressin 10 mcg intranasal prn      /63 (BP Location: Right arm, Patient Position: Lying, BP Method: Automatic)   Pulse 71   Temp 97.7 °F (36.5 °C) (Oral)   Resp 20   Ht 5' 2" (1.575 m)   Wt 60.1 kg (132 lb 7.9 oz)   SpO2 96%   Breastfeeding? No   BMI 24.23 kg/m²     Labs Reviewed and Include      Recent Labs  Lab 06/18/17  0415   GLU 86   CALCIUM 9.5   ALBUMIN 3.1*   PROT 6.9   *  148*   K 3.8   CO2 24   *   BUN 10   CREATININE 0.7   ALKPHOS 93   ALT 77*   AST 57*   BILITOT 0.2     Lab Results   Component Value Date    WBC 7.02 06/17/2017    HGB 13.1 06/18/2017    HCT 40.6 06/18/2017    MCV 98 06/18/2017     06/18/2017       Recent Labs  Lab 06/15/17  1007   FREET4 0.83     No results found for: HGBA1C    Nutritional status:   Body mass index is 24.23 kg/m².  Lab Results   Component Value Date    ALBUMIN 3.1 (L) 06/18/2017    ALBUMIN 3.1 (L) 06/17/2017    ALBUMIN 2.8 (L) 06/16/2017     No results found for: PREALBUMIN    Estimated Creatinine Clearance: 75.2 mL/min (based on Cr of 0.7).    Accu-Checks  Recent Labs      06/16/17   1752  06/17/17   0109  06/17/17   1841   POCTGLUCOSE  60*  77  76       Current Medications and/or Treatments Impacting Glycemic Control  Immunotherapy:  Immunosuppressants     None        Steroids:   Hormones     Start     Stop Route Frequency Ordered    06/17/17 1712  desmopressin nasal spray 10 mcg      -- Nasl As needed (PRN) 06/17/17 1614    06/12/17 2100  hydrocortisone tablet 5 mg      -- Oral Nightly 06/12/17 1059    06/12/17 1100  hydrocortisone tablet 10 mg      -- Oral Every morning 06/12/17 1059        Pressors:    Autonomic Drugs     None        Hyperglycemia/Diabetes Medications: Antihyperglycemics     None        " done

## 2022-09-19 NOTE — ASSESSMENT & PLAN NOTE
Evaluated by SLP.  1. Continue NPO.  2. SLP to continue working with patient.     What Type Of Note Output Would You Prefer (Optional)?: Bullet Format How Severe Is Your Skin Lesion?: moderate Has Your Skin Lesion Been Treated?: not been treated Is This A New Presentation, Or A Follow-Up?: Skin Lesion

## 2022-11-02 NOTE — PROGRESS NOTES
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 Chew and discharged on 17 to [facility].  She presents with altered mental status and worsening hypernatremia for 1 day. She was treated for DI her last admission and labs are trending up today.  Spoke with Dr. Fairchild at [facility], patient with waxing/waning mental status yesterday, but was appropriate & following some complex commands yesterday.  There was a period yesterday of significant lethargy.  Patient is unable to give history, and information is taken from the chart.     Post-Op Info:  * No surgery found *         Interval History: Pt is drowsy, but arousable.  Knows name, cannot answer questions appropriately.    Medications:  Continuous Infusions:   dextrose 5 % 150 mL/hr at 17 1500     Scheduled Meds:   enoxaparin  40 mg Subcutaneous Daily    gabapentin  300 mg Per NG tube TID    levetiracetam IVPB  500 mg Intravenous Q12H    nicotine  1 patch Transdermal Daily    sodium chloride 0.9%  3 mL Intravenous Q8H     PRN Meds:acetaminophen, ondansetron, ramelteon     Review of Systems  Objective:     Weight: 52.5 kg (115 lb 12.8 oz)  Body mass index is 21.18 kg/m².  Vital Signs (Most Recent):  Temp: 97.4 °F (36.3 °C) (17 1500)  Pulse: 101 (17 1500)  Resp: 12 (17 1500)  BP: 117/84 (17 1500)  SpO2: 97 % (17 1500) Vital Signs (24h Range):  Temp:  [97.4 °F (36.3 °C)-98.2 °F (36.8 °C)] 97.4 °F (36.3 °C)  Pulse:  [] 101  Resp:  [11-18] 12  SpO2:  [93 %-97 %] 97 %  BP: ()/(70-93) 117/84              Temp (24hrs), Av.7 °F (36.5 °C), Min:97.4 °F (36.3 °C), Max:98.2 °F (36.8 °C)           Date 17 0700 - 17 0659   Shift 8687-0393 4638-8407 5119-7468 24 Hour Total   I  N  T  A  K  E   I.V.  (mL/kg) 602.5  (11.5) 150  (2.9)  752.5  (14.3)    NG/GT  "400   400    IV Piggyback 100   100    Shift Total  (mL/kg) 1102.5  (21) 150  (2.9)  1252.5  (23.8)   O  U  T  P  U  T   Urine  (mL/kg/hr) 865  (2.1) 100  965    Shift Total  (mL/kg) 865  (16.5) 100  (1.9)  965  (18.4)   Weight (kg) 52.5 52.5 52.5 52.5          NG/OG Tube 06/29/17 1212 [place] sump Right nostril (Active)   Intake (mL) 400 mL 6/29/2017  1:00 PM            Urethral Catheter 06/15/17 1205 Latex (Active)   Site Assessment Clean;Intact 6/29/2017  3:00 PM   Collection Container Standard drainage bag 6/29/2017  3:00 PM   Securement Method secured to top of thigh w/ adhesive device 6/29/2017  3:00 PM   Catheter Care Performed yes 6/29/2017  3:00 PM   Reason for Continuing Urinary Catheterization Critically ill in ICU requiring intensive monitoring 6/29/2017  3:00 PM   CAUTI Prevention Bundle StatLock in place w 1" slack;Intact seal between catheter & drainage tubing;Drainage bag off the floor;Green sheeting clip in use;No dependent loops or kinks;Drainage bag not overfilled (<2/3 full);Drainage bag below bladder 6/29/2017  9:15 AM   Output (mL) 100 mL 6/29/2017  3:00 PM       Neurosurgery Physical Exam   General: well developed, well nourished, no distress  Head: normocephalic, atraumatic  Neurologic: Alert and oriented. Thought content appropriate  GCS: Motor: 5/Verbal: 3/Eyes: 3 GCS Total: 11  Mental Status: Arousable  Language: No aphasia  Speech: Dysarthria  Cranial nerves: face symmetric, tongue midline, CN II-XII grossly intact.   Eyes: pupils NR bilaterally  Pulmonary: normal respirations, not labored, no accessory muscles used  Abdomen: soft, non-distended, not tender to palpation  Sensory: intact to light touch throughout  Motor Strength: Moves all extremities spontaneously with good tone. No abnormal movements seen.   Pronator Drift: Unable to assess  Finger-to-nose: Unable to assess  Mcguire: absent  Clonus: absent  Babinski: absent  Pulses: 2+ and symmetric radial and dorsalis pedis  Skin: warm, " dry and intact, no rashes  Incision is healing well.         Significant Labs:    Recent Labs  Lab 06/29/17  0559  06/29/17  1406   GLU 91  < > 168*   *  < > 163*   K 3.8  < > 3.6   *  < > 125*   CO2 28  < > 28   BUN 19  < > 19   CREATININE 1.1  < > 1.2   CALCIUM 11.0*  < > 10.5   MG 2.5  --   --    < > = values in this interval not displayed.    Recent Labs  Lab 06/28/17  1422 06/29/17  0559 06/29/17  1018   WBC 9.77 9.00 8.22   HGB 15.5 14.1 15.0   HCT 46.9 45.1 48.4    334 349       Recent Labs  Lab 06/29/17  1018   INR 1.1     Microbiology Results (last 7 days)     Procedure Component Value Units Date/Time    Urine culture [[id number]] Collected:  06/28/17 1733    Order Status:  No result Specimen:  Urine Updated:  06/28/17 1733          Significant Diagnostics:  No new imaging    Assessment/Plan:     Meningioma    51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 Chew 6/7/17  -Tx to CMICU this am for dec in MS as well as Na 148  -Endocrine consulted for management of DI  -No acute neurosurgical intervention indicated  -CT Head with improvement compared to CT Head on 6/15/17  -Continue Keppra for seizure prophylaxis  -Seizure precautions  -Discuss with Dr. Jeevan Paulino PARamyaC  Neurosurgery  [facility]   Lab Facility: [id number] Lab Facility: [id number]

## 2022-12-21 NOTE — ASSESSMENT & PLAN NOTE
Patient was counseled on smoking cessation and she will be provided a nicotine transdermal patch applied while inpatient.  Will provide additional smoking cessation counseling prior to discharge.   on unit

## 2023-08-31 NOTE — ANESTHESIA PREPROCEDURE EVALUATION
06/06/2017  Pre-operative evaluation for Procedure(s) (LRB):  CRANIOTOMY WITH STEALTH (N/A)    Joanna Morales is a 51 y.o. 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  She underwent operation in January 2015 on the [employer] service at [facility].  It was apparently only possible to get a partial resection of the tumor.  She did have a followup scan done in 2016 showing a continued large meningioma in this area.  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  She had a   followup MRI scan done at [facility] on 01/20/17, showing this large tumor.  She was seen by you in Ophthalmology and then referred to Neurosurgery for further evaluation.   She is now scheduled for above procedure.     Prev airway: None documented      Patient Active Problem List   Diagnosis    Normal tension glaucoma of both eyes    Exotropia of left eye    Brain mass    Eyelid lesion    S/P hysterectomy - post-partum hyst 1996    Meningioma    History of hyperprolactinemia    HLD (hyperlipidemia)    Depression    Insomnia    Chronic nonintractable headache    Left breast mass       Review of patient's allergies indicates:   Allergen Reactions    Codeine         No current facility-administered medications on file prior to encounter.      Current Outpatient Prescriptions on File Prior to Encounter   Medication Sig Dispense Refill    amitriptyline (ELAVIL) 100 MG tablet Take 1 tablet (100 mg total) by mouth nightly. 90 tablet 3    EUCALYPTUS OIL/MENTHOL/CAMPHOR (VICKS VAPORUB TOP) Apply topically daily as needed.      gabapentin (NEURONTIN) 300 MG capsule Take 1 capsule (300 mg total) by mouth 3  (three) times daily. 90 capsule 8    hydrocodone-acetaminophen 5-325mg (NORCO) 5-325 mg per tablet Take 1 tablet by mouth every 12 (twelve) hours as needed (severe pain). 60 tablet 0       Past Surgical History:   Procedure Laterality Date    BASAL CELL CARCINOMA EXCISION      forehead    BRAIN SURGERY      SKIN BIOPSY         Social History     Social History    Marital status:      Spouse name: N/A    Number of children: N/A    Years of education: 10     Occupational History    Not on file.     Social History Main Topics    Smoking status: Current Every Day Smoker     Packs/day: 1.00     Years: 40.00     Types: Cigarettes    Smokeless tobacco: Never Used    Alcohol use No    Drug use: No    Sexual activity: No     Other Topics Concern    Are You Pregnant Or Think You May Be? No    Breast-Feeding No     Social History Narrative    No narrative on file         Vital Signs Range (Last 24H):         CBC: No results for input(s): WBC, RBC, HGB, HCT, PLT, MCV, MCH, MCHC in the last 72 hours.    CMP: No results for input(s): NA, K, CL, CO2, BUN, CREATININE, GLU, MG, PHOS, CALCIUM, ALBUMIN, PROT, ALKPHOS, ALT, AST, BILITOT in the last 72 hours.    INR  No results for input(s): INR, PROTIME, APTT in the last 72 hours.    Invalid input(s): PT        Diagnostic Studies:      EKG:  Normal sinus rhythm  Within normal limits  No previous ECGs available  Confirmed by Deepak Naranjo MD ([id number]) on 5/19/2017 9:53:20 AM      Anesthesia Evaluation    I have reviewed the Patient Summary Reports.     I have reviewed the Medications.     Review of Systems  Anesthesia Hx:  History of prior surgery of interest to airway management or planning: Denies Family Hx of Anesthesia complications.   Denies Personal Hx of Anesthesia complications.   Hematology/Oncology:  Hematology Normal        EENT/Dental:   Left eye blindness, right eye vision loss   Cardiovascular:  Cardiovascular Normal     Pulmonary:  Pulmonary Normal     Renal/:  Renal/ Normal     Hepatic/GI:  Hepatic/GI Normal    Neurological:   Headaches Meningioma (s/p partial resection)   Endocrine:  Endocrine Normal    Psych:   Psychiatric History depression             Anesthesia Plan  Type of Anesthesia, risks & benefits discussed:  Anesthesia Type:  general  Patient's Preference: general  Intra-op Monitoring Plan: standard ASA monitors and arterial line  Intra-op Monitoring Plan Comments:   Post Op Pain Control Plan:   Post Op Pain Control Plan Comments:   Induction:   IV  Beta Blocker:  Patient is not currently on a Beta-Blocker (No further documentation required).       Informed Consent: Patient understands risks and agrees with Anesthesia plan.  Questions answered. Anesthesia consent signed with patient.  ASA Score: 2     Day of Surgery Review of History & Physical:    H&P update referred to the surgeon.         Ready For Surgery From Anesthesia Perspective.        DISPLAY PLAN FREE TEXT

## 2023-10-06 NOTE — ASSESSMENT & PLAN NOTE
- Sodium elevated at 151 initially; repeat ISTAT with sodium 147.  - History of diabetes insipidus with prior administration of desmopressin.  - s/p 1L LR in ED.  - Would recommend continued fluids especially in setting of suspected infection.  - Would continue to monitor, ~q4-6h.   Severe protein-calorie malnutrition

## 2024-07-05 NOTE — PLAN OF CARE
12:53 PM  SW received call from pt's friend stating she will be able to afford 250 dollars. Friend stated she has not been able to get in touch with Ghazal at [facility]. Called Ghazal and informed her friend has been trying to reach her. Ghazal stated she would call friend after lunch.     Jodi Royal LMSW   [facility]  Ext [phone]     n/a

## 2024-09-18 NOTE — PROCEDURES
"Joanna Morales is a 51 y.o. female patient.    Temp: 97.8 °F (36.6 °C) (07/20/17 1100)  Pulse: 91 (07/20/17 1400)  Resp: (!) 26 (07/20/17 1400)  BP: 139/79 (07/20/17 1400)  SpO2: (!) 94 % (07/20/17 1400)  Weight: 60 kg (132 lb 4.4 oz) (07/20/17 1400)  Height: 5' 2" (157.5 cm) (07/20/17 1400)       Lumbar Puncture  Date/Time: 7/20/2017 3:36 PM  Location procedure was performed: [facility] ([facility])  Performed by: LUCILLE ARNOLD  Authorized by: LUCILLE ARNOLD   Assisting provider: JANET LOPEZ  Pre-operative diagnosis:  Acute metabolic encephalopathy  Post-operative diagnosis: acute metabolic encephalopathy  Consent Done: Yes  Indications: evaluation for infection and evaluation for altered mental status    Anesthesia:  Local Anesthetic: lidocaine 1% with epinephrine  Anesthetic total: 3 mL  Patient sedated: no  Preparation: Patient was prepped and draped in the usual sterile fashion.  Lumbar space: L3-L4 interspace  Patient's position: left lateral decubitus  Needle gauge: 18  Needle type: spinal needle - Quincke tip  Needle length: 1.5 in  Number of attempts: 1  Opening pressure: 6 cm H2O  Fluid appearance: clear  Tubes of fluid: 4  Total volume: 11 ml  Post-procedure: site cleaned, adhesive bandage applied and pressure dressing applied  Complications: No  Estimated blood loss (mL): 1  Patient tolerance: Patient tolerated the procedure well with no immediate complications          Lucille Arnold  7/20/2017  " DISPLAY PLAN FREE TEXT DISPLAY PLAN FREE TEXT DISPLAY PLAN FREE TEXT DISPLAY PLAN FREE TEXT DISPLAY PLAN FREE TEXT DISPLAY PLAN FREE TEXT DISPLAY PLAN FREE TEXT DISPLAY PLAN FREE TEXT DISPLAY PLAN FREE TEXT DISPLAY PLAN FREE TEXT
